# Patient Record
Sex: MALE | Race: WHITE | NOT HISPANIC OR LATINO | Employment: OTHER | URBAN - METROPOLITAN AREA
[De-identification: names, ages, dates, MRNs, and addresses within clinical notes are randomized per-mention and may not be internally consistent; named-entity substitution may affect disease eponyms.]

---

## 2017-03-04 ENCOUNTER — HOSPITAL ENCOUNTER (EMERGENCY)
Facility: CLINIC | Age: 82
Discharge: HOME | End: 2017-03-05
Attending: FAMILY MEDICINE | Admitting: FAMILY MEDICINE
Payer: COMMERCIAL

## 2017-03-04 ENCOUNTER — APPOINTMENT (OUTPATIENT)
Dept: GENERAL RADIOLOGY | Facility: CLINIC | Age: 82
End: 2017-03-04
Attending: FAMILY MEDICINE
Payer: COMMERCIAL

## 2017-03-04 DIAGNOSIS — M25.552 HIP PAIN, LEFT: ICD-10-CM

## 2017-03-04 PROCEDURE — 72100 X-RAY EXAM L-S SPINE 2/3 VWS: CPT

## 2017-03-04 PROCEDURE — 25000132 ZZH RX MED GY IP 250 OP 250 PS 637: Performed by: FAMILY MEDICINE

## 2017-03-04 PROCEDURE — 73502 X-RAY EXAM HIP UNI 2-3 VIEWS: CPT

## 2017-03-04 PROCEDURE — 99284 EMERGENCY DEPT VISIT MOD MDM: CPT | Mod: 25

## 2017-03-04 PROCEDURE — 99284 EMERGENCY DEPT VISIT MOD MDM: CPT | Performed by: FAMILY MEDICINE

## 2017-03-04 RX ORDER — HYDROCODONE BITARTRATE AND ACETAMINOPHEN 5; 325 MG/1; MG/1
1 TABLET ORAL ONCE
Status: COMPLETED | OUTPATIENT
Start: 2017-03-04 | End: 2017-03-04

## 2017-03-04 RX ORDER — LIDOCAINE 50 MG/G
1 PATCH TOPICAL
Status: DISCONTINUED | OUTPATIENT
Start: 2017-03-04 | End: 2017-03-05 | Stop reason: HOSPADM

## 2017-03-04 RX ADMIN — HYDROCODONE BITARTRATE AND ACETAMINOPHEN 1 TABLET: 5; 325 TABLET ORAL at 23:36

## 2017-03-04 RX ADMIN — LIDOCAINE 1 PATCH: 50 PATCH TOPICAL at 23:37

## 2017-03-04 NOTE — ED AVS SNAPSHOT
[facility] Emergency Department    [address] [facility] [address]    Phone:  [phone]    Fax:  [phone]                                       Alvin Newton   MRN: [id number]    Department:  [facility] Emergency Department   Date of Visit:  3/4/2017           After Visit Summary Signature Page     I have received my discharge instructions, and my questions have been answered. I have discussed any challenges I see with this plan with the nurse or doctor.    ..........................................................................................................................................  Patient/Patient Representative Signature      ..........................................................................................................................................  Patient Representative Print Name and Relationship to Patient    ..................................................               ................................................  Date                                            Time    ..........................................................................................................................................  Reviewed by Signature/Title    ...................................................              ..............................................  Date                                                            Time

## 2017-03-04 NOTE — ED AVS SNAPSHOT
[facility] Emergency Department    [address] [place] [address]    Phone:  [phone]    Fax:  [phone]                                       Alvin Newton   MRN: [id number]    Department:  [facility] Emergency Department   Date of Visit:  3/4/2017           Patient Information     Date Of Birth          5/10/1934        Your diagnoses for this visit were:     Hip pain, left This m,ay be coming from the hip replacement, the SI joint or the spine-lumbar., xray pover-read is currently pending.   I have written for medrol dose pack and vicodin.  remove the lidoderm patch from the low back tomorrow morning.  Lidocare is the OTC replacement that is available at [facility].  can apply in pm and remove in am. return for fever, worsening.       You were seen by Finesse Gonzalez MD.      Follow-up Information     Schedule an appointment as soon as possible for a visit with Semmler, Steven Duane, MD.    Specialty:  Family Practice    Contact information:    [facility]  [address] [facility] [id number] [phone]          Follow up with ortho .    Why:  they will call        Follow up with [facility] Emergency Department.    Specialty:  EMERGENCY MEDICINE    Why:  As needed, If symptoms worsen    Contact information:    [address] [place] [address] [phone]    Additional information:    The medical center is located at   [address] [place] [address] (between [address]   [address] in [place], [place] miles north   of [place]).        Discharge Instructions         ICD-10-CM    1. Hip pain, left M25.552 ORTHO  REFERRAL    This m,ay be coming from the hip replacement, the SI joint or the spine-lumbar., xray pover-read is currently pending.   I have written for medrol dose pack and vicodin.  remove the lidoderm patch from the low back tomorrow morning.  Lidocare is the OTC replacement that is available at [facility].  can apply in pm and remove in am.  return for fever, worsening.         24 Hour Appointment Hotline       To make an appointment at any [facility] clinic, call [phone] ([phone]). If you don't have a family doctor or clinic, we will help you find one. [facility] clinics are conveniently located to serve the needs of you and your family.          ED Discharge Orders     ORTHO  REFERRAL       [facility] is referring you to the Orthopedic  Services at [facility].       The  Representative will assist you in the coordination of your Orthopedic and Musculoskeletal Care as prescribed by your physician.    The  Representative will call you within 1 business day to help schedule your appointment, or you may contact the  Representative at:    All areas ~ [phone]     Type of Referral : Surgical / Specialist       Timeframe requested: 3 - 5 days    Coverage of these services is subject to the terms and limitations of your health insurance plan.  Please call member services at your health plan with any benefit or coverage questions.      If X-rays, CT or MRI's have been performed, please contact the facility where they were done to arrange for , prior to your scheduled appointment.  Please bring this referral request to your appointment and present it to your specialist.                     Review of your medicines      START taking        Dose / Directions Last dose taken    HYDROcodone-acetaminophen 5-325 MG per tablet   Commonly known as:  NORCO   Dose:  1 tablet   Quantity:  10 tablet        Take 1 tablet by mouth every 4 hours as needed for moderate to severe pain   Refills:  0        methylPREDNISolone 4 MG tablet   Commonly known as:  MEDROL DOSEPAK   Quantity:  21 tablet        Follow package instructions   Refills:  0          Our records show that you are taking the medicines listed below. If these are incorrect, please call your family doctor or  clinic.        Dose / Directions Last dose taken    aspirin 81 MG tablet   Dose:  81 mg        Take 81 mg by mouth every evening   Refills:  0        GLUCOSAMINE SULFATE PO   Dose:  1000 mg        Take 1,000 mg by mouth daily   Refills:  0        IBUPROFEN PO   Dose:  400 mg        Take 400 mg by mouth every evening   Refills:  0        lisinopril-hydrochlorothiazide 20-25 MG per tablet   Commonly known as:  PRINZIDE/ZESTORETIC   Dose:  1 tablet        Take 1 tablet by mouth daily   Refills:  0        MULTIVITAMINS PO   Dose:  1 tablet        Take 1 tablet by mouth daily   Refills:  0        OMEGA-3 FISH OIL PO   Dose:  1 capsule        Take 1 capsule by mouth daily   Refills:  0        ZOCOR PO   Dose:  40 mg        Take 40 mg by mouth At Bedtime   Refills:  0                Prescriptions were sent or printed at these locations (2 Prescriptions)                   [facility] [address] [place] [address] - [address]   [address] [facility] [address]    Telephone:  [phone]   Fax:  [phone]   Hours:                  E-Prescribed (1 of 1)         methylPREDNISolone (MEDROL DOSEPAK) 4 MG tablet                     Other Prescriptions                Printed at Department/Unit printer (1 of 1)         HYDROcodone-acetaminophen (NORCO) 5-325 MG per tablet                Procedures and tests performed during your visit     Lumbar spine XR, 2-3 views    Pelvis XR w/ unilateral hip left      Orders Needing Specimen Collection     None      Pending Results     Date and Time Order Name Status Description    3/4/2017 2321 Pelvis XR w/ unilateral hip left Preliminary     3/4/2017 2321 Lumbar spine XR, 2-3 views Preliminary             Pending Culture Results     No orders found for last 3 day(s).             Test Results from your hospital stay     3/5/2017 12:53 AM - Interface, Radiant Ib      Narrative     LUMBAR SPINE 3 VIEWS   3/5/2017 12:14 AM     HISTORY: Low back pain.    COMPARISON: None.       "  Impression     IMPRESSION: Moderate hypertrophic and degenerative changes are noted  throughout the lumbar spine. No convincing evidence for acute fracture  or gross malalignment. Bilateral hip arthroplasties are noted.         3/5/2017 12:54 AM - Interface, Radiant Ib      Narrative     PELVIS ONE VIEW AND LEFT HIP ONE VIEW   3/5/2017 12:15 AM     HISTORY: Left hip pain.    COMPARISON: 12/3/2013.        Impression     IMPRESSION: Bilateral hip arthroplasties. Components appear well  seated. No definite cause for left hip pain is identified. No  convincing evidence for acute fracture or dislocation.                Thank you for choosing [facility]       Thank you for choosing [facility] for your care. Our goal is always to provide you with excellent care. Hearing back from our patients is one way we can continue to improve our services. Please take a few minutes to complete the written survey that you may receive in the mail after you visit with us. Thank you!        [facility] Information     [employer] lets you send messages to your doctor, view your test results, renew your prescriptions, schedule appointments and more. To sign up, go to [email or web address] . Click on \"Log in\" on the left side of the screen, which will take you to the Welcome page. Then click on \"Sign up Now\" on the right side of the page.     You will be asked to enter the access code listed below, as well as some personal information. Please follow the directions to create your username and password.     Your access code is: [email or web address]  Expires: 6/3/2017 12:56 AM     Your access code will  in 90 days. If you need help or a new code, please call your [facility] clinic or [phone].        Care EveryWhere ID     This is your Care EveryWhere ID. This could be used by other organizations to access your [facility] medical records  [id number]        After Visit Summary       This is your record. Keep this with you and show to your community " pharmacist(s) and doctor(s) at your next visit.

## 2017-03-05 VITALS
OXYGEN SATURATION: 100 % | HEART RATE: 66 BPM | BODY MASS INDEX: 36.57 KG/M2 | HEIGHT: 72 IN | SYSTOLIC BLOOD PRESSURE: 150 MMHG | WEIGHT: 270 LBS | DIASTOLIC BLOOD PRESSURE: 70 MMHG | RESPIRATION RATE: 18 BRPM | TEMPERATURE: 98.1 F

## 2017-03-05 RX ORDER — HYDROCODONE BITARTRATE AND ACETAMINOPHEN 5; 325 MG/1; MG/1
1 TABLET ORAL EVERY 4 HOURS PRN
Qty: 10 TABLET | Refills: 0 | Status: ON HOLD | OUTPATIENT
Start: 2017-03-05 | End: 2017-12-31

## 2017-03-05 RX ORDER — METHYLPREDNISOLONE 4 MG
TABLET, DOSE PACK ORAL
Qty: 21 TABLET | Refills: 0 | Status: ON HOLD | OUTPATIENT
Start: 2017-03-05 | End: 2017-12-27

## 2017-03-05 RX ORDER — HYDROCODONE BITARTRATE AND ACETAMINOPHEN 5; 325 MG/1; MG/1
1 TABLET ORAL EVERY 4 HOURS PRN
Status: DISCONTINUED | OUTPATIENT
Start: 2017-03-05 | End: 2017-03-05 | Stop reason: HOSPADM

## 2017-03-05 NOTE — ED NOTES
Hx of bilateral hip replacements over 20-30 years ago.   Pt stood up onto curb while on vacation and felt something in left hip and has had pain since.  Extreme pain 3-4 days ago.  Pt reports driving back from [place] and came to ER.

## 2017-03-05 NOTE — DISCHARGE INSTRUCTIONS
ICD-10-CM    1. Hip pain, left M25.552 ORTHO  REFERRAL    This m,ay be coming from the hip replacement, the SI joint or the spine-lumbar., xray pover-read is currently pending.   I have written for medrol dose pack and vicodin.  remove the lidoderm patch from the low back tomorrow morning.  Lidocare is the OTC replacement that is available at [facility].  can apply in pm and remove in am. return for fever, worsening.

## 2017-03-09 ENCOUNTER — HOSPITAL ENCOUNTER (OUTPATIENT)
Dept: CT IMAGING | Facility: CLINIC | Age: 82
Discharge: HOME | End: 2017-03-09
Attending: ORTHOPAEDIC SURGERY | Admitting: ORTHOPAEDIC SURGERY
Payer: COMMERCIAL

## 2017-03-09 DIAGNOSIS — M25.552 PAIN OF LEFT HIP JOINT: ICD-10-CM

## 2017-03-09 DIAGNOSIS — Z96.642 STATUS POST TOTAL REPLACEMENT OF LEFT HIP: ICD-10-CM

## 2017-03-09 PROCEDURE — 73700 CT LOWER EXTREMITY W/O DYE: CPT | Mod: LT

## 2017-12-26 ENCOUNTER — HOSPITAL ENCOUNTER (EMERGENCY)
Facility: CLINIC | Age: 82
Discharge: ANOTHER HOSPITAL | End: 2017-12-27
Attending: EMERGENCY MEDICINE | Admitting: EMERGENCY MEDICINE
Payer: COMMERCIAL

## 2017-12-26 DIAGNOSIS — I21.4 NSTEMI (NON-ST ELEVATED MYOCARDIAL INFARCTION) (H): ICD-10-CM

## 2017-12-26 PROCEDURE — 76705 ECHO EXAM OF ABDOMEN: CPT | Mod: 26 | Performed by: EMERGENCY MEDICINE

## 2017-12-26 PROCEDURE — 99285 EMERGENCY DEPT VISIT HI MDM: CPT | Mod: 25 | Performed by: EMERGENCY MEDICINE

## 2017-12-26 PROCEDURE — 85025 COMPLETE CBC W/AUTO DIFF WBC: CPT | Performed by: EMERGENCY MEDICINE

## 2017-12-26 PROCEDURE — 80053 COMPREHEN METABOLIC PANEL: CPT | Performed by: EMERGENCY MEDICINE

## 2017-12-26 PROCEDURE — 93010 ELECTROCARDIOGRAM REPORT: CPT | Mod: Z6 | Performed by: EMERGENCY MEDICINE

## 2017-12-26 PROCEDURE — 96375 TX/PRO/DX INJ NEW DRUG ADDON: CPT | Performed by: EMERGENCY MEDICINE

## 2017-12-26 PROCEDURE — 96365 THER/PROPH/DIAG IV INF INIT: CPT | Performed by: EMERGENCY MEDICINE

## 2017-12-26 PROCEDURE — 76705 ECHO EXAM OF ABDOMEN: CPT | Performed by: EMERGENCY MEDICINE

## 2017-12-26 PROCEDURE — 96368 THER/DIAG CONCURRENT INF: CPT | Performed by: EMERGENCY MEDICINE

## 2017-12-26 PROCEDURE — 83690 ASSAY OF LIPASE: CPT | Performed by: EMERGENCY MEDICINE

## 2017-12-26 PROCEDURE — 93005 ELECTROCARDIOGRAM TRACING: CPT | Performed by: EMERGENCY MEDICINE

## 2017-12-26 PROCEDURE — 96366 THER/PROPH/DIAG IV INF ADDON: CPT | Performed by: EMERGENCY MEDICINE

## 2017-12-26 PROCEDURE — 84484 ASSAY OF TROPONIN QUANT: CPT | Performed by: EMERGENCY MEDICINE

## 2017-12-26 RX ORDER — ASPIRIN 81 MG/1
324 TABLET, CHEWABLE ORAL ONCE
Status: COMPLETED | OUTPATIENT
Start: 2017-12-26 | End: 2017-12-27

## 2017-12-27 ENCOUNTER — APPOINTMENT (OUTPATIENT)
Dept: GENERAL RADIOLOGY | Facility: CLINIC | Age: 82
End: 2017-12-27
Attending: EMERGENCY MEDICINE
Payer: COMMERCIAL

## 2017-12-27 ENCOUNTER — APPOINTMENT (OUTPATIENT)
Dept: NUCLEAR MEDICINE | Facility: CLINIC | Age: 82
DRG: 417 | End: 2017-12-27
Attending: INTERNAL MEDICINE
Payer: COMMERCIAL

## 2017-12-27 ENCOUNTER — APPOINTMENT (OUTPATIENT)
Dept: ULTRASOUND IMAGING | Facility: CLINIC | Age: 82
End: 2017-12-27
Attending: EMERGENCY MEDICINE
Payer: COMMERCIAL

## 2017-12-27 ENCOUNTER — APPOINTMENT (OUTPATIENT)
Dept: CT IMAGING | Facility: CLINIC | Age: 82
End: 2017-12-27
Attending: EMERGENCY MEDICINE
Payer: COMMERCIAL

## 2017-12-27 ENCOUNTER — HOSPITAL ENCOUNTER (INPATIENT)
Facility: CLINIC | Age: 82
LOS: 4 days | Discharge: HOME | DRG: 417 | End: 2017-12-31
Attending: INTERNAL MEDICINE | Admitting: INTERNAL MEDICINE
Payer: COMMERCIAL

## 2017-12-27 ENCOUNTER — APPOINTMENT (OUTPATIENT)
Dept: CARDIOLOGY | Facility: CLINIC | Age: 82
DRG: 417 | End: 2017-12-27
Attending: INTERNAL MEDICINE
Payer: COMMERCIAL

## 2017-12-27 VITALS
SYSTOLIC BLOOD PRESSURE: 163 MMHG | DIASTOLIC BLOOD PRESSURE: 83 MMHG | HEIGHT: 71 IN | WEIGHT: 260 LBS | OXYGEN SATURATION: 98 % | HEART RATE: 104 BPM | RESPIRATION RATE: 21 BRPM | TEMPERATURE: 97.4 F | BODY MASS INDEX: 36.4 KG/M2

## 2017-12-27 DIAGNOSIS — I21.4 NSTEMI (NON-ST ELEVATED MYOCARDIAL INFARCTION) (H): Primary | ICD-10-CM

## 2017-12-27 DIAGNOSIS — G89.18 ACUTE POST-OPERATIVE PAIN: ICD-10-CM

## 2017-12-27 DIAGNOSIS — K81.0 ACUTE CHOLECYSTITIS: ICD-10-CM

## 2017-12-27 DIAGNOSIS — I10 BENIGN ESSENTIAL HYPERTENSION: ICD-10-CM

## 2017-12-27 LAB
ALBUMIN SERPL-MCNC: 3.8 G/DL (ref 3.4–5)
ALBUMIN UR-MCNC: 10 MG/DL
ALP SERPL-CCNC: 113 U/L (ref 40–150)
ALT SERPL W P-5'-P-CCNC: 42 U/L (ref 0–70)
ANION GAP SERPL CALCULATED.3IONS-SCNC: 9 MMOL/L (ref 3–14)
APPEARANCE UR: CLEAR
AST SERPL W P-5'-P-CCNC: 19 U/L (ref 0–45)
BASOPHILS # BLD AUTO: 0 10E9/L (ref 0–0.2)
BASOPHILS NFR BLD AUTO: 0.2 %
BILIRUB SERPL-MCNC: 1 MG/DL (ref 0.2–1.3)
BILIRUB UR QL STRIP: NEGATIVE
BUN SERPL-MCNC: 27 MG/DL (ref 7–30)
CALCIUM SERPL-MCNC: 9.5 MG/DL (ref 8.5–10.1)
CHLORIDE SERPL-SCNC: 105 MMOL/L (ref 94–109)
CO2 SERPL-SCNC: 22 MMOL/L (ref 20–32)
COLOR UR AUTO: YELLOW
CREAT SERPL-MCNC: 0.9 MG/DL (ref 0.66–1.25)
DIFFERENTIAL METHOD BLD: ABNORMAL
EOSINOPHIL # BLD AUTO: 0 10E9/L (ref 0–0.7)
EOSINOPHIL NFR BLD AUTO: 0.2 %
ERYTHROCYTE [DISTWIDTH] IN BLOOD BY AUTOMATED COUNT: 13.2 % (ref 10–15)
ERYTHROCYTE [DISTWIDTH] IN BLOOD BY AUTOMATED COUNT: 13.2 % (ref 10–15)
GFR SERPL CREATININE-BSD FRML MDRD: 80 ML/MIN/1.7M2
GLUCOSE BLDC GLUCOMTR-MCNC: 142 MG/DL (ref 70–99)
GLUCOSE BLDC GLUCOMTR-MCNC: 146 MG/DL (ref 70–99)
GLUCOSE BLDC GLUCOMTR-MCNC: 188 MG/DL (ref 70–99)
GLUCOSE SERPL-MCNC: 210 MG/DL (ref 70–99)
GLUCOSE UR STRIP-MCNC: 300 MG/DL
HBA1C MFR BLD: 6.6 % (ref 4.3–6)
HCT VFR BLD AUTO: 44.1 % (ref 40–53)
HCT VFR BLD AUTO: 45.7 % (ref 40–53)
HGB BLD-MCNC: 15.9 G/DL (ref 13.3–17.7)
HGB BLD-MCNC: 16.2 G/DL (ref 13.3–17.7)
HGB UR QL STRIP: NEGATIVE
IMM GRANULOCYTES # BLD: 0 10E9/L (ref 0–0.4)
IMM GRANULOCYTES NFR BLD: 0.2 %
KETONES UR STRIP-MCNC: 40 MG/DL
LEUKOCYTE ESTERASE UR QL STRIP: NEGATIVE
LIPASE SERPL-CCNC: 75 U/L (ref 73–393)
LMWH PPP CHRO-ACNC: 0.66 IU/ML
LYMPHOCYTES # BLD AUTO: 1.6 10E9/L (ref 0.8–5.3)
LYMPHOCYTES NFR BLD AUTO: 12.7 %
MCH RBC QN AUTO: 30.1 PG (ref 26.5–33)
MCH RBC QN AUTO: 31.2 PG (ref 26.5–33)
MCHC RBC AUTO-ENTMCNC: 35.4 G/DL (ref 31.5–36.5)
MCHC RBC AUTO-ENTMCNC: 36.1 G/DL (ref 31.5–36.5)
MCV RBC AUTO: 85 FL (ref 78–100)
MCV RBC AUTO: 87 FL (ref 78–100)
MONOCYTES # BLD AUTO: 0.7 10E9/L (ref 0–1.3)
MONOCYTES NFR BLD AUTO: 5.3 %
MUCOUS THREADS #/AREA URNS LPF: PRESENT /LPF
NEUTROPHILS # BLD AUTO: 10 10E9/L (ref 1.6–8.3)
NEUTROPHILS NFR BLD AUTO: 81.4 %
NITRATE UR QL: NEGATIVE
PH UR STRIP: 5.5 PH (ref 5–7)
PLATELET # BLD AUTO: 195 10E9/L (ref 150–450)
PLATELET # BLD AUTO: 218 10E9/L (ref 150–450)
POTASSIUM SERPL-SCNC: 3.6 MMOL/L (ref 3.4–5.3)
PROT SERPL-MCNC: 8.4 G/DL (ref 6.8–8.8)
RBC # BLD AUTO: 5.09 10E12/L (ref 4.4–5.9)
RBC # BLD AUTO: 5.39 10E12/L (ref 4.4–5.9)
RBC #/AREA URNS AUTO: 3 /HPF (ref 0–2)
SODIUM SERPL-SCNC: 136 MMOL/L (ref 133–144)
SOURCE: ABNORMAL
SP GR UR STRIP: 1.02 (ref 1–1.03)
SQUAMOUS #/AREA URNS AUTO: <1 /HPF (ref 0–1)
TROPONIN I SERPL-MCNC: 0.05 UG/L (ref 0–0.04)
TROPONIN I SERPL-MCNC: 0.17 UG/L (ref 0–0.04)
TROPONIN I SERPL-MCNC: 0.17 UG/L (ref 0–0.04)
TROPONIN I SERPL-MCNC: <0.015 UG/L (ref 0–0.04)
UROBILINOGEN UR STRIP-MCNC: NORMAL MG/DL (ref 0–2)
WBC # BLD AUTO: 12.3 10E9/L (ref 4–11)
WBC # BLD AUTO: 18.3 10E9/L (ref 4–11)
WBC #/AREA URNS AUTO: <1 /HPF (ref 0–2)

## 2017-12-27 PROCEDURE — 25000128 H RX IP 250 OP 636: Performed by: EMERGENCY MEDICINE

## 2017-12-27 PROCEDURE — 00000146 ZZHCL STATISTIC GLUCOSE BY METER IP

## 2017-12-27 PROCEDURE — 99223 1ST HOSP IP/OBS HIGH 75: CPT | Mod: AI | Performed by: INTERNAL MEDICINE

## 2017-12-27 PROCEDURE — 78452 HT MUSCLE IMAGE SPECT MULT: CPT

## 2017-12-27 PROCEDURE — 76705 ECHO EXAM OF ABDOMEN: CPT

## 2017-12-27 PROCEDURE — 96375 TX/PRO/DX INJ NEW DRUG ADDON: CPT | Performed by: EMERGENCY MEDICINE

## 2017-12-27 PROCEDURE — 93018 CV STRESS TEST I&R ONLY: CPT | Performed by: INTERNAL MEDICINE

## 2017-12-27 PROCEDURE — 84484 ASSAY OF TROPONIN QUANT: CPT | Performed by: EMERGENCY MEDICINE

## 2017-12-27 PROCEDURE — 25000128 H RX IP 250 OP 636: Performed by: INTERNAL MEDICINE

## 2017-12-27 PROCEDURE — 93016 CV STRESS TEST SUPVJ ONLY: CPT | Performed by: INTERNAL MEDICINE

## 2017-12-27 PROCEDURE — 96365 THER/PROPH/DIAG IV INF INIT: CPT | Mod: 59 | Performed by: EMERGENCY MEDICINE

## 2017-12-27 PROCEDURE — 96366 THER/PROPH/DIAG IV INF ADDON: CPT | Performed by: EMERGENCY MEDICINE

## 2017-12-27 PROCEDURE — 96368 THER/DIAG CONCURRENT INF: CPT | Performed by: EMERGENCY MEDICINE

## 2017-12-27 PROCEDURE — 83036 HEMOGLOBIN GLYCOSYLATED A1C: CPT | Performed by: INTERNAL MEDICINE

## 2017-12-27 PROCEDURE — 36415 COLL VENOUS BLD VENIPUNCTURE: CPT | Performed by: INTERNAL MEDICINE

## 2017-12-27 PROCEDURE — 34300033 ZZH RX 343: Performed by: INTERNAL MEDICINE

## 2017-12-27 PROCEDURE — 84484 ASSAY OF TROPONIN QUANT: CPT | Performed by: INTERNAL MEDICINE

## 2017-12-27 PROCEDURE — 93017 CV STRESS TEST TRACING ONLY: CPT

## 2017-12-27 PROCEDURE — 71010 XR CHEST PORT 1 VW: CPT

## 2017-12-27 PROCEDURE — 74177 CT ABD & PELVIS W/CONTRAST: CPT

## 2017-12-27 PROCEDURE — 78452 HT MUSCLE IMAGE SPECT MULT: CPT | Mod: 26 | Performed by: INTERNAL MEDICINE

## 2017-12-27 PROCEDURE — A9502 TC99M TETROFOSMIN: HCPCS | Performed by: INTERNAL MEDICINE

## 2017-12-27 PROCEDURE — 99222 1ST HOSP IP/OBS MODERATE 55: CPT | Mod: 25 | Performed by: INTERNAL MEDICINE

## 2017-12-27 PROCEDURE — 85027 COMPLETE CBC AUTOMATED: CPT | Performed by: INTERNAL MEDICINE

## 2017-12-27 PROCEDURE — 25000132 ZZH RX MED GY IP 250 OP 250 PS 637: Performed by: INTERNAL MEDICINE

## 2017-12-27 PROCEDURE — 85520 HEPARIN ASSAY: CPT | Performed by: INTERNAL MEDICINE

## 2017-12-27 PROCEDURE — 25000125 ZZHC RX 250: Performed by: EMERGENCY MEDICINE

## 2017-12-27 PROCEDURE — 25000132 ZZH RX MED GY IP 250 OP 250 PS 637: Performed by: EMERGENCY MEDICINE

## 2017-12-27 PROCEDURE — 40000264 ECHO COMPLETE WITH LUMASON

## 2017-12-27 PROCEDURE — 81001 URINALYSIS AUTO W/SCOPE: CPT | Performed by: EMERGENCY MEDICINE

## 2017-12-27 PROCEDURE — 40000855 ZZH STATISTIC ECHO STRESS OR NM NPI

## 2017-12-27 PROCEDURE — 93005 ELECTROCARDIOGRAM TRACING: CPT

## 2017-12-27 PROCEDURE — 93010 ELECTROCARDIOGRAM REPORT: CPT | Performed by: INTERNAL MEDICINE

## 2017-12-27 PROCEDURE — 21000000 ZZH R&B IMCU HEART CARE

## 2017-12-27 PROCEDURE — 93306 TTE W/DOPPLER COMPLETE: CPT | Mod: 26 | Performed by: INTERNAL MEDICINE

## 2017-12-27 PROCEDURE — 25000131 ZZH RX MED GY IP 250 OP 636 PS 637: Performed by: INTERNAL MEDICINE

## 2017-12-27 PROCEDURE — 25500064 ZZH RX 255 OP 636: Performed by: INTERNAL MEDICINE

## 2017-12-27 RX ORDER — HYDROMORPHONE HYDROCHLORIDE 1 MG/ML
0.5 INJECTION, SOLUTION INTRAMUSCULAR; INTRAVENOUS; SUBCUTANEOUS
Status: DISCONTINUED | OUTPATIENT
Start: 2017-12-27 | End: 2017-12-27 | Stop reason: HOSPADM

## 2017-12-27 RX ORDER — ASPIRIN 81 MG/1
324 TABLET, CHEWABLE ORAL ONCE
Status: DISCONTINUED | OUTPATIENT
Start: 2017-12-27 | End: 2017-12-27

## 2017-12-27 RX ORDER — ACYCLOVIR 200 MG/1
0-1 CAPSULE ORAL
Status: DISCONTINUED | OUTPATIENT
Start: 2017-12-27 | End: 2017-12-28 | Stop reason: HOSPADM

## 2017-12-27 RX ORDER — HYDRALAZINE HYDROCHLORIDE 20 MG/ML
10 INJECTION INTRAMUSCULAR; INTRAVENOUS EVERY 4 HOURS PRN
Status: DISCONTINUED | OUTPATIENT
Start: 2017-12-27 | End: 2017-12-31 | Stop reason: HOSPADM

## 2017-12-27 RX ORDER — LABETALOL HYDROCHLORIDE 5 MG/ML
10 INJECTION, SOLUTION INTRAVENOUS
Status: DISCONTINUED | OUTPATIENT
Start: 2017-12-27 | End: 2017-12-31 | Stop reason: HOSPADM

## 2017-12-27 RX ORDER — NALOXONE HYDROCHLORIDE 0.4 MG/ML
.1-.4 INJECTION, SOLUTION INTRAMUSCULAR; INTRAVENOUS; SUBCUTANEOUS
Status: DISCONTINUED | OUTPATIENT
Start: 2017-12-27 | End: 2017-12-31 | Stop reason: HOSPADM

## 2017-12-27 RX ORDER — NICOTINE POLACRILEX 4 MG
15-30 LOZENGE BUCCAL
Status: DISCONTINUED | OUTPATIENT
Start: 2017-12-27 | End: 2017-12-31 | Stop reason: HOSPADM

## 2017-12-27 RX ORDER — ASPIRIN 325 MG
325 TABLET ORAL DAILY
Status: DISCONTINUED | OUTPATIENT
Start: 2017-12-28 | End: 2017-12-27

## 2017-12-27 RX ORDER — DEXTROSE MONOHYDRATE 25 G/50ML
25-50 INJECTION, SOLUTION INTRAVENOUS
Status: DISCONTINUED | OUTPATIENT
Start: 2017-12-27 | End: 2017-12-31 | Stop reason: HOSPADM

## 2017-12-27 RX ORDER — ONDANSETRON 2 MG/ML
4 INJECTION INTRAMUSCULAR; INTRAVENOUS EVERY 6 HOURS PRN
Status: DISCONTINUED | OUTPATIENT
Start: 2017-12-27 | End: 2017-12-31 | Stop reason: HOSPADM

## 2017-12-27 RX ORDER — HYDROMORPHONE HYDROCHLORIDE 1 MG/ML
0.2 INJECTION, SOLUTION INTRAMUSCULAR; INTRAVENOUS; SUBCUTANEOUS
Status: DISCONTINUED | OUTPATIENT
Start: 2017-12-27 | End: 2017-12-31 | Stop reason: HOSPADM

## 2017-12-27 RX ORDER — IOPAMIDOL 755 MG/ML
100 INJECTION, SOLUTION INTRAVASCULAR ONCE
Status: COMPLETED | OUTPATIENT
Start: 2017-12-27 | End: 2017-12-27

## 2017-12-27 RX ORDER — LIDOCAINE 40 MG/G
CREAM TOPICAL
Status: DISCONTINUED | OUTPATIENT
Start: 2017-12-27 | End: 2017-12-31 | Stop reason: HOSPADM

## 2017-12-27 RX ORDER — AMPICILLIN AND SULBACTAM 2; 1 G/1; G/1
3 INJECTION, POWDER, FOR SOLUTION INTRAMUSCULAR; INTRAVENOUS ONCE
Status: COMPLETED | OUTPATIENT
Start: 2017-12-27 | End: 2017-12-27

## 2017-12-27 RX ORDER — ACETAMINOPHEN 650 MG/1
650 SUPPOSITORY RECTAL EVERY 4 HOURS PRN
Status: DISCONTINUED | OUTPATIENT
Start: 2017-12-27 | End: 2017-12-31 | Stop reason: HOSPADM

## 2017-12-27 RX ORDER — ALBUTEROL SULFATE 90 UG/1
2 AEROSOL, METERED RESPIRATORY (INHALATION) EVERY 5 MIN PRN
Status: DISCONTINUED | OUTPATIENT
Start: 2017-12-27 | End: 2017-12-28 | Stop reason: HOSPADM

## 2017-12-27 RX ORDER — ONDANSETRON 2 MG/ML
4 INJECTION INTRAMUSCULAR; INTRAVENOUS ONCE
Status: COMPLETED | OUTPATIENT
Start: 2017-12-27 | End: 2017-12-27

## 2017-12-27 RX ORDER — LISINOPRIL AND HYDROCHLOROTHIAZIDE 20; 25 MG/1; MG/1
1 TABLET ORAL
Status: DISCONTINUED | OUTPATIENT
Start: 2017-12-27 | End: 2017-12-29

## 2017-12-27 RX ORDER — NITROGLYCERIN 0.4 MG/1
0.4 TABLET SUBLINGUAL EVERY 5 MIN PRN
Status: DISCONTINUED | OUTPATIENT
Start: 2017-12-27 | End: 2017-12-31 | Stop reason: HOSPADM

## 2017-12-27 RX ORDER — NALOXONE HYDROCHLORIDE 0.4 MG/ML
.1-.4 INJECTION, SOLUTION INTRAMUSCULAR; INTRAVENOUS; SUBCUTANEOUS
Status: DISCONTINUED | OUTPATIENT
Start: 2017-12-27 | End: 2017-12-27

## 2017-12-27 RX ORDER — ONDANSETRON 4 MG/1
4 TABLET, ORALLY DISINTEGRATING ORAL EVERY 6 HOURS PRN
Status: DISCONTINUED | OUTPATIENT
Start: 2017-12-27 | End: 2017-12-31 | Stop reason: HOSPADM

## 2017-12-27 RX ORDER — NITROGLYCERIN 0.4 MG/1
0.4 TABLET SUBLINGUAL EVERY 5 MIN PRN
Status: DISCONTINUED | OUTPATIENT
Start: 2017-12-27 | End: 2017-12-27 | Stop reason: HOSPADM

## 2017-12-27 RX ORDER — METOPROLOL TARTRATE 25 MG/1
25 TABLET, FILM COATED ORAL 2 TIMES DAILY
Status: DISCONTINUED | OUTPATIENT
Start: 2017-12-27 | End: 2017-12-30

## 2017-12-27 RX ORDER — METOPROLOL TARTRATE 25 MG/1
25 TABLET, FILM COATED ORAL ONCE
Status: COMPLETED | OUTPATIENT
Start: 2017-12-27 | End: 2017-12-27

## 2017-12-27 RX ORDER — SODIUM CHLORIDE 9 MG/ML
INJECTION, SOLUTION INTRAVENOUS CONTINUOUS
Status: ACTIVE | OUTPATIENT
Start: 2017-12-27 | End: 2017-12-30

## 2017-12-27 RX ORDER — BISACODYL 10 MG
10 SUPPOSITORY, RECTAL RECTAL DAILY PRN
Status: DISCONTINUED | OUTPATIENT
Start: 2017-12-27 | End: 2017-12-31

## 2017-12-27 RX ORDER — ALUMINA, MAGNESIA, AND SIMETHICONE 2400; 2400; 240 MG/30ML; MG/30ML; MG/30ML
30 SUSPENSION ORAL EVERY 4 HOURS PRN
Status: DISCONTINUED | OUTPATIENT
Start: 2017-12-27 | End: 2017-12-29

## 2017-12-27 RX ORDER — REGADENOSON 0.08 MG/ML
0.4 INJECTION, SOLUTION INTRAVENOUS ONCE
Status: DISCONTINUED | OUTPATIENT
Start: 2017-12-27 | End: 2017-12-27

## 2017-12-27 RX ORDER — AMINOPHYLLINE 25 MG/ML
50-100 INJECTION, SOLUTION INTRAVENOUS
Status: DISCONTINUED | OUTPATIENT
Start: 2017-12-27 | End: 2017-12-28 | Stop reason: HOSPADM

## 2017-12-27 RX ADMIN — TAZOBACTAM SODIUM AND PIPERACILLIN SODIUM 3.38 G: 375; 3 INJECTION, SOLUTION INTRAVENOUS at 08:38

## 2017-12-27 RX ADMIN — TETROFOSMIN 37.5 MCI.: 1.38 INJECTION, POWDER, LYOPHILIZED, FOR SOLUTION INTRAVENOUS at 14:03

## 2017-12-27 RX ADMIN — HEPARIN SODIUM 1100 UNITS/HR: 10000 INJECTION, SOLUTION INTRAVENOUS at 04:20

## 2017-12-27 RX ADMIN — SODIUM CHLORIDE 72 ML: 9 INJECTION, SOLUTION INTRAVENOUS at 01:10

## 2017-12-27 RX ADMIN — NITROGLYCERIN 0.4 MG: 0.4 TABLET SUBLINGUAL at 03:43

## 2017-12-27 RX ADMIN — METOPROLOL TARTRATE 25 MG: 25 TABLET ORAL at 04:51

## 2017-12-27 RX ADMIN — Medication 5500 UNITS: at 04:20

## 2017-12-27 RX ADMIN — LISINOPRIL AND HYDROCHLOROTHIAZIDE 1 TABLET: 25; 20 TABLET ORAL at 15:36

## 2017-12-27 RX ADMIN — Medication 0.2 MG: at 11:30

## 2017-12-27 RX ADMIN — ASPIRIN 81 MG 324 MG: 81 TABLET ORAL at 00:03

## 2017-12-27 RX ADMIN — INSULIN ASPART 1 UNITS: 100 INJECTION, SOLUTION INTRAVENOUS; SUBCUTANEOUS at 19:36

## 2017-12-27 RX ADMIN — AMPICILLIN SODIUM AND SULBACTAM SODIUM 3 G: 2; 1 INJECTION, POWDER, FOR SOLUTION INTRAMUSCULAR; INTRAVENOUS at 05:04

## 2017-12-27 RX ADMIN — IOPAMIDOL 100 ML: 755 INJECTION, SOLUTION INTRAVENOUS at 01:10

## 2017-12-27 RX ADMIN — REGADENOSON 0.4 MG: 0.08 INJECTION, SOLUTION INTRAVENOUS at 14:01

## 2017-12-27 RX ADMIN — NITROGLYCERIN 0.4 MG: 0.4 TABLET SUBLINGUAL at 03:51

## 2017-12-27 RX ADMIN — HYDROMORPHONE HYDROCHLORIDE 0.5 MG: 1 INJECTION, SOLUTION INTRAMUSCULAR; INTRAVENOUS; SUBCUTANEOUS at 00:43

## 2017-12-27 RX ADMIN — NITROGLYCERIN 0.4 MG: 0.4 TABLET SUBLINGUAL at 04:08

## 2017-12-27 RX ADMIN — ONDANSETRON 4 MG: 2 INJECTION INTRAMUSCULAR; INTRAVENOUS at 00:40

## 2017-12-27 RX ADMIN — TETROFOSMIN 12.3 MCI.: 1.38 INJECTION, POWDER, LYOPHILIZED, FOR SOLUTION INTRAVENOUS at 11:29

## 2017-12-27 RX ADMIN — TAZOBACTAM SODIUM AND PIPERACILLIN SODIUM 3.38 G: 375; 3 INJECTION, SOLUTION INTRAVENOUS at 15:28

## 2017-12-27 RX ADMIN — TAZOBACTAM SODIUM AND PIPERACILLIN SODIUM 3.38 G: 375; 3 INJECTION, SOLUTION INTRAVENOUS at 20:04

## 2017-12-27 RX ADMIN — METOPROLOL TARTRATE 25 MG: 25 TABLET ORAL at 11:28

## 2017-12-27 RX ADMIN — SODIUM CHLORIDE 1000 ML: 9 INJECTION, SOLUTION INTRAVENOUS at 08:29

## 2017-12-27 RX ADMIN — LIDOCAINE HYDROCHLORIDE 30 ML: 20 SOLUTION ORAL; TOPICAL at 00:05

## 2017-12-27 RX ADMIN — SULFUR HEXAFLUORIDE 2 ML: KIT at 12:04

## 2017-12-27 RX ADMIN — METOPROLOL TARTRATE 25 MG: 25 TABLET ORAL at 20:04

## 2017-12-27 NOTE — CONSULTS
General Surgery Consultation    Alvin Newton MRN#: [id number]   Age: 83 year old YOB: 1934     Date of Admission:  12/27/2017  Reason for consult: Possible Acute Cholecystitis       Requesting physician: Reyes Tillman       Surgeon:        Heber Gonzalez        Chief Complaint:   Abdominal pain, epigastric and RUQ     History is obtained from the patient; wife and daughter present.         History of Present Illness:   This patient is a 83 year old  male with a significant past medical history of obesity, type 2 diabetes and hypertension who presents with epigastric and RUQ pain after eating dinner last night.  Pain was severe and not alleviated by Maalox.  He presented to [facility] and was transferred to [facility].  His blood pressure has been high with this and he had a borderline elevated troponin for which Cardiology was consulted.          Past Medical History:   Alvin Newton  has a past medical history of Colonic diverticulum; Hyperlipidemia; Hypertension; Obesity (BMI 30-39.9); Osteoarthritis; and Type 2 diabetes mellitus (H).          Past Surgical History:     Past Surgical History:   Procedure Laterality Date     ARTHROPLASTY HIP BILATERAL  1987      ESOPHAGOSCOPY, DIAGNOSTIC  2003     TRANSRECTAL ULTRASONIC, TRANSURETHRAL RESECTION (TUR) OF PROSTATE CYST  1990      Also, recent face lesion excision (medial left cheek)    Tolerated anesthesia, though believes he had spinal for total hips.  No bleeding abnormalities.       Social History:     Social History   Substance Use Topics     Smoking status: Never Smoker     Smokeless tobacco: Never Used     Alcohol use Yes      Comment: 0-1 beer daily             Family History:   This patient has no significant family history          Allergies:   No Known Allergies          Medications:     Prior to Admission medications    Medication Sig Start Date End Date Taking? Authorizing Provider   HYDROcodone-acetaminophen (NORCO)  5-325 MG per tablet Take 1 tablet by mouth every 4 hours as needed for moderate to severe pain 3/5/17  Yes Finesse Gonzalez MD   Multiple Vitamin (MULTIVITAMINS PO) Take 1 tablet by mouth daily   Yes Reported, Patient   GLUCOSAMINE SULFATE PO Take 1,000 mg by mouth daily   Yes Reported, Patient   Simvastatin (ZOCOR PO) Take 40 mg by mouth At Bedtime    Yes Reported, Patient   Omega-3 Fatty Acids (OMEGA-3 FISH OIL PO) Take 1 capsule by mouth daily   Yes Reported, Patient   aspirin 81 MG tablet Take 81 mg by mouth every evening   Yes Reported, Patient   IBUPROFEN PO Take 400 mg by mouth every evening   Yes Reported, Patient   lisinopril-hydrochlorothiazide (PRINZIDE,ZESTORETIC) 20-25 MG per tablet Take 1 tablet by mouth daily   Yes Reported, Patient             Review of Systems:   The Review of Systems is negative other than noted in the HPI          Physical Exam:   Blood pressure 141/67, pulse 95, temperature 98.4  F (36.9  C), temperature source Oral, resp. rate 18, weight 118.8 kg (261 lb 14.4 oz).      General - This is a well developed, well nourished male in no apparent distress though tired.  HEENT - Face with scarring and mild paralysis to Lt and Rt sides of mouth that I would not have noted, but daughter (speech pathologist) mentioned slurred speech.  No unilateral facial drooping.  Tongue does not deviate to one side.  Pt and wife feel this is from recent surgery; sensation not fully returned, worsened by dry mouth.  Normocephalic. Atraumatic. Mucous membranes moist except dry mouth. Pupils equal.  No scleral icterus.    Neck - Supple without masses.  Lungs - Clear to ascultation bilaterally.    Heart - Regular rate & rhythm without murmur to me.  Cardiology reports 2/6 early systolic murmur.  Abdomen - Soft, nondistended with +bowel sounds.   + tenderness to upper quadrants with palpation. No rebound tenderness.   Extremities - Moves all extremities. Warm without edema.  Neurologic - Nonfocal.           Data:   Labs:  WBC -   WBC   Date Value Ref Range Status   12/27/2017 18.3 (H) 4.0 - 11.0 10e9/L Final     Hgb -   Hemoglobin   Date Value Ref Range Status   12/27/2017 15.9 13.3 - 17.7 g/dL Final       Liver Function Studies -   Recent Labs   Lab Test  12/26/17   2356   PROTTOTAL  8.4   ALBUMIN  3.8   BILITOTAL  1.0   ALKPHOS  113   AST  19   ALT  42       CT scan of the abdomen:   IMPRESSION:  1. No acute abnormality.  2. Cholelithiasis.  3. Colonic diverticula without acute diverticulitis. No bowel  obstruction or inflammation.   Ultrasound of the abdomen:  IMPRESSION:  1. Probable cholelithiasis. The gallbladder wall is borderline  thickened and there is a positive sonographic Driver's sign. Acute  cholecystitis is a possibility.  2. There is no evidence of biliary dilatation though the common  hepatic duct is not seen due to overlying bowel gas.    EKG and Echo:   Complete; See Chart         Assessment:   Cholelithiasis with probable Cholecystitis         Plan:   Laparoscopic Cholecystectomy this admission pending Cardiac clearance.  -Pt has had an Echo and is in between Stress testing.  Assuming Cardiac clearance, will try for surgery tomorrow 12/28/17.  -NPO after midnight for possible Lap Rosy.  -Discussed surgery in detail with patient and family.  All questions were answered.  Dr. Gonzalez will see pt and family prior to surgery.  -Continue Zosyn.      Enzo Ta PA-C, physician assistant for Heber Gonzalez  [facility], [phone]  Pager [phone]

## 2017-12-27 NOTE — PHARMACY-ANTICOAGULATION SERVICE
Patient to start the heparin C-V/Vascular protocol with a goal anti 10a level of 0.15-0.35. Using a HT of 71 inches and a WT of 118 kg, a dosing WT of 92 kg was calculated. Based on this dosing WT, give patient a heparin bolus of 5500 units from the bag and then start a drip at 1100 units/hr. Check the patient's anti 10a level 6 hours after starting the drip at ~1000.   Per C-V/Vascular protocol.    Iris HelmD

## 2017-12-27 NOTE — IP AVS SNAPSHOT
[facility] [facility]    [address] [place] [address]    Phone:  [phone]                                       After Visit Summary   12/27/2017    Alvin Newton    MRN: [id number]           After Visit Summary Signature Page     I have received my discharge instructions, and my questions have been answered. I have discussed any challenges I see with this plan with the nurse or doctor.    ..........................................................................................................................................  Patient/Patient Representative Signature      ..........................................................................................................................................  Patient Representative Print Name and Relationship to Patient    ..................................................               ................................................  Date                                            Time    ..........................................................................................................................................  Reviewed by Signature/Title    ...................................................              ..............................................  Date                                                            Time

## 2017-12-27 NOTE — CONSULTS
DATE OF SERVICE:  2017      REFERRAL SOURCE:  Reyes Tillman MD, Hospitalist Service.      REASON FOR CONSULTATION:  Elevated troponin in the context of possible acute cholecystitis.      HISTORY OF PRESENT ILLNESS:    Mr. Alvin Newton is new to Cardiology.  He is an 83-year-old obese (BMI 36.5 kg/m2),  gentleman who is known to have treated hypertension, hyperlipidemia, type 2 diabetes mellitus, osteoarthrosis and lumbar disk disease, lifelong never tobacco user.  He was transferred from [facility] due to a history and symptoms very suggestive of acute cholecystitis with borderline ultrasound imaging findings.  When he first presented yesterday, his blood pressure was markedly elevated at 194/94 mmHg (in the context of significant abdominal pain).  ECG showed old right bundle branch block without any concerning ischemic changes and he has had a borderline elevated troponin I of -0.049, 0.167.  He denies any chest pain.  His symptoms are epigastric and significant right upper quadrant tenderness that gets worse with minimal palpation and associated with some nausea, mild fever, and leukocytosis that is increasing (was 18.3 today).  His subsequent blood pressures overnight have been somewhat elevated, but the most recent one is 148/92.      The patient's risk factors are primarily his strong family history.  His brother had a non-fatal myocardial infarction in his mid 60s and went on to have quadruple bypass and  in his 80s.  One of his sisters also had myocardial infarction in 80s and another was found dead in her sleep in her 80s and mother had a fatal stroke and myocardial infarction in her 80s.  His blood pressure has historically been well controlled and he was only taking Zestoretic (lisinopril 20/hydrochlorothiazide 25 mg) daily at home.  His diabetes is diet-controlled with HbA1c of 6.6 percent.  We do not have a lipid profile.  His primary care provider is in the [facility] system.  The  patient does not have a preceding history concerning for angina.  He has severe arthritis but does go to the [facility] 3 times a week to do pool exercises and at this level of exertion, has not had any chest pain.  His creatinine is normal at 0.9.  Hemoglobin is 15.9.  He has not been screened for sleep apnea.      MEDICATIONS PRIOR TO ADMISSION:   1.  Simvastatin 40 mg daily.   2.  Aspirin 81 mg daily.   3.  Omega-3 fatty acid.   4.  Ibuprofen 400 mg every evening.   5.  Zestoretic (lisinopril 20/hydrochlorothiazide 25) 1 tablet daily.   6.  Hydrocodone/acetaminophen (Norco) as needed for osteoarthritis pain.      ALLERGIES:  No known allergies.      REVIEW OF SYSTEMS, PAST MEDICAL, SURGICAL, SOCIAL AND FAMILY HISTORY:  Please see my attached note in Epic.      PHYSICAL EXAMINATION:   VITAL SIGNS:  Temperature 99, blood pressure 138/91, heart rate 108 BPM and regular, respiratory rate 18 per minute, saturations 98% on 2 liters oxygen.  Height 1.803 meters, weight 118.8 kg (261 pounds), BMI is 36.5.   CONSTITUTIONAL:  Obese body habitus and lying in bed with 1 pillow.  He is cooperative, alert and oriented but he has significant right upper quadrant abdominal discomfort on minimal movement.   EYES:  No pallor, icterus, xanthelasma.   ENT:  Satisfactory dentition.   RESPIRATORY:  Normal breath sounds, no rales or wheeze.   CARDIOVASCULAR:  JVP is normal.  Carotid pulse is normal, no bruit.  Apical impulse not palpable due to obesity.  Heart sounds are normal.  He has a very soft 2/6 early systolic murmur which suggests either aortic sclerosis or mild mitral regurgitation.  No pericardial friction rub.   GASTROINTESTINAL:  Abdomen is nondistended but his right upper quadrant is exquisitely tender to palpation.  Active bowel sounds.  No splenomegaly.   SKIN:  No rash, erythema or ecchymosis.   NEUROPSYCHIATRIC:  Oriented to time, place and person.  Normal affect.   NEUROLOGIC:  No gross motor deficits.   EXTREMITIES:  No  edema, clubbing or deformities.      DIAGNOSTIC DATA:    I reviewed labs, serial ECGs (which all show an old right bundle branch block which was noted as far back as 2015), chest x-ray was unremarkable, CTA imaging with an abdominal ultrasound showed possible gallstones and possible subtle findings of acute cholecystitis.      ASSESSMENT AND PLAN:   1.  Mild increase in serum troponin, likely demand ischemia from hypertensive response in the context of abdominal pain.   2.  Possible acute cholecystitis.   3.  Hypertension.   4.  Hyperlipidemia.   5.  Type 2 diabetes mellitus (diet-controlled, HbA1c 6.6%).      ASSESSMENT 4/plan:   1.  The patient's symptoms are very typical for acute cholecystitis with some suggestive ultrasound findings.  I think the mild elevated troponin is due to a combination of infection and hypertension (190s/90s on admission).  His right bundle branch block is old.  There are no acute ischemic changes.  The patient used to do water exercises 3 times a week prior to admission and at this level does not have angina.  However, he does have a strong family history of coronary disease, so we should make sure that he has a stress test.  Clearly, he will not be able to exercise.  Another reason not to do a coronary angiogram is if we do find coronary disease, the timing of stenting will not be ideal but we will buy him dual antiplatelet therapy and he will not be able to have therapeutic gallbladder surgery.     2.  Pharmacological nuclear stress test started.   3.  Transthoracic echocardiogram ordered.  It will rule out regional wall motion abnormalities and ensure he does not have significant valve disease in the context of a soft murmur.   4.  He is tachycardic.  Start metoprolol tartrate 25 mg b.i.d.  We will also help potential angina.  Continue his established home Zestoretic   5.  Cardiology will follow up to review test results.  Unless there concerning findings, he should be able to  proceed to have gallbladder surgery.      It been my pleasure to be involved in the care of this patient.         PERICO COPELAND MD             D: 2017 11:18   T: 2017 11:55   MT: PATY      Name:     GORDON QUIJANO   MRN:      [id number]        Account:       [id number]   :      05/10/1934           Consult Date:  2017      Document: [id number]

## 2017-12-27 NOTE — PLAN OF CARE
Problem: Patient Care Overview  Goal: Plan of Care/Patient Progress Review   Pt transferred from [facility] with abd pain and trop bump of 0.049  High bps at [facility] y/day  194/94 .  ECG showed SR W old right bundle branch block Seen by Cards today and they are not too concerned with  ischemic changes and he has had a borderline elevated troponin I of -0.049, 0.167.  He denies any chest pain.  His symptoms are epigastric and significant right upper quadrant tenderness that gets worse with minimal palpation and associated with some nausea, mild fever.  Echo done, EF 70%, mild TR, Aortic Root dilation, and no WMA, , Nuc stress results pending, Surgery has seen pt and plan is LAP choly in am. Pt and family aware procedure is scheduled for 11:30 am.

## 2017-12-27 NOTE — PROGRESS NOTES
Pt received from [facility] @ 0630 via EMS. Heparin infusing @ 1100u/hr, A&Ox4, NSR with frequent PVCs, slightly HTN SBP 160s, sp02>92% on 2L NC, reports 4/10 constant, dull-sharp pain to RUQ with firm ABD distention. Denies nausea, vomiting and diarrhea. Pt has been NPO since 1800 last night. BBG elevated 188- pt reports recent diagnosis of DM2- plan to diet and exercise, plan for recheck in a few months- pt has lost 16lbs in the last 6 weeks. HOSP paged. Awaiting orders.

## 2017-12-27 NOTE — ED NOTES
After receiving pain medication, pt falling asleep and oxygen desaturation to 73%.  Nasal cannula applied at 2 Liters

## 2017-12-27 NOTE — PHARMACY-ADMISSION MEDICATION HISTORY
Admission medication history interview status for the 12/27/2017  admission is complete. See EPIC admission navigator for prior to admission medications     Medication history source reliability:Good    Actions taken by pharmacist (provider contacted, etc):None     Additional medication history information not noted on PTA med list :None    Medication reconciliation/reorder completed by provider prior to medication history? No    Time spent in this activity: 10 min    Prior to Admission medications    Medication Sig Last Dose Taking? Auth Provider   HYDROcodone-acetaminophen (NORCO) 5-325 MG per tablet Take 1 tablet by mouth every 4 hours as needed for moderate to severe pain  Yes Finesse Gonzalez MD   Multiple Vitamin (MULTIVITAMINS PO) Take 1 tablet by mouth daily 12/26/2017 at Unknown time Yes Reported, Patient   GLUCOSAMINE SULFATE PO Take 1,000 mg by mouth daily 12/26/2017 at Unknown time Yes Reported, Patient   Simvastatin (ZOCOR PO) Take 40 mg by mouth At Bedtime  12/26/2017 at Unknown time Yes Reported, Patient   Omega-3 Fatty Acids (OMEGA-3 FISH OIL PO) Take 1 capsule by mouth daily 12/26/2017 at Unknown time Yes Reported, Patient   aspirin 81 MG tablet Take 81 mg by mouth every evening 12/26/2017 at 325mg Yes Reported, Patient   IBUPROFEN PO Take 400 mg by mouth every evening 12/26/2017 at Unknown time Yes Reported, Patient   lisinopril-hydrochlorothiazide (PRINZIDE,ZESTORETIC) 20-25 MG per tablet Take 1 tablet by mouth daily 12/26/2017 at Unknown time Yes Reported, Patient

## 2017-12-27 NOTE — PROGRESS NOTES
Lexiscan Stress: Pt tolerated well. VSS. Pt denied chest pain/SOB after injection.     Pt transferred back to [facility] per w/c.

## 2017-12-27 NOTE — IP AVS SNAPSHOT
MRN:[id number]                      After Visit Summary   12/27/2017    Alvin Newton    MRN: [id number]           Thank you!     Thank you for choosing [facility] for your care. Our goal is always to provide you with excellent care. Hearing back from our patients is one way we can continue to improve our services. Please take a few minutes to complete the written survey that you may receive in the mail after you visit with us. Thank you!        Patient Information     Date Of Birth          5/10/1934        Designated Caregiver       Most Recent Value    Caregiver    Will someone help with your care after discharge? yes    Name of designated caregiver Wife    Phone number of caregiver [phone]    Caregiver address [address], [facility]      About your hospital stay     You were admitted on:  December 27, 2017 You last received care in the:  [place] [address] [facility]    You were discharged on:  December 31, 2017       Who to Call     For medical emergencies, please call 911.  For non-urgent questions about your medical care, please call your primary care provider or clinic, [phone]  For questions related to your surgery, please call your surgery clinic        Attending Provider     Provider Specialty    Leonora Leung MD Internal Medicine    Reyes Tillman MD Internal Medicine       Primary Care Provider Office Phone # Fax #    Steven Duane Semmler, [phone]      After Care Instructions     Activity       Walk several times per day. Avoid strenuous activity or heavy lifting >20 pounds for 2-3 weeks.            Diet       Gradually resume your regular diet as tolerated.            Discharge Instructions       CONSTIPATION PREVENTION  We recommend that you go to a pharmacy and purchase ONE of the following stool softeners/laxatives and start taking today to prevent constipation. You can stop this bowel regimen once you are no longer  taking your narcotic pain medication or are having regular bowel movements:    - Senokot oral once daily  - Milk of magnesium once daily  - Docusate Sodium 1 pill twice daily (stool softener)  - Miralax 1 scoop/packet 1-2 times daily (laxative)    In addition to the above options, if constipation continues, you can add:   - 4 oz Prune juice or Plum juice daily for constipation            Wound care and dressings       Keep dressings clean and dry. Remove the bandaids before showering. Wait 10 days before removing the small white steri strips over your incision. These will often fall off on their own in 7-10 days. Do not attempt to replace these if they should fall off sooner. After removing the bandaids you can shower normally. You can allow warm water and soap to run over the incision. Do not scrub the incision. After showering pat your skin and steri strips dry. Do not submerge or soak your incisions under water for 2 weeks.                  Follow-up Appointments     Follow-up and recommended labs and tests        Please follow-up in 2 weeks in Dr. Gonzalez's office for your first postoperative appointment. Call [phone] to schedule.  Our [facility] name [facility]. The address is [address] [place] [address], [facility] [address], [place], [place], [address]    F/U PCP within 1 week                  Your next 10 appointments already scheduled     Jan 26, 2018  1:00 PM CST   Ech Complete with SERENITY   [facility] ([facility])    [address] [facility] [address] [phone]           1. Please bring or wear a comfortable two-piece outfit. 2. You may eat, drink and take your normal medicines. 3. For any questions that cannot be answered, please contact the ordering physician            Jan 29, 2018  2:30 PM CST   Return Visit with Marisol Browning MD   [facility] ([facility])    [address] [facility] [address]    [phone]              Additional Services     Follow-Up with Cardiologist                 Future tests that were ordered for you     Echocardiogram       Administration of IV contrast will be tailored to this examination per the appropriate written protocol listed in the Echocardiography department Protocol Book, or by the supervising Cardiologist. This may result in an order change.    Use of contrast is at the discretion of the supervising Cardiologist.                  Further instructions from your care team       [facility]  Discharge Instructions: Post-Operative Laparoscopic Cholecystectomy    ACTIVITY    Increase your activity gradually.  Avoid strenuous physical activity or heavy lifting greater than 15-20 lbs. for 2-3 weeks.  You may climb stairs.    You may drive without restrictions when you are not using any prescription pain medication and comfortable in a car.    You may return to work/school when you are comfortable without any prescription pain medication.    WOUND CARE    You may remove your bandage and shower 48 hours after the surgery.  Pat your incisions dry and leave open to air.  Re-apply dressing (Band-aid or gauze/tape) as needed for drainage.    You may have steri-strips (looks like tape) or Dermabond (looks like glue) on your incision.  Leave it alone, it will peel up and fall off on its own.     Do not soak your incisions in a tub or pool for 2 weeks.     DIET    Return to the diet you were on before surgery.    Drink plenty of liquids to stay hydrated.     It is not uncommon to experience some loose stools or diarrhea after surgery.  This is your body s way of adapting to the bile which will slowly drain into your intestine. A low fat diet may help with this.     PAIN    Expect some tenderness and discomfort at the incision sites.  Use the prescribed pain medication at your discretion.  Expect gradual resolution of your pain over several  days.    You may take ibuprofen with food (unless you have been told not to) instead of or in addition to your prescribed pain medication.  If you are taking Norco or Percocet, do not take any additional acetaminophen/APAP/Tylenol.    Do not drink alcohol or drive while you are taking pain medications.    You may apply ice to your incisions in 20 minute intervals as needed for the next 48 hours.  After that time, consider switching to heat if you prefer.    RETURN APPOINTMENT    Follow up with your surgeon or a PA in 2 weeks.  Please call the office at [phone] to schedule your appointment.    CALL OUR OFFICE IF YOU HAVE:     Chills or fever above 101.5 F.    Increased redness or drainage at your incisions.    Significant bleeding.    Pain not relieved by your pain medication or rest.    Increasing pain after the first 48 hours.    Any other concerns or questions.    HELPFUL HINTS    Pain medications can cause constipation.  Limit use when possible.  Take over the counter stool softener/stimulant, such as Colace or Senna, with plenty of water.  You may take a mild over the counter laxative, such as Miralax or a suppository, as needed.      For laparoscopic surgery, you may have shoulder or upper back discomfort due to the gas used in surgery.  This is temporary and should resolve in 48-72 hours.  Short, frequent walks may help with this.    Revised August 2017    Pending Results     Date and Time Order Name Status Description    12/30/2017 1025 EKG 12-lead, tracing only Preliminary     12/29/2017 1942 EKG 12-lead, tracing only Preliminary             Statement of Approval     Ordered          12/31/17 1115  I have reviewed and agree with all the recommendations and orders detailed in this document.  EFFECTIVE NOW     Approved and electronically signed by:  Chris Caballero MD             Admission Information     Date & Time Provider Department Dept. Phone    12/27/2017 Reyes Tillman MD [place]  [facility] [address] [facility] [phone]      Your Vitals Were     Blood Pressure Pulse Temperature Respirations Weight Pulse Oximetry    135/75 (BP Location: Left arm) 57 98  F (36.7  C) (Oral) 16 123.1 kg (271 lb 6.2 oz) 95%    BMI (Body Mass Index)                   37.85 kg/m2           [employer] Information     [employer] lets you send messages to your doctor, view your test results, renew your prescriptions, schedule appointments and more. To sign up, go to [email or web address] . Click on \"Log in\" on the left side of the screen, which will take you to the Welcome page. Then click on \"Sign up Now\" on the right side of the page.     You will be asked to enter the access code listed below, as well as some personal information. Please follow the directions to create your username and password.     Your access code is: [email or web address]  Expires: 3/27/2018  1:50 AM     Your access code will  in 90 days. If you need help or a new code, please call your [facility] clinic or [phone].        Care EveryWhere ID     This is your Care EveryWhere ID. This could be used by other organizations to access your [facility] medical records  [id number]        Equal Access to Services     INGRID WATTERS AH: Janay carbajal Soyaneli, waaxda luqadaha, qaybta kaalmada adeegyada, corby coker. So [facility] [phone].    ATENCIÓN: Si habla español, tiene a gavin disposición servicios gratuitos de asistencia lingüística. Llame al [phone].    We comply with applicable federal civil rights laws and [place] laws. We do not discriminate on the basis of race, color, national origin, age, disability, sex, sexual orientation, or gender identity.               Review of your medicines      START taking        Dose / Directions    amoxicillin-clavulanate 875-125 MG per tablet   Commonly known as:  AUGMENTIN   Used for:  Acute cholecystitis        Dose:  1 tablet   Take 1 tablet by mouth 2 times daily for 7 " days   Quantity:  14 tablet   Refills:  0       metoprolol 25 MG tablet   Commonly known as:  LOPRESSOR   Used for:  Benign essential hypertension        Dose:  25 mg   Take 1 tablet (25 mg) by mouth 2 times daily   Quantity:  60 tablet   Refills:  0       oxyCODONE IR 5 MG tablet   Commonly known as:  ROXICODONE   Used for:  Acute post-operative pain        Dose:  5-10 mg   Take 1-2 tablets (5-10 mg) by mouth every 3 hours as needed for moderate to severe pain   Quantity:  20 tablet   Refills:  0         CONTINUE these medicines which have NOT CHANGED        Dose / Directions    aspirin 81 MG tablet        Dose:  81 mg   Take 81 mg by mouth every evening   Refills:  0       GLUCOSAMINE SULFATE PO        Dose:  1000 mg   Take 1,000 mg by mouth daily   Refills:  0       IBUPROFEN PO        Dose:  400 mg   Take 400 mg by mouth every evening   Refills:  0       MULTIVITAMINS PO        Dose:  1 tablet   Take 1 tablet by mouth daily   Refills:  0       OMEGA-3 FISH OIL PO        Dose:  1 capsule   Take 1 capsule by mouth daily   Refills:  0       ZOCOR PO        Dose:  40 mg   Take 40 mg by mouth At Bedtime   Refills:  0         STOP taking     HYDROcodone-acetaminophen 5-325 MG per tablet   Commonly known as:  NORCO           lisinopril-hydrochlorothiazide 20-25 MG per tablet   Commonly known as:  PRINZIDE/ZESTORETIC                Where to get your medicines      These medications were sent to [facility] - [address]  [address], [place] [address]     Phone:  [phone]     amoxicillin-clavulanate 875-125 MG per tablet    metoprolol 25 MG tablet         Some of these will need a paper prescription and others can be bought over the counter. Ask your nurse if you have questions.     Bring a paper prescription for each of these medications     oxyCODONE IR 5 MG tablet               ANTIBIOTIC INSTRUCTION     You've Been Prescribed an Antibiotic - Now What?  Your  healthcare team thinks that you or your loved one might have an infection. Some infections can be treated with antibiotics, which are powerful, life-saving drugs. Like all medications, antibiotics have side effects and should only be used when necessary. There are some important things you should know about your antibiotic treatment.      Your healthcare team may run tests before you start taking an antibiotic.    Your team may take samples (e.g., from your blood, urine or other areas) to run tests to look for bacteria. These test can be important to determine if you need an antibiotic at all and, if you do, which antibiotic will work best.      Within a few days, your healthcare team might change or even stop your antibiotic.    Your team may start you on an antibiotic while they are working to find out what is making you sick.    Your team might change your antibiotic because test results show that a different antibiotic would be better to treat your infection.    In some cases, once your team has more information, they learn that you do not need an antibiotic at all. They may find out that you don't have an infection, or that the antibiotic you're taking won't work against your infection. For example, an infection caused by a virus can't be treated with antibiotics. Staying on an antibiotic when you don't need it is more likely to be harmful than helpful.      You may experience side effects from your antibiotic.    Like all medications, antibiotics have side effects. Some of these can be serious.    Let you healthcare team know if you have any known allergies when you are admitted to the hospital.    One significant side effect of nearly all antibiotics is the risk of severe and sometimes deadly diarrhea caused by Clostridium difficile (C. Difficile). This occurs when a person takes antibiotics because some good germs are destroyed. Antibiotic use allows C. diificile to take over, putting patients at high risk  for this serious infection.    As a patient or caregiver, it is important to understand your or your loved one's antibiotic treatment. It is especially important for caregivers to speak up when patients can't speak for themselves. Here are some important questions to ask your healthcare team.    What infection is this antibiotic treating and how do you know I have that infection?    What side effects might occur from this antibiotic?    How long will I need to take this antibiotic?    Is it safe to take this antibiotic with other medications or supplements (e.g., vitamins) that I am taking?     Are there any special directions I need to know about taking this antibiotic? For example, should I take it with food?    How will I be monitored to know whether my infection is responding to the antibiotic?    What tests may help to make sure the right antibiotic is prescribed for me?      Information provided by:  www.cdc.gov/getsmart  U.S. Department of Health and Human Services  Centers for disease Control and Prevention  National Center for Emerging and Zoonotic Infectious Diseases  Division of Healthcare Quality Promotion         Protect others around you: Learn how to safely use, store and throw away your medicines at www.disposemymeds.org.             Medication List: This is a list of all your medications and when to take them. Check marks below indicate your daily home schedule. Keep this list as a reference.      Medications           Morning Afternoon Evening Bedtime As Needed    amoxicillin-clavulanate 875-125 MG per tablet   Commonly known as:  AUGMENTIN   Take 1 tablet by mouth 2 times daily for 7 days                                      aspirin 81 MG tablet   Take 81 mg by mouth every evening                                   GLUCOSAMINE SULFATE PO   Take 1,000 mg by mouth daily                                   IBUPROFEN PO   Take 400 mg by mouth every evening                                   metoprolol 25  MG tablet   Commonly known as:  LOPRESSOR   Take 1 tablet (25 mg) by mouth 2 times daily   Last time this was given:  25 mg on 12/31/2017  8:26 AM                                      MULTIVITAMINS PO   Take 1 tablet by mouth daily                                   OMEGA-3 FISH OIL PO   Take 1 capsule by mouth daily                                   oxyCODONE IR 5 MG tablet   Commonly known as:  ROXICODONE   Take 1-2 tablets (5-10 mg) by mouth every 3 hours as needed for moderate to severe pain   Last time this was given:  5 mg on 12/29/2017  3:51 AM                                   ZOCOR PO   Take 40 mg by mouth At Bedtime

## 2017-12-27 NOTE — ED PROVIDER NOTES
"  History     Chief Complaint   Patient presents with     Abdominal Pain     HPI  Alvin Newton is a 83 year old male who presents for epigastric abdominal pain.  Symptoms started after eating dinner tonight about 6 hours prior to arrival.  Pain is described as pressure, nonradiating, associated with shortness of breath and nausea.  No vomiting or diaphoresis.  Pain is severe.  He tried taking Maalox without improvement.  He denies hemoptysis, cough, fever, chills, headache, diarrhea, dysuria, or rash.  No prior heart attacks.  No prior abdominal surgeries.    Problem List:    There are no active problems to display for this patient.       Past Medical History:    No past medical history on file.    Past Surgical History:    No past surgical history on file.    Family History:    No family history on file.    Social History:  Marital Status:   [2]  Social History   Substance Use Topics     Smoking status: Not on file     Smokeless tobacco: Not on file     Alcohol use Not on file        Medications:      HYDROcodone-acetaminophen (NORCO) 5-325 MG per tablet   methylPREDNISolone (MEDROL DOSEPAK) 4 MG tablet   Multiple Vitamin (MULTIVITAMINS PO)   GLUCOSAMINE SULFATE PO   Simvastatin (ZOCOR PO)   Omega-3 Fatty Acids (OMEGA-3 FISH OIL PO)   aspirin 81 MG tablet   IBUPROFEN PO   lisinopril-hydrochlorothiazide (PRINZIDE,ZESTORETIC) 20-25 MG per tablet         Review of Systems  Pertinent positives and negatives listed in the HPI, all other systems reviewed and are negative.    Physical Exam   BP: 181/81  Pulse: 72  Heart Rate: 62  Temp: 97.4  F (36.3  C)  Resp: 20  Height: 180.3 cm (5' 11\")  Weight: 117.9 kg (260 lb)  SpO2: 100 %      Physical Exam   Constitutional: He is oriented to person, place, and time. He appears well-developed and well-nourished. He appears distressed.   HENT:   Head: Normocephalic and atraumatic.   Right Ear: External ear normal.   Left Ear: External ear normal.   Nose: Nose normal.   Eyes: " Conjunctivae are normal. No scleral icterus.   Neck: Normal range of motion.   Cardiovascular: Normal rate and regular rhythm.    No murmur heard.  Pulmonary/Chest: Effort normal. No stridor. No respiratory distress. He has no wheezes. He has no rales.   Abdominal: Soft. He exhibits no distension. There is tenderness in the right upper quadrant and epigastric area. There is no rigidity, no rebound and no CVA tenderness.   Neurological: He is alert and oriented to person, place, and time.   Skin: Skin is warm and dry. He is not diaphoretic.   Psychiatric: He has a normal mood and affect. His behavior is normal.   Nursing note and vitals reviewed.      ED Course     ED Course     Procedures    Results for orders placed during the hospital encounter of 12/26/17   POC US ABDOMEN LIMITED    [facility] Procedure Note      Limited Bedside ED Gallbladder  Ultrasound:    PROCEDURE: PERFORMED BY: Dr. Gomez Falcon  INDICATIONS:  Abdominal Pain  PROBE:  Low frequency convex probe  BODY LOCATION: Abdomen  FINDINGS:   An ultrasound of the gallbladder was performed using longitudinal and transverse views.  Gallstone(s):  Present  Gallbladder sludge:  Absent  Sonographic Driver's sign:  Present  Gallbladder wall thickening (greater than 4 mm):  Absent  Pericholecystic fluid: Absent  Common bile duct (dilated if internal diameter greater than 6 mm): Unable to visualize   INTERPRETATION:  Gallstones are present and there is a positive sonographic Driver's sign.  IMAGE DOCUMENTATION: Images were archived to PACs system.             EKG Interpretation:      Interpreted by Gomez Falcon  Time reviewed: 2320  Symptoms at time of EKG: Epigastric pain   Rhythm: sinus   Rate: 74  Axis: Normal  Ectopy: premature ventricular contractions (frequent)  Conduction: right bundle branch block (complete)  ST Segments/ T Waves: No acute ischemic changes  Q Waves: none  Comparison to prior: Unchanged    Clinical  Impression: no acute changes    Critical Care time:  none             Labs Ordered and Resulted from Time of ED Arrival Up to the Time of Departure from the ED   CBC WITH PLATELETS DIFFERENTIAL - Abnormal; Notable for the following:        Result Value    WBC 12.3 (*)     Absolute Neutrophil 10.0 (*)     All other components within normal limits   COMPREHENSIVE METABOLIC PANEL - Abnormal; Notable for the following:     Glucose 210 (*)     All other components within normal limits   URINE MACROSCOPIC WITH REFLEX TO MICRO - Abnormal; Notable for the following:     Glucose Urine 300 (*)     Ketones Urine 40 (*)     Protein Albumin Urine 10 (*)     RBC Urine 3 (*)     Mucous Urine Present (*)     All other components within normal limits   TROPONIN I - Abnormal; Notable for the following:     Troponin I ES 0.049 (*)     All other components within normal limits   TROPONIN I   LIPASE   PERIPHERAL IV CATHETER       Assessments & Plan (with Medical Decision Making)   83-year-old male presents for chest pain.  Temperature is 36.3 C, blood pressure 181/81, heart rate 72, SPO2 100% on room air.  Upon arrival his EKG shows right bundle branch block without signs of ischemia and initial troponin is normal.  He was given aspirin and GI cocktail upon arrival.  This did not improve his symptoms.  Therefore he was given IV hydromorphone.  White blood cell count slightly elevated at 12.3, hemoglobin stable.  LFTs and lipase normal, less likely hepatitis or pancreatitis.  Due to his continued pain, CT of his abdomen/pelvis obtained, images reviewed independently as well as radiology read reviewed, positive for cholelithiasis without signs of appendicitis, small bowel obstruction, pancreatitis.  I performed a bedside ultrasound that again showed cholelithiasis and had a positive sonographic Driver's over there is no pericholecystic fluid and I was unable to see the common bile duct.  A formal ultrasound obtained in the radiology  department and this was consistent with mine.  Urinalysis is clear, no signs of infection.  We did do a repeat troponin and this is elevated at 0.049, repeat EKG at this time is stable and unchanged.  He was given nitroglycerin with some improvement in his symptoms.  Reports his pain is pretty much gone now.  Portable chest x-ray obtained, images reviewed independently, no signs of infiltrate.  He is placed on heparin drip and I discussed the case with the on-call hospitalist at [facility], Dr. Leung.  She recommends I give metoprolol and this is done and she also recommends given the gallbladder findings to start the patient on IV Unasyn in case the troponin is falsely elevated and his symptoms are all related to cholecystitis.  She accepts the patient in transfer and the patient is transferred by ambulance for further treatment.    I have reviewed the nursing notes.    I have reviewed the findings, diagnosis, plan and need for follow up with the patient.       New Prescriptions    No medications on file       Final diagnoses:   NSTEMI (non-ST elevated myocardial infarction) (H)       12/26/2017   [facility] EMERGENCY DEPARTMENT     Gomez Falcon MD  12/27/17 [id number]

## 2017-12-27 NOTE — CONSULTS
"BRIEF NUTRITION ASSESSMENT      REASON FOR ASSESSMENT:  Nutrition Admission Screen - \"Unintentional weight loss of 10# or more in past 2 months (recently started diet/exerciseing due to DM dx)\"    NUTRITION HISTORY:  Visited with pt this morning  He tells me that he was recently dx with DM (last month)  \"I went to the DM classes at my clinic.  [place] about CHOs and meal planning.\"  Pt notes that he was told to keep his CHO intake to 45 gm per meal - \"I have been trying to keep it closer to 30 gm though\"  He isn't able to walk much - \"have 2 replaced hips and they are giving out\" - he does like to go to the [facility] a few days per week    CURRENT DIET AND INTAKE:  Diet:  NPO              Cardiology and Surgery consults pending    ANTHROPOMETRICS:  Height: 5'11\"  Weight: (12/27) 118.8 kg / 261#  BMI: 36.5 kg/m2  IBW:  78.2 kg  Weight Status: Obesity Grade II BMI 35-39.9  %IBW: 152%  Weight History: Pt reports losing ~16# since DM dx and making diet changes  Wt Readings from Last 10 Encounters:   12/27/17 118.8 kg (261 lb 14.4 oz)   12/26/17 117.9 kg (260 lb)   03/04/17 122.5 kg (270 lb)   12/03/13 115.7 kg (255 lb)         LABS:  Labs noted    MALNUTRITION:  Patient does not meet two of the following criteria necessary for diagnosing malnutrition: significant weight loss, reduced intake, subcutaneous fat loss, muscle loss or fluid retention    NUTRITION INTERVENTION:  Nutrition Diagnosis:  No nutrition diagnosis at this time.    Implementation:  Nutrition Education ---> Reviewed current diet order and meal ordering.  Encouraged pt to continue with his CHO counting once diet advanced.      FOLLOW UP/MONITORING:   Will re-evaluate in 7 - 10 days, or sooner, if re-consulted.          "

## 2017-12-27 NOTE — H&P
DATE OF SERVICE:  2017      PRIMARY CARE PHYSICIAN:  Andrey Lyons MD      CODE STATUS:  Full code.      CHIEF COMPLAINT:  Upper abdominal pain, nausea and shortness of breath.      HISTORY OF PRESENT ILLNESS:  Mr. Alvin Newton is an 83-year-old male who has a past medical history of type 2 diabetes mellitus, hyperlipidemia and lumbar disk disease, hypertension and osteoarthritis.  He does not have any prior personal history of heart disease, although his father and mother were vasculopathic and  of a stroke and that he does have some heart disease in the family.  The patient presented to [facility] as he started having right upper quadrant abdominal pain late last night.  It started after dinner last night and he said it was pressure and it actually started in the epigastric region but the pain has now shifted over to the right upper quadrant.  He says that his abdomen feels very distended like there was a bunch of fluid in it.  He has not had any recent changes in bowel habits.  He is denying any fever or chills.  He has nausea but no vomiting and says he feels like he is short of breath because he cannot expand his chest very good.  He is denying any chest pain.  He has not had any diaphoresis.  On presentation to [facility] he was a little tachycardic in the 120s and his blood pressure was high at 194/93.  They treated him with pain meds which helped some with his vitals.  They started working this up.  Initial troponin was not detectable.  He had mild leukocytosis of 12.3 and a very slight temperature bump to 99.2.  He did have Driver sign on physical exam.  They did a CT of his abdomen which was not very significant.  He had a normal lipase, normal liver enzymes and bilirubin.  They did do a right upper quadrant ultrasound and this was somewhat borderline.  It does confirm that there are gallstones and that there was some thickening of the gallbladder wall, but it is kind of indeterminate  for cholecystitis but the thought was mild temperature bump, mild leukocytosis and tachycardia might be suggestive of an early infection and they did check another troponin as he had some abnormal ST depression in his anterior lateral leads on his EKG.  The second troponin did come back a little elevated at 0.049.  The troponins were drawn about 3 hours apart from each other.  They put him on a heparin drip and sent him down here as he may require surgery and he may require cardiac workup beforehand and they do not have access to a coronary angiography at their facility.  My colleague, Dr. Leung accepted the patient in transfer and staffed the patient with me upon his arrival.  I went to see him, he looks uncomfortable.  His belly is tender on palpation, even mild palpation and he is bloated more than normal.  Fortunately, the CT scan does not show any ascites or any signs of a hernia or any other process that is really concerning for that matter, but he will be in the intermediate Medical/Surgical unit for the time being.      ALLERGIES:  No known drug allergies.      HOME MEDICATIONS:   1.  Norco 1 tab q.4h. p.r.n.   2.  Methylprednisolone.   3.  Multivitamin 1 daily.   4.  Glucosamine 1000 mg daily.   5.  Zocor 40 mg at bedtime.   6.  Omega 3 fish oil 1 cap daily.   7.  Aspirin 81 mg a day.   8.  Ibuprofen 400 mg every evening.   9.  Zestoretic 1 tab daily.      PAST MEDICAL HISTORY:   1.  Hypertension.   2.  Hypercholesterolemia.   3.  Morbid obesity.   4.  Osteoarthritis.   5.  Benign prostatic hypertrophy.   6.  Adhesive capsulitis, right shoulder.   7.  Colonic diverticula.   7.  Basal cell carcinoma of the skin.      PAST SURGICAL HISTORY:  The patient had a transurethral resection of prostate in , bilateral hip replacement in , EGD in  for gastritis and duodenitis.      FAMILY HISTORY:  Strong history of heart disease in the family.  Brother  of a heart attack at age 86.  Another sister   of a heart attack at the age of 85.   Both parents  of stroke, father at age 72, mom at age 84.      SOCIAL HISTORY:  He has never used tobacco.  He drinks a can of beer every other day, usually no more than 1-2.  No significant history of bingeing or withdrawal per patient report.  No illicit drug history.  He is retired and .  He and his wife live independently.  His primary care provider is through the [employer] system.      REVIEW OF SYSTEMS:  A 10-system review conducted with patient and other than those systems listed in history present illness are negative.      PHYSICAL EXAMINATION:   VITAL SIGNS:  Blood pressure 163/83, O2 sats 98% on 2 liters nasal cannula, respiratory rate 21, heart rate 104, temperature 99.2 Fahrenheit taken orally.  His T-max is 99.2, currently 97.4.   GENERAL:  This is a morbidly obese, elderly male, He is mildly distressed.  This is secondary to abdominal pain.  He is alert and oriented x4.    SKIN:  Warm to touch, not diaphoretic.     HEENT:  Normocephalic, atraumatic.  No oropharyngeal erythema.   NECK:  No cervical lymphadenopathy, no thyromegaly.   RESPIRATORY:  Lungs clear to auscultation bilaterally.  No marked rales, wheeze or rhonchi.   CARDIOVASCULAR:  Normal S1, S2, no S3, S4, regular rate and rhythm.  He has a very soft about 2/6 systolic murmur loudest at the fourth intercostal space without radiation.  2+ pulses are palpable in all 4 extremities.   ABDOMEN:  Tender in the right upper quadrant to even mild palpation.  There is some rebound.  He has a positive Driver sign and his abdomen is distended but not rigid.  He has hypoactive bowel sounds.   EXTREMITIES:  No clubbing, cyanosis or edema.   NEUROLOGIC:  Cranial nerves II-XII are intact, 5/5 strength in all 4 extremities.  Coordination is intact by bilateral finger-nose test.      LABORATORY DATA:  Complete metabolic profile, all values are within normal limits.  Other labs show lipase is normal at 75.   Troponins initial at 2356 and on 12/26/2017 was negative at less than 0.015, however, at 0240 on 12/27 it was 0.049.  On CBC, his white count is mildly elevated at 12.3 and his ANC is mildly elevated at 10, but all other values are normal.      IMAGING STUDIES:  CT abdomen and pelvis with contrast shows no acute abnormality, cholelithiasis and colonic diverticula without acute diverticulitis, no bowel obstruction or inflammation.  Abdominal ultrasound right upper quadrant shows probable cholelithiasis, gallbladder wall is borderline thickened and there is positive sonographic Driver sign.  Acute cholecystitis is a possibility.  There is no evidence of biliary dilatation in the common hepatic duct though the common hepatic duct was not seen due to overlying bowel gas.  Chest x-ray portable, 1 view, shows no acute abnormalities.  A 12-lead EKG shows right bundle branch block and nonspecific T-wave abnormality.  I agree you can see T-wave abnormalities in the anterolateral leads.  I agree with the computer interpretation.      ASSESSMENT:  This 83-year-old male with past medical history of hypertension, hyperlipidemia, morbid obesity, osteoarthritis and diet-controlled DM 2.  He presents with right upper quadrant abdominal pain, shortness of breath and nausea, possible cholecystitis based on radiographic evidence and some lab and physical exam evidence and he also has an NSTEMI; I am not sure if it is related to his gallbladder issues or if it is a cardiac issue.  He is transferred here for cardiac workup and possible surgery on his gallbladder.      PLAN:   1.  Non-ST elevation myocardial infarction, suspect demand ischemia.  He was tachycardic and hypertensive when he presented to [facility].  His initial troponin was negative; however, given the EKG findings with what appears to be ST depression in the anterolateral leads and some of these findings could even be anginal equivalents.  He does warrant workup and he was  started on a heparin drip at the [facility] facility.  I will just keep him on it and even if he does go to surgery at some point we can turn it off 4 hours prior and I am pretty sure that they will probably not want to take him to surgery before they get his heart issues sorted out.  The patient did tell me that his primary care doctor saw him in November and he said he had a murmur and they did an echocardiogram at that time.  I found it in his [facility] records and he had a preserved ejection fraction and it did not look like any significant valvular disease.  He was told that everything was okay.  At that time the echo looked okay.  I think we are going to get another one now  just to see if there is anything that it can tell us about possible acute coronary syndrome such as wall motion abnormalities or some hypokinesis, just more evidence that we can obtain quickly and easily to work up the elevated troponin level.  We will continue to trend the troponins q.6h.  He is in the cardiac ICU under intermediate Medical/Surgical status.  We will keep him on telemetry.  He has nitroglycerin, Dilaudid and Zofran available for symptoms and I am going to run normal saline at 75 mL an hour.   2.  Right upper quadrant pain, possible cholecystitis.  It is not 100% certain.  Ultrasound is somewhat borderline.  He does have a mild leukocytosis but that could be cardiac or gallbladder.  No outright fever but a small bump in temperature from normal, some tachycardia, but no hypotension and in fact he was more hypertensive.  I think we should treat this like cholecystitis until surgeon has a chance to evaluate things.  We will start him on Zosyn and ask General Surgery to see him to consider possible laparoscopic cholecystectomy.  I know that they will want his cardiac issues sorted out before they take him for any procedure.   3.  History of hypertension.  Having to hold his Zestoretic at the time being due to n.p.o. status.  I am  going to have IV labetalol and hydralazine available and labetalol should be first line agent and hydralazine can be used if he has any tachycardia.   4.  Hypercholesterolemia.  Hold prior to admission statin and resume in a few days after he has regained p.o. status.   5.  History of DM2.  In looking through his [facility] records, last hemoglobin A1c that I can find is 6.1 back in .  He is not on any meds for this.  He tells me it is diet-controlled.  What we will do is we will check another hemoglobin A1c and see where he is at now and I will put him on an n.p.o. sliding scale insulin for the time being.  I think once we have his A1c back we will know a bit more about his diabetes status and they can help us determine the need for a basal bolus regimen.   6.  Deep venous thrombosis prophylaxis.  He is on a heparin drip.   7.  Code status:  The patient endorses full code.      DISPOSITION:  I anticipate he will be here probably 2-3 days because he has NSTEMI that needs to be worked up and he will possibly need surgery for cholecystitis.         ROXANNE NEAL MD             D: 2017 07:50   T: 2017 08:46   MT:       Name:     GORDON QUIJANO   MRN:      [id number]        Account:      [id number]   :      05/10/1934           Admitted:     [id number]      Document: [id number]       cc: Andrey Lyons MD

## 2017-12-27 NOTE — CONSULTS
[facility]    Inpatient Cardiology Consultation   Inpatient cardiology consultation note has been dictated. Dictation number [id number]    Estrella Cordon MD    RECOMMENDATIONS:  1. Urgent pharmacological stress test and echocardiogram prior to inpatient cholecystectomy. I suspect his mild troponin elevation is demand ischemia from hypertension, acute infection and pain.        REVIEW OF SYSTEMS:  A comprehensive 10 point review of systems was completed and the pertinent positives are documented in history of present illness.    MEDICATIONS:  Prior to Admission Medications   Prescriptions Last Dose Informant Patient Reported? Taking?   GLUCOSAMINE SULFATE PO 12/26/2017 at Unknown time Self Yes Yes   Sig: Take 1,000 mg by mouth daily   HYDROcodone-acetaminophen (NORCO) 5-325 MG per tablet  Self No Yes   Sig: Take 1 tablet by mouth every 4 hours as needed for moderate to severe pain   IBUPROFEN PO 12/26/2017 at Unknown time Self Yes Yes   Sig: Take 400 mg by mouth every evening   Multiple Vitamin (MULTIVITAMINS PO) 12/26/2017 at Unknown time Self Yes Yes   Sig: Take 1 tablet by mouth daily   Omega-3 Fatty Acids (OMEGA-3 FISH OIL PO) 12/26/2017 at Unknown time Self Yes Yes   Sig: Take 1 capsule by mouth daily   Simvastatin (ZOCOR PO) 12/26/2017 at Unknown time Self Yes Yes   Sig: Take 40 mg by mouth At Bedtime    aspirin 81 MG tablet 12/26/2017 at 325mg Self Yes Yes   Sig: Take 81 mg by mouth every evening   lisinopril-hydrochlorothiazide (PRINZIDE,ZESTORETIC) 20-25 MG per tablet 12/26/2017 at Unknown time Self Yes Yes   Sig: Take 1 tablet by mouth daily      Facility-Administered Medications: None       ALLERGIES:  No Known Allergies    PAST MEDICAL HISTORY:  Past Medical History:   Diagnosis Date     Colonic diverticulum      Hyperlipidemia      Hypertension      Obesity (BMI 30-39.9)      Osteoarthritis      Type 2 diabetes mellitus (H)        PAST SURGICAL HISTORY:  Past Surgical History:    Procedure Laterality Date     ARTHROPLASTY HIP BILATERAL  1987      ESOPHAGOSCOPY, DIAGNOSTIC  2003     TRANSRECTAL ULTRASONIC, TRANSURETHRAL RESECTION (TUR) OF PROSTATE CYST  1990       SOCIAL HISTORY:   Alvin Newton  reports that he has never smoked. He has never used smokeless tobacco. He reports that he drinks alcohol. He reports that he does not use illicit drugs.    FAMILY HISTORY:  No family history on file.    PHYSICAL EXAMINATION:  Temp: 99  F (37.2  C) Temp src: Oral BP: (!) 138/91   Heart Rate: 108 Resp: 18           Vitals:    12/27/17 0600   Weight: 118.8 kg (261 lb 14.4 oz)       Constitutional: Obese body habitus. Comfortable at rest. Cooperative, alert and oriented, well developed, well nourished.  Eyes: Pupils equal and round, conjunctivae and lids unremarkable, sclera white, no xanthalasma,   ENT: Satisfactory dentition.  No cyanosis or pallor.  Neck: No thyromegaly.     Respiratory: Normal respiratory effort with symmetrical chest wall movements and no use of accessory muscles. Good air entry with normal breath sounds and no rales or wheeze.  Cardiovascular: Normal jugular venous pulse and pressure.  Normal carotid pulse character and volume.  No carotid bruit.  Apical impulse is undisplaced and normal to palpation without parasternal heave or thrill.  Heart sounds are normal and regular.  No S3 or S4.  No audible murmur.  GI: Soft, nontender, no hepatosplenomegaly, no masses, active bowel sounds.    Skin: No rash, erythema, ecchymosis.  Musculoskeletal: Normal muscle tone and strength. Normal gait. No spinal deformities.  Neuropsychiatric: Oriented to time place and person.  Affect normal.  No gross motor deficits.  Extremities: No edema, clubbing or deformities.  Vascular:     DIAGNOSTIC DATA:    Recent Labs  Lab 12/27/17  0736 12/27/17  0240 12/26/17  2356   WBC 18.3*  --  12.3*   HGB 15.9  --  16.2   MCV 87  --  85     --  218   NA  --   --  136   POTASSIUM  --   --  3.6   CHLORIDE   --   --  105   CO2  --   --  22   BUN  --   --  27   CR  --   --  0.90   ANIONGAP  --   --  9   JERRY  --   --  9.5   GLC  --   --  210*   ALBUMIN  --   --  3.8   PROTTOTAL  --   --  8.4   BILITOTAL  --   --  1.0   ALKPHOS  --   --  113   ALT  --   --  42   AST  --   --  19   LIPASE  --   --  75   TROPI 0.167* 0.049* <0.015               Mothilaulisses Cordon MD

## 2017-12-27 NOTE — ED NOTES
Shortly after eating dinner had sudden onset epigastric pain and bloating no nausea  reports pain as so bad it was hard to breathe  Tried gas med and antiacids no relief pain and pressure has persisted for about 2 hrs now  No history of same or similar

## 2017-12-28 ENCOUNTER — APPOINTMENT (OUTPATIENT)
Dept: SURGERY | Facility: PHYSICIAN GROUP | Age: 82
End: 2017-12-28
Payer: COMMERCIAL

## 2017-12-28 ENCOUNTER — ANESTHESIA (OUTPATIENT)
Dept: SURGERY | Facility: CLINIC | Age: 82
DRG: 417 | End: 2017-12-28
Payer: COMMERCIAL

## 2017-12-28 ENCOUNTER — ANESTHESIA EVENT (OUTPATIENT)
Dept: SURGERY | Facility: CLINIC | Age: 82
DRG: 417 | End: 2017-12-28
Payer: COMMERCIAL

## 2017-12-28 LAB
ALBUMIN SERPL-MCNC: 2.9 G/DL (ref 3.4–5)
ALP SERPL-CCNC: 80 U/L (ref 40–150)
ALT SERPL W P-5'-P-CCNC: 24 U/L (ref 0–70)
ANION GAP SERPL CALCULATED.3IONS-SCNC: 5 MMOL/L (ref 3–14)
AST SERPL W P-5'-P-CCNC: 15 U/L (ref 0–45)
BASOPHILS # BLD AUTO: 0 10E9/L (ref 0–0.2)
BASOPHILS NFR BLD AUTO: 0.1 %
BILIRUB SERPL-MCNC: 2 MG/DL (ref 0.2–1.3)
BUN SERPL-MCNC: 22 MG/DL (ref 7–30)
CALCIUM SERPL-MCNC: 8.5 MG/DL (ref 8.5–10.1)
CHLORIDE SERPL-SCNC: 103 MMOL/L (ref 94–109)
CO2 SERPL-SCNC: 30 MMOL/L (ref 20–32)
CREAT SERPL-MCNC: 1.26 MG/DL (ref 0.66–1.25)
DIFFERENTIAL METHOD BLD: ABNORMAL
EOSINOPHIL # BLD AUTO: 0 10E9/L (ref 0–0.7)
EOSINOPHIL NFR BLD AUTO: 0 %
ERYTHROCYTE [DISTWIDTH] IN BLOOD BY AUTOMATED COUNT: 13.7 % (ref 10–15)
GFR SERPL CREATININE-BSD FRML MDRD: 55 ML/MIN/1.7M2
GLUCOSE BLDC GLUCOMTR-MCNC: 117 MG/DL (ref 70–99)
GLUCOSE BLDC GLUCOMTR-MCNC: 131 MG/DL (ref 70–99)
GLUCOSE BLDC GLUCOMTR-MCNC: 132 MG/DL (ref 70–99)
GLUCOSE BLDC GLUCOMTR-MCNC: 135 MG/DL (ref 70–99)
GLUCOSE BLDC GLUCOMTR-MCNC: 136 MG/DL (ref 70–99)
GLUCOSE BLDC GLUCOMTR-MCNC: 186 MG/DL (ref 70–99)
GLUCOSE SERPL-MCNC: 137 MG/DL (ref 70–99)
HCT VFR BLD AUTO: 43.1 % (ref 40–53)
HGB BLD-MCNC: 14.8 G/DL (ref 13.3–17.7)
IMM GRANULOCYTES # BLD: 0.1 10E9/L (ref 0–0.4)
IMM GRANULOCYTES NFR BLD: 0.4 %
LYMPHOCYTES # BLD AUTO: 1.8 10E9/L (ref 0.8–5.3)
LYMPHOCYTES NFR BLD AUTO: 10.8 %
MCH RBC QN AUTO: 30.6 PG (ref 26.5–33)
MCHC RBC AUTO-ENTMCNC: 34.3 G/DL (ref 31.5–36.5)
MCV RBC AUTO: 89 FL (ref 78–100)
MONOCYTES # BLD AUTO: 1.4 10E9/L (ref 0–1.3)
MONOCYTES NFR BLD AUTO: 8.1 %
NEUTROPHILS # BLD AUTO: 13.5 10E9/L (ref 1.6–8.3)
NEUTROPHILS NFR BLD AUTO: 80.6 %
NRBC # BLD AUTO: 0 10*3/UL
NRBC BLD AUTO-RTO: 0 /100
PLATELET # BLD AUTO: 156 10E9/L (ref 150–450)
POTASSIUM SERPL-SCNC: 3.6 MMOL/L (ref 3.4–5.3)
PROT SERPL-MCNC: 7 G/DL (ref 6.8–8.8)
RBC # BLD AUTO: 4.84 10E12/L (ref 4.4–5.9)
SODIUM SERPL-SCNC: 138 MMOL/L (ref 133–144)
WBC # BLD AUTO: 16.7 10E9/L (ref 4–11)

## 2017-12-28 PROCEDURE — 25000128 H RX IP 250 OP 636: Performed by: ANESTHESIOLOGY

## 2017-12-28 PROCEDURE — 80053 COMPREHEN METABOLIC PANEL: CPT | Performed by: INTERNAL MEDICINE

## 2017-12-28 PROCEDURE — 71000012 ZZH RECOVERY PHASE 1 LEVEL 1 FIRST HR: Performed by: SURGERY

## 2017-12-28 PROCEDURE — 27210995 ZZH RX 272: Performed by: SURGERY

## 2017-12-28 PROCEDURE — 0FT44ZZ RESECTION OF GALLBLADDER, PERCUTANEOUS ENDOSCOPIC APPROACH: ICD-10-PCS | Performed by: SURGERY

## 2017-12-28 PROCEDURE — 25000128 H RX IP 250 OP 636: Performed by: INTERNAL MEDICINE

## 2017-12-28 PROCEDURE — 71000013 ZZH RECOVERY PHASE 1 LEVEL 1 EA ADDTL HR: Performed by: SURGERY

## 2017-12-28 PROCEDURE — 36415 COLL VENOUS BLD VENIPUNCTURE: CPT | Performed by: INTERNAL MEDICINE

## 2017-12-28 PROCEDURE — 36000058 ZZH SURGERY LEVEL 3 EA 15 ADDTL MIN: Performed by: SURGERY

## 2017-12-28 PROCEDURE — 25000128 H RX IP 250 OP 636: Performed by: NURSE ANESTHETIST, CERTIFIED REGISTERED

## 2017-12-28 PROCEDURE — 47562 LAPAROSCOPIC CHOLECYSTECTOMY: CPT | Performed by: SURGERY

## 2017-12-28 PROCEDURE — 88304 TISSUE EXAM BY PATHOLOGIST: CPT | Mod: 26 | Performed by: SURGERY

## 2017-12-28 PROCEDURE — 25000132 ZZH RX MED GY IP 250 OP 250 PS 637: Performed by: PHYSICIAN ASSISTANT

## 2017-12-28 PROCEDURE — 36000056 ZZH SURGERY LEVEL 3 1ST 30 MIN: Performed by: SURGERY

## 2017-12-28 PROCEDURE — 25000125 ZZHC RX 250: Performed by: NURSE ANESTHETIST, CERTIFIED REGISTERED

## 2017-12-28 PROCEDURE — 99233 SBSQ HOSP IP/OBS HIGH 50: CPT | Performed by: INTERNAL MEDICINE

## 2017-12-28 PROCEDURE — 25000132 ZZH RX MED GY IP 250 OP 250 PS 637: Performed by: INTERNAL MEDICINE

## 2017-12-28 PROCEDURE — 37000008 ZZH ANESTHESIA TECHNICAL FEE, 1ST 30 MIN: Performed by: SURGERY

## 2017-12-28 PROCEDURE — 25000125 ZZHC RX 250: Performed by: SURGERY

## 2017-12-28 PROCEDURE — 37000009 ZZH ANESTHESIA TECHNICAL FEE, EACH ADDTL 15 MIN: Performed by: SURGERY

## 2017-12-28 PROCEDURE — 00000146 ZZHCL STATISTIC GLUCOSE BY METER IP

## 2017-12-28 PROCEDURE — 47562 LAPAROSCOPIC CHOLECYSTECTOMY: CPT | Mod: AS | Performed by: PHYSICIAN ASSISTANT

## 2017-12-28 PROCEDURE — 25000566 ZZH SEVOFLURANE, EA 15 MIN: Performed by: SURGERY

## 2017-12-28 PROCEDURE — 27210794 ZZH OR GENERAL SUPPLY STERILE: Performed by: SURGERY

## 2017-12-28 PROCEDURE — 85025 COMPLETE CBC W/AUTO DIFF WBC: CPT | Performed by: INTERNAL MEDICINE

## 2017-12-28 PROCEDURE — 40000170 ZZH STATISTIC PRE-PROCEDURE ASSESSMENT II: Performed by: SURGERY

## 2017-12-28 PROCEDURE — 12000007 ZZH R&B INTERMEDIATE

## 2017-12-28 PROCEDURE — 88304 TISSUE EXAM BY PATHOLOGIST: CPT | Performed by: SURGERY

## 2017-12-28 RX ORDER — GLYCOPYRROLATE 0.2 MG/ML
INJECTION, SOLUTION INTRAMUSCULAR; INTRAVENOUS PRN
Status: DISCONTINUED | OUTPATIENT
Start: 2017-12-28 | End: 2017-12-28

## 2017-12-28 RX ORDER — MAGNESIUM HYDROXIDE 1200 MG/15ML
LIQUID ORAL PRN
Status: DISCONTINUED | OUTPATIENT
Start: 2017-12-28 | End: 2017-12-28 | Stop reason: HOSPADM

## 2017-12-28 RX ORDER — SODIUM CHLORIDE, SODIUM LACTATE, POTASSIUM CHLORIDE, CALCIUM CHLORIDE 600; 310; 30; 20 MG/100ML; MG/100ML; MG/100ML; MG/100ML
INJECTION, SOLUTION INTRAVENOUS CONTINUOUS
Status: DISCONTINUED | OUTPATIENT
Start: 2017-12-28 | End: 2017-12-28 | Stop reason: HOSPADM

## 2017-12-28 RX ORDER — ONDANSETRON 2 MG/ML
INJECTION INTRAMUSCULAR; INTRAVENOUS PRN
Status: DISCONTINUED | OUTPATIENT
Start: 2017-12-28 | End: 2017-12-28

## 2017-12-28 RX ORDER — HYDROMORPHONE HYDROCHLORIDE 1 MG/ML
.3-.5 INJECTION, SOLUTION INTRAMUSCULAR; INTRAVENOUS; SUBCUTANEOUS EVERY 5 MIN PRN
Status: DISCONTINUED | OUTPATIENT
Start: 2017-12-28 | End: 2017-12-28 | Stop reason: HOSPADM

## 2017-12-28 RX ORDER — EPHEDRINE SULFATE 50 MG/ML
INJECTION, SOLUTION INTRAMUSCULAR; INTRAVENOUS; SUBCUTANEOUS PRN
Status: DISCONTINUED | OUTPATIENT
Start: 2017-12-28 | End: 2017-12-28

## 2017-12-28 RX ORDER — ONDANSETRON 4 MG/1
4 TABLET, ORALLY DISINTEGRATING ORAL EVERY 30 MIN PRN
Status: DISCONTINUED | OUTPATIENT
Start: 2017-12-28 | End: 2017-12-28 | Stop reason: HOSPADM

## 2017-12-28 RX ORDER — NALOXONE HYDROCHLORIDE 0.4 MG/ML
.1-.4 INJECTION, SOLUTION INTRAMUSCULAR; INTRAVENOUS; SUBCUTANEOUS
Status: ACTIVE | OUTPATIENT
Start: 2017-12-28 | End: 2017-12-29

## 2017-12-28 RX ORDER — NEOSTIGMINE METHYLSULFATE 1 MG/ML
VIAL (ML) INJECTION PRN
Status: DISCONTINUED | OUTPATIENT
Start: 2017-12-28 | End: 2017-12-28

## 2017-12-28 RX ORDER — FENTANYL CITRATE 50 UG/ML
INJECTION, SOLUTION INTRAMUSCULAR; INTRAVENOUS PRN
Status: DISCONTINUED | OUTPATIENT
Start: 2017-12-28 | End: 2017-12-28

## 2017-12-28 RX ORDER — BUPIVACAINE HYDROCHLORIDE AND EPINEPHRINE 5; 5 MG/ML; UG/ML
INJECTION, SOLUTION PERINEURAL PRN
Status: DISCONTINUED | OUTPATIENT
Start: 2017-12-28 | End: 2017-12-28 | Stop reason: HOSPADM

## 2017-12-28 RX ORDER — FENTANYL CITRATE 50 UG/ML
25-50 INJECTION, SOLUTION INTRAMUSCULAR; INTRAVENOUS
Status: DISCONTINUED | OUTPATIENT
Start: 2017-12-28 | End: 2017-12-28 | Stop reason: HOSPADM

## 2017-12-28 RX ORDER — LIDOCAINE HYDROCHLORIDE 20 MG/ML
INJECTION, SOLUTION INFILTRATION; PERINEURAL PRN
Status: DISCONTINUED | OUTPATIENT
Start: 2017-12-28 | End: 2017-12-28

## 2017-12-28 RX ORDER — ONDANSETRON 2 MG/ML
4 INJECTION INTRAMUSCULAR; INTRAVENOUS EVERY 30 MIN PRN
Status: DISCONTINUED | OUTPATIENT
Start: 2017-12-28 | End: 2017-12-28 | Stop reason: HOSPADM

## 2017-12-28 RX ORDER — OXYCODONE HYDROCHLORIDE 5 MG/1
5-10 TABLET ORAL
Status: DISCONTINUED | OUTPATIENT
Start: 2017-12-28 | End: 2017-12-31 | Stop reason: HOSPADM

## 2017-12-28 RX ORDER — PROPOFOL 10 MG/ML
INJECTION, EMULSION INTRAVENOUS PRN
Status: DISCONTINUED | OUTPATIENT
Start: 2017-12-28 | End: 2017-12-28

## 2017-12-28 RX ORDER — ACETAMINOPHEN 500 MG
500 TABLET ORAL EVERY 4 HOURS PRN
Status: DISCONTINUED | OUTPATIENT
Start: 2017-12-28 | End: 2017-12-31 | Stop reason: HOSPADM

## 2017-12-28 RX ADMIN — PHENYLEPHRINE HYDROCHLORIDE 100 MCG: 10 INJECTION INTRAVENOUS at 11:45

## 2017-12-28 RX ADMIN — ONDANSETRON 4 MG: 2 INJECTION INTRAMUSCULAR; INTRAVENOUS at 12:08

## 2017-12-28 RX ADMIN — PHENYLEPHRINE HYDROCHLORIDE 200 MCG: 10 INJECTION INTRAVENOUS at 11:40

## 2017-12-28 RX ADMIN — GLYCOPYRROLATE 0.8 MG: 0.2 INJECTION, SOLUTION INTRAMUSCULAR; INTRAVENOUS at 12:16

## 2017-12-28 RX ADMIN — PHENYLEPHRINE HYDROCHLORIDE 100 MCG: 10 INJECTION INTRAVENOUS at 11:33

## 2017-12-28 RX ADMIN — SODIUM CHLORIDE: 9 INJECTION, SOLUTION INTRAVENOUS at 14:58

## 2017-12-28 RX ADMIN — PHENYLEPHRINE HYDROCHLORIDE 200 MCG: 10 INJECTION INTRAVENOUS at 11:36

## 2017-12-28 RX ADMIN — METOPROLOL TARTRATE 25 MG: 25 TABLET ORAL at 20:48

## 2017-12-28 RX ADMIN — PHENYLEPHRINE HYDROCHLORIDE 200 MCG: 10 INJECTION INTRAVENOUS at 11:30

## 2017-12-28 RX ADMIN — SUCCINYLCHOLINE CHLORIDE 100 MG: 20 INJECTION, SOLUTION INTRAMUSCULAR; INTRAVENOUS at 11:24

## 2017-12-28 RX ADMIN — TAZOBACTAM SODIUM AND PIPERACILLIN SODIUM 3.38 G: 375; 3 INJECTION, SOLUTION INTRAVENOUS at 20:48

## 2017-12-28 RX ADMIN — METOPROLOL TARTRATE 25 MG: 25 TABLET ORAL at 08:21

## 2017-12-28 RX ADMIN — HYDROMORPHONE HYDROCHLORIDE 0.5 MG: 1 INJECTION, SOLUTION INTRAMUSCULAR; INTRAVENOUS; SUBCUTANEOUS at 13:24

## 2017-12-28 RX ADMIN — FENTANYL CITRATE 50 MCG: 50 INJECTION, SOLUTION INTRAMUSCULAR; INTRAVENOUS at 11:16

## 2017-12-28 RX ADMIN — LIDOCAINE HYDROCHLORIDE 60 MG: 20 INJECTION, SOLUTION INFILTRATION; PERINEURAL at 11:22

## 2017-12-28 RX ADMIN — LISINOPRIL AND HYDROCHLOROTHIAZIDE 1 TABLET: 25; 20 TABLET ORAL at 08:21

## 2017-12-28 RX ADMIN — Medication 0.2 MG: at 08:33

## 2017-12-28 RX ADMIN — TAZOBACTAM SODIUM AND PIPERACILLIN SODIUM 3.38 G: 375; 3 INJECTION, SOLUTION INTRAVENOUS at 01:30

## 2017-12-28 RX ADMIN — FENTANYL CITRATE 25 MCG: 50 INJECTION, SOLUTION INTRAMUSCULAR; INTRAVENOUS at 11:55

## 2017-12-28 RX ADMIN — TAZOBACTAM SODIUM AND PIPERACILLIN SODIUM 3.38 G: 375; 3 INJECTION, SOLUTION INTRAVENOUS at 16:31

## 2017-12-28 RX ADMIN — NEOSTIGMINE METHYLSULFATE 5 MG: 1 INJECTION INTRAMUSCULAR; INTRAVENOUS; SUBCUTANEOUS at 12:16

## 2017-12-28 RX ADMIN — SODIUM CHLORIDE, POTASSIUM CHLORIDE, SODIUM LACTATE AND CALCIUM CHLORIDE: 600; 310; 30; 20 INJECTION, SOLUTION INTRAVENOUS at 10:12

## 2017-12-28 RX ADMIN — TAZOBACTAM SODIUM AND PIPERACILLIN SODIUM 3.38 G: 375; 3 INJECTION, SOLUTION INTRAVENOUS at 08:31

## 2017-12-28 RX ADMIN — SODIUM CHLORIDE: 9 INJECTION, SOLUTION INTRAVENOUS at 01:14

## 2017-12-28 RX ADMIN — Medication 0.2 MG: at 01:13

## 2017-12-28 RX ADMIN — OXYCODONE HYDROCHLORIDE 5 MG: 5 TABLET ORAL at 20:48

## 2017-12-28 RX ADMIN — Medication 5 MG: at 11:39

## 2017-12-28 RX ADMIN — PROPOFOL 130 MG: 10 INJECTION, EMULSION INTRAVENOUS at 11:24

## 2017-12-28 RX ADMIN — PHENYLEPHRINE HYDROCHLORIDE 100 MCG: 10 INJECTION INTRAVENOUS at 11:28

## 2017-12-28 RX ADMIN — FENTANYL CITRATE 50 MCG: 50 INJECTION, SOLUTION INTRAMUSCULAR; INTRAVENOUS at 11:44

## 2017-12-28 RX ADMIN — ROCURONIUM BROMIDE 30 MG: 10 INJECTION INTRAVENOUS at 11:38

## 2017-12-28 RX ADMIN — Medication 2.5 MG: at 11:33

## 2017-12-28 ASSESSMENT — LIFESTYLE VARIABLES: TOBACCO_USE: 0

## 2017-12-28 NOTE — OP NOTE
General Surgery Operative Note   PREOPERATIVE DIAGNOSIS: Acute cholecystitis  POSTOPERATIVE DIAGNOSIS: Acute gangrenous cholecystitis  PROCEDURE: LAPAROSCOPIC CHOLECYSTECTOMY   ANESTHESIA: General.   PREOPERATIVE MEDICATIONS: Ancef IV.   SURGEON: Heber Gonzalez MD   ASSISTANT: Travis Ta PA-C, Physician's assistant first assistant was necessary during the performance of this procedure for expertise in patient positioning, prepping, draping, trocar placement, camera management, retraction and exposure, and suctioning.  INDICATIONS: Patient presented with signs and symptoms consistent with acute cholecystitis.  His initial presentation was at an outside institution and he was transferred here for cardiac evaluation due to a mildly elevated troponin.  He underwent stress test and cardiology evaluation and was ultimately cleared for surgery.  Extensive discussion was undertaken regarding treatment options.  We reviewed the procedure of cholecystectomy.  Risks including but not limited to bleeding, infection, bile duct or visceral injury were all reviewed in great detail.  The patient's questions were all answered to satisfaction.  The patient wishes to proceed.  PROCEDURE: After informed consent was obtained the patient was taken to the operating suite and uneventfully endotracheally intubated. The abdomen was prepped and draped in a sterile fashion. Surgeon initiated timeout was acknowledged. A stab incision was then made within the umbilicus through which a 5mm optical trocar was used to gain access to the abdominal cavity. A CO2 pneumoperitoneum was then created. An 11mm trocar was then placed in the subxiphoid position and two 5mm ports were placed in the right subcostal position. We elevated the liver and were able to identify the gallbladder. The gallbladder was grasped and used to elevate the liver further.  The gallbladder was tense and distended with patchy areas of early gangrenous cholecystitis.  An  aspirating needle and syringe were used to decompress the gallbladder.  I began dissecting out some fatty adhesions down near the neck of the gallbladder until a cystic duct was encountered. I continued the dissection using combination of sharp and blunt dissection until the cystic duct was largely dissected out. I continued the dissection up along the sides of the gallbladder, both medially and laterally, until I had created a space between the gallbladder and the liver. At this point, I encountered the cystic artery, just posterior and lateral to the cystic duct. This again was dissected out. Once I had created a window where only the cystic artery and duct were noted to be entering the gallbladder, I felt that this represented our critical view. The cystic artery and duct were then doubly clipped and divided. I continued the dissection up along the body of the gallbladder, freeing all attachments and adhesions of the gallbladder to the liver. Gallbladder was removed from the liver in an atraumatic fashion. The gallbladder was then brought up through the subxiphoid port site and removed from the abdomen. The gallbladder fossa was reinspected, and all areas of bleeding were managed with electrocautery. I irrigated the area with normal saline and aspirated it out. I then removed the umbilical port trocar. I then reinspected the abdomen, and everything appeared to be in pristine condition. I removed the trocars under laparoscopic visualization and desufflated the abdomen with the [place] suction .  The fascia at the specimen extraction site was closed with figure-of-eight 0 Vicryl suture.  The skin edges were reapproximated with 4-0 Vicryl and Steri-Strips. The patient was uneventfully extubated, awakened and taken to the PACU in stable condition. At the conclusion of the case, all lap and needle counts were correct.   ESTIMATED BLOOD LOSS: 20cc   Heber Gonzalez MD, MD

## 2017-12-28 NOTE — PROGRESS NOTES
[facility]    Hospitalist Progress Note    Assessment & Plan    83-year-old male with past medical history of hypertension, hyperlipidemia, morbid obesity, osteoarthritis and diet-controlled DM 2.  He presented with right upper quadrant abdominal pain, shortness of breath and nausea, possible cholecystitisand elevated troponin.      Likely acute calculus cholecystitis:  -presented with epigastric and right upper quadrant pain  -patient had m mild leukocytosis with positive Driver sign on right upper quadrant ultrasound  -LFTs within normal limits except for bilirubin at 2.0 this morning  -plan for laparoscopic cholecystectomy today  -pain control as needed  -continue IV Zosyn  -NPO for now    Non-ST elevation myocardial infarction, due to demand ischemia; ACS ruled out.    -He was tachycardic and hypertensive when he presented to [facility].   -Troponin flat at 0.17   -EKG findings with ST depression in the anterolateral leads  -cardiology was consulted, patient underwent Madhavi scan -negative ischemia  -elevated troponin most likely due to demand ischemia  -cardiology has signed off, recommending continuation of perioperative beta-blocker    Diabetes mellitus type II; Diet controlled  -Hemoglobin A1c: 6.6  -sliding scale for now     History of hypertension.    -Hold his Zestoretic  -IV labetalol and hydralazine available  -continue metoprolol(new)    Hypercholesterolemia.    -Hold prior to admission statin for now      D/W: RN  DVT Prophylaxis: Pneumatic Compression Devices  Code Status: Full Code    Disposition: Expected discharge in 1-2 days    Chris Caballero MD    Interval History   Ongoing right upper quadrant abdominal pain this morning. Low-grade fever up to100.3. Denies chest pain. No dyspnea. Endorses ongoing nausea.      -Data reviewed today: I reviewed all new labs and imaging results over the last 24 hours. I personally reviewed the EKG tracing showing nsr.    Physical Exam   Temp: 99.9  F  (37.7  C) Temp src: Oral BP: 117/57 Pulse: 92 Heart Rate: 86 Resp: 24 SpO2: 99 % O2 Device: Nasal cannula Oxygen Delivery: 2 LPM  Vitals:    12/27/17 0600 12/28/17 0600   Weight: 118.8 kg (261 lb 14.4 oz) 119 kg (262 lb 5.6 oz)     Vital Signs with Ranges  Temp:  [98.4  F (36.9  C)-100.3  F (37.9  C)] 99.9  F (37.7  C)  Pulse:  [92-95] 92  Heart Rate:  [73-92] 86  Resp:  [20-24] 24  BP: ()/(36-69) 117/57  SpO2:  [96 %-99 %] 99 %  I/O last 3 completed shifts:  In: 650 [I.V.:650]  Out: 850 [Urine:850]    Constitutional: AAOX3, NAD, Appears comfortable  HEENT: Moist oral mucosa, no oral lesions, No pallor or icterus  Neck- Supple, Good ROM, No JVD  Respiratory:  No crackles, No wheezes, CTA B/L, Normal WOB  Cardiovascular: RRR, No murmur  GI: mild distention, tender right upper quadrant, sluggish bowel sounds  Skin/Integument: Warm and dry, no rashes  MSK: No joint deformity or swelling, no edema  Neuro: CN- grossly intact     Medications     - MEDICATION INSTRUCTIONS -       - MEDICATION INSTRUCTIONS -       NaCl 75 mL/hr at 12/28/17 0114     - MEDICATION INSTRUCTIONS -       [Auto Hold] Reason beta blocker order not selected       [Auto Hold] Reason ACE/ARB/ARNI order not selected       [Auto Hold] Reason statin medication order not selected         [Auto Hold] sodium chloride (PF)  3 mL Intracatheter Q8H     [Auto Hold] piperacillin-tazobactam  3.375 g Intravenous Q6H     [Auto Hold] insulin aspart  1-6 Units Subcutaneous Q4H     [Auto Hold] metoprolol  25 mg Oral BID     [Auto Hold] lisinopril-hydrochlorothiazide  1 tablet Oral Daily with breakfast     sodium chloride (PF)  10 mL Intravenous Once       Data     Recent Labs  Lab 12/28/17  0525 12/27/17  1325 12/27/17  0736 12/27/17  0240 12/26/17  2356   WBC 16.7*  --  18.3*  --  12.3*   HGB 14.8  --  15.9  --  16.2   MCV 89  --  87  --  85     --  195  --  218     --   --   --  136   POTASSIUM 3.6  --   --   --  3.6   CHLORIDE 103  --   --   --   105   CO2 30  --   --   --  22   BUN 22  --   --   --  27   CR 1.26*  --   --   --  0.90   ANIONGAP 5  --   --   --  9   JERRY 8.5  --   --   --  9.5   *  --   --   --  210*   ALBUMIN 2.9*  --   --   --  3.8   PROTTOTAL 7.0  --   --   --  8.4   BILITOTAL 2.0*  --   --   --  1.0   ALKPHOS 80  --   --   --  113   ALT 24  --   --   --  42   AST 15  --   --   --  19   LIPASE  --   --   --   --  75   TROPI  --  0.170* 0.167* 0.049* <0.015       Recent Results (from the past 24 hour(s))   NM Lexiscan stress test (nuc card)    Narrative    GATED MYOCARDIAL PERFUSION SCINTIGRAPHY WITH INTRAVENOUS PHARMACOLOGIC  VASODILATATION LEXISCAN -ONE DAY STUDY     12/27/2017 2:48 PM  GORDON QUIJANO  83 years  Male  5/10/1934.    Indication/Clinical History: Preop for acute cholecystitis    Impression  1.  Myocardial perfusion imaging using single isotope technique  demonstrated small area of mild ischemia at the apex/anteroapex..   2. Gated images demonstrated normal wall motion and thickening.  The  left ventricular systolic function is 85% at rest and 84% post stress.  3. Compared to the prior study from there is no prior study for  comparison .    Procedure  Pharmacologic stress testing was performed with Lexiscan at a rate of  0.08 mg/ml rapid bolus injection, for 15 seconds, 0.4 mg/5ml  intravenously. Low-level exercise was not performed along with the  vasodilator infusion.  The heart rate was 74 at baseline and michaela to  80 beats per minute during the Lexiscan infusion. The rest blood  pressure was 118/64 mmHg and was 105/43 mm Hg during Lexiscan  infusion. The patient experienced no chest pain  during the test.    Myocardial perfusion imaging was performed at rest, approximately 45  minutes after the injection intravenously of 12.3 mCi of Tc-99m  Myoview. At peak pharmacologic effect, 10-20 seconds after Lexiscan,   the patient was injected intravenously with 37.5 mCi of  Tc-99m  Myoview. The post-stress tomographic imaging  was performed  approximately 60 minutes after stress.    EKG Findings  The resting EKG demonstrated sinus rhythm with right bundle branch  block and occasional premature ventricular contractions. The stress  EKG demonstrated no ST-T changes over baseline to suggest ischemia or  injury.    Tomographic Findings  Overall, the study quality is adequate . On the stress images,  perfusion is normal with the exception of a very small area of mildly  decreased uptake at the apex and at the inferolateral base. On the  rest images, perfusion is normal with the exception of a mildly  decreased uptake at the inferolateral base . Gated images demonstrated  normal wall motion and thickening including at the inferolateral base.  The left ventricular ejection fraction was calculated to be 84% post  stress. TID was not seen with an index of 1.12. Rotating planar images  are reviewed and appropriately appropriately motion corrected.  Significant diaphragmatic attenuation artifact is seen which may  impact on interpretation.    GARRY GARNER MD

## 2017-12-28 NOTE — BRIEF OP NOTE
[facility] Brief Operative Note    Pre-operative diagnosis: CHOLELITHIASIS   Post-operative diagnosis Gangrenous Cholecystitis     Procedure: Procedure(s):  LAPAROSCOPIC CHOLECYSTECTOMY - Wound Class: II-Clean Contaminated   Surgeon(s): Surgeon(s) and Role:     * Heber Gonzalez MD - Primary     * Enzo Ta PA-C - Assisting   Estimated blood loss: 20 mL    Specimens:   ID Type Source Tests Collected by Time Destination   A : GALLBLADDER AND CONTENTS Tissue Gallbladder and Contents SURGICAL PATHOLOGY EXAM Heber Gonzalez MD 12/28/2017 11:52 AM       Findings: Gangrene gallbladder  15 Round ANGELA left along gallbladder fossa and Rt gutter  See Operative Report for full details.  No complications noted.

## 2017-12-28 NOTE — PLAN OF CARE
Problem: Patient Care Overview  Goal: Plan of Care/Patient Progress Review  Outcome: No Change  Tmax 100.3, gave scheduled iv antibiotics, later rechecked temp 99.4, tele SR w/ BBB. Gave prn iv dilaudid for c/o RLQ & back pain w/ relief. Voids adequate UOP. Denies CP/N/V. One assisted with cares. NPO, pt plans to have lap chol today.

## 2017-12-28 NOTE — ANESTHESIA CARE TRANSFER NOTE
Patient: Alvin Newton    Procedure(s):  LAPAROSCOPIC CHOLECYSTECTOMY - Wound Class: II-Clean Contaminated    Diagnosis: CHOLELITHIASIS  Diagnosis Additional Information: No value filed.    Anesthesia Type:   General, ETT     Note:  Airway :Face Mask  Patient transferred to:PACU  Handoff Report: Identifed the Patient, Identified the Reponsible Provider, Reviewed the pertinent medical history, Discussed the surgical course, Reviewed Intra-OP anesthesia mangement and issues during anesthesia, Set expectations for post-procedure period and Allowed opportunity for questions and acknowledgement of understanding      Vitals: (Last set prior to Anesthesia Care Transfer)    CRNA VITALS  12/28/2017 1154 - 12/28/2017 1229      12/28/2017             Pulse: 99    Ht Rate: 98    SpO2: 97 %    Resp Rate (set): 10                Electronically Signed By: CHUCK Mcwilliams CRNA  December 28, 2017  12:29 PM

## 2017-12-28 NOTE — ANESTHESIA POSTPROCEDURE EVALUATION
Patient: Alvin Newton    Procedure(s):  LAPAROSCOPIC CHOLECYSTECTOMY - Wound Class: II-Clean Contaminated    Diagnosis:CHOLELITHIASIS  Diagnosis Additional Information: No value filed.    Anesthesia Type:  General, ETT    Note:  Anesthesia Post Evaluation    Patient location during evaluation: PACU  Patient participation: Able to fully participate in evaluation  Level of consciousness: awake  Pain management: adequate  Cardiovascular status: acceptable  Respiratory status: acceptable  Hydration status: acceptable  PONV: none     Anesthetic complications: None          Last vitals:  Vitals:    12/28/17 1430 12/28/17 1440 12/28/17 1500   BP: 117/66  116/60   Pulse:      Resp: 16 10 20   Temp: 37.6  C (99.7  F) 37.6  C (99.7  F) 37  C (98.6  F)   SpO2: 95% 96% 90%         Electronically Signed By: TONY FAJARDO  December 28, 2017  5:00 PM

## 2017-12-28 NOTE — PROGRESS NOTES
I reviewed patient's cardiac tests from earlier today.    Echocardiogram showed hyperdynamic systolic function with an LVEF of 70-75% (not unexpected in the setting of acute cholecystitis). There were no regional wall motion abnormalities. The right ventricle was not well visualized but overall the systolic function appears preserved.    Pharmacological nuclear stress test was reported as a small area of ischemia in the apex, which is likely an artifact. There is mild aortic valve stenosis.     RECOMMENDATIONS:  1. Patient is cleared for urgent surgery in the context of acute cholecystitis. Intermediate risk surgery.  2. Please continue perioperative metoprolol titrate 25 mg b.i.d. which was started today.  3. Cardiology will sign off and follow up as an outpatient. The echocardiogram images were suboptimal including Doppler assessment of the aortic valve, so this will have to be repeated once he has recovered from this acute hospitalization. We will arrange for this.  4. Please page with questions.    Dr. TED Cordon. M.D. Cardiology.

## 2017-12-28 NOTE — PROGRESS NOTES
Care Coordination:    Following pt who was in heart center.  Cardiology followups were recommended.  Pt lives in [place], thus I asked Dr. Cordon if it is OK if pt sees a [facility] provider.  She states this is OK.  Scheduled echo 1/26/17, and cardiologist 1/29/17.              Aggie Godwin RN, BSN  [facility] Care Coordinator   Mobile Phone: [phone]

## 2017-12-28 NOTE — PLAN OF CARE
Problem: Cardiac: ACS (Acute Coronary Syndrome) (Adult)  Goal: Signs and Symptoms of Listed Potential Problems Will be Absent, Minimized or Managed (Cardiac: ACS)  Signs and symptoms of listed potential problems will be absent, minimized or managed by discharge/transition of care (reference Cardiac: ACS (Acute Coronary Syndrome) (Adult) CPG).   Outcome: No Change  VSS and rhythm remains SR BBB.  Pt declines pain.  Urinating in the urinal and is also incontinent at times.  Pt is on clear liquids and had minimal to eat this evening.  Will be NPO after midnight for cholecystectomy.

## 2017-12-28 NOTE — ANESTHESIA PREPROCEDURE EVALUATION
Anesthesia Evaluation     . Pt has had prior anesthetic.     No history of anesthetic complications          ROS/MED HX    ENT/Pulmonary:      (-) tobacco use, asthma and sleep apnea   Neurologic:       Cardiovascular: Comment: EKG RBBB    (+) Dyslipidemia, hypertension--CAD, -past MI,-. : . . . :. . Previous cardiac testing Echodate:results:Stress Testdate: results: date: results: date: results:         (-) angina and angina   METS/Exercise Tolerance:     Hematologic:         Musculoskeletal:         GI/Hepatic:        (-) GERD   Renal/Genitourinary:         Endo:     (+) type II DM Obesity, .      Psychiatric:         Infectious Disease:         Malignancy:         Other:                     Physical Exam  Normal systems: cardiovascular and pulmonary    Airway   Mallampati: II  TM distance: >3 FB  Neck ROM: full    Dental   (+) partials and upper dentures    Cardiovascular       Pulmonary                     Anesthesia Plan      History & Physical Review  History and physical reviewed and following examination; no interval change.    ASA Status:  3 .    NPO Status:  > 8 hours    Plan for General and ETT with Propofol induction.   PONV prophylaxis:  Ondansetron (or other 5HT-3)       Postoperative Care      Consents  Anesthetic plan, risks, benefits and alternatives discussed with:  Patient..                          .

## 2017-12-29 ENCOUNTER — APPOINTMENT (OUTPATIENT)
Dept: ULTRASOUND IMAGING | Facility: CLINIC | Age: 82
DRG: 417 | End: 2017-12-29
Attending: NURSE PRACTITIONER
Payer: COMMERCIAL

## 2017-12-29 LAB
ALBUMIN SERPL-MCNC: 2.3 G/DL (ref 3.4–5)
ALP SERPL-CCNC: 64 U/L (ref 40–150)
ALT SERPL W P-5'-P-CCNC: 27 U/L (ref 0–70)
ANION GAP SERPL CALCULATED.3IONS-SCNC: 6 MMOL/L (ref 3–14)
AST SERPL W P-5'-P-CCNC: 24 U/L (ref 0–45)
BILIRUB DIRECT SERPL-MCNC: 0.5 MG/DL (ref 0–0.2)
BILIRUB SERPL-MCNC: 1.6 MG/DL (ref 0.2–1.3)
BUN SERPL-MCNC: 34 MG/DL (ref 7–30)
CALCIUM SERPL-MCNC: 8 MG/DL (ref 8.5–10.1)
CHLORIDE SERPL-SCNC: 103 MMOL/L (ref 94–109)
CO2 SERPL-SCNC: 30 MMOL/L (ref 20–32)
COPATH REPORT: NORMAL
CREAT SERPL-MCNC: 1.57 MG/DL (ref 0.66–1.25)
CREAT SERPL-MCNC: 1.59 MG/DL (ref 0.66–1.25)
ERYTHROCYTE [DISTWIDTH] IN BLOOD BY AUTOMATED COUNT: 14 % (ref 10–15)
GFR SERPL CREATININE-BSD FRML MDRD: 42 ML/MIN/1.7M2
GFR SERPL CREATININE-BSD FRML MDRD: 42 ML/MIN/1.7M2
GLUCOSE BLDC GLUCOMTR-MCNC: 110 MG/DL (ref 70–99)
GLUCOSE BLDC GLUCOMTR-MCNC: 115 MG/DL (ref 70–99)
GLUCOSE BLDC GLUCOMTR-MCNC: 122 MG/DL (ref 70–99)
GLUCOSE BLDC GLUCOMTR-MCNC: 126 MG/DL (ref 70–99)
GLUCOSE BLDC GLUCOMTR-MCNC: 140 MG/DL (ref 70–99)
GLUCOSE SERPL-MCNC: 121 MG/DL (ref 70–99)
HCT VFR BLD AUTO: 38.3 % (ref 40–53)
HGB BLD-MCNC: 12.9 G/DL (ref 13.3–17.7)
LACTATE BLD-SCNC: 0.9 MMOL/L (ref 0.7–2)
MAGNESIUM SERPL-MCNC: 2.2 MG/DL (ref 1.6–2.3)
MCH RBC QN AUTO: 30.4 PG (ref 26.5–33)
MCHC RBC AUTO-ENTMCNC: 33.7 G/DL (ref 31.5–36.5)
MCV RBC AUTO: 90 FL (ref 78–100)
PLATELET # BLD AUTO: 142 10E9/L (ref 150–450)
POTASSIUM SERPL-SCNC: 3.4 MMOL/L (ref 3.4–5.3)
PROT SERPL-MCNC: 6.3 G/DL (ref 6.8–8.8)
RBC # BLD AUTO: 4.25 10E12/L (ref 4.4–5.9)
SODIUM SERPL-SCNC: 139 MMOL/L (ref 133–144)
TSH SERPL DL<=0.005 MIU/L-ACNC: 0.53 MU/L (ref 0.4–4)
WBC # BLD AUTO: 12.2 10E9/L (ref 4–11)

## 2017-12-29 PROCEDURE — 93970 EXTREMITY STUDY: CPT

## 2017-12-29 PROCEDURE — 93005 ELECTROCARDIOGRAM TRACING: CPT

## 2017-12-29 PROCEDURE — 25000132 ZZH RX MED GY IP 250 OP 250 PS 637: Performed by: INTERNAL MEDICINE

## 2017-12-29 PROCEDURE — 83605 ASSAY OF LACTIC ACID: CPT | Performed by: INTERNAL MEDICINE

## 2017-12-29 PROCEDURE — 36415 COLL VENOUS BLD VENIPUNCTURE: CPT | Performed by: PHYSICIAN ASSISTANT

## 2017-12-29 PROCEDURE — 99232 SBSQ HOSP IP/OBS MODERATE 35: CPT | Performed by: INTERNAL MEDICINE

## 2017-12-29 PROCEDURE — 25000128 H RX IP 250 OP 636: Performed by: PHYSICIAN ASSISTANT

## 2017-12-29 PROCEDURE — 25000132 ZZH RX MED GY IP 250 OP 250 PS 637: Performed by: PHYSICIAN ASSISTANT

## 2017-12-29 PROCEDURE — 93010 ELECTROCARDIOGRAM REPORT: CPT | Performed by: INTERNAL MEDICINE

## 2017-12-29 PROCEDURE — 80048 BASIC METABOLIC PNL TOTAL CA: CPT | Performed by: PHYSICIAN ASSISTANT

## 2017-12-29 PROCEDURE — 12000007 ZZH R&B INTERMEDIATE

## 2017-12-29 PROCEDURE — 40000275 ZZH STATISTIC RCP TIME EA 10 MIN

## 2017-12-29 PROCEDURE — 00000146 ZZHCL STATISTIC GLUCOSE BY METER IP

## 2017-12-29 PROCEDURE — 25000128 H RX IP 250 OP 636: Performed by: INTERNAL MEDICINE

## 2017-12-29 PROCEDURE — 85027 COMPLETE CBC AUTOMATED: CPT | Performed by: PHYSICIAN ASSISTANT

## 2017-12-29 PROCEDURE — 93010 ELECTROCARDIOGRAM REPORT: CPT | Mod: 77 | Performed by: INTERNAL MEDICINE

## 2017-12-29 PROCEDURE — 83735 ASSAY OF MAGNESIUM: CPT | Performed by: NURSE PRACTITIONER

## 2017-12-29 PROCEDURE — 99356 ZZC PROLONGED SERV,INPATIENT,1ST HR: CPT | Performed by: NURSE PRACTITIONER

## 2017-12-29 PROCEDURE — 40000885 ZZH STATISTIC STEP DOWN HRS EVENING

## 2017-12-29 PROCEDURE — 80076 HEPATIC FUNCTION PANEL: CPT | Performed by: PHYSICIAN ASSISTANT

## 2017-12-29 PROCEDURE — 36415 COLL VENOUS BLD VENIPUNCTURE: CPT | Performed by: INTERNAL MEDICINE

## 2017-12-29 PROCEDURE — 36415 COLL VENOUS BLD VENIPUNCTURE: CPT | Performed by: NURSE PRACTITIONER

## 2017-12-29 PROCEDURE — 25000125 ZZHC RX 250: Performed by: INTERNAL MEDICINE

## 2017-12-29 PROCEDURE — 84443 ASSAY THYROID STIM HORMONE: CPT | Performed by: NURSE PRACTITIONER

## 2017-12-29 PROCEDURE — 82565 ASSAY OF CREATININE: CPT | Performed by: NURSE PRACTITIONER

## 2017-12-29 RX ORDER — MAGNESIUM SULFATE HEPTAHYDRATE 40 MG/ML
4 INJECTION, SOLUTION INTRAVENOUS EVERY 4 HOURS PRN
Status: DISCONTINUED | OUTPATIENT
Start: 2017-12-29 | End: 2017-12-31 | Stop reason: HOSPADM

## 2017-12-29 RX ORDER — POTASSIUM CHLORIDE 7.45 MG/ML
10 INJECTION INTRAVENOUS
Status: DISCONTINUED | OUTPATIENT
Start: 2017-12-29 | End: 2017-12-31 | Stop reason: HOSPADM

## 2017-12-29 RX ORDER — SIMETHICONE 80 MG
80 TABLET,CHEWABLE ORAL EVERY 6 HOURS PRN
Status: DISCONTINUED | OUTPATIENT
Start: 2017-12-29 | End: 2017-12-31 | Stop reason: HOSPADM

## 2017-12-29 RX ORDER — AMOXICILLIN 250 MG
1 CAPSULE ORAL 2 TIMES DAILY PRN
Status: DISCONTINUED | OUTPATIENT
Start: 2017-12-29 | End: 2017-12-30

## 2017-12-29 RX ORDER — POTASSIUM CL/LIDO/0.9 % NACL 10MEQ/0.1L
10 INTRAVENOUS SOLUTION, PIGGYBACK (ML) INTRAVENOUS
Status: DISCONTINUED | OUTPATIENT
Start: 2017-12-29 | End: 2017-12-31 | Stop reason: HOSPADM

## 2017-12-29 RX ORDER — POTASSIUM CHLORIDE 1.5 G/1.58G
20-40 POWDER, FOR SOLUTION ORAL
Status: DISCONTINUED | OUTPATIENT
Start: 2017-12-29 | End: 2017-12-31 | Stop reason: HOSPADM

## 2017-12-29 RX ORDER — POTASSIUM CHLORIDE 1500 MG/1
20-40 TABLET, EXTENDED RELEASE ORAL
Status: DISCONTINUED | OUTPATIENT
Start: 2017-12-29 | End: 2017-12-31 | Stop reason: HOSPADM

## 2017-12-29 RX ORDER — POTASSIUM CHLORIDE 29.8 MG/ML
20 INJECTION INTRAVENOUS
Status: DISCONTINUED | OUTPATIENT
Start: 2017-12-29 | End: 2017-12-31 | Stop reason: HOSPADM

## 2017-12-29 RX ORDER — DILTIAZEM HYDROCHLORIDE 5 MG/ML
5-20 INJECTION INTRAVENOUS ONCE
Status: DISCONTINUED | OUTPATIENT
Start: 2017-12-29 | End: 2017-12-30

## 2017-12-29 RX ORDER — CYCLOBENZAPRINE HCL 10 MG
10 TABLET ORAL 3 TIMES DAILY PRN
Status: DISCONTINUED | OUTPATIENT
Start: 2017-12-29 | End: 2017-12-29

## 2017-12-29 RX ADMIN — SODIUM CHLORIDE 500 ML: 9 INJECTION, SOLUTION INTRAVENOUS at 10:25

## 2017-12-29 RX ADMIN — SODIUM CHLORIDE: 9 INJECTION, SOLUTION INTRAVENOUS at 03:36

## 2017-12-29 RX ADMIN — SODIUM CHLORIDE: 9 INJECTION, SOLUTION INTRAVENOUS at 12:47

## 2017-12-29 RX ADMIN — ENOXAPARIN SODIUM 40 MG: 40 INJECTION SUBCUTANEOUS at 10:25

## 2017-12-29 RX ADMIN — CYCLOBENZAPRINE HYDROCHLORIDE 10 MG: 10 TABLET, FILM COATED ORAL at 10:24

## 2017-12-29 RX ADMIN — LISINOPRIL AND HYDROCHLOROTHIAZIDE 1 TABLET: 25; 20 TABLET ORAL at 08:50

## 2017-12-29 RX ADMIN — SIMETHICONE CHEW TAB 80 MG 80 MG: 80 TABLET ORAL at 10:24

## 2017-12-29 RX ADMIN — DILTIAZEM HYDROCHLORIDE 5 MG/HR: 5 INJECTION INTRAVENOUS at 22:18

## 2017-12-29 RX ADMIN — TAZOBACTAM SODIUM AND PIPERACILLIN SODIUM 3.38 G: 375; 3 INJECTION, SOLUTION INTRAVENOUS at 03:36

## 2017-12-29 RX ADMIN — METOPROLOL TARTRATE 25 MG: 25 TABLET ORAL at 08:50

## 2017-12-29 RX ADMIN — OXYCODONE HYDROCHLORIDE 5 MG: 5 TABLET ORAL at 03:51

## 2017-12-29 RX ADMIN — SODIUM CHLORIDE 1000 ML: 9 INJECTION, SOLUTION INTRAVENOUS at 22:20

## 2017-12-29 RX ADMIN — CYCLOBENZAPRINE HYDROCHLORIDE 10 MG: 10 TABLET, FILM COATED ORAL at 16:46

## 2017-12-29 RX ADMIN — DILTIAZEM HYDROCHLORIDE 5 MG/HR: 5 INJECTION INTRAVENOUS at 22:37

## 2017-12-29 RX ADMIN — METOPROLOL TARTRATE 25 MG: 25 TABLET ORAL at 22:26

## 2017-12-29 RX ADMIN — ONDANSETRON 4 MG: 2 INJECTION INTRAMUSCULAR; INTRAVENOUS at 10:51

## 2017-12-29 RX ADMIN — TAZOBACTAM SODIUM AND PIPERACILLIN SODIUM 3.38 G: 375; 3 INJECTION, SOLUTION INTRAVENOUS at 22:26

## 2017-12-29 RX ADMIN — TAZOBACTAM SODIUM AND PIPERACILLIN SODIUM 3.38 G: 375; 3 INJECTION, SOLUTION INTRAVENOUS at 16:41

## 2017-12-29 RX ADMIN — TAZOBACTAM SODIUM AND PIPERACILLIN SODIUM 3.38 G: 375; 3 INJECTION, SOLUTION INTRAVENOUS at 08:53

## 2017-12-29 NOTE — PROGRESS NOTES
[facility]    Hospitalist Progress Note    Assessment & Plan    83-year-old male with past medical history of hypertension, hyperlipidemia, morbid obesity, osteoarthritis and diet-controlled DM 2.  He presented with right upper quadrant abdominal pain, shortness of breath and nausea, possible cholecystitisand elevated troponin.      Acute calculus gangrenous cholecystitis s/p laparoscopic cholecystectomy:  -presented with epigastric and right upper quadrant pain  -had leukocytosis with positive Driver sign on right upper quadrant ultrasound  -patient underwent laparoscopic cholecystectomy on 12/28  -routine postoperative care per general surgery  -continue IV Zosyn  -diet per surgery; currently on clear liquid diet    Non-ST elevation myocardial infarction, due to demand ischemia; ACS ruled out.    -He was tachycardic and hypertensive when he presented to [facility].   -Troponin flat at 0.17   -EKG findings with ST depression in the anterolateral leads  -cardiology was consulted, patient underwent Madhavi scan -negative ischemia  -elevated troponin most likely due to demand ischemia  -cardiology has signed off, recommending continuation of perioperative beta-blocker    Diabetes mellitus type II; Diet controlled  -Hemoglobin A1c: 6.6  -sliding scale for now  -adequate glycemic control the movement     History of hypertension.    Acute kidney injury  -creatinine worsened overnight to 1.59  -normal saline 500 ML bolus followed by 100 ML per hour  -Hold Zestoretic  -recheck creatinine in the morning  -IV labetalol and hydralazine available  -continue metoprolol(new)    Hypercholesterolemia.    -Hold prior to admission statin for now      D/W: RN  DVT Prophylaxis: Pneumatic Compression Devices  Code Status: Full Code    Disposition: Expected discharge in 1-2 days    Chris Caballero MD    Interval History   patient is status post laparoscopic cholecystectomy for gangrenous cholecystitis. Afebrile this morning.  Pain adequately controlled. Creatinine slightly worse this morning. No nausea or vomiting.      -Data reviewed today: I reviewed all new labs and imaging results over the last 24 hours. I personally reviewed the EKG tracing showing nsr.    Physical Exam   Temp: 98.8  F (37.1  C) Temp src: Oral BP: 105/54 Pulse: 73 Heart Rate: 66 Resp: 18 SpO2: 92 % O2 Device: Nasal cannula Oxygen Delivery: 1 LPM  Vitals:    12/27/17 0600 12/28/17 0600 12/29/17 0631   Weight: 118.8 kg (261 lb 14.4 oz) 119 kg (262 lb 5.6 oz) 120.3 kg (265 lb 4.8 oz)     Vital Signs with Ranges  Temp:  [98  F (36.7  C)-99.9  F (37.7  C)] 98.8  F (37.1  C)  Pulse:  [73] 73  Heart Rate:  [62-80] 66  Resp:  [9-24] 18  BP: (101-130)/(50-74) 105/54  SpO2:  [90 %-96 %] 92 %  I/O last 3 completed shifts:  In: 1567 [P.O.:480; I.V.:1087]  Out: 580 [Urine:475; Drains:85; Blood:20]    Constitutional: AAOX3, NAD, Appears comfortable  HEENT: Moist oral mucosa, no oral lesions, No pallor or icterus  Neck- Supple, Good ROM, No JVD  Respiratory:  No crackles, No wheezes, CTA B/L, Normal WOB  Cardiovascular: RRR, No murmur  GI: mild distention, tender right upper quadrant, drain in place; sluggish bowel sounds  Skin/Integument: Warm and dry, no rashes  MSK: No joint deformity or swelling, no edema  Neuro: CN- grossly intact     Medications     - MEDICATION INSTRUCTIONS -       - MEDICATION INSTRUCTIONS -       NaCl 100 mL/hr at 12/29/17 1247     - MEDICATION INSTRUCTIONS -       Reason beta blocker order not selected       Reason ACE/ARB/ARNI order not selected       Reason statin medication order not selected         enoxaparin  40 mg Subcutaneous Q24H     sodium chloride (PF)  3 mL Intracatheter Q8H     piperacillin-tazobactam  3.375 g Intravenous Q6H     insulin aspart  1-6 Units Subcutaneous Q4H     metoprolol  25 mg Oral BID       Data     Recent Labs  Lab 12/29/17  0751 12/28/17  0525 12/27/17  1325 12/27/17  0736 12/27/17  0240 12/26/17  2356   WBC 12.2* 16.7*   --  18.3*  --  12.3*   HGB 12.9* 14.8  --  15.9  --  16.2   MCV 90 89  --  87  --  85   * 156  --  195  --  218    138  --   --   --  136   POTASSIUM 3.4 3.6  --   --   --  3.6   CHLORIDE 103 103  --   --   --  105   CO2 30 30  --   --   --  22   BUN 34* 22  --   --   --  27   CR 1.59* 1.26*  --   --   --  0.90   ANIONGAP 6 5  --   --   --  9   JERRY 8.0* 8.5  --   --   --  9.5   * 137*  --   --   --  210*   ALBUMIN 2.3* 2.9*  --   --   --  3.8   PROTTOTAL 6.3* 7.0  --   --   --  8.4   BILITOTAL 1.6* 2.0*  --   --   --  1.0   ALKPHOS 64 80  --   --   --  113   ALT 27 24  --   --   --  42   AST 24 15  --   --   --  19   LIPASE  --   --   --   --   --  75   TROPI  --   --  0.170* 0.167* 0.049* <0.015       No results found for this or any previous visit (from the past 24 hour(s)).

## 2017-12-29 NOTE — PROGRESS NOTES
"General Surgery Note    Stable S/P Lap Rosy for Gangrenous Gallbladder  POD1    -Increase IVF to 100  -Add Flexeril for back spasms  -Add simethicone for bloating  -Keep on clears until nausea improves.  May ADAT later today if doing better.  -Antiemetics PRN.  -Start stool softener.  -Encourage ambulation 3-4 times daily  -Continue Zosyn.  Likely transition to Augmentin prior to discharge when tolerating PO better  -Start Lovenox.  Use PCDs  -Discontinue drain prior to discharge.  -Recheck labs in am.    Doing ok.  Pain from surgery tolerable but back pain \"from these hospital beds.\"  Uses his wife's baclofen at home for back pain/spasms.  Incision sites ok.  Had some nausea with apple juice this am.    NAD, pleasant, A&O  /50 (BP Location: Left arm)  Pulse 73  Temp 98  F (36.7  C) (Oral)  Resp 18  Wt 120.3 kg (265 lb 4.8 oz)  SpO2 93%  BMI 37 kg/m2    Intake/Output Summary (Last 24 hours) at 12/29/17 0905  Last data filed at 12/29/17 0631   Gross per 24 hour   Intake             1567 ml   Output              580 ml   Net              987 ml     Abd: distended, NT, no BS  Inc: CDI  WBC 12.2  Hgb 12.9  Creat 1.59  "

## 2017-12-29 NOTE — PLAN OF CARE
Problem: Patient Care Overview  Goal: Plan of Care/Patient Progress Review  Outcome: Improving   Pt is A&O X4. VSS stable on 1 L of O2. Lap sites are CDI. ANGELA tube is patent with minimal out put of drainage.  Pt is up standing at bedside  Using urinal. Urine out put has been low but adequate with dark miroslava urine.Post void residuals have also been low when bladder scanned. Capno is on and no alarms on this shift. Pt is tolerating oral intake well. Pain has been minimal and well controled with oxycodone.  Continue to monitor

## 2017-12-29 NOTE — PLAN OF CARE
Problem: Patient Care Overview  Goal: Plan of Care/Patient Progress Review  Outcome: Improving  Pt arrived to floor at 3pm. Denies pain. Oriented but sleepy. Jose Ramon at times, lowest 39. Pt has 3 lap sites and 1 ANGELA with SS drainage. Dressing CDI. Due to dangle. Due to void; bladder scan for 240.

## 2017-12-30 ENCOUNTER — APPOINTMENT (OUTPATIENT)
Dept: PHYSICAL THERAPY | Facility: CLINIC | Age: 82
DRG: 417 | End: 2017-12-30
Attending: NURSE PRACTITIONER
Payer: COMMERCIAL

## 2017-12-30 LAB
ANION GAP SERPL CALCULATED.3IONS-SCNC: 8 MMOL/L (ref 3–14)
BUN SERPL-MCNC: 34 MG/DL (ref 7–30)
CALCIUM SERPL-MCNC: 7.9 MG/DL (ref 8.5–10.1)
CHLORIDE SERPL-SCNC: 104 MMOL/L (ref 94–109)
CO2 SERPL-SCNC: 26 MMOL/L (ref 20–32)
CREAT SERPL-MCNC: 1.3 MG/DL (ref 0.66–1.25)
ERYTHROCYTE [DISTWIDTH] IN BLOOD BY AUTOMATED COUNT: 13.8 % (ref 10–15)
GFR SERPL CREATININE-BSD FRML MDRD: 53 ML/MIN/1.7M2
GLUCOSE BLDC GLUCOMTR-MCNC: 102 MG/DL (ref 70–99)
GLUCOSE BLDC GLUCOMTR-MCNC: 103 MG/DL (ref 70–99)
GLUCOSE BLDC GLUCOMTR-MCNC: 121 MG/DL (ref 70–99)
GLUCOSE BLDC GLUCOMTR-MCNC: 130 MG/DL (ref 70–99)
GLUCOSE BLDC GLUCOMTR-MCNC: 141 MG/DL (ref 70–99)
GLUCOSE BLDC GLUCOMTR-MCNC: 98 MG/DL (ref 70–99)
GLUCOSE SERPL-MCNC: 145 MG/DL (ref 70–99)
HCT VFR BLD AUTO: 37.1 % (ref 40–53)
HGB BLD-MCNC: 12.6 G/DL (ref 13.3–17.7)
INTERPRETATION ECG - MUSE: NORMAL
MAGNESIUM SERPL-MCNC: 2.3 MG/DL (ref 1.6–2.3)
MCH RBC QN AUTO: 30.3 PG (ref 26.5–33)
MCHC RBC AUTO-ENTMCNC: 34 G/DL (ref 31.5–36.5)
MCV RBC AUTO: 89 FL (ref 78–100)
PLATELET # BLD AUTO: 174 10E9/L (ref 150–450)
POTASSIUM SERPL-SCNC: 3.4 MMOL/L (ref 3.4–5.3)
RBC # BLD AUTO: 4.16 10E12/L (ref 4.4–5.9)
SODIUM SERPL-SCNC: 138 MMOL/L (ref 133–144)
WBC # BLD AUTO: 9 10E9/L (ref 4–11)

## 2017-12-30 PROCEDURE — 99207 ZZC CDG-MDM COMPONENT: MEETS MODERATE - UP CODED: CPT | Performed by: INTERNAL MEDICINE

## 2017-12-30 PROCEDURE — 97530 THERAPEUTIC ACTIVITIES: CPT | Mod: GP

## 2017-12-30 PROCEDURE — 97116 GAIT TRAINING THERAPY: CPT | Mod: GP

## 2017-12-30 PROCEDURE — 12000002 ZZH R&B CRITICAL

## 2017-12-30 PROCEDURE — 25000128 H RX IP 250 OP 636: Performed by: INTERNAL MEDICINE

## 2017-12-30 PROCEDURE — 83735 ASSAY OF MAGNESIUM: CPT | Performed by: INTERNAL MEDICINE

## 2017-12-30 PROCEDURE — 99233 SBSQ HOSP IP/OBS HIGH 50: CPT | Performed by: INTERNAL MEDICINE

## 2017-12-30 PROCEDURE — 80048 BASIC METABOLIC PNL TOTAL CA: CPT | Performed by: INTERNAL MEDICINE

## 2017-12-30 PROCEDURE — 40000193 ZZH STATISTIC PT WARD VISIT

## 2017-12-30 PROCEDURE — 25000128 H RX IP 250 OP 636: Performed by: PHYSICIAN ASSISTANT

## 2017-12-30 PROCEDURE — 25000132 ZZH RX MED GY IP 250 OP 250 PS 637: Performed by: INTERNAL MEDICINE

## 2017-12-30 PROCEDURE — 97161 PT EVAL LOW COMPLEX 20 MIN: CPT | Mod: GP

## 2017-12-30 PROCEDURE — 00000146 ZZHCL STATISTIC GLUCOSE BY METER IP

## 2017-12-30 PROCEDURE — 85027 COMPLETE CBC AUTOMATED: CPT | Performed by: PHYSICIAN ASSISTANT

## 2017-12-30 PROCEDURE — 36415 COLL VENOUS BLD VENIPUNCTURE: CPT | Performed by: INTERNAL MEDICINE

## 2017-12-30 PROCEDURE — 25000132 ZZH RX MED GY IP 250 OP 250 PS 637: Performed by: PHYSICIAN ASSISTANT

## 2017-12-30 PROCEDURE — 40000884 ZZH STATISTIC STEP DOWN HRS NIGHT

## 2017-12-30 RX ORDER — METOPROLOL TARTRATE 25 MG/1
25 TABLET, FILM COATED ORAL 2 TIMES DAILY
Status: DISCONTINUED | OUTPATIENT
Start: 2017-12-30 | End: 2017-12-31 | Stop reason: HOSPADM

## 2017-12-30 RX ORDER — DEXTROSE MONOHYDRATE 25 G/50ML
25-50 INJECTION, SOLUTION INTRAVENOUS
Status: DISCONTINUED | OUTPATIENT
Start: 2017-12-30 | End: 2017-12-30

## 2017-12-30 RX ORDER — NICOTINE POLACRILEX 4 MG
15-30 LOZENGE BUCCAL
Status: DISCONTINUED | OUTPATIENT
Start: 2017-12-30 | End: 2017-12-30

## 2017-12-30 RX ORDER — AMOXICILLIN 250 MG
1 CAPSULE ORAL 2 TIMES DAILY
Status: DISCONTINUED | OUTPATIENT
Start: 2017-12-30 | End: 2017-12-31 | Stop reason: HOSPADM

## 2017-12-30 RX ORDER — BISACODYL 10 MG
10 SUPPOSITORY, RECTAL RECTAL DAILY PRN
Status: DISCONTINUED | OUTPATIENT
Start: 2017-12-30 | End: 2017-12-31 | Stop reason: HOSPADM

## 2017-12-30 RX ORDER — CYCLOBENZAPRINE HCL 10 MG
5 TABLET ORAL 3 TIMES DAILY PRN
Status: DISCONTINUED | OUTPATIENT
Start: 2017-12-30 | End: 2017-12-30

## 2017-12-30 RX ORDER — CYCLOBENZAPRINE HCL 5 MG
5 TABLET ORAL 3 TIMES DAILY PRN
Status: DISCONTINUED | OUTPATIENT
Start: 2017-12-30 | End: 2017-12-31 | Stop reason: HOSPADM

## 2017-12-30 RX ORDER — METOPROLOL TARTRATE 50 MG
50 TABLET ORAL 2 TIMES DAILY
Status: DISCONTINUED | OUTPATIENT
Start: 2017-12-30 | End: 2017-12-30

## 2017-12-30 RX ADMIN — ENOXAPARIN SODIUM 40 MG: 40 INJECTION SUBCUTANEOUS at 10:16

## 2017-12-30 RX ADMIN — TAZOBACTAM SODIUM AND PIPERACILLIN SODIUM 3.38 G: 375; 3 INJECTION, SOLUTION INTRAVENOUS at 02:23

## 2017-12-30 RX ADMIN — SENNOSIDES AND DOCUSATE SODIUM 1 TABLET: 8.6; 5 TABLET ORAL at 10:15

## 2017-12-30 RX ADMIN — METOPROLOL TARTRATE 25 MG: 25 TABLET ORAL at 08:29

## 2017-12-30 RX ADMIN — TAZOBACTAM SODIUM AND PIPERACILLIN SODIUM 3.38 G: 375; 3 INJECTION, SOLUTION INTRAVENOUS at 08:31

## 2017-12-30 RX ADMIN — METOPROLOL TARTRATE 25 MG: 25 TABLET ORAL at 20:43

## 2017-12-30 RX ADMIN — TAZOBACTAM SODIUM AND PIPERACILLIN SODIUM 3.38 G: 375; 3 INJECTION, SOLUTION INTRAVENOUS at 20:44

## 2017-12-30 RX ADMIN — TAZOBACTAM SODIUM AND PIPERACILLIN SODIUM 3.38 G: 375; 3 INJECTION, SOLUTION INTRAVENOUS at 14:34

## 2017-12-30 RX ADMIN — CYCLOBENZAPRINE HYDROCHLORIDE 5 MG: 5 TABLET, FILM COATED ORAL at 14:33

## 2017-12-30 RX ADMIN — SODIUM CHLORIDE: 9 INJECTION, SOLUTION INTRAVENOUS at 13:56

## 2017-12-30 RX ADMIN — POTASSIUM CHLORIDE 20 MEQ: 1500 TABLET, EXTENDED RELEASE ORAL at 10:15

## 2017-12-30 RX ADMIN — SENNOSIDES AND DOCUSATE SODIUM 1 TABLET: 8.6; 5 TABLET ORAL at 20:43

## 2017-12-30 RX ADMIN — SODIUM CHLORIDE: 9 INJECTION, SOLUTION INTRAVENOUS at 02:24

## 2017-12-30 RX ADMIN — POTASSIUM CHLORIDE 20 MEQ: 1500 TABLET, EXTENDED RELEASE ORAL at 00:04

## 2017-12-30 NOTE — PLAN OF CARE
Problem: Cardiac: ACS (Acute Coronary Syndrome) (Adult)  Goal: Signs and Symptoms of Listed Potential Problems Will be Absent, Minimized or Managed (Cardiac: ACS)  Signs and symptoms of listed potential problems will be absent, minimized or managed by discharge/transition of care (reference Cardiac: ACS (Acute Coronary Syndrome) (Adult) CPG).   Outcome: Improving  Patient is alert and orientated X 4, up with assist of one, vital signs stable except heart rate tachy. Diltiazem drip infusing at 5 ML/hr. Tele: Afib with RVR. At 00:55 Tele: Afib with CVR. Potassium replaced for recheck this morning. Patient converted to SR at 07: 20; Diltiazem drip stopped.

## 2017-12-30 NOTE — SIGNIFICANT EVENT
Nursing Assistant assisting patient to ambulate, patient had just stood and CNA was attempting to place gate belt when patient suddenly fainted and fell backwards to the floor. Patient quickly came to and was able to answer all orientation questions appropriately. Neuro's intact, denies any new pain. VSS stable. 02 applied for comfort, House officers quickly arrived to patients room to assess. Lift equipment used to assist patient back to bed. Tele placed. New orders received. Will continue to monitor.

## 2017-12-30 NOTE — PROGRESS NOTES
12/30/17 1131   Quick Adds   Type of Visit Initial PT Evaluation   Living Environment   Lives With spouse   Living Arrangements house  (one story townhouse)   Home Accessibility no concerns   Number of Stairs to Enter Home 0   Number of Stairs Within Home 0   Self-Care   Usual Activity Tolerance moderate   Current Activity Tolerance fair   Regular Exercise no   Equipment Currently Used at Home none  (owns 4ww)   Functional Level Prior   Ambulation 0-->independent   Transferring 0-->independent   Fall history within last six months yes   Number of times patient has fallen within last six months 3   Which of the above functional risks had a recent onset or change? ambulation;transferring;fall history   General Information   Onset of Illness/Injury or Date of Surgery - Date 12/28/17   Referring Physician Salud Olguin CNP   Patient/Family Goals Statement return home   Pertinent History of Current Problem (include personal factors and/or comorbidities that impact the POC) Admitted 12/27 with R UQ pain, SOB, inc troponins and found to have an NSTEMI, gangrenous cholecystitis and is POD 2 lap cholecystectomy. PMH: DM2, back pain, HTN   Precautions/Limitations fall precautions   Weight-Bearing Status - LLE full weight-bearing   Weight-Bearing Status - RLE full weight-bearing   General Observations spouse and son present   Cognitive Status Examination   Orientation orientation to person, place and time   Level of Consciousness alert   Follows Commands and Answers Questions 100% of the time;able to follow multistep instructions   Personal Safety and Judgment intact   Memory intact   Pain Assessment   Patient Currently in Pain Yes, see Vital Sign flowsheet  (4/10)   Posture    Posture Forward head position;Protracted shoulders   Range of Motion (ROM)   ROM Quick Adds No deficits were identified   Strength   Strength Comments B LEs functionally weak, not formally tested due to abdominal surgery   Bed Mobility   Bed Mobility  "Comments Sit to supine with mod A for LEs   Transfer Skills   Transfer Comments Sit to stand with SBA   Gait   Gait Comments Pt amb 10 ft with IV pole and CGA with shuffled gait   Balance   Balance Comments Balance steady with IV pole   General Therapy Interventions   Planned Therapy Interventions bed mobility training;gait training;strengthening;transfer training   Clinical Impression   Criteria for Skilled Therapeutic Intervention yes, treatment indicated   PT Diagnosis Difficulty ambulating   Influenced by the following impairments Pain, dec strength, balance, activity tolerance   Functional limitations due to impairments Difficulty ambulating and transferrin   Clinical Presentation Stable/Uncomplicated   Clinical Presentation Rationale medically improved   Clinical Decision Making (Complexity) Low complexity   Therapy Frequency` daily   Predicted Duration of Therapy Intervention (days/wks) 3 days   Anticipated Discharge Disposition Home with Home Therapy   Risk & Benefits of therapy have been explained Yes   Patient, Family & other staff in agreement with plan of care Yes   [facility] TM \"6 Clicks\"   2016, Trustees of [facility], under license to [employer].  All rights reserved.   6 Clicks Short Forms Basic Mobility Inpatient Short Form   [facility]  \"6 Clicks\" V.2 Basic Mobility Inpatient Short Form   1. Turning from your back to your side while in a flat bed without using bedrails? 4 - None   2. Moving from lying on your back to sitting on the side of a flat bed without using bedrails? 3 - A Little   3. Moving to and from a bed to a chair (including a wheelchair)? 3 - A Little   4. Standing up from a chair using your arms (e.g., wheelchair, or bedside chair)? 3 - A Little   5. To walk in hospital room? 3 - A Little   6. Climbing 3-5 steps with a railing? 3 - A Little   Basic Mobility Raw Score (Score out of 24.Lower scores equate to lower levels of function) 19   Total " Evaluation Time   Total Evaluation Time (Minutes) 15

## 2017-12-30 NOTE — PROGRESS NOTES
HOSPITALIST CROSS COVER    Asked to see due to afib RVR    Patient feels a little weak, had a fall earlier.    GEN: NAD  HEENT: No facial asymmetryu  RESP: CTA B  CV: tachy, s1 s2 no murmurs  GI: s/nt/nd +BS         Ref. Range 12/26/2017 23:56 12/28/2017 05:25 12/29/2017 07:51 12/29/2017 18:52   Creatinine Latest Ref Range: 0.66 - 1.25 mg/dL 0.90 1.26 (H) 1.59 (H) 1.57 (H)     EKG: Afib with RVR in context of dehydration, ARF    Plan    1 liter NS bolus now.  Start Diltiazem gtts  Replace K, Mg.    BRETT PAEZ MD

## 2017-12-30 NOTE — PROGRESS NOTES
I spoke with Dr. Caballero about this patient. He was seen by Cardiology, Dr. Cordon, previously in the hospitalization. Please see her note for his full history. He had an episode of post-operative atrial fibrillation but has now converted back in to sinus rhythm. He was symptomatic with this. This is his only known episode. He has no prior history.     He already has Cardiology follow-up scheduled. If there are no further episodes during the hospitalization then continuing with follow-up as planned is appropriate. At the follow-up appt, they can consider the need for long-term anticoagulation and further evaluation for asymptomatic AF, but if he has no further events then neither of those may be necessary.    If he has further events during the hospitalization, then rate control with diltiazem, as was done in this scenario is reasonable. If AF continues then a short course of amiodarone could be considered. At that point, long-term anticoagulation will also need to be discussed more fully.     Please call with any questions or concerns.     Bora Olea MD

## 2017-12-30 NOTE — PROVIDER NOTIFICATION
MD Notification    Notified Person:  MD West Campbell    Notified Persons Name: MD West Campbell    Notification Date/Time: 12/ 29/17     Notification Interaction:  Talked with Physician    Purpose of Notification: New onset of Afib with RVR    Orders Received: 1 liter NS bolus, Start Diltiazem gtts, Replace K, Mg.

## 2017-12-30 NOTE — DISCHARGE INSTRUCTIONS
[facility]  Discharge Instructions: Post-Operative Laparoscopic Cholecystectomy    ACTIVITY    Increase your activity gradually.  Avoid strenuous physical activity or heavy lifting greater than 15-20 lbs. for 2-3 weeks.  You may climb stairs.    You may drive without restrictions when you are not using any prescription pain medication and comfortable in a car.    You may return to work/school when you are comfortable without any prescription pain medication.    WOUND CARE    You may remove your bandage and shower 48 hours after the surgery.  Pat your incisions dry and leave open to air.  Re-apply dressing (Band-aid or gauze/tape) as needed for drainage.    You may have steri-strips (looks like tape) or Dermabond (looks like glue) on your incision.  Leave it alone, it will peel up and fall off on its own.     Do not soak your incisions in a tub or pool for 2 weeks.     DIET    Return to the diet you were on before surgery.    Drink plenty of liquids to stay hydrated.     It is not uncommon to experience some loose stools or diarrhea after surgery.  This is your body s way of adapting to the bile which will slowly drain into your intestine. A low fat diet may help with this.     PAIN    Expect some tenderness and discomfort at the incision sites.  Use the prescribed pain medication at your discretion.  Expect gradual resolution of your pain over several days.    You may take ibuprofen with food (unless you have been told not to) instead of or in addition to your prescribed pain medication.  If you are taking Norco or Percocet, do not take any additional acetaminophen/APAP/Tylenol.    Do not drink alcohol or drive while you are taking pain medications.    You may apply ice to your incisions in 20 minute intervals as needed for the next 48 hours.  After that time, consider switching to heat if you prefer.    RETURN APPOINTMENT    Follow up with your surgeon or a PA in 2 weeks.   Please call the office at [phone] to schedule your appointment.    CALL OUR OFFICE IF YOU HAVE:     Chills or fever above 101.5 F.    Increased redness or drainage at your incisions.    Significant bleeding.    Pain not relieved by your pain medication or rest.    Increasing pain after the first 48 hours.    Any other concerns or questions.    HELPFUL HINTS    Pain medications can cause constipation.  Limit use when possible.  Take over the counter stool softener/stimulant, such as Colace or Senna, with plenty of water.  You may take a mild over the counter laxative, such as Miralax or a suppository, as needed.      For laparoscopic surgery, you may have shoulder or upper back discomfort due to the gas used in surgery.  This is temporary and should resolve in 48-72 hours.  Short, frequent walks may help with this.    Revised August 2017

## 2017-12-30 NOTE — CODE/RAPID RESPONSE
[facility]    RRT Note  12/29/2017   Time Called: 1804  RRT called for: fall    Assessment & Plan      Pt was walking w/ staff, felt ok prior to event, queried multiple times, recently rec'd flexeril (new med today, 2nd dose at 1646), felt dizzy, fell backwards, IV pole fell on top of pt's abd.  Fall was witnessed by staff, +LOC, no head strike.  Pt required O2 for hypoxia to 85% on RA when flat.  Metoprolol also is a new med since 12/27.  Denied neck pain, new back pain (chronic pain only), hip pain, generally feels ok.  Gluc WNL immediately after fall.  VS immediately after fall 104/66 (73) --> , HR 71 NSR on tele monitor, RR 23, 97% on 2L down from 6L.  Moved from floor to bed via ceiling lift    IMPRESSION & PLAN:  Fall preceded by dizziness w/ 2 new meds flexeril & metoprolol  DDx:  Med induced dizziness/PE/arrhythmia    INTERVENTIONS:  No need for musculoskeletal imaging at present w/o any new subjective MSK c/o  Telemetry monitoring to r/o arrhythmia (afib occurred later in the evening)  Fall precautions  Recheck Cr, still 1.5, considering CTA to r/o PE (wells score 1.5 conferring low risk) so will hold off for now, check LE ultrasound instead (neg), CTA if renal function improves.  Can also consider TTE in am if suspicion remains  D/c flexeril    Discussed with and defer further cares to jere Costa MD  Interval History     Alvin Newton is a 83 year old male who was admitted on 12/27/2017 for right upper quadrant abdominal pain, shortness of breath and nausea, possible cholecystitisand elevated troponin.  He ruled out for ACS, and is now POD2 s/p lap cholecystectomy c/b DEJUAN    Medical history significant for:hypertension, hyperlipidemia, morbid obesity, osteoarthritis and diet-controlled DM 2  Code Status: Full Code    Salud Olguin    Allergies   No Known Allergies    Physical Exam   Vital Signs with Ranges:  Temp:  [98  F (36.7  C)-99.1  F (37.3  C)] 98.5  F (36.9  C)  Pulse:   [62-73] 62  Heart Rate:  [62-78] 66  Resp:  [17-20] 18  BP: (101-111)/(50-57) 105/57  SpO2:  [91 %-95 %] 93 %  I/O last 3 completed shifts:  In: 767 [P.O.:480; I.V.:287]  Out: 880 [Urine:825; Drains:55]     Constitutional: elderly man lying on floor, NAD,occ staring off   ENT: defer   Neck: defer  Pulmonary: exp wheeze bilat upper lobes  Cardiovascular: S1S2  GI: obese soft, tender to palpation on R side, ANGELA drain w/ serosang on RUQ, lap sites cover w/ bandaids  Skin/Integumen: defer  Neuro: follows commands to show 2 fingers, wiggle feet bilat, more awake by end of event, laughing appropriately making jokes  Psych:  defer  Extremities: defer    Telemetry:  NSR    Troponin:    Recent Labs   Lab Test  12/27/17   1325   TROPI  0.170*         CBC with Diff:  Recent Labs   Lab Test  12/29/17   0751   WBC  12.2*   HGB  12.9*   MCV  90   PLT  142*       Comprehensive Metabolic Panel:    Recent Labs  Lab 12/29/17  0751      POTASSIUM 3.4   CHLORIDE 103   CO2 30   ANIONGAP 6   *   BUN 34*   CR 1.59*   GFRESTIMATED 42*   GFRESTBLACK 50*   JERRY 8.0*   PROTTOTAL 6.3*   ALBUMIN 2.3*   BILITOTAL 1.6*   ALKPHOS 64   AST 24   ALT 27       Recent Labs  Lab 12/27/17  0020   COLOR Yellow   APPEARANCE Clear   URINEGLC 300*   URINEBILI Negative   URINEKETONE 40*   SG 1.018   UBLD Negative   URINEPH 5.5   PROTEIN 10*   NITRITE Negative   LEUKEST Negative   RBCU 3*   WBCU <1       Time Spent on this Encounter   I spent 30 minutes on the unit/floor managing the care of Alvin Newton. Over 50% of my time was spent counseling the patient and/or coordinating care regarding services listed in this note.

## 2017-12-30 NOTE — PLAN OF CARE
Problem: Patient Care Overview  Goal: Plan of Care/Patient Progress Review  Outcome: Improving  VSS, denies pain, ABD distended, hyperactive BS, not passing gas, tolerating clear liquids, activity encouraged, patient c/o back spasms, flexeril providing some relief. IVF bolus given this AM, IVF rate increased, urine output now adequate.

## 2017-12-30 NOTE — PROGRESS NOTES
General Surgery Progress Note    Admission Date: 12/27/2017  Today's Date: 12/30/2017         Assessment:      Alvin Newton is a 83 year old male   S/P lap aurelio for gangrenous gallbladder  POD #2            Plan:   Advance diet, ambulate as tolerated. Minimize narcotics and muscle relaxants due to vasovagal episode yesterday. Continue drain for now, will remove prior to discharge.    Pain: mild, oxycodone and flexeril prn  Bowel: + flatus, scheduled senna and dulcolax suppository prn  Antibiotics: zosyn, transition to PO at discharge  DVT prophylaxis: PCDs, ambulation, lovenox (creatinine improving)    Dispo: improving, likely ready for discharge from surgical standpoint later today or tomorrow once cleared by hospitalist        Interval History:   Afib with RVR, started on diltiazem drip, now in NSR. Also had episodes of loss of consciousness and witnessed fall earlier in the evening. Denies chest pain, shortness of breath. Feels better today. Denies nausea, mild abdominal pain mainly with movement or deep breathing. Tolerating liquids, hungry for more food. + Flatus, no BM.  ANGELA with 75cc out x 24 hours.  Creatinine improved.          Physical Exam:   /51  Pulse 110  Temp 97.9  F (36.6  C) (Oral)  Resp 20  Wt 122.7 kg (270 lb 8.1 oz)  SpO2 99%  BMI 37.73 kg/m2  I/O last 3 completed shifts:  In: 5844 [P.O.:1080; I.V.:4764]  Out: 1175 [Urine:1100; Drains:75]  General: NAD, pleasant, alert and oriented x3  Abdomen: soft, non tender, mildly distended, + bowel sounds; ANGELA drain in place with SS fluid in bulb  Incisions: clean, dry, and intact with steris and bandaids in place      LABS:  Recent Labs   Lab Test  12/30/17   0810  12/29/17   0751  12/28/17   0525   WBC  9.0  12.2*  16.7*   HGB  12.6*  12.9*  14.8   MCV  89  90  89   PLT  174  142*  156      Recent Labs   Lab Test  12/30/17   0810  12/29/17   1852  12/29/17   0751  12/28/17   0525   POTASSIUM  3.4   --   3.4  3.6   CHLORIDE  104   --   103  103    CO2  26   --   30  30   BUN  34*   --   34*  22   CR  1.30*  1.57*  1.59*  1.26*   ANIONGAP  8   --   6  5                 Aggie Locke PA-C  [facility]: [phone]  Pager: [phone] Assistant (7am-5pm)

## 2017-12-30 NOTE — PROVIDER NOTIFICATION
Spoke with Dr Caballero at bedside, pts Dilt gtt stopped as pt has converted to NSR with HR is the high 50's.  Will continue to monitor and update Dr Caballero if pt goes back into A-fib

## 2017-12-30 NOTE — PLAN OF CARE
Problem: Patient Care Overview  Goal: Plan of Care/Patient Progress Review  PT: Orders received, eval completed, treatment initiated. Pt admitted with R UQ pain, SOB and found to have inc trops and NSTEMI, gangrenous cholelithiasis and is s/p lap aurelio. Noted to have fallen yesterday with nursing due to dizziness from new meds when ambulating. Prior to admit pt was living with spouse in a one level townhouse, no AD use but owns 4ww, independent with mobility and ADLs. Pt demonstrates pain, dec strength, balance, activity tolerance and difficulty ambulating and transferring and would benefit from skilled PT services in order to improve this.     Discharge Planner PT   Patient plan for discharge: Pt and spouse confident he can manage at home, hope to return home tomorrow  Current status: Currently requires SBA sit to/from stand, CGA for gait of 100 ft with IV pole on RA with sats 95% and required 2 rest breaks due to mild SOB and fatigue. Denies dizziness with gait today. Sit to supine with mod A for LEs.  Barriers to return to prior living situation: Needs assistance with bed mobility, dec activity tolerance, needs AD for safety at this time.  Recommendations for discharge: Return home with spouse assist and home PT.  Rationale for recommendations: Pt is safe with mobility and with spouse assistance will be safe at home.        Entered by: Arlene England 12/30/2017 11:46 AM

## 2017-12-30 NOTE — PROGRESS NOTES
[facility]    Hospitalist Progress Note    Assessment & Plan    83-year-old male with past medical history of hypertension, hyperlipidemia, morbid obesity, osteoarthritis and diet-controlled DM 2.  He presented with right upper quadrant abdominal pain, shortness of breath and nausea, possible cholecystitisand elevated troponin.      Acute calculus gangrenous cholecystitis s/p laparoscopic cholecystectomy:  -presented with epigastric and right upper quadrant pain  -had leukocytosis with positive Driver sign on right upper quadrant ultrasound  -patient underwent laparoscopic cholecystectomy on 12/28  -routine postoperative care per general surgery  -continue IV Zosyn  -diet per surgery; advanced to regular this morning    Non-ST elevation myocardial infarction, due to demand ischemia; ACS ruled out.    Brief post-op atrial fibrillation with RVR  History of hypertension.    -Troponin flat at 0.17   -initial EKG findings with ST depression in the anterolateral leads  -cardiology was consulted, patient underwent Madhavi scan -negative ischemia  -elevated troponin most likely due to demand ischemia  -patient went into a fit with RVR yesterday afternoon  -required brief Cardizem drip, now converted to normal sinus rhythm  -discussed briefly with cardiology, if no recurrence of a fib; follow-up with cardiology as previously scheduled with no intervention at this time  -continue metoprolol; hold lisinopril/hydrochlorothiazide    Brief syncope with fall:  -patient had a brief syncopal episode yesterday while getting up, apparently was lightheaded and dizzy  -likely vasovagal; blood pressure was soft at that time, although could be related to a fib, patient went into RVR after the fall  -monitor on telemetry    Diabetes mellitus type II; Diet controlled  -Hemoglobin A1c: 6.6  -sliding scale for now  -adequate glycemic control at the moment     Acute kidney injury  -presented with normal creatinine  -creatinine  worsened  to 1.59 during the course of hospital stay  -improving with IV fluids  -Hold Zestoretic  -recheck creatinine in the morning    Hypercholesterolemia.    -Hold prior to admission statin for now      D/W: RN  DVT Prophylaxis: Pneumatic Compression Devices, Lovenox  Code Status: Full Code    Disposition: Expected discharge 12/31    Chris Caballero MD    Interval History   went into a few with RVR yesterday evening, now converted to normal sinus rhythm. Had an episode of syncope with fall,  was lightheaded at that time. Denies any lightheadedness, dizziness, chest pain, dyspnea at this time. Passing flatus. Denies abdominal pain, feel slightly bloated.    -Data reviewed today: I reviewed all new labs and imaging results over the last 24 hours. I personally reviewed the EKG tracing showing nsr.    Physical Exam   Temp: 97.9  F (36.6  C) Temp src: Oral BP: (!) 113/95 Pulse: 110 Heart Rate: 82 Resp: 23 SpO2: 96 % O2 Device: Nasal cannula Oxygen Delivery: 2 LPM  Vitals:    12/28/17 0600 12/29/17 0631 12/30/17 0650   Weight: 119 kg (262 lb 5.6 oz) 120.3 kg (265 lb 4.8 oz) 122.7 kg (270 lb 8.1 oz)     Vital Signs with Ranges  Temp:  [97.9  F (36.6  C)-98.8  F (37.1  C)] 97.9  F (36.6  C)  Pulse:  [] 110  Heart Rate:  [] 82  Resp:  [11-24] 23  BP: ()/(51-95) 113/95  SpO2:  [92 %-98 %] 96 %  I/O last 3 completed shifts:  In: 5844 [P.O.:1080; I.V.:4764]  Out: 1175 [Urine:1100; Drains:75]    Constitutional: AAOX3, NAD, Appears comfortable  HEENT: Moist oral mucosa, no oral lesions, No pallor or icterus  Neck- Supple, Good ROM, No JVD  Respiratory: diminished at the base, normal effort of breathing  Cardiovascular: RRR, No murmur  GI: mild distention, mild tenderness right upper quadrant, drain in place; normal bowel sounds  Skin/Integument: Warm and dry, no rashes  MSK: No joint deformity or swelling, no edema  Neuro: CN- grossly intact     Medications     Reason anticoagulant not prescribed for  atrial fibrillation       diltiazem (CARDIZEM) infusion ADULT Stopped (12/30/17 0730)     - MEDICATION INSTRUCTIONS -       NaCl 100 mL/hr at 12/30/17 0224     - MEDICATION INSTRUCTIONS -       Reason beta blocker order not selected       Reason ACE/ARB/ARNI order not selected       Reason statin medication order not selected         metoprolol  25 mg Oral BID     enoxaparin  40 mg Subcutaneous Q24H     sodium chloride (PF)  3 mL Intracatheter Q8H     piperacillin-tazobactam  3.375 g Intravenous Q6H     insulin aspart  1-6 Units Subcutaneous Q4H       Data     Recent Labs  Lab 12/30/17  0810 12/29/17  1852 12/29/17  0751 12/28/17  0525 12/27/17  1325 12/27/17  0736 12/27/17  0240 12/26/17  2356   WBC 9.0  --  12.2* 16.7*  --  18.3*  --  12.3*   HGB 12.6*  --  12.9* 14.8  --  15.9  --  16.2   MCV 89  --  90 89  --  87  --  85     --  142* 156  --  195  --  218   NA  --   --  139 138  --   --   --  136   POTASSIUM  --   --  3.4 3.6  --   --   --  3.6   CHLORIDE  --   --  103 103  --   --   --  105   CO2  --   --  30 30  --   --   --  22   BUN  --   --  34* 22  --   --   --  27   CR  --  1.57* 1.59* 1.26*  --   --   --  0.90   ANIONGAP  --   --  6 5  --   --   --  9   JERRY  --   --  8.0* 8.5  --   --   --  9.5   GLC  --   --  121* 137*  --   --   --  210*   ALBUMIN  --   --  2.3* 2.9*  --   --   --  3.8   PROTTOTAL  --   --  6.3* 7.0  --   --   --  8.4   BILITOTAL  --   --  1.6* 2.0*  --   --   --  1.0   ALKPHOS  --   --  64 80  --   --   --  113   ALT  --   --  27 24  --   --   --  42   AST  --   --  24 15  --   --   --  19   LIPASE  --   --   --   --   --   --   --  75   TROPI  --   --   --   --  0.170* 0.167* 0.049* <0.015       Recent Results (from the past 24 hour(s))   US Lower Extremity Venous Duplex Bilateral    Narrative    ULTRASOUND VENOUS LOWER EXTREMITY BILATERAL 12/29/2017 9:53 PM     HISTORY: Rule out DVT, dizziness, new afib;     COMPARISON: None.    TECHNIQUE: Ultrasound gray scale, Color Doppler  flow, and spectral  Doppler waveform analysis performed.    FINDINGS: The bilateral common femoral, superficial femoral, popliteal  and posterior tibial veins are patent and fully compressible and  demonstrate normal venous Doppler flow.      Impression    IMPRESSION: No DVT demonstrated.    TAYA TRAN MD

## 2017-12-31 VITALS
OXYGEN SATURATION: 95 % | DIASTOLIC BLOOD PRESSURE: 75 MMHG | BODY MASS INDEX: 37.85 KG/M2 | TEMPERATURE: 98 F | HEART RATE: 57 BPM | SYSTOLIC BLOOD PRESSURE: 135 MMHG | WEIGHT: 271.39 LBS | RESPIRATION RATE: 16 BRPM

## 2017-12-31 LAB
BUN SERPL-MCNC: 26 MG/DL (ref 7–30)
CREAT SERPL-MCNC: 1.11 MG/DL (ref 0.66–1.25)
GFR SERPL CREATININE-BSD FRML MDRD: 63 ML/MIN/1.7M2
GLUCOSE BLDC GLUCOMTR-MCNC: 108 MG/DL (ref 70–99)
GLUCOSE BLDC GLUCOMTR-MCNC: 118 MG/DL (ref 70–99)
POTASSIUM SERPL-SCNC: 3.5 MMOL/L (ref 3.4–5.3)

## 2017-12-31 PROCEDURE — 25000128 H RX IP 250 OP 636: Performed by: INTERNAL MEDICINE

## 2017-12-31 PROCEDURE — 00000146 ZZHCL STATISTIC GLUCOSE BY METER IP

## 2017-12-31 PROCEDURE — 25000128 H RX IP 250 OP 636: Performed by: PHYSICIAN ASSISTANT

## 2017-12-31 PROCEDURE — 84520 ASSAY OF UREA NITROGEN: CPT | Performed by: INTERNAL MEDICINE

## 2017-12-31 PROCEDURE — 84132 ASSAY OF SERUM POTASSIUM: CPT | Performed by: INTERNAL MEDICINE

## 2017-12-31 PROCEDURE — 25000132 ZZH RX MED GY IP 250 OP 250 PS 637: Performed by: INTERNAL MEDICINE

## 2017-12-31 PROCEDURE — 82565 ASSAY OF CREATININE: CPT | Performed by: INTERNAL MEDICINE

## 2017-12-31 PROCEDURE — 36415 COLL VENOUS BLD VENIPUNCTURE: CPT | Performed by: INTERNAL MEDICINE

## 2017-12-31 PROCEDURE — 25000132 ZZH RX MED GY IP 250 OP 250 PS 637: Performed by: PHYSICIAN ASSISTANT

## 2017-12-31 PROCEDURE — 99239 HOSP IP/OBS DSCHRG MGMT >30: CPT | Performed by: INTERNAL MEDICINE

## 2017-12-31 RX ORDER — POLYETHYLENE GLYCOL 3350 17 G/17G
17 POWDER, FOR SOLUTION ORAL DAILY
Status: DISCONTINUED | OUTPATIENT
Start: 2017-12-31 | End: 2017-12-31 | Stop reason: HOSPADM

## 2017-12-31 RX ORDER — BISACODYL 10 MG
10 SUPPOSITORY, RECTAL RECTAL DAILY
Status: DISCONTINUED | OUTPATIENT
Start: 2017-12-31 | End: 2017-12-31 | Stop reason: HOSPADM

## 2017-12-31 RX ORDER — OXYCODONE HYDROCHLORIDE 5 MG/1
5-10 TABLET ORAL
Qty: 20 TABLET | Refills: 0 | Status: SHIPPED | OUTPATIENT
Start: 2017-12-31 | End: 2018-01-18

## 2017-12-31 RX ORDER — METOPROLOL TARTRATE 25 MG/1
25 TABLET, FILM COATED ORAL 2 TIMES DAILY
Qty: 60 TABLET | Refills: 0 | Status: SHIPPED | OUTPATIENT
Start: 2017-12-31 | End: 2018-01-29

## 2017-12-31 RX ADMIN — BISACODYL 10 MG: 10 SUPPOSITORY RECTAL at 08:35

## 2017-12-31 RX ADMIN — TAZOBACTAM SODIUM AND PIPERACILLIN SODIUM 3.38 G: 375; 3 INJECTION, SOLUTION INTRAVENOUS at 03:13

## 2017-12-31 RX ADMIN — SENNOSIDES AND DOCUSATE SODIUM 1 TABLET: 8.6; 5 TABLET ORAL at 08:26

## 2017-12-31 RX ADMIN — ENOXAPARIN SODIUM 40 MG: 40 INJECTION SUBCUTANEOUS at 10:52

## 2017-12-31 RX ADMIN — METOPROLOL TARTRATE 25 MG: 25 TABLET ORAL at 08:26

## 2017-12-31 RX ADMIN — POTASSIUM CHLORIDE 20 MEQ: 1500 TABLET, EXTENDED RELEASE ORAL at 12:22

## 2017-12-31 RX ADMIN — TAZOBACTAM SODIUM AND PIPERACILLIN SODIUM 3.38 G: 375; 3 INJECTION, SOLUTION INTRAVENOUS at 08:29

## 2017-12-31 NOTE — PROGRESS NOTES
Discharge instructions and medication given, reviewed and questions answered with patient, wife and daughter.  Pt discharged in stable condition with minimal pain in wheelchair via personal transportation.

## 2017-12-31 NOTE — PLAN OF CARE
Problem: Patient Care Overview  Goal: Plan of Care/Patient Progress Review  Outcome: Improving  Pt had syncopal episode yesterday and patient was in rapid a-fib, dilt gtt started and this morning at 720 pt converted to SR with HR in the 50's-60's, gtt stopped, abdomen distended, +flatus, -BM, lap sites CDI, plan for patient to discharge tomorrow and for ANGELA to be removed before leaving, K+ replaced, flexeril for back spasm

## 2017-12-31 NOTE — PROGRESS NOTES
General Surgery Progress Note    Admission Date: 12/27/2017  Today's Date: 12/31/2017         Assessment:      Alvin Newton is a 83 year old male   S/P lap aurelio for gangrenous gallbladder  POD #3          Plan:   Suppository this morning. Would like patient to have BM prior to discharge, will see how he is feeling later today. Likely plan for discharge to home today, follow-up with Dr. Gonzalez of [place] in 2 weeks. Remove ANGELA drain. Augmentin at discharge for 7 days.    Pain: mild, oxycodone available  Bowel: dulcolax, senna, miralax  Diet: regular  DVT prophylaxis: PCDs, ambulation, lovenox    Dispo: likely home later today        Interval History:   No acute events overnight. Afebrile, VSS on room air. Patient feels like his abdomen is very bloated, is passing a lot of gas but no BM. No nausea but minimal appetite today. Abdominal pain only with movement or palpation, mainly on right side near drain.            Physical Exam:   /69 (BP Location: Left arm)  Pulse 57  Temp 97.9  F (36.6  C) (Oral)  Resp 16  Wt 123.1 kg (271 lb 6.2 oz)  SpO2 95%  BMI 37.85 kg/m2  I/O last 3 completed shifts:  In: 1609 [P.O.:700; I.V.:909]  Out: 1125 [Urine:1035; Drains:90]  General: NAD, pleasant, alert and oriented x3  Abdomen: somewhat firm, minimally tender, mildly distended, + bowel sounds; ANGELA drain in place with SS fluid in bulb  Incisions: clean, dry, and intact      LABS:  Glucose: 118  Surgical pathology: cholelithiasis and acute suppurative cholecystitis, benign cystic duct lymph node present                Aggie Locke PA-C  [facility]: [phone]  Pager: [phone] (7am-5pm)

## 2017-12-31 NOTE — DISCHARGE SUMMARY
[facility]    Discharge Summary  Hospitalist    Date of Admission:  12/27/2017  Date of Discharge:  12/31/2017  Discharging Provider: Chris Caballero MD    Discharge Diagnoses       Acute calculus gangrenous cholecystitis s/p laparoscopic cholecystectomy   Non-ST elevation myocardial infarction, due to demand ischemia; ACS ruled out.    Brief post-op atrial fibrillation with RVR  History of hypertension.    Brief syncope with fall   Diabetes mellitus type II; Diet controlled  Acute kidney injury  Hypercholesterolemia.    Benign essential hypertension        Hospital Course   83-year-old male with past medical history of hypertension, hyperlipidemia, morbid obesity, osteoarthritis and diet-controlled DM 2.  He presented with right upper quadrant abdominal pain, shortness of breath and nausea, possible cholecystitisand elevated troponin.      Acute calculus gangrenous cholecystitis s/p laparoscopic cholecystectomy:  -presented with epigastric and right upper quadrant pain  -had leukocytosis with positive Driver sign on right upper quadrant ultrasound  -patient underwent laparoscopic cholecystectomy on 12/28  -initially on IV Zosyn; transitioned to Augmentin at DC  -tolerating regular diet at DC.     Non-ST elevation myocardial infarction, due to demand ischemia; ACS ruled out.    Brief post-op atrial fibrillation with RVR  History of hypertension.    -Troponin flat at 0.17   -initial EKG findings with ST depression in the anterolateral leads  -cardiology was consulted, patient underwent Madhavi scan -negative ischemia  -elevated troponin most likely due to demand ischemia  -patient went into A-fib with RVR(briefly) after surgery  -required brief Cardizem drip, now converted to normal sinus rhythm  -discussed with cardiology, as there was no recurrence of a fib; follow-up with cardiology as previously scheduled with no intervention at this time  -continue metoprolol; hold  lisinopril/hydrochlorothiazide     Brief syncope with fall:  -patient had a brief syncopal episode yesterday while getting up, apparently was lightheaded and dizzy  -likely vasovagal; blood pressure was soft at that time, although could be related to a fib, patient went into RVR after the fall  -monitored on telemetry- no further dysrhythmias.     Diabetes mellitus type II; Diet controlled  -Hemoglobin A1c: 6.6      Acute kidney injury  -presented with normal creatinine  -creatinine worsened  to 1.59 during the course of hospital stay  -improving with IV fluids  -Hold Zestoretic for now     Hypercholesterolemia.    -resume PTA regimen      Chris Caballero MD    Significant Results and Procedures       Pending Results     Unresulted Labs Ordered in the Past 30 Days of this Admission     No orders found from 10/28/2017 to 12/28/2017.          Code Status   Full Code       Primary Care Physician   Steven Duane Semmler    Physical Exam   Temp: 97.9  F (36.6  C) Temp src: Oral BP: 137/69 Pulse: 57 Heart Rate: 64 Resp: 16 SpO2: 95 % O2 Device: None (Room air)      Constitutional: AAOX3, NAD, Appears comfortable  Neck- Supple, Good ROM, No JVD  Respiratory: Diminished at basesL, Normal WOB, No crackles or wheezes  Cardiovascular: RRR, No murmur  GI: Soft, mild distension;, BS- normoactive  Skin/Integument: Warm and dry, no rashes  MSK: No joint deformity or swelling  Neuro: CN- grossly intact     Discharge Disposition   Discharged to home  Condition at discharge: Stable    Consultations This Hospital Stay   CARDIOLOGY IP CONSULT  PHARMACY TO DOSE HEPARIN  SURGERY GENERAL IP CONSULT  PHYSICAL THERAPY ADULT IP CONSULT  CARDIOLOGY IP CONSULT    Time Spent on this Encounter   Chris RODRIGUEZ, personally saw the patient today and spent greater than 30 minutes discharging this patient.    Discharge Orders     Follow-Up with Cardiologist     Activity   Walk several times per day. Avoid strenuous activity or heavy lifting  >20 pounds for 2-3 weeks.     Wound care and dressings   Keep dressings clean and dry. Remove the bandaids before showering. Wait 10 days before removing the small white steri strips over your incision. These will often fall off on their own in 7-10 days. Do not attempt to replace these if they should fall off sooner. After removing the bandaids you can shower normally. You can allow warm water and soap to run over the incision. Do not scrub the incision. After showering pat your skin and steri strips dry. Do not submerge or soak your incisions under water for 2 weeks.     Discharge Instructions   CONSTIPATION PREVENTION  We recommend that you go to a pharmacy and purchase ONE of the following stool softeners/laxatives and start taking today to prevent constipation. You can stop this bowel regimen once you are no longer taking your narcotic pain medication or are having regular bowel movements:    - Senokot oral once daily  - Milk of magnesium once daily  - Docusate Sodium 1 pill twice daily (stool softener)  - Miralax 1 scoop/packet 1-2 times daily (laxative)    In addition to the above options, if constipation continues, you can add:   - 4 oz Prune juice or Plum juice daily for constipation     Follow-up and recommended labs and tests    Please follow-up in 2 weeks in Dr. Gonzalez's office for your first postoperative appointment. Call [phone] to schedule.  Our clinic's name is [facility]. The address is [address] [place] [address], [id number]    F/U PCP within 1 week     Echocardiogram   Administration of IV contrast will be tailored to this examination per the appropriate written protocol listed in the Echocardiography department Protocol Book, or by the supervising Cardiologist. This may result in an order change.    Use of contrast is at the discretion of the supervising Cardiologist.     Diet   Gradually resume your regular diet as tolerated.       Discharge Medications   Current  Discharge Medication List      START taking these medications    Details   oxyCODONE IR (ROXICODONE) 5 MG tablet Take 1-2 tablets (5-10 mg) by mouth every 3 hours as needed for moderate to severe pain  Qty: 20 tablet, Refills: 0    Associated Diagnoses: Acute post-operative pain      amoxicillin-clavulanate (AUGMENTIN) 875-125 MG per tablet Take 1 tablet by mouth 2 times daily for 7 days  Qty: 14 tablet, Refills: 0    Associated Diagnoses: Acute cholecystitis      metoprolol (LOPRESSOR) 25 MG tablet Take 1 tablet (25 mg) by mouth 2 times daily  Qty: 60 tablet, Refills: 0    Associated Diagnoses: Benign essential hypertension         CONTINUE these medications which have NOT CHANGED    Details   Multiple Vitamin (MULTIVITAMINS PO) Take 1 tablet by mouth daily      GLUCOSAMINE SULFATE PO Take 1,000 mg by mouth daily      Simvastatin (ZOCOR PO) Take 40 mg by mouth At Bedtime       Omega-3 Fatty Acids (OMEGA-3 FISH OIL PO) Take 1 capsule by mouth daily      aspirin 81 MG tablet Take 81 mg by mouth every evening      IBUPROFEN PO Take 400 mg by mouth every evening         STOP taking these medications       HYDROcodone-acetaminophen (NORCO) 5-325 MG per tablet Comments:   Reason for Stopping:         lisinopril-hydrochlorothiazide (PRINZIDE,ZESTORETIC) 20-25 MG per tablet Comments:   Reason for Stopping:             Allergies   No Known Allergies  Data   Most Recent 3 CBC's:  Recent Labs   Lab Test  12/30/17   0810  12/29/17   0751  12/28/17   0525   WBC  9.0  12.2*  16.7*   HGB  12.6*  12.9*  14.8   MCV  89  90  89   PLT  174  142*  156      Most Recent 3 BMP's:  Recent Labs   Lab Test  12/31/17   0805  12/30/17   0810  12/29/17   1852  12/29/17   0751  12/28/17   0525   NA   --   138   --   139  138   POTASSIUM  3.5  3.4   --   3.4  3.6   CHLORIDE   --   104   --   103  103   CO2   --   26   --   30  30   BUN  26  34*   --   34*  22   CR  1.11  1.30*  1.57*  1.59*  1.26*   ANIONGAP   --   8   --   6  5   JERRY   --    7.9*   --   8.0*  8.5   GLC   --   145*   --   121*  137*     Most Recent 2 LFT's:  Recent Labs   Lab Test  12/29/17   0751  12/28/17   0525   AST  24  15   ALT  27  24   ALKPHOS  64  80   BILITOTAL  1.6*  2.0*     Most Recent INR's and Anticoagulation Dosing History:  Anticoagulation Dose History     There is no flowsheet data to display.        Most Recent 3 Troponin's:  Recent Labs   Lab Test  12/27/17   1325  12/27/17   0736  12/27/17   0240   TROPI  0.170*  0.167*  0.049*     Most Recent Cholesterol Panel:No lab results found.  Most Recent 6 Bacteria Isolates From Any Culture (See EPIC Reports for Culture Details):No lab results found.  Most Recent TSH, T4 and A1c Labs:  Recent Labs   Lab Test  12/29/17   1852  12/27/17   0736   TSH  0.53   --    A1C   --   6.6*       Results for orders placed or performed during the hospital encounter of 12/27/17   NM Lexiscan stress test (nuc card)    Narrative    GATED MYOCARDIAL PERFUSION SCINTIGRAPHY WITH INTRAVENOUS PHARMACOLOGIC  VASODILATATION LEXISCAN -ONE DAY STUDY     12/27/2017 2:48 PM  GORDON QUIJANO  83 years  Male  5/10/1934.    Indication/Clinical History: Preop for acute cholecystitis    Impression  1.  Myocardial perfusion imaging using single isotope technique  demonstrated small area of mild ischemia at the apex/anteroapex..   2. Gated images demonstrated normal wall motion and thickening.  The  left ventricular systolic function is 85% at rest and 84% post stress.  3. Compared to the prior study from there is no prior study for  comparison .    Procedure  Pharmacologic stress testing was performed with Lexiscan at a rate of  0.08 mg/ml rapid bolus injection, for 15 seconds, 0.4 mg/5ml  intravenously. Low-level exercise was not performed along with the  vasodilator infusion.  The heart rate was 74 at baseline and michaela to  80 beats per minute during the Lexiscan infusion. The rest blood  pressure was 118/64 mmHg and was 105/43 mm Hg during Lexiscan  infusion.  The patient experienced no chest pain  during the test.    Myocardial perfusion imaging was performed at rest, approximately 45  minutes after the injection intravenously of 12.3 mCi of Tc-99m  Myoview. At peak pharmacologic effect, 10-20 seconds after Lexiscan,   the patient was injected intravenously with 37.5 mCi of  Tc-99m  Myoview. The post-stress tomographic imaging was performed  approximately 60 minutes after stress.    EKG Findings  The resting EKG demonstrated sinus rhythm with right bundle branch  block and occasional premature ventricular contractions. The stress  EKG demonstrated no ST-T changes over baseline to suggest ischemia or  injury.    Tomographic Findings  Overall, the study quality is adequate . On the stress images,  perfusion is normal with the exception of a very small area of mildly  decreased uptake at the apex and at the inferolateral base. On the  rest images, perfusion is normal with the exception of a mildly  decreased uptake at the inferolateral base . Gated images demonstrated  normal wall motion and thickening including at the inferolateral base.  The left ventricular ejection fraction was calculated to be 84% post  stress. TID was not seen with an index of 1.12. Rotating planar images  are reviewed and appropriately appropriately motion corrected.  Significant diaphragmatic attenuation artifact is seen which may  impact on interpretation.    GARRY GARNER MD   US Lower Extremity Venous Duplex Bilateral    Narrative    ULTRASOUND VENOUS LOWER EXTREMITY BILATERAL 12/29/2017 9:53 PM     HISTORY: Rule out DVT, dizziness, new afib;     COMPARISON: None.    TECHNIQUE: Ultrasound gray scale, Color Doppler flow, and spectral  Doppler waveform analysis performed.    FINDINGS: The bilateral common femoral, superficial femoral, popliteal  and posterior tibial veins are patent and fully compressible and  demonstrate normal venous Doppler flow.      Impression    IMPRESSION: No DVT  demonstrated.    TAYA TRAN MD

## 2017-12-31 NOTE — PLAN OF CARE
Problem: Cardiac: ACS (Acute Coronary Syndrome) (Adult)  Goal: Signs and Symptoms of Listed Potential Problems Will be Absent, Minimized or Managed (Cardiac: ACS)  Signs and symptoms of listed potential problems will be absent, minimized or managed by discharge/transition of care (reference Cardiac: ACS (Acute Coronary Syndrome) (Adult) CPG).   Outcome: Improving  Patient is alert and orientated X 4, up with assist of one, vital signs stable, denies pain, lap sites CDI, ANGELA output minimal. Tele: SR with BBB.

## 2018-01-02 ENCOUNTER — CARE COORDINATION (OUTPATIENT)
Dept: CARDIOLOGY | Facility: CLINIC | Age: 83
End: 2018-01-02

## 2018-01-02 DIAGNOSIS — Z90.49 S/P LAPAROSCOPIC CHOLECYSTECTOMY: Primary | ICD-10-CM

## 2018-01-02 RX ORDER — ONDANSETRON 4 MG/1
4-8 TABLET, FILM COATED ORAL EVERY 6 HOURS PRN
Qty: 20 TABLET | Refills: 1 | Status: SHIPPED | OUTPATIENT
Start: 2018-01-02 | End: 2018-01-18

## 2018-01-02 NOTE — PLAN OF CARE
Problem: Patient Care Overview  Goal: Plan of Care/Patient Progress Review  Physical Therapy Discharge Summary    Reason for therapy discharge:    Discharged to home with home therapy.    Progress towards therapy goal(s). See [facility] on [facility] record for goal details.  Goals partially met.  Barriers to achieving goals:   discharge from facility.    Therapy recommendation(s):    Continued therapy is recommended.  Rationale/Recommendations:  Recommended home with home PT to ensure optimal safety in home environmnet as well as assist from wife PRN. Up with SBA, using IV pole for ambulation of 100' at last PT visit.

## 2018-01-02 NOTE — PROGRESS NOTES
Patient was evaluated by cardiology while inpatient for: Elevated troponin in the context of possible acute cholecystitis. Called patient to discuss any post hospital d/c questions he may have, discuss medications and confirm f/u appts.Patient denied any questions regarding cardiac medications. Patient denied any SOB, chest pain, or light headedness. RN confirmed with patient that he has an apt scheduled on 1/26/17 for an Echo and on 129/18 with Dr. Browning. Patient advised to call clinic with any cardiac related questions or concerns prior to his apt. Patient verbalized understanding and agreed with plan.

## 2018-01-03 LAB — INTERPRETATION ECG - MUSE: NORMAL

## 2018-01-06 LAB — INTERPRETATION ECG - MUSE: NORMAL

## 2018-01-18 ENCOUNTER — OFFICE VISIT (OUTPATIENT)
Dept: SURGERY | Facility: CLINIC | Age: 83
End: 2018-01-18
Payer: COMMERCIAL

## 2018-01-18 DIAGNOSIS — Z09 SURGERY FOLLOW-UP: Primary | ICD-10-CM

## 2018-01-18 PROCEDURE — 99024 POSTOP FOLLOW-UP VISIT: CPT | Performed by: SURGERY

## 2018-01-18 NOTE — MR AVS SNAPSHOT
After Visit Summary   1/18/2018    Alvin Newton    MRN: [id number]           Patient Information     Date Of Birth          5/10/1934        Visit Information        Provider Department      1/18/2018 9:45 AM Heber Gonzalez MD [facility]      Today's Diagnoses     Surgery follow-up    -  1       Follow-ups after your visit        Your next 10 appointments already scheduled     Jan 26, 2018  1:00 PM CST   Ech Complete with [address] Echocardiography ([facility])    [address] [facility] [id number] [phone]           1. Please bring or wear a comfortable two-piece outfit. 2. You may eat, drink and take your normal medicines. 3. For any questions that cannot be answered, please contact the ordering physician            Jan 29, 2018  2:30 PM CST   Return Visit with Marisol Browning MD   [facility] ([facility])    [address] [facility] [id number] [phone]              Who to contact     If you have questions or need follow up information about today's clinic visit or your schedule please contact [facility] directly at [phone].  Normal or non-critical lab and imaging results will be communicated to you by LogicMonitor[employer], letter or phone within 4 business days after the clinic has received the results. If you do not hear from us within 7 days, please contact the clinic through LogicMonitorhart or phone. If you have a critical or abnormal lab result, we will notify you by phone as soon as possible.  Submit refill requests through [employer] or call your pharmacy and they will forward the refill request to us. Please allow 3 business days for your refill to be completed.          Additional Information About Your Visit        LogicMonitorharHellotravel Information     [employer] lets you send messages to your doctor, view your test results,  "renew your prescriptions, schedule appointments and more. To sign up, go to [email or web address] . Click on \"Log in\" on the left side of the screen, which will take you to the Welcome page. Then click on \"Sign up Now\" on the right side of the page.     You will be asked to enter the access code listed below, as well as some personal information. Please follow the directions to create your username and password.     Your access code is: [email or web address]  Expires: 3/27/2018  1:50 AM     Your access code will  in 90 days. If you need help or a new code, please call your [place] clinic or [phone].        Care EveryWhere ID     This is your Care EveryWhere ID. This could be used by other organizations to access your [place] medical records  [id number]         Blood Pressure from Last 3 Encounters:   17 135/75   17 163/83   17 150/70    Weight from Last 3 Encounters:   17 271 lb 6.2 oz (123.1 kg)   17 260 lb (117.9 kg)   17 270 lb (122.5 kg)              Today, you had the following     No orders found for display         Today's Medication Changes          These changes are accurate as of: 18  9:49 AM.  If you have any questions, ask your nurse or doctor.               Stop taking these medicines if you haven't already. Please contact your care team if you have questions.     ondansetron 4 MG tablet   Commonly known as:  ZOFRAN   Stopped by:  Heber Gonzalez MD           oxyCODONE IR 5 MG tablet   Commonly known as:  ROXICODONE   Stopped by:  Heber Gonzalez MD                    Primary Care Provider Office Phone # Fax #    Steven Duane Semmler, [phone]       [facility] [address] [facility] [address]        Equal Access to Services     [facility]: Janay Waggoner, wafay luqadaha, qaybta kaalcorby durbin . [facility] [phone].    ATENCIÓN: Si " ruth cyr, tiene a gavin disposición servicios gratuitos de asistencia lingüística. Daniel horvath [phone].    We comply with applicable federal civil rights laws and [place] laws. We do not discriminate on the basis of race, color, national origin, age, disability, sex, sexual orientation, or gender identity.            Thank you!     Thank you for choosing [facility]  for your care. Our goal is always to provide you with excellent care. Hearing back from our patients is one way we can continue to improve our services. Please take a few minutes to complete the written survey that you may receive in the mail after your visit with us. Thank you!             Your Updated Medication List - Protect others around you: Learn how to safely use, store and throw away your medicines at www.disposemymeds.org.          This list is accurate as of: 1/18/18  9:49 AM.  Always use your most recent med list.                   Brand Name Dispense Instructions for use Diagnosis    aspirin 81 MG tablet      Take 81 mg by mouth every evening        GLUCOSAMINE SULFATE PO      Take 1,000 mg by mouth daily        IBUPROFEN PO      Take 400 mg by mouth every evening        metoprolol tartrate 25 MG tablet    LOPRESSOR    60 tablet    Take 1 tablet (25 mg) by mouth 2 times daily    Benign essential hypertension       MULTIVITAMINS PO      Take 1 tablet by mouth daily        OMEGA-3 FISH OIL PO      Take 1 capsule by mouth daily        ZOCOR PO      Take 40 mg by mouth At Bedtime

## 2018-01-18 NOTE — LETTER
2018    Re: Alvin Newton - 5/10/1934    Patient presents in follow-up after recent complicated laparoscopic cholecystectomy for severe acute cholecystitis.  His primary complaint at this time his back pain.  He has had no nausea or vomiting.  His had no fevers or chills.  He's been tolerating a diet.  His bowel function has been normal.  He's been slowly increasing activity.     On examination: His incisions are clean and intact.  There is no  evidence of infection or hernia.  His pathology was reviewed.     Overall doing well from a surgical perspective.  Suspect that his back pain is related to recent surgery as well as his hospital stay.  This should resolve with time.  He is already utilizing muscle relaxants. He may advance his activity and diet as tolerated.  He can follow-up in the general surgery clinic on an as-needed basis.     Heber Gonzalez M.D.  [facility]  [phone]

## 2018-01-25 NOTE — PROGRESS NOTES
Patient presents in follow-up after recent complicated laparoscopic cholecystectomy for severe acute cholecystitis.  His primary complaint at this time his back pain.  He has had no nausea or vomiting.  His had no fevers or chills.  He's been tolerating a diet.  His bowel function has been normal.  He's been slowly increasing activity.    On examination: His incisions are clean and intact.  There is no  evidence of infection or hernia.  His pathology was reviewed.    Overall doing well from a surgical perspective.  Suspect that his back pain is related to recent surgery as well as his hospital stay.  This should resolve with time.  He is already utilizing muscle relaxants.  He may advance his activity and diet as tolerated.  He can follow-up in the general surgery clinic on an as-needed basis.    Please route or send letter to:  Primary Care Provider (PCP)

## 2018-01-26 ENCOUNTER — HOSPITAL ENCOUNTER (OUTPATIENT)
Dept: CARDIOLOGY | Facility: CLINIC | Age: 83
Discharge: HOME | End: 2018-01-26
Attending: PHYSICIAN ASSISTANT | Admitting: PHYSICIAN ASSISTANT
Payer: COMMERCIAL

## 2018-01-26 DIAGNOSIS — I21.4 NSTEMI (NON-ST ELEVATED MYOCARDIAL INFARCTION) (H): ICD-10-CM

## 2018-01-26 PROCEDURE — 93308 TTE F-UP OR LMTD: CPT

## 2018-01-26 PROCEDURE — 93325 DOPPLER ECHO COLOR FLOW MAPG: CPT | Mod: 26 | Performed by: INTERNAL MEDICINE

## 2018-01-26 PROCEDURE — 93308 TTE F-UP OR LMTD: CPT | Mod: 26 | Performed by: INTERNAL MEDICINE

## 2018-01-26 PROCEDURE — 25500064 ZZH RX 255 OP 636: Performed by: PHYSICIAN ASSISTANT

## 2018-01-26 PROCEDURE — 40000264 ECHO COMPLETE WITH LUMASON

## 2018-01-26 PROCEDURE — 93321 DOPPLER ECHO F-UP/LMTD STD: CPT | Mod: 26 | Performed by: INTERNAL MEDICINE

## 2018-01-26 RX ADMIN — SULFUR HEXAFLUORIDE 5 ML: KIT at 13:11

## 2018-01-29 ENCOUNTER — OFFICE VISIT (OUTPATIENT)
Dept: CARDIOLOGY | Facility: CLINIC | Age: 83
End: 2018-01-29
Attending: PHYSICIAN ASSISTANT
Payer: COMMERCIAL

## 2018-01-29 VITALS
WEIGHT: 254.6 LBS | HEART RATE: 75 BPM | OXYGEN SATURATION: 99 % | DIASTOLIC BLOOD PRESSURE: 74 MMHG | SYSTOLIC BLOOD PRESSURE: 137 MMHG | BODY MASS INDEX: 35.51 KG/M2

## 2018-01-29 DIAGNOSIS — I21.4 NSTEMI (NON-ST ELEVATED MYOCARDIAL INFARCTION) (H): ICD-10-CM

## 2018-01-29 DIAGNOSIS — M54.40 ACUTE RIGHT-SIDED LOW BACK PAIN WITH SCIATICA, SCIATICA LATERALITY UNSPECIFIED: Primary | ICD-10-CM

## 2018-01-29 DIAGNOSIS — I35.0 AORTIC VALVE STENOSIS, ETIOLOGY OF CARDIAC VALVE DISEASE UNSPECIFIED: ICD-10-CM

## 2018-01-29 PROCEDURE — 99204 OFFICE O/P NEW MOD 45 MIN: CPT | Mod: 24 | Performed by: INTERNAL MEDICINE

## 2018-01-29 RX ORDER — LISINOPRIL AND HYDROCHLOROTHIAZIDE 20; 25 MG/1; MG/1
1 TABLET ORAL
COMMUNITY
Start: 2017-03-16 | End: 2022-06-06

## 2018-01-29 RX ORDER — NAPROXEN 500 MG/1
500 TABLET ORAL
COMMUNITY
Start: 2018-01-15 | End: 2019-09-09

## 2018-01-29 RX ORDER — CYCLOBENZAPRINE HCL 10 MG
10 TABLET ORAL
COMMUNITY
Start: 2017-03-16 | End: 2022-06-06

## 2018-01-29 NOTE — MR AVS SNAPSHOT
After Visit Summary   1/29/2018    Alvin Newton    MRN: [id number]           Patient Information     Date Of Birth          5/10/1934        Visit Information        Provider Department      1/29/2018 2:30 PM Marisol Browning MD [facility]        Today's Diagnoses     Back pain    -  1    NSTEMI (non-ST elevated myocardial infarction) (H)        Aortic stenosis          Care Instructions    Thank you for your  Heart Care visit today. Your provider has recommended the following:  Medication Changes:  1. Metoprolol was removed from your medication list as you never started taking this medication.  Recommendations:  1. Call Physical Therapy or stop downstairs today to make an appointment to start this for back pain.  Follow-up:  See Dr. Browning for cardiology follow up in one year with an echocardiogram to be done just prior to this revisit. Follow up sooner if needed.  We kindly ask that you call cardiology scheduling at [phone] three months prior to requested revisit date to schedule future cardiology appointments.               Reminder:  For your safety, we ask that you bring in your current medication(s) or an updated list of your medications with you to EACH office visit. Include the medication name, dose of pill on bottle and how you are taking it daily. Include over-the-counter medications or supplements as well. Your provider will review this at each visit and plan your care based on this. Thank You.  [facility] HEART CARE  [facility]~[address]~[place], [place]~[address]  Questions about your visit today?  Call your Cardiology Clinic RN's-Paulina Jensen and/or Elly Vizcarra at [phone].                Follow-ups after your visit        Additional Services     PHYSICAL THERAPY REFERRAL       *This therapy referral will be filtered to a centralized scheduling office at [facility] and  "the patient will receive a call to schedule an appointment at a [place] location most convenient for them. *     [facility] provides Physical Therapy evaluation and treatment and many specialty services across the [place] system.  If requesting a specialty program, please choose from the list below.    If you have not heard from the scheduling office within 2 business days, please call [phone] for all locations, with the exception of [place], please call [phone].  Treatment: Evaluation & Treatment  Special Instructions/Modalities: Back Pain  Special Programs:      Please be aware that coverage of these services is subject to the terms and limitations of your health insurance plan.  Call member services at your health plan with any benefit or coverage questions.      **Note to Provider:  If you are referring outside of [place] for the therapy appointment, please list the name of the location in the \"special instructions\" above, print the referral and give to the patient to schedule the appointment.            Follow-Up with Cardiologist                 Future tests that were ordered for you today     Open Future Orders        Priority Expected Expires Ordered    PHYSICAL THERAPY REFERRAL Routine 1/30/2018 1/29/2020 1/29/2018    Echocardiogram Complete Routine 1/1/2019 1/29/2019 1/29/2018    Follow-Up with Cardiologist Routine 1/1/2019 1/29/2020 1/29/2018            Who to contact     If you have questions or need follow up information about today's clinic visit or your schedule please contact [facility] directly at [phone].  Normal or non-critical lab and imaging results will be communicated to you by MyChart, letter or phone within 4 business days after the clinic has received the results. If you do not hear from us within 7 days, please contact the clinic through MyChart or phone. If you have a critical or abnormal lab result, we will " "notify you by phone as soon as possible.  Submit refill requests through [employer] or call your pharmacy and they will forward the refill request to us. Please allow 3 business days for your refill to be completed.          Additional Information About Your Visit        [employer] Information     [employer] lets you send messages to your doctor, view your test results, renew your prescriptions, schedule appointments and more. To sign up, go to [email or web address] . Click on \"Log in\" on the left side of the screen, which will take you to the Welcome page. Then click on \"Sign up Now\" on the right side of the page.     You will be asked to enter the access code listed below, as well as some personal information. Please follow the directions to create your username and password.     Your access code is: [email or web address]  Expires: 3/27/2018  1:50 AM     Your access code will  in 90 days. If you need help or a new code, please call your [place] clinic or [phone].        Care EveryWhere ID     This is your Care EveryWhere ID. This could be used by other organizations to access your [place] medical records  [id number]        Your Vitals Were     Pulse Pulse Oximetry BMI (Body Mass Index)             75 99% 35.51 kg/m2          Blood Pressure from Last 3 Encounters:   18 137/74   17 135/75   17 163/83    Weight from Last 3 Encounters:   18 115.5 kg (254 lb 9.6 oz)   17 123.1 kg (271 lb 6.2 oz)   17 117.9 kg (260 lb)              We Performed the Following     Follow-Up with Cardiologist        Primary Care Provider Office Phone # Fax #    Steven Duane Semmler, [phone]       [facility] [address] [facility] [address] [address]        Equal Access to Services     INGRID WATTERS : Janay Waggoner, jaime todd, corby read. [facility] [phone].    ATENCIÓN: Si jd davies " a gavin disposición servicios gratuitos de asistencia lingüística. Daniel horvath [phone].    We comply with applicable federal civil rights laws and [place] laws. We do not discriminate on the basis of race, color, national origin, age, disability, sex, sexual orientation, or gender identity.            Thank you!     Thank you for choosing [facility]  for your care. Our goal is always to provide you with excellent care. Hearing back from our patients is one way we can continue to improve our services. Please take a few minutes to complete the written survey that you may receive in the mail after your visit with us. Thank you!             Your Updated Medication List - Protect others around you: Learn how to safely use, store and throw away your medicines at www.disposemymeds.org.          This list is accurate as of 1/29/18  3:02 PM.  Always use your most recent med list.                   Brand Name Dispense Instructions for use Diagnosis    aspirin 81 MG tablet      Take 81 mg by mouth every evening        cyclobenzaprine 10 MG tablet    FLEXERIL     Take 10 mg by mouth        GLUCOSAMINE SULFATE PO      Take 1,000 mg by mouth daily        IBUPROFEN PO      Take 400 mg by mouth every evening        lisinopril-hydrochlorothiazide 20-25 MG per tablet    PRINZIDE/ZESTORETIC     Take 1 tablet by mouth        MULTIVITAMINS PO      Take 1 tablet by mouth daily        naproxen 500 MG tablet    NAPROSYN     Take 500 mg by mouth        OMEGA-3 FISH OIL PO      Take 1 capsule by mouth daily        ZOCOR PO      Take 40 mg by mouth At Bedtime

## 2018-01-29 NOTE — LETTER
1/29/2018    Steven Duane Semmler, MD  [facility] [address] [facility] [address] [address]    RE: Alvin Newton       Dear Colleague,    I had the pleasure of seeing Alvin Aman in the [facility].        Cardiology Consultation       Assessment & Plan      1.  Acute cholecystitis status post gallbladder surgery  2.  Likely type II demand MI  3.  Mild aortic stenosis with preserved LV systolic function  4.  Stable myocardial perfusion study low risk  5.  Recent back pain  6.  Paroxysmal atrial fibrillation currently in normal sinus rhythm    Recommendations    1.  Regarding his non-STEMI this is attributed to a type II demand MI, we will treat this conservatively  2.  Preserved LV systolic function on echocardiogram and low risk recent stress test  3.  Will reorder physical therapy for his back pain  4.  Continue present medications  5.  Is in normal sinus rhythm, at this point I do not feel complicating his post operative care with anticoagulation should be entertained. If he should redevelop atrial fibrillation we can certainly reconsider  6.  Return to clinic in 1 year's time with a pre-clinic echocardiogram      Marisol Browning MD      HPI:    Patient is a very pleasant 83-year-old gentleman who saw my colleague Dr. Cordon during his hospital stay in December 2017. At that point the cardiology service was consulted due to an elevated troponin in the context of possibly acute cholecystitis. He was significant hyper intensive on exam. An EKG at that point demonstrated old right bundle branch block without any concerning ischemic changes and his troponins were relatively flat. He had not complained of any chest pain and was mainly limited by his epigastric and significant right upper quadrant tenderness. Given his risk factor he underwent a micro-perfusion study as noted below. Prior to his presentation he has severe arthritis but had gone to the [facility] 3 times a week  for pool exercises and at this level of exertion had not had any cardiac related symptoms. Ultimately he was cleared for his noncardiac surgery and underwent cholecystectomy on December 28, 2017. He was treated with the usual antibiotics and surgical care following this and was subsequently discharged. He did have a period of time where he went into A. fib with RVR, however Cardizem was given and converted back to normal sinus rhythm.  Further workup regarding his cardiac status is as noted below. Here to establish local cardiac care.    Echocardiogram 1/26/2018  The visual ejection fraction is estimated at 55-60%.  No regional wall motion abnormalities noted.  The left ventricle is normal in structure, function and size.  The right ventricle is normal in structure, function and size.  There is no pericardial effusion.    Myocardial perfusion study December 27, 2017  Impression  1.  Myocardial perfusion imaging using single isotope technique  demonstrated small area of mild ischemia at the apex/anteroapex..   2. Gated images demonstrated normal wall motion and thickening.  The  left ventricular systolic function is 85% at rest and 84% post stress.  3. Compared to the prior study from there is no prior study for  comparison .      Mraisol Browning MD    Primary Care Physician   Steven Duane Semmler      Patient Active Problem List   Diagnosis     NSTEMI (non-ST elevated myocardial infarction) (H)       Past Medical History   I have reviewed this patient's medical history and updated it with pertinent information if needed.   Past Medical History:   Diagnosis Date     Colonic diverticulum      Hyperlipidemia      Hypertension      Obesity (BMI 30-39.9)      Osteoarthritis      Type 2 diabetes mellitus (H)        Past Surgical History   I have reviewed this patient's surgical history and updated it with pertinent information if needed.  Past Surgical History:   Procedure Laterality Date     ARTHROPLASTY HIP  BILATERAL  1987      ESOPHAGOSCOPY, DIAGNOSTIC  2003     LAPAROSCOPIC CHOLECYSTECTOMY N/A 12/28/2017    Procedure: LAPAROSCOPIC CHOLECYSTECTOMY;  LAPAROSCOPIC CHOLECYSTECTOMY;  Surgeon: Heber Gonzalez MD;  Location:  OR     TRANSRECTAL ULTRASONIC, TRANSURETHRAL RESECTION (TUR) OF PROSTATE CYST  1990       Prior to Admission Medications   Cannot display prior to admission medications because the patient has not been admitted in this contact.     [unfilled]  [unfilled]  Allergies   No Known Allergies    Social History    reports that he has never smoked. He has never used smokeless tobacco. He reports that he drinks alcohol. He reports that he does not use illicit drugs.    Family History   No family history on file.    Review of Systems   The comprehensive 10 point Review of Systems is negative other than noted in the HPI or here.     Physical Exam   Vital Signs with [place]     Wt Readings from Last 4 Encounters:   12/31/17 123.1 kg (271 lb 6.2 oz)   12/26/17 117.9 kg (260 lb)   03/04/17 122.5 kg (270 lb)   12/03/13 115.7 kg (255 lb)     [unfilled]      Vitals: There were no vitals taken for this visit.  Blood pressure 137/74, pulse 75, weight 115.5 kg (254 lb 9.6 oz), SpO2 99 %.  CONSTITUTIONAL:  Obese body habitus and lying in bed with 1 pillow.  He is cooperative, alert and oriented   EYES:  No pallor, icterus, xanthelasma.   ENT:  Satisfactory dentition.   RESPIRATORY:  Normal breath sounds, no rales or wheeze.   CARDIOVASCULAR:  JVP is normal.  Carotid pulse is normal, no bruit.  Apical impulse not palpable due to obesity.  Heart sounds are normal.  He has a very soft 2/6 early systolic murmur which suggests either aortic sclerosis or mild mitral regurgitation.  No pericardial friction rub.   GASTROINTESTINAL:  Abdomen is nondistended.  Active bowel sounds.  No splenomegaly.   SKIN:  No rash, erythema or ecchymosis.   NEUROPSYCHIATRIC:  Oriented to time, place and person.  Normal affect.    NEUROLOGIC:  No gross motor deficits.   EXTREMITIES:  No edema, clubbing or deformities.     @LABRCNTIPR(tropi:5,troponinies:5)@    @LABRCNTIPR(wbc:3,hgb:3,mcv:3,plt:3,inr:3,na:3,potassium:3,chloride:3,co2:3,bun:3,cr:3,gfrestimated:3,gfrestblack:3,aniongap:3,carmencita:3,glc:3,albumin:2,prottotal:2,bilitotal:2,alkphos:2,alt:2,ast:2,lipase:2,tropi:3)@  No results for input(s): CHOL, HDL, LDL, TRIG, CHOLHDLRATIO in the last 25050 hours.  @LABRCNTIP(wbc:3,hgb:3,hct:3,mcv:3,plt:3,iron:3,ironsat:3,reticabsct:3,retp:3,feb:3,phil:3,b12:3,folic:3,epoe:3,morph:3)@  @LABRCNTIP(PH:3,PHV:3,PO2:3,PO2V:3,sat:3,PCO2:3,PCO2V:3,HCO3:3,HCO3V:3)@  @LABRCNTIP(NTBNPI:3,NTBNP:3)@  @LABRCNTIP(DD:1)@  @LABRCNTIP(sed:3,crp:3)@  @LABRCNTIP(PLT:3)@  @LABRCNTIP(TSH:3)@  @LABRCNTIP(color:1,appearance:1,urineg,urinebili:1,urineketone:1,s,ubld:1,urineph:1,protein:1,urobilinogen:1,nitrite:1,leukest:1,rbcu:1,wbcu:1)@    Imaging:  No results found for this or any previous visit (from the past 48 hour(s)).    Echo:  No results found for this or any previous visit (from the past 4320 hour(s)).    Thank you for allowing me to participate in the care of your patient.    Sincerely,     Marisol Browning MD     [facility]

## 2018-01-29 NOTE — PATIENT INSTRUCTIONS
Thank you for your [facility] visit today. Your provider has recommended the following:  Medication Changes:  1. Metoprolol was removed from your medication list as you never started taking this medication.  Recommendations:  1. Call Physical Therapy or stop downstairs today to make an appointment to start this for back pain.  Follow-up:  See Dr. Browning for cardiology follow up in one year with an echocardiogram to be done just prior to this revisit. Follow up sooner if needed.  We kindly ask that you call cardiology scheduling at [phone] three months prior to requested revisit date to schedule future cardiology appointments.               Reminder:  For your safety, we ask that you bring in your current medication(s) or an updated list of your medications with you to EACH office visit. Include the medication name, dose of pill on bottle and how you are taking it daily. Include over-the-counter medications or supplements as well. Your provider will review this at each visit and plan your care based on this. Thank You.  [facility] HEART CARE  [facility]~[address]~[place], [place]~[address]  Questions about your visit today?  Call your Cardiology Clinic RN's-Paulina Jensen and/or Elly Vizcarra at [phone].

## 2018-01-29 NOTE — PROGRESS NOTES
Cardiology Consultation       Assessment & Plan      1.  Acute cholecystitis status post gallbladder surgery  2.  Likely type II demand MI  3.  Mild aortic stenosis with preserved LV systolic function  4.  Stable myocardial perfusion study low risk  5.  Recent back pain  6.  Paroxysmal atrial fibrillation currently in normal sinus rhythm    Recommendations    1.  Regarding his non-STEMI this is attributed to a type II demand MI, we will treat this conservatively  2.  Preserved LV systolic function on echocardiogram and low risk recent stress test  3.  Will reorder physical therapy for his back pain  4.  Continue present medications  5.  Is in normal sinus rhythm, at this point I do not feel complicating his post operative care with anticoagulation should be entertained. If he should redevelop atrial fibrillation we can certainly reconsider  6.  Return to clinic in 1 year's time with a pre-clinic echocardiogram      Marisol Browning MD      HPI:    Patient is a very pleasant 83-year-old gentleman who saw my colleague Dr. Cordon during his hospital stay in December 2017. At that point the cardiology service was consulted due to an elevated troponin in the context of possibly acute cholecystitis. He was significant hyper intensive on exam. An EKG at that point demonstrated old right bundle branch block without any concerning ischemic changes and his troponins were relatively flat. He had not complained of any chest pain and was mainly limited by his epigastric and significant right upper quadrant tenderness. Given his risk factor he underwent a micro-perfusion study as noted below. Prior to his presentation he has severe arthritis but had gone to the [facility] 3 times a week for pool exercises and at this level of exertion had not had any cardiac related symptoms. Ultimately he was cleared for his noncardiac surgery and underwent cholecystectomy on December 28, 2017. He was treated with the usual antibiotics and  surgical care following this and was subsequently discharged. He did have a period of time where he went into A. fib with RVR, however Cardizem was given and converted back to normal sinus rhythm.  Further workup regarding his cardiac status is as noted below. Here to establish local cardiac care.    Echocardiogram 1/26/2018  The visual ejection fraction is estimated at 55-60%.  No regional wall motion abnormalities noted.  The left ventricle is normal in structure, function and size.  The right ventricle is normal in structure, function and size.  There is no pericardial effusion.    Myocardial perfusion study December 27, 2017  Impression  1.  Myocardial perfusion imaging using single isotope technique  demonstrated small area of mild ischemia at the apex/anteroapex..   2. Gated images demonstrated normal wall motion and thickening.  The  left ventricular systolic function is 85% at rest and 84% post stress.  3. Compared to the prior study from there is no prior study for  comparison .      Marisol Browning MD    Primary Care Physician   Steven Duane Semmler      Patient Active Problem List   Diagnosis     NSTEMI (non-ST elevated myocardial infarction) (H)       Past Medical History   I have reviewed this patient's medical history and updated it with pertinent information if needed.   Past Medical History:   Diagnosis Date     Colonic diverticulum      Hyperlipidemia      Hypertension      Obesity (BMI 30-39.9)      Osteoarthritis      Type 2 diabetes mellitus (H)        Past Surgical History   I have reviewed this patient's surgical history and updated it with pertinent information if needed.  Past Surgical History:   Procedure Laterality Date     ARTHROPLASTY HIP BILATERAL  1987      ESOPHAGOSCOPY, DIAGNOSTIC  2003     LAPAROSCOPIC CHOLECYSTECTOMY N/A 12/28/2017    Procedure: LAPAROSCOPIC CHOLECYSTECTOMY;  LAPAROSCOPIC CHOLECYSTECTOMY;  Surgeon: Heber Gonzalez MD;  Location:  OR      TRANSRECTAL ULTRASONIC, TRANSURETHRAL RESECTION (TUR) OF PROSTATE CYST  1990       Prior to Admission Medications   Cannot display prior to admission medications because the patient has not been admitted in this contact.     [unfilled]  [unfilled]  Allergies   No Known Allergies    Social History    reports that he has never smoked. He has never used smokeless tobacco. He reports that he drinks alcohol. He reports that he does not use illicit drugs.    Family History   No family history on file.    Review of Systems   The comprehensive 10 point Review of Systems is negative other than noted in the HPI or here.     Physical Exam   Vital Signs with Ranges     Wt Readings from Last 4 Encounters:   12/31/17 123.1 kg (271 lb 6.2 oz)   12/26/17 117.9 kg (260 lb)   03/04/17 122.5 kg (270 lb)   12/03/13 115.7 kg (255 lb)     [unfilled]      Vitals: There were no vitals taken for this visit.  Blood pressure 137/74, pulse 75, weight 115.5 kg (254 lb 9.6 oz), SpO2 99 %.  CONSTITUTIONAL:  Obese body habitus and lying in bed with 1 pillow.  He is cooperative, alert and oriented   EYES:  No pallor, icterus, xanthelasma.   ENT:  Satisfactory dentition.   RESPIRATORY:  Normal breath sounds, no rales or wheeze.   CARDIOVASCULAR:  JVP is normal.  Carotid pulse is normal, no bruit.  Apical impulse not palpable due to obesity.  Heart sounds are normal.  He has a very soft 2/6 early systolic murmur which suggests either aortic sclerosis or mild mitral regurgitation.  No pericardial friction rub.   GASTROINTESTINAL:  Abdomen is nondistended.  Active bowel sounds.  No splenomegaly.   SKIN:  No rash, erythema or ecchymosis.   NEUROPSYCHIATRIC:  Oriented to time, place and person.  Normal affect.   NEUROLOGIC:  No gross motor deficits.   EXTREMITIES:  No edema, clubbing or deformities.      @LABRCNTIPR(tropi:5,troponinies:5)@    @LABRCNTIPR(wbc:3,hgb:3,mcv:3,plt:3,inr:3,na:3,potassium:3,chloride:3,co2:3,bun:3,cr:3,gfrestimated:3,gfrestblack:3,aniongap:3,carmencita:3,glc:3,albumin:2,prottotal:2,bilitotal:2,alkphos:2,alt:2,ast:2,lipase:2,tropi:3)@  No results for input(s): CHOL, HDL, LDL, TRIG, CHOLHDLRATIO in the last 71201 hours.  @LABRCNTIP(wbc:3,hgb:3,hct:3,mcv:3,plt:3,iron:3,ironsat:3,reticabsct:3,retp:3,feb:3,phil:3,b12:3,folic:3,epoe:3,morph:3)@  @LABRCNTIP(PH:3,PHV:3,PO2:3,PO2V:3,sat:3,PCO2:3,PCO2V:3,HCO3:3,HCO3V:3)@  @LABRCNTIP(NTBNPI:3,NTBNP:3)@  @LABRCNTIP(DD:1)@  @LABRCNTIP(sed:3,crp:3)@  @LABRCNTIP(PLT:3)@  @LABRCNTIP(TSH:3)@  @LABRCNTIP(color:1,appearance:1,urineg,urinebili:1,urineketone:1,s,ubld:1,urineph:1,protein:1,urobilinogen:1,nitrite:1,leukest:1,rbcu:1,wbcu:1)@    Imaging:  No results found for this or any previous visit (from the past 48 hour(s)).    Echo:  No results found for this or any previous visit (from the past 4320 hour(s)).

## 2019-09-06 ENCOUNTER — HOSPITAL ENCOUNTER (OUTPATIENT)
Dept: CARDIOLOGY | Facility: CLINIC | Age: 84
Discharge: HOME | End: 2019-09-06
Attending: INTERNAL MEDICINE | Admitting: INTERNAL MEDICINE
Payer: COMMERCIAL

## 2019-09-06 DIAGNOSIS — I35.0 AORTIC VALVE STENOSIS, ETIOLOGY OF CARDIAC VALVE DISEASE UNSPECIFIED: ICD-10-CM

## 2019-09-06 PROCEDURE — 25500064 ZZH RX 255 OP 636: Performed by: INTERNAL MEDICINE

## 2019-09-06 PROCEDURE — 40000264 ECHOCARDIOGRAM COMPLETE

## 2019-09-06 PROCEDURE — 93306 TTE W/DOPPLER COMPLETE: CPT | Mod: 26 | Performed by: INTERNAL MEDICINE

## 2019-09-06 RX ADMIN — HUMAN ALBUMIN MICROSPHERES AND PERFLUTREN 2 ML: 10; .22 INJECTION, SOLUTION INTRAVENOUS at 10:50

## 2019-09-09 ENCOUNTER — OFFICE VISIT (OUTPATIENT)
Dept: CARDIOLOGY | Facility: CLINIC | Age: 84
End: 2019-09-09
Attending: INTERNAL MEDICINE
Payer: COMMERCIAL

## 2019-09-09 VITALS
DIASTOLIC BLOOD PRESSURE: 65 MMHG | BODY MASS INDEX: 38.08 KG/M2 | HEART RATE: 69 BPM | OXYGEN SATURATION: 98 % | SYSTOLIC BLOOD PRESSURE: 139 MMHG | WEIGHT: 273 LBS

## 2019-09-09 DIAGNOSIS — I35.0 AORTIC VALVE STENOSIS, ETIOLOGY OF CARDIAC VALVE DISEASE UNSPECIFIED: ICD-10-CM

## 2019-09-09 DIAGNOSIS — I21.4 NSTEMI (NON-ST ELEVATED MYOCARDIAL INFARCTION) (H): ICD-10-CM

## 2019-09-09 PROCEDURE — 99213 OFFICE O/P EST LOW 20 MIN: CPT | Performed by: INTERNAL MEDICINE

## 2019-09-09 RX ORDER — ACETAMINOPHEN 325 MG/1
325-650 TABLET ORAL EVERY 6 HOURS PRN
COMMUNITY
End: 2022-06-06

## 2019-09-09 NOTE — LETTER
9/9/2019    Steven Duane Semmler, MD  [facility] [address] [facility] [address]    RE: Alvin Aman       Dear Colleague,    I had the pleasure of seeing Alvin Aman in the [facility].        Cardiology Clinic      Assessment & Plan      1.  Acute cholecystitis status post gallbladder surgery Dec 2017  2.  Likely type II demand MI during post operative period  3.  Moderate to Severe aortic stenosis with preserved LV systolic function.  Mean gradient 30 mmHg, KISHA 1.0 cm2 on recent echocardiogram  4.  Stable myocardial perfusion study low risk  5.  Paroxysmal atrial fibrillation (post aurelio) currently in normal sinus rhythm    Recommendations    1.  Regarding his paroxysmal atrial fibrillation we discussed anticoagulation given his risk factors.  At present patient states that he tends to bruise very easily and would like to think about this.  We can discuss this upon his return next year.  2.  We reviewed his echocardiogram with progression of his aortic stenosis.  He is asymptomatic.  We will get a follow-up echocardiogram in 6 months time  3.  [place].  We will have him follow-up in April 2020 with a pre-clinic echocardiogram.      Marisol Browning MD      HPI:    Patient is a very pleasant 85-year-old gentleman who saw my colleague Dr. Cordon during his hospital stay in December 2017. At that point the cardiology service was consulted due to an elevated troponin in the context of possibly acute cholecystitis. He was significant hyper intensive on exam. An EKG at that point demonstrated old right bundle branch block without any concerning ischemic changes and his troponins were relatively flat. He had not complained of any chest pain and was mainly limited by his epigastric and significant right upper quadrant tenderness. Given his risk factor he underwent a micro-perfusion study as noted below. Prior to his presentation he  has severe arthritis but had gone to the [facility] 3 times a week for pool exercises and at this level of exertion had not had any cardiac related symptoms. Ultimately he was cleared for his noncardiac surgery and underwent cholecystectomy on December 28, 2017. He was treated with the usual antibiotics and surgical care following this and was subsequently discharged. He did have a period of time where he went into A. fib with RVR, however Cardizem was given and converted back to normal sinus rhythm.  Further workup regarding his cardiac status is as noted below.     No active cardiac complaints since I saw him in Jan 2018.      Echocardiogram 9/6/19  The ascending aorta is mildly dilated with the Sinuses normal at 3.7 cm and  the ascending aorta 3.9 cm. There appears to be at least partial fusion  between the native right and left aortic leaflets. Moderately severe valvular  aortic stenosis is now present and the mean AoV pressure gradient is 30mmHg.  The calculated aortic valve area is 1-1.1cm2.  Left ventricular systolic function is normal. There is mild to moderate  concentric left ventricular hypertrophy.  Technically difficult, suboptimal study. Contrast was used without apparent  complications. Compared to the prior echo, the severity of aortic stenosis has  increased.      Myocardial perfusion study December 27, 2017  Impression  1.  Myocardial perfusion imaging using single isotope technique  demonstrated small area of mild ischemia at the apex/anteroapex..   2. Gated images demonstrated normal wall motion and thickening.  The  left ventricular systolic function is 85% at rest and 84% post stress.  3. Compared to the prior study from there is no prior study for  comparison .      Marisol Browning MD    Primary Care Physician   Steven Duane Semmler      Patient Active Problem List   Diagnosis     NSTEMI (non-ST elevated myocardial infarction) (H)       Past Medical History   I have reviewed this patient's  medical history and updated it with pertinent information if needed.   Past Medical History:   Diagnosis Date     Colonic diverticulum      Hyperlipidemia      Hypertension      Obesity (BMI 30-39.9)      Osteoarthritis      Type 2 diabetes mellitus (H)        Past Surgical History   I have reviewed this patient's surgical history and updated it with pertinent information if needed.  Past Surgical History:   Procedure Laterality Date     ARTHROPLASTY HIP BILATERAL  1987     HC ESOPHAGOSCOPY, DIAGNOSTIC  2003     LAPAROSCOPIC CHOLECYSTECTOMY N/A 12/28/2017    Procedure: LAPAROSCOPIC CHOLECYSTECTOMY;  LAPAROSCOPIC CHOLECYSTECTOMY;  Surgeon: Heber Gonzalez MD;  Location:  OR     TRANSRECTAL ULTRASONIC, TRANSURETHRAL RESECTION (TUR) OF PROSTATE CYST  1990       Prior to Admission Medications   Cannot display prior to admission medications because the patient has not been admitted in this contact.     [unfilled]  [unfilled]  Allergies   No Known Allergies    Social History    reports that he has never smoked. He has never used smokeless tobacco. He reports that he drinks alcohol. He reports that he does not use drugs.    Family History   No family history on file.    Review of Systems   The comprehensive 10 point Review of Systems is negative other than noted in the HPI or here.     Physical Exam   Vital Signs with Ranges     Wt Readings from Last 4 Encounters:   01/29/18 115.5 kg (254 lb 9.6 oz)   12/31/17 123.1 kg (271 lb 6.2 oz)   12/26/17 117.9 kg (260 lb)   03/04/17 122.5 kg (270 lb)     [unfilled]      Vitals: Blood pressure 139/65, pulse 69, weight 123.8 kg (273 lb), SpO2 98 %.    CONSTITUTIONAL:  Obese body habitus and lying in bed with 1 pillow.  He is cooperative, alert and oriented   EYES:  No pallor, icterus, xanthelasma.   ENT:  Satisfactory dentition.   RESPIRATORY:  Normal breath sounds, no rales or wheeze.   CARDIOVASCULAR:  JVP is normal.  Carotid pulse is normal, no bruit.  Apical  impulse not palpable due to obesity.  Heart sounds are normal.  He has a very soft 2/6 early systolic murmur which suggests either aortic sclerosis or mild mitral regurgitation.  No pericardial friction rub.   GASTROINTESTINAL:  Abdomen is nondistended.  Active bowel sounds.  No splenomegaly.   SKIN:  No rash, erythema or ecchymosis.   NEUROPSYCHIATRIC:  Oriented to time, place and person.  Normal affect.   NEUROLOGIC:  No gross motor deficits.   EXTREMITIES:  No edema, clubbing or deformities.     @LABRCNTIPR(tropi:5,troponinies:5)@    @LABRCNTIPR(wbc:3,hgb:3,mcv:3,plt:3,inr:3,na:3,potassium:3,chloride:3,co2:3,bun:3,cr:3,gfrestimated:3,gfrestblack:3,aniongap:3,carmencita:3,glc:3,albumin:2,prottotal:2,bilitotal:2,alkphos:2,alt:2,ast:2,lipase:2,tropi:3)@  No results for input(s): CHOL, HDL, LDL, TRIG, CHOLHDLRATIO in the last 92905 hours.  @LABRCNTIP(wbc:3,hgb:3,hct:3,mcv:3,plt:3,iron:3,ironsat:3,reticabsct:3,retp:3,feb:3,phil:3,b12:3,folic:3,epoe:3,morph:3)@  @LABRCNTIP(PH:3,PHV:3,PO2:3,PO2V:3,sat:3,PCO2:3,PCO2V:3,HCO3:3,HCO3V:3)@  @LABRCNTIP(NTBNPI:3,NTBNP:3)@  @LABRCNTIP(DD:1)@  @LABRCNTIP(sed:3,crp:3)@  @LABRCNTIP(PLT:3)@  @LABRCNTIP(TSH:3)@  @LABRCNTIP(color:1,appearance:1,urineg,urinebili:1,urineketone:1,s,ubld:1,urineph:1,protein:1,urobilinogen:1,nitrite:1,leukest:1,rbcu:1,wbcu:1)@    Imaging:  No results found for this or any previous visit (from the past 48 hour(s)).    Echo:  No results found for this or any previous visit (from the past 4320 hour(s)).      Thank you for allowing me to participate in the care of your patient.    Sincerely,     Marisol Browning MD     [facility]

## 2019-09-09 NOTE — PROGRESS NOTES
Cardiology Clinic      Assessment & Plan      1.  Acute cholecystitis status post gallbladder surgery Dec 2017  2.  Likely type II demand MI during post operative period  3.  Moderate to Severe aortic stenosis with preserved LV systolic function.  Mean gradient 30 mmHg, KISHA 1.0 cm2 on recent echocardiogram  4.  Stable myocardial perfusion study low risk  5.  Paroxysmal atrial fibrillation (post aurelio) currently in normal sinus rhythm    Recommendations    1.  Regarding his paroxysmal atrial fibrillation we discussed anticoagulation given his risk factors.  At present patient states that he tends to bruise very easily and would like to think about this.  We can discuss this upon his return next year.  2.  We reviewed his echocardiogram with progression of his aortic stenosis.  He is asymptomatic.  We will get a follow-up echocardiogram in 6 months time  3.  [place] going south [place].  We will have him follow-up in April 2020 with a pre-clinic echocardiogram.      Marisol Browning MD      HPI:    Patient is a very pleasant 85-year-old gentleman who saw my colleague Dr. Cordon during his hospital stay in December 2017. At that point the cardiology service was consulted due to an elevated troponin in the context of possibly acute cholecystitis. He was significant hyper intensive on exam. An EKG at that point demonstrated old right bundle branch block without any concerning ischemic changes and his troponins were relatively flat. He had not complained of any chest pain and was mainly limited by his epigastric and significant right upper quadrant tenderness. Given his risk factor he underwent a micro-perfusion study as noted below. Prior to his presentation he has severe arthritis but had gone to the [facility] 3 times a week for pool exercises and at this level of exertion had not had any cardiac related symptoms. Ultimately he was cleared for his noncardiac surgery and underwent cholecystectomy on  December 28, 2017. He was treated with the usual antibiotics and surgical care following this and was subsequently discharged. He did have a period of time where he went into A. fib with RVR, however Cardizem was given and converted back to normal sinus rhythm.  Further workup regarding his cardiac status is as noted below.     No active cardiac complaints since I saw him in Jan 2018.      Echocardiogram 9/6/19  The ascending aorta is mildly dilated with the Sinuses normal at 3.7 cm and  the ascending aorta 3.9 cm. There appears to be at least partial fusion  between the native right and left aortic leaflets. Moderately severe valvular  aortic stenosis is now present and the mean AoV pressure gradient is 30mmHg.  The calculated aortic valve area is 1-1.1cm2.  Left ventricular systolic function is normal. There is mild to moderate  concentric left ventricular hypertrophy.  Technically difficult, suboptimal study. Contrast was used without apparent  complications. Compared to the prior echo, the severity of aortic stenosis has  increased.      Myocardial perfusion study December 27, 2017  Impression  1.  Myocardial perfusion imaging using single isotope technique  demonstrated small area of mild ischemia at the apex/anteroapex..   2. Gated images demonstrated normal wall motion and thickening.  The  left ventricular systolic function is 85% at rest and 84% post stress.  3. Compared to the prior study from there is no prior study for  comparison .      Marisol Browning MD    Primary Care Physician   Steven Duane Semmler      Patient Active Problem List   Diagnosis     NSTEMI (non-ST elevated myocardial infarction) (H)       Past Medical History   I have reviewed this patient's medical history and updated it with pertinent information if needed.   Past Medical History:   Diagnosis Date     Colonic diverticulum      Hyperlipidemia      Hypertension      Obesity (BMI 30-39.9)      Osteoarthritis      Type 2 diabetes  mellitus (H)        Past Surgical History   I have reviewed this patient's surgical history and updated it with pertinent information if needed.  Past Surgical History:   Procedure Laterality Date     ARTHROPLASTY HIP BILATERAL  1987     HC ESOPHAGOSCOPY, DIAGNOSTIC  2003     LAPAROSCOPIC CHOLECYSTECTOMY N/A 12/28/2017    Procedure: LAPAROSCOPIC CHOLECYSTECTOMY;  LAPAROSCOPIC CHOLECYSTECTOMY;  Surgeon: Heber Gonzalez MD;  Location:  OR     TRANSRECTAL ULTRASONIC, TRANSURETHRAL RESECTION (TUR) OF PROSTATE CYST  1990       Prior to Admission Medications   Cannot display prior to admission medications because the patient has not been admitted in this contact.     [unfilled]  [unfilled]  Allergies   No Known Allergies    Social History    reports that he has never smoked. He has never used smokeless tobacco. He reports that he drinks alcohol. He reports that he does not use drugs.    Family History   No family history on file.    Review of Systems   The comprehensive 10 point Review of Systems is negative other than noted in the HPI or here.     Physical Exam   Vital Signs with Ranges     Wt Readings from Last 4 Encounters:   01/29/18 115.5 kg (254 lb 9.6 oz)   12/31/17 123.1 kg (271 lb 6.2 oz)   12/26/17 117.9 kg (260 lb)   03/04/17 122.5 kg (270 lb)     [unfilled]      Vitals: Blood pressure 139/65, pulse 69, weight 123.8 kg (273 lb), SpO2 98 %.    CONSTITUTIONAL:  Obese body habitus and lying in bed with 1 pillow.  He is cooperative, alert and oriented   EYES:  No pallor, icterus, xanthelasma.   ENT:  Satisfactory dentition.   RESPIRATORY:  Normal breath sounds, no rales or wheeze.   CARDIOVASCULAR:  JVP is normal.  Carotid pulse is normal, no bruit.  Apical impulse not palpable due to obesity.  Heart sounds are normal.  He has a very soft 2/6 early systolic murmur which suggests either aortic sclerosis or mild mitral regurgitation.  No pericardial friction rub.   GASTROINTESTINAL:  Abdomen is  nondistended.  Active bowel sounds.  No splenomegaly.   SKIN:  No rash, erythema or ecchymosis.   NEUROPSYCHIATRIC:  Oriented to time, place and person.  Normal affect.   NEUROLOGIC:  No gross motor deficits.   EXTREMITIES:  No edema, clubbing or deformities.     @LABRCNTIPR(tropi:5,troponinies:5)@    @LABRCNTIPR(wbc:3,hgb:3,mcv:3,plt:3,inr:3,na:3,potassium:3,chloride:3,co2:3,bun:3,cr:3,gfrestimated:3,gfrestblack:3,aniongap:3,carmencita:3,glc:3,albumin:2,prottotal:2,bilitotal:2,alkphos:2,alt:2,ast:2,lipase:2,tropi:3)@  No results for input(s): CHOL, HDL, LDL, TRIG, CHOLHDLRATIO in the last 66842 hours.  @LABRCNTIP(wbc:3,hgb:3,hct:3,mcv:3,plt:3,iron:3,ironsat:3,reticabsct:3,retp:3,feb:3,phil:3,b12:3,folic:3,epoe:3,morph:3)@  @LABRCNTIP(PH:3,PHV:3,PO2:3,PO2V:3,sat:3,PCO2:3,PCO2V:3,HCO3:3,HCO3V:3)@  @LABRCNTIP(NTBNPI:3,NTBNP:3)@  @LABRCNTIP(DD:1)@  @LABRCNTIP(sed:3,crp:3)@  @LABRCNTIP(PLT:3)@  @LABRCNTIP(TSH:3)@  @LABRCNTIP(color:1,appearance:1,urineg,urinebili:1,urineketone:1,s,ubld:1,urineph:1,protein:1,urobilinogen:1,nitrite:1,leukest:1,rbcu:1,wbcu:1)@    Imaging:  No results found for this or any previous visit (from the past 48 hour(s)).    Echo:  No results found for this or any previous visit (from the past 4320 hour(s)).

## 2019-09-09 NOTE — PATIENT INSTRUCTIONS
"[facility] HEART CARE  [facility]~[address]~[place], [place]~[address]  Thank you for your [facility] visit today. If you have questions regarding your visit, please contact your cardiology RN, Elly Vizcarra, at [phone].    Please arrive 15 minutes early to all cardiology appointments.    To schedule a future appointment, we kindly ask that you call cardiology scheduling at [phone] three months prior to requested revisit date.  [facility] cardiology clinic is staffed with \"Advance Practice Providers\". These are our cardiology Physician Assistants and Nurse Practitioners.   Please call cardiology scheduling if you feel you need clinical evaluation with them at any time for any cardiac reason.     Reminder:  For your safety, we ask that you bring in your current medication(s) or an updated list of your medications with you to EACH office visit. Include the medication name, dose of pill on bottle and how you are taking it. Include over-the-counter medications or supplements. Your provider will review this at each visit and plan your care based on your current information.   ~~~~~~~~~~~~~~~~~~~~~~~~~~~~~~~~~~~~~~~  \"[facility]\" campus telephone numbers for reference:  Cardiology Scheduling~[phone]  Diagnostic Imaging Scheduling~[phone]  Lab Scheduling~[phone]  Anticoagulation Clinic~[phone]  Cardiac Rehabilitation~[phone]  CORE Clinic RN's~[phone] (at [facility])  Congential Team [phone] (at [facility])  Cardiology Clinic RN's~[phone] (Elly Vizcarra, RN)  ~~~~~~~~~~~~~~~~~~~~~~~~~~~~~~~~~~~~~~~~      "

## 2019-09-09 NOTE — LETTER
9/9/2019    Steven Duane Semmler, MD  [facility] [address] [facility] [address]    RE: Alvin Aman       Dear Colleague,    I had the pleasure of seeing Alvin Aman in the [facility].        Cardiology Clinic      Assessment & Plan      1.  Acute cholecystitis status post gallbladder surgery Dec 2017  2.  Likely type II demand MI during post operative period  3.  Moderate to Severe aortic stenosis with preserved LV systolic function.  Mean gradient 30 mmHg, KISHA 1.0 cm2 on recent echocardiogram  4.  Stable myocardial perfusion study low risk  5.  Paroxysmal atrial fibrillation (post aurelio) currently in normal sinus rhythm    Recommendations    1.  Regarding his paroxysmal atrial fibrillation we discussed anticoagulation given his risk factors.  At present patient states that he tends to bruise very easily and would like to think about this.  We can discuss this upon his return next year.  2.  We reviewed his echocardiogram with progression of his aortic stenosis.  He is asymptomatic.  We will get a follow-up echocardiogram in 6 months time  3.  [place]  We will have him follow-up in April 2020 with a pre-clinic echocardiogram.      Marisol Browning MD      HPI:    Patient is a very pleasant 85-year-old gentleman who saw my colleague Dr. Cordon during his hospital stay in December 2017. At that point the cardiology service was consulted due to an elevated troponin in the context of possibly acute cholecystitis. He was significant hyper intensive on exam. An EKG at that point demonstrated old right bundle branch block without any concerning ischemic changes and his troponins were relatively flat. He had not complained of any chest pain and was mainly limited by his epigastric and significant right upper quadrant tenderness. Given his risk factor he underwent a micro-perfusion study as noted below. Prior to his presentation he  has severe arthritis but had gone to the [facility] 3 times a week for pool exercises and at this level of exertion had not had any cardiac related symptoms. Ultimately he was cleared for his noncardiac surgery and underwent cholecystectomy on December 28, 2017. He was treated with the usual antibiotics and surgical care following this and was subsequently discharged. He did have a period of time where he went into A. fib with RVR, however Cardizem was given and converted back to normal sinus rhythm.  Further workup regarding his cardiac status is as noted below.     No active cardiac complaints since I saw him in Jan 2018.      Echocardiogram 9/6/19  The ascending aorta is mildly dilated with the Sinuses normal at 3.7 cm and  the ascending aorta 3.9 cm. There appears to be at least partial fusion  between the native right and left aortic leaflets. Moderately severe valvular  aortic stenosis is now present and the mean AoV pressure gradient is 30mmHg.  The calculated aortic valve area is 1-1.1cm2.  Left ventricular systolic function is normal. There is mild to moderate  concentric left ventricular hypertrophy.  Technically difficult, suboptimal study. Contrast was used without apparent  complications. Compared to the prior echo, the severity of aortic stenosis has  increased.      Myocardial perfusion study December 27, 2017  Impression  1.  Myocardial perfusion imaging using single isotope technique  demonstrated small area of mild ischemia at the apex/anteroapex..   2. Gated images demonstrated normal wall motion and thickening.  The  left ventricular systolic function is 85% at rest and 84% post stress.  3. Compared to the prior study from there is no prior study for  comparison .      Marisol Browning MD    Primary Care Physician   Steven Duane Semmler      Patient Active Problem List   Diagnosis     NSTEMI (non-ST elevated myocardial infarction) (H)       Past Medical History   I have reviewed this patient's  medical history and updated it with pertinent information if needed.   Past Medical History:   Diagnosis Date     Colonic diverticulum      Hyperlipidemia      Hypertension      Obesity (BMI 30-39.9)      Osteoarthritis      Type 2 diabetes mellitus (H)        Past Surgical History   I have reviewed this patient's surgical history and updated it with pertinent information if needed.  Past Surgical History:   Procedure Laterality Date     ARTHROPLASTY HIP BILATERAL  1987     HC ESOPHAGOSCOPY, DIAGNOSTIC  2003     LAPAROSCOPIC CHOLECYSTECTOMY N/A 12/28/2017    Procedure: LAPAROSCOPIC CHOLECYSTECTOMY;  LAPAROSCOPIC CHOLECYSTECTOMY;  Surgeon: Heber Gonzalez MD;  Location:  OR     TRANSRECTAL ULTRASONIC, TRANSURETHRAL RESECTION (TUR) OF PROSTATE CYST  1990       Prior to Admission Medications   Cannot display prior to admission medications because the patient has not been admitted in this contact.     [unfilled]  [unfilled]  Allergies   No Known Allergies    Social History    reports that he has never smoked. He has never used smokeless tobacco. He reports that he drinks alcohol. He reports that he does not use drugs.    Family History   No family history on file.    Review of Systems   The comprehensive 10 point Review of Systems is negative other than noted in the HPI or here.     Physical Exam   Vital Signs with Ranges     Wt Readings from Last 4 Encounters:   01/29/18 115.5 kg (254 lb 9.6 oz)   12/31/17 123.1 kg (271 lb 6.2 oz)   12/26/17 117.9 kg (260 lb)   03/04/17 122.5 kg (270 lb)     [unfilled]      Vitals: Blood pressure 139/65, pulse 69, weight 123.8 kg (273 lb), SpO2 98 %.    CONSTITUTIONAL:  Obese body habitus and lying in bed with 1 pillow.  He is cooperative, alert and oriented   EYES:  No pallor, icterus, xanthelasma.   ENT:  Satisfactory dentition.   RESPIRATORY:  Normal breath sounds, no rales or wheeze.   CARDIOVASCULAR:  JVP is normal.  Carotid pulse is normal, no bruit.  Apical  impulse not palpable due to obesity.  Heart sounds are normal.  He has a very soft 2/6 early systolic murmur which suggests either aortic sclerosis or mild mitral regurgitation.  No pericardial friction rub.   GASTROINTESTINAL:  Abdomen is nondistended.  Active bowel sounds.  No splenomegaly.   SKIN:  No rash, erythema or ecchymosis.   NEUROPSYCHIATRIC:  Oriented to time, place and person.  Normal affect.   NEUROLOGIC:  No gross motor deficits.   EXTREMITIES:  No edema, clubbing or deformities.     @LABRCNTIPR(tropi:5,troponinies:5)@    @LABRCNTIPR(wbc:3,hgb:3,mcv:3,plt:3,inr:3,na:3,potassium:3,chloride:3,co2:3,bun:3,cr:3,gfrestimated:3,gfrestblack:3,aniongap:3,carmencita:3,glc:3,albumin:2,prottotal:2,bilitotal:2,alkphos:2,alt:2,ast:2,lipase:2,tropi:3)@  No results for input(s): CHOL, HDL, LDL, TRIG, CHOLHDLRATIO in the last 43608 hours.  @LABRCNTIP(wbc:3,hgb:3,hct:3,mcv:3,plt:3,iron:3,ironsat:3,reticabsct:3,retp:3,feb:3,phil:3,b12:3,folic:3,epoe:3,morph:3)@  @LABRCNTIP(PH:3,PHV:3,PO2:3,PO2V:3,sat:3,PCO2:3,PCO2V:3,HCO3:3,HCO3V:3)@  @LABRCNTIP(NTBNPI:3,NTBNP:3)@  @LABRCNTIP(DD:1)@  @LABRCNTIP(sed:3,crp:3)@  @LABRCNTIP(PLT:3)@  @LABRCNTIP(TSH:3)@  @LABRCNTIP(color:1,appearance:1,urineg,urinebili:1,urineketone:1,s,ubld:1,urineph:1,protein:1,urobilinogen:1,nitrite:1,leukest:1,rbcu:1,wbcu:1)@    Imaging:  No results found for this or any previous visit (from the past 48 hour(s)).    Echo:  No results found for this or any previous visit (from the past 4320 hour(s)).             Thank you for allowing me to participate in the care of your patient.      Sincerely,     Marisol Browning MD     [facility]    cc:   Marisol Browning MD  [address] [facility] [address] [place] [id number]

## 2021-02-26 ENCOUNTER — IMMUNIZATION (OUTPATIENT)
Dept: NURSING | Facility: CLINIC | Age: 86
End: 2021-02-26
Payer: COMMERCIAL

## 2021-02-26 PROCEDURE — 0001A PR COVID VAC PFIZER DIL RECON 30 MCG/0.3 ML IM: CPT

## 2021-02-26 PROCEDURE — 91300 PR COVID VAC PFIZER DIL RECON 30 MCG/0.3 ML IM: CPT

## 2021-03-19 ENCOUNTER — IMMUNIZATION (OUTPATIENT)
Dept: NURSING | Facility: CLINIC | Age: 86
End: 2021-03-19
Attending: FAMILY MEDICINE
Payer: COMMERCIAL

## 2021-03-19 PROCEDURE — 0002A PR COVID VAC PFIZER DIL RECON 30 MCG/0.3 ML IM: CPT

## 2021-03-19 PROCEDURE — 91300 PR COVID VAC PFIZER DIL RECON 30 MCG/0.3 ML IM: CPT

## 2021-04-03 ENCOUNTER — HEALTH MAINTENANCE LETTER (OUTPATIENT)
Age: 86
End: 2021-04-03

## 2021-09-18 ENCOUNTER — HEALTH MAINTENANCE LETTER (OUTPATIENT)
Age: 86
End: 2021-09-18

## 2021-11-18 ENCOUNTER — OFFICE VISIT (OUTPATIENT)
Dept: NEUROLOGY | Facility: CLINIC | Age: 86
End: 2021-11-18
Payer: COMMERCIAL

## 2021-11-18 DIAGNOSIS — G56.03 BILATERAL CARPAL TUNNEL SYNDROME: ICD-10-CM

## 2021-11-18 PROCEDURE — 95886 MUSC TEST DONE W/N TEST COMP: CPT | Mod: XS | Performed by: PSYCHIATRY & NEUROLOGY

## 2021-11-18 PROCEDURE — 2894A PR NEEDLE EMG 2 EXTREMITIES: CPT | Performed by: PSYCHIATRY & NEUROLOGY

## 2021-11-18 PROCEDURE — 2894A PR NCS MOTOR W OR W/O F-WAVE, 11 OR 12: CPT | Performed by: PSYCHIATRY & NEUROLOGY

## 2021-11-18 PROCEDURE — 95910 NRV CNDJ TEST 7-8 STUDIES: CPT | Performed by: PSYCHIATRY & NEUROLOGY

## 2021-11-18 NOTE — PROCEDURES
ELECTROMYOGRAPHY (EMG) REPORT       [facility] NEUR[facility] [place]  [address] [place] [address]  Tel: [phone]  Fax: [phone]  [email or web address]     Alvin Newton,  5/10/1934, MRN [id number]  PCP: Semmler, Steven Duane  Date: 2021     Principal Diagnosis: Bilateral carpal tunnel syndrome     Height: 5 feet 11 inch  Reason for referral: Evaluate bilateral uppers. c/o pain, numbness in both arms > 2 years. Right = Left.  Prediabetic.       Motor NCS      Nerve / Sites Lat Amp Dist Ravi    ms mV cm m/s   R Median - APB      Wrist 5.83 6.6 7       Elbow 11.46 6.3 27 48   L Median - APB      Wrist 7.40 3.6 7       Elbow 13.02 2.9 27.5 49   R Ulnar - ADM      Wrist 3.44 4.5 7       B.Elbow 8.18 4.1 23.5 50      A.Elbow 9.90 4.1 10 58   L Ulnar - ADM      Wrist 3.13 11.6 7       B.Elbow 7.66 10.7 24 53      A.Elbow 9.64 10.5 10 51       F  Wave      Nerve Fmin    ms   R Ulnar - ADM 34.43   L Ulnar - ADM 36.67       Sensory NCS      Nerve / Sites Onset Lat Pk Lat Amp.2-3 Dist Ravi Lat Diff    ms ms  V cm m/s ms   R Median - II (Antidr)      Wrist 4.11 5.68 15.6 13 32    L Median - II (Antidr)      Wrist 4.95 6.61 10.4 13 26    R Ulnar - V (Antidr)      Wrist NR NR NR 11 NR    L Ulnar - V (Antidr)      Wrist 2.66 3.18 4.1 11 41    R Median, Ulnar - Transcarpal comparison      Median Palm NR NR NR 8 NR       Ulnar Palm NR NR NR 8 NR          NR   L Median, Ulnar - Transcarpal comparison      Median Palm NR NR NR 8 NR       Ulnar Palm 1.77 2.29 7.0 8 45          NR       EMG Summary Table     Spontaneous MUAP Rcmt Note   Muscle Fib PSW Fasc IA # Amp Dur PPP Rate Type   R. Brachioradialis None None None N N N N N N N   R. Pronator teres None None None N N N N N N N   R. Biceps brachii None None None N N N N N N N   R. Deltoid None None None N N N N N N N   R. Triceps brachii None None None N N N N N N N   R. Flexor carpi ulnaris None None None N N N N N N N   R. Abductor digiti  minimi (manus) None None None N N N N N N N   R. First dorsal interosseous None None None N N N N N N N   R. Abductor pollicis brevis None None None N N N N N N N   L. Pronator teres None None None N N N N N N N   L. Brachioradialis None None None N N N N N N N   L. Biceps brachii None None None N N N N N N N   L. Deltoid None None None N N N N N N N   L. Triceps brachii None None None N N N N N N N   L. First dorsal interosseous None None None N N N N N N N   L. Abductor pollicis brevis None None None N N N N N N N        Summary: Nerve conduction and EMG study of upper extremities shows:  1. Delayed bilateral median distal motor latencies, amplitudes and conduction velocities.  2. Normal bilateral ulnar distal motor latencies, amplitudes and conduction velocities  3. Delayed bilateral ulnar F latencies  4. Delayed bilateral median peak sensory latencies with slow sensory nerve conduction velocities  5. Absent right ulnar SNAP.  Left ulnar peak sensory latency and conduction was normal    Impression:   This is a abnormal nerve conduction and EMG study of upper extremities that suggests:  1. Moderate to severe, left greater than right, bilateral carpal tunnel syndrome  2. 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  Clinical correlation is recommended      Vj Rowley MD  [facility]  (Formerly, [facility]      This note was dictated using voice recognition software.  Any grammatical or context distortions are unintentional and inherent to the software.

## 2021-11-18 NOTE — LETTER
11/18/2021         RE: Alvin Newton  [address]  [facility] [address]        Dear Colleague,    Thank you for referring your patient, Alvin Newton, to the [facility] [place]. Please see a copy of my visit note below.    See procedure note for EMG report      Again, thank you for allowing me to participate in the care of your patient.        Sincerely,        Vj Rowley MD

## 2022-01-08 ENCOUNTER — HEALTH MAINTENANCE LETTER (OUTPATIENT)
Age: 87
End: 2022-01-08

## 2022-04-24 ENCOUNTER — HEALTH MAINTENANCE LETTER (OUTPATIENT)
Age: 87
End: 2022-04-24

## 2022-06-06 ENCOUNTER — APPOINTMENT (OUTPATIENT)
Dept: CT IMAGING | Facility: CLINIC | Age: 87
End: 2022-06-06
Attending: EMERGENCY MEDICINE
Payer: COMMERCIAL

## 2022-06-06 ENCOUNTER — HOSPITAL ENCOUNTER (INPATIENT)
Facility: CLINIC | Age: 87
LOS: 14 days | Discharge: NURSING, REHAB OR LONG-TERM CARE | DRG: 552 | End: 2022-06-20
Attending: EMERGENCY MEDICINE | Admitting: SURGERY
Payer: COMMERCIAL

## 2022-06-06 ENCOUNTER — HOSPITAL ENCOUNTER (EMERGENCY)
Facility: CLINIC | Age: 87
Discharge: ANOTHER HOSPITAL | End: 2022-06-06
Attending: EMERGENCY MEDICINE | Admitting: EMERGENCY MEDICINE
Payer: COMMERCIAL

## 2022-06-06 VITALS
TEMPERATURE: 96.1 F | BODY MASS INDEX: 37.94 KG/M2 | SYSTOLIC BLOOD PRESSURE: 164 MMHG | DIASTOLIC BLOOD PRESSURE: 118 MMHG | HEIGHT: 70 IN | HEART RATE: 107 BPM | OXYGEN SATURATION: 98 % | RESPIRATION RATE: 9 BRPM | WEIGHT: 265 LBS

## 2022-06-06 DIAGNOSIS — K59.03 DRUG-INDUCED CONSTIPATION: ICD-10-CM

## 2022-06-06 DIAGNOSIS — N30.00 ACUTE CYSTITIS WITHOUT HEMATURIA: ICD-10-CM

## 2022-06-06 DIAGNOSIS — S32.019A CLOSED FRACTURE OF FIRST LUMBAR VERTEBRA, UNSPECIFIED FRACTURE MORPHOLOGY, INITIAL ENCOUNTER (H): ICD-10-CM
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
## 2022-06-06 DIAGNOSIS — R11.0 NAUSEA: ICD-10-CM

## 2022-06-06 DIAGNOSIS — I21.4 NSTEMI (NON-ST ELEVATED MYOCARDIAL INFARCTION) (H): ICD-10-CM

## 2022-06-06 DIAGNOSIS — S32.018A OTHER CLOSED FRACTURE OF FIRST LUMBAR VERTEBRA, INITIAL ENCOUNTER (H): Primary | ICD-10-CM

## 2022-06-06 DIAGNOSIS — G47.00 INSOMNIA, UNSPECIFIED TYPE: ICD-10-CM

## 2022-06-06 LAB
ALBUMIN SERPL-MCNC: 3.6 G/DL (ref 3.4–5)
ALP SERPL-CCNC: 128 U/L (ref 40–150)
ALT SERPL W P-5'-P-CCNC: 22 U/L (ref 0–70)
ANION GAP SERPL CALCULATED.3IONS-SCNC: 13 MMOL/L (ref 3–14)
APTT PPP: 31 SECONDS (ref 22–38)
AST SERPL W P-5'-P-CCNC: 30 U/L (ref 0–45)
BASOPHILS # BLD AUTO: 0.1 10E3/UL (ref 0–0.2)
BASOPHILS NFR BLD AUTO: 1 %
BILIRUB SERPL-MCNC: 2.3 MG/DL (ref 0.2–1.3)
BUN SERPL-MCNC: 20 MG/DL (ref 7–30)
CALCIUM SERPL-MCNC: 9.6 MG/DL (ref 8.5–10.1)
CHLORIDE BLD-SCNC: 108 MMOL/L (ref 94–109)
CO2 SERPL-SCNC: 21 MMOL/L (ref 20–32)
CREAT SERPL-MCNC: 1.07 MG/DL (ref 0.66–1.25)
EOSINOPHIL # BLD AUTO: 0.2 10E3/UL (ref 0–0.7)
EOSINOPHIL NFR BLD AUTO: 1 %
ERYTHROCYTE [DISTWIDTH] IN BLOOD BY AUTOMATED COUNT: 14.5 % (ref 10–15)
ETHANOL SERPL-MCNC: <0.01 G/DL
GFR SERPL CREATININE-BSD FRML MDRD: 67 ML/MIN/1.73M2
GLUCOSE BLD-MCNC: 185 MG/DL (ref 70–99)
HCT VFR BLD AUTO: 48.4 % (ref 40–53)
HGB BLD-MCNC: 16.2 G/DL (ref 13.3–17.7)
IMM GRANULOCYTES # BLD: 0.1 10E3/UL
IMM GRANULOCYTES NFR BLD: 1 %
INR PPP: 1.59 (ref 0.85–1.15)
LYMPHOCYTES # BLD AUTO: 4.5 10E3/UL (ref 0.8–5.3)
LYMPHOCYTES NFR BLD AUTO: 40 %
MCH RBC QN AUTO: 29.3 PG (ref 26.5–33)
MCHC RBC AUTO-ENTMCNC: 33.5 G/DL (ref 31.5–36.5)
MCV RBC AUTO: 88 FL (ref 78–100)
MONOCYTES # BLD AUTO: 1.2 10E3/UL (ref 0–1.3)
MONOCYTES NFR BLD AUTO: 10 %
NEUTROPHILS # BLD AUTO: 5.4 10E3/UL (ref 1.6–8.3)
NEUTROPHILS NFR BLD AUTO: 47 %
NRBC # BLD AUTO: 0 10E3/UL
NRBC BLD AUTO-RTO: 0 /100
PLATELET # BLD AUTO: 206 10E3/UL (ref 150–450)
POTASSIUM BLD-SCNC: 4.4 MMOL/L (ref 3.4–5.3)
PROT SERPL-MCNC: 7.7 G/DL (ref 6.8–8.8)
RADIOLOGIST FLAGS: ABNORMAL
RBC # BLD AUTO: 5.52 10E6/UL (ref 4.4–5.9)
SARS-COV-2 RNA RESP QL NAA+PROBE: NEGATIVE
SODIUM SERPL-SCNC: 142 MMOL/L (ref 133–144)
TROPONIN I SERPL HS-MCNC: 30 NG/L
TROPONIN I SERPL HS-MCNC: 31 NG/L
WBC # BLD AUTO: 11.4 10E3/UL (ref 4–11)

## 2022-06-06 PROCEDURE — 84484 ASSAY OF TROPONIN QUANT: CPT | Mod: 91 | Performed by: EMERGENCY MEDICINE

## 2022-06-06 PROCEDURE — 258N000003 HC RX IP 258 OP 636: Performed by: EMERGENCY MEDICINE

## 2022-06-06 PROCEDURE — 250N000011 HC RX IP 250 OP 636: Performed by: EMERGENCY MEDICINE

## 2022-06-06 PROCEDURE — 99285 EMERGENCY DEPT VISIT HI MDM: CPT | Mod: 25

## 2022-06-06 PROCEDURE — 72125 CT NECK SPINE W/O DYE: CPT

## 2022-06-06 PROCEDURE — 72131 CT LUMBAR SPINE W/O DYE: CPT

## 2022-06-06 PROCEDURE — 96376 TX/PRO/DX INJ SAME DRUG ADON: CPT

## 2022-06-06 PROCEDURE — 682N000003 HC TRAUMA EVALUATION W/O CC LEVEL II: Performed by: EMERGENCY MEDICINE

## 2022-06-06 PROCEDURE — 80053 COMPREHEN METABOLIC PANEL: CPT | Performed by: EMERGENCY MEDICINE

## 2022-06-06 PROCEDURE — 87635 SARS-COV-2 COVID-19 AMP PRB: CPT | Performed by: EMERGENCY MEDICINE

## 2022-06-06 PROCEDURE — 250N000009 HC RX 250: Performed by: EMERGENCY MEDICINE

## 2022-06-06 PROCEDURE — 96361 HYDRATE IV INFUSION ADD-ON: CPT

## 2022-06-06 PROCEDURE — 72128 CT CHEST SPINE W/O DYE: CPT

## 2022-06-06 PROCEDURE — 85610 PROTHROMBIN TIME: CPT | Performed by: EMERGENCY MEDICINE

## 2022-06-06 PROCEDURE — 99285 EMERGENCY DEPT VISIT HI MDM: CPT | Mod: 25 | Performed by: EMERGENCY MEDICINE

## 2022-06-06 PROCEDURE — C9803 HOPD COVID-19 SPEC COLLECT: HCPCS

## 2022-06-06 PROCEDURE — 93010 ELECTROCARDIOGRAM REPORT: CPT | Performed by: EMERGENCY MEDICINE

## 2022-06-06 PROCEDURE — 85730 THROMBOPLASTIN TIME PARTIAL: CPT | Performed by: EMERGENCY MEDICINE

## 2022-06-06 PROCEDURE — 99291 CRITICAL CARE FIRST HOUR: CPT | Mod: 25 | Performed by: EMERGENCY MEDICINE

## 2022-06-06 PROCEDURE — 96374 THER/PROPH/DIAG INJ IV PUSH: CPT | Mod: 59

## 2022-06-06 PROCEDURE — 36415 COLL VENOUS BLD VENIPUNCTURE: CPT | Performed by: EMERGENCY MEDICINE

## 2022-06-06 PROCEDURE — 70450 CT HEAD/BRAIN W/O DYE: CPT

## 2022-06-06 PROCEDURE — 96374 THER/PROPH/DIAG INJ IV PUSH: CPT | Performed by: EMERGENCY MEDICINE

## 2022-06-06 PROCEDURE — 84484 ASSAY OF TROPONIN QUANT: CPT | Performed by: EMERGENCY MEDICINE

## 2022-06-06 PROCEDURE — 74177 CT ABD & PELVIS W/CONTRAST: CPT

## 2022-06-06 PROCEDURE — 93005 ELECTROCARDIOGRAM TRACING: CPT

## 2022-06-06 PROCEDURE — 200N000002 HC R&B ICU UMMC

## 2022-06-06 PROCEDURE — 85025 COMPLETE CBC W/AUTO DIFF WBC: CPT | Performed by: EMERGENCY MEDICINE

## 2022-06-06 PROCEDURE — 96375 TX/PRO/DX INJ NEW DRUG ADDON: CPT

## 2022-06-06 PROCEDURE — 96376 TX/PRO/DX INJ SAME DRUG ADON: CPT | Performed by: EMERGENCY MEDICINE

## 2022-06-06 PROCEDURE — 82077 ASSAY SPEC XCP UR&BREATH IA: CPT | Performed by: EMERGENCY MEDICINE

## 2022-06-06 PROCEDURE — 96361 HYDRATE IV INFUSION ADD-ON: CPT | Performed by: EMERGENCY MEDICINE

## 2022-06-06 RX ORDER — ONDANSETRON 2 MG/ML
4 INJECTION INTRAMUSCULAR; INTRAVENOUS ONCE
Status: COMPLETED | OUTPATIENT
Start: 2022-06-06 | End: 2022-06-06

## 2022-06-06 RX ORDER — SODIUM CHLORIDE 9 MG/ML
1000 INJECTION, SOLUTION INTRAVENOUS CONTINUOUS
Status: DISCONTINUED | OUTPATIENT
Start: 2022-06-06 | End: 2022-06-07

## 2022-06-06 RX ORDER — HYDROMORPHONE HCL IN WATER/PF 6 MG/30 ML
0.2 PATIENT CONTROLLED ANALGESIA SYRINGE INTRAVENOUS ONCE
Status: COMPLETED | OUTPATIENT
Start: 2022-06-06 | End: 2022-06-06

## 2022-06-06 RX ORDER — METOPROLOL SUCCINATE 100 MG/1
100 TABLET, EXTENDED RELEASE ORAL DAILY
Status: ON HOLD | COMMUNITY
Start: 2022-03-23 | End: 2022-06-20

## 2022-06-06 RX ORDER — HYDROMORPHONE HYDROCHLORIDE 1 MG/ML
0.3 INJECTION, SOLUTION INTRAMUSCULAR; INTRAVENOUS; SUBCUTANEOUS ONCE
Status: COMPLETED | OUTPATIENT
Start: 2022-06-06 | End: 2022-06-06

## 2022-06-06 RX ORDER — APIXABAN 5 MG/1
5 TABLET, FILM COATED ORAL 2 TIMES DAILY
COMMUNITY
Start: 2022-04-21

## 2022-06-06 RX ORDER — ACETAMINOPHEN 500 MG
1000 TABLET ORAL 3 TIMES DAILY
Status: ON HOLD | COMMUNITY
Start: 2022-02-24 | End: 2023-07-27

## 2022-06-06 RX ORDER — ONDANSETRON 2 MG/ML
4 INJECTION INTRAMUSCULAR; INTRAVENOUS EVERY 6 HOURS PRN
Status: DISCONTINUED | OUTPATIENT
Start: 2022-06-06 | End: 2022-06-20 | Stop reason: HOSPADM

## 2022-06-06 RX ORDER — SODIUM CHLORIDE 9 MG/ML
1000 INJECTION, SOLUTION INTRAVENOUS CONTINUOUS
Status: DISCONTINUED | OUTPATIENT
Start: 2022-06-06 | End: 2022-06-06 | Stop reason: HOSPADM

## 2022-06-06 RX ORDER — METHOCARBAMOL 750 MG/1
750 TABLET, FILM COATED ORAL 3 TIMES DAILY
Status: DISCONTINUED | OUTPATIENT
Start: 2022-06-06 | End: 2022-06-08

## 2022-06-06 RX ORDER — HYDROMORPHONE HYDROCHLORIDE 1 MG/ML
0.5 INJECTION, SOLUTION INTRAMUSCULAR; INTRAVENOUS; SUBCUTANEOUS
Status: COMPLETED | OUTPATIENT
Start: 2022-06-06 | End: 2022-06-07

## 2022-06-06 RX ORDER — ACETAMINOPHEN 325 MG/1
650 TABLET ORAL EVERY 4 HOURS PRN
Status: DISCONTINUED | OUTPATIENT
Start: 2022-06-06 | End: 2022-06-20 | Stop reason: HOSPADM

## 2022-06-06 RX ORDER — DIAZEPAM 10 MG/2ML
2.5 INJECTION, SOLUTION INTRAMUSCULAR; INTRAVENOUS ONCE
Status: COMPLETED | OUTPATIENT
Start: 2022-06-06 | End: 2022-06-06

## 2022-06-06 RX ORDER — POLYETHYLENE GLYCOL 3350 17 G/17G
17 POWDER, FOR SOLUTION ORAL DAILY
Status: DISCONTINUED | OUTPATIENT
Start: 2022-06-07 | End: 2022-06-08

## 2022-06-06 RX ORDER — AMOXICILLIN 250 MG
1-2 CAPSULE ORAL 2 TIMES DAILY
Status: DISCONTINUED | OUTPATIENT
Start: 2022-06-07 | End: 2022-06-20 | Stop reason: HOSPADM

## 2022-06-06 RX ORDER — NAPROXEN 500 MG/1
1 TABLET ORAL EVERY 12 HOURS PRN
Status: ON HOLD | COMMUNITY
Start: 2022-04-08 | End: 2022-06-20

## 2022-06-06 RX ORDER — HYDROMORPHONE HYDROCHLORIDE 1 MG/ML
.3-.5 INJECTION, SOLUTION INTRAMUSCULAR; INTRAVENOUS; SUBCUTANEOUS
Status: DISCONTINUED | OUTPATIENT
Start: 2022-06-06 | End: 2022-06-14

## 2022-06-06 RX ORDER — ATORVASTATIN CALCIUM 20 MG/1
20 TABLET, FILM COATED ORAL AT BEDTIME
COMMUNITY
Start: 2022-04-08

## 2022-06-06 RX ORDER — IOPAMIDOL 755 MG/ML
100 INJECTION, SOLUTION INTRAVASCULAR ONCE
Status: COMPLETED | OUTPATIENT
Start: 2022-06-06 | End: 2022-06-06

## 2022-06-06 RX ORDER — ONDANSETRON 4 MG/1
4 TABLET, ORALLY DISINTEGRATING ORAL EVERY 6 HOURS PRN
Status: DISCONTINUED | OUTPATIENT
Start: 2022-06-06 | End: 2022-06-20 | Stop reason: HOSPADM

## 2022-06-06 RX ORDER — FUROSEMIDE 20 MG
1 TABLET ORAL DAILY
COMMUNITY
Start: 2022-04-08 | End: 2022-06-20

## 2022-06-06 RX ADMIN — SODIUM CHLORIDE 250 ML: 9 INJECTION, SOLUTION INTRAVENOUS at 18:24

## 2022-06-06 RX ADMIN — SODIUM CHLORIDE 250 ML: 9 INJECTION, SOLUTION INTRAVENOUS at 17:23

## 2022-06-06 RX ADMIN — SODIUM CHLORIDE 74 ML: 9 INJECTION, SOLUTION INTRAVENOUS at 14:49

## 2022-06-06 RX ADMIN — ONDANSETRON 4 MG: 2 INJECTION INTRAMUSCULAR; INTRAVENOUS at 16:02

## 2022-06-06 RX ADMIN — DIAZEPAM 2.5 MG: 10 INJECTION, SOLUTION INTRAMUSCULAR; INTRAVENOUS at 13:33

## 2022-06-06 RX ADMIN — HYDROMORPHONE HYDROCHLORIDE 0.2 MG: 0.2 INJECTION, SOLUTION INTRAMUSCULAR; INTRAVENOUS; SUBCUTANEOUS at 17:22

## 2022-06-06 RX ADMIN — HYDROMORPHONE HYDROCHLORIDE 0.3 MG: 1 INJECTION, SOLUTION INTRAMUSCULAR; INTRAVENOUS; SUBCUTANEOUS at 12:54

## 2022-06-06 RX ADMIN — HYDROMORPHONE HYDROCHLORIDE 0.2 MG: 0.2 INJECTION, SOLUTION INTRAMUSCULAR; INTRAVENOUS; SUBCUTANEOUS at 18:24

## 2022-06-06 RX ADMIN — HYDROMORPHONE HYDROCHLORIDE 0.5 MG: 1 INJECTION, SOLUTION INTRAMUSCULAR; INTRAVENOUS; SUBCUTANEOUS at 22:44

## 2022-06-06 RX ADMIN — ONDANSETRON 4 MG: 2 INJECTION INTRAMUSCULAR; INTRAVENOUS at 17:20

## 2022-06-06 RX ADMIN — IOPAMIDOL 100 ML: 755 INJECTION, SOLUTION INTRAVENOUS at 14:49

## 2022-06-06 ASSESSMENT — ENCOUNTER SYMPTOMS
EYE REDNESS: 0
WEAKNESS: 0
SHORTNESS OF BREATH: 0
HEADACHES: 0
CONFUSION: 0
COLOR CHANGE: 0
DIFFICULTY URINATING: 0
NECK STIFFNESS: 0
ABDOMINAL PAIN: 0
BACK PAIN: 1
NUMBNESS: 0
ARTHRALGIAS: 0
FEVER: 0

## 2022-06-06 ASSESSMENT — ACTIVITIES OF DAILY LIVING (ADL): ADLS_ACUITY_SCORE: 35

## 2022-06-06 NOTE — ED TRIAGE NOTES
Triage Assessment     Row Name 06/06/22 1243       Triage Assessment (Adult)    Airway WDL WDL       Respiratory WDL    Respiratory WDL WDL       Skin Circulation/Temperature WDL    Skin Circulation/Temperature WDL X  diaphoretic       Cardiac WDL    Cardiac WDL X  hx of afib       Peripheral/Neurovascular WDL    Peripheral Neurovascular WDL WDL

## 2022-06-06 NOTE — ED PROVIDER NOTES
History     Chief Complaint   Patient presents with     Fall     On blood thinners. Landed on back now having back pain.     Back Pain     HPI  Alvin Newton is a 88 year old male with a past medical history significant for atrial fibrillation, hypertension hyperlipidemia type 2 diabetes osteoarthritis and history of NSTEMI in 2017 who presents emergency department complaining of back pain status post fall.  Patient was trying to get a in a stool at home when he missed stepped and fell backwards landing on his lower back.  Patient is not sure if he lost consciousness and he is not sure if he hit his head.  Denies any significant neck pain but does have significant pain in his lower back.  Patient was brought in by EMS and given pain meds prior to arrival but was still in significant pain.  He is alert and oriented and denies any focal numbness weakness in extremity but does not want to move his legs due to pain in his back.  He does not have a headache denies visual changes.  He is not having chest pain and has some chronic shortness of breath which is unchanged.  He denies any abdominal pain or back pain and has not had any bowel or bladder dysfunction.    Allergies:  No Known Allergies    Problem List:    Patient Active Problem List    Diagnosis Date Noted     Bilateral carpal tunnel syndrome 11/18/2021     Priority: Medium     BPH with urinary obstruction 06/22/2020     Priority: Medium     NSTEMI (non-ST elevated myocardial infarction) (H) 12/27/2017     Priority: Medium     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) 12/06/2017     Priority: Medium     Obesity, morbid, BMI 40.0-49.9 (H) 11/07/2017     Priority: Medium     Essential hypertension 04/04/2016     Priority: Medium     Mixed hyperlipidemia 01/02/2009     Priority: Medium        Past Medical History:    Past Medical History:   Diagnosis Date     Colonic diverticulum      Hyperlipidemia      Hypertension      Obesity (BMI 30-39.9)       "Osteoarthritis      Type 2 diabetes mellitus (H)        Past Surgical History:    Past Surgical History:   Procedure Laterality Date     ARTHROPLASTY HIP BILATERAL  1987      ESOPHAGOSCOPY, DIAGNOSTIC  2003     LAPAROSCOPIC CHOLECYSTECTOMY N/A 12/28/2017    Procedure: LAPAROSCOPIC CHOLECYSTECTOMY;  LAPAROSCOPIC CHOLECYSTECTOMY;  Surgeon: Heber Gonzalez MD;  Location: [facility]     TRANSRECTAL ULTRASONIC, TRANSURETHRAL RESECTION (TUR) OF PROSTATE CYST  1990       Family History:    No family history on file.    Social History:  Marital Status:   [2]  Social History     Tobacco Use     Smoking status: Never Smoker     Smokeless tobacco: Never Used   Substance Use Topics     Alcohol use: Yes     Comment: 0-1 beer daily     Drug use: No        Medications:    acetaminophen (TYLENOL) 500 MG tablet  atorvastatin (LIPITOR) 20 MG tablet  cholecalciferol 50 MCG (2000 UT) CAPS  ELIQUIS ANTICOAGULANT 5 MG tablet  furosemide (LASIX) 20 MG tablet  metoprolol succinate ER (TOPROL XL) 100 MG 24 hr tablet  Multiple Vitamin (MULTIVITAMINS PO)  naproxen (NAPROSYN) 500 MG tablet          Review of Systems  All systems reviewed and other than pertinent positives and negatives in HPI all other systems are negative.  Physical Exam   BP: 133/84  Pulse: 97  Temp: (!) 96.1  F (35.6  C)  Resp: 24  Height: 177.8 cm (5' 10\")  Weight: 120.2 kg (265 lb)  SpO2: 97 %      Physical Exam  Vitals and nursing note reviewed.   Constitutional:       General: He is not in acute distress.     Appearance: Normal appearance. He is obese. He is not ill-appearing, toxic-appearing or diaphoretic.      Comments: Elderly male moderate distress secondary to back pain.   HENT:      Head: Normocephalic and atraumatic.      Nose: Nose normal.   Eyes:      Conjunctiva/sclera: Conjunctivae normal.   Neck:      Comments: C-collar in place.  Midline no swelling erythema noted using C-spine precautions posterior neck was palpated and there is mild right " posterior lateral neck pain without midline back pain or left-sided posterior neck pain.  Cardiovascular:      Rate and Rhythm: Normal rate and regular rhythm.      Pulses: Normal pulses.      Heart sounds: Normal heart sounds. No murmur heard.    No friction rub.   Pulmonary:      Effort: Pulmonary effort is normal.      Breath sounds: No stridor. No wheezing or rhonchi.      Comments: Sounds are decreased at bases.  Abdominal:      General: Abdomen is flat. Bowel sounds are normal. There is no distension.      Palpations: Abdomen is soft.      Tenderness: There is no abdominal tenderness.   Musculoskeletal:      Comments: Patient was logrolled using C-spine precautions and patient has tenderness to palpation of his lumbar spine especially in the upper lumbar spine..  No hip tenderness patient is able to raise legs off the bed but is very difficult to do secondary to back pain.  Denies any change in sensation no calf tenderness pulses and sensation are symmetrical.   Skin:     General: Skin is warm and dry.      Findings: No rash.   Neurological:      General: No focal deficit present.      Mental Status: He is alert and oriented to person, place, and time.      Coordination: Coordination normal.   Psychiatric:         Mood and Affect: Mood normal.         ED Course                 Procedures              EKG Interpretation:      Interpreted by Pradeep Salinas MD  Rhythm: atrial fibrillation - controlled  Rate: Normal  Axis: Right Axis Deviation  Ectopy: premature ventricular contractions (unifocal)  Conduction: right bundle branch block (complete)  ST Segments/ T Waves: Non-specific ST-T wave changes  Q Waves: none  Comparison to prior: Unchanged from 12/26/17    Clinical Impression: Atrial fibrillation rate controlled with right bundle branch block and nonspecific ST-T wave changes.    Critical Care time:  30 minutes for management of trauma evaluation       Trauma Summary Disposition     Patient is trauma  admission:  Trauma  Evaluation    Intent to transfer: 6/6/2022 @ 4pm    Spine  C-collar and immobilization: applied prior to arrival.  CSpine Clearance: CT scan  Full Primary and Secondary survey with appropriate immobilization of spine completed in exam section.     Neuro  GCS at arrival:  Motor 6=Obeys commands   Verbal 5=Oriented   Eye Opening 4=Spontaneous   Total: 15     GCS at disposition: unchanged    ED Procedures completed  none       Transfer Consultant:  Patient s status reviewed with Ad WALTERS at [facility],  who agrees to accept patient in transfer.@[id number]                Results for orders placed or performed during the hospital encounter of 06/06/22 (from the past 24 hour(s))   CBC with platelets, differential    Narrative    The following orders were created for panel order CBC with platelets, differential.  Procedure                               Abnormality         Status                     ---------                               -----------         ------                     CBC with platelets and d...[[id number]]  Abnormal            Final result                 Please view results for these tests on the individual orders.   Comprehensive metabolic panel   Result Value Ref Range    Sodium 142 133 - 144 mmol/L    Potassium 4.4 3.4 - 5.3 mmol/L    Chloride 108 94 - 109 mmol/L    Carbon Dioxide (CO2) 21 20 - 32 mmol/L    Anion Gap 13 3 - 14 mmol/L    Urea Nitrogen 20 7 - 30 mg/dL    Creatinine 1.07 0.66 - 1.25 mg/dL    Calcium 9.6 8.5 - 10.1 mg/dL    Glucose 185 (H) 70 - 99 mg/dL    Alkaline Phosphatase 128 40 - 150 U/L    AST 30 0 - 45 U/L    ALT 22 0 - 70 U/L    Protein Total 7.7 6.8 - 8.8 g/dL    Albumin 3.6 3.4 - 5.0 g/dL    Bilirubin Total 2.3 (H) 0.2 - 1.3 mg/dL    GFR Estimate 67 >60 mL/min/1.73m2   CBC with platelets and differential   Result Value Ref Range    WBC Count 11.4 (H) 4.0 - 11.0 10e3/uL    RBC Count 5.52 4.40 - 5.90 10e6/uL    Hemoglobin 16.2 13.3 - 17.7 g/dL     Hematocrit 48.4 40.0 - 53.0 %    MCV 88 78 - 100 fL    MCH 29.3 26.5 - 33.0 pg    MCHC 33.5 31.5 - 36.5 g/dL    RDW 14.5 10.0 - 15.0 %    Platelet Count 206 150 - 450 10e3/uL    % Neutrophils 47 %    % Lymphocytes 40 %    % Monocytes 10 %    % Eosinophils 1 %    % Basophils 1 %    % Immature Granulocytes 1 %    NRBCs per 100 WBC 0 <1 /100    Absolute Neutrophils 5.4 1.6 - 8.3 10e3/uL    Absolute Lymphocytes 4.5 0.8 - 5.3 10e3/uL    Absolute Monocytes 1.2 0.0 - 1.3 10e3/uL    Absolute Eosinophils 0.2 0.0 - 0.7 10e3/uL    Absolute Basophils 0.1 0.0 - 0.2 10e3/uL    Absolute Immature Granulocytes 0.1 <=0.4 10e3/uL    Absolute NRBCs 0.0 10e3/uL   Ethyl Alcohol Level   Result Value Ref Range    Alcohol ethyl <0.01 <=0.01 g/dL   Troponin I   Result Value Ref Range    Troponin I High Sensitivity 31 <79 ng/L   Lumbar spine CT w/o contrast    Narrative    CT OF THE THORACIC AND LUMBAR SPINE WITHOUT CONTRAST     6/6/2022 3:28 PM     COMPARISON: None.    HISTORY: Spine fracture, thoracic, traumatic.     TECHNIQUE: Axial images of the thoracic and lumbar spine were acquired  without intravenous contrast. Multiplanar reformations were created.      FINDINGS:   THORACIC SPINE CT:   Normal alignment. Normal vertebral body heights. Bulky bridging  osteophytes throughout the thoracic spine consistent with diffuse  idiopathic skeletal hyperostosis, resulting in spine ankylosis. No  fracture or posttraumatic subluxation. Moderate to severe foraminal  stenoses greatest at T9-T10 and T10-T11 on the right. No high-grade  spinal canal stenosis.    Please see dedicated chest CT regarding intrathoracic findings.    LUMBAR SPINE CT:   Bulky bridging osteophytes throughout the lumbar spine consistent with  diffuse idiopathic skeletal hyperostosis, and resulting in spinal  ankylosis. Mild anterior spondylolisthesis at L3-L4 and L4-L5.  Otherwise normal alignment. Acute obliquely-oriented fracture  extending from the anterior superior  osteophyte at L1 and propagating  through the posterior inferior cortex and inferior endplate of L1,  likely involving the L1-L2 disc space. There is diastasis along the  anterior fracture plane by 1 cm without significant subluxation. No  definite fracture extension into the posterior elements. No other  fractures.    Severe facet arthropathy with prominent hypertrophic changes in the  lower lumbar spine and multilevel facet ankylosis at L2-L5. Moderate  to severe spinal canal stenosis at L3-L4 and severe spinal canal  stenosis at L4-L5. Moderate to severe bilateral foraminal stenoses at  L4-L5 and mild to moderate foraminal stenoses at other levels.    Please see dedicated on abdomen/pelvis CT report regarding  intra-abdominal findings.    CONCLUSION:  THORACIC SPINE CT:  1.  No fracture or post-traumatic subluxation.  2.  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  3.  Multilevel moderate to severe foraminal stenoses within the mid to  lower thoracic spine.    LUMBAR SPINE CT:  1.  Diffuse spinal ankylosis secondary to bulky bridging osteophytes  throughout the thoracolumbar spine.  2.  Acute obliquely oriented fracture through the ventral osteophyte  at L1 propagating through the posterior inferior L1 vertebral body  with involvement of the inferior endplate. Mild diastasis along the  fracture plane anteriorly without significant subluxation. Spine  surgery consultation recommended.  3.  Severe spinal canal stenosis L4-L5 and moderate to severe spinal  canal stenosis L3-L4. Moderate to severe foraminal stenosis at L4-L5  and mild to moderate foraminal stenosis at other levels.    The results were communicated to Dr. Salinas at 3:55 PM on 6/6/2022.    CALEB BULL MD         SYSTEM ID:  [id number]   CT Thoracic Spine w/o Contrast    Narrative    CT OF THE THORACIC AND LUMBAR SPINE WITHOUT CONTRAST     6/6/2022 3:28 PM     COMPARISON: None.    HISTORY: Spine fracture,  thoracic, traumatic.     TECHNIQUE: Axial images of the thoracic and lumbar spine were acquired  without intravenous contrast. Multiplanar reformations were created.      FINDINGS:   THORACIC SPINE CT:   Normal alignment. Normal vertebral body heights. Bulky bridging  osteophytes throughout the thoracic spine consistent with diffuse  idiopathic skeletal hyperostosis, resulting in spine ankylosis. No  fracture or posttraumatic subluxation. Moderate to severe foraminal  stenoses greatest at T9-T10 and T10-T11 on the right. No high-grade  spinal canal stenosis.    Please see dedicated chest CT regarding intrathoracic findings.    LUMBAR SPINE CT:   Bulky bridging osteophytes throughout the lumbar spine consistent with  diffuse idiopathic skeletal hyperostosis, and resulting in spinal  ankylosis. Mild anterior spondylolisthesis at L3-L4 and L4-L5.  Otherwise normal alignment. Acute obliquely-oriented fracture  extending from the anterior superior osteophyte at L1 and propagating  through the posterior inferior cortex and inferior endplate of L1,  likely involving the L1-L2 disc space. There is diastasis along the  anterior fracture plane by 1 cm without significant subluxation. No  definite fracture extension into the posterior elements. No other  fractures.    Severe facet arthropathy with prominent hypertrophic changes in the  lower lumbar spine and multilevel facet ankylosis at L2-L5. Moderate  to severe spinal canal stenosis at L3-L4 and severe spinal canal  stenosis at L4-L5. Moderate to severe bilateral foraminal stenoses at  L4-L5 and mild to moderate foraminal stenoses at other levels.    Please see dedicated on abdomen/pelvis CT report regarding  intra-abdominal findings.    CONCLUSION:  THORACIC SPINE CT:  1.  No fracture or post-traumatic subluxation.  2.  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  3.  Multilevel moderate to severe foraminal stenoses within the mid  to  lower thoracic spine.    LUMBAR SPINE CT:  1.  Diffuse spinal ankylosis secondary to bulky bridging osteophytes  throughout the thoracolumbar spine.  2.  Acute obliquely oriented fracture through the ventral osteophyte  at L1 propagating through the posterior inferior L1 vertebral body  with involvement of the inferior endplate. Mild diastasis along the  fracture plane anteriorly without significant subluxation. Spine  surgery consultation recommended.  3.  Severe spinal canal stenosis L4-L5 and moderate to severe spinal  canal stenosis L3-L4. Moderate to severe foraminal stenosis at L4-L5  and mild to moderate foraminal stenosis at other levels.    The results were communicated to Dr. Salinas at 3:55 PM on 6/6/2022.    CALEB BULL MD         SYSTEM ID:  [id number]   CT Chest/Abdomen/Pelvis w Contrast   Result Value Ref Range    Radiologist flags Unstable lumbar fracture (AA)     Narrative    CT CHEST/ABDOMEN/PELVIS WITH CONTRAST 6/6/2022 3:29 PM    CLINICAL HISTORY: Flank and back pain following fall    TECHNIQUE: CT scan of the chest, abdomen, and pelvis was performed  following injection of IV contrast. Multiplanar reformats were  obtained. Dose reduction techniques were used.   CONTRAST: 100 mL Isovue-370    COMPARISON: 12/27/2017, 3/19/2015    FINDINGS:   LUNGS AND PLEURA: Small right pleural effusion. No pneumothorax. There  are two pulmonary nodules in the left lower lobe that measure 0.8 cm  each (series 6, images 195 and 199) that are unchanged. Mild bibasilar  atelectasis.    MEDIASTINUM/AXILLAE: Thoracic aorta is unremarkable. Thyroid and  esophagus appear normal. No thoracic or axillary adenopathy.    CORONARY ARTERY CALCIFICATION: Mild.    HEPATOBILIARY: No focal liver lesions. The gallbladder is absent.    PANCREAS: Normal.    SPLEEN: Normal.    ADRENAL GLANDS: Normal.    KIDNEYS/BLADDER: Normal.    BOWEL: Sigmoid diverticulosis without evidence of diverticulitis. No  bowel obstruction or free  air. No ascites.    PELVIC ORGANS: Normal.    ADDITIONAL FINDINGS: None.    MUSCULOSKELETAL: Bilateral hip replacements. There is an acute  fracture traversing the L1 vertebral body with mild displacement  (series 9, images 86-97 and series 5, image 161).      Impression    IMPRESSION:  1.  L1 vertebral body fracture appears unstable.  Orthopedic/neurosurgical consult recommended.  2.  Small right pleural effusion.  3.  Stable pulmonary nodules.      [Critical Result: Unstable lumbar fracture]    Finding was identified on 6/6/2022 3:42 PM.     Dr. Salinas was contacted by me on 6/6/2022 3:54 PM and verbalized  understanding of the critical result.     HALLE MOYER MD         SYSTEM ID:  [id number]   Head CT w/o contrast    Narrative    CT SCAN OF THE HEAD WITHOUT CONTRAST   6/6/2022 3:38 PM     HISTORY: Head trauma, minor (Age >= 65 years).    TECHNIQUE:  Axial images of the head and coronal reformations without  IV contrast material. Radiation dose for this scan was reduced using  automated exposure control, adjustment of the mA and/or kV according  to patient size, or iterative reconstruction technique.    COMPARISON: None.    FINDINGS: There is no evidence of intracranial hemorrhage, mass, acute  infarct or anomaly. The ventricles are normal in size, shape and  configuration. Mild diffuse parenchymal volume loss. Mild patchy  periventricular white matter hypodensities which are nonspecific, but  likely related to chronic microvascular ischemic disease. Scattered  atherosclerotic calcifications.    Bilateral lens implants. Near complete opacification of the right  maxillary sinus that is presumably due to mucosal thickening.  Hyperostosis of the walls of the right maxillary sinus, suggesting  some element of chronic right maxillary sinusitis. Mild to moderate  mucosal thickening in the sphenoid sinuses with layering fluid/aerated  secretions as well as small volume secretions noted in the right  frontal and right  posterior ethmoid air cells, with mild mucosal  thickening. The bony calvarium and bones of the skull base appear  intact.       Impression    IMPRESSION:  1. No CT findings of acute intracranial process.  2. Brain atrophy and presumed chronic small vessel ischemic changes,  as described.  3. Paranasal sinus disease, as described, including near complete  opacification of the right maxillary sinus as well as scattered  aerated/layering secretions in the right frontal, right posterior  ethmoid, and bilateral sphenoid sinuses. Please correlate clinically  for possible symptoms/signs of acute or acute on chronic sinusitis.    GORDON URIAS MD         SYSTEM ID:  [id number]   Cervical spine CT w/o contrast    Narrative    CT CERVICAL SPINE WITHOUT CONTRAST   6/6/2022 3:38 PM     HISTORY: Neck trauma (age >= 65 years). Fall, neck pain.     TECHNIQUE: Axial images of the cervical spine were obtained without  intravenous contrast. Multiplanar reformations were performed.   Radiation dose for this scan was reduced using automated exposure  control, adjustment of the mA and/or kV according to patient size, or  iterative reconstruction technique.    COMPARISON: None.    FINDINGS: No acute cervical spine fracture is identified.  Straightening of the normal cervical lordosis. Minimal dorsal convex  curvature of the lower cervical spine and minimal levoconvex curvature  of the partially visualized upper thoracic spine. Flowing  ossifications along the anterior aspect of the vertebral column  extending from C2 down to the upper thoracic region, compatible with  diffuse idiopathic skeletal hyperostosis. There also appears to be  multilevel facet joint fusion, at least bilaterally at C3-C4 and  possibly on the right at C2-C3.    Multilevel disc osteophyte complexes are present. There appears to be  at least moderate spinal canal stenosis at C3-C4, C5-C6 and C6-C7 with  lesser degrees of spinal canal narrowing elsewhere. Varying  degrees of  multilevel neural foraminal stenosis, including at least moderate  bilateral C3-C4 neural foraminal stenosis, moderate right C5-C6 neural  foraminal stenosis, moderate bilateral C6-C7 neural foraminal  stenosis, and moderate right C7-T1 neural foraminal stenosis.    Bilateral carotid bifurcation atherosclerotic calcifications. The  visualized paravertebral soft tissues otherwise appear grossly  unremarkable.      Impression    IMPRESSION:  1. No acute fracture or posttraumatic malalignment of the cervical  spine.  2. Diffuse idiopathic skeletal hyperostosis.  3. Multilevel degenerative changes, as described.    GORDON URIAS MD         SYSTEM ID:  [id number]   Asymptomatic COVID-19 Virus (Coronavirus) by PCR Nose    Specimen: Nose; Swab   Result Value Ref Range    SARS CoV2 PCR Negative Negative    Narrative    Testing was performed using the annette  SARS-CoV-2 & Influenza A/B Assay on the annette  Pilar  System.  This test should be ordered for the detection of SARS-COV-2 in individuals who meet SARS-CoV-2 clinical and/or epidemiological criteria. Test performance is unknown in asymptomatic patients.  This test is for in vitro diagnostic use under the FDA EUA for laboratories certified under CLIA to perform moderate and/or high complexity testing. This test has not been FDA cleared or approved.  A negative test does not rule out the presence of PCR inhibitors in the specimen or target RNA in concentration below the limit of detection for the assay. The possibility of a false negative should be considered if the patient's recent exposure or clinical presentation suggests COVID-19.  [facility] are certified under the Clinical Laboratory Improvement Amendments of 1988 (CLIA-88) as qualified to perform moderate and/or high complexity laboratory testing.   Partial thromboplastin time   Result Value Ref Range    aPTT 31 22 - 38 Seconds   INR   Result Value Ref Range    INR 1.59 (H) 0.85 - 1.15        Medications   0.9% sodium chloride BOLUS (250 mLs Intravenous New Bag 6/6/22 1723)   sodium chloride 0.9% infusion (has no administration in time range)   HYDROmorphone (PF) (DILAUDID) injection 0.3 mg (0.3 mg Intravenous Given 6/6/22 1254)   iopamidol (ISOVUE-370) solution 100 mL (100 mLs Intravenous Given 6/6/22 1449)   sodium chloride 0.9 % bag 500mL for CT scan flush use (74 mLs Intravenous Given 6/6/22 1449)   diazepam (VALIUM) injection 2.5 mg (2.5 mg Intravenous Given 6/6/22 1333)   ondansetron (ZOFRAN) injection 4 mg (4 mg Intravenous Given 6/6/22 1602)   ondansetron (ZOFRAN) injection 4 mg (4 mg Intravenous Given 6/6/22 1720)   HYDROmorphone (DILAUDID) injection 0.2 mg (0.2 mg Intravenous Given 6/6/22 1722)       Assessments & Plan (with Medical Decision Making) records were reviewed.  Labs were obtained.  EKG revealed atrial fibrillation with rate controlled several PVCs present.  Patient was given Dilaudid and Zofran and then due to muscle spasms in his back and legs was given Valium.  He was given a fluid bolus.  He did require second dose of Dilaudid.  Patient's white count was 11.4 hemoglobin 16.2 platelet count was 206..  Metabolic panel significant for a bilirubin elevated at 10.3 glucose was 185.  Troponin was within normal limits alcohol level was negative.  COVID was negative.  CT scan of the head revealed no acute intracranial process.  Chronic small vessel ischemic changes are noted.  Patient also has paranasal sinus disease with near complete opacification of the right maxillary sinus.  CT scan of the cervical spine revealed no acute fracture or posttraumatic malalignment of the cervical spine.  Multilevel degenerative changes are noted.  Thoracic spine CT with no fracture or posttraumatic subluxation diffuse spinal ankylosis is noted.  CT scan of the chest abdomen pelvis revealed small right pleural effusion with stable pulmonary nodules there was an L5 1 vertebral body fracture.   Unstable.  Orthopedic no surgical consultation was recommended.  At this time I did discuss the case with Dr. Eduardo with neurosurgery at the [facility].  He reviewed the imaging studies and felt that patient may avoid surgery but at least will need some fairly significant bracing and recommended patient coming down to the ED for further evaluation and care discussed the case with Dr. Tejada with [facility] ED.  It was recommended that we wait a short time before sending the patient down as there overwhelmed at this time.  Findings discussed with patient and his wife.  He was complaining of worsening pain and describes some shortness of breath.  A repeat troponin was obtained.  CT scan of the chest did not show any acute abnormality but I feel some of this is due to the pain.  He was given Zofran and was feeling a bit better.  Patient will be transferred to the [facility] when available patient is signed out to Dr. Morales to await transfer.     I have reviewed the nursing notes.    I have reviewed the findings, diagnosis, plan and need for follow up with the patient.       New Prescriptions    No medications on file       Final diagnoses:   Fall, initial encounter   Closed fracture of first lumbar vertebra, unspecified fracture morphology, initial encounter (H)       6/6/2022   [facility] EMERGENCY DEPT     Pradeep Salinas MD  06/08/22 0872

## 2022-06-06 NOTE — LETTER
"Transition Communication Hand-off for Care Transitions to Next Level of Care Provider    Name: Alvin Newton  : 5/10/1934  MRN #: [id number]  Primary Care Provider: Steven Duane Semmler     Primary Clinic: [facility] [address] [address]     Reason for Hospitalization:  Fracture of L1 vertebra (H) [S32.019A]  Fall, initial encounter [W19.XXXA]  Closed fracture of first lumbar vertebra, unspecified fracture morphology, initial encounter (H) [S32.019A]  Admit Date/Time: 2022  7:32 PM  Discharge Date: 2022  Payor Source: Payor: [facility] / Plan: [employer] MEDICARE / Product Type: HMO /       Reason for Communication Hand-off Referral:   Notification of the discharge plan  Discharge Plan:     Care Management Discharge Note     Discharge Date: 2022        Discharge Disposition:  [facility] ([phone])     Discharge Services:  Short term TCU placement at [facility]     Discharge DME:  (Not applicable at this time)     Discharge Transportation:  Per pt's floor nurse (Lindsey), pt can travel by w/c.   arranged for [facility] ([place] [phone]) to provide private pay w/c transport at 4pm.     Private pay costs discussed:   Pt, wife and daughter (Fuad) have been informed that the cost of transport from [facility] to [facility] is private pay.  Pt, wife and Fuad has been informed that [facility] pays up to the first 20 days in the SNF at 100%.  Pt has a $100.00 daily private pay co- pay for days  (if pt is still meeting criteria for coverage)     PAS Confirmation Code:  [id number]  Patient/family educated on Medicare website which has current facility and service quality ratings:  ( provided \"Medicare Care Compare\" document)     Education Provided on the Discharge Plan:  yes  Persons Notified of Discharge Plans: pt, daughter (Fuad), wife, 6A nursing and Alan Velazco NP  Patient/Family in Agreement with the Plan:   yes      Handoff Referral Completed: " "Yes     Additional Information:  - Alan Velazco NP has confirmed readiness for discharge  - Admissions for [facility] ([place]) has confirmed acceptance for admit  -  faxed pt's discharge orders, narcotic script and discharge summary to [facility]  - SW completed \"Important Message from Medicare\" with pt's wife.     HEATH Erickson  Social Work, 6A  Phone:  [phone]  Pager:  [phone]  6/20/2022     "

## 2022-06-06 NOTE — ED NOTES
"Pt breathing heavy and stating \"I can't breath.\"  Pt also c/o \"heaviness\" in his chest.  MD aware.  EKG obtained.  "

## 2022-06-07 ENCOUNTER — APPOINTMENT (OUTPATIENT)
Dept: GENERAL RADIOLOGY | Facility: CLINIC | Age: 87
DRG: 552 | End: 2022-06-07
Attending: NURSE PRACTITIONER
Payer: COMMERCIAL

## 2022-06-07 ENCOUNTER — APPOINTMENT (OUTPATIENT)
Dept: MRI IMAGING | Facility: CLINIC | Age: 87
DRG: 552 | End: 2022-06-07
Attending: EMERGENCY MEDICINE
Payer: COMMERCIAL

## 2022-06-07 LAB
ANION GAP SERPL CALCULATED.3IONS-SCNC: 7 MMOL/L (ref 3–14)
BUN SERPL-MCNC: 20 MG/DL (ref 7–30)
CALCIUM SERPL-MCNC: 9.6 MG/DL (ref 8.5–10.1)
CHLORIDE BLD-SCNC: 108 MMOL/L (ref 94–109)
CO2 SERPL-SCNC: 28 MMOL/L (ref 20–32)
CREAT SERPL-MCNC: 1.07 MG/DL (ref 0.66–1.25)
ERYTHROCYTE [DISTWIDTH] IN BLOOD BY AUTOMATED COUNT: 14.8 % (ref 10–15)
GFR SERPL CREATININE-BSD FRML MDRD: 67 ML/MIN/1.73M2
GLUCOSE BLD-MCNC: 205 MG/DL (ref 70–99)
GLUCOSE BLDC GLUCOMTR-MCNC: 139 MG/DL (ref 70–99)
GLUCOSE BLDC GLUCOMTR-MCNC: 156 MG/DL (ref 70–99)
GLUCOSE BLDC GLUCOMTR-MCNC: 162 MG/DL (ref 70–99)
HCT VFR BLD AUTO: 50.1 % (ref 40–53)
HGB BLD-MCNC: 16.2 G/DL (ref 13.3–17.7)
MAGNESIUM SERPL-MCNC: 1.9 MG/DL (ref 1.6–2.3)
MAGNESIUM SERPL-MCNC: 2.1 MG/DL (ref 1.6–2.3)
MCH RBC QN AUTO: 29.1 PG (ref 26.5–33)
MCHC RBC AUTO-ENTMCNC: 32.3 G/DL (ref 31.5–36.5)
MCV RBC AUTO: 90 FL (ref 78–100)
PHOSPHATE SERPL-MCNC: 3.2 MG/DL (ref 2.5–4.5)
PLATELET # BLD AUTO: 190 10E3/UL (ref 150–450)
POTASSIUM BLD-SCNC: 4.1 MMOL/L (ref 3.4–5.3)
POTASSIUM BLD-SCNC: 4.8 MMOL/L (ref 3.4–5.3)
RBC # BLD AUTO: 5.57 10E6/UL (ref 4.4–5.9)
SODIUM SERPL-SCNC: 143 MMOL/L (ref 133–144)
WBC # BLD AUTO: 14.1 10E3/UL (ref 4–11)

## 2022-06-07 PROCEDURE — 94640 AIRWAY INHALATION TREATMENT: CPT

## 2022-06-07 PROCEDURE — 71045 X-RAY EXAM CHEST 1 VIEW: CPT

## 2022-06-07 PROCEDURE — 999N000157 HC STATISTIC RCP TIME EA 10 MIN

## 2022-06-07 PROCEDURE — 72148 MRI LUMBAR SPINE W/O DYE: CPT

## 2022-06-07 PROCEDURE — 250N000013 HC RX MED GY IP 250 OP 250 PS 637: Performed by: NURSE PRACTITIONER

## 2022-06-07 PROCEDURE — 84132 ASSAY OF SERUM POTASSIUM: CPT | Performed by: STUDENT IN AN ORGANIZED HEALTH CARE EDUCATION/TRAINING PROGRAM

## 2022-06-07 PROCEDURE — 72148 MRI LUMBAR SPINE W/O DYE: CPT | Mod: 26 | Performed by: RADIOLOGY

## 2022-06-07 PROCEDURE — 84100 ASSAY OF PHOSPHORUS: CPT | Performed by: NURSE PRACTITIONER

## 2022-06-07 PROCEDURE — 94640 AIRWAY INHALATION TREATMENT: CPT | Mod: 76

## 2022-06-07 PROCEDURE — 250N000011 HC RX IP 250 OP 636: Performed by: STUDENT IN AN ORGANIZED HEALTH CARE EDUCATION/TRAINING PROGRAM

## 2022-06-07 PROCEDURE — 83735 ASSAY OF MAGNESIUM: CPT | Performed by: STUDENT IN AN ORGANIZED HEALTH CARE EDUCATION/TRAINING PROGRAM

## 2022-06-07 PROCEDURE — 71045 X-RAY EXAM CHEST 1 VIEW: CPT | Mod: 26 | Performed by: STUDENT IN AN ORGANIZED HEALTH CARE EDUCATION/TRAINING PROGRAM

## 2022-06-07 PROCEDURE — 250N000013 HC RX MED GY IP 250 OP 250 PS 637: Performed by: STUDENT IN AN ORGANIZED HEALTH CARE EDUCATION/TRAINING PROGRAM

## 2022-06-07 PROCEDURE — 250N000009 HC RX 250: Performed by: NURSE PRACTITIONER

## 2022-06-07 PROCEDURE — 250N000011 HC RX IP 250 OP 636: Performed by: NURSE PRACTITIONER

## 2022-06-07 PROCEDURE — 84100 ASSAY OF PHOSPHORUS: CPT | Performed by: STUDENT IN AN ORGANIZED HEALTH CARE EDUCATION/TRAINING PROGRAM

## 2022-06-07 PROCEDURE — 85027 COMPLETE CBC AUTOMATED: CPT | Performed by: STUDENT IN AN ORGANIZED HEALTH CARE EDUCATION/TRAINING PROGRAM

## 2022-06-07 PROCEDURE — 99232 SBSQ HOSP IP/OBS MODERATE 35: CPT | Performed by: NURSE PRACTITIONER

## 2022-06-07 PROCEDURE — 258N000003 HC RX IP 258 OP 636: Performed by: STUDENT IN AN ORGANIZED HEALTH CARE EDUCATION/TRAINING PROGRAM

## 2022-06-07 PROCEDURE — 36415 COLL VENOUS BLD VENIPUNCTURE: CPT | Performed by: STUDENT IN AN ORGANIZED HEALTH CARE EDUCATION/TRAINING PROGRAM

## 2022-06-07 PROCEDURE — 250N000011 HC RX IP 250 OP 636: Performed by: EMERGENCY MEDICINE

## 2022-06-07 PROCEDURE — 200N000002 HC R&B ICU UMMC

## 2022-06-07 RX ORDER — METOPROLOL SUCCINATE 100 MG/1
100 TABLET, EXTENDED RELEASE ORAL DAILY
Status: DISCONTINUED | OUTPATIENT
Start: 2022-06-07 | End: 2022-06-20

## 2022-06-07 RX ORDER — MAGNESIUM SULFATE HEPTAHYDRATE 40 MG/ML
2 INJECTION, SOLUTION INTRAVENOUS ONCE
Status: COMPLETED | OUTPATIENT
Start: 2022-06-07 | End: 2022-06-07

## 2022-06-07 RX ORDER — CYCLOBENZAPRINE HCL 5 MG
5 TABLET ORAL EVERY 8 HOURS PRN
Status: DISCONTINUED | OUTPATIENT
Start: 2022-06-07 | End: 2022-06-08

## 2022-06-07 RX ORDER — NALOXONE HYDROCHLORIDE 0.4 MG/ML
0.4 INJECTION, SOLUTION INTRAMUSCULAR; INTRAVENOUS; SUBCUTANEOUS
Status: DISCONTINUED | OUTPATIENT
Start: 2022-06-07 | End: 2022-06-20 | Stop reason: HOSPADM

## 2022-06-07 RX ORDER — POLYETHYLENE GLYCOL 3350 17 G/17G
17 POWDER, FOR SOLUTION ORAL DAILY
Status: DISCONTINUED | OUTPATIENT
Start: 2022-06-07 | End: 2022-06-07

## 2022-06-07 RX ORDER — ENOXAPARIN SODIUM 100 MG/ML
40 INJECTION SUBCUTANEOUS EVERY 24 HOURS
Status: DISCONTINUED | OUTPATIENT
Start: 2022-06-07 | End: 2022-06-08

## 2022-06-07 RX ORDER — SODIUM CHLORIDE 9 MG/ML
1000 INJECTION, SOLUTION INTRAVENOUS CONTINUOUS
Status: DISCONTINUED | OUTPATIENT
Start: 2022-06-07 | End: 2022-06-07

## 2022-06-07 RX ORDER — NALOXONE HYDROCHLORIDE 0.4 MG/ML
0.2 INJECTION, SOLUTION INTRAMUSCULAR; INTRAVENOUS; SUBCUTANEOUS
Status: DISCONTINUED | OUTPATIENT
Start: 2022-06-07 | End: 2022-06-20 | Stop reason: HOSPADM

## 2022-06-07 RX ORDER — OXYCODONE HYDROCHLORIDE 5 MG/1
5 TABLET ORAL
Status: DISCONTINUED | OUTPATIENT
Start: 2022-06-07 | End: 2022-06-10

## 2022-06-07 RX ORDER — LEVALBUTEROL INHALATION SOLUTION 0.63 MG/3ML
0.63 SOLUTION RESPIRATORY (INHALATION) EVERY 6 HOURS PRN
Status: DISCONTINUED | OUTPATIENT
Start: 2022-06-07 | End: 2022-06-20 | Stop reason: HOSPADM

## 2022-06-07 RX ADMIN — HYDROMORPHONE HYDROCHLORIDE 0.5 MG: 1 INJECTION, SOLUTION INTRAMUSCULAR; INTRAVENOUS; SUBCUTANEOUS at 03:21

## 2022-06-07 RX ADMIN — HYDROMORPHONE HYDROCHLORIDE 0.5 MG: 1 INJECTION, SOLUTION INTRAMUSCULAR; INTRAVENOUS; SUBCUTANEOUS at 22:42

## 2022-06-07 RX ADMIN — ONDANSETRON 4 MG: 2 INJECTION INTRAMUSCULAR; INTRAVENOUS at 03:20

## 2022-06-07 RX ADMIN — SODIUM CHLORIDE 1000 ML: 9 INJECTION, SOLUTION INTRAVENOUS at 00:49

## 2022-06-07 RX ADMIN — ACETAMINOPHEN 650 MG: 325 TABLET, FILM COATED ORAL at 20:07

## 2022-06-07 RX ADMIN — MAGNESIUM SULFATE IN WATER 2 G: 40 INJECTION, SOLUTION INTRAVENOUS at 13:36

## 2022-06-07 RX ADMIN — ENOXAPARIN SODIUM 40 MG: 40 INJECTION SUBCUTANEOUS at 14:25

## 2022-06-07 RX ADMIN — SENNOSIDES AND DOCUSATE SODIUM 1 TABLET: 8.6; 5 TABLET ORAL at 13:35

## 2022-06-07 RX ADMIN — OXYCODONE HYDROCHLORIDE 5 MG: 5 TABLET ORAL at 20:08

## 2022-06-07 RX ADMIN — HYDROMORPHONE HYDROCHLORIDE 0.5 MG: 1 INJECTION, SOLUTION INTRAMUSCULAR; INTRAVENOUS; SUBCUTANEOUS at 11:17

## 2022-06-07 RX ADMIN — METHOCARBAMOL 750 MG: 750 TABLET ORAL at 08:13

## 2022-06-07 RX ADMIN — LEVALBUTEROL HYDROCHLORIDE 0.63 MG: 0.63 SOLUTION RESPIRATORY (INHALATION) at 10:15

## 2022-06-07 RX ADMIN — OXYCODONE HYDROCHLORIDE 5 MG: 5 TABLET ORAL at 14:30

## 2022-06-07 RX ADMIN — METHOCARBAMOL 750 MG: 750 TABLET ORAL at 20:07

## 2022-06-07 RX ADMIN — METHOCARBAMOL 750 MG: 750 TABLET ORAL at 00:49

## 2022-06-07 RX ADMIN — HYDROMORPHONE HYDROCHLORIDE 0.5 MG: 1 INJECTION, SOLUTION INTRAMUSCULAR; INTRAVENOUS; SUBCUTANEOUS at 01:34

## 2022-06-07 RX ADMIN — METHOCARBAMOL 750 MG: 750 TABLET ORAL at 13:35

## 2022-06-07 RX ADMIN — METOPROLOL SUCCINATE 100 MG: 50 TABLET, EXTENDED RELEASE ORAL at 08:12

## 2022-06-07 RX ADMIN — CYCLOBENZAPRINE HYDROCHLORIDE 5 MG: 5 TABLET, FILM COATED ORAL at 17:12

## 2022-06-07 ASSESSMENT — ACTIVITIES OF DAILY LIVING (ADL)
TOILETING: 0-->INDEPENDENT
WALKING_OR_CLIMBING_STAIRS_DIFFICULTY: YES
ADLS_ACUITY_SCORE: 46
EATING: 0-->INDEPENDENT
ADLS_ACUITY_SCORE: 44
ADLS_ACUITY_SCORE: 40
ADLS_ACUITY_SCORE: 44
TRANSFERRING: 1-->ASSISTANCE (EQUIPMENT/PERSON) NEEDED (NOT DEVELOPMENTALLY APPROPRIATE)
DRESSING/BATHING_DIFFICULTY: YES
DRESSING/BATHING: BATHING DIFFICULTY, ASSISTANCE 1 PERSON;DRESSING DIFFICULTY, ASSISTANCE 1 PERSON
DIFFICULTY_EATING/SWALLOWING: YES
BATHING: 0-->INDEPENDENT
TOILETING_ISSUES: YES
NUMBER_OF_TIMES_PATIENT_HAS_FALLEN_WITHIN_LAST_SIX_MONTHS: 3
TRANSFERRING: 1-->ASSISTANCE (EQUIPMENT/PERSON) NEEDED
ADLS_ACUITY_SCORE: 33
SWALLOWING: 2-->DIFFICULTY SWALLOWING FOODS
WALKING_OR_CLIMBING_STAIRS: AMBULATION DIFFICULTY, REQUIRES EQUIPMENT
EQUIPMENT_CURRENTLY_USED_AT_HOME: CANE, STRAIGHT
TOILETING: 0-->INDEPENDENT
ADLS_ACUITY_SCORE: 46
FALL_HISTORY_WITHIN_LAST_SIX_MONTHS: YES
ADLS_ACUITY_SCORE: 44
WEAR_GLASSES_OR_BLIND: YES
SWALLOWING: 2-->DIFFICULTY SWALLOWING FOODS
EATING: 0-->INDEPENDENT
EATING/SWALLOWING: SWALLOWING SOLID FOOD
ADLS_ACUITY_SCORE: 46
ADLS_ACUITY_SCORE: 46
CHANGE_IN_FUNCTIONAL_STATUS_SINCE_ONSET_OF_CURRENT_ILLNESS/INJURY: YES
ADLS_ACUITY_SCORE: 40
DRESS: 0-->INDEPENDENT
TOILETING_ASSISTANCE: TOILETING DIFFICULTY, REQUIRES EQUIPMENT
DOING_ERRANDS_INDEPENDENTLY_DIFFICULTY: YES
DRESS: 0-->INDEPENDENT
ADLS_ACUITY_SCORE: 40
CONCENTRATING,_REMEMBERING_OR_MAKING_DECISIONS_DIFFICULTY: NO
ADLS_ACUITY_SCORE: 35

## 2022-06-07 ASSESSMENT — VISUAL ACUITY
OU: BASELINE

## 2022-06-07 ASSESSMENT — ENCOUNTER SYMPTOMS: BRUISES/BLEEDS EASILY: 1

## 2022-06-07 NOTE — PLAN OF CARE
Major Shift Events:  Neuro: A+Ox4. Follows commands. Moves all extremities. Shoulder pain at baseline. Strict bedrest, flat HOB, thoracic/lumbar precautions. Log-rolling, micro-turns/weight-shifting due to strict orders. Measured for TLSO brace. Tremors noted BUE; pt states this is baseline and usually more prominent in the morning; tremors absent in subsequent assessments. PRN dilaudid, Oxy, and Flexeril for pain. Scheduled robaxin.   CV: A-fib with RVR. HR 110s-160s. (See provider notification note). Occasional PVCs. BBB.   Pulm: 4L NC. Expiratory wheezing upper lobes, cleared with neb. CXR.   GI/: Regular diet, good appetite. Voiding adequately via urinal. No BM this shift.   Endo: Trauma notified BG >150, inquired about sliding scale insulin d/t h/o DM Type 2. No further orders at this time.     Gtts: TKO @ 5mL/h.     Plan: Pain management, spinal precautions. Notify MD with changes/concerns.     For vital signs and complete assessments, please see documentation flowsheets.

## 2022-06-07 NOTE — PLAN OF CARE
Assumed cares at [phone]     Status: Unstable lt vertebral Fracture  Neuro:  AOX4  GI/: assisted with male urinal with good amt of UO, no other complaints  Resp: All lungs were clear diminished sound: On 2L NC, upon arrival to ED, after MRI increased to 4L as desating to 88%  Mobility:  Strict bedrest, with lumbar spine prec  Cardiac: Fluctuating HR with 120-145, HIGH BP with 160-190/100, Team was aware: looks like Afib with PVC  Lines/Drains: PIV line at lt hand with Normal Saline 100ml/hr  Pain: 8/10 during MRI, given Dilaudid 0.5mg PRN     VS: BP (!) 163/117   Pulse (!) 131   Temp 97.9  F (36.6  C) (Oral)   Resp 20   SpO2 96%       Plan of Care:     1. Strict bedrest with lumbar spine precautions.  2. Pain control  3. NPO and IVF  4. Hold home meds including anticoagulation for now as per Trauma team

## 2022-06-07 NOTE — ED TRIAGE NOTES
[facility] transfer with L1 fracture after a fall. 0.6 mg dilaudid given en route.      Triage Assessment     Row Name 06/06/22 1937       Triage Assessment (Adult)    Airway WDL WDL       Respiratory WDL    Respiratory WDL WDL       Skin Circulation/Temperature WDL    Skin Circulation/Temperature WDL WDL       Cardiac WDL    Cardiac WDL WDL       Peripheral/Neurovascular WDL    Peripheral Neurovascular WDL WDL       Cognitive/Neuro/Behavioral WDL    Cognitive/Neuro/Behavioral WDL WDL

## 2022-06-07 NOTE — H&P
[facility]    History and Physical / Consult note: Trauma Service     Date of Admission:  6/6/2022    Time of Admission/Consult Request (page/call): 2030    Time of my evaluation: 2100  Consulting services:  Neurosurgery - Emergent consult (within 30 mins): Called by ED    Assessment & Plan   Trauma mechanism:GLF  Time/date of injury:06/06/22  Known Injuries:  1. Unstable L1 fracture  Other diagnoses:   None     Procedure: TBD    Plan:  1. Admit to trauma surgery  2. Neurosurgery consultation. Appreciate recs  3. Strict bedrest with lumbar spine precautions.  4. Pain control  5. NPO and IVF  6. Hold home meds including anticoagulation for now    Code status: Full confirmed with patient.     General Cares:  GI Prophylaxis: n/a  DVT Prophylaxis: scd  Date of last stool/Bowel Regimen:06/06/22  Pulmonary toilet:IS    ETOH: This patient was asked if in the last 3-6 months there has been a time when he had  5 or more drinks in a single day/outing.. Patient answer to the screening question was in the negative. No intervention needed.  Primary Care Physician   Steven Duane Semmler    Chief Complaint  Back pain    History is obtained from the patient    History of Present Illness   Alvin Newton is a 88 year old male with hx of A fib on Eliquis, HTN, HLD, T2DM, NSTEMI 2017 who presnted to the ED for evaluation after a fall. Pt states that he was trying to get off a bar stool when he lost balance and fell backwards and landed on his back. He did not strike his head. No LOC. He denies any headaches, vision changes, nausea/vomiting, numbness/tingling/weakness in any 4 extremities, loss of bowel and bladder control. He is complaining of significant amount of pain in his lower back. Work up in the ED significant for unstable L1 fracture. Labs are unremarkable.     Past Medical History    I have reviewed this patient's medical history and updated it with pertinent information if  needed.   Past Medical History:   Diagnosis Date     Colonic diverticulum      Hyperlipidemia      Hypertension      Obesity (BMI 30-39.9)      Osteoarthritis      Type 2 diabetes mellitus (H)        Past Surgical History   I have reviewed this patient's surgical history and updated it with pertinent information if needed.  Past Surgical History:   Procedure Laterality Date     ARTHROPLASTY HIP BILATERAL  1987     HC ESOPHAGOSCOPY, DIAGNOSTIC  2003     LAPAROSCOPIC CHOLECYSTECTOMY N/A 12/28/2017    Procedure: LAPAROSCOPIC CHOLECYSTECTOMY;  LAPAROSCOPIC CHOLECYSTECTOMY;  Surgeon: Heber Gonzalez MD;  Location: [facility] OR     TRANSRECTAL ULTRASONIC, TRANSURETHRAL RESECTION (TUR) OF PROSTATE CYST  1990     Prior to Admission Medications   Prior to Admission Medications   Prescriptions Last Dose Informant Patient Reported? Taking?   ELIQUIS ANTICOAGULANT 5 MG tablet  Self Yes No   Sig: Take 5 mg by mouth 2 times daily   Multiple Vitamin (MULTIVITAMINS PO)  Self Yes No   Sig: Take 1 chew tab by mouth daily   acetaminophen (TYLENOL) 500 MG tablet  Self Yes No   Sig: Take 500-1,000 mg by mouth every 6 hours as needed   atorvastatin (LIPITOR) 20 MG tablet  Self Yes No   Sig: Take 20 mg by mouth At Bedtime   cholecalciferol 50 MCG (2000 UT) CAPS  Self Yes No   Sig: Take 2,000 Units by mouth daily   furosemide (LASIX) 20 MG tablet  Self Yes No   Sig: Take 1 tablet by mouth daily   metoprolol succinate ER (TOPROL XL) 100 MG 24 hr tablet  Self Yes No   Sig: Take 100 mg by mouth daily   naproxen (NAPROSYN) 500 MG tablet  Self Yes No   Sig: Take 1 tablet by mouth every 12 hours as needed      Facility-Administered Medications: None     Allergies   No Known Allergies    Social History   Social History     Socioeconomic History     Marital status:      Spouse name: Not on file     Number of children: Not on file     Years of education: Not on file     Highest education level: Not on file   Occupational History     Not on  file   Tobacco Use     Smoking status: Never Smoker     Smokeless tobacco: Never Used   Substance and Sexual Activity     Alcohol use: Yes     Comment: 0-1 beer daily     Drug use: No     Sexual activity: Not on file   Other Topics Concern     Not on file   Social History Narrative     Not on file     Social Determinants of Health     Financial Resource Strain: Not on file   Food Insecurity: Not on file   Transportation Needs: Not on file   Physical Activity: Not on file   Stress: Not on file   Social Connections: Not on file   Intimate Partner Violence: Not on file   Housing Stability: Not on file       Family History   Family history reviewed with patient and is noncontributory.    Review of Systems   CONSTITUTIONAL: No fever, chills, sweats, fatigue   EYES: no visual blurring, no double vision or visual loss  ENT: no decrease in hearing, no tinnitus, no vertigo, no hoarseness  RESPIRATORY: no shortness of breath, no cough, no sputum   CARDIOVASCULAR: no palpitations, no chest  pain, no exertional chest pain or pressure  GASTROINTESTINAL: no nausea or vomiting, or abd pain  GENITOURINARY: no dysuria, no frequency or hesitancy, no hematuria  MUSCULOSKELETAL: no weakness, no redness, no swelling, no joint pain,   SKIN: no rashes, ecchymoses, abrasions or lacerations  NEUROLOGIC: no numbness or tingling of hands, no numbness or tingling  of feet, no syncope, no tremors or weakness  PSYCHIATRIC: no sleep disturbances, no anxiety or depression    Physical Exam   Temp: 97.9  F (36.6  C) Temp src: Oral BP: (!) 180/141 Pulse: (!) 136   Resp: 20 SpO2: 96 %      Vital Signs with Ranges  Temp:  [96.1  F (35.6  C)-97.9  F (36.6  C)] 97.9  F (36.6  C)  Pulse:  [] 136  Resp:  [7-28] 20  BP: (133-192)/() 180/141  SpO2:  [76 %-100 %] 96 % 0 lbs 0 oz    Primary Survey:  Airway: patient talking  Breathing: symmetric respiratory effort bilaterally  Circulation: central pulses present and peripheral pulses  present  Disability: Pupils - left 4 mm and brisk, right 4 mm and brisk     [place] Coma Scale - Total 15/15  Eye Response (E): 4  4= spontaneous,  3= to verbal/voice, 2=  to pain, 1= No response   Verbal Response (V): 5   5= Orientated, converses,  4= Confused, converses, 3= Inappropriate words,  2= Incomprehensible sounds,  1=No response   Motor Response (M): 6   6= Obeys commands, 5= Localizes to pain, 4= Withdrawal to pain, 3=Fexion to pain, 2= Extension to pain, 1= No response    Secondary Survey:  General: alert, oriented to person, place, time  Head: atraumatic, normocephalic, trachea midline  Eyes: PERRLA, pupils 4mm, EOMI, corneas and conjunctivae clear  Ears: pearly grey bilateral TMs and non-inflamed external ear canals  Nose: nares patent, no drainage, nasal septum non-tender  Mouth/Throat: no exudates or erythema,  no dental tenderness or malocclusions, no tongue lacerations  Neck: No cervical collar present. No midline posterior tenderness, full AROM without pain or tenderness   Chest/Pulmonary: normal respiratory rate and rhythm,  bilateral clear breath sounds, no wheezes, rales or rhonchi, no chest wall tenderness or deformities,   Cardiovascular: S1, S2,  normal and regular rate and rhythm, no murmurs  Abdomen: soft, non-tender, no guarding, no rebound tenderness and no tenderness to palpation  : normal external genitalia, pelvis stable to lateral compression  Back/Spine: deferred due to known unstable L1 fracture  Musculoskel/Extremities: normal extremities, full AROM of major joints without tenderness, edema, erythema, ecchymosis, or abrasions. bilateral PP. no edema.   Hand: no gross deformities of hands or fingers. Full AROM of hand and fingers in flexion and extension.  strength equal and symmetric.   Skin: no rashes, laceration, ecchymosis, skin warm and dry.   Neuro: PERRLA, alert, oriented x 4. CN II-XII grossly intact. No focal deficits. Strength 5/5 x 4 extremities.  Sensation  intact.  Psychiatric: affect/mood normal, cooperative, normal judgement/insight and memory intact  # Pain Assessment:  Current Pain Score 6/6/2022   Patient currently in pain? yes   Pain score (0-10) -   Pain location -   Pain descriptors -   - Alvin is experiencing pain due to fall. Pain management was discussed and the plan was created in a collaborative fashion.  Alvin's response to the current recommendations: compliant  - Please see the plan for pain management as documented above        Data     Most Recent 3 CBC's:Recent Labs   Lab Test 06/06/22  1301 12/30/17  0810 12/29/17  0751   WBC 11.4* 9.0 12.2*   HGB 16.2 12.6* 12.9*   MCV 88 89 90    174 142*     Most Recent 3 BMP's:Recent Labs   Lab Test 06/06/22  1301 12/31/17  0805 12/30/17  0810 12/29/17  1852 12/29/17  0751     --  138  --  139   POTASSIUM 4.4 3.5 3.4  --  3.4   CHLORIDE 108  --  104  --  103   CO2 21  --  26  --  30   BUN 20 26 34*  --  34*   CR 1.07 1.11 1.30*   < > 1.59*   ANIONGAP 13  --  8  --  6   JERRY 9.6  --  7.9*  --  8.0*   *  --  145*  --  121*    < > = values in this interval not displayed.     Most Recent 2 LFT's:Recent Labs   Lab Test 06/06/22  1301 12/29/17  0751   AST 30 24   ALT 22 27   ALKPHOS 128 64   BILITOTAL 2.3* 1.6*     Most Recent 3 INR's:Recent Labs   Lab Test 06/06/22  1716   INR 1.59*       Studies:  No orders to display     Discussed with Trauma staff on call, Dr. Thurston-Niko Hearn MD  CVICU/Trauma [facility]

## 2022-06-07 NOTE — PROVIDER NOTIFICATION
Paged the team regarding the -140's bpm, looks like Afib with PVC, pt was sleeping with high BP too. Team was informed, advised not to do anything right now as pt having h/o Afib

## 2022-06-07 NOTE — PROGRESS NOTES
S: Pt seen at [facility] room 4218 in much back pain from falling. O: I see the EPIC order for the custom TLSO due to multiple Dx and L-1 acute oblique Fx ( unstable). A: Measurements were taken for a custom TLSO. P: We will see him tomorrow after 2 pm for delivery and fitting of the custom TLSO measured today. G: The goal is to reduce vertical forces on the spine and to Restrict motion during healing.  Electronically signed Nish Noguera CPO, LPO.

## 2022-06-07 NOTE — PROGRESS NOTES
[facility]   Tertiary Survey Progress Note     Date of Service (when I saw the patient): 06/07/2022     Assessment & Plan     Trauma mechanism:Fall from bar stool  Time/date of injury:06/06 PM  Known Injuries:  1. Acute unstable L1 fracture  2. Posterior longitudinal Ligament injury    Procedure(s):   Plans today:  - CXR  - Resume PTA Metoprolol  - Xopenox inhaler due to tachycardia,  - Further plans for L1 fracture per Neurosurgery, NPO till then  - Orthotics consult for TLSO brace  - Mag sulfate 2gm  Neuro/Pain:  # Acute traumatic pain  Scheduled: Tylenol, Methocarbamol  PRN: Oxycodone, IV hydromorphone for breakthrough  - Monitor neurological status.   - Maintain circadian rhythm.  Lights on during the day.  Off at night, minimize cares at night.  OOB during the day.    Pulmonary:  # Hypoxia  Wheezy, on supplemental oxygen since admit, on flat bedrest 2/2 thoracic fractures. Occasionally uses an inhaler at home, unknown type  - CXR  - Decrease MIVF rate  - PRN Xopenex nebs   - Supplemental oxygen to keep saturation above 92 %.  - Aggressive pulmonary hygiene. Incentive spirometer while awake     Cardiovascular:    #Hx P Afib   # HTN, HLD  # NSTEMI 2017  12 lead EKG: Afib +rbbb (noted on prior 12 lead EKG's from 2017),  to 160's most the the night. BP stable  - Resumed PTA Metoprolol 100mg XL  - Electrolyte replacements, maintain K >4.0, mag >2.0  - mag sulfate 2gm x1  - Resume PTA statin  - Cardiac tele  - Pain control  - Holding Furosemide until surgical plan known    GI/Nutrition:    Regular diet  NPO once surgery vs non surgery plan finalized    Renal/ Fluids/Electrolytes:  - 0.9 for IV fluid hydration.   - electrolyte replacement protocol  In place.     Endocrine:  # Diabetes Mellitus   - PTA medications: No PTA hyperglycemic meds, or insulin   - No management indication.   Monitor blood glucose on daily BMP if persistently >180g/dL will initiate Novolog  Sliding scale insulin    Infectious disease:    No indications for antibiotics.     Hematology:    # Coagulopathy  On Apixaban PTA chronically for stroke prophylaxis with Hx of Afib  - Admitted with Hb of 16.2g/dL.   - Threshold for transfusion if hgb <7.0 or signs/symptoms of hypoperfusion.       Musculoskeletal:  #Fall  # L1 unstable fracture  Seen and evlauted by Neurosurgery  - Flat bedrest on T/L spine precautions  - TLSO brace ordered  - Surgical plan per Neurosurgery  - Physical and occupational therapy consults when cleared    Lines/ tubes/ drains:  - PIV  Code status: Full code  General Cares:    PPI/H2 blocker:  n/a   DVT prophylaxis: Lovenox, plan to hold pending OR   Bowel Regimen/Date of last stool: PTA   Pulmonary toilet: IS   ETOH screen completed: yes   Lines / drains: PIV    Interval History   Back spasms, denies chest pain or shortness of breath    Review Of Systems   Skin: negative  Eyes: negative  Ears/Nose/Throat: negative  Respiratory: No shortness of breath, dyspnea on exertion, cough, or hemoptysis  Cardiovascular: negative  Gastrointestinal: negative  Genitourinary: negative  Musculoskeletal:  back pain  Neurologic: negative  Psychiatric: negative  Hematologic/Lymphatic/Immunologic: negative  Endocrine: negative     Physical Exam     [place] Coma Scale - Total 15/15  Eye Response (E): 4   4= spontaneous, 3= to verbal/voice, 2= to pain, 1= No response   Verbal Response (V): 5   5= Orientated, converses, 4= Confused, converses, 3= Inappropriate words, 2= Incomprehensible sounds, 1=No response   Motor Response (M): 6   6= Obeys commands, 5= Localizes to pain, 4= Withdrawal to pain, 3=Fexion to pain, 2= Extension to pain, 1= No response     Frailty Questionnaire: To be done for all patients age 60+. To be completed on admission or with the tertiary exam  F (Fatigue): Is the patient easily fatigued? NO = 0  R (Resistance): Is the patient unable to walk one flight of stairs? NO = 0  A  (Ambulation): Is the patient unable to walk one block? NO = 0  I  (Illness): Does the patient have more than five illnesses? NO = 0  L (Loss of weight): Has the patient lost more than 5% of weight in the past 6 months. NO = 0  Lost five pounds or more in the last 3 months without trying? AND/OR Unintended weight loss?  Does the patient have difficulty performing housework such as washing windows or scrubbing floors? AND Activity in a typical 24-hour day- No moderate or vigorous activity    Score: 0    Score: 0-2: Ensure appropriate therapies consulted if needed     Physical Exam  Vitals and nursing note reviewed.   HENT:      Head: Normocephalic.      Nose: Nose normal.      Mouth/Throat:      Mouth: Mucous membranes are dry.   Cardiovascular:      Rate and Rhythm: Rhythm irregular.      Heart sounds: No murmur heard.     Comments: Afib  Pulmonary:      Effort: Pulmonary effort is normal.      Breath sounds: Wheezing present.   Abdominal:      General: Bowel sounds are normal. There is no distension.      Palpations: Abdomen is soft.      Tenderness: There is no abdominal tenderness.   Musculoskeletal:         General: Tenderness and signs of injury present.      Cervical back: Normal range of motion.      Comments: THoracic spine   Skin:     General: Skin is warm.      Capillary Refill: Capillary refill takes less than 2 seconds.      Coloration: Skin is not jaundiced.   Neurological:      General: No focal deficit present.      Mental Status: He is alert and oriented to person, place, and time.      Comments: With occasional tremors in the upper extremitites   Psychiatric:         Mood and Affect: Mood normal.         Temp: 97.5  F (36.4  C) Temp src: Axillary BP: 127/83 Pulse: (!) 133   Resp: 20 SpO2: 95 % O2 Device: Nasal cannula Oxygen Delivery: 4 LPM  Vitals:    06/07/22 0245   Weight: 119.4 kg (263 lb 3.7 oz)     Vital Signs with Ranges  Temp:  [96.1  F (35.6  C)-97.9  F (36.6  C)] 97.5  F (36.4  C)  Pulse:   [] 133  Resp:  [7-28] 20  BP: ()/() 127/83  SpO2:  [76 %-100 %] 95 %  I/O last 3 completed shifts:  In: 218.33 [I.V.:218.33]  Out: 250 [Urine:250]      CHUCK Higgins CNP  To contact the trauma service use job code pager [phone],   Numeric texts or alpha text through [facility]

## 2022-06-07 NOTE — CONSULTS
[facility]         NEUROSURGERY CONSULTATION      This consultation was requested by Dr. Cook from the ED service.      Time Paged/Notified: 11:45  Trauma Activation: Yes  Arrival time in ED: 11:53    GCS: 15      Reason for Consultation: L1 fracture    HPI:    88-year-old male, past medical history of diabetes type 2, atrial fibrillation on eloquis, hyperlipidemia, hypertension, NSTEMI, BPH presenting after mechanical fall today at home found to have an obliquely oriented L1 vertebral body fracture.    Patient reports he was standing on a high barstool this morning he missed a step fell backwards and was fell onto another chair.  After the fall he noticed immediate lower back pain in the lumbar region.  Due to his pain he reports he was unable to walk or get up after the fall.  Of note, at baseline he ambulates with a cane (mostly for stability says).  He denies any loss of consciousness.  Although he denies head strike, per chart review wife reports that he did hit his head.  At present, he endorses significant lumbar area back pain.  He denies any weakness, numbness, or paresthesias.  He denies any bowel or bladder incontinence.  Denies any neck or chest pain.  Denies any shooting pain down any of his extremities.    PAST MEDICAL HISTORY:   Past Medical History:   Diagnosis Date     Colonic diverticulum      Hyperlipidemia      Hypertension      Obesity (BMI 30-39.9)      Osteoarthritis      Type 2 diabetes mellitus (H)        PAST SURGICAL HISTORY:   Past Surgical History:   Procedure Laterality Date     ARTHROPLASTY HIP BILATERAL  1987      ESOPHAGOSCOPY, DIAGNOSTIC  2003     LAPAROSCOPIC CHOLECYSTECTOMY N/A 12/28/2017    Procedure: LAPAROSCOPIC CHOLECYSTECTOMY;  LAPAROSCOPIC CHOLECYSTECTOMY;  Surgeon: Heber Gonzalez MD;  Location:  OR     TRANSRECTAL ULTRASONIC, TRANSURETHRAL RESECTION (TUR) OF PROSTATE CYST  1990       FAMILY HISTORY: History  reviewed. No pertinent family history.    SOCIAL HISTORY:   Social History     Tobacco Use     Smoking status: Never Smoker     Smokeless tobacco: Never Used   Substance Use Topics     Alcohol use: Yes     Comment: 0-1 beer daily       MEDICATIONS:  Current Outpatient Medications   Medication Sig Dispense Refill     acetaminophen (TYLENOL) 500 MG tablet Take 500-1,000 mg by mouth every 6 hours as needed       atorvastatin (LIPITOR) 20 MG tablet Take 20 mg by mouth At Bedtime       cholecalciferol 50 MCG (2000 UT) CAPS Take 2,000 Units by mouth daily       ELIQUIS ANTICOAGULANT 5 MG tablet Take 5 mg by mouth 2 times daily       furosemide (LASIX) 20 MG tablet Take 1 tablet by mouth daily       metoprolol succinate ER (TOPROL XL) 100 MG 24 hr tablet Take 100 mg by mouth daily       Multiple Vitamin (MULTIVITAMINS PO) Take 1 chew tab by mouth daily       naproxen (NAPROSYN) 500 MG tablet Take 1 tablet by mouth every 12 hours as needed (Shoulder Pain)         Allergies:  No Known Allergies    ROS: 10 point ROS of systems including Constitutional, Eyes, Respiratory, Cardiovascular, Gastroenterology, Genitourinary, Integumentary, Muscularskeletal, Psychiatric were all negative except for pertinent positives noted in my HPI.    Physical exam:   Blood pressure (!) 190/91, pulse 76, temperature 97.9  F (36.6  C), temperature source Oral, resp. rate 20, SpO2 97 %.  General: Awake, alert, laying in bed, appearing stated age, not appearing in acute distress  HEENT: Atraumatic, normocephalic  PULM: Breathing comfortably on room air  MSK: Unremarkable, obese appearing gentleman  NEUROLOGIC:  -- Awake; Alert; oriented x 3  -- Follows commands briskly  -- +repetition, calculation, and naming  -- Speech fluent, spontaneous. No aphasia or dysarthria.  -- no gaze preference. No apparent hemineglect.  Cranial Nerves:  -- visual fields full to confrontation, PERRL 3-2mm bilat and brisk, extraocular movements intact  -- face symmetrical,  tongue midline  -- sensory V1-V3 intact bilaterally  -- palate elevates symmetrically, uvula midline  -- Slightly hard of hearing  -- Trapezii 5/5 strength bilat symmetric  -- Cerebellar: Finger nose finger without dysmetria, intact rapid alternating motions bilaterally    Motor:  Normal bulk / tone; no tremor, rigidity, or bradykinesia.  No muscle wasting or fasciculations  No Pronator Drift     Delt Bi Tri Hand Flexion/  Extension Iliopsoas Quadriceps Hamstrings Tibialis Anterior Gastroc    C5 C6 C7 C8/T1 L2 L3 L4-S1 L4 S1   R 5 5 5 5 5 (pain limited component, however full strength)) 5 5 5 5   L 5 5 5 5 5 (pain limited component, however full strength)) 5 5 5 5     Sensory:  intact to LT x 4 extremities throughout      Reflexes: no ankle clonus       Bi Tri BR Jaswinder Pat Ach Bab     C5-6 C7-8 C6 UMN L2-4 S1 UMN   R 2+ 2+ 2+ Norm 2+ 2+ Norm   L 2+ 2+ 2+ Norm 2+ 2+ Norm      Gait: deferred       IMAGING:  Recent Results (from the past 24 hour(s))   Lumbar spine CT w/o contrast    Narrative    CT OF THE THORACIC AND LUMBAR SPINE WITHOUT CONTRAST     6/6/2022 3:28 PM     COMPARISON: None.    HISTORY: Spine fracture, thoracic, traumatic.     TECHNIQUE: Axial images of the thoracic and lumbar spine were acquired  without intravenous contrast. Multiplanar reformations were created.      FINDINGS:   THORACIC SPINE CT:   Normal alignment. Normal vertebral body heights. Bulky bridging  osteophytes throughout the thoracic spine consistent with diffuse  idiopathic skeletal hyperostosis, resulting in spine ankylosis. No  fracture or posttraumatic subluxation. Moderate to severe foraminal  stenoses greatest at T9-T10 and T10-T11 on the right. No high-grade  spinal canal stenosis.    Please see dedicated chest CT regarding intrathoracic findings.    LUMBAR SPINE CT:   Bulky bridging osteophytes throughout the lumbar spine consistent with  diffuse idiopathic skeletal hyperostosis, and resulting in spinal  ankylosis. Mild anterior  spondylolisthesis at L3-L4 and L4-L5.  Otherwise normal alignment. Acute obliquely-oriented fracture  extending from the anterior superior osteophyte at L1 and propagating  through the posterior inferior cortex and inferior endplate of L1,  likely involving the L1-L2 disc space. There is diastasis along the  anterior fracture plane by 1 cm without significant subluxation. No  definite fracture extension into the posterior elements. No other  fractures.    Severe facet arthropathy with prominent hypertrophic changes in the  lower lumbar spine and multilevel facet ankylosis at L2-L5. Moderate  to severe spinal canal stenosis at L3-L4 and severe spinal canal  stenosis at L4-L5. Moderate to severe bilateral foraminal stenoses at  L4-L5 and mild to moderate foraminal stenoses at other levels.    Please see dedicated on abdomen/pelvis CT report regarding  intra-abdominal findings.    CONCLUSION:  THORACIC SPINE CT:  1.  No fracture or post-traumatic subluxation.  2.  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  3.  Multilevel moderate to severe foraminal stenoses within the mid to  lower thoracic spine.    LUMBAR SPINE CT:  1.  Diffuse spinal ankylosis secondary to bulky bridging osteophytes  throughout the thoracolumbar spine.  2.  Acute obliquely oriented fracture through the ventral osteophyte  at L1 propagating through the posterior inferior L1 vertebral body  with involvement of the inferior endplate. Mild diastasis along the  fracture plane anteriorly without significant subluxation. Spine  surgery consultation recommended.  3.  Severe spinal canal stenosis L4-L5 and moderate to severe spinal  canal stenosis L3-L4. Moderate to severe foraminal stenosis at L4-L5  and mild to moderate foraminal stenosis at other levels.    The results were communicated to Dr. Salinas at 3:55 PM on 6/6/2022.    CALEB BULL MD         SYSTEM ID:  [id number]   CT Thoracic Spine w/o Contrast     Narrative    CT OF THE THORACIC AND LUMBAR SPINE WITHOUT CONTRAST     6/6/2022 3:28 PM     COMPARISON: None.    HISTORY: Spine fracture, thoracic, traumatic.     TECHNIQUE: Axial images of the thoracic and lumbar spine were acquired  without intravenous contrast. Multiplanar reformations were created.      FINDINGS:   THORACIC SPINE CT:   Normal alignment. Normal vertebral body heights. Bulky bridging  osteophytes throughout the thoracic spine consistent with diffuse  idiopathic skeletal hyperostosis, resulting in spine ankylosis. No  fracture or posttraumatic subluxation. Moderate to severe foraminal  stenoses greatest at T9-T10 and T10-T11 on the right. No high-grade  spinal canal stenosis.    Please see dedicated chest CT regarding intrathoracic findings.    LUMBAR SPINE CT:   Bulky bridging osteophytes throughout the lumbar spine consistent with  diffuse idiopathic skeletal hyperostosis, and resulting in spinal  ankylosis. Mild anterior spondylolisthesis at L3-L4 and L4-L5.  Otherwise normal alignment. Acute obliquely-oriented fracture  extending from the anterior superior osteophyte at L1 and propagating  through the posterior inferior cortex and inferior endplate of L1,  likely involving the L1-L2 disc space. There is diastasis along the  anterior fracture plane by 1 cm without significant subluxation. No  definite fracture extension into the posterior elements. No other  fractures.    Severe facet arthropathy with prominent hypertrophic changes in the  lower lumbar spine and multilevel facet ankylosis at L2-L5. Moderate  to severe spinal canal stenosis at L3-L4 and severe spinal canal  stenosis at L4-L5. Moderate to severe bilateral foraminal stenoses at  L4-L5 and mild to moderate foraminal stenoses at other levels.    Please see dedicated on abdomen/pelvis CT report regarding  intra-abdominal findings.    CONCLUSION:  THORACIC SPINE CT:  1.  No fracture or post-traumatic subluxation.  2.  Diffuse spinal  ankylosis secondary to diffuse idiopathic skeletal  hyperostosis.  3.  Multilevel moderate to severe foraminal stenoses within the mid to  lower thoracic spine.    LUMBAR SPINE CT:  1.  Diffuse spinal ankylosis secondary to bulky bridging osteophytes  throughout the thoracolumbar spine.  2.  Acute obliquely oriented fracture through the ventral osteophyte  at L1 propagating through the posterior inferior L1 vertebral body  with involvement of the inferior endplate. Mild diastasis along the  fracture plane anteriorly without significant subluxation. Spine  surgery consultation recommended.  3.  Severe spinal canal stenosis L4-L5 and moderate to severe spinal  canal stenosis L3-L4. Moderate to severe foraminal stenosis at L4-L5  and mild to moderate foraminal stenosis at other levels.    The results were communicated to Dr. Salinas at 3:55 PM on 6/6/2022.    CALEB BULL MD         SYSTEM ID:  [id number]   CT Chest/Abdomen/Pelvis w Contrast   Result Value    Radiologist flags Unstable lumbar fracture (AA)    Narrative    CT CHEST/ABDOMEN/PELVIS WITH CONTRAST 6/6/2022 3:29 PM    CLINICAL HISTORY: Flank and back pain following fall    TECHNIQUE: CT scan of the chest, abdomen, and pelvis was performed  following injection of IV contrast. Multiplanar reformats were  obtained. Dose reduction techniques were used.   CONTRAST: 100 mL [id number]    COMPARISON: 12/27/2017, 3/19/2015    FINDINGS:   LUNGS AND PLEURA: Small right pleural effusion. No pneumothorax. There  are two pulmonary nodules in the left lower lobe that measure 0.8 cm  each (series 6, images 195 and 199) that are unchanged. Mild bibasilar  atelectasis.    MEDIASTINUM/AXILLAE: Thoracic aorta is unremarkable. Thyroid and  esophagus appear normal. No thoracic or axillary adenopathy.    CORONARY ARTERY CALCIFICATION: Mild.    HEPATOBILIARY: No focal liver lesions. The gallbladder is absent.    PANCREAS: Normal.    SPLEEN: Normal.    ADRENAL GLANDS:  Normal.    KIDNEYS/BLADDER: Normal.    BOWEL: Sigmoid diverticulosis without evidence of diverticulitis. No  bowel obstruction or free air. No ascites.    PELVIC ORGANS: Normal.    ADDITIONAL FINDINGS: None.    MUSCULOSKELETAL: Bilateral hip replacements. There is an acute  fracture traversing the L1 vertebral body with mild displacement  (series 9, images 86-97 and series 5, image 161).      Impression    IMPRESSION:  1.  L1 vertebral body fracture appears unstable.  Orthopedic/neurosurgical consult recommended.  2.  Small right pleural effusion.  3.  Stable pulmonary nodules.      [Critical Result: Unstable lumbar fracture]    Finding was identified on 6/6/2022 3:42 PM.     Dr. Salinas was contacted by me on 6/6/2022 3:54 PM and verbalized  understanding of the critical result.     HALLE MOYER MD         SYSTEM ID:  [id number]   Head CT w/o contrast    Narrative    CT SCAN OF THE HEAD WITHOUT CONTRAST   6/6/2022 3:38 PM     HISTORY: Head trauma, minor (Age >= 65 years).    TECHNIQUE:  Axial images of the head and coronal reformations without  IV contrast material. Radiation dose for this scan was reduced using  automated exposure control, adjustment of the mA and/or kV according  to patient size, or iterative reconstruction technique.    COMPARISON: None.    FINDINGS: There is no evidence of intracranial hemorrhage, mass, acute  infarct or anomaly. The ventricles are normal in size, shape and  configuration. Mild diffuse parenchymal volume loss. Mild patchy  periventricular white matter hypodensities which are nonspecific, but  likely related to chronic microvascular ischemic disease. Scattered  atherosclerotic calcifications.    Bilateral lens implants. Near complete opacification of the right  maxillary sinus that is presumably due to mucosal thickening.  Hyperostosis of the walls of the right maxillary sinus, suggesting  some element of chronic right maxillary sinusitis. Mild to moderate  mucosal thickening in  the sphenoid sinuses with layering fluid/aerated  secretions as well as small volume secretions noted in the right  frontal and right posterior ethmoid air cells, with mild mucosal  thickening. The bony calvarium and bones of the skull base appear  intact.       Impression    IMPRESSION:  1. No CT findings of acute intracranial process.  2. Brain atrophy and presumed chronic small vessel ischemic changes,  as described.  3. Paranasal sinus disease, as described, including near complete  opacification of the right maxillary sinus as well as scattered  aerated/layering secretions in the right frontal, right posterior  ethmoid, and bilateral sphenoid sinuses. Please correlate clinically  for possible symptoms/signs of acute or acute on chronic sinusitis.    GORDON URIAS MD         SYSTEM ID:  [id number]   Cervical spine CT w/o contrast    Narrative    CT CERVICAL SPINE WITHOUT CONTRAST   6/6/2022 3:38 PM     HISTORY: Neck trauma (age >= 65 years). Fall, neck pain.     TECHNIQUE: Axial images of the cervical spine were obtained without  intravenous contrast. Multiplanar reformations were performed.   Radiation dose for this scan was reduced using automated exposure  control, adjustment of the mA and/or kV according to patient size, or  iterative reconstruction technique.    COMPARISON: None.    FINDINGS: No acute cervical spine fracture is identified.  Straightening of the normal cervical lordosis. Minimal dorsal convex  curvature of the lower cervical spine and minimal levoconvex curvature  of the partially visualized upper thoracic spine. Flowing  ossifications along the anterior aspect of the vertebral column  extending from C2 down to the upper thoracic region, compatible with  diffuse idiopathic skeletal hyperostosis. There also appears to be  multilevel facet joint fusion, at least bilaterally at C3-C4 and  possibly on the right at C2-C3.    Multilevel disc osteophyte complexes are present. There appears to  be  at least moderate spinal canal stenosis at C3-C4, C5-C6 and C6-C7 with  lesser degrees of spinal canal narrowing elsewhere. Varying degrees of  multilevel neural foraminal stenosis, including at least moderate  bilateral C3-C4 neural foraminal stenosis, moderate right C5-C6 neural  foraminal stenosis, moderate bilateral C6-C7 neural foraminal  stenosis, and moderate right C7-T1 neural foraminal stenosis.    Bilateral carotid bifurcation atherosclerotic calcifications. The  visualized paravertebral soft tissues otherwise appear grossly  unremarkable.      Impression    IMPRESSION:  1. No acute fracture or posttraumatic malalignment of the cervical  spine.  2. Diffuse idiopathic skeletal hyperostosis.  3. Multilevel degenerative changes, as described.    GORDON URIAS MD         SYSTEM ID:  [id number]           LABS:   Last Comprehensive Metabolic Panel:  Sodium   Date Value Ref Range Status   06/06/2022 142 133 - 144 mmol/L Final   12/30/2017 138 133 - 144 mmol/L Final     Potassium   Date Value Ref Range Status   06/06/2022 4.4 3.4 - 5.3 mmol/L Final     Comment:     Specimen slightly hemolyzed, potassium may be falsely elevated.   12/31/2017 3.5 3.4 - 5.3 mmol/L Final     Chloride   Date Value Ref Range Status   06/06/2022 108 94 - 109 mmol/L Final   12/30/2017 104 94 - 109 mmol/L Final     Carbon Dioxide   Date Value Ref Range Status   12/30/2017 26 20 - 32 mmol/L Final     Carbon Dioxide (CO2)   Date Value Ref Range Status   06/06/2022 21 20 - 32 mmol/L Final     Anion Gap   Date Value Ref Range Status   06/06/2022 13 3 - 14 mmol/L Final   12/30/2017 8 3 - 14 mmol/L Final     Glucose   Date Value Ref Range Status   06/06/2022 185 (H) 70 - 99 mg/dL Final   12/30/2017 145 (H) 70 - 99 mg/dL Final     Urea Nitrogen   Date Value Ref Range Status   06/06/2022 20 7 - 30 mg/dL Final   12/31/2017 26 7 - 30 mg/dL Final     Creatinine   Date Value Ref Range Status   06/06/2022 1.07 0.66 - 1.25 mg/dL Final   12/31/2017  1.11 0.66 - 1.25 mg/dL Final     GFR Estimate   Date Value Ref Range Status   06/06/2022 67 >60 mL/min/1.73m2 Final     Comment:     Effective December 21, 2021 eGFRcr in adults is calculated using the 2021 CKD-EPI creatinine equation which includes age and gender (Sofia mansfield al., [place], DOI: 10.1056/LWYSzk3309427)   12/31/2017 63 >60 mL/min/1.7m2 Final     Comment:     Non  GFR Calc     Calcium   Date Value Ref Range Status   06/06/2022 9.6 8.5 - 10.1 mg/dL Final   12/30/2017 7.9 (L) 8.5 - 10.1 mg/dL Final     Lab Results   Component Value Date    WBC 11.4 06/06/2022    WBC 9.0 12/30/2017     Lab Results   Component Value Date    RBC 5.52 06/06/2022    RBC 4.16 12/30/2017     Lab Results   Component Value Date    HGB 16.2 06/06/2022    HGB 12.6 12/30/2017     Lab Results   Component Value Date    HCT 48.4 06/06/2022    HCT 37.1 12/30/2017     Lab Results   Component Value Date    MCV 88 06/06/2022    MCV 89 12/30/2017     Lab Results   Component Value Date    MCH 29.3 06/06/2022    MCH 30.3 12/30/2017     Lab Results   Component Value Date    MCHC 33.5 06/06/2022    MCHC 34.0 12/30/2017     Lab Results   Component Value Date    RDW 14.5 06/06/2022    RDW 13.8 12/30/2017     Lab Results   Component Value Date     06/06/2022     12/30/2017     INR   Date Value Ref Range Status   06/06/2022 1.59 (H) 0.85 - 1.15 Final      aPTT   Date Value Ref Range Status   06/06/2022 31 22 - 38 Seconds Final      @Fibrinogen1@    ASSESSMENT: 88-year-old male, atrial fibrillation on Eliquis, presenting after mechanical fall found to have a obliquely oriented linear fracture through L1 vertebral body-does not appear to extend to posterior elements.  Severe lumbar area back pain. He is neurologically normal.  Will need further MRI imaging to determine posterior element involvement and to rule out ligamentous injury.  Conservative management with bracing and serial imaging may suffice if posterior elements  appear to be intact.    Clinically Significant Risk Factors Present on Admission              # Coagulation Defect: home medication list includes an anticoagulant medication          RECOMMENDATIONS:  No neurosurgical intervention indicated at this time   Brace for now-TLSO  Upright XR        Rubina Liang MD  Neurosurgery Resident, PGY-2    The patient was will be discussed with staff in a.m.    I have reviewed the history above and agree with the resident's assessment and plan.  CHRISTIAN Eduardo MD

## 2022-06-07 NOTE — PLAN OF CARE
Neuro: A&Ox4. Follows commands. Moving all extremities, some difficulty with moving upper extremities due to shoulder pain, some difficulty with movement in lower extremities due to back pain. Strict bedrest with flat HOB  Cardiac: Atrial fibrillation with PVCs. Fluctuating -170, systolic blood pressure has been less than 180. Afebrile  Respiratory: Lung sounds clear. On 4L nasal cannula  GI: NPO except for meds, BS +  : Voiding spontaneously via bedside urinal    Plan: Pain management. Continue to follow care plan  For vital signs and complete assessments, please see documentation flowsheets.     Admitted/transferred from: ED at approximately 0300  Reason for admission/transfer: Patient will stay on 4A until 6B bed is available  2 RN skin assessment: completed by Lizbeth RN, Asiya RN  Result of skin assessment and interventions/actions: Preventative sacral mepilex to coccyx. Bruising/abrasion to RUE. Rash/redness to R chest/skinfold  Height, weight, drug calc weight: Done  Patient belongings (see Flowsheet)    ?

## 2022-06-07 NOTE — PROVIDER NOTIFICATION
Trauma notified HR up to 160s with A-fib and PVCs. Per NP, no further action at this time and continue to monitor.

## 2022-06-07 NOTE — ED PROVIDER NOTES
"ED Provider Note  [facility]      History     Chief Complaint   Patient presents with     Fall     Back Pain     HPI  Alvin Newton is a 88 year old male with a PMH of DM2, HTN, HLD, NSTEMI, colonic diverticulum, and BPH with urinary obstruction who presents to the ED today after a fall reporting back pain.  Patient reports that he was standing on a high barstool this morning at around 11 AM when he missed a step, falling backwards and striking his back on another chair before falling to the ground.  He states he could not get up or walk after the fall.  He reports that he did not lose consciousness.  He does note that his wife stated that he hit his head, but he \"does not feel it\".  Here in the ED he endorses back pain.  Patient denies neck pain, chest pain, abdominal pain, numbness or tingling in toes or any leg weakness.    Patient is a [facility] transfer with known L1 fracture after a fall.  0.6 mg Dilaudid was given en route.    CT OF THE THORACIC AND LUMBAR SPINE WITHOUT CONTRAST 6/6/2022 3:28 PM   COMPARISON: None.  HISTORY: Spine fracture, thoracic, traumatic.  IMPRESSIONS:  THORACIC SPINE CT:  1.  No fracture or post-traumatic subluxation.  2.  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  3.  Multilevel moderate to severe foraminal stenoses within the mid to lower thoracic spine.  LUMBAR SPINE CT:  1.  Diffuse spinal ankylosis secondary to bulky bridging osteophytes  throughout the thoracolumbar spine.  2.  Acute obliquely oriented fracture through the ventral osteophyte  at L1 propagating through the posterior inferior L1 vertebral body  with involvement of the inferior endplate. Mild diastasis along the  fracture plane anteriorly without significant subluxation. Spine  surgery consultation recommended.  3.  Severe spinal canal stenosis L4-L5 and moderate to severe spinal canal stenosis L3-L4. Moderate to severe foraminal stenosis at " L4-L5 and mild to moderate foraminal stenosis at other levels.    Past Medical History  Past Medical History:   Diagnosis Date     Colonic diverticulum      Hyperlipidemia      Hypertension      Obesity (BMI 30-39.9)      Osteoarthritis      Type 2 diabetes mellitus (H)      Past Surgical History:   Procedure Laterality Date     ARTHROPLASTY HIP BILATERAL  1987     HC ESOPHAGOSCOPY, DIAGNOSTIC  2003     LAPAROSCOPIC CHOLECYSTECTOMY N/A 12/28/2017    Procedure: LAPAROSCOPIC CHOLECYSTECTOMY;  LAPAROSCOPIC CHOLECYSTECTOMY;  Surgeon: Heber Gonzalez MD;  Location: [facility] OR     TRANSRECTAL ULTRASONIC, TRANSURETHRAL RESECTION (TUR) OF PROSTATE CYST  1990     acetaminophen (TYLENOL) 500 MG tablet  atorvastatin (LIPITOR) 20 MG tablet  cholecalciferol 50 MCG (2000 UT) CAPS  ELIQUIS ANTICOAGULANT 5 MG tablet  furosemide (LASIX) 20 MG tablet  metoprolol succinate ER (TOPROL XL) 100 MG 24 hr tablet  Multiple Vitamin (MULTIVITAMINS PO)  naproxen (NAPROSYN) 500 MG tablet      No Known Allergies  Family History  History reviewed. No pertinent family history.  Social History   Social History     Tobacco Use     Smoking status: Never Smoker     Smokeless tobacco: Never Used   Substance Use Topics     Alcohol use: Yes     Comment: 0-1 beer daily     Drug use: No      Past medical history, past surgical history, medications, allergies, family history, and social history were reviewed with the patient. No additional pertinent items.       Review of Systems   Constitutional: Negative for fever.   HENT: Negative for congestion.    Eyes: Negative for redness.   Respiratory: Negative for shortness of breath.    Cardiovascular: Negative for chest pain.   Gastrointestinal: Negative for abdominal pain.   Endocrine: Negative for polyuria.   Genitourinary: Negative for difficulty urinating.   Musculoskeletal: Positive for back pain. Negative for arthralgias and neck stiffness.   Skin: Negative for color change.   Allergic/Immunologic:  Negative for immunocompromised state.   Neurological: Negative for weakness, numbness and headaches.   Hematological: Bruises/bleeds easily.   Psychiatric/Behavioral: Negative for confusion.     A complete review of systems was performed with pertinent positives and negatives noted in the HPI, and all other systems negative.    Physical Exam       Physical Exam  General: Afebrile, moderate distress 2/2 to pain   HEENT: Normocephalic, atraumatic, conjunctivae normal. MMM  Neck: no cervical collar present, no midline TTP, full AROM without pain or tenderness  Cardio: regular rate. regular rhythm   Resp: Normal work of breathing, no respiratory distress, lungs clear bilaterally, no wheezing, rhonchi, rales  Chest/Back: deferred due to known unstable L1 fracture   Abdomen: soft, non distension, no tenderness, no peritoneal signs   Neuro: alert and fully oriented. CN II-XII  Intact. Normal strength and sensation in all extremities.   MSK: no deformities. Normal range of motion  Integumentary/Skin: no rash visualized, normal color  Psych: normal affect, normal behavior    ED Course     8:20 PM  The patient was seen and examined by Louise Norton MD in Room ED17.     Procedures     .Trauma:  Level of trauma activation: Trauma evaluation (consult) called at 2030  Full Primary and Secondary survey with appropriate immobilization of spine completed in exam section.  C-collar and immobilization: clear prior to arrival  CSpine Clearance: C spine cleared clinically  GCS at arrival: 15  GCS at disposition: unchanged  Consults prior to admission or transfer: Neurosurgery called at 2030  Procedures done in the ED: none  Disposition: Admit. Admission ordered at 2155 PM    Results for orders placed or performed during the hospital encounter of 06/06/22   Head CT w/o contrast     Status: None    Narrative    CT SCAN OF THE HEAD WITHOUT CONTRAST   6/6/2022 3:38 PM     HISTORY: Head trauma, minor (Age >= 65 years).    TECHNIQUE:   Axial images of the head and coronal reformations without  IV contrast material. Radiation dose for this scan was reduced using  automated exposure control, adjustment of the mA and/or kV according  to patient size, or iterative reconstruction technique.    COMPARISON: None.    FINDINGS: There is no evidence of intracranial hemorrhage, mass, acute  infarct or anomaly. The ventricles are normal in size, shape and  configuration. Mild diffuse parenchymal volume loss. Mild patchy  periventricular white matter hypodensities which are nonspecific, but  likely related to chronic microvascular ischemic disease. Scattered  atherosclerotic calcifications.    Bilateral lens implants. Near complete opacification of the right  maxillary sinus that is presumably due to mucosal thickening.  Hyperostosis of the walls of the right maxillary sinus, suggesting  some element of chronic right maxillary sinusitis. Mild to moderate  mucosal thickening in the sphenoid sinuses with layering fluid/aerated  secretions as well as small volume secretions noted in the right  frontal and right posterior ethmoid air cells, with mild mucosal  thickening. The bony calvarium and bones of the skull base appear  intact.       Impression    IMPRESSION:  1. No CT findings of acute intracranial process.  2. Brain atrophy and presumed chronic small vessel ischemic changes,  as described.  3. Paranasal sinus disease, as described, including near complete  opacification of the right maxillary sinus as well as scattered  aerated/layering secretions in the right frontal, right posterior  ethmoid, and bilateral sphenoid sinuses. Please correlate clinically  for possible symptoms/signs of acute or acute on chronic sinusitis.    GORDON URIAS MD         SYSTEM ID:  [id number]   Cervical spine CT w/o contrast     Status: None    Narrative    CT CERVICAL SPINE WITHOUT CONTRAST   6/6/2022 3:38 PM     HISTORY: Neck trauma (age >= 65 years). Fall, neck pain.      TECHNIQUE: Axial images of the cervical spine were obtained without  intravenous contrast. Multiplanar reformations were performed.   Radiation dose for this scan was reduced using automated exposure  control, adjustment of the mA and/or kV according to patient size, or  iterative reconstruction technique.    COMPARISON: None.    FINDINGS: No acute cervical spine fracture is identified.  Straightening of the normal cervical lordosis. Minimal dorsal convex  curvature of the lower cervical spine and minimal levoconvex curvature  of the partially visualized upper thoracic spine. Flowing  ossifications along the anterior aspect of the vertebral column  extending from C2 down to the upper thoracic region, compatible with  diffuse idiopathic skeletal hyperostosis. There also appears to be  multilevel facet joint fusion, at least bilaterally at C3-C4 and  possibly on the right at C2-C3.    Multilevel disc osteophyte complexes are present. There appears to be  at least moderate spinal canal stenosis at C3-C4, C5-C6 and C6-C7 with  lesser degrees of spinal canal narrowing elsewhere. Varying degrees of  multilevel neural foraminal stenosis, including at least moderate  bilateral C3-C4 neural foraminal stenosis, moderate right C5-C6 neural  foraminal stenosis, moderate bilateral C6-C7 neural foraminal  stenosis, and moderate right C7-T1 neural foraminal stenosis.    Bilateral carotid bifurcation atherosclerotic calcifications. The  visualized paravertebral soft tissues otherwise appear grossly  unremarkable.      Impression    IMPRESSION:  1. No acute fracture or posttraumatic malalignment of the cervical  spine.  2. Diffuse idiopathic skeletal hyperostosis.  3. Multilevel degenerative changes, as described.    GORDON URIAS MD         SYSTEM ID:  [id number]   CT Chest/Abdomen/Pelvis w Contrast     Status: Abnormal   Result Value Ref Range    Radiologist flags Unstable lumbar fracture (AA)     Narrative    CT  CHEST/ABDOMEN/PELVIS WITH CONTRAST 6/6/2022 3:29 PM    CLINICAL HISTORY: Flank and back pain following fall    TECHNIQUE: CT scan of the chest, abdomen, and pelvis was performed  following injection of IV contrast. Multiplanar reformats were  obtained. Dose reduction techniques were used.   CONTRAST: 100 mL [id number]    COMPARISON: 12/27/2017, 3/19/2015    FINDINGS:   LUNGS AND PLEURA: Small right pleural effusion. No pneumothorax. There  are two pulmonary nodules in the left lower lobe that measure 0.8 cm  each (series 6, images 195 and 199) that are unchanged. Mild bibasilar  atelectasis.    MEDIASTINUM/AXILLAE: Thoracic aorta is unremarkable. Thyroid and  esophagus appear normal. No thoracic or axillary adenopathy.    CORONARY ARTERY CALCIFICATION: Mild.    HEPATOBILIARY: No focal liver lesions. The gallbladder is absent.    PANCREAS: Normal.    SPLEEN: Normal.    ADRENAL GLANDS: Normal.    KIDNEYS/BLADDER: Normal.    BOWEL: Sigmoid diverticulosis without evidence of diverticulitis. No  bowel obstruction or free air. No ascites.    PELVIC ORGANS: Normal.    ADDITIONAL FINDINGS: None.    MUSCULOSKELETAL: Bilateral hip replacements. There is an acute  fracture traversing the L1 vertebral body with mild displacement  (series 9, images 86-97 and series 5, image 161).      Impression    IMPRESSION:  1.  L1 vertebral body fracture appears unstable.  Orthopedic/neurosurgical consult recommended.  2.  Small right pleural effusion.  3.  Stable pulmonary nodules.      [Critical Result: Unstable lumbar fracture]    Finding was identified on 6/6/2022 3:42 PM.     Dr. Salinas was contacted by me on 6/6/2022 3:54 PM and verbalized  understanding of the critical result.     HALLE MOYER MD         SYSTEM ID:  [id number]   CT Thoracic Spine w/o Contrast     Status: None    Narrative    CT OF THE THORACIC AND LUMBAR SPINE WITHOUT CONTRAST     6/6/2022 3:28 PM     COMPARISON: None.    HISTORY: Spine fracture, thoracic,  traumatic.     TECHNIQUE: Axial images of the thoracic and lumbar spine were acquired  without intravenous contrast. Multiplanar reformations were created.      FINDINGS:   THORACIC SPINE CT:   Normal alignment. Normal vertebral body heights. Bulky bridging  osteophytes throughout the thoracic spine consistent with diffuse  idiopathic skeletal hyperostosis, resulting in spine ankylosis. No  fracture or posttraumatic subluxation. Moderate to severe foraminal  stenoses greatest at T9-T10 and T10-T11 on the right. No high-grade  spinal canal stenosis.    Please see dedicated chest CT regarding intrathoracic findings.    LUMBAR SPINE CT:   Bulky bridging osteophytes throughout the lumbar spine consistent with  diffuse idiopathic skeletal hyperostosis, and resulting in spinal  ankylosis. Mild anterior spondylolisthesis at L3-L4 and L4-L5.  Otherwise normal alignment. Acute obliquely-oriented fracture  extending from the anterior superior osteophyte at L1 and propagating  through the posterior inferior cortex and inferior endplate of L1,  likely involving the L1-L2 disc space. There is diastasis along the  anterior fracture plane by 1 cm without significant subluxation. No  definite fracture extension into the posterior elements. No other  fractures.    Severe facet arthropathy with prominent hypertrophic changes in the  lower lumbar spine and multilevel facet ankylosis at L2-L5. Moderate  to severe spinal canal stenosis at L3-L4 and severe spinal canal  stenosis at L4-L5. Moderate to severe bilateral foraminal stenoses at  L4-L5 and mild to moderate foraminal stenoses at other levels.    Please see dedicated on abdomen/pelvis CT report regarding  intra-abdominal findings.    CONCLUSION:  THORACIC SPINE CT:  1.  No fracture or post-traumatic subluxation.  2.  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  3.  Multilevel moderate to severe foraminal stenoses within the mid to  lower  thoracic spine.    LUMBAR SPINE CT:  1.  Diffuse spinal ankylosis secondary to bulky bridging osteophytes  throughout the thoracolumbar spine.  2.  Acute obliquely oriented fracture through the ventral osteophyte  at L1 propagating through the posterior inferior L1 vertebral body  with involvement of the inferior endplate. Mild diastasis along the  fracture plane anteriorly without significant subluxation. Spine  surgery consultation recommended.  3.  Severe spinal canal stenosis L4-L5 and moderate to severe spinal  canal stenosis L3-L4. Moderate to severe foraminal stenosis at L4-L5  and mild to moderate foraminal stenosis at other levels.    The results were communicated to Dr. Salinas at 3:55 PM on 6/6/2022.    CALEB BULL MD         SYSTEM ID:  [id number]   Lumbar spine CT w/o contrast     Status: None    Narrative    CT OF THE THORACIC AND LUMBAR SPINE WITHOUT CONTRAST     6/6/2022 3:28 PM     COMPARISON: None.    HISTORY: Spine fracture, thoracic, traumatic.     TECHNIQUE: Axial images of the thoracic and lumbar spine were acquired  without intravenous contrast. Multiplanar reformations were created.      FINDINGS:   THORACIC SPINE CT:   Normal alignment. Normal vertebral body heights. Bulky bridging  osteophytes throughout the thoracic spine consistent with diffuse  idiopathic skeletal hyperostosis, resulting in spine ankylosis. No  fracture or posttraumatic subluxation. Moderate to severe foraminal  stenoses greatest at T9-T10 and T10-T11 on the right. No high-grade  spinal canal stenosis.    Please see dedicated chest CT regarding intrathoracic findings.    LUMBAR SPINE CT:   Bulky bridging osteophytes throughout the lumbar spine consistent with  diffuse idiopathic skeletal hyperostosis, and resulting in spinal  ankylosis. Mild anterior spondylolisthesis at L3-L4 and L4-L5.  Otherwise normal alignment. Acute obliquely-oriented fracture  extending from the anterior superior osteophyte at L1 and  propagating  through the posterior inferior cortex and inferior endplate of L1,  likely involving the L1-L2 disc space. There is diastasis along the  anterior fracture plane by 1 cm without significant subluxation. No  definite fracture extension into the posterior elements. No other  fractures.    Severe facet arthropathy with prominent hypertrophic changes in the  lower lumbar spine and multilevel facet ankylosis at L2-L5. Moderate  to severe spinal canal stenosis at L3-L4 and severe spinal canal  stenosis at L4-L5. Moderate to severe bilateral foraminal stenoses at  L4-L5 and mild to moderate foraminal stenoses at other levels.    Please see dedicated on abdomen/pelvis CT report regarding  intra-abdominal findings.    CONCLUSION:  THORACIC SPINE CT:  1.  No fracture or post-traumatic subluxation.  2.  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  3.  Multilevel moderate to severe foraminal stenoses within the mid to  lower thoracic spine.    LUMBAR SPINE CT:  1.  Diffuse spinal ankylosis secondary to bulky bridging osteophytes  throughout the thoracolumbar spine.  2.  Acute obliquely oriented fracture through the ventral osteophyte  at L1 propagating through the posterior inferior L1 vertebral body  with involvement of the inferior endplate. Mild diastasis along the  fracture plane anteriorly without significant subluxation. Spine  surgery consultation recommended.  3.  Severe spinal canal stenosis L4-L5 and moderate to severe spinal  canal stenosis L3-L4. Moderate to severe foraminal stenosis at L4-L5  and mild to moderate foraminal stenosis at other levels.    The results were communicated to Dr. Salinas at 3:55 PM on 6/6/2022.    CALEB BULL MD         SYSTEM ID:  [id number]   Comprehensive metabolic panel     Status: Abnormal   Result Value Ref Range    Sodium 142 133 - 144 mmol/L    Potassium 4.4 3.4 - 5.3 mmol/L    Chloride 108 94 - 109 mmol/L    Carbon Dioxide (CO2)  21 20 - 32 mmol/L    Anion Gap 13 3 - 14 mmol/L    Urea Nitrogen 20 7 - 30 mg/dL    Creatinine 1.07 0.66 - 1.25 mg/dL    Calcium 9.6 8.5 - 10.1 mg/dL    Glucose 185 (H) 70 - 99 mg/dL    Alkaline Phosphatase 128 40 - 150 U/L    AST 30 0 - 45 U/L    ALT 22 0 - 70 U/L    Protein Total 7.7 6.8 - 8.8 g/dL    Albumin 3.6 3.4 - 5.0 g/dL    Bilirubin Total 2.3 (H) 0.2 - 1.3 mg/dL    GFR Estimate 67 >60 mL/min/1.73m2   CBC with platelets and differential     Status: Abnormal   Result Value Ref Range    WBC Count 11.4 (H) 4.0 - 11.0 10e3/uL    RBC Count 5.52 4.40 - 5.90 10e6/uL    Hemoglobin 16.2 13.3 - 17.7 g/dL    Hematocrit 48.4 40.0 - 53.0 %    MCV 88 78 - 100 fL    MCH 29.3 26.5 - 33.0 pg    MCHC 33.5 31.5 - 36.5 g/dL    RDW 14.5 10.0 - 15.0 %    Platelet Count 206 150 - 450 10e3/uL    % Neutrophils 47 %    % Lymphocytes 40 %    % Monocytes 10 %    % Eosinophils 1 %    % Basophils 1 %    % Immature Granulocytes 1 %    NRBCs per 100 WBC 0 <1 /100    Absolute Neutrophils 5.4 1.6 - 8.3 10e3/uL    Absolute Lymphocytes 4.5 0.8 - 5.3 10e3/uL    Absolute Monocytes 1.2 0.0 - 1.3 10e3/uL    Absolute Eosinophils 0.2 0.0 - 0.7 10e3/uL    Absolute Basophils 0.1 0.0 - 0.2 10e3/uL    Absolute Immature Granulocytes 0.1 <=0.4 10e3/uL    Absolute NRBCs 0.0 10e3/uL   Ethyl Alcohol Level     Status: Normal   Result Value Ref Range    Alcohol ethyl <0.01 <=0.01 g/dL   Asymptomatic COVID-19 Virus (Coronavirus) by PCR Nose     Status: Normal    Specimen: Nose; Swab   Result Value Ref Range    SARS CoV2 PCR Negative Negative    Narrative    Testing was performed using the annette  SARS-CoV-2 & Influenza A/B Assay on the annette  Pilar  System.  This test should be ordered for the detection of SARS-COV-2 in individuals who meet SARS-CoV-2 clinical and/or epidemiological criteria. Test performance is unknown in asymptomatic patients.  This test is for in vitro diagnostic use under the FDA EUA for laboratories certified under CLIA to perform moderate  and/or high complexity testing. This test has not been FDA cleared or approved.  A negative test does not rule out the presence of PCR inhibitors in the specimen or target RNA in concentration below the limit of detection for the assay. The possibility of a false negative should be considered if the patient's recent exposure or clinical presentation suggests COVID-19.  [facility] are certified under the Clinical Laboratory Improvement Amendments of 1988 (CLIA-88) as qualified to perform moderate and/or high complexity laboratory testing.   Partial thromboplastin time     Status: Normal   Result Value Ref Range    aPTT 31 22 - 38 Seconds   INR     Status: Abnormal   Result Value Ref Range    INR 1.59 (H) 0.85 - 1.15   Troponin I     Status: Normal   Result Value Ref Range    Troponin I High Sensitivity 31 <79 ng/L   Troponin I     Status: Normal   Result Value Ref Range    Troponin I High Sensitivity 30 <79 ng/L   CBC with platelets, differential     Status: Abnormal    Narrative    The following orders were created for panel order CBC with platelets, differential.  Procedure                               Abnormality         Status                     ---------                               -----------         ------                     CBC with platelets and d...[[id number]]  Abnormal            Final result                 Please view results for these tests on the individual orders.     Medications - No data to display     Assessments & Plan (with Medical Decision Making)   Alvin Newton is a 88 year old male with a PMH of DM2, HTN, HLD, NSTEMI, colonic diverticulum, and BPH with urinary obstruction who presents to the ED today after a fall reporting back pain; patient sent as a trauma transfer for further evaluation of an unstable L1 fracture.  Upon arrival patient patient is nontoxic-appearing, moderate distress secondary to pain.  Patient complains of pain in his back, no other complaints.  I  discussed the case with trauma team as well as neurosurgery who both evaluated patient emergency department.  Neurosurgery recommends MRI of the lumbar spine.  We will plan on admission to the trauma team for further evaluation, pain management, TLSO brace.  Patient understands and agrees with the plan.    I have reviewed the nursing notes. I have reviewed the findings, diagnosis, plan and need for follow up with the patient.    New Prescriptions    No medications on file       Final diagnoses:   None   I, Isaias Navarrete, am serving as a trained medical scribe to document services personally performed by Louise Norton MD, based on the provider's statements to me.     I, Louise Norton MD, was physically present and have reviewed and verified the accuracy of this note documented by Isaias Navarrete.      --  Louise Norton MD  [facility] EMERGENCY DEPARTMENT  6/6/2022     Louise Norton MD  06/07/22 0132

## 2022-06-08 LAB
ANION GAP SERPL CALCULATED.3IONS-SCNC: 5 MMOL/L (ref 3–14)
BUN SERPL-MCNC: 23 MG/DL (ref 7–30)
CALCIUM SERPL-MCNC: 9.2 MG/DL (ref 8.5–10.1)
CHLORIDE BLD-SCNC: 110 MMOL/L (ref 94–109)
CO2 SERPL-SCNC: 25 MMOL/L (ref 20–32)
CREAT SERPL-MCNC: 0.92 MG/DL (ref 0.66–1.25)
ERYTHROCYTE [DISTWIDTH] IN BLOOD BY AUTOMATED COUNT: 15.2 % (ref 10–15)
GFR SERPL CREATININE-BSD FRML MDRD: 80 ML/MIN/1.73M2
GLUCOSE BLD-MCNC: 161 MG/DL (ref 70–99)
GLUCOSE BLDC GLUCOMTR-MCNC: 150 MG/DL (ref 70–99)
GLUCOSE BLDC GLUCOMTR-MCNC: 163 MG/DL (ref 70–99)
GLUCOSE BLDC GLUCOMTR-MCNC: 175 MG/DL (ref 70–99)
GLUCOSE BLDC GLUCOMTR-MCNC: 192 MG/DL (ref 70–99)
HCT VFR BLD AUTO: 46.8 % (ref 40–53)
HGB BLD-MCNC: 14.9 G/DL (ref 13.3–17.7)
MAGNESIUM SERPL-MCNC: 2.4 MG/DL (ref 1.6–2.3)
MCH RBC QN AUTO: 29.4 PG (ref 26.5–33)
MCHC RBC AUTO-ENTMCNC: 31.8 G/DL (ref 31.5–36.5)
MCV RBC AUTO: 92 FL (ref 78–100)
PHOSPHATE SERPL-MCNC: 3 MG/DL (ref 2.5–4.5)
PHOSPHATE SERPL-MCNC: 3.3 MG/DL (ref 2.5–4.5)
PLATELET # BLD AUTO: 183 10E3/UL (ref 150–450)
POTASSIUM BLD-SCNC: 4.5 MMOL/L (ref 3.4–5.3)
RBC # BLD AUTO: 5.07 10E6/UL (ref 4.4–5.9)
SODIUM SERPL-SCNC: 140 MMOL/L (ref 133–144)
WBC # BLD AUTO: 10.4 10E3/UL (ref 4–11)

## 2022-06-08 PROCEDURE — L0486 TLSO RIGIDLINED CUST FAB TWO: HCPCS

## 2022-06-08 PROCEDURE — 36415 COLL VENOUS BLD VENIPUNCTURE: CPT | Performed by: NURSE PRACTITIONER

## 2022-06-08 PROCEDURE — 250N000013 HC RX MED GY IP 250 OP 250 PS 637: Performed by: NURSE PRACTITIONER

## 2022-06-08 PROCEDURE — 85014 HEMATOCRIT: CPT | Performed by: NURSE PRACTITIONER

## 2022-06-08 PROCEDURE — 250N000012 HC RX MED GY IP 250 OP 636 PS 637: Performed by: PHYSICIAN ASSISTANT

## 2022-06-08 PROCEDURE — 250N000013 HC RX MED GY IP 250 OP 250 PS 637: Performed by: STUDENT IN AN ORGANIZED HEALTH CARE EDUCATION/TRAINING PROGRAM

## 2022-06-08 PROCEDURE — 84132 ASSAY OF SERUM POTASSIUM: CPT | Performed by: NURSE PRACTITIONER

## 2022-06-08 PROCEDURE — 200N000002 HC R&B ICU UMMC

## 2022-06-08 PROCEDURE — 84100 ASSAY OF PHOSPHORUS: CPT | Performed by: NURSE PRACTITIONER

## 2022-06-08 PROCEDURE — 250N000011 HC RX IP 250 OP 636: Performed by: STUDENT IN AN ORGANIZED HEALTH CARE EDUCATION/TRAINING PROGRAM

## 2022-06-08 PROCEDURE — 99232 SBSQ HOSP IP/OBS MODERATE 35: CPT | Performed by: NURSE PRACTITIONER

## 2022-06-08 PROCEDURE — 250N000011 HC RX IP 250 OP 636: Performed by: NURSE PRACTITIONER

## 2022-06-08 PROCEDURE — 83735 ASSAY OF MAGNESIUM: CPT | Performed by: NURSE PRACTITIONER

## 2022-06-08 RX ORDER — ACETAMINOPHEN 325 MG/1
975 TABLET ORAL EVERY 8 HOURS
Status: DISCONTINUED | OUTPATIENT
Start: 2022-06-08 | End: 2022-06-20 | Stop reason: HOSPADM

## 2022-06-08 RX ORDER — BISACODYL 10 MG
10 SUPPOSITORY, RECTAL RECTAL DAILY PRN
Status: DISCONTINUED | OUTPATIENT
Start: 2022-06-08 | End: 2022-06-20 | Stop reason: HOSPADM

## 2022-06-08 RX ORDER — VITAMIN B COMPLEX
2000 TABLET ORAL DAILY
Status: DISCONTINUED | OUTPATIENT
Start: 2022-06-08 | End: 2022-06-20 | Stop reason: HOSPADM

## 2022-06-08 RX ORDER — CYCLOBENZAPRINE HCL 10 MG
10 TABLET ORAL EVERY 8 HOURS
Status: DISCONTINUED | OUTPATIENT
Start: 2022-06-08 | End: 2022-06-09

## 2022-06-08 RX ORDER — LIDOCAINE 4 G/G
1-2 PATCH TOPICAL
Status: DISCONTINUED | OUTPATIENT
Start: 2022-06-08 | End: 2022-06-20 | Stop reason: HOSPADM

## 2022-06-08 RX ORDER — POLYETHYLENE GLYCOL 3350 17 G/17G
17 POWDER, FOR SOLUTION ORAL 2 TIMES DAILY
Status: DISCONTINUED | OUTPATIENT
Start: 2022-06-08 | End: 2022-06-20 | Stop reason: HOSPADM

## 2022-06-08 RX ORDER — ATORVASTATIN CALCIUM 20 MG/1
20 TABLET, FILM COATED ORAL AT BEDTIME
Status: DISCONTINUED | OUTPATIENT
Start: 2022-06-08 | End: 2022-06-20 | Stop reason: HOSPADM

## 2022-06-08 RX ORDER — DEXTROSE MONOHYDRATE 25 G/50ML
25-50 INJECTION, SOLUTION INTRAVENOUS
Status: DISCONTINUED | OUTPATIENT
Start: 2022-06-08 | End: 2022-06-20 | Stop reason: HOSPADM

## 2022-06-08 RX ORDER — NICOTINE POLACRILEX 4 MG
15-30 LOZENGE BUCCAL
Status: DISCONTINUED | OUTPATIENT
Start: 2022-06-08 | End: 2022-06-20 | Stop reason: HOSPADM

## 2022-06-08 RX ORDER — METHOCARBAMOL 750 MG/1
750 TABLET, FILM COATED ORAL EVERY 6 HOURS PRN
Status: DISCONTINUED | OUTPATIENT
Start: 2022-06-08 | End: 2022-06-09

## 2022-06-08 RX ORDER — MULTIVITAMIN,THERAPEUTIC
TABLET ORAL DAILY
Status: DISCONTINUED | OUTPATIENT
Start: 2022-06-08 | End: 2022-06-20 | Stop reason: HOSPADM

## 2022-06-08 RX ORDER — FUROSEMIDE 20 MG
20 TABLET ORAL DAILY
Status: DISCONTINUED | OUTPATIENT
Start: 2022-06-08 | End: 2022-06-20 | Stop reason: HOSPADM

## 2022-06-08 RX ADMIN — ACETAMINOPHEN 650 MG: 325 TABLET, FILM COATED ORAL at 06:26

## 2022-06-08 RX ADMIN — POLYETHYLENE GLYCOL 3350 17 G: 17 POWDER, FOR SOLUTION ORAL at 20:00

## 2022-06-08 RX ADMIN — INSULIN ASPART 1 UNITS: 100 INJECTION, SOLUTION INTRAVENOUS; SUBCUTANEOUS at 13:15

## 2022-06-08 RX ADMIN — OXYCODONE HYDROCHLORIDE 5 MG: 5 TABLET ORAL at 09:27

## 2022-06-08 RX ADMIN — INSULIN ASPART 2 UNITS: 100 INJECTION, SOLUTION INTRAVENOUS; SUBCUTANEOUS at 17:39

## 2022-06-08 RX ADMIN — METOPROLOL SUCCINATE 100 MG: 50 TABLET, EXTENDED RELEASE ORAL at 07:46

## 2022-06-08 RX ADMIN — OXYCODONE HYDROCHLORIDE 5 MG: 5 TABLET ORAL at 06:26

## 2022-06-08 RX ADMIN — APIXABAN 5 MG: 5 TABLET, FILM COATED ORAL at 20:00

## 2022-06-08 RX ADMIN — FUROSEMIDE 20 MG: 20 TABLET ORAL at 09:24

## 2022-06-08 RX ADMIN — OXYCODONE HYDROCHLORIDE 5 MG: 5 TABLET ORAL at 13:25

## 2022-06-08 RX ADMIN — ACETAMINOPHEN 325MG 975 MG: 325 TABLET ORAL at 15:16

## 2022-06-08 RX ADMIN — METHOCARBAMOL 750 MG: 750 TABLET ORAL at 07:46

## 2022-06-08 RX ADMIN — SENNOSIDES AND DOCUSATE SODIUM 1 TABLET: 8.6; 5 TABLET ORAL at 07:46

## 2022-06-08 RX ADMIN — Medication 2000 UNITS: at 09:23

## 2022-06-08 RX ADMIN — CYCLOBENZAPRINE 10 MG: 10 TABLET, FILM COATED ORAL at 22:54

## 2022-06-08 RX ADMIN — POLYETHYLENE GLYCOL 3350 17 G: 17 POWDER, FOR SOLUTION ORAL at 07:46

## 2022-06-08 RX ADMIN — SENNOSIDES AND DOCUSATE SODIUM 1 TABLET: 8.6; 5 TABLET ORAL at 20:00

## 2022-06-08 RX ADMIN — LIDOCAINE PATCH 4% 1 PATCH: 40 PATCH TOPICAL at 09:24

## 2022-06-08 RX ADMIN — THERA TABS 1 TABLET: TAB at 09:24

## 2022-06-08 RX ADMIN — ENOXAPARIN SODIUM 40 MG: 40 INJECTION SUBCUTANEOUS at 13:24

## 2022-06-08 RX ADMIN — ACETAMINOPHEN 325MG 975 MG: 325 TABLET ORAL at 22:53

## 2022-06-08 RX ADMIN — HYDROMORPHONE HYDROCHLORIDE 0.5 MG: 1 INJECTION, SOLUTION INTRAMUSCULAR; INTRAVENOUS; SUBCUTANEOUS at 23:48

## 2022-06-08 RX ADMIN — METHOCARBAMOL 750 MG: 750 TABLET ORAL at 13:24

## 2022-06-08 RX ADMIN — CYCLOBENZAPRINE 10 MG: 10 TABLET, FILM COATED ORAL at 15:39

## 2022-06-08 RX ADMIN — HYDROMORPHONE HYDROCHLORIDE 0.5 MG: 1 INJECTION, SOLUTION INTRAMUSCULAR; INTRAVENOUS; SUBCUTANEOUS at 14:00

## 2022-06-08 RX ADMIN — ATORVASTATIN CALCIUM 20 MG: 20 TABLET, FILM COATED ORAL at 22:53

## 2022-06-08 ASSESSMENT — ACTIVITIES OF DAILY LIVING (ADL)
ADLS_ACUITY_SCORE: 46
ADLS_ACUITY_SCORE: 50
ADLS_ACUITY_SCORE: 50
ADLS_ACUITY_SCORE: 46
ADLS_ACUITY_SCORE: 50
ADLS_ACUITY_SCORE: 46

## 2022-06-08 NOTE — PROGRESS NOTES
"S: Pt seen bedside with 5 nurses present for TLSO fitting/delivery and instructions today. Patient reports he is having much pain during the donning of the Brace and 1/2 hr after given pain medication. O: I see the EPIC order for said TLSO due to unstable L-1 Fx. Patient is conscious. A: I fit the TLSO and delivered the custom TLSO while laying supine. The instructions were given to the patient, and nurse. They seemed to understand. P: FU PRN. The goal is to stabilize the spinal column.    Fitting a Gage TLSO  1. Patient should be supine. Palpate for waist (below ribs but above hips) so you know where to line up waist grooves of the brace.  2. Logroll patient and slide back section of brace under patient lining up waist groove of brace with patient's waist.  3. Roll patient onto their back with the posterior section behind them. Recheck alignment of waist groove on brace with patient's waist. It may be necessary to logroll patient to the opposite side to get posterior section centered on patient, where it extends anteriorly equal amounts on patient's sides. Repeat logrolling side to side as necessary.  4. Once posterior section positioned correctly, lay anterior section on patient. Again, line up waist groove of brace to patient's waist. Waist grooves of anterior and posterior section should match up and fit together. Anterior shell should go over the top of posterior shell. Velcro straps should line up with the chafes/D-rings, if they don't, either the anterior or the posterior sections are not in the proper place.  5. Fasten the middle straps first and tighten both sides evenly. Then fasten either top or bottom straps in the same manner. Brace should be snug so as to prevent brace from sliding up on patient. If it is too loose, the brace will slide up and cause discomfort to the patient in the chin and/or axilla area.  6. When properly fit, brace should be about 1\" inferior to the manubrium and about 1.5-2\" " inferior to the axilla. The inferior aspect should allow clearance so as not to cut into the tops of the thighs when sitting, but needs to be as low in the pelvic area as possible.     For optimal fit brace should NOT be donned with patient sitting. Ideal fit is achieved by donning in either laying or standing position. Due to changes in belly tissue, it is difficult to achieve a proper fit when patient is sitting.     Brace migration is inevitable, especially when patient is going from laying to upright in bed. Bed will push posterior section of brace up and will push the whole brace up. Migration will also occur when patient is sitting in a hard chair. Don't be afraid to readjust brace and pull it down and back to its proper position.  This note has been electronically signed by Nish Noguera CPO, LPO.

## 2022-06-08 NOTE — PROVIDER NOTIFICATION
Trauma updated as patient is more confused overnight. He is oriented to self, disoriented to time, place, situation. Patient following simple commands, slower to respond. Pupils equal and reactive. Soft blood pressures systolic blood pressures 80s-90s.

## 2022-06-08 NOTE — PLAN OF CARE
Neuro: Patient sleeping deeply overnight, responds to voice and touch. At times disoriented to time, place, situation when woken up from sleep. Diminished hearing. Follows most commands, slower to respond during the night. Moving all extremities. Patient has back and shoulder pain which increases significantly with movement. Strict bedrest with flat HOB and log-rolling  Cardiac: Atrial fibrillation with occasional PVCs, bundle branch block. Fluctuating -150s, systolic blood pressures have been soft overnight. Afebrile  Respiratory: Lung sounds clear. On 4L nasal cannula  GI: Regular diet, no BM   : Voiding spontaneously via bedside urinal     Plan: Continue to follow care plan, spinal precautions  For vital signs and complete assessments, please see documentation flowsheets.

## 2022-06-08 NOTE — PROGRESS NOTES
[facility]  Trauma Service Progress Note    Date of Service (when I saw the patient): 06/08/2022     Assessment & Plan   Trauma mechanism:Fall from bar stool  Time/date of injury:06/06 PM  Known Injuries:  1. Acute unstable L1 fracture  2. Posterior longitudinal Ligament injury     Procedure(s):   Plans today:  - TLSO brace delivery  - Novolog insulin sliding scale  - Pain control  - Upright Xrays with brace  - Resumed Apixaban given no surgical plan    Neuro/Pain:  # Acute traumatic pain  Scheduled: Tylenol, Methocarbamol  PRN: Oxycodone, IV hydromorphone for breakthrough, added PRN Flexiril  - Monitor neurological status.   - Maintain circadian rhythm.  Lights on during the day.  Off at night, minimize cares at night.  OOB during the day.     Pulmonary:  # Hypoxia, on 4LNC  Prolonged bedrest pending TLSO brace delivery for lumbar fractures  CXR obtained with low lung volumes, atelectasis  - Early mobility once brace delivered  - Supplemental oxygen to keep saturation above 92 %.  - Aggressive pulmonary hygiene. Incentive spirometer while awake      Cardiovascular:    #Hx P Afib   # HTN, HLD  # NSTEMI 2017  12 lead EKG: Afib +rbbb (noted on prior 12 lead EKG's from 2017),  to 160's, . BP stable  - ContinuePTA Metoprolol 100mg XL  - Electrolyte replacements, maintain K >4.0, mag >2.0  - Resume PTA statin  - Pain control  - Resume Furosemide     GI/Nutrition:    Regular diet + bowel regimen    Renal/ Fluids/Electrolytes:  - electrolyte replacement protocol  In place.      Endocrine:  # Diabetes Mellitus with hyperglycemia  - No medications or insulin prior to admission.  - Medium correction scale insulin  Monitor blood glucose on daily BMP if persistently >180g/dL will initiate Novolog Sliding scale insulin     Infectious disease:    No indications for antibiotics.      Hematology:    # Coagulopathy  On Apixaban PTA chronically for stroke prophylaxis with Hx of  Afib  - Admitted with Hb of 16.2g/dL.   - Threshold for transfusion if hgb <7.0 or signs/symptoms of hypoperfusion.        Musculoskeletal:  #Fall  # L1 unstable fracture  Seen and evlauted by Neurosurgery  - Flat bedrest on T/L spine precautions  - TLSO brace ordered  - Surgical plan per Neurosurgery  - Physical and occupational therapy consults when cleared     Lines/ tubes/ drains:  - PIV  Code status: Full code  General Cares:               PPI/H2 blocker:  n/a              DVT prophylaxis: Lovenox,              Bowel Regimen/Date of last stool: PTA              Pulmonary toilet: IS              ETOH screen completed: yes              Lines / drains: PIV  Discharge goals:     Adequate pain management: yes    VSS x24 hours: yes    Hemoglobin stable x 48 hours: yes    Ambulating safely and/or therapy evals complete:yes    Drains/lines removed or plan in place to manage: yes  Interval History   Back spasms with any movement. Denies numbness and tingling or weakness of the extremities. Some confusion overnight  ROS x 8 negative with exception of those things listed in interval hx    Physical Exam   Temp: 97.3  F (36.3  C) Temp src: Axillary BP: 98/47 Pulse: 112   Resp: 16 SpO2: 97 % O2 Device: Nasal cannula Oxygen Delivery: 4 LPM  Vitals:    06/07/22 0245 06/08/22 0200   Weight: 119.4 kg (263 lb 3.7 oz) 120.7 kg (266 lb 3.2 oz)     Vital Signs with Ranges  Temp:  [97.3  F (36.3  C)-98.6  F (37  C)] 97.3  F (36.3  C)  Pulse:  [] 112  Resp:  [14-22] 16  BP: ()/() 98/47  SpO2:  [93 %-100 %] 97 %  I/O last 3 completed shifts:  In: 1835 [P.O.:1000; I.V.:835]  Out: 1175 [Urine:1175]    [place] Coma Scale - Total 15/15  Eye Response (E): 4   4= spontaneous, 3= to verbal/voice, 2= to pain, 1= No response   Verbal Response (V): 5   5= Orientated, converses, 4= Confused, converses, 3= Inappropriate words, 2= Incomprehensible sounds, 1=No response   Motor Response (M): 6   6= Obeys commands, 5= Localizes to  pain, 4= Withdrawal to pain, 3=Fexion to pain, 2= Extension to pain, 1= No response   Constitutional: Awake, alert, cooperative, no apparent distress.  Eyes: +EOMI  ENT: Normocephalic, atraumatic  Respiratory: Clear bilaterally without wheezes  Cardiovascular:  irregular rate and rhythm, normal S1 and S2  GI: Normal bowel sounds, abdomen soft, non-distended, non-tender, no guarding  Genitourinary:  Clear yellow  Skin:  Warm and dry  Musculoskeletal: There is no redness, warmth, or swelling of the joints.  Pedal pulse palpated.  Neurologic: Awake, alert, oriented. Strength and sensory is intact. No focal deficits.  Neuropsychiatric: Calm, normal eye contact, alert, affect appropriate to situation, oriented, thought process normal.    CHUCK Higgins CNP  To contact the trauma service use job code pager [phone],   Numeric texts or alpha text through [facility]

## 2022-06-08 NOTE — PROGRESS NOTES
[facility], [place]     Neurosurgery Progress Note:  06/08/2022      Interval History: TLSO fitted.  Pending upright x-rays once TLSO is fitted.    Assessment:  88-year-old male, atrial fibrillation on Eliquis, presenting after mechanical fall found to have a obliquely oriented linear fracture through L1 vertebral body-does not appear to extend to posterior elements.  Significant lumbar area back pain on exam however grossly nonfocal.  Will need further MRI imaging to determine posterior element involvement and to rule out ligamentous injury.  Conservative management with bracing and serial imaging may suffice if posterior elements appear to be intact.  Given significant medical comorbidities as well as patient's reluctance for surgery, will pursue conservative options with bracing and serial imaging.    Clinically Significant Risk Factors Present on Admission                  Recommendations:  No neurosurgical intervention indicated at this time   Brace for now-TLSO  Upright XR  -----------------------------------  Rubina Liang MD  Neurosurgery resident, PGY-2  -----------------------------------    Gen: Appears comfortable, NAD, laying in bed, not appearing in acute distress  Neurologic:  Alert & Oriented to person, place, time, and situation  Follows commands briskly  Speech fluent, spontaneous. No aphasia or dysarthria.  No gaze preference. No apparent hemineglect.  PERRL, EOMI  Face symmetric with sensation intact to light touch  Palate elevates symmetrically, uvula midline, tongue protrudes midline  Trapezii muscles 5/5 bilaterally  No pronator drift     Del Tr Bi WE WF Gr   R 5 5 5 5 5 5   L 5 5 5 5 5 5    HF KE KF DF PF EHL   R 4+ (pain limited) 5 5 5 5 5   L 4+ (pain limited) 5 5 5 5 5     Reflexes 2+ throughout    Sensation intact and symmetric to light touch throughout    Objective:   Temp:  [97.5  F (36.4  C)-98.6  F (37  C)] 98.6  F (37  C)  Pulse:  [105-152] 151  Resp:   [14-22] 20  BP: ()/() 110/96  SpO2:  [94 %-100 %] 95 %  I/O last 3 completed shifts:  In: 2053.33 [P.O.:1000; I.V.:1053.33]  Out: 1225 [Urine:1225]        LABS:  Recent Labs   Lab 06/07/22  1611 06/07/22  1142 06/07/22  0849 06/07/22  0535 06/07/22  0048 06/06/22  1301   NA  --   --   --  143  --  142   POTASSIUM  --   --   --  4.8 4.1 4.4   CHLORIDE  --   --   --  108  --  108   CO2  --   --   --  28  --  21   ANIONGAP  --   --   --  7  --  13   * 139* 156* 205*  --  185*   BUN  --   --   --  20  --  20   CR  --   --   --  1.07  --  1.07   JERRY  --   --   --  9.6  --  9.6       Recent Labs   Lab 06/07/22  0535   WBC 14.1*   RBC 5.57   HGB 16.2   HCT 50.1   MCV 90   MCH 29.1   MCHC 32.3   RDW 14.8          IMAGING:  Recent Results (from the past 24 hour(s))   XR Chest Port 1 View    Narrative    Exam: XR CHEST PORT 1 VIEW, 6/7/2022 8:36 AM    Indication: hypoxia, shortness of breath    Comparison: 6/6/2022    Findings:     Portable supine view radiograph with normal appearing trachea, cardiac  silhouette. Indistinct pulmonary vasculature. Low inspiratory volumes  with bibasilar opacities, favor atelectasis. Costophrenic angles are  not included. No appreciable pneumothorax. Degenerative changes of the  shoulder joints. No focal consolidation.      Impression    Impression: Low lung volumes, with basilar opacities, favor  atelectasis.    I have personally reviewed the examination and initial interpretation  and I agree with the findings.    YEYO CONRAD MD         SYSTEM ID:  [id number]

## 2022-06-09 LAB
ANION GAP SERPL CALCULATED.3IONS-SCNC: 8 MMOL/L (ref 3–14)
BUN SERPL-MCNC: 22 MG/DL (ref 7–30)
CALCIUM SERPL-MCNC: 9.3 MG/DL (ref 8.5–10.1)
CHLORIDE BLD-SCNC: 107 MMOL/L (ref 94–109)
CO2 SERPL-SCNC: 25 MMOL/L (ref 20–32)
CREAT SERPL-MCNC: 0.88 MG/DL (ref 0.66–1.25)
ERYTHROCYTE [DISTWIDTH] IN BLOOD BY AUTOMATED COUNT: 14.9 % (ref 10–15)
GFR SERPL CREATININE-BSD FRML MDRD: 83 ML/MIN/1.73M2
GLUCOSE BLD-MCNC: 167 MG/DL (ref 70–99)
GLUCOSE BLDC GLUCOMTR-MCNC: 131 MG/DL (ref 70–99)
GLUCOSE BLDC GLUCOMTR-MCNC: 136 MG/DL (ref 70–99)
GLUCOSE BLDC GLUCOMTR-MCNC: 139 MG/DL (ref 70–99)
GLUCOSE BLDC GLUCOMTR-MCNC: 146 MG/DL (ref 70–99)
GLUCOSE BLDC GLUCOMTR-MCNC: 174 MG/DL (ref 70–99)
HCT VFR BLD AUTO: 48.6 % (ref 40–53)
HGB BLD-MCNC: 15.6 G/DL (ref 13.3–17.7)
MAGNESIUM SERPL-MCNC: 2.2 MG/DL (ref 1.6–2.3)
MCH RBC QN AUTO: 29.5 PG (ref 26.5–33)
MCHC RBC AUTO-ENTMCNC: 32.1 G/DL (ref 31.5–36.5)
MCV RBC AUTO: 92 FL (ref 78–100)
PHOSPHATE SERPL-MCNC: 2.8 MG/DL (ref 2.5–4.5)
PHOSPHATE SERPL-MCNC: 2.8 MG/DL (ref 2.5–4.5)
PLATELET # BLD AUTO: 173 10E3/UL (ref 150–450)
POTASSIUM BLD-SCNC: 4.6 MMOL/L (ref 3.4–5.3)
RBC # BLD AUTO: 5.28 10E6/UL (ref 4.4–5.9)
SODIUM SERPL-SCNC: 140 MMOL/L (ref 133–144)
WBC # BLD AUTO: 9.5 10E3/UL (ref 4–11)

## 2022-06-09 PROCEDURE — 250N000013 HC RX MED GY IP 250 OP 250 PS 637: Performed by: STUDENT IN AN ORGANIZED HEALTH CARE EDUCATION/TRAINING PROGRAM

## 2022-06-09 PROCEDURE — 36415 COLL VENOUS BLD VENIPUNCTURE: CPT | Performed by: PHYSICIAN ASSISTANT

## 2022-06-09 PROCEDURE — 250N000011 HC RX IP 250 OP 636: Performed by: PHYSICIAN ASSISTANT

## 2022-06-09 PROCEDURE — 999N000127 HC STATISTIC PERIPHERAL IV START W US GUIDANCE

## 2022-06-09 PROCEDURE — 83735 ASSAY OF MAGNESIUM: CPT | Performed by: SURGERY

## 2022-06-09 PROCEDURE — 82310 ASSAY OF CALCIUM: CPT | Performed by: NURSE PRACTITIONER

## 2022-06-09 PROCEDURE — 258N000003 HC RX IP 258 OP 636: Performed by: PHYSICIAN ASSISTANT

## 2022-06-09 PROCEDURE — 250N000009 HC RX 250: Performed by: PHYSICIAN ASSISTANT

## 2022-06-09 PROCEDURE — 250N000011 HC RX IP 250 OP 636: Performed by: STUDENT IN AN ORGANIZED HEALTH CARE EDUCATION/TRAINING PROGRAM

## 2022-06-09 PROCEDURE — 84100 ASSAY OF PHOSPHORUS: CPT | Performed by: PHYSICIAN ASSISTANT

## 2022-06-09 PROCEDURE — 99232 SBSQ HOSP IP/OBS MODERATE 35: CPT | Performed by: PHYSICIAN ASSISTANT

## 2022-06-09 PROCEDURE — 200N000002 HC R&B ICU UMMC

## 2022-06-09 PROCEDURE — 99222 1ST HOSP IP/OBS MODERATE 55: CPT | Performed by: CLINICAL NURSE SPECIALIST

## 2022-06-09 PROCEDURE — 36415 COLL VENOUS BLD VENIPUNCTURE: CPT | Performed by: NURSE PRACTITIONER

## 2022-06-09 PROCEDURE — 84100 ASSAY OF PHOSPHORUS: CPT | Performed by: SURGERY

## 2022-06-09 PROCEDURE — 85027 COMPLETE CBC AUTOMATED: CPT | Performed by: NURSE PRACTITIONER

## 2022-06-09 PROCEDURE — 250N000013 HC RX MED GY IP 250 OP 250 PS 637: Performed by: NURSE PRACTITIONER

## 2022-06-09 PROCEDURE — 250N000013 HC RX MED GY IP 250 OP 250 PS 637: Performed by: PHYSICIAN ASSISTANT

## 2022-06-09 RX ORDER — KETOROLAC TROMETHAMINE 15 MG/ML
15 INJECTION, SOLUTION INTRAMUSCULAR; INTRAVENOUS ONCE
Status: COMPLETED | OUTPATIENT
Start: 2022-06-09 | End: 2022-06-09

## 2022-06-09 RX ORDER — DEXTROSE MONOHYDRATE, SODIUM CHLORIDE, AND POTASSIUM CHLORIDE 50; 1.49; 4.5 G/1000ML; G/1000ML; G/1000ML
INJECTION, SOLUTION INTRAVENOUS CONTINUOUS
Status: DISCONTINUED | OUTPATIENT
Start: 2022-06-09 | End: 2022-06-12

## 2022-06-09 RX ORDER — METHOCARBAMOL 750 MG/1
750 TABLET, FILM COATED ORAL 4 TIMES DAILY
Status: DISCONTINUED | OUTPATIENT
Start: 2022-06-09 | End: 2022-06-10

## 2022-06-09 RX ORDER — METHOCARBAMOL 100 MG/ML
1000 INJECTION, SOLUTION INTRAMUSCULAR; INTRAVENOUS ONCE
Status: COMPLETED | OUTPATIENT
Start: 2022-06-09 | End: 2022-06-09

## 2022-06-09 RX ADMIN — APIXABAN 5 MG: 5 TABLET, FILM COATED ORAL at 09:00

## 2022-06-09 RX ADMIN — ACETAMINOPHEN 325MG 975 MG: 325 TABLET ORAL at 14:31

## 2022-06-09 RX ADMIN — ACETAMINOPHEN 325MG 975 MG: 325 TABLET ORAL at 06:16

## 2022-06-09 RX ADMIN — INSULIN ASPART 1 UNITS: 100 INJECTION, SOLUTION INTRAVENOUS; SUBCUTANEOUS at 17:33

## 2022-06-09 RX ADMIN — INSULIN ASPART 1 UNITS: 100 INJECTION, SOLUTION INTRAVENOUS; SUBCUTANEOUS at 08:48

## 2022-06-09 RX ADMIN — POLYETHYLENE GLYCOL 3350 17 G: 17 POWDER, FOR SOLUTION ORAL at 19:46

## 2022-06-09 RX ADMIN — OXYCODONE HYDROCHLORIDE 5 MG: 5 TABLET ORAL at 14:31

## 2022-06-09 RX ADMIN — APIXABAN 5 MG: 5 TABLET, FILM COATED ORAL at 19:46

## 2022-06-09 RX ADMIN — METHOCARBAMOL 750 MG: 750 TABLET ORAL at 19:46

## 2022-06-09 RX ADMIN — CYCLOBENZAPRINE 10 MG: 10 TABLET, FILM COATED ORAL at 06:17

## 2022-06-09 RX ADMIN — KETOROLAC TROMETHAMINE 15 MG: 15 INJECTION, SOLUTION INTRAMUSCULAR; INTRAVENOUS at 09:17

## 2022-06-09 RX ADMIN — POLYETHYLENE GLYCOL 3350 17 G: 17 POWDER, FOR SOLUTION ORAL at 09:06

## 2022-06-09 RX ADMIN — ATORVASTATIN CALCIUM 20 MG: 20 TABLET, FILM COATED ORAL at 22:10

## 2022-06-09 RX ADMIN — KETAMINE HYDROCHLORIDE 5 MG/HR: 100 INJECTION, SOLUTION, CONCENTRATE INTRAMUSCULAR; INTRAVENOUS at 13:20

## 2022-06-09 RX ADMIN — FUROSEMIDE 20 MG: 20 TABLET ORAL at 09:07

## 2022-06-09 RX ADMIN — OXYCODONE HYDROCHLORIDE 5 MG: 5 TABLET ORAL at 08:59

## 2022-06-09 RX ADMIN — SENNOSIDES AND DOCUSATE SODIUM 1 TABLET: 8.6; 5 TABLET ORAL at 19:46

## 2022-06-09 RX ADMIN — Medication 2000 UNITS: at 09:04

## 2022-06-09 RX ADMIN — METOPROLOL SUCCINATE 100 MG: 50 TABLET, EXTENDED RELEASE ORAL at 09:00

## 2022-06-09 RX ADMIN — METHOCARBAMOL 750 MG: 750 TABLET ORAL at 14:31

## 2022-06-09 RX ADMIN — METHOCARBAMOL 1000 MG: 100 INJECTION, SOLUTION INTRAMUSCULAR; INTRAVENOUS at 10:29

## 2022-06-09 RX ADMIN — ACETAMINOPHEN 325MG 975 MG: 325 TABLET ORAL at 22:09

## 2022-06-09 RX ADMIN — SENNOSIDES AND DOCUSATE SODIUM 1 TABLET: 8.6; 5 TABLET ORAL at 09:05

## 2022-06-09 RX ADMIN — THERA TABS 1 TABLET: TAB at 09:05

## 2022-06-09 RX ADMIN — HYDROMORPHONE HYDROCHLORIDE 0.3 MG: 1 INJECTION, SOLUTION INTRAMUSCULAR; INTRAVENOUS; SUBCUTANEOUS at 08:35

## 2022-06-09 RX ADMIN — POTASSIUM CHLORIDE, DEXTROSE MONOHYDRATE AND SODIUM CHLORIDE: 150; 5; 450 INJECTION, SOLUTION INTRAVENOUS at 16:00

## 2022-06-09 ASSESSMENT — ACTIVITIES OF DAILY LIVING (ADL)
ADLS_ACUITY_SCORE: 46
ADLS_ACUITY_SCORE: 44
ADLS_ACUITY_SCORE: 46
ADLS_ACUITY_SCORE: 44
ADLS_ACUITY_SCORE: 46
ADLS_ACUITY_SCORE: 44

## 2022-06-09 NOTE — PLAN OF CARE
Major Shift Events: Patient lethargic but alert and oriented x4. [place]. Neurologically intact. Complains of low back pain that is worse with movement. PRN Tylenol and scheduled flexeril given in addition to 1x dose IV dilaudid, after which patient de-sated requiring increased O2 supplement. Thoracic/ lumbar precautions maintained, and logrolls for turns. MD ok'd for HOB to 30 for feeding and meds dt concern for aspiration when lying flat. Requiring meal assist per previous RN. Patient in afib with RVR & labile blood pressures. MD'd aware. Afebrile. Wheezy on 2L NC. Voiding spontaneously using the urinal. Continue to monitor.    Plan: Reattempt TLSO brace today and XR if tolerated. Ensure good pain management.     For vital signs and complete assessments, please see documentation flowsheets.

## 2022-06-09 NOTE — PROGRESS NOTES
[facility], [place]     Neurosurgery Progress Note:  06/09/2022      Interval History: Patient in significant pain when attempting to wear the TLSO.  We will attempt again to refit with instructions provided by orthotics via note.    Assessment:  88-year-old male, atrial fibrillation on Eliquis, presenting after mechanical fall found to have a obliquely oriented linear fracture through L1 vertebral body-does not appear to extend to posterior elements.  Significant lumbar area back pain on exam however grossly nonfocal.  Will need further MRI imaging to determine posterior element involvement and to rule out ligamentous injury.  Conservative management with bracing and serial imaging may suffice if posterior elements appear to be intact.  Given significant medical comorbidities as well as patient's reluctance for surgery, will pursue conservative options with bracing and serial imaging.    Clinically Significant Risk Factors Present on Admission                  Recommendations:  No neurosurgical intervention indicated at this time   Brace for now-TLSO  Upright XR  -----------------------------------  Rubina Liang MD  Neurosurgery resident, PGY-2  -----------------------------------    Gen: Appears comfortable, NAD, laying in bed, not appearing in acute distress, in good spirits  Cardiac: Appears to be in atrial fibrillation with rapid ventricular rate.  Neurologic:  Alert & Oriented to person, place, time, and situation  Follows commands briskly  Speech fluent, spontaneous. No aphasia or dysarthria.  No gaze preference. No apparent hemineglect.  PERRL, EOMI  Face symmetric with sensation intact to light touch  Palate elevates symmetrically, uvula midline, tongue protrudes midline  Trapezii muscles 5/5 bilaterally  No pronator drift     Del Tr Bi WE WF Gr   R 5 5 5 5 5 5   L 5 5 5 5 5 5    HF KE KF DF PF EHL   R 4+ (pain limited) 5 5 5 5 5   L 4+ (pain limited) 5 5 5 5 5     Reflexes  2+ throughout    Sensation intact and symmetric to light touch throughout    Objective:   Temp:  [97.5  F (36.4  C)-98  F (36.7  C)] 97.5  F (36.4  C)  Pulse:  [101-146] 103  Resp:  [16-22] 18  BP: ()/() 92/77  SpO2:  [92 %-100 %] 95 %  I/O last 3 completed shifts:  In: 1755 [P.O.:1650; I.V.:105]  Out: 1100 [Urine:1100]        LABS:  Recent Labs   Lab 06/09/22  0847 06/09/22  0421 06/08/22  2321 06/08/22  1058 06/08/22  0728 06/07/22  0849 06/07/22  0535   NA  --  140  --   --  140  --  143   POTASSIUM  --  4.6  --   --  4.5  --  4.8   CHLORIDE  --  107  --   --  110*  --  108   CO2  --  25  --   --  25  --  28   ANIONGAP  --  8  --   --  5  --  7   * 167* 163*   < > 161*   < > 205*   BUN  --  22  --   --  23  --  20   CR  --  0.88  --   --  0.92  --  1.07   JERRY  --  9.3  --   --  9.2  --  9.6    < > = values in this interval not displayed.       Recent Labs   Lab 06/09/22  0421   WBC 9.5   RBC 5.28   HGB 15.6   HCT 48.6   MCV 92   MCH 29.5   MCHC 32.1   RDW 14.9          IMAGING:  No results found for this or any previous visit (from the past 24 hour(s)).

## 2022-06-09 NOTE — PROGRESS NOTES
[facility]  Trauma Service Progress Note    Date of Service: 06/09/2022    Trauma mechanism:Fall from bar stool  Time/date of injury: 6/6/22  Known Injuries:  1. Acute unstable L1 fracture  2. Posterior longitudinal Ligament injury     Assessment & Plan   Neuro/Pain/Psych:  # Acute traumatic pain  - Scheduled: Tylenol, Methocarbamol  - PRN: Oxycodone, IV hydromorphone for breakthrough,  - Increased pain this am, could not sit up for Meds. Tried 1x dose of IV Robaxin 1,000mg and IV Toradol with no relief. Pt declining brace so unable to sit up.  - Added Ketamine gtts   - Monitor neurological status.   - Maintain circadian rhythm. Lights on during the day. Off at night, minimize cares at night. OOB during the day.    Pulmonary:  # Acute Hypoxia,  - Prolonged bedrest pending TLSO brace delivery for lumbar fractures  - Patient declining brace so unable to get OOB.   - CXR obtained with low lung volumes, atelectasis  - Early mobility once brace delivered  - Supplemental oxygen to keep saturation above 92 %.  - Aggressive pulmonary hygiene. Incentive spirometer while awake      Cardiovascular:    # Hx P Afib   # HTN, HLD  # NSTEMI 2017  - 12 lead EKG: Afib +rbbb (noted on prior 12 lead EKG's from 2017),  to 160's, . BP stable  - Continue PTA: Metoprolol 100mg XL, atorvastatin,   - Electrolyte replacements, maintain K >4.0, mag >2.0  - Resume PTA statin  - Pain control  - Resume Furosemide    GI/Nutrition:    - Regular Diet     Renal/ Fluids/Electrolytes:  - Creatinine: 0.88  - Dex5 + 0.45%NaCl +20 KCL for IV fluid hydration poor po intake   - electrolyte replacement protocol in place.     Endocrine:  # Diabetes Mellitus with hyperglycemia  - No medications or insulin prior to admission.  - Medium correction scale insulin  Monitor blood glucose on daily BMP if persistently >180g/dL will initiate Novolog Sliding scale insulin    Infectious disease:   -  No indications for  antibiotics.     Hematology:    # Coagulopathy  - On Apixaban PTA chronically for stroke prophylaxis with Hx of Afib, continued on 6/8/22.   - Admitted with Hb of 16.2g/dL.   - Threshold for transfusion if hgb <7.0 or signs/symptoms of hypoperfusion.   :     Musculoskeletal:  # Fall  # L1 unstable fracture  Seen and evlauted by Neurosurgery  - Flat bedrest on T/L spine precautions  - TLSO brace ordered  - Surgical plan per Neurosurgery  - Physical and occupational therapy consults when cleared    Skin:  - dilgent cares to prevent skin breakdown and wound formation.      Palliative Consulted     Lines/ tubes/ drains:  - PIV     General Cares:    PPI/H2 blocker:  NA   DVT prophylaxis: Lovenox   Bowel Regimen/Date of last stool: PTA   Pulmonary toilet: IS    Code status:  Full Code     Discharge goals:     Adequate pain management: yes    VSS x24 hours: yes    Hemoglobin stable x 48 hours: yes    Ambulating safely and/or therapy evals complete: in process     Drains/lines removed or plan in place to manage: yes    Teaching done: in process     Other:  Expected D/C date: dependent on NSGY plan    Lakia Bains PA-C  To contact the trauma service use job code pager [phone],   Numeric texts or alpha text through [employer]     Interval History   Patient in lower back pain this morning and unable to take po meds Added 1x dose of Robaxin and Toradol with little relief. Added ketamine gtts at 5. This is helping without the side effects experienced with the dilaudid.   Palliative will discuss surgical plan with Neurosurgery since patient has pain and unable to wear TLSO.   ROS x 8 negative with exception of those things listed in interval hx    Physical Exam   Temp: 97.7  F (36.5  C) Temp src: Axillary BP: 92/77 Pulse: 103   Resp: 18 SpO2: 95 % O2 Device: Nasal cannula Oxygen Delivery: 2 LPM  Vitals:    06/07/22 0245 06/08/22 0200 06/09/22 0400   Weight: 119.4 kg (263 lb 3.7 oz) 120.7 kg (266 lb 3.2 oz) 121 kg (266 lb 12.1 oz)      Vital Signs with Ranges  Temp:  [97.6  F (36.4  C)-98.7  F (37.1  C)] 97.7  F (36.5  C)  Pulse:  [101-146] 103  Resp:  [16-22] 18  BP: ()/() 92/77  SpO2:  [92 %-100 %] 95 %  I/O last 3 completed shifts:  In: 1755 [P.O.:1650; I.V.:105]  Out: 1100 [Urine:1100]    [place] Coma Scale - Total 15/15  Eye Response (E): 4   4= spontaneous, 3= to verbal/voice, 2= to pain, 1= No response   Verbal Response (V): 5   5= Orientated, converses, 4= Confused, converses, 3= Inappropriate words, 2= Incomprehensible sounds, 1=No response   Motor Response (M): 6   6= Obeys commands, 5= Localizes to pain, 4= Withdrawal to pain, 3=Fexion to pain, 2= Extension to pain, 1= No response     Constitutional: Awake, alert, cooperative, apparent distress when not moving, will have spasm pain during movements.   Eyes: +EOMI  ENT: Normocephalic, atraumatic  Respiratory: Clear bilaterally without wheezes  Cardiovascular:  irregular rate and rhythm, normal S1 and S2  GI: Normal bowel sounds, abdomen soft, non-distended, non-tender, no guarding  Genitourinary:  Clear yellow  Skin:  Warm and dry  Musculoskeletal: There is no redness, warmth, or swelling of the joints.  Pedal pulse palpated.  Neurologic: Awake, alert, oriented. Strength and sensory is intact. No focal deficits.  Neuropsychiatric: Calm, normal eye contact, alert, affect appropriate to situation, oriented, thought process normal.

## 2022-06-09 NOTE — CONSULTS
[facility]  Palliative Care Consultation Note    Patient: Alvin Newton  Date of Admission:  6/6/2022    Requesting Clinician / Team: Lakia Bains PA-C  Reason for consult: Symptom management  Goals of care    Recommendations:    Discussed with him option of surgery, sounds like he would be interested now however difficult to hold a full discussion with him, tried to reach out to his wife however unable to reach, will need further discussion with surgery, patient, and wife    Currently comfortable, no suggestions for pain as this moment, consider pain team and/or interventional options if pain continues to be difficult to control    If having dissociative side effects would give 1 mg ativan     These recommendations have been discussed with primary team, neurosurgery team.      Thank you for the opportunity to participate in the care of this patient and family. Our team: will continue to follow.     During regular M-F work hours -- if you are not sure who specifically to contact -- please contact us by sending a text page to our team consult pager at [phone].    After regular work hours and on weekends/holidays, you can call our answering service at [phone]. Also, who's on call for us is available in [employer].       Assessments:  Alvin Newton is a 88 year old male with a past medical history of afib on eliquis, HTN, HLD, T2DM, NSTEMI in 2017, who presented to the ED after a fall and found to have an unstable L1 fracture. His hospital course today has been complicated by severe pain greatly limiting mobility.    Today, the patient was seen for:  Unstable L1 fracture  Acute pain  Acute on chronic pain      Prognosis, Goals, & Planning:      Functional Status just prior to hospitalization: Not assessed      Prognosis, Goals, and/or Advance Care Planning were addressed today: Yes        Summary/Comments: spoke with patient about his understanding, he  reported that he did initially decline surgery however seems more open to it when we discussed it. He did seem slightly off in his thinking and statements also and so would be best to touch base with the wife/family to further discuss. I was unable to reach the wife x3 today.      Patient's decision making preferences: unable to assess          Patient has decision-making capacity today for complex decisions: Partial (needs assistance with complex decisions)            I have concerns about the patient/family's health literacy today: No           Patient has a completed Health Care Directive: No.       Code status: Full Code    Coping, Meaning, & Spirituality:   Mood, coping, and/or meaning in the context of serious illness were addressed today: Yes  Summary/Comments: having terrible pain from getting up and trying to get brace on. More comfortable now. Difficult to fully assess coping today given symptoms.    Social:     Key family / caregivers: wife    History of Present Illness:  History gathered today from: medical chart    Alvin Newton is a 88 year old male with a past medical history of afib on eliquis, HTN, HLD, T2DM, NSTEMI in 2017, who presented to the ED after a fall and found to have an unstable L1 fracture. His hospital course today has been complicated by severe pain greatly limiting mobility.    Key Palliative Symptom Data:  # Pain severity the last 12 hours: severe        ROS:  Comprehensive ROS is reviewed and is negative except as here & per HPI:      Past Medical History:  Past Medical History:   Diagnosis Date     Colonic diverticulum      Hyperlipidemia      Hypertension      Obesity (BMI 30-39.9)      Osteoarthritis      Type 2 diabetes mellitus (H)         Past Surgical History:  Past Surgical History:   Procedure Laterality Date     ARTHROPLASTY HIP BILATERAL  1987     HC ESOPHAGOSCOPY, DIAGNOSTIC  2003     LAPAROSCOPIC CHOLECYSTECTOMY N/A 12/28/2017    Procedure: LAPAROSCOPIC  CHOLECYSTECTOMY;  LAPAROSCOPIC CHOLECYSTECTOMY;  Surgeon: Heber Gonzalez MD;  Location: [facility] OR     TRANSRECTAL ULTRASONIC, TRANSURETHRAL RESECTION (TUR) OF PROSTATE CYST  1990         Family History:  History reviewed. No pertinent family history.      Allergies:  No Known Allergies     Medications:  I have reviewed this patient's medication profile and medications from this hospitalization.   Noted:  Acetaminophen 975 mg TID  Flexeril 10 mg q8h  Lidocaine patch  Methocarbamol 750 mg 4 times a day  miralax BID  Senna 1-2 tablets BID    Ketamine 2.5 mg/hr    Hydromorphone PRN- 1 mg over last 24 hours  Oxycodone PRN- 10 mg over last 24 hours    Physical Exam:  Vital Signs: Temp: 97.5  F (36.4  C) Temp src: Oral BP: (!) 88/77 Pulse: 105   Resp: 16 SpO2: 98 % O2 Device: Nasal cannula Oxygen Delivery: 3 LPM  Weight: 266 lbs 12.11 oz    Constitutional: Awake, alert, cooperative, no apparent distress  ENT: Normocephalic, without obvious abnormality, atramatic  Lungs: No increased work of breathing, good air exchange  Musculoskeletal: No redness, warmth, or swelling of the joints.   Neurologic: Awake, alert, oriented to name, able to answer questions, however also unable to follow some of his statements made  Skin: No rashes, erythema    Data reviewed:  Recent imaging reviewed, my comments on pertinents:   Lumbar spine 6/7  IMPRESSION:   1.  There is an acute unstable slightly distracted obliquely oriented fracture through the ventral osteophyte at L1 with propagation to the inferior endplate as well as to the posterior cortex where there may be injury to the posterior longitudinal   ligament, as detailed above.   2.  Edema within the L1-L2 disc compatible with disc injury.   3.  Diffuse idiopathic skeletal hyperostosis.   4.  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 There is moderate to severe central canal stenosis at L4-L5 with moderate right foraminal narrowing at     Recent lab  data reviewed, my comments on pertinents:   Sodium 140  Potassium 4.6  Creatinine 0.88  GFR 83  WBC 9.5  Hemoglobin 15.6  Platelets 173    CHUCK Chao CNS  Palliative Care Consult Team  Pager: [phone]    55 minutes spent. This includes 35 minutes face to face with patient discussing current complex health conditions, prognosis, goals of care, symptom management. Coordination of care with the primary team, neurosurgery, and RN regarding goals of care and symptom management.

## 2022-06-09 NOTE — PLAN OF CARE
Major Shift Events:  Neuro status unchanged. Cardiovascular: Afib w/RVR. Apical pulse irregular. BP WNL but trending downward for the day. Started on K+D5W1/2NS @ 100 m/L/hr. Ketamine gtt 5 mg/hr started today for difficult pain management. Given tylenol, oxycodone, dilaudid, robaxin, Toradol with little relief. 3L NC ongoing. Intermittently removes NC from nares. Voiding slightly low UO. Refusing turns intermittently d/t pain. Palliative consulted today, will reassess tomorrow the potential for surgery and plan a care conference. Unable to successfully apply TLSO today related to lack of motivation combined with substantial pain, both chronic and onset post traumatic fall.   Plan: Implement active orders, encourage activity, medicate as able  For vital signs and complete assessments, please see documentation flowsheets.    Problem: Plan of Care - These are the overarching goals to be used throughout the patient stay.    Goal: Optimal Comfort and Wellbeing  Outcome: Ongoing, Not Progressing  Intervention: Monitor Pain and Promote Comfort  Recent Flowsheet Documentation  Taken 6/9/2022 0800 by Jl Wade RN  Pain Management Interventions:    relaxation techniques promoted    medication (see MAR)  Intervention: Provide Person-Centered Care  Recent Flowsheet Documentation  Taken 6/9/2022 1600 by Jl Wade, RN  Trust Relationship/Rapport:    care explained    choices provided  Taken 6/9/2022 1200 by Jl Wade, BAO  Trust Relationship/Rapport:    care explained    choices provided  Taken 6/9/2022 0800 by Jl Wade, BAO  Trust Relationship/Rapport:    care explained    choices provided     Problem: Plan of Care - These are the overarching goals to be used throughout the patient stay.    Goal: Optimal Comfort and Wellbeing  Outcome: Ongoing, Not Progressing  Intervention: Monitor Pain and Promote Comfort  Recent Flowsheet Documentation  Taken 6/9/2022 0800 by Jl Wade RN  Pain  Management Interventions:    relaxation techniques promoted    medication (see MAR)  Intervention: Provide Person-Centered Care  Recent Flowsheet Documentation  Taken 6/9/2022 1600 by Jl Wade, RN  Trust Relationship/Rapport:    care explained    choices provided  Taken 6/9/2022 1200 by Jl Wade, RN  Trust Relationship/Rapport:    care explained    choices provided  Taken 6/9/2022 0800 by Jl Wade, RN  Trust Relationship/Rapport:    care explained    choices provided     Problem: Risk for Delirium  Goal: Optimal Coping  Outcome: Ongoing, Not Progressing  Intervention: Optimize Psychosocial Adjustment to Delirium  Recent Flowsheet Documentation  Taken 6/9/2022 1600 by Jl Wade, RN  Supportive Measures:    active listening utilized    decision-making supported    goal-setting facilitated    positive reinforcement provided    problem-solving facilitated    relaxation techniques promoted    self-care encouraged    self-reflection promoted    self-responsibility promoted    verbalization of feelings encouraged  Family/Support System Care: presence promoted  Taken 6/9/2022 1200 by Jl Wade, RN  Supportive Measures:    active listening utilized    decision-making supported    goal-setting facilitated    positive reinforcement provided    problem-solving facilitated    relaxation techniques promoted    self-care encouraged    self-reflection promoted    self-responsibility promoted    verbalization of feelings encouraged  Family/Support System Care: presence promoted  Taken 6/9/2022 0800 by Jl Wade, RN  Supportive Measures:    active listening utilized    decision-making supported    goal-setting facilitated    positive reinforcement provided    problem-solving facilitated    relaxation techniques promoted    self-care encouraged    self-reflection promoted    self-responsibility promoted    verbalization of feelings encouraged  Family/Support System Care:  presence promoted  Goal: Improved Behavioral Control  Outcome: Ongoing, Not Progressing  Intervention: Prevent and Manage Agitation  Recent Flowsheet Documentation  Taken 6/9/2022 1600 by Jl Wade RN  Environment Familiarity/Consistency: daily routine followed  Taken 6/9/2022 1200 by Jl Wade RN  Environment Familiarity/Consistency: daily routine followed  Taken 6/9/2022 0800 by Jl Wade RN  Environment Familiarity/Consistency: daily routine followed  Goal: Improved Attention and Thought Clarity  Outcome: Ongoing, Not Progressing  Intervention: Maximize Cognitive Function  Recent Flowsheet Documentation  Taken 6/9/2022 1600 by Jl Wade RN  Sensory Stimulation Regulation: care clustered  Reorientation Measures:    calendar in view    clock in view  Taken 6/9/2022 1200 by Jl Wade RN  Sensory Stimulation Regulation: care clustered  Reorientation Measures:    calendar in view    clock in view  Taken 6/9/2022 0800 by Jl Wade RN  Sensory Stimulation Regulation: care clustered  Reorientation Measures:    calendar in view    clock in view  Goal: Improved Sleep  Outcome: Ongoing, Not Progressing  Intervention: Promote Sleep  Recent Flowsheet Documentation  Taken 6/9/2022 1600 by Jl Wade RN  Sleep/Rest Enhancement:    consistent schedule promoted    natural light exposure provided    regular sleep/rest pattern promoted    relaxation techniques promoted  Taken 6/9/2022 1200 by Jl Wade RN  Sleep/Rest Enhancement:    consistent schedule promoted    natural light exposure provided    regular sleep/rest pattern promoted    relaxation techniques promoted  Taken 6/9/2022 0800 by Jl Wade RN  Sleep/Rest Enhancement:    consistent schedule promoted    natural light exposure provided    regular sleep/rest pattern promoted    relaxation techniques promoted

## 2022-06-10 LAB
ERYTHROCYTE [DISTWIDTH] IN BLOOD BY AUTOMATED COUNT: 14.6 % (ref 10–15)
GLUCOSE BLDC GLUCOMTR-MCNC: 128 MG/DL (ref 70–99)
GLUCOSE BLDC GLUCOMTR-MCNC: 141 MG/DL (ref 70–99)
GLUCOSE BLDC GLUCOMTR-MCNC: 156 MG/DL (ref 70–99)
GLUCOSE BLDC GLUCOMTR-MCNC: 161 MG/DL (ref 70–99)
GLUCOSE BLDC GLUCOMTR-MCNC: 162 MG/DL (ref 70–99)
HCT VFR BLD AUTO: 47.2 % (ref 40–53)
HGB BLD-MCNC: 14.9 G/DL (ref 13.3–17.7)
MAGNESIUM SERPL-MCNC: 2 MG/DL (ref 1.6–2.3)
MCH RBC QN AUTO: 29.3 PG (ref 26.5–33)
MCHC RBC AUTO-ENTMCNC: 31.6 G/DL (ref 31.5–36.5)
MCV RBC AUTO: 93 FL (ref 78–100)
PHOSPHATE SERPL-MCNC: 3.2 MG/DL (ref 2.5–4.5)
PLATELET # BLD AUTO: 172 10E3/UL (ref 150–450)
POTASSIUM BLD-SCNC: 4.5 MMOL/L (ref 3.4–5.3)
RBC # BLD AUTO: 5.09 10E6/UL (ref 4.4–5.9)
WBC # BLD AUTO: 9.2 10E3/UL (ref 4–11)

## 2022-06-10 PROCEDURE — 250N000013 HC RX MED GY IP 250 OP 250 PS 637: Performed by: SURGERY

## 2022-06-10 PROCEDURE — 84100 ASSAY OF PHOSPHORUS: CPT | Performed by: SURGERY

## 2022-06-10 PROCEDURE — 99356 PR PROLONGED SERV,INPATIENT,1ST HR: CPT | Performed by: CLINICAL NURSE SPECIALIST

## 2022-06-10 PROCEDURE — 250N000013 HC RX MED GY IP 250 OP 250 PS 637: Performed by: PHYSICIAN ASSISTANT

## 2022-06-10 PROCEDURE — 85027 COMPLETE CBC AUTOMATED: CPT | Performed by: NURSE PRACTITIONER

## 2022-06-10 PROCEDURE — 99232 SBSQ HOSP IP/OBS MODERATE 35: CPT | Performed by: PHYSICIAN ASSISTANT

## 2022-06-10 PROCEDURE — 200N000002 HC R&B ICU UMMC

## 2022-06-10 PROCEDURE — 99233 SBSQ HOSP IP/OBS HIGH 50: CPT | Performed by: CLINICAL NURSE SPECIALIST

## 2022-06-10 PROCEDURE — 250N000013 HC RX MED GY IP 250 OP 250 PS 637: Performed by: NURSE PRACTITIONER

## 2022-06-10 PROCEDURE — 84132 ASSAY OF SERUM POTASSIUM: CPT | Performed by: SURGERY

## 2022-06-10 PROCEDURE — 258N000003 HC RX IP 258 OP 636: Performed by: PHYSICIAN ASSISTANT

## 2022-06-10 PROCEDURE — 250N000013 HC RX MED GY IP 250 OP 250 PS 637: Performed by: STUDENT IN AN ORGANIZED HEALTH CARE EDUCATION/TRAINING PROGRAM

## 2022-06-10 PROCEDURE — 36415 COLL VENOUS BLD VENIPUNCTURE: CPT | Performed by: NURSE PRACTITIONER

## 2022-06-10 PROCEDURE — 250N000009 HC RX 250: Performed by: PHYSICIAN ASSISTANT

## 2022-06-10 PROCEDURE — 83735 ASSAY OF MAGNESIUM: CPT | Performed by: SURGERY

## 2022-06-10 RX ORDER — METHOCARBAMOL 500 MG/1
1000 TABLET, FILM COATED ORAL 4 TIMES DAILY
Status: DISCONTINUED | OUTPATIENT
Start: 2022-06-10 | End: 2022-06-20 | Stop reason: HOSPADM

## 2022-06-10 RX ORDER — MAGNESIUM OXIDE 400 MG/1
400 TABLET ORAL 2 TIMES DAILY
Status: DISCONTINUED | OUTPATIENT
Start: 2022-06-10 | End: 2022-06-11

## 2022-06-10 RX ORDER — OXYCODONE HYDROCHLORIDE 5 MG/1
5-10 TABLET ORAL
Status: DISCONTINUED | OUTPATIENT
Start: 2022-06-10 | End: 2022-06-20 | Stop reason: HOSPADM

## 2022-06-10 RX ADMIN — POTASSIUM CHLORIDE, DEXTROSE MONOHYDRATE AND SODIUM CHLORIDE: 150; 5; 450 INJECTION, SOLUTION INTRAVENOUS at 22:22

## 2022-06-10 RX ADMIN — FUROSEMIDE 20 MG: 20 TABLET ORAL at 07:55

## 2022-06-10 RX ADMIN — LIDOCAINE PATCH 4% 2 PATCH: 40 PATCH TOPICAL at 09:01

## 2022-06-10 RX ADMIN — INSULIN ASPART 1 UNITS: 100 INJECTION, SOLUTION INTRAVENOUS; SUBCUTANEOUS at 08:22

## 2022-06-10 RX ADMIN — SENNOSIDES AND DOCUSATE SODIUM 2 TABLET: 8.6; 5 TABLET ORAL at 20:49

## 2022-06-10 RX ADMIN — METHOCARBAMOL 1000 MG: 500 TABLET ORAL at 20:49

## 2022-06-10 RX ADMIN — ACETAMINOPHEN 325MG 975 MG: 325 TABLET ORAL at 06:01

## 2022-06-10 RX ADMIN — METHOCARBAMOL 1000 MG: 500 TABLET ORAL at 07:55

## 2022-06-10 RX ADMIN — METHOCARBAMOL 1000 MG: 500 TABLET ORAL at 12:02

## 2022-06-10 RX ADMIN — ACETAMINOPHEN 325MG 975 MG: 325 TABLET ORAL at 22:15

## 2022-06-10 RX ADMIN — POTASSIUM CHLORIDE, DEXTROSE MONOHYDRATE AND SODIUM CHLORIDE: 150; 5; 450 INJECTION, SOLUTION INTRAVENOUS at 02:38

## 2022-06-10 RX ADMIN — POTASSIUM CHLORIDE, DEXTROSE MONOHYDRATE AND SODIUM CHLORIDE: 150; 5; 450 INJECTION, SOLUTION INTRAVENOUS at 12:07

## 2022-06-10 RX ADMIN — APIXABAN 5 MG: 5 TABLET, FILM COATED ORAL at 20:49

## 2022-06-10 RX ADMIN — ATORVASTATIN CALCIUM 20 MG: 20 TABLET, FILM COATED ORAL at 22:15

## 2022-06-10 RX ADMIN — THERA TABS 1 TABLET: TAB at 08:00

## 2022-06-10 RX ADMIN — OXYCODONE HYDROCHLORIDE 5 MG: 5 TABLET ORAL at 02:04

## 2022-06-10 RX ADMIN — OXYCODONE HYDROCHLORIDE 5 MG: 5 TABLET ORAL at 12:02

## 2022-06-10 RX ADMIN — Medication 2000 UNITS: at 08:12

## 2022-06-10 RX ADMIN — APIXABAN 5 MG: 5 TABLET, FILM COATED ORAL at 07:55

## 2022-06-10 RX ADMIN — ACETAMINOPHEN 325MG 975 MG: 325 TABLET ORAL at 14:56

## 2022-06-10 RX ADMIN — POLYETHYLENE GLYCOL 3350 17 G: 17 POWDER, FOR SOLUTION ORAL at 08:56

## 2022-06-10 RX ADMIN — METHOCARBAMOL 1000 MG: 500 TABLET ORAL at 15:56

## 2022-06-10 RX ADMIN — METOPROLOL SUCCINATE 100 MG: 50 TABLET, EXTENDED RELEASE ORAL at 07:57

## 2022-06-10 RX ADMIN — Medication 400 MG: at 20:49

## 2022-06-10 RX ADMIN — POLYETHYLENE GLYCOL 3350 17 G: 17 POWDER, FOR SOLUTION ORAL at 20:49

## 2022-06-10 RX ADMIN — SENNOSIDES AND DOCUSATE SODIUM 2 TABLET: 8.6; 5 TABLET ORAL at 08:00

## 2022-06-10 RX ADMIN — KETAMINE HYDROCHLORIDE 5 MG/HR: 100 INJECTION, SOLUTION, CONCENTRATE INTRAMUSCULAR; INTRAVENOUS at 07:36

## 2022-06-10 RX ADMIN — Medication 400 MG: at 07:59

## 2022-06-10 RX ADMIN — INSULIN ASPART 1 UNITS: 100 INJECTION, SOLUTION INTRAVENOUS; SUBCUTANEOUS at 17:52

## 2022-06-10 ASSESSMENT — ACTIVITIES OF DAILY LIVING (ADL)
ADLS_ACUITY_SCORE: 46
DEPENDENT_IADLS:: INDEPENDENT
ADLS_ACUITY_SCORE: 46

## 2022-06-10 ASSESSMENT — VISUAL ACUITY: OU: GLASSES

## 2022-06-10 NOTE — PROGRESS NOTES
[facility]  Palliative Care Daily Progress Note       Recommendations & Counseling       Full code and restorative goals of care    Currently holding off on surgery and hoping that the injury will heal on its own over time    Appreciate primary team's symptom management    Our team will sign off however please re-consult should there be urgent needs that arise.          Assessments          Alvin Newton is a 88 year old male with a past medical history of afib on eliquis, HTN, HLD, T2DM, NSTEMI in 2017, who presented to the ED after a fall and found to have an unstable L1 fracture. His hospital course today has been complicated by severe pain greatly limiting mobility.     Today, the patient was seen for:  Unstable L1 fracture  Acute pain  Acute on chronic pain    Prognosis, Goals, or Advance Care Planning was addressed today with: Yes.    Met with patient and family today along with trauma and neurosurgery. Patient was not able to interact and was sleeping for most of the meeting. Discussed with family that from surgery's perspective that surgery is currently not thought to be the best option given he is tolerating the brace better today and pain seems to be better, however he is not more delirious. Discussed the next steps being better pain control and then getting further imaging to help determine next steps from there. Also discussed his delirium and what interventions can be helpful for getting his mind more clear. Discussed that family hopes that it can heal on its own and that surgery is not needed. Discussed his pain management as well today and the complexity of this as well. Also discussed the what if's of the future of maybe he needs a surgery and do we do that or not in the future. And also discussed concern for his functional level not being as good as it was prior to admission and needing more help. Also discussed the possibility of complications that  lead to more serious consequences. Answered all of families questions at this time.    Mood, coping, and/or meaning in the context of serious illness were addressed today: Yes.  Summary/Comments: patient is pleasantly confused, family is supportive and seems to be coping well.            Interval History:     Chart review/discussion with unit or clinical team members:   Notes reviewed, better pain management with ketamine, having a care meeting today with patient and family and medical teams. Was able to tolerate TSLO brace today. Ketamine now off due to AMS.    Per patient or family/caregivers today:  Patient is resting comfortably in bed, family notes that he is confused. Report that patient enjoys watching all sports, nature shows, and enjoys gardening. Family reports that he is stubborn at times given his [place] background. Also he had chronic shoulder pain which was his main limitation, would use a cane. He did not like to take medications and would sometimes use heat or ice.     Key Palliative Symptoms:  # Pain severity the last 12 hours: low               Review of Systems:     Besides above, ROS was reviewed and is unremarkable          Medications:     I have reviewed this patient's medication profile and medications during this hospitalization.           Physical Exam:   Vitals were reviewed  Temp: 97.8  F (36.6  C) Temp src: Oral BP: (!) 85/73 Pulse: 81   Resp: 16 SpO2: 100 % O2 Device: Nasal cannula Oxygen Delivery: 1 LPM    Intake/Output Summary (Last 24 hours) at 6/10/2022 1231  Last data filed at 6/10/2022 1100  Gross per 24 hour   Intake 2029.17 ml   Output 1615 ml   Net 414.17 ml     Constitutional: Awake, alert, cooperative, no apparent distress  ENT: Normocephalic, without obvious abnormality, atramatic  Lungs: No increased work of breathing, good air exchange  Musculoskeletal: No redness, warmth, or swelling of the joints.   Neurologic: Awake, alert, oriented to name, able to answer questions,  however also unable to follow some of his statements made  Skin: No rashes, erythema           Data Reviewed:     Reviewed recent pertinent imaging, comments:   Lumbar mri 6/7  IMPRESSION:   1.  There is an acute unstable slightly distracted obliquely oriented fracture through the ventral osteophyte at L1 with propagation to the inferior endplate as well as to the posterior cortex where there may be injury to the posterior longitudinal   ligament, as detailed above.   2.  Edema within the L1-L2 disc compatible with disc injury.   3.  Diffuse idiopathic skeletal hyperostosis.   4.  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 There is moderate to severe central canal stenosis at L4-L5 with moderate right foraminal narrowing at this level.    Reviewed recent labs, comments:   Potassium 4.5  WBC 9.2  Platelets 172  Hemoglobin 14.9      CHUCK Chao CNS  Palliative Care Consult Team  Pager: [phone]    70 minutes spent. This includes 45 minutes ([phone]) face to face with primary team, neurosurgery, patient and family discussing current complex health conditions and prognosis, goals of care, symptom management. Coordination of care with the primary team, neurosurgery, palliative  and SW, and RN regarding goals of care and symptom management.

## 2022-06-10 NOTE — PROGRESS NOTES
[facility]  Trauma Service Progress Note    Date of Service: 06/10/2022    Trauma mechanism:Fall from bar stool  Time/date of injury: 6/6/22  Known Injuries:  1. Acute unstable L1 fracture  2. Posterior longitudinal Ligament injury     Assessment & Plan   Neuro/Pain/Psych:  # Acute traumatic pain  - Scheduled: Tylenol, Methocarbamol  - PRN: Oxycodone, IV hydromorphone for breakthrough,  - Increased pain 6/10, could not sit up for Meds. Tried 1x dose of IV Robaxin 1,000mg and IV Toradol with no relief. Added Ketamine gtts, discontinued today d/t increased delirium.   - Monitor neurological status.   - Maintain circadian rhythm. Lights on during the day. Off at night, minimize cares at night. OOB during the day.     Pulmonary:  # Acute Hypoxia,   - Prolonged bedrest pending TLSO brace delivery for lumbar fractures  - Patient declining brace so unable to get OOB.   - CXR obtained with low lung volumes, atelectasis  - Early mobility once brace delivered  - Supplemental oxygen to keep saturation above 92 %. 2lpm NC  - Aggressive pulmonary hygiene. Incentive spirometer while awake      Cardiovascular:    # Hx P Afib   # HTN, HLD  # NSTEMI 2017  - 12 lead EKG: Afib +rbbb (noted on prior 12 lead EKG's from 2017),  to 160's, . BP stable  - Continue PTA: Metoprolol 100mg XL, atorvastatin,   - Electrolyte replacements, maintain K >4.0, mag >2.0  - Resume PTA statin  - Resume Furosemide     GI/Nutrition:    - Regular Diet      Renal/ Fluids/Electrolytes:  - Creatinine: 0.88  - Dex5 + 0.45%NaCl +20 KCL for IV fluid hydration poor po intake   - electrolyte replacement protocol in place.      Endocrine:  # Diabetes Mellitus with hyperglycemia  - No medications or insulin prior to admission.  - Medium correction scale insulin. Monitor blood glucose on daily BMP if persistently >180g/dL will initiate Novolog Sliding scale insulin     Infectious disease:   -  No indications for  antibiotics.      Hematology:    # Coagulopathy  - On Apixaban PTA chronically for stroke prophylaxis with Hx of Afib, continued on 6/8/22.   - Admitted with Hb of 16.2g/dL. Today: 14.9  - Threshold for transfusion if hgb <7.0 or signs/symptoms of hypoperfusion.   :      Musculoskeletal:  # Fall  # L1 unstable fracture  Seen and evlauted by Neurosurgery  - Flat bedrest on T/L spine precautions  - TLSO brace on, unable to get patient up at this time for follow-up back xray   - Neurosurgery following: We will discuss with trauma regarding need for potential care conference due to patient's degree of pain with bracing.  We will reach out to palliative regarding this. Continue to attempt donning brace for now-TLSO. Upright XR-okay for up in chair  - Physical and occupational therapy consults when cleared     Skin:  - dilgent cares to prevent skin breakdown and wound formation.       Palliative Consulted     Care Conference today. Patient's family wants to defer surgery at this time. Will continue to provide pain management      Lines/ tubes/ drains:  - PIV      General Cares:                 PPI/H2 blocker:  NA                DVT prophylaxis: Lovenox                Bowel Regimen/Date of last stool: PTA                Pulmonary toilet: IS     Code status:  Full Code      Discharge goals:     Adequate pain management: yes    VSS x24 hours: yes    Hemoglobin stable x 48 hours: yes    Ambulating safely and/or therapy evals complete: in process     Drains/lines removed or plan in place to manage: yes    Teaching done: in process     Other:  Expected D/C date: dependent on NSGY plan    Lakia Bains PA-C  To contact the trauma service use job code pager [phone],   Numeric texts or alpha text through [facility]     Interval History     ROS x 8 negative with exception of those things listed in interval hx    Physical Exam   Temp: (!) 96.6  F (35.9  C) Temp src: Axillary BP: (!) 85/74 Pulse: 74   Resp: 16 SpO2: 100 % O2 Device:  Nasal cannula Oxygen Delivery: 3 LPM  Vitals:    06/08/22 0200 06/09/22 0400 06/10/22 0400   Weight: 120.7 kg (266 lb 3.2 oz) 121 kg (266 lb 12.1 oz) 122.3 kg (269 lb 10 oz)     Vital Signs with Ranges  Temp:  [96.6  F (35.9  C)-97.5  F (36.4  C)] 96.6  F (35.9  C)  Pulse:  [] 74  Resp:  [14-18] 16  BP: ()/() 85/74  SpO2:  [91 %-100 %] 100 %  I/O last 3 completed shifts:  In: 1554.17 [P.O.:200; I.V.:1354.17]  Out: 825 [Urine:825]    [place] Coma Scale - Total 13/15  Eye Response (E): 3   4= spontaneous, 3= to verbal/voice, 2= to pain, 1= No response   Verbal Response (V): 4   5= Orientated, converses, 4= Confused, converses, 3= Inappropriate words, 2= Incomprehensible sounds, 1=No response   Motor Response (M): 6   6= Obeys commands, 5= Localizes to pain, 4= Withdrawal to pain, 3=Fexion to pain, 2= Extension to pain, 1= No response     Constitutional: lethargic, confused with talking   Eyes: +EOMI  ENT: Normocephalic, atraumatic  Respiratory: Clear bilaterally without wheezes  Cardiovascular:  irregular rate and rhythm,   GI: Abdomen soft, non-distended, non-tender,  Genitourinary: Eastman in place Clear yellow  Skin:  Warm and dry  Musculoskeletal: There is no redness, warmth, or swelling of the joints.  Pedal pulse palpated.  Neurologic: lethargic,. Strength and sensory is intact. No focal deficits.  Neuropsychiatric: Calm, normal eye contact, alert, affect appropriate to situation, oriented, thought process normal.

## 2022-06-10 NOTE — PLAN OF CARE
Major shift events: Able to place brace and keep it on w/out difficulty. Therapy had planned to see pt but unable to. Per therapy, not advised to get OOB w/out them. Good appetite today, starting calorie counts tmr. C/O back and shoulder pain w/ movement however it seems to be more tolerable today. Pt only wanted PRN Oxycodone x1 and declined any more, scheduled meds are helping. Ketamine discontinued this AM after pt showing confusion. A/O x4 the rest of shift however forgetful.   Weaned to room air. Continued Afib. Voiding spontaneously. Logrolling, weight shifting as tolerated. Care conference today - no surgical plan, will continue to monitor w/ pain management.     For complete assessments/vital signs please see flowsheets    Plan: Continue to wear brace, work w/ therapy, encourage PO intake, follow plan of care.

## 2022-06-10 NOTE — PROGRESS NOTES
[facility], [place]     Neurosurgery Progress Note:  06/10/2022      Interval History: Palliative consulted.  Given patient's tolerance of brace, surgical options will need to be discussed with family, trauma team, and palliative care together as patient is considerably high risk given the complexity of the surgery    Assessment:  88-year-old male, atrial fibrillation on Eliquis, presenting after mechanical fall found to have a obliquely oriented linear fracture through L1 vertebral body-does not appear to extend to posterior elements.  Significant lumbar area back pain on exam however grossly nonfocal.  Will need further MRI imaging to determine posterior element involvement and to rule out ligamentous injury.  Conservative management with bracing and serial imaging may suffice if posterior elements appear to be intact.  Given significant medical comorbidities as well as patient's reluctance for surgery, will pursue conservative options with bracing and serial imaging.    Clinically Significant Risk Factors Present on Admission                  Recommendations:  We will discuss with trauma regarding need for potential care conference due to patient's degree of pain with bracing.  We will reach out to palliative regarding this.  Continue to attempt donning brace for now-TLSO  Upright XR-okay for up in chair  -----------------------------------  Rubina Liang MD  Neurosurgery resident, PGY-2  -----------------------------------    Gen: Appears comfortable, NAD, laying in bed, not appearing in acute distress  Cardiac: Appears to be in atrial fibrillation with rapid ventricular rate.  Neurologic:  Alert & Oriented to person, place, time, and situation  Follows commands briskly  Speech fluent, spontaneous. No aphasia or dysarthria.  No gaze preference. No apparent hemineglect.  PERRL, EOMI  Face symmetric with sensation intact to light touch  Palate elevates symmetrically, uvula midline,  tongue protrudes midline  Trapezii muscles 5/5 bilaterally  No pronator drift     Del Tr Bi WE WF Gr   R 5 5 5 5 5 5   L 5 5 5 5 5 5    HF KE KF DF PF EHL   R 4+ (pain limited) 5 5 5 5 5   L 4+ (pain limited) 5 5 5 5 5     Reflexes 2+ throughout    Sensation intact and symmetric to light touch throughout    Objective:   Temp:  [96.8  F (36  C)-97.7  F (36.5  C)] 96.8  F (36  C)  Pulse:  [] 106  Resp:  [14-18] 16  BP: ()/() 146/78  SpO2:  [91 %-100 %] 99 %  I/O last 3 completed shifts:  In: 646.67 [P.O.:100; I.V.:546.67]  Out: 750 [Urine:750]        LABS:  Recent Labs   Lab 06/09/22  2208 06/09/22  1731 06/09/22  1547 06/09/22  0847 06/09/22  0421 06/08/22  1058 06/08/22  0728 06/07/22  0849 06/07/22  0535   NA  --   --   --   --  140  --  140  --  143   POTASSIUM  --   --   --   --  4.6  --  4.5  --  4.8   CHLORIDE  --   --   --   --  107  --  110*  --  108   CO2  --   --   --   --  25  --  25  --  28   ANIONGAP  --   --   --   --  8  --  5  --  7   * 174* 139*   < > 167*   < > 161*   < > 205*   BUN  --   --   --   --  22  --  23  --  20   CR  --   --   --   --  0.88  --  0.92  --  1.07   JERRY  --   --   --   --  9.3  --  9.2  --  9.6    < > = values in this interval not displayed.       Recent Labs   Lab 06/09/22 0421   WBC 9.5   RBC 5.28   HGB 15.6   HCT 48.6   MCV 92   MCH 29.5   MCHC 32.1   RDW 14.9          IMAGING:  No results found for this or any previous visit (from the past 24 hour(s)).

## 2022-06-10 NOTE — CONSULTS
Care Management Initial Consult    General Information  Assessment completed with: Patient, Family (by phone),    Type of CM/SW Visit: Initial Assessment  Primary Care Provider verified and updated as needed: Yes   Readmission within the last 30 days:     Advance Care Planning:            Communication Assessment  Patient's communication style: spoken language (English or Bilingual)    Hearing Difficulty or Deaf: yes   Wear Glasses or Blind: yes    Cognitive  Cognitive/Neuro/Behavioral: .WDL except  Level of Consciousness: somnolent  Arousal Level: arouses to voice, arouses to touch/gentle shaking  Orientation: oriented x 4  Mood/Behavior: calm, cooperative  Best Language: 0 - No aphasia  Speech: garbled    Living Environment:   People in home: spouse     Current living Arrangements: house      Able to return to prior arrangements:         Family/Social Support:  Care provided by: self  Provides care for: no one  Marital Status:   Wife, Children          Description of Support System: Supportive, Involved         Current Resources:   Patient receiving home care services: No  Community Resources: None  Equipment currently used at home: cane, straight  Supplies currently used at home: None    Employment/Financial:  Employment Status:  (not discussed)     Financial Concerns: No concerns identified         Functional Status:  Prior to admission patient needed assistance:   Dependent ADLs:: Ambulation-cane  Dependent IADLs:: Independent              Additional Information:  Lakia Trauma RENETTA requesting care conference with family, palliative and neurosurgery at 2PM today (both teams aware per RENETTA).     Spoke with daughterSabine by phone who is in the room with patient's wife (Rosie) and one of the patient's PCP - Dr. Irving Morin. They are all staying for the care conference and want it in the room so the patient can participate. Provided Sabine with the conference call in numbers so 2 other children can join by  phone. Bedside RN will bring ipad to room. Updated ICU SW and Trauma RENETTA with plan.     Completed above initial assessment with Sabine.     Shiela Neil RN, [place]  Float Care Coordinator  Covering ICU RN   Pager: [phone]

## 2022-06-10 NOTE — PLAN OF CARE
Major Shift Events:  Neuro status unchanged aside from 1 brief episode of confusion/ agitation at start of shift which has since resolved. Patient was started on a ketamine drip yesterday for better pain management. He continues to have significant low back pain with turns/ adjustments. CV: atrial fibrillation with runs of RVR. Afebrile. BPs labile. Pulm: on 3L NC. Urine output minimal but adequate. No bowel movements over night, scheduled bowel meds given. Electrolytes replaced per protocol.     Plan: Patient care conference today. Continue with pain management.     For vital signs and complete assessments, please see documentation flowsheets.

## 2022-06-11 ENCOUNTER — APPOINTMENT (OUTPATIENT)
Dept: PHYSICAL THERAPY | Facility: CLINIC | Age: 87
DRG: 552 | End: 2022-06-11
Attending: PHYSICIAN ASSISTANT
Payer: COMMERCIAL

## 2022-06-11 LAB
ANION GAP SERPL CALCULATED.3IONS-SCNC: 7 MMOL/L (ref 3–14)
BUN SERPL-MCNC: 22 MG/DL (ref 7–30)
CALCIUM SERPL-MCNC: 9 MG/DL (ref 8.5–10.1)
CHLORIDE BLD-SCNC: 105 MMOL/L (ref 94–109)
CO2 SERPL-SCNC: 24 MMOL/L (ref 20–32)
CREAT SERPL-MCNC: 0.76 MG/DL (ref 0.66–1.25)
ERYTHROCYTE [DISTWIDTH] IN BLOOD BY AUTOMATED COUNT: 14.5 % (ref 10–15)
GFR SERPL CREATININE-BSD FRML MDRD: 86 ML/MIN/1.73M2
GLUCOSE BLD-MCNC: 178 MG/DL (ref 70–99)
GLUCOSE BLDC GLUCOMTR-MCNC: 149 MG/DL (ref 70–99)
GLUCOSE BLDC GLUCOMTR-MCNC: 157 MG/DL (ref 70–99)
GLUCOSE BLDC GLUCOMTR-MCNC: 169 MG/DL (ref 70–99)
GLUCOSE BLDC GLUCOMTR-MCNC: 176 MG/DL (ref 70–99)
HCT VFR BLD AUTO: 47.7 % (ref 40–53)
HGB BLD-MCNC: 15.6 G/DL (ref 13.3–17.7)
MAGNESIUM SERPL-MCNC: 1.9 MG/DL (ref 1.6–2.3)
MCH RBC QN AUTO: 29.5 PG (ref 26.5–33)
MCHC RBC AUTO-ENTMCNC: 32.7 G/DL (ref 31.5–36.5)
MCV RBC AUTO: 90 FL (ref 78–100)
PHOSPHATE SERPL-MCNC: 2.8 MG/DL (ref 2.5–4.5)
PLATELET # BLD AUTO: 186 10E3/UL (ref 150–450)
POTASSIUM BLD-SCNC: 4.6 MMOL/L (ref 3.4–5.3)
RBC # BLD AUTO: 5.29 10E6/UL (ref 4.4–5.9)
SODIUM SERPL-SCNC: 136 MMOL/L (ref 133–144)
WBC # BLD AUTO: 9.3 10E3/UL (ref 4–11)

## 2022-06-11 PROCEDURE — 83735 ASSAY OF MAGNESIUM: CPT | Performed by: PHYSICIAN ASSISTANT

## 2022-06-11 PROCEDURE — 99232 SBSQ HOSP IP/OBS MODERATE 35: CPT | Performed by: PHYSICIAN ASSISTANT

## 2022-06-11 PROCEDURE — 250N000013 HC RX MED GY IP 250 OP 250 PS 637: Performed by: PHYSICIAN ASSISTANT

## 2022-06-11 PROCEDURE — 80048 BASIC METABOLIC PNL TOTAL CA: CPT | Performed by: PHYSICIAN ASSISTANT

## 2022-06-11 PROCEDURE — 97530 THERAPEUTIC ACTIVITIES: CPT | Mod: GP | Performed by: PHYSICAL THERAPIST

## 2022-06-11 PROCEDURE — 97161 PT EVAL LOW COMPLEX 20 MIN: CPT | Mod: GP | Performed by: PHYSICAL THERAPIST

## 2022-06-11 PROCEDURE — 36415 COLL VENOUS BLD VENIPUNCTURE: CPT | Performed by: NURSE PRACTITIONER

## 2022-06-11 PROCEDURE — 84100 ASSAY OF PHOSPHORUS: CPT | Performed by: SURGERY

## 2022-06-11 PROCEDURE — 250N000013 HC RX MED GY IP 250 OP 250 PS 637: Performed by: STUDENT IN AN ORGANIZED HEALTH CARE EDUCATION/TRAINING PROGRAM

## 2022-06-11 PROCEDURE — 200N000002 HC R&B ICU UMMC

## 2022-06-11 PROCEDURE — 250N000011 HC RX IP 250 OP 636: Performed by: STUDENT IN AN ORGANIZED HEALTH CARE EDUCATION/TRAINING PROGRAM

## 2022-06-11 PROCEDURE — 250N000013 HC RX MED GY IP 250 OP 250 PS 637: Performed by: NURSE PRACTITIONER

## 2022-06-11 PROCEDURE — 85027 COMPLETE CBC AUTOMATED: CPT | Performed by: NURSE PRACTITIONER

## 2022-06-11 PROCEDURE — 258N000003 HC RX IP 258 OP 636: Performed by: PHYSICIAN ASSISTANT

## 2022-06-11 PROCEDURE — 250N000013 HC RX MED GY IP 250 OP 250 PS 637: Performed by: SURGERY

## 2022-06-11 RX ORDER — MAGNESIUM OXIDE 400 MG/1
400 TABLET ORAL 2 TIMES DAILY
Status: DISCONTINUED | OUTPATIENT
Start: 2022-06-11 | End: 2022-06-12

## 2022-06-11 RX ADMIN — APIXABAN 5 MG: 5 TABLET, FILM COATED ORAL at 07:45

## 2022-06-11 RX ADMIN — INSULIN ASPART 1 UNITS: 100 INJECTION, SOLUTION INTRAVENOUS; SUBCUTANEOUS at 07:54

## 2022-06-11 RX ADMIN — ACETAMINOPHEN 325MG 975 MG: 325 TABLET ORAL at 22:33

## 2022-06-11 RX ADMIN — HYDROMORPHONE HYDROCHLORIDE 0.5 MG: 1 INJECTION, SOLUTION INTRAMUSCULAR; INTRAVENOUS; SUBCUTANEOUS at 09:49

## 2022-06-11 RX ADMIN — METHOCARBAMOL 1000 MG: 500 TABLET ORAL at 12:18

## 2022-06-11 RX ADMIN — Medication 400 MG: at 20:10

## 2022-06-11 RX ADMIN — SENNOSIDES AND DOCUSATE SODIUM 1 TABLET: 8.6; 5 TABLET ORAL at 20:10

## 2022-06-11 RX ADMIN — OXYCODONE HYDROCHLORIDE 5 MG: 5 TABLET ORAL at 23:59

## 2022-06-11 RX ADMIN — ACETAMINOPHEN 325MG 975 MG: 325 TABLET ORAL at 06:40

## 2022-06-11 RX ADMIN — POTASSIUM CHLORIDE, DEXTROSE MONOHYDRATE AND SODIUM CHLORIDE: 150; 5; 450 INJECTION, SOLUTION INTRAVENOUS at 07:52

## 2022-06-11 RX ADMIN — THERA TABS 1 TABLET: TAB at 07:53

## 2022-06-11 RX ADMIN — METOPROLOL SUCCINATE 100 MG: 50 TABLET, EXTENDED RELEASE ORAL at 07:45

## 2022-06-11 RX ADMIN — Medication 2000 UNITS: at 07:53

## 2022-06-11 RX ADMIN — METHOCARBAMOL 1000 MG: 500 TABLET ORAL at 20:10

## 2022-06-11 RX ADMIN — INSULIN ASPART 1 UNITS: 100 INJECTION, SOLUTION INTRAVENOUS; SUBCUTANEOUS at 12:26

## 2022-06-11 RX ADMIN — POTASSIUM CHLORIDE, DEXTROSE MONOHYDRATE AND SODIUM CHLORIDE: 150; 5; 450 INJECTION, SOLUTION INTRAVENOUS at 17:27

## 2022-06-11 RX ADMIN — LIDOCAINE PATCH 4% 2 PATCH: 40 PATCH TOPICAL at 07:25

## 2022-06-11 RX ADMIN — OXYCODONE HYDROCHLORIDE 5 MG: 5 TABLET ORAL at 16:45

## 2022-06-11 RX ADMIN — METHOCARBAMOL 1000 MG: 500 TABLET ORAL at 07:43

## 2022-06-11 RX ADMIN — ATORVASTATIN CALCIUM 20 MG: 20 TABLET, FILM COATED ORAL at 21:28

## 2022-06-11 RX ADMIN — POLYETHYLENE GLYCOL 3350 17 G: 17 POWDER, FOR SOLUTION ORAL at 07:47

## 2022-06-11 RX ADMIN — ACETAMINOPHEN 325MG 975 MG: 325 TABLET ORAL at 14:18

## 2022-06-11 RX ADMIN — POLYETHYLENE GLYCOL 3350 17 G: 17 POWDER, FOR SOLUTION ORAL at 20:10

## 2022-06-11 RX ADMIN — APIXABAN 5 MG: 5 TABLET, FILM COATED ORAL at 20:10

## 2022-06-11 RX ADMIN — FUROSEMIDE 20 MG: 20 TABLET ORAL at 07:46

## 2022-06-11 RX ADMIN — OXYCODONE HYDROCHLORIDE 5 MG: 5 TABLET ORAL at 07:43

## 2022-06-11 RX ADMIN — Medication 400 MG: at 07:46

## 2022-06-11 RX ADMIN — SENNOSIDES AND DOCUSATE SODIUM 2 TABLET: 8.6; 5 TABLET ORAL at 07:48

## 2022-06-11 RX ADMIN — METHOCARBAMOL 1000 MG: 500 TABLET ORAL at 15:56

## 2022-06-11 RX ADMIN — INSULIN ASPART 1 UNITS: 100 INJECTION, SOLUTION INTRAVENOUS; SUBCUTANEOUS at 18:10

## 2022-06-11 ASSESSMENT — ACTIVITIES OF DAILY LIVING (ADL)
ADLS_ACUITY_SCORE: 48
ADLS_ACUITY_SCORE: 46
ADLS_ACUITY_SCORE: 47
ADLS_ACUITY_SCORE: 48
ADLS_ACUITY_SCORE: 46
ADLS_ACUITY_SCORE: 47
ADLS_ACUITY_SCORE: 47
ADLS_ACUITY_SCORE: 46

## 2022-06-11 ASSESSMENT — VISUAL ACUITY
OU: GLASSES

## 2022-06-11 NOTE — PROGRESS NOTES
06/11/22 0900   Quick Adds   Type of Visit Initial PT Evaluation   Living Environment   People in Home spouse   Current Living Arrangements house   Home Accessibility no concerns   Transportation Anticipated car, drives self;family or friend will provide   Living Environment Comments spouse can assist as needed   Self-Care   Usual Activity Tolerance moderate   Current Activity Tolerance poor   Regular Exercise Yes   Activity/Exercise Type walking   Exercise Amount/Frequency 10 mins;daily   Equipment Currently Used at Home cane, straight;walker, rolling  (uses FWW for community distances approx 25% of time)   Fall history within last six months yes   Number of times patient has fallen within last six months 3   Activity/Exercise/Self-Care Comment IND with functional mobility and ADLs, used SPC/FWW for community ambulation   General Information   Onset of Illness/Injury or Date of Surgery 06/07/22   Referring Physician Lakia Bains PA-C   Patient/Family Therapy Goals Statement (PT) improve pain   Pertinent History of Current Problem (include personal factors and/or comorbidities that impact the POC) 88-year-old male, atrial fibrillation on Eliquis, presenting after mechanical fall found to have a obliquely oriented linear fracture through L1 vertebral body-does not appear to extend to posterior elements.  Significant lumbar area back pain on exam however grossly nonfocal.  Will need further MRI imaging to determine posterior element involvement and to rule out ligamentous injury.  Conservative management with bracing and serial imaging may suffice if posterior elements appear to be intact.  Given significant medical comorbidities as well as patient's reluctance for surgery, will pursue conservative options with bracing and serial imaging.   Existing Precautions/Restrictions   (TLSO when HOB > 30 degrees)   General Observations states he has long hx of B frozen shoulder   Cognition   Affect/Mental Status  (Cognition) confused  (mildly confused at times)   Orientation Status (Cognition) disoriented to;time   Follows Commands (Cognition) WFL   Pain Assessment   Patient Currently in Pain Yes, see Vital Sign flowsheet   Posture    Posture Forward head position;Protracted shoulders   Range of Motion (ROM)   ROM Comment B LE appear WFL, not formally assessed due to pain   Strength (Manual Muscle Testing)   Strength Comments demonstrated B LE antigravity strength   Bed Mobility   Comment, (Bed Mobility) supine > sit max assist   Transfers   Comment, (Transfers) unable to attempt sit > stand due to pain   Gait/Stairs (Locomotion)   Comment, (Gait/Stairs) unable to assess gait, unable to stand due to pain   Balance   Balance Comments required mod assist to maintain sitting balance due to pain   Sensory Examination   Sensory Perception Comments light touch, proprioception intact B LE   Clinical Impression   Criteria for Skilled Therapeutic Intervention Yes, treatment indicated   PT Diagnosis (PT) impaired functional mobility s/p fall and subsequent L1 fx.   Influenced by the following impairments pain, impaired balance, decreased activity tolerance   Functional limitations due to impairments impaired bed mobility, impaired transfers, impaired gait   Clinical Presentation (PT Evaluation Complexity) Evolving/Changing   Clinical Presentation Rationale clinical judgement   Clinical Decision Making (Complexity) low complexity   Planned Therapy Interventions (PT) balance training;bed mobility training;gait training;neuromuscular re-education;orthotic fitting/training;transfer training;progressive activity/exercise   Risk & Benefits of therapy have been explained evaluation/treatment results reviewed;care plan/treatment goals reviewed   PT Discharge Planning   PT Discharge Recommendation (DC Rec) Transitional Care Facility   PT Rationale for DC Rec dependence with functional mobility   PT Brief overview of current status supine > sit  max; unable to attempt standing due to pain   Plan of Care Review   Plan of Care Reviewed With patient   Total Evaluation Time   Total Evaluation Time (Minutes) 8   Physical Therapy Goals   PT Frequency 5x/week   PT Predicted Duration/Target Date for Goal Attainment 06/25/22   PT Goals Bed Mobility;Transfers;Gait   PT: Bed Mobility Independent;Supine to/from sit;Within precautions   PT: Transfers Modified independent;Sit to/from stand;Assistive device  (FWW)   PT: Gait Modified independent;Rolling walker;150 feet

## 2022-06-11 NOTE — PROGRESS NOTES
[facility], [place]     Neurosurgery Progress Note:  06/11/2022      Interval History: Family opted for no surgery.  Was able to wear brace.  We will slowly mobilize and hope for pain control with brace in place.    Assessment:  88-year-old male, atrial fibrillation on Eliquis, presenting after mechanical fall found to have a obliquely oriented linear fracture through L1 vertebral body-does not appear to extend to posterior elements.  Significant lumbar area back pain on exam however grossly nonfocal.  Will need further MRI imaging to determine posterior element involvement and to rule out ligamentous injury.  Conservative management with bracing and serial imaging may suffice if posterior elements appear to be intact.  Given significant medical comorbidities as well as patient's reluctance for surgery, will pursue conservative options with bracing and serial imaging.    Clinically Significant Risk Factors Present on Admission                   Recommendations:  TLSO brace  Upright XR-okay for up in chair  -----------------------------------  Rubina Liang MD  Neurosurgery resident, PGY-2  -----------------------------------    Gen: Appears comfortable, NAD, laying in bed, not appearing in acute distress  Cardiac: Appears to be in atrial fibrillation with rapid ventricular rate.  Neurologic:  Alert & Oriented to person, place, time, and situation  Follows commands briskly  Speech fluent, spontaneous. No aphasia or dysarthria.  No gaze preference. No apparent hemineglect.  PERRL, EOMI  Face symmetric with sensation intact to light touch  Palate elevates symmetrically, uvula midline, tongue protrudes midline  Trapezii muscles 5/5 bilaterally  No pronator drift     Del Tr Bi WE WF Gr   R 5 5 5 5 5 5   L 5 5 5 5 5 5    HF KE KF DF PF EHL   R 4+ (pain limited) 5 5 5 5 5   L 4+ (pain limited) 5 5 5 5 5     Reflexes 2+ throughout    Sensation intact and symmetric to light touch  throughout    Objective:   Temp:  [96.4  F (35.8  C)-98  F (36.7  C)] 97.2  F (36.2  C)  Pulse:  [] 129  Resp:  [14-22] 14  BP: ()/() 125/104  SpO2:  [89 %-99 %] 99 %  I/O last 3 completed shifts:  In: 4140.42 [P.O.:1690; I.V.:2450.42]  Out: 1430 [Urine:1430]        LABS:  Recent Labs   Lab 06/11/22  1225 06/11/22  0752 06/11/22  0434 06/10/22  0821 06/10/22  0340 06/09/22  0847 06/09/22  0421 06/08/22  1058 06/08/22  0728   NA  --   --  136  --   --   --  140  --  140   POTASSIUM  --   --  4.6  --  4.5  --  4.6  --  4.5   CHLORIDE  --   --  105  --   --   --  107  --  110*   CO2  --   --  24  --   --   --  25  --  25   ANIONGAP  --   --  7  --   --   --  8  --  5   * 169* 178*   < >  --    < > 167*   < > 161*   BUN  --   --  22  --   --   --  22  --  23   CR  --   --  0.76  --   --   --  0.88  --  0.92   JERRY  --   --  9.0  --   --   --  9.3  --  9.2    < > = values in this interval not displayed.       Recent Labs   Lab 06/11/22  0434   WBC 9.3   RBC 5.29   HGB 15.6   HCT 47.7   MCV 90   MCH 29.5   MCHC 32.7   RDW 14.5          IMAGING:  No results found for this or any previous visit (from the past 24 hour(s)).

## 2022-06-11 NOTE — PLAN OF CARE
Major Shift Events: Oriented, but forgetful. Neuro assessment intact. TLSO brace on overnight. Pain managed with scheduled medication and repositioning. Afib overnight  bpm with occasional PVC, normotensive and afebrile. RA-2 LPM NC w/sleep, dyspnea on exertion and tachypnea at times. Encouraging IS use. Bowel regimen given, active bowel sounds. Swallowing pills well with water. No appetite, carmencita count today. Voiding spontaneously. Mepilex applied under brace for prevention. PIVs x2 infusing MIVF. Mag replacements today. Breakfast ordered per pt request.    Plan: PT/OT today and encourage self-care.  For vital signs and complete assessments, please see documentation flowsheets.

## 2022-06-11 NOTE — PLAN OF CARE
Major shift events: Worked w/ PT today w/ much difficulty. Pt unable to properly sit on edge of bed or stand d/t pain despite pre-medicating. Pt states pain is minimal to none when at rest however is severely increased w/ movement.   Utilizing opoids w/ caution for pain management as it causes increased confusion. Weight shifting as tolerated. PO intake encouraged, fair intake. Intermittently using O2 when sleeping d/t desaturations. Afib w/ HR fluctuating from 60s to 140s, trauma team aware. About to give suppository however pt was able to have BM independently. Voiding spontaneously. Additional mepilex and interdry utilized for skin protection under brace. A/O except for time.     For complete assessments/vital signs please see flowsheets    Plan: Continue to work w/ therapy, pain management, follow plan of care.

## 2022-06-11 NOTE — PROGRESS NOTES
[facility]  Trauma Service Progress Note    Date of Service: 06/11/2022    Trauma mechanism:Fall from bar stool  Time/date of injury: 6/6/22  Known Injuries:  1. Acute unstable L1 fracture  2. Posterior longitudinal Ligament injury     Assessment & Plan   Neuro/Pain/Psych:  # Acute traumatic pain  - Scheduled: Tylenol, Methocarbamol  - PRN: Oxycodone, IV hydromorphone for breakthrough,  - Increased pain 6/9, could not sit up for Meds. Tried 1x dose of IV Robaxin 1,000mg and IV Toradol with no relief. Added Ketamine gtts, discontinued today d/t increased delirium.   - Monitor neurological status.   - Maintain circadian rhythm. Lights on during the day. Off at night, minimize cares at night. OOB during the day.     Pulmonary:  # Acute Hypoxia,   - Prolonged bedrest pending TLSO brace delivery for lumbar fractures  - Patient declining brace so unable to get OOB.   - CXR obtained with low lung volumes, atelectasis  - Early mobility once brace delivered  - Supplemental oxygen to keep saturation above 92 %. 2lpm NC  - Aggressive pulmonary hygiene. Incentive spirometer while awake      Cardiovascular:    # Hx P Afib   # HTN, HLD  # NSTEMI 2017  - 12 lead EKG: Afib +rbbb (noted on prior 12 lead EKG's from 2017),  to 160's, . BP stable  - Continue PTA: Metoprolol 100mg XL, atorvastatin,   - Electrolyte replacements, maintain K >4.0, mag >2.0  - continue PTA statin, Furosemide     GI/Nutrition:    - Regular Diet      Renal/ Fluids/Electrolytes:  - Creatinine: 0.88  - Dex5 + 0.45%NaCl +20 KCL for IV fluid hydration poor po intake   - electrolyte replacement protocol in place.      Endocrine:  # Diabetes Mellitus with hyperglycemia  - No medications or insulin prior to admission.  - Medium correction scale insulin. Monitor blood glucose on daily BMP if persistently >180g/dL will initiate Novolog Sliding scale insulin     Infectious disease:   -  No indications for antibiotics.       Hematology:    # Coagulopathy  - On Apixaban PTA chronically for stroke prophylaxis with Hx of Afib, continued on 6/8/22.   - Admitted with Hb of 16.2g/dL. Today: 14.9  - Threshold for transfusion if hgb <7.0 or signs/symptoms of hypoperfusion.   :      Musculoskeletal:  # Fall  # L1 unstable fracture  Seen and evlauted by Neurosurgery  - Flat bedrest on T/L spine precautions  - TLSO brace on, unable to get patient up at this time for follow-up back xray   - Neurosurgery following: We will discuss with trauma regarding need for potential care conference due to patient's degree of pain with bracing.  We will reach out to palliative regarding this. Continue to attempt donning brace for now-TLSO. Upright XR-okay for up in chair  - Physical and occupational therapy consults when cleared     Skin:  - dilgent cares to prevent skin breakdown and wound formation.       Palliative Consulted   Care Conference 6/10: Patient's family wants to defer surgery at this time since it would require a fusion from T11 to lower lumbar. They are concerned about the healing and recovery time. Will continue to provide pain management while diminishing delirium as possible.     Lines/ tubes/ drains:  - PIV      General Cares:                 PPI/H2 blocker:  NA                DVT prophylaxis: Lovenox                Bowel Regimen/Date of last stool: PTA                Pulmonary toilet: IS     Code status:  Full Code      Discharge goals:     Adequate pain management: yes    VSS x24 hours: yes    Hemoglobin stable x 48 hours: yes    Ambulating safely and/or therapy evals complete: in process     Drains/lines removed or plan in place to manage: yes    Teaching done: in process     Other:  Expected D/C date:  dependent on progression with PT    Lakia Bains PA-C  To contact the trauma service use job code pager [phone],   Numeric texts or alpha text through [facility]     Interval History   Patient more orientated today per nurse. He had  difficult with PT this morning. Per nurse they would like to defer to OT tomorrow when they can use a lift.   ROS x 8 negative with exception of those things listed in interval hx    Physical Exam   Temp: 98  F (36.7  C) Temp src: Oral BP: (!) 105/95 Pulse: 117   Resp: 20 SpO2: 98 % O2 Device: Nasal cannula Oxygen Delivery: 2 LPM  Vitals:    06/09/22 0400 06/10/22 0400 06/11/22 0000   Weight: 121 kg (266 lb 12.1 oz) 122.3 kg (269 lb 10 oz) 125.1 kg (275 lb 12.7 oz)     Vital Signs with Ranges  Temp:  [97.2  F (36.2  C)-98  F (36.7  C)] 98  F (36.7  C)  Pulse:  [] 117  Resp:  [14-22] 20  BP: ()/() 105/95  SpO2:  [89 %-100 %] 98 %  I/O last 3 completed shifts:  In: 4140.42 [P.O.:1690; I.V.:2450.42]  Out: 1430 [Urine:1430]    Amber Coma Scale - Total 14/15  Eye Response (E): 4   4= spontaneous, 3= to verbal/voice, 2= to pain, 1= No response   Verbal Response (V): 4  5= Orientated, converses, 4= Confused, converses, 3= Inappropriate words, 2= Incomprehensible sounds, 1=No response   Motor Response (M): 6   6= Obeys commands, 5= Localizes to pain, 4= Withdrawal to pain, 3=Fexion to pain, 2= Extension to pain, 1= No response     Constitutional: Awake, alert, cooperative, no apparent distress.  Eyes: +EOMI, PERRL,   ENT: Normocephalic, atraumatic  Respiratory: Clear bilaterally without wheezes  Cardiovascular:  irregular rate and rhythm,   GI: Abdomen soft, non-distended, non-tender,  Genitourinary: Eastman in place Clear yellow  Skin:  Warm and dry  Musculoskeletal: There is no redness, warmth, or swelling of the joints.  Pedal pulse palpated.  Neurologic: awake to lethargic,. Strength and sensory is intact. No focal deficits.  Neuropsychiatric: Calm, normal eye contact, alert, affect appropriate to situation, oriented, thought process normal.

## 2022-06-12 ENCOUNTER — APPOINTMENT (OUTPATIENT)
Dept: OCCUPATIONAL THERAPY | Facility: CLINIC | Age: 87
DRG: 552 | End: 2022-06-12
Attending: PHYSICIAN ASSISTANT
Payer: COMMERCIAL

## 2022-06-12 LAB
ANION GAP SERPL CALCULATED.3IONS-SCNC: 5 MMOL/L (ref 3–14)
BUN SERPL-MCNC: 17 MG/DL (ref 7–30)
CALCIUM SERPL-MCNC: 9 MG/DL (ref 8.5–10.1)
CHLORIDE BLD-SCNC: 105 MMOL/L (ref 94–109)
CO2 SERPL-SCNC: 25 MMOL/L (ref 20–32)
CREAT SERPL-MCNC: 0.55 MG/DL (ref 0.66–1.25)
ERYTHROCYTE [DISTWIDTH] IN BLOOD BY AUTOMATED COUNT: 14.4 % (ref 10–15)
GFR SERPL CREATININE-BSD FRML MDRD: >90 ML/MIN/1.73M2
GLUCOSE BLD-MCNC: 174 MG/DL (ref 70–99)
GLUCOSE BLDC GLUCOMTR-MCNC: 132 MG/DL (ref 70–99)
GLUCOSE BLDC GLUCOMTR-MCNC: 132 MG/DL (ref 70–99)
GLUCOSE BLDC GLUCOMTR-MCNC: 171 MG/DL (ref 70–99)
GLUCOSE BLDC GLUCOMTR-MCNC: 189 MG/DL (ref 70–99)
GLUCOSE BLDC GLUCOMTR-MCNC: 203 MG/DL (ref 70–99)
GLUCOSE BLDC GLUCOMTR-MCNC: 222 MG/DL (ref 70–99)
HCT VFR BLD AUTO: 46.2 % (ref 40–53)
HGB BLD-MCNC: 15.2 G/DL (ref 13.3–17.7)
MAGNESIUM SERPL-MCNC: 2 MG/DL (ref 1.6–2.3)
MCH RBC QN AUTO: 29.3 PG (ref 26.5–33)
MCHC RBC AUTO-ENTMCNC: 32.9 G/DL (ref 31.5–36.5)
MCV RBC AUTO: 89 FL (ref 78–100)
PHOSPHATE SERPL-MCNC: 3.1 MG/DL (ref 2.5–4.5)
PLATELET # BLD AUTO: 180 10E3/UL (ref 150–450)
POTASSIUM BLD-SCNC: 4.3 MMOL/L (ref 3.4–5.3)
RBC # BLD AUTO: 5.18 10E6/UL (ref 4.4–5.9)
SODIUM SERPL-SCNC: 135 MMOL/L (ref 133–144)
WBC # BLD AUTO: 9 10E3/UL (ref 4–11)

## 2022-06-12 PROCEDURE — 36415 COLL VENOUS BLD VENIPUNCTURE: CPT | Performed by: NURSE PRACTITIONER

## 2022-06-12 PROCEDURE — 250N000011 HC RX IP 250 OP 636: Performed by: STUDENT IN AN ORGANIZED HEALTH CARE EDUCATION/TRAINING PROGRAM

## 2022-06-12 PROCEDURE — 120N000002 HC R&B MED SURG/OB UMMC

## 2022-06-12 PROCEDURE — 97165 OT EVAL LOW COMPLEX 30 MIN: CPT | Mod: GO

## 2022-06-12 PROCEDURE — 250N000013 HC RX MED GY IP 250 OP 250 PS 637: Performed by: SURGERY

## 2022-06-12 PROCEDURE — 84100 ASSAY OF PHOSPHORUS: CPT | Performed by: SURGERY

## 2022-06-12 PROCEDURE — 82310 ASSAY OF CALCIUM: CPT | Performed by: PHYSICIAN ASSISTANT

## 2022-06-12 PROCEDURE — 250N000013 HC RX MED GY IP 250 OP 250 PS 637: Performed by: STUDENT IN AN ORGANIZED HEALTH CARE EDUCATION/TRAINING PROGRAM

## 2022-06-12 PROCEDURE — 99232 SBSQ HOSP IP/OBS MODERATE 35: CPT | Performed by: NURSE PRACTITIONER

## 2022-06-12 PROCEDURE — 85027 COMPLETE CBC AUTOMATED: CPT | Performed by: NURSE PRACTITIONER

## 2022-06-12 PROCEDURE — 97530 THERAPEUTIC ACTIVITIES: CPT | Mod: GO

## 2022-06-12 PROCEDURE — 83735 ASSAY OF MAGNESIUM: CPT | Performed by: SURGERY

## 2022-06-12 PROCEDURE — 250N000011 HC RX IP 250 OP 636: Performed by: NURSE PRACTITIONER

## 2022-06-12 PROCEDURE — 250N000013 HC RX MED GY IP 250 OP 250 PS 637: Performed by: PHYSICIAN ASSISTANT

## 2022-06-12 PROCEDURE — 250N000013 HC RX MED GY IP 250 OP 250 PS 637: Performed by: NURSE PRACTITIONER

## 2022-06-12 RX ORDER — MAGNESIUM OXIDE 400 MG/1
400 TABLET ORAL 2 TIMES DAILY
Status: COMPLETED | OUTPATIENT
Start: 2022-06-12 | End: 2022-06-13

## 2022-06-12 RX ORDER — KETOROLAC TROMETHAMINE 15 MG/ML
15 INJECTION, SOLUTION INTRAMUSCULAR; INTRAVENOUS EVERY 8 HOURS
Status: COMPLETED | OUTPATIENT
Start: 2022-06-12 | End: 2022-06-15

## 2022-06-12 RX ADMIN — APIXABAN 5 MG: 5 TABLET, FILM COATED ORAL at 20:13

## 2022-06-12 RX ADMIN — LIDOCAINE PATCH 4% 2 PATCH: 40 PATCH TOPICAL at 10:21

## 2022-06-12 RX ADMIN — METHOCARBAMOL 1000 MG: 500 TABLET ORAL at 08:24

## 2022-06-12 RX ADMIN — ACETAMINOPHEN 325MG 975 MG: 325 TABLET ORAL at 06:46

## 2022-06-12 RX ADMIN — OXYCODONE HYDROCHLORIDE 5 MG: 5 TABLET ORAL at 01:16

## 2022-06-12 RX ADMIN — METHOCARBAMOL 1000 MG: 500 TABLET ORAL at 20:13

## 2022-06-12 RX ADMIN — INSULIN ASPART 1 UNITS: 100 INJECTION, SOLUTION INTRAVENOUS; SUBCUTANEOUS at 09:08

## 2022-06-12 RX ADMIN — POLYETHYLENE GLYCOL 3350 17 G: 17 POWDER, FOR SOLUTION ORAL at 20:13

## 2022-06-12 RX ADMIN — Medication 400 MG: at 20:13

## 2022-06-12 RX ADMIN — FUROSEMIDE 20 MG: 20 TABLET ORAL at 08:21

## 2022-06-12 RX ADMIN — APIXABAN 5 MG: 5 TABLET, FILM COATED ORAL at 08:21

## 2022-06-12 RX ADMIN — ACETAMINOPHEN 325MG 975 MG: 325 TABLET ORAL at 13:54

## 2022-06-12 RX ADMIN — METHOCARBAMOL 1000 MG: 500 TABLET ORAL at 16:01

## 2022-06-12 RX ADMIN — OXYCODONE HYDROCHLORIDE 10 MG: 5 TABLET ORAL at 08:56

## 2022-06-12 RX ADMIN — Medication 2000 UNITS: at 08:21

## 2022-06-12 RX ADMIN — OXYCODONE HYDROCHLORIDE 5 MG: 5 TABLET ORAL at 13:55

## 2022-06-12 RX ADMIN — Medication 400 MG: at 08:24

## 2022-06-12 RX ADMIN — ATORVASTATIN CALCIUM 20 MG: 20 TABLET, FILM COATED ORAL at 21:53

## 2022-06-12 RX ADMIN — METOPROLOL SUCCINATE 100 MG: 50 TABLET, EXTENDED RELEASE ORAL at 08:23

## 2022-06-12 RX ADMIN — POLYETHYLENE GLYCOL 3350 17 G: 17 POWDER, FOR SOLUTION ORAL at 08:20

## 2022-06-12 RX ADMIN — THERA TABS 1 TABLET: TAB at 08:25

## 2022-06-12 RX ADMIN — ACETAMINOPHEN 325MG 975 MG: 325 TABLET ORAL at 21:53

## 2022-06-12 RX ADMIN — SENNOSIDES AND DOCUSATE SODIUM 2 TABLET: 8.6; 5 TABLET ORAL at 08:23

## 2022-06-12 RX ADMIN — KETOROLAC TROMETHAMINE 15 MG: 15 INJECTION, SOLUTION INTRAMUSCULAR; INTRAVENOUS at 16:01

## 2022-06-12 RX ADMIN — HYDROMORPHONE HYDROCHLORIDE 0.5 MG: 1 INJECTION, SOLUTION INTRAMUSCULAR; INTRAVENOUS; SUBCUTANEOUS at 07:32

## 2022-06-12 RX ADMIN — METHOCARBAMOL 1000 MG: 500 TABLET ORAL at 12:38

## 2022-06-12 RX ADMIN — SENNOSIDES AND DOCUSATE SODIUM 2 TABLET: 8.6; 5 TABLET ORAL at 20:13

## 2022-06-12 RX ADMIN — INSULIN ASPART 1 UNITS: 100 INJECTION, SOLUTION INTRAVENOUS; SUBCUTANEOUS at 12:37

## 2022-06-12 ASSESSMENT — ACTIVITIES OF DAILY LIVING (ADL)
ADLS_ACUITY_SCORE: 49
ADLS_ACUITY_SCORE: 48
ADLS_ACUITY_SCORE: 49
ADLS_ACUITY_SCORE: 49
ADLS_ACUITY_SCORE: 46
ADLS_ACUITY_SCORE: 49
ADLS_ACUITY_SCORE: 46
ADLS_ACUITY_SCORE: 49
ADLS_ACUITY_SCORE: 47
ADLS_ACUITY_SCORE: 47
ADLS_ACUITY_SCORE: 48
ADLS_ACUITY_SCORE: 46
IADL_COMMENTS: SPOUSE CAN A

## 2022-06-12 ASSESSMENT — VISUAL ACUITY
OU: GLASSES
OU: GLASSES

## 2022-06-12 NOTE — PROGRESS NOTES
[facility], [place]     Neurosurgery Progress Note:  06/12/2022      Interval History: Tolerated brace yesterday, though unable to sit up with PT for further work.  UXR pending.    Assessment:  88-year-old male, atrial fibrillation on Eliquis, presenting after mechanical fall found to have a obliquely oriented linear fracture through L1 vertebral body-does not appear to extend to posterior elements.  Significant lumbar area back pain on exam however grossly nonfocal.  Will need further MRI imaging to determine posterior element involvement and to rule out ligamentous injury.  Conservative management with bracing and serial imaging may suffice if posterior elements appear to be intact.  Given significant medical comorbidities as well as patient's reluctance for surgery, will pursue conservative options with bracing and serial imaging.    Clinically Significant Risk Factors Present on Admission                   Recommendations:  TLSO brace  Upright XR-okay for up in chair  -----------------------------------  Kevin Boston M.D.  Neurosurgery Resident, PGY-3    Please contact neurosurgery resident on call with questions.    Dial * * [phone], enter [phone] when prompted.    -----------------------------------    Gen: Appears comfortable, NAD, laying in bed, not appearing in acute distress  Cardiac: Appears to be in atrial fibrillation with rapid ventricular rate.  Neurologic:  Alert & Oriented to person, place, time, and situation  Follows commands briskly  Speech fluent, spontaneous. No aphasia or dysarthria.  No gaze preference. No apparent hemineglect.  PERRL, EOMI  Face symmetric with sensation intact to light touch  Palate elevates symmetrically, uvula midline, tongue protrudes midline  Trapezii muscles 5/5 bilaterally  No pronator drift     Del Tr Bi WE WF Gr   R 5 5 5 5 5 5   L 5 5 5 5 5 5    HF KE KF DF PF EHL   R 4+ (pain limited) 5 5 5 5 5   L 4+ (pain limited) 5 5 5 5 5     Reflexes  2+ throughout    Sensation intact and symmetric to light touch throughout    Objective:   Temp:  [96.4  F (35.8  C)-97.9  F (36.6  C)] 96.4  F (35.8  C)  Pulse:  [] 102  Resp:  [14-26] 16  BP: ()/() 133/98  SpO2:  [84 %-100 %] 98 %  I/O last 3 completed shifts:  In: 3520 [P.O.:1120; I.V.:2400]  Out: 1800 [Urine:1800]        LABS:  Recent Labs   Lab 06/12/22  0432 06/11/22  2130 06/11/22  1809 06/11/22  0752 06/11/22  0434 06/10/22  0821 06/10/22  0340 06/09/22  0847 06/09/22  0421     --   --   --  136  --   --   --  140   POTASSIUM 4.3  --   --   --  4.6  --  4.5  --  4.6   CHLORIDE 105  --   --   --  105  --   --   --  107   CO2 25  --   --   --  24  --   --   --  25   ANIONGAP 5  --   --   --  7  --   --   --  8   * 157* 149*   < > 178*   < >  --    < > 167*   BUN 17  --   --   --  22  --   --   --  22   CR 0.55*  --   --   --  0.76  --   --   --  0.88   JERRY 9.0  --   --   --  9.0  --   --   --  9.3    < > = values in this interval not displayed.       Recent Labs   Lab 06/12/22 0432   WBC 9.0   RBC 5.18   HGB 15.2   HCT 46.2   MCV 89   MCH 29.3   MCHC 32.9   RDW 14.4          IMAGING:  No results found for this or any previous visit (from the past 24 hour(s)).

## 2022-06-12 NOTE — PROGRESS NOTES
"   06/12/22 1200   Quick Adds   Type of Visit Initial Occupational Therapy Evaluation   Living Environment   People in Home spouse   Current Living Arrangements house   Home Accessibility no concerns   Transportation Anticipated car, drives self;family or friend will provide   Living Environment Comments Pt spouse can A. Pt reporting tub shower with grab bars/   Self-Care   Usual Activity Tolerance moderate   Current Activity Tolerance poor   Regular Exercise Yes   Activity/Exercise Type walking   Exercise Amount/Frequency 10 mins;daily   Equipment Currently Used at Home cane, straight;walker, rolling  (uses FWW for community distances \"sometimes\")   Fall history within last six months yes   Number of times patient has fallen within last six months 3   Activity/Exercise/Self-Care Comment Pt IND-JASMINA at baseline   Instrumental Activities of Daily Living (IADL)   IADL Comments Spouse can A   General Information   Onset of Illness/Injury or Date of Surgery 06/07/22   Referring Physician Lakia Bains PA-C   Patient/Family Therapy Goal Statement (OT) To go home   Additional Occupational Profile Info/Pertinent History of Current Problem per chart \"88-year-old male, atrial fibrillation on Eliquis, presenting after mechanical fall found to have a obliquely oriented linear fracture through L1 vertebral body-does not appear to extend to posterior elements. \"   Existing Precautions/Restrictions fall;spinal   Left Upper Extremity (Weight-bearing Status) partial weight-bearing (PWB)   Right Upper Extremity (Weight-bearing Status) partial weight-bearing (PWB)   Left Lower Extremity (Weight-bearing Status) full weight-bearing (FWB)   Right Lower Extremity (Weight-bearing Status) full weight-bearing (FWB)   Cognitive Status Examination   Orientation Status orientation to person, place and time   Affect/Mental Status (Cognitive) low arousal/lethargic   Follows Commands follows one-step commands   Cognitive Status Comments Pt " "lethargic throughout session, though cog appears intact some word-finding difficulties noted.   Visual Perception   Visual Impairment/Limitations corrective lenses full-time   Sensory   Sensory Quick Adds No deficits were identified   Pain Assessment   Patient Currently in Pain Yes, see Vital Sign flowsheet   Integumentary/Edema   Integumentary/Edema   (yes, pain reported with all movement, subsiding while supine or seated in recliner)   Posture   Posture forward head position;protracted shoulders   Range of Motion Comprehensive   Comment, General Range of Motion Pt reporting decreased ROM in B-shoulders at baseline - pt able to demo shoulder flexion to about 80-90 degrees. (pt could not remember name of injury, though stating \"rotator cuff sounds right\". all distal joints WFL   Strength Comprehensive (MMT)   Comment, General Manual Muscle Testing (MMT) Assessment at least 3+/5, not formally tested   Coordination   Coordination Comments Will cont to monitor, minimal deficits noted, though pt very lethargic through session   Bed Mobility   Bed Mobility rolling left;rolling right   Rolling Left [place] (Bed Mobility) 2 person assist;maximum assist (25% patient effort)   Rolling Right [place] (Bed Mobility) 2 person assist;maximum assist (25% patient effort)   Transfers   Transfers toilet transfer   Transfer Comments OH lift > recliner   Toilet Transfer   Toilet Transfer Comments OH lift to commode   Activities of Daily Living   BADL Assessment/Intervention bathing;upper body dressing;lower body dressing;grooming;toileting   Bathing Assessment/Intervention   [place] Level (Bathing) dependent (less than 25% patient effort)   Upper Body Dressing Assessment/Training   [place] Level (Upper Body Dressing) moderate assist (50% patient effort)   Lower Body Dressing Assessment/Training   [place] Level (Lower Body Dressing) maximum assist (25% patient effort)   Grooming Assessment/Training "   [place] Level (Grooming) minimum assist (75% patient effort)   Toileting   [place] Level (Toileting) dependent (less than 25% patient effort)   Clinical Impression   Criteria for Skilled Therapeutic Interventions Met (OT) Yes, treatment indicated   OT Diagnosis Pt is below baseline for ADLs   OT Problem List-Impairments impacting ADL problems related to;activity tolerance impaired;mobility;pain;post-surgical precautions  ((spinal precautions))   Assessment of Occupational Performance 3-5 Performance Deficits   Identified Performance Deficits dressing, bathing, toileting, functional mobility, home management   Planned Therapy Interventions (OT) ADL retraining;bed mobility training;strengthening;ROM;transfer training;progressive activity/exercise   Clinical Decision Making Complexity (OT) low complexity   Anticipated Equipment Needs Upon Discharge (OT) shower chair   Risk & Benefits of therapy have been explained evaluation/treatment results reviewed;care plan/treatment goals reviewed;risks/benefits reviewed;current/potential barriers reviewed;participants voiced agreement with care plan;patient   Clinical Impression Comments Pt presents below baseline, limited by pain and spinal precautions. pt will benefit from skilled OT services to progress toward PLOF   OT Discharge Planning   OT Discharge Recommendation (DC Rec) Transitional Care Facility   OT Rationale for DC Rec Pt well below baseline, limited this date by pain and spinal precautions, limited tolerance for activity. Pt will benefit from further skilled therapies to progress toward PLOF   OT Brief overview of current status AX2 w/OH lift   Total Evaluation Time (Minutes)   Total Evaluation Time (Minutes) 6   OT Goals   Therapy Frequency (OT) 5 times/wk   OT Predicted Duration/Target Date for Goal Attainment 06/23/22   OT Goals Upper Body Dressing;Lower Body Dressing;Toilet Transfer/Toileting;OT Goal 1;Hygiene/Grooming   OT: Hygiene/Grooming within  precautions;independent   OT: Upper Body Dressing Independent;within precautions   OT: Lower Body Dressing Modified independent;using adaptive equipment;within precautions   OT: Toilet Transfer/Toileting Modified independent;within precautions   OT: Goal 1 Pt will report 3/3 spinal precautions by discharge.   Psychosocial Support   Trust Relationship/Rapport choices provided;care explained;questions answered

## 2022-06-12 NOTE — PLAN OF CARE
[id number]    Major shift events: Able to get up to chair w/ lift and tolerated it fairly well being pre-medicated w/ 10mg Oxycodone. Fair PO intake. Small BM. Disoriented to place and time intermittently. Family present. Brace on.     For complete assessments/vital signs please see flowsheets    Plan: Transfer to

## 2022-06-12 NOTE — PLAN OF CARE
Major Shift Events: Disoriented to time and place. Forgetful, but following commands. Back pain managed with schedule tyl and robaxin, prn oxy given 10 mg total for breakthrough pain with adequate relief. Afib 60-120s, normotensive and afebrile. RA-2 LPM NC or oxymask. Dyspnea with exertion in bed. BM x2. Voiding adequate amounts. TLSO brace removed for skin breakdown prevention as long as HOB <30 per order set. L PIV infusing MIVF @ 100 ml/hr.    Plan: PT/OT and back XR when up in chair  For vital signs and complete assessments, please see documentation flowsheets.

## 2022-06-12 NOTE — PROGRESS NOTES
[facility]  Trauma Service Progress Note    Date of Service (when I saw the patient): 06/12/2022     Assessment & Plan   Trauma mechanism:Fall from bar stool  Time/date of injury: 6/6/22  Known Injuries:  1. Acute unstable L1 fracture  2. Posterior longitudinal Ligament injury     Neuro/Pain/Psych:  # Acute traumatic pain  - Scheduled: Tylenol, Methocarbamol, Lidoderm patches, TLSO brace  - PRN: Oxycodone, IV hydromorphone for breakthrough,  Delirium prevention measures:  - Maintain circadian rhythm. Lights on during the day. Off at night, minimize cares at night. OOB during the day.     Pulmonary:  # Acute Hypoxia, on 1-2 LPM NC  - CXR obtained with low lung volumes, atelectasis  - Early mobility, pulmonary hygiene  - Supplemental oxygen to keep saturation above 92 %. 2lpm NC  - Aggressive pulmonary hygiene. Incentive spirometer while awake      Cardiovascular:    # Hx P Afib   # HTN, HLD  # NSTEMI 2017  - 12 lead EKG: Afib +rbbb (noted on prior 12 lead EKG's from 2017), HR 80 to 160's, . BP stable  - Continue PTA: Metoprolol 100mg XL, atorvastatin,   - Electrolyte replacements, maintain K >4.0, mag >2.0  - continue PTA statin, Furosemide     GI/Nutrition:    - Regular Diet   - Bowel regimen     Renal/ Fluids/Electrolytes:  SL MIVF  - electrolyte replacement protocol in place.      Endocrine:  # Diabetes Mellitus with hyperglycemia  - No medications or insulin prior to admission.  - Medium correction scale insulin. Monitor blood glucose on daily BMP if persistently >180g/dL will initiate Novolog Sliding scale insulin     Infectious disease:   -  No indications for antibiotics.      Hematology:    # Coagulopathy  - On Apixaban PTA chronically for stroke prophylaxis with Hx of Afib, continued on 6/8/22.   - Admitted with Hb of 16.2g/dL, stable without the need for transfusion  - Threshold for transfusion if hgb <7.0 or signs/symptoms of hypoperfusion.   :       Musculoskeletal:  # Fall  # L1 unstable fracture  Seen and evlauted by Neurosurgery, managing fracture conservatively with pain control and bracing  - Continue to attempt donning brace for now-TLSO.   - Upright XR's when able  - TLSO brace when HOB >30degrees and when out of bed, OK to remove while <30degress  - Physical and occupational therapy consults when cleared     Skin:  - dilgent cares to prevent skin breakdown and wound formation.       Palliative Consulted   Care Conference 6/10: Patient's family wants to defer surgery at this time since it would require a fusion from T11 to lower lumbar. They are concerned about the healing and recovery time. Goal is to manage pain, improve delirium and discharge     Lines/ tubes/ drains:  - PIV      General Cares:                 PPI/H2 blocker:  NA                DVT prophylaxis: Lovenox                Bowel Regimen/Date of last stool: 06/12                Pulmonary toilet: IS     Code status:  Full Code      Discharge goals:     Adequate pain management: yes    VSS x24 hours: yes    Hemoglobin stable x 48 hours: yes    Ambulating safely and/or therapy evals complete:Yes    Drains/lines removed or plan in place to manage: yes    Teaching done: in process     Other:  Expected D/C date:  Dependent on pain, TCU anticipated    Interval History    BM 06/12, was able to be assisted OOB to the chair today, brace on. Premedications given, tolerated well  ROS x 8 negative with exception of those things listed in interval hx    Physical Exam   Temp: (!) 96.4  F (35.8  C) Temp src: Axillary BP: (!) 131/114 Pulse: 120   Resp: 18 SpO2: 97 % O2 Device: Nasal cannula Oxygen Delivery: 1 LPM  Vitals:    06/10/22 0400 06/11/22 0000 06/12/22 0000   Weight: 122.3 kg (269 lb 10 oz) 125.1 kg (275 lb 12.7 oz) 126.9 kg (279 lb 12.2 oz)     Vital Signs with Ranges  Temp:  [96.4  F (35.8  C)-97.9  F (36.6  C)] 96.4  F (35.8  C)  Pulse:  [] 120  Resp:  [14-26] 18  BP: ()/()  131/114  SpO2:  [84 %-100 %] 97 %  I/O last 3 completed shifts:  In: 3520 [P.O.:1120; I.V.:2400]  Out: 1800 [Urine:1800]    Amber Coma Scale - Total 14/15  Eye Response (E): 4   4= spontaneous, 3= to verbal/voice, 2= to pain, 1= No response   Verbal Response (V): 5   5= Orientated, converses, 4= Confused, converses, 3= Inappropriate words, 2= Incomprehensible sounds, 1=No response   Motor Response (M): 6   6= Obeys commands, 5= Localizes to pain, 4= Withdrawal to pain, 3=Fexion to pain, 2= Extension to pain, 1= No response   Constitutional: Awake, alert, very hard of hearing  Eyes: +EOMI, PERRL,   ENT: Normocephalic, atraumatic  Respiratory: Clear bilaterally without wheezes  Cardiovascular:  irregular rate and rhythm,   GI: Abdomen soft, non-distended, non-tender,  Genitourinary: [place] in place Clear yellow  Skin:  Warm and dry  Musculoskeletal: There is no redness, warmth, or swelling of the joints.  Pedal pulse palpated.  Neurologic: Awake, strength and sensory is intact. No focal deficits.  Neuropsychiatric: Calm, normal eye contact,     CHUCK Higgins CNP  To contact the trauma service use job code pager [phone],   Numeric texts or alpha text through [employer]

## 2022-06-12 NOTE — PROGRESS NOTES
Arrived from: 4A around 1450.   Belongings/meds: Cell Phone, , glasses, shoes, dentures.   2 RN Skin Assessment Completed by: Passed along to next RN. TLSO brace on at this time.

## 2022-06-12 NOTE — CONSULTS
"Pain Service Consultation Note  [facility]      Patient Name: Alvin Newton  MRN: [id number]   Age: 88 year old  Sex: male  Date: June 12, 2022                                      Reviewed: Yes    Referring Provider:  Soo Hernandez                                             Referring Service:  Trauma  Reason for Consultation: Difficult to control pain    Assessment/Recommendations:  88 year old old male with hx of A fib on eliquis, HTN, HLD, T2DM, NSTEMI in 2017 who presented following a fall. An unstable L1 fracture was found. Proceeding forward with non surgical treatment at this time but pain has been difficult to control complicated by delirium.    Plan:   Patient's pain may be difficult to control given complicating factors of age, delirium and eliquis use. Recommendations are to start 100mg gabapentin TID with caution of worsening delirium but this is a low dose, ketorolac 15mg TID with caution to kidney function and GI side effects given eliquis and also increase oxycodone 10-20mg q4h.    Patient has already tried ketamine without improvement    -- start gabapentin 100mg TID  -- start ketorolac 15mg TID  -- increase oxycodone to 10-20mg q4h  -- be cautious towards side effects given delirium, age and eliquis use    Thank you for the opportunity to participate in the care of Alvin Newton  Pain Service will continue to follow.    Discussed with attending anesthesiologist  Primary Service Contacted with Recommendations? Yes    Thank you for the opportunity to participate in the care of Alvin Newton    Contact information:  Mon - Friday 8 AM - 3 PM: [phone]  After Hours, Weekends and Holidays: [phone]  Please Page via [employer] (Search \"Pain\")    Nikolai Smith MD  6/12/2022      Chief Complaint:  Back apin      History of Present Illness:  Alvin Newton is a 88 year old old male with h/o a fib on eliquis, HTN, T2DM and NSTEMI in 2017. Patient had a fall and was found " to have a L1 burst fracture that is not being treated surgically.    Pain Assessment:   Description of Pain: non focal back pain  Location: low back  Current Pain: 7/10  Goal: 4/10  Baseline Pain Level: 0/10  Onset:post fall   Relieving/exacerbating factors: movement   Radiating: no   Localized: no  Constant: yes  Intermittent: yes    Past Medical History:  Past Medical History:   Diagnosis Date     Colonic diverticulum      Hyperlipidemia      Hypertension      Obesity (BMI 30-39.9)      Osteoarthritis      Type 2 diabetes mellitus (H)          Family History:    History reviewed. No pertinent family history.   reviewed family history and noncontributory to pain consult     Social History:  Social History     Tobacco Use     Smoking status: Never Smoker     Smokeless tobacco: Never Used   Substance Use Topics     Alcohol use: Yes     Comment: 0-1 beer daily         Tobacco:   no  ETOH:  yes  H/O Substance Abuse:  no      Review of Systems:  Complete ROS reviewed. Unless otherwise noted, all other systems found to be negative.        Laboratory Results:  Recent Labs   Lab Test 06/12/22  0432 06/07/22  0535 06/06/22  1716   INR  --   --  1.59*      < >  --    PTT  --   --  31   BUN 17   < >  --     < > = values in this interval not displayed.       Other significant values:       Allergies:  No Known Allergies      Pain Medications:  Pain Medications/Prescriber:     Current Pain Related Medications:  Medications related to Pain Management (From now, onward)    Start     Dose/Rate Route Frequency Ordered Stop    06/10/22 0800  methocarbamol (ROBAXIN) tablet 1,000 mg         1,000 mg Oral 4 TIMES DAILY 06/10/22 0722      06/10/22 0723  oxyCODONE (ROXICODONE) tablet 5-10 mg         5-10 mg Oral EVERY 3 HOURS PRN 06/10/22 0723      06/08/22 2000  polyethylene glycol (MIRALAX) Packet 17 g         17 g Oral 2 TIMES DAILY 06/08/22 1507      06/08/22 1505  bisacodyl (DULCOLAX) Suppository 10 mg         10 mg Rectal  "DAILY PRN 06/08/22 1507      06/08/22 1430  acetaminophen (TYLENOL) tablet 975 mg         975 mg Oral EVERY 8 HOURS 06/08/22 1425      06/08/22 0800  Lidocaine (LIDOCARE) 4 % Patch 1-2 patch         1-2 patch  over 12 Hours Transdermal EVERY 24 HOURS 0800 06/08/22 0756      06/08/22 0800  lidocaine patch in PLACE          Transdermal EVERY 8 HOURS SCHEDULED 06/08/22 0756      06/07/22 0800  senna-docusate (SENOKOT-S/PERICOLACE) 8.6-50 MG per tablet 1-2 tablet         1-2 tablet Oral 2 TIMES DAILY 06/06/22 2329 06/06/22 2329  acetaminophen (TYLENOL) tablet 650 mg         650 mg Oral EVERY 4 HOURS PRN 06/06/22 2329 06/06/22 2329  HYDROmorphone (PF) (DILAUDID) injection 0.3-0.5 mg         0.3-0.5 mg Intravenous EVERY 2 HOURS PRN 06/06/22 2329              Physical Exam:  Vitals: BP 98/71   Pulse 90   Temp 36.8  C (98.3  F) (Oral)   Resp 16   Ht 1.778 m (5' 10\")   Wt 126.9 kg (279 lb 12.2 oz)   SpO2 94%   BMI 40.14 kg/m      Physical Exam:   CONSTITUTIONAL/GENERAL APPEARANCE:  obese, uncomfortable with movement  EYES: EOMI, sclera anicteric, PERRLA  ENT/NECK: atraumatic, lips and oral mucous membranes dry  RESPIRATORY: non-labored breathing. No cough, wheeze  CV: RRR, no audible rubs, gallops, or murmurs  ABDOMEN: Soft, non-tender, non-distended  MUSCULOSKELETAL/BACK/SPINE/EXTREMITIES: Moves all extremities purposefully.  No edema or obvious joint deformities   NEURO: Alert and Oriented x3. Answers questions appropriately  SKIN/VASCULAR EXAM:  No jaundice, no visible rashes or lesions      Total time spent 35 minutes with greater than 50% in consultation, education and coordination of care.  Also discussed with RN, MD, Senior Acupuncturist. We specifically discussed. See notes above. And time spent in discussion with pharmacist.     Please Page the Pain Team Via Amcom: \"Adult acute inpatient pain management/[facility]\"  "

## 2022-06-12 NOTE — CONSULTS
"Pain Service Consultation Note  [facility]        Patient Name: Alvin Newton  MRN: [id number]   Age: 88 year old  Sex: male  Date: June 12, 2022                                      Reviewed: Yes     Referring Provider:  Soo Hernandez                                             Referring Service:  Trauma  Reason for Consultation: Difficult to control pain     Assessment/Recommendations:  88 year old old male with hx of A fib on eliquis, HTN, HLD, T2DM, NSTEMI in 2017 who presented following a fall. An unstable L1 fracture was found. Proceeding forward with non surgical treatment at this time but pain has been difficult to control complicated by delirium.     Plan:   Patient's pain may be difficult to control given complicating factors of age, delirium and eliquis use. Recommendations are to start 100mg gabapentin TID with caution of worsening delirium but this is a low dose, ketorolac 15mg TID with caution to kidney function and GI side effects given eliquis and also increase oxycodone 10-20mg q4h.     Patient has already tried ketamine without improvement     -- start gabapentin 100mg TID  -- start ketorolac 15mg TID  -- increase oxycodone to 10-20mg q4h  -- be cautious towards side effects given delirium, age and eliquis use     Thank you for the opportunity to participate in the care of Alvin Newton  Pain Service will continue to follow.     Discussed with attending anesthesiologist  Primary Service Contacted with Recommendations? Yes     Thank you for the opportunity to participate in the care of Alvin Newton     Contact information:  Mon - Friday 8 AM - 3 PM: [phone]  After Hours, Weekends and Holidays: [phone]  Please Page via [employer] (Search \"Pain\")     Nikolai Smith MD  6/12/2022        Chief Complaint:  Back apin        History of Present Illness:  Alvin Newton is a 88 year old old male with h/o a fib on eliquis, HTN, T2DM and NSTEMI in 2017. Patient had a " fall and was found to have a L1 burst fracture that is not being treated surgically.     Pain Assessment:   Description of Pain: non focal back pain  Location: low back  Current Pain: 7/10  Goal: 4/10  Baseline Pain Level: 0/10  Onset:post fall   Relieving/exacerbating factors: movement   Radiating: no   Localized: no  Constant: yes  Intermittent: yes     Past Medical History:       Past Medical History:   Diagnosis Date     Colonic diverticulum       Hyperlipidemia       Hypertension       Obesity (BMI 30-39.9)       Osteoarthritis       Type 2 diabetes mellitus (H)              Family History:    History reviewed. No pertinent family history.   reviewed family history and noncontributory to pain consult      Social History:  Social History            Tobacco Use     Smoking status: Never Smoker     Smokeless tobacco: Never Used   Substance Use Topics     Alcohol use: Yes       Comment: 0-1 beer daily         Tobacco:   no  ETOH:  yes  H/O Substance Abuse:  no        Review of Systems:  Complete ROS reviewed. Unless otherwise noted, all other systems found to be negative.          Laboratory Results:        Recent Labs   Lab Test 06/12/22  0432 06/07/22  0535 06/06/22  1716   INR  --   --  1.59*      < >  --    PTT  --   --  31   BUN 17   < >  --     < > = values in this interval not displayed.         Other significant values:         Allergies:  No Known Allergies        Pain Medications:  Pain Medications/Prescriber:      Current Pain Related Medications:              Medications related to Pain Management (From now, onward)     Start     Dose/Rate Route Frequency Ordered Stop     06/10/22 0800   methocarbamol (ROBAXIN) tablet 1,000 mg         1,000 mg Oral 4 TIMES DAILY 06/10/22 0722       06/10/22 0723   oxyCODONE (ROXICODONE) tablet 5-10 mg         5-10 mg Oral EVERY 3 HOURS PRN 06/10/22 0723       06/08/22 2000   polyethylene glycol (MIRALAX) Packet 17 g         17 g Oral 2 TIMES DAILY 06/08/22 1379    "    06/08/22 1505   bisacodyl (DULCOLAX) Suppository 10 mg         10 mg Rectal DAILY PRN 06/08/22 1507       06/08/22 1430   acetaminophen (TYLENOL) tablet 975 mg         975 mg Oral EVERY 8 HOURS 06/08/22 1425       06/08/22 0800   Lidocaine (LIDOCARE) 4 % Patch 1-2 patch         1-2 patch  over 12 Hours Transdermal EVERY 24 HOURS 0800 06/08/22 0756       06/08/22 0800   lidocaine patch in PLACE           Transdermal EVERY 8 HOURS SCHEDULED 06/08/22 0756       06/07/22 0800   senna-docusate (SENOKOT-S/PERICOLACE) 8.6-50 MG per tablet 1-2 tablet         1-2 tablet Oral 2 TIMES DAILY 06/06/22 2329 06/06/22 2329   acetaminophen (TYLENOL) tablet 650 mg         650 mg Oral EVERY 4 HOURS PRN 06/06/22 2329 06/06/22 2329   HYDROmorphone (PF) (DILAUDID) injection 0.3-0.5 mg         0.3-0.5 mg Intravenous EVERY 2 HOURS PRN 06/06/22 2329                  Physical Exam:  Vitals: BP 98/71   Pulse 90   Temp 36.8  C (98.3  F) (Oral)   Resp 16   Ht 1.778 m (5' 10\")   Wt 126.9 kg (279 lb 12.2 oz)   SpO2 94%   BMI 40.14 kg/m       Physical Exam:   CONSTITUTIONAL/GENERAL APPEARANCE:  obese, uncomfortable with movement  EYES: EOMI, sclera anicteric, PERRLA  ENT/NECK: atraumatic, lips and oral mucous membranes dry  RESPIRATORY: non-labored breathing. No cough, wheeze  CV: RRR, no audible rubs, gallops, or murmurs  ABDOMEN: Soft, non-tender, non-distended  MUSCULOSKELETAL/BACK/SPINE/EXTREMITIES: Moves all extremities purposefully.  No edema or obvious joint deformities   NEURO: Alert and Oriented x3. Answers questions appropriately  SKIN/VASCULAR EXAM:  No jaundice, no visible rashes or lesions        Total time spent 35 minutes with greater than 50% in consultation, education and coordination of care.  Also discussed with RN, MD, Senior Acupuncturist. We specifically discussed. See notes above. And time spent in discussion with pharmacist.     Harrison Falk MD  Perioperative and Interventional Pain " Service  6/12/2022 2:31 PM  PIPS 24-hour Job Code Pagers (for in-house use only, may text page using [employer])  [place]:  [phone]  [place]:  [phone]  Peds PIPS: [phone]

## 2022-06-13 ENCOUNTER — APPOINTMENT (OUTPATIENT)
Dept: GENERAL RADIOLOGY | Facility: CLINIC | Age: 87
DRG: 552 | End: 2022-06-13
Attending: NURSE PRACTITIONER
Payer: COMMERCIAL

## 2022-06-13 ENCOUNTER — APPOINTMENT (OUTPATIENT)
Dept: OCCUPATIONAL THERAPY | Facility: CLINIC | Age: 87
DRG: 552 | End: 2022-06-13
Payer: COMMERCIAL

## 2022-06-13 LAB
ANION GAP SERPL CALCULATED.3IONS-SCNC: 6 MMOL/L (ref 3–14)
BUN SERPL-MCNC: 26 MG/DL (ref 7–30)
CALCIUM SERPL-MCNC: 9.2 MG/DL (ref 8.5–10.1)
CHLORIDE BLD-SCNC: 104 MMOL/L (ref 94–109)
CO2 SERPL-SCNC: 28 MMOL/L (ref 20–32)
CREAT SERPL-MCNC: 0.95 MG/DL (ref 0.66–1.25)
ERYTHROCYTE [DISTWIDTH] IN BLOOD BY AUTOMATED COUNT: 14.6 % (ref 10–15)
GFR SERPL CREATININE-BSD FRML MDRD: 77 ML/MIN/1.73M2
GLUCOSE BLD-MCNC: 127 MG/DL (ref 70–99)
GLUCOSE BLDC GLUCOMTR-MCNC: 128 MG/DL (ref 70–99)
GLUCOSE BLDC GLUCOMTR-MCNC: 140 MG/DL (ref 70–99)
GLUCOSE BLDC GLUCOMTR-MCNC: 143 MG/DL (ref 70–99)
GLUCOSE BLDC GLUCOMTR-MCNC: 157 MG/DL (ref 70–99)
GLUCOSE BLDC GLUCOMTR-MCNC: 176 MG/DL (ref 70–99)
HCT VFR BLD AUTO: 46 % (ref 40–53)
HGB BLD-MCNC: 14.9 G/DL (ref 13.3–17.7)
MAGNESIUM SERPL-MCNC: 2.2 MG/DL (ref 1.6–2.3)
MCH RBC QN AUTO: 29.2 PG (ref 26.5–33)
MCHC RBC AUTO-ENTMCNC: 32.4 G/DL (ref 31.5–36.5)
MCV RBC AUTO: 90 FL (ref 78–100)
PHOSPHATE SERPL-MCNC: 3.6 MG/DL (ref 2.5–4.5)
PLATELET # BLD AUTO: 208 10E3/UL (ref 150–450)
POTASSIUM BLD-SCNC: 4.4 MMOL/L (ref 3.4–5.3)
RBC # BLD AUTO: 5.11 10E6/UL (ref 4.4–5.9)
SODIUM SERPL-SCNC: 138 MMOL/L (ref 133–144)
WBC # BLD AUTO: 7.7 10E3/UL (ref 4–11)

## 2022-06-13 PROCEDURE — 99232 SBSQ HOSP IP/OBS MODERATE 35: CPT | Performed by: NURSE PRACTITIONER

## 2022-06-13 PROCEDURE — 85027 COMPLETE CBC AUTOMATED: CPT | Performed by: SURGERY

## 2022-06-13 PROCEDURE — 250N000013 HC RX MED GY IP 250 OP 250 PS 637: Performed by: SURGERY

## 2022-06-13 PROCEDURE — 97530 THERAPEUTIC ACTIVITIES: CPT | Mod: GO

## 2022-06-13 PROCEDURE — 72100 X-RAY EXAM L-S SPINE 2/3 VWS: CPT | Mod: 26 | Performed by: RADIOLOGY

## 2022-06-13 PROCEDURE — 250N000013 HC RX MED GY IP 250 OP 250 PS 637: Performed by: STUDENT IN AN ORGANIZED HEALTH CARE EDUCATION/TRAINING PROGRAM

## 2022-06-13 PROCEDURE — 250N000011 HC RX IP 250 OP 636: Performed by: NURSE PRACTITIONER

## 2022-06-13 PROCEDURE — 36415 COLL VENOUS BLD VENIPUNCTURE: CPT | Performed by: SURGERY

## 2022-06-13 PROCEDURE — 97535 SELF CARE MNGMENT TRAINING: CPT | Mod: GO

## 2022-06-13 PROCEDURE — 250N000013 HC RX MED GY IP 250 OP 250 PS 637: Performed by: PHYSICIAN ASSISTANT

## 2022-06-13 PROCEDURE — 84100 ASSAY OF PHOSPHORUS: CPT | Performed by: SURGERY

## 2022-06-13 PROCEDURE — 120N000002 HC R&B MED SURG/OB UMMC

## 2022-06-13 PROCEDURE — 250N000013 HC RX MED GY IP 250 OP 250 PS 637: Performed by: NURSE PRACTITIONER

## 2022-06-13 PROCEDURE — 83735 ASSAY OF MAGNESIUM: CPT | Performed by: NURSE PRACTITIONER

## 2022-06-13 PROCEDURE — 99233 SBSQ HOSP IP/OBS HIGH 50: CPT | Performed by: NURSE PRACTITIONER

## 2022-06-13 PROCEDURE — 80048 BASIC METABOLIC PNL TOTAL CA: CPT | Performed by: SURGERY

## 2022-06-13 PROCEDURE — 72100 X-RAY EXAM L-S SPINE 2/3 VWS: CPT

## 2022-06-13 RX ORDER — GABAPENTIN 100 MG/1
100 CAPSULE ORAL 3 TIMES DAILY
Status: DISCONTINUED | OUTPATIENT
Start: 2022-06-13 | End: 2022-06-14

## 2022-06-13 RX ORDER — CALCITONIN SALMON 200 [IU]/.09ML
1 SPRAY, METERED NASAL DAILY
Status: DISCONTINUED | OUTPATIENT
Start: 2022-06-13 | End: 2022-06-20 | Stop reason: HOSPADM

## 2022-06-13 RX ADMIN — INSULIN ASPART 1 UNITS: 100 INJECTION, SOLUTION INTRAVENOUS; SUBCUTANEOUS at 08:26

## 2022-06-13 RX ADMIN — KETOROLAC TROMETHAMINE 15 MG: 15 INJECTION, SOLUTION INTRAMUSCULAR; INTRAVENOUS at 23:50

## 2022-06-13 RX ADMIN — Medication 400 MG: at 20:24

## 2022-06-13 RX ADMIN — METHOCARBAMOL 1000 MG: 500 TABLET ORAL at 08:12

## 2022-06-13 RX ADMIN — APIXABAN 5 MG: 5 TABLET, FILM COATED ORAL at 20:24

## 2022-06-13 RX ADMIN — INSULIN ASPART 1 UNITS: 100 INJECTION, SOLUTION INTRAVENOUS; SUBCUTANEOUS at 12:02

## 2022-06-13 RX ADMIN — METHOCARBAMOL 1000 MG: 500 TABLET ORAL at 11:52

## 2022-06-13 RX ADMIN — KETOROLAC TROMETHAMINE 15 MG: 15 INJECTION, SOLUTION INTRAMUSCULAR; INTRAVENOUS at 16:02

## 2022-06-13 RX ADMIN — Medication 2000 UNITS: at 08:12

## 2022-06-13 RX ADMIN — ACETAMINOPHEN 325MG 975 MG: 325 TABLET ORAL at 14:27

## 2022-06-13 RX ADMIN — APIXABAN 5 MG: 5 TABLET, FILM COATED ORAL at 08:12

## 2022-06-13 RX ADMIN — OXYCODONE HYDROCHLORIDE 10 MG: 5 TABLET ORAL at 06:17

## 2022-06-13 RX ADMIN — ACETAMINOPHEN 325MG 975 MG: 325 TABLET ORAL at 05:57

## 2022-06-13 RX ADMIN — LIDOCAINE PATCH 4% 1 PATCH: 40 PATCH TOPICAL at 08:14

## 2022-06-13 RX ADMIN — INSULIN ASPART 1 UNITS: 100 INJECTION, SOLUTION INTRAVENOUS; SUBCUTANEOUS at 18:05

## 2022-06-13 RX ADMIN — METHOCARBAMOL 1000 MG: 500 TABLET ORAL at 16:02

## 2022-06-13 RX ADMIN — GABAPENTIN 100 MG: 100 CAPSULE ORAL at 11:52

## 2022-06-13 RX ADMIN — GABAPENTIN 100 MG: 100 CAPSULE ORAL at 16:04

## 2022-06-13 RX ADMIN — KETOROLAC TROMETHAMINE 15 MG: 15 INJECTION, SOLUTION INTRAMUSCULAR; INTRAVENOUS at 09:51

## 2022-06-13 RX ADMIN — SENNOSIDES AND DOCUSATE SODIUM 2 TABLET: 8.6; 5 TABLET ORAL at 08:12

## 2022-06-13 RX ADMIN — Medication 400 MG: at 08:12

## 2022-06-13 RX ADMIN — KETOROLAC TROMETHAMINE 15 MG: 15 INJECTION, SOLUTION INTRAMUSCULAR; INTRAVENOUS at 00:27

## 2022-06-13 RX ADMIN — ATORVASTATIN CALCIUM 20 MG: 20 TABLET, FILM COATED ORAL at 22:25

## 2022-06-13 RX ADMIN — ACETAMINOPHEN 325MG 975 MG: 325 TABLET ORAL at 22:25

## 2022-06-13 RX ADMIN — OXYCODONE HYDROCHLORIDE 10 MG: 5 TABLET ORAL at 09:57

## 2022-06-13 RX ADMIN — THERA TABS 1 TABLET: TAB at 08:12

## 2022-06-13 RX ADMIN — METHOCARBAMOL 1000 MG: 500 TABLET ORAL at 20:24

## 2022-06-13 RX ADMIN — METOPROLOL SUCCINATE 100 MG: 50 TABLET, EXTENDED RELEASE ORAL at 08:12

## 2022-06-13 RX ADMIN — GABAPENTIN 100 MG: 100 CAPSULE ORAL at 22:26

## 2022-06-13 RX ADMIN — CALCITONIN SALMON 1 SPRAY: 200 SPRAY, METERED NASAL at 11:53

## 2022-06-13 RX ADMIN — FUROSEMIDE 20 MG: 20 TABLET ORAL at 08:12

## 2022-06-13 ASSESSMENT — ACTIVITIES OF DAILY LIVING (ADL)
ADLS_ACUITY_SCORE: 47
ADLS_ACUITY_SCORE: 47
ADLS_ACUITY_SCORE: 51
ADLS_ACUITY_SCORE: 47
ADLS_ACUITY_SCORE: 51
ADLS_ACUITY_SCORE: 51
ADLS_ACUITY_SCORE: 47
ADLS_ACUITY_SCORE: 47

## 2022-06-13 ASSESSMENT — VISUAL ACUITY
OU: NORMAL ACUITY;GLASSES

## 2022-06-13 NOTE — PROGRESS NOTES
Inpatient Pain Service: Follow Up Note  06/13/22    Patient: Alvin Newton  Admission Date:6/6/2022    * No surgery found *       Chief Pain Endorsement: back pain radicular to BLE    Recommendations were discussed and relayed to Trauma Service, HUMA Hernandez CNP  Plan was reviewed by the Inpatient Pain Service, staff attending GERA Canales MD      Alvin Newton is an 88 year old male with PMH significant for atrial fibrillation (on apixaban), HTN, HLD, DM II, NSTEMI in 2017 who presented following fall sustaining unstable L1 vertebral fracture.  Patient admitted seen by neurosurgery with plan for nonsurgical treatment approach. Pain management complicated by delirium.  Was seen by palliative care on 6/9.  Opioid naive prior to admission. Goal is to manage pain, improve delirium and discharge (TCU anticipated).  Patient has tried ketamine without improvement.      Primary Care Provider: Semmler, Steven Duane        1. Continue oxycodone 5-10 mg PO Q 3 hr PRN pain. No further dose increase recommended.    2. Continue Toradol 15 mg Q 8 hrs, max of 72 hours  3. Start gabapentin 100 mg PO TID    If sedation over the next 24 hrs, decrease to 100 mg at bedtime, and increase every day by until back at 100 mg PO TID.      Continue to titrate by 100 mg dose to effect and side effect for neuropathic (radicular pain)  4. Start miacalcin nasal spray, one spray in nostril, alternating nostrils daily x 30 days.     Discussed risk/benefit for lumbar compression fracture pain in the elderly patient with patient, his wife and daughter who verbalize understanding and would like to use this medication for pain management  5. Add menthol 5% topical patch Q 8 hrs when lidocaine patches are off  6. Agree with scheduled methocarbamol, lidocaine patches ordered per primary service  7. Agree with plan for cold therapy ordered per primary service for back pain. Patient states he prefers topical application of cold for pain  8. Bowel regimen per  "primary team to prevent opioid induced constipation.    Pain Service will Continue to follow at this time    Thank you for consulting the Inpatient Pain Management Service. The above recommendations are to be acted upon at the primary team s discretion.       Contact Info Please Page the Pain Team Via [facility]: \"Adult acute inpatient pain management/[facility]\"    PAIN MEDICATION SAFE USE:   Prior to discharge instruct patient on the following in addition to the medication fact sheet:    Caution: these medications can cause sedation    Take prescription medicine only if it has been prescribed by your doctor    Do not take more medicine or take it more often than instructed     Call your doctor if pain gets worse    Never mix pain medicine with alcohol, sleeping pills, or any illicit drugs    Do not operate heavy machinery, including vehicles, when initiation opioid therapy or increasing dosage    Store prescription opioids in a locked container, whenever possible     Dispose of unused opioids appropriately     Do not stop abruptly once at higher doses.  These medications must be tapered off.    Opioid pain medications do carry the risk for physical dependence and addiction and patients should be counseled about this.     Interval History:  Alvin Newton was seen today (06/13/22) and he reports that his back pain remains severe and \"shooting pain\" that worsens with movement, radicular to bilateral LE to knee pain.  Receiving oxycodone 5 mg x 1 and 10 mg x 2 over the past 24 hrs.  He is tearful when seen today.    His last bowel movement was 6/13 - incontinent  FUNCTIONAL STATUS:  Change:      staying the same  Oral intake:     Regular  Activity level:     Up with assistance, using TLSO brace  Mood:      depressed affect and labile     -- Inpatient Medications Related to Pain Management:   Medications related to Pain Management (From now, onward)    Start     Dose/Rate Route Frequency Ordered Stop    06/13/22 1000  gabapentin " "(NEURONTIN) capsule 100 mg         100 mg Oral 3 TIMES DAILY 06/13/22 0931      06/12/22 1430  ketorolac (TORADOL) injection 15 mg         15 mg Intravenous EVERY 8 HOURS 06/12/22 1414 06/15/22 1559    06/10/22 0800  methocarbamol (ROBAXIN) tablet 1,000 mg         1,000 mg Oral 4 TIMES DAILY 06/10/22 0722      06/10/22 0723  oxyCODONE (ROXICODONE) tablet 5-10 mg         5-10 mg Oral EVERY 3 HOURS PRN 06/10/22 0723      06/08/22 2000  polyethylene glycol (MIRALAX) Packet 17 g         17 g Oral 2 TIMES DAILY 06/08/22 1507      06/08/22 1505  bisacodyl (DULCOLAX) Suppository 10 mg         10 mg Rectal DAILY PRN 06/08/22 1507      06/08/22 1430  acetaminophen (TYLENOL) tablet 975 mg         975 mg Oral EVERY 8 HOURS 06/08/22 1425      06/08/22 0800  Lidocaine (LIDOCARE) 4 % Patch 1-2 patch         1-2 patch  over 12 Hours Transdermal EVERY 24 HOURS 0800 06/08/22 0756      06/08/22 0800  lidocaine patch in PLACE          Transdermal EVERY 8 HOURS SCHEDULED 06/08/22 0756      06/07/22 0800  senna-docusate (SENOKOT-S/PERICOLACE) 8.6-50 MG per tablet 1-2 tablet         1-2 tablet Oral 2 TIMES DAILY 06/06/22 2329      06/06/22 2329  acetaminophen (TYLENOL) tablet 650 mg         650 mg Oral EVERY 4 HOURS PRN 06/06/22 2329      06/06/22 2329  HYDROmorphone (PF) (DILAUDID) injection 0.3-0.5 mg         0.3-0.5 mg Intravenous EVERY 2 HOURS PRN 06/06/22 2329            LAB DATA:  Recent Labs   Lab 06/13/22  0746 06/12/22  0432 06/11/22  0434 06/07/22  0535 06/06/22  1301   CR 0.95 0.55* 0.76   < > 1.07   WBC 7.7 9.0 9.3   < > 11.4*   HGB 14.9 15.2 15.6   < > 16.2   AST  --   --   --   --  30   ALT  --   --   --   --  22    < > = values in this interval not displayed.       /87 (BP Location: Left arm, Patient Position: Semi-Cuenca's, Cuff Size: Adult Large)   Pulse 100   Temp 97.9  F (36.6  C) (Oral)   Resp 16   Ht 1.778 m (5' 10\")   Wt 126.9 kg (279 lb 12.2 oz)   SpO2 96%   BMI 40.14 kg/m   "   EXAM:  CONSTITUTIONAL/GENERAL APPEARANCE:  Awake, supine in bed. Mild distress at rest and severe distress with movement  EYES: EOMI, sclera anicteric  ENT/NECK: atraumatic, lips and oral mucous membranes moist  RESPIRATORY: non-labored breathing on room air. No cough, wheeze  MUSCULOSKELETAL/BACK/SPINE/EXTREMITIES: Moves all extremities purposefully, 5/5 strength BLE, negative straight leg raise.  No edema or obvious joint deformities  NEURO:  Intermittent confusion. Answers questions appropriately  SKIN/VASCULAR EXAM:  No jaundice, no suspicious rash or lesions to exposed skin  PSYCHIATRIC/BEHAVIORAL/OBSERVATIONS:  Objective signs of pain observed during our interview.  Tearful, cooperative      ----------------------------------------------------------------------------------  CHUCK Strickland [facility]  Inpatient Pain Service     Helpful Resources:  Getting Rid of Unwanted Medications (printable PDF for patients)   Opioid Overdose Prevention Toolkit (printable PDF for patients)   Prescription Opioids: What You Need To Know (printable PDF for patients)

## 2022-06-13 NOTE — PROGRESS NOTES
Admitted/transferred from:   2 RN full   skin assessment completed by Ahmet Yang RN and Yessenia FINN RN from .  Skin assessment finding: issues found skin rash/moist rash on both groins, under R breast, scattered bruises mostly L elebow, blanchable redness on his buttocks/coccyx, scattered scabbing, 2+ edema to LUE, skin cracked uner L armpit.    Interventions/actions: skin interventions Cleansed and applied interdry on his groins/paola area, under R breast, applied mepilex on L elbow and coccyx area, mepilex on L armpit.       Will continue to monitor.

## 2022-06-13 NOTE — PLAN OF CARE
Goal Outcome Evaluation:    Plan of Care Reviewed With: patient      Overall patient progress:no change    Time: 4876-9723  Status:admitted on 06/06/22 due to fall injury at home. L1 acute unstable fx. No surgical intervention at this time. TLSO brace when HOB> 30 degree or when OOB.   Neuro/Pain:  A&Ox4, intermittent confusion/lethargic, forgetful. 4/5 UE strength, 3/5 LE.   Activity: lift device for transfer. HOB < 30 degree  Cardiac/vs: On tele; Afib with HR 80-110s, denied chest pain. BP stable and afebrile.   Respiratory: ESQUIVEL at times. On room air sating > 92%, LS clear, diminished on the lower lobes.   GI/: incontinent of bowel, small/smear twice this shift. Voided using urinal, needs assistance for urinal.   Diet: regular, fair appetite, needs set up for food tray and ordering food.   Skin: blanchable redness, skin rash/yeast infection on both groins, and right under breast. Cleaned and Interdry applied. Scattered bruises and scabbing.   LDAs: PIV X 2 saline locked.   Labs/Imaging: BS ACHS.  Change this shift: none   Plan: HOB < 30 degree or TLSO brace when OOB or HOB > 30 degree. Pain management.

## 2022-06-13 NOTE — PLAN OF CARE
Status: Admitted on 06/06/22 due to fall injury at home. L1 acute unstable fx. No surgical intervention at this time.   Vitals: VSS on RA. A-fib with HR 80s to 110s.   Neuro/Pain:  Disoriented to time this shift. Intermittent confusion/lethargic, forgetful. BUE 5/5, BLE 3/5.   Lines: PIV x2 SL  Respiratory: Dyspnea on exertion.   GI: BM this shift x1, incontinent   : Voiding with bedside urinal  Diet: Regular diet, needs set up for food tray and ordering food.   Skin: Blanchable redness, skin rash/yeast infection on both groins - interdry in place. Scattered bruises and scabbing.   Activity: Up with lift, turn and repo Q2. TLSO when HOB >30 and when OOB.   Pain: Pain mananged with scheduled Tylenol, Toradol and PRN oxycodone.   Plan: Continue to monitor and follow POC. Recommending TCU.

## 2022-06-13 NOTE — PLAN OF CARE
Goal Outcome Evaluation:    Plan of Care Reviewed With: patient, spouse, daughter     Overall Patient Progress: improving       Status: Pt admitted on 6/6 due to fall injury at home. L1 acute unstable fx.  Vitals: HTN within parameters, known a-fib on CCM  Neuros: AO4, no instances of confusion/disorientation. BLE 3/5, forgetful.   IV: PIV SL  Labs/Electrolytes: WDL  Resp/trach: Dyspneic on exertion. 1-2L NC this AM for comfort.   Diet: Regular diet, fair intake  Bowel status: Smear BM incontinently this shift.   : Voiding incontinently  Skin: Bruising throughout. Preventative melipex lite to bilateral elbows. Yeast rash to groin, interdry in place   Pain: Well controlled with scheduled Tylenol, Robaxin, Gabapentin, and Toradol. PRN Oxycodone x 1  Activity: Up with assist of 2, lift. Up to chair x 1 this afternoon  Social: Daughter and wife present throughout the day, supportive  Plan: Plan for TCU pending placement. Continue to monitor and follow POC  Updates this shift: X-rays completed this shift. Gabapentin and Calcitrol added this shift.

## 2022-06-13 NOTE — PROGRESS NOTES
[facility], [place]     Neurosurgery Progress Note:  06/13/2022      Interval History: Tolerating brace better.  States back pain is better controlled today.     Assessment:  88-year-old male, atrial fibrillation on Eliquis, presenting after mechanical fall found to have a obliquely oriented linear fracture through L1 vertebral body-does not appear to extend to posterior elements.  Significant lumbar area back pain on exam however grossly nonfocal.  Will need further MRI imaging to determine posterior element involvement and to rule out ligamentous injury.  Conservative management with bracing and serial imaging may suffice if posterior elements appear to be intact.  Given significant medical comorbidities as well as patient's reluctance for surgery, will pursue conservative options with bracing and serial imaging.    Clinically Significant Risk Factors Present on Admission                   Recommendations:  TLSO brace  Upright XR-okay for up in chair    -----------------------------------  CHUCK Roberson, CNP  Department of Neurosurgery  Pager: [phone]  -----------------------------------    Gen: Appears comfortable, NAD, laying in bed, not appearing in acute distress  Cardiac: Appears to be in atrial fibrillation with rapid ventricular rate.  Neurologic:  Alert & Oriented to person, place, time, and situation  Follows commands briskly  Speech fluent, spontaneous. No aphasia or dysarthria.  No gaze preference. No apparent hemineglect.  PERRL, EOMI  Face symmetric with sensation intact to light touch  Palate elevates symmetrically, uvula midline, tongue protrudes midline  Trapezii muscles 5/5 bilaterally  No pronator drift     Del Tr Bi WE WF Gr   R 5 5 5 5 5 5   L 5 5 5 5 5 5    HF KE KF DF PF EHL   R 4+ (pain limited) 5 5 5 5 5   L 4+ (pain limited) 5 5 5 5 5     Reflexes 2+ throughout    Sensation intact and symmetric to light touch throughout    Objective:   Temp:  [97.9  F (36.6   C)-98.3  F (36.8  C)] 97.9  F (36.6  C)  Pulse:  [] 100  Resp:  [14-18] 16  BP: ()/(71-87) 117/87  SpO2:  [94 %-96 %] 96 %  I/O last 3 completed shifts:  In: 920 [P.O.:420; I.V.:500]  Out: 1460 [Urine:1460]        LABS:  Recent Labs   Lab 06/13/22  0818 06/13/22  0746 06/13/22  0257 06/12/22  0907 06/12/22  0432 06/11/22  0752 06/11/22  0434   NA  --  138  --   --  135  --  136   POTASSIUM  --  4.4  --   --  4.3  --  4.6   CHLORIDE  --  104  --   --  105  --  105   CO2  --  28  --   --  25  --  24   ANIONGAP  --  6  --   --  5  --  7   * 127* 128*   < > 174*   < > 178*   BUN  --  26  --   --  17  --  22   CR  --  0.95  --   --  0.55*  --  0.76   JERRY  --  9.2  --   --  9.0  --  9.0    < > = values in this interval not displayed.       Recent Labs   Lab 06/13/22  0746   WBC 7.7   RBC 5.11   HGB 14.9   HCT 46.0   MCV 90   MCH 29.2   MCHC 32.4   RDW 14.6          IMAGING:  No results found for this or any previous visit (from the past 24 hour(s)).

## 2022-06-13 NOTE — PROGRESS NOTES
[facility]  Trauma Service Progress Note    Date of Service (when I saw the patient): 06/13/2022     Assessment & Plan      Trauma mechanism:Fall from bar stool  Time/date of injury: 6/6/22  Known Injuries:  1. Acute unstable L1 fracture  2. Posterior longitudinal Ligament injury     Neuro/Pain/Psych:  # Acute traumatic pain  Neuropathic pain secondary to Lumbar fractures that radiate down the anterior portion of bilateral thighs.   Consulted pain service yesterday for assistance with pain management.   - Added scheduled Toradol 06/12, high risk of bleeding coadministration with Apixaban, family was cautioned. Will try for 72 hours  - Added Menthol patches PRN when Lidoderm patches are off  - Added low dose Gabapentin for neuropathic pain, will monitor for increased lethargy and delirium  - Miacalcin nasal spray alternating nostrils for a total of 30days  - Aqua K cooling pad to the low back  - Scheduled: Tylenol, Methocarbamol, Lidoderm patches, TLSO brace  - PRN: Oxycodone currently getting 5-10mg as needed, would be cautious about increasing the 10mg dose given his age and increased adverse effect risk.    Delirium prevention measures:  - Maintain circadian rhythm. Lights on during the day. Off at night, minimize cares at night. OOB during the day.     Pulmonary:  # Acute Hypoxia, resolved, on room air  - Early mobility, pulmonary hygiene  - Supplemental oxygen to keep saturation above 92 %  - Aggressive pulmonary hygiene. Incentive spirometer while awake      Cardiovascular:    # Hx P Afib   # HTN, HLD  # NSTEMI 2017  - 12 lead EKG: Afib +rbbb (noted on prior 12 lead EKG's from 2017), HR 80 to 160's, . BP stable  - Continue PTA: Metoprolol 100mg XL, atorvastatin, Apixaban, Furosemide  - Electrolyte replacements, maintain K >4.0, mag >2.0     GI/Nutrition:    - Regular Diet   - Bowel regimen     Renal/ Fluids/Electrolytes:  - electrolyte replacement protocol in place.  "     Endocrine:  # Diabetes Mellitus with hyperglycemia  - No medications or insulin prior to admission.  - Medium correction Novolog scale insulin     Infectious disease:   -  No indications for antibiotics.      Hematology:    # Coagulopathy  - On Apixaban PTA chronically for stroke prophylaxis with Hx of Afib, continued on 6/8/22.   - Admitted with Hb of 16.2g/dL, stable without the need for transfusion  - Threshold for transfusion if hgb <7.0 or signs/symptoms of hypoperfusion.   :      Musculoskeletal:  # Fall  # L1 unstable fracture  Seen and evlauted by Neurosurgery, managing fracture conservatively with pain control and bracing  - Continue to attempt donning brace for now-TLSO.   - Upright XR's when able  - TLSO brace when HOB >30degrees and when out of bed, OK to remove while <30degress  - Physical and occupational therapy consults when cleared     Skin:  - dilgent cares to prevent skin breakdown and wound formation.       Palliative Consulted and following  Pain team following  Care Conference 6/10: Patient's family wants to defer surgery at this time since it would require a fusion from T11 to lower lumbar. They are concerned about the healing and recovery time. Goal is to manage pain, improve delirium and discharge     Lines/ tubes/ drains:  - PIV      General Cares:                 PPI/H2 blocker:  NA                DVT prophylaxis: Lovenox                Bowel Regimen/Date of last stool: 06/12                Pulmonary toilet: IS     Code status:  Full Code      Discharge goals:     Adequate pain management: yes    VSS x24 hours: yes    Hemoglobin stable x 48 hours: yes    Ambulating safely and/or therapy evals complete:Yes    Drains/lines removed or plan in place to manage: yes    Teaching done: in process     Other:  Expected D/C date: 1-2 days dependent on pain, TCU anticipated    Interval History   \" I feel like giving up, this pain has to go away\", felt calmer and relaxed once family arrived. " Reports shorting pain that originates from the low back to the thighs. Some confusion overnight  Wife and daugther at bedside- updated  ROS x 8 negative with exception of those things listed in interval hx    Physical Exam   Temp: 97.9  F (36.6  C) Temp src: Oral BP: 117/87 Pulse: 100   Resp: 16 SpO2: 96 % O2 Device: None (Room air)    Vitals:    06/10/22 0400 06/11/22 0000 06/12/22 0000   Weight: 122.3 kg (269 lb 10 oz) 125.1 kg (275 lb 12.7 oz) 126.9 kg (279 lb 12.2 oz)     Vital Signs with Ranges  Temp:  [97.9  F (36.6  C)-98.3  F (36.8  C)] 97.9  F (36.6  C)  Pulse:  [] 100  Resp:  [14-18] 16  BP: ()/(71-87) 117/87  SpO2:  [94 %-96 %] 96 %  I/O last 3 completed shifts:  In: 920 [P.O.:420; I.V.:500]  Out: 1460 [Urine:1460]    Amber Coma Scale - Total 14-15/15  Eye Response (E): 4   4= spontaneous, 3= to verbal/voice, 2= to pain, 1= No response   Verbal Response (V): 5   5= Orientated, converses, 4= Confused, converses, 3= Inappropriate words, 2= Incomprehensible sounds, 1=No response   Motor Response (M): 6   6= Obeys commands, 5= Localizes to pain, 4= Withdrawal to pain, 3=Fexion to pain, 2= Extension to pain, 1= No response   Constitutional: Awake, alert, expresses feeling of   ENT: Normocephalic, atraumatic, Hard of hearing  Respiratory: No increased work of breathing, good air exchange, clear to auscultation bilaterally, no crackles or wheezing.  Cardiovascular:  irregular  GI: Normal bowel sounds, abdomen soft, non-distended, non-tender, no guarding  Genitourinary:  voids  Skin:  Warm and dry, left elbow ecchymotic, protective Mepilex dressing  Musculoskeletal: There is no redness, warmth, or swelling of the joints.  Pedal pulse palpated.  Neurologic: Awake, alert, oriented. Strength and sensory is intact. No focal deficits.  Neuropsychiatric: Calm, teary and emotional regarding pain, family visited, felt more relaxed and calm    CHUCK Higgins CNP  To contact the trauma service use job  code pager [phone],   Numeric texts or alpha text through [facility]

## 2022-06-14 ENCOUNTER — APPOINTMENT (OUTPATIENT)
Dept: GENERAL RADIOLOGY | Facility: CLINIC | Age: 87
DRG: 552 | End: 2022-06-14
Attending: NURSE PRACTITIONER
Payer: COMMERCIAL

## 2022-06-14 LAB
ALBUMIN UR-MCNC: NEGATIVE MG/DL
APPEARANCE UR: CLEAR
BACTERIA #/AREA URNS HPF: ABNORMAL /HPF
BILIRUB UR QL STRIP: NEGATIVE
COLOR UR AUTO: YELLOW
GLUCOSE BLDC GLUCOMTR-MCNC: 121 MG/DL (ref 70–99)
GLUCOSE BLDC GLUCOMTR-MCNC: 128 MG/DL (ref 70–99)
GLUCOSE BLDC GLUCOMTR-MCNC: 139 MG/DL (ref 70–99)
GLUCOSE BLDC GLUCOMTR-MCNC: 167 MG/DL (ref 70–99)
GLUCOSE BLDC GLUCOMTR-MCNC: 167 MG/DL (ref 70–99)
GLUCOSE BLDC GLUCOMTR-MCNC: 179 MG/DL (ref 70–99)
GLUCOSE BLDC GLUCOMTR-MCNC: 198 MG/DL (ref 70–99)
GLUCOSE BLDC GLUCOMTR-MCNC: 212 MG/DL (ref 70–99)
GLUCOSE UR STRIP-MCNC: NEGATIVE MG/DL
HGB UR QL STRIP: ABNORMAL
KETONES UR STRIP-MCNC: NEGATIVE MG/DL
LEUKOCYTE ESTERASE UR QL STRIP: ABNORMAL
MUCOUS THREADS #/AREA URNS LPF: PRESENT /LPF
NITRATE UR QL: NEGATIVE
PH UR STRIP: 5 [PH] (ref 5–7)
RBC URINE: 1 /HPF
SP GR UR STRIP: 1.02 (ref 1–1.03)
SQUAMOUS EPITHELIAL: <1 /HPF
UROBILINOGEN UR STRIP-MCNC: NORMAL MG/DL
WBC URINE: 58 /HPF

## 2022-06-14 PROCEDURE — 99232 SBSQ HOSP IP/OBS MODERATE 35: CPT | Performed by: NURSE PRACTITIONER

## 2022-06-14 PROCEDURE — 250N000013 HC RX MED GY IP 250 OP 250 PS 637: Performed by: PHYSICIAN ASSISTANT

## 2022-06-14 PROCEDURE — 71045 X-RAY EXAM CHEST 1 VIEW: CPT

## 2022-06-14 PROCEDURE — 120N000002 HC R&B MED SURG/OB UMMC

## 2022-06-14 PROCEDURE — 250N000011 HC RX IP 250 OP 636: Performed by: NURSE PRACTITIONER

## 2022-06-14 PROCEDURE — 250N000013 HC RX MED GY IP 250 OP 250 PS 637: Performed by: NURSE PRACTITIONER

## 2022-06-14 PROCEDURE — 81001 URINALYSIS AUTO W/SCOPE: CPT | Performed by: NURSE PRACTITIONER

## 2022-06-14 PROCEDURE — 87086 URINE CULTURE/COLONY COUNT: CPT | Performed by: NURSE PRACTITIONER

## 2022-06-14 PROCEDURE — 71045 X-RAY EXAM CHEST 1 VIEW: CPT | Mod: 26 | Performed by: RADIOLOGY

## 2022-06-14 RX ORDER — FUROSEMIDE 20 MG
20 TABLET ORAL ONCE
Status: COMPLETED | OUTPATIENT
Start: 2022-06-14 | End: 2022-06-14

## 2022-06-14 RX ADMIN — FUROSEMIDE 20 MG: 20 TABLET ORAL at 15:56

## 2022-06-14 RX ADMIN — APIXABAN 5 MG: 5 TABLET, FILM COATED ORAL at 19:16

## 2022-06-14 RX ADMIN — Medication 2000 UNITS: at 08:44

## 2022-06-14 RX ADMIN — ACETAMINOPHEN 325MG 975 MG: 325 TABLET ORAL at 05:56

## 2022-06-14 RX ADMIN — INSULIN ASPART 1 UNITS: 100 INJECTION, SOLUTION INTRAVENOUS; SUBCUTANEOUS at 17:27

## 2022-06-14 RX ADMIN — APIXABAN 5 MG: 5 TABLET, FILM COATED ORAL at 08:43

## 2022-06-14 RX ADMIN — METHOCARBAMOL 1000 MG: 500 TABLET ORAL at 11:59

## 2022-06-14 RX ADMIN — MICONAZOLE NITRATE: 2 POWDER TOPICAL at 15:57

## 2022-06-14 RX ADMIN — KETOROLAC TROMETHAMINE 15 MG: 15 INJECTION, SOLUTION INTRAMUSCULAR; INTRAVENOUS at 15:55

## 2022-06-14 RX ADMIN — METHOCARBAMOL 1000 MG: 500 TABLET ORAL at 15:56

## 2022-06-14 RX ADMIN — MICONAZOLE NITRATE: 2 POWDER TOPICAL at 19:13

## 2022-06-14 RX ADMIN — GABAPENTIN 100 MG: 100 CAPSULE ORAL at 08:44

## 2022-06-14 RX ADMIN — KETOROLAC TROMETHAMINE 15 MG: 15 INJECTION, SOLUTION INTRAMUSCULAR; INTRAVENOUS at 08:39

## 2022-06-14 RX ADMIN — Medication 5 MG: at 21:36

## 2022-06-14 RX ADMIN — ACETAMINOPHEN 325MG 975 MG: 325 TABLET ORAL at 14:41

## 2022-06-14 RX ADMIN — FUROSEMIDE 20 MG: 20 TABLET ORAL at 08:44

## 2022-06-14 RX ADMIN — CALCITONIN SALMON 1 SPRAY: 200 SPRAY, METERED NASAL at 08:49

## 2022-06-14 RX ADMIN — ATORVASTATIN CALCIUM 20 MG: 20 TABLET, FILM COATED ORAL at 21:36

## 2022-06-14 RX ADMIN — METHOCARBAMOL 1000 MG: 500 TABLET ORAL at 19:16

## 2022-06-14 RX ADMIN — METOPROLOL SUCCINATE 100 MG: 50 TABLET, EXTENDED RELEASE ORAL at 08:43

## 2022-06-14 RX ADMIN — METHOCARBAMOL 1000 MG: 500 TABLET ORAL at 08:43

## 2022-06-14 RX ADMIN — INSULIN ASPART 2 UNITS: 100 INJECTION, SOLUTION INTRAVENOUS; SUBCUTANEOUS at 11:59

## 2022-06-14 RX ADMIN — ACETAMINOPHEN 325MG 975 MG: 325 TABLET ORAL at 21:36

## 2022-06-14 RX ADMIN — THERA TABS 1 TABLET: TAB at 08:44

## 2022-06-14 ASSESSMENT — ACTIVITIES OF DAILY LIVING (ADL)
ADLS_ACUITY_SCORE: 51
ADLS_ACUITY_SCORE: 47
ADLS_ACUITY_SCORE: 51
ADLS_ACUITY_SCORE: 53
ADLS_ACUITY_SCORE: 51
ADLS_ACUITY_SCORE: 47
ADLS_ACUITY_SCORE: 47
ADLS_ACUITY_SCORE: 51
ADLS_ACUITY_SCORE: 51
ADLS_ACUITY_SCORE: 53
ADLS_ACUITY_SCORE: 47
ADLS_ACUITY_SCORE: 47

## 2022-06-14 ASSESSMENT — VISUAL ACUITY: OU: NORMAL ACUITY;GLASSES

## 2022-06-14 NOTE — PLAN OF CARE
[id number]  Status: Pt admitted on 6/6 due to fall injury at home. L1 acute unstable fx.  Vitals: VSS on RA, known a-fib. 1-2 L NC intermittently for comfort.  Neuros: Disoriented time this this shift. BLE strengths 3/5, forgetful. Diminished hearing bilaterally. No hallucinations noted this shift.  IV: PIV SL  Labs/Electrolytes: WDL. BG checks. UA sent this shift, findings include: trace blood, increased WBC, few bacteria and mucus in urine. UC results pending.  Resp/trach: Dyspneic on exertion. 1-2L NC for comfort.   Diet: Regular diet, good PO.  Bowel status: BS+, loose BMx3 this shift. LBM 6/15. Hold bowel meds this AM.  : Voiding via urinal with intermittent incontinence.  Skin: Bruising throughout. Large bruise to L elbow. Preventative melipex lite to coccyx, mepilex x 2 to R flank for unopened blisters. Yeast rash to groin, powder applied. Rash under R breast, powder applied.  Pain: Well controlled with scheduled Tylenol, Robaxin, and Toradol. PRN oxycodone given x1. Gabapentin was held d/t patient having visual hallucinations of fish on the ceiling, none noted this shift.  Activity: A2/lift. Turn/repo q2hr.  Social: Daughter and wife present, supportive. Daughter wants to talk to social work regarding patient.  Plan: Plan for TCU pending placement. Continue to monitor and follow POC

## 2022-06-14 NOTE — PROGRESS NOTES
"Pain Service Progress Note  [facility]  Date: 06/13/2022       Patient Name: Alvin Newton  MRN: [id number]  Age: 88 year old  Sex: male      Assessment:  88 year old old male with hx of A fib on eliquis, HTN, HLD, T2DM, NSTEMI in 2017 who presented following a fall. An unstable L1 fracture was found. Proceeding forward with non surgical treatment at this time but pain has been difficult to control complicated by delirium. Improving.       Plan/Recommendations:   Recommendations are to start 100mg gabapentin TID with caution of worsening delirium but this is a low dose, ketorolac 15mg TID with caution to kidney function and GI side effects given eliquis and also increase oxycodone 10-20mg q4h.     -- Continue gabapentin 100mg TID  -- Continue ketorolac 15mg TID  -- increase oxycodone to 10-20mg q4h  -- be cautious towards side effects given delirium, age and eliquis use           Overnight Events:  None, patient reports that he is doing \"better\" and that his pain is controlled.   Diet: Regular Diet Adult  Snacks/Supplements Adult: Ensure Enlive; Between Meals    Relevant Labs:  Recent Labs   Lab Test 06/13/22  0746 06/07/22  0535 06/06/22  1716   INR  --   --  1.59*      < >  --    PTT  --   --  31   BUN 26   < >  --     < > = values in this interval not displayed.       Physical Exam:  Vitals: BP (!) 129/114 (BP Location: Left arm)   Pulse 94   Temp 36.8  C (98.3  F) (Oral)   Resp 16   Ht 1.778 m (5' 10\")   Wt 124.7 kg (274 lb 14.6 oz)   SpO2 100%   BMI 39.45 kg/m        Relevant Medications:  Current Pain Medications:  Medications related to Pain Management (From now, onward)    Start     Dose/Rate Route Frequency Ordered Stop    06/13/22 1000  gabapentin (NEURONTIN) capsule 100 mg         100 mg Oral 3 TIMES DAILY 06/13/22 0931      06/12/22 1430  ketorolac (TORADOL) injection 15 mg         15 mg Intravenous EVERY 8 HOURS 06/12/22 1414 06/15/22 1559    06/10/22 0800  " "methocarbamol (ROBAXIN) tablet 1,000 mg         1,000 mg Oral 4 TIMES DAILY 06/10/22 0722      06/10/22 0723  oxyCODONE (ROXICODONE) tablet 5-10 mg         5-10 mg Oral EVERY 3 HOURS PRN 06/10/22 0723      06/08/22 2000  polyethylene glycol (MIRALAX) Packet 17 g         17 g Oral 2 TIMES DAILY 06/08/22 1507      06/08/22 1505  bisacodyl (DULCOLAX) Suppository 10 mg         10 mg Rectal DAILY PRN 06/08/22 1507      06/08/22 1430  acetaminophen (TYLENOL) tablet 975 mg         975 mg Oral EVERY 8 HOURS 06/08/22 1425      06/08/22 0800  Lidocaine (LIDOCARE) 4 % Patch 1-2 patch         1-2 patch  over 12 Hours Transdermal EVERY 24 HOURS 0800 06/08/22 0756      06/08/22 0800  lidocaine patch in PLACE          Transdermal EVERY 8 HOURS SCHEDULED 06/08/22 0756      06/07/22 0800  senna-docusate (SENOKOT-S/PERICOLACE) 8.6-50 MG per tablet 1-2 tablet         1-2 tablet Oral 2 TIMES DAILY 06/06/22 2329      06/06/22 2329  acetaminophen (TYLENOL) tablet 650 mg         650 mg Oral EVERY 4 HOURS PRN 06/06/22 2329      06/06/22 2329  HYDROmorphone (PF) (DILAUDID) injection 0.3-0.5 mg         0.3-0.5 mg Intravenous EVERY 2 HOURS PRN 06/06/22 2329            Leslie Goldberg, MD  06/13/2022   [phone]      Contact Info Please Page the Pain Team Via [employer]: Adult acute inpatient pain management/[employer]\"  "

## 2022-06-14 NOTE — PLAN OF CARE
Status: Admitted on 06/06/22 due to fall injury at home. L1 acute unstable fx. No surgical intervention at this time.   Vitals: VSS on RA. A-fib with HR 80s to 110s. 1L O2 when sleeping.   Neuro/Pain: A&Ox4. Intermittent confusion/lethargic, forgetful. At beginning of shift patient woke up out of sleep and was trying to get out of bed, also was hollering for help. Pt was very anxious and confused about where he was, but was able to be reoriented and did not experience this for the rest of the shift.  BUE 5/5, BLE 3/5.   Lines: PIV SL  Respiratory: Dyspnea on exertion, on 1L O2 overnight  GI: Loose BM this shift x1, incontinent   : Voiding with bedside urinal, PVRs 200-300 mL.   Diet: Regular diet, needs set up for food tray and ordering food.   Skin: Blanchable redness, skin rash/yeast infection on both groins - interdry in place. Scattered bruises and scabbing.   Social: Family updated on phone this shift   Activity: Up with lift, turn and repo Q2. TLSO when HOB >30 and when OOB.   Pain: Pain managed well with scheduled Tylenol, Toradol, Robaxin, and Gabapentin.   Plan: Continue to monitor and follow POC. Recommending TCU.

## 2022-06-14 NOTE — PROGRESS NOTES
[facility], [place]     Neurosurgery Progress Note:  06/14/2022      Interval History: States back pain is better controlled with meds today. He continues to work with PT/OT. Able to wear brace with less difficulty. Plan to transfer patient to the chair today with PT.  Will likely repeat upright X-rays once able to transfer OOB.     Assessment:  88-year-old male, atrial fibrillation on Eliquis, presenting after mechanical fall found to have a obliquely oriented linear fracture through L1 vertebral body-does not appear to extend to posterior elements.  Significant lumbar area back pain on exam however grossly nonfocal.  Will need further MRI imaging to determine posterior element involvement and to rule out ligamentous injury.  Conservative management with bracing and serial imaging may suffice if posterior elements appear to be intact.  Given significant medical comorbidities as well as patient's reluctance for surgery, will pursue conservative options with bracing and serial imaging.    Clinically Significant Risk Factors Present on Admission   # Hypertension  # Obesity (BMI 30-39.9)           Recommendations:  TLSO brace when OOB  Upright XR completed on 6/13/2022  Appreciate Pain Service managing on-going pain  Follow-up with Neurosurgery in 4-6 weeks with repeat Upright Thoracic/Lumbar X-Rays  Neurosurgery will sign-off at this time.  Please contact Neurosurgery with any questions or concerns.  [phone]  -----------------------------------  CHUCK Mcelroy, CNP  Neurosurgery  Pager [phone]    -----------------------------------    Gen: Appears comfortable, NAD, laying in bed, not appearing in acute distress  Neurologic:  Alert & Oriented to person, place, time, and situation  Follows commands briskly  Speech fluent, spontaneous. No aphasia or dysarthria.  No gaze preference. No apparent hemineglect.  PERRL, EOMI  Face symmetric with sensation intact to light touch  Palate elevates  symmetrically, uvula midline, tongue protrudes midline  Trapezii muscles 5/5 bilaterally  No pronator drift     Del Tr Bi WE WF Gr   R 5 5 5 5 5 5   L 5 5 5 5 5 5    HF KE KF DF PF EHL   R 4+ (pain limited) 5 5 5 5 5   L 4+ (pain limited) 5 5 5 5 5     Reflexes 2+ throughout    Sensation intact and symmetric to light touch throughout    Objective:   Temp:  [97.4  F (36.3  C)-98.3  F (36.8  C)] 97.4  F (36.3  C)  Pulse:  [75-94] 75  Resp:  [16] 16  BP: (125-159)/() 159/74  SpO2:  [95 %-100 %] 95 %  I/O last 3 completed shifts:  In: -   Out: 1025 [Urine:1025]        LABS:  Recent Labs   Lab 06/14/22  0744 06/14/22  0249 06/13/22 2132 06/13/22  0818 06/13/22  0746 06/12/22  0907 06/12/22  0432 06/11/22  0752 06/11/22  0434   NA  --   --   --   --  138  --  135  --  136   POTASSIUM  --   --   --   --  4.4  --  4.3  --  4.6   CHLORIDE  --   --   --   --  104  --  105  --  105   CO2  --   --   --   --  28  --  25  --  24   ANIONGAP  --   --   --   --  6  --  5  --  7   * 128* 176*   < > 127*   < > 174*   < > 178*   BUN  --   --   --   --  26  --  17  --  22   CR  --   --   --   --  0.95  --  0.55*  --  0.76   JERRY  --   --   --   --  9.2  --  9.0  --  9.0    < > = values in this interval not displayed.       Recent Labs   Lab 06/13/22  0746   WBC 7.7   RBC 5.11   HGB 14.9   HCT 46.0   MCV 90   MCH 29.2   MCHC 32.4   RDW 14.6          IMAGING:  Recent Results (from the past 24 hour(s))   XR Lumbar Spine 2/3 Views    Narrative    EXAM: XR LUMBAR SPINE 2-3 VIEWS  LOCATION: [facility]  DATE/TIME: 6/13/2022 5:11 PM    INDICATION: Known L 1 fracture, please obtain with TLSO brace ON  COMPARISON: Lumbar spine MRI 06/07/2022  TECHNIQUE: CR Lumbar Spine.      Impression    IMPRESSION: Evaluation is limited by osteopenia and overlying TLSO. Complex fracture deformity of the L1 vertebral body again noted. No definite loss of vertebral body height at L1 since the  comparison MRI. Mild retrolisthesis of the upper L1 fracture   fragment with respect L2 again noted. Alignment otherwise appears normal. No definite new fractures.

## 2022-06-14 NOTE — PLAN OF CARE
Goal Outcome Evaluation:    Plan of Care Reviewed With: patient, spouse, daughter     Overall Patient Progress: improving    Status: Pt admitted on 6/6 due to fall injury at home. L1 acute unstable fx.  Vitals: HTN within parameters, known a-fib  Neuros: AO4, disoriented time this AM after waking up, quickly resolved. BLE 3/5, forgetful.   IV: PIV SL  Labs/Electrolytes: WDL  Resp/trach: Dyspneic on exertion. 1-2L NC for comfort.   Diet: Regular diet, good intake  Bowel status: Smear BM incontinently this shift.   : Voiding incontinently  Skin: Bruising throughout. Large bruise to L elbow. Preventative melipex lite to coccyx, mepilex x 2 to R flank for unopened blisters. Yeast rash to groin, interdry in place, powder ordered   Pain: Well controlled with scheduled Tylenol, Robaxin, Gabapentin, and Toradol.   Activity: Up with assist of 2, lift. Up to chair x 1 this AM  Social: Daughter and wife present throughout the day, supportive  Plan: Plan for TCU pending placement. Continue to monitor and follow POC  Updates this shift: Chest X-rays completed this shift.

## 2022-06-14 NOTE — PROGRESS NOTES
[facility]  Trauma Service Progress Note    Date of Service (when I saw the patient): 06/14/2022     Assessment & Plan   Trauma mechanism:Fall from bar stool  Time/date of injury: 6/6/22  Known Injuries:  1. Acute unstable L1 fracture  2. Posterior longitudinal Ligament injury     Neuro/Pain/Psych:  # Acute traumatic pain  Neuropathic pain secondary to Lumbar fractures that radiate down the anterior portion of bilateral thighs, improved on exam today. Has not required any dose of Oxycodone sine 06/13am  Pain service consulted and is following pain management.   - Added scheduled Toradol 06/12, high risk of bleeding coadministration with Apixaban, family was cautioned. Duration x 72 hours, stop date today  - Menthol patches PRN when Lidoderm patches are off  - Started low dose Gabapentin 06/13 for neuropathic pain, monitor for increased lethargy and delirium, stopped possibly contributing to visual hallucinations.  - Miacalcin nasal spray alternating nostrils for a total of 30days  - Aqua K cooling pad to the low back  - Scheduled: Tylenol, Methocarbamol, Lidoderm patches, TLSO brace  - PRN: Oxycodone currently getting 5-10mg as needed, would be cautious about uptitrating given his age and increased adverse effect.     Delirium prevention measures:  - Maintain circadian rhythm. Lights on during the day. Off at night, minimize cares at night. OOB during the day.  - Added scheduled Melatonin     Pulmonary:  # Acute Hypoxia, resolved, on room air  - Reports dyspnea on exertion, on room air  - CXR today  - Early mobility, pulmonary hygiene  - Supplemental oxygen to keep saturation above 92 %  - Aggressive pulmonary hygiene. Incentive spirometer while awake      Cardiovascular:    # Hx P Afib   # HTN, HLD  # NSTEMI 2017  - 12 lead EKG: Afib +rbbb (noted on prior 12 lead EKG's from 2017), HR 80 to 160's, . BP stable  - Continue PTA: Metoprolol 100mg XL, Atorvastatin, Apixaban,  Furosemide  - Electrolyte replacements, maintain K >4.0, mag >2.0     GI/Nutrition:    - Regular Diet   - Bowel regimen  - Stooling     Renal/ Fluids/Electrolytes:  - electrolyte replacement protocol in place.   - Lab frequency changed to three times a week, or obtain as needed     Endocrine:  # Diabetes Mellitus with hyperglycemia  - No medications or insulin prior to admission.  - Medium correction Novolog scale insulin     Infectious disease:   -  No indications for antibiotics.      Hematology:    # Coagulopathy  - On Apixaban PTA chronically for stroke prophylaxis with Hx of Afib, continued on 6/8/22.   - Admitted with Hb of 16.2g/dL, stable without the need for transfusion  - Threshold for transfusion if hgb <7.0 or signs/symptoms of hypoperfusion.   :      Musculoskeletal:  # Fall  # L1 unstable fracture  Seen and evaluated by Neurosurgery, managing fracture conservatively with pain control and bracing  - Upright XR's completed and reviewed by Neurosurgery  - TLSO brace when HOB >30degrees and when out of bed, OK to remove while <30degrees  - Follow up recommended in 4-6 weeks with repeat Upright Thoracic/Lumbar X-Rays outpatient at the Neurosurgery Clinic.  - Physical and occupational therapy consults     Skin:  - dilgent cares to prevent skin breakdown and wound formation.       Palliative Consulted   Pain team  Care Conference 6/10: Patient's family wants to defer surgery at this time since it would require a fusion from T11 to lower lumbar. They are concerned about the healing and recovery time. Goal is to manage pain, improve delirium and discharge  Social work consult: TCU placement     Lines/ tubes/ drains:  - PIV      General Cares:                 PPI/H2 blocker:  NA                DVT prophylaxis: PTA Apixaban                Bowel Regimen/Date of last stool: 06/13, diarrhea                Pulmonary toilet: IS     Code status:  Full Code   Discharge goals:     Adequate pain management: yes    VSS x24  hours: yes    Hemoglobin stable x 48 hours: yes    Ambulating safely and/or therapy evals complete:Yes    Drains/lines removed or plan in place to manage: yes    Teaching done:yes    Other:  Expected D/C date: medically optimized for TCU placement.     Interval History   Some confusion reported at night, alert and oriented today. Smiling and making jokes during my visit. Reported dyspnea with activity otherwise none noted.   ROS x 8 negative with exception of those things listed in interval hx    Physical Exam   Temp: 97.4  F (36.3  C) Temp src: Axillary BP: (!) 159/74 (RN informed) Pulse: 75   Resp: 16 SpO2: 95 % O2 Device: None (Room air) Oxygen Delivery: 1 LPM  Vitals:    06/11/22 0000 06/12/22 0000 06/13/22 1739   Weight: 125.1 kg (275 lb 12.7 oz) 126.9 kg (279 lb 12.2 oz) 124.7 kg (274 lb 14.6 oz)     Vital Signs with Ranges  Temp:  [97.4  F (36.3  C)-98.3  F (36.8  C)] 97.4  F (36.3  C)  Pulse:  [75-94] 75  Resp:  [16] 16  BP: (125-159)/() 159/74  SpO2:  [95 %-100 %] 95 %  I/O last 3 completed shifts:  In: -   Out: 1025 [Urine:1025]  Dale Coma Scale - Total 15/15  Eye Response (E): 4   4= spontaneous, 3= to verbal/voice, 2= to pain, 1= No response   Verbal Response (V): 5   5= Orientated, converses, 4= Confused, converses, 3= Inappropriate words, 2= Incomprehensible sounds, 1=No response   Motor Response (M): 6   6= Obeys commands, 5= Localizes to pain, 4= Withdrawal to pain, 3=Fexion to pain, 2= Extension to pain, 1= No response   Constitutional: Awake, alert, cooperative, calm  Eyes:PERRL  ENT: Normocephalic, atraumatic  Respiratory: No increased work of breathing, good air exchange, clear to auscultation bilaterally, no crackles or wheezing.  Cardiovascular:  regular rate and rhythm, normal S1 and S2  GI: Normal bowel sounds, abdomen soft, non-distended, non-tender, no guarding  Genitourinary:  Voids spont.  Skin:  Ecchymotic areas to the left elbow and left flank possibly from friction/ shear, no  open wounds, paola redness  Musculoskeletal: There is no redness, warmth, or swelling of the joints.  Pedal pulse palpated.  Neurologic: Awake, alert, oriented. Strength and sensory is intact. No focal deficits.  Neuropsychiatric: Calm, normal eye contact, alert    CHUCK Higgins CNP  To contact the trauma service use job code pager [phone],   Numeric texts or alpha text through [facility]

## 2022-06-15 ENCOUNTER — APPOINTMENT (OUTPATIENT)
Dept: PHYSICAL THERAPY | Facility: CLINIC | Age: 87
DRG: 552 | End: 2022-06-15
Payer: COMMERCIAL

## 2022-06-15 ENCOUNTER — APPOINTMENT (OUTPATIENT)
Dept: OCCUPATIONAL THERAPY | Facility: CLINIC | Age: 87
DRG: 552 | End: 2022-06-15
Payer: COMMERCIAL

## 2022-06-15 LAB
ANION GAP SERPL CALCULATED.3IONS-SCNC: 8 MMOL/L (ref 3–14)
BUN SERPL-MCNC: 28 MG/DL (ref 7–30)
CALCIUM SERPL-MCNC: 9.1 MG/DL (ref 8.5–10.1)
CHLORIDE BLD-SCNC: 105 MMOL/L (ref 94–109)
CO2 SERPL-SCNC: 26 MMOL/L (ref 20–32)
CREAT SERPL-MCNC: 0.94 MG/DL (ref 0.66–1.25)
ERYTHROCYTE [DISTWIDTH] IN BLOOD BY AUTOMATED COUNT: 14.5 % (ref 10–15)
GFR SERPL CREATININE-BSD FRML MDRD: 78 ML/MIN/1.73M2
GLUCOSE BLD-MCNC: 133 MG/DL (ref 70–99)
GLUCOSE BLDC GLUCOMTR-MCNC: 118 MG/DL (ref 70–99)
GLUCOSE BLDC GLUCOMTR-MCNC: 154 MG/DL (ref 70–99)
GLUCOSE BLDC GLUCOMTR-MCNC: 158 MG/DL (ref 70–99)
GLUCOSE BLDC GLUCOMTR-MCNC: 168 MG/DL (ref 70–99)
GLUCOSE BLDC GLUCOMTR-MCNC: 196 MG/DL (ref 70–99)
HCT VFR BLD AUTO: 46.1 % (ref 40–53)
HGB BLD-MCNC: 15.1 G/DL (ref 13.3–17.7)
MAGNESIUM SERPL-MCNC: 2.2 MG/DL (ref 1.6–2.3)
MCH RBC QN AUTO: 29.2 PG (ref 26.5–33)
MCHC RBC AUTO-ENTMCNC: 32.8 G/DL (ref 31.5–36.5)
MCV RBC AUTO: 89 FL (ref 78–100)
PHOSPHATE SERPL-MCNC: 4 MG/DL (ref 2.5–4.5)
PLATELET # BLD AUTO: 199 10E3/UL (ref 150–450)
POTASSIUM BLD-SCNC: 4.2 MMOL/L (ref 3.4–5.3)
RBC # BLD AUTO: 5.18 10E6/UL (ref 4.4–5.9)
SODIUM SERPL-SCNC: 139 MMOL/L (ref 133–144)
WBC # BLD AUTO: 7.8 10E3/UL (ref 4–11)

## 2022-06-15 PROCEDURE — 250N000013 HC RX MED GY IP 250 OP 250 PS 637: Performed by: NURSE PRACTITIONER

## 2022-06-15 PROCEDURE — 99232 SBSQ HOSP IP/OBS MODERATE 35: CPT | Performed by: NURSE PRACTITIONER

## 2022-06-15 PROCEDURE — 120N000002 HC R&B MED SURG/OB UMMC

## 2022-06-15 PROCEDURE — 80048 BASIC METABOLIC PNL TOTAL CA: CPT | Performed by: NURSE PRACTITIONER

## 2022-06-15 PROCEDURE — 97116 GAIT TRAINING THERAPY: CPT | Mod: GP

## 2022-06-15 PROCEDURE — 85027 COMPLETE CBC AUTOMATED: CPT | Performed by: NURSE PRACTITIONER

## 2022-06-15 PROCEDURE — 84100 ASSAY OF PHOSPHORUS: CPT | Performed by: NURSE PRACTITIONER

## 2022-06-15 PROCEDURE — 83735 ASSAY OF MAGNESIUM: CPT | Performed by: NURSE PRACTITIONER

## 2022-06-15 PROCEDURE — 250N000013 HC RX MED GY IP 250 OP 250 PS 637: Performed by: PHYSICIAN ASSISTANT

## 2022-06-15 PROCEDURE — 250N000011 HC RX IP 250 OP 636: Performed by: NURSE PRACTITIONER

## 2022-06-15 PROCEDURE — 36415 COLL VENOUS BLD VENIPUNCTURE: CPT | Performed by: NURSE PRACTITIONER

## 2022-06-15 PROCEDURE — 97530 THERAPEUTIC ACTIVITIES: CPT | Mod: GO

## 2022-06-15 PROCEDURE — 97530 THERAPEUTIC ACTIVITIES: CPT | Mod: GP

## 2022-06-15 PROCEDURE — 97535 SELF CARE MNGMENT TRAINING: CPT | Mod: GO

## 2022-06-15 RX ORDER — CEFTRIAXONE 1 G/1
1 INJECTION, POWDER, FOR SOLUTION INTRAMUSCULAR; INTRAVENOUS EVERY 24 HOURS
Status: DISCONTINUED | OUTPATIENT
Start: 2022-06-15 | End: 2022-06-15

## 2022-06-15 RX ORDER — SULFAMETHOXAZOLE/TRIMETHOPRIM 800-160 MG
1 TABLET ORAL 2 TIMES DAILY
Status: DISCONTINUED | OUTPATIENT
Start: 2022-06-16 | End: 2022-06-20 | Stop reason: HOSPADM

## 2022-06-15 RX ADMIN — CALCITONIN SALMON 1 SPRAY: 200 SPRAY, METERED NASAL at 08:07

## 2022-06-15 RX ADMIN — ACETAMINOPHEN 325MG 975 MG: 325 TABLET ORAL at 06:31

## 2022-06-15 RX ADMIN — APIXABAN 5 MG: 5 TABLET, FILM COATED ORAL at 08:06

## 2022-06-15 RX ADMIN — MICONAZOLE NITRATE: 2 POWDER TOPICAL at 21:32

## 2022-06-15 RX ADMIN — OXYCODONE HYDROCHLORIDE 10 MG: 5 TABLET ORAL at 02:18

## 2022-06-15 RX ADMIN — THERA TABS 1 TABLET: TAB at 08:06

## 2022-06-15 RX ADMIN — KETOROLAC TROMETHAMINE 15 MG: 15 INJECTION, SOLUTION INTRAMUSCULAR; INTRAVENOUS at 00:27

## 2022-06-15 RX ADMIN — APIXABAN 5 MG: 5 TABLET, FILM COATED ORAL at 21:32

## 2022-06-15 RX ADMIN — METHOCARBAMOL 1000 MG: 500 TABLET ORAL at 21:32

## 2022-06-15 RX ADMIN — Medication 2000 UNITS: at 08:06

## 2022-06-15 RX ADMIN — INSULIN ASPART 2 UNITS: 100 INJECTION, SOLUTION INTRAVENOUS; SUBCUTANEOUS at 12:11

## 2022-06-15 RX ADMIN — ACETAMINOPHEN 325MG 975 MG: 325 TABLET ORAL at 13:50

## 2022-06-15 RX ADMIN — FUROSEMIDE 20 MG: 20 TABLET ORAL at 08:06

## 2022-06-15 RX ADMIN — METOPROLOL SUCCINATE 100 MG: 50 TABLET, EXTENDED RELEASE ORAL at 08:06

## 2022-06-15 RX ADMIN — LIDOCAINE PATCH 4% 2 PATCH: 40 PATCH TOPICAL at 08:06

## 2022-06-15 RX ADMIN — Medication 5 MG: at 21:32

## 2022-06-15 RX ADMIN — ACETAMINOPHEN 325MG 975 MG: 325 TABLET ORAL at 21:32

## 2022-06-15 RX ADMIN — ATORVASTATIN CALCIUM 20 MG: 20 TABLET, FILM COATED ORAL at 21:32

## 2022-06-15 RX ADMIN — INSULIN ASPART 1 UNITS: 100 INJECTION, SOLUTION INTRAVENOUS; SUBCUTANEOUS at 06:31

## 2022-06-15 RX ADMIN — OXYCODONE HYDROCHLORIDE 10 MG: 5 TABLET ORAL at 21:37

## 2022-06-15 RX ADMIN — METHOCARBAMOL 1000 MG: 500 TABLET ORAL at 17:13

## 2022-06-15 RX ADMIN — MICONAZOLE NITRATE: 2 POWDER TOPICAL at 08:07

## 2022-06-15 RX ADMIN — METHOCARBAMOL 1000 MG: 500 TABLET ORAL at 12:11

## 2022-06-15 RX ADMIN — CEFTRIAXONE SODIUM 1 G: 1 INJECTION, POWDER, FOR SOLUTION INTRAMUSCULAR; INTRAVENOUS at 09:24

## 2022-06-15 RX ADMIN — METHOCARBAMOL 1000 MG: 500 TABLET ORAL at 08:06

## 2022-06-15 RX ADMIN — KETOROLAC TROMETHAMINE 15 MG: 15 INJECTION, SOLUTION INTRAMUSCULAR; INTRAVENOUS at 08:07

## 2022-06-15 ASSESSMENT — ACTIVITIES OF DAILY LIVING (ADL)
ADLS_ACUITY_SCORE: 51
ADLS_ACUITY_SCORE: 51
ADLS_ACUITY_SCORE: 49
ADLS_ACUITY_SCORE: 49
ADLS_ACUITY_SCORE: 51
ADLS_ACUITY_SCORE: 49
ADLS_ACUITY_SCORE: 49
ADLS_ACUITY_SCORE: 51
ADLS_ACUITY_SCORE: 51
ADLS_ACUITY_SCORE: 49

## 2022-06-15 ASSESSMENT — VISUAL ACUITY: OU: NORMAL ACUITY;GLASSES

## 2022-06-15 NOTE — PROGRESS NOTES
"Inpatient Pain Service: Follow Up Note  06/15/22    Patient: Alvin Newton  Admission Date: 6/6/2022        Chief Pain Endorsement: back pain \"improving\"    Recommendations were discussed and relayed to HUMA Hernandez CNP with Trauma Service  Plan was reviewed by the Inpatient Pain Service, staff attending Dr. GERA Canales      Alvin Newton is an 88 year old male with PMH significant for atrial fibrillation (on apixaban), HTN, HLD, DM II, NSTEMI in 2017 who presented following fall sustaining unstable L1 vertebral fracture.  Patient admitted seen by neurosurgery with plan for nonsurgical treatment approach. Pain management complicated by delirium.  Was seen by palliative care on 6/9.  Opioid naive prior to admission. Goal is to manage pain, improve delirium and discharge (TCU anticipated).      6/14 Delirium and visual hallucinations likely multifactorial (medication, pain, poor sleep, possible UTI). Gabapentin stopped, UA/UC and started on antibiotic per primary service.    6/15 Confusion overnight, no further episodes of hallucinations, improved comfort and tolerating analgesic meds    Outpatient opioid requirements prior to admission: OME = 0 mg    Inpatient opioid use over past 24 hrs = oxycodone 10 mg x 1    Primary Care Provider: Semmler, Steven Duane        1. Continue oxycodone 5-10 mg PO Q 3 hr PRN pain. No further dose increase recommended.    2. No gabapentin at this time. If need to resume for radicular neuropathic pain, start with 100 mg PO at bedtime and titrate if needed by 100 mg dose to effect and side effect for neuropathic (radicular pain)  3. Continue Miacalcin nasal spray, one spray in nostril, alternating nostrils daily x 30 days.     Risk/benefit for lumbar compression fracture pain in the elderly patient has been discussed with patient, his wife and daughter who verbalize understanding and agree with use of medication for analgesia  4. Add menthol 5% topical patch Q 8 hrs when lidocaine patches are " "off  5. Agree with scheduled methocarbamol, lidocaine patches, and menthol patches as ordered  6. Agree with plan for cold therapy ordered per primary service for back pain. Patient states he prefers topical application of cold for pain  7. Bowel regimen per primary team to prevent opioid induced constipation.    Pain Service will Sign Off at this time    Thank you for consulting the Inpatient Pain Management Service. The above recommendations are to be acted upon at the primary team s discretion.       To reach us:  Please Page the Pain Team Via [employer]: \"Adult acute inpatient pain management/[facility]\"      Interval History:  Alvin Newton was seen today (06/15/22). Voiding, on commode.  Awake and alert, he reports that his pain is adequately controlled, improved comfort and activity is tolerable.  Plans for discharge to TCU.     -- Inpatient Medications Related to Pain Management:   Medications related to Pain Management (From now, onward)    Start     Dose/Rate Route Frequency Ordered Stop    06/10/22 0800  methocarbamol (ROBAXIN) tablet 1,000 mg         1,000 mg Oral 4 TIMES DAILY 06/10/22 0722      06/10/22 0723  oxyCODONE (ROXICODONE) tablet 5-10 mg         5-10 mg Oral EVERY 3 HOURS PRN 06/10/22 0723      06/08/22 2000  polyethylene glycol (MIRALAX) Packet 17 g         17 g Oral 2 TIMES DAILY 06/08/22 1507      06/08/22 1505  bisacodyl (DULCOLAX) Suppository 10 mg         10 mg Rectal DAILY PRN 06/08/22 1507      06/08/22 1430  acetaminophen (TYLENOL) tablet 975 mg         975 mg Oral EVERY 8 HOURS 06/08/22 1425      06/08/22 0800  Lidocaine (LIDOCARE) 4 % Patch 1-2 patch         1-2 patch  over 12 Hours Transdermal EVERY 24 HOURS 0800 06/08/22 0756      06/08/22 0800  lidocaine patch in PLACE          Transdermal EVERY 8 HOURS SCHEDULED 06/08/22 0756      06/07/22 0800  senna-docusate (SENOKOT-S/PERICOLACE) 8.6-50 MG per tablet 1-2 tablet         1-2 tablet Oral 2 TIMES DAILY 06/06/22 2324      06/06/22 2328  " "acetaminophen (TYLENOL) tablet 650 mg         650 mg Oral EVERY 4 HOURS PRN 06/06/22 2329            LAB DATA:  Recent Labs   Lab 06/15/22  0735 06/13/22  0746 06/12/22  0432   CR 0.94 0.95 0.55*   WBC 7.8 7.7 9.0   HGB 15.1 14.9 15.2       /73 (BP Location: Left arm, Patient Position: Chair, Cuff Size: Adult Large)   Pulse 58   Temp 97.5  F (36.4  C) (Oral)   Resp (!) 118   Ht 1.778 m (5' 10\")   Wt 124.1 kg (273 lb 9.5 oz)   SpO2 100%   BMI 39.26 kg/m     EXAM:  CONSTITUTIONAL/GENERAL APPEARANCE:  awake, NAD  EYES: EOMI, sclera anicteric  ENT/NECK: atraumatic, lips and oral mucous membranes moist  RESPIRATORY: non-labored breathing on room air. No cough, wheeze  MUSCULOSKELETAL/BACK/SPINE/EXTREMITIES: Wearing TLSO brace. Moves all extremities purposefully.  No edema  NEURO:  Alert oriented x 4. Answers questions appropriately  SKIN/VASCULAR EXAM:  No jaundice, no suspicious rash or lesions to exposed skin  PSYCHIATRIC/BEHAVIORAL/OBSERVATIONS:  No objective signs of pain observed during our interview.  Pleasant, cooperative    ----------------------------------------------------------------------------------  CHUCK Strickland [facility]  Inpatient Pain Service     Helpful Resources:  Getting Rid of Unwanted Medications (printable PDF for patients)   Opioid Overdose Prevention Toolkit (printable PDF for patients)   Prescription Opioids: What You Need To Know (printable PDF for patients)     "

## 2022-06-15 NOTE — PROGRESS NOTES
[facility]  Trauma Service Progress Note    Date of Service (when I saw the patient): 06/15/2022     Assessment & Plan   Trauma mechanism:Fall from bar stool  Time/date of injury: 6/6/22  Known Injuries:  1. Acute unstable L1 fracture  2. Posterior longitudinal Ligament injury     Neuro/Pain/Psych:  # Acute traumatic pain  Neuropathic pain is improved, overall pain is improved  Pain service consulted and is following pain management.   - Added scheduled Toradol 06/12, high risk of bleeding with Apixaban, family was cautioned. Completed 72 hours 06/15 am  - Menthol patches PRN when Lidoderm patches are off  - Started low dose Gabapentin 06/13 for neuropathic pain, stopped 06/14, concerned this was possibly contributing to visual hallucinations. Other etiologies include pain, poor sleep, medications, UTI (see ID)  - Miacalcin nasal spray alternating nostrils for a total of 30days  - Aqua K cooling pad to the low back can be used  - Scheduled: Tylenol, Methocarbamol, Lidoderm patches, TLSO brace  - PRN: Oxycodone currently getting 5-10mg as needed, would be cautious about uptitrating given his age and increased adverse effect.     Delirium prevention measures:  - Maintain circadian rhythm. Lights on during the day. Off at night, minimize cares at night. OOB during the day. Some confusion reported last evening otherwise slept well and is oriented  - Added scheduled Melatonin for sleep hygiene     Pulmonary:  # Acute Hypoxia, resolved, on room air  - Reports dyspnea on exertion, on room air, CXR with some pulmonary edema, additional dose of Furosemide given yesterday.   - Early mobility, pulmonary hygiene  - Supplemental oxygen to keep saturation above 92 %  - Aggressive pulmonary hygiene. Incentive spirometer while awake      Cardiovascular:    # Hx P Afib   # HTN, HLD  # NSTEMI 2017  - 12 lead EKG: Afib +rbbb (noted on prior 12 lead EKG's from 2017), HR 80 to 160's, . BP  stable  - Continue PTA: Metoprolol 100mg XL, Atorvastatin, Apixaban, Furosemide  - Electrolyte replacements, maintain K >4.0, mag >2.0     GI/Nutrition:    - Regular Diet   - Bowel regimen  - Diarrhea hold bowel regimen for loose stool     Renal/ Fluids/Electrolytes:  - electrolyte replacement protocol in place.   - Lab frequency changed to three times a week, or obtain as needed     Endocrine:  # Diabetes Mellitus with hyperglycemia  - No medications or insulin prior to admission.  - Medium correction Novolog scale insulin     Infectious disease:   # Possible UTI  UA obtained for episodes of hallucinations yesterday with large leuk est and WBC, cultures pending. Reports burring with urination  Started on Ceftriaxone empirically, can transition to an oral regimen at discharge     Hematology:    # Coagulopathy  - On Apixaban PTA chronically for stroke prophylaxis with Hx of Afib, continued on 6/8/22.   - Admitted with Hb of 16.2g/dL, stable without the need for transfusion  - Threshold for transfusion if hgb <7.0 or signs/symptoms of hypoperfusion.   :      Musculoskeletal:  # Fall  # L1 unstable fracture  Seen and evaluated by Neurosurgery, managing fracture conservatively with pain control and bracing  - Upright XR's completed and reviewed by Neurosurgery  - TLSO brace when HOB >30degrees and when out of bed, OK to remove while <30degrees  - Follow up recommended in 4-6 weeks with repeat Upright Thoracic/Lumbar X-Rays outpatient at the Neurosurgery Clinic.  - Physical and occupational therapy consults     Skin:  - dilgent cares to prevent skin breakdown and wound formation.       Palliative Consulted   Pain team  Care Conference 6/10: Patient's family wants to defer surgery at this time since it would require a fusion from T11 to lower lumbar. They are concerned about the healing and recovery time. Goal is to manage pain, improve delirium and discharge  Social work consult: TCU placement     Lines/ tubes/  drains:  - PIV   General Cares:                 PPI/H2 blocker:  NA                DVT prophylaxis: PTA Apixaban                Bowel Regimen/Date of last stool: 06/15, diarrhea                Pulmonary toilet: IS     Code status:  Full Code   Discharge goals:     Adequate pain management: yes    VSS x24 hours: yes    Hemoglobin stable x 48 hours: yes    Ambulating safely and/or therapy evals complete:Yes    Drains/lines removed or plan in place to manage: yes    Teaching done:yes    Other:  Expected D/C date: medically optimized for TCU placement.  Interval History   Nursing reported some confusion last evening, no further episodes noted for hallucinations. Was working with therapy at the time of my visit, stood. Reports improved pain.  ROS x 8 negative with exception of those things listed in interval hx    Physical Exam   Temp: 97.5  F (36.4  C) Temp src: Oral BP: 130/73 Pulse: 58   Resp: (!) 118 SpO2: 100 % O2 Device: None (Room air) Oxygen Delivery: 2 LPM (for comfort)  Vitals:    06/12/22 0000 06/13/22 1739 06/14/22 1300   Weight: 126.9 kg (279 lb 12.2 oz) 124.7 kg (274 lb 14.6 oz) 124.1 kg (273 lb 9.5 oz)     Vital Signs with Ranges  Temp:  [97.3  F (36.3  C)-97.5  F (36.4  C)] 97.5  F (36.4  C)  Pulse:  [58-77] 58  Resp:  [] 118  BP: (104-160)/() 130/73  SpO2:  [95 %-100 %] 100 %  I/O last 3 completed shifts:  In: -   Out: 1260 [Urine:1260]    Amber Coma Scale - Total 14-15/15  Eye Response (E): 4   4= spontaneous, 3= to verbal/voice, 2= to pain, 1= No response   Verbal Response (V): 5, forgetful at times   5= Orientated, converses, 4= Confused, converses, 3= Inappropriate words, 2= Incomprehensible sounds, 1=No response   Motor Response (M): 6   6= Obeys commands, 5= Localizes to pain, 4= Withdrawal to pain, 3=Fexion to pain, 2= Extension to pain, 1= No response   Constitutional: Awake, alert, pleasant, calm  ENT: Normocephalic, atraumatic  Respiratory: No increased work of breathing, good air  exchange, clear to auscultation bilaterally, no crackles or wheezing.  Cardiovascular:  regular rate and rhythm, normal S1 and S2  GI: Normal bowel sounds, abdomen soft, non-distended, non-tender, no guarding  Genitourinary:  Voids spont.  Skin:  Ecchymotic areas to the left elbow and left flank possibly from friction/ shear, no open wounds, paola redness, improving  Musculoskeletal: There is no redness, warmth, or swelling of the joints.  Pedal pulse palpated.  Neurologic: Awake, alert, oriented. Strength and sensory is intact. No focal deficits.  Neuropsychiatric: Calm, normal eye contact, alert    CHUCK Higgins CNP  To contact the trauma service use job code pager [phone],   Numeric texts or alpha text through [facility]

## 2022-06-15 NOTE — PLAN OF CARE
Goal Outcome Evaluation:    Plan of Care Reviewed With: patient     Overall Patient Progress: improving    Status: Pt admitted on 6/6 due to fall injury at home. L1 acute unstable fx.  Vitals: HTN within parameters after recheck, known a-fib  Neuros: AO4, disoriented time this AM after waking up, quickly resolved. BLE 3/5, forgetful. No hallucinations or confusion noted this shift  IV: PIV SL in between antibiotics   Labs/Electrolytes: WDL  Resp/trach: Dyspneic on exertion, LS clear  Diet: Regular diet, fair intake  Bowel status: Bm overnight, incontinently   : Voiding incontinently. + UTI, antibiotics added  Skin: Bruising throughout. Large bruise to L elbow. Preventative melipex lite to coccyx, mepilex x 2 to R flank for unopened blisters. Yeast rash to groin, interdry in place, powder applied  Pain: Well controlled with scheduled Tylenol, Robaxin, and Toradol. Declined Oxycodone   Activity: Up with assist of 2, GB, walker, pivot. Up to chair x 1   Social: Daughter updated via phone   Plan: Plan for TCU pending placement. Continue to monitor and follow POC

## 2022-06-16 ENCOUNTER — APPOINTMENT (OUTPATIENT)
Dept: OCCUPATIONAL THERAPY | Facility: CLINIC | Age: 87
DRG: 552 | End: 2022-06-16
Payer: COMMERCIAL

## 2022-06-16 ENCOUNTER — APPOINTMENT (OUTPATIENT)
Dept: PHYSICAL THERAPY | Facility: CLINIC | Age: 87
DRG: 552 | End: 2022-06-16
Payer: COMMERCIAL

## 2022-06-16 LAB
GLUCOSE BLDC GLUCOMTR-MCNC: 120 MG/DL (ref 70–99)
GLUCOSE BLDC GLUCOMTR-MCNC: 125 MG/DL (ref 70–99)
GLUCOSE BLDC GLUCOMTR-MCNC: 136 MG/DL (ref 70–99)
GLUCOSE BLDC GLUCOMTR-MCNC: 140 MG/DL (ref 70–99)
GLUCOSE BLDC GLUCOMTR-MCNC: 162 MG/DL (ref 70–99)
GLUCOSE BLDC GLUCOMTR-MCNC: 194 MG/DL (ref 70–99)

## 2022-06-16 PROCEDURE — 250N000013 HC RX MED GY IP 250 OP 250 PS 637: Performed by: STUDENT IN AN ORGANIZED HEALTH CARE EDUCATION/TRAINING PROGRAM

## 2022-06-16 PROCEDURE — 99232 SBSQ HOSP IP/OBS MODERATE 35: CPT | Performed by: PHYSICIAN ASSISTANT

## 2022-06-16 PROCEDURE — 97535 SELF CARE MNGMENT TRAINING: CPT | Mod: GO

## 2022-06-16 PROCEDURE — 250N000013 HC RX MED GY IP 250 OP 250 PS 637: Performed by: NURSE PRACTITIONER

## 2022-06-16 PROCEDURE — 250N000011 HC RX IP 250 OP 636: Performed by: STUDENT IN AN ORGANIZED HEALTH CARE EDUCATION/TRAINING PROGRAM

## 2022-06-16 PROCEDURE — 97110 THERAPEUTIC EXERCISES: CPT | Mod: GP

## 2022-06-16 PROCEDURE — 120N000002 HC R&B MED SURG/OB UMMC

## 2022-06-16 PROCEDURE — 250N000013 HC RX MED GY IP 250 OP 250 PS 637: Performed by: PHYSICIAN ASSISTANT

## 2022-06-16 RX ORDER — GABAPENTIN 100 MG/1
100 CAPSULE ORAL 2 TIMES DAILY
Status: DISCONTINUED | OUTPATIENT
Start: 2022-06-16 | End: 2022-06-20 | Stop reason: HOSPADM

## 2022-06-16 RX ADMIN — SULFAMETHOXAZOLE AND TRIMETHOPRIM 1 TABLET: 800; 160 TABLET ORAL at 21:00

## 2022-06-16 RX ADMIN — ACETAMINOPHEN 325MG 975 MG: 325 TABLET ORAL at 16:22

## 2022-06-16 RX ADMIN — APIXABAN 5 MG: 5 TABLET, FILM COATED ORAL at 09:04

## 2022-06-16 RX ADMIN — ACETAMINOPHEN 325MG 975 MG: 325 TABLET ORAL at 23:46

## 2022-06-16 RX ADMIN — MICONAZOLE NITRATE: 2 POWDER TOPICAL at 09:10

## 2022-06-16 RX ADMIN — APIXABAN 5 MG: 5 TABLET, FILM COATED ORAL at 21:00

## 2022-06-16 RX ADMIN — METHOCARBAMOL 1000 MG: 500 TABLET ORAL at 16:21

## 2022-06-16 RX ADMIN — Medication 2000 UNITS: at 09:04

## 2022-06-16 RX ADMIN — METHOCARBAMOL 1000 MG: 500 TABLET ORAL at 21:00

## 2022-06-16 RX ADMIN — ACETAMINOPHEN 650 MG: 325 TABLET, FILM COATED ORAL at 09:03

## 2022-06-16 RX ADMIN — ACETAMINOPHEN 325MG 975 MG: 325 TABLET ORAL at 06:15

## 2022-06-16 RX ADMIN — LIDOCAINE PATCH 4% 1 PATCH: 40 PATCH TOPICAL at 17:33

## 2022-06-16 RX ADMIN — GABAPENTIN 100 MG: 100 CAPSULE ORAL at 10:20

## 2022-06-16 RX ADMIN — INSULIN ASPART 1 UNITS: 100 INJECTION, SOLUTION INTRAVENOUS; SUBCUTANEOUS at 13:00

## 2022-06-16 RX ADMIN — METOPROLOL SUCCINATE 100 MG: 50 TABLET, EXTENDED RELEASE ORAL at 09:04

## 2022-06-16 RX ADMIN — CALCITONIN SALMON 1 SPRAY: 200 SPRAY, METERED NASAL at 09:09

## 2022-06-16 RX ADMIN — OXYCODONE HYDROCHLORIDE 5 MG: 5 TABLET ORAL at 19:02

## 2022-06-16 RX ADMIN — SULFAMETHOXAZOLE AND TRIMETHOPRIM 1 TABLET: 800; 160 TABLET ORAL at 09:05

## 2022-06-16 RX ADMIN — ATORVASTATIN CALCIUM 20 MG: 20 TABLET, FILM COATED ORAL at 21:00

## 2022-06-16 RX ADMIN — OXYCODONE HYDROCHLORIDE 5 MG: 5 TABLET ORAL at 10:20

## 2022-06-16 RX ADMIN — OXYCODONE HYDROCHLORIDE 10 MG: 5 TABLET ORAL at 02:23

## 2022-06-16 RX ADMIN — METHOCARBAMOL 1000 MG: 500 TABLET ORAL at 09:04

## 2022-06-16 RX ADMIN — GABAPENTIN 100 MG: 100 CAPSULE ORAL at 20:59

## 2022-06-16 RX ADMIN — FUROSEMIDE 20 MG: 20 TABLET ORAL at 09:04

## 2022-06-16 RX ADMIN — ONDANSETRON 4 MG: 4 TABLET, ORALLY DISINTEGRATING ORAL at 17:38

## 2022-06-16 RX ADMIN — OXYCODONE HYDROCHLORIDE 5 MG: 5 TABLET ORAL at 23:45

## 2022-06-16 RX ADMIN — THERA TABS 1 TABLET: TAB at 09:04

## 2022-06-16 RX ADMIN — Medication 5 MG: at 21:00

## 2022-06-16 RX ADMIN — MICONAZOLE NITRATE: 2 POWDER TOPICAL at 21:02

## 2022-06-16 RX ADMIN — ACETAMINOPHEN 650 MG: 325 TABLET, FILM COATED ORAL at 02:22

## 2022-06-16 ASSESSMENT — ACTIVITIES OF DAILY LIVING (ADL)
ADLS_ACUITY_SCORE: 47
ADLS_ACUITY_SCORE: 51
ADLS_ACUITY_SCORE: 47
ADLS_ACUITY_SCORE: 46
ADLS_ACUITY_SCORE: 48
ADLS_ACUITY_SCORE: 46
ADLS_ACUITY_SCORE: 46
ADLS_ACUITY_SCORE: 47
ADLS_ACUITY_SCORE: 48
ADLS_ACUITY_SCORE: 51
ADLS_ACUITY_SCORE: 47
ADLS_ACUITY_SCORE: 47

## 2022-06-16 NOTE — PLAN OF CARE
Status: Pt admitted on 6/6 due to fall injury at home. L1 acute unstable fx.  Vitals: HTN within parameters after recheck, known a-fib  Neuros: AO4, BLE 3/5, forgetful. No hallucinations or confusion noted this shift  IV: PIV SL in between antibiotics   Labs/Electrolytes: WDL  Resp/trach: Dyspneic on exertion, LS clear  Diet: Regular diet, fair intake  Bowel status: Loose BM this shift  : Voiding intermittently with incontinence. + UTI, on abx  Skin: Bruising throughout. Large bruise to L elbow. Preventative melipex lite to coccyx, mepilex x 2 to R flank for unopened blisters. Yeast rash to groin, interdry in place, powder applied  Pain: Well controlled with scheduled Tylenol, Robaxin, and oxy.  Activity: Up with assist of 2, GB, walker, pivot. Up to chair x 1   Social: Daughter updated via phone   Plan: Plan for TCU pending placement. Continue to monitor and follow POC

## 2022-06-16 NOTE — PROGRESS NOTES
[facility]  Trauma Service Progress Note    Date of Service: 06/16/2022    Trauma mechanism:Fall from bar stool  Time/date of injury: 6/6/22  Known Injuries:  1. Acute unstable L1 fracture  2. Posterior longitudinal Ligament injury    Assessment & Plan   Neuro/Pain/Psych:  # Acute traumatic pain  - Pain service consulted and is following pain management.   - Current Pain Regimen:   - Scheduled: Tylenol, Robaxin 1,000mg 4x a day, Miacalcin nasal spray alternating nostrils for a total of 30days  - PRN: Tylenol, Menthol, Oxycodone   - Restarted Gabapentin today, was taken off for as possible source of hallucinations. Pt is more clear today but has severe pain. Hallucinations could also be 2/2 UTI. Started Gabapentin at 100mg bid to see if his pain improves without hallucination.   - Completed 72 hours of Toradol   - Aqua K cooling pad to the low back can be used    Delirium prevention measures:  - Maintain circadian rhythm. Lights on during the day. Off at night, minimize cares at night. OOB during the day. Some confusion reported last evening otherwise slept well and is oriented  - Scheduled Melatonin 5mg for sleep hygiene    Pulmonary:  # Acute Hypoxia, resolved, on room air  - Reports dyspnea on exertion, on room air, CXR with some pulmonary edema, additional dose of Furosemide given yesterday.   - Early mobility, pulmonary hygiene  - Supplemental oxygen to keep saturation above 92 %  - Aggressive pulmonary hygiene. Incentive spirometer while awake      Cardiovascular:    # Hx P Afib   # HTN, HLD  # NSTEMI 2017  - 12 lead EKG: Afib +rbbb (noted on prior 12 lead EKG's from 2017), HR 80 to 160's, . BP stable  - Continue PTA: Metoprolol 100mg XL, Atorvastatin, Apixaban, Furosemide  - Electrolyte replacements, maintain K >4.0, mag >2.0     GI/Nutrition:    - Regular Diet   - Bowel regimen  - Diarrhea hold bowel regimen for loose stool     Renal/ Fluids/Electrolytes:  -  electrolyte replacement protocol in place.   - Lab frequency changed to three times a week, or obtain as needed  - Continue PTA: Lasix      Endocrine:  # Diabetes Mellitus with hyperglycemia  - No medications or insulin prior to admission.  - Medium correction Novolog scale insulin     Infectious disease:   # Possible UTI  - 6/14: Blood Trace, Leukocyte Esterase: Large, WBC 58, Bacteria Few, UC: Klebsiella aerogenes     Antibiotic Regimen::   - Ceftriaxone: 6/15 x1  - Bactrim BS started 6/16 for 5 day    Hematology:    # Coagulopathy  - On Apixaban PTA chronically for stroke prophylaxis with Hx of Afib, continued on 6/8/22.   - Admitted with Hb of 16.2g/dL, stable without the need for transfusion  - Threshold for transfusion if hgb <7.0 or signs/symptoms of hypoperfusion.        Musculoskeletal:  # Fall  # L1 unstable fracture  Seen and evaluated by Neurosurgery, managing fracture conservatively with pain control and bracing  - Upright XR's completed and reviewed by Neurosurgery  - TLSO brace when HOB >30degrees and when out of bed, OK to remove while <30degrees  - Follow up recommended in 4-6 weeks with repeat Upright Thoracic/Lumbar X-Rays outpatient at the Neurosurgery [facility].  - Physical and occupational therapy consults     Skin:  # Skin irritation from TLSO  - continue Miconazole 2% powder, wear Tshirt under brace   - dilgent cares to prevent skin breakdown and wound formation.       Palliative Consulted   Pain team  Care Conference 6/10: Patient's family wants to defer surgery at this time since it would require a fusion from T11 to lower lumbar. They are concerned about the healing and recovery time. Goal is to manage pain, improve delirium and discharge  Social work consult: TCU placement     Lines/ tubes/ drains:  - PIV   General Cares:                 PPI/H2 blocker:  NA                DVT prophylaxis: PTA Apixaban                Bowel Regimen/Date of last stool: 06/15, diarrhea                Pulmonary  toilet: IS     Code status:  Full Code   Discharge goals:     Adequate pain management: in process    VSS x24 hours: yes    Hemoglobin stable x 48 hours: yes    Ambulating safely and/or therapy evals complete:Yes    Drains/lines removed or plan in place to manage: yes    Teaching done:yes    Other:  Expected D/C date: medically ready for TCU in 1-2 days, increased pain today attempting to improve pain regimen without causing delirium.     Lakia Bains PA-C  To contact the trauma service use job code pager [phone],   Numeric texts or alpha text through [facility]     Interval History   I watched the patient work with the nurses this morning. He  was able to to get up with minimal assist but he was in a lot of pain. He is no longer having hallucinations, and remembers the hallucinations he has had.    ROS x 8 negative with exception of those things listed in interval hx    Physical Exam   Temp: 97.9  F (36.6  C) Temp src: Oral BP: (!) 136/94 Pulse: 83   Resp: 16 SpO2: 92 % O2 Device: None (Room air)    Vitals:    06/12/22 0000 06/13/22 1739 06/14/22 1300   Weight: 126.9 kg (279 lb 12.2 oz) 124.7 kg (274 lb 14.6 oz) 124.1 kg (273 lb 9.5 oz)     Vital Signs with Ranges  Temp:  [97.9  F (36.6  C)-98.6  F (37  C)] 97.9  F (36.6  C)  Pulse:  [81-95] 83  Resp:  [16-18] 16  BP: (108-136)/() 136/94  SpO2:  [92 %-100 %] 92 %  I/O last 3 completed shifts:  In: -   Out: 750 [Urine:750]    Amber Coma Scale - Total 15/15  Eye Response (E): 4   4= spontaneous, 3= to verbal/voice, 2= to pain, 1= No response   Verbal Response (V): 5   5= Orientated, converses, 4= Confused, converses, 3= Inappropriate words, 2= Incomprehensible sounds, 1=No response   Motor Response (M): 6   6= Obeys commands, 5= Localizes to pain, 4= Withdrawal to pain, 3=Fexion to pain, 2= Extension to pain, 1= No response     Constitutional: Awake, alert, calm  ENT: Normocephalic, atraumatic  Respiratory: No increased work of breathing, good air exchange,  clear to auscultation bilaterally,  Cardiovascular:  regular rate and rhythm, normal S1 and S2  GI: TLSO on, unable to assess  Genitourinary:  Voids spont.  Skin:  Ecchymotic areas to the left elbow and left flank possibly from friction/ shear, no open wounds, paola redness, improving  Musculoskeletal: There is no redness, warmth, or swelling of the joints.   Neurologic: Awake, alert, oriented. Strength and sensory is intact. No focal deficits.  Neuropsychiatric: Calm, normal eye contact, alert

## 2022-06-16 NOTE — PROGRESS NOTES
"CLINICAL NUTRITION SERVICES - ASSESSMENT NOTE     Nutrition Prescription    RECOMMENDATIONS FOR MDs/PROVIDERS TO ORDER:  Consider simethicone for bloating relief    Malnutrition Status:    Patient does not meet two of the established criteria necessary for diagnosing malnutrition but is at risk for malnutrition    Recommendations already ordered by Registered Dietitian (RD):  Discontinuing ensure per pt request    Future/Additional Recommendations:  Monitor labs, intakes, and weight trends.     REASON FOR ASSESSMENT  Alvin Newton is a/an 88 year old male assessed by the dietitian for LOS    NUTRITION/MEDICAL HISTORY  History of A fib on Eliquis, HTN, HLD, T2DM, NSTEMI 2017 presenting after mechanical fall found to have a obliquely oriented linear fracture through L1 vertebral body-does not appear to extend to posterior elements.  Significant lumbar area back pain on exam however grossly nonfocal    FINDINGS  Patient states he is eating okay. Discussed importance of nutrition in healing. Patient stated he is feeling very bloated and that has gotten in the way of him wanting to eat over the past couple days. Would like something to make him feel less bloated. Pt has not tried any of the Ensures provided and is not interested. Encouraged patient to try them, patient politely declined.     CURRENT NUTRITION ORDERS  Diet: Regular  Supplements: Ensure Enlive between meals  Intake/Tolerance: 0-75% of documented meals, mostly ~ 50%     Pt ordering (on average) 1669 kcal and 76 g protein per day per [employer] from 6/11-6/13 and with documented intakes is likely meeting 30% minimum energy and protein needs.    LABS  Labs reviewed Glucose 136     MEDICATIONS  Medications reviewed: atorvastatin, lasix, insulin, multivitamin, vitamin D3, oxycodone    ANTHROPOMETRICS  Height: 177.8 cm (5' 10\")  Most Recent Weight: 124.1 kg (273 lb 9.5 oz)    IBW: 75.5 kg  BMI: Obesity Grade II BMI 35-39.9  Weight History: Pt weight stable with " some fluctuations since admission.   Wt Readings from Last Encounters:   06/14/22 124.1 kg (273 lb 9.5 oz)   06/13/22 124.7 kg (274 lb 14.6 oz)   06/12/22 126.9 kg (279 lb 12.2 oz)   06/11/22 125.1 kg (275 lb 12.7 oz)   06/10/22 122.3 kg (269 lb 10 oz)   06/09/22 121 kg (266 lb 12.1 oz)   06/08/22 120.7 kg (266 lb 3.2 oz)   06/07/22 119.4 kg (263 lb 3.7 oz)   06/06/22 120.2 kg (265 lb)   09/09/19 123.8 kg (273 lb)   01/29/18 115.5 kg (254 lb 9.6 oz)   12/31/17 123.1 kg (271 lb 6.2 oz)   12/26/17 117.9 kg (260 lb)   03/04/17 122.5 kg (270 lb)   12/03/13 115.7 kg (255 lb)     Dosing Weight: 86 kg - adjusted BW based on admission weight and IBW of 75.5 kg    ASSESSED NUTRITION NEEDS  Estimated Energy Needs: 9463-0329 kcals/day (25 - 30 kcals/kg)  Justification: Maintenance  Estimated Protein Needs: 103-129 grams protein/day (1.2 - 1.5 grams of pro/kg)  Justification: Increased needs  Estimated Fluid Needs: 1 mL/kcal or per provider     MALNUTRITION  % Intake: </= 50% for >/= 5 days (severe)  % Weight Loss: None noted  Subcutaneous Fat Loss: None observed  Muscle Loss: None observed  Fluid Accumulation/Edema: None noted  Malnutrition Diagnosis: Patient does not meet two of the established criteria necessary for diagnosing malnutrition but is at risk for malnutrition    NUTRITION DIAGNOSIS  Inadequate oral intake related to decreased appetite and mobility and bloating as evidenced by patient report and documented intakes.       INTERVENTIONS  Implementation  Nutrition education for nutrition relationship to health/disease   Medical food supplement therapy - discontinue per patient request    Goals  Patient to consume % of nutritionally adequate meal trays TID, or the equivalent with supplements/snacks.     Monitoring/Evaluation  Progress toward goals will be monitored and evaluated per protocol.    Caridad Blanca MS, RDN, LDN  6A/7D RD pager: [phone]  Weekend/Holiday RD pager: [phone]

## 2022-06-16 NOTE — PLAN OF CARE
Vitals: VSS - known AFib   Neuros: Intact except BLE 4/5. No hallucinations noted.   IV: PIV SLd  Labs/Electrolytes: WNL   Resp/trach: Dyspneic on exertion, LS clear  Diet: Regular diet - Fair intake   Bowel status: Small BM 6/16  : Voided x 2   Skin: Bruising throughout. Rash to right chest and groin, powder and intra-dry applied.  Pain: Controlled with robaxin, oxycodone and re-started on gabapentin   Activity: Up with assist of 1-2 GB, walker. TLSO when OOB or HOB > 30. Sat in recliner most of shift, walked to bathroom with OT.   Social: Wife and daughter at bedside   Plan: TCU at discharge pending pain control.

## 2022-06-16 NOTE — PLAN OF CARE
Goal Outcome Evaluation:    Plan of Care Reviewed With: patient     Overall Patient Progress: improving    AxOx4, forgetful at times. No hallucinations noted overnight, patient slept well. Pain managed with Oral tylenol and oxy. UC still pending, on oral Abx. HOB<30, TLSO brace off overnight. Hesitency with voiding, able to void in urinal. Patient is awaiting TCU placement.

## 2022-06-17 ENCOUNTER — APPOINTMENT (OUTPATIENT)
Dept: PHYSICAL THERAPY | Facility: CLINIC | Age: 87
DRG: 552 | End: 2022-06-17
Payer: COMMERCIAL

## 2022-06-17 LAB
ANION GAP SERPL CALCULATED.3IONS-SCNC: 7 MMOL/L (ref 3–14)
BUN SERPL-MCNC: 23 MG/DL (ref 7–30)
CALCIUM SERPL-MCNC: 9.1 MG/DL (ref 8.5–10.1)
CHLORIDE BLD-SCNC: 107 MMOL/L (ref 94–109)
CO2 SERPL-SCNC: 25 MMOL/L (ref 20–32)
CREAT SERPL-MCNC: 0.77 MG/DL (ref 0.66–1.25)
ERYTHROCYTE [DISTWIDTH] IN BLOOD BY AUTOMATED COUNT: 14.6 % (ref 10–15)
GFR SERPL CREATININE-BSD FRML MDRD: 86 ML/MIN/1.73M2
GLUCOSE BLD-MCNC: 114 MG/DL (ref 70–99)
GLUCOSE BLDC GLUCOMTR-MCNC: 118 MG/DL (ref 70–99)
GLUCOSE BLDC GLUCOMTR-MCNC: 130 MG/DL (ref 70–99)
GLUCOSE BLDC GLUCOMTR-MCNC: 136 MG/DL (ref 70–99)
GLUCOSE BLDC GLUCOMTR-MCNC: 178 MG/DL (ref 70–99)
GLUCOSE BLDC GLUCOMTR-MCNC: 202 MG/DL (ref 70–99)
HCT VFR BLD AUTO: 46.8 % (ref 40–53)
HGB BLD-MCNC: 15 G/DL (ref 13.3–17.7)
MCH RBC QN AUTO: 29.2 PG (ref 26.5–33)
MCHC RBC AUTO-ENTMCNC: 32.1 G/DL (ref 31.5–36.5)
MCV RBC AUTO: 91 FL (ref 78–100)
PLATELET # BLD AUTO: 218 10E3/UL (ref 150–450)
POTASSIUM BLD-SCNC: 4 MMOL/L (ref 3.4–5.3)
RBC # BLD AUTO: 5.14 10E6/UL (ref 4.4–5.9)
SODIUM SERPL-SCNC: 139 MMOL/L (ref 133–144)
WBC # BLD AUTO: 7.6 10E3/UL (ref 4–11)

## 2022-06-17 PROCEDURE — 80048 BASIC METABOLIC PNL TOTAL CA: CPT | Performed by: NURSE PRACTITIONER

## 2022-06-17 PROCEDURE — 36415 COLL VENOUS BLD VENIPUNCTURE: CPT | Performed by: NURSE PRACTITIONER

## 2022-06-17 PROCEDURE — 97530 THERAPEUTIC ACTIVITIES: CPT | Mod: GP | Performed by: REHABILITATION PRACTITIONER

## 2022-06-17 PROCEDURE — 250N000013 HC RX MED GY IP 250 OP 250 PS 637: Performed by: STUDENT IN AN ORGANIZED HEALTH CARE EDUCATION/TRAINING PROGRAM

## 2022-06-17 PROCEDURE — 250N000013 HC RX MED GY IP 250 OP 250 PS 637: Performed by: PHYSICIAN ASSISTANT

## 2022-06-17 PROCEDURE — 85027 COMPLETE CBC AUTOMATED: CPT | Performed by: NURSE PRACTITIONER

## 2022-06-17 PROCEDURE — 120N000002 HC R&B MED SURG/OB UMMC

## 2022-06-17 PROCEDURE — 999N000128 HC STATISTIC PERIPHERAL IV START W/O US GUIDANCE

## 2022-06-17 PROCEDURE — 250N000013 HC RX MED GY IP 250 OP 250 PS 637: Performed by: NURSE PRACTITIONER

## 2022-06-17 PROCEDURE — 99232 SBSQ HOSP IP/OBS MODERATE 35: CPT | Performed by: PHYSICIAN ASSISTANT

## 2022-06-17 PROCEDURE — 97116 GAIT TRAINING THERAPY: CPT | Mod: GP | Performed by: REHABILITATION PRACTITIONER

## 2022-06-17 RX ADMIN — Medication 5 MG: at 21:45

## 2022-06-17 RX ADMIN — SULFAMETHOXAZOLE AND TRIMETHOPRIM 1 TABLET: 800; 160 TABLET ORAL at 19:35

## 2022-06-17 RX ADMIN — FUROSEMIDE 20 MG: 20 TABLET ORAL at 07:41

## 2022-06-17 RX ADMIN — SULFAMETHOXAZOLE AND TRIMETHOPRIM 1 TABLET: 800; 160 TABLET ORAL at 07:41

## 2022-06-17 RX ADMIN — OXYCODONE HYDROCHLORIDE 5 MG: 5 TABLET ORAL at 10:26

## 2022-06-17 RX ADMIN — GABAPENTIN 100 MG: 100 CAPSULE ORAL at 07:41

## 2022-06-17 RX ADMIN — MICONAZOLE NITRATE: 2 POWDER TOPICAL at 07:43

## 2022-06-17 RX ADMIN — THERA TABS 1 TABLET: TAB at 07:41

## 2022-06-17 RX ADMIN — GABAPENTIN 100 MG: 100 CAPSULE ORAL at 19:35

## 2022-06-17 RX ADMIN — METHOCARBAMOL 1000 MG: 500 TABLET ORAL at 07:41

## 2022-06-17 RX ADMIN — ACETAMINOPHEN 325MG 975 MG: 325 TABLET ORAL at 16:18

## 2022-06-17 RX ADMIN — Medication 2000 UNITS: at 07:41

## 2022-06-17 RX ADMIN — INSULIN ASPART 1 UNITS: 100 INJECTION, SOLUTION INTRAVENOUS; SUBCUTANEOUS at 12:25

## 2022-06-17 RX ADMIN — LIDOCAINE PATCH 4% 1 PATCH: 40 PATCH TOPICAL at 18:06

## 2022-06-17 RX ADMIN — APIXABAN 5 MG: 5 TABLET, FILM COATED ORAL at 19:35

## 2022-06-17 RX ADMIN — APIXABAN 5 MG: 5 TABLET, FILM COATED ORAL at 07:41

## 2022-06-17 RX ADMIN — CALCITONIN SALMON 1 SPRAY: 200 SPRAY, METERED NASAL at 07:42

## 2022-06-17 RX ADMIN — MICONAZOLE NITRATE: 2 POWDER TOPICAL at 19:19

## 2022-06-17 RX ADMIN — METHOCARBAMOL 1000 MG: 500 TABLET ORAL at 12:23

## 2022-06-17 RX ADMIN — OXYCODONE HYDROCHLORIDE 5 MG: 5 TABLET ORAL at 21:45

## 2022-06-17 RX ADMIN — METHOCARBAMOL 1000 MG: 500 TABLET ORAL at 19:35

## 2022-06-17 RX ADMIN — ATORVASTATIN CALCIUM 20 MG: 20 TABLET, FILM COATED ORAL at 21:45

## 2022-06-17 RX ADMIN — METHOCARBAMOL 1000 MG: 500 TABLET ORAL at 16:18

## 2022-06-17 RX ADMIN — ACETAMINOPHEN 650 MG: 325 TABLET, FILM COATED ORAL at 07:41

## 2022-06-17 ASSESSMENT — ACTIVITIES OF DAILY LIVING (ADL)
ADLS_ACUITY_SCORE: 46
ADLS_ACUITY_SCORE: 42
ADLS_ACUITY_SCORE: 46
ADLS_ACUITY_SCORE: 46
ADLS_ACUITY_SCORE: 42
ADLS_ACUITY_SCORE: 42
ADLS_ACUITY_SCORE: 44
ADLS_ACUITY_SCORE: 46
ADLS_ACUITY_SCORE: 42
ADLS_ACUITY_SCORE: 42
ADLS_ACUITY_SCORE: 46
ADLS_ACUITY_SCORE: 42

## 2022-06-17 NOTE — PLAN OF CARE
Vitals: VSS on RA - known AFib   Neuros: Intact except BLE 4/5. No hallucinations noted.   IV: PIV SL  Labs/Electrolytes: WNL   Resp/trach: Dyspneic on exertion  Diet: Regular, fair PO  Bowel status: BS+, no BM this shift. LBM 6/16  : Voiding spontaneously.   Skin: Bruising throughout. Rash to right chest and groin, powder and intra-dry applied.  Pain: Controlled with robaxin, oxycodone and re-started on gabapentin.  Activity: A2/GB/walker. TLSO when OOB or HOB > 30. Slept in recliner overnight. Stated it was much more comfortable then his bed.  Social: Sleeping in between cares.  Plan: TCU at discharge pending pain control. Pain management.

## 2022-06-17 NOTE — PROGRESS NOTES
Care Management Initial Consult    General Information  Assessment completed with:  (pt, wife, son (Abraham), daughter (Fuad)),  (Pt, wife, son (Abraham), daughter (Fuad))  Type of CM/SW Visit: Initial Assessment    Primary Care Provider verified and updated as needed: Yes   Readmission within the last 30 days: no previous admission in last 30 days      Reason for Consult: discharge planning  Advance Care Planning:     (Pt does not have a health care directive)       Communication Assessment  Patient's communication style: spoken language (English or Bilingual)    Hearing Difficulty or Deaf: yes   Wear Glasses or Blind: yes    Cognitive  Cognitive/Neuro/Behavioral: WDL  Level of Consciousness: alert  Arousal Level: opens eyes spontaneously  Orientation: oriented x 4  Mood/Behavior: calm, cooperative  Best Language: 0 - No aphasia  Speech: clear, spontaneous, logical    Living Environment:   People in home:  (pt and wife)   (Pt (Alvin) wife (Rosie))  Current living Arrangements: house      Able to return to prior arrangements: yes       Family/Social Support:  Care provided by: self  Provides care for: no one  Marital Status:   Wife, Children   (Rosie)       Description of Support System: Supportive, Involved    Support Assessment: Adequate family and caregiver support    Current Resources:   Patient receiving home care services: No     Community Resources:  (Disability parking certificate)  Equipment currently used at home:  (Pt owns: hh shower nozzle, reacher, RTS with toilet frame, grab bar in the tub/shower combo)  Supplies currently used at home: None    Employment/Financial:  Employment Status: retired     Employment/ Comments:  (Pt served 2 years in the [employer])  Financial Concerns: No concerns identified   Referral to Financial Worker: No       Lifestyle & Psychosocial Needs:  Social Determinants of Health     Tobacco Use: Low Risk      Smoking Tobacco Use: Never Smoker     Smokeless Tobacco Use:  "Never Used   Alcohol Use: Not on file   Financial Resource Strain: Not on file   Food Insecurity: Not on file   Transportation Needs: Not on file   Physical Activity: Not on file   Stress: Not on file   Social Connections: Not on file   Intimate Partner Violence: Not on file   Depression: Not on file   Housing Stability: Not on file       Functional Status:  Prior to admission patient needed assistance:   Dependent ADLs::  (Indep with ambulation, transfer and ADL's prior to current hospitalization,  bed mobility was challenging due to \"frozen shoulder\")  Dependent IADLs::  (Wife primarily completed the IADL's with 10-15% assist from pt)       Mental Health Status:  Mental Health Status:  (No mental health history and no current concerns related to mental health)       Chemical Dependency Status:     Chemical Dependency Management:  (No CD history or no current concerns related to CD)          Values/Beliefs:  Spiritual, Cultural Beliefs, Adventism Practices, Values that affect care: yes          Values/Beliefs Comment:  (Ben)    Additional Information:  Prior to hospitalization pt was living with his wife (Rosie) in a town home.  There is one thresh hold step to enter the home.  Pt's bed, bath (step in shower and tub/shower combo) and laundry are on the main floor.  Prior to hospitalization pt was indep with ambulation (pt used a cane, 2 falls in the past year one resulting in a fracture), transfers and with adl's.  Pt states that bed mobility was difficult due to \"frozen shoulder.\"  Pt's wife primarily completed the housekeeping, laundry, cooking and shopping tasks with 10-15% assist from pt.  Pt drives.  Pt was indep with medication administration.  Pt's wife manages the household finances.  Pt owns the following DME:  Cane, old walker, hh shower nozzle, RTS with handrails, grab bar in the tub/shower, and a reacher.  Pt has a disability parking certificate.  Pt was not utilizing add'l community resources.  " "Pt does not have a known .   Pt does not have a health care directive.  Pt's \"Gnosticism\" sandra is important to hime.  Pt's income consists of Social Security and a pension.  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  Pt served 2 years in the [employer].  Pt's PCP is Dr. Lyons at [facility] in [place].  Pt has not had add'l hospitalizations in the past 30 days.  Pt does not have MI/CD history or current concerns related to MI/CD.  Pt's support system includes:  - Wife (Rosie)  - Son (Abraham, resides in [place])  - Daughter (Fuad, resides in [place], [place])  - Daughter (Sabine, resides in [place], [place]).      Disposition planning was discussed with pt, wife, son and daughter (Fuad).  A TCU stay is recommended.  Pt and family voice agreement with this recommendation.  SW provided a \"Medicare Care Compare\" document.      Pt's family identified the following facility preferences (and will determine 6 add'l preferences in the event that this is needed:  - Laura on  TCU ,  sent referral via [facility]  - Karen on [facility] faxed referral to [facility]  - [facility] faxed referral via [facility]  - [facility] faxed referral via [facility].    ALIREZA will follow for discharge planning.    HAETH Erickson  Social Work, 6A  Phone:  [phone]  Pager:  [phone]  6/17/2022        EITAN Crabtree      "

## 2022-06-17 NOTE — PLAN OF CARE
Vitals: VSS - known AFib   Neuros: Intact except BLE 4/5. No hallucinations noted.   IV: PIV SLd  Labs/Electrolytes: WNL   Resp/trach: Dyspneic on exertion, LS clear  Diet: Regular diet - Fair intake   Bowel status: BM 6/16  : Voided x 1  Skin: Bruising throughout. Rash to right chest and groin, powder and intra-dry applied.   Pain: Lumbar pain controlled with robaxin, oxycodone   Activity: Up with assist of 1-2 GB, walker. TLSO when OOB or HOB > 30. Sat in recliner most of shift. Brace was off from [id number] while pt was in bed, skin intact.   Social: Daughter updated. Son and wife will be coming this afternoon.   Plan: TCU at discharge - Pt is medically ready.

## 2022-06-17 NOTE — PLAN OF CARE
Goal Outcome Evaluation:    Status: Fall and Closed fracture of first lumbar vertebra  Pain/Nausea: reported nausea, given oral Zofran and Oxycodone - effective   Mobility: Up with assist of 2, gait belt and walker,   Diet: Refused dinner, drank ensure and few bites of donut  Labs: Reviewed   LDAs: PIV SL   Skin/incisions:  Rash under R breast, and bruising on both arms  Neuro:  A/O X4   Respiratory: Some dyspnea on exertion, utilizes 1L O2 NC for comfort.   Cardiac: Apical - Irregular pulse, Hx A-fib  GI/: Urinal at bedside, No BM   New Changes: TLSO on,   Plan: Waiting for TCU placement.      Plan of Care Reviewed With: patient

## 2022-06-17 NOTE — PLAN OF CARE
0446-1207:  No changes since previous RN. Pt working well with therapies @ 1445, denies pain, completed his lunch.

## 2022-06-17 NOTE — PROGRESS NOTES
[facility]  Trauma Service Progress Note    Date of Service: 06/17/2022    Trauma mechanism:Fall from bar stool  Time/date of injury: 6/6/22  Known Injuries:  1. Acute unstable L1 fracture  2. Posterior longitudinal Ligament injury    Assessment & Plan   Neuro/Pain/Psych:  # Acute traumatic pain  - Pain service consulted and is following pain management.   - Current Pain Regimen:   - Scheduled: Tylenol, Robaxin 1,000mg 4x a day, Miacalcin nasal spray alternating nostrils for a total of 30days, Gabapentin restarted 6/17 at 100mg bid - pain is better managed with no hallucinations. Will continue on low dose.   - PRN: Tylenol, Menthol, Oxycodone   - Completed 72 hours of Toradol   - Aqua K cooling pad to the low back can be used     Delirium prevention measures:  - Maintain circadian rhythm. Lights on during the day. Off at night, minimize cares at night. OOB during the day. Some confusion reported last evening otherwise slept well and is oriented  - Scheduled Melatonin 5mg for sleep hygiene     Pulmonary:  # Acute Hypoxia, resolved, on room air  - Reports dyspnea on exertion, on room air, CXR with some pulmonary edema, additional dose of Furosemide given yesterday.   - Early mobility, pulmonary hygiene  - Supplemental oxygen to keep saturation above 92 %  - Aggressive pulmonary hygiene. Incentive spirometer while awake      Cardiovascular:    # Hx P Afib   # HTN, HLD  # NSTEMI 2017  - 12 lead EKG: Afib +rbbb (noted on prior 12 lead EKG's from 2017), HR 80 to 160's, . BP stable  - Continue PTA: Metoprolol 100mg XL, Atorvastatin, Apixaban, Furosemide  - Electrolyte replacements, maintain K >4.0, mag >2.0     GI/Nutrition:    - Regular Diet   - Bowel regimen  - Diarrhea hold bowel regimen for loose stool     Renal/ Fluids/Electrolytes:  - electrolyte replacement protocol in place.   - Lab frequency changed to three times a week, or obtain as needed  - Continue PTA: Lasix       Endocrine:  # Diabetes Mellitus with hyperglycemia  - No medications or insulin prior to admission.  - Medium correction Novolog scale insulin     Infectious disease:   # Possible UTI  - 6/14: Blood Trace, Leukocyte Esterase: Large, WBC 58, Bacteria Few, UC: Klebsiella aerogenes      Antibiotic Regimen::   - Ceftriaxone: 6/15 x1  - Bactrim BS started 6/16 for 5 day     Hematology:    # Coagulopathy  - On Apixaban PTA chronically for stroke prophylaxis with Hx of Afib, continued on 6/8/22.   - Admitted with Hb of 16.2g/dL, stable without the need for transfusion  - Threshold for transfusion if hgb <7.0 or signs/symptoms of hypoperfusion.        Musculoskeletal:  # Fall  # L1 unstable fracture  Seen and evaluated by Neurosurgery, managing fracture conservatively with pain control and bracing  - Upright XR's completed and reviewed by Neurosurgery  - TLSO brace when HOB >30degrees and when out of bed, OK to remove while <30degrees  - Follow up recommended in 4-6 weeks with repeat Upright Thoracic/Lumbar X-Rays outpatient at the [facility].  - Physical and occupational therapy consults     Skin:  # Skin irritation from TLSO  - continue Miconazole 2% powder, wear Tshirt under brace   - dilgent cares to prevent skin breakdown and wound formation.       Palliative Consulted   Pain team  Care Conference 6/10: Patient's family wants to defer surgery at this time since it would require a fusion from T11 to lower lumbar. They are concerned about the healing and recovery time. Goal is to manage pain, improve delirium and discharge  Social work consult: TCU placement     Lines/ tubes/ drains:  - PIV   General Cares:                 PPI/H2 blocker:  NA                DVT prophylaxis: PTA Apixaban                Bowel Regimen/Date of last stool: 06/15, diarrhea                Pulmonary toilet: IS     Code status:  Full Code   Discharge goals:     Adequate pain management: in process    VSS x24 hours: yes    Hemoglobin  stable x 48 hours: yes    Ambulating safely and/or therapy evals complete:Yes    Drains/lines removed or plan in place to manage: yes    Teaching done:yes    Other:  Expected D/C date: Pt medically ready for TCU    Lakia Bains PA-C  To contact the trauma service use job code pager [phone],   Numeric texts or alpha text through [facility]     Interval History   Patient had much better pain management today when up and moving on current pain regimen. He did decline morning Tylenol. Previous to admission patient would take no medications even when in severe shoulder and back pain. Or would take 1/2 a tab of Tylenol.   ROS x 8 negative with exception of those things listed in interval hx    Physical Exam   Temp: 97.6  F (36.4  C) Temp src: Oral BP: 102/65 Pulse: 82   Resp: 18 SpO2: 98 % O2 Device: None (Room air) Oxygen Delivery: 1 LPM  Vitals:    06/12/22 0000 06/13/22 1739 06/14/22 1300   Weight: 126.9 kg (279 lb 12.2 oz) 124.7 kg (274 lb 14.6 oz) 124.1 kg (273 lb 9.5 oz)     Vital Signs with Ranges  Temp:  [97.5  F (36.4  C)-97.6  F (36.4  C)] 97.6  F (36.4  C)  Pulse:  [71-82] 82  Resp:  [14-18] 18  BP: ()/(65-88) 102/65  SpO2:  [98 %-100 %] 98 %  I/O last 3 completed shifts:  In: 400 [P.O.:400]  Out: 950 [Urine:950]    Amber Coma Scale - Total 15/15  Eye Response (E): 4   4= spontaneous, 3= to verbal/voice, 2= to pain, 1= No response   Verbal Response (V): 5   5= Orientated, converses, 4= Confused, converses, 3= Inappropriate words, 2= Incomprehensible sounds, 1=No response   Motor Response (M): 6   6= Obeys commands, 5= Localizes to pain, 4= Withdrawal to pain, 3=Fexion to pain, 2= Extension to pain, 1= No response     Constitutional: Awake, alert, calm  ENT: Normocephalic, atraumatic  Respiratory: No increased work of breathing, good air exchange, clear to auscultation bilaterally,  Cardiovascular:  regular rate and rhythm, normal S1 and S2  GI: TLSO on, unable to assess  Genitourinary:  Voids  spont.  Skin:  Ecchymotic areas to the left elbow and left flank possibly from friction/ shear, no open wounds, paola redness, improving  Musculoskeletal: There is no redness, warmth, or swelling of the joints.   Neurologic: Awake, alert, oriented. Strength and sensory is intact. No focal deficits.  Neuropsychiatric: Calm, normal eye contact, alert

## 2022-06-18 ENCOUNTER — APPOINTMENT (OUTPATIENT)
Dept: OCCUPATIONAL THERAPY | Facility: CLINIC | Age: 87
DRG: 552 | End: 2022-06-18
Payer: COMMERCIAL

## 2022-06-18 LAB
BACTERIA UR CULT: ABNORMAL
BACTERIA UR CULT: ABNORMAL
GLUCOSE BLDC GLUCOMTR-MCNC: 112 MG/DL (ref 70–99)
GLUCOSE BLDC GLUCOMTR-MCNC: 130 MG/DL (ref 70–99)
GLUCOSE BLDC GLUCOMTR-MCNC: 131 MG/DL (ref 70–99)
GLUCOSE BLDC GLUCOMTR-MCNC: 137 MG/DL (ref 70–99)
GLUCOSE BLDC GLUCOMTR-MCNC: 145 MG/DL (ref 70–99)

## 2022-06-18 PROCEDURE — 250N000013 HC RX MED GY IP 250 OP 250 PS 637: Performed by: STUDENT IN AN ORGANIZED HEALTH CARE EDUCATION/TRAINING PROGRAM

## 2022-06-18 PROCEDURE — 120N000002 HC R&B MED SURG/OB UMMC

## 2022-06-18 PROCEDURE — G0463 HOSPITAL OUTPT CLINIC VISIT: HCPCS

## 2022-06-18 PROCEDURE — 97535 SELF CARE MNGMENT TRAINING: CPT | Mod: GO

## 2022-06-18 PROCEDURE — 250N000011 HC RX IP 250 OP 636: Performed by: STUDENT IN AN ORGANIZED HEALTH CARE EDUCATION/TRAINING PROGRAM

## 2022-06-18 PROCEDURE — 250N000013 HC RX MED GY IP 250 OP 250 PS 637: Performed by: NURSE PRACTITIONER

## 2022-06-18 PROCEDURE — 250N000013 HC RX MED GY IP 250 OP 250 PS 637: Performed by: PHYSICIAN ASSISTANT

## 2022-06-18 PROCEDURE — 99232 SBSQ HOSP IP/OBS MODERATE 35: CPT | Performed by: PHYSICIAN ASSISTANT

## 2022-06-18 PROCEDURE — 258N000003 HC RX IP 258 OP 636: Performed by: STUDENT IN AN ORGANIZED HEALTH CARE EDUCATION/TRAINING PROGRAM

## 2022-06-18 RX ORDER — PROCHLORPERAZINE MALEATE 5 MG
5 TABLET ORAL EVERY 6 HOURS PRN
Status: DISCONTINUED | OUTPATIENT
Start: 2022-06-18 | End: 2022-06-20 | Stop reason: HOSPADM

## 2022-06-18 RX ORDER — PROCHLORPERAZINE 25 MG
12.5 SUPPOSITORY, RECTAL RECTAL EVERY 12 HOURS PRN
Status: DISCONTINUED | OUTPATIENT
Start: 2022-06-18 | End: 2022-06-20 | Stop reason: HOSPADM

## 2022-06-18 RX ADMIN — APIXABAN 5 MG: 5 TABLET, FILM COATED ORAL at 19:47

## 2022-06-18 RX ADMIN — OXYCODONE HYDROCHLORIDE 10 MG: 5 TABLET ORAL at 10:21

## 2022-06-18 RX ADMIN — ATORVASTATIN CALCIUM 20 MG: 20 TABLET, FILM COATED ORAL at 21:46

## 2022-06-18 RX ADMIN — METHOCARBAMOL 1000 MG: 500 TABLET ORAL at 10:06

## 2022-06-18 RX ADMIN — GABAPENTIN 100 MG: 100 CAPSULE ORAL at 10:06

## 2022-06-18 RX ADMIN — CALCITONIN SALMON 1 SPRAY: 200 SPRAY, METERED NASAL at 10:06

## 2022-06-18 RX ADMIN — SULFAMETHOXAZOLE AND TRIMETHOPRIM 1 TABLET: 800; 160 TABLET ORAL at 19:47

## 2022-06-18 RX ADMIN — GABAPENTIN 100 MG: 100 CAPSULE ORAL at 19:47

## 2022-06-18 RX ADMIN — SENNOSIDES AND DOCUSATE SODIUM 1 TABLET: 8.6; 5 TABLET ORAL at 19:47

## 2022-06-18 RX ADMIN — OXYCODONE HYDROCHLORIDE 5 MG: 5 TABLET ORAL at 19:47

## 2022-06-18 RX ADMIN — ACETAMINOPHEN 325MG 975 MG: 325 TABLET ORAL at 10:05

## 2022-06-18 RX ADMIN — SODIUM CHLORIDE, POTASSIUM CHLORIDE, SODIUM LACTATE AND CALCIUM CHLORIDE 500 ML: 600; 310; 30; 20 INJECTION, SOLUTION INTRAVENOUS at 23:44

## 2022-06-18 RX ADMIN — POLYETHYLENE GLYCOL 3350 17 G: 17 POWDER, FOR SOLUTION ORAL at 10:05

## 2022-06-18 RX ADMIN — METHOCARBAMOL 1000 MG: 500 TABLET ORAL at 14:04

## 2022-06-18 RX ADMIN — METHOCARBAMOL 1000 MG: 500 TABLET ORAL at 19:47

## 2022-06-18 RX ADMIN — METOPROLOL SUCCINATE 100 MG: 50 TABLET, EXTENDED RELEASE ORAL at 10:08

## 2022-06-18 RX ADMIN — APIXABAN 5 MG: 5 TABLET, FILM COATED ORAL at 10:06

## 2022-06-18 RX ADMIN — MICONAZOLE NITRATE: 2 POWDER TOPICAL at 19:55

## 2022-06-18 RX ADMIN — SULFAMETHOXAZOLE AND TRIMETHOPRIM 1 TABLET: 800; 160 TABLET ORAL at 10:06

## 2022-06-18 RX ADMIN — ACETAMINOPHEN 325MG 975 MG: 325 TABLET ORAL at 16:48

## 2022-06-18 RX ADMIN — OXYCODONE HYDROCHLORIDE 10 MG: 5 TABLET ORAL at 14:04

## 2022-06-18 RX ADMIN — MICONAZOLE NITRATE: 2 POWDER TOPICAL at 08:40

## 2022-06-18 RX ADMIN — FUROSEMIDE 20 MG: 20 TABLET ORAL at 10:06

## 2022-06-18 RX ADMIN — ONDANSETRON 4 MG: 2 INJECTION INTRAMUSCULAR; INTRAVENOUS at 08:38

## 2022-06-18 ASSESSMENT — ACTIVITIES OF DAILY LIVING (ADL)
ADLS_ACUITY_SCORE: 42
ADLS_ACUITY_SCORE: 46
ADLS_ACUITY_SCORE: 45
ADLS_ACUITY_SCORE: 42
ADLS_ACUITY_SCORE: 45
ADLS_ACUITY_SCORE: 42
ADLS_ACUITY_SCORE: 42
ADLS_ACUITY_SCORE: 45

## 2022-06-18 NOTE — PLAN OF CARE
Status: admitted following a fall on 6/6 causing unable L1 fracture.  Vitals: VSS, known Afib  Neuros: AO x4. [place]. BLE numbness, baseline per pt. Edema to BLE.   IV: PIV SL  Labs/Electrolytes: WNL  Resp/trach: dyspnea on exertion, LS clear.  Diet: Regular diet, fair intake  Bowel status: LBM 6/16  : voiding via bedside urinal, +UTI - strains to void  Skin: generalized bruising. Rash to right chest and groin/perineum- powder applied per orders. Red spots noted on R side of abdomen, marked and WOC consult placed for possible start of pressure injury r/t brace.  Pain: back pain, scheduled tylenol/robaxin and prn oxycodone x1 effective.  Activity: A2 GB/Walker. TLSO when OOB or HOB > 30. Up in chair this afternoon.  Social: wife and son at bedside this shift, supportive.  Plan: awaiting TCU placement, continue POC.

## 2022-06-18 NOTE — PROGRESS NOTES
[facility]  Trauma Service Progress Note    Date of Service: 06/18/2022    Trauma mechanism:Fall from bar stool  Time/date of injury: 6/6/22  Known Injuries:  1. Acute unstable L1 fracture  2. Posterior longitudinal Ligament injury     Assessment & Plan   Neuro/Pain/Psych:  # Acute traumatic pain  - Pain service consulted and is following pain management.   - Current Pain Regimen:   - Scheduled: Tylenol, Robaxin 1,000mg 4x a day, Miacalcin nasal spray alternating nostrils for a total of 30days, Gabapentin restarted 6/17 at 100mg bid - pain is better managed with no hallucinations. Will continue on low dose.   - PRN: Tylenol, Menthol, Oxycodone   - Completed 72 hours of Toradol   - Aqua K cooling pad to the low back can be used     Delirium prevention measures:  - Maintain circadian rhythm. Lights on during the day. Off at night, minimize cares at night. OOB during the day. Some confusion reported last evening otherwise slept well and is oriented  - Scheduled Melatonin 5mg for sleep hygiene     Pulmonary:  # Acute Hypoxia, resolved, on room air  - Reports dyspnea on exertion, on room air, CXR with some pulmonary edema, additional dose of Furosemide given yesterday.   - Early mobility, pulmonary hygiene  - Supplemental oxygen to keep saturation above 92 %  - Aggressive pulmonary hygiene. Incentive spirometer while awake      Cardiovascular:    # Hx P Afib   # HTN, HLD  # NSTEMI 2017  - 12 lead EKG: Afib +rbbb (noted on prior 12 lead EKG's from 2017), HR 80 to 160's, . BP stable  - Continue PTA: Metoprolol 100mg XL, Atorvastatin, Apixaban, Furosemide  - Electrolyte replacements, maintain K >4.0, mag >2.0     GI/Nutrition:    - Regular Diet   - Bowel regimen  - Last BM on 6/16  - Nausea and vomiting today, prn Zofran and Compazine. Patient did not take pain meds overnight, increased pain with movement this morning witch may have contributed to.      Renal/  Fluids/Electrolytes:  - electrolyte replacement protocol in place.   - Lab frequency changed to three times a week, or obtain as needed  - Continue PTA: Lasix      Endocrine:  # Diabetes Mellitus with hyperglycemia  - No medications or insulin prior to admission.  - Medium correction Novolog scale insulin      Infectious disease:   # UTI  Klebsiella aerogenes   - 6/14: Blood Trace, Leukocyte Esterase: Large, WBC 58, Bacteria Few, UC: Klebsiella aerogenes      Antibiotic Regimen::   - Ceftriaxone: 6/15 x1  - Bactrim BS started 6/16 for 5 day     Hematology:    # Coagulopathy  - On Apixaban PTA chronically for stroke prophylaxis with Hx of Afib, continued on 6/8/22.   - Admitted with Hb of 16.2g/dL, stable without the need for transfusion  - Threshold for transfusion if hgb <7.0 or signs/symptoms of hypoperfusion.        Musculoskeletal:  # Fall  # L1 unstable fracture  Seen and evaluated by Neurosurgery, managing fracture conservatively with pain control and bracing  - Upright XR's completed and reviewed by Neurosurgery  - TLSO brace when HOB >30degrees and when out of bed, OK to remove while <30degrees  - Follow up recommended in 4-6 weeks with repeat Upright Thoracic/Lumbar X-Rays outpatient at the Neurosurgery Clinic.  - Physical and occupational therapy consults     Skin:  # Skin irritation from TLSO, improving   - continue Miconazole 2% powder, wear Tshirt or gown under brace   - dilgent cares to prevent skin breakdown and wound formation.       Palliative Consulted   Pain team  Care Conference 6/10: Patient's family wants to defer surgery at this time since it would require a fusion from T11 to lower lumbar. They are concerned about the healing and recovery time. Goal is to manage pain, improve delirium and discharge  Social work consult: TCU placement     Lines/ tubes/ drains:  - PIV   General Cares:                 PPI/H2 blocker:  NA                DVT prophylaxis: PTA Apixaban                Bowel  Regimen/Date of last stool: 06/15, diarrhea                Pulmonary toilet: IS     Code status:  Full Code   Discharge goals:     Adequate pain management: in process    VSS x24 hours: yes    Hemoglobin stable x 48 hours: yes    Ambulating safely and/or therapy evals complete:Yes    Drains/lines removed or plan in place to manage: yes    Teaching done:yes    Other:  Expected D/C date: Pt medically ready for TCU     Lakia Bains PA-C  To contact the trauma service use job code pager [phone],   Numeric texts or alpha text through [facility]     Interval History   Patient had increased pain with putting on brace and getting up this morning, which caused nausea then vomiting. This may be because patient did not get any pain medications overnight. Patient states he did not refuse. Nurse already has plans to make sure that pain plan continues throughout night because of the set backs experienced today.   ROS x 8 negative with exception of those things listed in interval hx    Physical Exam   Temp: 98.2  F (36.8  C) Temp src: Oral BP: (!) 123/100 (RN aware, will reassess) Pulse: 83   Resp: 16 SpO2: 94 % O2 Device: None (Room air)    Vitals:    06/12/22 0000 06/13/22 1739 06/14/22 1300   Weight: 126.9 kg (279 lb 12.2 oz) 124.7 kg (274 lb 14.6 oz) 124.1 kg (273 lb 9.5 oz)     Vital Signs with Ranges  Temp:  [97.5  F (36.4  C)-98.2  F (36.8  C)] 98.2  F (36.8  C)  Pulse:  [55-83] 83  Resp:  [16-18] 16  BP: (106-133)/() 123/100  SpO2:  [94 %-100 %] 94 %  I/O last 3 completed shifts:  In: 860 [P.O.:860]  Out: 1400 [Urine:1400]    [place] Coma Scale - Total 15/15  Eye Response (E): 4   4= spontaneous, 3= to verbal/voice, 2= to pain, 1= No response   Verbal Response (V): 5   5= Orientated, converses, 4= Confused, converses, 3= Inappropriate words, 2= Incomprehensible sounds, 1=No response   Motor Response (M): 6   6= Obeys commands, 5= Localizes to pain, 4= Withdrawal to pain, 3=Fexion to pain, 2= Extension to pain, 1= No  response     Constitutional: Awake, alert, sitting in chair, vomiting small amounts into blue bag  ENT: Normocephalic, atraumatic  Respiratory: No increased work of breathing, good air exchange, clear to auscultation bilaterally,  Cardiovascular:  regular rate and rhythm, normal S1 and S2  GI: TLSO on, unable to assess abdomen,   Genitourinary:  Voids spont.  Skin:  Warm & dry  Musculoskeletal: There is no redness, warmth, or swelling of the joints.   Neurologic: Awake, alert, oriented. Strength and sensory is intact. No focal deficits.  Neuropsychiatric:  normal eye contact, alert

## 2022-06-18 NOTE — CONSULTS
Focus- Abdomen and lateral back  D/I/A: Received consult for suspected pressure injury on R) abdomen related to TLSO brace. Assessed the area and noted blanchable erythema. Currently has foam lining on the brace for protection. No pressure injury detected.   P. Continue to follow pressure injury prevention protocol. Cover the area with Mepilex for protection if needed. Discussed the plan with RN. No further visit planned, will sign off.

## 2022-06-18 NOTE — PLAN OF CARE
Pt here with L1 fx 2/2 fall @ home, vs ex HTN with no interventions, HR displaying a.fib (as low as 47 and high as 142), pt denies any s/sx of a.fib and primary service was contacted. Neuros include: a/o x4, 5/5 with weakness 2/2 pain, BLE N/T @ baseline and slightly forgetful, [place]. PIV SL, regular diet with poor PO intake, voiding with hesitancy and needs cueing. Pt up AO2 w/GB/walker/TLSO brace, smear BM this morning, up in chair x1 and worked with OT. Pt receieving PRN and scheduled pain meds with relief, pt needs encouragement to take pain meds. Per family and pt he doesn't really like to take them but with education pt was more accepting and agreed that they provide better relief and are helpful, Please give pain meds over night. Continue to care per orders.

## 2022-06-18 NOTE — PLAN OF CARE
Status: admitted following a fall on 6/6 causing unable L1 fracture.  Vitals: VSS, known Afib  Neuros: AO x4. [place]. BLE numbness, baseline per pt. Edema to BLE.   IV: PIV SL  Labs/Electrolytes: Blood Glucose   Resp/trach: dyspnea on exertion, LS clear.  Diet: Regular diet  Bowel status: LBM 6/16, BS+  : voiding via bedside urinal, +UTI - strains to void  Skin: generalized bruising. Rash to right chest and groin/perineum- powder applied per orders. Red spots noted on R side of abdomen, marked and WOC consult placed for possible start of pressure injury r/t brace.  Pain: back pain, scheduled tylenol/robaxin and prn oxycodone. Denied pain on shift.  Activity: A2 GB/Walker. TLSO when OOB or HOB > 30. Up in chair this afternoon.  Social:  Plan: awaiting TCU placement

## 2022-06-19 LAB
GLUCOSE BLDC GLUCOMTR-MCNC: 107 MG/DL (ref 70–99)
GLUCOSE BLDC GLUCOMTR-MCNC: 118 MG/DL (ref 70–99)
GLUCOSE BLDC GLUCOMTR-MCNC: 132 MG/DL (ref 70–99)
GLUCOSE BLDC GLUCOMTR-MCNC: 141 MG/DL (ref 70–99)
GLUCOSE BLDC GLUCOMTR-MCNC: 159 MG/DL (ref 70–99)
GLUCOSE BLDC GLUCOMTR-MCNC: 160 MG/DL (ref 70–99)
LACTATE SERPL-SCNC: 1.5 MMOL/L (ref 0.7–2)

## 2022-06-19 PROCEDURE — 250N000013 HC RX MED GY IP 250 OP 250 PS 637: Performed by: STUDENT IN AN ORGANIZED HEALTH CARE EDUCATION/TRAINING PROGRAM

## 2022-06-19 PROCEDURE — 250N000013 HC RX MED GY IP 250 OP 250 PS 637: Performed by: PHYSICIAN ASSISTANT

## 2022-06-19 PROCEDURE — 99232 SBSQ HOSP IP/OBS MODERATE 35: CPT | Performed by: PHYSICIAN ASSISTANT

## 2022-06-19 PROCEDURE — 250N000013 HC RX MED GY IP 250 OP 250 PS 637: Performed by: NURSE PRACTITIONER

## 2022-06-19 PROCEDURE — 258N000003 HC RX IP 258 OP 636: Performed by: PHYSICIAN ASSISTANT

## 2022-06-19 PROCEDURE — 120N000002 HC R&B MED SURG/OB UMMC

## 2022-06-19 PROCEDURE — 83605 ASSAY OF LACTIC ACID: CPT | Performed by: SURGERY

## 2022-06-19 PROCEDURE — 36415 COLL VENOUS BLD VENIPUNCTURE: CPT | Performed by: SURGERY

## 2022-06-19 RX ADMIN — ACETAMINOPHEN 325MG 975 MG: 325 TABLET ORAL at 00:00

## 2022-06-19 RX ADMIN — ATORVASTATIN CALCIUM 20 MG: 20 TABLET, FILM COATED ORAL at 20:10

## 2022-06-19 RX ADMIN — INSULIN ASPART 1 UNITS: 100 INJECTION, SOLUTION INTRAVENOUS; SUBCUTANEOUS at 11:51

## 2022-06-19 RX ADMIN — GABAPENTIN 100 MG: 100 CAPSULE ORAL at 20:10

## 2022-06-19 RX ADMIN — SENNOSIDES AND DOCUSATE SODIUM 2 TABLET: 8.6; 5 TABLET ORAL at 07:58

## 2022-06-19 RX ADMIN — SENNOSIDES AND DOCUSATE SODIUM 1 TABLET: 8.6; 5 TABLET ORAL at 20:10

## 2022-06-19 RX ADMIN — SULFAMETHOXAZOLE AND TRIMETHOPRIM 1 TABLET: 800; 160 TABLET ORAL at 07:58

## 2022-06-19 RX ADMIN — METHOCARBAMOL 1000 MG: 500 TABLET ORAL at 11:49

## 2022-06-19 RX ADMIN — ACETAMINOPHEN 325MG 975 MG: 325 TABLET ORAL at 07:58

## 2022-06-19 RX ADMIN — METHOCARBAMOL 1000 MG: 500 TABLET ORAL at 07:58

## 2022-06-19 RX ADMIN — APIXABAN 5 MG: 5 TABLET, FILM COATED ORAL at 07:58

## 2022-06-19 RX ADMIN — ACETAMINOPHEN 325MG 975 MG: 325 TABLET ORAL at 16:02

## 2022-06-19 RX ADMIN — OXYCODONE HYDROCHLORIDE 5 MG: 5 TABLET ORAL at 17:41

## 2022-06-19 RX ADMIN — FUROSEMIDE 20 MG: 20 TABLET ORAL at 07:58

## 2022-06-19 RX ADMIN — APIXABAN 5 MG: 5 TABLET, FILM COATED ORAL at 20:10

## 2022-06-19 RX ADMIN — SODIUM CHLORIDE, POTASSIUM CHLORIDE, SODIUM LACTATE AND CALCIUM CHLORIDE 500 ML: 600; 310; 30; 20 INJECTION, SOLUTION INTRAVENOUS at 17:33

## 2022-06-19 RX ADMIN — THERA TABS 1 TABLET: TAB at 07:58

## 2022-06-19 RX ADMIN — GABAPENTIN 100 MG: 100 CAPSULE ORAL at 07:58

## 2022-06-19 RX ADMIN — MICONAZOLE NITRATE: 2 POWDER TOPICAL at 20:21

## 2022-06-19 RX ADMIN — SULFAMETHOXAZOLE AND TRIMETHOPRIM 1 TABLET: 800; 160 TABLET ORAL at 20:10

## 2022-06-19 RX ADMIN — MICONAZOLE NITRATE: 2 POWDER TOPICAL at 08:07

## 2022-06-19 RX ADMIN — Medication 2000 UNITS: at 07:58

## 2022-06-19 RX ADMIN — METHOCARBAMOL 1000 MG: 500 TABLET ORAL at 16:02

## 2022-06-19 RX ADMIN — CALCITONIN SALMON 1 SPRAY: 200 SPRAY, METERED NASAL at 07:59

## 2022-06-19 RX ADMIN — METHOCARBAMOL 1000 MG: 500 TABLET ORAL at 20:10

## 2022-06-19 ASSESSMENT — ACTIVITIES OF DAILY LIVING (ADL)
ADLS_ACUITY_SCORE: 45
ADLS_ACUITY_SCORE: 43
ADLS_ACUITY_SCORE: 43
ADLS_ACUITY_SCORE: 45
ADLS_ACUITY_SCORE: 40
ADLS_ACUITY_SCORE: 45
ADLS_ACUITY_SCORE: 43
ADLS_ACUITY_SCORE: 43
ADLS_ACUITY_SCORE: 45
ADLS_ACUITY_SCORE: 40
ADLS_ACUITY_SCORE: 43
ADLS_ACUITY_SCORE: 45

## 2022-06-19 NOTE — PLAN OF CARE
Status: admitted following a fall on 6/6 causing unstable L1 fracture.  Vitals: VSS ex soft BP's, known Afib. 500 LR bolus givne  Neuros: AO x4. [place]. BLE numbness, baseline per pt. Edema to BLE. d/o to time for 0000 assessment  IV: PIV SL  Labs/Electrolytes: Lactate 1.5. Blood glucose checks  Resp/trach: dyspnea on exertion, LS clear.  Diet: Regular diet   Bowel status: LBM 6/18,  : voiding via bedside urinal, +UTI - strains to void  Skin: generalized bruising. Rash to right chest and groin/perineum- powder applied per orders. Red spots noted on R side of abdomen r/t brace.  Pain: back pain, scheduled tylenol/robaxin and prn oxycodone effective   Activity: A2 GB/Walker. TLSO when OOB or HOB > 30.    Social:   Plan: awaiting TCU placement

## 2022-06-19 NOTE — PLAN OF CARE
Status: Pt admitted on 6/6 following a fall, which cased an unstable L1 fracture.   Vitals: VSS on RA ex soft BP. Known A fib.   Neuros: A&O x4. N/T and edema to BLE, baseline. [place].   IV: PIV SL.   Labs/Electrolytes: WDL.   Resp/trach: LS clear. Denies SOB.   Diet: Regular, fair intake.   Bowel status: BS+. LBM 6/18.  : Voids via bedside urinal. UTI+. Strains to void.   Skin: Rash to right chest and perineum, powder applied per MAR. Small red marks noted on R side of abdomen, marked, appear to be from brace. Generalized bruising.   Pain: C/o pain in mid-back. Scheduled Tylenol and Robaxin given, effective.   Activity: Assist of 1-2, GB, walker. TLSO on when OOB or HOB >30. Pt up in chair this afternoon, tolerating well.   Social: Daughter currently at bedside, supportive of pt.  Plan: Awaiting TCU placement. Continue follow plan of care.

## 2022-06-19 NOTE — PROGRESS NOTES
[facility]  Trauma Service Progress Note    Date of Service: 06/19/2022    Trauma mechanism:Fall from bar stool  Time/date of injury: 6/6/22  Known Injuries:  1. Acute unstable L1 fracture  2. Posterior longitudinal Ligament injury     Assessment & Plan   Neuro/Pain/Psych:  # Acute traumatic pain  - Pain service consulted and is following pain management.   - Current Pain Regimen:   - Scheduled: Tylenol, Robaxin 1,000mg 4x a day, Miacalcin nasal spray alternating nostrils for a total of 30days, Gabapentin restarted 6/17 at 100mg bid - pain is better managed with no hallucinations. Will continue on low dose.   - PRN: Tylenol, Menthol, Oxycodone   - Completed 72 hours of Toradol   - Aqua K cooling pad to the low back can be used     Delirium prevention measures:  - Maintain circadian rhythm. Lights on during the day. Off at night, minimize cares at night. OOB during the day. Some confusion reported last evening otherwise slept well and is oriented  - Scheduled Melatonin 5mg for sleep hygiene     Pulmonary:  # Acute Hypoxia, resolved, on room air  - Reports dyspnea on exertion, on room air, CXR with some pulmonary edema, additional dose of Furosemide given yesterday.   - Early mobility, pulmonary hygiene  - Supplemental oxygen to keep saturation above 92 %  - Aggressive pulmonary hygiene. Incentive spirometer while awake      Cardiovascular:    # Hx P Afib   # HTN, HLD  # NSTEMI 2017  - 12 lead EKG: Afib +rbbb (noted on prior 12 lead EKG's from 2017), HR 80 to 160's, . BP stable  - Continue PTA: Metoprolol 100mg XL, Atorvastatin, Apixaban, Furosemide  - Electrolyte replacements, maintain K >4.0, mag >2.0  - Hypotensive overnight, improved with fluids     GI/Nutrition:    - Regular Diet   - Bowel regimen  - Last BM on 6/16  - Nausea and vomiting today, prn Zofran and Compazine. Patient did not take pain meds overnight, increased pain with movement this morning witch may have  contributed to.      Renal/ Fluids/Electrolytes:  - electrolyte replacement protocol in place.   - Lab frequency changed to three times a week, or obtain as needed  - Continue PTA: Lasix      Endocrine:  # Diabetes Mellitus with hyperglycemia  - No medications or insulin prior to admission.  - Medium correction Novolog scale insulin      Infectious disease:   # UTI  Klebsiella aerogenes   - 6/14: Blood Trace, Leukocyte Esterase: Large, WBC 58, Bacteria Few, UC: Klebsiella aerogenes      Antibiotic Regimen::   - Ceftriaxone: 6/15 x1  - Bactrim BS started 6/16 for 5 day     Hematology:    # Coagulopathy  - On Apixaban PTA chronically for stroke prophylaxis with Hx of Afib, continued on 6/8/22.   - Admitted with Hb of 16.2g/dL, stable without the need for transfusion  - Threshold for transfusion if hgb <7.0 or signs/symptoms of hypoperfusion.        Musculoskeletal:  # Fall  # L1 unstable fracture  Seen and evaluated by Neurosurgery, managing fracture conservatively with pain control and bracing  - Upright XR's completed and reviewed by Neurosurgery  - TLSO brace when HOB >30degrees and when out of bed, OK to remove while <30degrees  - Follow up recommended in 4-6 weeks with repeat Upright Thoracic/Lumbar X-Rays outpatient at the [facility]surgery [facility].  - Physical and occupational therapy consults     Skin:  # Skin irritation from TLSO, improving   - continue Miconazole 2% powder, wear Tshirt or gown under brace   - dilgent cares to prevent skin breakdown and wound formation.       Palliative Consulted   Pain team  Care Conference 6/10: Patient's family wants to defer surgery at this time since it would require a fusion from T11 to lower lumbar. They are concerned about the healing and recovery time. Goal is to manage pain, improve delirium and discharge  Social work consult: TCU placement     Lines/ tubes/ drains:  - PIV   General Cares:                 PPI/H2 blocker:  NA                DVT prophylaxis: PTA  Apixaban                Bowel Regimen/Date of last stool: 06/15, diarrhea                Pulmonary toilet: IS     Code status:  Full Code   Discharge goals:     Adequate pain management: in process    VSS x24 hours: yes    Hemoglobin stable x 48 hours: yes    Ambulating safely and/or therapy evals complete:Yes    Drains/lines removed or plan in place to manage: yes    Teaching done:yes    Other:  Expected D/C date: Pt waiting for TCU     Lakia Bains PA-C  To contact the trauma service use job code pager [phone],   Numeric texts or alpha text through [facility]     Interval History   Patient laying in bed without the TLSO on. He states his pain is well managed. Discussed keeping up with his water intake. He had decreased UOP and hypotension overnight which improved with a 500cc bolus.   ROS x 8 negative with exception of those things listed in interval hx    Physical Exam   Temp: 98.1  F (36.7  C) Temp src: Oral BP: 101/81 Pulse: 88   Resp: 16 SpO2: 97 % O2 Device: None (Room air)    Vitals:    06/12/22 0000 06/13/22 1739 06/14/22 1300   Weight: 126.9 kg (279 lb 12.2 oz) 124.7 kg (274 lb 14.6 oz) 124.1 kg (273 lb 9.5 oz)     Vital Signs with Ranges  Temp:  [98.1  F (36.7  C)-98.4  F (36.9  C)] 98.1  F (36.7  C)  Pulse:  [] 88  Resp:  [16-18] 16  BP: ()/(43-89) 101/81  SpO2:  [94 %-100 %] 97 %  I/O last 3 completed shifts:  In: 200 [P.O.:200]  Out: -     [place] Coma Scale - Total 15/15  Eye Response (E): 4   4= spontaneous, 3= to verbal/voice, 2= to pain, 1= No response   Verbal Response (V): 5   5= Orientated, converses, 4= Confused, converses, 3= Inappropriate words, 2= Incomprehensible sounds, 1=No response   Motor Response (M): 6   6= Obeys commands, 5= Localizes to pain, 4= Withdrawal to pain, 3=Fexion to pain, 2= Extension to pain, 1= No response     Constitutional: Awake, alert, cooperative, no apparent distress.  ENT: Normocephalic, atraumatic  Respiratory: No increased work of breathing, good air  exchange, clear to auscultation bilaterally,  Cardiovascular:  regular rate and rhythm, normal S1 and S2.   GI: Abdomen soft, non-distended, non-tender,  Genitourinary:  Voids spont  Skin:  Warm & dry, ecchymosis improving   Musculoskeletal: There is no redness, warmth, or swelling of the joints.   Neurologic: Awake, alert, oriented. Strength and sensory is intact. No focal deficits.  Neuropsychiatric:  normal eye contact, alert

## 2022-06-19 NOTE — SIGNIFICANT EVENT
Writer contacted lab regarding the delay in pt's lactate results at 0300. Lab states Lactate was ran wrong again. Earlier at 0116 lab contacted writer to notify nurse the lactate was ran wrong so they would need to re-draw pt. Lab is currently in pt's room to obtain another blood sample.

## 2022-06-19 NOTE — PLAN OF CARE
Status: admitted following a fall on 6/6 causing unstable L1 fracture.  Vitals: VSS, known Afib  Neuros: AO x4. [place]. BLE numbness, baseline per pt. Edema to BLE.   IV: PIV SL  Labs/Electrolytes: WNL  Resp/trach: dyspnea on exertion, LS clear.  Diet: Regular diet, poor intake this shift.   Bowel status: LBM 6/18, smear  : voiding via bedside urinal, +UTI - strains to void  Skin: generalized bruising. Rash to right chest and groin/perineum- powder applied per orders. Red spots noted on R side of abdomen r/t brace.  Pain: back pain, scheduled tylenol/robaxin and prn oxycodone effective - family would like patient woken up at night for pain meds when available.   Activity: A2 GB/Walker. TLSO when OOB or HOB > 30. Up in chair this afternoon. Refusing to be repositioned despite education.   Social: wife and son at bedside this shift, supportive.  Plan: awaiting TCU placement, continue POC.    2304: trauma MD phone paged d/t BP of 77/43 - MD ordering LR bolus. Patient also triggered sepsis protocol, awaiting [place] lab draw.

## 2022-06-20 VITALS
RESPIRATION RATE: 16 BRPM | DIASTOLIC BLOOD PRESSURE: 80 MMHG | SYSTOLIC BLOOD PRESSURE: 118 MMHG | OXYGEN SATURATION: 97 % | HEIGHT: 70 IN | BODY MASS INDEX: 39.17 KG/M2 | WEIGHT: 273.59 LBS | HEART RATE: 115 BPM | TEMPERATURE: 98.2 F

## 2022-06-20 PROBLEM — M16.10 OSTEOARTHRITIS OF PELVIS: Status: ACTIVE | Noted: 2022-06-20

## 2022-06-20 PROBLEM — R73.01 IMPAIRED FASTING GLUCOSE: Status: ACTIVE | Noted: 2022-06-20

## 2022-06-20 PROBLEM — G56.03 CARPAL TUNNEL SYNDROME, BILATERAL: Status: ACTIVE | Noted: 2021-11-18

## 2022-06-20 PROBLEM — M19.019 PRIMARY LOCALIZED OSTEOARTHROSIS OF SHOULDER REGION: Status: ACTIVE | Noted: 2022-06-20

## 2022-06-20 LAB
ANION GAP SERPL CALCULATED.3IONS-SCNC: 8 MMOL/L (ref 3–14)
BUN SERPL-MCNC: 17 MG/DL (ref 7–30)
CALCIUM SERPL-MCNC: 9 MG/DL (ref 8.5–10.1)
CHLORIDE BLD-SCNC: 104 MMOL/L (ref 94–109)
CO2 SERPL-SCNC: 25 MMOL/L (ref 20–32)
CREAT SERPL-MCNC: 1.01 MG/DL (ref 0.66–1.25)
ERYTHROCYTE [DISTWIDTH] IN BLOOD BY AUTOMATED COUNT: 14.5 % (ref 10–15)
GFR SERPL CREATININE-BSD FRML MDRD: 72 ML/MIN/1.73M2
GLUCOSE BLD-MCNC: 114 MG/DL (ref 70–99)
GLUCOSE BLDC GLUCOMTR-MCNC: 131 MG/DL (ref 70–99)
GLUCOSE BLDC GLUCOMTR-MCNC: 132 MG/DL (ref 70–99)
HCT VFR BLD AUTO: 45.7 % (ref 40–53)
HGB BLD-MCNC: 14.7 G/DL (ref 13.3–17.7)
MCH RBC QN AUTO: 29.2 PG (ref 26.5–33)
MCHC RBC AUTO-ENTMCNC: 32.2 G/DL (ref 31.5–36.5)
MCV RBC AUTO: 91 FL (ref 78–100)
PLATELET # BLD AUTO: 235 10E3/UL (ref 150–450)
POTASSIUM BLD-SCNC: 4 MMOL/L (ref 3.4–5.3)
RBC # BLD AUTO: 5.04 10E6/UL (ref 4.4–5.9)
SODIUM SERPL-SCNC: 137 MMOL/L (ref 133–144)
WBC # BLD AUTO: 6.7 10E3/UL (ref 4–11)

## 2022-06-20 PROCEDURE — 36415 COLL VENOUS BLD VENIPUNCTURE: CPT | Performed by: NURSE PRACTITIONER

## 2022-06-20 PROCEDURE — 250N000013 HC RX MED GY IP 250 OP 250 PS 637: Performed by: PHYSICIAN ASSISTANT

## 2022-06-20 PROCEDURE — 250N000013 HC RX MED GY IP 250 OP 250 PS 637: Performed by: STUDENT IN AN ORGANIZED HEALTH CARE EDUCATION/TRAINING PROGRAM

## 2022-06-20 PROCEDURE — 85027 COMPLETE CBC AUTOMATED: CPT | Performed by: NURSE PRACTITIONER

## 2022-06-20 PROCEDURE — 250N000013 HC RX MED GY IP 250 OP 250 PS 637: Performed by: NURSE PRACTITIONER

## 2022-06-20 PROCEDURE — 99233 SBSQ HOSP IP/OBS HIGH 50: CPT | Performed by: NURSE PRACTITIONER

## 2022-06-20 PROCEDURE — 80048 BASIC METABOLIC PNL TOTAL CA: CPT | Performed by: NURSE PRACTITIONER

## 2022-06-20 RX ORDER — AMOXICILLIN 250 MG
1-2 CAPSULE ORAL 2 TIMES DAILY
DISCHARGE
Start: 2022-06-20 | End: 2022-06-22 | Stop reason: DRUGHIGH

## 2022-06-20 RX ORDER — SULFAMETHOXAZOLE/TRIMETHOPRIM 800-160 MG
1 TABLET ORAL 2 TIMES DAILY
DISCHARGE
Start: 2022-06-20 | End: 2022-06-22

## 2022-06-20 RX ORDER — ONDANSETRON 4 MG/1
4 TABLET, ORALLY DISINTEGRATING ORAL EVERY 6 HOURS PRN
DISCHARGE
Start: 2022-06-20 | End: 2022-07-27

## 2022-06-20 RX ORDER — POLYETHYLENE GLYCOL 3350 17 G/17G
17 POWDER, FOR SOLUTION ORAL DAILY
Qty: 510 G | DISCHARGE
Start: 2022-06-20 | End: 2022-07-08

## 2022-06-20 RX ORDER — METHOCARBAMOL 500 MG/1
1000 TABLET, FILM COATED ORAL 4 TIMES DAILY
DISCHARGE
Start: 2022-06-20 | End: 2022-07-08

## 2022-06-20 RX ORDER — GABAPENTIN 100 MG/1
100 CAPSULE ORAL 2 TIMES DAILY
DISCHARGE
Start: 2022-06-20 | End: 2022-07-08

## 2022-06-20 RX ORDER — OXYCODONE HYDROCHLORIDE 5 MG/1
5-10 TABLET ORAL
Qty: 35 TABLET | Refills: 0 | Status: SHIPPED | DISCHARGE
Start: 2022-06-20 | End: 2022-07-27

## 2022-06-20 RX ORDER — METOPROLOL SUCCINATE 25 MG/1
75 TABLET, EXTENDED RELEASE ORAL DAILY
DISCHARGE
Start: 2022-06-21 | End: 2022-07-08

## 2022-06-20 RX ORDER — CALCITONIN SALMON 200 [IU]/.09ML
1 SPRAY, METERED NASAL DAILY
Status: ON HOLD | DISCHARGE
Start: 2022-06-20 | End: 2023-07-12

## 2022-06-20 RX ADMIN — METHOCARBAMOL 1000 MG: 500 TABLET ORAL at 12:03

## 2022-06-20 RX ADMIN — GABAPENTIN 100 MG: 100 CAPSULE ORAL at 07:39

## 2022-06-20 RX ADMIN — Medication 2000 UNITS: at 07:40

## 2022-06-20 RX ADMIN — OXYCODONE HYDROCHLORIDE 5 MG: 5 TABLET ORAL at 06:29

## 2022-06-20 RX ADMIN — ACETAMINOPHEN 325MG 975 MG: 325 TABLET ORAL at 00:07

## 2022-06-20 RX ADMIN — SENNOSIDES AND DOCUSATE SODIUM 2 TABLET: 8.6; 5 TABLET ORAL at 07:39

## 2022-06-20 RX ADMIN — METOPROLOL SUCCINATE 100 MG: 50 TABLET, EXTENDED RELEASE ORAL at 07:39

## 2022-06-20 RX ADMIN — METHOCARBAMOL 1000 MG: 500 TABLET ORAL at 07:40

## 2022-06-20 RX ADMIN — ACETAMINOPHEN 650 MG: 325 TABLET, FILM COATED ORAL at 12:03

## 2022-06-20 RX ADMIN — ACETAMINOPHEN 325MG 975 MG: 325 TABLET ORAL at 15:49

## 2022-06-20 RX ADMIN — POLYETHYLENE GLYCOL 3350 17 G: 17 POWDER, FOR SOLUTION ORAL at 07:39

## 2022-06-20 RX ADMIN — ACETAMINOPHEN 650 MG: 325 TABLET, FILM COATED ORAL at 06:29

## 2022-06-20 RX ADMIN — MICONAZOLE NITRATE: 2 POWDER TOPICAL at 07:43

## 2022-06-20 RX ADMIN — APIXABAN 5 MG: 5 TABLET, FILM COATED ORAL at 07:40

## 2022-06-20 RX ADMIN — SULFAMETHOXAZOLE AND TRIMETHOPRIM 1 TABLET: 800; 160 TABLET ORAL at 07:40

## 2022-06-20 RX ADMIN — OXYCODONE HYDROCHLORIDE 10 MG: 5 TABLET ORAL at 12:03

## 2022-06-20 RX ADMIN — FUROSEMIDE 20 MG: 20 TABLET ORAL at 07:40

## 2022-06-20 RX ADMIN — THERA TABS 1 TABLET: TAB at 07:39

## 2022-06-20 RX ADMIN — OXYCODONE HYDROCHLORIDE 10 MG: 5 TABLET ORAL at 15:49

## 2022-06-20 RX ADMIN — METHOCARBAMOL 1000 MG: 500 TABLET ORAL at 15:49

## 2022-06-20 ASSESSMENT — ACTIVITIES OF DAILY LIVING (ADL)
ADLS_ACUITY_SCORE: 46
ADLS_ACUITY_SCORE: 40
ADLS_ACUITY_SCORE: 46
ADLS_ACUITY_SCORE: 40
ADLS_ACUITY_SCORE: 46
ADLS_ACUITY_SCORE: 46
ADLS_ACUITY_SCORE: 40
ADLS_ACUITY_SCORE: 40

## 2022-06-20 NOTE — PROGRESS NOTES
[facility] GERIATRICS  Primary Care Provider & Clinic: Steven Duane Semmler, MD, [facility] [address] [address]  Chief Complaint   Patient presents with     Hospital F/U     [facility] 6/6/2022 - 6/20/2022     [place] Medical Record Number: [id number]  Place of Service Where Encounter Took Place: [facility] (TCU) [[id number]]    Alvin Newton is a 88 year old (5/10/1934), admitted to the above facility from  [facility]. Hospital stay 6/6/22 through 6/20/22.    HPI:    Alvin Newton has a past medical history of afib, hypertension, hyperlipidemia, NSTEMI in 2017, DM2.  S/he was admitted to the hospital after losing his balance trying to get off a bar-height stool and found to have L1 unstable fracture.  S/he underwent conservative management with pain control, TLSO . The hospital stay was complicated with UTI-treated with bactrim ds, acute hypoxia.  Medication changes:  -  Added calcitonin, gabapentin, methocarbamol, zofran, oxycodone, bactrim ds  -Lasix and metoprolol changed/decreased due to hypotension (PTA dose lasix 20 mg daily, metoprolol  daily)- will need close monitoring   Follow up needs:  --neurosurgery clinic at [facility] in 4-6 weeks with upright thoracic and lumbar xrays prior at clinic  --TLSO when HOB >30 degrees or out of bed     Future Appointments   Date Time Provider Department Center   7/21/2022 10:00 AM [id number]   7/21/2022 10:30 AM Paris Cartagena APRN CNP [facility]     TODAY:  Alvin reports he slept pretty well last night, has minimal pain at rest.  He reports no dyspnea, no cough, no congestion.  He reports he had a BM today.     CODE STATUS/ADVANCE DIRECTIVES DISCUSSION: Full Code - CPR/Full code   Patient's living condition: lives with spouse  ALLERGIES: No Known Allergies   PAST MEDICAL HISTORY:   Past Medical History:   Diagnosis Date     Colonic  diverticulum      Hyperlipidemia      Hypertension      Obesity (BMI 30-39.9)      Osteoarthritis      Type 2 diabetes mellitus (H)       PAST SURGICAL HISTORY:  has a past surgical history that includes Transrectal Ultrasonic, Transurethral Resection (TUR) Of Prostate Cyst (1990); Arthroplasty hip bilateral (1987); ESOPHAGOSCOPY, DIAGNOSTIC (2003); and Laparoscopic cholecystectomy (N/A, 12/28/2017).  FAMILY HISTORY: family history is not on file.  SOCIAL HISTORY:  reports that he has never smoked. He has never used smokeless tobacco. He reports current alcohol use. He reports that he does not use drugs.    Post Discharge Medication Reconciliation Status: discharge medications reconciled and changed, per note/orders  Current Outpatient Medications   Medication Sig     acetaminophen (TYLENOL) 500 MG tablet Take 1,000 mg by mouth 3 times daily     atorvastatin (LIPITOR) 20 MG tablet Take 20 mg by mouth At Bedtime     calcitonin, salmon, (MIACALCIN) 200 UNIT/ACT nasal spray Spray 1 spray into one nostril alternating nostrils daily Alternate nostril each day.     cholecalciferol 50 MCG (2000 UT) CAPS Take 2,000 Units by mouth daily     ELIQUIS ANTICOAGULANT 5 MG tablet Take 5 mg by mouth 2 times daily     gabapentin (NEURONTIN) 100 MG capsule Take 1 capsule (100 mg) by mouth 2 times daily     melatonin 5 MG tablet Take 1 tablet (5 mg) by mouth At Bedtime     menthol (ICY HOT) 5 % PTCH Apply 1 patch topically At Bedtime To low back     methocarbamol (ROBAXIN) 500 MG tablet Take 2 tablets (1,000 mg) by mouth 4 times daily     metoprolol succinate ER (TOPROL XL) 25 MG 24 hr tablet Take 3 tablets (75 mg) by mouth daily     miconazole (MICATIN) 2 % external powder Apply topically 2 times daily     Multiple Vitamin (MULTIVITAMINS PO) Take 1 chew tab by mouth daily     ondansetron (ZOFRAN ODT) 4 MG ODT tab Take 1 tablet (4 mg) by mouth every 6 hours as needed for nausea or vomiting     oxyCODONE (ROXICODONE) 5 MG tablet Take  "1-2 tablets (5-10 mg) by mouth every 3 hours as needed for moderate to severe pain     polyethylene glycol (MIRALAX) 17 GM/Dose powder Take 17 g by mouth daily     senna-docusate (SENOKOT-S/PERICOLACE) 8.6-50 MG tablet Take 1 tablet by mouth 2 times daily     No current facility-administered medications for this visit.     ROS:  10 point ROS of systems including Constitutional, Eyes, Respiratory, Cardiovascular, Gastroenterology, Genitourinary, Integumentary, Musculoskeletal, Psychiatric were all negative except for pertinent positives noted in my HPI.    Vitals:  /82   Pulse 69   Temp 97.3  F (36.3  C)   Resp 18   Ht 1.778 m (5' 10\")   Wt 123.8 kg (273 lb)   SpO2 96%   BMI 39.17 kg/m    Exam:  GENERAL APPEARANCE:  Alert, in no distress at rest in bed  HEAD:  Normal, normocephalic, atraumatic  EYE EXAM: normal external eye, conjunctiva, lids, SELENA  NECK EXAM: supple, no JVD  CHEST/RESP:  respiratory effort normal, lung sounds CTA  , no respiratory distress  CV:  Rate reg, rhythm reg, no murmur, trace peripheral edema R>L  M/S:   extremities normal, gait abnormal-requires assist of staff for transfers, non-ambulatory, normal muscle tone, and range of motion normal  NEUROLOGIC EXAM: Cranial nerves 2-12 are normal tested and grossly at patient's baseline.  No tremor, gross motor movement at baseline.   PSYCH:  Alert and oriented to person-place-time , affect pleasant , judgement appropriate      Lab/Diagnostic data:  Recent labs in [facility] reviewed by me today. and Care Everywhere     ASSESSMENT/PLAN:  (S32.903S) Closed fracture of first lumbar vertebra with routine healing, unspecified fracture morphology, subsequent encounter  (primary encounter diagnosis)  Comment: patient with unstable L1 fracture, significant pain control issues.  Seen by Neurology and plans for TLSO when out of bed/head> 30 degrees and follow up with Neurology in about 4-6 weeks (planned for 7/21). Pain control has been of concern, now " on calcitonin, gabapentin, methocarbamol, oxycodone prn, icy hot prn, prn tylenol   Plan:   -schedule tylenol   -schedule icy hot patch  -therapy for mobility and strengthening   -follow up with Neurology per recommendations with xrays prior   -schedule senna s for constipation management    (N39.0) Urinary tract infection without hematuria, site unspecified  (N40.1,  N13.8) BPH with urinary obstruction  Comment: hospital stay complicated with UTI, now on day 5 of Bactrim for >100,000 Klebsiella UTI.  No new symptoms   Plan: clarify end date of bactrim to today     (R09.02) Hypoxia  Comment: patient with acute hypoxia while inpatient.  No oxygen use needed now, but certainly at risk of pulmonary complications due to immobility and use of TLSO.   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  Last A1C was 7.2% in 2/2022 per [facility] records.  He reports poor appetite.  Diet controlled  Plan: consider routine blood sugar checks, none scheduled now, monitor po intake     (I10) Essential hypertension  Comment: patient with history of hypertension, previously on lasix 20 daily, metoprolol 100 daily.  Trending hypotension in hospital so lasix was discontinued and beta blocker decreased.  BP still trending low 100's systolic, minimal LE edema  Plan:   -monitor BP, edema  -consider resumption of PTA medications as needed    (I48.20) Chronic atrial fibrillation (H)  (Z79.01) Long term current use of anticoagulant therapy  Comment: patient with history of afib on beta blocker and apixaban.  HR regular today   Plan: continue current plan     Orders:  - discontinue bactrim-completed for UTI  - Clarify senna s to 1 tab BID Po for constipation   - discontinue prn extra strength tylenol   - extra strength tylenol 1000 mg po TID for pain  -  Discontinue prn icy hot  - Icy Hot patch at bedtime to muscle spasms on low back     Total time spent with patient visit at the skilled nursing facility was 37 min including patient visit and review of past records. Greater than 50% of total time spent with counseling and coordinating care due to tenuous situation with patient at high risk for complications due to multiple medical comorbid conditions, significant pain control issues, decreased mobility, and counseling regarding coordination of care and care planning in skilled nursing facility as well as expected length of stay, therapy services, and discharge planning, with patient's goal to return home when goals have been met .     Electronically signed by: CHUCK Ozuna CNP

## 2022-06-20 NOTE — DISCHARGE SUMMARY
[facility]    Discharge Summary  Trauma Surgery Service    Date of Admission:  6/6/2022  Date of Discharge:  6/20/2022  Attending Physician: Dr. Leung  Discharging Provider: Alan Velazco NP   Date of Service (when I saw the patient): 06/20/22    Primary Provider: Semmler, Steven Duane  Primary Care clinic: [facility] [address] [facility] [address] [facility] [address]  Phone: [phone]  Fax number: [phone]     Discharge Diagnoses   Trauma mechanism:Fall from bar stool  Time/date of injury: 6/6/22  Known Injuries:  1. Acute unstable L1 fracture  2. Posterior longitudinal Ligament injury     Assessment & Plan   Neuro/Pain/Psych:  # Acute traumatic pain  - Pain service consulted and is following pain management.   - Current Pain Regimen:   - Scheduled: Tylenol, Robaxin 1,000mg 4x a day, Miacalcin nasal spray alternating nostrils for a total of 30days, Gabapentin restarted 6/17 at 100mg bid - pain is better managed with no hallucinations. Will continue on low dose.   - PRN: Tylenol, Menthol, Oxycodone   - Completed 72 hours of Toradol   - Aqua K cooling pad to the low back can be used     Delirium prevention measures:  - Maintain circadian rhythm. Lights on during the day. Off at night, minimize cares at night. OOB during the day. Some confusion reported last evening otherwise slept well and is oriented  - Scheduled Melatonin 5mg for sleep hygiene     Pulmonary:  # Acute Hypoxia, resolved, on room air  - Early mobility, pulmonary hygiene  - Supplemental oxygen to keep saturation above 92 %  - Aggressive pulmonary hygiene. Incentive spirometer while awake      Cardiovascular:    # Hx P Afib   # HTN, HLD  # NSTEMI 2017  - 12 lead EKG: Afib +rbbb (noted on prior 12 lead EKG's from 2017), HR 80 to 160's, BP stable  - Continue PTA: Metoprolol  XL, Atorvastatin, Apixaban, Furosemide  - Electrolyte replacements, maintain K >4.0, mag >2.0  - Intermittently  SBP<120, and HR <60  Last 48 hours. Hold Lasix and decrease metoprolol to 75. Resume or increase as needed.        GI/Nutrition:    - Regular Diet   - Bowel regimen  - Last BM on 6/19  - Nausea but improving; has not required tx        Renal/ Fluids/Electrolytes:  - Electrolyte replacement protocol in place.   - Lab frequency changed to three times a week, or obtain as needed  - Continue PTA: Lasix      Endocrine:  # Diabetes Mellitus with hyperglycemia  - No medications or insulin prior to admission.  - Medium correction Novolog scale insulin      Infectious disease:   # UTI  Klebsiella aerogenes   - 6/14: Blood Trace, Leukocyte Esterase: Large, WBC 58, Bacteria Few, UC: Klebsiella aerogenes      Antibiotic Regimen::   - Ceftriaxone: 6/15 x1  - Bactrim BS started 6/16 for 5 day     Hematology:    # Coagulopathy  - On Apixaban PTA chronically for stroke prophylaxis with Hx of Afib, continued on 6/8/22.   - Admitted with Hb of 16.2g/dL, stable without the need for transfusion  - Threshold for transfusion if hgb <7.0 or signs/symptoms of hypoperfusion.        Musculoskeletal:  # Fall  # L1 unstable fracture  Seen and evaluated by Neurosurgery, managing fracture conservatively with pain control and bracing  - Upright XR's completed and reviewed by Neurosurgery  - TLSO brace when HOB >30degrees and when out of bed, OK to remove while <30degrees  - Follow up recommended in 4-6 weeks with repeat Upright Thoracic/Lumbar X-Rays outpatient at the Neurosurgery Clinic.  - Physical and occupational therapy consults     Skin:  # Skin irritation from TLSO, improving   - continue Miconazole 2% powder, wear Tshirt or gown under brace   - dilgent cares to prevent skin breakdown and wound formation.        Palliative Care Consult    Palliative Consulted   Pain team  Care Conference 6/10: Patient's family wants to defer surgery at this time since it would require a fusion from T11 to lower lumbar. They are concerned about the healing  "and recovery time. Goal is to manage pain, improve delirium and discharge  Social work consult: TCU placement    Therapy Recommendations:   Current status of physical therapies on discharge: TCU     Current status of occupational therapies on discharge: TCU     Code Status   Full Code    SUBJECTIVE: ROS negative with exception of low back pain     Physical Exam   Temp: 98.2  F (36.8  C) Temp src: Oral BP: 118/80 Pulse: 115   Resp: 16 SpO2: 97 % O2 Device: None (Room air)    Vitals:    06/12/22 0000 06/13/22 1739 06/14/22 1300   Weight: 126.9 kg (279 lb 12.2 oz) 124.7 kg (274 lb 14.6 oz) 124.1 kg (273 lb 9.5 oz)     Vital Signs with Ranges  Temp:  [97.8  F (36.6  C)-98.3  F (36.8  C)] 98.2  F (36.8  C)  Pulse:  [] 115  Resp:  [16] 16  BP: ()/(49-84) 118/80  SpO2:  [97 %-98 %] 97 %  I/O last 3 completed shifts:  In: 400 [P.O.:400]  Out: 1650 [Urine:1650]    ---    Discharge Disposition   Discharged to home  Condition at discharge: Stable  Discharge VS: Blood pressure 118/80, pulse 115, temperature 98.2  F (36.8  C), temperature source Oral, resp. rate 16, height 1.778 m (5' 10\"), weight 124.1 kg (273 lb 9.5 oz), SpO2 97 %.    Consultations This Hospital Stay   ORTHOSIS SPINAL IP CONSULT  NURSING TO CONSULT FOR VASCULAR ACCESS CARE IP CONSULT  PALLIATIVE CARE ADULT IP CONSULT  PHYSICAL THERAPY ADULT IP CONSULT  OCCUPATIONAL THERAPY ADULT IP CONSULT  NURSING TO CONSULT FOR VASCULAR ACCESS CARE IP CONSULT  CARE MANAGEMENT / SOCIAL WORK IP CONSULT  PAIN MANAGEMENT ADULT IP CONSULT  NURSING TO CONSULT FOR VASCULAR ACCESS CARE IP CONSULT  WOUND OSTOMY CONTINENCE NURSE  IP CONSULT  WOUND OSTOMY CONTINENCE NURSE  IP CONSULT    Discharge Orders      XR Thoracic Lumbar Standing 2 Views     Discharge Medications   Current Discharge Medication List      CONTINUE these medications which have NOT CHANGED    Details   acetaminophen (TYLENOL) 500 MG tablet Take 500-1,000 mg by mouth every 6 hours as needed    "   atorvastatin (LIPITOR) 20 MG tablet Take 20 mg by mouth At Bedtime      cholecalciferol 50 MCG (2000 UT) CAPS Take 2,000 Units by mouth daily      ELIQUIS ANTICOAGULANT 5 MG tablet Take 5 mg by mouth 2 times daily      furosemide (LASIX) 20 MG tablet Take 1 tablet by mouth daily      metoprolol succinate ER (TOPROL XL) 100 MG 24 hr tablet Take 100 mg by mouth daily      Multiple Vitamin (MULTIVITAMINS PO) Take 1 chew tab by mouth daily      naproxen (NAPROSYN) 500 MG tablet Take 1 tablet by mouth every 12 hours as needed (Shoulder Pain)           Allergies   No Known Allergies  Data   Most Recent 3 CBC's:  Recent Labs   Lab Test 06/20/22  0725 06/17/22  0709 06/15/22  0735   WBC 6.7 7.6 7.8   HGB 14.7 15.0 15.1   MCV 91 91 89    218 199      Most Recent 3 BMP's:  Recent Labs   Lab Test 06/20/22  0725 06/20/22  0642 06/19/22  2108 06/17/22  0749 06/17/22  0709 06/15/22  0744 06/15/22  0735     --   --   --  139  --  139   POTASSIUM 4.0  --   --   --  4.0  --  4.2   CHLORIDE 104  --   --   --  107  --  105   CO2 25  --   --   --  25  --  26   BUN 17  --   --   --  23  --  28   CR 1.01  --   --   --  0.77  --  0.94   ANIONGAP 8  --   --   --  7  --  8   JERRY 9.0  --   --   --  9.1  --  9.1   * 132* 160*   < > 114*   < > 133*    < > = values in this interval not displayed.     Most Recent 2 LFT's:  Recent Labs   Lab Test 06/06/22  1301 12/29/17  0751   AST 30 24   ALT 22 27   ALKPHOS 128 64   BILITOTAL 2.3* 1.6*     Most Recent INR's and Anticoagulation Dosing History:  Anticoagulation Dose History     Recent Dosing and Labs Latest Ref Rng & Units 6/6/2022    INR 0.85 - 1.15 1.59(H)        Most Recent 3 Troponin's:  Recent Labs   Lab Test 12/27/17  1325 12/27/17  0736 12/27/17  0240   TROPI 0.170* 0.167* 0.049*     Most Recent 6 Bacteria Isolates From Any Culture (See EPIC Reports for Culture Details):No lab results found.  Most Recent TSH, T4 and A1c Labs:  Recent Labs   Lab Test 12/29/17  8768  12/27/17  0736   TSH 0.53  --    A1C  --  6.6*     Results for orders placed or performed during the hospital encounter of 06/06/22   Lumbar spine MRI w/o contrast    Narrative    EXAM: MR LUMBAR SPINE W/O CONTRAST  LOCATION: [facility]  DATE/TIME: 6/7/2022 1:34 AM    INDICATION: Back trauma, no prior imaging (Age >= 16y)  COMPARISON: CT lumbar spine 6/6/2022  TECHNIQUE: Routine Lumbar Spine MRI without IV contrast.    FINDINGS:   Nomenclature is based on 5 lumbar type vertebral bodies. Diffuse spinal ankylosis secondary to bulky bridging osteophytes through the visualized thoracic and lumbar spine. There is an acute obliquely oriented fracture through the L1 vertebra involving   the anterior bridging osteophytes, superior and inferior endplates as well as the anterior and posterior cortex and bilateral lateral cortices. There is distraction with the fracture cleft measuring up to 9 mm in thickness. There is hemorrhage within the   L1-L2 disc compatible with disc injury. There is disruption of the anterior longitudinal ligament. There may be disruption of the posterior longitudinal ligament laterally on the right, sagittal STIR image 9. No significant intraspinal hematoma. 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 There is 4 mm anterolisthesis of L4. 2 mm anterolisthesis of C3. Normal distal spinal cord and cauda equina with conus medullaris at L1. No extraspinal abnormality. Unremarkable   visualized bony pelvis.    T12-L1: Normal disc height and signal. No herniation. Moderate facet arthropathy. No spinal canal or neural foraminal stenosis.     L1-L2: Normal disc height. There is edema within the disc compatible with disc injury. Slight distraction anteriorly. No herniation. Relatively severe ligamentum flavum thickening and facet arthropathy. Mild central canal stenosis. No neural foraminal   narrowing.    L2-L3: Fusion across the  interspace. Mild loss of disc height. Interbody spurring. Moderate facet arthropathy. Mild congenital narrowing of the spinal canal. No foraminal narrowing.     L3-L4: Fusion across the interspace. Degenerative grade I anterolisthesis of L3 on L4. At least moderate to severe facet arthropathy. Moderate central canal stenosis. No foraminal narrowing.    L4-L5: Grade I anterolisthesis of L4. Moderate loss of disc height. Fusion across the interspace. Relatively severe facet arthropathy. Moderate to severe central canal stenosis. Moderate right and mild left neural foraminal narrowing.    L5-S1: Normal disc height. Disc desiccation. Minimal annular bulge. Advanced facet arthropathy. No spinal canal stenosis or foraminal narrowing.      Impression    IMPRESSION:  1.  There is an acute unstable slightly distracted obliquely oriented fracture through the ventral osteophyte at L1 with propagation to the inferior endplate as well as to the posterior cortex where there may be injury to the posterior longitudinal   ligament, as detailed above.  2.  Edema within the L1-L2 disc compatible with disc injury.  3.  Diffuse idiopathic skeletal hyperostosis.  4.  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 There is moderate to severe central canal stenosis at L4-L5 with moderate right foraminal narrowing at this level.   XR Chest Port 1 View    Narrative    Exam: XR CHEST PORT 1 VIEW, 6/7/2022 8:36 AM    Indication: hypoxia, shortness of breath    Comparison: 6/6/2022    Findings:     Portable supine view radiograph with normal appearing trachea, cardiac  silhouette. Indistinct pulmonary vasculature. Low inspiratory volumes  with bibasilar opacities, favor atelectasis. Costophrenic angles are  not included. No appreciable pneumothorax. Degenerative changes of the  shoulder joints. No focal consolidation.      Impression    Impression: Low lung volumes, with basilar opacities, favor  atelectasis.    I have  personally reviewed the examination and initial interpretation  and I agree with the findings.    YEYO CONRAD MD         SYSTEM ID:  [id number]   XR Lumbar Spine 2/3 Views    Narrative    EXAM: XR LUMBAR SPINE 2-3 VIEWS  LOCATION: [facility]  DATE/TIME: 6/13/2022 5:11 PM    INDICATION: Known L 1 fracture, please obtain with TLSO brace ON  COMPARISON: Lumbar spine MRI 06/07/2022  TECHNIQUE: CR Lumbar Spine.      Impression    IMPRESSION: Evaluation is limited by osteopenia and overlying TLSO. Complex fracture deformity of the L1 vertebral body again noted. No definite loss of vertebral body height at L1 since the comparison MRI. Mild retrolisthesis of the upper L1 fracture   fragment with respect L2 again noted. Alignment otherwise appears normal. No definite new fractures.   XR Chest Port 1 View    Narrative    Exam: XR CHEST PORT 1 VIEW, 6/14/2022 11:29 AM    Comparison: Chest x-ray 6/7/2022    History: dyspnea on exertion    Findings:  Portable AP view of the chest. Trachea is midline. Cardiomediastinal  silhouette is enlarged. Mildly increased interstitial opacities. Trace  right and small layering left pleural effusions. There is no  pneumothorax . The upper abdomen is unremarkable. No acute osseous  abnormality.       Impression    Impression:   1. Cardiomegaly with mildly increased interstitial opacities,  suggestive of pulmonary edema.  2. Trace right and small layering left pleural effusions.     I have personally reviewed the examination and initial interpretation  and I agree with the findings.    KAVITHA LIMA MD         SYSTEM ID:  [id number]       Time Spent on this Encounter   I, Alan Velazco NP, personally saw the patient today and spent greater than 30 minutes discharging this patient.    We appreciate the opportunity to care for your patient while in the hospital.  Should you have any questions about their injuries or this discharge summary our  contact information is below.    Trauma Services  [facility]   Department of Critical Care and Acute Care Surgery  [address]  [place] [address]  Office: [phone]

## 2022-06-20 NOTE — PROGRESS NOTES
"Care Management Discharge Note    Discharge Date: 06/20/2022       Discharge Disposition:  [facility] ([phone])    Discharge Services:  Short term TCU placement at [facility]    Discharge DME:  (Not applicable at this time)    Discharge Transportation:  Per pt's floor nurse (Lindsey), pt can travel by w/c.   arranged for [facility] (Chris [phone]) to provide private pay w/c transport at 4pm.    Private pay costs discussed:   Pt, wife and daughter (Fuad) have been informed that the cost of transport from [facility] to [facility] is private pay.  Pt, wife and Fuad has been informed that ARE pays up to the first 20 days in the SNF at 100%.  Pt has a $100.00 daily private pay co- pay for days  (if pt is still meeting criteria for coverage)    PAS Confirmation Code:  [id number]  Patient/family educated on Medicare website which has current facility and service quality ratings:  ( provided \"Medicare Care Compare\" document)    Education Provided on the Discharge Plan:  yes  Persons Notified of Discharge Plans: pt, daughter (Fuad), wife, 6A nursing and Alan Velazco NP  Patient/Family in Agreement with the Plan:   yes     Handoff Referral Completed: Yes    Additional Information:  - Alan Velazco NP has confirmed readiness for discharge  - Admissions for [facility] ([place]) has confirmed acceptance for admit  - SW faxed pt's discharge orders, narcotic script and discharge summary to [facility]  -  completed \"Important Message from Medicare\" with pt's wife.    HEATH Erickson  Social Work, 6A  Phone:  [phone]  Pager:  [phone]  6/20/2022            EITAN Crabtree        "

## 2022-06-20 NOTE — CARE PLAN
Physical Therapy Discharge Summary    Reason for therapy discharge:    Discharged to transitional care facility.    Progress towards therapy goal(s). See goals on Care Plan in [facility] electronic health record for goal details.  Goals not met.  Barriers to achieving goals:   discharge from facility.    Therapy recommendation(s):    Continued therapy is recommended.  Rationale/Recommendations:  TCU to address deficits in functional mobility.

## 2022-06-20 NOTE — PLAN OF CARE
Status: admitted following a fall on 6/6 causing unstable L1 fracture.  Vitals: soft Bps, known Afib  Neuros: Alert, disoriented to time at 1600 (knew June 2022, not exact date), [place].   IV: PIV SL  Labs/Electrolytes: WNL  Resp/trach: dyspnea on exertion, LS clear.  Diet: Regular diet, poor intake. Continue to encourage patient to eat/drink fluids.  Bowel status: LBM 6/19  : voiding via bedside urinal, +UTI - strains to void  Skin: generalized bruising. Rash to right chest and groin/perineum- powder applied per orders. Red spots noted on R side of abdomen r/t brace.  Pain: back pain, scheduled tylenol/robaxin and prn oxycodone x1 effective  Activity: A2 GB/Walker. TLSO when OOB or HOB > 30. Up in chair this afternoon. Refusing to be repositioned in bed despite education.   Social: daughter at bedside this shift, supportive.  Plan: 500mL bolus of LR given at 1733 for soft BP and per family request; awaiting TCU placement, continue POC.

## 2022-06-20 NOTE — PLAN OF CARE
Pt here with L1 fx 2/2 fall @ home, vs ex a.fib (baseline), neuros include: d/o x1 (time), forgetful, GW. PIV removed per orders, regular diet w/poor PO intake, voids with hesitancy and small volumes. PVR is WNL, small BM this morning, up AO2 w/GB and walker with TLSO brace. PRN and scheduled pain meds around the clock with relief, family @ bedside and very supportive. Plan for TCU @ 1600 today.

## 2022-06-20 NOTE — PLAN OF CARE
Occupational Therapy Discharge Summary    Reason for therapy discharge:    Discharged to transitional care facility.    Progress towards therapy goal(s). See goals on Care Plan in [facility] electronic health record for goal details.  Goals partially met.  Barriers to achieving goals:   discharge from facility.    Therapy recommendation(s):    Continued therapy is recommended.  Rationale/Recommendations:  to increase activity tolerance and independence with ADL completion.

## 2022-06-20 NOTE — PROGRESS NOTES
"Care Management Follow Up    Length of Stay (days): 14    Expected Discharge Date:  Pt remains medically ready for d/c     Concerns to be Addressed:    Disposition planning   Patient plan of care discussed at interdisciplinary rounds: Yes    Anticipated Discharge Disposition:  (Short term TCU placement)     Anticipated Discharge Services:  (Short term TCU placement)  Anticipated Discharge DME:  (Not applicable at this time)    Patient/family educated on Medicare website which has current facility and service quality ratings:  (SW provided \"Medicare Care Compare\" document)  Education Provided on the Discharge Plan:  Yes  Patient/Family in Agreement with the Plan:  Yes    Referrals Placed by CM/SW:  Post acute care facility's  Private pay costs discussed:   Not applicable at this time    Additional Information:  SW is following pt for discharge planning.  TCU placement is recommended and pt remains medically ready for discharge.      SW  Placed follow up calls to Admissions at the following SNF's:  - [facility], left message for Admissions to call regarding acceptance  - [facility], left message for Admissions to call regarding acceptance  - [facility], left message for Admissions to call regarding acceptance  - [facility], left message for Admissions to call regarding acceptance.    SW will await return calls from the above facility's. SW will continue to follow for discharge planning.    HEATH Erickson  Social Work, 6A  Phone:  [phone]  Pager:  [phone]  6/20/2022          EITAN Crabtree      "

## 2022-06-20 NOTE — PLAN OF CARE
Alert and oriented to person, place and situation. Rated back pain 5. Tylenol and oxycodone 5 mg given. Repositioned Q 2 hours. Blood glucose checked this morning. Voided in the urinal

## 2022-06-20 NOTE — PROGRESS NOTES
[facility]  Trauma Service Progress Note    Date of Service: 06/20/2022    Trauma mechanism:Fall from bar stool  Time/date of injury: 6/6/22  Known Injuries:  1. Acute unstable L1 fracture  2. Posterior longitudinal Ligament injury     Assessment & Plan   Neuro/Pain/Psych:  # Acute traumatic pain  - Pain service consulted and is following pain management.   - Current Pain Regimen:   - Scheduled: Tylenol, Robaxin 1,000mg 4x a day, Miacalcin nasal spray alternating nostrils for a total of 30days, Gabapentin restarted 6/17 at 100mg bid - pain is better managed with no hallucinations. Will continue on low dose.   - PRN: Tylenol, Menthol, Oxycodone   - Completed 72 hours of Toradol   - Aqua K cooling pad to the low back can be used     Delirium prevention measures:  - Maintain circadian rhythm. Lights on during the day. Off at night, minimize cares at night. OOB during the day. Some confusion reported last evening otherwise slept well and is oriented  - Scheduled Melatonin 5mg for sleep hygiene     Pulmonary:  # Acute Hypoxia, resolved, on room air  - Reports dyspnea on exertion, on room air, CXR with some pulmonary edema, additional dose of Furosemide given yesterday.   - Early mobility, pulmonary hygiene  - Supplemental oxygen to keep saturation above 92 %  - Aggressive pulmonary hygiene. Incentive spirometer while awake      Cardiovascular:    # Hx P Afib   # HTN, HLD  # NSTEMI 2017  - 12 lead EKG: Afib +rbbb (noted on prior 12 lead EKG's from 2017), HR 80 to 160's, . BP stable  - Continue PTA: Metoprolol 100mg XL, Atorvastatin, Apixaban, Furosemide  - Electrolyte replacements, maintain K >4.0, mag >2.0  - Intermittently SBP<120, and HR <60 hold Lasix and decrease metoprolol to 75. Resume or increase as needed.        GI/Nutrition:    - Regular Diet   - Bowel regimen  - Last BM on 6/19  - Nausea but improving  .      Renal/ Fluids/Electrolytes:  - electrolyte replacement  protocol in place.   - Lab frequency changed to three times a week, or obtain as needed  - Continue PTA: Lasix      Endocrine:  # Diabetes Mellitus with hyperglycemia  - No medications or insulin prior to admission.  - Medium correction Novolog scale insulin      Infectious disease:   # UTI  Klebsiella aerogenes   - 6/14: Blood Trace, Leukocyte Esterase: Large, WBC 58, Bacteria Few, UC: Klebsiella aerogenes      Antibiotic Regimen::   - Ceftriaxone: 6/15 x1  - Bactrim BS started 6/16 for 5 day     Hematology:    # Coagulopathy  - On Apixaban PTA chronically for stroke prophylaxis with Hx of Afib, continued on 6/8/22.   - Admitted with Hb of 16.2g/dL, stable without the need for transfusion  - Threshold for transfusion if hgb <7.0 or signs/symptoms of hypoperfusion.        Musculoskeletal:  # Fall  # L1 unstable fracture  Seen and evaluated by Neurosurgery, managing fracture conservatively with pain control and bracing  - Upright XR's completed and reviewed by Neurosurgery  - TLSO brace when HOB >30degrees and when out of bed, OK to remove while <30degrees  - Follow up recommended in 4-6 weeks with repeat Upright Thoracic/Lumbar X-Rays outpatient at the Neurosurgery Clinic.  - Physical and occupational therapy consults     Skin:  # Skin irritation from TLSO, improving   - continue Miconazole 2% powder, wear Tshirt or gown under brace   - dilgent cares to prevent skin breakdown and wound formation.       Palliative Consulted   Pain team  Care Conference 6/10: Patient's family wants to defer surgery at this time since it would require a fusion from T11 to lower lumbar. They are concerned about the healing and recovery time. Goal is to manage pain, improve delirium and discharge  Social work consult: TCU placement     Lines/ tubes/ drains:  - PIV   General Cares:                 PPI/H2 blocker:  NA                DVT prophylaxis: PTA Apixaban                Bowel Regimen/Date of last stool: 06/19,                  Pulmonary toilet: IS     Code status:  Full Code   Discharge goals:     Adequate pain management: Yes    VSS x24 hours: yes    Hemoglobin stable x 48 hours: yes    Ambulating safely and/or therapy evals complete:Yes    Drains/lines removed or plan in place to manage: yes    Teaching done:yes    Expected D/C date: Pt waiting for TCU     Alan Velazco NP  To contact the trauma service use job code pager [phone],   Numeric texts or alpha text through [facility]     Interval History   Patient laying in bed without the TLSO on. He states his pain is well managed. Current 3-4. No other acute complaints     ROS x 8 negative with exception of those things listed in interval hx    Physical Exam   Temp: 98.2  F (36.8  C) Temp src: Oral BP: 118/80 Pulse: 115   Resp: 16 SpO2: 97 % O2 Device: None (Room air)    Vitals:    06/12/22 0000 06/13/22 1739 06/14/22 1300   Weight: 126.9 kg (279 lb 12.2 oz) 124.7 kg (274 lb 14.6 oz) 124.1 kg (273 lb 9.5 oz)     Vital Signs with Ranges  Temp:  [97.8  F (36.6  C)-98.3  F (36.8  C)] 98.2  F (36.8  C)  Pulse:  [] 115  Resp:  [16] 16  BP: ()/(49-84) 118/80  SpO2:  [97 %-98 %] 97 %  I/O last 3 completed shifts:  In: 400 [P.O.:400]  Out: 1650 [Urine:1650]    [place] Coma Scale - Total 15/15  Eye Response (E): 4   4= spontaneous, 3= to verbal/voice, 2= to pain, 1= No response   Verbal Response (V): 5   5= Orientated, converses, 4= Confused, converses, 3= Inappropriate words, 2= Incomprehensible sounds, 1=No response   Motor Response (M): 6   6= Obeys commands, 5= Localizes to pain, 4= Withdrawal to pain, 3=Fexion to pain, 2= Extension to pain, 1= No response     Constitutional: Awake, alert, cooperative, no apparent distress.  ENT: Normocephalic, atraumatic  Respiratory: No increased work of breathing, good air exchange, clear to auscultation bilaterally,  Cardiovascular:  regular rate and rhythm, normal S1 and S2.   GI: Abdomen soft, non-distended,  non-tender,  Genitourinary:  Voids spont  Skin:  Warm & dry, ecchymosis improving   Musculoskeletal: There is no redness, warmth, or swelling of the joints.   Neurologic: Awake, alert, oriented. Strength and sensory is intact. No focal deficits.  Neuropsychiatric:  normal eye contact, alert

## 2022-06-20 NOTE — PLAN OF CARE
Discharge time/date: 6/20 at 1600 via EMS to Replaced by [facility]  Walked or Wheelchair: wheelchair  PIV removed: yes

## 2022-06-21 ENCOUNTER — PATIENT OUTREACH (OUTPATIENT)
Dept: CARE COORDINATION | Facility: CLINIC | Age: 87
End: 2022-06-21

## 2022-06-21 ENCOUNTER — TRANSITIONAL CARE UNIT VISIT (OUTPATIENT)
Dept: GERIATRICS | Facility: CLINIC | Age: 87
End: 2022-06-21
Payer: COMMERCIAL

## 2022-06-21 VITALS
HEIGHT: 70 IN | WEIGHT: 273 LBS | OXYGEN SATURATION: 96 % | SYSTOLIC BLOOD PRESSURE: 112 MMHG | RESPIRATION RATE: 18 BRPM | TEMPERATURE: 97.3 F | BODY MASS INDEX: 39.08 KG/M2 | HEART RATE: 69 BPM | DIASTOLIC BLOOD PRESSURE: 82 MMHG

## 2022-06-21 DIAGNOSIS — I10 ESSENTIAL HYPERTENSION: ICD-10-CM

## 2022-06-21 DIAGNOSIS — E11.42 TYPE 2 DIABETES MELLITUS WITH DIABETIC POLYNEUROPATHY, WITHOUT LONG-TERM CURRENT USE OF INSULIN (H): ICD-10-CM

## 2022-06-21 DIAGNOSIS — N40.1 BPH WITH URINARY OBSTRUCTION: ICD-10-CM

## 2022-06-21 DIAGNOSIS — I48.20 CHRONIC ATRIAL FIBRILLATION (H): ICD-10-CM

## 2022-06-21 DIAGNOSIS — N39.0 URINARY TRACT INFECTION WITHOUT HEMATURIA, SITE UNSPECIFIED: ICD-10-CM

## 2022-06-21 DIAGNOSIS — S32.019D CLOSED FRACTURE OF FIRST LUMBAR VERTEBRA WITH ROUTINE HEALING, UNSPECIFIED FRACTURE MORPHOLOGY, SUBSEQUENT ENCOUNTER: Primary | ICD-10-CM

## 2022-06-21 DIAGNOSIS — Z79.01 LONG TERM CURRENT USE OF ANTICOAGULANT THERAPY: ICD-10-CM

## 2022-06-21 DIAGNOSIS — N13.8 BPH WITH URINARY OBSTRUCTION: ICD-10-CM

## 2022-06-21 DIAGNOSIS — R09.02 HYPOXIA: ICD-10-CM

## 2022-06-21 DIAGNOSIS — Z71.89 OTHER SPECIFIED COUNSELING: ICD-10-CM

## 2022-06-21 PROCEDURE — 99310 SBSQ NF CARE HIGH MDM 45: CPT | Performed by: NURSE PRACTITIONER

## 2022-06-21 NOTE — PROGRESS NOTES
Clinic Care Coordination Contact    Background: Care Coordination referral placed from [facility] discharge report for reason of patient meeting criteria for a TCM outreach call by [facility] [place] team.    Assessment: Upon chart review, CCRC Team member will cancel/close the referral for TCM outreach due to reason below:    Patient is not established within [facility]. Upon chart review, CCRC Team member noted patient discharged to TCU.    Plan: Care Coordination referral for TCM outreach canceled.    NIKO Torre  [phone]  [facility]

## 2022-06-21 NOTE — LETTER
6/21/2022        RE: Alvin Newton  [address] [id number]        [facility]  Primary Care Provider & Clinic: Steven Duane Semmler, MD, [facility] [address] [facility] [address]  Chief Complaint   Patient presents with     Hospital F/U     [facility] 6/6/2022 - 6/20/2022     [facility] Medical Record Number: [id number]  Place of Service Where Encounter Took Place: [facility] [[id number]]    Alvin Newton is a 88 year old (5/10/1934), admitted to the above facility from  [facility]. Hospital stay 6/6/22 through 6/20/22.    HPI:    Alvin Netwon has a past medical history of afib, hypertension, hyperlipidemia, NSTEMI in 2017, DM2.  S/he was admitted to the hospital after losing his balance trying to get off a bar-height stool and found to have L1 unstable fracture.  S/he underwent conservative management with pain control, TLSO . The hospital stay was complicated with UTI-treated with bactrim ds, acute hypoxia.  Medication changes:  -  Added calcitonin, gabapentin, methocarbamol, zofran, oxycodone, bactrim ds  -Lasix and metoprolol changed/decreased due to hypotension (PTA dose lasix 20 mg daily, metoprolol  daily)- will need close monitoring   Follow up needs:  --neurosurgery clinic at [facility] in 4-6 weeks with upright thoracic and lumbar xrays prior at clinic  --TLSO when HOB >30 degrees or out of bed     Future Appointments   Date Time Provider Department Center   7/21/2022 10:00 AM [id number] [facility]   7/21/2022 10:30 AM Paris Cartagena APRN CNP [facility]     TODAY:  Alvin reports he slept pretty well last night, has minimal pain at rest.  He reports no dyspnea, no cough, no congestion.  He reports he had a BM today.     CODE STATUS/ADVANCE DIRECTIVES DISCUSSION: Full Code - CPR/Full code   Patient's living condition: lives with spouse  ALLERGIES: No  Known Allergies   PAST MEDICAL HISTORY:   Past Medical History:   Diagnosis Date     Colonic diverticulum      Hyperlipidemia      Hypertension      Obesity (BMI 30-39.9)      Osteoarthritis      Type 2 diabetes mellitus (H)       PAST SURGICAL HISTORY:  has a past surgical history that includes Transrectal Ultrasonic, Transurethral Resection (TUR) Of Prostate Cyst (1990); Arthroplasty hip bilateral (1987); ESOPHAGOSCOPY, DIAGNOSTIC (2003); and Laparoscopic cholecystectomy (N/A, 12/28/2017).  FAMILY HISTORY: family history is not on file.  SOCIAL HISTORY:  reports that he has never smoked. He has never used smokeless tobacco. He reports current alcohol use. He reports that he does not use drugs.    Post Discharge Medication Reconciliation Status: discharge medications reconciled and changed, per note/orders  Current Outpatient Medications   Medication Sig     acetaminophen (TYLENOL) 500 MG tablet Take 1,000 mg by mouth 3 times daily     atorvastatin (LIPITOR) 20 MG tablet Take 20 mg by mouth At Bedtime     calcitonin, salmon, (MIACALCIN) 200 UNIT/ACT nasal spray Spray 1 spray into one nostril alternating nostrils daily Alternate nostril each day.     cholecalciferol 50 MCG (2000 UT) CAPS Take 2,000 Units by mouth daily     ELIQUIS ANTICOAGULANT 5 MG tablet Take 5 mg by mouth 2 times daily     gabapentin (NEURONTIN) 100 MG capsule Take 1 capsule (100 mg) by mouth 2 times daily     melatonin 5 MG tablet Take 1 tablet (5 mg) by mouth At Bedtime     menthol (ICY HOT) 5 % PTCH Apply 1 patch topically At Bedtime To low back     methocarbamol (ROBAXIN) 500 MG tablet Take 2 tablets (1,000 mg) by mouth 4 times daily     metoprolol succinate ER (TOPROL XL) 25 MG 24 hr tablet Take 3 tablets (75 mg) by mouth daily     miconazole (MICATIN) 2 % external powder Apply topically 2 times daily     Multiple Vitamin (MULTIVITAMINS PO) Take 1 chew tab by mouth daily     ondansetron (ZOFRAN ODT) 4 MG ODT tab Take 1 tablet (4 mg) by mouth  "every 6 hours as needed for nausea or vomiting     oxyCODONE (ROXICODONE) 5 MG tablet Take 1-2 tablets (5-10 mg) by mouth every 3 hours as needed for moderate to severe pain     polyethylene glycol (MIRALAX) 17 GM/Dose powder Take 17 g by mouth daily     senna-docusate (SENOKOT-S/PERICOLACE) 8.6-50 MG tablet Take 1 tablet by mouth 2 times daily     No current facility-administered medications for this visit.     ROS:  10 point ROS of systems including Constitutional, Eyes, Respiratory, Cardiovascular, Gastroenterology, Genitourinary, Integumentary, Musculoskeletal, Psychiatric were all negative except for pertinent positives noted in my HPI.    Vitals:  /82   Pulse 69   Temp 97.3  F (36.3  C)   Resp 18   Ht 1.778 m (5' 10\")   Wt 123.8 kg (273 lb)   SpO2 96%   BMI 39.17 kg/m    Exam:  GENERAL APPEARANCE:  Alert, in no distress at rest in bed  HEAD:  Normal, normocephalic, atraumatic  EYE EXAM: normal external eye, conjunctiva, lids, SELENA  NECK EXAM: supple, no JVD  CHEST/RESP:  respiratory effort normal, lung sounds CTA  , no respiratory distress  CV:  Rate reg, rhythm reg, no murmur, trace peripheral edema R>L  M/S:   extremities normal, gait abnormal-requires assist of staff for transfers, non-ambulatory, normal muscle tone, and range of motion normal  NEUROLOGIC EXAM: Cranial nerves 2-12 are normal tested and grossly at patient's baseline.  No tremor, gross motor movement at baseline.   PSYCH:  Alert and oriented to person-place-time , affect pleasant , judgement appropriate      Lab/Diagnostic data:  Recent labs in [facility] reviewed by me today. and Care Everywhere     ASSESSMENT/PLAN:  (S32.459D) Closed fracture of first lumbar vertebra with routine healing, unspecified fracture morphology, subsequent encounter  (primary encounter diagnosis)  Comment: patient with unstable L1 fracture, significant pain control issues.  Seen by Neurology and plans for TLSO when out of bed/head> 30 degrees and follow up " with Neurology in about 4-6 weeks (planned for 7/21). 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  No new symptoms   Plan: clarify end date of bactrim to today     (R09.02) Hypoxia  Comment: patient with acute hypoxia while inpatient.  No oxygen use needed now, but certainly at risk of pulmonary complications due to immobility and use of TLSO.   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  Last A1C was 7.2% in 2/2022 per [facility] records.  He reports poor appetite.  Diet controlled  Plan: consider routine blood sugar checks, none scheduled now, monitor po intake     (I10) Essential hypertension  Comment: patient with history of hypertension, previously on lasix 20 daily, metoprolol 100 daily.  Trending hypotension in hospital so lasix was discontinued and beta blocker decreased.  BP still trending low 100's systolic, minimal LE edema  Plan:   -monitor BP, edema  -consider resumption of PTA medications as needed    (I48.20) Chronic atrial fibrillation (H)  (Z79.01) Long term current use of anticoagulant therapy  Comment: patient with history of afib on beta blocker and apixaban.  HR regular today   Plan: continue current plan     Orders:  - discontinue bactrim-completed for UTI  - Clarify senna s to 1 tab BID Po for constipation   -  discontinue prn extra strength tylenol   - extra strength tylenol 1000 mg po TID for pain  - Discontinue prn icy hot  - Icy Hot patch at bedtime to muscle spasms on low back     Total time spent with patient visit at the skilled nursing facility was 37 min including patient visit and review of past records. Greater than 50% of total time spent with counseling and coordinating care due to tenuous situation with patient at high risk for complications due to multiple medical comorbid conditions, significant pain control issues, decreased mobility, and counseling regarding coordination of care and care planning in skilled nursing facility as well as expected length of stay, therapy services, and discharge planning, with patient's goal to return home when goals have been met .     Electronically signed by: CHUCK Ozuna CNP        Sincerely,        CHUCK Ozuna CNP

## 2022-06-22 RX ORDER — AMOXICILLIN 250 MG
1 CAPSULE ORAL 2 TIMES DAILY
COMMUNITY
End: 2022-07-08

## 2022-06-23 ENCOUNTER — DOCUMENTATION ONLY (OUTPATIENT)
Dept: OTHER | Facility: CLINIC | Age: 87
End: 2022-06-23

## 2022-06-27 ENCOUNTER — TRANSITIONAL CARE UNIT VISIT (OUTPATIENT)
Dept: GERIATRICS | Facility: CLINIC | Age: 87
End: 2022-06-27
Payer: COMMERCIAL

## 2022-06-27 VITALS
HEART RATE: 50 BPM | RESPIRATION RATE: 16 BRPM | OXYGEN SATURATION: 91 % | TEMPERATURE: 97.8 F | SYSTOLIC BLOOD PRESSURE: 103 MMHG | DIASTOLIC BLOOD PRESSURE: 66 MMHG | WEIGHT: 255.8 LBS | HEIGHT: 70 IN | BODY MASS INDEX: 36.62 KG/M2

## 2022-06-27 DIAGNOSIS — G89.18 ACUTE POST-OPERATIVE PAIN: ICD-10-CM

## 2022-06-27 DIAGNOSIS — R11.0 NAUSEA: Primary | ICD-10-CM

## 2022-06-27 PROCEDURE — 99308 SBSQ NF CARE LOW MDM 20: CPT | Performed by: NURSE PRACTITIONER

## 2022-06-27 RX ORDER — PROCHLORPERAZINE MALEATE 5 MG
5 TABLET ORAL EVERY 6 HOURS PRN
COMMUNITY
End: 2022-07-27

## 2022-06-27 NOTE — PROGRESS NOTES
"[facility]    Chief Complaint   Patient presents with     RECHECK     HPI:  Alvin Newton is a 88 year old  (5/10/1934), who is being seen today for an episodic care visit at: [facility] ([facility] [[id number]]. Today's concern is:      Nausea  Acute post-operative pain     Patient has been having nausea all weekend. Barely keeping any food or medications down. Patient reports that nothing seems to help, zofran helps sometimes. Nursing home EHR shows patient is using the oxycodone for pain. Patient denies blood in emesis and stool. Reports small but recent bowel movement. Is passing gas.     Allergies, and PMH/PSH reviewed in [facility] today.  REVIEW OF SYSTEMS:  4 point ROS including Respiratory, CV, GI and , other than that noted in the HPI,  is negative    Objective:   /66   Pulse 50   Temp 97.8  F (36.6  C)   Resp 16   Ht 1.778 m (5' 10\")   Wt 116 kg (255 lb 12.8 oz)   SpO2 91%   BMI 36.70 kg/m    General: alert in no distress.  Abd: deferred. Patient is sitting up in the chair with his TLSO brace on    Assessment/Plan:     Nausea  Acute post-operative pain     -Compazine 5 mg p.o. twice daily with breakfast and supper x 5 days and 5 mg p.o. every 8 hours as needed for nausea x 14 days.     Electronically signed by: Sindhu Patel NP       "

## 2022-06-27 NOTE — LETTER
"    6/27/2022        RE: Alvin Newton  [address] [facility] [address]        [facility]    Chief Complaint   Patient presents with     RECHECK     HPI:  Alvin Newton is a 88 year old  (5/10/1934), who is being seen today for an episodic care visit at: [facility] ([facility]) [[id number]]. Today's concern is:      Nausea  Acute post-operative pain     Patient has been having nausea all weekend. Barely keeping any food or medications down. Patient reports that nothing seems to help, zofran helps sometimes. Nursing home EHR shows patient is using the oxycodone for pain. Patient denies blood in emesis and stool. Reports small but recent bowel movement. Is passing gas.     Allergies, and PMH/PSH reviewed in EPIC today.  REVIEW OF SYSTEMS:  4 point ROS including Respiratory, CV, GI and , other than that noted in the HPI,  is negative    Objective:   /66   Pulse 50   Temp 97.8  F (36.6  C)   Resp 16   Ht 1.778 m (5' 10\")   Wt 116 kg (255 lb 12.8 oz)   SpO2 91%   BMI 36.70 kg/m    General: alert in no distress.  Abd: deferred. Patient is sitting up in the chair with his TLSO brace on    Assessment/Plan:     Nausea  Acute post-operative pain     -Compazine 5 mg p.o. twice daily with breakfast and supper x 5 days and 5 mg p.o. every 8 hours as needed for nausea x 14 days.     Electronically signed by: Sindhu Patel NP             Sincerely,        Sindhu Patel NP      "

## 2022-06-28 ENCOUNTER — TRANSITIONAL CARE UNIT VISIT (OUTPATIENT)
Dept: GERIATRICS | Facility: CLINIC | Age: 87
End: 2022-06-28
Payer: COMMERCIAL

## 2022-06-28 VITALS
HEART RATE: 58 BPM | TEMPERATURE: 97.7 F | OXYGEN SATURATION: 95 % | SYSTOLIC BLOOD PRESSURE: 111 MMHG | HEIGHT: 70 IN | DIASTOLIC BLOOD PRESSURE: 71 MMHG | RESPIRATION RATE: 18 BRPM | BODY MASS INDEX: 37.68 KG/M2 | WEIGHT: 263.2 LBS

## 2022-06-28 DIAGNOSIS — R11.0 NAUSEA: ICD-10-CM

## 2022-06-28 DIAGNOSIS — I48.20 CHRONIC ATRIAL FIBRILLATION (H): ICD-10-CM

## 2022-06-28 DIAGNOSIS — S32.019D CLOSED FRACTURE OF FIRST LUMBAR VERTEBRA WITH ROUTINE HEALING, UNSPECIFIED FRACTURE MORPHOLOGY, SUBSEQUENT ENCOUNTER: Primary | ICD-10-CM

## 2022-06-28 DIAGNOSIS — G89.18 ACUTE POST-OPERATIVE PAIN: ICD-10-CM

## 2022-06-28 DIAGNOSIS — I10 ESSENTIAL HYPERTENSION: ICD-10-CM

## 2022-06-28 PROCEDURE — 99309 SBSQ NF CARE MODERATE MDM 30: CPT | Performed by: NURSE PRACTITIONER

## 2022-06-28 NOTE — PROGRESS NOTES
[facility] GERIATRICS  Primary Care Provider & Clinic: Steven Duane Semmler, MD, [facility] [address] [facility] [address]  Chief Complaint   [facility]     [facility]     [place] Medical Record Number: [id number]  Place of Service Where Encounter Took Place: [facility] [[id number]]    Alvin Newton is a 88 year old (5/10/1934), admitted to the above facility from  [facility]. Hospital stay 6/6/22 through 6/20/22.  Patient's living condition: lives with spouse    HPI:    Alvin Newton has a past medical history of afib, hypertension, hyperlipidemia, NSTEMI in 2017, DM2.  S/he was admitted to the hospital after losing his balance trying to get off a bar-height stool and found to have L1 unstable fracture.  S/he underwent conservative management with pain control, TLSO . The hospital stay was complicated with UTI-treated with bactrim ds, acute hypoxia.  Medication changes:  -  Added calcitonin, gabapentin, methocarbamol, zofran, oxycodone, bactrim ds  -Lasix and metoprolol changed/decreased due to hypotension (PTA dose lasix 20 mg daily, metoprolol  daily)- will need close monitoring   Follow up needs:  --neurosurgery clinic at [facility] in 4-6 weeks with upright thoracic and lumbar xrays prior at clinic  --TLSO when HOB >30 degrees or out of bed     Updates since admission to TCU:  6/21 - discontinue bactrim-completed for UTI- Clarify senna s to 1 tab BID Po for constipation - discontinue prn extra strength tylenol - extra strength tylenol 1000 mg po TID for pain  - Icy Hot patch at bedtime to muscle spasms on low back   6/27 -Compazine 5 mg p.o. twice daily with breakfast and supper x 5 days and 5 mg p.o. every 8 hours as needed for nausea x 14 days.     TODAY: wife and dtr present for visit. Patient reports nausea is much better today. Pain is under good control with oral medications. Denies  "having muscle spasm. Family expresses concerns over his lack of appetite and nutrition. Nursing home EHR shows a few pounds of weight loss and noted b/ps and pulse have been on lower side. Therapy reports patient has minimal shoulder ROM and is not able to manage his ADL's on his own while in the TLSO brace. Able to ambulate 200 feet with CGA.     ALLERGIES: Patient has no known allergies.  Past Medical, Surgical, Family and Social History reviewed and updated in EPIC.  Medication list and progress notes reviewed in nursing home electronic health record    ROS:  4 point ROS including Respiratory, CV, GI and , other than that noted in the HPI,  is negative    Vitals:  /71   Pulse 58   Temp 97.7  F (36.5  C)   Resp 18   Ht 1.778 m (5' 10\")   Wt 119.4 kg (263 lb 3.2 oz)   SpO2 95%   BMI 37.77 kg/m    Exam:  GENERAL APPEARANCE:  Alert, in no distress  CHEST/RESP:  respiratory effort normal,  CV:   trace peripheral edema   M/S: TLSO brace on, observed in recliner - moves toes. Limited ROM of shoulders at baseline.   PSYCH:  Alert and oriented to person-place-time , affect pleasant , judgement appropriate      ASSESSMENT/PLAN:  Closed fracture of first lumbar vertebra with routine healing, unspecified fracture morphology, subsequent encounter  patient with unstable L1 fracture with significant pain control issues. requires TSLO while up in chair. Reviewed anotomy and pathophys of the injury and reason for TLSO along with expected recover with family to the best of my ability. - TLSO per neurology wheneber hob > 30 deg.  - follow up with Neurology in about 4-6 weeks (planned for 7/21).   - pain medications adjusted last week and pain is better.  - continue Physical Therapy / Ocupational Therapy   -  following for discharge planning     Nausea  Improving with addition of scheduled compazine. Advised patient that the compazine is scheduled for 5 days and after that he will need to ask for it. He " can also ask for additional if needed.    Acute post-operative pain  Improving.  No changes    Essential hypertension  Home regimen was lasix 20 daily and metoprolol 100 daily.  Trending hypotension in hospital so lasix was discontinued and beta blocker decreased.    Seems a little more edema today. But b/ps and HR lower than needed.   - continue without lasix for now  - decrease metoprolol.     Chronic atrial fibrillation (H)  HR lower than needed.   - decrease metoprolol and monitor.     Orders:  - change methocarbamol to 500 mg po QID and 500 mg po QID PRN  - decrease metoprolol succinate to 50 mg po every day dx htn    Electronically signed by: Sindhu Patel NP

## 2022-06-28 NOTE — LETTER
6/28/2022        RE: Alvin Newton  [address] [facility] [address]        [facility] GERIATRICS  Primary Care Provider & Clinic: Steven Duane Semmler, MD, [facility] [address] [facility] [address]  Chief Complaint   Patient presents with     RECHECK     [place] Medical Record Number: [id number]  Place of Service Where Encounter Took Place: [facility] (TCU) [[id number]]    Alvin Newton is a 88 year old (5/10/1934), admitted to the above facility from  [facility]. Hospital stay 6/6/22 through 6/20/22.  Patient's living condition: lives with spouse    HPI:    Alvin Newton has a past medical history of afib, hypertension, hyperlipidemia, NSTEMI in 2017, DM2.  S/he was admitted to the hospital after losing his balance trying to get off a bar-height stool and found to have L1 unstable fracture.  S/he underwent conservative management with pain control, TLSO . The hospital stay was complicated with UTI-treated with bactrim ds, acute hypoxia.  Medication changes:  -  Added calcitonin, gabapentin, methocarbamol, zofran, oxycodone, bactrim ds  -Lasix and metoprolol changed/decreased due to hypotension (PTA dose lasix 20 mg daily, metoprolol  daily)- will need close monitoring   Follow up needs:  --neurosurgery clinic at [facility] in 4-6 weeks with upright thoracic and lumbar xrays prior at clinic  --TLSO when HOB >30 degrees or out of bed     Updates since admission to TCU:  6/21 - discontinue bactrim-completed for UTI- Clarify senna s to 1 tab BID Po for constipation - discontinue prn extra strength tylenol - extra strength tylenol 1000 mg po TID for pain  - Icy Hot patch at bedtime to muscle spasms on low back   6/27 -Compazine 5 mg p.o. twice daily with breakfast and supper x 5 days and 5 mg p.o. every 8 hours as needed for nausea x 14 days.     TODAY: wife and dtr present for visit. Patient  "reports nausea is much better today. Pain is under good control with oral medications. Denies having muscle spasm. Family expresses concerns over his lack of appetite and nutrition. Nursing home EHR shows a few pounds of weight loss and noted b/ps and pulse have been on lower side. Therapy reports patient has minimal shoulder ROM and is not able to manage his ADL's on his own while in the TLSO brace. Able to ambulate 200 feet with CGA.     ALLERGIES: Patient has no known allergies.  Past Medical, Surgical, Family and Social History reviewed and updated in EPIC.  Medication list and progress notes reviewed in nursing home electronic health record    ROS:  4 point ROS including Respiratory, CV, GI and , other than that noted in the HPI,  is negative    Vitals:  /71   Pulse 58   Temp 97.7  F (36.5  C)   Resp 18   Ht 1.778 m (5' 10\")   Wt 119.4 kg (263 lb 3.2 oz)   SpO2 95%   BMI 37.77 kg/m    Exam:  GENERAL APPEARANCE:  Alert, in no distress  CHEST/RESP:  respiratory effort normal,  CV:   trace peripheral edema   M/S: TLSO brace on, observed in recliner - moves toes. Limited ROM of shoulders at baseline.   PSYCH:  Alert and oriented to person-place-time , affect pleasant , judgement appropriate      ASSESSMENT/PLAN:  Closed fracture of first lumbar vertebra with routine healing, unspecified fracture morphology, subsequent encounter  patient with unstable L1 fracture with significant pain control issues. requires TSLO while up in chair. Reviewed anotomy and pathophys of the injury and reason for TLSO along with expected recover with family to the best of my ability. - TLSO per neurology wheneber hob > 30 deg.  - follow up with Neurology in about 4-6 weeks (planned for 7/21).   - pain medications adjusted last week and pain is better.  - continue Physical Therapy / Ocupational Therapy   -  following for discharge planning     Nausea  Improving with addition of scheduled compazine. Advised " patient that the compazine is scheduled for 5 days and after that he will need to ask for it. He can also ask for additional if needed.    Acute post-operative pain  Improving.  No changes    Essential hypertension  Home regimen was lasix 20 daily and metoprolol 100 daily.  Trending hypotension in hospital so lasix was discontinued and beta blocker decreased.    Seems a little more edema today. But b/ps and HR lower than needed.   - continue without lasix for now  - decrease metoprolol.     Chronic atrial fibrillation (H)  HR lower than needed.   - decrease metoprolol and monitor.     Orders:  - change methocarbamol to 500 mg po QID and 500 mg po QID PRN  - decrease metoprolol succinate to 50 mg po every day dx htn    Electronically signed by: Sidnhu Patel NP        Sincerely,        Sindhu Patel NP

## 2022-07-01 ENCOUNTER — TELEPHONE (OUTPATIENT)
Dept: NEUROSURGERY | Facility: CLINIC | Age: 87
End: 2022-07-01

## 2022-07-01 NOTE — TELEPHONE ENCOUNTER
Health Call Center    Phone Message    May a detailed message be left on voicemail: yes     Reason for Call:  Nancy nurse  from [facility] (TCU) called to speak to care team for clarification on TLSO Brace. Family / TCU looking for some more information about TLSO brace, wondering if patient can be up and put the brace on? Can he have it off when going to use the bathroom? Please call back to discuss p# [phone]    Action Taken: Message routed to:  [facility] ([facility]): [facility] neurosurg    Travel Screening: Not Applicable

## 2022-07-01 NOTE — TELEPHONE ENCOUNTER
[facility]    Phone Message    May a detailed message be left on voicemail: yes     Reason for Call: Other: Nancy return a call from [place] about Pt TLSO brace.   Please call Nancy back at [phone] to discuss.    Action Taken: Message routed to:  [facility] ([facility]): Neurosurgery    Travel Screening: Not Applicable

## 2022-07-06 ENCOUNTER — TRANSITIONAL CARE UNIT VISIT (OUTPATIENT)
Dept: GERIATRICS | Facility: CLINIC | Age: 87
End: 2022-07-06
Payer: COMMERCIAL

## 2022-07-06 VITALS
SYSTOLIC BLOOD PRESSURE: 118 MMHG | OXYGEN SATURATION: 98 % | RESPIRATION RATE: 18 BRPM | DIASTOLIC BLOOD PRESSURE: 69 MMHG | TEMPERATURE: 98.6 F | BODY MASS INDEX: 35.93 KG/M2 | WEIGHT: 251 LBS | HEART RATE: 62 BPM | HEIGHT: 70 IN

## 2022-07-06 VITALS
OXYGEN SATURATION: 97 % | SYSTOLIC BLOOD PRESSURE: 154 MMHG | HEIGHT: 70 IN | RESPIRATION RATE: 20 BRPM | TEMPERATURE: 97.7 F | WEIGHT: 251 LBS | DIASTOLIC BLOOD PRESSURE: 92 MMHG | HEART RATE: 65 BPM | BODY MASS INDEX: 35.93 KG/M2

## 2022-07-06 DIAGNOSIS — R11.0 NAUSEA: Primary | ICD-10-CM

## 2022-07-06 PROCEDURE — 99308 SBSQ NF CARE LOW MDM 20: CPT | Performed by: NURSE PRACTITIONER

## 2022-07-06 NOTE — LETTER
"    7/6/2022        RE: Alvin Newton  [address] [facility] [address]        [facility]    Chief Complaint   Patient presents with     RECHECK     HPI:  Alvin Newton is a 88 year old  (5/10/1934), who is being seen today for an episodic care visit at: [facility] ([facility]) [[id number]]. Today's concern is:   Nausea    Patient with recent history of nausea that improved with schecduled compazine. Today he present with nausea and vomiting for the last 2 days. He reports that he has been nauseated for a bout a week. During my last visit the nausea was better with the compazine. He denies abd pain, just bloating and loose stools.      Allergies, and PMH/PSH reviewed in EPIC today.  REVIEW OF SYSTEMS:  4 point ROS including Respiratory, CV, GI and , other than that noted in the HPI,  is negative    Objective:   BP (!) 154/92   Pulse 65   Temp 97.7  F (36.5  C)   Resp 20   Ht 1.778 m (5' 10\")   Wt 113.9 kg (251 lb)   SpO2 97%   BMI 36.01 kg/m    General: alert. In no distress. Sitting up in recliner with TLSO brace on.     Assessment/Plan:  Nausea  Compazine 5 mg po BID x 5 days  Compazine 5 mg po BID x 14 days     - follow up on Friday.   Time 15 minutes    Electronically signed by: Sindhu Patel NP             Sincerely,        Sindhu Patel NP      "

## 2022-07-06 NOTE — TELEPHONE ENCOUNTER
Writer returned call to Nancy [facility].   Provided information on TLSO brace. Call returned 07/06/2022     Tomeka Chu LPN  Neurosurgery

## 2022-07-06 NOTE — PROGRESS NOTES
"[place] GERIATRIC SERVICES  PRIMARY CARE PROVIDER AND CLINIC:  Steven Duane Semmler, MD, [facility] [address] [facility]*  Chief Complaint   Patient presents with     Hospital F/U     [place] Medical Record Number:  [id number]  Place of Service where encounter took place:  [facility] (TCU) [[id number]]    Alvin Newton  is a 88 year old  (5/10/1934), admitted to the above facility from  [facility]. Hospital stay 6/6/22 through 6/20/22..  Admitted to this facility for  rehab, medical management and nursing care.    HPI:    HPI information obtained from: facility chart records, facility staff, patient report, [facility] chart review and Care Everywhere Epic chart review. Received verbal consent to use Care Everywhere in order to access labs, documents, histories, and all other needed information to provide care at current facility.    Brief Summary of Hospital Course:   * Pt with pmh notable for  A-fib, CAD, Essential HTN, had a mechanical fall, resulted in L1 unstable fx, managed conservatively.   * for optimal analgesia, gabapentin, oxycodone, methocarbamol and calcitonin were added.   * hospital stay c/w: HoTN (Lasix 20 mg and toprol xl 100 mg reduced); and acute cystitis treated with Bactrim DS.       Today:  - L1 fx: pt rpeorts has pain at certain time when he works up and with putting  The brace.  In genearl aggravated wit hmovmetns, better with rest and med.s sleeps in the bed, and no pain unless he rolls over.      - rehab: reports it is going ok. At first jsut walked him in the arguello way but not doing more, walked on the bike for 10 minutes. 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  Asked if     - HTN / Afib:  Denies CP or SOB with reahb, or palpitation    - Nausea: : reports yesterday and today is decent, he does not eat much, and was able to keep " it donw.  Denies pain in the stomach. Feels his nause is due to many pills, does not have a gall bladder.     ================================    CODE STATUS/ADVANCE DIRECTIVES DISCUSSION:   CPR/Full code   Patient's living condition: lives with spouse  ALLERGIES: Patient has no known allergies.  PAST MEDICAL HISTORY:  has a past medical history of Colonic diverticulum, Hyperlipidemia, Hypertension, Obesity (BMI 30-39.9), Osteoarthritis, and Type 2 diabetes mellitus (H).  PAST SURGICAL HISTORY:   has a past surgical history that includes Transrectal Ultrasonic, Transurethral Resection (TUR) Of Prostate Cyst (); Arthroplasty hip bilateral (); ESOPHAGOSCOPY, DIAGNOSTIC (); and Laparoscopic cholecystectomy (N/A, 2017).  FAMILY HISTORY:   Medical History Relation Name Comments   Heart Disease Brother 3       Cancer Brother 4       Hypertension Father        Stroke Father        Arthritis Mother        Hypertension Mother        Osteoporosis Mother        Stroke Mother        Heart Disease Sister 2       Osteoporosis Sister 3         Family History  Relation Name Status Comments   Brother 1    (Age 86) Heart   Brother 2    (Age 63) Cancer   Brother 3         Brother 4         Father     (Age 72) Stroke   Mother     (Age 84) Stroke   Sister 1    (Age 85) Hea       SOCIAL HISTORY:   reports that he has never smoked. He has never used smokeless tobacco. He reports current alcohol use. He reports that he does not use drugs.    Post Discharge Medication Reconciliation Status: discharge medications reconciled and changed, per note/orders  Current Outpatient Medications   Medication Sig Dispense Refill     acetaminophen (TYLENOL) 500 MG tablet Take 1,000 mg by mouth 3 times daily       atorvastatin (LIPITOR) 20 MG tablet Take 20 mg by mouth At Bedtime       calcitonin, salmon, (MIACALCIN) 200 UNIT/ACT nasal spray Spray 1 spray into one nostril alternating nostrils daily  "Alternate nostril each day.       cholecalciferol 50 MCG (2000 UT) CAPS Take 2,000 Units by mouth daily       ELIQUIS ANTICOAGULANT 5 MG tablet Take 5 mg by mouth 2 times daily       gabapentin (NEURONTIN) 100 MG capsule Take 1 capsule (100 mg) by mouth 2 times daily       melatonin 5 MG tablet Take 1 tablet (5 mg) by mouth At Bedtime       menthol (ICY HOT) 5 % PTCH Apply 1 patch topically At Bedtime To low back       methocarbamol (ROBAXIN) 500 MG tablet Take 2 tablets (1,000 mg) by mouth 4 times daily       metoprolol succinate ER (TOPROL XL) 25 MG 24 hr tablet Take 3 tablets (75 mg) by mouth daily       miconazole (MICATIN) 2 % external powder Apply topically 2 times daily       Multiple Vitamin (MULTIVITAMINS PO) Take 1 chew tab by mouth daily       ondansetron (ZOFRAN ODT) 4 MG ODT tab Take 1 tablet (4 mg) by mouth every 6 hours as needed for nausea or vomiting       oxyCODONE (ROXICODONE) 5 MG tablet Take 1-2 tablets (5-10 mg) by mouth every 3 hours as needed for moderate to severe pain 35 tablet 0     polyethylene glycol (MIRALAX) 17 GM/Dose powder Take 17 g by mouth daily 510 g      prochlorperazine (COMPAZINE) 5 MG tablet Take 5 mg by mouth every 6 hours as needed       senna-docusate (SENOKOT-S/PERICOLACE) 8.6-50 MG tablet Take 1 tablet by mouth 2 times daily       ROS:  10 point ROS of systems including Constitutional, Eyes, Respiratory, Cardiovascular, Gastroenterology, Genitourinary, Integumentary, Musculoskeletal, Psychiatric were all negative except for pertinent positives noted in my HPI.    Vitals:  /69   Pulse 62   Temp 98.6  F (37  C)   Resp 18   Ht 1.778 m (5' 10\")   Wt 113.9 kg (251 lb)   SpO2 98%   BMI 36.01 kg/m    Exam:  GENERAL APPEARANCE:  in no distress, cooperative  ENT:  Mouth and posterior oropharynx normal, moist mucous membranes, oral mucosa moist, no lesion noted.   EYES:  EOMI, Pupil rounded and equal.  RESP:  lungs clear to auscultation   CV:  S1S2 audible, regular " HR, no murmur appreciated.   ABDOMEN:  soft, NT/ND, BS audible. no mass appreciated on palpation.   M/S:   TLSO in place.   SKIN:  No rash.   NEURO:   No NFD appreciated on observation  PSYCH: affect and mood normal      Lab/Diagnostic data: Reviewed in the chart and EHR.        ASSESSMENT/PLAN:  -----------------------------------  * Pt with pmh notable for  Chronic A-fib (on BB and Eliquis), CAD, Essential HTN, had a mechanical fall, resulted in L1 unstable fx, managed conservatively.   * for optimal analgesia, gabapentin, oxycodone, methocarbamol and calcitonin were added.   * hospital stay c/w: HoTN (Lasix 20 mg and toprol xl 100 mg reduced); and acute cystitis treated with Bactrim DS; and transient  hypoxia    - Started rehab program, making a progress, continue until desired goal is achieved.   - Analgesia optimal  - Followed by neurosurgery Team.   - BP  Is better.   - continue eliquis. CVR on metoprlol  - completed abx. No concern.         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   -  In this frail eldelry adult with a limited life expectancy, the goal of  the management is to address the hyperglycemia sx if any,  rather than the  BGs numbers per se.       Intractable nausea: started on compazine prn. QTc is 458, borderline high.       Electronically signed by:  Michele Dai MD

## 2022-07-06 NOTE — TELEPHONE ENCOUNTER
[facility] Call [facility]    Phone Message    May a detailed message be left on voicemail: yes     Reason for Call: Other: Nancy is looking for call back regarding patients TLSO brace. Please see previous messages. Please call her at [phone].    Action Taken: Message routed to:  [facility] ([facility]): neurosurgery    Travel Screening: Not Applicable

## 2022-07-07 ENCOUNTER — TRANSITIONAL CARE UNIT VISIT (OUTPATIENT)
Dept: GERIATRICS | Facility: CLINIC | Age: 87
End: 2022-07-07
Payer: COMMERCIAL

## 2022-07-07 DIAGNOSIS — E11.42 TYPE 2 DIABETES MELLITUS WITH DIABETIC POLYNEUROPATHY, WITHOUT LONG-TERM CURRENT USE OF INSULIN (H): ICD-10-CM

## 2022-07-07 DIAGNOSIS — I10 ESSENTIAL HYPERTENSION: ICD-10-CM

## 2022-07-07 DIAGNOSIS — S32.019D CLOSED FRACTURE OF FIRST LUMBAR VERTEBRA WITH ROUTINE HEALING, UNSPECIFIED FRACTURE MORPHOLOGY, SUBSEQUENT ENCOUNTER: ICD-10-CM

## 2022-07-07 DIAGNOSIS — R11.0 NAUSEA: ICD-10-CM

## 2022-07-07 DIAGNOSIS — I48.20 CHRONIC ATRIAL FIBRILLATION (H): Primary | ICD-10-CM

## 2022-07-07 PROCEDURE — 99305 1ST NF CARE MODERATE MDM 35: CPT | Performed by: FAMILY MEDICINE

## 2022-07-07 NOTE — PROGRESS NOTES
"[facility] GERIATRIC SERVICES    Chief Complaint   Patient presents with     RECHECK     HPI:  Alvin Newton is a 88 year old  (5/10/1934), who is being seen today for an episodic care visit at: [facility] ([facility]) [[id number]]. Today's concern is:   Nausea    Patient with recent history of nausea that improved with schecduled compazine. Today he present with nausea and vomiting for the last 2 days. He reports that he has been nauseated for a bout a week. During my last visit the nausea was better with the compazine. He denies abd pain, just bloating and loose stools.      Allergies, and PMH/PSH reviewed in EPIC today.  REVIEW OF SYSTEMS:  4 point ROS including Respiratory, CV, GI and , other than that noted in the HPI,  is negative    Objective:   BP (!) 154/92   Pulse 65   Temp 97.7  F (36.5  C)   Resp 20   Ht 1.778 m (5' 10\")   Wt 113.9 kg (251 lb)   SpO2 97%   BMI 36.01 kg/m    General: alert. In no distress. Sitting up in recliner with TLSO brace on.     Assessment/Plan:  Nausea  Compazine 5 mg po BID x 5 days  Compazine 5 mg po BID x 14 days     - follow up on Friday.   Time 15 minutes    Electronically signed by: Sindhu Patel NP       "

## 2022-07-07 NOTE — LETTER
"    7/7/2022        RE: Alvin Newton  [address] [address] [address]        [place] [facility]  PRIMARY CARE PROVIDER AND CLINIC:  Steven Duane Semmler, MD, [facility] [address] [facility]*  Chief Complaint   Patient presents with     Hospital F/U     [place] Medical Record Number:  [id number]  Place of Service where encounter took place:  [facility] (TCU) [[id number]]    Alvin Newton  is a 88 year old  (5/10/1934), admitted to the above facility from  [facility]. Hospital stay 6/6/22 through 6/20/22..  Admitted to this facility for  rehab, medical management and nursing care.    HPI:    HPI information obtained from: facility chart records, facility staff, patient report, [facility] chart review and Care Everywhere [facility] chart review. Received verbal consent to use Care Everywhere in order to access labs, documents, histories, and all other needed information to provide care at current facility.    Brief Summary of Hospital Course:   * Pt with pmh notable for  A-fib, CAD, Essential HTN, had a mechanical fall, resulted in L1 unstable fx, managed conservatively.   * for optimal analgesia, gabapentin, oxycodone, methocarbamol and calcitonin were added.   * hospital stay c/w: HoTN (Lasix 20 mg and toprol xl 100 mg reduced); and acute cystitis treated with Bactrim DS.       Today:  - L1 fx: pt rpeorts has pain at certain time when he works up and with putting  The brace.  In genearl aggravated wit hmovmetns, better with rest and med.s sleeps in the bed, and no pain unless he rolls over.      - rehab: reports it is going ok. At first jsut walked him in the arguello way but not doing more, walked on the bike for 10 minutes. 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  Asked if     - HTN / Afib:  Denies CP or SOB with reahb, or palpitation    - " Nausea: : reports yesterday and today is decent, he does not eat much, and was able to keep it donw.  Denies pain in the stomach. Feels his nause is due to many pills, does not have a gall bladder.     ================================    CODE STATUS/ADVANCE DIRECTIVES DISCUSSION:   CPR/Full code   Patient's living condition: lives with spouse  ALLERGIES: Patient has no known allergies.  PAST MEDICAL HISTORY:  has a past medical history of Colonic diverticulum, Hyperlipidemia, Hypertension, Obesity (BMI 30-39.9), Osteoarthritis, and Type 2 diabetes mellitus (H).  PAST SURGICAL HISTORY:   has a past surgical history that includes Transrectal Ultrasonic, Transurethral Resection (TUR) Of Prostate Cyst (); Arthroplasty hip bilateral (); ESOPHAGOSCOPY, DIAGNOSTIC (); and Laparoscopic cholecystectomy (N/A, 2017).  FAMILY HISTORY:   Medical History Relation Name Comments   Heart Disease Brother 3       Cancer Brother 4       Hypertension Father        Stroke Father        Arthritis Mother        Hypertension Mother        Osteoporosis Mother        Stroke Mother        Heart Disease Sister 2       Osteoporosis Sister 3         Family History  Relation Name Status Comments   Brother 1    (Age 86) Heart   Brother 2    (Age 63) Cancer   Brother 3         Brother 4         Father     (Age 72) Stroke   Mother     (Age 84) Stroke   Sister 1    (Age 85) Hea       SOCIAL HISTORY:   reports that he has never smoked. He has never used smokeless tobacco. He reports current alcohol use. He reports that he does not use drugs.    Post Discharge Medication Reconciliation Status: discharge medications reconciled and changed, per note/orders  Current Outpatient Medications   Medication Sig Dispense Refill     acetaminophen (TYLENOL) 500 MG tablet Take 1,000 mg by mouth 3 times daily       atorvastatin (LIPITOR) 20 MG tablet Take 20 mg by mouth At Bedtime       calcitonin, salmon,  "(MIACALCIN) 200 UNIT/ACT nasal spray Spray 1 spray into one nostril alternating nostrils daily Alternate nostril each day.       cholecalciferol 50 MCG (2000 UT) CAPS Take 2,000 Units by mouth daily       ELIQUIS ANTICOAGULANT 5 MG tablet Take 5 mg by mouth 2 times daily       gabapentin (NEURONTIN) 100 MG capsule Take 1 capsule (100 mg) by mouth 2 times daily       melatonin 5 MG tablet Take 1 tablet (5 mg) by mouth At Bedtime       menthol (ICY HOT) 5 % PTCH Apply 1 patch topically At Bedtime To low back       methocarbamol (ROBAXIN) 500 MG tablet Take 2 tablets (1,000 mg) by mouth 4 times daily       metoprolol succinate ER (TOPROL XL) 25 MG 24 hr tablet Take 3 tablets (75 mg) by mouth daily       miconazole (MICATIN) 2 % external powder Apply topically 2 times daily       Multiple Vitamin (MULTIVITAMINS PO) Take 1 chew tab by mouth daily       ondansetron (ZOFRAN ODT) 4 MG ODT tab Take 1 tablet (4 mg) by mouth every 6 hours as needed for nausea or vomiting       oxyCODONE (ROXICODONE) 5 MG tablet Take 1-2 tablets (5-10 mg) by mouth every 3 hours as needed for moderate to severe pain 35 tablet 0     polyethylene glycol (MIRALAX) 17 GM/Dose powder Take 17 g by mouth daily 510 g      prochlorperazine (COMPAZINE) 5 MG tablet Take 5 mg by mouth every 6 hours as needed       senna-docusate (SENOKOT-S/PERICOLACE) 8.6-50 MG tablet Take 1 tablet by mouth 2 times daily       ROS:  10 point ROS of systems including Constitutional, Eyes, Respiratory, Cardiovascular, Gastroenterology, Genitourinary, Integumentary, Musculoskeletal, Psychiatric were all negative except for pertinent positives noted in my HPI.    Vitals:  /69   Pulse 62   Temp 98.6  F (37  C)   Resp 18   Ht 1.778 m (5' 10\")   Wt 113.9 kg (251 lb)   SpO2 98%   BMI 36.01 kg/m    Exam:  GENERAL APPEARANCE:  in no distress, cooperative  ENT:  Mouth and posterior oropharynx normal, moist mucous membranes, oral mucosa moist, no lesion noted.   EYES:  " EOMI, Pupil rounded and equal.  RESP:  lungs clear to auscultation   CV:  S1S2 audible, regular HR, no murmur appreciated.   ABDOMEN:  soft, NT/ND, BS audible. no mass appreciated on palpation.   M/S:   TLSO in place.   SKIN:  No rash.   NEURO:   No NFD appreciated on observation  PSYCH: affect and mood normal      Lab/Diagnostic data: Reviewed in the chart and EHR.        ASSESSMENT/PLAN:  -----------------------------------  * Pt with pmh notable for  Chronic A-fib (on BB and Eliquis), CAD, Essential HTN, had a mechanical fall, resulted in L1 unstable fx, managed conservatively.   * for optimal analgesia, gabapentin, oxycodone, methocarbamol and calcitonin were added.   * hospital stay c/w: HoTN (Lasix 20 mg and toprol xl 100 mg reduced); and acute cystitis treated with Bactrim DS; and transient  hypoxia    - Started rehab program, making a progress, continue until desired goal is achieved.   - Analgesia optimal  - Followed by neurosurgery Team.   - BP  Is better.   - continue eliquis. CVR on metoprlol  - completed abx. No concern.         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   -  In this frail eldelry adult with a limited life expectancy, the goal of  the management is to address the hyperglycemia sx if any,  rather than the  BGs numbers per se.       Intractable nausea: started on compazine prn. QTc is 458, borderline high.       Electronically signed by:  Michele Dai MD               Sincerely,        Michele Dai MD

## 2022-07-08 ENCOUNTER — TRANSITIONAL CARE UNIT VISIT (OUTPATIENT)
Dept: GERIATRICS | Facility: CLINIC | Age: 87
End: 2022-07-08
Payer: COMMERCIAL

## 2022-07-08 VITALS
RESPIRATION RATE: 19 BRPM | SYSTOLIC BLOOD PRESSURE: 128 MMHG | TEMPERATURE: 98.3 F | OXYGEN SATURATION: 96 % | WEIGHT: 249.6 LBS | DIASTOLIC BLOOD PRESSURE: 72 MMHG | BODY MASS INDEX: 35.73 KG/M2 | HEART RATE: 65 BPM | HEIGHT: 70 IN

## 2022-07-08 DIAGNOSIS — S32.019D CLOSED FRACTURE OF FIRST LUMBAR VERTEBRA WITH ROUTINE HEALING, UNSPECIFIED FRACTURE MORPHOLOGY, SUBSEQUENT ENCOUNTER: Primary | ICD-10-CM

## 2022-07-08 DIAGNOSIS — I10 ESSENTIAL HYPERTENSION: ICD-10-CM

## 2022-07-08 DIAGNOSIS — G89.18 ACUTE POST-OPERATIVE PAIN: ICD-10-CM

## 2022-07-08 DIAGNOSIS — I48.20 CHRONIC ATRIAL FIBRILLATION (H): ICD-10-CM

## 2022-07-08 DIAGNOSIS — R11.0 NAUSEA: ICD-10-CM

## 2022-07-08 PROCEDURE — 99309 SBSQ NF CARE MODERATE MDM 30: CPT | Performed by: NURSE PRACTITIONER

## 2022-07-08 RX ORDER — METOPROLOL SUCCINATE 25 MG/1
50 TABLET, EXTENDED RELEASE ORAL DAILY
Status: ON HOLD
Start: 2022-07-08 | End: 2024-01-22

## 2022-07-08 NOTE — LETTER
7/8/2022        RE: Alvin Newton  [address] [facility] [address]        [facility]  Primary Care Provider & Clinic: Steven Duane Semmler, MD, [facility] [address] [facility] / [facility] [address]  Chief Complaint   Patient presents with     RECHECK     [place] Medical Record Number: [id number]  Place of Service Where Encounter Took Place: [facility] (TCU) [[id number]]    Alvin Newton is a 88 year old (5/10/1934), admitted to the above facility from  [facility]. Hospital stay 6/6/22 through 6/20/22.  Patient's living condition: lives with spouse    HPI:    Alvin Newton has a past medical history of afib, hypertension, hyperlipidemia, NSTEMI in 2017, DM2.  S/he was admitted to the hospital after losing his balance trying to get off a bar-height stool and found to have L1 unstable fracture.  S/he underwent conservative management with pain control, TLSO . The hospital stay was complicated with UTI-treated with bactrim ds, acute hypoxia.  Medication changes:  -  Added calcitonin, gabapentin, methocarbamol, zofran, oxycodone, bactrim ds  -Lasix and metoprolol changed/decreased due to hypotension (PTA dose lasix 20 mg daily, metoprolol  daily)- will need close monitoring   Follow up needs:  --neurosurgery clinic at [facility] in 4-6 weeks with upright thoracic and lumbar xrays prior at clinic  --TLSO when HOB >30 degrees or out of bed     Updates since admission to TCU:  6/21 - discontinue bactrim-completed for UTI- Clarify senna s to 1 tab BID Po for constipation - discontinue prn extra strength tylenol - extra strength tylenol 1000 mg po TID for pain  - Icy Hot patch at bedtime to muscle spasms on low back   6/27 -Compazine 5 mg p.o. twice daily with breakfast and supper x 5 days and 5 mg p.o. every 8 hours as needed for nausea x 14 days.   6/28 - change methocarbamol to 500 mg po QID and 500  "mg po QID PRN  - decrease metoprolol succinate to 50 mg po every day dx htn   -Therapy reports patient has minimal shoulder ROM and is not able to manage his ADL's on his own while in the TLSO brace. Able to ambulate 200 feet with CGA.     7/6 compazine 5 mg po BID added for nausea  7/7 MD visit    TODAY: nurse relays that patient has been refusing medications.  Patient reports that bloating and nausea are better since he stopped those medications.  He reports that he did ask for a oxycodone this morning.  Per nursing home EHR he is only requested oxycodone once in the last week.  He has been refusing several medications.    ALLERGIES: Patient has no known allergies.  Past Medical, Surgical, Family and Social History reviewed and updated in EPIC.  Medication list and progress notes reviewed in nursing home electronic health record    ROS:  4 point ROS including Respiratory, CV, GI and , other than that noted in the HPI,  is negative    Vitals:  /72   Pulse 65   Temp 98.3  F (36.8  C)   Resp 19   Ht 1.778 m (5' 10\")   Wt 113.2 kg (249 lb 9.6 oz)   SpO2 96%   BMI 35.81 kg/m    Exam:  GENERAL APPEARANCE:  Alert, in no distress  CHEST/RESP:  respiratory effort normal,  CV:   trace peripheral edema   M/S: TLSO brace on, observed in wheelchair- moves toes. Limited ROM of shoulders at baseline.   PSYCH:  Alert and oriented to person-place-time , affect pleasant , judgement appropriate      ASSESSMENT/PLAN:  Closed fracture of first lumbar vertebra with routine healing, unspecified fracture morphology, subsequent encounter  patient with unstable L1 fracture with significant pain control issues. requires TSLO while up in chair. Reviewed anotomy and pathophys of the injury and reason for TLSO along with expected recover with family to the best of my ability. - TLSO per neurology wheneber hob > 30 deg.  - follow up with Neurology in about 4-6 weeks (planned for 7/21).   -Continue as needed oxycodone   - continue " Physical Therapy / Ocupational Therapy   -  following for discharge planning     Nausea  Improving now.     Acute post-operative pain  Improving.  Discontinue methocarbamol scheduled and as needed.  Continue oxycodone as needed.  Continue Tylenol    Essential hypertension  Home regimen was lasix 20 daily and metoprolol 100 daily.  Trending hypotension in hospital so lasix was discontinued and beta blocker decreased.  Very little edema today.  Heart rate and BP good.  Metoprolol dose decreased further to 75 mg daily.  - continue without lasix for now    Chronic atrial fibrillation (H)  Controlled with metoprolol and for stroke prevention    Orders:  Discontinue MiraLAX, multivitamin, melatonin, gabapentin, senna S, methocarbamol    Electronically signed by: Sindhu Patel NP        Sincerely,        Sindhu Patel NP

## 2022-07-08 NOTE — PROGRESS NOTES
[facility] GERIATRICS  Primary Care Provider & Clinic: Steven Duane Semmler, MD, [facility] [address] [facility] [address]  Chief Complaint   Patient presents with     JESUSECK     [place] Medical Record Number: [id number]  Place of Service Where Encounter Took Place: [facility] ([facility] [[id number]]    Alvin Newton is a 88 year old (5/10/1934), admitted to the above facility from  [facility]. Hospital stay 6/6/22 through 6/20/22.  Patient's living condition: lives with spouse    HPI:    Alvin Newton has a past medical history of afib, hypertension, hyperlipidemia, NSTEMI in 2017, DM2.  S/he was admitted to the hospital after losing his balance trying to get off a bar-height stool and found to have L1 unstable fracture.  S/he underwent conservative management with pain control, TLSO . The hospital stay was complicated with UTI-treated with bactrim ds, acute hypoxia.  Medication changes:  -  Added calcitonin, gabapentin, methocarbamol, zofran, oxycodone, bactrim ds  -Lasix and metoprolol changed/decreased due to hypotension (PTA dose lasix 20 mg daily, metoprolol  daily)- will need close monitoring   Follow up needs:  --neurosurgery clinic at [facility] in 4-6 weeks with upright thoracic and lumbar xrays prior at clinic  --TLSO when HOB >30 degrees or out of bed     Updates since admission to TCU:  6/21 - discontinue bactrim-completed for UTI- Clarify senna s to 1 tab BID Po for constipation - discontinue prn extra strength tylenol - extra strength tylenol 1000 mg po TID for pain  - Icy Hot patch at bedtime to muscle spasms on low back   6/27 -Compazine 5 mg p.o. twice daily with breakfast and supper x 5 days and 5 mg p.o. every 8 hours as needed for nausea x 14 days.   6/28 - change methocarbamol to 500 mg po QID and 500 mg po QID PRN  - decrease metoprolol succinate to 50 mg po every day dx htn   -Therapy reports  "patient has minimal shoulder ROM and is not able to manage his ADL's on his own while in the TLSO brace. Able to ambulate 200 feet with CGA.     7/6 compazine 5 mg po BID added for nausea  7/7 MD visit    TODAY: nurse relays that patient has been refusing medications.  Patient reports that bloating and nausea are better since he stopped those medications.  He reports that he did ask for a oxycodone this morning.  Per nursing home EHR he is only requested oxycodone once in the last week.  He has been refusing several medications.    ALLERGIES: Patient has no known allergies.  Past Medical, Surgical, Family and Social History reviewed and updated in EPIC.  Medication list and progress notes reviewed in nursing home electronic health record    ROS:  4 point ROS including Respiratory, CV, GI and , other than that noted in the HPI,  is negative    Vitals:  /72   Pulse 65   Temp 98.3  F (36.8  C)   Resp 19   Ht 1.778 m (5' 10\")   Wt 113.2 kg (249 lb 9.6 oz)   SpO2 96%   BMI 35.81 kg/m    Exam:  GENERAL APPEARANCE:  Alert, in no distress  CHEST/RESP:  respiratory effort normal,  CV:   trace peripheral edema   M/S: TLSO brace on, observed in wheelchair- moves toes. Limited ROM of shoulders at baseline.   PSYCH:  Alert and oriented to person-place-time , affect pleasant , judgement appropriate      ASSESSMENT/PLAN:  Closed fracture of first lumbar vertebra with routine healing, unspecified fracture morphology, subsequent encounter  patient with unstable L1 fracture with significant pain control issues. requires TSLO while up in chair. Reviewed anotomy and pathophys of the injury and reason for TLSO along with expected recover with family to the best of my ability. - TLSO per neurology wheneber hob > 30 deg.  - follow up with Neurology in about 4-6 weeks (planned for 7/21).   -Continue as needed oxycodone   - continue Physical Therapy / Ocupational Therapy   -  following for discharge planning "     Nausea  Improving now.     Acute post-operative pain  Improving.  Discontinue methocarbamol scheduled and as needed.  Continue oxycodone as needed.  Continue Tylenol    Essential hypertension  Home regimen was lasix 20 daily and metoprolol 100 daily.  Trending hypotension in hospital so lasix was discontinued and beta blocker decreased.  Very little edema today.  Heart rate and BP good.  Metoprolol dose decreased further to 75 mg daily.  - continue without lasix for now    Chronic atrial fibrillation (H)  Controlled with metoprolol and for stroke prevention    Orders:  Discontinue MiraLAX, multivitamin, melatonin, gabapentin, senna S, methocarbamol    Electronically signed by: Sindhu Patel NP

## 2022-07-12 ENCOUNTER — TRANSITIONAL CARE UNIT VISIT (OUTPATIENT)
Dept: GERIATRICS | Facility: CLINIC | Age: 87
End: 2022-07-12
Payer: COMMERCIAL

## 2022-07-12 VITALS
SYSTOLIC BLOOD PRESSURE: 120 MMHG | DIASTOLIC BLOOD PRESSURE: 67 MMHG | HEART RATE: 66 BPM | RESPIRATION RATE: 16 BRPM | BODY MASS INDEX: 35.43 KG/M2 | TEMPERATURE: 97.2 F | OXYGEN SATURATION: 97 % | WEIGHT: 247.5 LBS | HEIGHT: 70 IN

## 2022-07-12 DIAGNOSIS — I48.20 CHRONIC ATRIAL FIBRILLATION (H): ICD-10-CM

## 2022-07-12 DIAGNOSIS — I10 ESSENTIAL HYPERTENSION: ICD-10-CM

## 2022-07-12 DIAGNOSIS — G89.18 ACUTE POST-OPERATIVE PAIN: ICD-10-CM

## 2022-07-12 DIAGNOSIS — S32.019D CLOSED FRACTURE OF FIRST LUMBAR VERTEBRA WITH ROUTINE HEALING, UNSPECIFIED FRACTURE MORPHOLOGY, SUBSEQUENT ENCOUNTER: Primary | ICD-10-CM

## 2022-07-12 DIAGNOSIS — R11.0 NAUSEA: ICD-10-CM

## 2022-07-12 PROCEDURE — 99309 SBSQ NF CARE MODERATE MDM 30: CPT | Performed by: NURSE PRACTITIONER

## 2022-07-12 RX ORDER — OMEPRAZOLE 40 MG/1
40 CAPSULE, DELAYED RELEASE ORAL DAILY
Status: ON HOLD | COMMUNITY
End: 2023-07-12

## 2022-07-12 NOTE — LETTER
7/12/2022        RE: Alvin Newton  [address] [facility] [address]        [facility] GERIATRICS  Primary Care Provider & Clinic: Steven Duane Semmler, MD, [facility] [address] [facility] [address]  Chief Complaint   Patient presents with     RECHECK     [place] Medical Record Number: [id number]  Place of Service Where Encounter Took Place: [facility] (TCU) [[id number]]    Alvin Newton is a 88 year old (5/10/1934), admitted to the above facility from  [facility]. Hospital stay 6/6/22 through 6/20/22.  Patient's living condition: lives with spouse    HPI:    Alvin Newton has a past medical history of afib, hypertension, hyperlipidemia, NSTEMI in 2017, DM2.  S/he was admitted to the hospital after losing his balance trying to get off a bar-height stool and found to have L1 unstable fracture.  S/he underwent conservative management with pain control, TLSO . The hospital stay was complicated with UTI-treated with bactrim ds, acute hypoxia.  Medication changes:  -  Added calcitonin, gabapentin, methocarbamol, zofran, oxycodone, bactrim ds  -Lasix and metoprolol changed/decreased due to hypotension (PTA dose lasix 20 mg daily, metoprolol  daily)- will need close monitoring   Follow up needs:  --neurosurgery clinic at [facility] in 4-6 weeks with upright thoracic and lumbar xrays prior at clinic  --TLSO when HOB >30 degrees or out of bed     Updates since admission to TCU:  6/21 - discontinue bactrim-completed for UTI- Clarify senna s to 1 tab BID Po for constipation - discontinue prn extra strength tylenol - extra strength tylenol 1000 mg po TID for pain  - Icy Hot patch at bedtime to muscle spasms on low back   6/27 -Compazine 5 mg p.o. twice daily with breakfast and supper x 5 days and 5 mg p.o. every 8 hours as needed for nausea x 14 days.   6/28 - change methocarbamol to 500 mg po QID and 500  "mg po QID PRN  - decrease metoprolol succinate to 50 mg po every day dx htn   -Therapy reports patient has minimal shoulder ROM and is not able to manage his ADL's on his own while in the TLSO brace. Able to ambulate 200 feet with CGA.     7/6 compazine 5 mg po BID added for nausea  7/7 MD visit  7/8 Discontinue MiraLAX, multivitamin, melatonin, gabapentin, senna S, methocarbamol    TODAY: nurse relays that compazine is helpful for nausea. Still unclear reason for nausea and not feeling well. So far, minimal weight loss noted. Patient still eating and drinking ok.   Wt Readings from Last 4 Encounters:   07/12/22 112.3 kg (247 lb 8 oz)   07/08/22 113.2 kg (249 lb 9.6 oz)   07/06/22 113.9 kg (251 lb)   07/06/22 113.9 kg (251 lb)        ALLERGIES: Patient has no known allergies.  Past Medical, Surgical, Family and Social History reviewed and updated in EPIC.  Medication list and progress notes reviewed in nursing home electronic health record    ROS:  4 point ROS including Respiratory, CV, GI and , other than that noted in the HPI,  is negative    Vitals:  /67   Pulse 66   Temp 97.2  F (36.2  C)   Resp 16   Ht 1.778 m (5' 10\")   Wt 112.3 kg (247 lb 8 oz)   SpO2 97%   BMI 35.51 kg/m    Exam:  GENERAL APPEARANCE:  Alert, in no distress  CHEST/RESP:  respiratory effort normal,  CV:   trace peripheral edema   M/S: TLSO brace on, observed in wheelchair- moves toes. Limited ROM of shoulders at baseline.   PSYCH:  Alert and oriented to person-place-time , affect pleasant , judgement appropriate      ASSESSMENT/PLAN:  Closed fracture of first lumbar vertebra with routine healing, unspecified fracture morphology, subsequent encounter  patient with unstable L1 fracture with significant pain control issues. requires TSLO while up in chair. Reviewed anotomy and pathophys of the injury and reason for TLSO along with expected recover with family to the best of my ability. - TLSO per neurology wheneber hob > 30 deg.  - " follow up with Neurology in about 4-6 weeks (planned for 7/21).   -Continue as needed oxycodone   - continue Physical Therapy / Ocupational Therapy   -  following for discharge planning     Nausea  Improved with compazine.   - will try adding omeprazole.     Essential hypertension  Stable. Continue metoprolol.    Chronic atrial fibrillation (H)  Controlled with metoprolol and apixiban for stroke prevention    Orders:  - omeprazole 20 mg po everyday dx nausea.     Electronically signed by: Sindhu Patel NP        Sincerely,        Sindhu Patel NP

## 2022-07-12 NOTE — PROGRESS NOTES
[facility] GERIATRICS  Primary Care Provider & Clinic: Steven Duane Semmler, MD, [facility] [address] [address]  Chief Complaint   Patient presents with     JESUSECK     [place] Medical Record Number: [id number]  Place of Service Where Encounter Took Place: [facility] ([facility]) [[id number]]    Alvin Newton is a 88 year old (5/10/1934), admitted to the above facility from  [facility]. Hospital stay 6/6/22 through 6/20/22.  Patient's living condition: lives with spouse    HPI:    Alvin Newton has a past medical history of afib, hypertension, hyperlipidemia, NSTEMI in 2017, DM2.  S/he was admitted to the hospital after losing his balance trying to get off a bar-height stool and found to have L1 unstable fracture.  S/he underwent conservative management with pain control, TLSO . The hospital stay was complicated with UTI-treated with bactrim ds, acute hypoxia.  Medication changes:  -  Added calcitonin, gabapentin, methocarbamol, zofran, oxycodone, bactrim ds  -Lasix and metoprolol changed/decreased due to hypotension (PTA dose lasix 20 mg daily, metoprolol  daily)- will need close monitoring   Follow up needs:  --neurosurgery clinic at [facility] in 4-6 weeks with upright thoracic and lumbar xrays prior at clinic  --TLSO when HOB >30 degrees or out of bed     Updates since admission to TCU:  6/21 - discontinue bactrim-completed for UTI- Clarify senna s to 1 tab BID Po for constipation - discontinue prn extra strength tylenol - extra strength tylenol 1000 mg po TID for pain  - Icy Hot patch at bedtime to muscle spasms on low back   6/27 -Compazine 5 mg p.o. twice daily with breakfast and supper x 5 days and 5 mg p.o. every 8 hours as needed for nausea x 14 days.   6/28 - change methocarbamol to 500 mg po QID and 500 mg po QID PRN  - decrease metoprolol succinate to 50 mg po every day dx htn   -Therapy reports  "patient has minimal shoulder ROM and is not able to manage his ADL's on his own while in the TLSO brace. Able to ambulate 200 feet with CGA.     7/6 compazine 5 mg po BID added for nausea  7/7 MD visit  7/8 Discontinue MiraLAX, multivitamin, melatonin, gabapentin, senna S, methocarbamol    TODAY: nurse relays that compazine is helpful for nausea. Still unclear reason for nausea and not feeling well. So far, minimal weight loss noted. Patient still eating and drinking ok.   Wt Readings from Last 4 Encounters:   07/12/22 112.3 kg (247 lb 8 oz)   07/08/22 113.2 kg (249 lb 9.6 oz)   07/06/22 113.9 kg (251 lb)   07/06/22 113.9 kg (251 lb)        ALLERGIES: Patient has no known allergies.  Past Medical, Surgical, Family and Social History reviewed and updated in EPIC.  Medication list and progress notes reviewed in nursing home electronic health record    ROS:  4 point ROS including Respiratory, CV, GI and , other than that noted in the HPI,  is negative    Vitals:  /67   Pulse 66   Temp 97.2  F (36.2  C)   Resp 16   Ht 1.778 m (5' 10\")   Wt 112.3 kg (247 lb 8 oz)   SpO2 97%   BMI 35.51 kg/m    Exam:  GENERAL APPEARANCE:  Alert, in no distress  CHEST/RESP:  respiratory effort normal,  CV:   trace peripheral edema   M/S: TLSO brace on, observed in wheelchair- moves toes. Limited ROM of shoulders at baseline.   PSYCH:  Alert and oriented to person-place-time , affect pleasant , judgement appropriate      ASSESSMENT/PLAN:  Closed fracture of first lumbar vertebra with routine healing, unspecified fracture morphology, subsequent encounter  patient with unstable L1 fracture with significant pain control issues. requires TSLO while up in chair. Reviewed anotomy and pathophys of the injury and reason for TLSO along with expected recover with family to the best of my ability. - TLSO per neurology wheneber hob > 30 deg.  - follow up with Neurology in about 4-6 weeks (planned for 7/21).   -Continue as needed " oxycodone   - continue Physical Therapy / Ocupational Therapy   -  following for discharge planning     Nausea  Improved with compazine.   - will try adding omeprazole.     Essential hypertension  Stable. Continue metoprolol.    Chronic atrial fibrillation (H)  Controlled with metoprolol and apixiban for stroke prevention    Orders:  - omeprazole 20 mg po everyday dx nausea.     Electronically signed by: Sindhu Patel NP

## 2022-07-14 ENCOUNTER — TELEPHONE (OUTPATIENT)
Dept: NEUROSURGERY | Facility: CLINIC | Age: 87
End: 2022-07-14

## 2022-07-14 NOTE — TELEPHONE ENCOUNTER
[facility] Call Center    Phone Message    May a detailed message be left on voicemail: yes     Reason for Call: Other: Pt daughter Fuad called and requested a call back to discuss whether or not her father can have his follow up appt in [place] as they now live far away from the [facility]. Fuad stated that they could do a virtual visit for the follow up as well. Please call Fuad back with further information.       Fuad also stated the Pt has a turtle brace and he has a lot of pain when they try to put it on while he's in bed. The nurses have been sitting him up in bed even though they're not suppose to do it sitting up but it does not hurt him when it is done that way. Pt would like to make sure that it is okay to do this sitting up.   Action Taken: Message routed to:  [facility] ([facility]): [facility] neurosurgery    Travel Screening: Not Applicable

## 2022-07-15 ENCOUNTER — LAB REQUISITION (OUTPATIENT)
Dept: LAB | Facility: CLINIC | Age: 87
End: 2022-07-15
Payer: COMMERCIAL

## 2022-07-15 DIAGNOSIS — R19.7 DIARRHEA, UNSPECIFIED: ICD-10-CM

## 2022-07-15 DIAGNOSIS — I10 ESSENTIAL (PRIMARY) HYPERTENSION: ICD-10-CM

## 2022-07-15 LAB
ALBUMIN SERPL-MCNC: 3.1 G/DL (ref 3.4–5)
ALP SERPL-CCNC: 138 U/L (ref 40–150)
ALT SERPL W P-5'-P-CCNC: 19 U/L (ref 0–70)
ANION GAP SERPL CALCULATED.3IONS-SCNC: 9 MMOL/L (ref 3–14)
AST SERPL W P-5'-P-CCNC: 20 U/L (ref 0–45)
BILIRUB SERPL-MCNC: 1.8 MG/DL (ref 0.2–1.3)
BUN SERPL-MCNC: 13 MG/DL (ref 7–30)
CALCIUM SERPL-MCNC: 8.8 MG/DL (ref 8.5–10.1)
CHLORIDE BLD-SCNC: 106 MMOL/L (ref 94–109)
CO2 SERPL-SCNC: 24 MMOL/L (ref 20–32)
CREAT SERPL-MCNC: 0.83 MG/DL (ref 0.66–1.25)
ERYTHROCYTE [DISTWIDTH] IN BLOOD BY AUTOMATED COUNT: 14.7 % (ref 10–15)
GFR SERPL CREATININE-BSD FRML MDRD: 84 ML/MIN/1.73M2
GLUCOSE BLD-MCNC: 97 MG/DL (ref 70–99)
HCT VFR BLD AUTO: 48.5 % (ref 40–53)
HGB BLD-MCNC: 15.8 G/DL (ref 13.3–17.7)
MCH RBC QN AUTO: 29.2 PG (ref 26.5–33)
MCHC RBC AUTO-ENTMCNC: 32.6 G/DL (ref 31.5–36.5)
MCV RBC AUTO: 90 FL (ref 78–100)
PLATELET # BLD AUTO: 170 10E3/UL (ref 150–450)
POTASSIUM BLD-SCNC: 3.7 MMOL/L (ref 3.4–5.3)
PROT SERPL-MCNC: 6.9 G/DL (ref 6.8–8.8)
RBC # BLD AUTO: 5.42 10E6/UL (ref 4.4–5.9)
SODIUM SERPL-SCNC: 139 MMOL/L (ref 133–144)
WBC # BLD AUTO: 6.1 10E3/UL (ref 4–11)

## 2022-07-15 PROCEDURE — 85027 COMPLETE CBC AUTOMATED: CPT | Performed by: NURSE PRACTITIONER

## 2022-07-15 PROCEDURE — 36415 COLL VENOUS BLD VENIPUNCTURE: CPT | Performed by: NURSE PRACTITIONER

## 2022-07-15 PROCEDURE — 80053 COMPREHEN METABOLIC PANEL: CPT | Performed by: NURSE PRACTITIONER

## 2022-07-15 PROCEDURE — P9603 ONE-WAY ALLOW PRORATED MILES: HCPCS | Performed by: NURSE PRACTITIONER

## 2022-07-15 NOTE — TELEPHONE ENCOUNTER
"[facility] Call Center    Phone Message    May a detailed message be left on voicemail: yes     Reason for Call: Other: [facility] is calling to request verbal orders per staff in regards to brace \"to sit on the edge of the bed, and have the brace applied right after sitting up\" or if the patient is okay to sit up without the brace on.  Would like verification that they want patient on a brat diet.    Please contact Caridad at [facility] to verify information at [phone]    Action Taken: Message routed to:  [facility] ([facility]): Neurosurgery    Travel Screening: Not Applicable                                                                        "

## 2022-07-15 NOTE — TELEPHONE ENCOUNTER
Writer spoke with [facility] from [facility].    Patient family would like for the patient to sit at the end of the bed to have patient brace put on.     Patient will need to have written or verbal orders. Outside of writer's scope of practice.     Patient will be seen on 07/21/2022 in clinic. Provider will determine patient plan of care for applying the brace.     Writer spoke with patient daughter and suggested food the care facility has available. Bananas, rice applesauce, toast for patient to try. Writer informed daughter to reach out to [facility] Nutritionist.     Writer did not recommend the patient be on a nutritional plan. Patient would need to have orders for a change in diet. Writer cannot give a verbal or written order.     Provider can discuss with family at 07/21/2022 appointment.     Writer spoke with patient daughter and informed of all of the above.     Daughter was agreeable to this plan.     Tomeka Chu LPN  Neurosurgery

## 2022-07-15 NOTE — TELEPHONE ENCOUNTER
Writer spoke with daughter Fuad. Patient will be coming to appointment on Thursday in clinic with imaging prior.     Discussed patient brace and nutrition.     Tomeka Chu LPN  Neurosurgery

## 2022-07-17 ENCOUNTER — LAB REQUISITION (OUTPATIENT)
Dept: LAB | Facility: CLINIC | Age: 87
End: 2022-07-17
Payer: COMMERCIAL

## 2022-07-17 DIAGNOSIS — R19.7 DIARRHEA, UNSPECIFIED: ICD-10-CM

## 2022-07-18 ENCOUNTER — TRANSITIONAL CARE UNIT VISIT (OUTPATIENT)
Dept: GERIATRICS | Facility: CLINIC | Age: 87
End: 2022-07-18
Payer: COMMERCIAL

## 2022-07-18 VITALS
HEIGHT: 70 IN | BODY MASS INDEX: 34.88 KG/M2 | RESPIRATION RATE: 14 BRPM | DIASTOLIC BLOOD PRESSURE: 75 MMHG | HEART RATE: 73 BPM | SYSTOLIC BLOOD PRESSURE: 132 MMHG | WEIGHT: 243.6 LBS | TEMPERATURE: 97.5 F | OXYGEN SATURATION: 97 %

## 2022-07-18 DIAGNOSIS — R63.4 WEIGHT LOSS: ICD-10-CM

## 2022-07-18 DIAGNOSIS — S32.019D CLOSED FRACTURE OF FIRST LUMBAR VERTEBRA WITH ROUTINE HEALING, UNSPECIFIED FRACTURE MORPHOLOGY, SUBSEQUENT ENCOUNTER: Primary | ICD-10-CM

## 2022-07-18 DIAGNOSIS — R11.0 NAUSEA: ICD-10-CM

## 2022-07-18 DIAGNOSIS — I10 ESSENTIAL HYPERTENSION: ICD-10-CM

## 2022-07-18 DIAGNOSIS — I48.20 CHRONIC ATRIAL FIBRILLATION (H): ICD-10-CM

## 2022-07-18 DIAGNOSIS — G89.18 ACUTE POST-OPERATIVE PAIN: ICD-10-CM

## 2022-07-18 PROCEDURE — 99309 SBSQ NF CARE MODERATE MDM 30: CPT | Performed by: NURSE PRACTITIONER

## 2022-07-18 NOTE — LETTER
"    7/18/2022        RE: Alvin Newton  [address] [address]        [facility] GERIATRIC [facility]    Chief Complaint   Patient presents with     RECHECK     HPI:  Alvin Newton is a 88 year old  (5/10/1934), who is being seen today for an episodic care visit at: [facility] ([facility]) [[id number]]. Today's concern is:      Closed fracture of first lumbar vertebra with routine healing, unspecified fracture morphology, subsequent encounter  Nausea  Acute post-operative pain  Essential hypertension  Chronic atrial fibrillation (H)    Patient seen today at the request of the facility nurses.  Nurse reports poor appetite, dark urine, refusing Prilosec.  Patient's family is present for visit and endorsed appetite is improved but still lower than normal.  Not having much nausea.  Patient is complaining of bloating - but generally not asking for the simethicone.      Allergies, and PMH/PSH reviewed in EPIC today.  REVIEW OF SYSTEMS:  4 point ROS including Respiratory, CV, GI and , other than that noted in the HPI,  is negative    Objective:   /75   Pulse 73   Temp 97.5  F (36.4  C)   Resp 14   Ht 1.778 m (5' 10\")   Wt 110.5 kg (243 lb 9.6 oz)   SpO2 97%   BMI 34.95 kg/m    General: Alert.  In no distress.    Assessment/Plan:  Nausea  Patient is refusing Prilosec explained rationale for the medication and patient still would like to stop it.    - Will discontinue Prilosec  -Discussed fluid intake with patient and recommended he add at least 1 more of the nursing home Jugs to his daily intake. Currently not drinking any.     Closed fracture of first lumbar vertebra with routine healing, unspecified fracture morphology, subsequent encounter  Acute post-operative pain  Not using any of the oxycodone and this could probably be discontinued when he returns home.  Looking forward to his appointment with the orthopedic surgeon on Thursday and family is hoping for good news.    Essential " hypertension  Chronic atrial fibrillation (H)  Reviewed all medications with family today at their request.  Continues on decreased dosing of metoprolol for hypertension.    Weight loss  Patient has lost about 30 pounds in the last couple months.  While this has not been intentional patient is feeling good about it and hopes to keep it off.       Orders  Discontinue Prilosec    Electronically signed by: Sindhu Patel NP             Sincerely,        Sindhu Patel NP

## 2022-07-19 NOTE — PROGRESS NOTES
Neurosurgery Clinic Note    Chief Complaint: f/u L1 vertebral body fracture     DATE OF VISIT: 7/21/2022    HPI:  88-year-old male, past medical history of diabetes type 2, atrial fibrillation on eloquis, hyperlipidemia, hypertension, NSTEMI, BPH presenting after mechanical fall on 6/6/22 while at home and was found to have an obliquely oriented L1 vertebral body fracture without extension into the posterior elements on imaging.  Treatment options were discussed however given the patient's multiple medical co-morbidities it was felt that conservative management should be pursued given high surgical risk.  Patient was fit with TLSO brace and serial x-rays which revealed stable positioning without evidence of increased height loss or kyphosis.  The patient was subsequently hospitalized from 6/6/22 to 6/20/22 and transferred to a TCU facility where he continues to reside.     Since hospital discharge, patient states pain is present above the right hip and midline in the upper lumbar region, continues to wear brace as directed when out of bed, denies radiation of pain or paresthesias to the bilateral lower extremities, denies bowel/bladder dysfunction however has been experiencing diarrhea.   Patient initially experienced exquisite pain while hospitalized and states that the pain continues to improve. The patient states pain is  worse in the morning when trying arise from bed.  Has been applying ice for pain in his back which has helped  Taking tylenol for pain in his back.  No radicular leg pain.   No eating well, has been consistently nauseous since his admission to the hospital on 6/6/22.   The patient is being followed by a dietician in the facility he is residing in.  Is taking Vitamin D supplement.      Patient continues to reside in the skilled nursing facility but is hoping to return home soon.             Review of Systems   Negative except in HPI    Past Medical History:   Diagnosis Date     Colonic  diverticulum      Hyperlipidemia      Hypertension      Obesity (BMI 30-39.9)      Osteoarthritis      Type 2 diabetes mellitus (H)        Past Surgical History:   Procedure Laterality Date     ARTHROPLASTY HIP BILATERAL  1987     HC ESOPHAGOSCOPY, DIAGNOSTIC  2003     LAPAROSCOPIC CHOLECYSTECTOMY N/A 12/28/2017    Procedure: LAPAROSCOPIC CHOLECYSTECTOMY;  LAPAROSCOPIC CHOLECYSTECTOMY;  Surgeon: Heber Gonzalez MD;  Location: [facility] OR     TRANSRECTAL ULTRASONIC, TRANSURETHRAL RESECTION (TUR) OF PROSTATE CYST  1990       Social History     Socioeconomic History     Marital status:    Tobacco Use     Smoking status: Never Smoker     Smokeless tobacco: Never Used   Substance and Sexual Activity     Alcohol use: Yes     Comment: 0-1 beer daily     Drug use: No       family history is not on file.              Physical Exam   There were no vitals taken for this visit.  Constitutional: Oriented to person, place, and time. Appears well-developed and well-nourished. Cooperative. No distress.   HENT: Head normocephalic and atraumatic.     Neurological: alert and oriented to person, place, and time. CN II-XII grossly intact      STRENGTH LEFT RIGHT   Deltoid 5 5   Bicep 5 5   Wrist Extensor 5 5   Tricep 5 5   Finger flexion 5 5   Finger abduction 5 5    5 5       Hip Flexion     5     5   Knee Extension 5 5   Ankle Dorsiflexion 5 5   Extensor Hallucis Longus 5 5   Plantar Flexion 5 5   Foot eversion 5 5   Foot inversion 5 5       Skin: Skin is warm, dry and intact.   Psychiatric: Normal mood and affect. Speech is normal and behavior is normal.      IMAGING:  Personally reviewed Thoracolumbar x-rays dated 7/21/22:  L1 fracture noted on previous imaging appears stable in height, no evidence of bebo- or retro listhesis, no significant kyphosis noted.       ASSESSMENT:  Alvin Newton is a 88-year-old male with history atrial fibrillation on Eliquis, presenting after mechanical fall found to have a obliquely  oriented linear fracture through L1 vertebral body which did not appear to extend to posterior elements.     PLAN:  Do not do any bending, twisting, strenuous exercise, or heavy lifting (greater than 10 pounds) until cleared by Neurosurgery. Be careful and ask for assistance when walking or going up and down stairs.   Ok for NSAIDs (advil, aleve, motrin, ibuprofen, celebrex, etc), head, ice, and menthol patches for pain     Continue taking Vitamin D supplementation as prescribed.    Please continue to wear brace when out of bed until discontinued by Neurosurgery provider.      Please follow-up in 6 weeks with repeat thoracolumbar imaging.      Documentation sent from nursing facility was completed prior to patient leaving today and was returned to patient's daughter.                  I spent 25 minutes in patient care with greater than 50% spent in counseling and/or coordination of care.     I performed independent visualization of radiographic imaging and entered my own interpretation, reviewed and/or ordered tests in radiology        CHUCK Roberson, CNP  Department of Neurosurgery  Pager: [phone]

## 2022-07-19 NOTE — PROGRESS NOTES
"[facility] GERIATRIC SERVICES    Chief Complaint   Patient presents with     RECHECK     HPI:  Alvin Newton is a 88 year old  (5/10/1934), who is being seen today for an episodic care visit at: [facility] ([facility] [[id number]]. Today's concern is:      Closed fracture of first lumbar vertebra with routine healing, unspecified fracture morphology, subsequent encounter  Nausea  Acute post-operative pain  Essential hypertension  Chronic atrial fibrillation (H)    Patient seen today at the request of the facility nurses.  Nurse reports poor appetite, dark urine, refusing Prilosec.  Patient's family is present for visit and endorsed appetite is improved but still lower than normal.  Not having much nausea.  Patient is complaining of bloating - but generally not asking for the simethicone.      Allergies, and PMH/PSH reviewed in [facility] today.  REVIEW OF SYSTEMS:  4 point ROS including Respiratory, CV, GI and , other than that noted in the HPI,  is negative    Objective:   /75   Pulse 73   Temp 97.5  F (36.4  C)   Resp 14   Ht 1.778 m (5' 10\")   Wt 110.5 kg (243 lb 9.6 oz)   SpO2 97%   BMI 34.95 kg/m    General: Alert.  In no distress.    Assessment/Plan:  Nausea  Patient is refusing Prilosec explained rationale for the medication and patient still would like to stop it.    - Will discontinue Prilosec  -Discussed fluid intake with patient and recommended he add at least 1 more of the nursing home Jugs to his daily intake. Currently not drinking any.     Closed fracture of first lumbar vertebra with routine healing, unspecified fracture morphology, subsequent encounter  Acute post-operative pain  Not using any of the oxycodone and this could probably be discontinued when he returns home.  Looking forward to his appointment with the orthopedic surgeon on Thursday and family is hoping for good news.    Essential hypertension  Chronic atrial fibrillation (H)  Reviewed all medications with family today at their " request.  Continues on decreased dosing of metoprolol for hypertension.    Weight loss  Patient has lost about 30 pounds in the last couple months.  While this has not been intentional patient is feeling good about it and hopes to keep it off.       Orders  Discontinue Prilosec    Electronically signed by: Sindhu Patel NP

## 2022-07-21 ENCOUNTER — OFFICE VISIT (OUTPATIENT)
Dept: NEUROSURGERY | Facility: CLINIC | Age: 87
End: 2022-07-21
Payer: COMMERCIAL

## 2022-07-21 ENCOUNTER — ANCILLARY PROCEDURE (OUTPATIENT)
Dept: GENERAL RADIOLOGY | Facility: CLINIC | Age: 87
End: 2022-07-21
Attending: NURSE PRACTITIONER
Payer: COMMERCIAL

## 2022-07-21 VITALS
HEART RATE: 85 BPM | OXYGEN SATURATION: 95 % | DIASTOLIC BLOOD PRESSURE: 85 MMHG | SYSTOLIC BLOOD PRESSURE: 129 MMHG | BODY MASS INDEX: 35.79 KG/M2 | WEIGHT: 249.4 LBS

## 2022-07-21 DIAGNOSIS — S32.011D CLOSED STABLE BURST FRACTURE OF FIRST LUMBAR VERTEBRA WITH ROUTINE HEALING, SUBSEQUENT ENCOUNTER: Primary | ICD-10-CM

## 2022-07-21 DIAGNOSIS — S32.018A OTHER CLOSED FRACTURE OF FIRST LUMBAR VERTEBRA, INITIAL ENCOUNTER (H): ICD-10-CM

## 2022-07-21 PROCEDURE — 72080 X-RAY EXAM THORACOLMB 2/> VW: CPT | Performed by: SURGERY

## 2022-07-21 PROCEDURE — 99213 OFFICE O/P EST LOW 20 MIN: CPT | Performed by: NURSE PRACTITIONER

## 2022-07-21 RX ORDER — SIMETHICONE 80 MG
80 TABLET,CHEWABLE ORAL EVERY 6 HOURS PRN
COMMUNITY
End: 2022-08-25

## 2022-07-21 ASSESSMENT — PAIN SCALES - GENERAL: PAINLEVEL: MILD PAIN (3)

## 2022-07-21 NOTE — LETTER
7/21/2022       RE: Alivn Newton  [address] [address]     Dear Colleague,    Thank you for referring your patient, Alvin Newton, to the [facility] [place] [facility]. Please see a copy of my visit note below.        Neurosurgery Clinic Note    Chief Complaint: f/u L1 vertebral body fracture     DATE OF VISIT: 7/21/2022    HPI:  88-year-old male, past medical history of diabetes type 2, atrial fibrillation on eloquis, hyperlipidemia, hypertension, NSTEMI, BPH presenting after mechanical fall on 6/6/22 while at home and was found to have an obliquely oriented L1 vertebral body fracture without extension into the posterior elements on imaging.  Treatment options were discussed however given the patient's multiple medical co-morbidities it was felt that conservative management should be pursued given high surgical risk.  Patient was fit with TLSO brace and serial x-rays which revealed stable positioning without evidence of increased height loss or kyphosis.  The patient was subsequently hospitalized from 6/6/22 to 6/20/22 and transferred to a TCU facility where he continues to reside.     Since hospital discharge, patient states pain is present above the right hip and midline in the upper lumbar region, continues to wear brace as directed when out of bed, denies radiation of pain or paresthesias to the bilateral lower extremities, denies bowel/bladder dysfunction however has been experiencing diarrhea.   Patient initially experienced exquisite pain while hospitalized and states that the pain continues to improve. The patient states pain is  worse in the morning when trying arise from bed.  Has been applying ice for pain in his back which has helped  Taking tylenol for pain in his back.  No radicular leg pain.   No eating well, has been consistently nauseous since his admission to the hospital on 6/6/22.   The  patient is being followed by a dietician in the facility he is residing in.  Is taking Vitamin D supplement.      Patient continues to reside in the skilled nursing facility but is hoping to return home soon.             Review of Systems   Negative except in HPI    Past Medical History:   Diagnosis Date     Colonic diverticulum      Hyperlipidemia      Hypertension      Obesity (BMI 30-39.9)      Osteoarthritis      Type 2 diabetes mellitus (H)        Past Surgical History:   Procedure Laterality Date     ARTHROPLASTY HIP BILATERAL  1987     HC ESOPHAGOSCOPY, DIAGNOSTIC  2003     LAPAROSCOPIC CHOLECYSTECTOMY N/A 12/28/2017    Procedure: LAPAROSCOPIC CHOLECYSTECTOMY;  LAPAROSCOPIC CHOLECYSTECTOMY;  Surgeon: Heber Gonzalez MD;  Location:  OR     TRANSRECTAL ULTRASONIC, TRANSURETHRAL RESECTION (TUR) OF PROSTATE CYST  1990       Social History     Socioeconomic History     Marital status:    Tobacco Use     Smoking status: Never Smoker     Smokeless tobacco: Never Used   Substance and Sexual Activity     Alcohol use: Yes     Comment: 0-1 beer daily     Drug use: No       family history is not on file.              Physical Exam   There were no vitals taken for this visit.  Constitutional: Oriented to person, place, and time. Appears well-developed and well-nourished. Cooperative. No distress.   HENT: Head normocephalic and atraumatic.     Neurological: alert and oriented to person, place, and time. CN II-XII grossly intact      STRENGTH LEFT RIGHT   Deltoid 5 5   Bicep 5 5   Wrist Extensor 5 5   Tricep 5 5   Finger flexion 5 5   Finger abduction 5 5    5 5       Hip Flexion     5     5   Knee Extension 5 5   Ankle Dorsiflexion 5 5   Extensor Hallucis Longus 5 5   Plantar Flexion 5 5   Foot eversion 5 5   Foot inversion 5 5       Skin: Skin is warm, dry and intact.   Psychiatric: Normal mood and affect. Speech is normal and behavior is normal.      IMAGING:  Personally reviewed Thoracolumbar x-rays  dated 7/21/22:  L1 fracture noted on previous imaging appears stable in height, no evidence of bebo- or retro listhesis, no significant kyphosis noted.       ASSESSMENT:  Alvin Newton is a 88-year-old male with history atrial fibrillation on Eliquis, presenting after mechanical fall found to have a obliquely oriented linear fracture through L1 vertebral body which did not appear to extend to posterior elements.     PLAN:  Do not do any bending, twisting, strenuous exercise, or heavy lifting (greater than 10 pounds) until cleared by Neurosurgery. Be careful and ask for assistance when walking or going up and down stairs.   Ok for NSAIDs (advil, aleve, motrin, ibuprofen, celebrex, etc), head, ice, and menthol patches for pain     Continue taking Vitamin D supplementation as prescribed.    Please continue to wear brace when out of bed until discontinued by Neurosurgery provider.      Please follow-up in 6 weeks with repeat thoracolumbar imaging.      Documentation sent from nursing facility was completed prior to patient leaving today and was returned to patient's daughter.                  I spent 25 minutes in patient care with greater than 50% spent in counseling and/or coordination of care.     I performed independent visualization of radiographic imaging and entered my own interpretation, reviewed and/or ordered tests in radiology        CHUCK Roberson, CNP  Department of Neurosurgery  Pager: [phone]

## 2022-07-21 NOTE — PATIENT INSTRUCTIONS
Do not do any bending, twisting, strenuous exercise, or heavy lifting (greater than 10 pounds) until cleared by Neurosurgery. Be careful and ask for assistance when walking or going up and down stairs.   Ok for NSAIDs (advil, aleve, motrin, ibuprofen, celebrex, etc), head, ice, and menthol patches for pain     Continue taking Vitamin D supplementation as prescribed.    Please continue to wear brace as directed until discontinued by Neurosurgery provider.      Please follow-up in 6 weeks with repeat thoracolumbar imaging.              At your visit today, we discussed your risk for falls and preventive options.    Fall Prevention  Falls often occur due to slipping, tripping or losing your balance. Millions of people fall every year and injure themselves. Here are ways to reduce your risk of falling again.   Think about your fall, was there anything that caused your fall that can be fixed, removed, or replaced?  Make your home safe by keeping walkways clear of objects you may trip over, such as electric cords.  Use non-slip pads under rugs. Don't use area rugs or small throw rugs.  Use non-slip mats in bathtubs and showers.  Install handrails and lights on staircases. The handrails should be on both sides of the stairs.  Don't walk in poorly lit areas.  Don't stand on chairs or wobbly ladders.  Use caution when reaching overhead or looking upward. This position can cause a loss of balance.  Be sure your shoes fit properly, have non-slip bottoms and are in good condition.   Wear shoes both inside and out. Don't go barefoot or wear slippers.  Be cautious when going up and down stairs, curbs, and when walking on uneven sidewalks.  If your balance is poor, consider using a cane or walker.  If your fall was related to alcohol use, stop or limit alcohol intake.   If your fall was related to use of sleeping medicines, talk to your healthcare provider about this. You may need to reduce your dosage at bedtime if you awaken  during the night to go to the bathroom.    To reduce the need for nighttime bathroom trips:  Don't drink fluids for several hours before going to bed  Empty your bladder before going to bed  Men can keep a urinal at the bedside  Stay as active as you can. Balance, flexibility, strength, and endurance all come from exercise. They all play a role in preventing falls. Ask your healthcare provider which types of activity are right for you.  Get your vision checked on a regular basis.  If you have pets, know where they are before you stand up or walk so you don't trip over them.  Use night lights.  Go over all your medicines with a pharmacist or other healthcare provider to see if any of them could make you more likely to fall.  [place] last reviewed this educational content on 4/1/2018 2000-2021 [employer]. All rights reserved. This information is not intended as a substitute for professional medical care. Always follow your healthcare professional's instructions.

## 2022-07-26 ENCOUNTER — DISCHARGE SUMMARY NURSING HOME (OUTPATIENT)
Dept: GERIATRICS | Facility: CLINIC | Age: 87
End: 2022-07-26
Payer: COMMERCIAL

## 2022-07-26 VITALS
HEART RATE: 73 BPM | OXYGEN SATURATION: 95 % | DIASTOLIC BLOOD PRESSURE: 72 MMHG | WEIGHT: 246.4 LBS | HEIGHT: 70 IN | SYSTOLIC BLOOD PRESSURE: 120 MMHG | BODY MASS INDEX: 35.28 KG/M2 | RESPIRATION RATE: 18 BRPM | TEMPERATURE: 97.3 F

## 2022-07-26 DIAGNOSIS — N13.8 BPH WITH URINARY OBSTRUCTION: ICD-10-CM

## 2022-07-26 DIAGNOSIS — G89.18 ACUTE POST-OPERATIVE PAIN: ICD-10-CM

## 2022-07-26 DIAGNOSIS — N40.1 BPH WITH URINARY OBSTRUCTION: ICD-10-CM

## 2022-07-26 DIAGNOSIS — R63.4 WEIGHT LOSS: ICD-10-CM

## 2022-07-26 DIAGNOSIS — I10 ESSENTIAL HYPERTENSION: ICD-10-CM

## 2022-07-26 DIAGNOSIS — E11.42 TYPE 2 DIABETES MELLITUS WITH DIABETIC POLYNEUROPATHY, WITHOUT LONG-TERM CURRENT USE OF INSULIN (H): ICD-10-CM

## 2022-07-26 DIAGNOSIS — R11.0 NAUSEA: ICD-10-CM

## 2022-07-26 DIAGNOSIS — I48.20 CHRONIC ATRIAL FIBRILLATION (H): ICD-10-CM

## 2022-07-26 DIAGNOSIS — S32.019D CLOSED FRACTURE OF FIRST LUMBAR VERTEBRA WITH ROUTINE HEALING, UNSPECIFIED FRACTURE MORPHOLOGY, SUBSEQUENT ENCOUNTER: Primary | ICD-10-CM

## 2022-07-26 PROCEDURE — 99316 NF DSCHRG MGMT 30 MIN+: CPT | Performed by: NURSE PRACTITIONER

## 2022-07-26 NOTE — LETTER
7/26/2022        RE: Alvin Newton  [address] [facility] [address]        [facility] GERIATRIC SERVICES DISCHARGE SUMMARY    PATIENT'S NAME: Alvin Newton  YOB: 1934  MEDICAL RECORD NUMBER:  [id number]  Place of Service where encounter took place:  [facility] (TCU) [[id number]]    PRIMARY CARE PROVIDER AND CLINIC RESPONSIBLE AFTER TRANSFER: [facility] [address] [facility]    Non-G Provider     Transferring providers: Sindhu Patel NP / Michele Dai MD  Recent Hospitalization/ED:  [facility]. Hospital stay 6/6/22 through 6/20/22.  Date of SNF Admission: 6/20  Date of SNF (anticipated) Discharge: 7/27  Discharged to: previous independent home. With wife and family support.   Cognitive Scores: Slums 15/30 MoCA 20/30 CPT 4.7/5.6  Physical Function: Mod to max assist for bed mobility modified independent for pivot transfers ambulating 250 feet with a walker.  ADLs are set up to max assist.  DME: No new equipment    CODE STATUS/ADVANCE DIRECTIVES DISCUSSION:  Full Code   ALLERGIES: Patient has no known allergies.    DISCHARGE DIAGNOSIS/NURSING FACILITY COURSE:   Alvin Newton has a past medical history of afib, hypertension, hyperlipidemia, NSTEMI in 2017, DM2.  S/he was admitted to the hospital after losing his balance trying to get off a bar-height stool and found to have L1 unstable fracture.  S/he underwent conservative management with pain control, TLSO . The hospital stay was complicated with UTI-treated with bactrim ds, acute hypoxia.  Medication changes:  -  Added calcitonin, gabapentin, methocarbamol, zofran, oxycodone, bactrim ds  -Lasix and metoprolol changed/decreased due to hypotension (PTA dose lasix 20 mg daily, metoprolol  daily)- will need close monitoring   Follow up needs:  --neurosurgery clinic at [facility] in 4-6 weeks with upright thoracic and  lumbar xrays prior at clinic  --TLSO when HOB >30 degrees or out of bed      Updates since admission to TCU:  6/21 - discontinue bactrim-completed for UTI- Clarify senna s to 1 tab BID Po for constipation - discontinue prn extra strength tylenol - extra strength tylenol 1000 mg po TID for pain  - Icy Hot patch at bedtime to muscle spasms on low back   6/27 -Compazine 5 mg p.o. twice daily with breakfast and supper x 5 days and 5 mg p.o. every 8 hours as needed for nausea x 14 days.   6/28 - change methocarbamol to 500 mg po QID and 500 mg po QID PRN  - decrease metoprolol succinate to 50 mg po every day dx htn   -Therapy reports patient has minimal shoulder ROM and is not able to manage his ADL's on his own while in the TLSO brace. Able to ambulate 200 feet with CGA.    7/6 compazine 5 mg po BID added for nausea  7/7 MD visit  7/8 Discontinue MiraLAX, multivitamin, melatonin, gabapentin, senna S, methocarbamol     Closed fracture of first lumbar vertebra with routine healing, unspecified fracture morphology, subsequent encounter  Patient progressed with therapy and is able to discharge home with care from his family.  He still needs help with some ADLs and applying the TLSO brace.  -Continue with home care PT/OT/nursing monitoring    Nausea  Nausea has resolved.  During his TCU stay patient's struggled intermittently with nausea and low appetite.     Acute post-operative pain  Resolved.  Discontinue oxycodone.    Essential hypertension  Blood pressures lower than needed while at the TCU his Lasix was stopped while at the hospital and his metoprolol has been decreased.    Chronic atrial fibrillation (H)  Heart rate controlled with the metoprolol, continues on Eliquis for stroke prevention.    Weight loss  Inadvertently lost about 30 pounds over the last month.  He feels that this has been helpful.  Advised against further weight loss at this time.    Type 2 diabetes mellitus with diabetic polyneuropathy, without  "long-term current use of insulin (H)  Diet controlled currently.    BPH with urinary obstruction  Stable.      PAST MEDICAL HISTORY:  has a past medical history of Colonic diverticulum, Hyperlipidemia, Hypertension, Obesity (BMI 30-39.9), Osteoarthritis, and Type 2 diabetes mellitus (H).    DISCHARGE MEDICATIONS:  Current Outpatient Medications   Medication Sig Dispense Refill     acetaminophen (TYLENOL) 500 MG tablet Take 1,000 mg by mouth 3 times daily       atorvastatin (LIPITOR) 20 MG tablet Take 20 mg by mouth At Bedtime       calcitonin, salmon, (MIACALCIN) 200 UNIT/ACT nasal spray Spray 1 spray into one nostril alternating nostrils daily Alternate nostril each day.       cholecalciferol 50 MCG (2000 UT) CAPS Take 2,000 Units by mouth daily       ELIQUIS ANTICOAGULANT 5 MG tablet Take 5 mg by mouth 2 times daily       menthol (ICY HOT) 5 % PTCH Apply 1 patch topically At Bedtime To low back       metoprolol succinate ER (TOPROL XL) 25 MG 24 hr tablet Take 2 tablets (50 mg) by mouth daily       miconazole (MICATIN) 2 % external powder Apply topically 2 times daily       omeprazole (PRILOSEC) 40 MG DR capsule Take 40 mg by mouth daily       simethicone (MYLICON) 80 MG chewable tablet Take 80 mg by mouth every 6 hours as needed for flatulence or cramping         MEDICATION CHANGES/RATIONALE:   Discontinue oxycodone due to no use.  discontinue Zofran and Compazine due to no use     ROS:    4 point ROS including Respiratory, CV, GI and , other than that noted in the HPI,  is negative    Physical Exam:   Vitals: /72   Pulse 73   Temp 97.3  F (36.3  C)   Resp 18   Ht 1.778 m (5' 10\")   Wt 111.8 kg (246 lb 6.4 oz)   SpO2 95%   BMI 35.35 kg/m    BMI= Body mass index is 35.35 kg/m .  Alert.  In no distress.  Wearing TLSO brace sitting in recliner.    DISCHARGE PLAN:  Occupational Therapy, Physical Therapy, Registered Nurse and Home Health Aide  Patient instructed to follow-up with:  PCP in 7 days  "     Current [place] scheduled appointments:  No future appointments.    MTM referral needed and placed by this provider: No    Pending labs: none  SNF labs: 7/15  K3.7 BUN 13 creatinine 0.83  AST 20 ALT 19 albumin 3.1 GFR 84.  Hemoglobin 15.8 WBC 6.1 platelets 170     discharge Instructions:  - Ok to discharge to home with current medications and treatments  - Home Physical Therapy / Ocupational Therapy / RN / HHA  - Follow up with Primary care provider in 1 week   -Instructed to follow-up with neurosurgery as directed    TOTAL DISCHARGE TIME:   Greater than 30 minutes  Electronically signed by:  Sindhu Patel NP    Documentation of Face to Face and Certification for Home Health Services  I certify that services are/were furnished while this patient was under the care of a physician and that a physician or an allowed non-physician practitioner (NPP), had a face-to-face encounter that meets the physician face-to-face encounter requirements. The encounter was in whole, or in part, related to the primary reason for home health. The patient is confined to his/her home and needs intermittent skilled nursing, physical therapy, speech-language pathology, or the continued need for occupational therapy. A plan of care has been established by a physician and is periodically reviewed by a physician.  Date of Face-to-Face Encounter:  7/26/2022.    I certify that, based on my findings, the following services are medically necessary home health services: Nursing, Occupational Therapy and Physical Therapy.    My clinical findings support the need for the above skilled services because: Requires assistance of another person or specialized equipment to access medical services because patient: Range of motion limitations prevents ability to exit home safely...    Patient to re-establish plan of care with their PCP within 7-10 days after leaving the facility to reestablish care.  Medicare certified [facility]DA provider: Sindhu  SANTHOSH Patel   Date: July 27, 2022          Sincerely,        Sindhu Patel NP

## 2022-07-27 ENCOUNTER — TELEPHONE (OUTPATIENT)
Dept: NEUROSURGERY | Facility: CLINIC | Age: 87
End: 2022-07-27

## 2022-07-27 NOTE — PROGRESS NOTES
[facility] GERIATRIC SERVICES DISCHARGE SUMMARY    PATIENT'S NAME: Alvin Newton  YOB: 1934  MEDICAL RECORD NUMBER:  [id number]  [facility] [place] [facility] [id number]    PRIMARY CARE PROVIDER AND CLINIC RESPONSIBLE AFTER TRANSFER: Semmler, Steven Duane [facility] [address] [facility]*    Non-FMG Provider     Transferring providers: Sindhu Patel NP / Michele Dai MD  Recent Hospitalization/ED:  [facility]. Hospital stay 6/6/22 through 6/20/22.  Date of SNF Admission: 6/20  Date of SNF (anticipated) Discharge: 7/27  Discharged to: previous independent home. With wife and family support.   Cognitive Scores: Slums 15/30 MoCA 20/30 CPT 4.7/5.6  Physical Function: Mod to max assist for bed mobility modified independent for pivot transfers ambulating 250 feet with a walker.  ADLs are set up to max assist.  DME: No new equipment    CODE STATUS/ADVANCE DIRECTIVES DISCUSSION:  Full Code   ALLERGIES: Patient has no known allergies.    DISCHARGE DIAGNOSIS/NURSING FACILITY COURSE:   Alvin Newton has a past medical history of afib, hypertension, hyperlipidemia, NSTEMI in 2017, DM2.  S/he was admitted to the hospital after losing his balance trying to get off a bar-height stool and found to have L1 unstable fracture.  S/he underwent conservative management with pain control, TLSO . The hospital stay was complicated with UTI-treated with bactrim ds, acute hypoxia.  Medication changes:  -  Added calcitonin, gabapentin, methocarbamol, zofran, oxycodone, bactrim ds  -Lasix and metoprolol changed/decreased due to hypotension (PTA dose lasix 20 mg daily, metoprolol  daily)- will need close monitoring   Follow up needs:  --neurosurgery clinic at [facility] in 4-6 weeks with upright thoracic and lumbar xrays prior at clinic  --TLSO when HOB >30 degrees or out of bed      Updates since  admission to TCU:  6/21 - discontinue bactrim-completed for UTI- Clarify senna s to 1 tab BID Po for constipation - discontinue prn extra strength tylenol - extra strength tylenol 1000 mg po TID for pain  - Icy Hot patch at bedtime to muscle spasms on low back   6/27 -Compazine 5 mg p.o. twice daily with breakfast and supper x 5 days and 5 mg p.o. every 8 hours as needed for nausea x 14 days.   6/28 - change methocarbamol to 500 mg po QID and 500 mg po QID PRN  - decrease metoprolol succinate to 50 mg po every day dx htn   -Therapy reports patient has minimal shoulder ROM and is not able to manage his ADL's on his own while in the TLSO brace. Able to ambulate 200 feet with CGA.    7/6 compazine 5 mg po BID added for nausea  7/7 MD visit  7/8 Discontinue MiraLAX, multivitamin, melatonin, gabapentin, senna S, methocarbamol     Closed fracture of first lumbar vertebra with routine healing, unspecified fracture morphology, subsequent encounter  Patient progressed with therapy and is able to discharge home with care from his family.  He still needs help with some ADLs and applying the TLSO brace.  -Continue with home care PT/OT/nursing monitoring    Nausea  Nausea has resolved.  During his TCU stay patient's struggled intermittently with nausea and low appetite.     Acute post-operative pain  Resolved.  Discontinue oxycodone.    Essential hypertension  Blood pressures lower than needed while at the TCU his Lasix was stopped while at the hospital and his metoprolol has been decreased.    Chronic atrial fibrillation (H)  Heart rate controlled with the metoprolol, continues on Eliquis for stroke prevention.    Weight loss  Inadvertently lost about 30 pounds over the last month.  He feels that this has been helpful.  Advised against further weight loss at this time.    Type 2 diabetes mellitus with diabetic polyneuropathy, without long-term current use of insulin (H)  Diet controlled currently.    BPH with urinary  "obstruction  Stable.      PAST MEDICAL HISTORY:  has a past medical history of Colonic diverticulum, Hyperlipidemia, Hypertension, Obesity (BMI 30-39.9), Osteoarthritis, and Type 2 diabetes mellitus (H).    DISCHARGE MEDICATIONS:  Current Outpatient Medications   Medication Sig Dispense Refill     acetaminophen (TYLENOL) 500 MG tablet Take 1,000 mg by mouth 3 times daily       atorvastatin (LIPITOR) 20 MG tablet Take 20 mg by mouth At Bedtime       calcitonin, salmon, (MIACALCIN) 200 UNIT/ACT nasal spray Spray 1 spray into one nostril alternating nostrils daily Alternate nostril each day.       cholecalciferol 50 MCG (2000 UT) CAPS Take 2,000 Units by mouth daily       ELIQUIS ANTICOAGULANT 5 MG tablet Take 5 mg by mouth 2 times daily       menthol (ICY HOT) 5 % PTCH Apply 1 patch topically At Bedtime To low back       metoprolol succinate ER (TOPROL XL) 25 MG 24 hr tablet Take 2 tablets (50 mg) by mouth daily       miconazole (MICATIN) 2 % external powder Apply topically 2 times daily       omeprazole (PRILOSEC) 40 MG DR capsule Take 40 mg by mouth daily       simethicone (MYLICON) 80 MG chewable tablet Take 80 mg by mouth every 6 hours as needed for flatulence or cramping         MEDICATION CHANGES/RATIONALE:   Discontinue oxycodone due to no use.  discontinue Zofran and Compazine due to no use     ROS:    4 point ROS including Respiratory, CV, GI and , other than that noted in the HPI,  is negative    Physical Exam:   Vitals: /72   Pulse 73   Temp 97.3  F (36.3  C)   Resp 18   Ht 1.778 m (5' 10\")   Wt 111.8 kg (246 lb 6.4 oz)   SpO2 95%   BMI 35.35 kg/m    BMI= Body mass index is 35.35 kg/m .  Alert.  In no distress.  Wearing TLSO brace sitting in recliner.    DISCHARGE PLAN:  Occupational Therapy, Physical Therapy, Registered Nurse and Home Health Aide  Patient instructed to follow-up with:  PCP in 7 days      Current [employer] scheduled appointments:  No future appointments.    MTM referral needed " and placed by this provider: No    Pending labs: none  SNF labs: 7/15  K3.7 BUN 13 creatinine 0.83  AST 20 ALT 19 albumin 3.1 GFR 84.  Hemoglobin 15.8 WBC 6.1 platelets 170     discharge Instructions:  - Ok to discharge to home with current medications and treatments  - Home Physical Therapy / Ocupational Therapy / RN / HHA  - Follow up with Primary care provider in 1 week   -Instructed to follow-up with neurosurgery as directed    TOTAL DISCHARGE TIME:   Greater than 30 minutes  Electronically signed by:  Sindhu Patel NP    Documentation of Face to Face and Certification for Home Health Services  I certify that services are/were furnished while this patient was under the care of a physician and that a physician or an allowed non-physician practitioner (NPP), had a face-to-face encounter that meets the physician face-to-face encounter requirements. The encounter was in whole, or in part, related to the primary reason for home health. The patient is confined to his/her home and needs intermittent skilled nursing, physical therapy, speech-language pathology, or the continued need for occupational therapy. A plan of care has been established by a physician and is periodically reviewed by a physician.  Date of Face-to-Face Encounter:  7/26/2022.    I certify that, based on my findings, the following services are medically necessary home health services: Nursing, Occupational Therapy and Physical Therapy.    My clinical findings support the need for the above skilled services because: Requires assistance of another person or specialized equipment to access medical services because patient: Range of motion limitations prevents ability to exit home safely...    Patient to re-establish plan of care with their PCP within 7-10 days after leaving the facility to reestablish care.  Medicare certified PECOS provider: Sindhu Patel NP   Date: July 27, 2022

## 2022-07-27 NOTE — TELEPHONE ENCOUNTER
[facility] Call Center    Phone Message    May a detailed message be left on voicemail: yes     Reason for Call: Other: [facility]. Please call and discuss.     Action Taken: Other: Neurosurgery    Travel Screening: Not Applicable

## 2022-07-27 NOTE — TELEPHONE ENCOUNTER
Writer routed to Neurosurgery Clinic Scheduling.     Please schedule patient for follow up appointment and schedule patient imaging prior with Paris Cartagena CNP.     Tomeka Chu LPN  Neurosurgery

## 2022-07-28 NOTE — TELEPHONE ENCOUNTER
Writer was asked to call pt daughter, FUNMI for asking for her to call back and schedule a follow up    Please schedule an in person, return ([facility] RETURN visit type) with Paris Cartagena for the end of August. Please also help to schedule Xray(ordered) prior to visit, can be same day    Adriana Tellez

## 2022-08-14 ENCOUNTER — HEALTH MAINTENANCE LETTER (OUTPATIENT)
Age: 87
End: 2022-08-14

## 2022-08-25 ENCOUNTER — OFFICE VISIT (OUTPATIENT)
Dept: NEUROSURGERY | Facility: CLINIC | Age: 87
End: 2022-08-25
Payer: COMMERCIAL

## 2022-08-25 ENCOUNTER — ANCILLARY PROCEDURE (OUTPATIENT)
Dept: GENERAL RADIOLOGY | Facility: CLINIC | Age: 87
End: 2022-08-25
Attending: NURSE PRACTITIONER
Payer: COMMERCIAL

## 2022-08-25 VITALS
OXYGEN SATURATION: 99 % | HEART RATE: 101 BPM | RESPIRATION RATE: 16 BRPM | DIASTOLIC BLOOD PRESSURE: 90 MMHG | SYSTOLIC BLOOD PRESSURE: 131 MMHG

## 2022-08-25 DIAGNOSIS — S32.011D CLOSED STABLE BURST FRACTURE OF FIRST LUMBAR VERTEBRA WITH ROUTINE HEALING, SUBSEQUENT ENCOUNTER: ICD-10-CM

## 2022-08-25 DIAGNOSIS — S32.011D CLOSED STABLE BURST FRACTURE OF FIRST LUMBAR VERTEBRA WITH ROUTINE HEALING, SUBSEQUENT ENCOUNTER: Primary | ICD-10-CM

## 2022-08-25 PROCEDURE — 72080 X-RAY EXAM THORACOLMB 2/> VW: CPT | Performed by: STUDENT IN AN ORGANIZED HEALTH CARE EDUCATION/TRAINING PROGRAM

## 2022-08-25 PROCEDURE — 99214 OFFICE O/P EST MOD 30 MIN: CPT | Performed by: NURSE PRACTITIONER

## 2022-08-25 RX ORDER — CYCLOBENZAPRINE HCL 5 MG
TABLET ORAL
Status: ON HOLD | COMMUNITY
Start: 2022-08-22 | End: 2023-07-19

## 2022-08-25 ASSESSMENT — PAIN SCALES - GENERAL: PAINLEVEL: NO PAIN (1)

## 2022-08-25 NOTE — LETTER
8/25/2022       RE: Alvin Newton  [address] [id number]     Dear Colleague,    Thank you for referring your patient, Alvin Newton, to the [facility] NEUROSURGERY CLINIC [place] at [facility]. Please see a copy of my visit note below.        Neurosurgery Clinic Note    Chief Complaint: follow-up     DATE OF VISIT: 8/25/2022    HPI: Alvin Newton is a pleasant 88-year-old male, past medical history of diabetes type 2, atrial fibrillation on eloquis, hyperlipidemia, hypertension, NSTEMI, BPH presenting after mechanical fall on 6/6/22 while at home and was found to have an obliquely oriented L1 vertebral body fracture without extension into the posterior elements on imaging.  Treatment options were discussed however given the patient's multiple medical co-morbidities it was felt that conservative management should be pursued given high surgical risk.  Patient was fit with TLSO brace and serial x-rays which revealed stable positioning without evidence of increased height loss or kyphosis.  The patient was subsequently hospitalized from 6/6/22 to 6/20/22 and transferred to a TCU facility where he has since been discharged from on 7/27/22.  Patient was last seen on 7/21/22 and at that time pain above the right hip and in the lumbar spine had improved, no radiculopathy.  Lumbar x-rays at that time was stable in height and alignment was maintained.         Since his last visit he continues to note pain across the entire low back at the beltline.  Pain typically worse in the morning.  No leg pain or paresthesias.  No weakness in the legs, no bowel or bladder issues.  Has been wearing brace as directed.  Taking tylenol three times daily for pain and intermittent ibuprofen.  Has been ambulating with the assistance of a cane.  Working with home health PT once a week and has been walking frequently.           Review of Systems   Negative except  in HPI    Past Medical History:   Diagnosis Date     Colonic diverticulum      Hyperlipidemia      Hypertension      Obesity (BMI 30-39.9)      Osteoarthritis      Type 2 diabetes mellitus (H)        Past Surgical History:   Procedure Laterality Date     ARTHROPLASTY HIP BILATERAL  1987     HC ESOPHAGOSCOPY, DIAGNOSTIC  2003     LAPAROSCOPIC CHOLECYSTECTOMY N/A 12/28/2017    Procedure: LAPAROSCOPIC CHOLECYSTECTOMY;  LAPAROSCOPIC CHOLECYSTECTOMY;  Surgeon: Heber Gonzalez MD;  Location: [facility] OR     TRANSRECTAL ULTRASONIC, TRANSURETHRAL RESECTION (TUR) OF PROSTATE CYST  1990       Social History     Socioeconomic History     Marital status:    Tobacco Use     Smoking status: Never Smoker     Smokeless tobacco: Never Used   Substance and Sexual Activity     Alcohol use: Yes     Comment: 0-1 beer daily     Drug use: No       family history is not on file.              Physical Exam   There were no vitals taken for this visit.  Constitutional: Oriented to person, place, and time. Appears well-developed and well-nourished. Cooperative. No distress.   HENT: Head normocephalic and atraumatic.     Neurological: alert and oriented to person, place, and time. CN II-XII grossly  intact      STRENGTH LEFT RIGHT   Deltoid 5 5   Bicep 5 5   Wrist Extensor 5 5   Tricep 5 5   Finger flexion 5 5   Finger abduction 5 5    5 5       Hip Flexion     5     5   Knee Extension 5 5   Ankle Dorsiflexion 5 5   Extensor Hallucis Longus 5 5   Plantar Flexion 5 5   Foot eversion 5 5   Foot inversion 5 5       Skin: Skin is warm, dry and intact.   Psychiatric: Normal mood and affect. Speech is normal and behavior is normal.      IMAGING:  Personally reviewed Thoracolumbar x-rays dated 8/25/22:  L1 fracture noted on previous imaging appears stable in height, no evidence of bebo- or retro listhesis, no significant kyphosis noted.  Lucency in the L1 body remains present.     ASSESSMENT:  Alvin Newton is a 88-year-old male with  history atrial fibrillation on Eliquis, presenting after mechanical fall found to have a obliquely oriented linear fracture through L1 vertebral body which did not appear to extend to posterior elements.     PLAN:  Ok to gradually increase activity/walking.  Ok to lift up to 15 pounds.  Please refrain from repetitive bending or twisting.     Continue with TLSO brace, ok to remove when sedentary.     Continue physical therapy as prescribed.     Ok for  tylenol, and menthol patches for pain. May also use other non medicinal modalities such as heat/ice.      Continue taking calcium and Vitamin D supplementation as prescribed.    Please follow-up with 4 weeks with repeat upright thoracolumbar imaging.                I spent 30 minutes in patient care with greater than 50% spent in counseling and/or coordination of care.     I performed independent visualization of radiographic imaging and entered my own interpretation, reviewed and/or ordered tests in radiology        CHUCK Roberson, CNP  Department of Neurosurgery  Pager: [phone]

## 2022-08-25 NOTE — PATIENT INSTRUCTIONS
Ok to gradually increase activity.  Ok to lift up to 15 pounds and gradually increase if tolerating.  Please refrain from repetitive bending or twisting.     Continue physical therapy as prescribed.     Ok for NSAIDs (advil, aleve, motrin, ibuprofen, celebrex, etc), tylenol, and menthol patches for pain. May also use other non medicinal modalities such as heat/ice.      Continue taking calcium and Vitamin D supplementation as prescribed.    Please follow-up with 4 weeks with repeat upright thoracolumbar imaging.

## 2022-08-25 NOTE — PROGRESS NOTES
Neurosurgery Clinic Note    Chief Complaint: follow-up     DATE OF VISIT: 8/25/2022    HPI: Alvin Newton is a pleasant 88-year-old male, past medical history of diabetes type 2, atrial fibrillation on eloquis, hyperlipidemia, hypertension, NSTEMI, BPH presenting after mechanical fall on 6/6/22 while at home and was found to have an obliquely oriented L1 vertebral body fracture without extension into the posterior elements on imaging.  Treatment options were discussed however given the patient's multiple medical co-morbidities it was felt that conservative management should be pursued given high surgical risk.  Patient was fit with TLSO brace and serial x-rays which revealed stable positioning without evidence of increased height loss or kyphosis.  The patient was subsequently hospitalized from 6/6/22 to 6/20/22 and transferred to a TCU facility where he has since been discharged from on 7/27/22.  Patient was last seen on 7/21/22 and at that time pain above the right hip and in the lumbar spine had improved, no radiculopathy.  Lumbar x-rays at that time was stable in height and alignment was maintained.         Since his last visit he continues to note pain across the entire low back at the beltline.  Pain typically worse in the morning.  No leg pain or paresthesias.  No weakness in the legs, no bowel or bladder issues.  Has been wearing brace as directed.  Taking tylenol three times daily for pain and intermittent ibuprofen.  Has been ambulating with the assistance of a cane.  Working with home health PT once a week and has been walking frequently.           Review of Systems   Negative except in HPI    Past Medical History:   Diagnosis Date     Colonic diverticulum      Hyperlipidemia      Hypertension      Obesity (BMI 30-39.9)      Osteoarthritis      Type 2 diabetes mellitus (H)        Past Surgical History:   Procedure Laterality Date     ARTHROPLASTY HIP BILATERAL  1987     HC ESOPHAGOSCOPY, DIAGNOSTIC   2003     LAPAROSCOPIC CHOLECYSTECTOMY N/A 12/28/2017    Procedure: LAPAROSCOPIC CHOLECYSTECTOMY;  LAPAROSCOPIC CHOLECYSTECTOMY;  Surgeon: Heber Gonzalez MD;  Location: [facility] OR     TRANSRECTAL ULTRASONIC, TRANSURETHRAL RESECTION (TUR) OF PROSTATE CYST  1990       Social History     Socioeconomic History     Marital status:    Tobacco Use     Smoking status: Never Smoker     Smokeless tobacco: Never Used   Substance and Sexual Activity     Alcohol use: Yes     Comment: 0-1 beer daily     Drug use: No       family history is not on file.              Physical Exam   There were no vitals taken for this visit.  Constitutional: Oriented to person, place, and time. Appears well-developed and well-nourished. Cooperative. No distress.   HENT: Head normocephalic and atraumatic.     Neurological: alert and oriented to person, place, and time. CN II-XII grossly  intact      STRENGTH LEFT RIGHT   Deltoid 5 5   Bicep 5 5   Wrist Extensor 5 5   Tricep 5 5   Finger flexion 5 5   Finger abduction 5 5    5 5       Hip Flexion     5     5   Knee Extension 5 5   Ankle Dorsiflexion 5 5   Extensor Hallucis Longus 5 5   Plantar Flexion 5 5   Foot eversion 5 5   Foot inversion 5 5       Skin: Skin is warm, dry and intact.   Psychiatric: Normal mood and affect. Speech is normal and behavior is normal.      IMAGING:  Personally reviewed Thoracolumbar x-rays dated 8/25/22:  L1 fracture noted on previous imaging appears stable in height, no evidence of bebo- or retro listhesis, no significant kyphosis noted.  Lucency in the L1 body remains present.     ASSESSMENT:  Alvin Newton is a 88-year-old male with history atrial fibrillation on Eliquis, presenting after mechanical fall found to have a obliquely oriented linear fracture through L1 vertebral body which did not appear to extend to posterior elements.     PLAN:  Ok to gradually increase activity/walking.  Ok to lift up to 15 pounds.  Please refrain from repetitive  bending or twisting.     Continue with TLSO brace, ok to remove when sedentary.     Continue physical therapy as prescribed.     Ok for  tylenol, and menthol patches for pain. May also use other non medicinal modalities such as heat/ice.      Continue taking calcium and Vitamin D supplementation as prescribed.    Please follow-up with 4 weeks with repeat upright thoracolumbar imaging.                I spent 30 minutes in patient care with greater than 50% spent in counseling and/or coordination of care.     I performed independent visualization of radiographic imaging and entered my own interpretation, reviewed and/or ordered tests in radiology        CHUCK Roberson, CNP  Department of Neurosurgery  Pager: [phone]

## 2022-09-21 NOTE — PROGRESS NOTES
Neurosurgery Clinic Note    Chief Complaint: follow-up     DATE OF VISIT: 9/22/2022    HPI:     HPI: Alvin Newton is a pleasant 88-year-old male, past medical history of diabetes type 2, atrial fibrillation on eloquis, hyperlipidemia, hypertension, NSTEMI, BPH presenting after mechanical fall on 6/6/22 while at home and was found to have an obliquely oriented L1 vertebral body fracture without extension into the posterior elements on imaging.  Treatment options were discussed however given the patient's multiple medical co-morbidities it was felt that conservative management should be pursued given high surgical risk.  Patient was fit with TLSO brace and serial x-rays which revealed stable positioning without evidence of increased height loss or kyphosis.  The patient was subsequently hospitalized from 6/6/22 to 6/20/22 and transferred to a TCU facility where he has since been discharged from on 7/27/22.  Patient was last seen on 8/25/22 and at that time the patient endorses low back pain without radicular symptoms.  Lumbar x-rays at that time was stable in height and alignment was maintained however no significant change in healing of fracture was noted.          Since his last visit on 8/25/2022,  he continues to note pain across the left lower back at the beltline.  Pain typically worse in the morning.  No leg pain or paresthesias.  No weakness in the legs, no bowel or bladder issues.  Has been wearing brace as directed.  Taking tylenol three times daily for pain and intermittent ibuprofen.  Has been ambulating with the assistance of a cane.  Working with home health PT once a week and has been walking frequently.     Notes intermittent left lower back pain without radicular symptoms.  Since last visit, the patient has returned home after being in a TCU facility.   Patient stated he began to wean from brace two weeks ago, has not noted increased pain.  Has been undergoing home therapies 1-2 times per week.   Currently only taking tylenol for pain.  No bowel or bladder changes.           Review of Systems   Negative except in HPI    Past Medical History:   Diagnosis Date     Colonic diverticulum      Hyperlipidemia      Hypertension      Obesity (BMI 30-39.9)      Osteoarthritis      Type 2 diabetes mellitus (H)        Past Surgical History:   Procedure Laterality Date     ARTHROPLASTY HIP BILATERAL  1987     HC ESOPHAGOSCOPY, DIAGNOSTIC  2003     LAPAROSCOPIC CHOLECYSTECTOMY N/A 12/28/2017    Procedure: LAPAROSCOPIC CHOLECYSTECTOMY;  LAPAROSCOPIC CHOLECYSTECTOMY;  Surgeon: Heber Gonzalez MD;  Location: [facility] OR     TRANSRECTAL ULTRASONIC, TRANSURETHRAL RESECTION (TUR) OF PROSTATE CYST  1990       Social History     Socioeconomic History     Marital status:    Tobacco Use     Smoking status: Never Smoker     Smokeless tobacco: Never Used   Substance and Sexual Activity     Alcohol use: Yes     Comment: 0-1 beer daily     Drug use: No       family history is not on file.              Physical Exam   There were no vitals taken for this visit.  Constitutional: Oriented to person, place, and time. Appears well-developed and well-nourished. Cooperative. No distress.   HENT: Head normocephalic and atraumatic.     Neurological: alert and oriented to person, place, and time. CN II-XII grossly  intact      STRENGTH LEFT RIGHT   Deltoid 5 5   Bicep 5 5   Wrist Extensor 5 5   Tricep 5 5   Finger flexion 5 5   Finger abduction 5 5    5 5       Hip Flexion     5     5   Knee Extension 5 5   Ankle Dorsiflexion 5 5   Extensor Hallucis Longus 5 5   Plantar Flexion 5 5   Foot eversion 5 5   Foot inversion 5 5       Skin: Skin is warm, dry and intact.   Psychiatric: Normal mood and affect. Speech is normal and behavior is normal.      IMAGING:  Personally reviewed Thoracolumbar x-rays dated 9/22/22:  L1 fracture noted on previous imaging appears stable in height, no evidence of bebo- or retro listhesis, no significant  kyphosis noted.          ASSESSMENT:  Alvin Newton is a 88-year-old male with history atrial fibrillation on Eliquis, presenting after mechanical fall found to have a obliquely oriented linear fracture through L1 vertebral body which did not appear to extend to posterior elements.     PLAN:  Ok to walk as tolerated.  Ok to lift up to 15 pounds.  Please refrain from repetitive bending or twisting.      Continue to discontinue TLSO brace      Continue physical therapy as prescribed.      Ok for  tylenol, and menthol patches for pain. May also use other non medicinal modalities such as heat/ice.      Continue taking calcium and Vitamin D supplementation as prescribed.     Please follow-up with 4 weeks with repeat upright thoracolumbar imaging.  Ok for virtual visit.                   I spent 30 minutes in patient care with greater than 50% spent in counseling and/or coordination of care.     I performed independent visualization of radiographic imaging and entered my own interpretation, reviewed and/or ordered tests in radiology        CHUCK Roberson, CNP  Department of Neurosurgery  Pager: [phone]

## 2022-09-22 ENCOUNTER — OFFICE VISIT (OUTPATIENT)
Dept: NEUROSURGERY | Facility: CLINIC | Age: 87
End: 2022-09-22
Payer: COMMERCIAL

## 2022-09-22 ENCOUNTER — ANCILLARY PROCEDURE (OUTPATIENT)
Dept: GENERAL RADIOLOGY | Facility: CLINIC | Age: 87
End: 2022-09-22
Attending: NURSE PRACTITIONER
Payer: COMMERCIAL

## 2022-09-22 VITALS
SYSTOLIC BLOOD PRESSURE: 130 MMHG | HEART RATE: 87 BPM | DIASTOLIC BLOOD PRESSURE: 79 MMHG | WEIGHT: 245.7 LBS | BODY MASS INDEX: 35.25 KG/M2 | OXYGEN SATURATION: 98 %

## 2022-09-22 DIAGNOSIS — S32.011D CLOSED STABLE BURST FRACTURE OF FIRST LUMBAR VERTEBRA WITH ROUTINE HEALING, SUBSEQUENT ENCOUNTER: Primary | ICD-10-CM

## 2022-09-22 DIAGNOSIS — S32.011D CLOSED STABLE BURST FRACTURE OF FIRST LUMBAR VERTEBRA WITH ROUTINE HEALING, SUBSEQUENT ENCOUNTER: ICD-10-CM

## 2022-09-22 PROCEDURE — 72080 X-RAY EXAM THORACOLMB 2/> VW: CPT | Performed by: STUDENT IN AN ORGANIZED HEALTH CARE EDUCATION/TRAINING PROGRAM

## 2022-09-22 PROCEDURE — 99214 OFFICE O/P EST MOD 30 MIN: CPT | Performed by: NURSE PRACTITIONER

## 2022-09-22 ASSESSMENT — PAIN SCALES - GENERAL: PAINLEVEL: NO PAIN (0)

## 2022-09-22 NOTE — PATIENT INSTRUCTIONS
Ok to walk as tolerated.  Ok to lift up to 15 pounds.  Please refrain from repetitive bending or twisting.      Continue to discontinue TLSO brace      Continue physical therapy as prescribed.      Ok for  tylenol, and menthol patches for pain. May also use other non medicinal modalities such as heat/ice.      Continue taking calcium and Vitamin D supplementation as prescribed.     Please follow-up with 4 weeks with repeat upright thoracolumbar imaging.

## 2022-09-22 NOTE — LETTER
9/22/2022       RE: Alvin Newton  [address] [address] [facility] [id number]     Dear Colleague,    Thank you for referring your patient, Alvin Newton, to the [facility]UROSURGERY CLINIC [place] at [facility]. Please see a copy of my visit note below.        Neurosurgery Clinic Note    Chief Complaint: follow-up     DATE OF VISIT: 9/22/2022    HPI: Alvin Newton is a pleasant 88-year-old male, past medical history of diabetes type 2, atrial fibrillation on eloquis, hyperlipidemia, hypertension, NSTEMI, BPH presenting after mechanical fall on 6/6/22 while at home and was found to have an obliquely oriented L1 vertebral body fracture without extension into the posterior elements on imaging.  Treatment options were discussed however given the patient's multiple medical co-morbidities it was felt that conservative management should be pursued given high surgical risk.  Patient was fit with TLSO brace and serial x-rays which revealed stable positioning without evidence of increased height loss or kyphosis.  The patient was subsequently hospitalized from 6/6/22 to 6/20/22 and transferred to a TCU facility where he has since been discharged from on 7/27/22.  Patient was last seen on 8/25/22 and at that time the patient endorses low back pain without radicular symptoms.  Lumbar x-rays at that time was stable in height and alignment was maintained however no significant change in healing of fracture was noted.          Since his last visit on 8/25/2022,  he continues to note pain across the left lower back at the beltline.  Pain typically worse in the morning.  No leg pain or paresthesias.  No weakness in the legs, no bowel or bladder issues.  Has been wearing brace as directed.  Taking tylenol three times daily for pain and intermittent ibuprofen.  Has been ambulating with the assistance of a cane.  Working with home health PT once a week and has been  walking frequently.     Notes intermittent left lower back pain without radicular symptoms.  Since last visit, the patient has returned home after being in a TCU facility.   Patient stated he began to wean from brace two weeks ago, has not noted increased pain.  Has been undergoing home therapies 1-2 times per week.  Currently only taking tylenol for pain.  No bowel or bladder changes.           Review of Systems   Negative except in HPI    Past Medical History:   Diagnosis Date     Colonic diverticulum      Hyperlipidemia      Hypertension      Obesity (BMI 30-39.9)      Osteoarthritis      Type 2 diabetes mellitus (H)        Past Surgical History:   Procedure Laterality Date     ARTHROPLASTY HIP BILATERAL  1987     HC ESOPHAGOSCOPY, DIAGNOSTIC  2003     LAPAROSCOPIC CHOLECYSTECTOMY N/A 12/28/2017    Procedure: LAPAROSCOPIC CHOLECYSTECTOMY;  LAPAROSCOPIC CHOLECYSTECTOMY;  Surgeon: Heber Gonzalez MD;  Location: [facility] OR     TRANSRECTAL ULTRASONIC, TRANSURETHRAL RESECTION (TUR) OF PROSTATE CYST  1990       Social History     Socioeconomic History     Marital status:    Tobacco Use     Smoking status: Never Smoker     Smokeless tobacco: Never Used   Substance and Sexual Activity     Alcohol use: Yes     Comment: 0-1 beer daily     Drug use: No       family history is not on file.              Physical Exam   There were no vitals taken for this visit.  Constitutional: Oriented to person, place, and time. Appears well-developed and well-nourished. Cooperative. No distress.   HENT: Head normocephalic and atraumatic.     Neurological: alert and oriented to person, place, and time. CN II-XII grossly  intact      STRENGTH LEFT RIGHT   Deltoid 5 5   Bicep 5 5   Wrist Extensor 5 5   Tricep 5 5   Finger flexion 5 5   Finger abduction 5 5    5 5       Hip Flexion     5     5   Knee Extension 5 5   Ankle Dorsiflexion 5 5   Extensor Hallucis Longus 5 5   Plantar Flexion 5 5   Foot eversion 5 5   Foot inversion 5  5       Skin: Skin is warm, dry and intact.   Psychiatric: Normal mood and affect. Speech is normal and behavior is normal.      IMAGING:  Personally reviewed Thoracolumbar x-rays dated 9/22/22:  L1 fracture noted on previous imaging appears stable in height, no evidence of bebo- or retro listhesis, no significant kyphosis noted.          ASSESSMENT:  Alvin Newton is a 88-year-old male with history atrial fibrillation on Eliquis, presenting after mechanical fall found to have a obliquely oriented linear fracture through L1 vertebral body which did not appear to extend to posterior elements.     PLAN:  Ok to walk as tolerated.  Ok to lift up to 15 pounds.  Please refrain from repetitive bending or twisting.      Continue to discontinue TLSO brace      Continue physical therapy as prescribed.      Ok for  tylenol, and menthol patches for pain. May also use other non medicinal modalities such as heat/ice.      Continue taking calcium and Vitamin D supplementation as prescribed.     Please follow-up with 4 weeks with repeat upright thoracolumbar imaging.  Ok for virtual visit.           I spent 30 minutes in patient care with greater than 50% spent in counseling and/or coordination of care.     I performed independent visualization of radiographic imaging and entered my own interpretation, reviewed and/or ordered tests in radiology        CHUCK Roberson, CNP  Department of Neurosurgery  Pager: [phone]

## 2022-09-26 ENCOUNTER — TELEPHONE (OUTPATIENT)
Dept: NEUROSURGERY | Facility: CLINIC | Age: 87
End: 2022-09-26

## 2022-09-26 NOTE — TELEPHONE ENCOUNTER
[facility] Call Center    Phone Message    May a detailed message be left on voicemail: yes     Reason for Call: Other: Rosie calling to request a call back to discuss Alvin's appointments on 10/17/22 and 10/20/22. She stated that she was under the impression that both of Alvin's appointments were going to be in [place]. She is inquiring if the appointment on 10/20/22 can be a telephone visit due to travel. Please call Rosie at your earliest convenience to discuss.     Action Taken: Message routed to:  [facility] ([facility]    Travel Screening: Not Applicable

## 2022-10-17 ENCOUNTER — ANCILLARY PROCEDURE (OUTPATIENT)
Dept: GENERAL RADIOLOGY | Facility: CLINIC | Age: 87
End: 2022-10-17
Attending: NURSE PRACTITIONER
Payer: COMMERCIAL

## 2022-10-17 DIAGNOSIS — S32.011D CLOSED STABLE BURST FRACTURE OF FIRST LUMBAR VERTEBRA WITH ROUTINE HEALING, SUBSEQUENT ENCOUNTER: ICD-10-CM

## 2022-10-17 PROCEDURE — 72082 X-RAY EXAM ENTIRE SPI 2/3 VW: CPT | Mod: TC | Performed by: RADIOLOGY

## 2022-10-18 DIAGNOSIS — S32.010A CLOSED WEDGE COMPRESSION FRACTURE OF L1 VERTEBRA, INITIAL ENCOUNTER (H): Primary | ICD-10-CM

## 2022-10-20 ENCOUNTER — VIRTUAL VISIT (OUTPATIENT)
Dept: NEUROSURGERY | Facility: CLINIC | Age: 87
End: 2022-10-20
Payer: COMMERCIAL

## 2022-10-20 DIAGNOSIS — S32.010D CLOSED WEDGE COMPRESSION FRACTURE OF L1 VERTEBRA WITH ROUTINE HEALING, SUBSEQUENT ENCOUNTER: Primary | ICD-10-CM

## 2022-10-20 PROCEDURE — 99443 PR PHYSICIAN TELEPHONE EVALUATION 21-30 MIN: CPT | Mod: 95 | Performed by: NURSE PRACTITIONER

## 2022-10-20 NOTE — LETTER
10/20/2022       RE: Alvin Newton  [address] [facility] [id number]     Dear Colleague,    Thank you for referring your patient, Alvin Newton, to the [facility] NEUROSURGERY CLINIC [place] at [facility]. Please see a copy of my visit note below.       Neurosurgery Clinic Note     Chief Complaint: follow-up      DATE OF VISIT: 10/20/2022       HPI:      HPI: Alvin Newton is a pleasant 88-year-old male, past medical history of diabetes type 2, atrial fibrillation on eloquis, hyperlipidemia, hypertension, NSTEMI, BPH presenting after mechanical fall on 6/6/22 while at home and was found to have an obliquely oriented L1 vertebral body fracture without extension into the posterior elements on imaging.  Treatment options were discussed however given the patient's multiple medical co-morbidities it was felt that conservative management should be pursued given high surgical risk.  Patient was fit with TLSO brace and serial x-rays which revealed stable positioning without evidence of increased height loss or kyphosis.  The patient was subsequently hospitalized from 6/6/22 to 6/20/22 and transferred to a TCU facility where he has since been discharged from on 7/27/22.  Patient was last seen on 9/22/22 and at that time the patient endorses low back pain without radicular symptoms.  Lumbar x-rays at that time was stable in height and alignment was maintained however no significant change in healing of fracture was noted.          Since his last visit on 9/22/2022, patient states pain continues to improve.  He states he is going to try sleeping in a bed tonight given his pain has improved so much, has been sleeping in recliner.    No leg pain or paresthesias.  No weakness in the legs, no bowel or bladder issues.  Since last visit has weaned from brace.  Taking tylenol three times daily for pain and intermittent ibuprofen.  Has been ambulating with the  assistance of a cane.  Working with home health PT once a week and continues to  walk frequently.              Review of Systems   Negative except in HPI     Past Medical History        Past Medical History:   Diagnosis Date     Colonic diverticulum       Hyperlipidemia       Hypertension       Obesity (BMI 30-39.9)       Osteoarthritis       Type 2 diabetes mellitus (H)              Past Surgical History         Past Surgical History:   Procedure Laterality Date     ARTHROPLASTY HIP BILATERAL   1987     HC ESOPHAGOSCOPY, DIAGNOSTIC   2003     LAPAROSCOPIC CHOLECYSTECTOMY N/A 12/28/2017     Procedure: LAPAROSCOPIC CHOLECYSTECTOMY;  LAPAROSCOPIC CHOLECYSTECTOMY;  Surgeon: Heber Gonzalez MD;  Location: [facility] OR     TRANSRECTAL ULTRASONIC, TRANSURETHRAL RESECTION (TUR) OF PROSTATE CYST   1990            Social History            Socioeconomic History     Marital status:    Tobacco Use     Smoking status: Never Smoker     Smokeless tobacco: Never Used   Substance and Sexual Activity     Alcohol use: Yes       Comment: 0-1 beer daily     Drug use: No         family history is not on file.                Physical Exam   There were no vitals taken for this visit.  Constitutional: Oriented to person, place, and time. Appears well-developed and well-nourished. Cooperative. No distress.   HENT: Head normocephalic and atraumatic.     Neurological: alert and oriented to person, place, and time. CN II-XII grossly  intact        STRENGTH LEFT RIGHT   Deltoid 5 5   Bicep 5 5   Wrist Extensor 5 5   Tricep 5 5   Finger flexion 5 5   Finger abduction 5 5    5 5         Hip Flexion       5       5   Knee Extension 5 5   Ankle Dorsiflexion 5 5   Extensor Hallucis Longus 5 5   Plantar Flexion 5 5   Foot eversion 5 5   Foot inversion 5 5        Skin: Skin is warm, dry and intact.   Psychiatric: Normal mood and affect. Speech is normal and behavior is normal.        IMAGING:  Personally reviewed Thoracolumbar x-rays  dated 10/20/2022:  L1 fracture noted on previous imaging appears stable in height, no evidence of bebo- or retro listhesis, no significant kyphosis noted.           ASSESSMENT:  Alvin Newton is a 88-year-old male with history atrial fibrillation on Eliquis, presenting after mechanical fall found to have a obliquely oriented linear fracture through L1 vertebral body which did not appear to extend to posterior elements.  Serial imaging has remained stable without evidence increased height loss, instability or malalignment.  Pain has resolved.      PLAN:  Ok to increase activity as tolerated.       Continue physical therapy as prescribed.      Ok for  tylenol, and menthol patches for pain. May also use other non medicinal modalities such as heat/ice.      Continue taking calcium and Vitamin D supplementation as prescribed.    Given stability of x-rays, no need for additional follow-up.      Instructed patient to contact our clinic if patient should note increased pain or have additional questions.             I spent 30 minutes in patient care with greater than 50% spent in counseling and/or coordination of care.     I performed independent visualization of radiographic imaging and entered my own interpretation, reviewed and/or ordered tests in radiology           CHUCK Roberson, CNP  Department of Neurosurgery  Pager: [phone]

## 2022-10-20 NOTE — PROGRESS NOTES
Alvin is a 88 year old who is being evaluated via a billable telephone visit.      What phone number would you like to be contacted at? [phone]  How would you like to obtain your AVS? Kareem  Phone call duration: 30 minutes

## 2022-10-20 NOTE — PROGRESS NOTES
Neurosurgery Clinic Note     Chief Complaint: follow-up      DATE OF VISIT: 10/20/2022       HPI:      HPI: Alvin Newton is a pleasant 88-year-old male, past medical history of diabetes type 2, atrial fibrillation on eloquis, hyperlipidemia, hypertension, NSTEMI, BPH presenting after mechanical fall on 6/6/22 while at home and was found to have an obliquely oriented L1 vertebral body fracture without extension into the posterior elements on imaging.  Treatment options were discussed however given the patient's multiple medical co-morbidities it was felt that conservative management should be pursued given high surgical risk.  Patient was fit with TLSO brace and serial x-rays which revealed stable positioning without evidence of increased height loss or kyphosis.  The patient was subsequently hospitalized from 6/6/22 to 6/20/22 and transferred to a TCU facility where he has since been discharged from on 7/27/22.  Patient was last seen on 9/22/22 and at that time the patient endorses low back pain without radicular symptoms.  Lumbar x-rays at that time was stable in height and alignment was maintained however no significant change in healing of fracture was noted.          Since his last visit on 9/22/2022, patient states pain continues to improve.  He states he is going to try sleeping in a bed tonight given his pain has improved so much, has been sleeping in recliner.    No leg pain or paresthesias.  No weakness in the legs, no bowel or bladder issues.  Since last visit has weaned from brace.  Taking tylenol three times daily for pain and intermittent ibuprofen.  Has been ambulating with the assistance of a cane.  Working with home health PT once a week and continues to  walk frequently.              Review of Systems   Negative except in HPI     Past Medical History        Past Medical History:   Diagnosis Date     Colonic diverticulum       Hyperlipidemia       Hypertension       Obesity (BMI 30-39.9)        Osteoarthritis       Type 2 diabetes mellitus (H)              Past Surgical History         Past Surgical History:   Procedure Laterality Date     ARTHROPLASTY HIP BILATERAL   1987      ESOPHAGOSCOPY, DIAGNOSTIC   2003     LAPAROSCOPIC CHOLECYSTECTOMY N/A 12/28/2017     Procedure: LAPAROSCOPIC CHOLECYSTECTOMY;  LAPAROSCOPIC CHOLECYSTECTOMY;  Surgeon: Heber Gonzalez MD;  Location:  OR     TRANSRECTAL ULTRASONIC, TRANSURETHRAL RESECTION (TUR) OF PROSTATE CYST   1990            Social History            Socioeconomic History     Marital status:    Tobacco Use     Smoking status: Never Smoker     Smokeless tobacco: Never Used   Substance and Sexual Activity     Alcohol use: Yes       Comment: 0-1 beer daily     Drug use: No         family history is not on file.                Physical Exam   There were no vitals taken for this visit.  Constitutional: Oriented to person, place, and time. Appears well-developed and well-nourished. Cooperative. No distress.   HENT: Head normocephalic and atraumatic.     Neurological: alert and oriented to person, place, and time. CN II-XII grossly  intact        STRENGTH LEFT RIGHT   Deltoid 5 5   Bicep 5 5   Wrist Extensor 5 5   Tricep 5 5   Finger flexion 5 5   Finger abduction 5 5    5 5         Hip Flexion       5       5   Knee Extension 5 5   Ankle Dorsiflexion 5 5   Extensor Hallucis Longus 5 5   Plantar Flexion 5 5   Foot eversion 5 5   Foot inversion 5 5        Skin: Skin is warm, dry and intact.   Psychiatric: Normal mood and affect. Speech is normal and behavior is normal.        IMAGING:  Personally reviewed Thoracolumbar x-rays dated 10/20/2022:  L1 fracture noted on previous imaging appears stable in height, no evidence of bebo- or retro listhesis, no significant kyphosis noted.           ASSESSMENT:  Alvin Newton is a 88-year-old male with history atrial fibrillation on Eliquis, presenting after mechanical fall found to have a obliquely oriented  linear fracture through L1 vertebral body which did not appear to extend to posterior elements.  Serial imaging has remained stable without evidence increased height loss, instability or malalignment.  Pain has resolved.      PLAN:  Ok to increase activity as tolerated.       Continue physical therapy as prescribed.      Ok for  tylenol, and menthol patches for pain. May also use other non medicinal modalities such as heat/ice.      Continue taking calcium and Vitamin D supplementation as prescribed.    Given stability of x-rays, no need for additional follow-up.      Instructed patient to contact our clinic if patient should note increased pain or have additional questions.                              I spent 30 minutes in patient care with greater than 50% spent in counseling and/or coordination of care.     I performed independent visualization of radiographic imaging and entered my own interpretation, reviewed and/or ordered tests in radiology           CHUCK Roberson, CNP  Department of Neurosurgery  Pager: [phone]

## 2022-12-21 ENCOUNTER — HOSPITAL ENCOUNTER (EMERGENCY)
Facility: CLINIC | Age: 87
Discharge: HOME | End: 2022-12-21
Attending: EMERGENCY MEDICINE | Admitting: EMERGENCY MEDICINE
Payer: COMMERCIAL

## 2022-12-21 ENCOUNTER — APPOINTMENT (OUTPATIENT)
Dept: GENERAL RADIOLOGY | Facility: CLINIC | Age: 87
End: 2022-12-21
Attending: EMERGENCY MEDICINE
Payer: COMMERCIAL

## 2022-12-21 VITALS
TEMPERATURE: 99.1 F | HEART RATE: 109 BPM | DIASTOLIC BLOOD PRESSURE: 65 MMHG | RESPIRATION RATE: 21 BRPM | WEIGHT: 235 LBS | SYSTOLIC BLOOD PRESSURE: 95 MMHG | OXYGEN SATURATION: 98 % | BODY MASS INDEX: 32.9 KG/M2 | HEIGHT: 71 IN

## 2022-12-21 DIAGNOSIS — M62.81 GENERALIZED MUSCLE WEAKNESS: ICD-10-CM

## 2022-12-21 DIAGNOSIS — E86.0 DEHYDRATION: ICD-10-CM

## 2022-12-21 LAB
ALBUMIN SERPL BCG-MCNC: 3.2 G/DL (ref 3.5–5.2)
ALBUMIN UR-MCNC: 30 MG/DL
ALP SERPL-CCNC: 129 U/L (ref 40–129)
ALT SERPL W P-5'-P-CCNC: 14 U/L (ref 10–50)
ANION GAP SERPL CALCULATED.3IONS-SCNC: 12 MMOL/L (ref 7–15)
APPEARANCE UR: CLEAR
AST SERPL W P-5'-P-CCNC: 18 U/L (ref 10–50)
BASE EXCESS BLDV CALC-SCNC: 1.2 MMOL/L (ref -7.7–1.9)
BASOPHILS # BLD AUTO: 0 10E3/UL (ref 0–0.2)
BASOPHILS NFR BLD AUTO: 0 %
BILIRUB SERPL-MCNC: 2.6 MG/DL
BILIRUB UR QL STRIP: ABNORMAL
BUN SERPL-MCNC: 26.3 MG/DL (ref 8–23)
CALCIUM SERPL-MCNC: 9.2 MG/DL (ref 8.8–10.2)
CHLORIDE SERPL-SCNC: 103 MMOL/L (ref 98–107)
COLOR UR AUTO: YELLOW
CREAT SERPL-MCNC: 1.17 MG/DL (ref 0.67–1.17)
DEPRECATED HCO3 PLAS-SCNC: 23 MMOL/L (ref 22–29)
EOSINOPHIL # BLD AUTO: 0 10E3/UL (ref 0–0.7)
EOSINOPHIL NFR BLD AUTO: 0 %
ERYTHROCYTE [DISTWIDTH] IN BLOOD BY AUTOMATED COUNT: 14 % (ref 10–15)
FLUAV RNA SPEC QL NAA+PROBE: NEGATIVE
FLUBV RNA RESP QL NAA+PROBE: NEGATIVE
GFR SERPL CREATININE-BSD FRML MDRD: 60 ML/MIN/1.73M2
GLUCOSE SERPL-MCNC: 164 MG/DL (ref 70–99)
GLUCOSE UR STRIP-MCNC: NEGATIVE MG/DL
HCO3 BLDV-SCNC: 25 MMOL/L (ref 21–28)
HCT VFR BLD AUTO: 46.8 % (ref 40–53)
HGB BLD-MCNC: 15.2 G/DL (ref 13.3–17.7)
HGB UR QL STRIP: ABNORMAL
HOLD SPECIMEN: NORMAL
HYALINE CASTS: 4 /LPF
IMM GRANULOCYTES # BLD: 0 10E3/UL
IMM GRANULOCYTES NFR BLD: 0 %
KETONES UR STRIP-MCNC: 5 MG/DL
LACTATE SERPL-SCNC: 1.3 MMOL/L (ref 0.7–2)
LEUKOCYTE ESTERASE UR QL STRIP: NEGATIVE
LYMPHOCYTES # BLD AUTO: 1 10E3/UL (ref 0.8–5.3)
LYMPHOCYTES NFR BLD AUTO: 11 %
MCH RBC QN AUTO: 29.9 PG (ref 26.5–33)
MCHC RBC AUTO-ENTMCNC: 32.5 G/DL (ref 31.5–36.5)
MCV RBC AUTO: 92 FL (ref 78–100)
MONOCYTES # BLD AUTO: 1 10E3/UL (ref 0–1.3)
MONOCYTES NFR BLD AUTO: 11 %
MUCOUS THREADS #/AREA URNS LPF: PRESENT /LPF
NEUTROPHILS # BLD AUTO: 7.1 10E3/UL (ref 1.6–8.3)
NEUTROPHILS NFR BLD AUTO: 78 %
NITRATE UR QL: NEGATIVE
NRBC # BLD AUTO: 0 10E3/UL
NRBC BLD AUTO-RTO: 0 /100
O2/TOTAL GAS SETTING VFR VENT: 21 %
PCO2 BLDV: 37 MM HG (ref 40–50)
PH BLDV: 7.44 [PH] (ref 7.32–7.43)
PH UR STRIP: 5 [PH] (ref 5–7)
PLATELET # BLD AUTO: 160 10E3/UL (ref 150–450)
PO2 BLDV: 56 MM HG (ref 25–47)
POTASSIUM SERPL-SCNC: 4.3 MMOL/L (ref 3.4–5.3)
PROT SERPL-MCNC: 6.8 G/DL (ref 6.4–8.3)
RBC # BLD AUTO: 5.08 10E6/UL (ref 4.4–5.9)
RBC URINE: 39 /HPF
SARS-COV-2 RNA RESP QL NAA+PROBE: NEGATIVE
SODIUM SERPL-SCNC: 138 MMOL/L (ref 136–145)
SP GR UR STRIP: 1.02 (ref 1–1.03)
SQUAMOUS EPITHELIAL: 1 /HPF
UROBILINOGEN UR STRIP-MCNC: NORMAL MG/DL
WBC # BLD AUTO: 9.2 10E3/UL (ref 4–11)
WBC URINE: 10 /HPF

## 2022-12-21 PROCEDURE — 258N000003 HC RX IP 258 OP 636: Performed by: EMERGENCY MEDICINE

## 2022-12-21 PROCEDURE — 82040 ASSAY OF SERUM ALBUMIN: CPT | Performed by: EMERGENCY MEDICINE

## 2022-12-21 PROCEDURE — 80053 COMPREHEN METABOLIC PANEL: CPT | Performed by: EMERGENCY MEDICINE

## 2022-12-21 PROCEDURE — 85025 COMPLETE CBC W/AUTO DIFF WBC: CPT | Performed by: EMERGENCY MEDICINE

## 2022-12-21 PROCEDURE — 250N000013 HC RX MED GY IP 250 OP 250 PS 637: Performed by: EMERGENCY MEDICINE

## 2022-12-21 PROCEDURE — 87636 SARSCOV2 & INF A&B AMP PRB: CPT | Performed by: EMERGENCY MEDICINE

## 2022-12-21 PROCEDURE — C9803 HOPD COVID-19 SPEC COLLECT: HCPCS | Performed by: EMERGENCY MEDICINE

## 2022-12-21 PROCEDURE — 83605 ASSAY OF LACTIC ACID: CPT | Performed by: EMERGENCY MEDICINE

## 2022-12-21 PROCEDURE — 99284 EMERGENCY DEPT VISIT MOD MDM: CPT | Mod: CS | Performed by: EMERGENCY MEDICINE

## 2022-12-21 PROCEDURE — 36415 COLL VENOUS BLD VENIPUNCTURE: CPT | Performed by: EMERGENCY MEDICINE

## 2022-12-21 PROCEDURE — 96360 HYDRATION IV INFUSION INIT: CPT | Performed by: EMERGENCY MEDICINE

## 2022-12-21 PROCEDURE — 82803 BLOOD GASES ANY COMBINATION: CPT | Performed by: EMERGENCY MEDICINE

## 2022-12-21 PROCEDURE — 71045 X-RAY EXAM CHEST 1 VIEW: CPT

## 2022-12-21 PROCEDURE — 99284 EMERGENCY DEPT VISIT MOD MDM: CPT | Mod: CS,25 | Performed by: EMERGENCY MEDICINE

## 2022-12-21 PROCEDURE — 81001 URINALYSIS AUTO W/SCOPE: CPT | Performed by: EMERGENCY MEDICINE

## 2022-12-21 RX ORDER — METOPROLOL TARTRATE 25 MG/1
25 TABLET, FILM COATED ORAL ONCE
Status: DISCONTINUED | OUTPATIENT
Start: 2022-12-21 | End: 2022-12-21

## 2022-12-21 RX ORDER — METOPROLOL TARTRATE 1 MG/ML
5 INJECTION, SOLUTION INTRAVENOUS EVERY 5 MIN PRN
Status: DISCONTINUED | OUTPATIENT
Start: 2022-12-21 | End: 2022-12-21

## 2022-12-21 RX ORDER — ACETAMINOPHEN 325 MG/1
650 TABLET ORAL ONCE
Status: COMPLETED | OUTPATIENT
Start: 2022-12-21 | End: 2022-12-21

## 2022-12-21 RX ADMIN — SODIUM CHLORIDE 500 ML: 9 INJECTION, SOLUTION INTRAVENOUS at 19:10

## 2022-12-21 RX ADMIN — ACETAMINOPHEN 650 MG: 325 TABLET, FILM COATED ORAL at 21:54

## 2022-12-21 ASSESSMENT — ACTIVITIES OF DAILY LIVING (ADL)
ADLS_ACUITY_SCORE: 35
ADLS_ACUITY_SCORE: 35

## 2022-12-22 NOTE — ED NOTES
Spoke with daughter and wife and updated them on patient status and plan for discharge. Staying hydrated was strong encouraged and all parties agree for discharge and transport with [facility].

## 2022-12-22 NOTE — DISCHARGE INSTRUCTIONS
Continue your home medications.  I think you likely have a viral illness and got dehydrated.  Return to the emergency department for fevers, repeated vomiting, worsening symptoms, or other concerns.  Otherwise follow-up in clinic for recheck if not feeling better over the next week.

## 2022-12-22 NOTE — ED NOTES
Spoke with Milana (daughter) and provided update. Unfortunately no one would be able to pick him up until the morning due to weather.

## 2022-12-22 NOTE — ED PROVIDER NOTES
"  History     Chief Complaint   Patient presents with     Altered Mental Status     HPI  Alvin Newton is a 88 year old male with a history of atrial fibrillation on apixaban who presents for concerns of altered mental status.  History is obtained from the patient, review of the medical record, and EMS.  Per EMS the patient started to act more confused per family about 2 or 3 hours prior to arrival.  EMS arrived and found the patient be tachycardic and brought him here for further evaluation.  Upon arrival here the patient says that he is feeling a lot better.  He tells me that he remembers feeling confused earlier, wanted to go lay down in bed but could not figure out how to get there and felt weak all over in his arms and his legs.  He remembers talking to his wife but does not feel like he can get the words out correctly and does remember understanding what she was saying to him.  He is doing better now and is able to tell me about being a high school  and he is able to tell me the date and where he is.  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  He denies headache, vision changes, chest pain, shortness of breath, palpitations, abdominal pain, vomiting, diarrhea, or rash.    I called the spouse on the phone with her and she says he has been feeling rundown for several days with runny nose, more fatigued, slept most of the day today, did not eat much, and then this evening he kept repeating himself. Then after going to the bathroom he seemed very confused, he wanted to go to bed but was walked towards the front door. He said \"I need to go someplace and get something done\" but could not elaborate further.     Allergies:  No Known Allergies    Problem List:    Patient Active Problem List    Diagnosis Date Noted     Impaired fasting glucose 06/20/2022     Priority: Medium     Osteoarthritis of pelvis 06/20/2022     " Priority: Medium     Dec 16, 2003 Entered By: JOHN GROVE Comment: 1988  S-P HIP REPLACEMENT       Primary localized osteoarthrosis of shoulder region 06/20/2022     Priority: Medium     Reason for consultation 06/20/2022     Priority: Medium     Right bundle branch block 06/20/2022     Priority: Medium     Fall, initial encounter 06/06/2022     Priority: Medium     Closed fracture of first lumbar vertebra, unspecified fracture morphology, initial encounter (H) 06/06/2022     Priority: Medium     Fracture of L1 vertebra (H) 06/06/2022     Priority: Medium     Carpal tunnel syndrome, bilateral 11/18/2021     Priority: Medium     BPH with urinary obstruction 06/22/2020     Priority: Medium     NSTEMI (non-ST elevated myocardial infarction) (H) 12/27/2017     Priority: Medium     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) 12/06/2017     Priority: Medium     Obesity, morbid, BMI 40.0-49.9 (H) 11/07/2017     Priority: Medium     Essential hypertension 04/04/2016     Priority: Medium     Mixed hyperlipidemia 01/02/2009     Priority: Medium     Pure hypercholesterolemia 01/01/1999     Priority: Medium     Other ill-defined and unknown causes of morbidity and mortality 01/01/1985     Priority: Medium     Dec 16, 2003 Entered By: JOHN GROVE Comment: S-P TURP          Past Medical History:    Past Medical History:   Diagnosis Date     Colonic diverticulum      Hyperlipidemia      Hypertension      Obesity (BMI 30-39.9)      Osteoarthritis      Type 2 diabetes mellitus (H)        Past Surgical History:    Past Surgical History:   Procedure Laterality Date     ARTHROPLASTY HIP BILATERAL  1987      ESOPHAGOSCOPY, DIAGNOSTIC  2003     LAPAROSCOPIC CHOLECYSTECTOMY N/A 12/28/2017    Procedure: LAPAROSCOPIC CHOLECYSTECTOMY;  LAPAROSCOPIC CHOLECYSTECTOMY;  Surgeon: Heber Gonzalez MD;  Location:  OR     TRANSRECTAL ULTRASONIC, TRANSURETHRAL RESECTION (TUR) OF PROSTATE CYST  1990       Family History:   "  No family history on file.    Social History:  Marital Status:   [2]  Social History     Tobacco Use     Smoking status: Never     Smokeless tobacco: Never   Substance Use Topics     Alcohol use: Yes     Comment: 0-1 beer daily     Drug use: No        Medications:    atorvastatin (LIPITOR) 20 MG tablet  ELIQUIS ANTICOAGULANT 5 MG tablet  acetaminophen (TYLENOL) 500 MG tablet  calcitonin, salmon, (MIACALCIN) 200 UNIT/ACT nasal spray  cholecalciferol 50 MCG (2000 UT) CAPS  cyclobenzaprine (FLEXERIL) 5 MG tablet  metoprolol succinate ER (TOPROL XL) 25 MG 24 hr tablet  miconazole (MICATIN) 2 % external powder  omeprazole (PRILOSEC) 40 MG DR capsule          Review of Systems  Pertinent positives and negatives listed in the HPI, all other systems reviewed and are negative.    Physical Exam   BP: 130/87  Pulse: (!) 135  Temp: 99.1  F (37.3  C)  Resp: 20  Height: 180.3 cm (5' 11\")  Weight: 106.6 kg (235 lb)  SpO2: 96 %      Physical Exam  Vitals and nursing note reviewed.   Constitutional:       General: He is in acute distress.      Appearance: He is well-developed. He is not diaphoretic.   HENT:      Head: Normocephalic and atraumatic.      Right Ear: External ear normal.      Left Ear: External ear normal.      Nose: Nose normal.      Mouth/Throat:      Mouth: Mucous membranes are dry.   Eyes:      General: No scleral icterus.     Conjunctiva/sclera: Conjunctivae normal.   Cardiovascular:      Rate and Rhythm: Tachycardia present. Rhythm irregularly irregular.   Pulmonary:      Effort: Pulmonary effort is normal. No respiratory distress.      Breath sounds: No stridor.   Abdominal:      General: There is no distension.      Palpations: Abdomen is soft.   Musculoskeletal:      Cervical back: Normal range of motion.      Right lower leg: Edema present.      Left lower leg: Edema present.   Skin:     General: Skin is warm and dry.   Neurological:      Mental Status: He is alert and oriented to person, place, and " time.      GCS: GCS eye subscore is 4. GCS verbal subscore is 5. GCS motor subscore is 6.      Cranial Nerves: No cranial nerve deficit.      Comments: Moving all extremities   Psychiatric:         Behavior: Behavior normal.         ED Course                 Procedures              Critical Care time:  none               Results for orders placed or performed during the hospital encounter of 12/21/22 (from the past 24 hour(s))   [place] Draw    Narrative    The following orders were created for panel order [place] Draw.  Procedure                               Abnormality         Status                     ---------                               -----------         ------                     Extra Blue Top Tube[[id number]]                              Final result               Extra Red Top Tube[[id number]]                               Final result               Extra Green Top (Lithium...[[id number]]                      Final result               Extra Purple Top Tube[[id number]]                            Final result               Extra Green Top (Lithium...[[id number]]                      Final result                 Please view results for these tests on the individual orders.   Extra Blue Top Tube   Result Value Ref Range    Hold Specimen JIC    Extra Red Top Tube   Result Value Ref Range    Hold Specimen JIC    Extra Green Top (Lithium Heparin) Tube   Result Value Ref Range    Hold Specimen JIC    Extra Purple Top Tube   Result Value Ref Range    Hold Specimen JIC    Extra Green Top (Lithium Heparin) ON ICE   Result Value Ref Range    Hold Specimen     CBC with Platelets & Differential    Narrative    The following orders were created for panel order CBC with Platelets & Differential.  Procedure                               Abnormality         Status                     ---------                               -----------         ------                     CBC with platelets and d...[[id number]]                       Final result                 Please view results for these tests on the individual orders.   Comprehensive metabolic panel   Result Value Ref Range    Sodium 138 136 - 145 mmol/L    Potassium 4.3 3.4 - 5.3 mmol/L    Chloride 103 98 - 107 mmol/L    Carbon Dioxide (CO2) 23 22 - 29 mmol/L    Anion Gap 12 7 - 15 mmol/L    Urea Nitrogen 26.3 (H) 8.0 - 23.0 mg/dL    Creatinine 1.17 0.67 - 1.17 mg/dL    Calcium 9.2 8.8 - 10.2 mg/dL    Glucose 164 (H) 70 - 99 mg/dL    Alkaline Phosphatase 129 40 - 129 U/L    AST 18 10 - 50 U/L    ALT 14 10 - 50 U/L    Protein Total 6.8 6.4 - 8.3 g/dL    Albumin 3.2 (L) 3.5 - 5.2 g/dL    Bilirubin Total 2.6 (H) <=1.2 mg/dL    GFR Estimate 60 (L) >60 mL/min/1.73m2   Lactic acid whole blood   Result Value Ref Range    Lactic Acid 1.3 0.7 - 2.0 mmol/L   CBC with platelets and differential   Result Value Ref Range    WBC Count 9.2 4.0 - 11.0 10e3/uL    RBC Count 5.08 4.40 - 5.90 10e6/uL    Hemoglobin 15.2 13.3 - 17.7 g/dL    Hematocrit 46.8 40.0 - 53.0 %    MCV 92 78 - 100 fL    MCH 29.9 26.5 - 33.0 pg    MCHC 32.5 31.5 - 36.5 g/dL    RDW 14.0 10.0 - 15.0 %    Platelet Count 160 150 - 450 10e3/uL    % Neutrophils 78 %    % Lymphocytes 11 %    % Monocytes 11 %    % Eosinophils 0 %    % Basophils 0 %    % Immature Granulocytes 0 %    NRBCs per 100 WBC 0 <1 /100    Absolute Neutrophils 7.1 1.6 - 8.3 10e3/uL    Absolute Lymphocytes 1.0 0.8 - 5.3 10e3/uL    Absolute Monocytes 1.0 0.0 - 1.3 10e3/uL    Absolute Eosinophils 0.0 0.0 - 0.7 10e3/uL    Absolute Basophils 0.0 0.0 - 0.2 10e3/uL    Absolute Immature Granulocytes 0.0 <=0.4 10e3/uL    Absolute NRBCs 0.0 10e3/uL   Blood gas venous   Result Value Ref Range    pH Venous 7.44 (H) 7.32 - 7.43    pCO2 Venous 37 (L) 40 - 50 mm Hg    pO2 Venous 56 (H) 25 - 47 mm Hg    Bicarbonate Venous 25 21 - 28 mmol/L    Base Excess/Deficit (+/-) 1.2 -7.7 - 1.9 mmol/L    FIO2 21    Symptomatic Influenza A/B & SARS-CoV2 (COVID-19) Virus PCR Multiplex  Nasopharyngeal    Specimen: Nasopharyngeal; Swab   Result Value Ref Range    Influenza A PCR Negative Negative    Influenza B PCR Negative Negative    SARS CoV2 PCR Negative Negative    Narrative    Testing was performed using the annette SARS-CoV-2 & Influenza A/B Assay on the annette Pilar System. This test should be ordered for the detection of SARS-CoV-2 and influenza viruses in individuals who meet clinical and/or epidemiological criteria. Test performance is unknown in asymptomatic patients. This test is for in vitro diagnostic use under the FDA EUA for laboratories certified under CLIA to perform moderate and/or high complexity testing. This test has not been FDA cleared or approved. A negative result does not rule out the presence of PCR inhibitors in the specimen or target RNA in concentration below the limit of detection for the assay. If only one viral target is positive but coinfection with multiple targets is suspected, the sample should be re-tested with another FDA cleared, approved or authorized test, if coinfection would change clinical management. [facility] are certified under the Clinical Laboratory Improvement Amendments of 1988 (CLIA-88) as qualified to perform moderate and/or high complexity laboratory testing.   UA reflex to Microscopic   Result Value Ref Range    Color Urine Yellow Colorless, Straw, Light Yellow, Yellow    Appearance Urine Clear Clear    Glucose Urine Negative Negative mg/dL    Bilirubin Urine Small (A) Negative    Ketones Urine 5 (A) Negative mg/dL    Specific Gravity Urine 1.020 1.003 - 1.035    Blood Urine Moderate (A) Negative    pH Urine 5.0 5.0 - 7.0    Protein Albumin Urine 30 (A) Negative mg/dL    Urobilinogen Urine Normal Normal, 2.0 mg/dL    Nitrite Urine Negative Negative    Leukocyte Esterase Urine Negative Negative    RBC Urine 39 (H) <=2 /HPF    WBC Urine 10 (H) <=5 /HPF    Squamous Epithelials Urine 1 <=1 /HPF    Mucus Urine Present (A) None  Seen /LPF    Hyaline Casts Urine 4 (H) <=2 /LPF   XR Chest Port 1 View    Narrative    EXAM: XR CHEST PORT 1 VIEW  LOCATION: [facility]  DATE/TIME: 12/21/2022 6:51 PM    INDICATION: Generalized weakness.  COMPARISON: Chest x-ray 06/14/2022.      Impression    IMPRESSION: Cardiomegaly appears stable. No acute airspace disease identified. Bilateral shoulder DJD.       Medications   0.9% sodium chloride BOLUS (0 mLs Intravenous Stopped 12/21/22 1959)   acetaminophen (TYLENOL) tablet 650 mg (650 mg Oral Given 12/21/22 2154)       Assessments & Plan (with Medical Decision Making)   88-year-old male with history of atrial fibrillation on apixaban who presents for generalized weakness and confusion at home.  Blood pressure is 130/87, temperature is 37.3  C, heart is 135, SPO2 is 96% on room air.  Based on the history provided from the spouse it sounds more like encephalopathy, does not sound like stroke or TIA and he has a normal neurologic examination now.  He has some mild bilateral peripheral edema but his mucous membranes are dry.  We will give him a 500 cc bolus of LR.  Urinalysis is positive for hyaline casts consistent with dehydration and he also has red blood cells and white blood cells redness.  Negative nitrite or leukocyte esterase.  White blood cell count of 9.2 and hemoglobin of 15.2, no signs of anemia.  His lactic acid is 1.3 which is reassuring.  Nasal swab is negative for COVID-19 or influenza.  VBG is reassuring with a pH of 7.4 and a PCO2 of 37.  Electrolytes are within normal limits.  LFTs are normal, no signs of hepatitis.  White blood cell count is 9.2 and his hemoglobin is 15.2, no signs of anemia.  On recheck he is feeling much better.  Heart rate is improved, he is ambulating with his normal assistive device, communicating clearly.  I think he has been sick for several days with likely viral illness and has gotten dehydrated.  Doing better after fluids and at this time  I believe he is safe to discharge home with instructions return if he has worsening of his symptoms or other concerns, otherwise follow-up in clinic.  The patient feels comfortable with this plan.    I have reviewed the nursing notes.    I have reviewed the findings, diagnosis, plan and need for follow up with the patient.       New Prescriptions    No medications on file       Final diagnoses:   Generalized muscle weakness   Dehydration       12/21/2022   [facility] EMERGENCY DEPT     Gomez Falcon MD  12/21/22 4081

## 2023-01-20 NOTE — ED PROVIDER NOTES
History     Chief Complaint   Patient presents with     Hip Pain     L hip pain with h/o LTHR x 30 years ago.  pt states a week ago he felt and heard a cracking noise when walking a curb.  it has progressively gotten worse.  pt [place] home from [place] yesterday and today  approx 25 hr dr divided in 2 days.  c/o weakness with inability to stand and walk now.  denies loss of bladder/bowel     HPI  Alvin Newton is a 82 year old male who presents from home after returning from a multi-day trip vacationing with his wife and during the vacation wall descending from a curb and felt a crack in the lower left back in the region of the SI joints.  Over the last 4 days he's had painful ambulation.  He only sleeps in a recliner and is unable to lie down.   He notes pain predominantly at the left SI as well as in the inguinal region of the hip and on associated pain also in the low back possibly in the L3-4 region midline and finally a numbness on the lateral thigh.   He has no flank pain no dysuria urgency frequency or hematuria.  There is no associated fever.    No symptoms suggestive of  cauda equina syndrome (central spinal stenosis) such as incontinence or retention of urine or stool, inner thigh numbness or foot drop.     PMH  pre-diabetes    Current Outpatient Prescriptions   Medication Sig Dispense Refill     Multiple Vitamin (MULTIVITAMINS PO) Take 1 tablet by mouth daily       GLUCOSAMINE SULFATE PO Take 1,000 mg by mouth daily       Simvastatin (ZOCOR PO) Take 40 mg by mouth At Bedtime        Omega-3 Fatty Acids (OMEGA-3 FISH OIL PO) Take 1 capsule by mouth daily       aspirin 81 MG tablet Take 81 mg by mouth every evening       IBUPROFEN PO Take 400 mg by mouth every evening       lisinopril-hydrochlorothiazide (PRINZIDE,ZESTORETIC) 20-25 MG per tablet Take 1 tablet by mouth daily          No Known Allergies    I have reviewed the Medications, Allergies, Past Medical and Surgical History, and Social History in the Epic  OPERATIVE REPORT - Podiatry  PATIENT NAME: Morgan Blank    :  1958  MRN: [id number]  Pt Location: [facility] OR ROOM 01    SURGERY DATE: 2023    Surgeon(s) and Role:     * Tory Chavez DPM - Primary    Pre-op Diagnosis:  Toe osteomyelitis, left ([facility] [address] ) [M86 9]    Post-Op Diagnosis Codes:     * Toe osteomyelitis, left ([facility] [address] ) [M86 9]    Procedure(s) (LRB):  AMPUTATION TOE- hallux (Left)    Specimen(s):  ID Type Source Tests Collected by Time Destination   1 : left great toe Tissue Toe, Left TISSUE EXAM Tory Chavez DPM 2023 1036        Estimated Blood Loss:   3 mL    Drains:  * No LDAs found *    Anesthesia Type:   Conscious Sedation  with 10 ml of 1% Lidocaine and 0 5% Bupivacaine in a 1:1 mixture    Hemostasis:  -Atraumatic technique    Materials:  * No implants in log *  -3-0 nylon    Operative Findings:  After hallux amputation, first metatarsal head was examined and was found to be white in color and hard when touched with instrumentation, these are signs of healthy bone  Complications:   None    Procedure and Technique:     Under mild sedation, the patient was brought into the operating room and remained on the stretcher in the supine position  IV sedation was achieved by anesthesia team and a universal timeout was performed where all parties are in agreement of correct patient, correct procedure and correct site  A Santana block was performed consisting of 10 ml of 1% Lidocaine and 0 5% Bupivacaine in a 1:1 mixture  The foot was then prepped and draped in the usual aseptic manner  Attention was then directed to the left foot  A surgical marker was utilized to draw a racquet shaped incision surrounding the patient's first MTPJ  A surgical blade was then utilized to create a full depth incision down to bone in accordance with the drawn on incision line  The first MTPJ was identified and was entered with the surgical blade    Soft tissue structures from the hallux were "system.    Review of Systems   No fever  No cough  No chest pain, palpitations or shortness of breath  No abdominal pain, nausea, vomiting, diarrhea, constipation or blood in the stool or black tarry stools  No dysuria, urgency,  frequency or hematuria  No new rashes  Review of systems otherwise negative         Physical Exam   BP: 156/73  Pulse: 66  Temp: 97.4  F (36.3  C)  Resp: 18  Height: 181.6 cm (5' 11.5\")  Weight: 122.5 kg (270 lb)  SpO2: 98 %  Physical Exam   Constitutional: He appears distressed.   Neck: Neck supple.   Cardiovascular: Normal rate and regular rhythm.    Pulmonary/Chest: Effort normal. No respiratory distress. He has no wheezes. He has no rales.   Abdominal: Soft. Bowel sounds are normal. He exhibits distension. There is no tenderness. There is no rebound.   Musculoskeletal: He exhibits no edema.   Neurological: He is alert.   Skin: No rash noted. He is not diaphoretic.      GENERAL: Alert and no distress  CARDIOVASCULAR: Peripheral pulses are palpable    Lumbosacral spine area reveals local tenderness left si joint, less at midline lumbar spine,   painful hip range of motion  generally painful range of motion.  sitting, as this is most comfortable    Straight leg raise      Sitting: Left (-) Right (-)   SI Joint Testing   Unable to perform specific testing    Other joints          Knees: full range of motion without pain.    NEURO:      Deep Tendon Reflexes: Present and symmetric      Motor strength: 5/5 throughout      Sensation to light touch intact         ED Course     ED Course     Procedures             Critical Care time:  none             Results for orders placed or performed during the hospital encounter of 03/04/17   Lumbar spine XR, 2-3 views    Narrative    LUMBAR SPINE 3 VIEWS   3/5/2017 12:14 AM     HISTORY: Low back pain.    COMPARISON: None.      Impression    IMPRESSION: Moderate hypertrophic and degenerative changes are noted  throughout the lumbar spine. No convincing " freed globally in order to fully freed the digit at the first MTPJ  It was dissected out of the foot and passed off the field for pathological examination  The underlying first metatarsal head was examined and was found to be white in color and was hard when touched with instrumentation, these are signs of healthy bone  The underlying soft tissue was examined and was noted to be healthy, with no purulence or infected tissue noted  The incision site was then irrigated with a bulb syringe normal sterile saline  Skin was reapproximated with 3-0 nylon in horizontal mattress and simple interrupted techniques  The foot was then cleansed and dried and the incision site was dressed with Betadine soaked Adaptic, 4 x 4 gauze, Lester, and a 4 inch Ace bandage  Normal hyperemic response was noted to all digits  The patient tolerated the procedure and anesthesia well without immediate complications and transferred to PACU with vital signs stable  As with many limb salvage procedures, we contemplate the possibility of performing further stages to this procedure  Procedures may include debridements, delayed closure, plastic surgery techniques, or more proximal amputations  This procedure may be considered part of a multi-staged limb salvage treatment plan  Dr Luz Maria Molina was present during the entire procedure and participated in all key aspects  Isela Tarango DPM  DATE: January 20, 2023  TIME: 11:02 AM      Portions of the record may have been created with voice recognition software  Occasional wrong word or "sound a like" substitutions may have occurred due to the inherent limitations of voice recognition software  Read the chart carefully and recognize, using context, where substitutions have occurred  evidence for acute fracture  or gross malalignment in the lumbar spine. Bilateral hip  arthroplasties are noted.    ROXANNE GIRON MD   Pelvis XR w/ unilateral hip left    Narrative    PELVIS ONE VIEW AND LEFT HIP ONE VIEW   3/5/2017 12:15 AM     HISTORY: Left hip pain.    COMPARISON: 12/3/2013.      Impression    IMPRESSION: Bilateral hip arthroplasties. Components appear well  seated. No definite cause for left hip pain is identified. No  convincing evidence for acute fracture or dislocation.    ROXANNE GIRON MD         Assessments & Plan (with Medical Decision Making)     MDM: Alvin Newton is a 82 year old male who presented with history of bilateral MARIANA, and acute onset left hip pain while walking on vacation and stepping off the curb.  There was no fall and he was able to ambulate afterwords but had difficulty with several different positions including being completely upright standing as well as lying.  there was no obvious focal weakness but there was a numbness on the lateral thigh. He had tenderness and pain in several different regions that could lead to his current symptoms. Pain over both the midline lumbar spine that was mild and yell for region as well as over the SI joints which was more significant and over the inguinal region of the anterior hip.   he noted hip range of motion also exacerbated his pain. Straight leg raise did not significantly exacerbate his symptoms.  Examination was made more difficult due to his pain interfering with the exam.    Imaging demonstrated no obvious  loosening or fracture in the hip and primarily chronic degenerative changes in the spine and SI region.      Differential diagnosis includes lumbar disc disease with radiculopathy especially in the L4 region, SI joint dysfunction, acute hip pathology. Plain film imaging is reassuring but additional imaging including MRI may be beneficial. I recommended follow up with orthopedics given his bilateral hip arthrosis.       Lidoderm patch was applied to the posterior low back region near the SI joints.  A single Vicodin tablet was also given prior to imaging.  given the radicular symptoms of the low back a prednisone dose was given here and he will be on Medrol Dosepak after this one is able to pick this up when pharmacies are open. Additional pain meds are given and we discussed the use of Lidoderm as a possible adjunct. I asked for conc years console later this week.  He is given precautions for return and warned about the risk of falls.    I have reviewed the nursing notes.    I have reviewed the findings, diagnosis, plan and need for follow up with the patient.    New Prescriptions    No medications on file       Final diagnoses:   Hip pain, left - This m,ay be coming from the hip replacement, the SI joint or the spine-lumbar., xray pover-read is currently pending.   I have written for medrol dose pack and vicodin.  remove the lidoderm patch from the low back tomorrow morning.  Lidocare is the OTC replacement that is available at [facility].  can apply in pm and remove in am. return for fever, worsening.       3/4/2017   [facility] EMERGENCY DEPARTMENT     Finesse Gonzalez MD  03/05/17 [id number]

## 2023-03-02 ENCOUNTER — OFFICE VISIT (OUTPATIENT)
Dept: AUDIOLOGY | Facility: CLINIC | Age: 88
End: 2023-03-02
Payer: COMMERCIAL

## 2023-03-02 DIAGNOSIS — H90.3 SENSORINEURAL HEARING LOSS, BILATERAL: Primary | ICD-10-CM

## 2023-03-02 PROCEDURE — 92550 TYMPANOMETRY & REFLEX THRESH: CPT | Performed by: AUDIOLOGIST

## 2023-03-02 PROCEDURE — 92557 COMPREHENSIVE HEARING TEST: CPT | Performed by: AUDIOLOGIST

## 2023-03-02 NOTE — PROGRESS NOTES
AUDIOLOGY REPORT    SUBJECTIVE:  Alvin Newton is a 88 year old male who was seen in the Audiology Clinic [facility] on 3/02/23 for audiologic evaluation, referred by [facility].  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 The patient denies  bilateral tinnitus, bilateral otalgia, bilateral drainage, bilateral aural fullness, family history of hearing loss, and dizziness. The patient notes difficulty with communication in a variety of listening situations. They were accompanied today by their wife.    Abuse Screening:  Do you feel unsafe at home or work/school? No  Do you feel threatened by someone? No  Does anyone try to keep you from having contact with others, or doing things outside of your home? No  Physical signs of abuse present? No     Fall Risk Screen:  1. Have you fallen two or more times in the past year? No  2. Have you fallen and had an injury in the past year? No    OBJECTIVE:    Otoscopic exam indicates partial obstruction with cerumen bilaterally     Pure Tone Thresholds assessed using standard techniques  audiometry with good  reliability from 250-8000 Hz bilaterally using insert earphones and circumaural headphones     RIGHT:  mild to profound sensorineural hearing loss    LEFT:    mild to profound sensorineural hearing loss    NOTE: Change in transducers did not merit a change in thresholds.     Tympanogram:    RIGHT: restricted eardrum mobility [Type As]    LEFT:   restricted eardrum mobility [Type As]    Reflexes (reported by stimulus ear): 1000 Hz  RIGHT: Ipsilateral is absent at frequencies tested  RIGHT: Contralateral is absent at frequencies tested  LEFT:   Ipsilateral is absent at frequencies tested  LEFT:   Contralateral is absent at frequencies tested      Speech Reception Threshold:    RIGHT: 40 dB HL    LEFT:   55 dB HL    Word Recognition Score:     RIGHT:  68% at 80 dB HL using NU-6 recorded word list.    LEFT:   64% at 90 dB HL using NU-6 recorded word list.    ASSESSMENT:   Bilateral sensorineural hearing loss      Today s results were discussed with the patient and his wife in detail. Patient requested and was provided a copy of his audiogram.      PLAN:  Patient was counseled regarding hearing loss and impact on communication.  Patient is a good candidate for amplification at this time. Handout on good communication strategies, and hearing aid use was given to patient. It is recommended that the patient have his ears cleared of cerumen and return for a hearing aid consultation.  Please call this clinic with questions regarding these results or recommendations.    Antolin Stringer [facility]  Licensed Audiologist #[id number]  3/2/2023

## 2023-03-10 ENCOUNTER — TELEPHONE (OUTPATIENT)
Dept: AUDIOLOGY | Facility: CLINIC | Age: 88
End: 2023-03-10
Payer: COMMERCIAL

## 2023-03-10 NOTE — TELEPHONE ENCOUNTER
Discussed options for more rapid hearing aid fitting. Milana will look into private practices for quicker fitting our of pocket.     -Antolin Stringer CCC-A

## 2023-03-10 NOTE — TELEPHONE ENCOUNTER
[facility] Call Center    Phone Message    May a detailed message be left on voicemail: yes     Reason for Call: Other: Pt Daughter Fuad called to have someone call back and discuss the options for the hearing aids as her Father is a [facility] patient. Please call @ [phone] Thank You     Action Taken: Other: AUD    Travel Screening: Not Applicable

## 2023-03-10 NOTE — TELEPHONE ENCOUNTER
Daughter calling again requesting a call from Dr. Macedo to discuss other, more timely, options to obtain hearing aids for patient. Daughter states patient has a hard time hearing and wife of patient gets confused. Please call Milana at [phone]. [facility] on file.     Ev Novak  [facility]

## 2023-04-09 ENCOUNTER — HEALTH MAINTENANCE LETTER (OUTPATIENT)
Age: 88
End: 2023-04-09

## 2023-07-11 ENCOUNTER — APPOINTMENT (OUTPATIENT)
Dept: GENERAL RADIOLOGY | Facility: CLINIC | Age: 88
DRG: 872 | End: 2023-07-11
Attending: EMERGENCY MEDICINE
Payer: COMMERCIAL

## 2023-07-11 ENCOUNTER — HOSPITAL ENCOUNTER (INPATIENT)
Facility: CLINIC | Age: 88
LOS: 1 days | Discharge: ANOTHER HOSPITAL | DRG: 872 | End: 2023-07-12
Attending: EMERGENCY MEDICINE | Admitting: EMERGENCY MEDICINE
Payer: COMMERCIAL

## 2023-07-11 DIAGNOSIS — I48.91 ATRIAL FIBRILLATION WITH RVR (H): ICD-10-CM

## 2023-07-11 DIAGNOSIS — R65.20 SEVERE SEPSIS (H): ICD-10-CM

## 2023-07-11 DIAGNOSIS — L03.115 CELLULITIS OF RIGHT LOWER EXTREMITY: ICD-10-CM

## 2023-07-11 DIAGNOSIS — A41.9 SEVERE SEPSIS (H): ICD-10-CM

## 2023-07-11 LAB
ALBUMIN SERPL BCG-MCNC: 4.1 G/DL (ref 3.5–5.2)
ALBUMIN UR-MCNC: NEGATIVE MG/DL
ALP SERPL-CCNC: 170 U/L (ref 40–129)
ALT SERPL W P-5'-P-CCNC: 20 U/L (ref 0–70)
ANION GAP SERPL CALCULATED.3IONS-SCNC: 11 MMOL/L (ref 7–15)
APPEARANCE UR: ABNORMAL
AST SERPL W P-5'-P-CCNC: 27 U/L (ref 0–45)
BACTERIA #/AREA URNS HPF: ABNORMAL /HPF
BASE EXCESS BLDV CALC-SCNC: 2.1 MMOL/L (ref -7.7–1.9)
BASOPHILS # BLD AUTO: 0 10E3/UL (ref 0–0.2)
BASOPHILS NFR BLD AUTO: 0 %
BILIRUB SERPL-MCNC: 3 MG/DL
BILIRUB UR QL STRIP: NEGATIVE
BUN SERPL-MCNC: 28.3 MG/DL (ref 8–23)
CALCIUM SERPL-MCNC: 10 MG/DL (ref 8.8–10.2)
CHLORIDE SERPL-SCNC: 104 MMOL/L (ref 98–107)
COLOR UR AUTO: ABNORMAL
CREAT SERPL-MCNC: 1.32 MG/DL (ref 0.67–1.17)
DEPRECATED HCO3 PLAS-SCNC: 24 MMOL/L (ref 22–29)
EOSINOPHIL # BLD AUTO: 0 10E3/UL (ref 0–0.7)
EOSINOPHIL NFR BLD AUTO: 0 %
ERYTHROCYTE [DISTWIDTH] IN BLOOD BY AUTOMATED COUNT: 14.1 % (ref 10–15)
GFR SERPL CREATININE-BSD FRML MDRD: 52 ML/MIN/1.73M2
GLUCOSE BLDC GLUCOMTR-MCNC: 147 MG/DL (ref 70–99)
GLUCOSE SERPL-MCNC: 140 MG/DL (ref 70–99)
GLUCOSE UR STRIP-MCNC: NEGATIVE MG/DL
HCO3 BLDV-SCNC: 27 MMOL/L (ref 21–28)
HCT VFR BLD AUTO: 48.9 % (ref 40–53)
HGB BLD-MCNC: 16.1 G/DL (ref 13.3–17.7)
HGB UR QL STRIP: ABNORMAL
HYALINE CASTS: 4 /LPF
IMM GRANULOCYTES # BLD: 0 10E3/UL
IMM GRANULOCYTES NFR BLD: 0 %
KETONES UR STRIP-MCNC: NEGATIVE MG/DL
LACTATE SERPL-SCNC: 2.4 MMOL/L (ref 0.7–2)
LEUKOCYTE ESTERASE UR QL STRIP: ABNORMAL
LIPASE SERPL-CCNC: 15 U/L (ref 13–60)
LYMPHOCYTES # BLD AUTO: 0.7 10E3/UL (ref 0.8–5.3)
LYMPHOCYTES NFR BLD AUTO: 7 %
MCH RBC QN AUTO: 29.9 PG (ref 26.5–33)
MCHC RBC AUTO-ENTMCNC: 32.9 G/DL (ref 31.5–36.5)
MCV RBC AUTO: 91 FL (ref 78–100)
MONOCYTES # BLD AUTO: 0.8 10E3/UL (ref 0–1.3)
MONOCYTES NFR BLD AUTO: 9 %
MUCOUS THREADS #/AREA URNS LPF: PRESENT /LPF
NEUTROPHILS # BLD AUTO: 8 10E3/UL (ref 1.6–8.3)
NEUTROPHILS NFR BLD AUTO: 84 %
NITRATE UR QL: NEGATIVE
NRBC # BLD AUTO: 0 10E3/UL
NRBC BLD AUTO-RTO: 0 /100
O2/TOTAL GAS SETTING VFR VENT: 0 %
PCO2 BLDV: 42 MM HG (ref 40–50)
PH BLDV: 7.42 [PH] (ref 7.32–7.43)
PH UR STRIP: 5 [PH] (ref 5–7)
PLATELET # BLD AUTO: 159 10E3/UL (ref 150–450)
PO2 BLDV: 23 MM HG (ref 25–47)
POTASSIUM SERPL-SCNC: 4.2 MMOL/L (ref 3.4–5.3)
PROT SERPL-MCNC: 7.3 G/DL (ref 6.4–8.3)
RBC # BLD AUTO: 5.39 10E6/UL (ref 4.4–5.9)
RBC URINE: 14 /HPF
SODIUM SERPL-SCNC: 139 MMOL/L (ref 136–145)
SP GR UR STRIP: 1.02 (ref 1–1.03)
SQUAMOUS EPITHELIAL: <1 /HPF
UROBILINOGEN UR STRIP-MCNC: NORMAL MG/DL
WBC # BLD AUTO: 9.6 10E3/UL (ref 4–11)
WBC URINE: 29 /HPF

## 2023-07-11 PROCEDURE — 83605 ASSAY OF LACTIC ACID: CPT | Performed by: EMERGENCY MEDICINE

## 2023-07-11 PROCEDURE — 87149 DNA/RNA DIRECT PROBE: CPT | Performed by: EMERGENCY MEDICINE

## 2023-07-11 PROCEDURE — 82803 BLOOD GASES ANY COMBINATION: CPT | Performed by: EMERGENCY MEDICINE

## 2023-07-11 PROCEDURE — 81001 URINALYSIS AUTO W/SCOPE: CPT | Performed by: EMERGENCY MEDICINE

## 2023-07-11 PROCEDURE — 99285 EMERGENCY DEPT VISIT HI MDM: CPT | Performed by: EMERGENCY MEDICINE

## 2023-07-11 PROCEDURE — 87077 CULTURE AEROBIC IDENTIFY: CPT | Performed by: EMERGENCY MEDICINE

## 2023-07-11 PROCEDURE — 71045 X-RAY EXAM CHEST 1 VIEW: CPT

## 2023-07-11 PROCEDURE — 36415 COLL VENOUS BLD VENIPUNCTURE: CPT | Performed by: EMERGENCY MEDICINE

## 2023-07-11 PROCEDURE — 87086 URINE CULTURE/COLONY COUNT: CPT | Performed by: EMERGENCY MEDICINE

## 2023-07-11 PROCEDURE — 82962 GLUCOSE BLOOD TEST: CPT

## 2023-07-11 PROCEDURE — 83690 ASSAY OF LIPASE: CPT | Performed by: EMERGENCY MEDICINE

## 2023-07-11 PROCEDURE — 120N000001 HC R&B MED SURG/OB

## 2023-07-11 PROCEDURE — 258N000003 HC RX IP 258 OP 636: Performed by: EMERGENCY MEDICINE

## 2023-07-11 PROCEDURE — 99285 EMERGENCY DEPT VISIT HI MDM: CPT | Mod: 25 | Performed by: EMERGENCY MEDICINE

## 2023-07-11 PROCEDURE — 250N000011 HC RX IP 250 OP 636: Performed by: EMERGENCY MEDICINE

## 2023-07-11 PROCEDURE — 80053 COMPREHEN METABOLIC PANEL: CPT | Performed by: EMERGENCY MEDICINE

## 2023-07-11 PROCEDURE — 85025 COMPLETE CBC W/AUTO DIFF WBC: CPT | Performed by: EMERGENCY MEDICINE

## 2023-07-11 PROCEDURE — 250N000013 HC RX MED GY IP 250 OP 250 PS 637: Performed by: EMERGENCY MEDICINE

## 2023-07-11 RX ORDER — HYDROMORPHONE HYDROCHLORIDE 1 MG/ML
0.3 INJECTION, SOLUTION INTRAMUSCULAR; INTRAVENOUS; SUBCUTANEOUS
Status: DISCONTINUED | OUTPATIENT
Start: 2023-07-11 | End: 2023-07-12 | Stop reason: HOSPADM

## 2023-07-11 RX ORDER — ACETAMINOPHEN 500 MG
1000 TABLET ORAL ONCE
Status: COMPLETED | OUTPATIENT
Start: 2023-07-11 | End: 2023-07-11

## 2023-07-11 RX ORDER — CEFAZOLIN SODIUM 1 G/50ML
1250 SOLUTION INTRAVENOUS EVERY 24 HOURS
Status: DISCONTINUED | OUTPATIENT
Start: 2023-07-12 | End: 2023-07-12 | Stop reason: HOSPADM

## 2023-07-11 RX ADMIN — SODIUM CHLORIDE, POTASSIUM CHLORIDE, SODIUM LACTATE AND CALCIUM CHLORIDE 1000 ML: 600; 310; 30; 20 INJECTION, SOLUTION INTRAVENOUS at 23:02

## 2023-07-11 RX ADMIN — SODIUM CHLORIDE, POTASSIUM CHLORIDE, SODIUM LACTATE AND CALCIUM CHLORIDE 1000 ML: 600; 310; 30; 20 INJECTION, SOLUTION INTRAVENOUS at 23:43

## 2023-07-11 RX ADMIN — ACETAMINOPHEN 1000 MG: 500 TABLET, FILM COATED ORAL at 23:05

## 2023-07-11 RX ADMIN — PIPERACILLIN SODIUM AND TAZOBACTAM SODIUM 4.5 G: 4; .5 INJECTION, SOLUTION INTRAVENOUS at 23:15

## 2023-07-11 RX ADMIN — HYDROMORPHONE HYDROCHLORIDE 0.3 MG: 1 INJECTION, SOLUTION INTRAMUSCULAR; INTRAVENOUS; SUBCUTANEOUS at 23:50

## 2023-07-11 RX ADMIN — VANCOMYCIN HYDROCHLORIDE 2500 MG: 1 INJECTION, POWDER, LYOPHILIZED, FOR SOLUTION INTRAVENOUS at 23:51

## 2023-07-11 ASSESSMENT — ACTIVITIES OF DAILY LIVING (ADL): ADLS_ACUITY_SCORE: 35

## 2023-07-12 ENCOUNTER — APPOINTMENT (OUTPATIENT)
Dept: GENERAL RADIOLOGY | Facility: CLINIC | Age: 88
DRG: 871 | End: 2023-07-12
Attending: HOSPITALIST
Payer: COMMERCIAL

## 2023-07-12 ENCOUNTER — APPOINTMENT (OUTPATIENT)
Dept: CT IMAGING | Facility: CLINIC | Age: 88
DRG: 871 | End: 2023-07-12
Attending: FAMILY MEDICINE
Payer: COMMERCIAL

## 2023-07-12 ENCOUNTER — HOSPITAL ENCOUNTER (INPATIENT)
Facility: CLINIC | Age: 88
LOS: 7 days | Discharge: NURSING, REHAB OR LONG-TERM CARE | DRG: 871 | End: 2023-07-19
Attending: HOSPITALIST | Admitting: HOSPITALIST
Payer: COMMERCIAL

## 2023-07-12 VITALS
OXYGEN SATURATION: 92 % | DIASTOLIC BLOOD PRESSURE: 90 MMHG | HEART RATE: 116 BPM | BODY MASS INDEX: 36.44 KG/M2 | RESPIRATION RATE: 30 BRPM | SYSTOLIC BLOOD PRESSURE: 114 MMHG | TEMPERATURE: 98.3 F | WEIGHT: 260.3 LBS | HEIGHT: 71 IN

## 2023-07-12 DIAGNOSIS — R60.0 PERIPHERAL EDEMA: ICD-10-CM

## 2023-07-12 DIAGNOSIS — G89.29 CHRONIC PAIN OF BOTH SHOULDERS: Primary | ICD-10-CM

## 2023-07-12 DIAGNOSIS — L03.115 CELLULITIS OF RIGHT LOWER EXTREMITY: ICD-10-CM

## 2023-07-12 DIAGNOSIS — M25.511 CHRONIC PAIN OF BOTH SHOULDERS: Primary | ICD-10-CM

## 2023-07-12 DIAGNOSIS — A41.9 SEPTIC SHOCK (H): ICD-10-CM

## 2023-07-12 DIAGNOSIS — I48.0 PAROXYSMAL ATRIAL FIBRILLATION WITH RVR (H): ICD-10-CM

## 2023-07-12 DIAGNOSIS — B95.5 STREPTOCOCCAL BACTEREMIA: ICD-10-CM

## 2023-07-12 DIAGNOSIS — R65.21 SEPTIC SHOCK (H): ICD-10-CM

## 2023-07-12 DIAGNOSIS — R78.81 STREPTOCOCCAL BACTEREMIA: ICD-10-CM

## 2023-07-12 DIAGNOSIS — M25.512 CHRONIC PAIN OF BOTH SHOULDERS: Primary | ICD-10-CM

## 2023-07-12 LAB
ALBUMIN SERPL BCG-MCNC: 3.1 G/DL (ref 3.5–5.2)
ALP SERPL-CCNC: 113 U/L (ref 40–129)
ALT SERPL W P-5'-P-CCNC: 27 U/L (ref 0–70)
ANION GAP SERPL CALCULATED.3IONS-SCNC: 13 MMOL/L (ref 7–15)
ANION GAP SERPL CALCULATED.3IONS-SCNC: 15 MMOL/L (ref 7–15)
AST SERPL W P-5'-P-CCNC: 31 U/L (ref 0–45)
BASOPHILS # BLD AUTO: 0.1 10E3/UL (ref 0–0.2)
BASOPHILS NFR BLD AUTO: 1 %
BILIRUB SERPL-MCNC: 3.8 MG/DL
BUN SERPL-MCNC: 27.4 MG/DL (ref 8–23)
BUN SERPL-MCNC: 28.5 MG/DL (ref 8–23)
CALCIUM SERPL-MCNC: 9 MG/DL (ref 8.8–10.2)
CALCIUM SERPL-MCNC: 9.2 MG/DL (ref 8.8–10.2)
CHLORIDE SERPL-SCNC: 103 MMOL/L (ref 98–107)
CHLORIDE SERPL-SCNC: 104 MMOL/L (ref 98–107)
CREAT SERPL-MCNC: 1.16 MG/DL (ref 0.67–1.17)
CREAT SERPL-MCNC: 1.18 MG/DL (ref 0.67–1.17)
CRP SERPL-MCNC: 50.65 MG/L
DEPRECATED HCO3 PLAS-SCNC: 19 MMOL/L (ref 22–29)
DEPRECATED HCO3 PLAS-SCNC: 21 MMOL/L (ref 22–29)
ENTEROCOCCUS FAECALIS: NOT DETECTED
ENTEROCOCCUS FAECIUM: NOT DETECTED
EOSINOPHIL # BLD AUTO: 0 10E3/UL (ref 0–0.7)
EOSINOPHIL NFR BLD AUTO: 0 %
ERYTHROCYTE [DISTWIDTH] IN BLOOD BY AUTOMATED COUNT: 14.3 % (ref 10–15)
GFR SERPL CREATININE-BSD FRML MDRD: 59 ML/MIN/1.73M2
GFR SERPL CREATININE-BSD FRML MDRD: 60 ML/MIN/1.73M2
GLUCOSE BLDC GLUCOMTR-MCNC: 131 MG/DL (ref 70–99)
GLUCOSE BLDC GLUCOMTR-MCNC: 134 MG/DL (ref 70–99)
GLUCOSE BLDC GLUCOMTR-MCNC: 144 MG/DL (ref 70–99)
GLUCOSE BLDC GLUCOMTR-MCNC: 147 MG/DL (ref 70–99)
GLUCOSE BLDC GLUCOMTR-MCNC: 157 MG/DL (ref 70–99)
GLUCOSE SERPL-MCNC: 152 MG/DL (ref 70–99)
GLUCOSE SERPL-MCNC: 156 MG/DL (ref 70–99)
HCT VFR BLD AUTO: 44.2 % (ref 40–53)
HGB BLD-MCNC: 14.3 G/DL (ref 13.3–17.7)
HOLD SPECIMEN: NORMAL
IMM GRANULOCYTES # BLD: 0.1 10E3/UL
IMM GRANULOCYTES NFR BLD: 1 %
LACTATE SERPL-SCNC: 2.1 MMOL/L (ref 0.7–2)
LACTATE SERPL-SCNC: 2.5 MMOL/L (ref 0.7–2)
LACTATE SERPL-SCNC: 3.1 MMOL/L (ref 0.7–2)
LACTATE SERPL-SCNC: 3.2 MMOL/L (ref 0.7–2)
LACTATE SERPL-SCNC: 3.2 MMOL/L (ref 0.7–2)
LISTERIA SPECIES (DETECTED/NOT DETECTED): NOT DETECTED
LYMPHOCYTES # BLD AUTO: 0.7 10E3/UL (ref 0.8–5.3)
LYMPHOCYTES NFR BLD AUTO: 6 %
MAGNESIUM SERPL-MCNC: 1.5 MG/DL (ref 1.7–2.3)
MAGNESIUM SERPL-MCNC: 2.2 MG/DL (ref 1.7–2.3)
MCH RBC QN AUTO: 29.4 PG (ref 26.5–33)
MCHC RBC AUTO-ENTMCNC: 32.4 G/DL (ref 31.5–36.5)
MCV RBC AUTO: 91 FL (ref 78–100)
MONOCYTES # BLD AUTO: 0.8 10E3/UL (ref 0–1.3)
MONOCYTES NFR BLD AUTO: 7 %
NEUTROPHILS # BLD AUTO: 9.3 10E3/UL (ref 1.6–8.3)
NEUTROPHILS NFR BLD AUTO: 85 %
NRBC # BLD AUTO: 0 10E3/UL
NRBC BLD AUTO-RTO: 0 /100
PHOSPHATE SERPL-MCNC: 2.9 MG/DL (ref 2.5–4.5)
PLATELET # BLD AUTO: 133 10E3/UL (ref 150–450)
POTASSIUM SERPL-SCNC: 4.2 MMOL/L (ref 3.4–5.3)
POTASSIUM SERPL-SCNC: 4.5 MMOL/L (ref 3.4–5.3)
PROCALCITONIN SERPL IA-MCNC: 6.74 NG/ML
PROT SERPL-MCNC: 6.3 G/DL (ref 6.4–8.3)
RBC # BLD AUTO: 4.86 10E6/UL (ref 4.4–5.9)
SODIUM SERPL-SCNC: 137 MMOL/L (ref 136–145)
SODIUM SERPL-SCNC: 138 MMOL/L (ref 136–145)
STAPHYLOCOCCUS AUREUS: NOT DETECTED
STAPHYLOCOCCUS EPIDERMIDIS: NOT DETECTED
STAPHYLOCOCCUS LUGDUNENSIS: NOT DETECTED
STAPHYLOCOCCUS SPECIES: NOT DETECTED
STREPTOCOCCUS AGALACTIAE: NOT DETECTED
STREPTOCOCCUS ANGINOSUS GROUP: NOT DETECTED
STREPTOCOCCUS PNEUMONIAE: NOT DETECTED
STREPTOCOCCUS PYOGENES: NOT DETECTED
STREPTOCOCCUS SPECIES: DETECTED
WBC # BLD AUTO: 10.9 10E3/UL (ref 4–11)

## 2023-07-12 PROCEDURE — 85025 COMPLETE CBC W/AUTO DIFF WBC: CPT | Performed by: HOSPITALIST

## 2023-07-12 PROCEDURE — 36415 COLL VENOUS BLD VENIPUNCTURE: CPT | Performed by: FAMILY MEDICINE

## 2023-07-12 PROCEDURE — 73590 X-RAY EXAM OF LOWER LEG: CPT | Mod: RT

## 2023-07-12 PROCEDURE — 99223 1ST HOSP IP/OBS HIGH 75: CPT | Mod: 25 | Performed by: HOSPITALIST

## 2023-07-12 PROCEDURE — 250N000009 HC RX 250: Performed by: FAMILY MEDICINE

## 2023-07-12 PROCEDURE — 86140 C-REACTIVE PROTEIN: CPT | Performed by: FAMILY MEDICINE

## 2023-07-12 PROCEDURE — 250N000012 HC RX MED GY IP 250 OP 636 PS 637: Performed by: HOSPITALIST

## 2023-07-12 PROCEDURE — 250N000011 HC RX IP 250 OP 636: Mod: JZ | Performed by: FAMILY MEDICINE

## 2023-07-12 PROCEDURE — 36415 COLL VENOUS BLD VENIPUNCTURE: CPT | Performed by: HOSPITALIST

## 2023-07-12 PROCEDURE — 80053 COMPREHEN METABOLIC PANEL: CPT | Performed by: HOSPITALIST

## 2023-07-12 PROCEDURE — 250N000013 HC RX MED GY IP 250 OP 250 PS 637: Performed by: FAMILY MEDICINE

## 2023-07-12 PROCEDURE — 250N000013 HC RX MED GY IP 250 OP 250 PS 637: Performed by: HOSPITALIST

## 2023-07-12 PROCEDURE — 84100 ASSAY OF PHOSPHORUS: CPT | Performed by: FAMILY MEDICINE

## 2023-07-12 PROCEDURE — 250N000011 HC RX IP 250 OP 636: Performed by: HOSPITALIST

## 2023-07-12 PROCEDURE — 258N000003 HC RX IP 258 OP 636: Performed by: HOSPITALIST

## 2023-07-12 PROCEDURE — 87040 BLOOD CULTURE FOR BACTERIA: CPT | Performed by: FAMILY MEDICINE

## 2023-07-12 PROCEDURE — 250N000011 HC RX IP 250 OP 636: Performed by: FAMILY MEDICINE

## 2023-07-12 PROCEDURE — 83605 ASSAY OF LACTIC ACID: CPT | Performed by: FAMILY MEDICINE

## 2023-07-12 PROCEDURE — 73701 CT LOWER EXTREMITY W/DYE: CPT | Mod: RT

## 2023-07-12 PROCEDURE — 99291 CRITICAL CARE FIRST HOUR: CPT | Performed by: FAMILY MEDICINE

## 2023-07-12 PROCEDURE — 83605 ASSAY OF LACTIC ACID: CPT | Performed by: HOSPITALIST

## 2023-07-12 PROCEDURE — 83735 ASSAY OF MAGNESIUM: CPT | Performed by: FAMILY MEDICINE

## 2023-07-12 PROCEDURE — 87641 MR-STAPH DNA AMP PROBE: CPT | Performed by: FAMILY MEDICINE

## 2023-07-12 PROCEDURE — 200N000001 HC R&B ICU

## 2023-07-12 PROCEDURE — 99207 PR NOT IN PERSON INPATIENT CONSULT STATISTICAL MARKER: CPT | Performed by: HOSPITALIST

## 2023-07-12 PROCEDURE — 258N000003 HC RX IP 258 OP 636: Performed by: FAMILY MEDICINE

## 2023-07-12 PROCEDURE — 84145 PROCALCITONIN (PCT): CPT | Performed by: FAMILY MEDICINE

## 2023-07-12 RX ORDER — DEXTROSE MONOHYDRATE 25 G/50ML
25-50 INJECTION, SOLUTION INTRAVENOUS
Status: DISCONTINUED | OUTPATIENT
Start: 2023-07-12 | End: 2023-07-19 | Stop reason: HOSPADM

## 2023-07-12 RX ORDER — HYDROMORPHONE HCL IN WATER/PF 6 MG/30 ML
0.2 PATIENT CONTROLLED ANALGESIA SYRINGE INTRAVENOUS
Status: DISCONTINUED | OUTPATIENT
Start: 2023-07-12 | End: 2023-07-19 | Stop reason: HOSPADM

## 2023-07-12 RX ORDER — SODIUM CHLORIDE 9 MG/ML
INJECTION, SOLUTION INTRAVENOUS CONTINUOUS
Status: DISCONTINUED | OUTPATIENT
Start: 2023-07-12 | End: 2023-07-12

## 2023-07-12 RX ORDER — AMOXICILLIN 250 MG
1 CAPSULE ORAL 2 TIMES DAILY PRN
Status: DISCONTINUED | OUTPATIENT
Start: 2023-07-12 | End: 2023-07-19 | Stop reason: HOSPADM

## 2023-07-12 RX ORDER — DEXTROSE MONOHYDRATE 25 G/50ML
25-50 INJECTION, SOLUTION INTRAVENOUS
Status: DISCONTINUED | OUTPATIENT
Start: 2023-07-12 | End: 2023-07-12

## 2023-07-12 RX ORDER — ROPIVACAINE IN 0.9% SOD CHL/PF 0.1 %
.03-.125 PLASTIC BAG, INJECTION (ML) EPIDURAL CONTINUOUS
Status: DISCONTINUED | OUTPATIENT
Start: 2023-07-12 | End: 2023-07-14

## 2023-07-12 RX ORDER — IOPAMIDOL 755 MG/ML
500 INJECTION, SOLUTION INTRAVASCULAR ONCE
Status: COMPLETED | OUTPATIENT
Start: 2023-07-12 | End: 2023-07-12

## 2023-07-12 RX ORDER — ATORVASTATIN CALCIUM 10 MG/1
20 TABLET, FILM COATED ORAL AT BEDTIME
Status: DISCONTINUED | OUTPATIENT
Start: 2023-07-12 | End: 2023-07-19 | Stop reason: HOSPADM

## 2023-07-12 RX ORDER — NICOTINE POLACRILEX 4 MG
15-30 LOZENGE BUCCAL
Status: DISCONTINUED | OUTPATIENT
Start: 2023-07-12 | End: 2023-07-12

## 2023-07-12 RX ORDER — FUROSEMIDE 20 MG
20 TABLET ORAL EVERY MORNING
Status: ON HOLD | COMMUNITY
Start: 2023-06-20 | End: 2023-07-19

## 2023-07-12 RX ORDER — CEFAZOLIN SODIUM 1 G/50ML
1250 SOLUTION INTRAVENOUS EVERY 24 HOURS
Status: DISCONTINUED | OUTPATIENT
Start: 2023-07-12 | End: 2023-07-13

## 2023-07-12 RX ORDER — HEPARIN SODIUM 5000 [USP'U]/.5ML
5000 INJECTION, SOLUTION INTRAVENOUS; SUBCUTANEOUS EVERY 8 HOURS
Status: DISCONTINUED | OUTPATIENT
Start: 2023-07-12 | End: 2023-07-12

## 2023-07-12 RX ORDER — NICOTINE POLACRILEX 4 MG
15-30 LOZENGE BUCCAL
Status: DISCONTINUED | OUTPATIENT
Start: 2023-07-12 | End: 2023-07-19 | Stop reason: HOSPADM

## 2023-07-12 RX ORDER — SODIUM CHLORIDE, SODIUM LACTATE, POTASSIUM CHLORIDE, CALCIUM CHLORIDE 600; 310; 30; 20 MG/100ML; MG/100ML; MG/100ML; MG/100ML
INJECTION, SOLUTION INTRAVENOUS CONTINUOUS
Status: DISCONTINUED | OUTPATIENT
Start: 2023-07-12 | End: 2023-07-13

## 2023-07-12 RX ORDER — LIDOCAINE 40 MG/G
CREAM TOPICAL
Status: DISCONTINUED | OUTPATIENT
Start: 2023-07-12 | End: 2023-07-19 | Stop reason: HOSPADM

## 2023-07-12 RX ORDER — ONDANSETRON 4 MG/1
4 TABLET, ORALLY DISINTEGRATING ORAL EVERY 6 HOURS PRN
Status: DISCONTINUED | OUTPATIENT
Start: 2023-07-12 | End: 2023-07-19 | Stop reason: HOSPADM

## 2023-07-12 RX ORDER — METOPROLOL SUCCINATE 100 MG/1
100 TABLET, EXTENDED RELEASE ORAL
Status: ON HOLD | COMMUNITY
Start: 2023-02-01 | End: 2023-07-12

## 2023-07-12 RX ORDER — MAGNESIUM SULFATE HEPTAHYDRATE 40 MG/ML
4 INJECTION, SOLUTION INTRAVENOUS ONCE
Status: COMPLETED | OUTPATIENT
Start: 2023-07-12 | End: 2023-07-12

## 2023-07-12 RX ORDER — ONDANSETRON 2 MG/ML
4 INJECTION INTRAMUSCULAR; INTRAVENOUS EVERY 6 HOURS PRN
Status: DISCONTINUED | OUTPATIENT
Start: 2023-07-12 | End: 2023-07-19 | Stop reason: HOSPADM

## 2023-07-12 RX ORDER — ACETAMINOPHEN 325 MG/1
650 TABLET ORAL EVERY 4 HOURS PRN
Status: DISCONTINUED | OUTPATIENT
Start: 2023-07-12 | End: 2023-07-13

## 2023-07-12 RX ORDER — PIPERACILLIN SODIUM, TAZOBACTAM SODIUM 4; .5 G/20ML; G/20ML
4.5 INJECTION, POWDER, LYOPHILIZED, FOR SOLUTION INTRAVENOUS EVERY 6 HOURS
Status: DISCONTINUED | OUTPATIENT
Start: 2023-07-12 | End: 2023-07-14

## 2023-07-12 RX ORDER — METOPROLOL SUCCINATE 50 MG/1
50 TABLET, EXTENDED RELEASE ORAL DAILY
Status: DISCONTINUED | OUTPATIENT
Start: 2023-07-12 | End: 2023-07-14

## 2023-07-12 RX ORDER — AMOXICILLIN 250 MG
2 CAPSULE ORAL 2 TIMES DAILY PRN
Status: DISCONTINUED | OUTPATIENT
Start: 2023-07-12 | End: 2023-07-19 | Stop reason: HOSPADM

## 2023-07-12 RX ORDER — FUROSEMIDE 20 MG
20 TABLET ORAL EVERY MORNING
Status: DISCONTINUED | OUTPATIENT
Start: 2023-07-13 | End: 2023-07-14

## 2023-07-12 RX ADMIN — INSULIN ASPART 1 UNITS: 100 INJECTION, SOLUTION INTRAVENOUS; SUBCUTANEOUS at 06:53

## 2023-07-12 RX ADMIN — PIPERACILLIN AND TAZOBACTAM 4.5 G: 4; .5 INJECTION, POWDER, FOR SOLUTION INTRAVENOUS at 17:53

## 2023-07-12 RX ADMIN — ACETAMINOPHEN 650 MG: 325 TABLET, FILM COATED ORAL at 16:24

## 2023-07-12 RX ADMIN — ACETAMINOPHEN 650 MG: 325 TABLET, FILM COATED ORAL at 09:50

## 2023-07-12 RX ADMIN — IOPAMIDOL 100 ML: 755 INJECTION, SOLUTION INTRAVENOUS at 08:37

## 2023-07-12 RX ADMIN — APIXABAN 5 MG: 5 TABLET, FILM COATED ORAL at 20:36

## 2023-07-12 RX ADMIN — VANCOMYCIN HYDROCHLORIDE 1250 MG: 1 INJECTION, POWDER, LYOPHILIZED, FOR SOLUTION INTRAVENOUS at 21:23

## 2023-07-12 RX ADMIN — MAGNESIUM SULFATE HEPTAHYDRATE 4 G: 40 INJECTION, SOLUTION INTRAVENOUS at 09:19

## 2023-07-12 RX ADMIN — SODIUM CHLORIDE, POTASSIUM CHLORIDE, SODIUM LACTATE AND CALCIUM CHLORIDE: 600; 310; 30; 20 INJECTION, SOLUTION INTRAVENOUS at 14:37

## 2023-07-12 RX ADMIN — SODIUM CHLORIDE 70 ML: 9 INJECTION, SOLUTION INTRAVENOUS at 08:36

## 2023-07-12 RX ADMIN — SODIUM CHLORIDE, POTASSIUM CHLORIDE, SODIUM LACTATE AND CALCIUM CHLORIDE 500 ML: 600; 310; 30; 20 INJECTION, SOLUTION INTRAVENOUS at 09:36

## 2023-07-12 RX ADMIN — HEPARIN SODIUM 5000 UNITS: 10000 INJECTION, SOLUTION INTRAVENOUS; SUBCUTANEOUS at 06:33

## 2023-07-12 RX ADMIN — INSULIN ASPART 1 UNITS: 100 INJECTION, SOLUTION INTRAVENOUS; SUBCUTANEOUS at 09:36

## 2023-07-12 RX ADMIN — PIPERACILLIN AND TAZOBACTAM 4.5 G: 4; .5 INJECTION, POWDER, FOR SOLUTION INTRAVENOUS at 11:51

## 2023-07-12 RX ADMIN — PIPERACILLIN AND TAZOBACTAM 4.5 G: 4; .5 INJECTION, POWDER, FOR SOLUTION INTRAVENOUS at 06:33

## 2023-07-12 RX ADMIN — PIPERACILLIN AND TAZOBACTAM 4.5 G: 4; .5 INJECTION, POWDER, FOR SOLUTION INTRAVENOUS at 23:40

## 2023-07-12 RX ADMIN — SODIUM CHLORIDE, POTASSIUM CHLORIDE, SODIUM LACTATE AND CALCIUM CHLORIDE 2000 ML: 600; 310; 30; 20 INJECTION, SOLUTION INTRAVENOUS at 06:04

## 2023-07-12 ASSESSMENT — ACTIVITIES OF DAILY LIVING (ADL)
ADLS_ACUITY_SCORE: 34
ADLS_ACUITY_SCORE: 35
ADLS_ACUITY_SCORE: 35
WEAR_GLASSES_OR_BLIND: YES
VISION_MANAGEMENT: GLASSES
CONCENTRATING,_REMEMBERING_OR_MAKING_DECISIONS_DIFFICULTY: NO
DIFFICULTY_EATING/SWALLOWING: NO
DEPENDENT_IADLS:: TRANSPORTATION
ADLS_ACUITY_SCORE: 35
ADLS_ACUITY_SCORE: 34
ADLS_ACUITY_SCORE: 35
ADLS_ACUITY_SCORE: 34
ADLS_ACUITY_SCORE: 35
ADLS_ACUITY_SCORE: 26
ADLS_ACUITY_SCORE: 35
ADLS_ACUITY_SCORE: 35

## 2023-07-12 ASSESSMENT — COLUMBIA-SUICIDE SEVERITY RATING SCALE - C-SSRS
5. HAVE YOU STARTED TO WORK OUT OR WORKED OUT THE DETAILS OF HOW TO KILL YOURSELF? DO YOU INTEND TO CARRY OUT THIS PLAN?: NO
4. HAVE YOU HAD THESE THOUGHTS AND HAD SOME INTENTION OF ACTING ON THEM?: NO
3. HAVE YOU BEEN THINKING ABOUT HOW YOU MIGHT KILL YOURSELF?: NO
2. HAVE YOU ACTUALLY HAD ANY THOUGHTS OF KILLING YOURSELF IN THE PAST MONTH?: NO
1. IN THE PAST MONTH, HAVE YOU WISHED YOU WERE DEAD OR WISHED YOU COULD GO TO SLEEP AND NOT WAKE UP?: NO
6. HAVE YOU EVER DONE ANYTHING, STARTED TO DO ANYTHING, OR PREPARED TO DO ANYTHING TO END YOUR LIFE?: NO

## 2023-07-12 NOTE — PHARMACY-VANCOMYCIN DOSING SERVICE
"Pharmacy Vancomycin Initial Note  Date of Service 2023  Patient's  5/10/1934  89 year old, male    Indication: Sepsis and Skin and Soft Tissue Infection    Current estimated CrCl = Estimated Creatinine Clearance: 47.3 mL/min (A) (based on SCr of 1.32 mg/dL (H)).    Creatinine for last 3 days  2023: 10:44 PM Creatinine 1.32 mg/dL    Recent Vancomycin Level(s) for last 3 days  No results found for requested labs within last 3 days.      Vancomycin IV Administrations (past 72 hours)                   vancomycin (VANCOCIN) 2,500 mg in sodium chloride 0.9 % 500 mL intermittent infusion (mg) 2,500 mg New Bag 23 2351                Nephrotoxins and other renal medications (From now, onward)    Start     Dose/Rate Route Frequency Ordered Stop    23 2200  vancomycin (VANCOCIN) 1,250 mg in sodium chloride 0.9 % 250 mL intermittent infusion         1,250 mg  over 90 Minutes Intravenous EVERY 24 HOURS 23 0530      23 0530  piperacillin-tazobactam (ZOSYN) 4.5 g vial to attach to  mL bag        Note to Pharmacy: For [facility], [facility] and [facility]: For Zosyn-naive patients, use the \"Zosyn initial dose + extended infusion\" order panel.    4.5 g  over 30 Minutes Intravenous EVERY 6 HOURS 23 0521            Contrast Orders - past 72 hours (72h ago, onward)    None          Insight[employer] Prediction of Planned Initial Vancomycin Regimen  Loading dose: got 2500mg IV once at [facility]  Regimen: 1250 mg IV every 24 hours.  Start time: 11:51 on 2023  Exposure target: AUC24 (range)400-600 mg/L.hr   AUC24,ss: 499 mg/L.hr  Probability of AUC24 > 400: 73 %  Ctrough,ss: 16 mg/L  Probability of Ctrough,ss > 20: 31 %  Probability of nephrotoxicity (Lodise MAYKEL ): 11 %          Plan:  1. Start vancomycin  1250 mg IV q24h.   2. Vancomycin monitoring method: AUC  3. Vancomycin therapeutic monitoring goal: 400-600 mg*h/L  4. Pharmacy will check vancomycin levels as appropriate in 1-3 Days.    5. Serum " creatinine levels will be ordered daily for the first week of therapy and at least twice weekly for subsequent weeks.      Iris KunzD

## 2023-07-12 NOTE — PROGRESS NOTES
Patient has appeared to stabilize throughout the day.  Blood pressures continue in the  range without pressors being needed and MAPs consistently 65 or above this afternoon.  Patient tolerating dietary advancement throughout the day     Blood cultures Verigene indicate strep species     Patient was finally able to void small amount - very concentrated    Lactic acid continues to be elevated in the 3.1-3.2 range but stable and no longer trending upward.      PLAN;  -Continue on ICU status overnight in case pressors are needed  -continue Zosyn and Vanco for antibiotics.  -lactic acids every 8 hours or sooner if signs of decompensation   -repeat blood cultures these evening as planned  -transition to mod carb diet and transition to moderate carb diet, blood sugar monitoring and sliding scale insulin with meals and bedtime.      48 minutes has been spent throughout the day on patient with ongoing critical illness and high risk for further decompensation.      Electronically Signed:  Arlene Lawrence MD

## 2023-07-12 NOTE — PHARMACY-VANCOMYCIN DOSING SERVICE
Pharmacy Vancomycin Initial Note  Date of Service 2023  Patient's  5/10/1934  89 year old, male    Indication: Sepsis and Skin and Soft Tissue Infection    Current estimated CrCl = Estimated Creatinine Clearance: 49.6 mL/min (A) (based on SCr of 1.32 mg/dL (H)).    Creatinine for last 3 days  2023: 10:44 PM Creatinine 1.32 mg/dL    Recent Vancomycin Level(s) for last 3 days  No results found for requested labs within last 3 days.      Vancomycin IV Administrations (past 72 hours)        No vancomycin orders with administrations in past 72 hours.                    Nephrotoxins and other renal medications (From now, onward)      Start     Dose/Rate Route Frequency Ordered Stop    23 2330  vancomycin (VANCOCIN) 1,250 mg in sodium chloride 0.9 % 250 mL intermittent infusion         1,250 mg  over 90 Minutes Intravenous EVERY 24 HOURS 23 2323      23 2325  vancomycin (VANCOCIN) 2,500 mg in sodium chloride 0.9 % 500 mL intermittent infusion         2,500 mg  over 120 Minutes Intravenous ONCE 23 2323              Contrast Orders - past 72 hours (72h ago, onward)      None            InsightRX Prediction of Planned Initial Vancomycin Regimen  Loading dose: 2500 mg at 23:30 2023.  Regimen: 1250 mg IV every 24 hours.  Start time: 23:45 on 2023  Exposure target: AUC24 (range)400-600 mg/L.hr   AUC24,ss: 457 mg/L.hr  Probability of AUC24 > 400: 64 %  Ctrough,ss: 14.7 mg/L  Probability of Ctrough,ss > 20: 24 %  Probability of nephrotoxicity (Lodise MAYKEL ): 10 %        Plan:  Start vancomycin 2500mg load, then 1250 mg IV q24h.   Vancomycin monitoring method: AUC  Vancomycin therapeutic monitoring goal: 400-600 mg*h/L  Pharmacy will check vancomycin levels as appropriate in 1-3 Days.    Serum creatinine levels will be ordered daily for the first week of therapy and at least twice weekly for subsequent weeks.      Bryson Mohr, [facility]

## 2023-07-12 NOTE — ED NOTES
Pt was accepted to [facility]. Writer informed pt on admission and location. Pt agreed to transfer. Writer contacted spouse, Rosie. Spouse was upset that pt was not admitting at [facility] or [facility]. Writer informed spouse that bed availably is limited and we were able to find a bed at [facility]. Rosie agreed to transfer.

## 2023-07-12 NOTE — ED TRIAGE NOTES
Pt was brought in by EMS, lives at home with wife. last known well at 0900, right sided weakness. . A/O2--3. Per EMS wife and pt is poor historian. Pt has a hx of right frozen shoulder and is on blood thinners. Pt denied CP, SOB. Writer noted isai RLE and redness in right under arm. Pt has c/o right ankle pain. Reported he fell 2 months prior and has had right ankle pain since.

## 2023-07-12 NOTE — PROGRESS NOTES
Both blood cultures from 7/11/23 prior to antibiotic initiation are growing gram positive cocci in pairs and chains.  Will monitor for Verigene results and culture results going forward, continue with current antibiotics.  Plan to repeat blood cultures tonight after 24 hours of what should be effective antibiotic therapy to ensure clearance of the bacteremia.    Electronically Signed:  Arlene Lawrence MD

## 2023-07-12 NOTE — H&P
"[facility]    History and Physical - Hospitalist Service       Date of Admission:  7/12/2023    Assessment & Plan      Alvin Newton is a 89 year old male admitted on 7/12/2023. He was transferred from  [facility] for sepsis secondary to cellulitis of right lower extremity.    Sepsis with septic shock  Cellulitis of the right LE  Admit to ICU  Patient did get fluid resuscitation in the ER at [facility]  BP continues to be soft, give 2 L RL bolus, followed by 150cc/hr  Follow labs: CBC, BMP mAg, lactic acid  Zosyn and vancomycin started  Follow blood and urine culture  May need to start on pressures to keep MAP>65  Will also consider LE arterial doppler to r/o PAD, poor pulses   patient does have diabetes, but do not see an open wound  Will get imaging x ray and follow    Paroxysmal A.fibwith RVR  Patient's blood pressure/hypotensive hold metoprolol and takes at home  I will hold Eliquis until imaging is done for any intervention needed.  Restart Eliquis once patient's imaging studies are negative for deep infection/abscess and may not need intervention      DM 2  Continue home medications  hold Metformin given renal function during hospitalization  continue rest of home meds   ISS:   check HbA1c and Follow    Acute renal insufficiency  In the setting of sepsis  Continue IV fluid and follow.  Monitor lites and replace.         Diet: Advance Diet as Tolerated: Clear Liquid Diet    DVT Prophylaxis: Heparin SQ  Eastman Catheter: Not present  Lines: None     Cardiac Monitoring: ACTIVE order. Indication: ICU  Code Status: Full Code      Clinically Significant Risk Factors Present on Admission               # Drug Induced Coagulation Defect: home medication list includes an anticoagulant medication    # Hypertension: Noted on problem list      # Obesity: Estimated body mass index is 33 kg/m  as calculated from the following:    Height as of this encounter: 1.803 m (5' 11\").    Weight as " of this encounter: 107.3 kg (236 lb 9.6 oz).            Disposition Plan      Expected Discharge Date: 07/14/2023                  Zechariah Lozano MD  Hospitalist Service  [facility]  Securely message with [employer] (more info)  Text page via [facility] Paging/Directory     ______________________________________________________________________    Chief Complaint   Worsening lower extremity swelling and erythema the last few days    History is obtained from the patient, electronic health record, emergency department physician and paper chart    History of Present Illness   Alvin Newton is a 89 year old male who presented with altered mental status and presentation redness with swelling of his right lower extremity over the past several days.  Per EMS they were called for confusion and weakness.  Initially was asked for stroke evaluation upon interviewing the patient states that he was feeling weak in his right lower extremity swelling and red for the last few days.  Patient has history of paroxysmal atrial fibrillation on the blocker and Eliquis, diabetes mellitus type 2, hypertension, hyperlipidemia, taking medications regularly.  In the emergency the patient was found to be in severe sepsis with septic shock did well with fluid resuscitation and started on antibiotics, cultures were sent.  Denies headache, LOC, seizures,no Chest pain, SOB, palpitation, cough or fever, no abdominal pain, nausea or vomiting. no bowel or bladder issues, no focal weakness, no recent travel, taking medications regulary, no sick contacts       Past Medical History    Past Medical History:   Diagnosis Date     Colonic diverticulum      Hyperlipidemia      Hypertension      Obesity (BMI 30-39.9)      Osteoarthritis      Type 2 diabetes mellitus (H)        Past Surgical History   Past Surgical History:   Procedure Laterality Date     ARTHROPLASTY HIP BILATERAL  1987      ESOPHAGOSCOPY, DIAGNOSTIC  2003      LAPAROSCOPIC CHOLECYSTECTOMY N/A 12/28/2017    Procedure: LAPAROSCOPIC CHOLECYSTECTOMY;  LAPAROSCOPIC CHOLECYSTECTOMY;  Surgeon: Heber Gonzalez MD;  Location: [facility] OR     TRANSRECTAL ULTRASONIC, TRANSURETHRAL RESECTION (TUR) OF PROSTATE CYST  1990       Prior to Admission Medications   Prior to Admission Medications   Prescriptions Last Dose Informant Patient Reported? Taking?   ELIQUIS ANTICOAGULANT 5 MG tablet  Self Yes No   Sig: Take 5 mg by mouth 2 times daily   acetaminophen (TYLENOL) 500 MG tablet  Self Yes No   Sig: Take 1,000 mg by mouth 3 times daily   atorvastatin (LIPITOR) 20 MG tablet  Self Yes No   Sig: Take 20 mg by mouth At Bedtime   calcitonin, salmon, (MIACALCIN) 200 UNIT/ACT nasal spray   No No   Sig: Spray 1 spray into one nostril alternating nostrils daily Alternate nostril each day.   Patient not taking: Reported on 10/20/2022   cholecalciferol 50 MCG (2000 UT) CAPS  Self Yes No   Sig: Take 2,000 Units by mouth daily   cyclobenzaprine (FLEXERIL) 5 MG tablet   Yes No   Patient not taking: Reported on 9/22/2022   furosemide (LASIX) 20 MG tablet   Yes No   Sig: Take 20 mg by mouth every morning   metoprolol succinate ER (TOPROL XL) 25 MG 24 hr tablet   No No   Sig: Take 2 tablets (50 mg) by mouth daily   miconazole (MICATIN) 2 % external powder   No No   Sig: Apply topically 2 times daily   Patient not taking: Reported on 9/22/2022   omeprazole (PRILOSEC) 40 MG DR capsule   Yes No   Sig: Take 40 mg by mouth daily   Patient not taking: Reported on 9/22/2022      Facility-Administered Medications: None        Review of Systems    The 10 point Review of Systems is negative other than noted in the HPI or here.   Physical Exam   Vital Signs: Temp: 98.3  F (36.8  C) Temp src: Oral BP: 98/61 Pulse: 114   Resp: 18 SpO2: 98 % O2 Device: None (Room air)    Weight: 236 lbs 9.6 oz    General:  Alert and oriented, No acute distress.    HENT:  Normocephalic.    Respiratory:  Lungs are clear to  auscultation, Respirations are non-labored.    Cardiovascular:  Normal rate, Regular rhythm.  Systolic murmur  Gastrointestinal:  Soft, Non-tender, Non-distended, Normal bowel sounds.  Musculoskeletal: Swelling with erythema and tenderness over the right lower extremity more over his legs.  Per RN no pulses  Neurologic:  Alert, Oriented.    Cognition and Speech:  Oriented, Speech clear and coherent.    Psychiatric:  Cooperative, Appropriate mood & affect.    Medical Decision Making     80 MINUTES SPENT BY ME on the date of service doing chart review, history, exam, documentation & further activities per the note.      Data     I have personally reviewed the following data over the past 24 hrs:    9.6  \   16.1   / 159     139 104 28.3 (H) /  140 (H)   4.2 24 1.32 (H) \       ALT: 20 AST: 27 AP: 170 (H) TBILI: 3.0 (H)   ALB: 4.1 TOT PROTEIN: 7.3 LIPASE: 15       Procal: N/A CRP: N/A Lactic Acid: 2.4 (H)         Imaging results reviewed over the past 24 hrs:   Recent Results (from the past 24 hour(s))   XR Chest Port 1 View    Narrative    EXAM: XR CHEST PORT 1 VIEW  LOCATION: [facility]  DATE: 7/11/2023    INDICATION: sepsis  COMPARISON: Portable chest radiograph 12/21/2022      Impression    IMPRESSION: Stable enlarged heart without pulmonary vascular congestion. No definite focal pulmonary consolidation. Question slight blunting of the left costophrenic angle which could be indicative of a trace left pleural effusion. No pneumothorax.   Degenerative changes of the shoulders and thoracic spine. No acute osseous abnormality.     Visit/Communication Style   Virtual (Video) communication was used to evaluate Alvin.  Alvin consented to the use of video communication: yes  Video START time: 0545AM EST, 7/12/2023  Video STOP time: 0620AM EST, 7/12/2023   Patient's location: [facility]   Provider's location during the visit: [facility]-medicine site

## 2023-07-12 NOTE — PROGRESS NOTES
Patient is a 89 year old with severe sepsis with acute organ dysfunction and persistent hypotension however had not yet received adequate 30 mL/kg bolus on arrival to ICU.  Possible sources are right lower leg cellulitis that is rapidly expanding and possible UTI.  Blood pressures how have MAPS above 65 but blood pressures starting to trend downward following bolus completion and systolic now in the 90s again.  Lactic acid continues to be in the low 3 range even after fluid resuscitation.  Currently on Zosyn and Vanco for antibiotic coverage    Lactic acid - will complete final 500 ml fluid bolus, start IV fluids at maintanence rate and recheck in 3 hours of adequate perfusion     Blood pressures - were in the 80 to low 90s on arrival to the ICU, improved into the 100s following early fluid bolusing but now decreasing into the 90s again.  Continue with final 500 ml fluid bolus but order placed for peripheral Levophed if MAPS less than 65 and nursing will inform if needed.  Continue to hold home blood pressure medications. Would need PICC line placement if Levophed initiated.      Cellulitis - noted on right lower leg.  CT does not show any since of necrotizing fasciitis but does confirm cellulitis.  Will restart home Eliquis.  Continue Zosyn and Vanco as above.  MRSA swab    UA - UC pending.  Continue antibiotics as above    DM - patient not wanting to eat much.  Continue with blood sugars q4 hours with sliding scale insulin low resistance coverage and ADAT.  Nursing will inform if patient starts adequate PO intake.     Atrial fibrillation - chronic.  Normally on metoprolol XL 50 mg daily and Eliquis which were both held initially.  Continue to hold metoprolol given blood pressures above but restart Eliquis given no anticipated upcoming surgery.  Stop heparin. Monitor by cardiac monitoring.     Electronically Signed:  Arlene Lawrence MD

## 2023-07-12 NOTE — ED NOTES
Writer provided BAO Restrepo with report    Writer called spouse to update with [facility] ICU phone number. Writer reviewed home medications with spouse.

## 2023-07-12 NOTE — ED PROVIDER NOTES
History     Chief Complaint   Patient presents with    Stroke Symptoms     HPI  Alvin Newton is a 89 year old male who presents for altered mental status.  History is obtained from the patient and from EMS.  EMS reports that they were called for confusion and weakness, they were concerned for weakness and asked for stroke evaluation upon arrival.  I met the patient immediately upon arrival in room 1.  The patient reports that he has been feeling weak and has had increased pain and redness and swelling of his right lower extremity over the past several days.  He denies headache, vision changes, chest pain, cough, difficulty breathing, abdominal pain, nausea, vomiting.    Allergies:  No Known Allergies    Problem List:    Patient Active Problem List    Diagnosis Date Noted    Sepsis (H) 07/12/2023     Priority: Medium    Cellulitis of right lower extremity 07/11/2023     Priority: Medium    Severe sepsis (H) 07/11/2023     Priority: Medium    Impaired fasting glucose 06/20/2022     Priority: Medium    Osteoarthritis of pelvis 06/20/2022     Priority: Medium     Dec 16, 2003 Entered By: JOHN GROVE Comment: 1988  S-P HIP REPLACEMENT      Primary localized osteoarthrosis of shoulder region 06/20/2022     Priority: Medium    Reason for consultation 06/20/2022     Priority: Medium    Right bundle branch block 06/20/2022     Priority: Medium    Fall, initial encounter 06/06/2022     Priority: Medium    Closed fracture of first lumbar vertebra, unspecified fracture morphology, initial encounter (H) 06/06/2022     Priority: Medium    Fracture of L1 vertebra (H) 06/06/2022     Priority: Medium    Carpal tunnel syndrome, bilateral 11/18/2021     Priority: Medium    BPH with urinary obstruction 06/22/2020     Priority: Medium    NSTEMI (non-ST elevated myocardial infarction) (H) 12/27/2017     Priority: Medium    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) 12/06/2017     Priority: Medium    Obesity, morbid,  "BMI 40.0-49.9 (H) 11/07/2017     Priority: Medium    Essential hypertension 04/04/2016     Priority: Medium    Mixed hyperlipidemia 01/02/2009     Priority: Medium    Pure hypercholesterolemia 01/01/1999     Priority: Medium    Other ill-defined and unknown causes of morbidity and mortality 01/01/1985     Priority: Medium     Dec 16, 2003 Entered By: JOHN GORVE Comment: S-P TURP          Past Medical History:    Past Medical History:   Diagnosis Date    Colonic diverticulum     Hyperlipidemia     Hypertension     Obesity (BMI 30-39.9)     Osteoarthritis     Type 2 diabetes mellitus (H)        Past Surgical History:    Past Surgical History:   Procedure Laterality Date    ARTHROPLASTY HIP BILATERAL  1987    HC ESOPHAGOSCOPY, DIAGNOSTIC  2003    LAPAROSCOPIC CHOLECYSTECTOMY N/A 12/28/2017    Procedure: LAPAROSCOPIC CHOLECYSTECTOMY;  LAPAROSCOPIC CHOLECYSTECTOMY;  Surgeon: Heber Gonzalez MD;  Location: [facility] OR    TRANSRECTAL ULTRASONIC, TRANSURETHRAL RESECTION (TUR) OF PROSTATE CYST  1990       Family History:    No family history on file.    Social History:  Marital Status:   [2]  Social History     Tobacco Use    Smoking status: Never    Smokeless tobacco: Never   Substance Use Topics    Alcohol use: Yes     Comment: 0-1 beer daily    Drug use: No        Medications:    No current outpatient medications on file.        Review of Systems    Physical Exam   BP: 111/71  Pulse: 120  Temp: (!) 102.2  F (39  C)  Resp: 27  Height: 180.3 cm (5' 11\")  Weight: 118.1 kg (260 lb 4.8 oz)  SpO2: 96 %      Physical Exam  Vitals and nursing note reviewed.   Constitutional:       General: He is in acute distress.      Appearance: He is well-developed.      Comments: Warm to the touch   HENT:      Head: Normocephalic and atraumatic.      Right Ear: External ear normal.      Left Ear: External ear normal.      Nose: Nose normal.      Mouth/Throat:      Mouth: Mucous membranes are dry.   Eyes:      General: No scleral " icterus.     Conjunctiva/sclera: Conjunctivae normal.   Cardiovascular:      Rate and Rhythm: Tachycardia present. Rhythm irregularly irregular.   Pulmonary:      Effort: Pulmonary effort is normal. No respiratory distress.      Breath sounds: No stridor.   Abdominal:      General: There is no distension.      Palpations: Abdomen is soft.      Tenderness: There is no abdominal tenderness.   Musculoskeletal:      Cervical back: Normal range of motion.   Skin:     General: Skin is warm and dry.      Comments: The patient was undressed and rolled for full skin evaluation.  His right lower extremity has erythema, tenderness to palpation, swelling starting from just below the knee down to the ankle.   Neurological:      Mental Status: He is alert and oriented to person, place, and time.   Psychiatric:         Behavior: Behavior normal.         ED Course                 Procedures              EKG Interpretation:      Interpreted by Gomez Falcon MD  Time reviewed: 2316  Symptoms at time of EKG: Severe sepsis, rapid irregular rate   Rhythm: atrial fibrillation - rapid  Rate: 122  Axis: Right Axis Deviation  Ectopy: none  Conduction: right bundle branch block (complete)  ST Segments/ T Waves: No acute ischemic changes  Comparison to prior: Unchanged morphology, new A-fib with RVR.    Clinical Impression: Atrial fibrillation with RVR and right bundle branch block      Critical Care time:  was 35 minutes for this patient excluding procedures.               Results for orders placed or performed during the hospital encounter of 07/11/23 (from the past 24 hour(s))   Glucose by meter   Result Value Ref Range    GLUCOSE BY METER POCT 147 (H) 70 - 99 mg/dL   Urine Macroscopic with reflex to Microscopic   Result Value Ref Range    Color Urine Lynn (A) Colorless, Straw, Light Yellow, Yellow    Appearance Urine Slightly Cloudy (A) Clear    Glucose Urine Negative Negative mg/dL    Bilirubin Urine Negative Negative    Ketones  Urine Negative Negative mg/dL    Specific Gravity Urine 1.024 1.003 - 1.035    Blood Urine Moderate (A) Negative    pH Urine 5.0 5.0 - 7.0    Protein Albumin Urine Negative Negative mg/dL    Urobilinogen Urine Normal Normal, 2.0 mg/dL    Nitrite Urine Negative Negative    Leukocyte Esterase Urine Moderate (A) Negative    Bacteria Urine Moderate (A) None Seen /HPF    RBC Urine 14 (H) <=2 /HPF    WBC Urine 29 (H) <=5 /HPF    Squamous Epithelials Urine <1 <=1 /HPF    Mucus Urine Present (A) None Seen /LPF    Hyaline Casts Urine 4 (H) <=2 /LPF   Blood gas venous   Result Value Ref Range    pH Venous 7.42 7.32 - 7.43    pCO2 Venous 42 40 - 50 mm Hg    pO2 Venous 23 (L) 25 - 47 mm Hg    Bicarbonate Venous 27 21 - 28 mmol/L    Base Excess/Deficit (+/-) 2.1 (H) -7.7 - 1.9 mmol/L    FIO2 0    Lactic acid whole blood   Result Value Ref Range    Lactic Acid 2.4 (H) 0.7 - 2.0 mmol/L   Comprehensive metabolic panel   Result Value Ref Range    Sodium 139 136 - 145 mmol/L    Potassium 4.2 3.4 - 5.3 mmol/L    Chloride 104 98 - 107 mmol/L    Carbon Dioxide (CO2) 24 22 - 29 mmol/L    Anion Gap 11 7 - 15 mmol/L    Urea Nitrogen 28.3 (H) 8.0 - 23.0 mg/dL    Creatinine 1.32 (H) 0.67 - 1.17 mg/dL    Calcium 10.0 8.8 - 10.2 mg/dL    Glucose 140 (H) 70 - 99 mg/dL    Alkaline Phosphatase 170 (H) 40 - 129 U/L    AST 27 0 - 45 U/L    ALT 20 0 - 70 U/L    Protein Total 7.3 6.4 - 8.3 g/dL    Albumin 4.1 3.5 - 5.2 g/dL    Bilirubin Total 3.0 (H) <=1.2 mg/dL    GFR Estimate 52 (L) >60 mL/min/1.73m2   CBC with Platelets & Differential    Narrative    The following orders were created for panel order CBC with Platelets & Differential.  Procedure                               Abnormality         Status                     ---------                               -----------         ------                     CBC with platelets and d...[[id number]]  Abnormal            Final result                 Please view results for these tests on the individual  orders.   Lipase   Result Value Ref Range    Lipase 15 13 - 60 U/L   CBC with platelets and differential   Result Value Ref Range    WBC Count 9.6 4.0 - 11.0 10e3/uL    RBC Count 5.39 4.40 - 5.90 10e6/uL    Hemoglobin 16.1 13.3 - 17.7 g/dL    Hematocrit 48.9 40.0 - 53.0 %    MCV 91 78 - 100 fL    MCH 29.9 26.5 - 33.0 pg    MCHC 32.9 31.5 - 36.5 g/dL    RDW 14.1 10.0 - 15.0 %    Platelet Count 159 150 - 450 10e3/uL    % Neutrophils 84 %    % Lymphocytes 7 %    % Monocytes 9 %    % Eosinophils 0 %    % Basophils 0 %    % Immature Granulocytes 0 %    NRBCs per 100 WBC 0 <1 /100    Absolute Neutrophils 8.0 1.6 - 8.3 10e3/uL    Absolute Lymphocytes 0.7 (L) 0.8 - 5.3 10e3/uL    Absolute Monocytes 0.8 0.0 - 1.3 10e3/uL    Absolute Eosinophils 0.0 0.0 - 0.7 10e3/uL    Absolute Basophils 0.0 0.0 - 0.2 10e3/uL    Absolute Immature Granulocytes 0.0 <=0.4 10e3/uL    Absolute NRBCs 0.0 10e3/uL   [place] Draw    Narrative    The following orders were created for panel order [place] Draw.  Procedure                               Abnormality         Status                     ---------                               -----------         ------                     Extra Blue Top Tube[[id number]]                              Final result                 Please view results for these tests on the individual orders.   Extra Blue Top Tube   Result Value Ref Range    Hold Specimen JI    XR Chest Port 1 View    Narrative    EXAM: XR CHEST PORT 1 VIEW  LOCATION: [facility]  DATE: 7/11/2023    INDICATION: sepsis  COMPARISON: Portable chest radiograph 12/21/2022      Impression    IMPRESSION: Stable enlarged heart without pulmonary vascular congestion. No definite focal pulmonary consolidation. Question slight blunting of the left costophrenic angle which could be indicative of a trace left pleural effusion. No pneumothorax.   Degenerative changes of the shoulders and thoracic spine. No acute osseous abnormality.        Medications   lactated ringers BOLUS 1,000 mL (0 mLs Intravenous Stopped 7/12/23 0120)   acetaminophen (TYLENOL) tablet 1,000 mg (1,000 mg Oral $Given 7/11/23 2305)   piperacillin-tazobactam (ZOSYN) intermittent infusion 4.5 g (0 g Intravenous Stopped 7/11/23 2349)   lactated ringers BOLUS 1,000 mL (0 mLs Intravenous Stopped 7/12/23 0152)   vancomycin (VANCOCIN) 2,500 mg in sodium chloride 0.9 % 500 mL intermittent infusion (0 mg Intravenous Stopped 7/12/23 0217)       Assessments & Plan (with Medical Decision Making)   89-year-old male who presents for confusion.  Febrile upon arrival with a temperature of 102  F rectally.  He is given IV fluids.  Lactic acid is elevated.  Urinalysis is positive for white blood cells and red blood cells.  Urine culture and blood cultures are pending.  He is started on IV piperacillin-tazobactam and vancomycin empirically for cellulitis.  Chest x-ray obtained, images interpreted independently as well as radiology read reviewed, no signs of infiltrate to suggest pneumonia.  The patient requires admission to the hospital for further management of his severe sepsis.  Unfortunately there are no hospital beds available at [facility] at this time and the patient will be transferred to [facility] for further care.  I discussed the case with the on-call telehospitalist, Dr. Lozano agrees to accept the patient in transfer.  We discussed ongoing management of the patient including need for ICU bed.    I have reviewed the nursing notes.    I have reviewed the findings, diagnosis, plan and need for follow up with the patient.           Medical Decision Making  The patient's presentation was of high complexity (an acute health issue posing potential threat to life or bodily function).    The patient's evaluation involved:  ordering and/or review of 3+ test(s) in this encounter (see separate area of note for details)  independent interpretation of testing performed by another health  professional (see separate area of note for details)  discussion of management or test interpretation with another health professional (see separate area of note for details)    The patient's management necessitated moderate risk (prescription drug management including medications given in the ED).        Discharge Medication List as of 7/12/2023  2:19 AM          Final diagnoses:   Severe sepsis (H)   Cellulitis of right lower extremity   Atrial fibrillation with RVR (H)       7/11/2023   [facility] EMERGENCY DEPT       Gomez Falcon MD  07/12/23 0739

## 2023-07-12 NOTE — PROGRESS NOTES
S-(situation): Patient arrives to room 218 via cart from [facility]    B-(background): Sepsis / Cellulitis     A-(assessment): Pt arrived alert and oriented to self and place, somewhat anxious but cooperative and able to participate in the admission process. Right lower leg is reddened, warm and edematous, both feet also edematous with about +2 edema. 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 There is a red rash present at the front of the right arm pit. Telemetry shows afib with a rate of 110-140 on arrival. All other vitals stable at the time of arrival. External catheter in place. No identifiable neurological deficits at the time of arrival. Pt was not able to stand or ambulate at the time of admission but states that he does use a cane for ambulation at baseline.     R-(recommendations): Orders reviewed with Pt. Will monitor patient per MD orders.     Inpatient nursing criteria listed below were met:    Health care directives status obtained and documented: Yes  VTE ordered/documented: Yes  Skin issues/needs documented:Yes  Isolation addressed and Signage used: NA  Fall Prevention: Care plan updated Yes Education given and documented Yes  Care Plan initiated and Co-Morbidities added: Yes  Education Assessment documented:Yes  Admission Education Documented: Yes  If present CAUTI/CLABI Education done: NA  New medication patient education completed and documented (Possible Side Effects of Common Medications handout): Yes  Allergies Reviewed: Yes  Admission Medication Reconciliation completed: Yes  Home medications if not able to send immediately home with family stored here: NA  Reminder note placed in discharge instructions regarding home meds: NA  Individualized care needs/preferences addressed and charted: Yes  Provider Notified that patient has arrived to the unit: Yes

## 2023-07-12 NOTE — CONSULTS
Care Management Initial Consult    General Information  Assessment completed with: Patient,    Type of CM/SW Visit: Initial Assessment    Primary Care Provider verified and updated as needed: Yes   Readmission within the last 30 days: no previous admission in last 30 days      Reason for Consult: discharge planning  Advance Care Planning: Advance Care Planning Reviewed: no concerns identified          Communication Assessment  Patient's communication style: spoken language (English or Bilingual)    Hearing Difficulty or Deaf: yes        Cognitive  Cognitive/Neuro/Behavioral: .WDL except, orientation  Level of Consciousness: alert  Arousal Level: opens eyes spontaneously  Orientation: disoriented to, situation  Mood/Behavior: calm  Best Language: 0 - No aphasia  Speech: clear    Living Environment:   People in home: spouse  Rosie  Current living Arrangements: house      Able to return to prior arrangements: other (see comments) (depends on mobility)       Family/Social Support:  Care provided by: self  Provides care for: no one  Marital Status:   Wife  Rosie       Description of Support System: Supportive, Involved    Support Assessment: Adequate family and caregiver support (if patient is able to be mobile)    Current Resources:   Patient receiving home care services: No     Community Resources: None  Equipment currently used at home:    Supplies currently used at home: None    Employment/Financial:  Employment Status: retired        Financial Concerns: No concerns identified   Referral to Financial Worker: No       Does the patient's insurance plan have a 3 day qualifying hospital stay waiver?  No    Lifestyle & Psychosocial Needs:  Social Determinants of Health     Tobacco Use: Low Risk  (7/11/2023)    Patient History     Smoking Tobacco Use: Never     Smokeless Tobacco Use: Never     Passive Exposure: Not on file   Alcohol Use: Not on file   Financial Resource Strain: Not on file   Food Insecurity: Not  on file   Transportation Needs: Not on file   Physical Activity: Not on file   Stress: Not on file   Social Connections: Not on file   Intimate Partner Violence: Not on file   Depression: Not at risk (7/21/2022)    PHQ-2     PHQ-2 Score: 0   Housing Stability: Not on file       Functional Status:  Prior to admission patient needed assistance:   Dependent ADLs:: Ambulation-cane  Dependent IADLs:: Transportation (wife does most of the driving)  Assesssment of Functional Status: Not at baseline with ADL Functioning, Not at baseline with mobility    Mental Health Status:          Chemical Dependency Status:                Values/Beliefs:  Spiritual, Cultural Beliefs, [employer] Practices, Values that affect care:                 Additional Information:  Writer met with patient in the room introduced self and role patient currently lives in an one level town home with his wife. Patient has 3 children 2 who live in in [place] and 1 son who lives in [place]. Patient states his one daughter Fuad [phone] see him and his wife 1-2 times a week and is encouraging patient to move to an FCO, patient is not ready for that move yet. Write encouraged patient to get on a list at a place they would be interested in so when it is time they would get to be whee they wanted to be.    Patient's wife drives gets groceries and runs other errands when needed and would be able to transport patient home if able to get in a vehicle otherwise would need health planned transpotation. Patient states they share chores and meal prep at home and normally is up and about with a cane.    Patient is open to Home care which he has had in the past through  the [facility] and if he would need TCU he has been at [facility] (Main: [phone] Admissions: [phone] Fax: [phone]) in the past. It is to early in patient's stay to determine what he needs. CTS will continue to follow.    Juana Robert RN   Inpatient Care  Coordinator  M [facility] [phone]  [facility] [phone]       Juana Robert RN

## 2023-07-12 NOTE — ED NOTES
Bed: ED01  Expected date: 7/11/23  Expected time: 10:15 PM  Means of arrival: Ambulance  Comments:  Stroke eval

## 2023-07-12 NOTE — PROVIDER NOTIFICATION
07/12/23 1356   Critical Test Results/Notification   Critical Lab Result (Lab Name and Value) procalcitonin 6.74   What Time Did The Lab Notify You? 1356   Provider Notified yes   Date of Provider Notification 07/12/23   Time of Provider Notification 1356   Mechanism of Provider notification page   What Provider Did You Notify? Lawrence   Response no orders were obtained, but call back

## 2023-07-12 NOTE — MEDICATION SCRIBE - ADMISSION MEDICATION HISTORY
Medication Scribe Admission Medication History    Admission medication history is complete. The information provided in this note is only as accurate as the sources available at the time of the update.    Medication reconciliation/reorder completed by provider prior to medication history? Yes    Information Source(s): Patient via in-person    Pertinent Information:     Changes made to PTA medication list:  Added: None  Deleted: Calcitonin, [place] (miacalcin) 200 unit/act nasal spray   Miconazole 2% external powder   Omeprazole 40 mg   Deleted all, patient reports not taking, dispense reports seems to support report   Changed: Metoprolol 25 mg, take 2 tablets (50 mg) PO daily changed to take 1 tablet 25 mg daily as per patient report with dispense history support     Medication Affordability:  Not including over the counter (OTC) medications, was there a time in the past 3 months when you did not take your medications as prescribed because of cost?: No (Barely able to cover Eliquis co pay)    Allergies reviewed with patient and updates made in EHR: yes    Medication History Completed By: CHRISTIANO WESTFALL 7/12/2023 4:38 PM    Prior to Admission medications    Medication Sig Last Dose Taking? Auth Provider Long Term End Date   acetaminophen (TYLENOL) 500 MG tablet Take 1,000 mg by mouth 3 times daily 7/11/2023 at evening Yes Reported, Patient     atorvastatin (LIPITOR) 20 MG tablet Take 20 mg by mouth At Bedtime 7/11/2023 at hs Yes Reported, Patient Yes    cholecalciferol 50 MCG (2000 UT) CAPS Take 2,000 Units by mouth daily 7/11/2023 at am Yes Reported, Patient     ELIQUIS ANTICOAGULANT 5 MG tablet Take 5 mg by mouth 2 times daily 7/11/2023 at hs Yes Reported, Patient     furosemide (LASIX) 20 MG tablet Take 20 mg by mouth every morning 7/11/2023 at am Yes Reported, Patient Yes    metoprolol succinate ER (TOPROL XL) 25 MG 24 hr tablet Take 2 tablets (50 mg) by mouth daily  Patient taking differently: Take 25 mg by mouth  daily 7/11/2023 at am Yes Sindhu Patel, NP Yes    cyclobenzaprine (FLEXERIL) 5 MG tablet    Reported, Patient

## 2023-07-13 ENCOUNTER — APPOINTMENT (OUTPATIENT)
Dept: GENERAL RADIOLOGY | Facility: CLINIC | Age: 88
DRG: 871 | End: 2023-07-13
Attending: PEDIATRICS
Payer: COMMERCIAL

## 2023-07-13 PROBLEM — E83.42 HYPOMAGNESEMIA: Status: ACTIVE | Noted: 2023-07-13

## 2023-07-13 PROBLEM — M25.512 CHRONIC PAIN OF BOTH SHOULDERS: Status: ACTIVE | Noted: 2023-07-13

## 2023-07-13 PROBLEM — E80.6 HYPERBILIRUBINEMIA: Status: ACTIVE | Noted: 2023-07-13

## 2023-07-13 PROBLEM — M25.511 CHRONIC PAIN OF BOTH SHOULDERS: Status: ACTIVE | Noted: 2023-07-13

## 2023-07-13 PROBLEM — R65.21 SEPTIC SHOCK (H): Status: ACTIVE | Noted: 2023-07-12

## 2023-07-13 PROBLEM — R78.81 STREPTOCOCCAL BACTEREMIA: Status: ACTIVE | Noted: 2023-07-13

## 2023-07-13 PROBLEM — B95.5 STREPTOCOCCAL BACTEREMIA: Status: ACTIVE | Noted: 2023-07-13

## 2023-07-13 PROBLEM — G89.29 CHRONIC PAIN OF BOTH SHOULDERS: Status: ACTIVE | Noted: 2023-07-13

## 2023-07-13 PROBLEM — E66.01 OBESITY, MORBID, BMI 40.0-49.9 (H): Status: ACTIVE | Noted: 2017-11-07

## 2023-07-13 PROBLEM — D69.6 THROMBOCYTOPENIA (H): Status: ACTIVE | Noted: 2023-07-13

## 2023-07-13 PROBLEM — R82.81 PYURIA: Status: ACTIVE | Noted: 2023-07-13

## 2023-07-13 PROBLEM — E83.39 HYPOPHOSPHATEMIA: Status: ACTIVE | Noted: 2023-07-13

## 2023-07-13 PROBLEM — E88.09 HYPOALBUMINEMIA: Status: ACTIVE | Noted: 2023-07-13

## 2023-07-13 LAB
ALBUMIN SERPL BCG-MCNC: 3 G/DL (ref 3.5–5.2)
ALP SERPL-CCNC: 115 U/L (ref 40–129)
ALT SERPL W P-5'-P-CCNC: 23 U/L (ref 0–70)
ANION GAP SERPL CALCULATED.3IONS-SCNC: 11 MMOL/L (ref 7–15)
AST SERPL W P-5'-P-CCNC: 22 U/L (ref 0–45)
BILIRUB SERPL-MCNC: 3.3 MG/DL
BUN SERPL-MCNC: 27.3 MG/DL (ref 8–23)
CALCIUM SERPL-MCNC: 9 MG/DL (ref 8.8–10.2)
CHLORIDE SERPL-SCNC: 103 MMOL/L (ref 98–107)
CREAT SERPL-MCNC: 1.25 MG/DL (ref 0.67–1.17)
CRP SERPL-MCNC: 246.7 MG/L
DEPRECATED HCO3 PLAS-SCNC: 21 MMOL/L (ref 22–29)
ERYTHROCYTE [DISTWIDTH] IN BLOOD BY AUTOMATED COUNT: 14.8 % (ref 10–15)
GFR SERPL CREATININE-BSD FRML MDRD: 55 ML/MIN/1.73M2
GLUCOSE BLDC GLUCOMTR-MCNC: 120 MG/DL (ref 70–99)
GLUCOSE BLDC GLUCOMTR-MCNC: 127 MG/DL (ref 70–99)
GLUCOSE BLDC GLUCOMTR-MCNC: 132 MG/DL (ref 70–99)
GLUCOSE BLDC GLUCOMTR-MCNC: 152 MG/DL (ref 70–99)
GLUCOSE SERPL-MCNC: 137 MG/DL (ref 70–99)
HCT VFR BLD AUTO: 43.7 % (ref 40–53)
HGB BLD-MCNC: 14.3 G/DL (ref 13.3–17.7)
LACTATE SERPL-SCNC: 2 MMOL/L (ref 0.7–2)
MAGNESIUM SERPL-MCNC: 2.2 MG/DL (ref 1.7–2.3)
MCH RBC QN AUTO: 29.8 PG (ref 26.5–33)
MCHC RBC AUTO-ENTMCNC: 32.7 G/DL (ref 31.5–36.5)
MCV RBC AUTO: 91 FL (ref 78–100)
MRSA DNA SPEC QL NAA+PROBE: NEGATIVE
NT-PROBNP SERPL-MCNC: 4246 PG/ML (ref 0–1800)
PHOSPHATE SERPL-MCNC: 2.4 MG/DL (ref 2.5–4.5)
PLATELET # BLD AUTO: 132 10E3/UL (ref 150–450)
POTASSIUM SERPL-SCNC: 4.3 MMOL/L (ref 3.4–5.3)
PROCALCITONIN SERPL IA-MCNC: 8.23 NG/ML
PROT SERPL-MCNC: 6.4 G/DL (ref 6.4–8.3)
RBC # BLD AUTO: 4.8 10E6/UL (ref 4.4–5.9)
SA TARGET DNA: POSITIVE
SODIUM SERPL-SCNC: 135 MMOL/L (ref 136–145)
WBC # BLD AUTO: 13 10E3/UL (ref 4–11)

## 2023-07-13 PROCEDURE — 85027 COMPLETE CBC AUTOMATED: CPT | Performed by: FAMILY MEDICINE

## 2023-07-13 PROCEDURE — 83605 ASSAY OF LACTIC ACID: CPT | Performed by: FAMILY MEDICINE

## 2023-07-13 PROCEDURE — 84100 ASSAY OF PHOSPHORUS: CPT | Performed by: FAMILY MEDICINE

## 2023-07-13 PROCEDURE — 200N000001 HC R&B ICU

## 2023-07-13 PROCEDURE — 36415 COLL VENOUS BLD VENIPUNCTURE: CPT | Performed by: FAMILY MEDICINE

## 2023-07-13 PROCEDURE — 99233 SBSQ HOSP IP/OBS HIGH 50: CPT | Performed by: PEDIATRICS

## 2023-07-13 PROCEDURE — 84145 PROCALCITONIN (PCT): CPT | Performed by: FAMILY MEDICINE

## 2023-07-13 PROCEDURE — 250N000011 HC RX IP 250 OP 636: Mod: JZ | Performed by: HOSPITALIST

## 2023-07-13 PROCEDURE — 250N000013 HC RX MED GY IP 250 OP 250 PS 637: Performed by: PEDIATRICS

## 2023-07-13 PROCEDURE — 80053 COMPREHEN METABOLIC PANEL: CPT | Performed by: FAMILY MEDICINE

## 2023-07-13 PROCEDURE — 71045 X-RAY EXAM CHEST 1 VIEW: CPT

## 2023-07-13 PROCEDURE — 86140 C-REACTIVE PROTEIN: CPT | Performed by: FAMILY MEDICINE

## 2023-07-13 PROCEDURE — 250N000013 HC RX MED GY IP 250 OP 250 PS 637: Performed by: FAMILY MEDICINE

## 2023-07-13 PROCEDURE — 83880 ASSAY OF NATRIURETIC PEPTIDE: CPT | Performed by: PEDIATRICS

## 2023-07-13 PROCEDURE — 83735 ASSAY OF MAGNESIUM: CPT | Performed by: FAMILY MEDICINE

## 2023-07-13 PROCEDURE — 258N000003 HC RX IP 258 OP 636: Performed by: FAMILY MEDICINE

## 2023-07-13 PROCEDURE — 250N000013 HC RX MED GY IP 250 OP 250 PS 637: Performed by: HOSPITALIST

## 2023-07-13 RX ORDER — NALOXONE HYDROCHLORIDE 0.4 MG/ML
0.2 INJECTION, SOLUTION INTRAMUSCULAR; INTRAVENOUS; SUBCUTANEOUS
Status: DISCONTINUED | OUTPATIENT
Start: 2023-07-13 | End: 2023-07-19 | Stop reason: HOSPADM

## 2023-07-13 RX ORDER — AMIODARONE HYDROCHLORIDE 100 MG/1
300 TABLET ORAL 2 TIMES DAILY
Status: COMPLETED | OUTPATIENT
Start: 2023-07-17 | End: 2023-07-18

## 2023-07-13 RX ORDER — NALOXONE HYDROCHLORIDE 0.4 MG/ML
0.4 INJECTION, SOLUTION INTRAMUSCULAR; INTRAVENOUS; SUBCUTANEOUS
Status: DISCONTINUED | OUTPATIENT
Start: 2023-07-13 | End: 2023-07-19 | Stop reason: HOSPADM

## 2023-07-13 RX ORDER — AMIODARONE HYDROCHLORIDE 200 MG/1
200 TABLET ORAL DAILY
Status: DISCONTINUED | OUTPATIENT
Start: 2023-07-19 | End: 2023-07-19 | Stop reason: HOSPADM

## 2023-07-13 RX ORDER — OXYCODONE HYDROCHLORIDE 5 MG/1
5 TABLET ORAL EVERY 4 HOURS PRN
Status: DISCONTINUED | OUTPATIENT
Start: 2023-07-13 | End: 2023-07-14

## 2023-07-13 RX ORDER — ACETAMINOPHEN 325 MG/1
975 TABLET ORAL EVERY 6 HOURS PRN
Status: DISCONTINUED | OUTPATIENT
Start: 2023-07-13 | End: 2023-07-14

## 2023-07-13 RX ORDER — AMIODARONE HYDROCHLORIDE 200 MG/1
600 TABLET ORAL 2 TIMES DAILY
Status: COMPLETED | OUTPATIENT
Start: 2023-07-13 | End: 2023-07-16

## 2023-07-13 RX ADMIN — OXYCODONE HYDROCHLORIDE 2.5 MG: 5 TABLET ORAL at 13:50

## 2023-07-13 RX ADMIN — HYDROMORPHONE HYDROCHLORIDE 0.2 MG: 0.2 INJECTION, SOLUTION INTRAMUSCULAR; INTRAVENOUS; SUBCUTANEOUS at 05:10

## 2023-07-13 RX ADMIN — PIPERACILLIN AND TAZOBACTAM 4.5 G: 4; .5 INJECTION, POWDER, FOR SOLUTION INTRAVENOUS at 11:36

## 2023-07-13 RX ADMIN — APIXABAN 5 MG: 5 TABLET, FILM COATED ORAL at 08:53

## 2023-07-13 RX ADMIN — Medication: at 10:52

## 2023-07-13 RX ADMIN — APIXABAN 5 MG: 5 TABLET, FILM COATED ORAL at 21:07

## 2023-07-13 RX ADMIN — SODIUM CHLORIDE, POTASSIUM CHLORIDE, SODIUM LACTATE AND CALCIUM CHLORIDE: 600; 310; 30; 20 INJECTION, SOLUTION INTRAVENOUS at 03:38

## 2023-07-13 RX ADMIN — ACETAMINOPHEN 650 MG: 325 TABLET, FILM COATED ORAL at 04:55

## 2023-07-13 RX ADMIN — AMIODARONE HYDROCHLORIDE 600 MG: 200 TABLET ORAL at 21:06

## 2023-07-13 RX ADMIN — POTASSIUM & SODIUM PHOSPHATES POWDER PACK 280-160-250 MG 1 PACKET: 280-160-250 PACK at 07:05

## 2023-07-13 RX ADMIN — POTASSIUM & SODIUM PHOSPHATES POWDER PACK 280-160-250 MG 1 PACKET: 280-160-250 PACK at 11:35

## 2023-07-13 RX ADMIN — Medication: at 16:58

## 2023-07-13 RX ADMIN — AMIODARONE HYDROCHLORIDE 600 MG: 200 TABLET ORAL at 09:42

## 2023-07-13 RX ADMIN — PIPERACILLIN AND TAZOBACTAM 4.5 G: 4; .5 INJECTION, POWDER, FOR SOLUTION INTRAVENOUS at 23:38

## 2023-07-13 RX ADMIN — PIPERACILLIN AND TAZOBACTAM 4.5 G: 4; .5 INJECTION, POWDER, FOR SOLUTION INTRAVENOUS at 05:18

## 2023-07-13 RX ADMIN — ACETAMINOPHEN 650 MG: 325 TABLET, FILM COATED ORAL at 08:53

## 2023-07-13 RX ADMIN — PIPERACILLIN AND TAZOBACTAM 4.5 G: 4; .5 INJECTION, POWDER, FOR SOLUTION INTRAVENOUS at 17:38

## 2023-07-13 RX ADMIN — ACETAMINOPHEN 975 MG: 325 TABLET, FILM COATED ORAL at 17:01

## 2023-07-13 ASSESSMENT — ACTIVITIES OF DAILY LIVING (ADL)
ADLS_ACUITY_SCORE: 34

## 2023-07-13 NOTE — PROGRESS NOTES
[facility]    Medicine Progress Note - Hospitalist Service    Date of Admission:  7/12/2023    Assessment & Plan      Alvin Newton is a 89 year old man with paroxysmal atrial fibrillation, type 2 diabetes with polyneuropathy, hypertension, BPH with urinary obstruction, obesity, and chronic bilateral shoulder pain who presented with several day history of increased redness of the right lower leg, confusion, and weakness and was admitted on 7/12/2023 due to concerns for sepsis and cellulitis of right lower extremity.  Clinical course has been concerning for septic shock.    Septic shock due to cellulitis of right lower extremity with streptococcal bacteremia  Clinical presentation of cellulitis in the right lower leg with both blood cultures from admission growing Streptococcus species.  Has had SIRS with elevated lactic acid level, thrombocytopenia, and worsened hyperbilirubinemia from baseline all concerning for sepsis.  Also presented with confusion raising concern for probable septic encephalopathy present at admission.  Procalcitonin has been significantly elevated.  Blood pressure has been tenuous though generally responsive to extensive IV fluid resuscitation, but he is now demonstrating signs of volume overload with over 10 kg weight gain in the last 24 hours and continues to have intermittent hypotension.  Renal function again is trending toward worsening.  This increases concern for septic shock.  Blood cultures repeated about 10 PM July 12 are pending.  -Continue Zosyn empirically while awaiting additional culture results, discontinue vancomycin  -Discontinue IV fluids and start norepinephrine infusion if he is hypotensive  -Continue close ICU monitoring  -Continue to hold chronic Toprol-XL because of hypotension  -Continue to repeat blood cultures until they have been negative for at least 24 to 48 hours  -Avoiding placement of central venous catheter until bacteremia has  cleared  -Reconsider transthoracic echocardiogram for evaluation of endocarditis if there is increasing concern for it (transesophageal echocardiogram is not available at this hospital)    Paroxysmal atrial fibrillation with rapid ventricular response  Medication induced coagulopathy from Eliquis  Has known paroxysmal atrial fibrillation and currently continues to have atrial fibrillation.  Normally taking Toprol-XL for rate and rhythm control.  Heart rate is trending upward persistently tachycardic after holding chronic beta-blocker therapy and blood pressure remains intermittently hypotensive in context of septic shock although rapid atrial fibrillation may also contribute to hypotension.  Chronically taking Eliquis which causes coagulopathy and increases bleeding risk but active bleeding has not been suspected so far.  -Continuing to hold Toprol-XL because of hypotension  -Start oral amiodarone loading protocol 600 mg twice daily  -Add IV amiodarone bolus if rapid atrial fibrillation worsens and persists  -Continue chronic dose of Eliquis    Type 2 diabetes with polyneuropathy  Good glycemic control so far during hospitalization.  No apparent recent hemoglobin A1c on file.  Currently manages diabetes with lifestyle measures and no medications at baseline.  -Continue to monitor blood sugars and use NovoLog correction scale to treat hyperglycemia  -Diabetic diet    Hypertension  Chronically takes Toprol-XL and Lasix both of which are on hold because of hypotension.  -Continue to hold Toprol-XL and Lasix    Peripheral edema  Patient reports history of peripheral edema for which he takes Lasix daily at baseline.  Today he has significant peripheral edema on exam with what appears to be greater than 10 kg weight gain since admission.  -Holding chronic Lasix for now because of hypotension although could administer a dose of oral or IV Lasix if needed today    Hypomagnesemia  Hypophosphatemia  Hypoalbuminemia  Has  developed hypomagnesemia, hypophosphatemia, and hypoalbuminemia during hospitalization in the context of sepsis with decreased oral intake.  Electrolyte disturbances could exacerbate symptoms of weakness and his arrhythmia.  Hypoalbuminemia increases his risk for edema.  -Continue magnesium and phosphorus supplementation per respective protocols  -Continue to monitor magnesium and phosphorus as indicated  -Monitor albumin as indicated    BPH with urinary obstruction  Pyuria  Chronically symptomatic from BPH likely exacerbated by chronic Lasix therapy.  Voiding spontaneously so far during hospitalization.  Initial UA demonstrated pyuria.  He has not reported symptoms suspicious for UTI.  Urine culture is pending  -Monitor spontaneous voiding capacity  -Reconsider bladder scan and/or catheterization if there is increasing concern for urinary retention or obstruction  -Follow-up on urine culture results    Thrombocytopenia  Has developed mild thrombocytopenia due to sepsis.  Thrombocytopenia increases his bleeding risk but active bleeding has not been clinically apparent so far.  -Monitor platelet count    Hyperbilirubinemia  Has chronically elevated serum bilirubin level which is worsened due to sepsis.  May be at risk for worsening liver function tests after starting amiodarone.  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  Shoulder pain has not been responsive over the long-term to previous injection therapy.  Current shoulder pain is similar to his usual baseline and there are no clinical findings to suggest an acute inflammatory process including infection in either shoulder.  -Start topical Aspercreme as needed to the shoulders  -Continue acetaminophen but increase dose as needed for shoulder pain  -Add oxycodone  as needed for moderate to severe shoulder pain    Obesity  Chronic with elevated BMI currently 37 although likely higher than baseline because of significant volume overload currently.  Obesity likely contributes to chronic health problems including diabetes.  -No acute intervention       Diet: Moderate Consistent Carb (60 g CHO per Meal) Diet    DVT Prophylaxis: DOAC  Eastman Catheter: Not present  Lines: None     Cardiac Monitoring: ACTIVE order. Indication: ICU  Code Status: Full Code      Clinically Significant Risk Factors                           Disposition Plan     Expected Discharge Date: 07/14/2023      Destination: other (comment) (Home vs HHC vs TCU)            Matt Valera MD  Hospitalist Service  [facility]  Securely message with [employer] (more info)  Text page via [facility] Paging/Directory   ______________________________________________________________________    Interval History   Patient's heart rate and blood pressure have continued to fluctuate overnight.  He was more persistently tachycardic with resting heart rate 130s for a few hours this morning.  He was intermittently hypotensive overnight.  He reports a sense of chest congestion although denies shortness of breath at rest, dizziness, palpitations, or chest pain.  Intermittent episodes of hypotension resolved without specific intervention although he continues to receive IV fluids.  Vasopressors have not been started.  Nursing has noted that he appears very dyspneic with limited activity.  He has maintained adequate oxygen saturations at rest.  He is voiding spontaneously but borderline oliguric over the last 24 hours.  He is tolerating oral intake including medications.  He continues to have pain in his right lower leg although it has not worsened.  He also reports ongoing pain in both of his shoulders right greater than left but his shoulder pain is no different than his usual state.  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  He says he has had injection therapy in his shoulders which helped for a few weeks but not longer than that in the past.    Physical Exam   Vital Signs: Temp: 99.1  F (37.3  C) Temp src: Oral BP: 94/81 Pulse: (!) 135   Resp: 27 SpO2: 94 % O2 Device: None (Room air)    Patient Vitals for the past 24 hrs:   BP Temp Temp src Pulse Resp SpO2 Weight   07/13/23 0830 94/81 -- -- (!) 135 27 94 % --   07/13/23 0815 -- -- -- (!) 134 18 96 % --   07/13/23 0800 -- -- -- 105 24 95 % --   07/13/23 0745 -- -- -- 112 20 96 % --   07/13/23 0730 94/76 -- -- (!) 142 24 95 % --   07/13/23 0700 (!) 84/54 99.1  F (37.3  C) Oral (!) 144 24 94 % --   07/13/23 0600 94/81 -- -- (!) 130 20 95 % --   07/13/23 0540 -- -- -- (!) 138 22 94 % --   07/13/23 0500 (!) 115/100 -- -- 118 30 96 % 121.6 kg (268 lb)   07/13/23 0400 105/71 -- -- 118 25 97 % --   07/13/23 0300 119/78 -- -- (!) 124 10 96 % --   07/13/23 0200 102/79 -- -- (!) 121 30 97 % --   07/13/23 0100 101/72 -- -- 94 23 96 % --   07/13/23 0000 95/65 98.7  F (37.1  C) Oral 105 29 95 % --   07/12/23 2300 (!) 86/70 -- -- (!) 144 16 96 % --   07/12/23 2200 (!) 80/62 -- -- 113 26 97 % --   07/12/23 2100 100/67 -- -- 95 (!) 31 96 % --   07/12/23 2000 98/65 -- -- 110 24 95 % --   07/12/23 1900 90/52 -- -- 100 25 95 % --   07/12/23 1845 99/65 -- -- 101 20 -- --   07/12/23 1810 96/65 98.8  F (37.1  C) Oral 107 27 97 % --   07/12/23 1800 101/61 -- -- (!) 121 25 95 % --   07/12/23 1700 107/76 -- -- 114 17 97 % --   07/12/23 1600 94/84 -- -- 101 22 98 % --   07/12/23 1500 97/78 -- -- 104 25 98 % --   07/12/23 1430 93/74 -- -- 77 22 98 % --   07/12/23 1400 99/68 -- -- 91 22 97 % --   07/12/23 1330 (!) 88/68 -- -- 84 21 97 % --   07/12/23 1300 99/71 98.2  F (36.8  C) Oral 112 20 97 % --   07/12/23 1230 100/78 -- -- 97 27 98 % --   07/12/23 1200 91/76 -- -- 111 15 97 % --   07/12/23 1130 97/71 -- -- 110 23 97 % --   07/12/23 1100 97/72 -- -- 117 23 95 % --    07/12/23 1035 96/75 -- -- 112 20 96 % --   07/12/23 1030 -- -- -- -- -- -- 110.9 kg (244 lb 8 oz)   07/12/23 1000 112/81 -- -- 118 24 97 % --   07/12/23 0915 102/77 -- -- -- 23 100 % --     Weight: 268 lbs 0 oz Body mass index is 37.38 kg/m .    Vitals:    07/12/23 0342 07/12/23 1030 07/13/23 0500   Weight: 107.3 kg (236 lb 9.6 oz) 110.9 kg (244 lb 8 oz) 121.6 kg (268 lb)     Wt Readings from Last 4 Encounters:   07/13/23 121.6 kg (268 lb)   07/11/23 118.1 kg (260 lb 4.8 oz)   12/21/22 106.6 kg (235 lb)   09/22/22 111.4 kg (245 lb 11.2 oz)       Intake/Output Summary (Last 24 hours) at 7/13/2023 0854  Last data filed at 7/13/2023 0613  Gross per 24 hour   Intake 5864.2 ml   Output 950 ml   Net 4914.2 ml     General Appearance: Ill-appearing elderly man without signs of acute distress while sitting up in bed  Respiratory: Tachypneic with otherwise normal respiratory effort, mildly diminished breath sounds, clear lung fields  Cardiovascular: Intermittently tachycardic with irregularly irregular rhythm, palpable radial pulse, normal capillary refill, moderately severe peripheral edema in the lower legs and feet  GI: Obese abdomen, hypoactive bowel sounds, no distention, soft, nontender  Skin: Right erythema circumferentially around most of the right lower leg without open skin lesions, vesicles, pustules, or bullae, skin of right lower leg is tender to touch but no areas of fluctuance are appreciated  Musculoskeletal: No erythema or warmth overlying either shoulder, mild tenderness to palpation present over both shoulders diffusely, passive range of motion of the shoulders is painful and limited  Neuro: Alert and maintains wakefulness and attention, answering questions appropriately at this time, no apparent confusion, able to follow simple instructions and purposefully move all limbs symmetrically    Medical Decision Making       59 MINUTES SPENT BY ME on the date of service doing chart review, history, exam,  documentation & further activities per the note.      Data     I have personally reviewed the following data over the past 24 hrs:    13.0 (H)  \   14.3   / 132 (L)     135 (L) 103 27.3 (H) /  132 (H)   4.3 21 (L) 1.25 (H) \     ALT: 23 AST: 22 AP: 115 TBILI: 3.3 (H)   ALB: 3.0 (L) TOT PROTEIN: 6.4 LIPASE: N/A     Procal: 8.23 (HH) CRP: 246.70 (H) Lactic Acid: 2.0         Blood cultures from admission are both growing gram-positive cocci in pairs and chains with preliminary identification as Strep species  Blood cultures drawn about 10 PM on July 12 are pending  Urine culture from admission is pending  Nasal MRSA testing was negative although positive for Staph aureus    Blood sugars ranged 131-156 over the last day  Phosphorus 2.4 today, decreased from 2.9 yesterday  Magnesium 2.2 today, increased from 1.5 previously  Lactic acid 2.0 this morning, improved from 2.1 last night  Procalcitonin previously was 6.7    Imaging results reviewed over the past 24 hrs:   Recent Results (from the past 24 hour(s))   CT Tibia Fibula Right w Contrast    Narrative    CT RIGHT TIBIA FIBULA/LOWER LEG WITH CONTRAST  7/12/2023 9:17 AM    CLINICAL HISTORY:  Rapidly developing cellulitis and severe sepsis  (possible septic shock) with concern for necrotizing fasciitis, please  include foot and lower right leg up to the knee. Leg pain.    TECHNIQUE: CT scan of the right tibia and fibula following injection  of IV contrast. Multiplanar reformats were obtained. Dose reduction  techniques were used.  CONTRAST: Isovue 370, 100 mL    COMPARISON: 7/12/2023 radiographs.     FINDINGS:    BONES: No acute fracture. No evidence of osteomyelitis. No evidence of  a lytic or sclerotic lesion in the visualized bones. Small chronic  avulsion fracture of the medial aspect of the neck of the talus. Mild  degenerative changes throughout the midfoot. Advanced degenerative  changes at the first MTP joint. Mild degenerative changes in the  medial and  patellofemoral compartments of the knee.    SOFT TISSUES: Moderate circumferential subcutaneous cellulitis in the  distal calf extending into the ankle and foot. No discrete fluid  collection to suggest an abscess. No soft tissue gas. Arch atrophy of  the intrinsic musculature of the foot. Moderate atrophy of the medial  head of the gastrocnemius and adjacent soleus muscle. Moderate atrophy  of the peroneal musculature. No deep fascial fluid or enhancement.      Impression    IMPRESSION:   1.  No abscess. No soft tissue gas. No evidence of osteomyelitis.  2.    3.  Moderate circumferential subcutaneous cellulitis in the distal  calf extending into the ankle and foot.    JANETTE FLYNN MD         SYSTEM ID:  [id number]     Recent Labs   Lab 07/13/23  0826 07/13/23  0527 07/12/23  2130 07/12/23  1711 07/12/23  1605 07/12/23  0809 07/12/23  0536 07/12/23  0534 07/11/23  2244   WBC  --  13.0*  --   --   --   --  10.9  --  9.6   HGB  --  14.3  --   --   --   --  14.3  --  16.1   MCV  --  91  --   --   --   --  91  --  91   PLT  --  132*  --   --   --   --  133*  --  159   NA  --  135*  --   --  137  --  138  --  139   POTASSIUM  --  4.3  --   --  4.5  --  4.2  --  4.2   CHLORIDE  --  103  --   --  103  --  104  --  104   CO2  --  21*  --   --  21*  --  19*  --  24   BUN  --  27.3*  --   --  27.4*  --  28.5*  --  28.3*   CR  --  1.25*  --   --  1.16  --  1.18*  --  1.32*   ANIONGAP  --  11  --   --  13  --  15  --  11   JERRY  --  9.0  --   --  9.0  --  9.2  --  10.0   * 137* 131*   < > 156*   < > 152*   < > 140*   ALBUMIN  --  3.0*  --   --  3.1*  --   --   --  4.1   PROTTOTAL  --  6.4  --   --  6.3*  --   --   --  7.3   BILITOTAL  --  3.3*  --   --  3.8*  --   --   --  3.0*   ALKPHOS  --  115  --   --  113  --   --   --  170*   ALT  --  23  --   --  27  --   --   --  20   AST  --  22  --   --  31  --   --   --  27   LIPASE  --   --   --   --   --   --   --   --  15    < > = values in this interval not displayed.

## 2023-07-13 NOTE — PLAN OF CARE
Goal Outcome Evaluation:      Plan of Care Reviewed With: patient    Overall Patient Progress: no changeOverall Patient Progress: no change    Outcome Evaluation: VSS on RA.  Afebrile.  BPs trending down after boluses today, but MAP remains >65.  Pt alert and oriented x4 this shift, appearing more tired this evening.  Lung sounds diminished, patient has ESQUIVEL and fatigues easily with turning in bed.  Tylenol given x2 for chronic right shoulder pain.  RLE remains warm to touch and reddened and edematous, elevated on pillows.  Bed change and partial bath completed this evening after external catheter leaked small amount on linens.  Borderline urine output throughout the day today.  Urine dark miroslava in color.  Good water intake at meals, diet advanced to mod CHO and patient ate 25% of potroast and mashed potato dinner.  MRSA swab collected this evening and sent to lab, results in process.

## 2023-07-13 NOTE — PROVIDER NOTIFICATION
Respiratory distress and expiratory wheezing when pt lying flat to turn/reposition. Sats stayed above 90% but lips turning purple. Took several minutes to recover once head elevated. Lungs clear. HR remained 100-120s, BP stable.  Dr. Valera notified.  New orders for BNP add-on and CXR.  Continuing to monitor

## 2023-07-13 NOTE — PLAN OF CARE
Goal Outcome Evaluation:      Plan of Care Reviewed With: patient    Overall Patient Progress: improvingOverall Patient Progress: improving    Outcome Evaluation: VSS, afebrile.  HR 90-110s this afternoon after recieving initial dose of PO amiodarone this morning.  BP has remained stable.  Tylenol given for right shoulder pain with little relief noted and poor activity tolerance despite analgesia.  Up to commode via ceiling lift with assist of 2 staff.  Pt very dyspneic while lying flat - see provider notification note.  Pt c/o nausea and declined lunch tray prior to having a large, watery brown BM on commode.  Pt stated he believes the PO phosphorus supplement caused the nausea and refused the afternoon dose.  Urine is dark miroslava, draining via external catheter. 2.5mg oxycodone given this afternoon and patient appearing more comfortable resting in bed.  Pt's spouse and daughter visited today and were supportive at bedside.  Updates provided to family.  Continuing to monitor and assess.

## 2023-07-13 NOTE — PLAN OF CARE
"Goal Outcome Evaluation:      Plan of Care Reviewed With: patient    Overall Patient Progress: improvingOverall Patient Progress: improving         Outcome Evaluation: Pt A&O x3, disoriented to place. BP stable with MAPS above 67 throughout shift. Resting well. Does have some sleep apnea. Lactic trending down. Edema +3 on left lower leg and +2 on right lower leg. External catheter in use with dark miroslava urine.Tele rhythm is afib. Tylenol and dilaudid given x1 for shoulder and leg pain rated 7/10, recheck at 3/10.  Tachycardia.  Afebrile. /79   Pulse (!) 121   Temp 98.7  F (37.1  C) (Oral)   Resp 30   Ht 1.803 m (5' 11\")   Wt 121.6 kg (268 lb)   SpO2 97%   BMI 37.38 kg/m      "

## 2023-07-13 NOTE — CONSULTS
CLINICAL NUTRITION SERVICES - ASSESSMENT NOTE     Nutrition Prescription    RECOMMENDATIONS FOR MDs/PROVIDERS TO ORDER:  Highly recommend initiation of multivitamin supplement with minerals to support micronutrient status in setting of poor PO intake    Malnutrition Status:    Unable to determine due to large weight fluctuations - bed scale vs. fluid shifts, lack of NFPE, nutrition hx - will meet with pt as able    Recommendations already ordered by Registered Dietitian (RD):  Will offer Ensure clear (any flavor) TID with 10 am, 2 pm, and 8 pm snack times to supplement oral intake - adjust per pt preference, tolerance    Future/Additional Recommendations:  Will follow to monitor oral intake, weight changes, GI symptoms/BMs, and nutritional supplement tolerance     REASON FOR ASSESSMENT  Alvin Newton is a/an 89 year old male assessed by the dietitian via RN consult - Hunter less than 3 Consult     NUTRITION HISTORY  Pt admitted from [facility] for sepsis secondary to cellulitis of right lower extremity. Presented with altered mental status. EMS was called for confusion and weakness. Right lower leg is reddened, warm, and edematous upon admission, was present for a few days prior to admission.. Was not able to stand or ambulate at time of admission but does use a cane at baseline.     Found to have cellulitis of right lower extremity, septic shock, streptococcal bacteremia, thrombocytopenia, hyperbilirubinemia, hypophosphatemia, hypomagnesemia    Dxhx of BPH with a.fib, urinary obstruction, chronic pain of both shoulders, aortic stenosis, HTN, morbid obesity, T2DM with diabetic polyneuropathy, NSTEMI, mixed hyperlipidemia, carpal tunnel syndrome bilateral, closed fracture of first lumbar vertebra, osteoarthritis of pelvis, primary localized osteoarthrosis of shoulder region, pure hypercholesterolemia, right bundle branch block    Per chart review, pt has a hx of OP diabetes education per RD/CDE.    CURRENT NUTRITION  "ORDERS  Diet: Orders Placed This Encounter      Moderate Consistent Carb (60 g CHO per Meal) Diet    Intake/Tolerance: 0% this AM for breakfast (no solids or fluid). Overall very inconsistent intake between 0-75% at most. Appetite last rated as \"poor\" but has been \"fair\" during admission. Has adequate PO fluid intake. Mostly eating 25% of meal trays. GI is abnormal - rounded, taut at baseline per flow sheet. Has been having nausea and diarrhea. Last BM was yesterday 7/13, watery and brown.     LABS  Labs reviewed - elevated urea nitrogen and creatinine, low GFR, elevated alkaline phosphatase, elevated bilirubin total, elevated CRP, slightly elevated BGs, elevated BNP, critically elevated procalcitonin at 8.23    MEDICATIONS  Medications reviewed - amiodarone, eliquis, lipitor (held), lasix, insulin, toprol XL, IV zosyn, PRN tylenol last given today 7/14, PRN IV dilaudid given yesterday 7/13, PRN oxycodone given yesterday 7/13    ANTHROPOMETRICS  Height: 180.3 cm (5' 11\")  Most Recent Weight: 113.9 kg (251 lb) via bed scale   IBW: 172 lbs  ADJ BW: 192 lbs  BMI: Obesity Grade I BMI 30-34.9  Weight History:   Wt Readings from Last 15 Encounters:   07/14/23 107.1 kg (236 lb 1.6 oz)   07/11/23 118.1 kg (260 lb 4.8 oz)   12/21/22 106.6 kg (235 lb)   09/22/22 111.4 kg (245 lb 11.2 oz)   07/26/22 111.8 kg (246 lb 6.4 oz)   07/21/22 113.1 kg (249 lb 6.4 oz)   07/18/22 110.5 kg (243 lb 9.6 oz)   07/12/22 112.3 kg (247 lb 8 oz)   07/08/22 113.2 kg (249 lb 9.6 oz)   07/06/22 113.9 kg (251 lb)   07/06/22 113.9 kg (251 lb)   06/28/22 119.4 kg (263 lb 3.2 oz)   06/27/22 116 kg (255 lb 12.8 oz)   06/21/22 123.8 kg (273 lb)   06/14/22 124.1 kg (273 lb 9.5 oz)     Weights during admission:   07/14/23 0500 107.1 kg (236 lb 1.6 oz) Bed scale   07/13/23 1410 113.9 kg (251 lb) Bed scale   07/13/23 0745 118.9 kg (262 lb 3.2 oz) Bed scale   07/13/23 0500 121.6 kg (268 lb) Bed scale   07/12/23 1030 110.9 kg (244 lb 8 oz) Bed scale "   07/12/23 0342 107.3 kg (236 lb 9.6 oz) --     Very rapid weight fluctuations during admission, difficult to tell accurate weight due to bed scale. Does have edema that is likely contributing to fluid shifts. May be able to assess later in admission pending fluid status.     Per chart review:  259 lbs on 3/31/23     Dosing Weight: 87.3 kg using ADJ BW    ASSESSED NUTRITION NEEDS  Estimated Energy Needs: 2,183-2,619 kcals/day (25 - 30 kcals/kg)  Justification: Increased needs and Repletion  Estimated Protein Needs: 105-131 grams protein/day (1.2 - 1.5 grams of pro/kg)  Justification: Hypercatabolism with acute illness, Increased needs, and Repletion  Estimated Fluid Needs: 2,183-2,619 mL/day (25 - 30 mL/kg)   Justification: Maintenance    PHYSICAL FINDINGS  See malnutrition section below.  Per chart review, blanchable redness to buttocks    MALNUTRITION  % Intake: Unable to assess PTA (not noted per MST score, likely due to illness)  % Weight Loss: Unable to assess due to large weight fluctuations - bed scale vs. fluid shifts  Subcutaneous Fat Loss: Unable to assess  Muscle Loss: Unable to assess - potential for age-related sarcopenia vs. malnutrition if intake WDL prior to admission  Fluid Accumulation/Edema: Does not meet criteria - 2-3+ mild-moderate to BLEs  Malnutrition Diagnosis: Unable to determine due to large weight fluctuations - bed scale vs. fluid shifts, lack of NFPE, nutrition hx - will meet with pt as able    NUTRITION DIAGNOSIS  Inadequate oral intake related to poor appetite, acute on chronic illness, GI distress as evidenced by inconsistent intake of 0-75% during admission and increased needs above baseline    INTERVENTIONS  Implementation  Nutrition Education: Will be provided if education needs arise     Collaboration with other providers  Medical food supplement therapy - will offer Ensure clear due to nausea, TID between meals to support stable BG levels  Multivitamin/mineral supplement therapy  - recommended pending MD approval/order    Goals  Patient to consume % of nutritionally adequate meal trays TID, or the equivalent with supplements/snacks  Pt weight will remain stable during admission  Pt will tolerate nutritional supplement      Monitoring/Evaluation  Progress toward goals will be monitored and evaluated per protocol.  Khadijah Donaldson RD, LD  Clinical Dietitian  Office: [phone]  Weekend pager: [phone]

## 2023-07-14 ENCOUNTER — APPOINTMENT (OUTPATIENT)
Dept: CARDIOLOGY | Facility: CLINIC | Age: 88
DRG: 871 | End: 2023-07-14
Attending: PEDIATRICS
Payer: COMMERCIAL

## 2023-07-14 PROBLEM — R82.79 POSITIVE URINE CULTURE: Status: ACTIVE | Noted: 2023-07-14

## 2023-07-14 PROBLEM — I77.810 ASCENDING AORTA DILATATION (H): Status: ACTIVE | Noted: 2023-07-14

## 2023-07-14 PROBLEM — R60.0 PERIPHERAL EDEMA: Status: ACTIVE | Noted: 2023-07-14

## 2023-07-14 PROBLEM — T50.905A MEDICATION INDUCED COAGULOPATHY (H): Status: ACTIVE | Noted: 2023-07-14

## 2023-07-14 PROBLEM — R79.89 ELEVATED BRAIN NATRIURETIC PEPTIDE (BNP) LEVEL: Status: ACTIVE | Noted: 2023-07-14

## 2023-07-14 PROBLEM — D68.9 MEDICATION INDUCED COAGULOPATHY (H): Status: ACTIVE | Noted: 2023-07-14

## 2023-07-14 LAB
ALBUMIN SERPL BCG-MCNC: 2.8 G/DL (ref 3.5–5.2)
ALP SERPL-CCNC: 168 U/L (ref 40–129)
ALT SERPL W P-5'-P-CCNC: 23 U/L (ref 0–70)
ANION GAP SERPL CALCULATED.3IONS-SCNC: 11 MMOL/L (ref 7–15)
AST SERPL W P-5'-P-CCNC: 22 U/L (ref 0–45)
BACTERIA BLD CULT: ABNORMAL
BILIRUB SERPL-MCNC: 3 MG/DL
BUN SERPL-MCNC: 27.1 MG/DL (ref 8–23)
CALCIUM SERPL-MCNC: 8.9 MG/DL (ref 8.8–10.2)
CHLORIDE SERPL-SCNC: 104 MMOL/L (ref 98–107)
CREAT SERPL-MCNC: 1.18 MG/DL (ref 0.67–1.17)
CRP SERPL-MCNC: 229.47 MG/L
DEPRECATED HCO3 PLAS-SCNC: 21 MMOL/L (ref 22–29)
GFR SERPL CREATININE-BSD FRML MDRD: 59 ML/MIN/1.73M2
GLUCOSE BLDC GLUCOMTR-MCNC: 125 MG/DL (ref 70–99)
GLUCOSE BLDC GLUCOMTR-MCNC: 131 MG/DL (ref 70–99)
GLUCOSE BLDC GLUCOMTR-MCNC: 138 MG/DL (ref 70–99)
GLUCOSE BLDC GLUCOMTR-MCNC: 157 MG/DL (ref 70–99)
GLUCOSE BLDC GLUCOMTR-MCNC: 171 MG/DL (ref 70–99)
GLUCOSE SERPL-MCNC: 123 MG/DL (ref 70–99)
LACTATE SERPL-SCNC: 1.5 MMOL/L (ref 0.7–2)
LACTATE SERPL-SCNC: 1.7 MMOL/L (ref 0.7–2)
LACTATE SERPL-SCNC: 2.1 MMOL/L (ref 0.7–2)
LVEF ECHO: NORMAL
MAGNESIUM SERPL-MCNC: 2.1 MG/DL (ref 1.7–2.3)
PHOSPHATE SERPL-MCNC: 2.6 MG/DL (ref 2.5–4.5)
PLATELET # BLD AUTO: 130 10E3/UL (ref 150–450)
POTASSIUM SERPL-SCNC: 4.2 MMOL/L (ref 3.4–5.3)
PROT SERPL-MCNC: 6.4 G/DL (ref 6.4–8.3)
SODIUM SERPL-SCNC: 136 MMOL/L (ref 136–145)
TSH SERPL DL<=0.005 MIU/L-ACNC: 1.54 UIU/ML (ref 0.3–4.2)
WBC # BLD AUTO: 12 10E3/UL (ref 4–11)

## 2023-07-14 PROCEDURE — 99233 SBSQ HOSP IP/OBS HIGH 50: CPT | Performed by: PEDIATRICS

## 2023-07-14 PROCEDURE — 255N000002 HC RX 255 OP 636: Performed by: INTERNAL MEDICINE

## 2023-07-14 PROCEDURE — 85048 AUTOMATED LEUKOCYTE COUNT: CPT | Performed by: PEDIATRICS

## 2023-07-14 PROCEDURE — 250N000011 HC RX IP 250 OP 636: Mod: JZ | Performed by: PEDIATRICS

## 2023-07-14 PROCEDURE — 86140 C-REACTIVE PROTEIN: CPT | Performed by: PEDIATRICS

## 2023-07-14 PROCEDURE — 250N000011 HC RX IP 250 OP 636: Mod: JZ | Performed by: HOSPITALIST

## 2023-07-14 PROCEDURE — 250N000013 HC RX MED GY IP 250 OP 250 PS 637: Performed by: FAMILY MEDICINE

## 2023-07-14 PROCEDURE — 84443 ASSAY THYROID STIM HORMONE: CPT | Performed by: PEDIATRICS

## 2023-07-14 PROCEDURE — 36415 COLL VENOUS BLD VENIPUNCTURE: CPT | Performed by: PEDIATRICS

## 2023-07-14 PROCEDURE — 250N000013 HC RX MED GY IP 250 OP 250 PS 637: Performed by: PEDIATRICS

## 2023-07-14 PROCEDURE — 85049 AUTOMATED PLATELET COUNT: CPT | Performed by: PEDIATRICS

## 2023-07-14 PROCEDURE — 84100 ASSAY OF PHOSPHORUS: CPT | Performed by: FAMILY MEDICINE

## 2023-07-14 PROCEDURE — 83605 ASSAY OF LACTIC ACID: CPT | Performed by: PEDIATRICS

## 2023-07-14 PROCEDURE — 83735 ASSAY OF MAGNESIUM: CPT | Performed by: FAMILY MEDICINE

## 2023-07-14 PROCEDURE — 120N000001 HC R&B MED SURG/OB

## 2023-07-14 PROCEDURE — 80053 COMPREHEN METABOLIC PANEL: CPT | Performed by: PEDIATRICS

## 2023-07-14 PROCEDURE — 93306 TTE W/DOPPLER COMPLETE: CPT | Mod: 26 | Performed by: INTERNAL MEDICINE

## 2023-07-14 RX ORDER — FUROSEMIDE 10 MG/ML
40 INJECTION INTRAMUSCULAR; INTRAVENOUS EVERY 8 HOURS
Status: DISCONTINUED | OUTPATIENT
Start: 2023-07-14 | End: 2023-07-15

## 2023-07-14 RX ORDER — ACETAMINOPHEN 325 MG/1
975 TABLET ORAL 3 TIMES DAILY
Status: DISCONTINUED | OUTPATIENT
Start: 2023-07-14 | End: 2023-07-19 | Stop reason: HOSPADM

## 2023-07-14 RX ORDER — CEFTRIAXONE 2 G/1
2 INJECTION, POWDER, FOR SOLUTION INTRAMUSCULAR; INTRAVENOUS EVERY 24 HOURS
Status: DISCONTINUED | OUTPATIENT
Start: 2023-07-14 | End: 2023-07-19 | Stop reason: HOSPADM

## 2023-07-14 RX ORDER — OXYCODONE HYDROCHLORIDE 5 MG/1
5 TABLET ORAL EVERY 4 HOURS PRN
Status: DISCONTINUED | OUTPATIENT
Start: 2023-07-14 | End: 2023-07-19 | Stop reason: HOSPADM

## 2023-07-14 RX ORDER — OXYCODONE HYDROCHLORIDE 5 MG/1
10 TABLET ORAL EVERY 4 HOURS PRN
Status: DISCONTINUED | OUTPATIENT
Start: 2023-07-14 | End: 2023-07-19 | Stop reason: HOSPADM

## 2023-07-14 RX ORDER — LIDOCAINE 4 G/G
2 PATCH TOPICAL
Status: DISCONTINUED | OUTPATIENT
Start: 2023-07-14 | End: 2023-07-19 | Stop reason: HOSPADM

## 2023-07-14 RX ORDER — METOPROLOL SUCCINATE 25 MG/1
25 TABLET, EXTENDED RELEASE ORAL DAILY
Status: DISCONTINUED | OUTPATIENT
Start: 2023-07-14 | End: 2023-07-16

## 2023-07-14 RX ADMIN — ACETAMINOPHEN 975 MG: 325 TABLET, FILM COATED ORAL at 21:29

## 2023-07-14 RX ADMIN — ATORVASTATIN CALCIUM 20 MG: 10 TABLET, FILM COATED ORAL at 21:29

## 2023-07-14 RX ADMIN — APIXABAN 5 MG: 5 TABLET, FILM COATED ORAL at 10:07

## 2023-07-14 RX ADMIN — ACETAMINOPHEN 975 MG: 325 TABLET, FILM COATED ORAL at 07:44

## 2023-07-14 RX ADMIN — FUROSEMIDE 40 MG: 10 INJECTION, SOLUTION INTRAMUSCULAR; INTRAVENOUS at 10:06

## 2023-07-14 RX ADMIN — APIXABAN 5 MG: 5 TABLET, FILM COATED ORAL at 21:29

## 2023-07-14 RX ADMIN — AMIODARONE HYDROCHLORIDE 600 MG: 200 TABLET ORAL at 10:07

## 2023-07-14 RX ADMIN — PIPERACILLIN AND TAZOBACTAM 4.5 G: 4; .5 INJECTION, POWDER, FOR SOLUTION INTRAVENOUS at 11:58

## 2023-07-14 RX ADMIN — METOPROLOL SUCCINATE 25 MG: 25 TABLET, EXTENDED RELEASE ORAL at 10:07

## 2023-07-14 RX ADMIN — PIPERACILLIN AND TAZOBACTAM 4.5 G: 4; .5 INJECTION, POWDER, FOR SOLUTION INTRAVENOUS at 05:16

## 2023-07-14 RX ADMIN — FUROSEMIDE 40 MG: 10 INJECTION, SOLUTION INTRAMUSCULAR; INTRAVENOUS at 17:46

## 2023-07-14 RX ADMIN — LIDOCAINE 2 PATCH: 560 PATCH PERCUTANEOUS; TOPICAL; TRANSDERMAL at 11:58

## 2023-07-14 RX ADMIN — PIPERACILLIN AND TAZOBACTAM 4.5 G: 4; .5 INJECTION, POWDER, FOR SOLUTION INTRAVENOUS at 17:47

## 2023-07-14 RX ADMIN — ACETAMINOPHEN 975 MG: 325 TABLET, FILM COATED ORAL at 14:11

## 2023-07-14 RX ADMIN — OXYCODONE HYDROCHLORIDE 5 MG: 5 TABLET ORAL at 10:11

## 2023-07-14 RX ADMIN — AMIODARONE HYDROCHLORIDE 600 MG: 200 TABLET ORAL at 21:28

## 2023-07-14 RX ADMIN — CEFTRIAXONE SODIUM 2 G: 2 INJECTION, POWDER, FOR SOLUTION INTRAMUSCULAR; INTRAVENOUS at 19:42

## 2023-07-14 RX ADMIN — HUMAN ALBUMIN MICROSPHERES AND PERFLUTREN 6 ML: 10; .22 INJECTION, SOLUTION INTRAVENOUS at 08:43

## 2023-07-14 ASSESSMENT — ACTIVITIES OF DAILY LIVING (ADL)
ADLS_ACUITY_SCORE: 34
ADLS_ACUITY_SCORE: 36
ADLS_ACUITY_SCORE: 34
ADLS_ACUITY_SCORE: 36
ADLS_ACUITY_SCORE: 34

## 2023-07-14 NOTE — PLAN OF CARE
Goal Outcome Evaluation:      Plan of Care Reviewed With: patient    Overall Patient Progress: improvingOverall Patient Progress: improving       Patient alert and oriented. Vital signs stable. Up with assist X2 with marisol barrow. Poor appetite. Electrolytes rechecks in AM. ECHO complete today, see results. IV lasix. Telemetry a-fib.

## 2023-07-14 NOTE — PROGRESS NOTES
Antimicrobial Stewardship Team Note    Antimicrobial Stewardship Program - A joint venture between [facility] and  Physicians to optimize antibiotic management.  NOT a formal consult - Restricted Antimicrobial Review     Patient: Alvin Newton  MRN: [id number]  Allergies: Patient has no known allergies.    Brief Summary:   Patient is an 89 year old male with a PMHx of HTN, HLD, Type 2 diabetes, osteoarthritis, obesity, and colonic diverticulum who presented to the ED on 7/11 with altered mental status, confusion, and weakness.     History of Present Illness:  The patient reported feeling weak and having increased pain, redness, and swelling of his right lower extremity over the past several days. He denied headache, vision changes, chest pain, cough, difficulty breathing, abdominal pain, nausea, and vomiting.     Upon presentation, patient was febrile (102.2), tachycardic (120), normotensive (111/71), and tachypneic (27). Labs showed a WBC (9.6) that increased to 13.0 on 7/13, creatinine (1.32), lactic acid (2.4) and a CRP (50.65) that increased to 246.70 on 7/13. A Chest XR on 7/11 showed no definite focal pulmonary consolidation, slight blunting of the left costophrenic angle, no pneumothorax, and no acute osseous abnormality. An XR of the tibia/fibula on 7/12 showed soft tissue edema of the leg, no fracture, dislocation, bone destruction, or radiopaque foreign body. A CT of the tibia/fibula on 7/12 showed no abscess, no soft tissue gas, no evidence of osteomyelitis, and moderate circumferential subcutaneous cellulitis in the distal calf extending into the ankle and foot. A follow up Chest XR on 7/13 showed no amish edema, definite airspace consolidation or pleural effusion, and no pneumothorax. A urine culture on 7/11 grew >100k CFU Staphylococcus epidermidis (UA demonstrated moderate leukocyte esterase, 29 WBC, and moderate bacteria). Blood cultures on 7/11 grew 2/2 sets (4/4 bottles)  Streptococcus dysgalactiae. Repeat blood cultures on 7/12 were no growth after 1 day. The MRSA Nares PCR was negative (MSSA +). Patient was started on Zosyn and Vancomycin.        Active Anti-infective Medications   (From admission, onward)                 Start     Stop    07/12/23 0530  piperacillin-tazobactam  4.5 g,   Intravenous,   EVERY 6 HOURS        Sepsis, Skin and Soft Tissue Infection       --                  Assessment: Streptococcus bacteremia secondary to cellulitis of the right lower extremity  Patient has remained afebrile since admission, tachycardic (97), normotensive (131/98), and mildly tachypneic (21) with an oxygen saturation of 96% on room air. His lactic acidosis resolved and CRP (229.47) is trending down. His WBC has gone down (12.0). Patient has been overall improving but has numbness and tingling in his right lower extremity. Given that the patient has no urinary symptoms and no catheter in place, Staphylococcus epidermidis on the urine culture is likely a contaminant. Regardless of the MRSA nares PCR result, no longer need vancomycin since we have a known organism, therefore agree with discontinuation of vancomycin. The likely source of the bacteremia is the cellulitis of the right lower extremity given the known organism of Streptococcus that grew 2/2 blood culture sets and is known to be a common skin colonizer. Given the known organism, recommend narrowing therapy to Ceftriaxone and discontinuing Zosyn. Recommend giving ceftriaxone for as long as possible (while in the hospital) and then continuing the rest of the antibiotic course with an oral regimen such as Amoxicillin. Would plan for a 14-day total antibiotic course with continued clinical improvement.     Recommendations:  Agree with discontinuation of vancomycin  Discontinue Zosyn  Start Ceftriaxone 2 g IV every 24 hours while in the hospital, then switch to an oral regimen of Amoxicillin 1000 mg PO every 12 hours  Would plan  for a 14-day total antibiotic course with continued clinical improvement (7/12 - 7/25)    Pharmacy took the following actions: Sticky note reminder created, Electronic note created.    Discussed with ID Staff Ambar Rodriguez MD and Larissa Santana, PharmD, [facility]    Shaye Fleming, PharmD Student  Phone: [phone]    Vital Signs/Clinical Features:  Vitals         07/12 0700  07/13 0659 07/13 0700 07/14 0659 07/14 0700 07/14 1220   Most Recent      Temp ( F) 98.2 -  98.8    97.7 -  99.1    97.9 -  98.6     97.9 (36.6) 07/14 1145    Pulse 77 -  144    86 -  144      115     115 07/14 1145    Resp 10 -  31    9 -  31 22     22 07/14 1145    BP 80/62 -  119/78    84/54 -  135/93      107/80     107/80 07/14 1145    SpO2 (%) 94 -  100    94 -  98      93     93 07/14 1145            Labs  Estimated Creatinine Clearance: 52.8 mL/min (A) (based on SCr of 1.18 mg/dL (H)).  Recent Labs   Lab Test 12/21/22 1758 07/11/23  2244 07/12/23  0536 07/12/23  1605 07/13/23  0527 07/14/23  0509   CR 1.17 1.32* 1.18* 1.16 1.25* 1.18*       Recent Labs   Lab Test 12/26/17  2356 12/27/17  0736 12/28/17  0525 12/29/17  0751 06/20/22  0725 07/15/22  0810 12/21/22 1758 07/11/23 2244 07/12/23  0536 07/13/23  0527 07/14/23  0509   WBC 12.3*   < > 16.7*   < > 6.7 6.1 9.2 9.6 10.9 13.0* 12.0*   ANEU 10.0*  --  13.5*  --   --   --   --   --   --   --   --    ALYM 1.6  --  1.8  --   --   --   --   --   --   --   --    KIANNA 0.7  --  1.4*  --   --   --   --   --   --   --   --    AEOS 0.0  --  0.0  --   --   --   --   --   --   --   --    HGB 16.2   < > 14.8   < > 14.7 15.8 15.2 16.1 14.3 14.3  --    HCT 45.7   < > 43.1   < > 45.7 48.5 46.8 48.9 44.2 43.7  --    MCV 85   < > 89   < > 91 90 92 91 91 91  --       < > 156   < > 235 170 160 159 133* 132* 130*    < > = values in this interval not displayed.       Recent Labs   Lab Test 07/15/22  0810 12/21/22  1758 07/11/23  2244 07/12/23  1605 07/13/23  0527 07/14/23  0509   BILITOTAL  1.8* 2.6* 3.0* 3.8* 3.3* 3.0*   ALKPHOS 138 129 170* 113 115 168*   ALBUMIN 3.1* 3.2* 4.1 3.1* 3.0* 2.8*   AST 20 18 27 31 22 22   ALT 19 14 20 27 23 23       Recent Labs   Lab Test 07/12/23  0536 07/12/23  0826 07/12/23  1127 07/12/23  1605 07/12/23 2211 07/13/23  0527 07/14/23  0509   PCAL 6.74*  --   --   --   --  8.23*  --    LACT 2.5* 3.2* 3.1* 3.2* 2.1* 2.0  --    CRPI 50.65*  --   --   --   --  246.70* 229.47*             Culture Results:  7-Day Micro Results       Procedure Component Value Units Date/Time    Blood Culture Hand, Right [[id number]]  (Normal) Collected: 07/12/23 2241    Order Status: Completed Lab Status: Preliminary result Updated: 07/14/23 0417    Specimen: Blood from Hand, Right      Culture No growth after 1 day    Narrative:      Only an Aerobic Blood Culture Bottle was collected, interpret results with caution.        Blood Culture Arm, Left [[id number]]  (Normal) Collected: 07/12/23 2211    Order Status: Completed Lab Status: Preliminary result Updated: 07/14/23 0417    Specimen: Blood from Arm, Left      Culture No growth after 1 day    MRSA MSSA PCR, Nasal Swab [[id number]] Collected: 07/12/23 1825    Order Status: Completed Lab Status: Final result Updated: 07/13/23 0022    Specimen: Swab from Nares, Bilateral      MRSA Target DNA Negative     SA Target DNA Positive    Narrative:      The [employer]  Xpert SA Nasal Complete assay performed in the [employer]  Dx System is a qualitative in vitro diagnostic test designed for rapid detection of Staphylococcus aureus (SA) and methicillin-resistant Staphylococcus aureus (MRSA) from nasal swabs in patients at risk for nasal colonization. The test utilizes automated real-time polymerase chain reaction (PCR) to detect MRSA/SA DNA. The Xpert SA Nasal Complete assay is intended to aid in the prevention and control of MRSA/SA infections in healthcare settings. The assay is not intended to diagnose, guide or monitor treatment for MRSA/SA  infections, or provide results of susceptibility to methicillin. A negative result does not preclude MRSA/SA nasal colonization.     Blood Culture Line, venous [[id number]]  (Abnormal) Collected: 07/11/23 2307    Order Status: Completed Lab Status: Final result Updated: 07/14/23 0709    Specimen: Blood from Line, venous      Culture Positive on the 1st day of incubation      Streptococcus dysgalactiae (Group C Streptococcus)     Comment: 2 of 2 bottlesSusceptibilities done on previous cultures       Blood Culture Peripheral Blood [[id number]]  (Abnormal)  (Susceptibility) Collected: 07/11/23 2244    Order Status: Completed Lab Status: Final result Updated: 07/14/23 1047    Specimen: Peripheral Blood      Culture Positive on the 1st day of incubation      Streptococcus dysgalactiae (Group C Streptococcus)     Comment: 2 of 2 bottles       Susceptibility       Streptococcus dysgalactiae (Group C Streptococcus) (1)       Antibiotic Interpretation Sensitivity   Method Status    Ampicillin Susceptible <=0.06 ug/mL CELINE Final    Penicillin Susceptible <=0.03 ug/mL CELINE Final    Clindamycin Susceptible <=0.06 ug/mL CELINE Final    Erythromycin  [*]  Susceptible <=0.06 ug/mL CELINE Final    Cefotaxime Susceptible <=0.25 ug/mL CELINE Final    Ceftriaxone Susceptible <=0.25 ug/mL CELINE Final    Vancomycin Susceptible 0.25 ug/mL CELINE Final    Meropenem Susceptible <=0.06 ug/mL CELNIE Final               [*]  Suppressed Antibiotic                   Verigene GP Panel [[id number]]  (Abnormal) Collected: 07/11/23 2244    Order Status: Completed Lab Status: Final result Updated: 07/12/23 1547    Specimen: Peripheral Blood      Staphylococcus species Not Detected     Staphylococcus aureus Not Detected     Staphylococcus epidermidis Not Detected     Staphylococcus lugdunensis Not Detected     Enterococcus faecalis Not Detected     Enterococcus faecium Not Detected     Streptococcus species Detected     Comment: Positive for Streptococcus  species other than Streptococcus pneumococcus, Streptococcus anginosus group, Streptococcus pyogenes and Streptococcus agalactiae. Performed using [employer] multiplex nucleic acid test. Final identification and antimicrobial susceptibility testing will be verified by standard methods.        Streptococcus agalactiae Not Detected     Streptococcus anginosus group Not Detected     Streptococcus pneumoniae Not Detected     Streptococcus pyogenes Not Detected     Listeria species Not Detected    Narrative:      Specimen tested with Verigene multiplex, gram-positive blood culture nucleic acid test for the following targets: Staphylococcus aureus, Staphylococcus epidermidis, Staphylococcus lugdunensis, other Staphylococcus species, Enterococcus faecalis, Enterococcus faecium, Streptococcus species, Streptococcus agalactiae, Streptococcus anginosus group, Streptococcus pneumoniae, Streptococcus pyogenes, Listeria species, mecA (methicillin resistance), and Jen/vanB (vancomycin resistance).    Urine Culture [[id number]]  (Abnormal) Collected: 23    Order Status: Completed Lab Status: Preliminary result Updated: 23 114    Specimen: Urine, Catheter      Culture >100,000 CFU/mL Staphylococcus epidermidis      50,000-100,000 CFU/mL Urogenital wesley            Recent Labs   Lab Test 17  0020 226 22  1804 23   URINEPH 5.5 5.0 5.0 5.0   NITRITE Negative Negative Negative Negative   LEUKEST Negative Large* Negative Moderate*   WBCU <1 58* 10* 29*                         Imaging: Echocardiogram Complete    Result Date: 2023  [id number]^PHOEBE^FREDERICK^PEDRO  [facility] [address] [place] [address]  Name: GORDON QUIJANO MRN: [id number] : 05/10/1934 Study Date: 2023 08:10 AM Age: 89 yrs Gender: Male Patient Location: [facility] Reason For Study: Aortic Valve Disorder History: PAF, DM, HTN,  HYPERLIPIDEMIA Ordering Physician: FREDERICK SANDHU Referring Physician: DRU VERMA Performed By: Shaye Powell  BSA: 2.3 m2 Height: 71 in Weight: 236 lb HR: 76 BP: 135/93 mmHg ______________________________________________________________________________ Procedure Complete Portable Echo Adult. Optison (NDC #9723-4578) given intravenously. Poor acoustic windows. Technically difficult study.Extremely difficult acoustic windows despite the use of contrast for endcardial border definition. ______________________________________________________________________________ Interpretation Summary  The left ventricle is normal in size. There is moderate concentric left ventricular hypertrophy.Left ventricular systolic function is mildly reduced. The visual ejection fraction is 50-55%. No regional wall motion abnormalities noted. Severe valvular aortic stenosis. (severe low-flow, low gradient AS with SVI of only 15 ml/m2). Visually, the AV appears to be severely stenotic. The calculated aortic valve area is 0.7cm2. The mean AoV pressure gradient is 21mmHg. The peak AoV pressure gradient is 31mmHg. The right ventricle is mildly dilated. Mildly decreased right ventricular systolic function The rhythm was rapid atrial fibrillation. -110 during most of the study.  The study was technically difficult. Compared to the previous study, the AS appears to be worse and afib with RVR is now present. ______________________________________________________________________________ Left Ventricle The left ventricle is normal in size. There is moderate concentric left ventricular hypertrophy. Diastolic Doppler findings (E/E' ratio and/or other parameters) suggest left ventricular filling pressures are increased. Left ventricular systolic function is mildly reduced. The visual ejection fraction is 50-55%. No regional wall motion abnormalities noted.  Right Ventricle The right ventricle is mildly dilated. Mildly decreased  right ventricular systolic function.  Atria Normal left atrial size. Right atrial size is normal. There is no atrial shunt seen.  Mitral Valve The mitral valve is normal in structure and function. There is trace mitral regurgitation.  Tricuspid Valve The tricuspid valve is normal in structure and function. Right ventricle systolic pressure estimate normal. There is trace tricuspid regurgitation.  Aortic Valve No aortic regurgitation is present. The calculated aortic valve area is 0.7cm2. The mean AoV pressure gradient is 21mmHg. The peak AoV pressure gradient is 31mmHg. Severe valvular aortic stenosis.  Pulmonic Valve The pulmonic valve is not well seen, but is grossly normal. There is trace pulmonic valvular regurgitation.  Vessels Normal size aorta.  Pericardium There is no pericardial effusion.  Rhythm The rhythm was rapid atrial fibrillation. ______________________________________________________________________________ MMode/2D Measurements & Calculations IVSd: 1.7 cm  LVIDd: 3.9 cm LVIDs: 3.5 cm LVPWd: 1.7 cm FS: 11.6 % LV mass(C)d: 270.3 grams LV mass(C)dI: 119.5 grams/m2 Ao root diam: 3.8 cm LA dimension: 4.8 cm asc Aorta Diam: 3.9 cm LA/Ao: 1.3 LVOT diam: 2.2 cm LVOT area: 3.9 cm2 LA Volume (BP): 66.7 ml LA Volume Index (BP): 29.5 ml/m2 RWT: 0.85 TAPSE: 1.2 cm  Doppler Measurements & Calculations MV E max ravi: 87.0 cm/sec MV dec time: 0.18 sec Ao V2 max: 273.1 cm/sec Ao max P.7 mmHg Ao V2 mean: 200.6 cm/sec Ao mean P.7 mmHg Ao V2 VTI: 49.5 cm KISHA(I,D): 0.67 cm2 KISHA(V,D): 0.64 cm2 LV V1 max P.78 mmHg LV V1 max: 44.1 cm/sec LV V1 VTI: 8.4 cm SV(LVOT): 33.1 ml SI(LVOT): 14.6 ml/m2 PA V2 max: 68.5 cm/sec PA max P.9 mmHg PI end-d ravi: 149.3 cm/sec AV Ravi Ratio (DI): 0.16 KISHA Index (cm2/m2): 0.30 Medial E/e': 18.2 RV S Ravi: 7.7 cm/sec  ______________________________________________________________________________ Report approved by: Estuardo Chew 2023 12:04 PM       XR Chest Port 1  View    Result Date: 7/13/2023  XR CHEST PORT 1 VIEW   7/13/2023 2:40 PM HISTORY: Shortness of breath, edema, septic shock, ?CHF COMPARISON: Chest radiograph 7/11/2023     IMPRESSION: Stable cardiac silhouette which is at the upper limits of normal in size. No amish edema, definite airspace consolidation or pleural effusion. No pneumothorax. Bones are unchanged. Severe osteoarthritis of both shoulders. TONY ESPINOZA MD   SYSTEM ID:  [id number]    CT Tibia Fibula Right w Contrast    Result Date: 7/12/2023  CT RIGHT TIBIA FIBULA/LOWER LEG WITH CONTRAST  7/12/2023 9:17 AM CLINICAL HISTORY:  Rapidly developing cellulitis and severe sepsis (possible septic shock) with concern for necrotizing fasciitis, please include foot and lower right leg up to the knee. Leg pain. TECHNIQUE: CT scan of the right tibia and fibula following injection of IV contrast. Multiplanar reformats were obtained. Dose reduction techniques were used. CONTRAST: Isovue 370, 100 mL COMPARISON: 7/12/2023 radiographs. FINDINGS: BONES: No acute fracture. No evidence of osteomyelitis. No evidence of a lytic or sclerotic lesion in the visualized bones. Small chronic avulsion fracture of the medial aspect of the neck of the talus. Mild degenerative changes throughout the midfoot. Advanced degenerative changes at the first MTP joint. Mild degenerative changes in the medial and patellofemoral compartments of the knee. SOFT TISSUES: Moderate circumferential subcutaneous cellulitis in the distal calf extending into the ankle and foot. No discrete fluid collection to suggest an abscess. No soft tissue gas. Arch atrophy of the intrinsic musculature of the foot. Moderate atrophy of the medial head of the gastrocnemius and adjacent soleus muscle. Moderate atrophy of the peroneal musculature. No deep fascial fluid or enhancement.     IMPRESSION: 1.  No abscess. No soft tissue gas. No evidence of osteomyelitis. 2.  3.  Moderate circumferential subcutaneous  cellulitis in the distal calf extending into the ankle and foot. JANETTE FLYNN MD   SYSTEM ID:  [id number]    XR Tibia & Fibula Port Right 2 Views    Result Date: 7/12/2023  EXAM: XR TIBIA and FIBULA PORT RIGHT 2 VIEWS LOCATION: [facility] DATE: 7/12/2023 INDICATION: right LE leg cellulitis COMPARISON: None.     IMPRESSION: Soft tissue edema of the leg. No fracture, dislocation, bone destruction or radiopaque foreign body.    XR Chest Port 1 View    Result Date: 7/11/2023  EXAM: XR CHEST PORT 1 VIEW LOCATION: [facility] DATE: 7/11/2023 INDICATION: sepsis COMPARISON: Portable chest radiograph 12/21/2022     IMPRESSION: Stable enlarged heart without pulmonary vascular congestion. No definite focal pulmonary consolidation. Question slight blunting of the left costophrenic angle which could be indicative of a trace left pleural effusion. No pneumothorax. Degenerative changes of the shoulders and thoracic spine. No acute osseous abnormality.

## 2023-07-14 NOTE — PLAN OF CARE
Goal Outcome Evaluation:      Plan of Care Reviewed With: patient    Overall Patient Progress: improvingOverall Patient Progress: improving    Outcome Evaluation: A&Ox4. Slept well overnight. Tele - a.fib/a. flutter. HR 90s to one-teens. .77, 118/97 and 118/97. Lower extremities edematous bilaterally. + cellulitis to RLE. Legs elevated, + numbness and tingling in RLE per patient with cellulitis. Lung sounds are clear, diminished at bases bilaterally. Will audibly sound wheezy at times but on auscultation does not. Does not tolate lying supine, experiences dyspnea and increased work of breather. Blood sugars 127 and 125. Chronic should pain bilaterally, R>L. Patient declined pain relievers overnight.

## 2023-07-14 NOTE — PROGRESS NOTES
[facility]    Medicine Progress Note - Hospitalist Service    Date of Admission:  7/12/2023    Assessment & Plan   Alvin Newton is a 89 year old man with paroxysmal atrial fibrillation, type 2 diabetes with polyneuropathy, hypertension, BPH with urinary obstruction, obesity, and chronic bilateral shoulder pain who presented with several day history of increased redness of the right lower leg, confusion, and weakness and was admitted on 7/12/2023 due to concerns for sepsis and cellulitis of right lower extremity.  Clinical course has been concerning for septic shock.    Septic shock due to cellulitis of right lower extremity with streptococcal bacteremia  Clinical presentation of cellulitis in the right lower leg with both blood cultures from admission growing Streptococcus dysgalactiae group C.  Has had SIRS with acute organ dysfunction including hypotension, elevated lactic acid level, encephalopathy, acute kidney injury (creatinine as high as 1.32 with baseline creatinine 0.83 in July 2022), thrombocytopenia, and worsened hyperbilirubinemia from baseline all concerning for sepsis.  Procalcitonin has been significantly elevated.  Hypotension was generally responsive to extensive IV fluid resuscitation and withholding chronic Lasix and metoprolol XL and vasopressors were strongly considered although never started.   He probably had septic shock.  He has improved with improving signs of acute organ dysfunction.  Hypotension and encephalopathy have resolved.  Lactic acid has normalized.  Renal function is recovering toward baseline.  Blood cultures repeated about 10 PM July 12 are negative so far.  Urine culture from admission was positive for staph epi of unclear clinical significance but probably not the cause for sepsis.  -Continue Zosyn empirically while awaiting culture results including results of antibiotic susceptibility testing, anticipate continued use of parenteral  antibiotics during hospitalization due to severity of peripheral edema which will limit penetration of less bioavailable antibiotics (such as beta-lactam agents) into the soft tissues  -May advance activity and transfer out of ICU today  -Restart Toprol-XL today but at lower dose of 25 mg daily compared with chronic dose of 50 mg daily  -If necessary, could place central venous catheter now that repeat blood cultures are negative demonstrating clearance of bacteremia for about 36 hours  -Echocardiogram ordered today for assessment of aortic valve disease and will also review with respect to question of endocarditis within the limitations of a transthoracic echocardiogram (transesophageal echocardiogram is not available at this hospital)    Paroxysmal atrial fibrillation with rapid ventricular response  Medication induced coagulopathy from Eliquis  Has known paroxysmal atrial fibrillation and has had persistent atrial fibrillation during hospitalization.  Chronic Toprol-XL for rate and rhythm control was held due to septic shock.  Heart rate subsequently trended upward with persistent tachycardia and blood pressure remained intermittently hypotensive, so he was started on oral amiodarone loading dose on 7/13 which he has tolerated well and after which his rate control at rest has been improved.  Blood pressure has also significantly improved and it is possible that rapid atrial fibrillation was exacerbating hypotension.  He chronically takes Eliquis which causes coagulopathy and increases bleeding risk but active bleeding has not been suspected.  -Restarting Toprol-XL but at lower dose than previously because of hypotension  -Continue oral amiodarone loading protocol 600 mg twice daily with de-escalating doses per that protocol    Peripheral edema  Elevated BNP  Aortic stenosis  Dilated ascending aorta  Patient reports history of peripheral edema for which he takes Lasix daily at baseline.  He has significant  peripheral edema probably exacerbated by aggressive IV fluid resuscitation of septic shock and with holding chronic Lasix therapy.  Peripheral edema likely exacerbates soft tissue infection in the right lower leg and recovery from it.  Echocardiogram in September 2019 demonstrated moderately severe aortic stenosis at that time with aortic valve area 1 to 1.1 cm.  Also noted was LVH with dilated ascending aorta on that study.  BNP on 7/13 was elevated without previous BNP for comparison and without any known history of congestive heart failure.  Chest x-ray did not demonstrate findings concerning for CHF and he has not been hypoxic at rest.  He continues to have significant exertional dyspnea and cyanosis with exertion which could be related to symptomatic aortic stenosis.  Hemodynamic status has improved as he recovers from septic shock and as rate of atrial fibrillation has improved.  -Restart Lasix today with IV Lasix as long as he tolerates that hemodynamically  -Echocardiogram ordered for reassessment of valvular disease and LV function  -Discussed outpatient follow-up with cardiology after recovery from this acute illness    Type 2 diabetes with polyneuropathy  Good glycemic control so far during hospitalization.  Most recent hemoglobin A1c was 7.0 in March 2023.  Currently manages diabetes with lifestyle measures and no medications at baseline.  -Continue to monitor blood sugars and use NovoLog correction scale to treat hyperglycemia  -Diabetic diet    Hypertension  Chronically takes Toprol-XL and Lasix both of which were held because of hypotension.  -Restarting Toprol-XL and Lasix today as above    Hypomagnesemia  Hypophosphatemia  Hypoalbuminemia  Has developed hypomagnesemia, hypophosphatemia, and hypoalbuminemia during hospitalization in the context of sepsis with decreased oral intake.  Electrolyte disturbances could exacerbate symptoms of weakness and his arrhythmia.  Hypoalbuminemia increases his risk  for edema.  Magnesium and phosphorus have improved with supplementation.  -Continue magnesium and phosphorus supplementation per respective protocols  -Continue to monitor magnesium and phosphorus as indicated  -Encourage adequate nutritional intake  -Monitor albumin as indicated    BPH with urinary obstruction  Pyuria  Positive urine culture for staph epi  Chronically symptomatic from BPH likely exacerbated by chronic Lasix therapy.  He does not appear to be taking medication chronically for BPH treatment.  He is voiding spontaneously during hospitalization.  Initial UA demonstrated pyuria with urine culture that grew greater than 100,000 colonies per mL Staphylococcus epidermidis of unclear clinical significance.  He has not reported symptoms suspicious for UTI, and sepsis has been attributed to soft tissue infection with streptococcal bacteremia.  For these reasons, it seems less likely that positive urine culture results are indicative of active infection.  -Monitor spontaneous voiding capacity  -Reconsider bladder scan and/or catheterization if there is increasing concern for urinary retention or obstruction  -Could also consider adding Flomax or similar medication for treatment of BPH if there are increasing concerns for symptomatic BPH    Thrombocytopenia  He developed mild thrombocytopenia due to sepsis.  Thrombocytopenia increases his bleeding risk but active bleeding has not been clinically apparent so far.  Thrombocytopenia is stabilizing as sepsis resolves.  -Monitor platelet count    Hyperbilirubinemia  Has chronically elevated serum bilirubin level which worsened due to sepsis.  He has had previous cholecystectomy and most recent abdominal ultrasound in October 2022 demonstrated normal common bile duct but findings suspicious for hepatic fibrosis.  He may be at risk for worsening liver function tests after starting amiodarone, but bilirubin is trending toward improvement and transaminases remain  normal.  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  Shoulder pain has not been responsive over the long-term to previous injection therapy.  Current shoulder pain is similar to his usual baseline and there are no clinical findings to suggest an acute inflammatory process including infection in either shoulder.  -Continue topical Aspercreme as needed to the shoulders  -Start scheduled dose of acetaminophen  -Continue oxycodone as needed for moderate to severe shoulder pain  -Add Lidoderm patch to the shoulders today  -Avoiding NSAIDs because of sepsis with acute kidney injury    Obesity  Chronic with elevated BMI currently 37 although likely higher than baseline because of significant volume overload currently.  Obesity likely contributes to chronic health problems including diabetes.  -No acute intervention       Diet: Moderate Consistent Carb (60 g CHO per Meal) Diet    DVT Prophylaxis: DOAC  Eastman Catheter: Not present  Lines: None     Cardiac Monitoring: ACTIVE order. Indication: ICU  Code Status: Full Code      Clinically Significant Risk Factors                           Disposition Plan     Expected Discharge Date: 07/14/2023      Destination: other (comment) (Home vs HHC vs TCU)            Matt Valera MD  Hospitalist Service  [facility], located within [facility]  Securely message with [employer] (more info)  Text page via [employer] Paging/Directory   ______________________________________________________________________    Interval History   He says he feels poorly today.  He has pain in his shoulders particularly his right shoulders.  He also continues to have pain in his lower legs particularly his right lower leg.  He remains afebrile.  Blood pressure has trended upward  over the last 24 hours and today his diastolic blood pressure is consistently elevated.  Heart rate improved after starting amiodarone yesterday and has been adequately controlled at rest overnight and this morning.  He denies dyspnea at rest but feels very dyspneic with even limited activity.  Nursing reports that he had difficulty tolerating supine positioning yesterday because of worsening dyspnea and he appeared cyanotic in the supine position.  Dyspnea and cyanosis resolved with elevating his head about 30 to 45 degrees.  He has not yet attempted to get out of bed.  He denies any chest pain, palpitations, or dizziness.  He continues to void spontaneously.  He feels like he is going to have a bowel movement today.    Physical Exam   Vital Signs: Temp: 98.6  F (37  C) Temp src: Oral BP: (!) 135/93 Pulse: 91   Resp: 22 SpO2: 96 % O2 Device: None (Room air)    Patient Vitals for the past 24 hrs:   BP Temp Temp src Pulse Resp SpO2 Weight   07/14/23 0744 -- 98.6  F (37  C) Oral -- -- -- --   07/14/23 0600 (!) 135/93 -- -- 91 22 96 % --   07/14/23 0500 -- -- -- -- -- -- 107.1 kg (236 lb 1.6 oz)   07/14/23 0319 (!) 118/97 97.8  F (36.6  C) Oral 105 22 96 % --   07/13/23 2338 (!) 118/97 97.7  F (36.5  C) Oral 90 23 96 % --   07/13/23 1934 102/77 97.7  F (36.5  C) Oral 93 23 95 % --   07/13/23 1700 118/89 -- -- 112 25 -- --   07/13/23 1600 95/81 -- -- 92 21 98 % --   07/13/23 1500 94/73 -- -- 110 23 95 % --   07/13/23 1410 -- -- -- -- -- -- 113.9 kg (251 lb)   07/13/23 1330 119/82 98.3  F (36.8  C) Oral (!) 129 (!) 31 97 % --   07/13/23 1325 -- -- -- (!) 123 (!) 9 96 % --   07/13/23 1320 -- -- -- (!) 126 22 95 % --   07/13/23 1315 -- -- -- (!) 122 28 -- --   07/13/23 1300 107/79 -- -- 116 23 95 % --   07/13/23 1200 103/77 98.8  F (37.1  C) Oral 106 26 96 % --   07/13/23 1100 96/67 -- -- 86 23 96 % --   07/13/23 1030 90/70 -- -- 115 25 95 % --   07/13/23 1000 (!) 87/67 -- -- 105 23 95 % --   07/13/23 0944 108/73 -- -- 120  23 95 % --     Weight: 236 lbs 1.6 oz  Vitals:    07/12/23 1030 07/13/23 0500 07/13/23 0745 07/13/23 1410   Weight: 110.9 kg (244 lb 8 oz) 121.6 kg (268 lb) 118.9 kg (262 lb 3.2 oz) 113.9 kg (251 lb)    07/14/23 0500   Weight: 107.1 kg (236 lb 1.6 oz)       Intake/Output Summary (Last 24 hours) at 7/14/2023 0913  Last data filed at 7/14/2023 0600  Gross per 24 hour   Intake 910 ml   Output 890 ml   Net 20 ml       General Appearance: Chronically ill-appearing elderly man, obese, no acute distress sitting up in bed  Respiratory: Normal respiratory effort, diminished breath sounds throughout, clear lung fields  Cardiovascular: Irregularly irregular heart rate and rhythm, palpable radial pulse with normal capillary refill, severe peripheral edema in the lower legs bilaterally  GI: Obese abdomen, soft, nontender  Skin: Erythema with warmth and tenderness present over most of the right lower leg, no open skin lesions seen on the right lower leg, no erythema or warmth over the shoulders     Medical Decision Making     Total visit time 58 minutes today including time spent with the activities listed in this note        Data     I have personally reviewed the following data over the past 24 hrs:    12.0 (H)  \   N/A   / 130 (L)     136 104 27.1 (H) /  131 (H)   4.2 21 (L) 1.18 (H) \     ALT: 23 AST: 22 AP: 168 (H) TBILI: 3.0 (H)   ALB: 2.8 (L) TOT PROTEIN: 6.4 LIPASE: N/A     Trop: N/A BNP: N/A     Procal: N/A CRP: 229.47 (H) Lactic Acid: N/A         Blood sugars range 120-152  Reviewed outside lab results: most recent hemoglobin A1c from March 2023 was 7.0  CRP yesterday was 246.7 for comparison  BNP yesterday was 4246 without previous BNP for comparison  Report of echocardiogram from September 2019 was reviewed and that study demonstrated moderately severe aortic stenosis with aortic valve area 1 to 1.1 cm , LVH, normal LVEF, and dilated ascending aorta  Report of abdominal ultrasound from October 2022 was reviewed and  demonstrated findings of previous cholecystectomy, normal common bile duct diameter, and findings interpreted as suggestive of hepatic fibrosis    Blood cultures from admission on July 11 are growing group C strep dysgalactiae, antibiotic susceptibility testing is pending  Blood cultures from about 10:40 PM on July 12 are negative so far  Urine culture from admission is growing greater than 100,000 colonies per mL Staphylococcus epidermidis with antibiotic susceptibility test results pending    Cardiac monitor results reviewed over the past 24 hours: Atrial fibrillation with generally controlled ventricular response, no other arrhythmias    Imaging results reviewed over the past 24 hrs:   Recent Results (from the past 24 hour(s))   XR Chest Port 1 View    Narrative    XR CHEST PORT 1 VIEW   7/13/2023 2:40 PM     HISTORY: Shortness of breath, edema, septic shock, ?CHF    COMPARISON: Chest radiograph 7/11/2023      Impression    IMPRESSION: Stable cardiac silhouette which is at the upper limits of  normal in size. No amish edema, definite airspace consolidation or  pleural effusion. No pneumothorax. Bones are unchanged. Severe  osteoarthritis of both shoulders.    TONY ESPINOZA MD         SYSTEM ID:  [id number]     Recent Labs   Lab 07/14/23  0742 07/14/23  0509 07/14/23  0152 07/13/23  0826 07/13/23  0527 07/12/23  1711 07/12/23  1605 07/12/23  0809 07/12/23  0536 07/12/23  0534 07/11/23  2244   WBC  --  12.0*  --   --  13.0*  --   --   --  10.9  --  9.6   HGB  --   --   --   --  14.3  --   --   --  14.3  --  16.1   MCV  --   --   --   --  91  --   --   --  91  --  91   PLT  --  130*  --   --  132*  --   --   --  133*  --  159   NA  --  136  --   --  135*  --  137  --  138  --  139   POTASSIUM  --  4.2  --   --  4.3  --  4.5  --  4.2  --  4.2   CHLORIDE  --  104  --   --  103  --  103  --  104  --  104   CO2  --  21*  --   --  21*  --  21*  --  19*  --  24   BUN  --  27.1*  --   --  27.3*  --  27.4*  --  28.5*  --   28.3*   CR  --  1.18*  --   --  1.25*  --  1.16  --  1.18*  --  1.32*   ANIONGAP  --  11  --   --  11  --  13  --  15  --  11   JERRY  --  8.9  --   --  9.0  --  9.0  --  9.2  --  10.0   * 123* 125*   < > 137*   < > 156*   < > 152*   < > 140*   ALBUMIN  --  2.8*  --   --  3.0*  --  3.1*  --   --   --  4.1   PROTTOTAL  --  6.4  --   --  6.4  --  6.3*  --   --   --  7.3   BILITOTAL  --  3.0*  --   --  3.3*  --  3.8*  --   --   --  3.0*   ALKPHOS  --  168*  --   --  115  --  113  --   --   --  170*   ALT  --  23  --   --  23  --  27  --   --   --  20   AST  --  22  --   --  22  --  31  --   --   --  27   LIPASE  --   --   --   --   --   --   --   --   --   --  15    < > = values in this interval not displayed.

## 2023-07-14 NOTE — PROGRESS NOTES
"S- Transfer to 269 from 218.    B- Cellulitis of right lower extremity.     A- Brief systems assessment: Patient alert and oriented. Remains in a-fib, heart rate increase when use of marisol steady to commode. Blood pressure stable. Assist X2 with marisol steady. Bowel movement today.     R- Transfer to med/surg per physician orders. Continue to monitor pt and update physician as needed.      Code status: Full Code  Skin: RLE cellulitis.   Fall Risk: Yes- Department fall risk interventions implemented.  Isolation and Signage: None  Medication drips upon transfer: None  Blue Bin checked and medications transfer out with patientYesABLE\"}    "

## 2023-07-15 LAB
ALBUMIN SERPL BCG-MCNC: 2.9 G/DL (ref 3.5–5.2)
ALP SERPL-CCNC: 220 U/L (ref 40–129)
ALT SERPL W P-5'-P-CCNC: 27 U/L (ref 0–70)
ANION GAP SERPL CALCULATED.3IONS-SCNC: 14 MMOL/L (ref 7–15)
AST SERPL W P-5'-P-CCNC: 25 U/L (ref 0–45)
BACTERIA UR CULT: ABNORMAL
BACTERIA UR CULT: ABNORMAL
BILIRUB SERPL-MCNC: 2.4 MG/DL
BUN SERPL-MCNC: 27.3 MG/DL (ref 8–23)
CALCIUM SERPL-MCNC: 8.9 MG/DL (ref 8.8–10.2)
CHLORIDE SERPL-SCNC: 102 MMOL/L (ref 98–107)
CREAT SERPL-MCNC: 1.21 MG/DL (ref 0.67–1.17)
CRP SERPL-MCNC: 179.95 MG/L
DEPRECATED HCO3 PLAS-SCNC: 22 MMOL/L (ref 22–29)
GFR SERPL CREATININE-BSD FRML MDRD: 57 ML/MIN/1.73M2
GLUCOSE BLDC GLUCOMTR-MCNC: 126 MG/DL (ref 70–99)
GLUCOSE BLDC GLUCOMTR-MCNC: 128 MG/DL (ref 70–99)
GLUCOSE BLDC GLUCOMTR-MCNC: 129 MG/DL (ref 70–99)
GLUCOSE BLDC GLUCOMTR-MCNC: 135 MG/DL (ref 70–99)
GLUCOSE BLDC GLUCOMTR-MCNC: 140 MG/DL (ref 70–99)
GLUCOSE SERPL-MCNC: 142 MG/DL (ref 70–99)
MAGNESIUM SERPL-MCNC: 1.8 MG/DL (ref 1.7–2.3)
PHOSPHATE SERPL-MCNC: 3.2 MG/DL (ref 2.5–4.5)
PLATELET # BLD AUTO: 158 10E3/UL (ref 150–450)
POTASSIUM SERPL-SCNC: 3.2 MMOL/L (ref 3.4–5.3)
POTASSIUM SERPL-SCNC: 3.7 MMOL/L (ref 3.4–5.3)
PROCALCITONIN SERPL IA-MCNC: 2.97 NG/ML
PROT SERPL-MCNC: 6.7 G/DL (ref 6.4–8.3)
SODIUM SERPL-SCNC: 138 MMOL/L (ref 136–145)
WBC # BLD AUTO: 9.5 10E3/UL (ref 4–11)

## 2023-07-15 PROCEDURE — 86140 C-REACTIVE PROTEIN: CPT | Performed by: PEDIATRICS

## 2023-07-15 PROCEDURE — 80053 COMPREHEN METABOLIC PANEL: CPT | Performed by: PEDIATRICS

## 2023-07-15 PROCEDURE — 83735 ASSAY OF MAGNESIUM: CPT | Performed by: PEDIATRICS

## 2023-07-15 PROCEDURE — 36415 COLL VENOUS BLD VENIPUNCTURE: CPT | Performed by: FAMILY MEDICINE

## 2023-07-15 PROCEDURE — 99233 SBSQ HOSP IP/OBS HIGH 50: CPT | Performed by: FAMILY MEDICINE

## 2023-07-15 PROCEDURE — 85048 AUTOMATED LEUKOCYTE COUNT: CPT | Performed by: PEDIATRICS

## 2023-07-15 PROCEDURE — 250N000011 HC RX IP 250 OP 636: Mod: JZ | Performed by: FAMILY MEDICINE

## 2023-07-15 PROCEDURE — 84100 ASSAY OF PHOSPHORUS: CPT | Performed by: PEDIATRICS

## 2023-07-15 PROCEDURE — 84145 PROCALCITONIN (PCT): CPT | Performed by: PEDIATRICS

## 2023-07-15 PROCEDURE — 250N000013 HC RX MED GY IP 250 OP 250 PS 637: Performed by: FAMILY MEDICINE

## 2023-07-15 PROCEDURE — 36415 COLL VENOUS BLD VENIPUNCTURE: CPT | Performed by: PEDIATRICS

## 2023-07-15 PROCEDURE — 85049 AUTOMATED PLATELET COUNT: CPT | Performed by: PEDIATRICS

## 2023-07-15 PROCEDURE — 84132 ASSAY OF SERUM POTASSIUM: CPT | Performed by: FAMILY MEDICINE

## 2023-07-15 PROCEDURE — 120N000001 HC R&B MED SURG/OB

## 2023-07-15 PROCEDURE — 250N000013 HC RX MED GY IP 250 OP 250 PS 637: Performed by: PEDIATRICS

## 2023-07-15 PROCEDURE — 250N000011 HC RX IP 250 OP 636: Mod: JZ | Performed by: PEDIATRICS

## 2023-07-15 RX ORDER — POTASSIUM CHLORIDE 1500 MG/1
40 TABLET, EXTENDED RELEASE ORAL ONCE
Status: COMPLETED | OUTPATIENT
Start: 2023-07-15 | End: 2023-07-15

## 2023-07-15 RX ORDER — FUROSEMIDE 10 MG/ML
60 INJECTION INTRAMUSCULAR; INTRAVENOUS
Status: DISCONTINUED | OUTPATIENT
Start: 2023-07-15 | End: 2023-07-19

## 2023-07-15 RX ADMIN — POTASSIUM CHLORIDE 40 MEQ: 1500 TABLET, EXTENDED RELEASE ORAL at 08:38

## 2023-07-15 RX ADMIN — CEFTRIAXONE SODIUM 2 G: 2 INJECTION, POWDER, FOR SOLUTION INTRAMUSCULAR; INTRAVENOUS at 19:54

## 2023-07-15 RX ADMIN — FUROSEMIDE 60 MG: 10 INJECTION, SOLUTION INTRAVENOUS at 15:48

## 2023-07-15 RX ADMIN — FUROSEMIDE 40 MG: 10 INJECTION, SOLUTION INTRAMUSCULAR; INTRAVENOUS at 02:40

## 2023-07-15 RX ADMIN — LIDOCAINE 2 PATCH: 560 PATCH PERCUTANEOUS; TOPICAL; TRANSDERMAL at 08:41

## 2023-07-15 RX ADMIN — FUROSEMIDE 40 MG: 10 INJECTION, SOLUTION INTRAMUSCULAR; INTRAVENOUS at 10:07

## 2023-07-15 RX ADMIN — OXYCODONE HYDROCHLORIDE 5 MG: 5 TABLET ORAL at 02:51

## 2023-07-15 RX ADMIN — ACETAMINOPHEN 975 MG: 325 TABLET, FILM COATED ORAL at 08:38

## 2023-07-15 RX ADMIN — APIXABAN 5 MG: 5 TABLET, FILM COATED ORAL at 20:52

## 2023-07-15 RX ADMIN — AMIODARONE HYDROCHLORIDE 600 MG: 200 TABLET ORAL at 08:38

## 2023-07-15 RX ADMIN — APIXABAN 5 MG: 5 TABLET, FILM COATED ORAL at 08:38

## 2023-07-15 RX ADMIN — ACETAMINOPHEN 975 MG: 325 TABLET, FILM COATED ORAL at 13:56

## 2023-07-15 RX ADMIN — OXYCODONE HYDROCHLORIDE 10 MG: 5 TABLET ORAL at 12:01

## 2023-07-15 RX ADMIN — METOPROLOL SUCCINATE 25 MG: 25 TABLET, EXTENDED RELEASE ORAL at 08:38

## 2023-07-15 RX ADMIN — AMIODARONE HYDROCHLORIDE 600 MG: 200 TABLET ORAL at 20:52

## 2023-07-15 RX ADMIN — ACETAMINOPHEN 975 MG: 325 TABLET, FILM COATED ORAL at 20:52

## 2023-07-15 RX ADMIN — ATORVASTATIN CALCIUM 20 MG: 10 TABLET, FILM COATED ORAL at 20:53

## 2023-07-15 ASSESSMENT — ACTIVITIES OF DAILY LIVING (ADL)
ADLS_ACUITY_SCORE: 36
ADLS_ACUITY_SCORE: 34
ADLS_ACUITY_SCORE: 34
ADLS_ACUITY_SCORE: 36
ADLS_ACUITY_SCORE: 36
ADLS_ACUITY_SCORE: 34
ADLS_ACUITY_SCORE: 36
ADLS_ACUITY_SCORE: 33
ADLS_ACUITY_SCORE: 34
ADLS_ACUITY_SCORE: 36

## 2023-07-15 NOTE — PLAN OF CARE
Goal Outcome Evaluation:      Plan of Care Reviewed With: patient    Overall Patient Progress: improvingOverall Patient Progress: improving    Outcome Evaluation: Pt is A&Ox4. VSS on RA. Pt is up with lizzie PATRICK and marisol edwards. pt up to chair for dinner. Pt continues to have a poor appetite but is snacking in between meals. Pt continues to have redness to RLE-small fluid filled blisters noted on right lower leg. Pt is on IV rocephin for antibiotic treatment.

## 2023-07-15 NOTE — PROGRESS NOTES
[facility]    Medicine Progress Note - Hospitalist Service    Date of Admission:  7/12/2023    Assessment & Plan     Alvin Newton is a 89 year old man with paroxysmal atrial fibrillation, type 2 diabetes with polyneuropathy, hypertension, BPH with urinary obstruction, obesity, and chronic bilateral shoulder pain who presented with several day history of increased redness of the right lower leg, confusion, and weakness and was admitted on 7/12/2023 due to concerns for severe sepsis and cellulitis of right lower extremity.  Clinical course has been concerning for septic shock however patient is improving with ongoing antibiotics.  Clinical course now complicated by third spacing of fluid previously needed and worsening of his aortic stenosis to the severe stable.  Patient is underlying cautious diuresis with good results and stable blood pressure to date with ongoing overload still present.  Will continue with diuresis efforts and have physical therapy evaluate patient to assist with discharge planning.    Septic shock due to cellulitis of right lower extremity with streptococcal bacteremia  Clinical presentation of cellulitis in the right lower leg with both blood cultures from admission growing Streptococcus dysgalactiae group C.  Has had SIRS with acute organ dysfunction including hypotension, elevated lactic acid level, encephalopathy, acute kidney injury (creatinine as high as 1.32 with baseline creatinine 0.83 in July 2022), thrombocytopenia, and worsened hyperbilirubinemia from baseline all consistent for sepsis.  Procalcitonin has been significantly elevated.  Hypotension was generally responsive to extensive IV fluid resuscitation and withholding chronic Lasix and metoprolol XL and vasopressors were strongly considered although never started.   He probably had septic shock.  He has improved with improving signs of acute organ dysfunction.  Hypotension and encephalopathy have  resolved.  Lactic acid has normalized.  Renal function is recovering toward baseline.  Repeat blood cultures negative to date.  Urine culture from admission was positive for staph epi of unclear clinical significance but probably not the cause for sepsis.  Patient has been transitioned to IV Rocephin as of 7/14/23 as per antibiotic stewardship recommendations  -Continue with Rocephin while hospitalized with plans to transition to Augmentin for 14 day total course completion as per antibiotic stewardship recommendations.    -Monitor for ongoing clinical improvement  -Continue Toprol-XL at lower dose of 25 mg daily compared with chronic dose of 50 mg daily    Paroxysmal atrial fibrillation with rapid ventricular response  Medication induced coagulopathy from Eliquis  Has known paroxysmal atrial fibrillation and has had persistent atrial fibrillation during hospitalization.  Chronic Toprol-XL for rate and rhythm control was held due to septic shock.  Heart rate subsequently trended upward with persistent tachycardia and blood pressure remained intermittently hypotensive, so he was started on oral amiodarone loading dose on 7/13 which he has tolerated well and after which his rate control at rest has been improved.  Blood pressure has also significantly improved and lower than normal doses of Toprol XL restarted and well tolerated.  He chronically takes Eliquis which causes coagulopathy and increases bleeding risk but active bleeding has not been suspected.  -Continue Toprol-XL at lower dose than previously to allow for more aggressive diuresis with plans to increase to 50 mg as per home regimen as blood pressure allows  -Continue oral amiodarone loading protocol 600 mg twice daily with de-escalating doses per protocol    Peripheral edema  Elevated BNP  Severe aortic stenosis  Dilated ascending aorta  Patient reports history of peripheral edema for which he takes Lasix daily at baseline.  He has significant peripheral  edema probably exacerbated by aggressive IV fluid resuscitation of septic shock and with holding chronic Lasix therapy.  Chest x-ray did not demonstrate findings concerning for CHF and he has not been hypoxic at rest.  He continues to have significant exertional dyspnea and cyanosis with exertion which could be related to symptomatic aortic stenosis, which has progressed from moderate to severe in nature since last ECHO.  Hemodynamic status has improved as he recovers from septic shock and as rate of atrial fibrillation has improved.  -Continue IV Lasix with hold parameters as long as he tolerates that hemodynamically.  Will attempt to increase Lasix to 60 mg but ongoing give twice a day to allow patient improved sleep overnight  -Patient will need close outpatient follow-up with cardiology after recovery from this acute illness to discuss severe aortic stenosis further    Type 2 diabetes with polyneuropathy  Good glycemic control so far during hospitalization.  Most recent hemoglobin A1c was 7.0 in March 2023.  Currently manages diabetes with lifestyle measures and no medications at baseline.  -Continue to monitor blood sugars and use NovoLog correction scale to treat hyperglycemia  -Diabetic diet    Hypertension  Chronically takes Toprol-XL and Lasix both of which were held because of hypotension earlier in this stay.  -Continue lower than normal Toprox XL as above  -continue IV lasix as above     Hypomagnesemia  Hypophosphatemia  Hypoalbuminemia  Has developed hypomagnesemia, hypophosphatemia, and hypoalbuminemia during hospitalization in the context of sepsis with decreased oral intake.  Electrolyte disturbances could exacerbate symptoms of weakness and his arrhythmia.  Hypoalbuminemia increases his risk for edema.  Magnesium and phosphorus have improved with supplementation.  -Continue magnesium and phosphorus supplementation per respective protocols  -Continue to monitor magnesium and phosphorus as  indicated  -Encourage adequate nutritional intake  -Monitor albumin as indicated    BPH with urinary obstruction  Pyuria  Positive urine culture for staph epi  Chronically symptomatic from BPH likely exacerbated by chronic Lasix therapy.  He does not appear to be taking medication chronically for BPH treatment.  He is voiding spontaneously during hospitalization.  Initial UA demonstrated pyuria with urine culture that grew greater than 100,000 colonies per mL Staphylococcus epidermidis of unclear clinical significance.  He has not reported symptoms suspicious for UTI, and sepsis has been attributed to soft tissue infection with streptococcal bacteremia.  For these reasons, it seems less likely that positive urine culture results are indicative of active infection.  -Monitor spontaneous voiding capacity  -Could also consider adding Flomax or similar medication for treatment of BPH if there are increasing concerns for symptomatic BPH    Thrombocytopenia  He developed mild thrombocytopenia due to sepsis.  Thrombocytopenia has now resolved as sepsis continue to improve  -Monitor platelet count to ensure ongoing resolution     Hyperbilirubinemia  Has chronically elevated serum bilirubin level which worsened due to sepsis.  He has had previous cholecystectomy and most recent abdominal ultrasound in October 2022 demonstrated normal common bile duct but findings suspicious for hepatic fibrosis.  He may be at risk for worsening liver function tests after starting amiodarone, but bilirubin is trending toward improvement and transaminases remain normal.  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  Shoulder pain has not been responsive over the long-term to previous injection therapy.  Current  "shoulder pain is similar to his usual baseline and there are no clinical findings to suggest an acute inflammatory process including infection in either shoulder.  -Continue topical Aspercreme as needed to the shoulders  -Continue scheduled dose of acetaminophen  -Continue oxycodone as needed for moderate to severe shoulder pain  -Continue Lidoderm patch to the shoulders     Obesity  Chronic with elevated BMI currently 37 although likely higher than baseline because of significant volume overload currently.  Obesity likely contributes to chronic health problems including diabetes.  -No acute intervention       Diet: Moderate Consistent Carb (60 g CHO per Meal) Diet  Snacks/Supplements Adult: Ensure Clear; Between Meals    DVT Prophylaxis: Eliquis   Eastman Catheter: Not present  Lines: None     Cardiac Monitoring: ACTIVE order. Indication: Tachyarrhythmias, acute (48 hours)  Code Status: Full Code      Clinically Significant Risk Factors        # Hypokalemia: Lowest K = 3.2 mmol/L in last 2 days, will replace as needed       # Hypoalbuminemia: Lowest albumin = 2.8 g/dL at 7/14/2023  5:09 AM, will monitor as appropriate     # Hypertension: Noted on problem list        # Obesity: Estimated body mass index is 32.93 kg/m  as calculated from the following:    Height as of this encounter: 1.803 m (5' 11\").    Weight as of this encounter: 107.1 kg (236 lb 1.6 oz)., PRESENT ON ADMISSION          Disposition Plan      Expected Discharge Date: 07/18/2023    Discharge Delays: OT Disposition recs needed  PT Disposition recs needed  Destination: other (comment) (Home vs HHC vs TCU)          Arlene Lawrence MD  Hospitalist Service  [facility]  Securely message with [employer] (more info)  Text page via [employer] Paging/Directory   ______________________________________________________________________    Interval History   Patient has been afebrile, vitals stable.  Patient continues to worry about " his ongoing lower leg swelling and the burning pain present in his leg.  He also complains of voiding so much last night that he couldn't sleep and is now very tired today.  No other new symptoms or concerns.  No new nursing concerns.      Physical Exam   Vital Signs: Temp: 97  F (36.1  C) Temp src: Oral BP: 128/70 Pulse: 83   Resp: 20 SpO2: 100 % O2 Device: None (Room air)    Weight: 236 lbs 1.6 oz    Constitutional: sleepy but awakens to voice, cooperative, no apparent distress, and appears stated age  Respiratory: No increased work of breathing, good air exchange, clear to auscultation bilaterally, no crackles or wheezing  Cardiovascular: RRR  Skin: patient has mild erythema but no warmth or tenderness on palpation of right lower leg.  Does have ongoing swelling bilaterally.    Musculoskeletal: bilateral pitting edema 2+ in bilateral lower feet and lower legs up to the knees  Neurologic: sleepy but oriented to person and place    Medical Decision Making       60 MINUTES SPENT BY ME on the date of service doing chart review, history, exam, documentation & further activities per the note.      Data     I have personally reviewed the following data over the past 24 hrs:    9.5  \   N/A   / 158     138 102 27.3 (H) /  126 (H)   3.7 22 1.21 (H) \       ALT: 27 AST: 25 AP: 220 (H) TBILI: 2.4 (H)   ALB: 2.9 (L) TOT PROTEIN: 6.7 LIPASE: N/A       Procal: 2.97 (H) CRP: 179.95 (H) Lactic Acid: 1.5

## 2023-07-15 NOTE — PLAN OF CARE
Goal Outcome Evaluation:      Plan of Care Reviewed With: patient    Overall Patient Progress: improvingOverall Patient Progress: improving    Outcome Evaluation: patient A&O, afternoon dose of lasix increased so that night time dose could be skipped for better sleep, redness under right breast interdry in place, PRN 10mg oxy for right leg pain

## 2023-07-15 NOTE — PLAN OF CARE
Goal Outcome Evaluation:      Plan of Care Reviewed With: patient    Overall Patient Progress: no changeOverall Patient Progress: no change    Outcome Evaluation: Pt is A&Ox4. VSS on RA. Tele is Afib. Pt is up with lizzie arriaza and 2A. Pt got up to chair for dinner. Cellutlitis to RLE marked. Pt's antibiotics switched from zosyn to rocephin.  Legs elevated in bed.  Lactic elevated at 2.1 on rechecked came down to 1.5. Pt's appetite is poor-pt only ate 25% of dinner- ensure clear encouraged d/t poor appetite. Pt reports feeling tired and has been napping on and off this shift. Pt took scheduled tylenol for chronic R shoulder pain-which pt reported as helpful. Pt recieved IV lasix and had good urine output.

## 2023-07-15 NOTE — PLAN OF CARE
Goal Outcome Evaluation:      Plan of Care Reviewed With: patient    Overall Patient Progress: no changeOverall Patient Progress: no change    Outcome Evaluation: Pt A&Ox4 but seemed forgetful at times overnight. Used call light appropriately except for one attempt to get out of bed independently to use the bathroom. Agreeable to using urinal in bed. No new redness noted outside of marked borders to RLE. PRN oxycodone given x1 for pain, sleeping on reassessment.

## 2023-07-16 ENCOUNTER — APPOINTMENT (OUTPATIENT)
Dept: PHYSICAL THERAPY | Facility: CLINIC | Age: 88
DRG: 871 | End: 2023-07-16
Attending: HOSPITALIST
Payer: COMMERCIAL

## 2023-07-16 LAB
ALBUMIN SERPL BCG-MCNC: 2.9 G/DL (ref 3.5–5.2)
ALP SERPL-CCNC: 258 U/L (ref 40–129)
ALT SERPL W P-5'-P-CCNC: 25 U/L (ref 0–70)
ANION GAP SERPL CALCULATED.3IONS-SCNC: 13 MMOL/L (ref 7–15)
AST SERPL W P-5'-P-CCNC: 25 U/L (ref 0–45)
BILIRUB SERPL-MCNC: 2.1 MG/DL
BUN SERPL-MCNC: 26.3 MG/DL (ref 8–23)
CALCIUM SERPL-MCNC: 9.1 MG/DL (ref 8.8–10.2)
CHLORIDE SERPL-SCNC: 101 MMOL/L (ref 98–107)
CREAT SERPL-MCNC: 1.2 MG/DL (ref 0.67–1.17)
CREAT SERPL-MCNC: 1.37 MG/DL (ref 0.67–1.17)
CRP SERPL-MCNC: 129.84 MG/L
DEPRECATED HCO3 PLAS-SCNC: 27 MMOL/L (ref 22–29)
GFR SERPL CREATININE-BSD FRML MDRD: 49 ML/MIN/1.73M2
GFR SERPL CREATININE-BSD FRML MDRD: 58 ML/MIN/1.73M2
GLUCOSE BLDC GLUCOMTR-MCNC: 106 MG/DL (ref 70–99)
GLUCOSE BLDC GLUCOMTR-MCNC: 123 MG/DL (ref 70–99)
GLUCOSE BLDC GLUCOMTR-MCNC: 125 MG/DL (ref 70–99)
GLUCOSE BLDC GLUCOMTR-MCNC: 138 MG/DL (ref 70–99)
GLUCOSE BLDC GLUCOMTR-MCNC: 160 MG/DL (ref 70–99)
GLUCOSE SERPL-MCNC: 121 MG/DL (ref 70–99)
MAGNESIUM SERPL-MCNC: 1.8 MG/DL (ref 1.7–2.3)
PHOSPHATE SERPL-MCNC: 3.7 MG/DL (ref 2.5–4.5)
PLATELET # BLD AUTO: 149 10E3/UL (ref 150–450)
POTASSIUM SERPL-SCNC: 3.5 MMOL/L (ref 3.4–5.3)
PROT SERPL-MCNC: 6.9 G/DL (ref 6.4–8.3)
SODIUM SERPL-SCNC: 141 MMOL/L (ref 136–145)
WBC # BLD AUTO: 7.2 10E3/UL (ref 4–11)

## 2023-07-16 PROCEDURE — 83735 ASSAY OF MAGNESIUM: CPT | Performed by: FAMILY MEDICINE

## 2023-07-16 PROCEDURE — 250N000011 HC RX IP 250 OP 636: Mod: JZ | Performed by: PEDIATRICS

## 2023-07-16 PROCEDURE — 84100 ASSAY OF PHOSPHORUS: CPT | Performed by: FAMILY MEDICINE

## 2023-07-16 PROCEDURE — 250N000011 HC RX IP 250 OP 636: Mod: JZ | Performed by: FAMILY MEDICINE

## 2023-07-16 PROCEDURE — 82565 ASSAY OF CREATININE: CPT | Performed by: FAMILY MEDICINE

## 2023-07-16 PROCEDURE — 250N000013 HC RX MED GY IP 250 OP 250 PS 637: Performed by: PEDIATRICS

## 2023-07-16 PROCEDURE — 82310 ASSAY OF CALCIUM: CPT | Performed by: FAMILY MEDICINE

## 2023-07-16 PROCEDURE — 250N000013 HC RX MED GY IP 250 OP 250 PS 637: Performed by: FAMILY MEDICINE

## 2023-07-16 PROCEDURE — 97530 THERAPEUTIC ACTIVITIES: CPT | Mod: GP | Performed by: PHYSICAL THERAPIST

## 2023-07-16 PROCEDURE — 85049 AUTOMATED PLATELET COUNT: CPT | Performed by: FAMILY MEDICINE

## 2023-07-16 PROCEDURE — 99233 SBSQ HOSP IP/OBS HIGH 50: CPT | Performed by: FAMILY MEDICINE

## 2023-07-16 PROCEDURE — 85048 AUTOMATED LEUKOCYTE COUNT: CPT | Performed by: FAMILY MEDICINE

## 2023-07-16 PROCEDURE — 97161 PT EVAL LOW COMPLEX 20 MIN: CPT | Mod: GP | Performed by: PHYSICAL THERAPIST

## 2023-07-16 PROCEDURE — 36415 COLL VENOUS BLD VENIPUNCTURE: CPT | Performed by: FAMILY MEDICINE

## 2023-07-16 PROCEDURE — 120N000001 HC R&B MED SURG/OB

## 2023-07-16 PROCEDURE — 86140 C-REACTIVE PROTEIN: CPT | Performed by: FAMILY MEDICINE

## 2023-07-16 RX ORDER — METOPROLOL SUCCINATE 25 MG/1
25 TABLET, EXTENDED RELEASE ORAL ONCE
Status: COMPLETED | OUTPATIENT
Start: 2023-07-16 | End: 2023-07-16

## 2023-07-16 RX ORDER — METOPROLOL SUCCINATE 50 MG/1
50 TABLET, EXTENDED RELEASE ORAL DAILY
Status: DISCONTINUED | OUTPATIENT
Start: 2023-07-17 | End: 2023-07-19 | Stop reason: HOSPADM

## 2023-07-16 RX ADMIN — ACETAMINOPHEN 975 MG: 325 TABLET, FILM COATED ORAL at 08:01

## 2023-07-16 RX ADMIN — FUROSEMIDE 60 MG: 10 INJECTION, SOLUTION INTRAVENOUS at 15:41

## 2023-07-16 RX ADMIN — OXYCODONE HYDROCHLORIDE 10 MG: 5 TABLET ORAL at 09:20

## 2023-07-16 RX ADMIN — AMIODARONE HYDROCHLORIDE 600 MG: 200 TABLET ORAL at 20:50

## 2023-07-16 RX ADMIN — OXYCODONE HYDROCHLORIDE 5 MG: 5 TABLET ORAL at 18:09

## 2023-07-16 RX ADMIN — APIXABAN 5 MG: 5 TABLET, FILM COATED ORAL at 20:50

## 2023-07-16 RX ADMIN — ACETAMINOPHEN 975 MG: 325 TABLET, FILM COATED ORAL at 20:50

## 2023-07-16 RX ADMIN — AMIODARONE HYDROCHLORIDE 600 MG: 200 TABLET ORAL at 08:01

## 2023-07-16 RX ADMIN — CEFTRIAXONE SODIUM 2 G: 2 INJECTION, POWDER, FOR SOLUTION INTRAMUSCULAR; INTRAVENOUS at 20:49

## 2023-07-16 RX ADMIN — ACETAMINOPHEN 975 MG: 325 TABLET, FILM COATED ORAL at 13:57

## 2023-07-16 RX ADMIN — APIXABAN 5 MG: 5 TABLET, FILM COATED ORAL at 08:01

## 2023-07-16 RX ADMIN — METOPROLOL SUCCINATE 25 MG: 25 TABLET, EXTENDED RELEASE ORAL at 13:03

## 2023-07-16 RX ADMIN — METOPROLOL SUCCINATE 25 MG: 25 TABLET, EXTENDED RELEASE ORAL at 08:01

## 2023-07-16 RX ADMIN — FUROSEMIDE 60 MG: 10 INJECTION, SOLUTION INTRAVENOUS at 08:07

## 2023-07-16 RX ADMIN — LIDOCAINE 2 PATCH: 560 PATCH PERCUTANEOUS; TOPICAL; TRANSDERMAL at 08:03

## 2023-07-16 RX ADMIN — ATORVASTATIN CALCIUM 20 MG: 10 TABLET, FILM COATED ORAL at 21:13

## 2023-07-16 ASSESSMENT — ACTIVITIES OF DAILY LIVING (ADL)
ADLS_ACUITY_SCORE: 33
ADLS_ACUITY_SCORE: 34
ADLS_ACUITY_SCORE: 33
ADLS_ACUITY_SCORE: 34

## 2023-07-16 NOTE — DISCHARGE INSTRUCTIONS
Apixaban (By mouth)  Apixaban (a-PIX-a-ban)    Treats and prevents blood clots. This medicine is a blood thinner.    Brand Name(s):Eliquis,Eliquis 30 Day DVT/PE Starter Pack  There may be other brand names for this medicine.    When This Medicine Should Not Be Used:  This medicine is not right for everyone. Do not use it if you had an allergic reaction to apixaban or you have active bleeding.    How to Use This Medicine:  Tablet  Your doctor will tell you how much medicine to use. Do not use more than directed.   If you are not able to swallow the tablets whole, they may be crushed and mixed in water, 5% dextrose in water (D5W), apple juice, or applesauce. The crushed tablets may be mixed with 60 mL of water or D5W dose and given through a nasogastric tube (NGT).  This medicine should come with a Medication Guide. Ask your pharmacist for a copy if you do not have one.   Missed dose: Take a dose as soon as you remember. If it is almost time for your next dose, wait until then and take a regular dose. Do not take extra medicine to make up for a missed dose.   Store the medicine in a closed container at room temperature, away from heat, moisture, and direct light.     Drugs and Foods to Avoid:  Ask your doctor or pharmacist before using any other medicine, including over-the-counter medicines, vitamins, and herbal products.  Some medicines can affect how apixaban works. Tell your doctor if you are using any of the following:   Carbamazepine, itraconazole, ketoconazole, phenytoin, rifampin, ritonavir, Marilee's wort  Blood thinner (including clopidogrel, heparin, prasugrel, warfarin)  Medicine to treat depression  NSAID pain or arthritis medicine (including aspirin, celecoxib, diclofenac, ibuprofen, naproxen)    Warnings While Using This Medicine:  Tell your doctor if you are pregnant or breastfeeding, or if you have kidney disease, liver disease, bleeding problems, antiphospholipid syndrome, or an artificial heart  valve.  Do not stop using this medicine suddenly without asking your doctor. You might have a higher risk of stroke for a short time after you stop using this medicine.  This medicine increases your risk for bleeding that can become serious if not controlled. You may also bruise easily, and it may take longer than usual for bleeding to stop.  This medicine may increase your risk for a hematoma (blood clot) in your spine or back if you undergo an epidural or spinal puncture. This could lead to paralysis. Tell your doctor if you ever had spine problems or back surgery.  Tell any doctor or dentist who treats you that you are using this medicine. With your doctor's supervision, you may need to stop using this medicine several days before you have surgery or medical tests.  Your doctor will do lab tests at regular visits to check on the effects of this medicine. Keep all appointments.   Keep all medicine out of the reach of children. Never share your medicine with anyone.     Possible Side Effects While Using This Medicine:  Call your doctor right away if you notice any of these side effects:  Allergic reaction: Itching or hives, swelling in your face or hands, swelling or tingling in your mouth or throat, chest tightness, trouble breathing  Change in how much or how often you urinate, red or pink urine  Chest pain, trouble breathing  Coughing up blood, vomiting blood or material that looks like coffee grounds  Numbness, tingling, or muscle weakness in your legs or feet  Red or black, tarry stools  Unusual bleeding, bruising, or weakness    If you notice other side effects that you think are caused by this medicine, tell your doctor.  Call your doctor for medical advice about side effects. You may report side effects to FDA at 3-178-FDA-9271

## 2023-07-16 NOTE — PROGRESS NOTES
Care Management Follow Up    Length of Stay (days): 4    Expected Discharge Date: 07/18/2023     Concerns to be Addressed: discharge planning     Patient plan of care discussed at interdisciplinary rounds: Yes    Anticipated Discharge Disposition: Home, Skilled Nursing Facility (awaiting mobility assessment)     Anticipated Discharge Services:  TCU  Anticipated Discharge DME:  None    Patient/family educated on Medicare website which has current facility and service quality ratings:  Yes  Education Provided on the Discharge Plan:  Yes  Patient/Family in Agreement with the Plan: yes    Referrals Placed by CM/SW:  TCU  Private pay costs discussed: Not applicable    Additional Information:  PT saw patient this AM and is recommending TCU placement at this time. CM spoke with patient and he is in agreement with TCU placement. Referrals sent to the following:     Destination    Service Provider Request Status Selected Services Address Phone Fax Patient Preferred   [facility] ()  Pending - Request Sent N/A [address] [address] [phone] --   [facility] - REFERRAL ONLY (SNF)  Pending - Request Sent N/A [id number] [facility] [id number] [phone]    [facility] ()  Pending - Request Sent N/A [id number] [facility] [id number] [phone] --   [facility])  Pending - Request Sent N/A [id number] [facility] [id number] [phone] --     Care Management will continue to follow for discharge planning.       Vane Villegas [facility]  Inpatient Care Coordinator   [facility] [phone]  [facility] [phone]

## 2023-07-16 NOTE — PLAN OF CARE
Goal Outcome Evaluation:      Plan of Care Reviewed With: patient    Overall Patient Progress: improvingOverall Patient Progress: improving    Outcome Evaluation: A&Ox4. VSS on room air. Up with lizzie PATRICK and marisol barrow. Denies pain.  overnight. Afib on telemetry. numbness and edema in BLE.

## 2023-07-16 NOTE — PROGRESS NOTES
07/16/23 1100   Appointment Info   Signing Clinician's Name / Credentials (PT) Ludwin Sena, PT   Rehab Comments (PT) PT order for: severe sepsis due to R LE cellulitis, a fib with RVR with ongoing generalized weakness, assist with disposition planning   Living Environment   People in Home spouse   Current Living Arrangements house   Home Accessibility no concerns   Transportation Anticipated family or friend will provide   Living Environment Comments town house   Self-Care   Usual Activity Tolerance fair   Current Activity Tolerance poor   Regular Exercise No   Equipment Currently Used at Home cane, straight;walker, rolling;raised toilet seat   Fall history within last six months no   Activity/Exercise/Self-Care Comment was going to the pool at the [facility] but stopped about 1 year aog after he got hurt from a fall, pt notes he usually uses a cane for ambulation, wheeled walker at times.   General Information   Onset of Illness/Injury or Date of Surgery 07/12/23   Referring Physician Dr Lawrence   Patient/Family Therapy Goals Statement (PT) get more mobile   Pertinent History of Current Problem (include personal factors and/or comorbidities that impact the POC) Alvin Newton is a 89 year old man with paroxysmal atrial fibrillation, type 2 diabetes with polyneuropathy, hypertension, BPH with urinary obstruction, obesity, and chronic bilateral shoulder pain who presented with several day history of increased redness of the right lower leg, confusion, and weakness and was admitted on 7/12/2023 due to concerns for severe sepsis and cellulitis of right lower extremity.  Clinical course has been concerning for septic shock however patient is improving with ongoing antibiotics.  Clinical course now complicated by third spacing of fluid previously needed and worsening of his aortic stenosis to the severe stable.  Patient is underlying cautious diuresis with good results and stable blood pressure to date with ongoing overload  still present.  Will continue with diuresis efforts and have physical therapy evaluate patient to assist with discharge planning.   Existing Precautions/Restrictions fall   Weight-Bearing Status - LLE weight-bearing as tolerated   Weight-Bearing Status - RLE weight-bearing as tolerated   General Observations Pt hard of hearing, marisol steady in room and pt notes leg pain with the transfer. Pt in bedside chair upon PT arrival.   Cognition   Affect/Mental Status (Cognition) confused   Orientation Status (Cognition) person;place;situation;oriented x 3   Follows Commands (Cognition) delayed response/completion   Safety Deficit (Cognition) at risk behavior observed   Cognitive Status Comments Slower responses may be due to hearing issues, difficult to assess.   Pain Assessment   Patient Currently in Pain Yes, see Vital Sign flowsheet  (4-5/10 but just took pain meds, 7-8/10 before the meds.)   Integumentary/Edema   Integumentary/Edema Comments R lower leg red and is being monitored, swelling present   Range of Motion (ROM)   ROM Comment R ankle limited due to swelling, otherwise wfl   Strength (Manual Muscle Testing)   Strength (Manual Muscle Testing) Able to perform R SLR;Able to perform L SLR;strength is WFL   Bed Mobility   Comment, (Bed Mobility) pt just got up in beside chair so did not assess   Transfers   Comment, (Transfers) Pt not able to get up sit to  bedside chair on multiple attempts. Mod assist x 1 sit to stand.   Gait/Stairs (Locomotion)   [place] Level (Gait) contact guard;verbal cues   Assistive Device (Gait) walker, front-wheeled   Distance in Feet 10   Pattern (Gait) step-to   Deviations/Abnormal Patterns (Gait) antalgic;base of support, wide;gait speed decreased;stride length decreased   Comment, (Gait/Stairs) Pt has not been ambulating since admit so did not want to overdo it.   Balance   Balance Comments fair sitting and standing posture   Clinical Impression   Criteria for Skilled  Therapeutic Intervention Yes, treatment indicated   PT Diagnosis (PT) impaired mobility, decreased functional level   Influenced by the following impairments pain, swelling, edema, cellulitis, sepsis, weakness, balance   Functional limitations due to impairments general mobility, transfers, ambulation   Clinical Presentation (PT Evaluation Complexity) Stable/Uncomplicated   Clinical Presentation Rationale clinical judgement   Clinical Decision Making (Complexity) low complexity   Planned Therapy Interventions (PT) balance training;gait training;home exercise program;progressive activity/exercise   Risk & Benefits of therapy have been explained evaluation/treatment results reviewed;care plan/treatment goals reviewed;patient;current/potential barriers reviewed   Clinical Impression Comments Admit on 7/12/23 due to sepsis and R LE cellulitis and has extensive PMH. He has had a decline in his functional level and today was the 1st time he was standing and walking with FWW.   PT Total Evaluation Time   PT Marta Low Complexity Minutes (15695) 15   Physical Therapy Goals   PT Frequency Daily   PT Predicted Duration/Target Date for Goal Attainment 07/19/23   PT Goals Bed Mobility;Transfers;Gait   PT: Bed Mobility Supervision/stand-by assist;Supine to/from sit   PT: Transfers Minimal assist;Sit to/from stand;Bed to/from chair;Assistive device   PT: Gait Supervision/stand-by assist;Rolling walker;25 feet   Interventions   Interventions Quick Adds Therapeutic Activity   Therapeutic Activity   Therapeutic Activities: dynamic activities to improve functional performance Minutes (75431) 15   Symptoms Noted During/After Treatment Fatigue   Treatment Detail/Skilled Intervention pt seated for L and R ankle pumps and could do on R without pain, seated R and L SLR x 10 each and no pain increase on R. Sat edge of chair with SBA x 3 without R LE pain increase, mod assist x 1 sit to stand with FWW x 2' and no pain increase. Did FWW  ambulation with min assist and VCs x 1 x 10 feet and back to bedside chair with LE's elevated and alarm on.   PT Discharge Planning   PT Plan daily for progressive activity/mobility as long as R lower leg tolerates   PT Discharge Recommendation (DC Rec) home with home care physical therapy;Transitional Care Facility   PT Rationale for DC Rec TCU stay likely due to decline in functional status. Pt lives in [facility] with wife and is now mod assist for sit to stand and has not been doing any significant walking since 7/12/23 admit. Would benefit from TCU stay and skilled PT to get back to prior functional level.   PT Brief overview of current status Pt mod assist sit to stand, min assist x 1 for FWW ambulation x 10 feet, no complaints of increased R LE pain with PT, R LE cellulits and pain may limit some activity tolerance, pt with some confusion today but also is quite hard of hearing

## 2023-07-16 NOTE — PROGRESS NOTES
[facility]    Medicine Progress Note - Hospitalist Service    Date of Admission:  7/12/2023    Assessment & Plan     Alvin Newton is a 89 year old man with paroxysmal atrial fibrillation, type 2 diabetes with polyneuropathy, hypertension, BPH with urinary obstruction, obesity, and chronic bilateral shoulder pain who presented with several day history of increased redness of the right lower leg, confusion, and weakness and was admitted on 7/12/2023 due to concerns for severe sepsis and cellulitis of right lower extremity.  Clinical course has been concerning for septic shock however patient is improving with ongoing antibiotics.  Clinical course now complicated by third spacing of fluid previously needed and worsening of his aortic stenosis to the severe stable.  Patient is underlying cautious diuresis with good results and stable blood pressure to date with ongoing overload still present.  Will continue with diuresis efforts and have physical therapy evaluate patient to assist with discharge planning.  Anticipate discharge in 1-2 days depending on patient's ongoing improvement.     Septic shock due to cellulitis of right lower extremity with streptococcal bacteremia  Clinical presentation of cellulitis in the right lower leg with both blood cultures from admission growing Streptococcus dysgalactiae group C.  Has had SIRS with acute organ dysfunction including hypotension, elevated lactic acid level, encephalopathy, acute kidney injury (creatinine as high as 1.32 with baseline creatinine 0.83 in July 2022), thrombocytopenia, and worsened hyperbilirubinemia from baseline all consistent for sepsis.  Procalcitonin has been significantly elevated.  Hypotension was generally responsive to extensive IV fluid resuscitation and withholding chronic Lasix and metoprolol XL and vasopressors were strongly considered although never started.   He probably had septic shock.  He has improved with  improving signs of acute organ dysfunction.  Hypotension and encephalopathy have resolved and blood pressures are now hypertensive despite diuresis.  Lactic acid has normalized.  Renal function is recovering toward baseline.  Repeat blood cultures negative to date.  Urine culture from admission was positive for staph epi of unclear clinical significance but probably not the cause for sepsis.  Patient has been transitioned to IV Rocephin as of 7/14/23 as per antibiotic stewardship recommendations  -Continue with Rocephin while hospitalized with plans to transition to Augmentin for 14 day total course completion as per antibiotic stewardship recommendations.    -Monitor for ongoing clinical improvement  -Continue Toprol-XL but increase to previous chronic dose of 50 mg daily and monitor for tolerance     Paroxysmal atrial fibrillation with rapid ventricular response  Medication induced coagulopathy from Eliquis  Has known paroxysmal atrial fibrillation and has had persistent atrial fibrillation during hospitalization.  Chronic Toprol-XL for rate and rhythm control was held due to septic shock.  Heart rate subsequently trended upward with persistent tachycardia and blood pressure remained intermittently hypotensive, so he was started on oral amiodarone loading dose on 7/13 which he has tolerated well and after which his rate control at rest has been improved.  Blood pressure has also significantly improved and lower than normal doses of Toprol XL restarted and well tolerated.  He chronically takes Eliquis which causes coagulopathy and increases bleeding risk but active bleeding has not been suspected.  -Continue Toprol-XL and increase to 50 mg as per home regimen today and monitor for blood pressure tolerance  -Continue oral amiodarone loading protocol 600 mg twice daily with de-escalating doses per protocol    Peripheral edema  Elevated BNP  Severe aortic stenosis  Dilated ascending aorta  Patient reports history of  peripheral edema for which he takes Lasix daily at baseline.  He has significant peripheral edema probably exacerbated by aggressive IV fluid resuscitation of septic shock and with holding chronic Lasix therapy.  Chest x-ray did not demonstrate findings concerning for CHF and he has not been hypoxic at rest.  He continues to have significant exertional dyspnea and cyanosis with exertion which could be related to symptomatic aortic stenosis, which has progressed from moderate to severe in nature since last ECHO.  Hemodynamic status has improved as he recovers from septic shock and as rate of atrial fibrillation has improved.  -Continue IV Lasix with hold parameters as long as he tolerates that hemodynamically.  Will continue Lasix to 60 mg twice a day timed to allow patient improved sleep overnight  -Patient will need close outpatient follow-up with cardiology after recovery from this acute illness to discuss severe aortic stenosis further.  Discussed with patient and will attempt to get patient an appointment with his cardiologist he sees through [facility].    Type 2 diabetes with polyneuropathy  Good glycemic control so far during hospitalization.  Most recent hemoglobin A1c was 7.0 in March 2023.  Currently manages diabetes with lifestyle measures and no medications at baseline.  -Continue to monitor blood sugars and use NovoLog correction scale to treat hyperglycemia  -Diabetic diet    Hypertension  Chronically takes Toprol-XL and Lasix both of which were held because of hypotension earlier in this stay.   -Increase Toprox XL back to chronic dosing as above  -continue IV lasix as above     Hypomagnesemia  Hypophosphatemia  Hypoalbuminemia  Has developed hypomagnesemia, hypophosphatemia, and hypoalbuminemia during hospitalization in the context of sepsis with decreased oral intake.  Electrolyte disturbances could exacerbate symptoms of weakness and his arrhythmia.  Hypoalbuminemia increases his risk for edema.   Magnesium and phosphorus have improved with supplementation.  -Continue magnesium and phosphorus supplementation per respective protocols  -Continue to monitor magnesium and phosphorus as indicated  -Encourage adequate nutritional intake  -Monitor albumin as indicated    BPH with urinary obstruction  Pyuria  Positive urine culture for staph epi  Chronically symptomatic from BPH likely exacerbated by chronic Lasix therapy.  He does not appear to be taking medication chronically for BPH treatment.  He is voiding spontaneously during hospitalization.  Initial UA demonstrated pyuria with urine culture that grew greater than 100,000 colonies per mL Staphylococcus epidermidis of unclear clinical significance.  He has not reported symptoms suspicious for UTI, and sepsis has been attributed to soft tissue infection with streptococcal bacteremia.  For these reasons, it seems less likely that positive urine culture results are indicative of active infection.  -Monitor spontaneous voiding capacity  -Could also consider adding Flomax or similar medication for treatment of BPH if there are increasing concerns for symptomatic BPH    Thrombocytopenia  He developed mild thrombocytopenia due to sepsis.  Thrombocytopenia has been improving as sepsis resolves and is near normal today  -Monitor platelet count to ensure ongoing resolution     Hyperbilirubinemia  Has chronically elevated serum bilirubin level which worsened due to sepsis.  He has had previous cholecystectomy and most recent abdominal ultrasound in October 2022 demonstrated normal common bile duct but findings suspicious for hepatic fibrosis.  He may be at risk for worsening liver function tests after starting amiodarone, but bilirubin is trending toward improvement and transaminases remain normal.  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  Shoulder pain has not been responsive over the long-term to previous injection therapy.  Current shoulder pain is similar to his usual baseline and there are no clinical findings to suggest an acute inflammatory process including infection in either shoulder.  -Continue topical Aspercreme as needed to the shoulders  -Continue scheduled dose of acetaminophen  -Continue oxycodone as needed for moderate to severe shoulder pain  -Continue Lidoderm patch to the shoulders     Obesity  Chronic with elevated BMI currently 37 although likely higher than baseline because of significant volume overload currently.  Obesity likely contributes to chronic health problems including diabetes.  -No acute intervention     Diet: Moderate Consistent Carb (60 g CHO per Meal) Diet  Snacks/Supplements Adult: Ensure Clear; Between Meals    DVT Prophylaxis: DOAC  Eastman Catheter: Not present  Lines: None     Cardiac Monitoring: ACTIVE order. Indication: Tachyarrhythmias, acute (48 hours)  Code Status: Full Code      Clinically Significant Risk Factors        # Hypokalemia: Lowest K = 3.2 mmol/L in last 2 days, will replace as needed       # Hypoalbuminemia: Lowest albumin = 2.8 g/dL at 7/14/2023  5:09 AM, will monitor as appropriate     # Hypertension: Noted on problem list        # Obesity: Estimated body mass index is 32.93 kg/m  as calculated from the following:    Height as of this encounter: 1.803 m (5' 11\").    Weight as of this encounter: 107.1 kg (236 lb 1.6 oz).           Disposition Plan      Expected Discharge Date: 07/18/2023    Discharge Delays: OT Disposition recs needed  PT Disposition recs needed  Destination: other (comment) (Home vs HHC vs TCU)            Arlene Lawrence MD  Hospitalist Service  [facility]  Securely message with [employer] (more info)  Text page via [employer] " Paging/Directory   ______________________________________________________________________    Interval History   Patient reports sleeping much better overnight and is pleased by that.  Afebrile and vitals stable with blood pressure in the normal to slightly hypertensive stage over the past 24 hours. He feels his right leg pain is still ongoing for longer than he was planning on but oxycodone is working very well to help control his symptoms.  No new patient symptoms.  No new nursing concerns.      Physical Exam   Vital Signs: Temp: 97.8  F (36.6  C) Temp src: Oral BP: 124/75 Pulse: 68   Resp: 16 SpO2: 96 % O2 Device: None (Room air)    Weight: 236 lbs 1.6 oz    Constitutional: awake, alert, cooperative, no apparent distress, and appears stated age  Respiratory: No increased work of breathing, good air exchange, clear to auscultation bilaterally, no crackles or wheezing  Cardiovascular: irregularly irregular rhythm with rate well controlled  GI: bowel sounds present, abdomen obese but soft and non-tender   Skin: Patient has ongoing dull redness and swelling but improved warmth of the right lower leg and overall improved from yesterday   Neurologic: Awake, alert, oriented to name, place and overall to situation     Medical Decision Making       55 MINUTES SPENT BY ME on the date of service doing chart review, history, exam, documentation & further activities per the note.      Data     I have personally reviewed the following data over the past 24 hrs:    7.2  \   N/A   / 149 (L)     141 101 26.3 (H) /  160 (H)   3.5 27 1.20 (H) \       ALT: 25 AST: 25 AP: 258 (H) TBILI: 2.1 (H)   ALB: 2.9 (L) TOT PROTEIN: 6.9 LIPASE: N/A       Procal: N/A CRP: 129.84 (H) Lactic Acid: N/A

## 2023-07-16 NOTE — PLAN OF CARE
Goal Outcome Evaluation:      Plan of Care Reviewed With: patient    Overall Patient Progress: improvingOverall Patient Progress: improving    Outcome Evaluation: patient A&O, increased metropolol today, worked with PT, PRN oxy 10mg for pain, afib on tele, possible TCU

## 2023-07-17 ENCOUNTER — APPOINTMENT (OUTPATIENT)
Dept: PHYSICAL THERAPY | Facility: CLINIC | Age: 88
DRG: 871 | End: 2023-07-17
Attending: HOSPITALIST
Payer: COMMERCIAL

## 2023-07-17 LAB
ALBUMIN SERPL BCG-MCNC: 2.5 G/DL (ref 3.5–5.2)
ALP SERPL-CCNC: 260 U/L (ref 40–129)
ALT SERPL W P-5'-P-CCNC: 19 U/L (ref 0–70)
ANION GAP SERPL CALCULATED.3IONS-SCNC: 15 MMOL/L (ref 7–15)
AST SERPL W P-5'-P-CCNC: 27 U/L (ref 0–45)
BILIRUB SERPL-MCNC: 1.6 MG/DL
BUN SERPL-MCNC: 25.1 MG/DL (ref 8–23)
CALCIUM SERPL-MCNC: 9.3 MG/DL (ref 8.8–10.2)
CHLORIDE SERPL-SCNC: 99 MMOL/L (ref 98–107)
CREAT SERPL-MCNC: 1.17 MG/DL (ref 0.67–1.17)
CRP SERPL-MCNC: 124.76 MG/L
DEPRECATED HCO3 PLAS-SCNC: 21 MMOL/L (ref 22–29)
GFR SERPL CREATININE-BSD FRML MDRD: 60 ML/MIN/1.73M2
GLUCOSE BLDC GLUCOMTR-MCNC: 122 MG/DL (ref 70–99)
GLUCOSE BLDC GLUCOMTR-MCNC: 126 MG/DL (ref 70–99)
GLUCOSE BLDC GLUCOMTR-MCNC: 126 MG/DL (ref 70–99)
GLUCOSE BLDC GLUCOMTR-MCNC: 131 MG/DL (ref 70–99)
GLUCOSE SERPL-MCNC: 116 MG/DL (ref 70–99)
MAGNESIUM SERPL-MCNC: 1.8 MG/DL (ref 1.7–2.3)
PHOSPHATE SERPL-MCNC: 3.8 MG/DL (ref 2.5–4.5)
PLATELET # BLD AUTO: 158 10E3/UL (ref 150–450)
POTASSIUM SERPL-SCNC: 3.7 MMOL/L (ref 3.4–5.3)
PROT SERPL-MCNC: 6.9 G/DL (ref 6.4–8.3)
SODIUM SERPL-SCNC: 135 MMOL/L (ref 136–145)

## 2023-07-17 PROCEDURE — 250N000013 HC RX MED GY IP 250 OP 250 PS 637: Performed by: FAMILY MEDICINE

## 2023-07-17 PROCEDURE — 36415 COLL VENOUS BLD VENIPUNCTURE: CPT | Performed by: FAMILY MEDICINE

## 2023-07-17 PROCEDURE — 250N000013 HC RX MED GY IP 250 OP 250 PS 637: Performed by: PEDIATRICS

## 2023-07-17 PROCEDURE — 85049 AUTOMATED PLATELET COUNT: CPT | Performed by: FAMILY MEDICINE

## 2023-07-17 PROCEDURE — 99233 SBSQ HOSP IP/OBS HIGH 50: CPT | Performed by: FAMILY MEDICINE

## 2023-07-17 PROCEDURE — 84100 ASSAY OF PHOSPHORUS: CPT | Performed by: FAMILY MEDICINE

## 2023-07-17 PROCEDURE — 83735 ASSAY OF MAGNESIUM: CPT | Performed by: FAMILY MEDICINE

## 2023-07-17 PROCEDURE — 80053 COMPREHEN METABOLIC PANEL: CPT | Performed by: FAMILY MEDICINE

## 2023-07-17 PROCEDURE — 86140 C-REACTIVE PROTEIN: CPT | Performed by: FAMILY MEDICINE

## 2023-07-17 PROCEDURE — 250N000011 HC RX IP 250 OP 636: Mod: JZ | Performed by: FAMILY MEDICINE

## 2023-07-17 PROCEDURE — 250N000011 HC RX IP 250 OP 636: Mod: JZ | Performed by: PEDIATRICS

## 2023-07-17 PROCEDURE — 97530 THERAPEUTIC ACTIVITIES: CPT | Mod: GP | Performed by: PHYSICAL THERAPIST

## 2023-07-17 PROCEDURE — 120N000001 HC R&B MED SURG/OB

## 2023-07-17 PROCEDURE — 97110 THERAPEUTIC EXERCISES: CPT | Mod: GP | Performed by: PHYSICAL THERAPIST

## 2023-07-17 RX ADMIN — AMIODARONE HYDROCHLORIDE 300 MG: 100 TABLET ORAL at 20:35

## 2023-07-17 RX ADMIN — ATORVASTATIN CALCIUM 20 MG: 10 TABLET, FILM COATED ORAL at 21:54

## 2023-07-17 RX ADMIN — METOPROLOL SUCCINATE 50 MG: 50 TABLET, EXTENDED RELEASE ORAL at 08:24

## 2023-07-17 RX ADMIN — APIXABAN 5 MG: 5 TABLET, FILM COATED ORAL at 20:36

## 2023-07-17 RX ADMIN — CEFTRIAXONE SODIUM 2 G: 2 INJECTION, POWDER, FOR SOLUTION INTRAMUSCULAR; INTRAVENOUS at 19:39

## 2023-07-17 RX ADMIN — LIDOCAINE 2 PATCH: 560 PATCH PERCUTANEOUS; TOPICAL; TRANSDERMAL at 08:34

## 2023-07-17 RX ADMIN — APIXABAN 5 MG: 5 TABLET, FILM COATED ORAL at 08:24

## 2023-07-17 RX ADMIN — FUROSEMIDE 60 MG: 10 INJECTION, SOLUTION INTRAVENOUS at 16:14

## 2023-07-17 RX ADMIN — AMIODARONE HYDROCHLORIDE 300 MG: 100 TABLET ORAL at 08:24

## 2023-07-17 RX ADMIN — ACETAMINOPHEN 975 MG: 325 TABLET, FILM COATED ORAL at 20:34

## 2023-07-17 RX ADMIN — OXYCODONE HYDROCHLORIDE 5 MG: 5 TABLET ORAL at 14:51

## 2023-07-17 RX ADMIN — OXYCODONE HYDROCHLORIDE 5 MG: 5 TABLET ORAL at 10:02

## 2023-07-17 RX ADMIN — FUROSEMIDE 60 MG: 10 INJECTION, SOLUTION INTRAVENOUS at 08:27

## 2023-07-17 RX ADMIN — ACETAMINOPHEN 975 MG: 325 TABLET, FILM COATED ORAL at 13:21

## 2023-07-17 RX ADMIN — ACETAMINOPHEN 975 MG: 325 TABLET, FILM COATED ORAL at 08:24

## 2023-07-17 RX ADMIN — OXYCODONE HYDROCHLORIDE 5 MG: 5 TABLET ORAL at 06:42

## 2023-07-17 ASSESSMENT — ACTIVITIES OF DAILY LIVING (ADL)
ADLS_ACUITY_SCORE: 33

## 2023-07-17 NOTE — PROGRESS NOTES
[facility]    Medicine Progress Note - Hospitalist Service    Date of Admission:  7/12/2023    Assessment & Plan   Alvin Newton is a 89 year old man with paroxysmal atrial fibrillation, type 2 diabetes with polyneuropathy, hypertension, BPH with urinary obstruction, obesity, and chronic bilateral shoulder pain who presented with several day history of increased redness of the right lower leg, confusion, and weakness and was admitted on 7/12/2023 due to concerns for severe sepsis and cellulitis of right lower extremity.  Clinical course has been concerning for septic shock however patient is improving with ongoing antibiotics however clinical course now complicated by third spacing of fluid previously needed and worsening of his aortic stenosis to the severe stable.  Patient is being cautious diuresed with good results and stable blood pressure to date with ongoing overload still present.  Will continue with diuresis efforts.  TCU is recommended at discharge with referrals pending.  Anticipate discharge in 1-2 days depending on patient's ongoing improvement.     Septic shock due to cellulitis of right lower extremity with streptococcal bacteremia  Clinical presentation of cellulitis in the right lower leg with both blood cultures from admission growing Streptococcus dysgalactiae group C.  Has had SIRS with acute organ dysfunction including hypotension, elevated lactic acid level, encephalopathy, acute kidney injury (creatinine as high as 1.32 with baseline creatinine 0.83 in July 2022), thrombocytopenia, and worsened hyperbilirubinemia from baseline all consistent for sepsis.  Procalcitonin has been significantly elevated.  Hypotension was generally responsive to extensive IV fluid resuscitation and withholding chronic Lasix and metoprolol XL and vasopressors were strongly considered although never started.   He probably had septic shock.  He has improved with improving signs of acute  organ dysfunction.  Hypotension and encephalopathy have resolved and blood pressures are now hypertensive despite diuresis.  Lactic acid has normalized.  Renal function is recovering toward baseline.  Repeat blood cultures negative to date.  Urine culture from admission was positive for staph epi of unclear clinical significance but probably not the cause for sepsis.  Patient has been transitioned to IV Rocephin as of 7/14/23 as per antibiotic stewardship recommendations.  Blood pressures became hypertensive and home metoprolol restarted.  -Continue with Rocephin while hospitalized with plans to transition to Augmentin for 14 day total course completion as per antibiotic stewardship recommendations.    -Monitor for ongoing clinical improvement  -Continue Toprol-XL at previous chronic dose of 50 mg daily and monitor for tolerance     Paroxysmal atrial fibrillation with rapid ventricular response  Medication induced coagulopathy from Eliquis  Has known paroxysmal atrial fibrillation and has had persistent atrial fibrillation during hospitalization.  Chronic Toprol-XL for rate and rhythm control was held due to septic shock.  Heart rate subsequently trended upward with persistent tachycardia and blood pressure remained intermittently hypotensive, so he was started on oral amiodarone loading dose on 7/13 which he has tolerated well and after which his rate control at rest has been improved.  Blood pressure has also significantly improved and became slightly hypertensive and he is now back on his chronic home regimen.  He chronically takes Eliquis which has been continued during this stay  -Continue Toprol-XL 50 mg as per home regimen and monitor for blood pressure tolerance  -Continue oral amiodarone loading protocol 600 mg twice daily with de-escalating doses per protocol    Peripheral edema  Elevated BNP  Severe aortic stenosis  Dilated ascending aorta  Patient reports history of peripheral edema for which he takes  Lasix daily at baseline.  He has significant peripheral edema probably exacerbated by aggressive IV fluid resuscitation of septic shock and with holding chronic Lasix therapy.  Chest x-ray did not demonstrate findings concerning for CHF and he has not been hypoxic at rest.  He continues to have significant exertional dyspnea and cyanosis with exertion which could be related to symptomatic aortic stenosis, which has progressed from moderate to severe in nature since last ECHO.  Hemodynamic status has improved as he recovers from septic shock and as rate of atrial fibrillation has improved.  -Continue IV Lasix with hold parameters as long as he tolerates that hemodynamically.  Will continue Lasix to 60 mg twice a day timed to allow patient improved sleep overnight  -Patient will need close outpatient follow-up with cardiology after recovery from this acute illness to discuss severe aortic stenosis further.  Discussed with patient and will attempt to get patient an appointment with his cardiologist he sees through [facility].    Type 2 diabetes with polyneuropathy  Good glycemic control so far during hospitalization.  Most recent hemoglobin A1c was 7.0 in March 2023.  Currently manages diabetes with lifestyle measures and no medications at baseline.  -Continue to monitor blood sugars and use NovoLog correction scale to treat hyperglycemia  -Diabetic diet    Hypertension  Chronically takes Toprol-XL and Lasix both of which were held because of hypotension earlier in this stay.   -Continue Toprox XL at chronic dosing as above  -continue IV lasix as above     Hypomagnesemia  Hypophosphatemia  Hypoalbuminemia  Has developed hypomagnesemia, hypophosphatemia, and hypoalbuminemia during hospitalization in the context of sepsis with decreased oral intake.  Electrolyte disturbances could exacerbate symptoms of weakness and his arrhythmia.  Hypoalbuminemia increases his risk for edema.  Magnesium and phosphorus have improved with  supplementation.  -Continue magnesium and phosphorus supplementation per respective protocols  -Continue to monitor magnesium and phosphorus as indicated  -Encourage adequate nutritional intake  -Monitor albumin as indicated    BPH with urinary obstruction  Pyuria  Positive urine culture for staph epi  Chronically symptomatic from BPH likely exacerbated by chronic Lasix therapy.  He does not appear to be taking medication chronically for BPH treatment.  He is voiding spontaneously during hospitalization.  Initial UA demonstrated pyuria with urine culture that grew greater than 100,000 colonies per mL Staphylococcus epidermidis of unclear clinical significance.  He has not reported symptoms suspicious for UTI, and sepsis has been attributed to soft tissue infection with streptococcal bacteremia.  For these reasons, it seems less likely that positive urine culture results are indicative of active infection.  -Monitor spontaneous voiding capacity  -Could also consider adding Flomax or similar medication for treatment of BPH if there are increasing concerns for symptomatic BPH    Thrombocytopenia  He developed mild thrombocytopenia due to sepsis.  Thrombocytopenia has been improving as sepsis resolves and is normal today  -no further routine monitoring needed as long as patient continues to clinically improve    Hyperbilirubinemia  Has chronically elevated serum bilirubin level which worsened due to sepsis.  He has had previous cholecystectomy and most recent abdominal ultrasound in October 2022 demonstrated normal common bile duct but findings suspicious for hepatic fibrosis.  He may be at risk for worsening liver function tests after starting amiodarone, but bilirubin is trending toward improvement and transaminases remain normal.  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  Shoulder pain has not been responsive over the long-term to previous injection therapy.  Current shoulder pain is similar to his usual baseline and there are no clinical findings to suggest an acute inflammatory process including infection in either shoulder.  -Continue topical Aspercreme as needed to the shoulders  -Continue scheduled dose of acetaminophen  -Continue oxycodone as needed for moderate to severe shoulder pain  -Continue Lidoderm patch to the shoulders     Obesity  Chronic with elevated BMI currently 37 although likely higher than baseline because of significant volume overload currently.  Obesity likely contributes to chronic health problems including diabetes.  -No acute intervention       Diet: Moderate Consistent Carb (60 g CHO per Meal) Diet  Snacks/Supplements Adult: Ensure Clear; Between Meals    DVT Prophylaxis: DOAC  Eastman Catheter: Not present  Lines: None     Cardiac Monitoring: None  Code Status: Full Code      Clinically Significant Risk Factors           # Hypercalcemia: corrected calcium is >10.1, will monitor as appropriate    # Hypoalbuminemia: Lowest albumin = 2.5 g/dL at 7/17/2023  6:15 AM, will monitor as appropriate     # Hypertension: Noted on problem list        # Obesity: Estimated body mass index is 34.59 kg/m  as calculated from the following:    Height as of this encounter: 1.803 m (5' 11\").    Weight as of this encounter: 112.5 kg (248 lb 0.3 oz).           Disposition Plan      Expected Discharge Date: 07/18/2023    Discharge Delays: OT Disposition recs needed  PT Disposition recs needed  Destination: other (comment) (Home vs HHC vs TCU)  Discharge Comments: TCU referrals pending.          Arlene Lawrence MD  Hospitalist Service  [facility]  Securely message with [employer] (more info)  Text page via [facility] Paging/Directory "   ______________________________________________________________________    Interval History   Patient had a good night - slept well with avoidance of late night Lasix.  Had severe pain upon attempting to stand this morning which has improved with prn oxycodone administration.  Ongoing swelling and tenderness in the right lower leg and patient discouraged as he thought he would be much further along in his improvement after this long.  He is understanding of the need for TCU but is also discouraged by it because he wants to go home.  Still not having much of an appetite and feels his energy level is low but has no new symptoms.  No new nursing concerns     Physical Exam   Vital Signs: Temp: 98.1  F (36.7  C) Temp src: Oral BP: 118/75 Pulse: 72   Resp: 18 SpO2: 95 % O2 Device: None (Room air)    Weight: 248 lbs .28 oz    Constitutional: awake, alert, cooperative but appears discouraged and down affect, no apparent distress, and appears stated age  Respiratory: No increased work of breathing, good air exchange, clear to auscultation bilaterally, no crackles or wheezing  Cardiovascular: irregularly irregular rhythm  GI: bowel sounds present, abdomen soft and non-tender  Skin: patient has ongoing edema, dark erythema, and mild tenderness on palpation of right lower leg in area of cellulitis involvement  Musculoskeletal: ongoing bilateral lower leg edema, worse in right than left  Neurologic: Awake, alert, oriented to name, place and situation     Medical Decision Making       50 MINUTES SPENT BY ME on the date of service doing chart review, history, exam, documentation & further activities per the note.      Data     I have personally reviewed the following data over the past 24 hrs:    N/A  \   N/A   / 158     135 (L) 99 25.1 (H) /  126 (H)   3.7 21 (L) 1.17 \       ALT: 19 AST: 27 AP: 260 (H) TBILI: 1.6 (H)   ALB: 2.5 (L) TOT PROTEIN: 6.9 LIPASE: N/A       Procal: N/A CRP: 124.76 (H) Lactic Acid: N/A

## 2023-07-17 NOTE — PROGRESS NOTES
"Nutrition Therapy Update: Brief Note    Notified by dietary staff that pt no longer would like Ensure clear provided daily, reportedly is tired of drinking them. Writer d/c'd order from [employer] and removed plan from [employer].     Pt's intake remains inconsistent, frequently 25% but did have one intake of 75% and another of 100%. Appetite lasted as \"fair\" this AM with intake of 25%.     Please continue to encourage oral intake - will meet with pt as able to see if pt is open to another supplement and/or additional intervention.     Will continue to follow pt to monitor oral intake, weight changes, and GI symptoms/BMs.    Khadijah Donaldson RD, LD  Clinical Dietitian  Office: [phone]  Weekend pager: [phone]  "

## 2023-07-17 NOTE — PLAN OF CARE
Goal Outcome Evaluation:      Plan of Care Reviewed With: patient    Overall Patient Progress: improvingOverall Patient Progress: improving    Outcome Evaluation: patient A&O, IV lasix, AM checks on k+, mag, phos, no replacements, PRN oxy for pain, RLE edema, Ax1 with walker/gaitbelt, waiting on TCU placement

## 2023-07-17 NOTE — PLAN OF CARE
Goal Outcome Evaluation:      Plan of Care Reviewed With: patient    Overall Patient Progress: improvingOverall Patient Progress: improving    Outcome Evaluation: Pt is A&Ox4. VSS on RA. Pt is afebrile. Pt is up with 1A, gb and walker and is up to chair. Pt reports right leg pain/tightness which pt reports is improving with PO oxycodone and elevation. Blood glucose before dinner was 126. Pt is on IV rocephin. Pt is on K, Mag, phos protocol to be rechecked tomorrow AM. Appetite still is decreased, pt is snacking in between meals. Pt reports he does not like ensures.

## 2023-07-17 NOTE — PLAN OF CARE
Goal Outcome Evaluation:      Plan of Care Reviewed With: patient    Overall Patient Progress: improvingOverall Patient Progress: improving    Outcome Evaluation: A&Ox4. VSS on room air. Ambulating A2 w/gait belt and marisol steady. Oxycodone given for pain. Edema in BLE +2. Lungs clear. Voiding adequately. RLE hot and red. Cellulitis still within markings.

## 2023-07-17 NOTE — PLAN OF CARE
Goal Outcome Evaluation:           Overall Patient Progress: improvingOverall Patient Progress: improving    Outcome Evaluation: Pt is A&Ox4. VSS and afebrile. Pt is up with 2A, gb and marisol edwards. Pt reported right leg pain and took 5 mg of PO Oxycodone which brought pain down to a tolerable pain level for pt. Pt's appetite was improved for dinner this evening eating 75% of meal. Pt denies nausea. Redness to RLE is within marked area-small blisters on leg are intact and appear to be serous fluid filled. No drainage noted. Pt is on IV rocephin. Blood glucose have been 160-138 this shift. Pt's K, Mag and Phos to be rechecked tomorrow AM.

## 2023-07-17 NOTE — PROGRESS NOTES
Care Management Follow Up    Length of Stay (days): 5    Expected Discharge Date: 07/18/2023     Concerns to be Addressed: discharge planning       Patient plan of care discussed at interdisciplinary rounds: Yes    Anticipated Discharge Disposition: TCU      Anticipated Discharge Services: PT, skilled nursing     Anticipated Discharge DME:  None     Patient/family educated on Medicare website which has current facility and service quality ratings:  Yes     Education Provided on the Discharge Plan:  Yes     Patient/Family in Agreement with the Plan: yes    Referrals Placed by CM/SW:  Post Acute Facilities     Private pay costs discussed: transportation costs    Additional Information:  Writer received voice mail message from Riana with [facility] #[phone] inquiring about the referral that was sent over the weekend on this patient.  Writer called Riana back and had to leave her a voice mail.  Will await call back.      8439- Writer has visited with patient, wife, and daughter- Fuad.  Discussed pending referrals:      [facility] ()  Pending - Request Sent N/A [address] [id number] [phone] --   Internal Comment last updated by Elaine Vega MA 7/17/2023 1046    7/17 0938 sent a follow-up email-regla [facility] - REFERRAL ONLY (SNF)  Pending - Request Sent N/A [id number] [facility] [id number] [phone]    Internal Comment last updated by Elaine Vega MA 7/17/2023 1042    7/17 0944 follow-up email sent to Trish TAYLOR [facility] ()  Pending - Request Sent N/A [id number] [facility] [id number] [phone] --   [facility])  Pending - Request Sent N/A [address] [facility] [id number] [phone] --     Discussed opening at [facility].  Patient has been there before, but family  reports he did not have the best experience at that time.  Patient and family are considering this option, but do want to hear from the other facilities first.  Writer is awaiting communication back from the other 3 facilities.        HEATH Rae  [facility]   [phone]

## 2023-07-18 ENCOUNTER — APPOINTMENT (OUTPATIENT)
Dept: PHYSICAL THERAPY | Facility: CLINIC | Age: 88
DRG: 871 | End: 2023-07-18
Attending: HOSPITALIST
Payer: COMMERCIAL

## 2023-07-18 LAB
ALBUMIN SERPL BCG-MCNC: 2.5 G/DL (ref 3.5–5.2)
ALP SERPL-CCNC: 248 U/L (ref 40–129)
ALT SERPL W P-5'-P-CCNC: 19 U/L (ref 0–70)
ANION GAP SERPL CALCULATED.3IONS-SCNC: 13 MMOL/L (ref 7–15)
AST SERPL W P-5'-P-CCNC: 24 U/L (ref 0–45)
BACTERIA BLD CULT: NO GROWTH
BACTERIA BLD CULT: NO GROWTH
BILIRUB SERPL-MCNC: 1.4 MG/DL
BUN SERPL-MCNC: 24.9 MG/DL (ref 8–23)
CALCIUM SERPL-MCNC: 8.9 MG/DL (ref 8.8–10.2)
CHLORIDE SERPL-SCNC: 98 MMOL/L (ref 98–107)
CK SERPL-CCNC: 41 U/L (ref 39–308)
CREAT SERPL-MCNC: 1.16 MG/DL (ref 0.67–1.17)
CRP SERPL-MCNC: 128.87 MG/L
DEPRECATED HCO3 PLAS-SCNC: 25 MMOL/L (ref 22–29)
GFR SERPL CREATININE-BSD FRML MDRD: 60 ML/MIN/1.73M2
GLUCOSE BLDC GLUCOMTR-MCNC: 109 MG/DL (ref 70–99)
GLUCOSE BLDC GLUCOMTR-MCNC: 117 MG/DL (ref 70–99)
GLUCOSE BLDC GLUCOMTR-MCNC: 117 MG/DL (ref 70–99)
GLUCOSE BLDC GLUCOMTR-MCNC: 124 MG/DL (ref 70–99)
GLUCOSE BLDC GLUCOMTR-MCNC: 138 MG/DL (ref 70–99)
GLUCOSE SERPL-MCNC: 112 MG/DL (ref 70–99)
MAGNESIUM SERPL-MCNC: 1.8 MG/DL (ref 1.7–2.3)
PHOSPHATE SERPL-MCNC: 3.4 MG/DL (ref 2.5–4.5)
POTASSIUM SERPL-SCNC: 3.2 MMOL/L (ref 3.4–5.3)
POTASSIUM SERPL-SCNC: 3.3 MMOL/L (ref 3.4–5.3)
POTASSIUM SERPL-SCNC: 3.5 MMOL/L (ref 3.4–5.3)
PROT SERPL-MCNC: 6.7 G/DL (ref 6.4–8.3)
SODIUM SERPL-SCNC: 136 MMOL/L (ref 136–145)
WBC # BLD AUTO: 7.1 10E3/UL (ref 4–11)

## 2023-07-18 PROCEDURE — 84100 ASSAY OF PHOSPHORUS: CPT | Performed by: FAMILY MEDICINE

## 2023-07-18 PROCEDURE — 120N000001 HC R&B MED SURG/OB

## 2023-07-18 PROCEDURE — 250N000013 HC RX MED GY IP 250 OP 250 PS 637: Performed by: FAMILY MEDICINE

## 2023-07-18 PROCEDURE — 84132 ASSAY OF SERUM POTASSIUM: CPT | Performed by: PEDIATRICS

## 2023-07-18 PROCEDURE — 85048 AUTOMATED LEUKOCYTE COUNT: CPT | Performed by: FAMILY MEDICINE

## 2023-07-18 PROCEDURE — 250N000013 HC RX MED GY IP 250 OP 250 PS 637: Performed by: PEDIATRICS

## 2023-07-18 PROCEDURE — 36415 COLL VENOUS BLD VENIPUNCTURE: CPT | Performed by: PEDIATRICS

## 2023-07-18 PROCEDURE — 83735 ASSAY OF MAGNESIUM: CPT | Performed by: FAMILY MEDICINE

## 2023-07-18 PROCEDURE — 250N000011 HC RX IP 250 OP 636: Mod: JZ | Performed by: FAMILY MEDICINE

## 2023-07-18 PROCEDURE — 86140 C-REACTIVE PROTEIN: CPT | Performed by: FAMILY MEDICINE

## 2023-07-18 PROCEDURE — 80053 COMPREHEN METABOLIC PANEL: CPT | Performed by: FAMILY MEDICINE

## 2023-07-18 PROCEDURE — 82550 ASSAY OF CK (CPK): CPT | Performed by: PEDIATRICS

## 2023-07-18 PROCEDURE — 97530 THERAPEUTIC ACTIVITIES: CPT | Mod: GP | Performed by: PHYSICAL THERAPIST

## 2023-07-18 PROCEDURE — 36415 COLL VENOUS BLD VENIPUNCTURE: CPT | Performed by: FAMILY MEDICINE

## 2023-07-18 PROCEDURE — 99233 SBSQ HOSP IP/OBS HIGH 50: CPT | Performed by: PEDIATRICS

## 2023-07-18 PROCEDURE — 250N000011 HC RX IP 250 OP 636: Mod: JZ | Performed by: PEDIATRICS

## 2023-07-18 RX ORDER — POTASSIUM CHLORIDE 1500 MG/1
40 TABLET, EXTENDED RELEASE ORAL ONCE
Status: COMPLETED | OUTPATIENT
Start: 2023-07-18 | End: 2023-07-18

## 2023-07-18 RX ADMIN — POTASSIUM CHLORIDE 40 MEQ: 1500 TABLET, EXTENDED RELEASE ORAL at 13:16

## 2023-07-18 RX ADMIN — FUROSEMIDE 60 MG: 10 INJECTION, SOLUTION INTRAVENOUS at 08:03

## 2023-07-18 RX ADMIN — AMIODARONE HYDROCHLORIDE 300 MG: 100 TABLET ORAL at 20:04

## 2023-07-18 RX ADMIN — APIXABAN 5 MG: 5 TABLET, FILM COATED ORAL at 20:06

## 2023-07-18 RX ADMIN — OXYCODONE HYDROCHLORIDE 5 MG: 5 TABLET ORAL at 16:08

## 2023-07-18 RX ADMIN — AMIODARONE HYDROCHLORIDE 300 MG: 100 TABLET ORAL at 09:58

## 2023-07-18 RX ADMIN — FUROSEMIDE 60 MG: 10 INJECTION, SOLUTION INTRAVENOUS at 16:09

## 2023-07-18 RX ADMIN — METOPROLOL SUCCINATE 50 MG: 50 TABLET, EXTENDED RELEASE ORAL at 08:02

## 2023-07-18 RX ADMIN — ATORVASTATIN CALCIUM 20 MG: 10 TABLET, FILM COATED ORAL at 21:48

## 2023-07-18 RX ADMIN — POTASSIUM CHLORIDE 40 MEQ: 1500 TABLET, EXTENDED RELEASE ORAL at 08:02

## 2023-07-18 RX ADMIN — LIDOCAINE 2 PATCH: 560 PATCH PERCUTANEOUS; TOPICAL; TRANSDERMAL at 08:04

## 2023-07-18 RX ADMIN — ACETAMINOPHEN 975 MG: 325 TABLET, FILM COATED ORAL at 08:02

## 2023-07-18 RX ADMIN — CEFTRIAXONE SODIUM 2 G: 2 INJECTION, POWDER, FOR SOLUTION INTRAMUSCULAR; INTRAVENOUS at 19:58

## 2023-07-18 RX ADMIN — ACETAMINOPHEN 975 MG: 325 TABLET, FILM COATED ORAL at 13:16

## 2023-07-18 RX ADMIN — APIXABAN 5 MG: 5 TABLET, FILM COATED ORAL at 08:02

## 2023-07-18 RX ADMIN — ACETAMINOPHEN 975 MG: 325 TABLET, FILM COATED ORAL at 20:02

## 2023-07-18 ASSESSMENT — ACTIVITIES OF DAILY LIVING (ADL)
ADLS_ACUITY_SCORE: 33

## 2023-07-18 NOTE — PROGRESS NOTES
Care Management Follow Up    Length of Stay (days): 6    Expected Discharge Date: 07/18/2023     Concerns to be Addressed: discharge planning       Patient plan of care discussed at interdisciplinary rounds: Yes    Anticipated Discharge Disposition: Home, Skilled Nursing Facility (awaiting mobility assessment)     Anticipated Discharge Services: TCU      Anticipated Discharge DME:  None     Patient/family educated on Medicare website which has current facility and service quality ratings:  Yes     Education Provided on the Discharge Plan:  Yes     Patient/Family in Agreement with the Plan: yes    Referrals Placed by CM/SW:  Post hospital facilities     Private pay costs discussed: transportation costs    Additional Information:  Susanar is awaiting communication back from [facility] Phone: (Admissions: [phone] RN Report: [phone] Fax: [phone]) regarding possible male TCU opening for today.      Writer has had communication with [facility] (Phone: [phone] Fax: [phone]). She does not have a male bed for today.      Also awaiting communication back from [facility].      Family is hoping for placement at one of the above facilities.      1509- Both [facility] and [place] [facility] have a bed available for tomorrow.  Writer has called and spoken with daughter, Fuad, as she is the primary contact for patient, per discussion with patient, wife, and daughter yesterday.  Writer updated her on options and discussed that it is possible patient could be ready for discharge tomorrow, but not a guarantee.      PT has reported that patient could transport by family car, as he has improved enough for this.      Care Management will continue to follow and assist with discharge planning.        HEATH Rae  [facility]   [phone]

## 2023-07-18 NOTE — PROGRESS NOTES
[facility]    Medicine Progress Note - Hospitalist Service    Date of Admission:  7/12/2023    Assessment & Plan   Alvin Newton is a 89 year old man with paroxysmal atrial fibrillation, type 2 diabetes with polyneuropathy, hypertension, BPH with urinary obstruction, obesity, and chronic bilateral shoulder pain who presented with several day history of increased redness of the right lower leg, confusion, and weakness and was admitted on 7/12/2023 due to concerns for severe sepsis and cellulitis of right lower extremity.  Clinical course has been concerning for septic shock     Septic shock due to cellulitis of right lower extremity with streptococcal bacteremia  Clinical presentation of cellulitis in the right lower leg with both blood cultures from admission that grew Streptococcus dysgalactiae group C.  He had SIRS with acute organ dysfunction including hypotension, elevated lactic acid level, encephalopathy, acute kidney injury (creatinine as high as 1.32 with baseline creatinine 0.83 in July 2022), thrombocytopenia, and worsened hyperbilirubinemia from baseline all consistent for sepsis.  Procalcitonin was significantly elevated.  Hypotension was generally responsive to extensive IV fluid resuscitation and withholding chronic Lasix and metoprolol XL.  Although vasopressors were strongly considered, they were never started.   He probably had septic shock.  Sepsis improved with resolving signs of acute organ dysfunction.  Hypotension and encephalopathy have resolved and blood pressures became hypertensive.  Lactic acid normalized.  Renal function is recovering toward baseline.  Repeat blood cultures were negative.  Urine culture from admission grew staph epi of unclear clinical significance but probably not the cause for sepsis.  Patient was switched to IV Rocephin as of 7/14/23 as per antibiotic stewardship recommendations.  Patient continues to have significant right lower leg  pain and swelling.  CT imaging had not demonstrated any abscess or other radiographic signs of complication earlier in hospitalization.  He chronically has been receiving therapeutic anticoagulation, so DVT has not been suspected.  -Continue Rocephin while hospitalized but transition to Augmentin to complete 14 day treatment course per antibiotic stewardship recommendations (today is day 7)    -Ordered CK level  -Ordered recheck of CRP  -Continue symptomatic treatment of right leg pain with analgesics as needed  -May bear weight on right leg and advance activity as tolerated    Paroxysmal atrial fibrillation with rapid ventricular response  Medication induced coagulopathy from Eliquis  Has known paroxysmal atrial fibrillation with persistent atrial fibrillation during hospitalization.  Chronic Toprol-XL for rate and rhythm control was held due to septic shock.  Heart rate then trended upward with persistent tachycardia and blood pressure remained intermittently hypotensive, so he was started on oral amiodarone loading dose on 7/13 which he has tolerated well and after which his rate control at rest has been improved.  Blood pressure has also significantly improved and he is now tolerating his chronic dose of Toprol XL.  He chronically takes Eliquis which has been continued during this stay  -Continue Toprol-XL 50 mg as per home regimen and monitor for blood pressure tolerance  -Continue oral amiodarone loading protocol 600 mg twice daily with de-escalating doses per protocol    Peripheral edema  Elevated BNP  Severe aortic stenosis  Dilated ascending aorta  Patient reports history of peripheral edema for which he takes Lasix daily at baseline.  He has significant peripheral edema probably exacerbated by aggressive IV fluid resuscitation of septic shock and while holding chronic Lasix therapy.  Chest x-ray did not demonstrate findings concerning for CHF and he was not hypoxic at rest.  He had significant exertional  dyspnea and cyanosis with exertion possibly due to symptomatic severe aortic stenosis which has worsened last ECHO in 2019.  Hemodynamic status has improved as he recovers from septic shock and as rate of atrial fibrillation has improved.  -Continue IV Lasix with hold parameters as long as he tolerates it hemodynamically  -anticipate close outpatient follow-up with [facility] (previously seen at [facility]) after recovery from this acute illness to discuss severe aortic stenosis further    Type 2 diabetes with polyneuropathy  Good glycemic control during hospitalization.  Most recent hemoglobin A1c was 7.0 in March 2023.  Currently manages diabetes with lifestyle measures and no medications at baseline.  -Continue to monitor blood sugars and use NovoLog correction scale to treat hyperglycemia  -Diabetic diet    Hypertension  Chronically takes Toprol-XL and Lasix both of which were held because of hypotension earlier in this stay.   -Continue Toprox XL at chronic dosing  -continue IV lasix    Hypomagnesemia  Hypophosphatemia  Hypoalbuminemia  Hypokalemia  Has developed hypomagnesemia, hypophosphatemia, and hypoalbuminemia during hospitalization in the context of sepsis with decreased oral intake.  Electrolyte disturbances could exacerbate symptoms of weakness and his arrhythmia.  Hypoalbuminemia increases his risk for edema.  Magnesium and phosphorus have improved with supplementation.  He also developed hypokalemia as acute kidney injury subsided and after initiating IV Lasix.  Potassium is improving with supplementation.  -Continue potassium, magnesium and phosphorus supplementation per respective protocols  -Continue to monitor magnesium and phosphorus as indicated  -Encourage adequate nutritional intake  -Monitor albumin as indicated    BPH with urinary obstruction  Pyuria  Positive urine culture for staph epi  Chronically symptomatic from BPH likely exacerbated by chronic Lasix therapy.  He does  not appear to be taking medication chronically for BPH treatment.  He is voiding spontaneously during hospitalization.  Initial UA demonstrated pyuria with urine culture that grew greater than 100,000 colonies per mL Staphylococcus epidermidis of unclear clinical significance.  He has not reported symptoms suspicious for UTI, and sepsis has been attributed to soft tissue infection with streptococcal bacteremia.  For these reasons, it seems less likely that positive urine culture results are indicative of active infection.  -Monitor spontaneous voiding capacity  -Could also consider adding Flomax or similar medication for treatment of BPH if there are increasing concerns for symptomatic BPH    Thrombocytopenia  He developed mild thrombocytopenia due to sepsis.  Thrombocytopenia has resolved  -no further routine monitoring needed as long as patient continues to clinically improve    Hyperbilirubinemia  Has chronically elevated serum bilirubin level which worsened due to sepsis.  He has had previous cholecystectomy and most recent abdominal ultrasound in October 2022 demonstrated normal common bile duct but findings suspicious for hepatic fibrosis.  He may be at risk for worsening liver function tests after starting amiodarone, but bilirubin is trending toward improvement and transaminases remain normal.  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  Shoulder pain has not been responsive over the long-term to previous injection therapy.  Current shoulder pain is similar to his usual baseline and there are no clinical findings to suggest an acute inflammatory process including infection in either shoulder.  -Continue topical Aspercreme as needed to the shoulders  -Continue scheduled dose of  acetaminophen  -Continue oxycodone as needed for moderate to severe shoulder pain  -Continue Lidoderm patch to the shoulders     Obesity  Chronic with elevated BMI currently 37 although likely higher than baseline because of significant volume overload currently.  Obesity likely contributes to chronic health problems including diabetes.  -No acute intervention       Diet: Moderate Consistent Carb (60 g CHO per Meal) Diet    DVT Prophylaxis: DOAC  Eastman Catheter: Not present  Lines: None     Cardiac Monitoring: None  Code Status: Full Code      Clinically Significant Risk Factors                            Disposition Plan     Expected Discharge Date: 07/18/2023      Destination: other (comment) (Home vs HHC vs TCU)  Discharge Comments: TCU referrals pending.          Matt Valera MD  Hospitalist Service  [facility]  Securely message with [employer] (more info)  Text page via [email or web address] Paging/Directory   ______________________________________________________________________    Interval History   There were no significant overnight events.  He continues to report pain in his right lower leg.  However, he has been able to stand, bear weight, and ambulate on his right leg.  His right leg pain is not any worse but it has not improved very much.  He is not sure if the leg swelling has improved.  He remains afebrile and hemodynamically stable.  Oxygenation remains normal.  He is tolerating good oral intake.  He is voiding well after doses of IV Lasix.    Physical Exam   Vital Signs: Temp: 98.3  F (36.8  C) Temp src: Oral BP: 139/73 Pulse: 99   Resp: 20 SpO2: 98 % O2 Device: None (Room air)    Patient Vitals for the past 24 hrs:   BP Temp Temp src Pulse Resp SpO2 Weight   07/18/23 0735 139/73 98.3  F (36.8  C) Oral 99 20 98 % --   07/18/23 0554 -- -- -- -- -- -- 113.5 kg (250 lb 4.8 oz)   07/18/23 0248 121/54 98.7  F (37.1  C) Oral 57 20 93 % --   07/17/23 2307 118/66 97.7  F (36.5  C) Oral 80 18  95 % --   07/17/23 1927 123/67 98.3  F (36.8  C) Oral 88 20 95 % --   07/17/23 1522 101/62 97.9  F (36.6  C) Oral 100 20 94 % --   07/17/23 1125 119/74 97.9  F (36.6  C) Oral 86 20 96 % --     Weight: 250 lbs 4.8 oz  Vitals:    07/13/23 1410 07/14/23 0500 07/16/23 0649 07/17/23 0636   Weight: 113.9 kg (251 lb) 107.1 kg (236 lb 1.6 oz) 117.1 kg (258 lb 2.5 oz) 112.5 kg (248 lb 0.3 oz)    07/18/23 0554   Weight: 113.5 kg (250 lb 4.8 oz)     Wt Readings from Last 4 Encounters:   07/18/23 113.5 kg (250 lb 4.8 oz)   07/11/23 118.1 kg (260 lb 4.8 oz)   12/21/22 106.6 kg (235 lb)   09/22/22 111.4 kg (245 lb 11.2 oz)       Intake/Output Summary (Last 24 hours) at 7/18/2023 1043  Last data filed at 7/18/2023 1003  Gross per 24 hour   Intake 750 ml   Output 2000 ml   Net -1250 ml     General Appearance: Elderly man without signs of acute distress while resting in bed  Respiratory: Normal respiratory effort, clear lungs  Cardiovascular: Irregularly irregular heart rate and rhythm, good radial pulse, brisk capillary refill  Skin: Right lower leg is erythematous, warm, and tender to touch without crepitus  Musculoskeletal: There is asymmetric peripheral edema in the lower legs with moderate edema in the right lower leg and mild edema in the left lower leg    Medical Decision Making             Data     I have personally reviewed the following data over the past 24 hrs:    7.1  \   N/A   / N/A     136 98 24.9 (H) /  109 (H)   3.2 (L) 25 1.16 \     ALT: 19 AST: 24 AP: 248 (H) TBILI: 1.4 (H)   ALB: 2.5 (L) TOT PROTEIN: 6.7 LIPASE: N/A     Procal: N/A CRP: 128.87 (H) Lactic Acid: N/A         Blood sugars ranged 112-126 over the last day  CRP yesterday was 124.8    Recent Labs   Lab 07/18/23  0735 07/18/23  0632 07/18/23  0244 07/17/23  0747 07/17/23  0615 07/16/23  1638 07/16/23  1411 07/16/23  0739 07/16/23  0613 07/15/23  0739 07/15/23  0559 07/13/23  0826 07/13/23  0527 07/12/23  0809 07/12/23  0536 07/12/23  0534 07/11/23  2124    WBC  --  7.1  --   --   --   --   --   --  7.2  --  9.5   < > 13.0*  --  10.9  --  9.6   HGB  --   --   --   --   --   --   --   --   --   --   --   --  14.3  --  14.3  --  16.1   MCV  --   --   --   --   --   --   --   --   --   --   --   --  91  --  91  --  91   PLT  --   --   --   --  158  --   --   --  149*  --  158   < > 132*  --  133*  --  159   NA  --  136  --   --  135*  --   --   --  141  --  138   < > 135*   < > 138  --  139   POTASSIUM  --  3.2*  --   --  3.7  --   --   --  3.5   < > 3.2*   < > 4.3   < > 4.2  --  4.2   CHLORIDE  --  98  --   --  99  --   --   --  101  --  102   < > 103   < > 104  --  104   CO2  --  25  --   --  21*  --   --   --  27  --  22   < > 21*   < > 19*  --  24   BUN  --  24.9*  --   --  25.1*  --   --   --  26.3*  --  27.3*   < > 27.3*   < > 28.5*  --  28.3*   CR  --  1.16  --   --  1.17  --  1.20*  --  1.37*  --  1.21*   < > 1.25*   < > 1.18*  --  1.32*   ANIONGAP  --  13  --   --  15  --   --   --  13  --  14   < > 11   < > 15  --  11   JERRY  --  8.9  --   --  9.3  --   --   --  9.1  --  8.9   < > 9.0   < > 9.2  --  10.0   * 112* 117*   < > 116*   < >  --    < > 121*   < > 142*   < > 137*   < > 152*   < > 140*   ALBUMIN  --  2.5*  --   --  2.5*  --   --   --  2.9*  --  2.9*   < > 3.0*   < >  --   --  4.1   PROTTOTAL  --  6.7  --   --  6.9  --   --   --  6.9  --  6.7   < > 6.4   < >  --   --  7.3   BILITOTAL  --  1.4*  --   --  1.6*  --   --   --  2.1*  --  2.4*   < > 3.3*   < >  --   --  3.0*   ALKPHOS  --  248*  --   --  260*  --   --   --  258*  --  220*   < > 115   < >  --   --  170*   ALT  --  19  --   --  19  --   --   --  25  --  27   < > 23   < >  --   --  20   AST  --  24  --   --  27  --   --   --  25  --  25   < > 22   < >  --   --  27   LIPASE  --   --   --   --   --   --   --   --   --   --   --   --   --   --   --   --  15    < > = values in this interval not displayed.

## 2023-07-18 NOTE — PLAN OF CARE
Goal Outcome Evaluation:      Plan of Care Reviewed With: patient    Overall Patient Progress: improvingOverall Patient Progress: improving     Vitals stable on RA. A&Ox4. Pt reported pain in RLE has decreased but continues with chronic pain in R shoulder. Lido patch removed. Mag, phos and K protocols will be ran in the AM.  and novolog held.

## 2023-07-18 NOTE — PLAN OF CARE
Goal Outcome Evaluation:      Plan of Care Reviewed With: patient    Overall Patient Progress: no changeOverall Patient Progress: no change    Outcome Evaluation: Pt is A/Ox4. VSS on RA. Afebrile. Up with assist of 1 with walker and gaitbelt. Encouraged to be up in the chair. Pt continues to report pain and tightness in right leg. Recieved scheduled pain medication this shift. Blood sugars today were 109,124, recieved no insulin coverage. Potassium was replaced x2 this shift. Magnesium and phosphorus will be rechecked in the morning. Continues to have poor diet. Will continue to monitor.

## 2023-07-19 ENCOUNTER — LAB REQUISITION (OUTPATIENT)
Dept: LAB | Facility: CLINIC | Age: 88
End: 2023-07-19

## 2023-07-19 VITALS
HEIGHT: 71 IN | SYSTOLIC BLOOD PRESSURE: 125 MMHG | TEMPERATURE: 98.2 F | OXYGEN SATURATION: 97 % | HEART RATE: 94 BPM | RESPIRATION RATE: 18 BRPM | BODY MASS INDEX: 34.66 KG/M2 | WEIGHT: 247.6 LBS | DIASTOLIC BLOOD PRESSURE: 63 MMHG

## 2023-07-19 DIAGNOSIS — M25.511 CHRONIC PAIN OF BOTH SHOULDERS: ICD-10-CM

## 2023-07-19 DIAGNOSIS — M25.512 CHRONIC PAIN OF BOTH SHOULDERS: ICD-10-CM

## 2023-07-19 DIAGNOSIS — G89.29 CHRONIC PAIN OF BOTH SHOULDERS: ICD-10-CM

## 2023-07-19 DIAGNOSIS — R76.12 NONSPECIFIC REACTION TO CELL MEDIATED IMMUNITY MEASUREMENT OF GAMMA INTERFERON ANTIGEN RESPONSE WITHOUT ACTIVE TUBERCULOSIS: ICD-10-CM

## 2023-07-19 PROBLEM — I50.30 DIASTOLIC CONGESTIVE HEART FAILURE, UNSPECIFIED HF CHRONICITY (H): Status: ACTIVE | Noted: 2023-03-31

## 2023-07-19 LAB
ANION GAP SERPL CALCULATED.3IONS-SCNC: 10 MMOL/L (ref 7–15)
BUN SERPL-MCNC: 23.8 MG/DL (ref 8–23)
CALCIUM SERPL-MCNC: 9.2 MG/DL (ref 8.8–10.2)
CHLORIDE SERPL-SCNC: 99 MMOL/L (ref 98–107)
CREAT SERPL-MCNC: 1.43 MG/DL (ref 0.67–1.17)
CRP SERPL-MCNC: 116.48 MG/L
DEPRECATED HCO3 PLAS-SCNC: 31 MMOL/L (ref 22–29)
GFR SERPL CREATININE-BSD FRML MDRD: 47 ML/MIN/1.73M2
GLUCOSE BLDC GLUCOMTR-MCNC: 107 MG/DL (ref 70–99)
GLUCOSE BLDC GLUCOMTR-MCNC: 116 MG/DL (ref 70–99)
GLUCOSE SERPL-MCNC: 122 MG/DL (ref 70–99)
HOLD SPECIMEN: NORMAL
MAGNESIUM SERPL-MCNC: 2 MG/DL (ref 1.7–2.3)
PHOSPHATE SERPL-MCNC: 2.9 MG/DL (ref 2.5–4.5)
POTASSIUM SERPL-SCNC: 3.8 MMOL/L (ref 3.4–5.3)
SODIUM SERPL-SCNC: 140 MMOL/L (ref 136–145)

## 2023-07-19 PROCEDURE — 83735 ASSAY OF MAGNESIUM: CPT | Performed by: PEDIATRICS

## 2023-07-19 PROCEDURE — 250N000013 HC RX MED GY IP 250 OP 250 PS 637: Performed by: PEDIATRICS

## 2023-07-19 PROCEDURE — 250N000013 HC RX MED GY IP 250 OP 250 PS 637: Performed by: FAMILY MEDICINE

## 2023-07-19 PROCEDURE — 36415 COLL VENOUS BLD VENIPUNCTURE: CPT | Performed by: PEDIATRICS

## 2023-07-19 PROCEDURE — 86140 C-REACTIVE PROTEIN: CPT | Performed by: PEDIATRICS

## 2023-07-19 PROCEDURE — 99239 HOSP IP/OBS DSCHRG MGMT >30: CPT | Performed by: PEDIATRICS

## 2023-07-19 PROCEDURE — 80048 BASIC METABOLIC PNL TOTAL CA: CPT | Performed by: PEDIATRICS

## 2023-07-19 PROCEDURE — 84100 ASSAY OF PHOSPHORUS: CPT | Performed by: PEDIATRICS

## 2023-07-19 RX ORDER — OXYCODONE HYDROCHLORIDE 10 MG/1
10 TABLET ORAL EVERY 4 HOURS PRN
Qty: 4 TABLET | Refills: 0 | Status: SHIPPED | OUTPATIENT
Start: 2023-07-19 | End: 2023-07-19

## 2023-07-19 RX ORDER — FUROSEMIDE 40 MG
80 TABLET ORAL DAILY
Status: DISCONTINUED | OUTPATIENT
Start: 2023-07-19 | End: 2023-07-19 | Stop reason: HOSPADM

## 2023-07-19 RX ORDER — FUROSEMIDE 20 MG
80 TABLET ORAL EVERY MORNING
DISCHARGE
Start: 2023-07-19 | End: 2023-08-07

## 2023-07-19 RX ORDER — OXYCODONE HYDROCHLORIDE 5 MG/1
5 TABLET ORAL EVERY 4 HOURS PRN
Qty: 4 TABLET | Refills: 0 | Status: SHIPPED | OUTPATIENT
Start: 2023-07-19 | End: 2023-07-19

## 2023-07-19 RX ORDER — LIDOCAINE 4 G/G
2 PATCH TOPICAL EVERY 24 HOURS
Status: ON HOLD | DISCHARGE
Start: 2023-07-19 | End: 2024-01-22

## 2023-07-19 RX ORDER — OXYCODONE HYDROCHLORIDE 5 MG/1
5-10 TABLET ORAL EVERY 4 HOURS PRN
Qty: 20 TABLET | Refills: 0 | Status: ON HOLD | OUTPATIENT
Start: 2023-07-19 | End: 2023-07-27

## 2023-07-19 RX ORDER — AMIODARONE HYDROCHLORIDE 200 MG/1
200 TABLET ORAL DAILY
DISCHARGE
Start: 2023-07-20

## 2023-07-19 RX ADMIN — AMIODARONE HYDROCHLORIDE 200 MG: 200 TABLET ORAL at 08:15

## 2023-07-19 RX ADMIN — OXYCODONE HYDROCHLORIDE 5 MG: 5 TABLET ORAL at 03:18

## 2023-07-19 RX ADMIN — LIDOCAINE 2 PATCH: 560 PATCH PERCUTANEOUS; TOPICAL; TRANSDERMAL at 08:13

## 2023-07-19 RX ADMIN — ACETAMINOPHEN 975 MG: 325 TABLET, FILM COATED ORAL at 08:15

## 2023-07-19 RX ADMIN — METOPROLOL SUCCINATE 50 MG: 50 TABLET, EXTENDED RELEASE ORAL at 08:14

## 2023-07-19 RX ADMIN — APIXABAN 5 MG: 5 TABLET, FILM COATED ORAL at 08:15

## 2023-07-19 RX ADMIN — FUROSEMIDE 80 MG: 40 TABLET ORAL at 08:14

## 2023-07-19 RX ADMIN — OXYCODONE HYDROCHLORIDE 10 MG: 5 TABLET ORAL at 09:34

## 2023-07-19 ASSESSMENT — ACTIVITIES OF DAILY LIVING (ADL)
ADLS_ACUITY_SCORE: 33

## 2023-07-19 NOTE — PLAN OF CARE
Goal Outcome Evaluation:      Plan of Care Reviewed With: patient    Overall Patient Progress: improvingOverall Patient Progress: improving     Vitals stable. Pt continues with chronic pain in shoulders and 5/10 pain in RLE. Lido patch removed. Pt K was 3.5 at 1815 and set up for AM recheck. Pt BS was 117, 116, novolog was held. PRN oxycodone given was given at 0320. Pt daughter Milana called and left message with staff requesting cardiac consult due to family friend who was a Dr concerns with fluid retention related to stenosis.

## 2023-07-19 NOTE — DISCHARGE SUMMARY
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)    Thrombocytopenia (H)    Hyperbilirubinemia    Hypophosphatemia    Hypoalbuminemia    Hypomagnesemia    Aortic stenosis    Elevated brain natriuretic peptide (BNP) level    Ascending aorta dilatation (H)    Peripheral edema    Positive urine culture    Medication induced coagulopathy (H)    BPH with urinary obstruction    Essential hypertension    Obesity, morbid, BMI 40.0-49.9 (H)    Pyuria    Chronic pain of both shoulders    Clinically Significant Risk Factors     # Obesity: Estimated body mass index is 34.53 kg/m  as calculated from the following:    Height as of this encounter: 1.803 m (5' 11\").    Weight as of this encounter: 112.3 kg (247 lb 9.6 oz).       Follow-ups Needed After Discharge   Follow-up Appointments     Follow Up and recommended labs and tests      Follow up with [facility] physician.  The following labs/tests are   recommended: basic metabolic panel within 1 week.          Discharge Disposition   Discharged to TCU  Condition at discharge: Stable    Hospital Course   Alvin Newton is a 89 year old man with paroxysmal atrial fibrillation, type 2 diabetes with polyneuropathy, hypertension, BPH with urinary obstruction, obesity, and chronic bilateral shoulder pain who presented with several day history of increased redness of the right lower leg, confusion, and weakness and was admitted on 7/12/2023 due to concerns for severe sepsis and cellulitis of right lower extremity.      Septic shock due to cellulitis of right lower " extremity with streptococcal bacteremia  Clinically presented with cellulitis in the right lower leg with both blood cultures from admission that grew Streptococcus dysgalactiae group C.  He had SIRS with acute organ dysfunction including hypotension, elevated lactic acid level, encephalopathy, acute kidney injury (creatinine initially as high as 1.32 with baseline creatinine 0.83 in July 2022), thrombocytopenia, and worsened hyperbilirubinemia from baseline all concerning for sepsis.  Procalcitonin was significantly elevated.  Hypotension was generally responsive to extensive IV fluid resuscitation and withholding chronic Lasix and metoprolol XL.  Although vasopressors were strongly considered, they were never started.   He probably had septic shock.  Sepsis improved with resolving signs of acute organ dysfunction.  Hypotension and encephalopathy resolved and blood pressures became hypertensive.  Lactic acid normalized.  Renal function started to recover toward baseline although trended toward worsening after aggressive diuresis.  Repeat blood cultures were negative.  Urine culture from admission grew staph epi of unclear clinical significance but probably not the cause for sepsis.  After initial treatment with vancomycin and Zosyn, patient was switched to IV Rocephin as of 7/14/23 per antibiotic stewardship recommendations.  Patient continued to have significant right lower leg pain and swelling.  CT imaging did not demonstrate any abscess or other radiographic signs of complication.  CK was normal.  Pain was adequately controlled with oral analgesics by discharge.  He chronically had been receiving therapeutic anticoagulation, so DVT was not suspected.  At discharge, infectious disease had advised transition from IV Rocephin to oral Augmentin for another week of antibiotic treatment.  Continuing monitoring of cellulitis clinically and recheck of CRP appeared appropriate.    Paroxysmal atrial fibrillation with  rapid ventricular response  Medication induced coagulopathy from Eliquis  Has known paroxysmal atrial fibrillation with persistent atrial fibrillation during hospitalization.  Chronic Toprol-XL for rate and rhythm control was held due to septic shock.  Heart rate then trended upward with persistent tachycardia from rapid atrial fibrillation and blood pressure remained intermittently hypotensive, so he was started on oral amiodarone loading protocol on 7/13 which he tolerated well and after which his rate control at rest improved.  Blood pressure also significantly improved and he subsequently tolerated restarting his chronic dose of Toprol XL.  He chronically takes Eliquis which was continued during this stay.    Peripheral edema  Elevated BNP  Severe aortic stenosis  Dilated ascending aorta  Patient reported history of peripheral edema for which he has been taking Lasix daily at baseline.  He had significant peripheral edema probably exacerbated by aggressive IV fluid resuscitation of septic shock and initially holding chronic Lasix therapy.  Chest x-ray did not demonstrate findings concerning for CHF and he was not hypoxic at rest.  BNP was elevated.  Echocardiogram demonstrated severe aortic stenosis.  He had significant exertional dyspnea and cyanosis with exertion probably due to symptomatic severe aortic stenosis which had worsened compared with prior Echo in 2019.  Hemodynamic status improved as he recovered from septic shock and as rate of atrial fibrillation improved.  Due to concern that worsening peripheral edema exacerbated soft tissue infection of the right leg, he was treated with high doses of IV Lasix with recovery of weight to nearly admission weight by discharge.  However, after treatment with IV Lasix, creatinine did start to trend upward.  At discharge he was advised to continue higher dose oral Lasix with recommendation to recheck renal function within a week.  Outpatient follow-up with his  primary cardiologist at [facility] in [place] regarding worsening aortic stenosis was recommended.    Type 2 diabetes with polyneuropathy  Good glycemic control during hospitalization.  Most recent hemoglobin A1c was 7.0 in March 2023.  Previously had managed diabetes with lifestyle measures and no medications at baseline.  Continued monitoring of blood sugars after discharge was recommended.    Hypertension  Chronically takes Toprol-XL and Lasix both of which were held because of hypotension earlier in this stay.  As septic shock resolved, both Toprol-XL and Lasix were restarted.    Hypomagnesemia  Hypophosphatemia  Hypoalbuminemia  Hypokalemia  He developed hypomagnesemia, hypophosphatemia, and hypoalbuminemia during hospitalization in the context of sepsis with decreased oral intake.  Electrolyte disturbances may have exacerbated symptoms of weakness and his arrhythmia.  Hypoalbuminemia increases his risk for edema.  Magnesium and phosphorus improved with supplementation.  He also developed hypokalemia as acute kidney injury subsided and after initiating IV Lasix.  Potassium also improved with supplementation.  As he was recovering from acute illness, all oral nutritional intake was improving.    BPH with urinary obstruction  Pyuria  Positive urine culture for staph epi  Chronically symptomatic from BPH likely exacerbated by chronic Lasix therapy.  He did not appear to be taking medication chronically for BPH treatment.  He voided spontaneously during hospitalization.  Initial UA demonstrated pyuria with urine culture that grew greater than 100,000 colonies per mL Staphylococcus epidermidis of unclear clinical significance.  He had not reported symptoms suspicious for UTI, and sepsis was attributed to soft tissue infection and streptococcal bacteremia.  For these reasons, it seems un likely that positive urine culture results were indicative of active infection.    Thrombocytopenia  He developed mild  thrombocytopenia due to sepsis.  Thrombocytopenia resolved    Hyperbilirubinemia  Has chronically elevated serum bilirubin level which worsened due to sepsis.  He has had previous cholecystectomy and most recent abdominal ultrasound in October 2022 demonstrated normal common bile duct but findings suspicious for hepatic fibrosis.  As he recovered from septic shock and even after continuing chronic statin therapy and starting amiodarone, bilirubin is trended toward improvement and transaminases remained normal.    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  Shoulder pain has not been responsive over the long-term to previous injection therapy.  Current shoulder pain is similar to his usual baseline and there were no clinical findings to suggest an acute inflammatory process including infection in either shoulder.  Shoulder pain was treated with topical Lidoderm patch to be shoulder, Aspercreme as needed to the shoulders, scheduled dose of acetaminophen, and oxycodone as needed for moderate to severe shoulder pain    Obesity  Chronic with elevated BMI currently 37 although likely higher than baseline because of significant volume overload currently.  Obesity likely contributes to chronic health problems including diabetes.    Consultations This Hospital Stay   PHARMACY TO DOSE VANCO  CARE MANAGEMENT / SOCIAL WORK IP CONSULT  NUTRITION SERVICES ADULT IP CONSULT  PHYSICAL THERAPY ADULT IP CONSULT  PHYSICAL THERAPY ADULT IP CONSULT    Code Status   Full Code    Time Spent on this Encounter   I, Matt Valera MD, personally saw the patient today and spent greater than 30 minutes discharging this patient.       Matt Valera MD  [address] MEDICAL SURGICAL  [address] [place] [address]  Phone:  [phone]  ______________________________________________________________________    Physical Exam   Vital Signs: Temp: 98.2  F (36.8  C) Temp src: Oral BP: 125/63 Pulse: 94   Resp: 18 SpO2: 97 % O2 Device: None (Room air)    Weight: 247 lbs 9.6 oz  General Appearance: Elderly man, currently appears uncomfortable sitting up in a chair  Skin: Fading erythema on right lower leg with 2 small bullae containing serous fluid anteriorly  Other: Mild peripheral edema left lower leg and moderate peripheral edema in right lower leg       Primary Care Physician   Steven Duane Semmler    Discharge Orders      General info for SNF    Length of Stay Estimate: Short Term Care: Estimated # of Days <30  Condition at Discharge: Improving  Level of care:skilled   Rehabilitation Potential: Good  Admission H&P remains valid and up-to-date: Yes  Recent Chemotherapy: N/A  Use Nursing Home Standing Orders: Yes     Mantoux instructions    Give two-step Mantoux (PPD) Per Facility Policy Yes     Follow Up and recommended labs and tests    Follow up with [facility] physician.  The following labs/tests are recommended: basic metabolic panel within 1 week.     Reason for your hospital stay    Hospitalized due to severe infection (bloodstream infection, skin infection and sepsis) and improved     Glucose monitor nursing POCT    Once daily at varying times before meals and at bedtime     Daily weights    Call Provider for weight gain of more than 2 pounds per day or 5 pounds per week.     Activity - Up with nursing assistance     Full Code     Physical Therapy Adult Consult    Evaluate and treat as clinically indicated.    Reason:  cellulitis and bacteremia with septic shock, weakness     Diet    Follow this diet upon discharge: Orders Placed This Encounter      Moderate Consistent Carb (60 g CHO per Meal) and 2 gm Na Diet       Significant Results and Procedures   Most Recent 3 CBC's:  Recent Labs   Lab Test 07/18/23  0632 07/17/23  0615  07/16/23  0613 07/15/23  0559 07/14/23  0509 07/13/23  0527 07/12/23  0536 07/11/23  2244   WBC 7.1  --  7.2 9.5   < > 13.0* 10.9 9.6   HGB  --   --   --   --   --  14.3 14.3 16.1   MCV  --   --   --   --   --  91 91 91   PLT  --  158 149* 158   < > 132* 133* 159    < > = values in this interval not displayed.     Most Recent 3 BMP's:  Recent Labs   Lab Test 07/19/23  0803 07/19/23  0610 07/19/23  0144 07/18/23  2110 07/18/23  1813 07/18/23  1653 07/18/23  1245 07/18/23  0735 07/18/23  0632 07/17/23  0747 07/17/23  0615   NA  --  140  --   --   --   --   --   --  136  --  135*   POTASSIUM  --  3.8  --   --  3.5  --  3.3*  --  3.2*  --  3.7   CHLORIDE  --  99  --   --   --   --   --   --  98  --  99   CO2  --  31*  --   --   --   --   --   --  25  --  21*   BUN  --  23.8*  --   --   --   --   --   --  24.9*  --  25.1*   CR  --  1.43*  --   --   --   --   --   --  1.16  --  1.17   ANIONGAP  --  10  --   --   --   --   --   --  13  --  15   JERRY  --  9.2  --   --   --   --   --   --  8.9  --  9.3   * 122* 116*   < >  --    < >  --    < > 112*   < > 116*    < > = values in this interval not displayed.     Most Recent 2 LFT's:  Recent Labs   Lab Test 07/18/23  0632 07/17/23  0615   AST 24 27   ALT 19 19   ALKPHOS 248* 260*   BILITOTAL 1.4* 1.6*     7-Day Micro Results       Collected Updated Procedure Result Status      07/12/2023 2241 07/18/2023 0418 Blood Culture Hand, Right [[id number]]    Blood from Hand, Right    Final result Component Value   Culture No Growth               07/12/2023 2211 07/18/2023 0418 Blood Culture Arm, Left [[id number]]   Blood from Arm, Left    Final result Component Value   Culture No Growth               07/12/2023 1825 07/13/2023 0022 MRSA MSSA PCR, Nasal Swab [[id number]]    Swab from Nares, Bilateral    Final result Component Value   MRSA Target DNA Negative   SA Target DNA Positive                  Most Recent Hemoglobin A1c:  Recent Labs   Lab Test 12/27/17  0736   A1C 6.6*      Most Recent 6 glucoses:  Recent Labs   Lab Test 07/19/23  0803 07/19/23  0610 07/19/23  0144 07/18/23  2110 07/18/23  1653 07/18/23  1150   * 122* 116* 117* 138* 124*     Most Recent ESR & CRP:  Recent Labs   Lab Test 07/19/23  0610   CRPI 116.48*   ,   Results for orders placed or performed during the hospital encounter of 07/12/23   XR Tibia & Fibula Port Right 2 Views    Narrative    EXAM: XR TIBIA and FIBULA PORT RIGHT 2 VIEWS  LOCATION: [facility]  DATE: 7/12/2023    INDICATION: right LE leg cellulitis  COMPARISON: None.      Impression    IMPRESSION: Soft tissue edema of the leg. No fracture, dislocation, bone destruction or radiopaque foreign body.   CT Tibia Fibula Right w Contrast    Narrative    CT RIGHT TIBIA FIBULA/LOWER LEG WITH CONTRAST  7/12/2023 9:17 AM    CLINICAL HISTORY:  Rapidly developing cellulitis and severe sepsis  (possible septic shock) with concern for necrotizing fasciitis, please  include foot and lower right leg up to the knee. Leg pain.    TECHNIQUE: CT scan of the right tibia and fibula following injection  of IV contrast. Multiplanar reformats were obtained. Dose reduction  techniques were used.  CONTRAST: Isovue 370, 100 mL    COMPARISON: 7/12/2023 radiographs.     FINDINGS:    BONES: No acute fracture. No evidence of osteomyelitis. No evidence of  a lytic or sclerotic lesion in the visualized bones. Small chronic  avulsion fracture of the medial aspect of the neck of the talus. Mild  degenerative changes throughout the midfoot. Advanced degenerative  changes at the first MTP joint. Mild degenerative changes in the  medial and patellofemoral compartments of the knee.    SOFT TISSUES: Moderate circumferential subcutaneous cellulitis in the  distal calf extending into the ankle and foot. No discrete fluid  collection to suggest an abscess. No soft tissue gas. Arch atrophy of  the intrinsic musculature of the foot. Moderate atrophy of the  medial  head of the gastrocnemius and adjacent soleus muscle. Moderate atrophy  of the peroneal musculature. No deep fascial fluid or enhancement.      Impression    IMPRESSION:   1.  No abscess. No soft tissue gas. No evidence of osteomyelitis.  2.    3.  Moderate circumferential subcutaneous cellulitis in the distal  calf extending into the ankle and foot.    JANETTE FLYNN MD         SYSTEM ID:  [id number]   XR Chest Port 1 View    Narrative    XR CHEST PORT 1 VIEW   2023 2:40 PM     HISTORY: Shortness of breath, edema, septic shock, ?CHF    COMPARISON: Chest radiograph 2023      Impression    IMPRESSION: Stable cardiac silhouette which is at the upper limits of  normal in size. No amish edema, definite airspace consolidation or  pleural effusion. No pneumothorax. Bones are unchanged. Severe  osteoarthritis of both shoulders.    TONY ESPINOZA MD         SYSTEM ID:  [id number]   Echocardiogram Complete     Value    LVEF  50-55%    Narrative    [id number]  [id number]  [id number]  [id number]^PHOEBE^FREDERICK^PEDRO     [facility]  Echocardiography Laboratory  [address] [place] [address] [place] [address]     Name: GORDON QUIJANO  MRN: [id number]  : 05/10/1934  Study Date: 2023 08:10 AM  Age: 89 yrs  Gender: Male  Patient Location: [facility]  Reason For Study: Aortic Valve Disorder  History: PAF, DM, HTN, HYPERLIPIDEMIA  Ordering Physician: FREDERICK SANDHU  Referring Physician: DRU VERMA  Performed By: Shaye Powell     BSA: 2.3 m2  Height: 71 in  Weight: 236 lb  HR: 76  BP: 135/93 mmHg  ______________________________________________________________________________  Procedure  Complete Portable Echo Adult. Optison (NDC #7026-6451) given intravenously.  Poor acoustic windows. Technically difficult study.Extremely difficult  acoustic windows despite the use of contrast for endcardial border  definition.  ______________________________________________________________________________  Interpretation Summary     The left ventricle is normal in size.  There is moderate concentric left ventricular hypertrophy.Left ventricular  systolic function is mildly reduced.  The visual ejection fraction is 50-55%. No regional wall motion abnormalities  noted.  Severe valvular aortic stenosis. (severe low-flow, low gradient AS with SVI of  only 15 ml/m2). Visually, the AV appears to be severely stenotic.  The calculated aortic valve area is 0.7cm2. The mean AoV pressure gradient is  21mmHg. The peak AoV pressure gradient is 31mmHg.  The right ventricle is mildly dilated. Mildly decreased right ventricular  systolic function  The rhythm was rapid atrial fibrillation. -110 during most of the study.     The study was technically difficult. Compared to the previous study, the AS  appears to be worse and afib with RVR is now present.  ______________________________________________________________________________  Left Ventricle  The left ventricle is normal in size. There is moderate concentric left  ventricular hypertrophy. Diastolic Doppler findings (E/E' ratio and/or other  parameters) suggest left ventricular filling pressures are increased. Left  ventricular systolic function is mildly reduced. The visual ejection fraction  is 50-55%. No regional wall motion abnormalities noted.     Right Ventricle  The right ventricle is mildly dilated. Mildly decreased right ventricular  systolic function.     Atria  Normal left atrial size. Right atrial size is normal. There is no atrial shunt  seen.     Mitral Valve  The mitral valve is normal in structure and function. There is trace mitral  regurgitation.     Tricuspid Valve  The tricuspid valve is normal in structure and function. Right ventricle  systolic pressure estimate normal. There is trace tricuspid regurgitation.     Aortic Valve  No aortic regurgitation is present.  The calculated aortic valve area is  0.7cm2. The mean AoV pressure gradient is 21mmHg. The peak AoV pressure  gradient is 31mmHg. Severe valvular aortic stenosis.     Pulmonic Valve  The pulmonic valve is not well seen, but is grossly normal. There is trace  pulmonic valvular regurgitation.     Vessels  Normal size aorta.     Pericardium  There is no pericardial effusion.     Rhythm  The rhythm was rapid atrial fibrillation.  ______________________________________________________________________________  MMode/2D Measurements & Calculations  IVSd: 1.7 cm     LVIDd: 3.9 cm  LVIDs: 3.5 cm  LVPWd: 1.7 cm  FS: 11.6 %  LV mass(C)d: 270.3 grams  LV mass(C)dI: 119.5 grams/m2  Ao root diam: 3.8 cm  LA dimension: 4.8 cm  asc Aorta Diam: 3.9 cm  LA/Ao: 1.3  LVOT diam: 2.2 cm  LVOT area: 3.9 cm2  LA Volume (BP): 66.7 ml  LA Volume Index (BP): 29.5 ml/m2  RWT: 0.85  TAPSE: 1.2 cm     Doppler Measurements & Calculations  MV E max ravi: 87.0 cm/sec  MV dec time: 0.18 sec  Ao V2 max: 273.1 cm/sec  Ao max P.7 mmHg  Ao V2 mean: 200.6 cm/sec  Ao mean P.7 mmHg  Ao V2 VTI: 49.5 cm  KISHA(I,D): 0.67 cm2  KISHA(V,D): 0.64 cm2  LV V1 max P.78 mmHg  LV V1 max: 44.1 cm/sec  LV V1 VTI: 8.4 cm  SV(LVOT): 33.1 ml  SI(LVOT): 14.6 ml/m2  PA V2 max: 68.5 cm/sec  PA max P.9 mmHg  PI end-d ravi: 149.3 cm/sec  AV Ravi Ratio (DI): 0.16  KISHA Index (cm2/m2): 0.30  Medial E/e': 18.2  RV S Ravi: 7.7 cm/sec     ______________________________________________________________________________  Report approved by: Estuardo Chew 2023 12:04 PM             Discharge Medications   Current Discharge Medication List        START taking these medications    Details   amiodarone (PACERONE) 200 MG tablet Take 1 tablet (200 mg) by mouth daily    Associated Diagnoses: Paroxysmal atrial fibrillation with RVR (H)      amoxicillin-clavulanate (AUGMENTIN) 875-125 MG tablet Take 1 tablet by mouth 2 times daily for 7 days    Associated Diagnoses:  Streptococcal bacteremia; Septic shock (H); Cellulitis of right lower extremity      Lidocaine (LIDOCARE) 4 % Patch Place 2 patches onto the skin every 24 hours To prevent lidocaine toxicity, patient should be patch free for 12 hrs daily.    Associated Diagnoses: Chronic pain of both shoulders      menthol, Topical Analgesic, 2.5% (BENGAY VANISHING SCENT) 2.5 % GEL topical gel Apply topically 4 times daily as needed for other (pain) Apply to shoulders at times when Lidoderm patch is absent    Associated Diagnoses: Chronic pain of both shoulders      !! oxyCODONE (ROXICODONE) 10 MG tablet Take 1 tablet (10 mg) by mouth every 4 hours as needed for severe pain  Qty: 4 tablet, Refills: 0    Associated Diagnoses: Chronic pain of both shoulders      !! oxyCODONE (ROXICODONE) 5 MG tablet Take 1 tablet (5 mg) by mouth every 4 hours as needed for moderate pain  Qty: 4 tablet, Refills: 0    Associated Diagnoses: Chronic pain of both shoulders       !! - Potential duplicate medications found. Please discuss with provider.        CONTINUE these medications which have CHANGED    Details   furosemide (LASIX) 20 MG tablet Take 4 tablets (80 mg) by mouth every morning    Associated Diagnoses: Peripheral edema           CONTINUE these medications which have NOT CHANGED    Details   acetaminophen (TYLENOL) 500 MG tablet Take 1,000 mg by mouth 3 times daily      atorvastatin (LIPITOR) 20 MG tablet Take 20 mg by mouth At Bedtime      cholecalciferol 50 MCG (2000 UT) CAPS Take 2,000 Units by mouth daily      ELIQUIS ANTICOAGULANT 5 MG tablet Take 5 mg by mouth 2 times daily      metoprolol succinate ER (TOPROL XL) 25 MG 24 hr tablet Take 2 tablets (50 mg) by mouth daily           STOP taking these medications       cyclobenzaprine (FLEXERIL) 5 MG tablet Comments:   Reason for Stopping:             Allergies   No Known Allergies

## 2023-07-19 NOTE — PLAN OF CARE
"----- Message from Yoshi Erazo MD sent at 4/17/2019  7:50 AM CDT -----  Desi  please tell patient that their Hepatitis A, B and C labs are negative but they have "No" immunity to them either.      There is currently No vaccination yet for Hepatitis C.    There is vaccinations for Hepatitis A and B,  and Recommend the Hepatitis A and B vaccination series.        Its a three part vaccination series:   Day 1, then again in 1 month, and then again in 6 months from first one.      Orders were  Placed.      Desi - please tell patient that they are iron deficient and but not anaemic and recommend that they take ferrous sulfate one 325mg pill every 12 hours for next 4 months and repeat fasting hemoglobin and Ferritin in 8 weeks - Orders placed.  " Physical Therapy Discharge Summary    Reason for therapy discharge:    Discharged to transitional care facility.    Progress towards therapy goal(s). See goals on Care Plan in [facility] electronic health record for goal details.  Goals partially met.  Barriers to achieving goals:   limited tolerance for therapy and discharge from facility.    Therapy recommendation(s):    Continued therapy is recommended.  Rationale/Recommendations:  to address functional mobility, safety, strength with transfers and global endurance in the setting of LE infection in order to safely progress patient to home.    Thank you for your referral.  Zara Perez, PT, DPT, ATC, [facility]  O: [phone]  E: [email or web address]

## 2023-07-19 NOTE — PROGRESS NOTES
Care Management Discharge Note    Discharge Date: 07/19/2023       Discharge Disposition: Transitional Care    Discharge Services: Transportation Services    Discharge DME:  None     Discharge Transportation: agency- [facility]     Private pay costs discussed: transportation costs    Does the patient's insurance plan have a 3 day qualifying hospital stay waiver?  No    PAS Confirmation Code:  [id number]    Patient/family educated on Medicare website which has current facility and service quality ratings:  Yes     Education Provided on the Discharge Plan: Yes    Persons Notified of Discharge Plans: Patient, Daughter- Laura Avelar [place]- U     Patient/Family in Agreement with the Plan: yes    Handoff Referral Completed: Yes    Additional Information:  Writer visited with patient this morning.  Discussed that he is medically ready for discharge and bed is available at  [facility] today and bed available at [facility] today.  Patient and family are wanting [facility].     Writer called and spoke with daughterRosio, per patient request. Discussed family transport (OK per PT) versus agency wheelchair transport.  Daughter requesting agency transport, as she lives two hours away in [place].  Writer contacted [facility] #[phone].  Discussed with daughter that they are available at 1100 today and the up front private pay cost by check is $236.00.  Family in agreement with this.      Writer notified Nadia in admissions at [facility].  She was aware and in agreement with the discharge time.       Mikaela Gutierrez, [facility]   [phone]

## 2023-07-19 NOTE — PROGRESS NOTES
S-(situation): Patient discharged to [facility] via transport service with [facility]    B-(background): Cellulitis    A-(assessment): A&O. VSS. Oxycodone x1 given for RLE pain. Up in chair for breakfast. BS-107, no insulin coverage    R-(recommendations): Discharge instructions reviewed with [facility] [place]. Listed belongings gathered and returned to patient. YES         Discharge Nursing Criteria:     Care Plan and Patient education resolved: Yes    New Medications- pt has been educated about purpose and side effects: No, going to TCU    Vaccines  Influenza status verified at discharge:  No    MISC  Prescriptions if needed, hard copies sent with patient  Yes  Home medications returned to patient: NA  Medication Bin checked and emptied on discharge Yes  Patient reports post-discharge pain management plan is effective: No

## 2023-07-20 ENCOUNTER — TELEPHONE (OUTPATIENT)
Dept: GERIATRICS | Facility: CLINIC | Age: 88
End: 2023-07-20
Payer: COMMERCIAL

## 2023-07-20 PROCEDURE — 86481 TB AG RESPONSE T-CELL SUSP: CPT | Performed by: NURSE PRACTITIONER

## 2023-07-20 PROCEDURE — P9604 ONE-WAY ALLOW PRORATED TRIP: HCPCS | Performed by: NURSE PRACTITIONER

## 2023-07-20 PROCEDURE — 36415 COLL VENOUS BLD VENIPUNCTURE: CPT | Performed by: NURSE PRACTITIONER

## 2023-07-20 RX ORDER — ONDANSETRON 4 MG/1
1 TABLET, FILM COATED ORAL EVERY 8 HOURS PRN
COMMUNITY
End: 2023-08-16

## 2023-07-20 NOTE — TELEPHONE ENCOUNTER
[facility] Triage Nurse Telephone Encounter    Provider: CHUCK Martino CNP  Facility: [facility]  Facility Type:  TCU    Caller: Rose   Call Back Number: [phone]    Allergies:  No Known Allergies     Reason for call: Pt has an emesis at 530am and 845am today, medium amount of waterly liquid. No abd pain, no distension, small BM today and yesterday. Pt is having nausea, Vitals: BP:  133/75  P:: 67  R:: 16  SPO2: 96% R/A Temp.:  98.3.   Currently on Augmentin.           Verbal Order/Direction given by Provider: Zofran 4mg q8h prn, monitor for ongoing emesis and VS.     Provider giving Order:  Michele Dai MD    Verbal Order given to: Rose Jacobsen RN

## 2023-07-23 ENCOUNTER — APPOINTMENT (OUTPATIENT)
Dept: GENERAL RADIOLOGY | Facility: CLINIC | Age: 88
DRG: 372 | End: 2023-07-23
Attending: PHYSICIAN ASSISTANT
Payer: COMMERCIAL

## 2023-07-23 ENCOUNTER — APPOINTMENT (OUTPATIENT)
Dept: CT IMAGING | Facility: CLINIC | Age: 88
DRG: 372 | End: 2023-07-23
Attending: STUDENT IN AN ORGANIZED HEALTH CARE EDUCATION/TRAINING PROGRAM
Payer: COMMERCIAL

## 2023-07-23 ENCOUNTER — HOSPITAL ENCOUNTER (INPATIENT)
Facility: CLINIC | Age: 88
LOS: 4 days | Discharge: NURSING, REHAB OR LONG-TERM CARE | DRG: 372 | End: 2023-07-27
Attending: STUDENT IN AN ORGANIZED HEALTH CARE EDUCATION/TRAINING PROGRAM | Admitting: FAMILY MEDICINE
Payer: COMMERCIAL

## 2023-07-23 ENCOUNTER — TELEPHONE (OUTPATIENT)
Dept: GERIATRICS | Facility: CLINIC | Age: 88
End: 2023-07-23

## 2023-07-23 DIAGNOSIS — K29.00 ACUTE SUPERFICIAL GASTRITIS WITHOUT HEMORRHAGE: ICD-10-CM

## 2023-07-23 DIAGNOSIS — E83.42 HYPOMAGNESEMIA: ICD-10-CM

## 2023-07-23 DIAGNOSIS — L03.115 CELLULITIS OF RIGHT LOWER EXTREMITY: ICD-10-CM

## 2023-07-23 DIAGNOSIS — A04.72 COLITIS DUE TO CLOSTRIDIUM DIFFICILE: ICD-10-CM

## 2023-07-23 DIAGNOSIS — Z11.52 ENCOUNTER FOR SCREENING LABORATORY TESTING FOR SEVERE ACUTE RESPIRATORY SYNDROME CORONAVIRUS 2 (SARS-COV-2): Primary | ICD-10-CM

## 2023-07-23 DIAGNOSIS — R19.7 DIARRHEA, UNSPECIFIED TYPE: ICD-10-CM

## 2023-07-23 DIAGNOSIS — R41.82 ALTERED MENTAL STATUS, UNSPECIFIED ALTERED MENTAL STATUS TYPE: ICD-10-CM

## 2023-07-23 LAB
ALBUMIN SERPL BCG-MCNC: 3.4 G/DL (ref 3.5–5.2)
ALBUMIN UR-MCNC: NEGATIVE MG/DL
ALP SERPL-CCNC: 336 U/L (ref 40–129)
ALP SERPL-CCNC: ABNORMAL U/L
ALT SERPL W P-5'-P-CCNC: 31 U/L (ref 0–70)
ALT SERPL W P-5'-P-CCNC: ABNORMAL U/L
ANION GAP SERPL CALCULATED.3IONS-SCNC: 12 MMOL/L (ref 7–15)
APPEARANCE UR: CLEAR
AST SERPL W P-5'-P-CCNC: 44 U/L (ref 0–45)
AST SERPL W P-5'-P-CCNC: ABNORMAL U/L
BASOPHILS # BLD AUTO: 0.1 10E3/UL (ref 0–0.2)
BASOPHILS NFR BLD AUTO: 0 %
BILIRUB SERPL-MCNC: 1.6 MG/DL
BILIRUB UR QL STRIP: NEGATIVE
BUN SERPL-MCNC: 28.5 MG/DL (ref 8–23)
CALCIUM SERPL-MCNC: 9.9 MG/DL (ref 8.8–10.2)
CHLORIDE SERPL-SCNC: 95 MMOL/L (ref 98–107)
COLOR UR AUTO: YELLOW
CREAT SERPL-MCNC: 1.44 MG/DL (ref 0.67–1.17)
CRP SERPL-MCNC: 67.97 MG/L
DEPRECATED HCO3 PLAS-SCNC: 29 MMOL/L (ref 22–29)
EOSINOPHIL # BLD AUTO: 0.1 10E3/UL (ref 0–0.7)
EOSINOPHIL NFR BLD AUTO: 0 %
ERYTHROCYTE [DISTWIDTH] IN BLOOD BY AUTOMATED COUNT: 14 % (ref 10–15)
ERYTHROCYTE [SEDIMENTATION RATE] IN BLOOD BY WESTERGREN METHOD: 32 MM/HR (ref 0–20)
FLUAV RNA SPEC QL NAA+PROBE: NEGATIVE
FLUBV RNA RESP QL NAA+PROBE: NEGATIVE
GFR SERPL CREATININE-BSD FRML MDRD: 46 ML/MIN/1.73M2
GLUCOSE SERPL-MCNC: 137 MG/DL (ref 70–99)
GLUCOSE UR STRIP-MCNC: NEGATIVE MG/DL
HCT VFR BLD AUTO: 46.2 % (ref 40–53)
HGB BLD-MCNC: 15.3 G/DL (ref 13.3–17.7)
HGB UR QL STRIP: ABNORMAL
HOLD SPECIMEN: NORMAL
HYALINE CASTS: 4 /LPF
IMM GRANULOCYTES # BLD: 0.1 10E3/UL
IMM GRANULOCYTES NFR BLD: 1 %
KETONES UR STRIP-MCNC: 5 MG/DL
LACTATE SERPL-SCNC: 1.9 MMOL/L (ref 0.7–2)
LEUKOCYTE ESTERASE UR QL STRIP: NEGATIVE
LIPASE SERPL-CCNC: 22 U/L (ref 13–60)
LYMPHOCYTES # BLD AUTO: 1.8 10E3/UL (ref 0.8–5.3)
LYMPHOCYTES NFR BLD AUTO: 9 %
MCH RBC QN AUTO: 29.5 PG (ref 26.5–33)
MCHC RBC AUTO-ENTMCNC: 33.1 G/DL (ref 31.5–36.5)
MCV RBC AUTO: 89 FL (ref 78–100)
MONOCYTES # BLD AUTO: 1.6 10E3/UL (ref 0–1.3)
MONOCYTES NFR BLD AUTO: 8 %
MUCOUS THREADS #/AREA URNS LPF: PRESENT /LPF
NEUTROPHILS # BLD AUTO: 15.7 10E3/UL (ref 1.6–8.3)
NEUTROPHILS NFR BLD AUTO: 82 %
NITRATE UR QL: NEGATIVE
NRBC # BLD AUTO: 0 10E3/UL
NRBC BLD AUTO-RTO: 0 /100
PH UR STRIP: 5 [PH] (ref 5–7)
PLATELET # BLD AUTO: 413 10E3/UL (ref 150–450)
POTASSIUM SERPL-SCNC: 5.6 MMOL/L (ref 3.4–5.3)
PROCALCITONIN SERPL IA-MCNC: 0.17 NG/ML
PROT SERPL-MCNC: 8.5 G/DL (ref 6.4–8.3)
RBC # BLD AUTO: 5.18 10E6/UL (ref 4.4–5.9)
RBC URINE: 3 /HPF
RSV RNA SPEC NAA+PROBE: NEGATIVE
SARS-COV-2 RNA RESP QL NAA+PROBE: NEGATIVE
SODIUM SERPL-SCNC: 136 MMOL/L (ref 136–145)
SP GR UR STRIP: 1.03 (ref 1–1.03)
TROPONIN T SERPL HS-MCNC: 50 NG/L
TROPONIN T SERPL HS-MCNC: 50 NG/L
UROBILINOGEN UR STRIP-MCNC: NORMAL MG/DL
WBC # BLD AUTO: 19.3 10E3/UL (ref 4–11)
WBC URINE: 1 /HPF

## 2023-07-23 PROCEDURE — 120N000001 HC R&B MED SURG/OB

## 2023-07-23 PROCEDURE — 84484 ASSAY OF TROPONIN QUANT: CPT | Performed by: STUDENT IN AN ORGANIZED HEALTH CARE EDUCATION/TRAINING PROGRAM

## 2023-07-23 PROCEDURE — 70450 CT HEAD/BRAIN W/O DYE: CPT

## 2023-07-23 PROCEDURE — 84075 ASSAY ALKALINE PHOSPHATASE: CPT | Performed by: STUDENT IN AN ORGANIZED HEALTH CARE EDUCATION/TRAINING PROGRAM

## 2023-07-23 PROCEDURE — 250N000011 HC RX IP 250 OP 636: Mod: JZ | Performed by: STUDENT IN AN ORGANIZED HEALTH CARE EDUCATION/TRAINING PROGRAM

## 2023-07-23 PROCEDURE — 250N000009 HC RX 250: Performed by: STUDENT IN AN ORGANIZED HEALTH CARE EDUCATION/TRAINING PROGRAM

## 2023-07-23 PROCEDURE — 99285 EMERGENCY DEPT VISIT HI MDM: CPT | Mod: 25 | Performed by: STUDENT IN AN ORGANIZED HEALTH CARE EDUCATION/TRAINING PROGRAM

## 2023-07-23 PROCEDURE — 87637 SARSCOV2&INF A&B&RSV AMP PRB: CPT | Performed by: STUDENT IN AN ORGANIZED HEALTH CARE EDUCATION/TRAINING PROGRAM

## 2023-07-23 PROCEDURE — 83605 ASSAY OF LACTIC ACID: CPT | Performed by: STUDENT IN AN ORGANIZED HEALTH CARE EDUCATION/TRAINING PROGRAM

## 2023-07-23 PROCEDURE — 258N000003 HC RX IP 258 OP 636: Performed by: PHYSICIAN ASSISTANT

## 2023-07-23 PROCEDURE — 85025 COMPLETE CBC W/AUTO DIFF WBC: CPT | Performed by: STUDENT IN AN ORGANIZED HEALTH CARE EDUCATION/TRAINING PROGRAM

## 2023-07-23 PROCEDURE — 87040 BLOOD CULTURE FOR BACTERIA: CPT | Performed by: STUDENT IN AN ORGANIZED HEALTH CARE EDUCATION/TRAINING PROGRAM

## 2023-07-23 PROCEDURE — 99223 1ST HOSP IP/OBS HIGH 75: CPT | Mod: AI | Performed by: PHYSICIAN ASSISTANT

## 2023-07-23 PROCEDURE — 96375 TX/PRO/DX INJ NEW DRUG ADDON: CPT | Performed by: STUDENT IN AN ORGANIZED HEALTH CARE EDUCATION/TRAINING PROGRAM

## 2023-07-23 PROCEDURE — 250N000013 HC RX MED GY IP 250 OP 250 PS 637: Performed by: PHYSICIAN ASSISTANT

## 2023-07-23 PROCEDURE — 74177 CT ABD & PELVIS W/CONTRAST: CPT

## 2023-07-23 PROCEDURE — 81001 URINALYSIS AUTO W/SCOPE: CPT | Performed by: STUDENT IN AN ORGANIZED HEALTH CARE EDUCATION/TRAINING PROGRAM

## 2023-07-23 PROCEDURE — 71045 X-RAY EXAM CHEST 1 VIEW: CPT

## 2023-07-23 PROCEDURE — 83036 HEMOGLOBIN GLYCOSYLATED A1C: CPT | Performed by: PHYSICIAN ASSISTANT

## 2023-07-23 PROCEDURE — 84145 PROCALCITONIN (PCT): CPT | Performed by: PHYSICIAN ASSISTANT

## 2023-07-23 PROCEDURE — 73700 CT LOWER EXTREMITY W/O DYE: CPT | Mod: RT

## 2023-07-23 PROCEDURE — 93010 ELECTROCARDIOGRAM REPORT: CPT | Performed by: STUDENT IN AN ORGANIZED HEALTH CARE EDUCATION/TRAINING PROGRAM

## 2023-07-23 PROCEDURE — 36415 COLL VENOUS BLD VENIPUNCTURE: CPT | Performed by: STUDENT IN AN ORGANIZED HEALTH CARE EDUCATION/TRAINING PROGRAM

## 2023-07-23 PROCEDURE — 96374 THER/PROPH/DIAG INJ IV PUSH: CPT | Mod: 59 | Performed by: STUDENT IN AN ORGANIZED HEALTH CARE EDUCATION/TRAINING PROGRAM

## 2023-07-23 PROCEDURE — 93005 ELECTROCARDIOGRAM TRACING: CPT | Performed by: STUDENT IN AN ORGANIZED HEALTH CARE EDUCATION/TRAINING PROGRAM

## 2023-07-23 PROCEDURE — 258N000003 HC RX IP 258 OP 636: Performed by: STUDENT IN AN ORGANIZED HEALTH CARE EDUCATION/TRAINING PROGRAM

## 2023-07-23 PROCEDURE — 250N000011 HC RX IP 250 OP 636: Performed by: STUDENT IN AN ORGANIZED HEALTH CARE EDUCATION/TRAINING PROGRAM

## 2023-07-23 PROCEDURE — 83690 ASSAY OF LIPASE: CPT | Performed by: STUDENT IN AN ORGANIZED HEALTH CARE EDUCATION/TRAINING PROGRAM

## 2023-07-23 PROCEDURE — 96361 HYDRATE IV INFUSION ADD-ON: CPT | Performed by: STUDENT IN AN ORGANIZED HEALTH CARE EDUCATION/TRAINING PROGRAM

## 2023-07-23 PROCEDURE — 86140 C-REACTIVE PROTEIN: CPT | Performed by: STUDENT IN AN ORGANIZED HEALTH CARE EDUCATION/TRAINING PROGRAM

## 2023-07-23 PROCEDURE — 85652 RBC SED RATE AUTOMATED: CPT | Performed by: STUDENT IN AN ORGANIZED HEALTH CARE EDUCATION/TRAINING PROGRAM

## 2023-07-23 PROCEDURE — 84155 ASSAY OF PROTEIN SERUM: CPT | Performed by: STUDENT IN AN ORGANIZED HEALTH CARE EDUCATION/TRAINING PROGRAM

## 2023-07-23 RX ORDER — NALOXONE HYDROCHLORIDE 0.4 MG/ML
0.2 INJECTION, SOLUTION INTRAMUSCULAR; INTRAVENOUS; SUBCUTANEOUS
Status: DISCONTINUED | OUTPATIENT
Start: 2023-07-23 | End: 2023-07-27 | Stop reason: HOSPADM

## 2023-07-23 RX ORDER — LIDOCAINE 4 G/G
2 PATCH TOPICAL EVERY 24 HOURS
Status: DISCONTINUED | OUTPATIENT
Start: 2023-07-24 | End: 2023-07-27 | Stop reason: HOSPADM

## 2023-07-23 RX ORDER — LIDOCAINE 40 MG/G
CREAM TOPICAL
Status: DISCONTINUED | OUTPATIENT
Start: 2023-07-23 | End: 2023-07-27 | Stop reason: HOSPADM

## 2023-07-23 RX ORDER — OXYCODONE HYDROCHLORIDE 5 MG/1
5 TABLET ORAL EVERY 4 HOURS PRN
Status: DISCONTINUED | OUTPATIENT
Start: 2023-07-23 | End: 2023-07-27 | Stop reason: HOSPADM

## 2023-07-23 RX ORDER — AMIODARONE HYDROCHLORIDE 200 MG/1
200 TABLET ORAL DAILY
Status: DISCONTINUED | OUTPATIENT
Start: 2023-07-24 | End: 2023-07-27 | Stop reason: HOSPADM

## 2023-07-23 RX ORDER — HYDROMORPHONE HCL IN WATER/PF 6 MG/30 ML
0.2 PATIENT CONTROLLED ANALGESIA SYRINGE INTRAVENOUS ONCE
Status: COMPLETED | OUTPATIENT
Start: 2023-07-23 | End: 2023-07-23

## 2023-07-23 RX ORDER — ACETAMINOPHEN 325 MG/1
650 TABLET ORAL EVERY 6 HOURS PRN
Status: DISCONTINUED | OUTPATIENT
Start: 2023-07-23 | End: 2023-07-27 | Stop reason: HOSPADM

## 2023-07-23 RX ORDER — SODIUM CHLORIDE 9 MG/ML
INJECTION, SOLUTION INTRAVENOUS CONTINUOUS
Status: DISCONTINUED | OUTPATIENT
Start: 2023-07-23 | End: 2023-07-27 | Stop reason: HOSPADM

## 2023-07-23 RX ORDER — ATORVASTATIN CALCIUM 20 MG/1
20 TABLET, FILM COATED ORAL AT BEDTIME
Status: DISCONTINUED | OUTPATIENT
Start: 2023-07-23 | End: 2023-07-27 | Stop reason: HOSPADM

## 2023-07-23 RX ORDER — METOPROLOL SUCCINATE 50 MG/1
50 TABLET, EXTENDED RELEASE ORAL DAILY
Status: DISCONTINUED | OUTPATIENT
Start: 2023-07-24 | End: 2023-07-27 | Stop reason: HOSPADM

## 2023-07-23 RX ORDER — ACETAMINOPHEN 500 MG
1000 TABLET ORAL 3 TIMES DAILY
Status: DISCONTINUED | OUTPATIENT
Start: 2023-07-23 | End: 2023-07-27 | Stop reason: HOSPADM

## 2023-07-23 RX ORDER — PROCHLORPERAZINE 25 MG
12.5 SUPPOSITORY, RECTAL RECTAL EVERY 12 HOURS PRN
Status: DISCONTINUED | OUTPATIENT
Start: 2023-07-23 | End: 2023-07-27 | Stop reason: HOSPADM

## 2023-07-23 RX ORDER — FUROSEMIDE 40 MG
80 TABLET ORAL EVERY MORNING
Status: DISCONTINUED | OUTPATIENT
Start: 2023-07-24 | End: 2023-07-25

## 2023-07-23 RX ORDER — ONDANSETRON 2 MG/ML
4 INJECTION INTRAMUSCULAR; INTRAVENOUS EVERY 6 HOURS PRN
Status: DISCONTINUED | OUTPATIENT
Start: 2023-07-23 | End: 2023-07-27 | Stop reason: HOSPADM

## 2023-07-23 RX ORDER — PROCHLORPERAZINE MALEATE 5 MG
5 TABLET ORAL EVERY 6 HOURS PRN
Status: DISCONTINUED | OUTPATIENT
Start: 2023-07-23 | End: 2023-07-27 | Stop reason: HOSPADM

## 2023-07-23 RX ORDER — NALOXONE HYDROCHLORIDE 0.4 MG/ML
0.4 INJECTION, SOLUTION INTRAMUSCULAR; INTRAVENOUS; SUBCUTANEOUS
Status: DISCONTINUED | OUTPATIENT
Start: 2023-07-23 | End: 2023-07-27 | Stop reason: HOSPADM

## 2023-07-23 RX ORDER — ONDANSETRON 4 MG/1
4 TABLET, ORALLY DISINTEGRATING ORAL EVERY 6 HOURS PRN
Status: DISCONTINUED | OUTPATIENT
Start: 2023-07-23 | End: 2023-07-27 | Stop reason: HOSPADM

## 2023-07-23 RX ORDER — ONDANSETRON 2 MG/ML
4 INJECTION INTRAMUSCULAR; INTRAVENOUS ONCE
Status: COMPLETED | OUTPATIENT
Start: 2023-07-23 | End: 2023-07-23

## 2023-07-23 RX ORDER — IOPAMIDOL 755 MG/ML
100 INJECTION, SOLUTION INTRAVASCULAR ONCE
Status: COMPLETED | OUTPATIENT
Start: 2023-07-23 | End: 2023-07-23

## 2023-07-23 RX ADMIN — HYDROMORPHONE HYDROCHLORIDE 0.2 MG: 0.2 INJECTION, SOLUTION INTRAMUSCULAR; INTRAVENOUS; SUBCUTANEOUS at 13:33

## 2023-07-23 RX ADMIN — ACETAMINOPHEN 1000 MG: 500 TABLET, FILM COATED ORAL at 21:33

## 2023-07-23 RX ADMIN — MICONAZOLE NITRATE: 20 POWDER TOPICAL at 22:09

## 2023-07-23 RX ADMIN — APIXABAN 5 MG: 5 TABLET, FILM COATED ORAL at 21:33

## 2023-07-23 RX ADMIN — ATORVASTATIN CALCIUM 20 MG: 20 TABLET, FILM COATED ORAL at 21:33

## 2023-07-23 RX ADMIN — SODIUM CHLORIDE, PRESERVATIVE FREE: 5 INJECTION INTRAVENOUS at 21:34

## 2023-07-23 RX ADMIN — HYDROMORPHONE HYDROCHLORIDE 0.2 MG: 0.2 INJECTION, SOLUTION INTRAMUSCULAR; INTRAVENOUS; SUBCUTANEOUS at 20:21

## 2023-07-23 RX ADMIN — AMOXICILLIN AND CLAVULANATE POTASSIUM 1 TABLET: 875; 125 TABLET, FILM COATED ORAL at 21:33

## 2023-07-23 RX ADMIN — IOPAMIDOL 100 ML: 755 INJECTION, SOLUTION INTRAVENOUS at 15:10

## 2023-07-23 RX ADMIN — SODIUM CHLORIDE, PRESERVATIVE FREE: 5 INJECTION INTRAVENOUS at 21:33

## 2023-07-23 RX ADMIN — SODIUM CHLORIDE 500 ML: 9 INJECTION, SOLUTION INTRAVENOUS at 13:33

## 2023-07-23 RX ADMIN — ONDANSETRON 4 MG: 2 INJECTION INTRAMUSCULAR; INTRAVENOUS at 13:33

## 2023-07-23 RX ADMIN — SODIUM CHLORIDE 69 ML: 9 INJECTION, SOLUTION INTRAVENOUS at 15:10

## 2023-07-23 ASSESSMENT — ACTIVITIES OF DAILY LIVING (ADL)
ADLS_ACUITY_SCORE: 35
DOING_ERRANDS_INDEPENDENTLY_DIFFICULTY: YES
WEAR_GLASSES_OR_BLIND: YES
HEARING_MANAGEMENT: HEARING AIDS
WALKING_OR_CLIMBING_STAIRS_DIFFICULTY: YES
ADLS_ACUITY_SCORE: 35
TRANSFERRING: 2-->COMPLETELY DEPENDENT
DESCRIBE_HEARING_LOSS: BILATERAL HEARING LOSS
HEARING_DIFFICULTY_OR_DEAF: NO
CONCENTRATING,_REMEMBERING_OR_MAKING_DECISIONS_DIFFICULTY: YES
THE_FOLLOWING_AIDS_WERE_PROVIDED;: PATIENT DECLINED OFFER OF AUXILIARY AIDS
COMMUNICATION: DIFFICULTY SPEAKING
ADLS_ACUITY_SCORE: 35
DIFFICULTY_COMMUNICATING: YES
ADLS_ACUITY_SCORE: 43
DRESS: 2-->COMPLETELY DEPENDENT (NOT DEVELOPMENTALLY APPROPRIATE)
DIFFICULTY_EATING/SWALLOWING: OTHER (SEE COMMENTS)
PATIENT'S_PREFERRED_MEANS_OF_COMMUNICATION: OTHER
DRESS: 2-->COMPLETELY DEPENDENT
TOILETING_ISSUES: NO
ADLS_ACUITY_SCORE: 43
TRANSFERRING: 2-->COMPLETELY DEPENDENT (NOT DEVELOPMENTALLY APPROPRIATE)
ADLS_ACUITY_SCORE: 35
BATHING: 2-->COMPLETELY DEPENDENT (NOT DEVELOPMENTALLY APPROPRIATE)
WERE_AUXILIARY_AIDS_OFFERED?: NO
CHANGE_IN_FUNCTIONAL_STATUS_SINCE_ONSET_OF_CURRENT_ILLNESS/INJURY: YES
DRESSING/BATHING_DIFFICULTY: YES

## 2023-07-23 NOTE — ED TRIAGE NOTES
Pt arrives to ER via EMS from [facility].  He is there after being hospitalized for cellulitis/sepsis RLE.  He normally lives in a senior community duplex with his wife.  He normally A & O x 4.  Today he is somewhat confused per staff who called 911.  Pt agrees, he feels slightly confused.  He is able to answer basic questions appropriately.  Afebrile.      Triage Assessment       Row Name 07/23/23 0945       Triage Assessment (Adult)    Airway WDL WDL       Cognitive/Neuro/Behavioral WDL    Cognitive/Neuro/Behavioral WDL WDL

## 2023-07-23 NOTE — ED PROVIDER NOTES
History     Chief Complaint   Patient presents with    Abdominal Pain     HPI  Alvin Newton is a 89 year old male recently discharged from hospital 7/19/2023 after treatment for RLE cellulitis meeting criteria for septic shock.  Patient arrived to the department via EMS but unfortunately confused and poor historian with regards to recent events.  He does relay that he is suffering from abdominal pain and nausea.  Have not heard from local TCU, staff will attempt to contact for update.      Allergies:  No Known Allergies    Problem List:    Patient Active Problem List    Diagnosis Date Noted    Altered mental status, unspecified altered mental status type 07/23/2023     Priority: Medium    Diarrhea, unspecified type 07/23/2023     Priority: Medium    Aortic stenosis 07/14/2023     Priority: Medium    Elevated brain natriuretic peptide (BNP) level 07/14/2023     Priority: Medium    Ascending aorta dilatation (H) 07/14/2023     Priority: Medium    Peripheral edema 07/14/2023     Priority: Medium    Positive urine culture 07/14/2023     Priority: Medium    Medication induced coagulopathy (H) 07/14/2023     Priority: Medium    Streptococcal bacteremia 07/13/2023     Priority: Medium    Thrombocytopenia (H) 07/13/2023     Priority: Medium    Hyperbilirubinemia 07/13/2023     Priority: Medium    Hypophosphatemia 07/13/2023     Priority: Medium    Hypoalbuminemia 07/13/2023     Priority: Medium    Pyuria 07/13/2023     Priority: Medium    Paroxysmal atrial fibrillation with RVR (H) 07/13/2023     Priority: Medium    Hypomagnesemia 07/13/2023     Priority: Medium    Chronic pain of both shoulders 07/13/2023     Priority: Medium    Septic shock (H) 07/12/2023     Priority: Medium    Cellulitis of right lower extremity 07/11/2023     Priority: Medium    Severe sepsis (H) 07/11/2023     Priority: Medium    Diastolic congestive heart failure, unspecified HF chronicity (H) 03/31/2023     Priority: Medium    Impaired fasting  glucose 06/20/2022     Priority: Medium    Osteoarthritis of pelvis 06/20/2022     Priority: Medium     Dec 16, 2003 Entered By: JOHN GROVE Comment: 1988  S-P HIP REPLACEMENT      Primary localized osteoarthrosis of shoulder region 06/20/2022     Priority: Medium    Reason for consultation 06/20/2022     Priority: Medium    Right bundle branch block 06/20/2022     Priority: Medium    Fall, initial encounter 06/06/2022     Priority: Medium    Closed fracture of first lumbar vertebra, unspecified fracture morphology, initial encounter (H) 06/06/2022     Priority: Medium    Fracture of L1 vertebra (H) 06/06/2022     Priority: Medium    Carpal tunnel syndrome, bilateral 11/18/2021     Priority: Medium    BPH with urinary obstruction 06/22/2020     Priority: Medium    NSTEMI (non-ST elevated myocardial infarction) (H) 12/27/2017     Priority: Medium    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) 12/06/2017     Priority: Medium    Obesity, morbid, BMI 40.0-49.9 (H) 11/07/2017     Priority: Medium    Essential hypertension 04/04/2016     Priority: Medium    Mixed hyperlipidemia 01/02/2009     Priority: Medium    Pure hypercholesterolemia 01/01/1999     Priority: Medium    Other ill-defined and unknown causes of morbidity and mortality 01/01/1985     Priority: Medium     Dec 16, 2003 Entered By: JOHN GROVE Comment: S-P TURP          Past Medical History:    Past Medical History:   Diagnosis Date    Colonic diverticulum     Hyperlipidemia     Hypertension     Obesity (BMI 30-39.9)     Osteoarthritis     Type 2 diabetes mellitus (H)        Past Surgical History:    Past Surgical History:   Procedure Laterality Date    ARTHROPLASTY HIP BILATERAL  1987     ESOPHAGOSCOPY, DIAGNOSTIC  2003    LAPAROSCOPIC CHOLECYSTECTOMY N/A 12/28/2017    Procedure: LAPAROSCOPIC CHOLECYSTECTOMY;  LAPAROSCOPIC CHOLECYSTECTOMY;  Surgeon: Heber Gonzalez MD;  Location:  OR    TRANSRECTAL ULTRASONIC, TRANSURETHRAL  RESECTION (TUR) OF PROSTATE CYST  1990       Family History:    No family history on file.    Social History:  Marital Status:   [2]  Social History     Tobacco Use    Smoking status: Never    Smokeless tobacco: Never   Substance Use Topics    Alcohol use: Yes     Comment: 0-1 beer daily    Drug use: No        Medications:    No current outpatient medications on file.        Review of Systems  Unable to obtain secondary to clinical condition...    Physical Exam   BP: (!) 145/81  Pulse: 108  Temp: 98.2  F (36.8  C)  Resp: 18  Weight: 107 kg (236 lb)  SpO2: 97 %      Physical Exam  Constitutional:  Well developed, well nourished.  Appears uncomfortable but nontoxic.  HENT:  Normocephalic and atraumatic.  Symmetric in appearance.  Eyes:  Conjunctivae are normal.  Cardiovascular:  No cyanosis.  Borderline tachycardia with regular rhythm.  No audible murmurs noted.    Respiratory:  Effort normal without sign of respiratory distress.  No audible wheezing or stridor.  CTAB.   Gastrointestinal:  Soft nondistended abdomen.  Nontender and without guarding.  No rigidity or rebound tenderness.  Negative Driver's sign.  Negative McBurney's point.  No palpable pulsatile mass.   Musculoskeletal:  Moves extremities spontaneously.  No appreciable right knee or ankle effusion.  Neurological:  Patient is alert but disoriented.  Skin:  Skin is warm and dry.  Significant cellulitis of right lower extremity.  Psychiatric:  Normal mood and affect.      ED Course                 Procedures                EKG Interpretation:      Interpreted by: Jake Mac  Time reviewed: Upon completion    Symptoms at time of EKG: AMS  Rhythm: Narrow complex irregular  Rate: 110 bpm  Axis: Normal    Conduction: N RBBB  ST Segments/ T Waves: No pathologic ST-elevations or T-wave abnormalities.  Q Waves: None  Comparison to prior: Similar morphology to previous     Clinical Impression: No sign of ischemia       Critical Care time:  none                Results for orders placed or performed during the hospital encounter of 07/23/23 (from the past 24 hour(s))   [place] Draw    Narrative    The following orders were created for panel order [place] [facility].  Procedure                               Abnormality         Status                     ---------                               -----------         ------                     Extra Blue Top Tube[[id number]]                              Final result               Extra Red Top Tube[[id number]]                               Final result               Extra Green Top (Lithium...[[id number]]                      Final result               Extra Purple Top Tube[[id number]]                            Final result               Extra Green Top (Lithium...[[id number]]                      Final result                 Please view results for these tests on the individual orders.   Extra Blue Top Tube   Result Value Ref Range    Hold Specimen JIC    Extra Red Top Tube   Result Value Ref Range    Hold Specimen JIC    Extra Green Top (Lithium Heparin) Tube   Result Value Ref Range    Hold Specimen JIC    Extra Purple Top Tube   Result Value Ref Range    Hold Specimen JIC    Extra Green Top (Lithium Heparin) ON ICE   Result Value Ref Range    Hold Specimen JIC    CBC with Platelets & Differential    Narrative    The following orders were created for panel order CBC with Platelets & Differential.  Procedure                               Abnormality         Status                     ---------                               -----------         ------                     CBC with platelets and d...[[id number]]  Abnormal            Final result                 Please view results for these tests on the individual orders.   Comprehensive metabolic panel   Result Value Ref Range    Sodium 136 136 - 145 mmol/L    Potassium 5.6 (H) 3.4 - 5.3 mmol/L    Chloride 95 (L) 98 - 107 mmol/L    Carbon Dioxide (CO2) 29 22 - 29 mmol/L    Anion Gap 12  7 - 15 mmol/L    Urea Nitrogen 28.5 (H) 8.0 - 23.0 mg/dL    Creatinine 1.44 (H) 0.67 - 1.17 mg/dL    Calcium 9.9 8.8 - 10.2 mg/dL    Glucose 137 (H) 70 - 99 mg/dL    Alkaline Phosphatase      AST      ALT      Protein Total 8.5 (H) 6.4 - 8.3 g/dL    Albumin 3.4 (L) 3.5 - 5.2 g/dL    Bilirubin Total 1.6 (H) <=1.2 mg/dL    GFR Estimate 46 (L) >60 mL/min/1.73m2   CBC with platelets and differential   Result Value Ref Range    WBC Count 19.3 (H) 4.0 - 11.0 10e3/uL    RBC Count 5.18 4.40 - 5.90 10e6/uL    Hemoglobin 15.3 13.3 - 17.7 g/dL    Hematocrit 46.2 40.0 - 53.0 %    MCV 89 78 - 100 fL    MCH 29.5 26.5 - 33.0 pg    MCHC 33.1 31.5 - 36.5 g/dL    RDW 14.0 10.0 - 15.0 %    Platelet Count 413 150 - 450 10e3/uL    % Neutrophils 82 %    % Lymphocytes 9 %    % Monocytes 8 %    % Eosinophils 0 %    % Basophils 0 %    % Immature Granulocytes 1 %    NRBCs per 100 WBC 0 <1 /100    Absolute Neutrophils 15.7 (H) 1.6 - 8.3 10e3/uL    Absolute Lymphocytes 1.8 0.8 - 5.3 10e3/uL    Absolute Monocytes 1.6 (H) 0.0 - 1.3 10e3/uL    Absolute Eosinophils 0.1 0.0 - 0.7 10e3/uL    Absolute Basophils 0.1 0.0 - 0.2 10e3/uL    Absolute Immature Granulocytes 0.1 <=0.4 10e3/uL    Absolute NRBCs 0.0 10e3/uL   Lipase   Result Value Ref Range    Lipase 22 13 - 60 U/L   Lactic acid whole blood   Result Value Ref Range    Lactic Acid 1.9 0.7 - 2.0 mmol/L   Symptomatic Influenza A/B, RSV, & SARS-CoV2 PCR (COVID-19) Nose    Specimen: Nose; Swab   Result Value Ref Range    Influenza A PCR Negative Negative    Influenza B PCR Negative Negative    RSV PCR Negative Negative    SARS CoV2 PCR Negative Negative    Narrative    Testing was performed using the Xpert Xpress CoV2/Flu/RSV Assay on the [employer] Instrument. This test should be ordered for the detection of SARS-CoV-2, influenza, and RSV viruses in individuals who meet clinical and/or epidemiological criteria. Test performance is unknown in asymptomatic patients. This test is for in vitro  diagnostic use under the FDA EUA for laboratories certified under CLIA to perform high or moderate complexity testing. This test has not been FDA cleared or approved. A negative result does not rule out the presence of PCR inhibitors in the specimen or target RNA in concentration below the limit of detection for the assay. If only one viral target is positive but coinfection with multiple targets is suspected, the sample should be re-tested with another FDA cleared, approved, or authorized test, if coinfection would change clinical management. This test was validated by the [facility]. These laboratories are certified under the Clinical Laboratory Improvement Amendments of 1988 (CLIA-88) as qualified to perform high complexity laboratory testing.   Troponin T, High Sensitivity   Result Value Ref Range    Troponin T, High Sensitivity 50 (H) <=22 ng/L   AST   Result Value Ref Range    AST 44 0 - 45 U/L   ALT   Result Value Ref Range    ALT 31 0 - 70 U/L   Alkaline phosphatase   Result Value Ref Range    Alkaline Phosphatase 336 (H) 40 - 129 U/L   CRP Inflammation   Result Value Ref Range    CRP Inflammation 67.97 (H) <5.00 mg/L   CT Abdomen Pelvis w Contrast    Narrative    EXAM: CT ABDOMEN PELVIS W CONTRAST  LOCATION: [facility]  DATE: 7/23/2023    INDICATION: AMS with abdominal pain and nausea  COMPARISON: None.  TECHNIQUE: CT scan of the abdomen and pelvis was performed following injection of IV contrast. Multiplanar reformats were obtained. Dose reduction techniques were used.  CONTRAST: 90 mL Isovue 370    FINDINGS:   LOWER CHEST: Multiple small pulmonary nodules, including 5 mm left lower lobe nodule (series 3 image 31) and 4 mm right lower lobe nodule (series 3 image 28). No focal airspace consolidation or pleural effusion. Extensive calcification of aortic valve   leaflets.       HEPATOBILIARY: Cholecystectomy.    PANCREAS: Normal.    SPLEEN: Multiple calcified  splenic granulomas.    ADRENAL GLANDS: Normal.    KIDNEYS/BLADDER: Punctate nonobstructing stone lower pole the left kidney. No ureterolithiasis or hydronephrosis. Likely small renal cysts, no specific follow-up recommended. Urinary bladder is decompressed and not well-visualized.    BOWEL: Moderate rectal wall thickening without surrounding fat stranding. No additional evidence of bowel inflammation. No obstruction or perforation. Normal appendix.    LYMPH NODES: No suspicious lymphadenopathy.    VASCULATURE: Moderate calcified atherosclerosis.    PELVIC ORGANS: Obscured by artifact from hip arthroplasties.    MUSCULOSKELETAL: No acute bony abnormality. Bilateral hip arthroplasties noted. 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      Impression    IMPRESSION:   1.  Rectal wall thickening without definite surrounding fat stranding, nonspecific but can be seen in the setting of proctitis.  2.  No additional visualized explanation for patient's symptoms.  3.  Incidental small pulmonary nodules in the lung bases, largest measuring 5 mm, consider follow-up described below.  4.  Calcification of aortic valve leaflets, can be seen in setting of aortic stenosis.    REFERENCE:  Guidelines for Management of Incidental Pulmonary Nodules Detected on CT Images: From the Fleischner Society 2017.   Guidelines apply to incidental nodules in patients who are 35 years or older.  Guidelines do not apply to lung cancer screening, patients with immunosuppression, or patients with known primary cancer.    MULTIPLE NODULES  Nodule size <6 mm  Low-risk patients: No follow-up needed.  High-risk patients: Optional follow-up at 12 months.         UA with Microscopic reflex to Culture    Specimen: Urine, Clean Catch   Result Value Ref Range    Color Urine Yellow Colorless, Straw, Light Yellow, Yellow    Appearance Urine Clear Clear    Glucose Urine Negative Negative mg/dL    Bilirubin Urine Negative Negative    Ketones Urine 5 (A)  Negative mg/dL    Specific Gravity Urine 1.033 1.003 - 1.035    Blood Urine Small (A) Negative    pH Urine 5.0 5.0 - 7.0    Protein Albumin Urine Negative Negative mg/dL    Urobilinogen Urine Normal Normal, 2.0 mg/dL    Nitrite Urine Negative Negative    Leukocyte Esterase Urine Negative Negative    Mucus Urine Present (A) None Seen /LPF    RBC Urine 3 (H) <=2 /HPF    WBC Urine 1 <=5 /HPF    Hyaline Casts Urine 4 (H) <=2 /LPF    Narrative    Urine Culture not indicated   Head CT w/o contrast    Narrative    EXAM: CT HEAD W/O CONTRAST  LOCATION: [facility]  DATE: 7/23/2023    INDICATION: Altered mental status.  COMPARISON: 06/06/2022.   TECHNIQUE: Routine CT Head without IV contrast. Multiplanar reformats. Dose reduction techniques were used.    FINDINGS:  INTRACRANIAL CONTENTS: No acute intracranial hemorrhage, extraaxial collection, or mass effect. No evidence of an acute transcortical confluent infarct. Mild presumed chronic small vessel ischemic changes. Mild generalized parenchymal volume loss. No   interval ventriculomegaly.     VISUALIZED ORBITS/SINUSES/MASTOIDS: No acute intraorbital finding. Grossly similar partial opacification of the visualized paranasal sinuses, most notably and moderate in the right sphenoid and partially imaged maxillary sinuses with scattered sinus wall   ostitis, most consistent with chronic sinusitis. No mastoid or large middle ear effusion.     BONES/SOFT TISSUES: No acute abnormality.      Impression    IMPRESSION:  1.  No acute intracranial abnormality.  2.  Similar chronic changes, as described.   Erythrocyte sedimentation rate auto   Result Value Ref Range    Erythrocyte Sedimentation Rate 32 (H) 0 - 20 mm/hr   Troponin T, High Sensitivity   Result Value Ref Range    Troponin T, High Sensitivity 50 (H) <=22 ng/L   Procalcitonin   Result Value Ref Range    Procalcitonin 0.17 (H) <0.05 ng/mL   CT Ankle Right w/o Contrast    Narrative    EXAM: CT ANKLE  RIGHT W/O CONTRAST  LOCATION: [facility]  DATE: 7/23/2023    INDICATION: Right ankle pain, cellulitis.  COMPARISON: Right foot CT 07/12/2023.  TECHNIQUE: Noncontrast. Axial, sagittal and coronal thin-section reconstruction. Dose reduction techniques were used.     FINDINGS:     BONES:  -Negative for fracture.  -Moderate degenerative hypertrophic spurring at the dorsal aspect of the talonavicular and TMT joints.  -Moderate plantar and small Achilles calcaneal enthesophytes.    JOINTS:  -Normal alignment. No joint effusion.    SOFT TISSUES:  -Moderate chronic atrophy of the musculature in the lower calf and foot. No deep space soft tissue edema.  -Moderate skin thickening and subcutaneous edema throughout the lower calf, greatest anteriorly, medially, and laterally. No soft tissue air/gas.      Impression    IMPRESSION:  1.  Skin thickening and subcutaneous edema in the lower calf, anteriorly, medially, and laterally, which may correlate with symptoms of cellulitis.    2.  No acute bone or joint abnormality. No fracture. No joint effusions.    3.  Mild-moderate degenerative arthritic spurring in the ankle.    4.  Moderate chronic atrophy of the musculature in the lower calf and foot. No deep space soft tissue edema/inflammation visualized by CT.         Medications   acetaminophen (TYLENOL) tablet 1,000 mg (1,000 mg Oral $Given 7/23/23 2133)   amiodarone (PACERONE) tablet 200 mg (has no administration in time range)   amoxicillin-clavulanate (AUGMENTIN) 875-125 MG per tablet 1 tablet (1 tablet Oral $Given 7/23/23 2133)   atorvastatin (LIPITOR) tablet 20 mg (20 mg Oral $Given 7/23/23 2133)   apixaban ANTICOAGULANT (ELIQUIS) tablet 5 mg (5 mg Oral $Given 7/23/23 2133)   furosemide (LASIX) tablet 80 mg ( Oral Automatically Held 7/27/23 0800)   Lidocaine (LIDOCARE) 4 % Patch 2 patch (has no administration in time range)   menthol (Topical Analgesic) 2.5% (BENGAY VANISHING SCENT) 2.5 % topical  gel (has no administration in time range)   metoprolol succinate ER (TOPROL XL) 24 hr tablet 50 mg (has no administration in time range)   lidocaine 1 % 0.1-1 mL (has no administration in time range)   lidocaine (LMX4) kit (has no administration in time range)   sodium chloride (PF) 0.9% PF flush 3 mL (3 mLs Intracatheter $Given 7/23/23 2134)   sodium chloride (PF) 0.9% PF flush 3 mL (has no administration in time range)   melatonin tablet 1 mg (has no administration in time range)   Patient is already receiving anticoagulation with heparin, enoxaparin (LOVENOX), warfarin (COUMADIN)  or other anticoagulant medication (has no administration in time range)   sodium chloride 0.9% infusion ( Intravenous $New Bag 7/23/23 2134)   acetaminophen (TYLENOL) tablet 650 mg (has no administration in time range)     Or   acetaminophen (TYLENOL) Suppository 650 mg (has no administration in time range)   oxyCODONE IR (ROXICODONE) half-tab 2.5 mg (has no administration in time range)   oxyCODONE (ROXICODONE) tablet 5 mg (has no administration in time range)   prochlorperazine (COMPAZINE) injection 5 mg (has no administration in time range)     Or   prochlorperazine (COMPAZINE) tablet 5 mg (has no administration in time range)     Or   prochlorperazine (COMPAZINE) suppository 12.5 mg (has no administration in time range)   ondansetron (ZOFRAN ODT) ODT tab 4 mg (has no administration in time range)     Or   ondansetron (ZOFRAN) injection 4 mg (has no administration in time range)   lidocaine patch REMOVAL (has no administration in time range)   naloxone (NARCAN) injection 0.2 mg (has no administration in time range)     Or   naloxone (NARCAN) injection 0.4 mg (has no administration in time range)     Or   naloxone (NARCAN) injection 0.2 mg (has no administration in time range)     Or   naloxone (NARCAN) injection 0.4 mg (has no administration in time range)   miconazole (MICATIN) 2 % powder (has no administration in time range)   0.9%  sodium chloride BOLUS (0 mLs Intravenous Stopped 7/23/23 1631)   ondansetron (ZOFRAN) injection 4 mg (4 mg Intravenous $Given 7/23/23 1333)   HYDROmorphone (DILAUDID) injection 0.2 mg (0.2 mg Intravenous $Given 7/23/23 1333)   iopamidol (ISOVUE-370) solution 100 mL (100 mLs Intravenous $Given 7/23/23 1510)   sodium chloride 0.9 % bag 500mL for CT scan flush use (69 mLs Intravenous $Given 7/23/23 1510)   HYDROmorphone (DILAUDID) injection 0.2 mg (0.2 mg Intravenous $Given 7/23/23 2021)       Assessments & Plan (with Medical Decision Making)   Alvin Newton is a 89 year old male who presented to the department via EMS from local TCU for evaluation of confusion.  Patient arrived to the department alert but disoriented, not aware of place or time.  Shortly after arrival the patient's spouse and daughter came with medical update, they report that over the past 24 hours the patient has been intermittently confused reminiscent of about of altered mentation he had 1 year ago after hospitalization.  They admit he is also complained of abdominal pain and nausea, however this is not an unusual occurrence by their report.  Secondarily he has said that he has right ankle pain, they believe the right lower extremity cellulitis is improving with antibiotic therapy.  Patient is afebrile and hemodynamically stable, clear lung sounds with relatively benign abdominal examination.  Metabolic panel values including creatinine are not significantly elevated, although WBC count is approaching 20.  Urinalysis unimpressive for infection.  Family provides further details that the patient has had some recent diarrhea, in addition to antibiotics is possible that he is suffering from colitis.  Patient will require hospital admission for signs and symptoms consistent with encephalopathy, accepted by hospitalist Yojana who agrees with plan thus far.        Disclaimer:  This note consists of symbols derived from keyboarding, dictation, and/or  voice recognition software.  As a result, there may be errors in the script that have gone undetected.  Please consider this when interpreting information found in the chart.      I have reviewed the nursing notes.    I have reviewed the findings, diagnosis, plan and need for follow up with the patient.        Current Discharge Medication List          Final diagnoses:   Altered mental status, unspecified altered mental status type   Cellulitis of right lower extremity   Diarrhea, unspecified type       7/23/2023   [facility] EMERGENCY DEPT       Jake Mac,   07/23/23 [id number]

## 2023-07-23 NOTE — H&P
[facility]    History and Physical - Hospitalist Service       Date of Admission:  7/23/2023    Assessment & Plan      Alvin Newton is a 89 year old male admitted on 7/23/2023. He presented to the emergency department for evaluation abdominal pain, diarrhea, and mild confusion of unclear etiology for which he is being admitted for further evaluation and treatment.    Mild encephalopathy / Altered mental status, unclear etiology  Presents slightly confused, is A&O x 3 (didn't fully know date, only year), at times has difficulty following a conversation and provides conflicting answers to questions. Head CT unremarkable. Concern for infection noted below, confusion is possibly driven by infection.  - Address infection below and monitor for improvement  - If not improving, consider MRI brain to rule out stroke vs septic emboli    Leukocytosis with elevated ANC  Admit WBC 19.3, ANC 15.7, absolute monocytes 1.6. Afebrile, normal lactic acid. Procalcitonin mildly elevated (0.17). Etiology of infection is unclear, differential includes possible C.diff or other GI illness (although less likely), persisting cellulitis and bacteremia, untreated prior UTI, less likely meningitis or pneumonia.   - Trend CBC with differential  - Blood culture x 2 pending  - Treat each possible infection as noted below    Diarrhea and abdominal pain, concern for possible C.diff  Reports 4 day history of diarrhea and mild abdominal cramping. Occurs in the setting of current Augmentin use for cellulitis and recent hospitalization, so is at risk for C.diff. CT abdomen & pelvis demonstrates evidence of possible proctitis, but otherwise no obvious intraabdominal infectious source.   - C.diff and enteric panel pending  - Empiric IV Flagyl (unable to take oral vancomycin at this time)     Cellulitis of right lower extremity  Recent bacteremia - Strep dysgalactiae  Cellulitis and bacteremia previously diagnosed, was  "hospitalized at [facility] from July 12-19, 2023 for cellulitis with septic shock. Strep was pan-sensitive. Initially on zosyn/vancomycin, then ceftriaxone, and discharged on 7 day course of Augmentin. Patient has persisting evidence of cellulitis on admission 7/23/2023, although erythema has diminished in area from previously outlined area.   CT ankle demonstrates \"1.  Skin  thickening and subcutaneous edema in the lower calf, anteriorly, medially, and laterally, which may correlate with symptoms of cellulitis. 2.  No acute bone or joint abnormality. No fracture. No joint effusions. 3.  Mild-moderate degenerative arthritic spurring in the ankle. 4.  Moderate chronic atrophy of the musculature in the lower calf and foot. No deep space soft tissue edema/inflammation visualized by CT.\"   - Hold Augmentin (unable to take oral at this time); change to Unasyn 3g q8h while NPO    Prior positive urine culture - Staph epidermidis   Prior urine culture from 7/11/23 grew >100k Staph epidermidis, resistant to cephalosporins and penicillins. This was previously considered to be of unclear significance, but was not felt to be the driving force behind patient's prior sepsis and was not specifically treated at discharge. Repeat UA on admission 7/23/2023 was unremarkable.  - Doubt UTI is the source for patient's current infection, although Augmentin would not have covered the prior Staph epi that grew    Dysphagia   New onset 2 days prior to admission. Is aspiration risk, although admit chest x-ray without evidence of infection. Patient is not hypoxic at time of admission.  - SLP evaluation  - NPO, await SLP recommendations    Hyperkalemia   Admit K = 5.6. Appears slightly dry, received 500 ml NS in the emergency department. No peaked t waves on EKG.   - Gentle NS and repeat BMP in am     Renal impairment  Admit creatinine 1.44 (recent baseline 1.1 - 1.4), GFR 46 (recent baseline 47 - 60). Patient likely has CKD stage 3, " "although his most recent renal function was fluctuating in the hospital due to sepsis and rehydration followed by diuresis, so his baseline is unknown at this time.   - Holding furosemide and giving LR @ 50 ml/hr; re-evaluate in am and adjust IV fluids vs diuresis based on am labs    Elevated troponin   Admit troponin 50, denies chest pain, no acute ST changes on EKG.   Troponin unchanged on recheck, doubt acute coronary syndrome. Likely strain due to illness.   - No further work-up    Pulmonary nodules  Incidental finding on CT abdomen & pelvis - \"Incidental small pulmonary nodules in the lung bases, largest measuring 5 mm, consider follow-up described below.\" Per history in chart, patient is a lifelong non-smoker, so low risk.  - Radiology does not recommend follow-up for low risk patients    Paroxysmal atrial fibrillation with RVR  Medication induced coagulopathy  Previously diagnosed. Rate controlled prior to admission with amiodarone 200 mg daily and metoprolol XL 50 mg daily. Anticoagulated prior to admission with apixaban 5 mg bid.   - Hold amiodarone, metoprolol, and apixaban while NPO; resume in am or substitute with IV medications depending on results of SLP evaluation    Type 2 diabetes mellitus with diabetic polyneuropathy  Previously diagnosed, is diet controlled and not on any pharmacologic treatments.  - HgbA1c pending  - Will need consistent carbohydrate diet once advanced  - Monitor glucose on daily labs, if persistently elevated then initiate sliding scale insulin     Diastolic congestive heart failure  Aortic stenosis  Essential hypertension  Previously diagnosed. Appears compensated / slightly dry on admission. Managed prior to admission with furosemide 80 mg daily and beta blocker as noted above.  - Holding furosemide  - See above regarding beta blocker    Mixed hyperlipidemia  Managed prior to admission with atorvastatin 20 mg q hs, hold while NPO.          Diet: NPO for Medical/Clinical " "Reasons Except for: No Exceptions    DVT Prophylaxis: DOAC  Eastman Catheter: Not present  Lines: PIV     Cardiac Monitoring: None  Code Status: Full Code - discussed at time of admission     Clinically Significant Risk Factors Present on Admission        # Hyperkalemia: Highest K = 5.6 mmol/L in last 2 days, will monitor as appropriate       # Hypoalbuminemia: Lowest albumin = 3.4 g/dL at 7/23/2023 12:52 PM, will monitor as appropriate    # Drug Induced Coagulation Defect: home medication list includes an anticoagulant medication      # Hypertension: Noted on problem list      # Obesity: Estimated body mass index is 32.92 kg/m  as calculated from the following:    Height as of 7/12/23: 1.803 m (5' 11\").    Weight as of this encounter: 107 kg (236 lb).            Disposition Plan      Expected Discharge Date: 07/25/2023                The patient's care was discussed with the Attending Physician, Dr. Aurelio Singh, Bedside Nurse, and Patient.    Lyric Quick PA-C  Hospitalist Service  [facility]  Securely message with [employer] (more info)  Text page via [facility] Paging/Directory     ______________________________________________________________________    Chief Complaint   \"I was a little confused today.\"    History is obtained from the patient, review of EMR, and emergency department sign out from Dr. Jake Mac.    History of Present Illness   Alvin Newton is a 89 year old male who presented to the emergency department for evaluation of confusion, abdominal pain, and diarrhea.    Patient has a complex recent medical course, was hospitalized at [facility] July 12-19, 2023 for cellulitis of right lower extremity and bacteremia with septic shock, ultimately discharged on Augmentin to TCU where he has been since 7/19.   A few days ago patient developed some abdominal pain, diarrhea, and vomiting.  He has also been unable to swallow pills or food the past 2 " days.  Today he seemed confused per staff at the TCU, so he was sent back to the emergency department; work-up did not identify any acute changes, although patient is unable to discharge at this time, as cause of his symptoms are not yet identified.     Patient says his abdominal pain is mild, but is associated with nausea and vomiting.  He has had some diarrhea the past 4 days, describes it as bloody although this cannot be confirmed.  Patient feels a little confused at times.  He denies any known fevers or chills.  Denies chest pain, palpitations, cough, wheeze, shortness of breath.  Gives inconsistent information regarding urination - initially says he is not urinating much, but later says he urinates a lot.  He feels his cellulitis is improving but not yet gone.  Still feels generally weak.   The remainder review of systems is negative.       Past Medical History    Past Medical History:   Diagnosis Date    Colonic diverticulum     Hyperlipidemia     Hypertension     Obesity (BMI 30-39.9)     Osteoarthritis     Type 2 diabetes mellitus (H)        Past Surgical History   Past Surgical History:   Procedure Laterality Date    ARTHROPLASTY HIP BILATERAL  1987     ESOPHAGOSCOPY, DIAGNOSTIC  2003    LAPAROSCOPIC CHOLECYSTECTOMY N/A 12/28/2017    Procedure: LAPAROSCOPIC CHOLECYSTECTOMY;  LAPAROSCOPIC CHOLECYSTECTOMY;  Surgeon: Heber Gonzalez MD;  Location:  OR    TRANSRECTAL ULTRASONIC, TRANSURETHRAL RESECTION (TUR) OF PROSTATE CYST  1990       Prior to Admission Medications   Prior to Admission Medications   Prescriptions Last Dose Informant Patient Reported? Taking?   ELIQUIS ANTICOAGULANT 5 MG tablet 7/23/2023 at am Self Yes Yes   Sig: Take 5 mg by mouth 2 times daily   Lidocaine (LIDOCARE) 4 % Patch 7/23/2023 at OFF Self No Yes   Sig: Place 2 patches onto the skin every 24 hours To prevent lidocaine toxicity, patient should be patch free for 12 hrs daily.   acetaminophen (TYLENOL) 500 MG tablet  7/23/2023 at 0600 Self Yes Yes   Sig: Take 1,000 mg by mouth 3 times daily   amiodarone (PACERONE) 200 MG tablet 7/23/2023 at am Self No Yes   Sig: Take 1 tablet (200 mg) by mouth daily   amoxicillin-clavulanate (AUGMENTIN) 875-125 MG tablet 7/23/2023 at am Self No Yes   Sig: Take 1 tablet by mouth 2 times daily for 7 days   atorvastatin (LIPITOR) 20 MG tablet 7/22/2023 at hs Self Yes Yes   Sig: Take 20 mg by mouth At Bedtime   cholecalciferol 50 MCG (2000 UT) CAPS 7/23/2023 at am Self Yes Yes   Sig: Take 2,000 Units by mouth daily   furosemide (LASIX) 20 MG tablet 7/23/2023 at am Self No Yes   Sig: Take 4 tablets (80 mg) by mouth every morning   menthol, Topical Analgesic, 2.5% (BENGAY VANISHING SCENT) 2.5 % GEL topical gel Unknown Self No Yes   Sig: Apply topically 4 times daily as needed for other (pain) Apply to shoulders at times when Lidoderm patch is absent   metoprolol succinate ER (TOPROL XL) 25 MG 24 hr tablet 7/23/2023 at am Self No Yes   Sig: Take 2 tablets (50 mg) by mouth daily   ondansetron (ZOFRAN) 4 MG tablet 7/23/2023 at 0500 Self Yes Yes   Sig: Take 1 tablet by mouth every 8 hours as needed   oxyCODONE (ROXICODONE) 5 MG tablet 7/23/2023 at 0500 Self No Yes   Sig: Take 1-2 tablets (5-10 mg) by mouth every 4 hours as needed for moderate pain      Facility-Administered Medications: None        Review of Systems    The 10 point Review of Systems is negative other than noted in the HPI or here.      Physical Exam   Vital Signs: Temp: 99.5  F (37.5  C) Temp src: Oral BP: 119/57 Pulse: 89   Resp: 14 SpO2: 93 % O2 Device: None (Room air)    Weight: 236 lbs 0 oz    Constitutional: Alert, cooperative. Oriented x 3 (did not know month, but knows year). No apparent distress, appears nontoxic. Speaking in full sentences, although at times he provides inconsistent answers and has some difficulty following a conversation.    Eyes: Eyes are clear, pupils are reactive. No scleral icterus.     HEENT: Oropharynx is  clear but dry, no lesions. Normocephalic, no evidence of cranial trauma.      Cardiovascular: Irregularly irregular rhythm, normal rate, normal S1 and S2. No murmur, rubs, or gallops. Peripheral pulses intact bilaterally. Bilateral +1 lower extremity edema.    Respiratory: Non-labored breathing on room air. Lung sounds are clear to auscultation bilaterally without wheezes, rhonchi, or crackles.    GI: Soft, non-distended. Minimally tender to palpation of left lower quadrant, otherwise non-tender, no rebound or guarding. No hepatosplenomegaly or masses appreciated. Normal bowel sounds.     Musculoskeletal: Without obvious deformity, normal range of motion. Normal muscle bulk and tone. Distal CMS intact.      Skin: Warm and dry. No mottling of skin. Area of ecchymosis on right arm. Right lower extremity with dense erythema that has been previously outlined, area of erythema slightly receded from previous border.      Neurologic: Patient moves all extremities. Gross strength and sensation are equal bilaterally.    Genitourinary: Deferred      Medical Decision Making       75 MINUTES SPENT BY ME on the date of service doing chart review, history, exam, documentation & further activities per the note.  MANAGEMENT DISCUSSED with the following over the past 24 hours: Dr. Aurelio Singh   NOTE(S)/MEDICAL RECORDS REVIEWED over the past 24 hours: emergency department documentation 7/23/23, prior discharge summary from 7/19/23  Tests ORDERED & REVIEWED in the past 24 hours:  Tests ORDERED in the past 24 hours:   Tests REVIEWED in the past 24 hours:  - CMP  - CBC  - CRP  - Lactic Acid  - LFTs  - Procalcitonin  - Troponin  Tests personally interpreted in the past 24 hours:  - EKG tracing showing atrial fibrillation, no acute ST changes       Data     I have personally reviewed the following data over the past 24 hrs:    19.3 (H)  \   15.3   / 413     136 95 (L) 28.5 (H) /  137 (H)   5.6 (H) 29 1.44 (H) \     ALT: 31 AST: 44 AP:  336 (H) TBILI: 1.6 (H)   ALB: 3.4 (L) TOT PROTEIN: 8.5 (H) LIPASE: 22     Trop: 50 (H) BNP: N/A     Procal: 0.17 (H) CRP: 67.97 (H) Lactic Acid: 1.9         Imaging results reviewed over the past 24 hrs:   Recent Results (from the past 24 hour(s))   CT Abdomen Pelvis w Contrast    Narrative    EXAM: CT ABDOMEN PELVIS W CONTRAST  LOCATION: [facility]  DATE: 7/23/2023    INDICATION: AMS with abdominal pain and nausea  COMPARISON: None.  TECHNIQUE: CT scan of the abdomen and pelvis was performed following injection of IV contrast. Multiplanar reformats were obtained. Dose reduction techniques were used.  CONTRAST: 90 mL Isovue 370    FINDINGS:   LOWER CHEST: Multiple small pulmonary nodules, including 5 mm left lower lobe nodule (series 3 image 31) and 4 mm right lower lobe nodule (series 3 image 28). No focal airspace consolidation or pleural effusion. Extensive calcification of aortic valve   leaflets.       HEPATOBILIARY: Cholecystectomy.    PANCREAS: Normal.    SPLEEN: Multiple calcified splenic granulomas.    ADRENAL GLANDS: Normal.    KIDNEYS/BLADDER: Punctate nonobstructing stone lower pole the left kidney. No ureterolithiasis or hydronephrosis. Likely small renal cysts, no specific follow-up recommended. Urinary bladder is decompressed and not well-visualized.    BOWEL: Moderate rectal wall thickening without surrounding fat stranding. No additional evidence of bowel inflammation. No obstruction or perforation. Normal appendix.    LYMPH NODES: No suspicious lymphadenopathy.    VASCULATURE: Moderate calcified atherosclerosis.    PELVIC ORGANS: Obscured by artifact from hip arthroplasties.    MUSCULOSKELETAL: No acute bony abnormality. Bilateral hip arthroplasties noted. 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      Impression    IMPRESSION:   1.  Rectal wall thickening without definite surrounding fat stranding, nonspecific but can be seen in the setting of proctitis.  2.   No additional visualized explanation for patient's symptoms.  3.  Incidental small pulmonary nodules in the lung bases, largest measuring 5 mm, consider follow-up described below.  4.  Calcification of aortic valve leaflets, can be seen in setting of aortic stenosis.    REFERENCE:  Guidelines for Management of Incidental Pulmonary Nodules Detected on CT Images: From the Fleischner Society 2017.   Guidelines apply to incidental nodules in patients who are 35 years or older.  Guidelines do not apply to lung cancer screening, patients with immunosuppression, or patients with known primary cancer.    MULTIPLE NODULES  Nodule size <6 mm  Low-risk patients: No follow-up needed.  High-risk patients: Optional follow-up at 12 months.         Head CT w/o contrast    Narrative    EXAM: CT HEAD W/O CONTRAST  LOCATION: [facility]  DATE: 7/23/2023    INDICATION: Altered mental status.  COMPARISON: 06/06/2022.   TECHNIQUE: Routine CT Head without IV contrast. Multiplanar reformats. Dose reduction techniques were used.    FINDINGS:  INTRACRANIAL CONTENTS: No acute intracranial hemorrhage, extraaxial collection, or mass effect. No evidence of an acute transcortical confluent infarct. Mild presumed chronic small vessel ischemic changes. Mild generalized parenchymal volume loss. No   interval ventriculomegaly.     VISUALIZED ORBITS/SINUSES/MASTOIDS: No acute intraorbital finding. Grossly similar partial opacification of the visualized paranasal sinuses, most notably and moderate in the right sphenoid and partially imaged maxillary sinuses with scattered sinus wall   ostitis, most consistent with chronic sinusitis. No mastoid or large middle ear effusion.     BONES/SOFT TISSUES: No acute abnormality.      Impression    IMPRESSION:  1.  No acute intracranial abnormality.  2.  Similar chronic changes, as described.   CT Ankle Right w/o Contrast    Narrative    EXAM: CT ANKLE RIGHT W/O CONTRAST  LOCATION:   [facility]  DATE: 7/23/2023    INDICATION: Right ankle pain, cellulitis.  COMPARISON: Right foot CT 07/12/2023.  TECHNIQUE: Noncontrast. Axial, sagittal and coronal thin-section reconstruction. Dose reduction techniques were used.     FINDINGS:     BONES:  -Negative for fracture.  -Moderate degenerative hypertrophic spurring at the dorsal aspect of the talonavicular and TMT joints.  -Moderate plantar and small Achilles calcaneal enthesophytes.    JOINTS:  -Normal alignment. No joint effusion.    SOFT TISSUES:  -Moderate chronic atrophy of the musculature in the lower calf and foot. No deep space soft tissue edema.  -Moderate skin thickening and subcutaneous edema throughout the lower calf, greatest anteriorly, medially, and laterally. No soft tissue air/gas.      Impression    IMPRESSION:  1.  Skin thickening and subcutaneous edema in the lower calf, anteriorly, medially, and laterally, which may correlate with symptoms of cellulitis.    2.  No acute bone or joint abnormality. No fracture. No joint effusions.    3.  Mild-moderate degenerative arthritic spurring in the ankle.    4.  Moderate chronic atrophy of the musculature in the lower calf and foot. No deep space soft tissue edema/inflammation visualized by CT.     XR Chest Port 1 View    Narrative    EXAM: XR CHEST PORT 1 VIEW  LOCATION: [facility]  DATE: 7/23/2023    INDICATION: Infection of unclear source, dysphagia, aspiration risk  COMPARISON: 07/13/2023      Impression    IMPRESSION: No change. Heart size and pulmonary vessels normal. Lungs clear. Severe shoulder DJD.

## 2023-07-23 NOTE — TELEPHONE ENCOUNTER
[facility] TRIAGE ENCOUNTER    Chief Complaint   Patient presents with     Abdominal Pain       Alvin Newton is a 89 year old  (5/10/1934), Nurse called today to report: In distress- crying and rocking back and forth. He is currently on Augmentin for cellulitis. He has been having abdominal pain, taking oxycodone without relief. Had a large BM yesterday. Non-distended abdomen. HR 's.  Increased confusion and weakness. Assessment is hard due to him being in distress. OK send patient to ER for further evaluation.     ASSESSMENT/PLAN    OK to send to ER    Electronically signed by:   Paris Plasencia NP

## 2023-07-23 NOTE — ED NOTES
[facility]   Admission Handoff    The patient is Alvin Newton, 89 year old who arrived in the ED by AMBULANCE from TCU with a complaint of Abdominal Pain  . The patient's current symptoms are new and during this time the symptoms have remained the same. In the ED, patient was diagnosed with   Final diagnoses:   Altered mental status, unspecified altered mental status type   Cellulitis of right lower extremity   Diarrhea, unspecified type         Needed?: No    Allergies:  No Known Allergies    Past Medical Hx:   Past Medical History:   Diagnosis Date    Colonic diverticulum     Hyperlipidemia     Hypertension     Obesity (BMI 30-39.9)     Osteoarthritis     Type 2 diabetes mellitus (H)        Initial vitals were: BP: (!) 145/81  Pulse: 108  Temp: 98.2  F (36.8  C)  Resp: 18  Weight: 107 kg (236 lb)  SpO2: 97 %   Recent vital Signs: /65   Pulse 93   Temp 98.2  F (36.8  C) (Oral)   Resp (!) 8   Wt 107 kg (236 lb)   SpO2 94%   BMI 32.92 kg/m      Elimination Status: Continent: wears briefs     Activity Level: 2 assist    Fall Status: Reason for falls risk:  Mobility and Reason for falls risk: Cognition  bed/chair alarm on, nonskid shoes/slippers when out of bed, arm band in place, patient and family education, assistive device/personal items within reach, and activity supervised    Baseline Mental status: WDL  Current Mental Status changes: acute confusion    Infection present or suspected this encounter: cultures pending  Sepsis suspected: No    Isolation type:     Bariatric equipment needed?: No    In the ED these meds were given:   Medications   0.9% sodium chloride BOLUS (0 mLs Intravenous Stopped 7/23/23 1631)   ondansetron (ZOFRAN) injection 4 mg (4 mg Intravenous $Given 7/23/23 1333)   HYDROmorphone (DILAUDID) injection 0.2 mg (0.2 mg Intravenous $Given 7/23/23 1333)   iopamidol (ISOVUE-370) solution 100 mL (100 mLs Intravenous $Given 7/23/23 1510)   sodium  chloride 0.9 % bag 500mL for CT scan flush use (69 mLs Intravenous $Given 7/23/23 1510)       Drips running?  No    Home pump  No    Current LDAs: Peripheral IV: Site left AC; Gauge 20g    Results:   Labs/Imaging  Ordered and Resulted from Time of ED Arrival Up to the Time of Departure from the ED  Results for orders placed or performed during the hospital encounter of 07/23/23 (from the past 24 hour(s))   [place] Draw    Narrative    The following orders were created for panel order [place] Draw.  Procedure                               Abnormality         Status                     ---------                               -----------         ------                     Extra Blue Top Tube[[id number]]                              Final result               Extra Red Top Tube[[id number]]                               Final result               Extra Green Top (Lithium...[[id number]]                      Final result               Extra Purple Top Tube[[id number]]                            Final result               Extra Green Top (Lithium...[[id number]]                      Final result                 Please view results for these tests on the individual orders.   Extra Blue Top Tube   Result Value Ref Range    Hold Specimen JIC    Extra Red Top Tube   Result Value Ref Range    Hold Specimen JIC    Extra Green Top (Lithium Heparin) Tube   Result Value Ref Range    Hold Specimen JIC    Extra Purple Top Tube   Result Value Ref Range    Hold Specimen JIC    Extra Green Top (Lithium Heparin) ON ICE   Result Value Ref Range    Hold Specimen JIC    CBC with Platelets & Differential    Narrative    The following orders were created for panel order CBC with Platelets & Differential.  Procedure                               Abnormality         Status                     ---------                               -----------         ------                     CBC with platelets and d...[[id number]]  Abnormal            Final result                  Please view results for these tests on the individual orders.   Comprehensive metabolic panel   Result Value Ref Range    Sodium 136 136 - 145 mmol/L    Potassium 5.6 (H) 3.4 - 5.3 mmol/L    Chloride 95 (L) 98 - 107 mmol/L    Carbon Dioxide (CO2) 29 22 - 29 mmol/L    Anion Gap 12 7 - 15 mmol/L    Urea Nitrogen 28.5 (H) 8.0 - 23.0 mg/dL    Creatinine 1.44 (H) 0.67 - 1.17 mg/dL    Calcium 9.9 8.8 - 10.2 mg/dL    Glucose 137 (H) 70 - 99 mg/dL    Alkaline Phosphatase      AST      ALT      Protein Total 8.5 (H) 6.4 - 8.3 g/dL    Albumin 3.4 (L) 3.5 - 5.2 g/dL    Bilirubin Total 1.6 (H) <=1.2 mg/dL    GFR Estimate 46 (L) >60 mL/min/1.73m2   CBC with platelets and differential   Result Value Ref Range    WBC Count 19.3 (H) 4.0 - 11.0 10e3/uL    RBC Count 5.18 4.40 - 5.90 10e6/uL    Hemoglobin 15.3 13.3 - 17.7 g/dL    Hematocrit 46.2 40.0 - 53.0 %    MCV 89 78 - 100 fL    MCH 29.5 26.5 - 33.0 pg    MCHC 33.1 31.5 - 36.5 g/dL    RDW 14.0 10.0 - 15.0 %    Platelet Count 413 150 - 450 10e3/uL    % Neutrophils 82 %    % Lymphocytes 9 %    % Monocytes 8 %    % Eosinophils 0 %    % Basophils 0 %    % Immature Granulocytes 1 %    NRBCs per 100 WBC 0 <1 /100    Absolute Neutrophils 15.7 (H) 1.6 - 8.3 10e3/uL    Absolute Lymphocytes 1.8 0.8 - 5.3 10e3/uL    Absolute Monocytes 1.6 (H) 0.0 - 1.3 10e3/uL    Absolute Eosinophils 0.1 0.0 - 0.7 10e3/uL    Absolute Basophils 0.1 0.0 - 0.2 10e3/uL    Absolute Immature Granulocytes 0.1 <=0.4 10e3/uL    Absolute NRBCs 0.0 10e3/uL   Lipase   Result Value Ref Range    Lipase 22 13 - 60 U/L   Lactic acid whole blood   Result Value Ref Range    Lactic Acid 1.9 0.7 - 2.0 mmol/L   Symptomatic Influenza A/B, RSV, & SARS-CoV2 PCR (COVID-19) Nose    Specimen: Nose; Swab   Result Value Ref Range    Influenza A PCR Negative Negative    Influenza B PCR Negative Negative    RSV PCR Negative Negative    SARS CoV2 PCR Negative Negative    Narrative    Testing was performed using the Xpert  Xpress CoV2/Flu/RSV Assay on the [employer] Gene[employer]pert Instrument. This test should be ordered for the detection of SARS-CoV-2, influenza, and RSV viruses in individuals who meet clinical and/or epidemiological criteria. Test performance is unknown in asymptomatic patients. This test is for in vitro diagnostic use under the FDA EUA for laboratories certified under CLIA to perform high or moderate complexity testing. This test has not been FDA cleared or approved. A negative result does not rule out the presence of PCR inhibitors in the specimen or target RNA in concentration below the limit of detection for the assay. If only one viral target is positive but coinfection with multiple targets is suspected, the sample should be re-tested with another FDA cleared, approved, or authorized test, if coinfection would change clinical management. This test was validated by the [facility]. These laboratories are certified under the Clinical Laboratory Improvement Amendments of 1988 (CLIA-88) as qualified to perform high complexity laboratory testing.   Troponin T, High Sensitivity   Result Value Ref Range    Troponin T, High Sensitivity 50 (H) <=22 ng/L   AST   Result Value Ref Range    AST 44 0 - 45 U/L   ALT   Result Value Ref Range    ALT 31 0 - 70 U/L   Alkaline phosphatase   Result Value Ref Range    Alkaline Phosphatase 336 (H) 40 - 129 U/L   CT Abdomen Pelvis w Contrast    Narrative    EXAM: CT ABDOMEN PELVIS W CONTRAST  LOCATION: [facility]  DATE: 7/23/2023    INDICATION: AMS with abdominal pain and nausea  COMPARISON: None.  TECHNIQUE: CT scan of the abdomen and pelvis was performed following injection of IV contrast. Multiplanar reformats were obtained. Dose reduction techniques were used.  CONTRAST: 90 mL Isovue 370    FINDINGS:   LOWER CHEST: Multiple small pulmonary nodules, including 5 mm left lower lobe nodule (series 3 image 31) and 4 mm right lower lobe nodule  (series 3 image 28). No focal airspace consolidation or pleural effusion. Extensive calcification of aortic valve   leaflets.       HEPATOBILIARY: Cholecystectomy.    PANCREAS: Normal.    SPLEEN: Multiple calcified splenic granulomas.    ADRENAL GLANDS: Normal.    KIDNEYS/BLADDER: Punctate nonobstructing stone lower pole the left kidney. No ureterolithiasis or hydronephrosis. Likely small renal cysts, no specific follow-up recommended. Urinary bladder is decompressed and not well-visualized.    BOWEL: Moderate rectal wall thickening without surrounding fat stranding. No additional evidence of bowel inflammation. No obstruction or perforation. Normal appendix.    LYMPH NODES: No suspicious lymphadenopathy.    VASCULATURE: Moderate calcified atherosclerosis.    PELVIC ORGANS: Obscured by artifact from hip arthroplasties.    MUSCULOSKELETAL: No acute bony abnormality. Bilateral hip arthroplasties noted. 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      Impression    IMPRESSION:   1.  Rectal wall thickening without definite surrounding fat stranding, nonspecific but can be seen in the setting of proctitis.  2.  No additional visualized explanation for patient's symptoms.  3.  Incidental small pulmonary nodules in the lung bases, largest measuring 5 mm, consider follow-up described below.  4.  Calcification of aortic valve leaflets, can be seen in setting of aortic stenosis.    REFERENCE:  Guidelines for Management of Incidental Pulmonary Nodules Detected on CT Images: From the Fleischner Society 2017.   Guidelines apply to incidental nodules in patients who are 35 years or older.  Guidelines do not apply to lung cancer screening, patients with immunosuppression, or patients with known primary cancer.    MULTIPLE NODULES  Nodule size <6 mm  Low-risk patients: No follow-up needed.  High-risk patients: Optional follow-up at 12 months.         UA with Microscopic reflex to Culture    Specimen: Urine, Clean Catch    Result Value Ref Range    Color Urine Yellow Colorless, Straw, Light Yellow, Yellow    Appearance Urine Clear Clear    Glucose Urine Negative Negative mg/dL    Bilirubin Urine Negative Negative    Ketones Urine 5 (A) Negative mg/dL    Specific Gravity Urine 1.033 1.003 - 1.035    Blood Urine Small (A) Negative    pH Urine 5.0 5.0 - 7.0    Protein Albumin Urine Negative Negative mg/dL    Urobilinogen Urine Normal Normal, 2.0 mg/dL    Nitrite Urine Negative Negative    Leukocyte Esterase Urine Negative Negative    Mucus Urine Present (A) None Seen /LPF    RBC Urine 3 (H) <=2 /HPF    WBC Urine 1 <=5 /HPF    Hyaline Casts Urine 4 (H) <=2 /LPF    Narrative    Urine Culture not indicated       For the majority of the shift this patient's behavior was Green     Cardiac Rhythm: N/A  Pt needs tele? No  Skin/wound Issues: cellulitis on right lower leg    Code Status: Full Code    Pain control: good    Nausea control: good    Abnormal labs/tests/findings requiring intervention:     Patient tested for COVID 19 prior to admission: YES     OBS brochure/video discussed/provided to patient/family: Yes     Family present during ED course? Yes     Family Comments/Social Situation comments:     Tasks needing completion: None    Jeimy Lucas, RN

## 2023-07-23 NOTE — ED NOTES
[facility]. Given update on patient's condition and admission status. #[phone]

## 2023-07-23 NOTE — MEDICATION SCRIBE - ADMISSION MEDICATION HISTORY
Medication Scribe Admission Medication History    Admission medication history is complete. The information provided in this note is only as accurate as the sources available at the time of the update.    Medication reconciliation/reorder completed by provider prior to medication history? No    Information Source(s): Facility (TCU/NH/) medication list/MAR  [facility] [place] staff: Edna via phone [phone]    Pertinent Information: Augmentin was started the evening of 7/19/23, only one dose was administered that day; scheduled to be completed Wednesday 7/26/23 morning. Only one dose was administered by TCU today, second dose due this evening.     Changes made to PTA medication list:  Added: None  Deleted: None  Changed: None    Medication Affordability:  Not including over the counter (OTC) medications, was there a time in the past 3 months when you did not take your medications as prescribed because of cost?: Unable to Assess    Allergies reviewed with patient and updates made in EHR: yes - reviewed with TCU staff, Edna     Medication History Completed By: CHRISTIANO WESTFALL 7/23/2023 5:26 PM    Prior to Admission medications    Medication Sig Last Dose Taking? Auth Provider Long Term End Date   acetaminophen (TYLENOL) 500 MG tablet Take 1,000 mg by mouth 3 times daily 7/23/2023 at 0600 Yes Reported, Patient     amiodarone (PACERONE) 200 MG tablet Take 1 tablet (200 mg) by mouth daily 7/23/2023 at am Yes Matt Valera MD Yes    amoxicillin-clavulanate (AUGMENTIN) 875-125 MG tablet Take 1 tablet by mouth 2 times daily for 7 days 7/23/2023 at am Yes Matt Valera MD  7/26/23   atorvastatin (LIPITOR) 20 MG tablet Take 20 mg by mouth At Bedtime 7/22/2023 at hs Yes Reported, Patient Yes    cholecalciferol 50 MCG (2000 UT) CAPS Take 2,000 Units by mouth daily 7/23/2023 at am Yes Reported, Patient     ELIQUIS ANTICOAGULANT 5 MG tablet Take 5 mg by mouth 2 times daily 7/23/2023 at am Yes Reported, Patient      furosemide (LASIX) 20 MG tablet Take 4 tablets (80 mg) by mouth every morning 7/23/2023 at am Yes Matt Valera MD Yes    Lidocaine (LIDOCARE) 4 % Patch Place 2 patches onto the skin every 24 hours To prevent lidocaine toxicity, patient should be patch free for 12 hrs daily. 7/23/2023 at OFF Yes Matt Valera MD     menthol, Topical Analgesic, 2.5% (BENGAY VANISHING SCENT) 2.5 % GEL topical gel Apply topically 4 times daily as needed for other (pain) Apply to shoulders at times when Lidoderm patch is absent Unknown Yes Matt Valera MD     metoprolol succinate ER (TOPROL XL) 25 MG 24 hr tablet Take 2 tablets (50 mg) by mouth daily 7/23/2023 at am Yes Sindhu Patel, SANTHOSH Yes    ondansetron (ZOFRAN) 4 MG tablet Take 1 tablet by mouth every 8 hours as needed 7/23/2023 at 0500 Yes Reported, Patient     oxyCODONE (ROXICODONE) 5 MG tablet Take 1-2 tablets (5-10 mg) by mouth every 4 hours as needed for moderate pain 7/23/2023 at 0500 Yes Yeimy Mary APRN CNP

## 2023-07-24 LAB
ALBUMIN SERPL BCG-MCNC: 3.1 G/DL (ref 3.5–5.2)
ALP SERPL-CCNC: 298 U/L (ref 40–129)
ALT SERPL W P-5'-P-CCNC: 27 U/L (ref 0–70)
ANION GAP SERPL CALCULATED.3IONS-SCNC: 17 MMOL/L (ref 7–15)
AST SERPL W P-5'-P-CCNC: 40 U/L (ref 0–45)
BASOPHILS # BLD AUTO: 0.1 10E3/UL (ref 0–0.2)
BASOPHILS NFR BLD AUTO: 0 %
BILIRUB SERPL-MCNC: 1.7 MG/DL
BUN SERPL-MCNC: 27.4 MG/DL (ref 8–23)
CALCIUM SERPL-MCNC: 9.3 MG/DL (ref 8.8–10.2)
CHLORIDE SERPL-SCNC: 100 MMOL/L (ref 98–107)
CREAT SERPL-MCNC: 1.41 MG/DL (ref 0.67–1.17)
DEPRECATED HCO3 PLAS-SCNC: 26 MMOL/L (ref 22–29)
EOSINOPHIL # BLD AUTO: 0 10E3/UL (ref 0–0.7)
EOSINOPHIL NFR BLD AUTO: 0 %
ERYTHROCYTE [DISTWIDTH] IN BLOOD BY AUTOMATED COUNT: 14.2 % (ref 10–15)
GAMMA INTERFERON BACKGROUND BLD IA-ACNC: 0.09 IU/ML
GFR SERPL CREATININE-BSD FRML MDRD: 48 ML/MIN/1.73M2
GLUCOSE SERPL-MCNC: 141 MG/DL (ref 70–99)
HBA1C MFR BLD: 7.2 %
HCT VFR BLD AUTO: 44.8 % (ref 40–53)
HGB BLD-MCNC: 14.8 G/DL (ref 13.3–17.7)
IMM GRANULOCYTES # BLD: 0.2 10E3/UL
IMM GRANULOCYTES NFR BLD: 1 %
LACTATE SERPL-SCNC: 1.8 MMOL/L (ref 0.7–2)
LYMPHOCYTES # BLD AUTO: 1.3 10E3/UL (ref 0.8–5.3)
LYMPHOCYTES NFR BLD AUTO: 5 %
M TB IFN-G BLD-IMP: NEGATIVE
M TB IFN-G CD4+ BCKGRND COR BLD-ACNC: 0.75 IU/ML
MCH RBC QN AUTO: 29.6 PG (ref 26.5–33)
MCHC RBC AUTO-ENTMCNC: 33 G/DL (ref 31.5–36.5)
MCV RBC AUTO: 90 FL (ref 78–100)
MITOGEN IGNF BCKGRD COR BLD-ACNC: 0 IU/ML
MITOGEN IGNF BCKGRD COR BLD-ACNC: 0 IU/ML
MONOCYTES # BLD AUTO: 2.2 10E3/UL (ref 0–1.3)
MONOCYTES NFR BLD AUTO: 9 %
NEUTROPHILS # BLD AUTO: 20 10E3/UL (ref 1.6–8.3)
NEUTROPHILS NFR BLD AUTO: 85 %
NRBC # BLD AUTO: 0 10E3/UL
NRBC BLD AUTO-RTO: 0 /100
PLATELET # BLD AUTO: 378 10E3/UL (ref 150–450)
POTASSIUM SERPL-SCNC: 3.5 MMOL/L (ref 3.4–5.3)
PROT SERPL-MCNC: 7.3 G/DL (ref 6.4–8.3)
QUANTIFERON MITOGEN: 0.84 IU/ML
QUANTIFERON NIL TUBE: 0.09 IU/ML
QUANTIFERON TB1 TUBE: 0.09 IU/ML
QUANTIFERON TB2 TUBE: 0.09
RBC # BLD AUTO: 5 10E6/UL (ref 4.4–5.9)
SODIUM SERPL-SCNC: 143 MMOL/L (ref 136–145)
WBC # BLD AUTO: 23.8 10E3/UL (ref 4–11)

## 2023-07-24 PROCEDURE — 99232 SBSQ HOSP IP/OBS MODERATE 35: CPT | Performed by: FAMILY MEDICINE

## 2023-07-24 PROCEDURE — 999N000226 HC STATISTIC SLP IP EVAL DEFER: Performed by: SPEECH-LANGUAGE PATHOLOGIST

## 2023-07-24 PROCEDURE — 87507 IADNA-DNA/RNA PROBE TQ 12-25: CPT | Performed by: PHYSICIAN ASSISTANT

## 2023-07-24 PROCEDURE — 120N000001 HC R&B MED SURG/OB

## 2023-07-24 PROCEDURE — 250N000013 HC RX MED GY IP 250 OP 250 PS 637: Performed by: PHYSICIAN ASSISTANT

## 2023-07-24 PROCEDURE — 87493 C DIFF AMPLIFIED PROBE: CPT | Performed by: PHYSICIAN ASSISTANT

## 2023-07-24 PROCEDURE — 36415 COLL VENOUS BLD VENIPUNCTURE: CPT | Performed by: FAMILY MEDICINE

## 2023-07-24 PROCEDURE — 83605 ASSAY OF LACTIC ACID: CPT | Performed by: FAMILY MEDICINE

## 2023-07-24 PROCEDURE — 87324 CLOSTRIDIUM AG IA: CPT | Performed by: PHYSICIAN ASSISTANT

## 2023-07-24 PROCEDURE — 36415 COLL VENOUS BLD VENIPUNCTURE: CPT | Performed by: PHYSICIAN ASSISTANT

## 2023-07-24 PROCEDURE — 85025 COMPLETE CBC W/AUTO DIFF WBC: CPT | Performed by: PHYSICIAN ASSISTANT

## 2023-07-24 PROCEDURE — 80053 COMPREHEN METABOLIC PANEL: CPT | Performed by: PHYSICIAN ASSISTANT

## 2023-07-24 PROCEDURE — 250N000011 HC RX IP 250 OP 636: Performed by: PHYSICIAN ASSISTANT

## 2023-07-24 RX ORDER — AMPICILLIN AND SULBACTAM 2; 1 G/1; G/1
3 INJECTION, POWDER, FOR SOLUTION INTRAMUSCULAR; INTRAVENOUS EVERY 8 HOURS
Status: DISCONTINUED | OUTPATIENT
Start: 2023-07-24 | End: 2023-07-25

## 2023-07-24 RX ORDER — METRONIDAZOLE 500 MG/100ML
500 INJECTION, SOLUTION INTRAVENOUS EVERY 8 HOURS
Status: DISCONTINUED | OUTPATIENT
Start: 2023-07-24 | End: 2023-07-25

## 2023-07-24 RX ADMIN — MICONAZOLE NITRATE: 20 POWDER TOPICAL at 21:55

## 2023-07-24 RX ADMIN — AMPICILLIN SODIUM, SULBACTAM SODIUM 3 G: 2; 1 INJECTION, POWDER, FOR SOLUTION INTRAMUSCULAR; INTRAVENOUS at 16:14

## 2023-07-24 RX ADMIN — LIDOCAINE 2 PATCH: 560 PATCH PERCUTANEOUS; TOPICAL; TRANSDERMAL at 07:45

## 2023-07-24 RX ADMIN — METRONIDAZOLE 500 MG: 500 INJECTION, SOLUTION INTRAVENOUS at 01:12

## 2023-07-24 RX ADMIN — METRONIDAZOLE 500 MG: 500 INJECTION, SOLUTION INTRAVENOUS at 07:43

## 2023-07-24 RX ADMIN — AMPICILLIN SODIUM, SULBACTAM SODIUM 3 G: 2; 1 INJECTION, POWDER, FOR SOLUTION INTRAMUSCULAR; INTRAVENOUS at 08:22

## 2023-07-24 RX ADMIN — APIXABAN 5 MG: 5 TABLET, FILM COATED ORAL at 21:15

## 2023-07-24 RX ADMIN — ONDANSETRON 4 MG: 4 TABLET, ORALLY DISINTEGRATING ORAL at 03:30

## 2023-07-24 RX ADMIN — MICONAZOLE NITRATE: 20 POWDER TOPICAL at 07:36

## 2023-07-24 RX ADMIN — ATORVASTATIN CALCIUM 20 MG: 20 TABLET, FILM COATED ORAL at 21:15

## 2023-07-24 RX ADMIN — AMPICILLIN SODIUM, SULBACTAM SODIUM 3 G: 2; 1 INJECTION, POWDER, FOR SOLUTION INTRAMUSCULAR; INTRAVENOUS at 00:29

## 2023-07-24 RX ADMIN — METRONIDAZOLE 500 MG: 500 INJECTION, SOLUTION INTRAVENOUS at 16:59

## 2023-07-24 ASSESSMENT — ACTIVITIES OF DAILY LIVING (ADL)
ADLS_ACUITY_SCORE: 44
ADLS_ACUITY_SCORE: 45
ADLS_ACUITY_SCORE: 44
ADLS_ACUITY_SCORE: 45
ADLS_ACUITY_SCORE: 44
ADLS_ACUITY_SCORE: 45
ADLS_ACUITY_SCORE: 44
DEPENDENT_IADLS:: CLEANING;COOKING;LAUNDRY;MEAL PREPARATION;SHOPPING;MEDICATION MANAGEMENT;MONEY MANAGEMENT;TRANSPORTATION

## 2023-07-24 NOTE — PROGRESS NOTES
SPEECH THERAPY    Attempted bedside swallow. Patient's daughter reports it is not necessary at this time and he is doing well (daughter is SLP). Patient was also cleared by MD to be on a regular diet. Completing orders.    Funmi Maldonado MA, CCC/SLP

## 2023-07-24 NOTE — PLAN OF CARE
Pt confused, patient noted to start choking when attempting to administer medications so swallow study was ordered by provider and NPO resumed, sleeping between cares, RLE red and swollen (cellulitis), skin is very red particularly in groin so powder applied per MAR, NS 50 ml/hr with intermittent IV abx (until patient pulled it out), denied pain, need stool sample, emesis x 1 and Zofran given, sleeping between cares  /57 (BP Location: Right arm, Patient Position: Supine)   Pulse 89   Temp 99.5  F (37.5  C) (Oral)   Resp 14   Wt 107 kg (236 lb)   SpO2 93%   BMI 32.92 kg/m      Staff found patient on edge of bed ~0330 with blood everywhere.  Pt had pulled his PIV out.  Will replace in am.

## 2023-07-24 NOTE — PROGRESS NOTES
[facility] [place]    Patient admitted to room 2208 at approximately 2030 via cart from emergency room. Patient was accompanied by other: Patient came alone.     Verbal SBAR report received from BAO Castillo prior to patient arrival.     Patient trasferred to bed via air jasvir. Patient alert and oriented X 1. The patient is not having any pain.  . Admission vital signs: Blood pressure 127/71, pulse 93, temperature 99.3  F (37.4  C), temperature source Oral, resp. rate (!) 8, weight 107 kg (236 lb), SpO2 94 %. Patient was oriented to plan of care, call light, bed controls, tv, telephone, bathroom, and visiting hours.     Risk Assessment    The following safety risks were identified during admission: fall. Yellow risk band applied: YES.     Skin Initial Assessment    This writer admitted this patient and completed a full skin assessment and Hunter score in the Adult PCS flowsheet. Appropriate interventions initiated as needed.     Secondary skin check completed by BAO Fraser.         Education    Patient has a Alex to Observation order: No  Observation education completed and documented: N/A      Galilea Norton RN

## 2023-07-24 NOTE — PROGRESS NOTES
CLINICAL NUTRITION SERVICES - ASSESSMENT NOTE     Nutrition Prescription    RECOMMENDATIONS FOR MDs/PROVIDERS TO ORDER:  None at this time    Malnutrition Status:    Moderate malnutrition in the context of acute illness    Recommendations already ordered by Registered Dietitian (RD):  Please send vanilla magic cup with dinner meal  Log patient meal preferences into   Education provided on nutrition for the preservation of LBM and nutrition post antibiotic treatment for C. diff    Future/Additional Recommendations:  Monitor patient weight, intakes, labs, and GI/BM  Monitor patient tolerance to supplement     REASON FOR ASSESSMENT  Alvin Newton is a/an 89 year old male assessed by the dietitian for Admission Nutrition Risk Screen for positive    NUTRITION HISTORY  Patient is a 89 year old male who presents with mild encephalopathy/ altered mental status, leukocytosis with elevated ANC, diarrhea and abdominal pain; concern for possible C.diff, cellulitis of right lower extremity, recent bacteremia, prior positive urine culture, dysphagia, hyperkalemia, renal impairment, elevated trop, pulm nodules, afib with RVR, type 2 DM, diastolic congestive HF, aortic stenosis. HTN, mixed HLD.     Per MST criteria patient scored unsure for weight loss but no decreased appetite    Family reports that patient looks much better this morning than he did yesterday.  Patient was oriented to self, place, time, and situation this morning with very little error. Family reports this is at baseline.     Per patient interview  Patient reports a poor appetite since the start of his illness. Patient endorses symptoms of nausea, vomiting, and loose stools. RD went over symptoms of C.diff and provided education on gut micro biome health following antibiotic treatment for C.diff. Patient estimates around a 10 lb weight loss but unsure of how much he typical weighs. RD went over the importance of calories and protein for the preservation of LBM.  Patient was agreeable to supplementation however noted that he has tried ensure in the past and it made him vomit. Patient was agreeable to trial of vanilla magic cup. RD also went over patient preferences and logged information in patient menu.     CURRENT NUTRITION ORDERS  Diet: Orders Placed This Encounter      Regular Diet Adult      Intake/Tolerance: No recorded intake in patient chart.    LABS  Labs reviewed  Potassium low-3.2 mg/dL  Glucose elevated-139 mg/dL      MEDICATIONS  Medications reviewed  Scheduled: Unasyn, Flagyl  Continuous: Sodium chloride 50 mL/hr  PRN: Zofran    ANTHROPOMETRICS  Height: 0 cm (Data Unavailable)  Most Recent Weight: 107.8 kg (237 lb 10.5 oz)    IBW: 78.1 kg  BMI: Obesity Grade I BMI 30-34.9  Weight History:   Wt Readings from Last 15 Encounters:   07/24/23 107.8 kg (237 lb 10.5 oz)   07/19/23 112.3 kg (247 lb 9.6 oz)   07/11/23 118.1 kg (260 lb 4.8 oz)   12/21/22 106.6 kg (235 lb)   09/22/22 111.4 kg (245 lb 11.2 oz)   07/26/22 111.8 kg (246 lb 6.4 oz)   07/21/22 113.1 kg (249 lb 6.4 oz)   07/18/22 110.5 kg (243 lb 9.6 oz)   07/12/22 112.3 kg (247 lb 8 oz)   07/08/22 113.2 kg (249 lb 9.6 oz)   07/06/22 113.9 kg (251 lb)   07/06/22 113.9 kg (251 lb)   06/28/22 119.4 kg (263 lb 3.2 oz)   06/27/22 116 kg (255 lb 12.8 oz)   06/21/22 123.8 kg (273 lb)   8.8% weight loss noted in one month.     Dosing Weight: 85.5 kg-adjusted BW used.    ASSESSED NUTRITION NEEDS  Estimated Energy Needs: 2,137- 2,565 kcals/day (25 - 30 kcals/kg)  Justification: Maintenance  Estimated Protein Needs:  grams protein/day (1 - 1.2 grams of pro/kg)  Justification: Increased needs  Estimated Fluid Needs: 2,137- 2,565 mL/day (1 mL/kcal)   Justification: Per provider pending fluid status    PHYSICAL FINDINGS  See malnutrition section below.  Cellulitis of right lower extremity    MALNUTRITION  % Intake: </= 50% intake for >/= 5 days (severe)  % Weight Loss: Unable to determine d/t fluid  fluctuations  Subcutaneous Fat Loss: None observed  Muscle Loss: None observed  Fluid Accumulation/Edema: Mild  Malnutrition Diagnosis: Moderate malnutrition in the context of acute illness    NUTRITION DIAGNOSIS  Malnutrition related to poor appetite as evidenced by intakes <50% intake for > 5 days and mild fluid accumulation.    INTERVENTIONS  Implementation  Nutrition education for nutrition relationship to health/disease and Nutrition education for recommended modifications   Collaboration with other providers-IDT rounds  Medical food supplement therapy-Please send vanilla magic cup once daily  Modify composition of meals/snacks -Logged patient meal preferences into HT.    Goals  Patient to consume % of estimated needs through meal trays or the equivalent w/ supplements/snacks.     Monitoring/Evaluation  Progress toward goals will be monitored and evaluated per protocol.  Paris Chin RDN, KARTHIK  Clinical Dietitian  Office: [phone]  [facility] pager: [phone]

## 2023-07-24 NOTE — UTILIZATION REVIEW
[facility] Utilization Review  Admission Status; Secondary Review Determination     Admission Date: 7/23/2023 12:26 PM      Under the authority of the Utilization Management Committee, the utilization review process indicated a secondary review on the above patient.  The review outcome is based on review of the medical records, discussions with staff, and applying clinical experience noted on the date of the review.        (X)      Inpatient Status Appropriate - This patient's medical care is consistent with medical management for inpatient care and reasonable inpatient medical practice.          RATIONALE FOR DETERMINATION   89-year-old male admitted with abdominal pain, diarrhea and mild encephalopathy of unclear etiology.  Patient started on IV Flagyl with concern for C. difficile versus antibiotic associated diarrhea with leukocytosis.  Blood cultures x2 pending, started on IV Unasyn for right lower extremity cellulitis ongoing, and seen on CT ankle.  Complex patient with multiple acute issues during the hospital stay, confusion resolved but had diarrhea overnight, with plan to discharge to TCU once medically stable, with ongoing medical issues and requiring interventions with close monitoring in the hospital, agree with inpatient status      The severity of illness, intensity of service provided, expected LOS and risk for adverse outcome make the care complex, high risk and appropriate for hospital admission.The patient requires hospital based medical care which is anticipated to require a stay of 2 or more midnights; according to CMS guidelines the patient should be admitted as inpatient        The information on this document is developed by the utilization review team in order for the business office to ensure compliance.  This only denotes the appropriateness of proper admission status and does not reflect the quality of care rendered.         The definitions of Inpatient Status and Observation  Status used in making the determination above are those provided in the CMS Coverage Manual, Chapter 1 and Chapter 6, section 70.4.      Sincerely,       Olayinka Lyle MD  Physician Advisor  Utilization Review-[place]    Phone: [phone]

## 2023-07-24 NOTE — PROGRESS NOTES
"[facility]    Medicine Progress Note - Hospitalist Service    Date of Admission:  7/23/2023    Assessment & Plan      Alvin Newton is a 89 year old male admitted on 7/23/2023. He presented to the emergency department for evaluation abdominal pain, diarrhea, and mild confusion of unclear etiology for which he is being admitted for further evaluation and treatment.    Mild encephalopathy / Altered mental status, unclear etiology  Resolved today.  Family at bedside and confirms that he is at his normal mentation.  His daughter thinks that perhaps it could just be from panic because he has been so sick and not getting better.    Diarrhea and abdominal pain, concern for possible C.diff  C. difficile versus antibiotic associated  - C.diff and enteric panel pending  - Empiric IV Flagyl (unable to take oral vancomycin at this time)     Leukocytosis with elevated ANC  Admit WBC 19.3, ANC 15.7, absolute monocytes 1.6. Afebrile, normal lactic acid. Procalcitonin mildly elevated (0.17). Etiology of infection is unclear, differential includes possible C.diff or other GI illness (although less likely), persisting cellulitis and bacteremia.  - Trend CBC with differential  - Blood culture x 2 pending  -Continue Unasyn and empiric Flagyl while looking for other possible sources      Cellulitis of right lower extremity  Recent bacteremia - Strep dysgalactiae  Cellulitis and bacteremia previously diagnosed, was hospitalized at [facility] from July 12-19, 2023 for cellulitis with septic shock. Strep was pan-sensitive. Initially on zosyn/vancomycin, then ceftriaxone, and discharged on 7 day course of Augmentin. Patient has persisting evidence of cellulitis on admission 7/23/2023, although erythema has diminished in area from previously outlined area.   CT ankle demonstrates \"1.  Skin  thickening and subcutaneous edema in the lower calf, anteriorly, medially, and laterally, which may correlate with " "symptoms of cellulitis. 2.  No acute bone or joint abnormality. No fracture. No joint effusions. 3.  Mild-moderate degenerative arthritic spurring in the ankle. 4.  Moderate chronic atrophy of the musculature in the lower calf and foot. No deep space soft tissue edema/inflammation visualized by CT.\"   -Currently covered well with Unasyn  -As patient cannot swallow the Augmentin, will plan to switch to cefdinir at discharge if the patient does not have a separate indication for a different antibiotic    Prior positive urine culture - Staph epidermidis   Prior urine culture from 7/11/23 grew >100k Staph epidermidis, but no evidence of UTI on admission    Suspected dysphagia   The patient and family deny dysphagia and say really the only thing he cannot swallow or the very large Augmentin tablets.  I do not think there is really an indication for a full SLP evaluation in that case.  We will cleared the patient to take orals and switch the Augmentin to a smaller tablet.    Hyperkalemia   Resolved    Renal impairment  Admit creatinine 1.44 (recent baseline 1.1 - 1.4), GFR 46 (recent baseline 47 - 60). Patient likely has CKD stage 3, although his most recent renal function was fluctuating in the hospital due to sepsis and rehydration followed by diuresis, so his baseline is unknown at this time.   -Continue to hold furosemide until we are sure that the patient can tolerate oral intake and diarrhea is resolved    Paroxysmal atrial fibrillation with RVR  Medication induced coagulopathy  Previously diagnosed. Rate controlled prior to admission with amiodarone 200 mg daily and metoprolol XL 50 mg daily. Anticoagulated prior to admission with apixaban 5 mg bid.   -Resume amiodarone, metoprolol, and apixaban     Type 2 diabetes mellitus with diabetic polyneuropathy  Previously diagnosed, is diet controlled and not on any pharmacologic treatments.  - HgbA1c 7.2 which is appropriate for his age  - Monitor glucose on daily labs, " "if persistently elevated then initiate sliding scale insulin     Diastolic congestive heart failure  Aortic stenosis  Essential hypertension  Previously diagnosed. Appears compensated / slightly dry on admission. Managed prior to admission with furosemide 80 mg daily and beta blocker as noted above.  - Holding furosemide  -Continue metoprolol    Mixed hyperlipidemia  Continue atorvastatin.            Diet: Regular Diet Adult    DVT Prophylaxis: DOAC  Eastman Catheter: Not present  Lines: None     Cardiac Monitoring: None  Code Status: Full Code      Clinically Significant Risk Factors Present on Admission        # Hyperkalemia: Highest K = 5.6 mmol/L in last 2 days, will monitor as appropriate    # Hypercalcemia: corrected calcium is >10.1, will monitor as appropriate    # Hypoalbuminemia: Lowest albumin = 3.1 g/dL at 7/24/2023  5:33 AM, will monitor as appropriate  # Drug Induced Coagulation Defect: home medication list includes an anticoagulant medication    # Hypertension: Noted on problem list     # DMII: A1C = 7.2 % (Ref range: <5.7 %) within past 6 months    # Obesity: Estimated body mass index is 33.15 kg/m  as calculated from the following:    Height as of 7/12/23: 1.803 m (5' 11\").    Weight as of this encounter: 107.8 kg (237 lb 10.5 oz).            Disposition Plan      Expected Discharge Date: 07/24/2023      Destination: inpatient rehabilitation facility            Gomez Hollingsworth MD  Hospitalist Service  [facility]  Securely message with [employer] (more info)  Text page via [facility] Paging/Directory   ______________________________________________________________________    Interval History   The patient's confusion rapidly resolved overnight.  He had an episode of diarrhea overnight but none since.  He is hungry.  No new symptoms.    Physical Exam   Vital Signs: Temp: 98  F (36.7  C) Temp src: Oral BP: 121/67 Pulse: 63   Resp: 16 SpO2: 97 % O2 Device: None (Room air)    Weight: " 237 lbs 10.49 oz    Physical Exam  Vitals and nursing note reviewed.   Cardiovascular:      Rate and Rhythm: Regular rhythm.      Heart sounds: Murmur heard.   Abdominal:      General: Bowel sounds are increased.      Tenderness: There is no abdominal tenderness.   Musculoskeletal:      Comments: Right lower extremity area of cellulitis with no significant calor, more dusky than erythematous, and withdrawn from previous markings   Skin:     Capillary Refill: Capillary refill takes less than 2 seconds.   Neurological:      General: No focal deficit present.   Psychiatric:         Mood and Affect: Mood normal.         Behavior: Behavior normal.          Medical Decision Making             Data     I have personally reviewed the following data over the past 24 hrs:    23.8 (H)  \   14.8   / 378     143 100 27.4 (H) /  141 (H)   3.5 26 1.41 (H) \       ALT: 27 AST: 40 AP: 298 (H) TBILI: 1.7 (H)   ALB: 3.1 (L) TOT PROTEIN: 7.3 LIPASE: N/A       Trop: 50 (H) BNP: N/A       TSH: N/A T4: N/A A1C: 7.2 (H)       Procal: 0.17 (H) CRP: N/A Lactic Acid: N/A         Imaging results reviewed over the past 24 hrs:   Recent Results (from the past 24 hour(s))   CT Abdomen Pelvis w Contrast    Narrative    EXAM: CT ABDOMEN PELVIS W CONTRAST  LOCATION: [facility]  DATE: 7/23/2023    INDICATION: AMS with abdominal pain and nausea  COMPARISON: None.  TECHNIQUE: CT scan of the abdomen and pelvis was performed following injection of IV contrast. Multiplanar reformats were obtained. Dose reduction techniques were used.  CONTRAST: 90 mL Isovue 370    FINDINGS:   LOWER CHEST: Multiple small pulmonary nodules, including 5 mm left lower lobe nodule (series 3 image 31) and 4 mm right lower lobe nodule (series 3 image 28). No focal airspace consolidation or pleural effusion. Extensive calcification of aortic valve   leaflets.       HEPATOBILIARY: Cholecystectomy.    PANCREAS: Normal.    SPLEEN: Multiple calcified  splenic granulomas.    ADRENAL GLANDS: Normal.    KIDNEYS/BLADDER: Punctate nonobstructing stone lower pole the left kidney. No ureterolithiasis or hydronephrosis. Likely small renal cysts, no specific follow-up recommended. Urinary bladder is decompressed and not well-visualized.    BOWEL: Moderate rectal wall thickening without surrounding fat stranding. No additional evidence of bowel inflammation. No obstruction or perforation. Normal appendix.    LYMPH NODES: No suspicious lymphadenopathy.    VASCULATURE: Moderate calcified atherosclerosis.    PELVIC ORGANS: Obscured by artifact from hip arthroplasties.    MUSCULOSKELETAL: No acute bony abnormality. Bilateral hip arthroplasties noted. 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      Impression    IMPRESSION:   1.  Rectal wall thickening without definite surrounding fat stranding, nonspecific but can be seen in the setting of proctitis.  2.  No additional visualized explanation for patient's symptoms.  3.  Incidental small pulmonary nodules in the lung bases, largest measuring 5 mm, consider follow-up described below.  4.  Calcification of aortic valve leaflets, can be seen in setting of aortic stenosis.    REFERENCE:  Guidelines for Management of Incidental Pulmonary Nodules Detected on CT Images: From the Fleischner Society 2017.   Guidelines apply to incidental nodules in patients who are 35 years or older.  Guidelines do not apply to lung cancer screening, patients with immunosuppression, or patients with known primary cancer.    MULTIPLE NODULES  Nodule size <6 mm  Low-risk patients: No follow-up needed.  High-risk patients: Optional follow-up at 12 months.         Head CT w/o contrast    Narrative    EXAM: CT HEAD W/O CONTRAST  LOCATION: [facility]  DATE: 7/23/2023    INDICATION: Altered mental status.  COMPARISON: 06/06/2022.   TECHNIQUE: Routine CT Head without IV contrast. Multiplanar reformats. Dose reduction  techniques were used.    FINDINGS:  INTRACRANIAL CONTENTS: No acute intracranial hemorrhage, extraaxial collection, or mass effect. No evidence of an acute transcortical confluent infarct. Mild presumed chronic small vessel ischemic changes. Mild generalized parenchymal volume loss. No   interval ventriculomegaly.     VISUALIZED ORBITS/SINUSES/MASTOIDS: No acute intraorbital finding. Grossly similar partial opacification of the visualized paranasal sinuses, most notably and moderate in the right sphenoid and partially imaged maxillary sinuses with scattered sinus wall   ostitis, most consistent with chronic sinusitis. No mastoid or large middle ear effusion.     BONES/SOFT TISSUES: No acute abnormality.      Impression    IMPRESSION:  1.  No acute intracranial abnormality.  2.  Similar chronic changes, as described.   CT Ankle Right w/o Contrast    Narrative    EXAM: CT ANKLE RIGHT W/O CONTRAST  LOCATION: [facility]  DATE: 7/23/2023    INDICATION: Right ankle pain, cellulitis.  COMPARISON: Right foot CT 07/12/2023.  TECHNIQUE: Noncontrast. Axial, sagittal and coronal thin-section reconstruction. Dose reduction techniques were used.     FINDINGS:     BONES:  -Negative for fracture.  -Moderate degenerative hypertrophic spurring at the dorsal aspect of the talonavicular and TMT joints.  -Moderate plantar and small Achilles calcaneal enthesophytes.    JOINTS:  -Normal alignment. No joint effusion.    SOFT TISSUES:  -Moderate chronic atrophy of the musculature in the lower calf and foot. No deep space soft tissue edema.  -Moderate skin thickening and subcutaneous edema throughout the lower calf, greatest anteriorly, medially, and laterally. No soft tissue air/gas.      Impression    IMPRESSION:  1.  Skin thickening and subcutaneous edema in the lower calf, anteriorly, medially, and laterally, which may correlate with symptoms of cellulitis.    2.  No acute bone or joint abnormality. No  fracture. No joint effusions.    3.  Mild-moderate degenerative arthritic spurring in the ankle.    4.  Moderate chronic atrophy of the musculature in the lower calf and foot. No deep space soft tissue edema/inflammation visualized by CT.     XR Chest Port 1 View    Narrative    EXAM: XR CHEST PORT 1 VIEW  LOCATION: [facility]  DATE: 7/23/2023    INDICATION: Infection of unclear source, dysphagia, aspiration risk  COMPARISON: 07/13/2023      Impression    IMPRESSION: No change. Heart size and pulmonary vessels normal. Lungs clear. Severe shoulder DJD.

## 2023-07-24 NOTE — PLAN OF CARE
Patient is alert and answering questions correctly no report of abdominal pain or diarrhea at this time right lower extremity remains red and warm to touch

## 2023-07-24 NOTE — CONSULTS
Care Management Initial Consult    General Information  Assessment completed with: Patient, Children, Spouse or significant other, Milana Busby, spouse, Rosie  Type of CM/SW Visit: Initial Assessment    Primary Care Provider verified and updated as needed: Yes   Readmission within the last 30 days:  (Yes)      Reason for Consult: discharge planning  Advance Care Planning:            Communication Assessment  Patient's communication style: spoken language (English or Bilingual)    Hearing Difficulty or Deaf: no   Wear Glasses or Blind: yes    Cognitive  Cognitive/Neuro/Behavioral: .WDL except, orientation        Orientation: disoriented to, person, place, time             Living Environment:   People in home: spouse     Current living Arrangements: house      Able to return to prior arrangements:  (Admitted from [facility])       Family/Social Support:  Care provided by: self, spouse/significant other  Provides care for: no one  Marital Status:   Wife, Children          Description of Support System: Supportive, Involved    Support Assessment: Adequate family and caregiver support, Adequate social supports    Current Resources:   Patient receiving home care services: No     Community Resources: Transitional Care  Equipment currently used at home: cane, straight, raised toilet seat, walker, rolling  Supplies currently used at home:      Employment/Financial:  Employment Status: retired        Financial Concerns:     Referral to Financial Worker: No       Does the patient's insurance plan have a 3 day qualifying hospital stay waiver?  No    Lifestyle & Psychosocial Needs:  Social Determinants of Health     Tobacco Use: Low Risk  (7/11/2023)    Patient History     Smoking Tobacco Use: Never     Smokeless Tobacco Use: Never     Passive Exposure: Not on file   Alcohol Use: Not on file   Financial Resource Strain: Not on file   Food Insecurity: Not on file   Transportation Needs: Not on file   Physical Activity:  Not on file   Stress: Not on file   Social Connections: Not on file   Intimate Partner Violence: Not on file   Depression: Not at risk (7/21/2022)    PHQ-2     PHQ-2 Score: 0   Housing Stability: Not on file       Functional Status:  Prior to admission patient needed assistance:   Dependent ADLs:: Bathing, Dressing, Eating, Grooming, Transfers, Toileting  Dependent IADLs:: Cleaning, Cooking, Laundry, Meal Preparation, Shopping, Medication Management, Money Management, Transportation  Assesssment of Functional Status: Not at baseline with ADL Functioning, Not at baseline with mobility, Needs placement in a SNF/TCF for rehabilitation, Not at  functional baseline    Mental Health Status:  Mental Health Status: No Current Concerns       Chemical Dependency Status:  Chemical Dependency Status: No Current Concerns             Values/Beliefs:  Spiritual, Cultural Beliefs, Amish Practices, Values that affect care: N/a            Additional Information:    Writer met patient, dtr, Milana and spouse, Rosie at bedside to introduce self and discuss role. [facility] from [facility]) on 7/23. Per dtr, they do have a bed hold at [facility]. Dtr will be bringing Rosie to [facility] to sign the bed hold form today.     Per dtr, patient hasn't been able to tolerate much therapy as he has not been feeling well the last few days. Dtr reports patient has been using a standard walker at the TCU.    PLAN: Return to [facility] (Phone: [phone] Fax: [phone]) when medically ready. Patient will likely need [facility] WC transport.     13:05- confirmed that patient has a bed hold from Nadia, khdara at [facility].       EITAN Olvera  Care Management, [facility]  [phone]

## 2023-07-25 PROBLEM — A04.72 COLITIS DUE TO CLOSTRIDIUM DIFFICILE: Status: ACTIVE | Noted: 2023-07-25

## 2023-07-25 LAB
ADV 40+41 DNA STL QL NAA+NON-PROBE: NEGATIVE
ALBUMIN SERPL BCG-MCNC: 3 G/DL (ref 3.5–5.2)
ALP SERPL-CCNC: 253 U/L (ref 40–129)
ALT SERPL W P-5'-P-CCNC: 20 U/L (ref 0–70)
ANION GAP SERPL CALCULATED.3IONS-SCNC: 13 MMOL/L (ref 7–15)
AST SERPL W P-5'-P-CCNC: 28 U/L (ref 0–45)
ASTRO TYP 1-8 RNA STL QL NAA+NON-PROBE: NEGATIVE
BASOPHILS # BLD AUTO: 0.1 10E3/UL (ref 0–0.2)
BASOPHILS NFR BLD AUTO: 0 %
BILIRUB SERPL-MCNC: 1.2 MG/DL
BUN SERPL-MCNC: 28.4 MG/DL (ref 8–23)
C CAYETANENSIS DNA STL QL NAA+NON-PROBE: NEGATIVE
C DIFF GDH STL QL IA: POSITIVE
C DIFF TOX A+B STL QL IA: POSITIVE
C DIFF TOX B STL QL: POSITIVE
CALCIUM SERPL-MCNC: 9.2 MG/DL (ref 8.8–10.2)
CAMPYLOBACTER DNA SPEC NAA+PROBE: NEGATIVE
CHLORIDE SERPL-SCNC: 103 MMOL/L (ref 98–107)
CREAT SERPL-MCNC: 1.16 MG/DL (ref 0.67–1.17)
CRYPTOSP DNA STL QL NAA+NON-PROBE: NEGATIVE
DEPRECATED HCO3 PLAS-SCNC: 28 MMOL/L (ref 22–29)
E COLI O157 DNA STL QL NAA+NON-PROBE: NORMAL
E HISTOLYT DNA STL QL NAA+NON-PROBE: NEGATIVE
EAEC ASTA GENE ISLT QL NAA+PROBE: NEGATIVE
EC STX1+STX2 GENES STL QL NAA+NON-PROBE: NEGATIVE
EOSINOPHIL # BLD AUTO: 0.1 10E3/UL (ref 0–0.7)
EOSINOPHIL NFR BLD AUTO: 0 %
EPEC EAE GENE STL QL NAA+NON-PROBE: NEGATIVE
ERYTHROCYTE [DISTWIDTH] IN BLOOD BY AUTOMATED COUNT: 14.4 % (ref 10–15)
ETEC LTA+ST1A+ST1B TOX ST NAA+NON-PROBE: NEGATIVE
G LAMBLIA DNA STL QL NAA+NON-PROBE: NEGATIVE
GFR SERPL CREATININE-BSD FRML MDRD: 60 ML/MIN/1.73M2
GLUCOSE SERPL-MCNC: 139 MG/DL (ref 70–99)
HCT VFR BLD AUTO: 44.5 % (ref 40–53)
HGB BLD-MCNC: 14.3 G/DL (ref 13.3–17.7)
IMM GRANULOCYTES # BLD: 0.2 10E3/UL
IMM GRANULOCYTES NFR BLD: 1 %
LACTATE SERPL-SCNC: 2 MMOL/L (ref 0.7–2)
LACTATE SERPL-SCNC: 2.1 MMOL/L (ref 0.7–2)
LYMPHOCYTES # BLD AUTO: 1.8 10E3/UL (ref 0.8–5.3)
LYMPHOCYTES NFR BLD AUTO: 10 %
MAGNESIUM SERPL-MCNC: 2 MG/DL (ref 1.7–2.3)
MCH RBC QN AUTO: 29.2 PG (ref 26.5–33)
MCHC RBC AUTO-ENTMCNC: 32.1 G/DL (ref 31.5–36.5)
MCV RBC AUTO: 91 FL (ref 78–100)
MONOCYTES # BLD AUTO: 2 10E3/UL (ref 0–1.3)
MONOCYTES NFR BLD AUTO: 11 %
NEUTROPHILS # BLD AUTO: 14.1 10E3/UL (ref 1.6–8.3)
NEUTROPHILS NFR BLD AUTO: 78 %
NOROVIRUS GI+II RNA STL QL NAA+NON-PROBE: NEGATIVE
NRBC # BLD AUTO: 0 10E3/UL
NRBC BLD AUTO-RTO: 0 /100
P SHIGELLOIDES DNA STL QL NAA+NON-PROBE: NEGATIVE
PLATELET # BLD AUTO: 410 10E3/UL (ref 150–450)
POTASSIUM SERPL-SCNC: 3.2 MMOL/L (ref 3.4–5.3)
PROT SERPL-MCNC: 7.1 G/DL (ref 6.4–8.3)
RBC # BLD AUTO: 4.89 10E6/UL (ref 4.4–5.9)
RVA RNA STL QL NAA+NON-PROBE: NEGATIVE
SALMONELLA SP RPOD STL QL NAA+PROBE: NEGATIVE
SAPO I+II+IV+V RNA STL QL NAA+NON-PROBE: NEGATIVE
SHIGELLA SP+EIEC IPAH ST NAA+NON-PROBE: NEGATIVE
SODIUM SERPL-SCNC: 144 MMOL/L (ref 136–145)
V CHOLERAE DNA SPEC QL NAA+PROBE: NEGATIVE
VIBRIO DNA SPEC NAA+PROBE: NEGATIVE
WBC # BLD AUTO: 18.2 10E3/UL (ref 4–11)
Y ENTEROCOL DNA STL QL NAA+PROBE: NEGATIVE

## 2023-07-25 PROCEDURE — 250N000011 HC RX IP 250 OP 636: Mod: JZ | Performed by: FAMILY MEDICINE

## 2023-07-25 PROCEDURE — 258N000003 HC RX IP 258 OP 636: Performed by: PHYSICIAN ASSISTANT

## 2023-07-25 PROCEDURE — 120N000001 HC R&B MED SURG/OB

## 2023-07-25 PROCEDURE — 250N000013 HC RX MED GY IP 250 OP 250 PS 637: Performed by: PHYSICIAN ASSISTANT

## 2023-07-25 PROCEDURE — 36415 COLL VENOUS BLD VENIPUNCTURE: CPT | Performed by: FAMILY MEDICINE

## 2023-07-25 PROCEDURE — 80053 COMPREHEN METABOLIC PANEL: CPT | Performed by: FAMILY MEDICINE

## 2023-07-25 PROCEDURE — 250N000011 HC RX IP 250 OP 636: Mod: JZ | Performed by: PHYSICIAN ASSISTANT

## 2023-07-25 PROCEDURE — 83735 ASSAY OF MAGNESIUM: CPT | Performed by: FAMILY MEDICINE

## 2023-07-25 PROCEDURE — 83605 ASSAY OF LACTIC ACID: CPT | Performed by: FAMILY MEDICINE

## 2023-07-25 PROCEDURE — 99232 SBSQ HOSP IP/OBS MODERATE 35: CPT | Performed by: FAMILY MEDICINE

## 2023-07-25 PROCEDURE — 250N000013 HC RX MED GY IP 250 OP 250 PS 637: Performed by: FAMILY MEDICINE

## 2023-07-25 PROCEDURE — 85025 COMPLETE CBC W/AUTO DIFF WBC: CPT | Performed by: FAMILY MEDICINE

## 2023-07-25 RX ORDER — FUROSEMIDE 40 MG
80 TABLET ORAL EVERY MORNING
Status: DISCONTINUED | OUTPATIENT
Start: 2023-07-25 | End: 2023-07-27 | Stop reason: HOSPADM

## 2023-07-25 RX ORDER — POTASSIUM CHLORIDE 7.45 MG/ML
10 INJECTION INTRAVENOUS
Status: DISPENSED | OUTPATIENT
Start: 2023-07-25 | End: 2023-07-25

## 2023-07-25 RX ORDER — POTASSIUM CHLORIDE 29.8 MG/ML
20 INJECTION INTRAVENOUS
Status: DISCONTINUED | OUTPATIENT
Start: 2023-07-25 | End: 2023-07-25 | Stop reason: DRUGHIGH

## 2023-07-25 RX ORDER — POTASSIUM CHLORIDE 1.5 G/1.58G
40 POWDER, FOR SOLUTION ORAL ONCE
Status: COMPLETED | OUTPATIENT
Start: 2023-07-25 | End: 2023-07-25

## 2023-07-25 RX ORDER — VANCOMYCIN HYDROCHLORIDE 125 MG/1
125 CAPSULE ORAL 4 TIMES DAILY
Status: DISCONTINUED | OUTPATIENT
Start: 2023-07-25 | End: 2023-07-27 | Stop reason: HOSPADM

## 2023-07-25 RX ORDER — CEFDINIR 300 MG/1
300 CAPSULE ORAL EVERY 12 HOURS SCHEDULED
Status: COMPLETED | OUTPATIENT
Start: 2023-07-25 | End: 2023-07-26

## 2023-07-25 RX ADMIN — CEFDINIR 300 MG: 300 CAPSULE ORAL at 21:03

## 2023-07-25 RX ADMIN — APIXABAN 5 MG: 5 TABLET, FILM COATED ORAL at 08:10

## 2023-07-25 RX ADMIN — VANCOMYCIN HYDROCHLORIDE 125 MG: 125 CAPSULE ORAL at 17:34

## 2023-07-25 RX ADMIN — APIXABAN 5 MG: 5 TABLET, FILM COATED ORAL at 21:03

## 2023-07-25 RX ADMIN — POTASSIUM CHLORIDE 10 MEQ: 7.46 INJECTION, SOLUTION INTRAVENOUS at 21:05

## 2023-07-25 RX ADMIN — VANCOMYCIN HYDROCHLORIDE 125 MG: 125 CAPSULE ORAL at 12:15

## 2023-07-25 RX ADMIN — ATORVASTATIN CALCIUM 20 MG: 20 TABLET, FILM COATED ORAL at 21:03

## 2023-07-25 RX ADMIN — POTASSIUM CHLORIDE 10 MEQ: 7.46 INJECTION, SOLUTION INTRAVENOUS at 16:27

## 2023-07-25 RX ADMIN — AMIODARONE HYDROCHLORIDE 200 MG: 200 TABLET ORAL at 08:10

## 2023-07-25 RX ADMIN — MICONAZOLE NITRATE: 20 POWDER TOPICAL at 08:09

## 2023-07-25 RX ADMIN — CEFDINIR 300 MG: 300 CAPSULE ORAL at 15:49

## 2023-07-25 RX ADMIN — VANCOMYCIN HYDROCHLORIDE 125 MG: 125 CAPSULE ORAL at 08:10

## 2023-07-25 RX ADMIN — SODIUM CHLORIDE, PRESERVATIVE FREE: 5 INJECTION INTRAVENOUS at 02:11

## 2023-07-25 RX ADMIN — ONDANSETRON 4 MG: 4 TABLET, ORALLY DISINTEGRATING ORAL at 11:49

## 2023-07-25 RX ADMIN — AMPICILLIN SODIUM, SULBACTAM SODIUM 3 G: 2; 1 INJECTION, POWDER, FOR SOLUTION INTRAMUSCULAR; INTRAVENOUS at 00:56

## 2023-07-25 RX ADMIN — METRONIDAZOLE 500 MG: 500 INJECTION, SOLUTION INTRAVENOUS at 02:12

## 2023-07-25 RX ADMIN — LIDOCAINE 2 PATCH: 560 PATCH PERCUTANEOUS; TOPICAL; TRANSDERMAL at 08:05

## 2023-07-25 RX ADMIN — POTASSIUM CHLORIDE 10 MEQ: 7.46 INJECTION, SOLUTION INTRAVENOUS at 18:20

## 2023-07-25 RX ADMIN — AMPICILLIN SODIUM, SULBACTAM SODIUM 3 G: 2; 1 INJECTION, POWDER, FOR SOLUTION INTRAMUSCULAR; INTRAVENOUS at 07:58

## 2023-07-25 RX ADMIN — METOPROLOL SUCCINATE 50 MG: 50 TABLET, EXTENDED RELEASE ORAL at 08:10

## 2023-07-25 RX ADMIN — FUROSEMIDE 80 MG: 40 TABLET ORAL at 15:49

## 2023-07-25 RX ADMIN — VANCOMYCIN HYDROCHLORIDE 125 MG: 125 CAPSULE ORAL at 21:03

## 2023-07-25 ASSESSMENT — ACTIVITIES OF DAILY LIVING (ADL)
ADLS_ACUITY_SCORE: 50
ADLS_ACUITY_SCORE: 50
ADLS_ACUITY_SCORE: 44
ADLS_ACUITY_SCORE: 50
ADLS_ACUITY_SCORE: 44
ADLS_ACUITY_SCORE: 50
ADLS_ACUITY_SCORE: 44
ADLS_ACUITY_SCORE: 50
ADLS_ACUITY_SCORE: 44
ADLS_ACUITY_SCORE: 50

## 2023-07-25 NOTE — PROGRESS NOTES
Time: 4836-1775    Reason for Admission: Diarrhea    Activity:  Assist of one, gait belt and walker.    Neuro: Patient is alert to self and disorientated to place, time and situation.    Resp:  LS are clear.  O2 sats 94% on RA.  No SOB.    GI/:  No c/o nausea.  Continent of bowel and bladder.  1 episode of diarrhea this am.      Skin: Right lower leg cellulitis has stayed inside marked regions.  Rash bilateral groin.      Diet:  Regular diet.    IV Access: PIV left lower forearm infusing NS at 50 ml/hr.    Vitals: /72 (BP Location: Right arm)   Pulse 79   Temp 98.8  F (37.1  C) (Oral)   Resp 18   Wt 107.8 kg (237 lb 10.5 oz)   SpO2 94%   BMI 33.15 kg/m      Pain: Mild pain in right ankle.  Declined anything for pain.    Plan: Continue with current plan of care.

## 2023-07-25 NOTE — PROGRESS NOTES
Goal Outcome Evaluation:      Neuro: A&0x2 to self and time. Pt does not know where he is or why he is here.   Cardiac: a-fib, regular apical pulse  Respiratory: All fields clear but diminished.   GI/: Diarrhea x1 at 7pm. Denies abdominal pain.   Diet/appetite: Minimal appetite. Reg diet  Activity: Up with walker and 1 assist.    Pain: Denies pain but cringes when moving in bed  Skin: Cellulitis on right shin/calf. Rash on both sides of groin.    LDA's:PIV infusing NS @ 150/hr     Other: Daughter will be coming in the morning to visit with patient.       Plan: undetermined.

## 2023-07-25 NOTE — PROGRESS NOTES
Situation: Patient admitted for cellulitis of RLE. Patient has also been found to be positive for C Diff.      Subjective/Objective: Family reports that patient looks much better this morning than he did yesterday.       Neuro: Patient was oriented to self, place, time, and situation this morning with very little error. Family reports this is at baseline.       Cardiac: has irregular tachycardic pulse, Dr Hollingsworth placed patient on telemetry this shift       Respiratory: WNL       GI/: Positive for C Diff. 1 loose stool documented for yesterday. None today as of the time of this note (1635)       Mobility: Assist 1 w/walker and gait belt       Skin: Red Raúl Skin BLE. Cellulitis in RLE. Infection has receded from the border markings.       LDAs: IV in left arm. Lidocaine pain patches on BLE shoulders/shoulder blades.     Pain: Patient denies. Treated with lidocaine pain patches.       Misc: Pleasant calm demeanor.     Potassium replacement begun this afternoon. Patient cannot tolerate oral pills or oral packets without vomiting. Patient could not tolerate IV potassium @ 100mL/hr, patient is able to tolerate IV potassium at 50mL/hr.    Nab GORE [facility]

## 2023-07-25 NOTE — PROGRESS NOTES
[facility]    Medicine Progress Note - Hospitalist Service    Date of Admission:  7/23/2023    Assessment & Plan    Alvin Newton is a 89 year old male admitted on 7/23/2023. He presented to the emergency department for evaluation abdominal pain, diarrhea, and mild confusion of unclear etiology for which he is being admitted for further evaluation and treatment.    Clostridium Difficile Colitis  - C.diff and enteric panel pending  - PO Vancomycin       Cellulitis of right lower extremity  Recent bacteremia - Strep dysgalactiae  Cellulitis and bacteremia previously diagnosed, was hospitalized at [facility] from July 12-19, 2023 for cellulitis with septic shock. Strep was pan-sensitive. Initially on zosyn/vancomycin, then ceftriaxone, and discharged on 7 day course of Augmentin.  Not tolerating the Augmentin due to nausea and difficulty swallowing.  Has been on Unasyn this hospitalization.  -Transition to Omnicef now in anticipation of discharge tomorrow    Hyperkalemia on admission, devolved into hypokalemia now  -Potassium chloride 40mEq once now  -Check Magnesium    CKD stage 3A  Estimated Creatinine Clearance: 54.2 mL/min (based on SCr of 1.16 mg/dL).  Safe to restart furosemide today    Paroxysmal atrial fibrillation with RVR  Medication induced coagulopathy  Previously diagnosed. Rate controlled prior to admission with amiodarone 200 mg daily and metoprolol XL 50 mg daily. Anticoagulated prior to admission with apixaban 5 mg bid.   -Continue amiodarone, metoprolol, and apixaban   -Tachy today, add telemetry    Type 2 diabetes mellitus with diabetic polyneuropathy  Previously diagnosed, is diet controlled and not on any pharmacologic treatments.  - HgbA1c 7.2 which is appropriate for his age  - Monitor glucose on daily labs, if persistently elevated then initiate sliding scale insulin     Diastolic congestive heart failure  Aortic stenosis  Essential hypertension  Previously  "diagnosed. Appears compensated / slightly dry on admission, however now appearing volume up  - Restart furosemide, first dose now  -Continue metoprolol    Mixed hyperlipidemia  Continue atorvastatin.     Resolved hospital problems  Possible Mild encephalopathy vs delirium from panic, resolved  Suspected dysphagia, ruled out       Diet: Regular Diet Adult  Snacks/Supplements Adult: Magic Cup; With Meals    DVT Prophylaxis: DOAC  Eastman Catheter: Not present  Lines: None     Cardiac Monitoring: None  Code Status: Full Code      Clinically Significant Risk Factors        # Hypokalemia: Lowest K = 3.2 mmol/L in last 2 days, will replace as needed       # Hypoalbuminemia: Lowest albumin = 3 g/dL at 7/25/2023  4:05 AM, will monitor as appropriate     # Hypertension: Noted on problem list       # DMII: A1C = 7.2 % (Ref range: <5.7 %) within past 6 months, PRESENT ON ADMISSION    # Obesity: Estimated body mass index is 33.42 kg/m  as calculated from the following:    Height as of 7/24/23: 1.803 m (5' 11\").    Weight as of this encounter: 108.7 kg (239 lb 10.2 oz)., PRESENT ON ADMISSION  # Moderate Malnutrition: based on nutrition assessment, PRESENT ON ADMISSION        Disposition Plan           Possibly stable to discharge tomorrow, with some electrolyte abnormalities today and tachycardic.  We will reevaluate on a daily basis.    Gomez Hollingsworth MD  Hospitalist Service  [facility]  Securely message with [employer] (more info)  Text page via [facility] Paging/Directory   ______________________________________________________________________    Interval History   Patient did well overnight.  He is in no difficulty with eating.  He has only had 1 more episode of diarrhea.  He is not having chest pain or difficulty breathing.  His mental status is at his baseline.  His family visited today and they thought that he looked like normal.    Physical Exam   Vital Signs: Temp: 98.1  F (36.7  C) Temp src: Oral " BP: (!) 160/75 Pulse: 113   Resp: 18 SpO2: 95 % O2 Device: None (Room air)    Weight: 239 lbs 10.24 oz    Physical Exam  Vitals and nursing note reviewed.   Cardiovascular:      Rate and Rhythm: Tachycardia present. Rhythm irregularly irregular.      Heart sounds: Murmur heard.      Systolic murmur is present with a grade of 2/6.   Abdominal:      General: Bowel sounds are increased.      Tenderness: There is no abdominal tenderness.   Skin:     Capillary Refill: Capillary refill takes less than 2 seconds.      Comments: Right lower extremity area of cellulitis with no significant calor, more dusky than erythematous, and withdrawn from previous markings   Neurological:      General: No focal deficit present.   Psychiatric:         Mood and Affect: Mood normal.         Behavior: Behavior normal.          Medical Decision Making             Data     I have personally reviewed the following data over the past 24 hrs:    18.2 (H)  \   14.3   / 410     144 103 28.4 (H) /  139 (H)   3.2 (L) 28 1.16 \     ALT: 20 AST: 28 AP: 253 (H) TBILI: 1.2   ALB: 3.0 (L) TOT PROTEIN: 7.1 LIPASE: N/A     Procal: N/A CRP: N/A Lactic Acid: 1.8

## 2023-07-26 LAB
HOLD SPECIMEN: NORMAL
POTASSIUM SERPL-SCNC: 3.5 MMOL/L (ref 3.4–5.3)

## 2023-07-26 PROCEDURE — 250N000013 HC RX MED GY IP 250 OP 250 PS 637: Performed by: FAMILY MEDICINE

## 2023-07-26 PROCEDURE — 250N000011 HC RX IP 250 OP 636: Mod: JZ | Performed by: FAMILY MEDICINE

## 2023-07-26 PROCEDURE — 258N000003 HC RX IP 258 OP 636: Performed by: INTERNAL MEDICINE

## 2023-07-26 PROCEDURE — 250N000011 HC RX IP 250 OP 636: Mod: JZ | Performed by: PHYSICIAN ASSISTANT

## 2023-07-26 PROCEDURE — 84132 ASSAY OF SERUM POTASSIUM: CPT | Performed by: FAMILY MEDICINE

## 2023-07-26 PROCEDURE — 99232 SBSQ HOSP IP/OBS MODERATE 35: CPT | Performed by: FAMILY MEDICINE

## 2023-07-26 PROCEDURE — 250N000013 HC RX MED GY IP 250 OP 250 PS 637: Performed by: PHYSICIAN ASSISTANT

## 2023-07-26 PROCEDURE — 258N000003 HC RX IP 258 OP 636: Performed by: PHYSICIAN ASSISTANT

## 2023-07-26 PROCEDURE — 120N000001 HC R&B MED SURG/OB

## 2023-07-26 PROCEDURE — 36415 COLL VENOUS BLD VENIPUNCTURE: CPT | Performed by: FAMILY MEDICINE

## 2023-07-26 RX ORDER — POTASSIUM CHLORIDE 7.45 MG/ML
10 INJECTION INTRAVENOUS
Status: DISCONTINUED | OUTPATIENT
Start: 2023-07-26 | End: 2023-07-26

## 2023-07-26 RX ORDER — POTASSIUM CHLORIDE 7.45 MG/ML
10 INJECTION INTRAVENOUS
Status: COMPLETED | OUTPATIENT
Start: 2023-07-26 | End: 2023-07-26

## 2023-07-26 RX ORDER — PANTOPRAZOLE SODIUM 40 MG/1
40 TABLET, DELAYED RELEASE ORAL
Status: DISCONTINUED | OUTPATIENT
Start: 2023-07-26 | End: 2023-07-27 | Stop reason: HOSPADM

## 2023-07-26 RX ADMIN — ATORVASTATIN CALCIUM 20 MG: 20 TABLET, FILM COATED ORAL at 21:34

## 2023-07-26 RX ADMIN — FUROSEMIDE 80 MG: 40 TABLET ORAL at 09:03

## 2023-07-26 RX ADMIN — SODIUM CHLORIDE, PRESERVATIVE FREE: 5 INJECTION INTRAVENOUS at 06:53

## 2023-07-26 RX ADMIN — CEFDINIR 300 MG: 300 CAPSULE ORAL at 21:34

## 2023-07-26 RX ADMIN — APIXABAN 5 MG: 5 TABLET, FILM COATED ORAL at 21:34

## 2023-07-26 RX ADMIN — VANCOMYCIN HYDROCHLORIDE 125 MG: 125 CAPSULE ORAL at 12:14

## 2023-07-26 RX ADMIN — VANCOMYCIN HYDROCHLORIDE 125 MG: 125 CAPSULE ORAL at 09:03

## 2023-07-26 RX ADMIN — METOPROLOL SUCCINATE 50 MG: 50 TABLET, EXTENDED RELEASE ORAL at 09:03

## 2023-07-26 RX ADMIN — VANCOMYCIN HYDROCHLORIDE 125 MG: 125 CAPSULE ORAL at 19:20

## 2023-07-26 RX ADMIN — CEFDINIR 300 MG: 300 CAPSULE ORAL at 09:03

## 2023-07-26 RX ADMIN — MICONAZOLE NITRATE: 20 POWDER TOPICAL at 09:07

## 2023-07-26 RX ADMIN — AMIODARONE HYDROCHLORIDE 200 MG: 200 TABLET ORAL at 09:03

## 2023-07-26 RX ADMIN — ONDANSETRON 4 MG: 2 INJECTION INTRAMUSCULAR; INTRAVENOUS at 06:53

## 2023-07-26 RX ADMIN — MICONAZOLE NITRATE: 20 POWDER TOPICAL at 21:35

## 2023-07-26 RX ADMIN — APIXABAN 5 MG: 5 TABLET, FILM COATED ORAL at 09:02

## 2023-07-26 RX ADMIN — POTASSIUM CHLORIDE 10 MEQ: 7.46 INJECTION, SOLUTION INTRAVENOUS at 02:18

## 2023-07-26 RX ADMIN — SODIUM CHLORIDE 500 ML: 9 INJECTION, SOLUTION INTRAVENOUS at 00:08

## 2023-07-26 RX ADMIN — LIDOCAINE 2 PATCH: 560 PATCH PERCUTANEOUS; TOPICAL; TRANSDERMAL at 07:48

## 2023-07-26 RX ADMIN — PANTOPRAZOLE SODIUM 40 MG: 40 TABLET, DELAYED RELEASE ORAL at 12:14

## 2023-07-26 ASSESSMENT — ACTIVITIES OF DAILY LIVING (ADL)
ADLS_ACUITY_SCORE: 52
ADLS_ACUITY_SCORE: 53
ADLS_ACUITY_SCORE: 52
ADLS_ACUITY_SCORE: 53
ADLS_ACUITY_SCORE: 51
ADLS_ACUITY_SCORE: 53
ADLS_ACUITY_SCORE: 53
ADLS_ACUITY_SCORE: 52

## 2023-07-26 NOTE — PROGRESS NOTES
"A&O, independent in room. Does move somewhat slow, but is steady on his feet. VSS, denies pain, one BM this shift.     Writer spoke with daughter Fuad on the phone, she requested and update. She stated that patient was very upset on the phone when speaking with her regarding his \"arm\" and IV, although daughter was unaware pf what this was at the time. Explained K+ in IV, along with slowing rate. Daughter hopes patient will return back to [facility] upon discharge. Currently resting comfortably.;  "

## 2023-07-26 NOTE — PROGRESS NOTES
Care Management Follow Up    Length of Stay (days): 3  Expected Discharge Date: 7/27/23  Concerns to be Addressed:  All concerns addressed     Patient plan of care discussed at interdisciplinary rounds: Yes  Anticipated Discharge Disposition: Transitional Care  Anticipated Discharge Services:  Transportation  Patient/family educated on Medicare website which has current facility and service quality ratings: no  Education Provided on the Discharge Plan: Yes  Patient/Family in Agreement with the Plan: yes  Referrals Placed by CM/SW: External Care Coordination, Post Acute Facilities  Private pay costs discussed: transportation costs    Additional Information:    The discharge plan remains unchanged, Patient will return to [facility] (Phone: [phone] Fax: [phone]) when medically stable.  He has a bed hold at [facility].  Spoke with Patient, wife Rosie and daughter Sara regarding the discharge plan, they are in agreement.  Care Management will arrange wheelchair transportation upon discharge.    Plan:  Return to [facility]      Rosio Snyder RN

## 2023-07-26 NOTE — PLAN OF CARE
[id number]    Neuro:  Alert, Intermittent confusion.   Cardiac: Irregular HR, a-fib. Tele in place.   Respiratory: WNL.   GI/: Intermittent nausea, no emesis. Improved throughout the day. Loose stool x1 this shift. Urine very dark.  Mobility: A1 w/ walker.   Diet: Regular diet, poor appetite for both meals.   Pain: Shoulder pain, lidocaine patch in place.   Skin:  Scattered bruising. RLE cellulitis. Open area to coccyx.   LDA: PIV infusing at 50mL/hr.

## 2023-07-26 NOTE — PROGRESS NOTES
[facility]    Medicine Progress Note - Hospitalist Service    Date of Admission:  7/23/2023    Assessment & Plan   Alvin Newton is a 89 year old male admitted on 7/23/2023. He presented to the emergency department for evaluation abdominal pain, diarrhea, and mild confusion of unclear etiology for which he is being admitted for further evaluation and treatment.    Clostridium Difficile Colitis  - PO Vancomycin   -If patient continues vomiting may need to restart IV Flagyl    Cellulitis of right lower extremity  Recent bacteremia - Strep dysgalactiae  Cellulitis and bacteremia previously diagnosed, was hospitalized at [facility] [place] from July 12-19, 2023 for cellulitis with septic shock. Strep was pan-sensitive. Initially on zosyn/vancomycin, then ceftriaxone, and discharged on 7 day course of Augmentin.  Not tolerating the Augmentin due to nausea and difficulty swallowing.  Has been on Unasyn this hospitalization, switched to Omnicef yesterday.  -This evening should be his last dose as originally prescribed and his cellulitis looks better so we will enter a stop date of this evening    Hyperkalemia on admission, devolved into hypokalemia now  Normal today, recheck tomorrow    CKD stage 3A  Estimated Creatinine Clearance: 54 mL/min (based on SCr of 1.16 mg/dL).      Paroxysmal atrial fibrillation with RVR  Medication induced coagulopathy  Previously diagnosed. Rate controlled prior to admission with amiodarone 200 mg daily and metoprolol XL 50 mg daily. Anticoagulated prior to admission with apixaban 5 mg bid.   -Continue amiodarone, metoprolol, and apixaban   -Heart rates hovering around 100, continue telemetry.    Type 2 diabetes mellitus with diabetic polyneuropathy  Previously diagnosed, is diet controlled and not on any pharmacologic treatments.  Most Recent 6 glucoses:  Recent Labs   Lab Test 07/25/23  0405 07/24/23  0533 07/23/23  1252 07/19/23  0803 07/19/23  0610  "07/19/23  0144   * 141* 137* 107* 122* 116*   - HgbA1c 7.2 which is appropriate for his age  -No need for Accu-Cheks    Diastolic congestive heart failure  Aortic stenosis  Essential hypertension  Looks euvolemic today  -Continue home furosemide  -Continue metoprolol    Mixed hyperlipidemia  Continue atorvastatin.     Resolved hospital problems  Possible Mild encephalopathy vs delirium from panic, resolved  Suspected dysphagia, ruled out       Diet: Regular Diet Adult  Snacks/Supplements Adult: Magic Cup; With Meals    DVT Prophylaxis: DOAC  Eastman Catheter: Not present  Lines: None     Cardiac Monitoring: ACTIVE order. Indication: Tachyarrhythmias, acute (48 hours)  Code Status: Full Code      Clinically Significant Risk Factors        # Hypokalemia: Lowest K = 3.2 mmol/L in last 2 days, will replace as needed       # Hypoalbuminemia: Lowest albumin = 3 g/dL at 7/25/2023  4:05 AM, will monitor as appropriate     # Hypertension: Noted on problem list       # DMII: A1C = 7.2 % (Ref range: <5.7 %) within past 6 months, PRESENT ON ADMISSION  # Obesity: Estimated body mass index is 33.27 kg/m  as calculated from the following:    Height as of 7/24/23: 1.803 m (5' 11\").    Weight as of this encounter: 108.2 kg (238 lb 8.6 oz)., PRESENT ON ADMISSION  # Moderate Malnutrition: based on nutrition assessment, PRESENT ON ADMISSION        Disposition Plan      Expected Discharge Date: 07/26/2023      Destination: inpatient rehabilitation facility            Gomez Hollingsworth MD  Hospitalist Service  [facility]  Securely message with [employer] (more info)  Text page via [facility] Paging/Directory   ______________________________________________________________________    Interval History   The patient has had 2 episodes of vomiting.  He denies having heartburn, but does state that he has significant burning with the vomiting.  He continues to feel generally unwell.  No significant pain from his right " lower extremity.  No difficulty breathing or chest pain.    Physical Exam   Vital Signs: Temp: 97.5  F (36.4  C) Temp src: Oral BP: 139/82 Pulse: 58   Resp: 14 SpO2: 96 % O2 Device: None (Room air)    Weight: 238 lbs 8.6 oz    Physical Exam  Vitals and nursing note reviewed.   Constitutional:       Comments: Holding emesis bag   Cardiovascular:      Rate and Rhythm: Tachycardia present. Rhythm irregularly irregular.      Heart sounds: Murmur heard.      Systolic murmur is present with a grade of 2/6.   Abdominal:      General: Bowel sounds are increased.      Tenderness: There is no abdominal tenderness.   Skin:     Capillary Refill: Capillary refill takes less than 2 seconds.      Comments: Right lower extremity area of cellulitis with no significant calor, more dusky than erythematous, and withdrawn from previous markings   Neurological:      General: No focal deficit present.   Psychiatric:         Mood and Affect: Mood normal.         Behavior: Behavior normal.          Medical Decision Making             Data     I have personally reviewed the following data over the past 24 hrs:    N/A  \   N/A   / N/A     N/A N/A N/A /  N/A   3.5 N/A N/A \     Procal: N/A CRP: N/A Lactic Acid: 2.0

## 2023-07-26 NOTE — PLAN OF CARE
Goal Outcome Evaluation:  Pt is alert, intermittently confused, forgetful.   Using callight majority of time. Bed alarm on for safety.   Patient is assist of one, walker, and gait belt to transfer and ambulate.   Blood pressure (!) 133/93, pulse 54, temperature 97.4  F (36.3  C), temperature source Oral, resp. rate 18, weight 108.7 kg (239 lb 10.2 oz), SpO2 97 %.  Stable on room air.   IV fluids infusing as ordered.  K+ replaced by IV. Recheck this AM.   Continue to assess per plan of care and update care team as needed.

## 2023-07-27 VITALS
OXYGEN SATURATION: 97 % | RESPIRATION RATE: 16 BRPM | TEMPERATURE: 97.9 F | WEIGHT: 238.54 LBS | DIASTOLIC BLOOD PRESSURE: 63 MMHG | SYSTOLIC BLOOD PRESSURE: 135 MMHG | BODY MASS INDEX: 33.27 KG/M2 | HEART RATE: 58 BPM

## 2023-07-27 PROBLEM — K29.30 SUPERFICIAL GASTRITIS WITHOUT HEMORRHAGE: Status: ACTIVE | Noted: 2023-07-27

## 2023-07-27 LAB
ALBUMIN SERPL BCG-MCNC: 2.8 G/DL (ref 3.5–5.2)
ALP SERPL-CCNC: 263 U/L (ref 40–129)
ALT SERPL W P-5'-P-CCNC: 17 U/L (ref 0–70)
ANION GAP SERPL CALCULATED.3IONS-SCNC: 14 MMOL/L (ref 7–15)
AST SERPL W P-5'-P-CCNC: 25 U/L (ref 0–45)
BASOPHILS # BLD AUTO: 0.1 10E3/UL (ref 0–0.2)
BASOPHILS NFR BLD AUTO: 0 %
BILIRUB SERPL-MCNC: 1.4 MG/DL
BUN SERPL-MCNC: 25.6 MG/DL (ref 8–23)
CALCIUM SERPL-MCNC: 9 MG/DL (ref 8.8–10.2)
CHLORIDE SERPL-SCNC: 101 MMOL/L (ref 98–107)
CREAT SERPL-MCNC: 1.15 MG/DL (ref 0.67–1.17)
DEPRECATED HCO3 PLAS-SCNC: 28 MMOL/L (ref 22–29)
EOSINOPHIL # BLD AUTO: 0.1 10E3/UL (ref 0–0.7)
EOSINOPHIL NFR BLD AUTO: 1 %
ERYTHROCYTE [DISTWIDTH] IN BLOOD BY AUTOMATED COUNT: 14.5 % (ref 10–15)
GFR SERPL CREATININE-BSD FRML MDRD: 61 ML/MIN/1.73M2
GLUCOSE SERPL-MCNC: 137 MG/DL (ref 70–99)
HCT VFR BLD AUTO: 47.8 % (ref 40–53)
HGB BLD-MCNC: 15.5 G/DL (ref 13.3–17.7)
IMM GRANULOCYTES # BLD: 0.1 10E3/UL
IMM GRANULOCYTES NFR BLD: 1 %
LYMPHOCYTES # BLD AUTO: 1.9 10E3/UL (ref 0.8–5.3)
LYMPHOCYTES NFR BLD AUTO: 14 %
MCH RBC QN AUTO: 29.4 PG (ref 26.5–33)
MCHC RBC AUTO-ENTMCNC: 32.4 G/DL (ref 31.5–36.5)
MCV RBC AUTO: 91 FL (ref 78–100)
MONOCYTES # BLD AUTO: 1.7 10E3/UL (ref 0–1.3)
MONOCYTES NFR BLD AUTO: 13 %
NEUTROPHILS # BLD AUTO: 9.6 10E3/UL (ref 1.6–8.3)
NEUTROPHILS NFR BLD AUTO: 71 %
NRBC # BLD AUTO: 0 10E3/UL
NRBC BLD AUTO-RTO: 0 /100
PLATELET # BLD AUTO: 539 10E3/UL (ref 150–450)
POTASSIUM SERPL-SCNC: 3.1 MMOL/L (ref 3.4–5.3)
PROT SERPL-MCNC: 7.2 G/DL (ref 6.4–8.3)
RBC # BLD AUTO: 5.27 10E6/UL (ref 4.4–5.9)
SODIUM SERPL-SCNC: 143 MMOL/L (ref 136–145)
WBC # BLD AUTO: 13.4 10E3/UL (ref 4–11)

## 2023-07-27 PROCEDURE — 250N000013 HC RX MED GY IP 250 OP 250 PS 637: Performed by: PHYSICIAN ASSISTANT

## 2023-07-27 PROCEDURE — 80053 COMPREHEN METABOLIC PANEL: CPT | Performed by: FAMILY MEDICINE

## 2023-07-27 PROCEDURE — 250N000013 HC RX MED GY IP 250 OP 250 PS 637: Performed by: FAMILY MEDICINE

## 2023-07-27 PROCEDURE — 36415 COLL VENOUS BLD VENIPUNCTURE: CPT | Performed by: FAMILY MEDICINE

## 2023-07-27 PROCEDURE — 85025 COMPLETE CBC W/AUTO DIFF WBC: CPT | Performed by: FAMILY MEDICINE

## 2023-07-27 PROCEDURE — 258N000003 HC RX IP 258 OP 636: Performed by: PHYSICIAN ASSISTANT

## 2023-07-27 PROCEDURE — 99239 HOSP IP/OBS DSCHRG MGMT >30: CPT | Performed by: FAMILY MEDICINE

## 2023-07-27 RX ORDER — PANTOPRAZOLE SODIUM 40 MG/1
40 TABLET, DELAYED RELEASE ORAL
Qty: 26 TABLET | Refills: 0 | Status: ON HOLD | OUTPATIENT
Start: 2023-07-28 | End: 2023-08-24

## 2023-07-27 RX ORDER — VANCOMYCIN HYDROCHLORIDE 125 MG/1
125 CAPSULE ORAL 4 TIMES DAILY
Qty: 32 CAPSULE | Refills: 0 | Status: SHIPPED | OUTPATIENT
Start: 2023-07-27 | End: 2023-08-04

## 2023-07-27 RX ORDER — ACETAMINOPHEN 500 MG
1000 TABLET ORAL EVERY 8 HOURS PRN
Refills: 0 | Status: ON HOLD | DISCHARGE
Start: 2023-07-27 | End: 2024-01-29

## 2023-07-27 RX ADMIN — AMIODARONE HYDROCHLORIDE 200 MG: 200 TABLET ORAL at 08:56

## 2023-07-27 RX ADMIN — VANCOMYCIN HYDROCHLORIDE 125 MG: 125 CAPSULE ORAL at 00:33

## 2023-07-27 RX ADMIN — APIXABAN 5 MG: 5 TABLET, FILM COATED ORAL at 08:56

## 2023-07-27 RX ADMIN — PANTOPRAZOLE SODIUM 40 MG: 40 TABLET, DELAYED RELEASE ORAL at 06:21

## 2023-07-27 RX ADMIN — FUROSEMIDE 80 MG: 40 TABLET ORAL at 08:56

## 2023-07-27 RX ADMIN — SODIUM CHLORIDE, PRESERVATIVE FREE: 5 INJECTION INTRAVENOUS at 00:35

## 2023-07-27 RX ADMIN — MICONAZOLE NITRATE: 20 POWDER TOPICAL at 12:05

## 2023-07-27 RX ADMIN — VANCOMYCIN HYDROCHLORIDE 125 MG: 125 CAPSULE ORAL at 08:55

## 2023-07-27 RX ADMIN — LIDOCAINE 2 PATCH: 560 PATCH PERCUTANEOUS; TOPICAL; TRANSDERMAL at 09:13

## 2023-07-27 RX ADMIN — ACETAMINOPHEN 650 MG: 325 TABLET, FILM COATED ORAL at 13:44

## 2023-07-27 RX ADMIN — VANCOMYCIN HYDROCHLORIDE 125 MG: 125 CAPSULE ORAL at 12:22

## 2023-07-27 ASSESSMENT — ACTIVITIES OF DAILY LIVING (ADL)
ADLS_ACUITY_SCORE: 54
ADLS_ACUITY_SCORE: 53
ADLS_ACUITY_SCORE: 53
ADLS_ACUITY_SCORE: 54
ADLS_ACUITY_SCORE: 53
ADLS_ACUITY_SCORE: 53

## 2023-07-27 NOTE — PROGRESS NOTES
Care Management Discharge Note    Discharge Date: 07/27/2023     Discharge Disposition: Transitional Care    Discharge Services:  TCU    Discharge DME:  NA    Discharge Transportation: agency    Private pay costs discussed: transportation costs    Does the patient's insurance plan have a 3 day qualifying hospital stay waiver?  No    Patient/family educated on Medicare website which has current facility and service quality ratings: no    Education Provided on the Discharge Plan: Yes  Persons Notified of Discharge Plans: Patient, Spouse, [facility] staff  Patient/Family in Agreement with the Plan: yes    Handoff Referral Completed: No    Additional Information:    Per IDT rounds today, MD team states that patient is medically stable for discharge today. Patient will return to [facility] (Phone: [phone] Fax: [phone]) via  [employer] w/c transport between 2:10-2:45PM.     Left VM for spouse and updated Nadia in admissions at [facility] (Phone: [phone] Fax: [phone]) regarding discharge plans.    GIANLUCA ARCE RN

## 2023-07-27 NOTE — DISCHARGE SUMMARY
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)    Cellulitis of right lower extremity    Paroxysmal atrial fibrillation with RVR (H)    Aortic stenosis    Medication induced coagulopathy (H)    Diastolic congestive heart failure, unspecified HF chronicity (H)    Altered mental status, unspecified altered mental status type    Diarrhea, unspecified type    Stage 3a chronic kidney disease (H)    Superficial gastritis without hemorrhage    Clinically Significant Risk Factors     # DMII: A1C = 7.2 % (Ref range: <5.7 %) within past 6 months  # Obesity: Estimated body mass index is 33.27 kg/m  as calculated from the following:    Height as of 7/24/23: 1.803 m (5' 11\").    Weight as of this encounter: 108.2 kg (238 lb 8.6 oz).  # Moderate Malnutrition: based on nutrition assessment      Follow-ups Needed After Discharge   Follow-up Appointments     Follow Up and recommended labs and tests      Follow up with Nursing home physician.  BMP recommended, this has been   ordered for 7/31/2023        He has been started on a PPI for gastritis.  He should be on this for 4 weeks, but it is recommended to try off of the PPI afterward to see if he still has gastritis rather than leaving him on this medication indefinitely.  If the gastritis recurs, would restart PPI.  If the PPI is not effective at relieving his symptoms, would recommend further evaluation.    Unresulted Labs Ordered in the Past 30 Days of this Admission       Date and Time Order Name Status Description    7/23/2023  1:09 PM Blood Culture Hand, Right Preliminary     7/23/2023  1:09 PM " Blood Culture Hand, Right Preliminary         These results will be followed up by the hospitalist team    Discharge Disposition   Discharged to rehabilitation facility  Condition at discharge: Stable    Hospital Course   Alvin Newton is a 89 year old male admitted on 7/23/2023. He presented to the emergency department for evaluation abdominal pain, diarrhea, and mild confusion of unclear etiology.  The patient's diarrhea was determined to be secondary to C. difficile colitis.  Additionally, he is having significant nausea attributed to his Augmentin.  On admission it was believed from the history that he had dysphagia, however was later determined that the only thing he could not swallow was his large Augmentin tablet he was taking for cellulitis and he had no difficulty with food or fluids.  Patient was initially treated with IV Flagyl and converted to p.o. vancomycin.  While he was inpatient his Augmentin was replaced with initially Unasyn, followed with cefdinir.  He did complete the full course of antibiotics that had been prescribed by the outside facility for his right lower extremity cellulitis.  Patient continued to have gastric reflux with waterbrash and was started on a PPI.  This is a new development for him and is believed that most likely represents stress gastritis from his recent hospitalization.  He will be treated with a short course of PPI and it is recommended to discontinue the PPI    Consultations This Hospital Stay   CARE MANAGEMENT / SOCIAL WORK IP CONSULT  SPEECH LANGUAGE PATH ADULT IP CONSULT    Code Status   Full Code    Time Spent on this Encounter   IGomez MD, personally saw the patient today and spent greater than 30 minutes discharging this patient.       Gomez Hollingsworth MD  [facility] SURGICAL  [facility] [address]  Phone: [phone]  Fax:  [phone]  ______________________________________________________________________    Physical Exam   Vital Signs: Temp: 97.9  F (36.6  C) Temp src: Oral BP: 135/63 Pulse: 58   Resp: 16 SpO2: 97 % O2 Device: None (Room air)    Weight: 238 lbs 8.6 oz  Physical Exam  Vitals and nursing note reviewed.   Constitutional:       General: He is not in acute distress.  Cardiovascular:      Rate and Rhythm: Tachycardia present. Rhythm irregularly irregular.      Heart sounds: Murmur heard.      Systolic murmur is present with a grade of 2/6.   Abdominal:      General: Bowel sounds are increased.      Tenderness: There is no abdominal tenderness.   Skin:     Capillary Refill: Capillary refill takes less than 2 seconds.      Comments: Right lower extremity area of cellulitis with no significant calor, more dusky than erythematous, and withdrawn from previous markings   Neurological:      General: No focal deficit present.      Mental Status: He is alert.   Psychiatric:         Mood and Affect: Mood normal.         Behavior: Behavior normal.             Primary Care Physician   Steven Duane Semmler    Discharge Orders      Basic metabolic panel     General info for SNF    Length of Stay Estimate: Long Term Care  Condition at Discharge: Improving  Level of care:board and care  Rehabilitation Potential: Fair  Admission H&P remains valid and up-to-date: Yes  Recent Chemotherapy: N/A  Use Nursing Home Standing Orders: Yes     Reason for your hospital stay    Clostridium difficile colitis     Activity - Up ad kierra     Follow Up and recommended labs and tests    Follow up with Nursing home physician.  BMP recommended, this has been ordered for 7/31/2023     Contact Isolation    Enteric contact precautions     Fall precautions     Diet    Follow this diet upon discharge:     Regular Diet Adult       Significant Results and Procedures   Most Recent 3 CBC's:  Recent Labs   Lab Test 07/27/23  0448 07/25/23  0405 07/24/23  0533   WBC  13.4* 18.2* 23.8*   HGB 15.5 14.3 14.8   MCV 91 91 90   * 410 378     Most Recent 3 BMP's:  Recent Labs   Lab Test 07/27/23  0448 07/26/23  0628 07/25/23  0405 07/24/23  0533     --  144 143   POTASSIUM 3.1* 3.5 3.2* 3.5   CHLORIDE 101  --  103 100   CO2 28  --  28 26   BUN 25.6*  --  28.4* 27.4*   CR 1.15  --  1.16 1.41*   ANIONGAP 14  --  13 17*   JERRY 9.0  --  9.2 9.3   *  --  139* 141*     7-Day Micro Results       Collected Updated Procedure Result Status      07/24/2023 2145 07/25/2023 0522 Enteric Bacteria and Virus Panel PCR [[id number]]    Stool from Per Rectum    Final result Component Value   Campylobacter species Negative   Salmonella species Negative   Vibrio species Negative   Vibrio cholerae Negative   Yersinia enterocolitica Negative   Enteropathogenic E. coli (EPEC) Negative   Shiga-like toxin-producing E. coli (STEC) Negative   Shigella/Enteroinvasive E. coli (EIEC) Negative   Cryptosporidium species Negative   Giardia lamblia Negative   Norovirus Gl/Gll Negative   Rotavirus A Negative   Plesiomonas shigelloides Negative   Enteroaggregative E. coli (EAEC) Negative   Enterotoxigenic E. coli (ETEC) Negative   E. coli O157 NA   Cyclospora cayetanensis Negative   Entamoeba histolytica Negative   Adenovirus F40/41 Negative   Astrovirus Negative   Sapovirus Negative            07/24/2023 2145 07/25/2023 0438 C. difficile Toxin B PCR with reflex to C. difficile Antigen and Toxins A/B EIA [[id number]]    (Abnormal)   Stool from Per Rectum    Final result Component Value   C Difficile Toxin B by PCR Positive   Detection of C. difficile nucleic acid in stools confirms the presence of these organisms in diarrheal patients but may not indicate that C. difficile is the etiologic agent of the diarrhea. Results from the Xpert C. difficile assay should be interpreted in conjunction with other laboratory and clinical data available to the clinician.    Patients with a positive C. difficile  PCR result will receive reflex GDH/Toxin Immunoassay testing. Please interpret the PCR test result in conjunction with GDH/Toxin Immunoassay results and the clinical status of patient.            07/24/2023 2145 07/25/2023 0543 C. difficile Antigen and Toxins A/B by Enzyme Immunoassay [[id number]]    (Abnormal)   Stool from Per Rectum    Final result Component Value   C. difficile GDH Antigen Positive   C. difficile Toxin Positive            07/23/2023 1549 07/27/2023 1831 Blood Culture Hand, Right [[id number]]   Blood from Hand, Right    Preliminary result Component Value   Culture No growth after 4 days  [P]                07/23/2023 1410 07/27/2023 1831 Blood Culture Hand, Right [[id number]]    Blood from Hand, Right    Preliminary result Component Value   Culture No growth after 4 days  [P]                07/23/2023 1342 07/23/2023 1423 Symptomatic Influenza A/B, RSV, & SARS-CoV2 PCR (COVID-19) Nose [[id number]]    Swab from Nose    Final result Component Value   Influenza A PCR Negative   Influenza B PCR Negative   RSV PCR Negative   SARS CoV2 PCR Negative   NEGATIVE: SARS-CoV-2 (COVID-19) RNA not detected, presumed negative.                ,   Results for orders placed or performed during the hospital encounter of 07/23/23   CT Abdomen Pelvis w Contrast    Narrative    EXAM: CT ABDOMEN PELVIS W CONTRAST  LOCATION: [facility]  DATE: 7/23/2023    INDICATION: AMS with abdominal pain and nausea  COMPARISON: None.  TECHNIQUE: CT scan of the abdomen and pelvis was performed following injection of IV contrast. Multiplanar reformats were obtained. Dose reduction techniques were used.  CONTRAST: 90 mL Isovue 370    FINDINGS:   LOWER CHEST: Multiple small pulmonary nodules, including 5 mm left lower lobe nodule (series 3 image 31) and 4 mm right lower lobe nodule (series 3 image 28). No focal airspace consolidation or pleural effusion. Extensive calcification of aortic valve    leaflets.       HEPATOBILIARY: Cholecystectomy.    PANCREAS: Normal.    SPLEEN: Multiple calcified splenic granulomas.    ADRENAL GLANDS: Normal.    KIDNEYS/BLADDER: Punctate nonobstructing stone lower pole the left kidney. No ureterolithiasis or hydronephrosis. Likely small renal cysts, no specific follow-up recommended. Urinary bladder is decompressed and not well-visualized.    BOWEL: Moderate rectal wall thickening without surrounding fat stranding. No additional evidence of bowel inflammation. No obstruction or perforation. Normal appendix.    LYMPH NODES: No suspicious lymphadenopathy.    VASCULATURE: Moderate calcified atherosclerosis.    PELVIC ORGANS: Obscured by artifact from hip arthroplasties.    MUSCULOSKELETAL: No acute bony abnormality. Bilateral hip arthroplasties noted. 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      Impression    IMPRESSION:   1.  Rectal wall thickening without definite surrounding fat stranding, nonspecific but can be seen in the setting of proctitis.  2.  No additional visualized explanation for patient's symptoms.  3.  Incidental small pulmonary nodules in the lung bases, largest measuring 5 mm, consider follow-up described below.  4.  Calcification of aortic valve leaflets, can be seen in setting of aortic stenosis.    REFERENCE:  Guidelines for Management of Incidental Pulmonary Nodules Detected on CT Images: From the Fleischner Society 2017.   Guidelines apply to incidental nodules in patients who are 35 years or older.  Guidelines do not apply to lung cancer screening, patients with immunosuppression, or patients with known primary cancer.    MULTIPLE NODULES  Nodule size <6 mm  Low-risk patients: No follow-up needed.  High-risk patients: Optional follow-up at 12 months.         Head CT w/o contrast    Narrative    EXAM: CT HEAD W/O CONTRAST  LOCATION: [facility]  DATE: 7/23/2023    INDICATION: Altered mental status.  COMPARISON:  06/06/2022.   TECHNIQUE: Routine CT Head without IV contrast. Multiplanar reformats. Dose reduction techniques were used.    FINDINGS:  INTRACRANIAL CONTENTS: No acute intracranial hemorrhage, extraaxial collection, or mass effect. No evidence of an acute transcortical confluent infarct. Mild presumed chronic small vessel ischemic changes. Mild generalized parenchymal volume loss. No   interval ventriculomegaly.     VISUALIZED ORBITS/SINUSES/MASTOIDS: No acute intraorbital finding. Grossly similar partial opacification of the visualized paranasal sinuses, most notably and moderate in the right sphenoid and partially imaged maxillary sinuses with scattered sinus wall   ostitis, most consistent with chronic sinusitis. No mastoid or large middle ear effusion.     BONES/SOFT TISSUES: No acute abnormality.      Impression    IMPRESSION:  1.  No acute intracranial abnormality.  2.  Similar chronic changes, as described.   CT Ankle Right w/o Contrast    Narrative    EXAM: CT ANKLE RIGHT W/O CONTRAST  LOCATION: [facility]  DATE: 7/23/2023    INDICATION: Right ankle pain, cellulitis.  COMPARISON: Right foot CT 07/12/2023.  TECHNIQUE: Noncontrast. Axial, sagittal and coronal thin-section reconstruction. Dose reduction techniques were used.     FINDINGS:     BONES:  -Negative for fracture.  -Moderate degenerative hypertrophic spurring at the dorsal aspect of the talonavicular and TMT joints.  -Moderate plantar and small Achilles calcaneal enthesophytes.    JOINTS:  -Normal alignment. No joint effusion.    SOFT TISSUES:  -Moderate chronic atrophy of the musculature in the lower calf and foot. No deep space soft tissue edema.  -Moderate skin thickening and subcutaneous edema throughout the lower calf, greatest anteriorly, medially, and laterally. No soft tissue air/gas.      Impression    IMPRESSION:  1.  Skin thickening and subcutaneous edema in the lower calf, anteriorly, medially, and laterally,  which may correlate with symptoms of cellulitis.    2.  No acute bone or joint abnormality. No fracture. No joint effusions.    3.  Mild-moderate degenerative arthritic spurring in the ankle.    4.  Moderate chronic atrophy of the musculature in the lower calf and foot. No deep space soft tissue edema/inflammation visualized by CT.     XR Chest Port 1 View    Narrative    EXAM: XR CHEST PORT 1 VIEW  LOCATION: [facility]  DATE: 7/23/2023    INDICATION: Infection of unclear source, dysphagia, aspiration risk  COMPARISON: 07/13/2023      Impression    IMPRESSION: No change. Heart size and pulmonary vessels normal. Lungs clear. Severe shoulder DJD.       Discharge Medications   Current Discharge Medication List        START taking these medications    Details   pantoprazole (PROTONIX) 40 MG EC tablet Take 1 tablet (40 mg) by mouth every morning (before breakfast) for 26 days  Qty: 26 tablet, Refills: 0    Associated Diagnoses: Acute superficial gastritis without hemorrhage      vancomycin (VANCOCIN) 125 MG capsule Take 1 capsule (125 mg) by mouth 4 times daily for 8 days  Qty: 32 capsule, Refills: 0    Associated Diagnoses: Colitis due to Clostridium difficile           CONTINUE these medications which have CHANGED    Details   acetaminophen (TYLENOL) 500 MG tablet Take 2 tablets (1,000 mg) by mouth every 8 hours as needed for mild pain or fever  Refills: 0    Associated Diagnoses: Hypomagnesemia           CONTINUE these medications which have NOT CHANGED    Details   amiodarone (PACERONE) 200 MG tablet Take 1 tablet (200 mg) by mouth daily    Associated Diagnoses: Paroxysmal atrial fibrillation with RVR (H)      atorvastatin (LIPITOR) 20 MG tablet Take 20 mg by mouth At Bedtime      cholecalciferol 50 MCG (2000 UT) CAPS Take 2,000 Units by mouth daily      ELIQUIS ANTICOAGULANT 5 MG tablet Take 5 mg by mouth 2 times daily      furosemide (LASIX) 20 MG tablet Take 4 tablets (80 mg) by mouth  every morning    Associated Diagnoses: Peripheral edema      Lidocaine (LIDOCARE) 4 % Patch Place 2 patches onto the skin every 24 hours To prevent lidocaine toxicity, patient should be patch free for 12 hrs daily.    Associated Diagnoses: Chronic pain of both shoulders      menthol, Topical Analgesic, 2.5% (BENGAY VANISHING SCENT) 2.5 % GEL topical gel Apply topically 4 times daily as needed for other (pain) Apply to shoulders at times when Lidoderm patch is absent    Associated Diagnoses: Chronic pain of both shoulders      metoprolol succinate ER (TOPROL XL) 25 MG 24 hr tablet Take 2 tablets (50 mg) by mouth daily      ondansetron (ZOFRAN) 4 MG tablet Take 1 tablet by mouth every 8 hours as needed           STOP taking these medications       amoxicillin-clavulanate (AUGMENTIN) 875-125 MG tablet Comments:   Reason for Stopping:         oxyCODONE (ROXICODONE) 5 MG tablet Comments:   Reason for Stopping:             Allergies   No Known Allergies

## 2023-07-27 NOTE — PLAN OF CARE
[facility] DISCHARGE NOTE    Patient discharged to transitional care unit at 2:57 PM via wheel chair. Accompanied by other:  [facility] staff. Discharge instructions reviewed with  Coretta kelly at [place] [phone] , opportunity offered to ask questions. Prescriptions sent with patient to fill. All belongings sent with patient.    Maggie Grossman RN    Goal Outcome Evaluation:      Plan of Care Reviewed With: other (see comments)

## 2023-07-27 NOTE — PLAN OF CARE
Goal Outcome Evaluation:      Neuro: Intermittent confusion  Cardiac: Apical pulse irregular, on tele  Respiratory: WNL  GI/: Incontinent stool x1  Diet/appetite: Regular  Activity: A1 with walker and gaitbelt  Pain: Denies  Skin: Cellulitis of R leg, open area on sacrum, scattered bruising  LDA's:  IV infusing NS @ 50     Other: Pt does not utilize call light      Plan: Return to [facility] once medically stable

## 2023-07-27 NOTE — PROGRESS NOTES
Patient disoriented to situation this morning. Bed alarm has been on for safety. One small loose stool tonight, ambulated well with assist 1, walker and gait belt. Denied any nausea, and had no emesis tonight. Heart rate irregular, VSS.

## 2023-07-28 ENCOUNTER — TELEPHONE (OUTPATIENT)
Dept: GERIATRICS | Facility: CLINIC | Age: 88
End: 2023-07-28
Payer: COMMERCIAL

## 2023-07-28 LAB
BACTERIA BLD CULT: NO GROWTH
BACTERIA BLD CULT: NO GROWTH

## 2023-07-28 NOTE — TELEPHONE ENCOUNTER
[facility] Geriatrics Triage Nurse Telephone Encounter    Provider: Michele Dai MD  Facility: [facility] Facility Type:  TCU      Call Back Number: [phone]    Allergies:  No Known Allergies     Reason for call: New orders per MD.      Verbal Order/Direction given by Provider: Check Heme 1 and CMP on 8/1/23.      Provider giving Order:  Michele Dai MD    Verbal Order given to: Tess Herndon RN

## 2023-07-30 ENCOUNTER — LAB REQUISITION (OUTPATIENT)
Dept: LAB | Facility: CLINIC | Age: 88
End: 2023-07-30

## 2023-07-30 DIAGNOSIS — L03.116 CELLULITIS OF LEFT LOWER LIMB: ICD-10-CM

## 2023-07-31 ENCOUNTER — TRANSITIONAL CARE UNIT VISIT (OUTPATIENT)
Dept: GERIATRICS | Facility: CLINIC | Age: 88
End: 2023-07-31
Payer: COMMERCIAL

## 2023-07-31 ENCOUNTER — HOSPITAL ENCOUNTER (EMERGENCY)
Facility: CLINIC | Age: 88
Discharge: HOME | End: 2023-07-31
Attending: EMERGENCY MEDICINE | Admitting: EMERGENCY MEDICINE
Payer: COMMERCIAL

## 2023-07-31 ENCOUNTER — APPOINTMENT (OUTPATIENT)
Dept: GENERAL RADIOLOGY | Facility: CLINIC | Age: 88
End: 2023-07-31
Attending: EMERGENCY MEDICINE
Payer: COMMERCIAL

## 2023-07-31 VITALS
SYSTOLIC BLOOD PRESSURE: 126 MMHG | BODY MASS INDEX: 31.92 KG/M2 | HEIGHT: 71 IN | WEIGHT: 228 LBS | DIASTOLIC BLOOD PRESSURE: 74 MMHG | OXYGEN SATURATION: 99 % | RESPIRATION RATE: 16 BRPM | HEART RATE: 76 BPM | TEMPERATURE: 98.1 F

## 2023-07-31 VITALS
TEMPERATURE: 97.7 F | OXYGEN SATURATION: 97 % | HEART RATE: 101 BPM | RESPIRATION RATE: 10 BRPM | BODY MASS INDEX: 32.2 KG/M2 | WEIGHT: 230 LBS | DIASTOLIC BLOOD PRESSURE: 83 MMHG | SYSTOLIC BLOOD PRESSURE: 126 MMHG | HEIGHT: 71 IN

## 2023-07-31 DIAGNOSIS — I35.0 AORTIC VALVE STENOSIS, ETIOLOGY OF CARDIAC VALVE DISEASE UNSPECIFIED: ICD-10-CM

## 2023-07-31 DIAGNOSIS — L03.115 CELLULITIS OF RIGHT LOWER EXTREMITY: ICD-10-CM

## 2023-07-31 DIAGNOSIS — N18.31 STAGE 3A CHRONIC KIDNEY DISEASE (H): ICD-10-CM

## 2023-07-31 DIAGNOSIS — E11.42 TYPE 2 DIABETES MELLITUS WITH DIABETIC POLYNEUROPATHY, WITHOUT LONG-TERM CURRENT USE OF INSULIN (H): ICD-10-CM

## 2023-07-31 DIAGNOSIS — I50.30 DIASTOLIC CONGESTIVE HEART FAILURE, UNSPECIFIED HF CHRONICITY (H): ICD-10-CM

## 2023-07-31 DIAGNOSIS — A04.72 COLITIS DUE TO CLOSTRIDIUM DIFFICILE: ICD-10-CM

## 2023-07-31 DIAGNOSIS — M25.512 CHRONIC PAIN OF BOTH SHOULDERS: ICD-10-CM

## 2023-07-31 DIAGNOSIS — R07.9 CHEST PAIN, UNSPECIFIED TYPE: ICD-10-CM

## 2023-07-31 DIAGNOSIS — I48.20 CHRONIC ATRIAL FIBRILLATION (H): ICD-10-CM

## 2023-07-31 DIAGNOSIS — M10.9 ACUTE GOUTY ARTHRITIS: ICD-10-CM

## 2023-07-31 DIAGNOSIS — E78.00 PURE HYPERCHOLESTEROLEMIA: ICD-10-CM

## 2023-07-31 DIAGNOSIS — E87.6 HYPOKALEMIA: ICD-10-CM

## 2023-07-31 DIAGNOSIS — G89.29 CHRONIC PAIN OF BOTH SHOULDERS: ICD-10-CM

## 2023-07-31 DIAGNOSIS — I10 ESSENTIAL HYPERTENSION: ICD-10-CM

## 2023-07-31 DIAGNOSIS — D68.9 MEDICATION INDUCED COAGULOPATHY (H): ICD-10-CM

## 2023-07-31 DIAGNOSIS — R07.9 CHEST PAIN, UNSPECIFIED TYPE: Primary | ICD-10-CM

## 2023-07-31 DIAGNOSIS — T50.905A MEDICATION INDUCED COAGULOPATHY (H): ICD-10-CM

## 2023-07-31 DIAGNOSIS — M25.511 CHRONIC PAIN OF BOTH SHOULDERS: ICD-10-CM

## 2023-07-31 DIAGNOSIS — I77.810 ASCENDING AORTA DILATATION (H): ICD-10-CM

## 2023-07-31 PROBLEM — E66.01 OBESITY, MORBID, BMI 40.0-49.9 (H): Status: RESOLVED | Noted: 2017-11-07 | Resolved: 2023-07-31

## 2023-07-31 LAB
ALBUMIN SERPL BCG-MCNC: 3.1 G/DL (ref 3.5–5.2)
ALP SERPL-CCNC: 236 U/L (ref 40–129)
ALT SERPL W P-5'-P-CCNC: 23 U/L (ref 0–70)
ANION GAP SERPL CALCULATED.3IONS-SCNC: 14 MMOL/L (ref 7–15)
ANION GAP SERPL CALCULATED.3IONS-SCNC: 14 MMOL/L (ref 7–15)
AST SERPL W P-5'-P-CCNC: 44 U/L (ref 0–45)
BASOPHILS # BLD AUTO: 0.1 10E3/UL (ref 0–0.2)
BASOPHILS NFR BLD AUTO: 1 %
BILIRUB DIRECT SERPL-MCNC: 0.51 MG/DL (ref 0–0.3)
BILIRUB SERPL-MCNC: 2 MG/DL
BUN SERPL-MCNC: 23.1 MG/DL (ref 8–23)
BUN SERPL-MCNC: 23.8 MG/DL (ref 8–23)
CALCIUM SERPL-MCNC: 9.1 MG/DL (ref 8.8–10.2)
CALCIUM SERPL-MCNC: 9.4 MG/DL (ref 8.8–10.2)
CHLORIDE SERPL-SCNC: 96 MMOL/L (ref 98–107)
CHLORIDE SERPL-SCNC: 99 MMOL/L (ref 98–107)
CREAT SERPL-MCNC: 1.3 MG/DL (ref 0.67–1.17)
CREAT SERPL-MCNC: 1.38 MG/DL (ref 0.67–1.17)
DEPRECATED HCO3 PLAS-SCNC: 31 MMOL/L (ref 22–29)
DEPRECATED HCO3 PLAS-SCNC: 32 MMOL/L (ref 22–29)
EOSINOPHIL # BLD AUTO: 0.2 10E3/UL (ref 0–0.7)
EOSINOPHIL NFR BLD AUTO: 1 %
ERYTHROCYTE [DISTWIDTH] IN BLOOD BY AUTOMATED COUNT: 14.4 % (ref 10–15)
GFR SERPL CREATININE-BSD FRML MDRD: 49 ML/MIN/1.73M2
GFR SERPL CREATININE-BSD FRML MDRD: 53 ML/MIN/1.73M2
GLUCOSE SERPL-MCNC: 153 MG/DL (ref 70–99)
GLUCOSE SERPL-MCNC: 167 MG/DL (ref 70–99)
HCT VFR BLD AUTO: 49.9 % (ref 40–53)
HGB BLD-MCNC: 16.3 G/DL (ref 13.3–17.7)
HOLD SPECIMEN: NORMAL
IMM GRANULOCYTES # BLD: 0.3 10E3/UL
IMM GRANULOCYTES NFR BLD: 2 %
LACTATE SERPL-SCNC: 2 MMOL/L (ref 0.7–2)
LYMPHOCYTES # BLD AUTO: 2.7 10E3/UL (ref 0.8–5.3)
LYMPHOCYTES NFR BLD AUTO: 18 %
MCH RBC QN AUTO: 29.3 PG (ref 26.5–33)
MCHC RBC AUTO-ENTMCNC: 32.7 G/DL (ref 31.5–36.5)
MCV RBC AUTO: 90 FL (ref 78–100)
MONOCYTES # BLD AUTO: 1.7 10E3/UL (ref 0–1.3)
MONOCYTES NFR BLD AUTO: 11 %
NEUTROPHILS # BLD AUTO: 10.6 10E3/UL (ref 1.6–8.3)
NEUTROPHILS NFR BLD AUTO: 67 %
NRBC # BLD AUTO: 0 10E3/UL
NRBC BLD AUTO-RTO: 0 /100
NT-PROBNP SERPL-MCNC: 2568 PG/ML (ref 0–1800)
PLATELET # BLD AUTO: 474 10E3/UL (ref 150–450)
POTASSIUM SERPL-SCNC: 2.8 MMOL/L (ref 3.4–5.3)
POTASSIUM SERPL-SCNC: 2.9 MMOL/L (ref 3.4–5.3)
PROT SERPL-MCNC: 7.5 G/DL (ref 6.4–8.3)
RBC # BLD AUTO: 5.56 10E6/UL (ref 4.4–5.9)
SODIUM SERPL-SCNC: 141 MMOL/L (ref 136–145)
SODIUM SERPL-SCNC: 145 MMOL/L (ref 136–145)
TROPONIN T SERPL HS-MCNC: 60 NG/L
TROPONIN T SERPL HS-MCNC: 65 NG/L
URATE SERPL-MCNC: 8.9 MG/DL (ref 3.4–7)
WBC # BLD AUTO: 15.6 10E3/UL (ref 4–11)

## 2023-07-31 PROCEDURE — 99284 EMERGENCY DEPT VISIT MOD MDM: CPT | Mod: 25 | Performed by: EMERGENCY MEDICINE

## 2023-07-31 PROCEDURE — 250N000011 HC RX IP 250 OP 636: Mod: JZ | Performed by: EMERGENCY MEDICINE

## 2023-07-31 PROCEDURE — 85014 HEMATOCRIT: CPT | Performed by: EMERGENCY MEDICINE

## 2023-07-31 PROCEDURE — 82310 ASSAY OF CALCIUM: CPT | Performed by: NURSE PRACTITIONER

## 2023-07-31 PROCEDURE — 93005 ELECTROCARDIOGRAM TRACING: CPT | Performed by: EMERGENCY MEDICINE

## 2023-07-31 PROCEDURE — 36415 COLL VENOUS BLD VENIPUNCTURE: CPT | Performed by: NURSE PRACTITIONER

## 2023-07-31 PROCEDURE — 84484 ASSAY OF TROPONIN QUANT: CPT | Mod: 91 | Performed by: EMERGENCY MEDICINE

## 2023-07-31 PROCEDURE — 36415 COLL VENOUS BLD VENIPUNCTURE: CPT | Performed by: EMERGENCY MEDICINE

## 2023-07-31 PROCEDURE — 93010 ELECTROCARDIOGRAM REPORT: CPT | Performed by: EMERGENCY MEDICINE

## 2023-07-31 PROCEDURE — 71046 X-RAY EXAM CHEST 2 VIEWS: CPT

## 2023-07-31 PROCEDURE — 250N000013 HC RX MED GY IP 250 OP 250 PS 637: Performed by: EMERGENCY MEDICINE

## 2023-07-31 PROCEDURE — P9604 ONE-WAY ALLOW PRORATED TRIP: HCPCS | Performed by: NURSE PRACTITIONER

## 2023-07-31 PROCEDURE — 96374 THER/PROPH/DIAG INJ IV PUSH: CPT | Mod: 59 | Performed by: EMERGENCY MEDICINE

## 2023-07-31 PROCEDURE — 96361 HYDRATE IV INFUSION ADD-ON: CPT | Performed by: EMERGENCY MEDICINE

## 2023-07-31 PROCEDURE — 80053 COMPREHEN METABOLIC PANEL: CPT | Performed by: EMERGENCY MEDICINE

## 2023-07-31 PROCEDURE — 82248 BILIRUBIN DIRECT: CPT | Performed by: EMERGENCY MEDICINE

## 2023-07-31 PROCEDURE — 258N000003 HC RX IP 258 OP 636: Performed by: EMERGENCY MEDICINE

## 2023-07-31 PROCEDURE — 99310 SBSQ NF CARE HIGH MDM 45: CPT | Performed by: NURSE PRACTITIONER

## 2023-07-31 PROCEDURE — 96365 THER/PROPH/DIAG IV INF INIT: CPT | Performed by: EMERGENCY MEDICINE

## 2023-07-31 PROCEDURE — 83880 ASSAY OF NATRIURETIC PEPTIDE: CPT | Performed by: EMERGENCY MEDICINE

## 2023-07-31 PROCEDURE — 99285 EMERGENCY DEPT VISIT HI MDM: CPT | Mod: 25 | Performed by: EMERGENCY MEDICINE

## 2023-07-31 PROCEDURE — 83605 ASSAY OF LACTIC ACID: CPT | Performed by: EMERGENCY MEDICINE

## 2023-07-31 PROCEDURE — 84550 ASSAY OF BLOOD/URIC ACID: CPT | Performed by: EMERGENCY MEDICINE

## 2023-07-31 PROCEDURE — 96366 THER/PROPH/DIAG IV INF ADDON: CPT | Performed by: EMERGENCY MEDICINE

## 2023-07-31 RX ORDER — POTASSIUM CHLORIDE 7.45 MG/ML
10 INJECTION INTRAVENOUS ONCE
Status: COMPLETED | OUTPATIENT
Start: 2023-07-31 | End: 2023-07-31

## 2023-07-31 RX ORDER — POTASSIUM CHLORIDE 1500 MG/1
40 TABLET, EXTENDED RELEASE ORAL ONCE
Status: COMPLETED | OUTPATIENT
Start: 2023-07-31 | End: 2023-07-31

## 2023-07-31 RX ORDER — KETOROLAC TROMETHAMINE 15 MG/ML
15 INJECTION, SOLUTION INTRAMUSCULAR; INTRAVENOUS ONCE
Status: COMPLETED | OUTPATIENT
Start: 2023-07-31 | End: 2023-07-31

## 2023-07-31 RX ORDER — POTASSIUM CHLORIDE 750 MG/1
10 TABLET, EXTENDED RELEASE ORAL 2 TIMES DAILY
Qty: 6 TABLET | Refills: 0 | Status: SHIPPED | OUTPATIENT
Start: 2023-07-31 | End: 2023-08-03

## 2023-07-31 RX ADMIN — KETOROLAC TROMETHAMINE 15 MG: 15 INJECTION, SOLUTION INTRAMUSCULAR; INTRAVENOUS at 18:23

## 2023-07-31 RX ADMIN — SODIUM CHLORIDE 500 ML: 9 INJECTION, SOLUTION INTRAVENOUS at 14:46

## 2023-07-31 RX ADMIN — POTASSIUM CHLORIDE 40 MEQ: 1500 TABLET, EXTENDED RELEASE ORAL at 17:11

## 2023-07-31 RX ADMIN — POTASSIUM CHLORIDE 10 MEQ: 7.46 INJECTION, SOLUTION INTRAVENOUS at 17:09

## 2023-07-31 RX ADMIN — POTASSIUM CHLORIDE 10 MEQ: 7.46 INJECTION, SOLUTION INTRAVENOUS at 15:35

## 2023-07-31 ASSESSMENT — ACTIVITIES OF DAILY LIVING (ADL)
ADLS_ACUITY_SCORE: 35

## 2023-07-31 NOTE — ED TRIAGE NOTES
"Pt reports pain in great toes bilateral from gout, right shoulder pain due to frozen shoulder and left chest/breast pain x 2 hours ago.   Pt was given asa and nitroglycerin at [facility] and pt reports \"maybe feeling a little bit better\"   pt was admitted to [facility] TCU 2 days ago per pt.      Triage Assessment       Row Name 07/31/23 1226       Triage Assessment (Adult)    Airway WDL WDL       Respiratory WDL    Respiratory WDL X       Cardiac WDL    Cardiac WDL X;chest pain       Chest Pain Assessment    Chest Pain Intervention cardiac monitoring continued;activity minimized                    "

## 2023-07-31 NOTE — ED NOTES
MD notified that pt vomited a portion of his oral potassium pills and refused the final amount. IV potassium currently infusion.

## 2023-07-31 NOTE — ED PROVIDER NOTES
History     Chief Complaint   Patient presents with     Chest Pain     HPI  Alvin Newton is a 89 year old male with past medical history significant for previous STEMI hypertension hyperlipidemia diabetes type 2 hypercholesterolemia right bundle branch block with A-fib on Eliquis who presents emergency department complaining of chest pain.  Patient states he began having chest pain about 2 hours ago.  This is a left-sided chest aching pain.  He was given aspirin and nitro at [facility] and states that did improve things he still has a dull ache in his chest also has some pain in his right shoulder which is chronic pain in his feet from gout which she states is chronic also.  He has had a mild cough which is nonproductive denies any fevers or chills has not had a headache denies any neck pain has not had any back pain or abdominal pain.  He has not had any bowel or bladder dysfunction.  Patient was recently discharged from the hospital on the 27th after being diagnosed with C. difficile.    Allergies:  No Known Allergies    Problem List:    Patient Active Problem List    Diagnosis Date Noted     Superficial gastritis without hemorrhage 07/27/2023     Priority: Medium     Colitis due to Clostridium difficile 07/25/2023     Priority: Medium     Stage 3a chronic kidney disease (H) 07/25/2023     Priority: Medium     Altered mental status, unspecified altered mental status type 07/23/2023     Priority: Medium     Diarrhea, unspecified type 07/23/2023     Priority: Medium     Aortic stenosis 07/14/2023     Priority: Medium     Elevated brain natriuretic peptide (BNP) level 07/14/2023     Priority: Medium     Ascending aorta dilatation (H) 07/14/2023     Priority: Medium     Peripheral edema 07/14/2023     Priority: Medium     Positive urine culture 07/14/2023     Priority: Medium     Medication induced coagulopathy (H) 07/14/2023     Priority: Medium     Streptococcal bacteremia 07/13/2023     Priority: Medium      Thrombocytopenia (H) 07/13/2023     Priority: Medium     Hyperbilirubinemia 07/13/2023     Priority: Medium     Hypophosphatemia 07/13/2023     Priority: Medium     Hypoalbuminemia 07/13/2023     Priority: Medium     Pyuria 07/13/2023     Priority: Medium     Paroxysmal atrial fibrillation with RVR (H) 07/13/2023     Priority: Medium     Hypomagnesemia 07/13/2023     Priority: Medium     Chronic pain of both shoulders 07/13/2023     Priority: Medium     Septic shock (H) 07/12/2023     Priority: Medium     Cellulitis of right lower extremity 07/11/2023     Priority: Medium     Severe sepsis (H) 07/11/2023     Priority: Medium     Diastolic congestive heart failure, unspecified HF chronicity (H) 03/31/2023     Priority: Medium     Impaired fasting glucose 06/20/2022     Priority: Medium     Osteoarthritis of pelvis 06/20/2022     Priority: Medium     Dec 16, 2003 Entered By: JOHN GROVE Comment: 1988  S-P HIP REPLACEMENT       Primary localized osteoarthrosis of shoulder region 06/20/2022     Priority: Medium     Reason for consultation 06/20/2022     Priority: Medium     Right bundle branch block 06/20/2022     Priority: Medium     Fall, initial encounter 06/06/2022     Priority: Medium     Closed fracture of first lumbar vertebra, unspecified fracture morphology, initial encounter (H) 06/06/2022     Priority: Medium     Fracture of L1 vertebra (H) 06/06/2022     Priority: Medium     Carpal tunnel syndrome, bilateral 11/18/2021     Priority: Medium     BPH with urinary obstruction 06/22/2020     Priority: Medium     NSTEMI (non-ST elevated myocardial infarction) (H) 12/27/2017     Priority: Medium     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) 12/06/2017     Priority: Medium     Essential hypertension 04/04/2016     Priority: Medium     Mixed hyperlipidemia 01/02/2009     Priority: Medium     Pure hypercholesterolemia 01/01/1999     Priority: Medium     Other ill-defined and unknown causes of  "morbidity and mortality 01/01/1985     Priority: Medium     Dec 16, 2003 Entered By: JOHN GROVE Comment: S-P TURP          Past Medical History:    Past Medical History:   Diagnosis Date     Colonic diverticulum      Hyperlipidemia      Hypertension      Obesity (BMI 30-39.9)      Osteoarthritis      Type 2 diabetes mellitus (H)        Past Surgical History:    Past Surgical History:   Procedure Laterality Date     ARTHROPLASTY HIP BILATERAL  1987     HC ESOPHAGOSCOPY, DIAGNOSTIC  2003     LAPAROSCOPIC CHOLECYSTECTOMY N/A 12/28/2017    Procedure: LAPAROSCOPIC CHOLECYSTECTOMY;  LAPAROSCOPIC CHOLECYSTECTOMY;  Surgeon: Heber Gonzalez MD;  Location: [facility] OR     TRANSRECTAL ULTRASONIC, TRANSURETHRAL RESECTION (TUR) OF PROSTATE CYST  1990       Family History:    No family history on file.    Social History:  Marital Status:   [2]  Social History     Tobacco Use     Smoking status: Never     Smokeless tobacco: Never   Substance Use Topics     Alcohol use: Yes     Comment: 0-1 beer daily     Drug use: No        Medications:    acetaminophen (TYLENOL) 500 MG tablet  amiodarone (PACERONE) 200 MG tablet  atorvastatin (LIPITOR) 20 MG tablet  cholecalciferol 50 MCG (2000 UT) CAPS  ELIQUIS ANTICOAGULANT 5 MG tablet  furosemide (LASIX) 20 MG tablet  Lidocaine (LIDOCARE) 4 % Patch  menthol, Topical Analgesic, 2.5% (BENGAY VANISHING SCENT) 2.5 % GEL topical gel  metoprolol succinate ER (TOPROL XL) 25 MG 24 hr tablet  ondansetron (ZOFRAN) 4 MG tablet  pantoprazole (PROTONIX) 40 MG EC tablet  vancomycin (VANCOCIN) 125 MG capsule          Review of Systems  As per HPI.  Physical Exam   BP: 128/79  Pulse: 115  Temp: 97.7  F (36.5  C)  Resp: 18  Height: 180.3 cm (5' 11\")  Weight: 104.3 kg (230 lb)  SpO2: 94 %      Physical Exam  Vitals and nursing note reviewed.   Constitutional:       General: He is not in acute distress.     Appearance: He is well-developed. He is not ill-appearing, toxic-appearing or diaphoretic. "   HENT:      Head: Normocephalic and atraumatic.      Nose: Nose normal.      Mouth/Throat:      Mouth: Mucous membranes are moist.      Pharynx: Oropharynx is clear.   Eyes:      Conjunctiva/sclera: Conjunctivae normal.   Cardiovascular:      Rate and Rhythm: Normal rate and regular rhythm.      Pulses: Normal pulses.      Heart sounds: Normal heart sounds. No murmur heard.  Pulmonary:      Effort: Pulmonary effort is normal.      Breath sounds: No stridor. No wheezing or rhonchi.      Comments: Breath sounds are decreased at bases.  Abdominal:      General: Abdomen is flat. Bowel sounds are normal. There is no distension.      Palpations: Abdomen is soft.      Tenderness: There is no abdominal tenderness. There is no right CVA tenderness or left CVA tenderness.   Musculoskeletal:         General: No swelling or tenderness. Normal range of motion.      Cervical back: Normal range of motion and neck supple.      Right lower leg: No edema.      Left lower leg: No edema.      Comments: Left toe with mild redness and swelling at the base joint this area is tender to palpation good capillary refill pulses sensation symmetrical.   Skin:     General: Skin is warm and dry.   Neurological:      General: No focal deficit present.      Mental Status: He is alert and oriented to person, place, and time.      Sensory: No sensory deficit.      Motor: No weakness.      Coordination: Coordination normal.      Comments: Venous stasis changes noted to bilateral legs.  No significant erythema present.   Psychiatric:         Mood and Affect: Mood normal.         ED Course                 Procedures              EKG Interpretation:      Interpreted by Pradeep Salinas MD  Rhythm: atrial fibrillation - controlled  Rate: Normal  Axis: Right Axis Deviation  Ectopy: none  Conduction: right bundle branch block (complete)  ST Segments/ T Waves: Non-specific ST-T wave changes  Q Waves: none  Comparison to prior: Unchanged from  7/23/23    Clinical Impression: Rate controlled atrial fibrillation with right bundle branch block and nonspecific ST-T wave abnormaliti  Critical Care time:  none               Results for orders placed or performed during the hospital encounter of 07/31/23 (from the past 24 hour(s))   [place] Draw    Narrative    The following orders were created for panel order [place] Draw.  Procedure                               Abnormality         Status                     ---------                               -----------         ------                     Extra Blue Top Tube[[id number]]                              In process                 Extra Red Top Tube[[id number]]                               In process                 Extra Green Top (Lithium...[[id number]]                      In process                 Extra Purple Top Tube[[id number]]                            In process                   Please view results for these tests on the individual orders.   CBC with platelets, differential    Narrative    The following orders were created for panel order CBC with platelets, differential.  Procedure                               Abnormality         Status                     ---------                               -----------         ------                     CBC with platelets and d...[[id number]]  Abnormal            Final result                 Please view results for these tests on the individual orders.   Basic metabolic panel   Result Value Ref Range    Sodium 141 136 - 145 mmol/L    Potassium 2.9 (L) 3.4 - 5.3 mmol/L    Chloride 96 (L) 98 - 107 mmol/L    Carbon Dioxide (CO2) 31 (H) 22 - 29 mmol/L    Anion Gap 14 7 - 15 mmol/L    Urea Nitrogen 23.8 (H) 8.0 - 23.0 mg/dL    Creatinine 1.30 (H) 0.67 - 1.17 mg/dL    Calcium 9.4 8.8 - 10.2 mg/dL    Glucose 153 (H) 70 - 99 mg/dL    GFR Estimate 53 (L) >60 mL/min/1.73m2   CBC with platelets and differential   Result Value Ref Range    WBC Count 15.6 (H) 4.0 - 11.0  10e3/uL    RBC Count 5.56 4.40 - 5.90 10e6/uL    Hemoglobin 16.3 13.3 - 17.7 g/dL    Hematocrit 49.9 40.0 - 53.0 %    MCV 90 78 - 100 fL    MCH 29.3 26.5 - 33.0 pg    MCHC 32.7 31.5 - 36.5 g/dL    RDW 14.4 10.0 - 15.0 %    Platelet Count 474 (H) 150 - 450 10e3/uL    % Neutrophils 67 %    % Lymphocytes 18 %    % Monocytes 11 %    % Eosinophils 1 %    % Basophils 1 %    % Immature Granulocytes 2 %    NRBCs per 100 WBC 0 <1 /100    Absolute Neutrophils 10.6 (H) 1.6 - 8.3 10e3/uL    Absolute Lymphocytes 2.7 0.8 - 5.3 10e3/uL    Absolute Monocytes 1.7 (H) 0.0 - 1.3 10e3/uL    Absolute Eosinophils 0.2 0.0 - 0.7 10e3/uL    Absolute Basophils 0.1 0.0 - 0.2 10e3/uL    Absolute Immature Granulocytes 0.3 <=0.4 10e3/uL    Absolute NRBCs 0.0 10e3/uL       Medications - No data to display    Assessments & Plan (with Medical Decision Making) records were reviewed.  Past medical history was reviewed.  Recent hospital admission on 723 was reviewed secondary to colitis due to C. difficile.  Patient just left the hospital 4 days ago.  EKG revealed a atrial fibrillation rate controlled with right bundle branch block nonspecific ST-T wave abnormalities no significant change from previous EKG.  Labs were obtained.  I independently reviewed and interpreted labs.  Patient's white count was 15.6 it has been previously elevated.  Hemoglobin 16.3 platelet count 474.  Basic metabolic panel significant for potassium level 2.9 chloride 96 bicarb 31 creatinine was 1.3 GFR 53.  Creatinine creatinine is slightly increased from 6 days ago.  Hepatic panel near baseline for patient.  Patient was given IV potassium 10 mill equivalents x2.  Lactic acid was within normal limits initial troponin was 65-second troponin 60.  proBNP elevated at 2568 this is improved from previous.  Uric acid is 8.9.  Patient was given a small fluid bolus and also given 15 of Toradol for his gouty arthritis.  He is a diabetic and I do not think prednisone would be a good idea  and he cannot take colchicine secondary to his amiodarone.  Advised taking small amounts of ibuprofen as needed and monitoring levels closely.  A chest x-ray revealed a stable cardiac silhouette with no focal airspace consolidation.  No pleural effusion or pneumothorax.  There is severe degenerative changes of both shoulders.  Findings discussed with family in detail.  No obvious source of chest pain I do not think this is ACS.  I have given the patient to rounds of potassium and his baseline potassium has been around 3.1.  I offered admission to the patient but he would rather not be in the hospital and wanted feels comfortable going home.  He should monitor his gout/arthritis and return if worsening chest pain fevers vomiting altered mental status abdominal pain weakness or other symptoms present.  Patient will be transported back to transitional care unit in stable condition.     I have reviewed the nursing notes.    I have reviewed the findings, diagnosis, plan and need for follow up with the patient.             Discharge Medication List as of 7/31/2023  7:07 PM      START taking these medications    Details   potassium chloride ER (K-TAB/KLOR-CON) 10 MEQ CR tablet Take 1 tablet (10 mEq) by mouth 2 times daily for 3 days, Disp-6 tablet, R-0, Local Print             Final diagnoses:   Chest pain, unspecified type   Acute gouty arthritis   Hypokalemia       7/31/2023   [facility] EMERGENCY DEPT     Rita, Pradeep Mcgraw MD  08/02/23 [id number]

## 2023-07-31 NOTE — ED NOTES
MD notified of abdominal labs (including K+, WBC, and trop) and that BPA sepsis protocol alerted on pt's chart. Pt remains in a-fib but reports that CP has improved. All other vss.   Pt resting in bed, with family at bedside.

## 2023-07-31 NOTE — ED NOTES
Writer spoke with MD to determine if he would like potassium lab re-draw. He stated that he would order another dose of IV potassium plus oral supplement, and no re-draw will be needed afterwards.

## 2023-07-31 NOTE — LETTER
7/31/2023        RE: Alvin Newton  [address]  [place] [id number]        [facility] GERIATRICS  INITIAL VISIT NOTE  July 31, 2023    PRIMARY CARE PROVIDER AND CLINIC: Semmler, Steven Duane [address] [facility] [id number]    [facility] Medical Record Number: [id number]  Place of Service where encounter took place: [facility] [[id number]]    Chief Complaint   Patient presents with     [facility] F/U     [facility] 7/23/2023 - 7/27/2023     HPI:    Alvin Newton is a 89 year old (5/10/1934) male returned to the above facility from [facility]. Hospital stay 7/23/23 through 7/27/23 where they were admitted for Cellulitis. Now admitted to this facility for rehab, medical management, and nursing care.      History obtained from: facility chart records, facility staff, patient report, [facility] chart review, and Care Everywhere [facility] chart review.      Brief Hospital Course: PMH of HLD, Dm2, CHF,, NSTEMI, Ckd3, HTN who was admitted 7/12-7/19 with RLE cellulitis. sepsis.  Was started on amiodarone for afib with RVR. Was started on Augmentin and discharged to TCU. 7/23 developed  abdominal pain, confusion and diarrhea and was sent to the ED. Had been on Augmentin for cellulitis. Tested positive for c.diff. Started on IV Flagyl, converted to PO Vanco. Augmentin changed to cefdinir for RLE cellulitis. Was started on PPI for gastritis. When medically stable was discharged to TCU for further rehab and medical management.      TCU Course: Nursing told provider patient started complaining of pressure in left chest/breast area when  went in to draw blood. Started about an hour ago. He reports discomfort is 5/10, /74, HR 77. He was given nitroglycerin x1 with no change. Reports SOB but does not appear to be in distress. He reports some nausea. Says he has had some increased dizziness today, needed assistance with walking  because of this. Has been having 1-2 BM daily, says they are not longer watery, soft, but not formed. He also reports pain in both great toes, says he has had gout before. Has some difficulty swallowing pills as he choked on Augmentin previously as it was large, and now says all pills are difficult to take.     CODE STATUS/ADVANCE DIRECTIVES: CPR/Full code     ALLERGIES:  No Known Allergies    PAST MEDICAL HISTORY:   Past Medical History:   Diagnosis Date     Colonic diverticulum      Hyperlipidemia      Hypertension      Obesity (BMI 30-39.9)      Osteoarthritis      Type 2 diabetes mellitus (H)      PAST SURGICAL HISTORY:   Past Surgical History:   Procedure Laterality Date     ARTHROPLASTY HIP BILATERAL  1987     HC ESOPHAGOSCOPY, DIAGNOSTIC  2003     LAPAROSCOPIC CHOLECYSTECTOMY N/A 12/28/2017    Procedure: LAPAROSCOPIC CHOLECYSTECTOMY;  LAPAROSCOPIC CHOLECYSTECTOMY;  Surgeon: Heber Gonzalez MD;  Location: [facility] OR     TRANSRECTAL ULTRASONIC, TRANSURETHRAL RESECTION (TUR) OF PROSTATE CYST  1990           SOCIAL HISTORY:   Patient's living condition: lives with spouse    MEDICATIONS  Post Discharge Medication Reconciliation Status: discharge medications reconciled, continue medications without change.  Current Outpatient Medications   Medication Sig Dispense Refill     acetaminophen (TYLENOL) 500 MG tablet Take 2 tablets (1,000 mg) by mouth every 8 hours as needed for mild pain or fever  0     amiodarone (PACERONE) 200 MG tablet Take 1 tablet (200 mg) by mouth daily       atorvastatin (LIPITOR) 20 MG tablet Take 20 mg by mouth At Bedtime       cholecalciferol 50 MCG (2000 UT) CAPS Take 2,000 Units by mouth daily       ELIQUIS ANTICOAGULANT 5 MG tablet Take 5 mg by mouth 2 times daily       furosemide (LASIX) 20 MG tablet Take 4 tablets (80 mg) by mouth every morning       Lidocaine (LIDOCARE) 4 % Patch Place 2 patches onto the skin every 24 hours To prevent lidocaine toxicity, patient should be patch free  "for 12 hrs daily.       menthol, Topical Analgesic, 2.5% (BENGAY VANISHING SCENT) 2.5 % GEL topical gel Apply topically 4 times daily as needed for other (pain) Apply to shoulders at times when Lidoderm patch is absent       metoprolol succinate ER (TOPROL XL) 25 MG 24 hr tablet Take 2 tablets (50 mg) by mouth daily       ondansetron (ZOFRAN) 4 MG tablet Take 1 tablet by mouth every 8 hours as needed       pantoprazole (PROTONIX) 40 MG EC tablet Take 1 tablet (40 mg) by mouth every morning (before breakfast) for 26 days 26 tablet 0     vancomycin (VANCOCIN) 125 MG capsule Take 1 capsule (125 mg) by mouth 4 times daily for 8 days 32 capsule 0     ROS:  10 point ROS neg other than the symptoms noted above in the HPI.      PHYSICAL EXAM:  /74   Pulse 76   Temp 98.1  F (36.7  C)   Resp 16   Ht 1.803 m (5' 11\")   Wt 103.4 kg (228 lb)   SpO2 99%   BMI 31.80 kg/m    Physical Exam  Cardiovascular:      Rate and Rhythm: Tachycardia present. Rhythm irregular.   Pulmonary:      Effort: Pulmonary effort is normal.      Breath sounds: Normal breath sounds.   Abdominal:      General: Bowel sounds are normal.   Skin:     Comments: Dry skin, mild erythema to to RLE   Neurological:      Mental Status: He is alert.   Psychiatric:         Mood and Affect: Mood normal.      Comments: forgetful          LABORATORY/IMAGING DATA:  Reviewed as per [facility] and/or [facility]    ASSESSMENT/PLAN:  Chest pain, unspecified type  Denies history of \"heart attack\" but does have NSTEMI listed on diagnosis list. Was given nitroglycerin x1 in TCU with no improvement EMS called, and he was given ASA 324mg  - send to ED for evaluation of chest pain    Diastolic congestive heart failure, unspecified HF chronicity (H)  Ascending aorta dilatation (H)  Essential hypertension  Pure hypercholesterolemia  Aortic valve stenosis, etiology of cardiac valve disease unspecified  Echo with EF of 50-55%, severe aortic stenosis. Weight in -228lbs " in TCU. -130s. Appears compensated. Reviewed with nursing staff.   - daily weights  - continue lasix 80mg daily, metoprolol XL, atrovastatin  - daily weights  - follows with cardiology     Type 2 diabetes mellitus with diabetic polyneuropathy, without long-term current use of insulin (H)  A1C 7.2%, diet controlled, -160,   - BG checks BID    Chronic atrial fibrillation (H)  Had RVR, typically running 60-80 in TCU, but is 100s today.  Discussed with patient, nursing staff.   - Eliquis BID  - metoprolol XL 50mg daily   - monitor and adjust     Stage 3a chronic kidney disease (H)  Baseline 1.1-1.4, stable  - BMP stable on labs today  - avoid nephrotoxic medications.      Cellulitis of right lower extremity  Resolving, healing. Has completed abx  - monitor     Medication induced coagulopathy (H)  Known issue that I take into account for their medical decisions, no current exacerbations or new concerns    Gastritis  New on PPI, felt likely due to stress  - continue protonix, will wean off when able.         Colitis due to Clostridium difficile  Improving, denies nausea. Discussed with patient, nursing staff  - Vanco PO QID  - contact precautions        Chronic pain of both shoulders  Reports pain in right shoulder. Staff and therapy report has been ongoing.   - APAP 975mg TID PRN, Bengay QID PRN      Orders:   Send to ED re: chest pain     Total time spent with patient visit at the skilled nursing facility was 50 minutes including patient visit and review of past records. Total time spent reviewing records from hospitalization within my organization Including review of labs, review of PCP note from outside organization, review of TCU facility records, medication reconciliation discussion of plan of care with nursing staff and therapy, time spent on documentation as well as discussion with patient including review of medications, discussion of plan of care and patient education as stated above.        Electronically signed by:  CHUCK Cedeno CNP       Sincerely,        CHUCK Cedeno CNP

## 2023-07-31 NOTE — PROGRESS NOTES
[facility] GERIATRICS  INITIAL VISIT NOTE  July 31, 2023    PRIMARY CARE PROVIDER AND CLINIC: Semmler, Steven Duane [address] [facility] / [facility] [address]    [facility] Medical Record Number: [id number]  Place of Service where encounter took place: [facility] ([facility]) [[id number]]    Chief Complaint   Patient presents with    Hospital F/U     [facility] 7/23/2023 - 7/27/2023     HPI:    Alvin Newton is a 89 year old (5/10/1934) male returned to the above facility from [facility]. Hospital stay 7/23/23 through 7/27/23 where they were admitted for Cellulitis. Now admitted to this facility for rehab, medical management, and nursing care.      History obtained from: facility chart records, facility staff, patient report, [facility] chart review, and Care Everywhere [facility] chart review.      Brief Hospital Course: PMH of HLD, Dm2, CHF,, NSTEMI, Ckd3, HTN who was admitted 7/12-7/19 with RLE cellulitis. sepsis.  Was started on amiodarone for afib with RVR. Was started on Augmentin and discharged to TCU. 7/23 developed  abdominal pain, confusion and diarrhea and was sent to the ED. Had been on Augmentin for cellulitis. Tested positive for c.diff. Started on IV Flagyl, converted to PO Vanco. Augmentin changed to cefdinir for RLE cellulitis. Was started on PPI for gastritis. When medically stable was discharged to TCU for further rehab and medical management.      TCU Course: Nursing told provider patient started complaining of pressure in left chest/breast area when  went in to draw blood. Started about an hour ago. He reports discomfort is 5/10, /74, HR 77. He was given nitroglycerin x1 with no change. Reports SOB but does not appear to be in distress. He reports some nausea. Says he has had some increased dizziness today, needed assistance with walking because of this. Has been having 1-2 BM daily, says they are not longer watery, soft, but not  formed. He also reports pain in both great toes, says he has had gout before. Has some difficulty swallowing pills as he choked on Augmentin previously as it was large, and now says all pills are difficult to take.     CODE STATUS/ADVANCE DIRECTIVES: CPR/Full code     ALLERGIES:  No Known Allergies    PAST MEDICAL HISTORY:   Past Medical History:   Diagnosis Date    Colonic diverticulum     Hyperlipidemia     Hypertension     Obesity (BMI 30-39.9)     Osteoarthritis     Type 2 diabetes mellitus (H)      PAST SURGICAL HISTORY:   Past Surgical History:   Procedure Laterality Date    ARTHROPLASTY HIP BILATERAL  1987     ESOPHAGOSCOPY, DIAGNOSTIC  2003    LAPAROSCOPIC CHOLECYSTECTOMY N/A 12/28/2017    Procedure: LAPAROSCOPIC CHOLECYSTECTOMY;  LAPAROSCOPIC CHOLECYSTECTOMY;  Surgeon: Heber Gonzalez MD;  Location: [facility] OR    TRANSRECTAL ULTRASONIC, TRANSURETHRAL RESECTION (TUR) OF PROSTATE CYST  1990           SOCIAL HISTORY:   Patient's living condition: lives with spouse    MEDICATIONS  Post Discharge Medication Reconciliation Status: discharge medications reconciled, continue medications without change.  Current Outpatient Medications   Medication Sig Dispense Refill    acetaminophen (TYLENOL) 500 MG tablet Take 2 tablets (1,000 mg) by mouth every 8 hours as needed for mild pain or fever  0    amiodarone (PACERONE) 200 MG tablet Take 1 tablet (200 mg) by mouth daily      atorvastatin (LIPITOR) 20 MG tablet Take 20 mg by mouth At Bedtime      cholecalciferol 50 MCG (2000 UT) CAPS Take 2,000 Units by mouth daily      ELIQUIS ANTICOAGULANT 5 MG tablet Take 5 mg by mouth 2 times daily      furosemide (LASIX) 20 MG tablet Take 4 tablets (80 mg) by mouth every morning      Lidocaine (LIDOCARE) 4 % Patch Place 2 patches onto the skin every 24 hours To prevent lidocaine toxicity, patient should be patch free for 12 hrs daily.      menthol, Topical Analgesic, 2.5% (BENGAY VANISHING SCENT) 2.5 % GEL topical gel Apply  "topically 4 times daily as needed for other (pain) Apply to shoulders at times when Lidoderm patch is absent      metoprolol succinate ER (TOPROL XL) 25 MG 24 hr tablet Take 2 tablets (50 mg) by mouth daily      ondansetron (ZOFRAN) 4 MG tablet Take 1 tablet by mouth every 8 hours as needed      pantoprazole (PROTONIX) 40 MG EC tablet Take 1 tablet (40 mg) by mouth every morning (before breakfast) for 26 days 26 tablet 0    vancomycin (VANCOCIN) 125 MG capsule Take 1 capsule (125 mg) by mouth 4 times daily for 8 days 32 capsule 0     ROS:  10 point ROS neg other than the symptoms noted above in the HPI.      PHYSICAL EXAM:  /74   Pulse 76   Temp 98.1  F (36.7  C)   Resp 16   Ht 1.803 m (5' 11\")   Wt 103.4 kg (228 lb)   SpO2 99%   BMI 31.80 kg/m    Physical Exam  Cardiovascular:      Rate and Rhythm: Tachycardia present. Rhythm irregular.   Pulmonary:      Effort: Pulmonary effort is normal.      Breath sounds: Normal breath sounds.   Abdominal:      General: Bowel sounds are normal.   Skin:     Comments: Dry skin, mild erythema to to RLE   Neurological:      Mental Status: He is alert.   Psychiatric:         Mood and Affect: Mood normal.      Comments: forgetful          LABORATORY/IMAGING DATA:  Reviewed as per [facility] and/or [facility]    ASSESSMENT/PLAN:  Chest pain, unspecified type  Denies history of \"heart attack\" but does have NSTEMI listed on diagnosis list. Was given nitroglycerin x1 in TCU with no improvement EMS called, and he was given ASA 324mg  - send to ED for evaluation of chest pain    Diastolic congestive heart failure, unspecified HF chronicity (H)  Ascending aorta dilatation (H)  Essential hypertension  Pure hypercholesterolemia  Aortic valve stenosis, etiology of cardiac valve disease unspecified  Echo with EF of 50-55%, severe aortic stenosis. Weight in -228lbs in TCU. -130s. Appears compensated. Reviewed with nursing staff.   - daily weights  - continue lasix 80mg " daily, metoprolol XL, atrovastatin  - daily weights  - follows with cardiology     Type 2 diabetes mellitus with diabetic polyneuropathy, without long-term current use of insulin (H)  A1C 7.2%, diet controlled, -160,   - BG checks BID    Chronic atrial fibrillation (H)  Had RVR, typically running 60-80 in TCU, but is 100s today.  Discussed with patient, nursing staff.   - Eliquis BID  - metoprolol XL 50mg daily   - monitor and adjust     Stage 3a chronic kidney disease (H)  Baseline 1.1-1.4, stable  - BMP stable on labs today  - avoid nephrotoxic medications.      Cellulitis of right lower extremity  Resolving, healing. Has completed abx  - monitor     Medication induced coagulopathy (H)  Known issue that I take into account for their medical decisions, no current exacerbations or new concerns    Gastritis  New on PPI, felt likely due to stress  - continue protonix, will wean off when able.         Colitis due to Clostridium difficile  Improving, denies nausea. Discussed with patient, nursing staff  - Vanco PO QID  - contact precautions        Chronic pain of both shoulders  Reports pain in right shoulder. Staff and therapy report has been ongoing.   - APAP 975mg TID PRN, Bengay QID PRN      Orders:   Send to ED re: chest pain     Total time spent with patient visit at the skilled nursing facility was 50 minutes including patient visit and review of past records. Total time spent reviewing records from hospitalization within my organization Including review of labs, review of PCP note from outside organization, review of TCU facility records, medication reconciliation discussion of plan of care with nursing staff and therapy, time spent on documentation as well as discussion with patient including review of medications, discussion of plan of care and patient education as stated above.       Electronically signed by:  CHUCK Cedeno CNP

## 2023-08-01 NOTE — ED NOTES
Jayshree nurse from [facility] called for update.   Pt to be discharged via [facility].

## 2023-08-01 NOTE — DISCHARGE INSTRUCTIONS
Return if symptoms worsen or new symptoms develop.  Follow-up with primary care physician later this week or have nursing home nurse draw potassium to be rechecked Thursday or Friday.  Take ibuprofen 400 mg twice daily as needed for gout.  Steroids and colchicine are not indicated as you have diabetes and are on amiodarone.  Drink plenty of fluids.  If increased pain in toe spread of redness or erythema or other symptoms present please return for further evaluation and care.

## 2023-08-02 ENCOUNTER — DOCUMENTATION ONLY (OUTPATIENT)
Dept: OTHER | Facility: CLINIC | Age: 88
End: 2023-08-02
Payer: COMMERCIAL

## 2023-08-02 ENCOUNTER — LAB REQUISITION (OUTPATIENT)
Dept: LAB | Facility: CLINIC | Age: 88
End: 2023-08-02

## 2023-08-02 DIAGNOSIS — E87.6 HYPOKALEMIA: ICD-10-CM

## 2023-08-02 NOTE — PROGRESS NOTES
[facility] GERIATRICS  ACUTE/EPISODIC VISIT    [facility] Medical Record Number: [id number]  Place of Service where encounter took place: [facility] ([facility]) [[id number]]    Chief Complaint   Patient presents with    RECHECK     ED F/U - [facility] 7/31/2023 (8 hours)       HPI:    Alvin Newton is a 89 year old (5/10/1934), who is being seen today for an episodic care visit. HPI information obtained from: facility chart records, facility staff, patient report, and [facility] chart review.    Today's concern is: Recently hospitalized with cellulitis; was sent to ED on 7/31 with chest pain. He was found to have hypokalemia. Chest pain not felt to be ACS. Did have elevated uric acid, was given x1 dose of Toradol and was discharged back to TCU. Seen today with daughter in his room. He is wanting to go home. provider, PT and daughter discussed goals that he needs to achieve to go home, and he verbalized agreement that he needs to get better to go home. He has not been walking much, but feels pain in his toes has improved so he will be able to walk more. He does feel tired today. Staff did attempt to give him is potassium pills this morning, but he vomited them back up. Daughter does not think he would tolerate drinking the liquid potassium. He does admit to feeling depressed, having some anxiety. He was willing to try medication    Daughter requested we discuss code status, but Alvin states he was tired and just wanted to sleep so were not able to address during visit.     ALLERGIES: No Known Allergies   MEDICATIONS:  Post Discharge Medication Reconciliation Status: medication reconcilation previously completed during another office visit.     Current Outpatient Medications   Medication Sig Dispense Refill    citalopram (CELEXA) 10 MG tablet Take 0.5 tablets (5 mg) by mouth daily      acetaminophen (TYLENOL) 500 MG tablet Take 2 tablets (1,000 mg) by mouth every 8 hours as needed for  "mild pain or fever  0    amiodarone (PACERONE) 200 MG tablet Take 1 tablet (200 mg) by mouth daily      atorvastatin (LIPITOR) 20 MG tablet Take 20 mg by mouth At Bedtime      cholecalciferol 50 MCG (2000 UT) CAPS Take 2,000 Units by mouth daily      ELIQUIS ANTICOAGULANT 5 MG tablet Take 5 mg by mouth 2 times daily      furosemide (LASIX) 20 MG tablet Take 4 tablets (80 mg) by mouth every morning      Lidocaine (LIDOCARE) 4 % Patch Place 2 patches onto the skin every 24 hours To prevent lidocaine toxicity, patient should be patch free for 12 hrs daily.      menthol, Topical Analgesic, 2.5% (BENGAY VANISHING SCENT) 2.5 % GEL topical gel Apply topically 4 times daily as needed for other (pain) Apply to shoulders at times when Lidoderm patch is absent      metoprolol succinate ER (TOPROL XL) 25 MG 24 hr tablet Take 2 tablets (50 mg) by mouth daily      ondansetron (ZOFRAN) 4 MG tablet Take 1 tablet by mouth every 8 hours as needed      pantoprazole (PROTONIX) 40 MG EC tablet Take 1 tablet (40 mg) by mouth every morning (before breakfast) for 26 days 26 tablet 0    potassium chloride ER (K-TAB/KLOR-CON) 10 MEQ CR tablet Take 1 tablet (10 mEq) by mouth 2 times daily for 3 days 6 tablet 0    vancomycin (VANCOCIN) 125 MG capsule Take 1 capsule (125 mg) by mouth 4 times daily for 8 days 32 capsule 0     Medications reviewed:  Medications reconciled to facility chart and changes were made to reflect current medications as identified as above med list. Below are the changes that were made:   Medications stopped since last EPIC medication reconciliation:   There are no discontinued medications.    Medications started since last [facility] medication reconciliation:  No orders of the defined types were placed in this encounter.        REVIEW OF SYSTEMS:  4 point ROS neg other than the symptoms noted above in the HPI.      PHYSICAL EXAM:  /72   Pulse 67   Temp 98.2  F (36.8  C)   Resp 13   Ht 1.803 m (5' 11\")   Wt 103.3 kg " (227 lb 12.8 oz)   SpO2 93%   BMI 31.77 kg/m    Physical Exam  Cardiovascular:      Rate and Rhythm: Normal rate and regular rhythm.      Heart sounds: Normal heart sounds.   Pulmonary:      Effort: Pulmonary effort is normal.      Breath sounds: Normal breath sounds.   Musculoskeletal:         General: Tenderness (mild, to bilat great toes) present.      Right lower leg: Edema (1+) present.      Left lower leg: Edema (1+) present.   Skin:     Comments: Thickened, long yellow toenails   Neurological:      Mental Status: He is alert and oriented to person, place, and time.   Psychiatric:         Mood and Affect: Mood normal.      Comments: Memory fair         ASSESSMENT / PLAN:  Hypokalemia  In the setting of diuretics, decreased intake. Discussed with patient and daughter  - repeat K-dur 20meq this AM, and give 20 meq again this afternoon  - increase K-dur to 20meq q AM and 10 meq q afternoon,   - [facility] on 8/7    Reactive depression  Anxiety  Family reports they note he has been depressed. Have also noted anxiety, gets worked up easily. Discussed starting selective serotonin reuptake inhibitor with patient, and daughter, did discuss side effects. He was agreeable to try medication  - citalopram (CELEXA) 10 MG tablet; Take 0.5 tablets (5 mg) by mouth daily    Essential hypertension  Diastolic congestive heart failure, unspecified HF chronicity (H)  cho with EF of 50-55%, severe aortic stenosis. Weight stable. -120  - continue lasix 80mg daily, metoprolol XL, atrovastatin  - daily weights  - follows with cardiology    Acute idiopathic gout involving toe, unspecified laterality  Elevated uric acid level, was given toradol x1, reports pain continues to improve. Discussed potential side effect of prednisone, and opted against trying.   - continue to monitor    Colitis due to Clostridium difficile  Having soft stools, no further diarrhea. Reviewed with patient and daughter  - complete vancomycin 125mg QID  -  monitor    Dysphagia  Difficulty taking pills, daughter feels is anxiety related.  Discussed with patient, nursing staff, and daughter  - add scheduled zofran  - encourage patient to try take pills independently instead of nursing using spoon as may improved his tolerance  - monitor and adjust     Hypertrophic toenails  Thick, yellow toenails, likely has underlying fungal infection. Provider trimmed all nails on both feet during visit today      Orders:  Give K-dur 20 mew PO x1 now and again at 2 PM  Increase k-dur to 20 meq PO q am and 10 meq q PM   BMP on 8/7  Encourage patient to take pills independently, may crush if needed  Zofran 4mg po q AM before breakfast  Celexa 5mg Po q day     Total time spent with patient visit at the skilled nursing facility was 45 min including patient visit and review of past records. Total time spent reviewing records from hospitalization/ED visit within my organization, review of TCU facility records, medication reconciliation discussion of plan of care with nursing staff and therapy, time spent on documentation as well as discussion with patient including review of medications, discussion and patient education around selective serotonin reuptake inhibitor, gout, hypokalemia, and others as stated above. .       Electronically signed by:  CHUCK Cedeno CNP

## 2023-08-03 ENCOUNTER — TRANSITIONAL CARE UNIT VISIT (OUTPATIENT)
Dept: GERIATRICS | Facility: CLINIC | Age: 88
End: 2023-08-03
Payer: COMMERCIAL

## 2023-08-03 VITALS
BODY MASS INDEX: 31.89 KG/M2 | SYSTOLIC BLOOD PRESSURE: 113 MMHG | HEART RATE: 67 BPM | WEIGHT: 227.8 LBS | TEMPERATURE: 98.2 F | HEIGHT: 71 IN | DIASTOLIC BLOOD PRESSURE: 72 MMHG | OXYGEN SATURATION: 93 % | RESPIRATION RATE: 13 BRPM

## 2023-08-03 DIAGNOSIS — E87.6 HYPOKALEMIA: Primary | ICD-10-CM

## 2023-08-03 DIAGNOSIS — L60.2 HYPERTROPHIC TOENAIL: ICD-10-CM

## 2023-08-03 DIAGNOSIS — A04.72 COLITIS DUE TO CLOSTRIDIUM DIFFICILE: ICD-10-CM

## 2023-08-03 DIAGNOSIS — F41.9 ANXIETY: ICD-10-CM

## 2023-08-03 DIAGNOSIS — M10.079 ACUTE IDIOPATHIC GOUT INVOLVING TOE, UNSPECIFIED LATERALITY: ICD-10-CM

## 2023-08-03 DIAGNOSIS — I50.30 DIASTOLIC CONGESTIVE HEART FAILURE, UNSPECIFIED HF CHRONICITY (H): ICD-10-CM

## 2023-08-03 DIAGNOSIS — F32.9 REACTIVE DEPRESSION: ICD-10-CM

## 2023-08-03 DIAGNOSIS — I10 ESSENTIAL HYPERTENSION: ICD-10-CM

## 2023-08-03 LAB — POTASSIUM SERPL-SCNC: 2.9 MMOL/L (ref 3.4–5.3)

## 2023-08-03 PROCEDURE — P9603 ONE-WAY ALLOW PRORATED MILES: HCPCS | Performed by: NURSE PRACTITIONER

## 2023-08-03 PROCEDURE — 84132 ASSAY OF SERUM POTASSIUM: CPT | Performed by: NURSE PRACTITIONER

## 2023-08-03 PROCEDURE — 99310 SBSQ NF CARE HIGH MDM 45: CPT | Mod: 25 | Performed by: NURSE PRACTITIONER

## 2023-08-03 PROCEDURE — G0127 TRIM NAIL(S): HCPCS | Performed by: NURSE PRACTITIONER

## 2023-08-03 RX ORDER — CITALOPRAM HYDROBROMIDE 10 MG/1
5 TABLET ORAL DAILY
Status: ON HOLD
Start: 2023-08-03 | End: 2024-01-22

## 2023-08-03 RX ORDER — POTASSIUM CHLORIDE 750 MG/1
TABLET, EXTENDED RELEASE ORAL
Qty: 6 TABLET | Refills: 0 | COMMUNITY
Start: 2023-08-03 | End: 2023-08-16

## 2023-08-03 NOTE — LETTER
8/3/2023        RE: Alvin Newton  [address] [facility] [address]        [facility] GERIATRICS  ACUTE/EPISODIC VISIT    [facility] Medical Record Number: [id number]  Place of Service where encounter took place: [facility] ([facility]) [[id number]]    Chief Complaint   Patient presents with     RECHECK     ED F/U - [facility] 7/31/2023 (8 hours)       HPI:    Alvin Newton is a 89 year old (5/10/1934), who is being seen today for an episodic care visit. HPI information obtained from: facility chart records, facility staff, patient report, and [facility] chart review.    Today's concern is: Recently hospitalized with cellulitis; was sent to ED on 7/31 with chest pain. He was found to have hypokalemia. Chest pain not felt to be ACS. Did have elevated uric acid, was given x1 dose of Toradol and was discharged back to TCU. Seen today with daughter in his room. He is wanting to go home. provider, PT and daughter discussed goals that he needs to achieve to go home, and he verbalized agreement that he needs to get better to go home. He has not been walking much, but feels pain in his toes has improved so he will be able to walk more. He does feel tired today. Staff did attempt to give him is potassium pills this morning, but he vomited them back up. Daughter does not think he would tolerate drinking the liquid potassium. He does admit to feeling depressed, having some anxiety. He was willing to try medication    Daughter requested we discuss code status, but Alvin states he was tired and just wanted to sleep so were not able to address during visit.     ALLERGIES: No Known Allergies   MEDICATIONS:  Post Discharge Medication Reconciliation Status: medication reconcilation previously completed during another office visit.     Current Outpatient Medications   Medication Sig Dispense Refill     citalopram (CELEXA) 10 MG tablet Take 0.5 tablets (5 mg) by mouth daily        acetaminophen (TYLENOL) 500 MG tablet Take 2 tablets (1,000 mg) by mouth every 8 hours as needed for mild pain or fever  0     amiodarone (PACERONE) 200 MG tablet Take 1 tablet (200 mg) by mouth daily       atorvastatin (LIPITOR) 20 MG tablet Take 20 mg by mouth At Bedtime       cholecalciferol 50 MCG (2000 UT) CAPS Take 2,000 Units by mouth daily       ELIQUIS ANTICOAGULANT 5 MG tablet Take 5 mg by mouth 2 times daily       furosemide (LASIX) 20 MG tablet Take 4 tablets (80 mg) by mouth every morning       Lidocaine (LIDOCARE) 4 % Patch Place 2 patches onto the skin every 24 hours To prevent lidocaine toxicity, patient should be patch free for 12 hrs daily.       menthol, Topical Analgesic, 2.5% (BENGAY VANISHING SCENT) 2.5 % GEL topical gel Apply topically 4 times daily as needed for other (pain) Apply to shoulders at times when Lidoderm patch is absent       metoprolol succinate ER (TOPROL XL) 25 MG 24 hr tablet Take 2 tablets (50 mg) by mouth daily       ondansetron (ZOFRAN) 4 MG tablet Take 1 tablet by mouth every 8 hours as needed       pantoprazole (PROTONIX) 40 MG EC tablet Take 1 tablet (40 mg) by mouth every morning (before breakfast) for 26 days 26 tablet 0     potassium chloride ER (K-TAB/KLOR-CON) 10 MEQ CR tablet Take 1 tablet (10 mEq) by mouth 2 times daily for 3 days 6 tablet 0     vancomycin (VANCOCIN) 125 MG capsule Take 1 capsule (125 mg) by mouth 4 times daily for 8 days 32 capsule 0     Medications reviewed:  Medications reconciled to facility chart and changes were made to reflect current medications as identified as above med list. Below are the changes that were made:   Medications stopped since last EPIC medication reconciliation:   There are no discontinued medications.    Medications started since last [facility] medication reconciliation:  No orders of the defined types were placed in this encounter.        REVIEW OF SYSTEMS:  4 point ROS neg other than the symptoms noted above in the  "HPI.      PHYSICAL EXAM:  /72   Pulse 67   Temp 98.2  F (36.8  C)   Resp 13   Ht 1.803 m (5' 11\")   Wt 103.3 kg (227 lb 12.8 oz)   SpO2 93%   BMI 31.77 kg/m    Physical Exam  Cardiovascular:      Rate and Rhythm: Normal rate and regular rhythm.      Heart sounds: Normal heart sounds.   Pulmonary:      Effort: Pulmonary effort is normal.      Breath sounds: Normal breath sounds.   Musculoskeletal:         General: Tenderness (mild, to bilat great toes) present.      Right lower leg: Edema (1+) present.      Left lower leg: Edema (1+) present.   Skin:     Comments: Thickened, long yellow toenails   Neurological:      Mental Status: He is alert and oriented to person, place, and time.   Psychiatric:         Mood and Affect: Mood normal.      Comments: Memory fair         ASSESSMENT / PLAN:  Hypokalemia  In the setting of diuretics, decreased intake. Discussed with patient and daughter  - repeat K-dur 20meq this AM, and give 20 meq again this afternoon  - increase K-dur to 20meq q AM and 10 meq q afternoon,   - [facility] on 8/7    Reactive depression  Anxiety  Family reports they note he has been depressed. Have also noted anxiety, gets worked up easily. Discussed starting selective serotonin reuptake inhibitor with patient, and daughter, did discuss side effects. He was agreeable to try medication  - citalopram (CELEXA) 10 MG tablet; Take 0.5 tablets (5 mg) by mouth daily    Essential hypertension  Diastolic congestive heart failure, unspecified HF chronicity (H)  cho with EF of 50-55%, severe aortic stenosis. Weight stable. -120  - continue lasix 80mg daily, metoprolol XL, atrovastatin  - daily weights  - follows with cardiology    Acute idiopathic gout involving toe, unspecified laterality  Elevated uric acid level, was given toradol x1, reports pain continues to improve. Discussed potential side effect of prednisone, and opted against trying.   - continue to monitor    Colitis due to Clostridium " difficile  Having soft stools, no further diarrhea. Reviewed with patient and daughter  - complete vancomycin 125mg QID  - monitor    Dysphagia  Difficulty taking pills, daughter feels is anxiety related.  Discussed with patient, nursing staff, and daughter  - add scheduled zofran  - encourage patient to try take pills independently instead of nursing using spoon as may improved his tolerance  - monitor and adjust     Hypertrophic toenails  Thick, yellow toenails, likely has underlying fungal infection. Provider trimmed all nails on both feet during visit today      Orders:  Give K-dur 20 mew PO x1 now and again at 2 PM  Increase k-dur to 20 meq PO q am and 10 meq q PM   BMP on 8/7  Encourage patient to take pills independently, may crush if needed  Zofran 4mg po q AM before breakfast  Celexa 5mg Po q day     Total time spent with patient visit at the skilled nursing facility was 45 min including patient visit and review of past records. Total time spent reviewing records from hospitalization/ED visit within my organization, review of TCU facility records, medication reconciliation discussion of plan of care with nursing staff and therapy, time spent on documentation as well as discussion with patient including review of medications, discussion and patient education around selective serotonin reuptake inhibitor, gout, hypokalemia, and others as stated above. .       Electronically signed by:  CHUCK Cedeno CNP      Sincerely,        CHUCK Cedeno CNP

## 2023-08-06 ENCOUNTER — LAB REQUISITION (OUTPATIENT)
Dept: LAB | Facility: CLINIC | Age: 88
End: 2023-08-06

## 2023-08-06 DIAGNOSIS — E87.6 HYPOKALEMIA: ICD-10-CM

## 2023-08-07 ENCOUNTER — DISCHARGE SUMMARY NURSING HOME (OUTPATIENT)
Dept: GERIATRICS | Facility: CLINIC | Age: 88
End: 2023-08-07
Payer: COMMERCIAL

## 2023-08-07 VITALS
HEIGHT: 71 IN | WEIGHT: 220.8 LBS | OXYGEN SATURATION: 97 % | TEMPERATURE: 98.1 F | SYSTOLIC BLOOD PRESSURE: 116 MMHG | HEART RATE: 64 BPM | BODY MASS INDEX: 30.91 KG/M2 | RESPIRATION RATE: 18 BRPM | DIASTOLIC BLOOD PRESSURE: 72 MMHG

## 2023-08-07 DIAGNOSIS — E11.42 TYPE 2 DIABETES MELLITUS WITH DIABETIC POLYNEUROPATHY, WITHOUT LONG-TERM CURRENT USE OF INSULIN (H): ICD-10-CM

## 2023-08-07 DIAGNOSIS — L60.2 HYPERTROPHIC TOENAIL: ICD-10-CM

## 2023-08-07 DIAGNOSIS — I10 ESSENTIAL HYPERTENSION: ICD-10-CM

## 2023-08-07 DIAGNOSIS — R07.9 CHEST PAIN, UNSPECIFIED TYPE: ICD-10-CM

## 2023-08-07 DIAGNOSIS — I48.20 CHRONIC ATRIAL FIBRILLATION (H): ICD-10-CM

## 2023-08-07 DIAGNOSIS — F41.9 ANXIETY: ICD-10-CM

## 2023-08-07 DIAGNOSIS — Z71.89 ACP (ADVANCE CARE PLANNING): ICD-10-CM

## 2023-08-07 DIAGNOSIS — R13.10 DYSPHAGIA, UNSPECIFIED TYPE: ICD-10-CM

## 2023-08-07 DIAGNOSIS — M10.079 ACUTE IDIOPATHIC GOUT INVOLVING TOE, UNSPECIFIED LATERALITY: ICD-10-CM

## 2023-08-07 DIAGNOSIS — G89.29 CHRONIC PAIN OF BOTH SHOULDERS: ICD-10-CM

## 2023-08-07 DIAGNOSIS — M25.511 CHRONIC PAIN OF BOTH SHOULDERS: ICD-10-CM

## 2023-08-07 DIAGNOSIS — A04.72 COLITIS DUE TO CLOSTRIDIUM DIFFICILE: Primary | ICD-10-CM

## 2023-08-07 DIAGNOSIS — L03.115 CELLULITIS OF RIGHT LOWER EXTREMITY: ICD-10-CM

## 2023-08-07 DIAGNOSIS — I50.30 DIASTOLIC CONGESTIVE HEART FAILURE, UNSPECIFIED HF CHRONICITY (H): ICD-10-CM

## 2023-08-07 DIAGNOSIS — N18.31 STAGE 3A CHRONIC KIDNEY DISEASE (H): ICD-10-CM

## 2023-08-07 DIAGNOSIS — M25.512 CHRONIC PAIN OF BOTH SHOULDERS: ICD-10-CM

## 2023-08-07 DIAGNOSIS — I35.0 AORTIC VALVE STENOSIS, ETIOLOGY OF CARDIAC VALVE DISEASE UNSPECIFIED: ICD-10-CM

## 2023-08-07 DIAGNOSIS — F32.9 REACTIVE DEPRESSION: ICD-10-CM

## 2023-08-07 LAB
ANION GAP SERPL CALCULATED.3IONS-SCNC: 11 MMOL/L (ref 7–15)
BUN SERPL-MCNC: 23 MG/DL (ref 8–23)
CALCIUM SERPL-MCNC: 9.4 MG/DL (ref 8.8–10.2)
CHLORIDE SERPL-SCNC: 99 MMOL/L (ref 98–107)
CREAT SERPL-MCNC: 1.6 MG/DL (ref 0.67–1.17)
DEPRECATED HCO3 PLAS-SCNC: 32 MMOL/L (ref 22–29)
GFR SERPL CREATININE-BSD FRML MDRD: 41 ML/MIN/1.73M2
GLUCOSE SERPL-MCNC: 139 MG/DL (ref 70–99)
POTASSIUM SERPL-SCNC: 3.7 MMOL/L (ref 3.4–5.3)
SODIUM SERPL-SCNC: 142 MMOL/L (ref 136–145)

## 2023-08-07 PROCEDURE — 82310 ASSAY OF CALCIUM: CPT | Performed by: NURSE PRACTITIONER

## 2023-08-07 PROCEDURE — 99316 NF DSCHRG MGMT 30 MIN+: CPT | Performed by: NURSE PRACTITIONER

## 2023-08-07 RX ORDER — FUROSEMIDE 20 MG
60 TABLET ORAL EVERY MORNING
Start: 2023-08-07 | End: 2023-08-16

## 2023-08-07 NOTE — LETTER
8/7/2023        RE: Alvin Newton  [address] [facility] [id number]        [facility] GERIATRICS DISCHARGE SUMMARY  Patient Name: Alvin Newton  YOB: 1934  [place] Medical Record Number: [id number]  Place of Service Where Encounter Took Place: [facility] - [facility] (SNF) [[id number]]    PRIMARY CARE PROVIDER AND CLINIC RESPONSIBLE AFTER TRANSFER: Steven Duane Semmler, MD, [address] [facility] / [facility] [id number]; Non-FMG Provider     Transferring providers: CHUCK Martino CNP; Michele Dai MD  Recent Hospitalization/ED: [facility] stay 7/23/23 to 7/27/23.  Date of SNF Admission: September 27, 2023  Date of SNF (anticipated) Discharge: August 09, 2023  Discharged to: previous independent home  Cognitive Scores: SLUMS: 20/30  Physical Function: Ambulating 50 ft with walker, SBA  DME: Wheelchair    CODE STATUS/ADVANCE DIRECTIVES DISCUSSION: No CPR- Pre-arrest intubation OK   ALLERGIES: Patient has no known allergies.    NURSING FACILITY COURSE:  Medication Changes/Rationale:   Lasix decreased from 80mg to 60mg due to DEJUAN,   Increased K-dur due to hypokalemia    Summary of nursing facility stay:   PMH of HLD, Dm2, CHF,, NSTEMI, Ckd3, HTN who was admitted 7/12-7/19 with RLE cellulitis. sepsis.  Was started on amiodarone for afib with RVR. Was started on Augmentin and discharged to TCU. 7/23 developed  abdominal pain, confusion and diarrhea and was sent to the ED. Had been on Augmentin for cellulitis. Tested positive for c.diff. Started on IV Flagyl, converted to PO Vanco. Augmentin changed to cefdinir for RLE cellulitis. Was started on PPI for gastritis. When medically stable was discharged to TCU for further rehab and medical management.      On 7/31 was sent to the ED with chest pain. Work-up negative for ACS, Did have elevated uric acid, concern for gouty arthritis in big toes. Was given a does of Toradol and discharged back  to TCU.     Cellulitis of right lower extremity  Completed antibiotics. Healing well.     Colitis due to Clostridium difficile  Completed course of vanco on 8/4. No further diarrhea, now having soft stools    Physical deconditioning  Mobility limited, made some progress with therapy. Longer stay in TCU was recommended but family feels he is depressed and that they can manage him at home and are requesting discharge home.  He will continue with home care. Will have patient driven discharge home on 8/9 with demetrius.     Diastolic congestive heart failure, unspecified HF chronicity (H)  Essential hypertension  Echo with EF of 50-55%, severe aortic stenosis. Weight stable ~220lbs,  -130  - continue metoprolol XL, atorvastatin  - decreasing lasix today from 80mg to 60mg due to elevated creatinine,   - daily weights  - follows with cardiology  - Med Therapy Management Referral    Acute idiopathic gout involving toe, unspecified laterality  Elevated uric acid, pain in bilat big toes is improving. Was given does of Toradol x1 in ED. Erythema resolved and tenderness significantly improved.     Reactive depression  Anxiety  Family feels he is depressed in TCU. Also report some anxious behaviors even before hospitalization. Agreeable to start low dose of Celexa  - Celexa 5mg daily  - follow-up with PCP    Dysphagia  Difficulty taking pills, daughter feels is anxiety related. Declined SLP evaluation or liquid potassium  - crush pill as needed  - follow-up with PCP    Type 2 diabetes mellitus with diabetic polyneuropathy, without long-term current use of insulin (H)  A1C 7.2%, diet controlled, -160.     ACP (advance care planning)  Reviewed code status with patient today per request of daughter. She states family is concerned he does not understand what CPR entails. Reviewed resuscitation and CPR with patient today induce risk of broken ribs and low survival rate outside the hospital. He changed code status to DNR,  intubation ok    Stage 3a chronic kidney disease (H)  Baseline 1.1-1.4, elevated to 1.6 on labs today. Suspect due to decreased instak  - decreased lasix to 60mg daily  - encourage fluid intake  - follow-up BMP with PCP    Chest pain, unspecified type  Seen in ED on 7/31, negative for ACS. No further chest pain    Hypertrophic toenail  Trimmed toenails in TCU on 8/4    Chronic atrial fibrillation (H)  Aortic valve stenosis, etiology of cardiac valve disease unspecified  Had RVR while IP, running 60-80 while in TCU.   - Eliquis BID  - metoprolol XL 50mg daily     Chronic pain of both shoulders  Felt to be arthritis  - APAP PRN, bengay PRN, lidocaine patch,   - follow-up with PCP    Gastritis  New on PPI, felt likely due to stress  - continue protonix, will wean off when able.     Discharge Medications:  MED REC REQUIRED{Post Medication Reconciliation Status:  Medication reconciliation previously completed during another office visit    Current Outpatient Medications   Medication Sig Dispense Refill     furosemide (LASIX) 20 MG tablet Take 3 tablets (60 mg) by mouth every morning       acetaminophen (TYLENOL) 500 MG tablet Take 2 tablets (1,000 mg) by mouth every 8 hours as needed for mild pain or fever  0     amiodarone (PACERONE) 200 MG tablet Take 1 tablet (200 mg) by mouth daily       atorvastatin (LIPITOR) 20 MG tablet Take 20 mg by mouth At Bedtime       cholecalciferol 50 MCG (2000 UT) CAPS Take 2,000 Units by mouth daily       citalopram (CELEXA) 10 MG tablet Take 0.5 tablets (5 mg) by mouth daily       ELIQUIS ANTICOAGULANT 5 MG tablet Take 5 mg by mouth 2 times daily       Lidocaine (LIDOCARE) 4 % Patch Place 2 patches onto the skin every 24 hours To prevent lidocaine toxicity, patient should be patch free for 12 hrs daily.       menthol, Topical Analgesic, 2.5% (BENGAY VANISHING SCENT) 2.5 % GEL topical gel Apply topically 4 times daily as needed for other (pain) Apply to shoulders at times when Lidoderm  "patch is absent       metoprolol succinate ER (TOPROL XL) 25 MG 24 hr tablet Take 2 tablets (50 mg) by mouth daily       ondansetron (ZOFRAN) 4 MG tablet Take 1 tablet by mouth every 8 hours as needed       pantoprazole (PROTONIX) 40 MG EC tablet Take 1 tablet (40 mg) by mouth every morning (before breakfast) for 26 days 26 tablet 0     potassium chloride ER (K-TAB/KLOR-CON) 10 MEQ CR tablet 20 meq q AM, and 10 meq q PM 6 tablet 0     Controlled medications:   not applicable/none     Past Medical History:   Past Medical History:   Diagnosis Date     Colonic diverticulum      Hyperlipidemia      Hypertension      Obesity (BMI 30-39.9)      Osteoarthritis      Type 2 diabetes mellitus (H)      Physical Exam:   Vitals: /72   Pulse 64   Temp 98.1  F (36.7  C)   Resp 18   Ht 1.803 m (5' 11\")   Wt 100.2 kg (220 lb 12.8 oz)   SpO2 97%   BMI 30.80 kg/m    BMI: Body mass index is 30.8 kg/m .  GENERAL APPEARANCE:  Alert, in no distress, cooperative,   RESP:  lungs clear to auscultation , no respiratory distress   CV:  regular rate and rhythm, no murmur, rub, or gallop, 1+ RLE edema  ABDOMEN:  bowel sounds normal,   M/S:   no gross joint deformities, generalized weakness  SKIN: isai discoloration RLE, dry flakey skin RLE  NEURO:   Cn 2-12 grossly intact,   PSYCH:  affect and mood normal, forgetful      SNF Labs: Recent labs in [facility] reviewed by me today.  and Most Recent 3 CBC's:  Recent Labs   Lab Test 07/31/23  1229 07/27/23  0448 07/25/23  0405   WBC 15.6* 13.4* 18.2*   HGB 16.3 15.5 14.3   MCV 90 91 91   * 539* 410     Most Recent 3 BMP's:  Recent Labs   Lab Test 08/07/23  0700 08/03/23  0650 07/31/23  1229 07/31/23  1115     --  141 145   POTASSIUM 3.7 2.9* 2.9* 2.8*   CHLORIDE 99  --  96* 99   CO2 32*  --  31* 32*   BUN 23.0  --  23.8* 23.1*   CR 1.60*  --  1.30* 1.38*   ANIONGAP 11  --  14 14   JERRY 9.4  --  9.4 9.1   *  --  153* 167*       DISCHARGE PLAN:  Follow up labs: Recommend " follow-up BMP with PCP  Medical Follow Up:   Follow up with primary care provider in 1-2 weeks  [facility] scheduled appointments: None.  Discharge Services: Home Care: Physical Therapy, Occupational Therapy, Registered Nurse, Home Health Aide.  Discharge Instructions Verbalized to Patient at Discharge:   Weigh yourself daily in the morning and keep a record. Call your primary clinic: a) if you are more short of breath, or b) if your weight changes more than 3 pounds in one day or more than 5 pounds in one week.     TOTAL DISCHARGE TIME: Greater than 30 minutes  Electronically signed by:  CHUCK Cedeno CNP    Documentation of Face to Face and Certification for Home Health Services    I certify that services are/were furnished while this patient was under the care of a physician and that a physician or an allowed non-physician practitioner (NPP), had a face-to-face encounter that meets the physician face-to-face encounter requirements. The encounter was in whole, or in part, related to the primary reason for home health. The patient is confined to his/her home and needs intermittent skilled nursing, physical therapy, speech-language pathology, or the continued need for occupational therapy. A plan of care has been established by a physician and is periodically reviewed by a physician.  Date of Face-to-Face Encounter: 8/7/2023.    I certify that, based on my findings, the following services are medically necessary home health services: Nursing, Occupational Therapy, Physical Therapy, and home health aide .    My clinical findings support the need for the above skilled services because: Requires assistance of another person or specialized equipment to access medical services because patient: Requires supervision of another for safe transfer...    Patient to re-establish plan of care with their PCP within 7-10 days after leaving the facility to reestablish care.  Medicare certified PECSAÚL provider: Yeimy  CHUCK Ugalde CNP  Date: August 7, 2023    Dr.Yasser Suhas MD, signing F2F and only signing for initial order. Please send all follow up questions and concerns or needed follow up signatures to the PCP, who [facility] has on file as:  Semmler, Steven Duane.          Sincerely,        CHUCK Cedeno CNP

## 2023-08-07 NOTE — PROGRESS NOTES
[facility] GERIATRICS DISCHARGE SUMMARY  Patient Name: Alvin Newton  YOB: 1934  [place] Medical Record Number: [id number]  [facility] [[id number]]    PRIMARY CARE PROVIDER AND CLINIC RESPONSIBLE AFTER TRANSFER: Steven Duane Semmler, MD, [address] [facility] / [facility] [id number]; Non-FMG Provider     Transferring providers: CHUCK Martino CNP; Michele Dai MD  Recent Hospitalization/ED: [facility] stay 7/23/23 to 7/27/23.  Date of SNF Admission: September 27, 2023  Date of SNF (anticipated) Discharge: August 09, 2023  Discharged to: previous independent home  Cognitive Scores: SLUMS: 20/30  Physical Function: Ambulating 50 ft with walker, SBA  DME: Wheelchair    CODE STATUS/ADVANCE DIRECTIVES DISCUSSION: No CPR- Pre-arrest intubation OK   ALLERGIES: Patient has no known allergies.    NURSING FACILITY COURSE:  Medication Changes/Rationale:   Lasix decreased from 80mg to 60mg due to DEJUAN,   Increased K-dur due to hypokalemia    Summary of nursing facility stay:   PMH of HLD, Dm2, CHF,, NSTEMI, Ckd3, HTN who was admitted 7/12-7/19 with RLE cellulitis. sepsis.  Was started on amiodarone for afib with RVR. Was started on Augmentin and discharged to TCU. 7/23 developed  abdominal pain, confusion and diarrhea and was sent to the ED. Had been on Augmentin for cellulitis. Tested positive for c.diff. Started on IV Flagyl, converted to PO Vanco. Augmentin changed to cefdinir for RLE cellulitis. Was started on PPI for gastritis. When medically stable was discharged to TCU for further rehab and medical management.      On 7/31 was sent to the ED with chest pain. Work-up negative for ACS, Did have elevated uric acid, concern for gouty arthritis in big toes. Was given a does of Toradol and discharged back to TCU.     Cellulitis of right lower extremity  Completed antibiotics. Healing well.      Colitis due to Clostridium difficile  Completed course of vanco on 8/4. No further diarrhea, now having soft stools    Physical deconditioning  Mobility limited, made some progress with therapy. Longer stay in TCU was recommended but family feels he is depressed and that they can manage him at home and are requesting discharge home.  He will continue with home care. Will have patient driven discharge home on 8/9 with demetrius.     Diastolic congestive heart failure, unspecified HF chronicity (H)  Essential hypertension  Echo with EF of 50-55%, severe aortic stenosis. Weight stable ~220lbs,  -130  - continue metoprolol XL, atorvastatin  - decreasing lasix today from 80mg to 60mg due to elevated creatinine,   - daily weights  - follows with cardiology  - Med Therapy Management Referral    Acute idiopathic gout involving toe, unspecified laterality  Elevated uric acid, pain in bilat big toes is improving. Was given does of Toradol x1 in ED. Erythema resolved and tenderness significantly improved.     Reactive depression  Anxiety  Family feels he is depressed in TCU. Also report some anxious behaviors even before hospitalization. Agreeable to start low dose of Celexa  - Celexa 5mg daily  - follow-up with PCP    Dysphagia  Difficulty taking pills, daughter feels is anxiety related. Declined SLP evaluation or liquid potassium  - crush pill as needed  - follow-up with PCP    Type 2 diabetes mellitus with diabetic polyneuropathy, without long-term current use of insulin (H)  A1C 7.2%, diet controlled, -160.     ACP (advance care planning)  Reviewed code status with patient today per request of daughter. She states family is concerned he does not understand what CPR entails. Reviewed resuscitation and CPR with patient today induce risk of broken ribs and low survival rate outside the hospital. He changed code status to DNR, intubation ok    Stage 3a chronic kidney disease (H)  Baseline 1.1-1.4, elevated to 1.6  on labs today. Suspect due to decreased instak  - decreased lasix to 60mg daily  - encourage fluid intake  - follow-up BMP with PCP    Chest pain, unspecified type  Seen in ED on 7/31, negative for ACS. No further chest pain    Hypertrophic toenail  Trimmed toenails in TCU on 8/4    Chronic atrial fibrillation (H)  Aortic valve stenosis, etiology of cardiac valve disease unspecified  Had RVR while IP, running 60-80 while in TCU.   - Eliquis BID  - metoprolol XL 50mg daily     Chronic pain of both shoulders  Felt to be arthritis  - APAP PRN, bengay PRN, lidocaine patch,   - follow-up with PCP    Gastritis  New on PPI, felt likely due to stress  - continue protonix, will wean off when able.     Discharge Medications:  MED REC REQUIRED{Post Medication Reconciliation Status:  Medication reconciliation previously completed during another office visit    Current Outpatient Medications   Medication Sig Dispense Refill    furosemide (LASIX) 20 MG tablet Take 3 tablets (60 mg) by mouth every morning      acetaminophen (TYLENOL) 500 MG tablet Take 2 tablets (1,000 mg) by mouth every 8 hours as needed for mild pain or fever  0    amiodarone (PACERONE) 200 MG tablet Take 1 tablet (200 mg) by mouth daily      atorvastatin (LIPITOR) 20 MG tablet Take 20 mg by mouth At Bedtime      cholecalciferol 50 MCG (2000 UT) CAPS Take 2,000 Units by mouth daily      citalopram (CELEXA) 10 MG tablet Take 0.5 tablets (5 mg) by mouth daily      ELIQUIS ANTICOAGULANT 5 MG tablet Take 5 mg by mouth 2 times daily      Lidocaine (LIDOCARE) 4 % Patch Place 2 patches onto the skin every 24 hours To prevent lidocaine toxicity, patient should be patch free for 12 hrs daily.      menthol, Topical Analgesic, 2.5% (BENGAY VANISHING SCENT) 2.5 % GEL topical gel Apply topically 4 times daily as needed for other (pain) Apply to shoulders at times when Lidoderm patch is absent      metoprolol succinate ER (TOPROL XL) 25 MG 24 hr tablet Take 2 tablets (50 mg)  "by mouth daily      ondansetron (ZOFRAN) 4 MG tablet Take 1 tablet by mouth every 8 hours as needed      pantoprazole (PROTONIX) 40 MG EC tablet Take 1 tablet (40 mg) by mouth every morning (before breakfast) for 26 days 26 tablet 0    potassium chloride ER (K-TAB/KLOR-CON) 10 MEQ CR tablet 20 meq q AM, and 10 meq q PM 6 tablet 0     Controlled medications:   not applicable/none     Past Medical History:   Past Medical History:   Diagnosis Date    Colonic diverticulum     Hyperlipidemia     Hypertension     Obesity (BMI 30-39.9)     Osteoarthritis     Type 2 diabetes mellitus (H)      Physical Exam:   Vitals: /72   Pulse 64   Temp 98.1  F (36.7  C)   Resp 18   Ht 1.803 m (5' 11\")   Wt 100.2 kg (220 lb 12.8 oz)   SpO2 97%   BMI 30.80 kg/m    BMI: Body mass index is 30.8 kg/m .  GENERAL APPEARANCE:  Alert, in no distress, cooperative,   RESP:  lungs clear to auscultation , no respiratory distress   CV:  regular rate and rhythm, no murmur, rub, or gallop, 1+ RLE edema  ABDOMEN:  bowel sounds normal,   M/S:   no gross joint deformities, generalized weakness  SKIN: isai discoloration RLE, dry flakey skin RLE  NEURO:   Cn 2-12 grossly intact,   PSYCH:  affect and mood normal, forgetful      SNF Labs: Recent labs in [facility] reviewed by me today.  and Most Recent 3 CBC's:  Recent Labs   Lab Test 07/31/23  1229 07/27/23  0448 07/25/23  0405   WBC 15.6* 13.4* 18.2*   HGB 16.3 15.5 14.3   MCV 90 91 91   * 539* 410     Most Recent 3 BMP's:  Recent Labs   Lab Test 08/07/23  0700 08/03/23  0650 07/31/23  1229 07/31/23  1115     --  141 145   POTASSIUM 3.7 2.9* 2.9* 2.8*   CHLORIDE 99  --  96* 99   CO2 32*  --  31* 32*   BUN 23.0  --  23.8* 23.1*   CR 1.60*  --  1.30* 1.38*   ANIONGAP 11  --  14 14   JERRY 9.4  --  9.4 9.1   *  --  153* 167*       DISCHARGE PLAN:  Follow up labs: Recommend follow-up BMP with PCP  Medical Follow Up:   Follow up with primary care provider in 1-2 weeks  Current [facility] " scheduled appointments: None.  Discharge Services: Home Care: Physical Therapy, Occupational Therapy, Registered Nurse, Home Health Aide.  Discharge Instructions Verbalized to Patient at Discharge:   Weigh yourself daily in the morning and keep a record. Call your primary clinic: a) if you are more short of breath, or b) if your weight changes more than 3 pounds in one day or more than 5 pounds in one week.     TOTAL DISCHARGE TIME: Greater than 30 minutes  Electronically signed by:  CHUCK Cedeno CNP    Documentation of Face to Face and Certification for Home Health Services    I certify that services are/were furnished while this patient was under the care of a physician and that a physician or an allowed non-physician practitioner (NPP), had a face-to-face encounter that meets the physician face-to-face encounter requirements. The encounter was in whole, or in part, related to the primary reason for home health. The patient is confined to his/her home and needs intermittent skilled nursing, physical therapy, speech-language pathology, or the continued need for occupational therapy. A plan of care has been established by a physician and is periodically reviewed by a physician.  Date of Face-to-Face Encounter: 8/7/2023.    I certify that, based on my findings, the following services are medically necessary home health services: Nursing, Occupational Therapy, Physical Therapy, and home health aide .    My clinical findings support the need for the above skilled services because: Requires assistance of another person or specialized equipment to access medical services because patient: Requires supervision of another for safe transfer...    Patient to re-establish plan of care with their PCP within 7-10 days after leaving the facility to reestablish care.  Medicare certified PECOS provider: CHUCK Cedeno CNP  Date: August 7, 2023    Dr.Yasser Suhas MD, signing F2F and only signing for initial  order. Please send all follow up questions and concerns or needed follow up signatures to the PCP, who [facility] has on file as:  Semmler, Steven Duane.

## 2023-08-07 NOTE — LETTER
8/7/2023        RE: Alvin Newton  [address] [facility] [id number]        [facility] GERIATRICS DISCHARGE SUMMARY  Patient Name: Alvin Newton  YOB: 1934  [facility] Medical Record Number: [id number]  [facility][id number]]    PRIMARY CARE PROVIDER AND CLINIC RESPONSIBLE AFTER TRANSFER: Steven Duane Semmler, MD, [address] [facility] / [facility] [address]; Non-[facility] Provider     Transferring providers: CHUCK Martino CNP; Michele Dai MD  Recent Hospitalization/ED: [facility] stay 7/23/23 to 7/27/23.  Date of SNF Admission: September 27, 2023  Date of SNF (anticipated) Discharge: August 09, 2023  Discharged to: previous independent home  Cognitive Scores: SLUMS: 20/30  Physical Function: Ambulating 50 ft with walker, SBA  DME: Wheelchair    CODE STATUS/ADVANCE DIRECTIVES DISCUSSION: No CPR- Pre-arrest intubation OK   ALLERGIES: Patient has no known allergies.    NURSING FACILITY COURSE:  Medication Changes/Rationale:   Lasix decreased from 80mg to 60mg due to DEJUAN,   Increased K-dur due to hypokalemia    Summary of nursing facility stay:   PMH of HLD, Dm2, CHF,, NSTEMI, Ckd3, HTN who was admitted 7/12-7/19 with RLE cellulitis. sepsis.  Was started on amiodarone for afib with RVR. Was started on Augmentin and discharged to TCU. 7/23 developed  abdominal pain, confusion and diarrhea and was sent to the ED. Had been on Augmentin for cellulitis. Tested positive for c.diff. Started on IV Flagyl, converted to PO Vanco. Augmentin changed to cefdinir for RLE cellulitis. Was started on PPI for gastritis. When medically stable was discharged to TCU for further rehab and medical management.      On 7/31 was sent to the ED with chest pain. Work-up negative for ACS, Did have elevated uric acid, concern for gouty arthritis in big toes. Was given a does of Toradol and discharged back  to TCU.     Cellulitis of right lower extremity  Completed antibiotics. Healing well.     Colitis due to Clostridium difficile  Completed course of vanco on 8/4. No further diarrhea, now having soft stools    Physical deconditioning  Mobility limited, made some progress with therapy. Longer stay in TCU was recommended but family feels he is depressed and that they can manage him at home and are requesting discharge home.  He will continue with home care. Will have patient driven discharge home on 8/9 with demetrius.     Diastolic congestive heart failure, unspecified HF chronicity (H)  Essential hypertension  Echo with EF of 50-55%, severe aortic stenosis. Weight stable ~220lbs,  -130  - continue metoprolol XL, atorvastatin  - decreasing lasix today from 80mg to 60mg due to elevated creatinine,   - daily weights  - follows with cardiology  - Med Therapy Management Referral    Acute idiopathic gout involving toe, unspecified laterality  Elevated uric acid, pain in bilat big toes is improving. Was given does of Toradol x1 in ED. Erythema resolved and tenderness significantly improved.     Reactive depression  Anxiety  Family feels he is depressed in TCU. Also report some anxious behaviors even before hospitalization. Agreeable to start low dose of Celexa  - Celexa 5mg daily  - follow-up with PCP    Dysphagia  Difficulty taking pills, daughter feels is anxiety related. Declined SLP evaluation or liquid potassium  - crush pill as needed  - follow-up with PCP    Type 2 diabetes mellitus with diabetic polyneuropathy, without long-term current use of insulin (H)  A1C 7.2%, diet controlled, -160.     ACP (advance care planning)  Reviewed code status with patient today per request of daughter. She states family is concerned he does not understand what CPR entails. Reviewed resuscitation and CPR with patient today induce risk of broken ribs and low survival rate outside the hospital. He changed code status to DNR,  intubation ok    Stage 3a chronic kidney disease (H)  Baseline 1.1-1.4, elevated to 1.6 on labs today. Suspect due to decreased instak  - decreased lasix to 60mg daily  - encourage fluid intake  - follow-up BMP with PCP    Chest pain, unspecified type  Seen in ED on 7/31, negative for ACS. No further chest pain    Hypertrophic toenail  Trimmed toenails in TCU on 8/4    Chronic atrial fibrillation (H)  Aortic valve stenosis, etiology of cardiac valve disease unspecified  Had RVR while IP, running 60-80 while in TCU.   - Eliquis BID  - metoprolol XL 50mg daily     Chronic pain of both shoulders  Felt to be arthritis  - APAP PRN, bengay PRN, lidocaine patch,   - follow-up with PCP    Gastritis  New on PPI, felt likely due to stress  - continue protonix, will wean off when able.     Discharge Medications:  MED REC REQUIRED{Post Medication Reconciliation Status:  Medication reconciliation previously completed during another office visit    Current Outpatient Medications   Medication Sig Dispense Refill     furosemide (LASIX) 20 MG tablet Take 3 tablets (60 mg) by mouth every morning       acetaminophen (TYLENOL) 500 MG tablet Take 2 tablets (1,000 mg) by mouth every 8 hours as needed for mild pain or fever  0     amiodarone (PACERONE) 200 MG tablet Take 1 tablet (200 mg) by mouth daily       atorvastatin (LIPITOR) 20 MG tablet Take 20 mg by mouth At Bedtime       cholecalciferol 50 MCG (2000 UT) CAPS Take 2,000 Units by mouth daily       citalopram (CELEXA) 10 MG tablet Take 0.5 tablets (5 mg) by mouth daily       ELIQUIS ANTICOAGULANT 5 MG tablet Take 5 mg by mouth 2 times daily       Lidocaine (LIDOCARE) 4 % Patch Place 2 patches onto the skin every 24 hours To prevent lidocaine toxicity, patient should be patch free for 12 hrs daily.       menthol, Topical Analgesic, 2.5% (BENGAY VANISHING SCENT) 2.5 % GEL topical gel Apply topically 4 times daily as needed for other (pain) Apply to shoulders at times when Lidoderm  "patch is absent       metoprolol succinate ER (TOPROL XL) 25 MG 24 hr tablet Take 2 tablets (50 mg) by mouth daily       ondansetron (ZOFRAN) 4 MG tablet Take 1 tablet by mouth every 8 hours as needed       pantoprazole (PROTONIX) 40 MG EC tablet Take 1 tablet (40 mg) by mouth every morning (before breakfast) for 26 days 26 tablet 0     potassium chloride ER (K-TAB/KLOR-CON) 10 MEQ CR tablet 20 meq q AM, and 10 meq q PM 6 tablet 0     Controlled medications:   not applicable/none     Past Medical History:   Past Medical History:   Diagnosis Date     Colonic diverticulum      Hyperlipidemia      Hypertension      Obesity (BMI 30-39.9)      Osteoarthritis      Type 2 diabetes mellitus (H)      Physical Exam:   Vitals: /72   Pulse 64   Temp 98.1  F (36.7  C)   Resp 18   Ht 1.803 m (5' 11\")   Wt 100.2 kg (220 lb 12.8 oz)   SpO2 97%   BMI 30.80 kg/m    BMI: Body mass index is 30.8 kg/m .  GENERAL APPEARANCE:  Alert, in no distress, cooperative,   RESP:  lungs clear to auscultation , no respiratory distress   CV:  regular rate and rhythm, no murmur, rub, or gallop, 1+ RLE edema  ABDOMEN:  bowel sounds normal,   M/S:   no gross joint deformities, generalized weakness  SKIN: isai discoloration RLE, dry flakey skin RLE  NEURO:   Cn 2-12 grossly intact,   PSYCH:  affect and mood normal, forgetful      SNF Labs: Recent labs in [facility] reviewed by me today.  and Most Recent 3 CBC's:  Recent Labs   Lab Test 07/31/23  1229 07/27/23  0448 07/25/23  0405   WBC 15.6* 13.4* 18.2*   HGB 16.3 15.5 14.3   MCV 90 91 91   * 539* 410     Most Recent 3 BMP's:  Recent Labs   Lab Test 08/07/23  0700 08/03/23  0650 07/31/23  1229 07/31/23  1115     --  141 145   POTASSIUM 3.7 2.9* 2.9* 2.8*   CHLORIDE 99  --  96* 99   CO2 32*  --  31* 32*   BUN 23.0  --  23.8* 23.1*   CR 1.60*  --  1.30* 1.38*   ANIONGAP 11  --  14 14   JERRY 9.4  --  9.4 9.1   *  --  153* 167*       DISCHARGE PLAN:  Follow up labs: Recommend " follow-up BMP with PCP  Medical Follow Up:   Follow up with primary care provider in 1-2 weeks  [facility] scheduled appointments: None.  Discharge Services: Home Care: Physical Therapy, Occupational Therapy, Registered Nurse, Home Health Aide.  Discharge Instructions Verbalized to Patient at Discharge:   Weigh yourself daily in the morning and keep a record. Call your primary clinic: a) if you are more short of breath, or b) if your weight changes more than 3 pounds in one day or more than 5 pounds in one week.     TOTAL DISCHARGE TIME: Greater than 30 minutes  Electronically signed by:  CHUCK Cedeno CNP    Documentation of Face to Face and Certification for Home Health Services    I certify that services are/were furnished while this patient was under the care of a physician and that a physician or an allowed non-physician practitioner (NPP), had a face-to-face encounter that meets the physician face-to-face encounter requirements. The encounter was in whole, or in part, related to the primary reason for home health. The patient is confined to his/her home and needs intermittent skilled nursing, physical therapy, speech-language pathology, or the continued need for occupational therapy. A plan of care has been established by a physician and is periodically reviewed by a physician.  Date of Face-to-Face Encounter: 8/7/2023.    I certify that, based on my findings, the following services are medically necessary home health services: Nursing, Occupational Therapy, Physical Therapy, and home health aide .    My clinical findings support the need for the above skilled services because: Requires assistance of another person or specialized equipment to access medical services because patient: Requires supervision of another for safe transfer...    Patient to re-establish plan of care with their PCP within 7-10 days after leaving the facility to reestablish care.  Medicare certified [facility] provider: Yeimy  CHUCK Ugalde CNP  Date: August 7, 2023    Dr.Yasser Suhas MD, signing F2F and only signing for initial order. Please send all follow up questions and concerns or needed follow up signatures to the PCP, who [facility] has on file as:  Semmler, Steven Duane.        Sincerely,        CHUCK Cedeno CNP

## 2023-08-08 ENCOUNTER — DOCUMENTATION ONLY (OUTPATIENT)
Dept: OTHER | Facility: CLINIC | Age: 88
End: 2023-08-08
Payer: COMMERCIAL

## 2023-08-16 ENCOUNTER — HOSPITAL ENCOUNTER (EMERGENCY)
Facility: CLINIC | Age: 88
Discharge: OTHER INSTITUTION | End: 2023-08-16
Attending: EMERGENCY MEDICINE | Admitting: EMERGENCY MEDICINE
Payer: COMMERCIAL

## 2023-08-16 VITALS
SYSTOLIC BLOOD PRESSURE: 115 MMHG | DIASTOLIC BLOOD PRESSURE: 68 MMHG | TEMPERATURE: 96.7 F | BODY MASS INDEX: 32.2 KG/M2 | HEART RATE: 100 BPM | OXYGEN SATURATION: 98 % | HEIGHT: 71 IN | WEIGHT: 230 LBS | RESPIRATION RATE: 16 BRPM

## 2023-08-16 DIAGNOSIS — N17.9 ACUTE KIDNEY INJURY SUPERIMPOSED ON CKD (H): ICD-10-CM

## 2023-08-16 DIAGNOSIS — Z86.19 H/O CLOSTRIDIUM DIFFICILE INFECTION: ICD-10-CM

## 2023-08-16 DIAGNOSIS — N18.9 ACUTE KIDNEY INJURY SUPERIMPOSED ON CKD (H): ICD-10-CM

## 2023-08-16 DIAGNOSIS — I48.91 ATRIAL FIBRILLATION WITH RAPID VENTRICULAR RESPONSE (H): ICD-10-CM

## 2023-08-16 LAB
ALBUMIN SERPL BCG-MCNC: 3.4 G/DL (ref 3.5–5.2)
ALP SERPL-CCNC: 183 U/L (ref 40–129)
ALT SERPL W P-5'-P-CCNC: 20 U/L (ref 0–70)
ANION GAP SERPL CALCULATED.3IONS-SCNC: 18 MMOL/L (ref 7–15)
AST SERPL W P-5'-P-CCNC: 23 U/L (ref 0–45)
BASOPHILS # BLD AUTO: 0 10E3/UL (ref 0–0.2)
BASOPHILS NFR BLD AUTO: 0 %
BILIRUB SERPL-MCNC: 3.4 MG/DL
BUN SERPL-MCNC: 21.6 MG/DL (ref 8–23)
CALCIUM SERPL-MCNC: 9.7 MG/DL (ref 8.8–10.2)
CHLORIDE SERPL-SCNC: 96 MMOL/L (ref 98–107)
CREAT SERPL-MCNC: 1.83 MG/DL (ref 0.67–1.17)
DEPRECATED HCO3 PLAS-SCNC: 23 MMOL/L (ref 22–29)
EOSINOPHIL # BLD AUTO: 0.1 10E3/UL (ref 0–0.7)
EOSINOPHIL NFR BLD AUTO: 1 %
ERYTHROCYTE [DISTWIDTH] IN BLOOD BY AUTOMATED COUNT: 14.9 % (ref 10–15)
GFR SERPL CREATININE-BSD FRML MDRD: 35 ML/MIN/1.73M2
GLUCOSE SERPL-MCNC: 132 MG/DL (ref 70–99)
HCT VFR BLD AUTO: 45.4 % (ref 40–53)
HGB BLD-MCNC: 15.6 G/DL (ref 13.3–17.7)
HOLD SPECIMEN: NORMAL
HOLD SPECIMEN: NORMAL
IMM GRANULOCYTES # BLD: 0 10E3/UL
IMM GRANULOCYTES NFR BLD: 0 %
LACTATE SERPL-SCNC: 2 MMOL/L (ref 0.7–2)
LACTATE SERPL-SCNC: 2.6 MMOL/L (ref 0.7–2)
LYMPHOCYTES # BLD AUTO: 2.8 10E3/UL (ref 0.8–5.3)
LYMPHOCYTES NFR BLD AUTO: 28 %
MCH RBC QN AUTO: 29.6 PG (ref 26.5–33)
MCHC RBC AUTO-ENTMCNC: 34.4 G/DL (ref 31.5–36.5)
MCV RBC AUTO: 86 FL (ref 78–100)
MONOCYTES # BLD AUTO: 1.2 10E3/UL (ref 0–1.3)
MONOCYTES NFR BLD AUTO: 12 %
NEUTROPHILS # BLD AUTO: 5.6 10E3/UL (ref 1.6–8.3)
NEUTROPHILS NFR BLD AUTO: 59 %
NRBC # BLD AUTO: 0 10E3/UL
NRBC BLD AUTO-RTO: 0 /100
PLATELET # BLD AUTO: 222 10E3/UL (ref 150–450)
POTASSIUM SERPL-SCNC: 4 MMOL/L (ref 3.4–5.3)
PROT SERPL-MCNC: 7.4 G/DL (ref 6.4–8.3)
RBC # BLD AUTO: 5.27 10E6/UL (ref 4.4–5.9)
SODIUM SERPL-SCNC: 137 MMOL/L (ref 136–145)
WBC # BLD AUTO: 9.7 10E3/UL (ref 4–11)

## 2023-08-16 PROCEDURE — 99285 EMERGENCY DEPT VISIT HI MDM: CPT | Mod: 25 | Performed by: EMERGENCY MEDICINE

## 2023-08-16 PROCEDURE — 258N000003 HC RX IP 258 OP 636: Performed by: EMERGENCY MEDICINE

## 2023-08-16 PROCEDURE — 83605 ASSAY OF LACTIC ACID: CPT | Performed by: EMERGENCY MEDICINE

## 2023-08-16 PROCEDURE — 96360 HYDRATION IV INFUSION INIT: CPT | Performed by: EMERGENCY MEDICINE

## 2023-08-16 PROCEDURE — 36415 COLL VENOUS BLD VENIPUNCTURE: CPT | Performed by: EMERGENCY MEDICINE

## 2023-08-16 PROCEDURE — 93010 ELECTROCARDIOGRAM REPORT: CPT | Performed by: EMERGENCY MEDICINE

## 2023-08-16 PROCEDURE — 80053 COMPREHEN METABOLIC PANEL: CPT | Performed by: EMERGENCY MEDICINE

## 2023-08-16 PROCEDURE — 85025 COMPLETE CBC W/AUTO DIFF WBC: CPT | Performed by: EMERGENCY MEDICINE

## 2023-08-16 PROCEDURE — 93005 ELECTROCARDIOGRAM TRACING: CPT | Performed by: EMERGENCY MEDICINE

## 2023-08-16 PROCEDURE — 250N000013 HC RX MED GY IP 250 OP 250 PS 637: Performed by: EMERGENCY MEDICINE

## 2023-08-16 RX ORDER — POTASSIUM CHLORIDE 1500 MG/1
1 TABLET, EXTENDED RELEASE ORAL EVERY MORNING
Status: ON HOLD | COMMUNITY
Start: 2023-08-09 | End: 2024-01-22

## 2023-08-16 RX ORDER — METOPROLOL SUCCINATE 25 MG/1
25 TABLET, EXTENDED RELEASE ORAL DAILY
Status: DISCONTINUED | OUTPATIENT
Start: 2023-08-16 | End: 2023-08-17 | Stop reason: HOSPADM

## 2023-08-16 RX ORDER — FUROSEMIDE 20 MG
1 TABLET ORAL DAILY
COMMUNITY
End: 2023-09-05

## 2023-08-16 RX ORDER — ONDANSETRON 4 MG/1
TABLET, ORALLY DISINTEGRATING ORAL
COMMUNITY
Start: 2023-08-09 | End: 2023-09-05

## 2023-08-16 RX ORDER — POTASSIUM CHLORIDE 750 MG/1
1 TABLET, EXTENDED RELEASE ORAL EVERY EVENING
Status: ON HOLD | COMMUNITY
Start: 2023-08-09 | End: 2023-08-24

## 2023-08-16 RX ORDER — AMIODARONE HYDROCHLORIDE 200 MG/1
200 TABLET ORAL DAILY
Status: DISCONTINUED | OUTPATIENT
Start: 2023-08-16 | End: 2023-08-17 | Stop reason: HOSPADM

## 2023-08-16 RX ADMIN — AMIODARONE HYDROCHLORIDE 200 MG: 200 TABLET ORAL at 18:44

## 2023-08-16 RX ADMIN — SODIUM CHLORIDE, POTASSIUM CHLORIDE, SODIUM LACTATE AND CALCIUM CHLORIDE 1000 ML: 600; 310; 30; 20 INJECTION, SOLUTION INTRAVENOUS at 17:00

## 2023-08-16 RX ADMIN — METOPROLOL SUCCINATE 25 MG: 25 TABLET, EXTENDED RELEASE ORAL at 18:42

## 2023-08-16 ASSESSMENT — ACTIVITIES OF DAILY LIVING (ADL)
ADLS_ACUITY_SCORE: 35

## 2023-08-16 NOTE — ED TRIAGE NOTES
Pt was discharged from the TCU 8/9. Had C-diff while there and completed treatment. Here today d/t lack of oral intake and diarrhea over the last two days. Had formed stool prior to the diarrhea.      Triage Assessment       Row Name 08/16/23 6983       Triage Assessment (Adult)    Airway WDL WDL       Respiratory WDL    Respiratory WDL WDL       Skin Circulation/Temperature WDL    Skin Circulation/Temperature WDL WDL       Cardiac WDL    Cardiac WDL WDL       Peripheral/Neurovascular WDL    Peripheral Neurovascular WDL WDL       Cognitive/Neuro/Behavioral WDL    Cognitive/Neuro/Behavioral WDL WDL

## 2023-08-16 NOTE — ED NOTES
Pt was able to swallow his pills. One at a time, sitting very upright and with water. Pt belches after intake, which is normal for him.

## 2023-08-16 NOTE — MEDICATION SCRIBE - ADMISSION MEDICATION HISTORY
Medication Scribe Admission Medication History    Admission medication history is complete. The information provided in this note is only as accurate as the sources available at the time of the update.    Medication reconciliation/reorder completed by provider prior to medication history? No    Information Source(s): Family member via in-person    Pertinent Information: daughter and spouse    Changes made to PTA medication list:  Added: None  Deleted: vit D  Changed: furosemide 3 tabs to one tab daily     Medication Affordability:  Not including over the counter (OTC) medications, was there a time in the past 3 months when you did not take your medications as prescribed because of cost?: No    Allergies reviewed with patient and updates made in EHR: yes    Medication History Completed By: Rekha Garcia 8/16/2023 6:14 PM    Prior to Admission medications    Medication Sig Last Dose Taking? Auth Provider Long Term End Date   acetaminophen (TYLENOL) 500 MG tablet Take 2 tablets (1,000 mg) by mouth every 8 hours as needed for mild pain or fever Past Month at unknown Yes Gomez Hollingsworth MD     amiodarone (PACERONE) 200 MG tablet Take 1 tablet (200 mg) by mouth daily 8/15/2023 at am Yes Matt Valera MD Yes    atorvastatin (LIPITOR) 20 MG tablet Take 20 mg by mouth At Bedtime 8/15/2023 at hs Yes Reported, Patient Yes    citalopram (CELEXA) 10 MG tablet Take 0.5 tablets (5 mg) by mouth daily 8/15/2023 at pm Yes Yeimy Mary APRN CNP Yes    ELIQUIS ANTICOAGULANT 5 MG tablet Take 5 mg by mouth 2 times daily 8/16/2023 at am Yes Reported, Patient     furosemide (LASIX) 20 MG tablet Take 1 tablet by mouth daily 8/16/2023 at am Yes Reported, Patient No    Lidocaine (LIDOCARE) 4 % Patch Place 2 patches onto the skin every 24 hours To prevent lidocaine toxicity, patient should be patch free for 12 hrs daily. 8/16/2023 at 1000 Yes Matt Valera MD     menthol, Topical Analgesic, 2.5% (BENGAY  VANISHING SCENT) 2.5 % GEL topical gel Apply topically 4 times daily as needed for other (pain) Apply to shoulders at times when Lidoderm patch is absent Past Month at unknown Yes Matt Valera MD     metoprolol succinate ER (TOPROL XL) 25 MG 24 hr tablet Take 2 tablets (50 mg) by mouth daily 8/15/2023 at am Yes Sindhu Patel, NP Yes    ondansetron (ZOFRAN ODT) 4 MG ODT tab DISSOLVE 1 TABLET IN MOUTH EVERY 8 HOURS AS NEEDED  Yes Reported, Patient     pantoprazole (PROTONIX) 40 MG EC tablet Take 1 tablet (40 mg) by mouth every morning (before breakfast) for 26 days 8/16/2023 at am Yes Gomez Hollingsworth MD  8/23/23   potassium chloride ER (KLOR-CON M) 10 MEQ CR tablet Take 1 tablet by mouth every evening 8/14/2023 at am Yes Reported, Patient     potassium chloride ER (KLOR-CON M) 20 MEQ CR tablet Take 1 tablet by mouth every morning 8/14/2023 at pm Yes Reported, Patient

## 2023-08-16 NOTE — ED PROVIDER NOTES
History     Chief Complaint   Patient presents with    Generalized Weakness    Diarrhea     HPI  Alvin Newton is a 89 year old male with history notable for recent C. difficile infection and completed course of oral vancomycin on 8/4/2023, atrial fibrillation on apixaban, metoprolol, amiodarone, diabetes type 2, congestive heart failure, coronary artery disease, CKD 3, with decreased appetite and 3 days of recurrent loose stools.  He is accompanied by his wife and his daughter.  His mild generalized abdominal discomfort.  No watery or foul-smelling stools.  Does not seem like prior episodes of C. difficile.  Loose stool started approximately 3 days ago and had 5 on the first day and 3 for the past 2 days.  Mucus but no blood.        The patient's PMHx, Surgical Hx, Allergies, and Medications were all reviewed with the patient.    Allergies:  No Known Allergies    Problem List:    Patient Active Problem List    Diagnosis Date Noted    Superficial gastritis without hemorrhage 07/27/2023     Priority: Medium    Colitis due to Clostridium difficile 07/25/2023     Priority: Medium    Stage 3a chronic kidney disease (H) 07/25/2023     Priority: Medium    Altered mental status, unspecified altered mental status type 07/23/2023     Priority: Medium    Diarrhea, unspecified type 07/23/2023     Priority: Medium    Aortic stenosis 07/14/2023     Priority: Medium    Elevated brain natriuretic peptide (BNP) level 07/14/2023     Priority: Medium    Ascending aorta dilatation (H) 07/14/2023     Priority: Medium    Peripheral edema 07/14/2023     Priority: Medium    Positive urine culture 07/14/2023     Priority: Medium    Medication induced coagulopathy (H) 07/14/2023     Priority: Medium    Streptococcal bacteremia 07/13/2023     Priority: Medium    Thrombocytopenia (H) 07/13/2023     Priority: Medium    Hyperbilirubinemia 07/13/2023     Priority: Medium    Hypophosphatemia 07/13/2023     Priority: Medium    Hypoalbuminemia  07/13/2023     Priority: Medium    Pyuria 07/13/2023     Priority: Medium    Paroxysmal atrial fibrillation with RVR (H) 07/13/2023     Priority: Medium    Hypomagnesemia 07/13/2023     Priority: Medium    Chronic pain of both shoulders 07/13/2023     Priority: Medium    Septic shock (H) 07/12/2023     Priority: Medium    Cellulitis of right lower extremity 07/11/2023     Priority: Medium    Severe sepsis (H) 07/11/2023     Priority: Medium    Diastolic congestive heart failure, unspecified HF chronicity (H) 03/31/2023     Priority: Medium    Impaired fasting glucose 06/20/2022     Priority: Medium    Osteoarthritis of pelvis 06/20/2022     Priority: Medium     Dec 16, 2003 Entered By: JOHN GROVE Comment: Morgan  S-P HIP REPLACEMENT      Primary localized osteoarthrosis of shoulder region 06/20/2022     Priority: Medium    Reason for consultation 06/20/2022     Priority: Medium    Right bundle branch block 06/20/2022     Priority: Medium    Fall, initial encounter 06/06/2022     Priority: Medium    Closed fracture of first lumbar vertebra, unspecified fracture morphology, initial encounter (H) 06/06/2022     Priority: Medium    Fracture of L1 vertebra (H) 06/06/2022     Priority: Medium    Carpal tunnel syndrome, bilateral 11/18/2021     Priority: Medium    BPH with urinary obstruction 06/22/2020     Priority: Medium    NSTEMI (non-ST elevated myocardial infarction) (H) 12/27/2017     Priority: Medium    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) 12/06/2017     Priority: Medium    Essential hypertension 04/04/2016     Priority: Medium    Mixed hyperlipidemia 01/02/2009     Priority: Medium    Pure hypercholesterolemia 01/01/1999     Priority: Medium    Other ill-defined and unknown causes of morbidity and mortality 01/01/1985     Priority: Medium     Dec 16, 2003 Entered By: JOHN GROVE Comment: S-P KATERINE          Past Medical History:    Past Medical History:   Diagnosis Date    Colonic diverticulum   "   Hyperlipidemia     Hypertension     Obesity (BMI 30-39.9)     Osteoarthritis     Type 2 diabetes mellitus (H)        Past Surgical History:    Past Surgical History:   Procedure Laterality Date    ARTHROPLASTY HIP BILATERAL  1987    HC ESOPHAGOSCOPY, DIAGNOSTIC  2003    LAPAROSCOPIC CHOLECYSTECTOMY N/A 12/28/2017    Procedure: LAPAROSCOPIC CHOLECYSTECTOMY;  LAPAROSCOPIC CHOLECYSTECTOMY;  Surgeon: Heber Gonzalez MD;  Location: [facility] OR    TRANSRECTAL ULTRASONIC, TRANSURETHRAL RESECTION (TUR) OF PROSTATE CYST  1990       Family History:    No family history on file.    Social History:  Marital Status:   [2]  Social History     Tobacco Use    Smoking status: Never    Smokeless tobacco: Never   Substance Use Topics    Alcohol use: Yes     Comment: 0-1 beer daily    Drug use: No        Medications:    acetaminophen (TYLENOL) 500 MG tablet  amiodarone (PACERONE) 200 MG tablet  atorvastatin (LIPITOR) 20 MG tablet  citalopram (CELEXA) 10 MG tablet  ELIQUIS ANTICOAGULANT 5 MG tablet  furosemide (LASIX) 20 MG tablet  Lidocaine (LIDOCARE) 4 % Patch  menthol, Topical Analgesic, 2.5% (BENGAY VANISHING SCENT) 2.5 % GEL topical gel  metoprolol succinate ER (TOPROL XL) 25 MG 24 hr tablet  ondansetron (ZOFRAN ODT) 4 MG ODT tab  pantoprazole (PROTONIX) 40 MG EC tablet  potassium chloride ER (KLOR-CON M) 10 MEQ CR tablet  potassium chloride ER (KLOR-CON M) 20 MEQ CR tablet          Review of Systems  Pertinent positives and negatives mentioned in HPI    Physical Exam   BP: 108/62  Pulse: 81  Temp: (!) 96.7  F (35.9  C)  Resp: 18  Height: 180.3 cm (5' 11\")  Weight: 104.3 kg (230 lb)  SpO2: 100 %    Physical Exam  GEN: Hard of hearing.  Appears distressed but nontoxic.  HENT: Oral mucosa dry   CV : Tachycardic rate.  Rhythm.  PULM: Normal effort. No wheezes, rales, or rhonchi bilaterally.  ABD: Soft, mild generalized tenderness, non-distended. No rebound or guarding.   NEURO: Normal speech. Following commands. CN II-XII " grossly intact. Answering questions and interacting appropriately.   EXT: No gross deformity. Trace lower extremity edema.   INT: Warm. No diaphoresis. Normal color.        ED Course        Procedures         EKG: Interpreted by myself.  Right bundle branch block pattern with rate of 103 bpm.  Irregular rhythm with no discrete P waves.  QRS complexes.  Significant baseline artifact limits interpretation.  No obvious ST segment elevations.  Impression atrial fibrillation with rapid ventricular the spots and right bundle branch block.  No significant change compared to 7/31/2023.    Critical Care time:  none          Results for orders placed or performed during the hospital encounter of 08/16/23 (from the past 24 hour(s))   [place] Draw    Narrative    The following orders were created for panel order [place] Draw.  Procedure                               Abnormality         Status                     ---------                               -----------         ------                     Extra Blue Top Tube[[id number]]                              Final result               Extra Red Top Tube[[id number]]                               Final result                 Please view results for these tests on the individual orders.   Lactic acid whole blood   Result Value Ref Range    Lactic Acid 2.6 (H) 0.7 - 2.0 mmol/L   Extra Blue Top Tube   Result Value Ref Range    Hold Specimen JIC    Extra Red Top Tube   Result Value Ref Range    Hold Specimen JIC    CBC with platelets, differential    Narrative    The following orders were created for panel order CBC with platelets, differential.  Procedure                               Abnormality         Status                     ---------                               -----------         ------                     CBC with platelets and d...[[id number]]                      Final result                 Please view results for these tests on the individual orders.   Comprehensive  metabolic panel   Result Value Ref Range    Sodium 137 136 - 145 mmol/L    Potassium 4.0 3.4 - 5.3 mmol/L    Chloride 96 (L) 98 - 107 mmol/L    Carbon Dioxide (CO2) 23 22 - 29 mmol/L    Anion Gap 18 (H) 7 - 15 mmol/L    Urea Nitrogen 21.6 8.0 - 23.0 mg/dL    Creatinine 1.83 (H) 0.67 - 1.17 mg/dL    Calcium 9.7 8.8 - 10.2 mg/dL    Glucose 132 (H) 70 - 99 mg/dL    Alkaline Phosphatase 183 (H) 40 - 129 U/L    AST 23 0 - 45 U/L    ALT 20 0 - 70 U/L    Protein Total 7.4 6.4 - 8.3 g/dL    Albumin 3.4 (L) 3.5 - 5.2 g/dL    Bilirubin Total 3.4 (H) <=1.2 mg/dL    GFR Estimate 35 (L) >60 mL/min/1.73m2   CBC with platelets and differential   Result Value Ref Range    WBC Count 9.7 4.0 - 11.0 10e3/uL    RBC Count 5.27 4.40 - 5.90 10e6/uL    Hemoglobin 15.6 13.3 - 17.7 g/dL    Hematocrit 45.4 40.0 - 53.0 %    MCV 86 78 - 100 fL    MCH 29.6 26.5 - 33.0 pg    MCHC 34.4 31.5 - 36.5 g/dL    RDW 14.9 10.0 - 15.0 %    Platelet Count 222 150 - 450 10e3/uL    % Neutrophils 59 %    % Lymphocytes 28 %    % Monocytes 12 %    % Eosinophils 1 %    % Basophils 0 %    % Immature Granulocytes 0 %    NRBCs per 100 WBC 0 <1 /100    Absolute Neutrophils 5.6 1.6 - 8.3 10e3/uL    Absolute Lymphocytes 2.8 0.8 - 5.3 10e3/uL    Absolute Monocytes 1.2 0.0 - 1.3 10e3/uL    Absolute Eosinophils 0.1 0.0 - 0.7 10e3/uL    Absolute Basophils 0.0 0.0 - 0.2 10e3/uL    Absolute Immature Granulocytes 0.0 <=0.4 10e3/uL    Absolute NRBCs 0.0 10e3/uL       Medications   amiodarone (PACERONE) tablet 200 mg (200 mg Oral $Given 8/16/23 1896)   metoprolol succinate ER (TOPROL XL) 24 hr tablet 25 mg (25 mg Oral $Given 8/16/23 1842)   lactated ringers BOLUS 1,000 mL (0 mLs Intravenous Stopped 8/16/23 1759)         Creatinine   Date Value Ref Range Status   08/16/2023 1.83 (H) 0.67 - 1.17 mg/dL Final   08/07/2023 1.60 (H) 0.67 - 1.17 mg/dL Final   07/31/2023 1.30 (H) 0.67 - 1.17 mg/dL Final   07/31/2023 1.38 (H) 0.67 - 1.17 mg/dL Final   07/27/2023 1.15 0.67 - 1.17 mg/dL  Final   07/25/2023 1.16 0.67 - 1.17 mg/dL Final   12/31/2017 1.11 0.66 - 1.25 mg/dL Final   12/30/2017 1.30 (H) 0.66 - 1.25 mg/dL Final   12/29/2017 1.57 (H) 0.66 - 1.25 mg/dL Final   12/29/2017 1.59 (H) 0.66 - 1.25 mg/dL Final   12/28/2017 1.26 (H) 0.66 - 1.25 mg/dL Final   12/26/2017 0.90 0.66 - 1.25 mg/dL Final       Assessments & Plan (with Medical Decision Making)   89 year old male with past medical history notable for recent C. difficile infection and completed course of oral vancomycin on 8/4, atrial fibrillation on apixaban, metoprolol, amiodarone,, congestive heart failure and furosemide, who comes in with family members from home with 3 days of loose stools and decreased oral intake.  Daughter states he has had issues with food for some time and she is concerned there is a swallowing issue.  Has not had a swallow study or endoscopy.  I think this would be a good place to start.  He is tolerating oral secretions and able to drink fluids without much difficulty.  Neither of these are easily accomplished from the emergency department.  Initial vital signs reassuring but then was tachycardic.  Heart rate in the 90s during my evaluation.  Trace edema of lower extremities.  Oromucosa is dry.  1 L of lactated Ringer's solution infusing.  Lungs clear.  No signs of cellulitis.  Daughter states that he did not take his amiodarone or metoprolol this morning and that she did drop a stool sample off at his clinic which is to the [facility].    Afebrile in department.  CBC is normal.  Lactic acid elevated 2.6.  Will recheck after IV fluids.  CMP with elevated anion gap but normal bicarbonate and creatinine is now elevated to 1.83 which is up from 1.69 days ago and was 1.3 and late July and 1.16 on 7/27.  This is likely related to combination of reported decreased intake, GI losses and diuretic use.  T. bili slightly evaded at 3.4 , 2 weeks ago was 2.0.  Unclear clinical significance.  He is status post  cholecystectomy.    Given increase of creatinine I recommend hospitalization and will also need further evaluation of swallowing issues.  He does have a virtual appointment with GI tomorrow.  Uncertain how this can be accomplished while hospitalized I defer to excepting team.  Since he will be admitted to the [facility] system we will resend C. difficile panel through our lab.  We will hold off on antibiotic therapy at this time. No obvious source of infection and recent C.diff.  He did have small amount of stool which was loose but formed.    Case discussed with Dr. Harris at [facility] who accepted admission.       I have reviewed the nursing notes.         New Prescriptions    No medications on file       Final diagnoses:   Acute kidney injury superimposed on CKD (H)   H/O Clostridium difficile infection   Atrial fibrillation with rapid ventricular response (H)     Leonid Kellogg MD    8/16/2023   [facility] EMERGENCY DEPT    Disclaimer: This note consists of words and symbols derived from keyboarding and dictation using voice recognition software.  As a result, there may be errors that have gone undetected.  Please consider this when interpreting information found in this note.               Leonid Kellogg MD  08/16/23 1948

## 2023-08-17 ENCOUNTER — HOSPITAL ENCOUNTER (INPATIENT)
Facility: CLINIC | Age: 88
LOS: 7 days | Discharge: NURSING, REHAB OR LONG-TERM CARE | DRG: 683 | End: 2023-08-24
Attending: INTERNAL MEDICINE | Admitting: INTERNAL MEDICINE
Payer: COMMERCIAL

## 2023-08-17 ENCOUNTER — APPOINTMENT (OUTPATIENT)
Dept: SPEECH THERAPY | Facility: CLINIC | Age: 88
DRG: 683 | End: 2023-08-17
Attending: INTERNAL MEDICINE
Payer: COMMERCIAL

## 2023-08-17 DIAGNOSIS — Z86.19 HISTORY OF CLOSTRIDIUM DIFFICILE INFECTION: ICD-10-CM

## 2023-08-17 DIAGNOSIS — R93.3 ABNORMAL ESOPHAGRAM: ICD-10-CM

## 2023-08-17 DIAGNOSIS — K22.4 ESOPHAGEAL DYSMOTILITY: ICD-10-CM

## 2023-08-17 DIAGNOSIS — N18.31 STAGE 3A CHRONIC KIDNEY DISEASE (H): Primary | ICD-10-CM

## 2023-08-17 DIAGNOSIS — A04.72 CLOSTRIDIUM DIFFICILE DIARRHEA: ICD-10-CM

## 2023-08-17 DIAGNOSIS — E11.42 TYPE 2 DIABETES MELLITUS WITH DIABETIC POLYNEUROPATHY, WITHOUT LONG-TERM CURRENT USE OF INSULIN (H): ICD-10-CM

## 2023-08-17 PROBLEM — M10.9 ACUTE GOUT INVOLVING TOE OF LEFT FOOT: Status: ACTIVE | Noted: 2023-08-17

## 2023-08-17 PROBLEM — R13.10 DYSPHAGIA: Status: ACTIVE | Noted: 2023-08-17

## 2023-08-17 PROBLEM — N17.9 AKI (ACUTE KIDNEY INJURY) (H): Status: ACTIVE | Noted: 2023-08-17

## 2023-08-17 PROBLEM — R63.8 INADEQUATE ORAL INTAKE: Status: ACTIVE | Noted: 2023-08-17

## 2023-08-17 LAB
ALBUMIN SERPL BCG-MCNC: 2.9 G/DL (ref 3.5–5.2)
ALP SERPL-CCNC: 155 U/L (ref 40–129)
ALT SERPL W P-5'-P-CCNC: 15 U/L (ref 0–70)
ANION GAP SERPL CALCULATED.3IONS-SCNC: 14 MMOL/L (ref 7–15)
AST SERPL W P-5'-P-CCNC: 24 U/L (ref 0–45)
BILIRUB DIRECT SERPL-MCNC: 0.53 MG/DL (ref 0–0.3)
BILIRUB SERPL-MCNC: 3.3 MG/DL
BUN SERPL-MCNC: 20.2 MG/DL (ref 8–23)
CALCIUM SERPL-MCNC: 8.9 MG/DL (ref 8.8–10.2)
CHLORIDE SERPL-SCNC: 99 MMOL/L (ref 98–107)
CREAT SERPL-MCNC: 1.6 MG/DL (ref 0.67–1.17)
DEPRECATED HCO3 PLAS-SCNC: 23 MMOL/L (ref 22–29)
GFR SERPL CREATININE-BSD FRML MDRD: 41 ML/MIN/1.73M2
GLUCOSE BLDC GLUCOMTR-MCNC: 104 MG/DL (ref 70–99)
GLUCOSE BLDC GLUCOMTR-MCNC: 123 MG/DL (ref 70–99)
GLUCOSE BLDC GLUCOMTR-MCNC: 146 MG/DL (ref 70–99)
GLUCOSE BLDC GLUCOMTR-MCNC: 171 MG/DL (ref 70–99)
GLUCOSE BLDC GLUCOMTR-MCNC: 195 MG/DL (ref 70–99)
GLUCOSE SERPL-MCNC: 133 MG/DL (ref 70–99)
HOLD SPECIMEN: NORMAL
MAGNESIUM SERPL-MCNC: 1.5 MG/DL (ref 1.7–2.3)
MAGNESIUM SERPL-MCNC: 2.3 MG/DL (ref 1.7–2.3)
PHOSPHATE SERPL-MCNC: 3.4 MG/DL (ref 2.5–4.5)
POTASSIUM SERPL-SCNC: 3.3 MMOL/L (ref 3.4–5.3)
POTASSIUM SERPL-SCNC: 3.8 MMOL/L (ref 3.4–5.3)
PROT SERPL-MCNC: 6.3 G/DL (ref 6.4–8.3)
SODIUM SERPL-SCNC: 136 MMOL/L (ref 136–145)

## 2023-08-17 PROCEDURE — 250N000012 HC RX MED GY IP 250 OP 636 PS 637: Performed by: INTERNAL MEDICINE

## 2023-08-17 PROCEDURE — 258N000003 HC RX IP 258 OP 636: Performed by: FAMILY MEDICINE

## 2023-08-17 PROCEDURE — 84132 ASSAY OF SERUM POTASSIUM: CPT | Performed by: FAMILY MEDICINE

## 2023-08-17 PROCEDURE — 80053 COMPREHEN METABOLIC PANEL: CPT | Performed by: INTERNAL MEDICINE

## 2023-08-17 PROCEDURE — 258N000003 HC RX IP 258 OP 636: Performed by: INTERNAL MEDICINE

## 2023-08-17 PROCEDURE — 92610 EVALUATE SWALLOWING FUNCTION: CPT | Mod: GN | Performed by: SPEECH-LANGUAGE PATHOLOGIST

## 2023-08-17 PROCEDURE — 250N000013 HC RX MED GY IP 250 OP 250 PS 637: Performed by: FAMILY MEDICINE

## 2023-08-17 PROCEDURE — 120N000001 HC R&B MED SURG/OB

## 2023-08-17 PROCEDURE — 82248 BILIRUBIN DIRECT: CPT | Performed by: INTERNAL MEDICINE

## 2023-08-17 PROCEDURE — 36415 COLL VENOUS BLD VENIPUNCTURE: CPT | Performed by: FAMILY MEDICINE

## 2023-08-17 PROCEDURE — 83735 ASSAY OF MAGNESIUM: CPT | Performed by: FAMILY MEDICINE

## 2023-08-17 PROCEDURE — 83735 ASSAY OF MAGNESIUM: CPT | Performed by: INTERNAL MEDICINE

## 2023-08-17 PROCEDURE — 84100 ASSAY OF PHOSPHORUS: CPT | Performed by: FAMILY MEDICINE

## 2023-08-17 PROCEDURE — 250N000013 HC RX MED GY IP 250 OP 250 PS 637: Performed by: INTERNAL MEDICINE

## 2023-08-17 PROCEDURE — 36415 COLL VENOUS BLD VENIPUNCTURE: CPT | Performed by: INTERNAL MEDICINE

## 2023-08-17 PROCEDURE — 250N000011 HC RX IP 250 OP 636: Mod: JZ | Performed by: FAMILY MEDICINE

## 2023-08-17 RX ORDER — PREDNISONE 20 MG/1
40 TABLET ORAL DAILY
Status: DISCONTINUED | OUTPATIENT
Start: 2023-08-17 | End: 2023-08-20

## 2023-08-17 RX ORDER — POTASSIUM CHLORIDE 7.45 MG/ML
10 INJECTION INTRAVENOUS
Status: COMPLETED | OUTPATIENT
Start: 2023-08-17 | End: 2023-08-17

## 2023-08-17 RX ORDER — GUAIFENESIN 600 MG/1
15 TABLET, EXTENDED RELEASE ORAL DAILY
Status: DISCONTINUED | OUTPATIENT
Start: 2023-08-18 | End: 2023-08-24 | Stop reason: HOSPADM

## 2023-08-17 RX ORDER — NICOTINE POLACRILEX 4 MG
15-30 LOZENGE BUCCAL
Status: DISCONTINUED | OUTPATIENT
Start: 2023-08-17 | End: 2023-08-24 | Stop reason: HOSPADM

## 2023-08-17 RX ORDER — AMIODARONE HYDROCHLORIDE 200 MG/1
200 TABLET ORAL DAILY
Status: DISCONTINUED | OUTPATIENT
Start: 2023-08-17 | End: 2023-08-18

## 2023-08-17 RX ORDER — MULTIPLE VITAMINS W/ MINERALS TAB 9MG-400MCG
1 TAB ORAL DAILY
Status: DISCONTINUED | OUTPATIENT
Start: 2023-08-17 | End: 2023-08-17

## 2023-08-17 RX ORDER — MAGNESIUM SULFATE HEPTAHYDRATE 40 MG/ML
4 INJECTION, SOLUTION INTRAVENOUS ONCE
Status: COMPLETED | OUTPATIENT
Start: 2023-08-17 | End: 2023-08-17

## 2023-08-17 RX ORDER — DEXTROSE MONOHYDRATE 25 G/50ML
25-50 INJECTION, SOLUTION INTRAVENOUS
Status: DISCONTINUED | OUTPATIENT
Start: 2023-08-17 | End: 2023-08-24 | Stop reason: HOSPADM

## 2023-08-17 RX ORDER — SODIUM CHLORIDE, SODIUM LACTATE, POTASSIUM CHLORIDE, CALCIUM CHLORIDE 600; 310; 30; 20 MG/100ML; MG/100ML; MG/100ML; MG/100ML
INJECTION, SOLUTION INTRAVENOUS CONTINUOUS
Status: DISCONTINUED | OUTPATIENT
Start: 2023-08-17 | End: 2023-08-17

## 2023-08-17 RX ORDER — METHYLPREDNISOLONE SODIUM SUCCINATE 40 MG/ML
40 INJECTION, POWDER, LYOPHILIZED, FOR SOLUTION INTRAMUSCULAR; INTRAVENOUS EVERY 24 HOURS
Status: DISCONTINUED | OUTPATIENT
Start: 2023-08-17 | End: 2023-08-20

## 2023-08-17 RX ORDER — DEXTROSE MONOHYDRATE, SODIUM CHLORIDE, AND POTASSIUM CHLORIDE 50; 1.49; 4.5 G/1000ML; G/1000ML; G/1000ML
INJECTION, SOLUTION INTRAVENOUS CONTINUOUS
Status: DISCONTINUED | OUTPATIENT
Start: 2023-08-17 | End: 2023-08-19

## 2023-08-17 RX ORDER — ENOXAPARIN SODIUM 100 MG/ML
0.75 INJECTION SUBCUTANEOUS EVERY 12 HOURS
Status: DISCONTINUED | OUTPATIENT
Start: 2023-08-17 | End: 2023-08-19

## 2023-08-17 RX ORDER — LIDOCAINE 40 MG/G
CREAM TOPICAL
Status: DISCONTINUED | OUTPATIENT
Start: 2023-08-17 | End: 2023-08-21

## 2023-08-17 RX ORDER — ONDANSETRON 2 MG/ML
4 INJECTION INTRAMUSCULAR; INTRAVENOUS EVERY 6 HOURS PRN
Status: DISCONTINUED | OUTPATIENT
Start: 2023-08-17 | End: 2023-08-24 | Stop reason: HOSPADM

## 2023-08-17 RX ORDER — LIDOCAINE 4 G/G
2 PATCH TOPICAL EVERY 24 HOURS
Status: DISCONTINUED | OUTPATIENT
Start: 2023-08-17 | End: 2023-08-24 | Stop reason: HOSPADM

## 2023-08-17 RX ORDER — POTASSIUM CHLORIDE 1.5 G/1.58G
40 POWDER, FOR SOLUTION ORAL ONCE
Status: COMPLETED | OUTPATIENT
Start: 2023-08-17 | End: 2023-08-17

## 2023-08-17 RX ORDER — ATORVASTATIN CALCIUM 10 MG/1
20 TABLET, FILM COATED ORAL AT BEDTIME
Status: DISCONTINUED | OUTPATIENT
Start: 2023-08-17 | End: 2023-08-24 | Stop reason: HOSPADM

## 2023-08-17 RX ORDER — SIMETHICONE 80 MG
80 TABLET,CHEWABLE ORAL 4 TIMES DAILY PRN
Status: DISCONTINUED | OUTPATIENT
Start: 2023-08-17 | End: 2023-08-24 | Stop reason: HOSPADM

## 2023-08-17 RX ORDER — PROCHLORPERAZINE MALEATE 5 MG
5 TABLET ORAL EVERY 6 HOURS PRN
Status: DISCONTINUED | OUTPATIENT
Start: 2023-08-17 | End: 2023-08-24 | Stop reason: HOSPADM

## 2023-08-17 RX ORDER — METRONIDAZOLE 500 MG/100ML
500 INJECTION, SOLUTION INTRAVENOUS EVERY 8 HOURS
Status: DISCONTINUED | OUTPATIENT
Start: 2023-08-17 | End: 2023-08-20

## 2023-08-17 RX ORDER — ONDANSETRON 4 MG/1
4 TABLET, ORALLY DISINTEGRATING ORAL EVERY 6 HOURS PRN
Status: DISCONTINUED | OUTPATIENT
Start: 2023-08-17 | End: 2023-08-24 | Stop reason: HOSPADM

## 2023-08-17 RX ORDER — PANTOPRAZOLE SODIUM 40 MG/1
40 TABLET, DELAYED RELEASE ORAL
Status: DISCONTINUED | OUTPATIENT
Start: 2023-08-17 | End: 2023-08-19

## 2023-08-17 RX ORDER — VANCOMYCIN HYDROCHLORIDE 125 MG/1
125 CAPSULE ORAL 4 TIMES DAILY
Status: DISCONTINUED | OUTPATIENT
Start: 2023-08-17 | End: 2023-08-17

## 2023-08-17 RX ORDER — METOPROLOL SUCCINATE 50 MG/1
50 TABLET, EXTENDED RELEASE ORAL DAILY
Status: DISCONTINUED | OUTPATIENT
Start: 2023-08-17 | End: 2023-08-24 | Stop reason: HOSPADM

## 2023-08-17 RX ORDER — POTASSIUM CHLORIDE 1500 MG/1
40 TABLET, EXTENDED RELEASE ORAL ONCE
Status: COMPLETED | OUTPATIENT
Start: 2023-08-17 | End: 2023-08-17

## 2023-08-17 RX ORDER — PROCHLORPERAZINE 25 MG
12.5 SUPPOSITORY, RECTAL RECTAL EVERY 12 HOURS PRN
Status: DISCONTINUED | OUTPATIENT
Start: 2023-08-17 | End: 2023-08-24 | Stop reason: HOSPADM

## 2023-08-17 RX ORDER — SIMETHICONE 125 MG
125 TABLET,CHEWABLE ORAL 4 TIMES DAILY PRN
Status: ON HOLD | COMMUNITY
End: 2023-08-24

## 2023-08-17 RX ADMIN — METHYLPREDNISOLONE SODIUM SUCCINATE 40 MG: 40 INJECTION, POWDER, FOR SOLUTION INTRAMUSCULAR; INTRAVENOUS at 10:54

## 2023-08-17 RX ADMIN — METRONIDAZOLE 500 MG: 500 INJECTION, SOLUTION INTRAVENOUS at 20:07

## 2023-08-17 RX ADMIN — LIDOCAINE 1 PATCH: 560 PATCH PERCUTANEOUS; TOPICAL; TRANSDERMAL at 20:08

## 2023-08-17 RX ADMIN — VANCOMYCIN HYDROCHLORIDE 125 MG: 125 CAPSULE ORAL at 01:36

## 2023-08-17 RX ADMIN — POTASSIUM CHLORIDE 10 MEQ: 7.46 INJECTION, SOLUTION INTRAVENOUS at 10:54

## 2023-08-17 RX ADMIN — ENOXAPARIN SODIUM 80 MG: 80 INJECTION SUBCUTANEOUS at 20:07

## 2023-08-17 RX ADMIN — POTASSIUM CHLORIDE, DEXTROSE MONOHYDRATE AND SODIUM CHLORIDE: 150; 5; 450 INJECTION, SOLUTION INTRAVENOUS at 14:49

## 2023-08-17 RX ADMIN — INSULIN ASPART 1 UNITS: 100 INJECTION, SOLUTION INTRAVENOUS; SUBCUTANEOUS at 17:56

## 2023-08-17 RX ADMIN — SODIUM CHLORIDE, POTASSIUM CHLORIDE, SODIUM LACTATE AND CALCIUM CHLORIDE: 600; 310; 30; 20 INJECTION, SOLUTION INTRAVENOUS at 11:56

## 2023-08-17 RX ADMIN — DICLOFENAC SODIUM 4 G: 10 GEL TOPICAL at 20:08

## 2023-08-17 RX ADMIN — POTASSIUM CHLORIDE 10 MEQ: 7.46 INJECTION, SOLUTION INTRAVENOUS at 14:49

## 2023-08-17 RX ADMIN — METRONIDAZOLE 500 MG: 500 INJECTION, SOLUTION INTRAVENOUS at 12:51

## 2023-08-17 RX ADMIN — MAGNESIUM SULFATE HEPTAHYDRATE 4 G: 40 INJECTION, SOLUTION INTRAVENOUS at 14:06

## 2023-08-17 RX ADMIN — PANTOPRAZOLE SODIUM 40 MG: 40 TABLET, DELAYED RELEASE ORAL at 08:09

## 2023-08-17 RX ADMIN — SODIUM CHLORIDE, POTASSIUM CHLORIDE, SODIUM LACTATE AND CALCIUM CHLORIDE: 600; 310; 30; 20 INJECTION, SOLUTION INTRAVENOUS at 01:01

## 2023-08-17 ASSESSMENT — ACTIVITIES OF DAILY LIVING (ADL)
ADLS_ACUITY_SCORE: 35
ADLS_ACUITY_SCORE: 33
ADLS_ACUITY_SCORE: 35
ADLS_ACUITY_SCORE: 35
ADLS_ACUITY_SCORE: 38
ADLS_ACUITY_SCORE: 33
ADLS_ACUITY_SCORE: 35
ADLS_ACUITY_SCORE: 38
ADLS_ACUITY_SCORE: 38

## 2023-08-17 NOTE — PROVIDER NOTIFICATION
Dr. Yeh notified via [employer] that patient has pain in his left great toe. The entire toe is warm and red. His daughter also requested nurse notify that she has concerns regarding possible cellulitis. Also, noted that patient's entire right shin is isai and warm. MD ordered prednisone for suspected gout exacerbation in toe. Stated that shin should be assessed by day provider.

## 2023-08-17 NOTE — H&P
"[facility]    History and Physical - Hospitalist Service       Date of Admission:  8/17/2023    Assessment & Plan   DEJUAN  -prerenal due to poor oral intake and GI losses  -cont IVF; repeat CMP in AM  -baseline creatinine 1.15 (1.6 ten days ago)    Dysphagia  Weight loss  gastritis  -seems more esophageal; has outpatient GI referral  -SLP consult in AM  -cont PPI    C diff  -diarrhea has never improved despite completion of a course or oral vanco   -resume oral vanco for now    Paroxysmal afib  -cont amiodarone, metoprolol, apixaban    Type 2 DM  -A1C 7.2  -ISS     Chronic diastolic congestive heart failure  Aortic stenosis  Essential hypertension  -cont metoprolol; hold Lasix      HLD  -cont atorvastatin    Hyperbilirubinemia  -s/p aruelio; cont monitor    Gout, left great toe  -avoiding NSAIDs and colchicine with impaired renal function  -prednisone 40mg daily       Diet: mod carb  DVT Prophylaxis: DOAC  Eastman Catheter: Not present  Lines: None     Code Status:  DNR; ok with intubation    Clinically Significant Risk Factors Present on Admission   # Drug Induced Coagulation Defect: home medication list includes an anticoagulant medication   # Acute Kidney Injury, unspecified: based on a >150% or 0.3 mg/dL increase in last creatinine compared to past 90 day average, will monitor renal function  # Hypertension: Noted on problem list  # Chronic heart failure with preserved ejection fraction: heart failure noted on problem list and last echo with EF >50%    # DMII: A1C = 7.2 % (Ref range: <5.7 %) within past 6 months    # Obesity: Estimated body mass index is 32.08 kg/m  as calculated from the following:    Height as of 8/16/23: 1.803 m (5' 11\").    Weight as of 8/16/23: 104.3 kg (230 lb).              Disposition Plan      Expected Discharge Date: 08/19/2023                The patient's care was discussed with the Patient.        RYAN FITZPATRICK MD  [facility] " [place]  Securely message with the Vocera Web Console (learn more here)  Text page via [facility] Paging/Directory      Visit/Communication Style   Virtual (Video) communication was used to evaluate Alvin.  Alvin consented to the use of video communication: yes  Video START time: , 8/17/2023  Video STOP time: , 8/17/2023   Patient's location: [facility]   Provider's location during the visit: remote [place] [facility] site        ______________________________________________________________________    Chief Complaint   dehydration    History of Present Illness   89yoM with HTN, HLD, diastolic HF, DM, and paroxysmal afib presented to [facility] ED with weakness, poor po intake, diarrhea, and dehydration.  He was admitted in late July to [facility] with Cdiff related to antibiotics had received earlier in July for cellulitis.  He completed a course of oral vanco.  He says the diarrhea never resolved.  He has 2-5 watery brown bowel movements daily.  He has been having ongoing issues with swallowing for several weeks.  Food seems to get stuck in his chest and he vomits if he eats/drinks too much.  He already has a poor appetite and is subsequently not taking in much at all.  He thinks he has lost ~40 pounds in 5-6 weeks.  His daughter has been staying with him and wife (she lives in [place]) but they have been unable to keep up with his oral intake at home with small sips and bites.  He has some mild abdominal discomfort.  Outpatient GI referral was made by his PCP already.      Review of Systems    General: negative for fever, chills, sweats,   Eyes: negative for blurred vision, loss of vision  Ear Nose and Throat: negative for pharyngitis, speech or swallowing difficulties  Respiratory:  negative for sputum production, wheezing, ESQUIVEL, pleuritic pain, sob or cough  Cardiology:  negative for chest pain, palpitations, orthopnea, PND, edema, syncope   Gastrointestinal: negative for constipation,  hematemesis, melena or hematochezia  Genitourinary: negative for frequency, urgency, dysuria, hematuria   Neurological: negative for focal weakness, paresthesia    Past Medical History    I have reviewed this patient's medical history and updated it with pertinent information if needed.   Past Medical History:   Diagnosis Date    Colonic diverticulum     Hyperlipidemia     Hypertension     Obesity (BMI 30-39.9)     Osteoarthritis     Type 2 diabetes mellitus (H)        Past Surgical History   I have reviewed this patient's surgical history and updated it with pertinent information if needed.  Past Surgical History:   Procedure Laterality Date    ARTHROPLASTY HIP BILATERAL  1987    HC ESOPHAGOSCOPY, DIAGNOSTIC  2003    LAPAROSCOPIC CHOLECYSTECTOMY N/A 12/28/2017    Procedure: LAPAROSCOPIC CHOLECYSTECTOMY;  LAPAROSCOPIC CHOLECYSTECTOMY;  Surgeon: Heber Gonzalez MD;  Location: [facility] OR    TRANSRECTAL ULTRASONIC, TRANSURETHRAL RESECTION (TUR) OF PROSTATE CYST  1990       Social History   I have reviewed this patient's social history and updated it with pertinent information if needed.  Social History     Tobacco Use    Smoking status: Never    Smokeless tobacco: Never   Substance Use Topics    Alcohol use: Yes     Comment: 0-1 beer daily    Drug use: No       Family History   No significant family history    Prior to Admission Medications   Prior to Admission Medications   Prescriptions Last Dose Informant Patient Reported? Taking?   ELIQUIS ANTICOAGULANT 5 MG tablet  Spouse/Significant Other, Daughter Yes No   Sig: Take 5 mg by mouth 2 times daily   Lidocaine (LIDOCARE) 4 % Patch  Spouse/Significant Other, Daughter No No   Sig: Place 2 patches onto the skin every 24 hours To prevent lidocaine toxicity, patient should be patch free for 12 hrs daily.   acetaminophen (TYLENOL) 500 MG tablet  Spouse/Significant Other, Daughter No No   Sig: Take 2 tablets (1,000 mg) by mouth every 8 hours as needed for mild pain or  fever   amiodarone (PACERONE) 200 MG tablet  Spouse/Significant Other, Daughter No No   Sig: Take 1 tablet (200 mg) by mouth daily   atorvastatin (LIPITOR) 20 MG tablet  Spouse/Significant Other, Daughter Yes No   Sig: Take 20 mg by mouth At Bedtime   citalopram (CELEXA) 10 MG tablet  Spouse/Significant Other, Daughter No No   Sig: Take 0.5 tablets (5 mg) by mouth daily   furosemide (LASIX) 20 MG tablet  Daughter, Spouse/Significant Other Yes No   Sig: Take 1 tablet by mouth daily   menthol, Topical Analgesic, 2.5% (BENGAY VANISHING SCENT) 2.5 % GEL topical gel  Spouse/Significant Other, Daughter No No   Sig: Apply topically 4 times daily as needed for other (pain) Apply to shoulders at times when Lidoderm patch is absent   metoprolol succinate ER (TOPROL XL) 25 MG 24 hr tablet  Spouse/Significant Other, Daughter No No   Sig: Take 2 tablets (50 mg) by mouth daily   ondansetron (ZOFRAN ODT) 4 MG ODT tab  Spouse/Significant Other, Daughter Yes No   Sig: DISSOLVE 1 TABLET IN MOUTH EVERY 8 HOURS AS NEEDED   pantoprazole (PROTONIX) 40 MG EC tablet  Spouse/Significant Other, Daughter No No   Sig: Take 1 tablet (40 mg) by mouth every morning (before breakfast) for 26 days   potassium chloride ER (KLOR-CON M) 10 MEQ CR tablet  Spouse/Significant Other, Daughter Yes No   Sig: Take 1 tablet by mouth every evening   potassium chloride ER (KLOR-CON M) 20 MEQ CR tablet  Spouse/Significant Other, Daughter Yes No   Sig: Take 1 tablet by mouth every morning      Facility-Administered Medications: None     Allergies   No Known Allergies    Physical Exam   Vital Signs: Temp: 97.9  F (36.6  C) Temp src: Oral BP: 131/71 Pulse: 95   Resp: 20 SpO2: 99 % O2 Device: None (Room air)    Weight: 0 lbs 0 oz    Gen:  Well-developed, well-nourished, in no acute distress, lying semi-supine in hospital stretcher  HEENT:  Anicteric sclera, PER, hard of hearing  Resp:  No accessory muscle use, breath sounds clear; no wheezes no rales no  rhonchi  Card:  irreg irreg  Abd:  Soft per RN exam, TTP on right side, non-distended, normoactive bowel sounds are present  Musc:  Normal strength and movement of the major muscle groups without obvious deformity  Psych:  not anxious, not agitated    Data     Recent Labs   Lab 08/16/23  1610   WBC 9.7   HGB 15.6   MCV 86         POTASSIUM 4.0   CHLORIDE 96*   CO2 23   BUN 21.6   CR 1.83*   ANIONGAP 18*   JERRY 9.7   *   ALBUMIN 3.4*   PROTTOTAL 7.4   BILITOTAL 3.4*   ALKPHOS 183*   ALT 20   AST 23         No results found for this or any previous visit (from the past 24 hour(s)).

## 2023-08-17 NOTE — PLAN OF CARE
"Goal Outcome Evaluation:      Plan of Care Reviewed With: patient    Overall Patient Progress: no change    Pt A&Ox4 throughout shift. VSS. RA. Pt has poor oral intake due to nausea/vomiting and possible dysphagia. SLP consult planned.    Up with assist of 1 x2 overnight. Had 2 loose BMs. Has pain in right shoulder, but stated it is his usual pain and did not want pain medication.     Pt stated pain in left great toe. Toe appeared red and was warm to touch. Dr. Yeh notified and ordered prednisone to treat suspected gout exacerbation. Also, right shin isai with old sore in middle. Dr. Yeh suggested hospitalist to assess 8/17 am.     /62 (BP Location: Left arm)   Pulse 71   Temp 97.9  F (36.6  C) (Oral)   Resp 20   Ht 1.803 m (5' 10.98\")   Wt 101.1 kg (222 lb 14.4 oz)   SpO2 99%   BMI 31.10 kg/m          "

## 2023-08-17 NOTE — ED NOTES
Patient family wanting info to be passed on that when patient needs meds he needs to be fully sitting up with his chin tucked. He needs a small drink before he takes his meds. He can only take one pill at a time.   Also wanting info passed on that patient is having pain in his right shoulder and to exercise care when moving the patient.   Patient also has a wound on his coccyx that causes him a lot of pain when being transferred.

## 2023-08-17 NOTE — PLAN OF CARE
Goal Outcome Evaluation:      Plan of Care Reviewed With: patient    Overall Patient Progress: no changeOverall Patient Progress: no change    Outcome Evaluation: Pt A&Ox4. VSS on RA. Limited urine ouput, loose BM x1. D5 1/2 w/ 20 K+ running at 100 mL/hr. 2 out of 4 bags tolerated for K+ replacement. Recheck now 3.8. Mag also replaced. Both will be rechecked in AM. SLP consult done today, recommending puree diet with thin liquids. Pt seems to tolerate sherbet and popsicles fairly well. Emesis with any other attempts of PO intake. Most meds modified to IV. Flagyl for abx admin. Ambulates A-1 GBW. Mepilex to bottom changed, as pt has open pink area to buttocks. L great toe also red and puffy, believed to be a flare up of gout. Esophogram will be performed tomorrow. Pt expresses some anxiety regarding this procedure. He would like anti-nausea meds prior to prep.

## 2023-08-17 NOTE — LETTER
Transition Communication Hand-off for Care Transitions to Next Level of Care Provider    Name: Alvin Newton  : 5/10/1934  MRN #: [id number]  Primary Care Provider: Steven Duane Semmler     Primary Clinic: [facility] [address]     Reason for Hospitalization:  DEJUAN (acute kidney injury) (H) [N17.9]  Admit Date/Time: 2023 12:02 AM  Discharge Date: 23  Payor Source: Payor: [facility] / Plan: [facility] MEDICARE / Product Type: HMO /     Readmission Assessment Measure (TRUDI) Risk Score/category: High         Reason for Communication Hand-off Referral: Multiple providers/specialties    [place] [facility] (Phone: [phone] Fax: [phone])     Discharge Plan: TCU at [facility]     Flowsheet Row Most Recent Value   Disposition Comments              Concern for non-adherence with plan of care:   Y/N : No    Discharge Needs Assessment:  Needs      Flowsheet Row Most Recent Value   Equipment Currently Used at Home walker, standard, wheelchair, manual, shower chair, raised toilet seat, dressing device   # of Referrals Placed by CM External Care Coordination, Post Acute Facilities, Transportation          Follow-up plan:    Future Appointments   Date Time Provider Department Center   2023  3:00 PM Z[facility] Perez, [facility]       Any outstanding tests or procedures:        Referrals       Future Labs/Procedures    Medication Therapy Management Referral     Process Instructions:        This referral will be filtered to a centralized scheduling office at [place] [facility] and the patient will receive a call to schedule an appointment at a [place] location most convenient for them.    Scheduling Instructions:    MTM referral reason  Total Score: 1           Patient has 5 PTA or Discharge Medications AND one of the following   diagnoses: DM,HF,COPD, or AMI DX       Comments:    This service is designed to help you get the most from your  medications.  A specially trained pharmacist will work closely with you and your doctors  to solve any problems related to your medications and to help you get the   best results from taking them.      The Medication Therapy Management staff will call you to schedule an appointment.                [facility]  [facility] [phone]/ [place] [phone]  Care Management  AVS/Discharge Summary is the source of truth; this is a helpful guide for improved communication of patient story

## 2023-08-17 NOTE — PROGRESS NOTES
"S-(situation): Patient arrives to room 269 via EMS from [facility].    B-(background): Presented in ED with ongoing diarrhea, dehydration. Found to have DEJUAN and suspected continued C diff infection.    A-(assessment): Patient has had diarrhea ongoing for a \"couple weeks\" which has not improved with treatment for C diff. Has had significant weight loss and weakness. He stated he vomits if he eats more than a few bites or drinks of anything.     Patient is hard of hearing, A & O x4 but stated he does have occasional memory issues.     Also noted that patient has redness, warmth and pain in great toe on left foot and redness on shin of right leg.     R-(recommendations): Orders reviewed with patient. Will monitor patient per MD orders.     Inpatient nursing criteria listed below were met:    Health care directives status obtained and documented: Yes  VTE ordered/documented: Yes  Skin issues/needs documented:Yes  Isolation addressed and Signage used: NA  Fall Prevention: Care plan updated NA Education given and documented NA  Care Plan initiated and Co-Morbidities added: Yes  Education Assessment documented:Yes  Admission Education Documented: Yes  If present CAUTI/CLABI Education done: NA  New medication patient education completed and documented (Possible Side Effects of Common Medications handout): Yes  Allergies Reviewed: Yes  Admission Medication Reconciliation completed: Yes  Home medications if not able to send immediately home with family stored here: NA  Reminder note placed in discharge instructions regarding home meds: NA  Individualized care needs/preferences addressed and charted: Yes  Provider Notified that patient has arrived to the unit: Yes       "

## 2023-08-17 NOTE — PROGRESS NOTES
"CLINICAL NUTRITION SERVICES - ASSESSMENT NOTE     Nutrition Prescription    RECOMMENDATIONS FOR MDs/PROVIDERS TO ORDER:  Diet adv vs. nutrition support within 2-3 days    Malnutrition Status:    Moderate malnutrition in the context of acute on chronic illness    Per RD 7/25/23, pt met criteria for moderate malnutrition in the context of acute illness    Recommendations already ordered by Registered Dietitian (RD):  *Update: pt is NPO with no exceptions currently. Will offer intervention as able pending diet advancement.     Will offer Magic cup vanilla once daily with lunch once diet advances - plan to meet with pt as able to determine flavor preference, tolerance    Pt at risk for refeeding syndrome - order for updated phos lab to determine plan for increasing supplement frequency *updated phos is WDL but unsure if pt has had PO intake since admission    Order for multivitamin with minerals due to NPO status, poor PO intake     Future/Additional Recommendations:  Will follow to monitor NPO status, SLP recs, diet advancement, weight changes, and GI symptoms/BMs     REASON FOR ASSESSMENT  Alvin Newton is a/an 89 year old male assessed by the dietitian for: identified at risk via screening criteria    MST score of 5: recent weight loss of 34 lbs or more, poor appetite noted    NUTRITION HISTORY  Pt admitted from [facility] ED with weakness, poor PO intake, diarrhea,and dehydration. Was recently admitted to [facility] with C.diff related to antibiotic use in July for cellulitis. Completed oral vanco course. Diarrhea never went away - still having 2-5 watery brown BMs daily. Ongoing issues with swallowing the pats few weeks - food gets \"stuck\" in chest, vomits if he eats or drinks too much. Has poor appetite recently at baseline so PO intake is even worse. Pt believes he has lost about 40 lbs in 5-6 weeks. Pt and family unable to increase oral intake beyond a few bites and sips at home. Mild abdominal discomfort - does have an " "OP GI referral.     Dxhx of BPH with a.fib, urinary obstruction, chronic pain of both shoulders, aortic stenosis, HTN, morbid obesity, T2DM with diabetic polyneuropathy, NSTEMI, mixed hyperlipidemia, carpal tunnel syndrome bilateral, closed fracture of first lumbar vertebra, osteoarthritis of pelvis, primary localized osteoarthrosis of shoulder region, pure hypercholesterolemia, right bundle branch block     Per IDT morning rounding, pt reports that food is getting stuck in his chest. Severe dysphagia, is on PPI for heartburn (?)     Per chart review, was recently evaluated by RD at [facility] 7/25 - does not like Ensure because has thrown it up in the past. Was open to a trial of magic cup at that time - writer unsure if pt ended up liking supplement or not.    CURRENT NUTRITION ORDERS  Diet: Orders Placed This Encounter      Moderate Consistent Carb (60 g CHO per Meal) Diet    Intake/Tolerance: no intake recorded yet in flow sheets. GI is abnormal - C.diff infection, abdominal discomfort, diarrhea, nausea intermittent, abdomen is firm in all quadrants. Last BM was today 8/17 - loose, brown.     LABS  Labs reviewed - potassium low today at 3.3, elevated creatinine, low GFR, low magnesium, elevated alkaline phosphatase, elevated bilirubin, elevated BGs    MEDICATIONS  Medications reviewed - pacerone, eliquis, lipitor, celexa, fidaxomicin, insulin, lidocaine patch, toprol XL, protonix, potassium chloride IV, prednisone, lactated ringers IVF at 100 mL/hr = 2,400 mL per day    ANTHROPOMETRICS  Height: 180.3 cm (5' 10.984\")  Most Recent Weight: 101.1 kg (222 lb 14.4 oz) via standing scale   IBW: 172 lbs  ADJ BW: 185 lbs  BMI: Obesity Grade I BMI 30-34.9  Weight History:   Wt Readings from Last 15 Encounters:   08/17/23 101.1 kg (222 lb 14.4 oz)   08/16/23 104.3 kg (230 lb)   08/07/23 100.2 kg (220 lb 12.8 oz)   08/03/23 103.3 kg (227 lb 12.8 oz)   07/31/23 104.3 kg (230 lb)   07/31/23 103.4 kg (228 lb)   07/26/23 108.2 kg " (238 lb 8.6 oz)   07/24/23 107 kg (236 lb)   07/19/23 112.3 kg (247 lb 9.6 oz)   07/11/23 118.1 kg (260 lb 4.8 oz)   12/21/22 106.6 kg (235 lb)   09/22/22 111.4 kg (245 lb 11.2 oz)   07/26/22 111.8 kg (246 lb 6.4 oz)   07/21/22 113.1 kg (249 lb 6.4 oz)   07/18/22 110.5 kg (243 lb 9.6 oz)   Per chart review:   224 lbs on 8/11/23  259 lbs on 3/31/23 (shoes on)  247 lbs on 10/18/22    14.3% weight loss in less than 6 months (significant for severe malnutrition)     Dosing Weight: 84.1 kg using ADJ BW    ASSESSED NUTRITION NEEDS  Estimated Energy Needs: 2,103-2,523 kcals/day (25 - 30 kcals/kg)  Justification: Increased needs and Repletion  Estimated Protein Needs:  grams protein/day (0.6 - 1.2 grams of pro/kg)  Justification: CKD, Hypercatabolism with acute illness, Increased needs, and Repletion  *pending kidney status  Estimated Fluid Needs: 2,523 mL/day (30 mL/kg)   Justification: Maintenance    PHYSICAL FINDINGS  See malnutrition section below.  Per chart review, pt does have a coccyx wound partial thickness     MALNUTRITION  % Intake: < 75% for > 7 days (moderate) *at least, estimated per chart review, will verify with pt  % Weight Loss: > 10% in less than 6 months (severe)  Subcutaneous Fat Loss: Unable to assess - will meet with pt as able  Muscle Loss: Unable to assess - will meet with pt as able  Fluid Accumulation/Edema: None noted  Malnutrition Diagnosis: Moderate malnutrition in the context of acute on chronic illness    NUTRITION DIAGNOSIS  Malnutrition related to poor appetite, potential dysphagia, acute on chronic illness as evidenced by < 75% for > 7 days (moderate), weight loss of > 10% in less than 6 months (severe), increased needs above baseline, and report of severe difficulty swallowing food and fluids    INTERVENTIONS  Implementation  Nutrition Education: Will be provided if education needs arise     Collaboration with other providers  Multivitamin with minerals - due to NPO status and poor  PO intake lately    No other interventions at this time due to NPO status - will continue to monitor and offer interventions as diet advances     Goals  Diet adv vs. nutrition support within 2-3 days  Pt weight will remain stable during admission  Pt will tolerate diet advancement pending SLP recs     Monitoring/Evaluation  Progress toward goals will be monitored and evaluated per protocol.  Khadijah Donaldson RD, LD  Clinical Dietitian  Office: [phone]  Weekend pager: [phone]

## 2023-08-17 NOTE — LETTER
Alvin Newton  [address] [facility] [address]  August 30, 2023    Dear Alvin,   This letter is to inform you of the results of your pathology report on your upper endoscopy (EGD).    Your pathology report was:  Normal.    You had a Schatzki ring which is a benign narrowing of the esophagus.  If you develop continued symptoms, return for repeat dilation and endoscopy.    Final Diagnosis   A: Small intestine, duodenum, biopsy:  -Normal small intestinal mucosa without evidence of untreated sprue, peptic duodenitis or other microscopic alteration     B: Stomach, biopsy:  - Normal antral mucosa with minimal chronic inflammation  -No evidence of acute inflammation, intestinal metaplasia or dysplasia  -No evidence of Helicobacter-pylori like organisms on routine/H&E stain     C: Mid esophagus, biopsies:  -Normal squamous mucosa without inflammation, including without eosinophils; no evidence of columnar mucosa          If you have any questions or concerns please do not hesitate to call my office at [phone].  Sincerely,   Antolin Cyr,    [facility]

## 2023-08-17 NOTE — MEDICATION SCRIBE - ADMISSION MEDICATION HISTORY
Medication Scribe Admission Medication History    Admission medication history is complete. The information provided in this note is only as accurate as the sources available at the time of the update.    Medication reconciliation/reorder completed by provider prior to medication history? No    Information Source(s): Family member via in-person    Pertinent Information: PTA medications reviewed in room with daughter and spouse 8/16/23, before transfer to [facility]..    Changes made to PTA medication list:  Added: None  Deleted: vit D  Changed: furosemide 3 tabs to one tab daily    Medication Affordability:  Not including over the counter (OTC) medications, was there a time in the past 3 months when you did not take your medications as prescribed because of cost?: No    Allergies reviewed with patient and updates made in EHR: yes    Medication History Completed By: Rekha Garcia 8/17/2023 9:13 AM    Prior to Admission medications    Medication Sig Last Dose Taking? Auth Provider Long Term End Date   acetaminophen (TYLENOL) 500 MG tablet Take 2 tablets (1,000 mg) by mouth every 8 hours as needed for mild pain or fever Past Month at unknown Yes Gomez Hollingsworth MD     amiodarone (PACERONE) 200 MG tablet Take 1 tablet (200 mg) by mouth daily 8/15/2023 at am Yes Matt Valera MD Yes    atorvastatin (LIPITOR) 20 MG tablet Take 20 mg by mouth At Bedtime 8/15/2023 at am Yes Reported, Patient Yes    citalopram (CELEXA) 10 MG tablet Take 0.5 tablets (5 mg) by mouth daily 8/15/2023 at pm Yes Yeimy Mary APRN CNP Yes    ELIQUIS ANTICOAGULANT 5 MG tablet Take 5 mg by mouth 2 times daily 8/16/2023 at am Yes Reported, Patient     furosemide (LASIX) 20 MG tablet Take 1 tablet by mouth daily 8/16/2023 at am Yes Reported, Patient No    Lidocaine (LIDOCARE) 4 % Patch Place 2 patches onto the skin every 24 hours To prevent lidocaine toxicity, patient should be patch free for 12 hrs daily. 8/16/2023 at  am Yes Matt Vaelra MD     menthol, Topical Analgesic, 2.5% (BENGAY VANISHING SCENT) 2.5 % GEL topical gel Apply topically 4 times daily as needed for other (pain) Apply to shoulders at times when Lidoderm patch is absent Unknown at unknown Yes Matt Valera MD     metoprolol succinate ER (TOPROL XL) 25 MG 24 hr tablet Take 2 tablets (50 mg) by mouth daily 8/15/2023 at am Yes Sindhu Patel, SANTHOSH Yes    pantoprazole (PROTONIX) 40 MG EC tablet Take 1 tablet (40 mg) by mouth every morning (before breakfast) for 26 days 8/16/2023 at am Yes Gomez Hollingsworth MD  8/23/23   potassium chloride ER (KLOR-CON M) 10 MEQ CR tablet Take 1 tablet by mouth every evening 8/14/2023 at am Yes Reported, Patient     potassium chloride ER (KLOR-CON M) 20 MEQ CR tablet Take 1 tablet by mouth every morning 8/14/2023 at am Yes Reported, Patient     simethicone (MYLICON) 125 MG chewable tablet Take 125 mg by mouth 4 times daily as needed for intestinal gas 8/16/2023 at 0800 Yes Reported, Patient     ondansetron (ZOFRAN ODT) 4 MG ODT tab DISSOLVE 1 TABLET IN MOUTH EVERY 8 HOURS AS NEEDED  at unknown  Reported, Patient

## 2023-08-17 NOTE — PROGRESS NOTES
"   08/17/23 1400   Appointment Info   Signing Clinician's Name / Credentials (SLP) Aggie Andersen MA, CCC-SLP   General Information   Onset of Illness/Injury or Date of Surgery 08/17/23  (admin date)   Referring Physician Arlene Yeh MD   Patient/Family Therapy Goal Statement (SLP) To be able to eat/drink   Pertinent History of Current Problem Per chart review,  Pt admitted from [facility] ED with weakness, poor PO intake, diarrhea,and dehydration. Was recently admitted to [facility] with C.diff related to antibiotic use in July for cellulitis. Completed oral vanco course. Diarrhea never went away - still having 2-5 watery brown BMs daily. Ongoing issues with swallowing the pats few weeks - food gets \"stuck\" in chest, vomits if he eats or drinks too much. Has poor appetite recently at baseline so PO intake is even worse. Pt believes he has lost about 40 lbs in 5-6 weeks. Pt and family unable to increase oral intake beyond a few bites and sips at home. Mild abdominal discomfort - does have an OP GI referral.  Orders received for clinical bedside swallow evaluation.   General Observations Pt sititng upright in bed with wife and daughters at bedside. Pt reporting that he can't eat or drink anything or he will vomit.       Present no   Language English   Type of Evaluation   Type of Evaluation Swallow Evaluation   Oral Motor   Oral Musculature generally intact   Mucosal Quality good   Dentition (Oral Motor)   Dentition (Oral Motor) adequate dentition   Lip Function (Oral Motor)   Lip Range of Motion (Oral Motor) WNL   Tongue Function (Oral Motor)   Tongue ROM (Oral Motor) WNL   Jaw Function (Oral Motor)   Jaw Function (Oral Motor) WNL   Cough/Swallow/Gag Reflex (Oral Motor)   Volitional Throat Clear/Cough (Oral Motor) WNL   Vocal Quality/Secretion Management (Oral Motor)   Vocal Quality (Oral Motor) WNL   Secretion Management (Oral Motor) WNL   General Swallowing Observations   Past History of " "Dysphagia Patient s daughter present at time of eval and reporting that she is an SLP. Daughter reporting that pt has had difficulties with oral phase of swallow for quite some time. She reports that he will chew for extended periods of time and then spit things such as grape skin out. She report he will also have difficulties with things such as rice with occasional pocketing and/or delayed initiation of swallow. Current complaints appear to largely be esophageal as pt has difficulties keeping anything down and feels things get stuck in his chest.   Current Diet/Method of Nutritional Intake (General Swallowing Observations, NIS) NPO   Swallowing Evaluation Clinical swallow evaluation   Clinical Swallow Evaluation   Feeding Assistance minimal assistance required   Clinical Swallow Evaluation Textures Trialed thin liquids;pureed   Clinical Swallow Eval: Thin Liquid Texture Trial   Mode of Presentation, Thin Liquids cup;self-fed   Volume of Liquid or Food Presented 3 tablespoons   Oral Phase of Swallow WFL   Pharyngeal Phase of Swallow intact   Diagnostic Statement Functional swallow with single sips of thin liquid. No overt s/sx of penetration/aspiration.   Clinical Swallow Evaluation: Puree Solid Texture Trial   Mode of Presentation, Puree spoon;fed by clinician   Volume of Puree Presented 1.5 tablespoons   Oral Phase, Puree WFL   Pharyngeal Phase, Puree intact   Diagnostic Statement Functional swallow with small trials of puree. No overt s/sx of penetration/aspiration.   Esophageal Phase of Swallow   Patient reports or presents with symptoms of esophageal dysphagia Yes   Esophageal comments vomiting, feeling of food getting \"stuck\" in chest   Swallowing Recommendations   Diet Consistency Recommendations thin liquids (level 0);pureed (level 4)   Mode of Delivery Recommendations bolus size, small;slow rate of intake   Swallowing Maneuver Recommendations alternate food and liquid intake   Medication Administration " Recommendations, Swallowing (SLP) Pills with thin liquid or in food carrier   Instrumental Assessment Recommendations VFSS (videofluoroscopic swallowing study)   Comment, Swallowing Recommendations Patient presents with functional oropharyngeal swallow with conservative trials. No overt s/sx of penetration or aspiration with thin or puree thick consistencies. Unable to trial advanced textures this date d/t pt feeling they will get  stuck  or make him through up.   General Therapy Interventions   Planned Therapy Interventions Dysphagia Treatment   Dysphagia treatment Compensatory strategies for swallowing   Intervention Comments Patient presents with functional oropharyngeal swallow with conservative trials. No overt s/sx of penetration or aspiration with thin or puree thick consistencies. Unable to trial advanced textures this date d/t pt feeling they will get  stuck  or make him through up. Recommend puree diet with thin liquids. Small sips/bites. D/t changes in swallow function and reports of food getting  stuck , recommend VFSS with esophagram to objectively assess oral, pharyngeal, and esophageal phases of swallow and to determine appropriate diet and/or intervention.   Clinical Impression   Criteria for Skilled Therapeutic Interventions Met (SLP Eval) Yes, treatment indicated   SLP Diagnosis dysphagia   Risks & Benefits of therapy have been explained evaluation/treatment results reviewed;care plan/treatment goals reviewed;participants voiced agreement with care plan;participants included;patient;spouse/significant other;daughter   Clinical Impression Comments Patient presents with functional oropharyngeal swallow with conservative trials. No overt s/sx of penetration or aspiration with thin or puree thick consistencies. Unable to trial advanced textures this date d/t pt feeling they will get  stuck  or make him through up. Recommend puree diet with thin liquids. Small sips/bites. D/t changes in swallow function  and reports of food getting  stuck , recommend VFSS with esophagram to objectively assess oral, pharyngeal, and esophageal phases of swallow and to determine appropriate diet and/or intervention.   SLP Total Evaluation Time   Eval: oral/pharyngeal swallow function, clinical swallow Minutes (49613) 20   SLP Goals   Therapy Frequency (SLP Eval) 2 times/wk   SLP Predicted Duration/Target Date for Goal Attainment 08/23/23   SLP Goals Swallow   SLP: Safely tolerate diet without signs/symptoms of aspiration Minced & moist diet;Thin liquids;With use of compensatory swallow strategies   SLP Discharge Planning   SLP Plan SLP will continue to follow and advance diet as warranted, Pt presenting with significant esophageal complaints.   SLP Discharge Recommendation home with assist   SLP Brief overview of current status  Puree diet; thin liquids   Total Session Time   Total Session Time (sum of timed and untimed services) 20       Thank you for this referral!    Aggie Andersen MA, [facility]-SLP  [facility]  [phone]

## 2023-08-17 NOTE — PROGRESS NOTES
"Patient unable to swallow liquid potassium replacement this morning due to it \"getting stuck\" and patient vomiting it back out.  Per patient and family this is what has been having with increasing frequency in recent weeks and patient's PO intake has been minimal, which is likely what is causing the weight loss and acute kidney injury current present.      Left toe pain is much improved after steroid initiation    ID recommending repeat testing for c diff occur today    Plan:  -make patient NPO and have speech therapy evaluation today  -change PO Vanco to IV Flagyl for ongoing treatment of suspected C diff  -order c diff toxin B PCR with reflex to C diff Antigen and Toxins A/B EIA  -change prednisone to IV solumedrol once daily  -change PO Protonix to IV formulation  -hold all other home medications   -since apixaban has to be held, start lovenox subcutaneous therapeutic dosing this evening  -placed order for esophagram to occur tomorrow when radiology present on site  -change IV fluids to be D5 1/2 NS with 20 mEq KCl at 100 ml/hour    Continue to monitor patient closely.    Electronically Signed:  Arlene Lawrence MD    "

## 2023-08-18 ENCOUNTER — APPOINTMENT (OUTPATIENT)
Dept: GENERAL RADIOLOGY | Facility: CLINIC | Age: 88
DRG: 683 | End: 2023-08-18
Attending: FAMILY MEDICINE
Payer: COMMERCIAL

## 2023-08-18 ENCOUNTER — ANESTHESIA EVENT (OUTPATIENT)
Dept: GASTROENTEROLOGY | Facility: CLINIC | Age: 88
DRG: 683 | End: 2023-08-18
Payer: COMMERCIAL

## 2023-08-18 PROBLEM — R63.4 UNINTENTIONAL WEIGHT LOSS: Status: ACTIVE | Noted: 2023-08-18

## 2023-08-18 PROBLEM — R93.3 ABNORMAL ESOPHAGRAM: Status: ACTIVE | Noted: 2023-08-18

## 2023-08-18 LAB
ALBUMIN SERPL BCG-MCNC: 2.7 G/DL (ref 3.5–5.2)
ALP SERPL-CCNC: 138 U/L (ref 40–129)
ALT SERPL W P-5'-P-CCNC: 15 U/L (ref 0–70)
ANION GAP SERPL CALCULATED.3IONS-SCNC: 11 MMOL/L (ref 7–15)
AST SERPL W P-5'-P-CCNC: 17 U/L (ref 0–45)
BILIRUB SERPL-MCNC: 2 MG/DL
BUN SERPL-MCNC: 18 MG/DL (ref 8–23)
C DIFF GDH STL QL IA: POSITIVE
C DIFF TOX A+B STL QL IA: POSITIVE
C DIFF TOX B STL QL: POSITIVE
CALCIUM SERPL-MCNC: 8.4 MG/DL (ref 8.8–10.2)
CHLORIDE SERPL-SCNC: 99 MMOL/L (ref 98–107)
CREAT SERPL-MCNC: 1.18 MG/DL (ref 0.67–1.17)
DEPRECATED HCO3 PLAS-SCNC: 24 MMOL/L (ref 22–29)
ERYTHROCYTE [DISTWIDTH] IN BLOOD BY AUTOMATED COUNT: 15.2 % (ref 10–15)
GFR SERPL CREATININE-BSD FRML MDRD: 59 ML/MIN/1.73M2
GLUCOSE BLDC GLUCOMTR-MCNC: 144 MG/DL (ref 70–99)
GLUCOSE BLDC GLUCOMTR-MCNC: 157 MG/DL (ref 70–99)
GLUCOSE BLDC GLUCOMTR-MCNC: 174 MG/DL (ref 70–99)
GLUCOSE BLDC GLUCOMTR-MCNC: 178 MG/DL (ref 70–99)
GLUCOSE BLDC GLUCOMTR-MCNC: 206 MG/DL (ref 70–99)
GLUCOSE SERPL-MCNC: 185 MG/DL (ref 70–99)
HCT VFR BLD AUTO: 41.7 % (ref 40–53)
HGB BLD-MCNC: 13.8 G/DL (ref 13.3–17.7)
LACTATE SERPL-SCNC: 1.4 MMOL/L (ref 0.7–2)
MAGNESIUM SERPL-MCNC: 2.1 MG/DL (ref 1.7–2.3)
MCH RBC QN AUTO: 29.3 PG (ref 26.5–33)
MCHC RBC AUTO-ENTMCNC: 33.1 G/DL (ref 31.5–36.5)
MCV RBC AUTO: 89 FL (ref 78–100)
PLATELET # BLD AUTO: 180 10E3/UL (ref 150–450)
POTASSIUM SERPL-SCNC: 3.7 MMOL/L (ref 3.4–5.3)
PROT SERPL-MCNC: 5.9 G/DL (ref 6.4–8.3)
RBC # BLD AUTO: 4.71 10E6/UL (ref 4.4–5.9)
SODIUM SERPL-SCNC: 134 MMOL/L (ref 136–145)
WBC # BLD AUTO: 9.4 10E3/UL (ref 4–11)

## 2023-08-18 PROCEDURE — 250N000009 HC RX 250

## 2023-08-18 PROCEDURE — 250N000011 HC RX IP 250 OP 636: Mod: JZ | Performed by: FAMILY MEDICINE

## 2023-08-18 PROCEDURE — 74220 X-RAY XM ESOPHAGUS 1CNTRST: CPT

## 2023-08-18 PROCEDURE — C9113 INJ PANTOPRAZOLE SODIUM, VIA: HCPCS | Mod: JZ | Performed by: FAMILY MEDICINE

## 2023-08-18 PROCEDURE — 120N000001 HC R&B MED SURG/OB

## 2023-08-18 PROCEDURE — 83605 ASSAY OF LACTIC ACID: CPT | Performed by: FAMILY MEDICINE

## 2023-08-18 PROCEDURE — 99233 SBSQ HOSP IP/OBS HIGH 50: CPT | Performed by: FAMILY MEDICINE

## 2023-08-18 PROCEDURE — 36415 COLL VENOUS BLD VENIPUNCTURE: CPT | Performed by: FAMILY MEDICINE

## 2023-08-18 PROCEDURE — 87493 C DIFF AMPLIFIED PROBE: CPT | Performed by: FAMILY MEDICINE

## 2023-08-18 PROCEDURE — 83735 ASSAY OF MAGNESIUM: CPT | Performed by: FAMILY MEDICINE

## 2023-08-18 PROCEDURE — 258N000003 HC RX IP 258 OP 636: Performed by: FAMILY MEDICINE

## 2023-08-18 PROCEDURE — 250N000013 HC RX MED GY IP 250 OP 250 PS 637: Performed by: FAMILY MEDICINE

## 2023-08-18 PROCEDURE — 250N000013 HC RX MED GY IP 250 OP 250 PS 637: Performed by: INTERNAL MEDICINE

## 2023-08-18 PROCEDURE — 87324 CLOSTRIDIUM AG IA: CPT | Performed by: FAMILY MEDICINE

## 2023-08-18 PROCEDURE — 99418 PROLNG IP/OBS E/M EA 15 MIN: CPT | Performed by: FAMILY MEDICINE

## 2023-08-18 PROCEDURE — 80053 COMPREHEN METABOLIC PANEL: CPT | Performed by: FAMILY MEDICINE

## 2023-08-18 PROCEDURE — 85027 COMPLETE CBC AUTOMATED: CPT | Performed by: FAMILY MEDICINE

## 2023-08-18 RX ORDER — WATER 10 ML/10ML
INJECTION INTRAMUSCULAR; INTRAVENOUS; SUBCUTANEOUS
Status: COMPLETED
Start: 2023-08-18 | End: 2023-08-18

## 2023-08-18 RX ORDER — ENOXAPARIN SODIUM 100 MG/ML
0.75 INJECTION SUBCUTANEOUS ONCE
Status: COMPLETED | OUTPATIENT
Start: 2023-08-18 | End: 2023-08-18

## 2023-08-18 RX ORDER — ENOXAPARIN SODIUM 100 MG/ML
0.75 INJECTION SUBCUTANEOUS ONCE
Status: DISCONTINUED | OUTPATIENT
Start: 2023-08-18 | End: 2023-08-18

## 2023-08-18 RX ORDER — METOPROLOL TARTRATE 25 MG/1
25 TABLET, FILM COATED ORAL ONCE
Status: COMPLETED | OUTPATIENT
Start: 2023-08-18 | End: 2023-08-18

## 2023-08-18 RX ORDER — AMIODARONE HYDROCHLORIDE 200 MG/1
200 TABLET ORAL DAILY
Status: DISCONTINUED | OUTPATIENT
Start: 2023-08-18 | End: 2023-08-24 | Stop reason: HOSPADM

## 2023-08-18 RX ADMIN — INSULIN ASPART 1 UNITS: 100 INJECTION, SOLUTION INTRAVENOUS; SUBCUTANEOUS at 12:24

## 2023-08-18 RX ADMIN — METRONIDAZOLE 500 MG: 500 INJECTION, SOLUTION INTRAVENOUS at 04:14

## 2023-08-18 RX ADMIN — CITALOPRAM HYDROBROMIDE 5 MG: 10 TABLET ORAL at 20:08

## 2023-08-18 RX ADMIN — DICLOFENAC SODIUM 4 G: 10 GEL TOPICAL at 08:59

## 2023-08-18 RX ADMIN — POTASSIUM CHLORIDE, DEXTROSE MONOHYDRATE AND SODIUM CHLORIDE: 150; 5; 450 INJECTION, SOLUTION INTRAVENOUS at 14:37

## 2023-08-18 RX ADMIN — ENOXAPARIN SODIUM 80 MG: 80 INJECTION SUBCUTANEOUS at 08:59

## 2023-08-18 RX ADMIN — METHYLPREDNISOLONE SODIUM SUCCINATE 40 MG: 40 INJECTION, POWDER, FOR SOLUTION INTRAMUSCULAR; INTRAVENOUS at 10:54

## 2023-08-18 RX ADMIN — POTASSIUM CHLORIDE, DEXTROSE MONOHYDRATE AND SODIUM CHLORIDE: 150; 5; 450 INJECTION, SOLUTION INTRAVENOUS at 03:08

## 2023-08-18 RX ADMIN — METRONIDAZOLE 500 MG: 500 INJECTION, SOLUTION INTRAVENOUS at 20:09

## 2023-08-18 RX ADMIN — AMIODARONE HYDROCHLORIDE 200 MG: 200 TABLET ORAL at 16:41

## 2023-08-18 RX ADMIN — DICLOFENAC SODIUM 4 G: 10 GEL TOPICAL at 16:41

## 2023-08-18 RX ADMIN — INSULIN ASPART 1 UNITS: 100 INJECTION, SOLUTION INTRAVENOUS; SUBCUTANEOUS at 08:59

## 2023-08-18 RX ADMIN — ENOXAPARIN SODIUM 80 MG: 80 INJECTION SUBCUTANEOUS at 16:41

## 2023-08-18 RX ADMIN — WATER 1 ML: 1 INJECTION INTRAMUSCULAR; INTRAVENOUS; SUBCUTANEOUS at 10:54

## 2023-08-18 RX ADMIN — DICLOFENAC SODIUM 4 G: 10 GEL TOPICAL at 12:24

## 2023-08-18 RX ADMIN — METOPROLOL TARTRATE 25 MG: 25 TABLET, FILM COATED ORAL at 16:41

## 2023-08-18 RX ADMIN — DICLOFENAC SODIUM 4 G: 10 GEL TOPICAL at 20:09

## 2023-08-18 RX ADMIN — INSULIN ASPART 1 UNITS: 100 INJECTION, SOLUTION INTRAVENOUS; SUBCUTANEOUS at 17:23

## 2023-08-18 RX ADMIN — PANTOPRAZOLE SODIUM 40 MG: 40 INJECTION, POWDER, FOR SOLUTION INTRAVENOUS at 09:00

## 2023-08-18 RX ADMIN — METRONIDAZOLE 500 MG: 500 INJECTION, SOLUTION INTRAVENOUS at 10:55

## 2023-08-18 ASSESSMENT — ACTIVITIES OF DAILY LIVING (ADL)
ADLS_ACUITY_SCORE: 39
ADLS_ACUITY_SCORE: 36
ADLS_ACUITY_SCORE: 38
ADLS_ACUITY_SCORE: 36
ADLS_ACUITY_SCORE: 38
ADLS_ACUITY_SCORE: 39
ADLS_ACUITY_SCORE: 36
ADLS_ACUITY_SCORE: 38

## 2023-08-18 NOTE — PROGRESS NOTES
"Pt reported to nursing that he lifted his head up and turned his head side to side, he then \"blacked out.\" Patient told writer that this happened about 3 minutes before writer came into room. Pt denies pain, dizziness or lightheadedness. Pt vitals were assessed. Pt's heart rate was irregular  but primarily low 100's. Pt has history of afib. Dr Lawrence and charge nurse updated.  "

## 2023-08-18 NOTE — PROGRESS NOTES
[facility]    Medicine Progress Note - Hospitalist Service    Date of Admission:  8/17/2023    Assessment & Plan   Patient is an 89-year-old gentleman who presented with worsening weakness, ongoing dehydration, and significant weight loss in the last month.  He has had complicated medical course over the past 2 months with hospitalization for severe sepsis in June followed by a hospitalization in July for C. difficile colitis.  Since discharging from July patient has had difficulty with minimal oral intake with a 25 pound weight loss and ongoing diarrhea despite completion of vancomycin dosing.  Due to failure to thrive he was brought by his family to the emergency department where he was found to have acute kidney injury and severe dehydration as well as electrolyte abnormalities suspected secondary to poor oral intake.  Renal function and electrolytes have improved however patient continues to have very poor oral intake and vomits easily when attempting to consume food.  Speech therapy evaluated and recommended puréed diet with thin liquids.  Esophagram today shows severe esophageal dysmotility as well as diffuse narrowing in the distal esophagus without focal stricture that resolves in the distal third of the esophagus.    In discussion with Dr. Warner, GI provider from [facility] recommendation is for patient to undergo go an EGD to evaluate etiology for narrowing with consideration of biopsy if appropriate.  If EGD is nondiagnostic, will consider high-resolution manometry which would need to be done in an outpatient setting shortly after discharge.  Discussed with Dr. Emmanuel, general surgeon on call, and the general surgeon tomorrow is able to perform EGD recommended.  Will restart pureed diet for tonight but make NPO after midnight, hold Lovenox after this afternoon dose.      Discussed in detail the results obtained so far with patient and family and all questions answered.      Principal Problem:    DEJUAN (acute kidney injury) superimposed on CKD stage 3a    Dehydration     Assessment: present at time of admission with creatinine of 1.83 with baseline recnetly of 1.1-1.3.  has improved to 1.18 following IV fluid resuscitation.  Thought to be caused by inadequate PO intake as outlined below    Plan:   -will continue with IV fluids given minimal PO intake and need for NPO after midnight  -continue to monitor creatinine for stabilization     Active Problems:    Inadequate oral intake    Dysphagia    Unintentional weight loss    Abnormal esophagram     Assessment: patient has had a 37 lb weight loss in the past 5 months and 25 of that is in the past month, unable to swallow more than small amount of pureed or clear liquids at a time or he vomits.  Has variable tolerance of pills.  Speech therapy has evaluated and no difficulty with actual swallow function suspected.  Esophagram today is abnormal in that there is severe esophageal dysmotility which causes a significant delay of passage of the contrast bolus.  In the distal esophagus there is also diffusely narrowing but without a focal stricture present.  Once the contrast reaches the distal third of the esophagus it passes easily through the GE junction.     Plan:   -proceed with EDG tomorrow for further evaluation as above   -Lovenox now and then hold in case biopsies are needed  -if EGD non-diagnostic, will need to have high resolution manometry which has to be done as an outpatient for furhter evaluation   -plan to restart pureed diet, thin liquids for tonight until she needs to be NPO and then restart after procedure tomorrow  -nutritionist to help evaluate how to maximize caloric intake  -did start conversation with patient and family about if supplemental nutrition is needed (TPN vs enteric) if patient would want to proceed and patient will think about what his wishes are.        Colitis due to Clostridium difficile    Diarrhea  continues    Assessment: patient has continued to have persistent diarrhea since PO Vanco was completed.  Patient has been started on IV Flagyl due to frequent NPO status and diarrhea has slowed.  C diff testing positive and has been sent for antigen and A/B toxins pending to evaluate for acute infection vs colonization     Plan:   -continue IV Flagyl for now  -await C diff testing       Hypoalbuminemia    Assessment: noted at admission at 3.4 and has decreased to 2.7 following fluid resuscitation.  Likely secondary to poor PO intake in last weeks    Plan:   -intermittent monitoring going forward      Hypomagnesemia    Hypokalemia    Assessment: magnesium of 1.5 at admission and potassium 3.3, thought secondary to poor nutritional intake in recent weeks. Improved to normal with supplementation    Plan:   -continue with high replacement supplementations as needed      Acute gout involving toe of left foot      Assessment: present at time of admission with patient having rapid improvement with steroids and minimal pain present today compared to yesterday.     Plan:   -continue IV solumedrol 40 mg daily - today is day 2      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)    Assessment: patient has not been on medication recently due to poor appetite with hemoglobin A1C last month of 7.2.  Patient was placed on dextrose containing IV fluids to avoid hypoglycemia.  Blood sugars have been 104-195 in the past 24 hours.     Plan:   -will continue dextrose containing IV fluids for now given anticipated NPO status after midnight tonight  -continue to monitor blood sugars every 4 hours   -Low resistance sliding scale insulin Novolog      Primary localized osteoarthrosis of shoulder region    Assessment: ongoing issue    Plan:   -continue Lidocaine patch  -started Voltaren cream as needed during this stay       Paroxysmal atrial fibrillation with RVR (H)    Medication induced coagulopathy (H)    Assessment: previous  history, patient normally on amiodarone, Metoprolol XL and Eliquis which have been held due to NPO status.  Patient has been bridged with therapeutic Lovenox  Patient did have short period of increased HR in the 110-120 this afternoon which decreased to the 80s on recheck.    Plan:   -restart amiodarone today   -will restart metoprolol with short acting dose today and then restart XL dosing tomorrow   -continue to hold Eliquis  -Give final dose of Lovenox subcutaneous this afternoon but hold after in case biopsies are needed tomorrow during EGD      Chronic diastolic congestive heart failure    Essential hypertension      Aortic stenosis    Assessment: Patient normally on metoprolol XL 50 mg daily, lasix 20 mg daily.  Diuretics have been on hold secondary to DEJUAN.    Plan:   -will restart metoprolol as blood pressure is increasing and more often hypertensive in the past 12 hours.   -continue to hold Lasix given ongoing issues above      Hyperbilirubinemia    Assessment: patient has chronic elevation in the upper 1-3 range in recent years (since 2017).  Currently is 2.0    Plan:   -continue to monitor       BPH with urinary obstruction    Assessment: noted to be present but patient is not currently on medication and is not overly symptomatic from this.     Plan:   -continue to monitor       Mixed hyperlipidemia    Assessment: on atorvastatin    Plan:   -continue to hold home regimen for now given ongoing swallowing difficulty        Diet: NPO for Medical/Clinical Reasons Except for: Meds    DVT Prophylaxis: Enoxaparin (Lovenox) SQ  Eastman Catheter: Not present  Lines: None     Cardiac Monitoring: None  Code Status: DNR - pre-intubation and during code intubation okay    Clinically Significant Risk Factors        # Hypokalemia: Lowest K = 3.3 mmol/L in last 2 days, will replace as needed    # Hypercalcemia: corrected calcium is >10.1, will monitor as appropriate  # Hypomagnesemia: Lowest Mg = 1.5 mg/dL in last 2 days,  "will replace as needed   # Hypoalbuminemia: Lowest albumin = 2.7 g/dL at 8/18/2023  6:08 AM, will monitor as appropriate     # Hypertension: Noted on problem list    # Chronic heart failure with preserved ejection fraction: heart failure noted on problem list and last echo with EF >50%      # DMII: A1C = 7.2 % (Ref range: <5.7 %) within past 6 months, PRESENT ON ADMISSION  # Obesity: Estimated body mass index is 31.1 kg/m  as calculated from the following:    Height as of this encounter: 1.803 m (5' 10.98\").    Weight as of this encounter: 101.1 kg (222 lb 14.4 oz)., PRESENT ON ADMISSION  # Moderate Malnutrition: based on nutrition assessment, PRESENT ON ADMISSION          Disposition Plan      Expected Discharge Date: 08/20/2023                  Arlene Lawrence MD  Hospitalist Service  [facility]  Securely message with [employer] (more info)  Text page via [facility] Paging/Directory   ______________________________________________________________________    Interval History   Patient reports having slightly more energy today and feeling better overall than he has in weeks but still not himself.  No pain.  No urge to eat.  Frustrated by how sick he is still feeling and that he still cannot eat but denies any new symptoms.  Blood pressure hypertensive x2 overnight and HR continues to be normal to mildly tachycardic.  Other vitals normal and stable. No new nursing concerns.     Physical Exam   Vital Signs: Temp: 97.9  F (36.6  C) Temp src: Oral BP: 129/74 Pulse: 107   Resp: 18 SpO2: 97 % O2 Device: None (Room air)    Weight: 222 lbs 14.4 oz    Constitutional: awake, alert, cooperative, no apparent distress but continues to appear disheartened and depressed mood currently, and appears stated age  Respiratory: No increased work of breathing, good air exchange, clear to auscultation bilaterally, no crackles or wheezing  Cardiovascular: RRR in the 80s  GI: bowel sounds present, abdomen " soft and non-tender   Neurologic: Awake, alert, oriented to name, place and situation, heard of hearing     Medical Decision Making       75 MINUTES SPENT BY ME on the date of service doing chart review, history, exam, documentation & further activities per the note.      Data     I have personally reviewed the following data over the past 24 hrs:    9.4  \   13.8   / 180     134 (L) 99 18.0 /  206 (H)   3.7 24 1.18 (H) \     ALT: 15 AST: 17 AP: 138 (H) TBILI: 2.0 (H)   ALB: 2.7 (L) TOT PROTEIN: 5.9 (L) LIPASE: N/A     Procal: N/A CRP: N/A Lactic Acid: 1.4

## 2023-08-18 NOTE — PLAN OF CARE
Goal Outcome Evaluation:      Plan of Care Reviewed With: patient    Overall Patient Progress: no changeOverall Patient Progress: no change    Outcome Evaluation: Pt is A&Ox4. Pt has been NPO this shift for procedure and awaiting recommendations. Pt is now being placed on his diet with plan to be NPO at [facility] for EGD tomorrow AM. Pt is up with 1A,gb and walker. Pt is steady. Pt had one loose stool this shift and was positive for C-toib-zweojlz testing being done, pt remains on enteric precautions. Pt has denied nausea and has had no emesis this shift. IV fluids D5 NS with 20 kcl infusing at 100ml/hr. Esophagram done this morning severe esophageal dysmotility noted. Pt reports improvement in discomfort with use of Volteran cream on right shoulder. Pt had had family in room with him most of the day.

## 2023-08-18 NOTE — PROGRESS NOTES
Spoke with Dr. Lawrence regarding the patient's dysphagia. Barium is suggestive of narrowing and dysmotility. Recommended evaluation with endoscopy and biopsy if any concerning findings for malignancy. If normal, and no obvious stricture noted, the patient could be set up for high resolution esophageal manometry.     Patient was not physically seen by myself and is not currently under my direct care.     Edgard Warner DO   of Medicine  Director, Esophageal Disorders Program  Division of Gastroenterology, Hepatology, and Nutrition  [facility]

## 2023-08-18 NOTE — CONSULTS
Nutrition Therapy Update: Brief Note    Writer reviewing chart due to provider order - patient can only have pureed foods and thin liquids, 37 lb unintentional weight loss in past 5 months, assist in finding best caloric support    RD has completed full assessment and currently following, will complete consult due to this and appreciate plan of care update. Previous diet of NPO is now being held - current diet for pureed and thin liquids starting today at 1458. Per chart review, will be NPO again at midnight per procedure/surgery.     Pt is at very high risk for refeeding syndrome due to prolonged inadequate intake, weight loss, and recent NPO status. Will start off with low calorie administration - pt does not like Ensures, so will order magic cup (any flavor) this evening if pt able to have dinner.     RD is not working this weekend - will update covering RD on plan to support calorie intake. Anticipate slow administration of calories to minimize refeeding risk. Will discuss with covering RD on possible diet education this weekend via phone (otherwise will provide education and reinforcement once back on-site Monday 8/21).    Ordered phos lab checks for the next few days to assess for refeeding risk. Do have very high calorie option for supplementation (Vital Cuisine) to offer pt but want to assess refeeding risk and increase calories minimally to start.    RD will continue to follow and remain available for additional recommendations as pt's intake is initiated and increased.    Khadijah Donaldson RD, LD  Clinical Dietitian  Office: [phone]  Weekend pager: [phone]

## 2023-08-18 NOTE — PLAN OF CARE
"Goal Outcome Evaluation:      Plan of Care Reviewed With: patient    Overall Patient Progress: improvingOverall Patient Progress: improving    Outcome Evaluation: A & O x4, able to make needs known.  Takes pills whole.  VSS.  On RA w SpO2 > 90%.  RFA and LAC paola IV's patent, infusing cont. D5/NS/KCL @ 100mL/hr.  Continent BB, +1 assist w walker to bathroom.  C/o R shoulder pain, given scheduled lidocaine patch and cream, effective.  No c/o nausea or episodes of emesis throughout shift.  Per report, shallow pressure wound on sacrum likely d/t diarrhea irritation, mepilex in place for protection.    /74 (BP Location: Left arm, Patient Position: Supine)   Pulse 107   Temp 97.9  F (36.6  C) (Oral)   Resp 18   Ht 1.803 m (5' 10.98\")   Wt 101.1 kg (222 lb 14.4 oz)   SpO2 97%   BMI 31.10 kg/m      Jing Trujillo RN on 8/18/2023 at 3:00 AM      "

## 2023-08-19 ENCOUNTER — ANESTHESIA (OUTPATIENT)
Dept: GASTROENTEROLOGY | Facility: CLINIC | Age: 88
DRG: 683 | End: 2023-08-19
Payer: COMMERCIAL

## 2023-08-19 PROBLEM — E44.0 MODERATE MALNUTRITION (H): Status: ACTIVE | Noted: 2023-08-17

## 2023-08-19 PROBLEM — K22.4 ESOPHAGEAL DYSMOTILITY: Status: ACTIVE | Noted: 2023-07-27

## 2023-08-19 PROBLEM — Z86.19 HISTORY OF CLOSTRIDIUM DIFFICILE INFECTION: Status: ACTIVE | Noted: 2023-07-25

## 2023-08-19 PROBLEM — E87.6 HYPOKALEMIA: Status: ACTIVE | Noted: 2023-08-19

## 2023-08-19 PROBLEM — A04.72 CLOSTRIDIUM DIFFICILE DIARRHEA: Status: ACTIVE | Noted: 2023-07-23

## 2023-08-19 PROBLEM — E87.1 HYPONATREMIA: Status: ACTIVE | Noted: 2023-08-19

## 2023-08-19 LAB
ALBUMIN SERPL BCG-MCNC: 2.6 G/DL (ref 3.5–5.2)
ALP SERPL-CCNC: 122 U/L (ref 40–129)
ALT SERPL W P-5'-P-CCNC: 14 U/L (ref 0–70)
ANION GAP SERPL CALCULATED.3IONS-SCNC: 10 MMOL/L (ref 7–15)
AST SERPL W P-5'-P-CCNC: 14 U/L (ref 0–45)
BILIRUB SERPL-MCNC: 1.3 MG/DL
BUN SERPL-MCNC: 18.8 MG/DL (ref 8–23)
CALCIUM SERPL-MCNC: 8.4 MG/DL (ref 8.8–10.2)
CHLORIDE SERPL-SCNC: 101 MMOL/L (ref 98–107)
CREAT SERPL-MCNC: 1.08 MG/DL (ref 0.67–1.17)
DEPRECATED HCO3 PLAS-SCNC: 22 MMOL/L (ref 22–29)
GFR SERPL CREATININE-BSD FRML MDRD: 66 ML/MIN/1.73M2
GLUCOSE BLDC GLUCOMTR-MCNC: 152 MG/DL (ref 70–99)
GLUCOSE BLDC GLUCOMTR-MCNC: 155 MG/DL (ref 70–99)
GLUCOSE BLDC GLUCOMTR-MCNC: 168 MG/DL (ref 70–99)
GLUCOSE BLDC GLUCOMTR-MCNC: 172 MG/DL (ref 70–99)
GLUCOSE BLDC GLUCOMTR-MCNC: 184 MG/DL (ref 70–99)
GLUCOSE BLDC GLUCOMTR-MCNC: 203 MG/DL (ref 70–99)
GLUCOSE SERPL-MCNC: 201 MG/DL (ref 70–99)
HGB BLD-MCNC: 12.9 G/DL (ref 13.3–17.7)
MAGNESIUM SERPL-MCNC: 2 MG/DL (ref 1.7–2.3)
PHOSPHATE SERPL-MCNC: 3.1 MG/DL (ref 2.5–4.5)
POTASSIUM SERPL-SCNC: 4 MMOL/L (ref 3.4–5.3)
PROT SERPL-MCNC: 5.5 G/DL (ref 6.4–8.3)
SODIUM SERPL-SCNC: 133 MMOL/L (ref 136–145)
UPPER GI ENDOSCOPY: NORMAL

## 2023-08-19 PROCEDURE — 80053 COMPREHEN METABOLIC PANEL: CPT | Performed by: FAMILY MEDICINE

## 2023-08-19 PROCEDURE — 83735 ASSAY OF MAGNESIUM: CPT | Performed by: FAMILY MEDICINE

## 2023-08-19 PROCEDURE — 250N000013 HC RX MED GY IP 250 OP 250 PS 637: Performed by: SURGERY

## 2023-08-19 PROCEDURE — 258N000003 HC RX IP 258 OP 636: Performed by: PEDIATRICS

## 2023-08-19 PROCEDURE — 85018 HEMOGLOBIN: CPT | Performed by: FAMILY MEDICINE

## 2023-08-19 PROCEDURE — 0D748ZZ DILATION OF ESOPHAGOGASTRIC JUNCTION, VIA NATURAL OR ARTIFICIAL OPENING ENDOSCOPIC: ICD-10-PCS | Performed by: SURGERY

## 2023-08-19 PROCEDURE — 43245 EGD DILATE STRICTURE: CPT | Performed by: SURGERY

## 2023-08-19 PROCEDURE — 258N000003 HC RX IP 258 OP 636: Performed by: FAMILY MEDICINE

## 2023-08-19 PROCEDURE — 88305 TISSUE EXAM BY PATHOLOGIST: CPT | Mod: TC | Performed by: SURGERY

## 2023-08-19 PROCEDURE — 250N000011 HC RX IP 250 OP 636: Mod: JZ | Performed by: FAMILY MEDICINE

## 2023-08-19 PROCEDURE — 0DB28ZX EXCISION OF MIDDLE ESOPHAGUS, VIA NATURAL OR ARTIFICIAL OPENING ENDOSCOPIC, DIAGNOSTIC: ICD-10-PCS | Performed by: SURGERY

## 2023-08-19 PROCEDURE — 370N000017 HC ANESTHESIA TECHNICAL FEE, PER MIN: Performed by: SURGERY

## 2023-08-19 PROCEDURE — 99418 PROLNG IP/OBS E/M EA 15 MIN: CPT | Performed by: PEDIATRICS

## 2023-08-19 PROCEDURE — 120N000001 HC R&B MED SURG/OB

## 2023-08-19 PROCEDURE — C9113 INJ PANTOPRAZOLE SODIUM, VIA: HCPCS | Mod: JZ | Performed by: FAMILY MEDICINE

## 2023-08-19 PROCEDURE — 43239 EGD BIOPSY SINGLE/MULTIPLE: CPT | Performed by: SURGERY

## 2023-08-19 PROCEDURE — 250N000009 HC RX 250: Performed by: NURSE ANESTHETIST, CERTIFIED REGISTERED

## 2023-08-19 PROCEDURE — 0DB98ZX EXCISION OF DUODENUM, VIA NATURAL OR ARTIFICIAL OPENING ENDOSCOPIC, DIAGNOSTIC: ICD-10-PCS | Performed by: SURGERY

## 2023-08-19 PROCEDURE — 99233 SBSQ HOSP IP/OBS HIGH 50: CPT | Performed by: PEDIATRICS

## 2023-08-19 PROCEDURE — 250N000011 HC RX IP 250 OP 636: Mod: JZ | Performed by: SURGERY

## 2023-08-19 PROCEDURE — 0DB68ZX EXCISION OF STOMACH, VIA NATURAL OR ARTIFICIAL OPENING ENDOSCOPIC, DIAGNOSTIC: ICD-10-PCS | Performed by: SURGERY

## 2023-08-19 PROCEDURE — 250N000013 HC RX MED GY IP 250 OP 250 PS 637: Performed by: PEDIATRICS

## 2023-08-19 PROCEDURE — 84100 ASSAY OF PHOSPHORUS: CPT | Performed by: FAMILY MEDICINE

## 2023-08-19 PROCEDURE — 250N000013 HC RX MED GY IP 250 OP 250 PS 637: Performed by: FAMILY MEDICINE

## 2023-08-19 PROCEDURE — 250N000011 HC RX IP 250 OP 636: Performed by: NURSE ANESTHETIST, CERTIFIED REGISTERED

## 2023-08-19 PROCEDURE — 43249 ESOPH EGD DILATION <30 MM: CPT | Performed by: SURGERY

## 2023-08-19 PROCEDURE — 36415 COLL VENOUS BLD VENIPUNCTURE: CPT | Performed by: FAMILY MEDICINE

## 2023-08-19 RX ORDER — FAMOTIDINE 20 MG/1
40 TABLET, FILM COATED ORAL 2 TIMES DAILY
Status: DISCONTINUED | OUTPATIENT
Start: 2023-08-19 | End: 2023-08-24 | Stop reason: HOSPADM

## 2023-08-19 RX ORDER — VANCOMYCIN HYDROCHLORIDE 125 MG/1
125 CAPSULE ORAL 4 TIMES DAILY
Status: DISCONTINUED | OUTPATIENT
Start: 2023-08-19 | End: 2023-08-24 | Stop reason: HOSPADM

## 2023-08-19 RX ORDER — PROPOFOL 10 MG/ML
INJECTION, EMULSION INTRAVENOUS CONTINUOUS PRN
Status: DISCONTINUED | OUTPATIENT
Start: 2023-08-19 | End: 2023-08-19

## 2023-08-19 RX ORDER — PROPOFOL 10 MG/ML
INJECTION, EMULSION INTRAVENOUS PRN
Status: DISCONTINUED | OUTPATIENT
Start: 2023-08-19 | End: 2023-08-19

## 2023-08-19 RX ORDER — LIDOCAINE HYDROCHLORIDE 20 MG/ML
INJECTION, SOLUTION INFILTRATION; PERINEURAL PRN
Status: DISCONTINUED | OUTPATIENT
Start: 2023-08-19 | End: 2023-08-19

## 2023-08-19 RX ORDER — LIDOCAINE 40 MG/G
CREAM TOPICAL
Status: DISCONTINUED | OUTPATIENT
Start: 2023-08-19 | End: 2023-08-24 | Stop reason: HOSPADM

## 2023-08-19 RX ORDER — SODIUM CHLORIDE, SODIUM LACTATE, POTASSIUM CHLORIDE, CALCIUM CHLORIDE 600; 310; 30; 20 MG/100ML; MG/100ML; MG/100ML; MG/100ML
INJECTION, SOLUTION INTRAVENOUS CONTINUOUS
Status: DISCONTINUED | OUTPATIENT
Start: 2023-08-19 | End: 2023-08-20

## 2023-08-19 RX ORDER — MAGNESIUM SULFATE HEPTAHYDRATE 40 MG/ML
2 INJECTION, SOLUTION INTRAVENOUS ONCE
Status: COMPLETED | OUTPATIENT
Start: 2023-08-19 | End: 2023-08-19

## 2023-08-19 RX ADMIN — DICLOFENAC SODIUM 4 G: 10 GEL TOPICAL at 16:16

## 2023-08-19 RX ADMIN — VANCOMYCIN HYDROCHLORIDE 125 MG: 125 CAPSULE ORAL at 20:06

## 2023-08-19 RX ADMIN — PANTOPRAZOLE SODIUM 40 MG: 40 INJECTION, POWDER, FOR SOLUTION INTRAVENOUS at 09:24

## 2023-08-19 RX ADMIN — DICLOFENAC SODIUM 4 G: 10 GEL TOPICAL at 20:07

## 2023-08-19 RX ADMIN — INSULIN ASPART 1 UNITS: 100 INJECTION, SOLUTION INTRAVENOUS; SUBCUTANEOUS at 17:58

## 2023-08-19 RX ADMIN — FAMOTIDINE 40 MG: 20 TABLET, FILM COATED ORAL at 20:06

## 2023-08-19 RX ADMIN — METHYLPREDNISOLONE SODIUM SUCCINATE 40 MG: 40 INJECTION, POWDER, FOR SOLUTION INTRAMUSCULAR; INTRAVENOUS at 11:31

## 2023-08-19 RX ADMIN — DICLOFENAC SODIUM 4 G: 10 GEL TOPICAL at 09:25

## 2023-08-19 RX ADMIN — METRONIDAZOLE 500 MG: 500 INJECTION, SOLUTION INTRAVENOUS at 11:31

## 2023-08-19 RX ADMIN — METOPROLOL SUCCINATE 50 MG: 50 TABLET, EXTENDED RELEASE ORAL at 13:40

## 2023-08-19 RX ADMIN — MAGNESIUM SULFATE HEPTAHYDRATE 2 G: 40 INJECTION, SOLUTION INTRAVENOUS at 09:24

## 2023-08-19 RX ADMIN — VANCOMYCIN HYDROCHLORIDE 125 MG: 125 CAPSULE ORAL at 17:58

## 2023-08-19 RX ADMIN — AMIODARONE HYDROCHLORIDE 200 MG: 200 TABLET ORAL at 13:40

## 2023-08-19 RX ADMIN — POTASSIUM CHLORIDE, DEXTROSE MONOHYDRATE AND SODIUM CHLORIDE: 150; 5; 450 INJECTION, SOLUTION INTRAVENOUS at 04:07

## 2023-08-19 RX ADMIN — PROPOFOL 40 MG: 10 INJECTION, EMULSION INTRAVENOUS at 08:28

## 2023-08-19 RX ADMIN — METRONIDAZOLE 500 MG: 500 INJECTION, SOLUTION INTRAVENOUS at 03:55

## 2023-08-19 RX ADMIN — METRONIDAZOLE 500 MG: 500 INJECTION, SOLUTION INTRAVENOUS at 20:06

## 2023-08-19 RX ADMIN — PROPOFOL 20 MG: 10 INJECTION, EMULSION INTRAVENOUS at 08:30

## 2023-08-19 RX ADMIN — INSULIN ASPART 1 UNITS: 100 INJECTION, SOLUTION INTRAVENOUS; SUBCUTANEOUS at 09:25

## 2023-08-19 RX ADMIN — PROPOFOL 100 MCG/KG/MIN: 10 INJECTION, EMULSION INTRAVENOUS at 08:28

## 2023-08-19 RX ADMIN — APIXABAN 2.5 MG: 2.5 TABLET, FILM COATED ORAL at 20:06

## 2023-08-19 RX ADMIN — CITALOPRAM HYDROBROMIDE 5 MG: 10 TABLET ORAL at 20:06

## 2023-08-19 RX ADMIN — DICLOFENAC SODIUM 4 G: 10 GEL TOPICAL at 11:34

## 2023-08-19 RX ADMIN — POTASSIUM CHLORIDE, DEXTROSE MONOHYDRATE AND SODIUM CHLORIDE: 150; 5; 450 INJECTION, SOLUTION INTRAVENOUS at 09:24

## 2023-08-19 RX ADMIN — Medication 15 ML: at 13:40

## 2023-08-19 RX ADMIN — SODIUM CHLORIDE, POTASSIUM CHLORIDE, SODIUM LACTATE AND CALCIUM CHLORIDE: 600; 310; 30; 20 INJECTION, SOLUTION INTRAVENOUS at 16:16

## 2023-08-19 RX ADMIN — LIDOCAINE HYDROCHLORIDE 50 MG: 20 INJECTION, SOLUTION INFILTRATION; PERINEURAL at 08:28

## 2023-08-19 RX ADMIN — INSULIN ASPART 1 UNITS: 100 INJECTION, SOLUTION INTRAVENOUS; SUBCUTANEOUS at 11:34

## 2023-08-19 ASSESSMENT — ACTIVITIES OF DAILY LIVING (ADL)
ADLS_ACUITY_SCORE: 35
ADLS_ACUITY_SCORE: 36

## 2023-08-19 ASSESSMENT — ENCOUNTER SYMPTOMS: DYSRHYTHMIAS: 1

## 2023-08-19 NOTE — CONSULTS
89 year old year old male here for upper endoscopy for abnormal swallow study, dysphagia. Ongoing last 2 months.  He regurgitates food.  He has lost 40 lbs of weight.          Patient Active Problem List   Diagnosis    NSTEMI (non-ST elevated myocardial infarction) (H)    BPH with urinary obstruction    Essential hypertension    Mixed hyperlipidemia    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)    Carpal tunnel syndrome, bilateral    Fall, initial encounter    Closed fracture of first lumbar vertebra, unspecified fracture morphology, initial encounter (H)    Fracture of L1 vertebra (H)    Impaired fasting glucose    Osteoarthritis of pelvis    Other ill-defined and unknown causes of morbidity and mortality    Primary localized osteoarthrosis of shoulder region    Pure hypercholesterolemia    Reason for consultation    Right bundle branch block    Cellulitis of right lower extremity    Severe sepsis (H)    Septic shock (H)    Streptococcal bacteremia    Thrombocytopenia (H)    Hyperbilirubinemia    Hypophosphatemia    Hypoalbuminemia    Pyuria    Paroxysmal atrial fibrillation with RVR (H)    Hypomagnesemia    Chronic pain of both shoulders    Aortic stenosis    Elevated brain natriuretic peptide (BNP) level    Ascending aorta dilatation (H)    Peripheral edema    Positive urine culture    Medication induced coagulopathy (H)    Diastolic congestive heart failure, unspecified HF chronicity (H)    Altered mental status, unspecified altered mental status type    Diarrhea, unspecified type    Colitis due to Clostridium difficile    Stage 3a chronic kidney disease (H)    Superficial gastritis without hemorrhage    DEJUAN (acute kidney injury) (H)    Inadequate oral intake    Dysphagia    Acute gout involving toe of left foot    Unintentional weight loss    Abnormal esophagram       Past Medical History:   Diagnosis Date    Colonic diverticulum     Hyperlipidemia     Hypertension     Obesity (BMI 30-39.9)      Osteoarthritis     Type 2 diabetes mellitus (H)        Past Surgical History:   Procedure Laterality Date    ARTHROPLASTY HIP BILATERAL  1987     ESOPHAGOSCOPY, DIAGNOSTIC  2003    LAPAROSCOPIC CHOLECYSTECTOMY N/A 12/28/2017    Procedure: LAPAROSCOPIC CHOLECYSTECTOMY;  LAPAROSCOPIC CHOLECYSTECTOMY;  Surgeon: Heber Gonzalez MD;  Location: [facility] OR    TRANSRECTAL ULTRASONIC, TRANSURETHRAL RESECTION (TUR) OF PROSTATE CYST  1990       No family history on file.    No current outpatient medications on file.       No Known Allergies    Pt reports that he has never smoked. He has never used smokeless tobacco. He reports current alcohol use. He reports that he does not use drugs.    Exam:    Awake, Alert OX3  Lungs - CTA bilaterally  CV - RRR, no murmurs, distal pulses intact  Abd - soft, non-distended, non-tender, +BS  Extr - No cyanosis or edema    A/P 89 year old year old male in need of upper endoscopy for abnormal barium swallow, dysphaia. Risks, benefits, alternatives, and complications were discussed including the possibility of perforation and the patient agreed to proceed.    Antolin Cyr, DO on 8/19/2023 at 7:53 AM

## 2023-08-19 NOTE — PLAN OF CARE
Goal Outcome Evaluation:      Plan of Care Reviewed With: patient    Overall Patient Progress: no changeOverall Patient Progress: no change    Outcome Evaluation: Patient has had 1 loose stool. He was able to take bites for supper without any nausea . Plan for EGD in the morning. No complaints of pain. Up with stand by assist within the room.

## 2023-08-19 NOTE — PLAN OF CARE
Goal Outcome Evaluation:      Plan of Care Reviewed With: patient    Overall Patient Progress: no changeOverall Patient Progress: no change    Outcome Evaluation: Pt A&Ox4, using call light appropriately. Refused to get out of bed this shift, up once to bathroom. EGD this AM, schatzki ring found and dilated per provider note. NPO until 1300 today, clear liquid diet until tomorrow. Tolerated minimal PO intake so far this afternoon. No pain.

## 2023-08-19 NOTE — BRIEF OP NOTE
[facility]    Brief Operative Note    Pre-operative diagnosis: Abnormal esophagram [R93.3]  Post-operative diagnosis 1. Schatzki ring    Procedure: Procedure(s):  ESOPHAGOGASTRODUODENOSCOPY, WITH BIOPSY  Esophagoscopy, gastroscopy, duodenoscopy (EGD), dilatation, combined  Surgeon: Surgeon(s) and Role:     * Antolin Cyr DO - Primary  Anesthesia: MAC   Estimated Blood Loss: 0 mL from 8/19/2023  8:25 AM to 8/19/2023  8:51 AM      Drains: None  Specimens:   ID Type Source Tests Collected by Time Destination   1 : duodenal biopsies Biopsy Small Intestine, Duodenum SURGICAL PATHOLOGY EXAM Antolin Cyr,  8/19/2023  8:35 AM    2 : antral biopsies Biopsy Stomach, Antrum SURGICAL PATHOLOGY EXAM Antolin Cyr,  8/19/2023  8:35 AM    3 : mid esophageal biopsies Biopsy Esophagus, Mid SURGICAL PATHOLOGY EXAM Antolin Cyr,  8/19/2023  8:43 AM      Findings:   None.  Complications: None.  Implants: * No implants in log *    NPO for 4 hours.  Clear liquids remainder of day.    Antolin Cyr,  on 8/19/2023 at 8:52 AM

## 2023-08-19 NOTE — ANESTHESIA POSTPROCEDURE EVALUATION
Patient: Alvin Newton    Procedure: Procedure(s):  ESOPHAGOGASTRODUODENOSCOPY, WITH BIOPSY  Esophagoscopy, gastroscopy, duodenoscopy (EGD), dilatation, combined       Anesthesia Type:  MAC    Note:  Disposition: Inpatient   Postop Pain Control: Uneventful            Sign Out: Well controlled pain   PONV: No   Neuro/Psych: Uneventful            Sign Out: Acceptable/Baseline neuro status   Airway/Respiratory: Uneventful            Sign Out: Acceptable/Baseline resp. status   CV/Hemodynamics: Uneventful            Sign Out: Acceptable CV status   Other NRE: NONE   DID A NON-ROUTINE EVENT OCCUR? No    Event details/Postop Comments:  Pt was happy with anesthesia care.  No complications.  I will follow up with the pt if needed.           Last vitals:  Vitals:    08/18/23 2209 08/19/23 0744 08/19/23 0900   BP: 113/73 110/71 97/73   Pulse: 118 105 117   Resp: 20 21 16   Temp: 97.8  F (36.6  C) 97.9  F (36.6  C) 98  F (36.7  C)   SpO2: 95% 96% 98%       Electronically Signed By: CHUCK Enciso CRNA  August 19, 2023  9:03 AM

## 2023-08-19 NOTE — PROGRESS NOTES
[facility]    Medicine Progress Note - Hospitalist Service    Date of Admission:  8/17/2023    Assessment & Plan   89-year-old man with type 2 diabetes, hypertension, stage III chronic kidney disease, paroxysmal atrial fibrillation, chronic diastolic heart failure, severe aortic stenosis, BPH, and history of clostridia difficile infection July 2023 treated with 10-day course of oral vancomycin who presented with worsening weakness, poor oral intake, persistent diarrhea, and 25 pound weight loss in the last month.  Due to concerns for acute kidney injury, severe dehydration, electrolyte abnormalities, and inadequate oral intake, he was admitted.      DEJUAN (acute kidney injury) superimposed on CKD stage 3a  Hypovolemia  Presented with creatinine of 1.83 with recent baseline creatinine 1.1-1.3.  Suspect DEJUAN was due to hypovolemia from the combination of inadequate oral intake and diarrhea.  Renal function has recovered to baseline following IV fluid treatment.    -continue IV fluids as he starts to advance oral intake after his EGD procedure today but switch to lactated Ringer's infusion  -Ordered recheck of renal function    Dysphagia  Schatzki ring  Esophageal dysmotility  Moderate malnutrition  Patient has had a 37 lb weight loss in the past 5 months and 25 pounds in the past month.he has been unable to swallow more than small amount of pureed or clear liquids at a time because of vomiting if he tries to consume larger amounts.  He has had variable tolerance of pills.  He had been diagnosed with moderate malnutrition in July 2023 with persistent signs of moderate malnutrition during this hospitalization.  Speech therapy has evaluated and no difficulty with actual swallow function was suspected.  Esophagram was abnormal with severe esophageal dysmotility proximally and diffuse narrowing distally.  Diagnostic EGD performed 8/19 demonstrated Schatzki ring that was dilated and minimal if  any lower esophageal sphincter.  Esophageal as well as gastric and duodenal mucosal biopsies were obtained with results pending at this time.  -Discussed results of EGD with surgeon Dr. Cyr today who suggested the following dietary plan today  -Restricting diet to clear liquids today after procedure at recommendation of surgeon but may resume oral medications  -Continue antacid therapy but switch from PPI to Pepcid because of comorbid persistent clostridia difficile infection  -Because EGD was diagnostic, not planning to recommend high resolution manometry as an outpatient at this time and this can be reassessed at outpatient follow-up  -If he tolerates clear liquid diet today anticipate advancing to puréed solids tomorrow  -Registered dietitian assisting with recommendation to optimize his nutritional status   -Monitoring phosphorus daily as diet advances because of his high risk for refeeding syndrome    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  Patient was empirically started on IV Flagyl during this hospitalization while awaiting additional test results and diarrhea has slowed.  C diff PCR testing was positive and subsequent antigen and A/B toxin testing results were also positive indicative of persistent active cluster difficile infection with diarrhea.   Suspect diarrhea contributed to hypovolemia and acute kidney injury.  -Resume oral vancomycin 125 mg 4 times daily and anticipate a protracted tapering course per treatment guidelines for recurrent closer to difficile  -continue IV Flagyl for now    Hypoalbuminemia  Hypoalbuminemia noted at admission at 3.4 and decreased to 2.7 following fluid resuscitation.  Suspect hypoalbuminemia is due to malnutrition.  -intermittent monitoring of albumin as indicated     Hypomagnesemia  Hypokalemia  Magnesium 1.5 and potassium 3.3  attributed to poor nutritional intake and diarrhea.  Magnesium and potassium normalized with supplementation  -continue with potassium and magnesium supplementation as indicated by respective treatment protocols  -Monitor potassium and magnesium    Acute gout involving toe of left foot    Acute gout of left foot was present at time of admission with rapid improvement after starting treatment with steroids.   -continue IV solumedrol 40 mg daily - today is day 3      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)    Assessment: patient has not been on medication recently due to poor appetite with hemoglobin A1C last month of 7.2.  Patient was placed on dextrose containing IV fluids to avoid hypoglycemia.  Blood sugars have been 144-206 in the past day and his diet is now advancing after his EGD procedure today.   -will discontinue dextrose containing IV fluids today and switch to lactated Ringer's infusion instead  -monitor blood sugars 4 times daily  -Continue low resistance sliding scale insulin Novolog    Primary localized osteoarthrosis of shoulder region  Chronic ongoing issue  -continue Lidocaine patch  -started Voltaren cream as needed during this stay     Paroxysmal atrial fibrillation  Medication induced coagulopathy  Previous history of atrial fibrillation normally treated chronically with amiodarone, Metoprolol XL and Eliquis which were held due to NPO status.  Patient treated with therapeutic Lovenox for bridging while Eliquis was held.  Patient did have a transient period of increased HR in the 110-120 on 8/18 which resolved without acute intervention and has not since recurred.  Eliquis causes coagulopathy, but bleeding has not been suspected during hospitalization.  -Continue chronic dose of amiodarone   -Resume chronic dose of metoprolol XL  -Resume chronic dose of Eliquis    Severe aortic stenosis  Chronic diastolic congestive heart failure  Essential hypertension    Patient has chronic  "severe aortic stenosis and diastolic heart failure both of which has been clinically stable during hospitalization.  He chronically is treated with  metoprolol XL 50 mg daily and lasix 20 mg daily.  Diuretics have been held secondary to DEJUAN.  -Restarting Toprol-XL today  -continue to hold chronic Lasix today unless there is increasing concern for worsening heart failure    Hyperbilirubinemia  Patient has chronic hyperbilirubinemia with bilirubin ranging 1-3 since 2017.  Bilirubin is trending down and is now normal  -monitor bilirubin as indicated    BPH with urinary obstruction  Reported history of BPH with urinary obstruction but patient is not currently on medication to treat BPH and is not overtly symptomatic from it.   -continue clinical monitoring of spontaneous voiding capacity    Mixed hyperlipidemia  Chronic hyperlipidemia is treated with atorvastatin which has been held so far during this hospitalization.  -continue to hold atorvastatin    Obesity  Chronic with BMI 31 even after recent weight loss  -No acute intervention       Diet: Clear Liquid Diet  Snacks/Supplements Adult: Gelatein Plus; With Meals  Snacks/Supplements Adult: Magic Cup; With Meals    DVT Prophylaxis: DOAC  Eastman Catheter: Not present  Lines: None     Cardiac Monitoring: None  Code Status:  Special code    Clinically Significant Risk Factors              # Hypoalbuminemia: Lowest albumin = 2.6 g/dL at 8/19/2023  5:41 AM, will monitor as appropriate     # Hypertension: Noted on problem list  # Chronic heart failure with preserved ejection fraction: heart failure noted on problem list and last echo with EF >50%      # DMII: A1C = 7.2 % (Ref range: <5.7 %) within past 6 months, PRESENT ON ADMISSION  # Obesity: Estimated body mass index is 31.1 kg/m  as calculated from the following:    Height as of this encounter: 1.803 m (5' 10.98\").    Weight as of this encounter: 101.1 kg (222 lb 14.4 oz)., PRESENT ON ADMISSION  # Moderate Malnutrition: " based on nutrition assessment, PRESENT ON ADMISSION          Disposition Plan      Expected Discharge Date: 08/21/2023        Discharge Comments: Monday?          Matt Valera MD  Hospitalist Service  [facility]  Securely message with happin! (more info)  Text page via [facility] Paging/Directory   ______________________________________________________________________    Interval History   There were no significant overnight events.  Today he underwent diagnostic and therapeutic EGD.  He was found to have Schatzki ring in the distal esophagus that was dilated uneventfully.  No other gross abnormalities were reported but biopsies were obtained of the esophageal, gastric, and duodenal mucosa.  Since his procedure this morning, patient says he is feeling better.  He just generally feels better overall.  He has been able to swallow liquids this afternoon and is not having dysphagia or odynophagia.  He had 1 episode of belching some gas but has not vomited.  He denies abdominal pain.  Diarrhea continues but seems to be slowing.  He remains afebrile.  He has been hemodynamically stable with intermittent mild tachycardia that subsides spontaneously.  Oxygenation has been normal.  He says he has been up ambulating to his bathroom in his room without difficulty.    Physical Exam   Vital Signs: Temp: 98  F (36.7  C) Temp src: Oral BP: 124/81 Pulse: 110   Resp: 20 SpO2: 96 % O2 Device: None (Room air)   Patient Vitals for the past 24 hrs:   BP Temp Temp src Pulse Resp SpO2   08/19/23 1538 (!) 136/95 97.9  F (36.6  C) Oral 96 19 96 %   08/19/23 1136 124/81 -- -- -- 20 96 %   08/19/23 1037 -- -- -- -- -- 95 %   08/19/23 0937 115/86 -- -- 110 20 97 %   08/19/23 0924 119/87 -- -- 93 20 97 %   08/19/23 0900 97/73 98  F (36.7  C) Oral 117 16 98 %   08/19/23 0744 110/71 97.9  F (36.6  C) Oral 105 21 96 %   08/18/23 2209 113/73 97.8  F (36.6  C) Oral 118 20 95 %   08/18/23 1640 121/86 -- -- 93 -- --    08/18/23 1600 -- -- -- -- -- 98 %       Weight: 222 lbs 14.4 oz Body mass index is 31.1 kg/m .    Vitals:    08/17/23 0010   Weight: 101.1 kg (222 lb 14.4 oz)         General Appearance: Elderly man without signs of acute distress while resting in bed  Respiratory: Normal respiratory effort, clear lungs  Cardiovascular: Regular rate and rhythm, systolic murmur heard at the right upper sternal border, good radial pulse, brisk capillary refill  GI: Nondistended abdomen, soft, nontender  Skin: Pale color  Neuro: Alert and maintains wakefulness and attention    Medical Decision Making       82 MINUTES SPENT BY ME on the date of service doing chart review, history, exam, documentation & further activities per the note.      Data     I have personally reviewed the following data over the past 24 hrs:    N/A  \   12.9 (L)   / N/A     133 (L) 101 18.8 /  152 (H)   4.0 22 1.08 \     ALT: 14 AST: 14 AP: 122 TBILI: 1.3 (H)   ALB: 2.6 (L) TOT PROTEIN: 5.5 (L) LIPASE: N/A         EGD results reviewed: Schatzki ring that was dilated    Recent Labs   Lab 08/19/23  1130 08/19/23  0742 08/19/23  0541 08/18/23  0822 08/18/23  0608 08/17/23  2050 08/17/23  1657 08/17/23  0746 08/17/23  0613 08/17/23  0108 08/16/23  1610   WBC  --   --   --   --  9.4  --   --   --   --   --  9.7   HGB  --   --  12.9*  --  13.8  --   --   --   --   --  15.6   MCV  --   --   --   --  89  --   --   --   --   --  86   PLT  --   --   --   --  180  --   --   --   --   --  222   NA  --   --  133*  --  134*  --   --   --  136  --  137   POTASSIUM  --   --  4.0  --  3.7  --  3.8  --  3.3*  --  4.0   CHLORIDE  --   --  101  --  99  --   --   --  99  --  96*   CO2  --   --  22  --  24  --   --   --  23  --  23   BUN  --   --  18.8  --  18.0  --   --   --  20.2  --  21.6   CR  --   --  1.08  --  1.18*  --   --   --  1.60*  --  1.83*   ANIONGAP  --   --  10  --  11  --   --   --  14  --  18*   JERRY  --   --  8.4*  --  8.4*  --   --   --  8.9  --  9.7   *  172* 201*   < > 185*   < >  --    < > 133*   < > 132*   ALBUMIN  --   --  2.6*  --  2.7*  --   --   --  2.9*  --  3.4*   PROTTOTAL  --   --  5.5*  --  5.9*  --   --   --  6.3*  --  7.4   BILITOTAL  --   --  1.3*  --  2.0*  --   --   --  3.3*  --  3.4*   ALKPHOS  --   --  122  --  138*  --   --   --  155*  --  183*   ALT  --   --  14  --  15  --   --   --  15  --  20   AST  --   --  14  --  17  --   --   --  24  --  23    < > = values in this interval not displayed.

## 2023-08-19 NOTE — ANESTHESIA PREPROCEDURE EVALUATION
"Anesthesia Pre-Procedure Evaluation    Patient: Gordon Newton   MRN: [id number] : 5/10/1934        Procedure : Procedure(s):  ESOPHAGOGASTRODUODENOSCOPY          Past Medical History:   Diagnosis Date    Colonic diverticulum     Hyperlipidemia     Hypertension     Obesity (BMI 30-39.9)     Osteoarthritis     Type 2 diabetes mellitus (H)       Past Surgical History:   Procedure Laterality Date    ARTHROPLASTY HIP BILATERAL       ESOPHAGOSCOPY, DIAGNOSTIC      LAPAROSCOPIC CHOLECYSTECTOMY N/A 2017    Procedure: LAPAROSCOPIC CHOLECYSTECTOMY;  LAPAROSCOPIC CHOLECYSTECTOMY;  Surgeon: Heber Gonzalez MD;  Location:  OR    TRANSRECTAL ULTRASONIC, TRANSURETHRAL RESECTION (TUR) OF PROSTATE CYST        No Known Allergies   Social History     Tobacco Use    Smoking status: Never    Smokeless tobacco: Never   Substance Use Topics    Alcohol use: Yes     Comment: 0-1 beer daily      Wt Readings from Last 1 Encounters:   23 101.1 kg (222 lb 14.4 oz)        Anesthesia Evaluation   Pt has had prior anesthetic.     No history of anesthetic complications       ROS/MED HX  ENT/Pulmonary:       Neurologic:       Cardiovascular: Comment: Aortic \"dilation\"    (+)  hypertension- -   -  - -      CHF                  dysrhythmias, a-fib,        Previous cardiac testing   Echo: Date: 23 Results:  Details      Reading Physician Reading Date Result Priority  Nish Haynes MD  [phone] 2023     Narrative & Impression  [id number]  Critical access hospital  [id number]  [id number]^PHOEBE^FREDERICK^PEDRO     [facility]  Echocardiography Laboratory  [address] [place] [address]     Name: GORDON NEWTON  MRN: [id number]  : 05/10/1934  Study Date: 2023 08:10 AM  Age: 89 yrs  Gender: Male  Patient Location: [facility]  Reason For Study: Aortic Valve Disorder  History: PAF, DM, HTN, HYPERLIPIDEMIA  Ordering Physician: FREDERICK SANDHU  Referring Physician: DRU VERMA  Performed By: " Shaye Powell     BSA: 2.3 m2  Height: 71 in  Weight: 236 lb  HR: 76  BP: 135/93 mmHg  ______________________________________________________________________________  Procedure  Complete Portable Echo Adult. Optison (NDC #6658-5987) given intravenously.  Poor acoustic windows. Technically difficult study.Extremely difficult  acoustic windows despite the use of contrast for endcardial border definition.  ______________________________________________________________________________  Interpretation Summary     The left ventricle is normal in size.  There is moderate concentric left ventricular hypertrophy.Left ventricular  systolic function is mildly reduced.  The visual ejection fraction is 50-55%. No regional wall motion abnormalities  noted.  Severe valvular aortic stenosis. (severe low-flow, low gradient AS with SVI of  only 15 ml/m2). Visually, the AV appears to be severely stenotic.  The calculated aortic valve area is 0.7cm2. The mean AoV pressure gradient is  21mmHg. The peak AoV pressure gradient is 31mmHg.  The right ventricle is mildly dilated. Mildly decreased right ventricular  systolic function  The rhythm was rapid atrial fibrillation. -110 during most of the study.     The study was technically difficult. Compared to the previous study, the AS  appears to be worse and afib with RVR is now present.  ______________________________________________________________________________  Left Ventricle  The left ventricle is normal in size. There is moderate concentric left  ventricular hypertrophy. Diastolic Doppler findings (E/E' ratio and/or other  parameters) suggest left ventricular filling pressures are increased. Left  ventricular systolic function is mildly reduced. The visual ejection fraction  is 50-55%. No regional wall motion abnormalities noted.     Right Ventricle  The right ventricle is mildly dilated. Mildly decreased right ventricular  systolic function.     Atria  Normal left atrial  size. Right atrial size is normal. There is no atrial shunt  seen.     Mitral Valve  The mitral valve is normal in structure and function. There is trace mitral  regurgitation.     Tricuspid Valve  The tricuspid valve is normal in structure and function. Right ventricle  systolic pressure estimate normal. There is trace tricuspid regurgitation.     Aortic Valve  No aortic regurgitation is present. The calculated aortic valve area is  0.7cm2. The mean AoV pressure gradient is 21mmHg. The peak AoV pressure  gradient is 31mmHg. Severe valvular aortic stenosis.     Pulmonic Valve  The pulmonic valve is not well seen, but is grossly normal. There is trace  pulmonic valvular regurgitation.     Vessels  Normal size aorta.     Pericardium  There is no pericardial effusion.     Rhythm  The rhythm was rapid atrial fibrillation.  ______________________________________________________________________________  MMode/2D Measurements & Calculations  IVSd: 1.7 cm     LVIDd: 3.9 cm  LVIDs: 3.5 cm  LVPWd: 1.7 cm  FS: 11.6 %  LV mass(C)d: 270.3 grams  LV mass(C)dI: 119.5 grams/m2  Ao root diam: 3.8 cm  LA dimension: 4.8 cm  asc Aorta Diam: 3.9 cm  LA/Ao: 1.3  LVOT diam: 2.2 cm  LVOT area: 3.9 cm2  LA Volume (BP): 66.7 ml  LA Volume Index (BP): 29.5 ml/m2  RWT: 0.85  TAPSE: 1.2 cm     Doppler Measurements & Calculations  MV E max ravi: 87.0 cm/sec  MV dec time: 0.18 sec  Ao V2 max: 273.1 cm/sec  Ao max P.7 mmHg  Ao V2 mean: 200.6 cm/sec  Ao mean P.7 mmHg  Ao V2 VTI: 49.5 cm  KISHA(I,D): 0.67 cm2  KISHA(V,D): 0.64 cm2  LV V1 max P.78 mmHg  LV V1 max: 44.1 cm/sec  LV V1 VTI: 8.4 cm  SV(LVOT): 33.1 ml  SI(LVOT): 14.6 ml/m2  PA V2 max: 68.5 cm/sec  PA max P.9 mmHg  PI end-d ravi: 149.3 cm/sec  AV Ravi Ratio (DI): 0.16  KISHA Index (cm2/m2): 0.30  Medial E/e': 18.2  RV S Ravi: 7.7 cm/sec     ______________________________________________________________________________  Report approved by: Estuardo Chew 2023 12:04 PM              Component 1 mo ago  LVEF 50-55%        Stress Test:  Date: Results:    ECG Reviewed:  Date: 8-16-23 Results:  Atrial fibrillation   -Right bundle branch block and right axis -possible right ventricular hypertrophy -consider pulmonary disease or posterior fasicular block.    Cath:  Date: Results:      METS/Exercise Tolerance:     Hematologic:       Musculoskeletal:       GI/Hepatic:       Renal/Genitourinary:       Endo:     (+)  type II DM, Last HgA1c: 7.2, date: 7-23-23, Not using insulin, - not using insulin pump.                Psychiatric/Substance Use:       Infectious Disease:       Malignancy:       Other:            Physical Exam    Airway        Mallampati: II   TM distance: > 3 FB   Neck ROM: limited   Mouth opening: > 3 cm    Respiratory Devices and Support         Dental           Cardiovascular             Pulmonary                   OUTSIDE LABS:  CBC:   Lab Results   Component Value Date    WBC 9.4 08/18/2023    WBC 9.7 08/16/2023    HGB 12.9 (L) 08/19/2023    HGB 13.8 08/18/2023    HCT 41.7 08/18/2023    HCT 45.4 08/16/2023     08/18/2023     08/16/2023     BMP:   Lab Results   Component Value Date     (L) 08/19/2023     (L) 08/18/2023    POTASSIUM 4.0 08/19/2023    POTASSIUM 3.7 08/18/2023    CHLORIDE 101 08/19/2023    CHLORIDE 99 08/18/2023    CO2 22 08/19/2023    CO2 24 08/18/2023    BUN 18.8 08/19/2023    BUN 18.0 08/18/2023    CR 1.08 08/19/2023    CR 1.18 (H) 08/18/2023     (H) 08/19/2023     (H) 08/19/2023     COAGS:   Lab Results   Component Value Date    PTT 31 06/06/2022    INR 1.59 (H) 06/06/2022     POC:   Lab Results   Component Value Date     (H) 12/31/2017     HEPATIC:   Lab Results   Component Value Date    ALBUMIN 2.6 (L) 08/19/2023    PROTTOTAL 5.5 (L) 08/19/2023    ALT 14 08/19/2023    AST 14 08/19/2023    ALKPHOS 122 08/19/2023    BILITOTAL 1.3 (H) 08/19/2023     OTHER:   Lab Results   Component Value Date    LACT 1.4  08/18/2023    A1C 7.2 (H) 07/23/2023    JERRY 8.4 (L) 08/19/2023    PHOS 3.1 08/19/2023    MAG 2.0 08/19/2023    LIPASE 22 07/23/2023    TSH 1.54 07/14/2023    SED 32 (H) 07/23/2023       Anesthesia Plan    ASA Status:  3, emergent    NPO Status:  NPO Appropriate    Anesthesia Type: MAC.     - Reason for MAC: straight local not clinically adequate   Induction: Intravenous, Propofol.   Maintenance: TIVA.        Consents    Anesthesia Plan(s) and associated risks, benefits, and realistic alternatives discussed. Questions answered and patient/representative(s) expressed understanding.     - Discussed:     - Discussed with:  Patient      - Extended Intubation/Ventilatory Support Discussed: No.      - Patient is DNR/DNI Status: No     Use of blood products discussed: No .     Postoperative Care       PONV prophylaxis: Background Propofol Infusion     Comments:    Other Comments: The risks and benefits of anesthesia, and the alternatives where applicable, have been discussed with the patient, and they wish to proceed.               CHUCK Enciso CRNA

## 2023-08-19 NOTE — ANESTHESIA CARE TRANSFER NOTE
Patient: Alvin Newton    Procedure: Procedure(s):  ESOPHAGOGASTRODUODENOSCOPY, WITH BIOPSY  Esophagoscopy, gastroscopy, duodenoscopy (EGD), dilatation, combined       Diagnosis: Abnormal esophagram [R93.3]  Diagnosis Additional Information: No value filed.    Anesthesia Type:   MAC     Note:    Oropharynx: oropharynx clear of all foreign objects and spontaneously breathing  Level of Consciousness: drowsy  Oxygen Supplementation: nasal cannula    Independent Airway: airway patency satisfactory and stable  Dentition: dentition unchanged  Vital Signs Stable: post-procedure vital signs reviewed and stable  Report to RN Given: handoff report given  Patient transferred to: Medical/Surgical Unit    Handoff Report: Identifed the Patient, Identified the Reponsible Provider, Reviewed the pertinent medical history, Discussed the surgical course, Reviewed Intra-OP anesthesia mangement and issues during anesthesia, Set expectations for post-procedure period and Allowed opportunity for questions and acknowledgement of understanding      Vitals:  Vitals Value Taken Time   BP 97/73 08/19/23 0900   Temp 98  F (36.7  C) 08/19/23 0900   Pulse 117 08/19/23 0900   Resp 16 08/19/23 0900   SpO2 98 % 08/19/23 0902   Vitals shown include unvalidated device data.    Electronically Signed By: CHUCK Enciso CRNA  August 19, 2023  9:03 AM

## 2023-08-19 NOTE — PROVIDER NOTIFICATION
"  \"Pt is back to floor from EGD, orders to be NPO with meds until 1300 then switch to clears, but the team that brought him back to the floor stated strict NPO. Just want to clarify that I can give him his PO medications from this morning (amiodarone, metoprolol, multivitamin). Thanks\"    Matt Valera - 9:56 am  strict NPO until 1 pm      Medication due times moved to 1300, will give at that time  "

## 2023-08-19 NOTE — PLAN OF CARE
Goal Outcome Evaluation:      Plan of Care Reviewed With: patient    Overall Patient Progress: no changeOverall Patient Progress: no change    Outcome Evaluation: Patient A/O x4. Denies pain. NPO for EGD. SBA with ativity. Denied any GI symptoms such as nausea, no stools. VSS.

## 2023-08-20 LAB
ANION GAP SERPL CALCULATED.3IONS-SCNC: 9 MMOL/L (ref 7–15)
BUN SERPL-MCNC: 17.9 MG/DL (ref 8–23)
CALCIUM SERPL-MCNC: 8.7 MG/DL (ref 8.8–10.2)
CHLORIDE SERPL-SCNC: 101 MMOL/L (ref 98–107)
CREAT SERPL-MCNC: 0.98 MG/DL (ref 0.67–1.17)
DEPRECATED HCO3 PLAS-SCNC: 22 MMOL/L (ref 22–29)
GFR SERPL CREATININE-BSD FRML MDRD: 74 ML/MIN/1.73M2
GLUCOSE BLDC GLUCOMTR-MCNC: 149 MG/DL (ref 70–99)
GLUCOSE BLDC GLUCOMTR-MCNC: 155 MG/DL (ref 70–99)
GLUCOSE BLDC GLUCOMTR-MCNC: 165 MG/DL (ref 70–99)
GLUCOSE BLDC GLUCOMTR-MCNC: 216 MG/DL (ref 70–99)
GLUCOSE SERPL-MCNC: 165 MG/DL (ref 70–99)
HGB BLD-MCNC: 13.5 G/DL (ref 13.3–17.7)
MAGNESIUM SERPL-MCNC: 2.3 MG/DL (ref 1.7–2.3)
PHOSPHATE SERPL-MCNC: 3 MG/DL (ref 2.5–4.5)
POTASSIUM SERPL-SCNC: 4.3 MMOL/L (ref 3.4–5.3)
SODIUM SERPL-SCNC: 132 MMOL/L (ref 136–145)

## 2023-08-20 PROCEDURE — 250N000013 HC RX MED GY IP 250 OP 250 PS 637: Performed by: FAMILY MEDICINE

## 2023-08-20 PROCEDURE — 250N000013 HC RX MED GY IP 250 OP 250 PS 637: Performed by: SURGERY

## 2023-08-20 PROCEDURE — 250N000011 HC RX IP 250 OP 636: Performed by: SURGERY

## 2023-08-20 PROCEDURE — 120N000001 HC R&B MED SURG/OB

## 2023-08-20 PROCEDURE — 250N000013 HC RX MED GY IP 250 OP 250 PS 637: Performed by: INTERNAL MEDICINE

## 2023-08-20 PROCEDURE — 84100 ASSAY OF PHOSPHORUS: CPT | Performed by: SURGERY

## 2023-08-20 PROCEDURE — 82310 ASSAY OF CALCIUM: CPT | Performed by: PEDIATRICS

## 2023-08-20 PROCEDURE — 83735 ASSAY OF MAGNESIUM: CPT | Performed by: SURGERY

## 2023-08-20 PROCEDURE — 258N000003 HC RX IP 258 OP 636: Performed by: PEDIATRICS

## 2023-08-20 PROCEDURE — 85018 HEMOGLOBIN: CPT | Performed by: PEDIATRICS

## 2023-08-20 PROCEDURE — 250N000013 HC RX MED GY IP 250 OP 250 PS 637: Performed by: PEDIATRICS

## 2023-08-20 PROCEDURE — 36415 COLL VENOUS BLD VENIPUNCTURE: CPT | Performed by: PEDIATRICS

## 2023-08-20 PROCEDURE — 250N000011 HC RX IP 250 OP 636: Mod: JZ | Performed by: INTERNAL MEDICINE

## 2023-08-20 PROCEDURE — 99232 SBSQ HOSP IP/OBS MODERATE 35: CPT | Performed by: PEDIATRICS

## 2023-08-20 RX ORDER — PREDNISONE 20 MG/1
20 TABLET ORAL DAILY
Status: COMPLETED | OUTPATIENT
Start: 2023-08-21 | End: 2023-08-23

## 2023-08-20 RX ORDER — METRONIDAZOLE 500 MG/100ML
500 INJECTION, SOLUTION INTRAVENOUS EVERY 8 HOURS
Status: DISCONTINUED | OUTPATIENT
Start: 2023-08-20 | End: 2023-08-20

## 2023-08-20 RX ORDER — METRONIDAZOLE 500 MG/1
500 TABLET ORAL 3 TIMES DAILY
Status: DISCONTINUED | OUTPATIENT
Start: 2023-08-20 | End: 2023-08-20

## 2023-08-20 RX ADMIN — APIXABAN 2.5 MG: 2.5 TABLET, FILM COATED ORAL at 21:21

## 2023-08-20 RX ADMIN — FAMOTIDINE 40 MG: 20 TABLET, FILM COATED ORAL at 08:25

## 2023-08-20 RX ADMIN — INSULIN ASPART 1 UNITS: 100 INJECTION, SOLUTION INTRAVENOUS; SUBCUTANEOUS at 17:02

## 2023-08-20 RX ADMIN — LIDOCAINE 2 PATCH: 560 PATCH PERCUTANEOUS; TOPICAL; TRANSDERMAL at 19:38

## 2023-08-20 RX ADMIN — DICLOFENAC SODIUM 4 G: 10 GEL TOPICAL at 17:02

## 2023-08-20 RX ADMIN — INSULIN ASPART 1 UNITS: 100 INJECTION, SOLUTION INTRAVENOUS; SUBCUTANEOUS at 08:09

## 2023-08-20 RX ADMIN — METRONIDAZOLE 500 MG: 500 INJECTION, SOLUTION INTRAVENOUS at 03:44

## 2023-08-20 RX ADMIN — FAMOTIDINE 40 MG: 20 TABLET, FILM COATED ORAL at 21:21

## 2023-08-20 RX ADMIN — AMIODARONE HYDROCHLORIDE 200 MG: 200 TABLET ORAL at 08:25

## 2023-08-20 RX ADMIN — CITALOPRAM HYDROBROMIDE 5 MG: 10 TABLET ORAL at 21:21

## 2023-08-20 RX ADMIN — DICLOFENAC SODIUM 4 G: 10 GEL TOPICAL at 08:29

## 2023-08-20 RX ADMIN — METOPROLOL SUCCINATE 50 MG: 50 TABLET, EXTENDED RELEASE ORAL at 08:25

## 2023-08-20 RX ADMIN — DICLOFENAC SODIUM 4 G: 10 GEL TOPICAL at 11:52

## 2023-08-20 RX ADMIN — VANCOMYCIN HYDROCHLORIDE 125 MG: 125 CAPSULE ORAL at 08:25

## 2023-08-20 RX ADMIN — SODIUM CHLORIDE, POTASSIUM CHLORIDE, SODIUM LACTATE AND CALCIUM CHLORIDE: 600; 310; 30; 20 INJECTION, SOLUTION INTRAVENOUS at 11:52

## 2023-08-20 RX ADMIN — INSULIN ASPART 1 UNITS: 100 INJECTION, SOLUTION INTRAVENOUS; SUBCUTANEOUS at 11:51

## 2023-08-20 RX ADMIN — MICONAZOLE NITRATE 1 G: 2 POWDER TOPICAL at 21:20

## 2023-08-20 RX ADMIN — VANCOMYCIN HYDROCHLORIDE 125 MG: 125 CAPSULE ORAL at 19:41

## 2023-08-20 RX ADMIN — VANCOMYCIN HYDROCHLORIDE 125 MG: 125 CAPSULE ORAL at 17:02

## 2023-08-20 RX ADMIN — APIXABAN 2.5 MG: 2.5 TABLET, FILM COATED ORAL at 08:25

## 2023-08-20 RX ADMIN — METHYLPREDNISOLONE SODIUM SUCCINATE 40 MG: 40 INJECTION, POWDER, FOR SOLUTION INTRAMUSCULAR; INTRAVENOUS at 11:51

## 2023-08-20 RX ADMIN — VANCOMYCIN HYDROCHLORIDE 125 MG: 125 CAPSULE ORAL at 12:02

## 2023-08-20 RX ADMIN — METRONIDAZOLE 500 MG: 500 INJECTION, SOLUTION INTRAVENOUS at 11:51

## 2023-08-20 ASSESSMENT — ACTIVITIES OF DAILY LIVING (ADL)
DEPENDENT_IADLS:: CLEANING;COOKING;LAUNDRY;SHOPPING;MEAL PREPARATION;MONEY MANAGEMENT;TRANSPORTATION
ADLS_ACUITY_SCORE: 35
ADLS_ACUITY_SCORE: 38
ADLS_ACUITY_SCORE: 35

## 2023-08-20 NOTE — PROGRESS NOTES
[facility]    Medicine Progress Note - Hospitalist Service    Date of Admission:  8/17/2023    Assessment & Plan   89-year-old man with type 2 diabetes, hypertension, stage III chronic kidney disease, paroxysmal atrial fibrillation, chronic diastolic heart failure, severe aortic stenosis, BPH, and history of clostridia difficile infection July 2023 treated with 10-day course of oral vancomycin who presented with worsening weakness, poor oral intake, persistent diarrhea, and 25 pound weight loss in the last month.  Due to concerns for acute kidney injury, severe dehydration, electrolyte abnormalities, and inadequate oral intake, he was admitted.      DEJUAN (acute kidney injury) superimposed on CKD stage 3a  Hypovolemia  Presented with creatinine of 1.83 with recent baseline creatinine 1.1-1.3.  Suspect DEJUAN was due to hypovolemia from the combination of inadequate oral intake and diarrhea.  Renal function has recovered to baseline following IV fluid treatment.    -discontinue IV fluids today  -Ordered recheck of renal function    Dysphagia  Schatzki ring  Esophageal dysmotility  Moderate malnutrition  Patient has had a 37 lb weight loss in the past 5 months and 25 pounds in the past month.he has been unable to swallow more than small amount of pureed or clear liquids at a time because of vomiting if he tries to consume larger amounts.  He has had variable tolerance of pills.  He had been diagnosed with moderate malnutrition in July 2023 with persistent signs of moderate malnutrition during this hospitalization.  Speech therapy has evaluated and no difficulty with actual swallow function was suspected.  Esophagram was abnormal with severe esophageal dysmotility proximally and diffuse narrowing distally.  Diagnostic EGD performed 8/19 demonstrated Schatzki ring that was dilated and minimal if any lower esophageal sphincter.  Esophageal as well as gastric and duodenal mucosal biopsies were  obtained with results pending at this time.  He is tolerating liquid diet and oral medications after dilation procedure without difficulty.  So far he has not demonstrated overt signs of refeeding syndrome.  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  Patient was empirically started on IV Flagyl during this hospitalization while awaiting additional test results and diarrhea has slowed.  C diff PCR testing was positive and subsequent antigen and A/B toxin testing results were also positive indicative of persistent active clostridia difficile infection with diarrhea.   Suspect diarrhea contributed to hypovolemia and acute kidney injury.  Diarrhea is improving.  -Continue oral vancomycin 125 mg 4 times daily and anticipate a protracted tapering course per treatment guidelines for recurrent clostridia difficile  -discontinue IV Flagyl today    Hypoalbuminemia  Hypoalbuminemia noted at admission at 3.4 and decreased to 2.7 following fluid resuscitation.  Suspect hypoalbuminemia is due to malnutrition.  -intermittent monitoring of albumin as indicated     Hypomagnesemia  Hypokalemia  Magnesium 1.5 and potassium 3.3 attributed to poor nutritional intake and diarrhea.  Magnesium and potassium normalized with supplementation  -continue with potassium and magnesium supplementation as indicated by  respective treatment protocols  -Monitor potassium and magnesium    Acute gout involving great toe of left foot    Acute gout of great toe of left foot (podagra) was present at time of admission with rapid improvement after starting treatment with steroids.  Acute gout symptoms are nearly resolved.  -discontinue IV solumedrol - today was day 4  -Start prednisone 20 mg daily for 3 more days, first dose tomorrow      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)    Assessment: patient has not been on medication recently due to poor appetite with hemoglobin A1C last month of 7.2.  Patient was placed on dextrose containing IV fluids to avoid hypoglycemia.  Blood sugars have been 149-203 in the past day as his diet advanced after his EGD procedure  -monitor blood sugars 4 times daily  -Continue low resistance sliding scale insulin Novolog    Primary localized osteoarthrosis of shoulder region  Chronic ongoing issue  -continue Lidocaine patch  -started Voltaren cream as needed during this stay     Paroxysmal atrial fibrillation  Medication induced coagulopathy  Previous history of atrial fibrillation normally treated chronically with amiodarone, Metoprolol XL and Eliquis which were held due to NPO status.  Patient treated with therapeutic Lovenox for bridging while Eliquis was held.  Patient did have a transient period of increased HR in the 110-120 on 8/18 which resolved without acute intervention and has not since recurred.  Eliquis causes coagulopathy, but bleeding has not been suspected during hospitalization.  -Continue chronic dose of amiodarone   -Continue chronic dose of metoprolol XL  -Continue chronic dose of Eliquis    Severe aortic stenosis  Chronic diastolic congestive heart failure  Essential hypertension    Patient has chronic severe aortic stenosis and diastolic heart failure both of which has been clinically stable during hospitalization.  He chronically is treated with  metoprolol XL 50 mg  "daily and lasix 20 mg daily.  Diuretics have been held secondary to DEJUAN.  -Continue Toprol-XL  -continue to hold chronic Lasix today unless there is increasing concern for worsening heart failure    Hyperbilirubinemia  Patient has chronic hyperbilirubinemia with bilirubin ranging 1-3 since 2017.  Bilirubin is trending down and is now normal  -monitor bilirubin as indicated    BPH with urinary obstruction  Reported history of BPH with urinary obstruction but patient is not currently on medication to treat BPH and is not overtly symptomatic from it.   -continue clinical monitoring of spontaneous voiding capacity    Mixed hyperlipidemia  Chronic hyperlipidemia is treated with atorvastatin which has been held so far during this hospitalization.  -continue to hold atorvastatin    Obesity  Chronic with BMI 31 even after recent weight loss  -No acute intervention       Diet: Clear Liquid Diet  Snacks/Supplements Adult: Gelatein Plus; With Meals  Snacks/Supplements Adult: Magic Cup; With Meals    DVT Prophylaxis: Enoxaparin (Lovenox) SQ  Eastman Catheter: Not present  Lines: None     Cardiac Monitoring: None  Code Status:  Special    Clinically Significant Risk Factors              # Hypoalbuminemia: Lowest albumin = 2.6 g/dL at 8/19/2023  5:41 AM, will monitor as appropriate     # Hypertension: Noted on problem list  # Chronic heart failure with preserved ejection fraction: heart failure noted on problem list and last echo with EF >50%      # DMII: A1C = 7.2 % (Ref range: <5.7 %) within past 6 months, PRESENT ON ADMISSION  # Obesity: Estimated body mass index is 31.1 kg/m  as calculated from the following:    Height as of this encounter: 1.803 m (5' 10.98\").    Weight as of this encounter: 101.1 kg (222 lb 14.4 oz)., PRESENT ON ADMISSION  # Moderate Malnutrition: based on nutrition assessment, PRESENT ON ADMISSION          Disposition Plan     Expected Discharge Date: 08/21/2023        Discharge Comments: Monday?      "     Matt Valera MD  Hospitalist Service  [facility]  Securely message with [employer] (more info)  Text page via [facility] Paging/Directory   ______________________________________________________________________    Interval History   Patient says he feels much better today.  He is drinking liquids without any difficulty.  He had a little trouble swallowing some of the larger medication tablets today but was able to swallow them.  He is hungry and would like to try to eat some solid food today.  He gets full quickly so does not anticipate he will be eating much at a time.  Diarrhea is improving with only 2 diarrheal stools yesterday and none so far today.  Stools do remain loose.  He denies any abdominal pain.  He remains afebrile and hemodynamically stable with blood pressure trending upward.  Oxygenation has been normal.  He is voiding spontaneously.    Physical Exam   Vital Signs: Temp: 97.7  F (36.5  C) Temp src: Oral BP: 120/65 Pulse: 73   Resp: 20 SpO2: 96 % O2 Device: None (Room air)    Patient Vitals for the past 24 hrs:   BP Temp Temp src Pulse Resp SpO2   08/20/23 0825 120/65 -- -- 73 -- --   08/20/23 0742 -- 97.7  F (36.5  C) Oral -- 20 --   08/19/23 2227 (!) 134/95 97.9  F (36.6  C) Oral 55 20 96 %   08/19/23 1538 (!) 136/95 97.9  F (36.6  C) Oral 96 19 96 %     Weight: 222 lbs 14.4 oz    Intake/Output Summary (Last 24 hours) at 8/20/2023 1341  Last data filed at 8/20/2023 0600  Gross per 24 hour   Intake 1565 ml   Output --   Net 1565 ml     He voided twice yesterday and had two loose bowel movements yesterday    General Appearance: Chronically ill-appearing elderly man without signs of acute distress  Respiratory: Normal respiratory effort, clear lungs  Cardiovascular: Regular rate and rhythm, systolic murmur at the right upper sternal border  GI: Normal bowel sounds, nondistended abdomen, soft, no abdominal tenderness     Medical Decision Making             Data     I have  personally reviewed the following data over the past 24 hrs:    N/A  \   13.5   / N/A     132 (L) 101 17.9 /  155 (H)   4.3 22 0.98 \       Magnesium 2.3, phosphorus 3.0  Blood sugars ranged 149-203 over the last day    Recent Labs   Lab 08/20/23  1137 08/20/23  0801 08/20/23  0541 08/19/23  0742 08/19/23  0541 08/18/23  0822 08/18/23  0608 08/17/23  0108 08/16/23  1610   WBC  --   --   --   --   --   --  9.4  --  9.7   HGB  --   --  13.5  --  12.9*  --  13.8  --  15.6   MCV  --   --   --   --   --   --  89  --  86   PLT  --   --   --   --   --   --  180  --  222   NA  --   --  132*  --  133*  --  134*   < > 137   POTASSIUM  --   --  4.3  --  4.0  --  3.7   < > 4.0   CHLORIDE  --   --  101  --  101  --  99   < > 96*   CO2  --   --  22  --  22  --  24   < > 23   BUN  --   --  17.9  --  18.8  --  18.0   < > 21.6   CR  --   --  0.98  --  1.08  --  1.18*   < > 1.83*   ANIONGAP  --   --  9  --  10  --  11   < > 18*   JERRY  --   --  8.7*  --  8.4*  --  8.4*   < > 9.7   * 149* 165*   < > 201*   < > 185*   < > 132*   ALBUMIN  --   --   --   --  2.6*  --  2.7*   < > 3.4*   PROTTOTAL  --   --   --   --  5.5*  --  5.9*   < > 7.4   BILITOTAL  --   --   --   --  1.3*  --  2.0*   < > 3.4*   ALKPHOS  --   --   --   --  122  --  138*   < > 183*   ALT  --   --   --   --  14  --  15   < > 20   AST  --   --   --   --  14  --  17   < > 23    < > = values in this interval not displayed.

## 2023-08-20 NOTE — PLAN OF CARE
"Goal Outcome Evaluation:                 Outcome Evaluation: Pt resting comfortably throughout night. Mepilex applied to coccyx, CDI. LSC on RA. IVF LR @ 50 ml/hr.  @ HS. No BM this shift. BP (!) 134/95 (BP Location: Left arm)   Pulse 55   Temp 97.9  F (36.6  C) (Oral)   Resp 20   Ht 1.803 m (5' 10.98\")   Wt 101.1 kg (222 lb 14.4 oz)   SpO2 96%   BMI 31.10 kg/m          "

## 2023-08-20 NOTE — PLAN OF CARE
Goal Outcome Evaluation:      Plan of Care Reviewed With: patient    Overall Patient Progress: improvingOverall Patient Progress: improving    Outcome Evaluation: Pt A/O x4. Up with assist x1 with gait belt/walker. Denies pain. Patient states that he has been able to swallow eaiser. Tolerated clears for breakfast and lunch, no nausea/vomiting/gaging. Patient will advance to solids for dinner.

## 2023-08-20 NOTE — PLAN OF CARE
Goal Outcome Evaluation:      Plan of Care Reviewed With: patient    Overall Patient Progress: improvingOverall Patient Progress: improving    Outcome Evaluation: Patient has been tolerating clear liquids this afternoon. No pain or nausea. 1 very small loose stool. Vitals WNL. Oral Vanco started today.

## 2023-08-20 NOTE — CONSULTS
Care Management Initial Consult    General Information  Assessment completed with: Patient, Children, Daughter Fuad  Type of CM/SW Visit: Initial Assessment    Primary Care Provider verified and updated as needed: Yes   Readmission within the last 30 days: current reason for admission unrelated to previous admission      Reason for Consult: discharge planning  Advance Care Planning: Advance Care Planning Reviewed: no concerns identified          Communication Assessment  Patient's communication style: spoken language (English or Bilingual)    Hearing Difficulty or Deaf: yes   Wear Glasses or Blind: yes    Cognitive  Cognitive/Neuro/Behavioral: WDL  Level of Consciousness: alert  Arousal Level: opens eyes spontaneously  Orientation: oriented x 4  Mood/Behavior: cooperative, calm  Best Language: 0 - No aphasia  Speech: clear    Living Environment:   People in home: spouse     Current living Arrangements: other (see comments) (town home)      Able to return to prior arrangements: yes       Family/Social Support:  Care provided by: self, spouse/significant other, homecare agency  Provides care for: no one  Marital Status:   Wife, Children          Description of Support System: Supportive, Involved         Current Resources:   Patient receiving home care services: Yes  Skilled Home Care Services: Skilled Nursing, Home Health Aid, Occupational Therapy, Physical Therapy, Speech Therapy  Community Resources: Home Care  Equipment currently used at home: walker, standard, wheelchair, manual, shower chair, raised toilet seat  Supplies currently used at home:      Employment/Financial:  Employment Status: retired        Financial Concerns: No concerns identified           Does the patient's insurance plan have a 3 day qualifying hospital stay waiver?  No    Lifestyle & Psychosocial Needs:  Social Determinants of Health     Tobacco Use: Low Risk  (7/11/2023)    Patient History     Smoking Tobacco Use: Never     Smokeless  "Tobacco Use: Never     Passive Exposure: Not on file   Alcohol Use: Not on file   Financial Resource Strain: Not on file   Food Insecurity: Not on file   Transportation Needs: Not on file   Physical Activity: Not on file   Stress: Not on file   Social Connections: Not on file   Intimate Partner Violence: Not on file   Depression: Not at risk (7/21/2022)    PHQ-2     PHQ-2 Score: 0   Housing Stability: Not on file       Functional Status:  Prior to admission patient needed assistance:   Dependent ADLs:: Ambulation-walker, Bathing, Dressing  Dependent IADLs:: Cleaning, Cooking, Laundry, Shopping, Meal Preparation, Money Management, Transportation       Values/Beliefs:  Spiritual, Cultural Beliefs, Buddhist Practices, Values that affect care: no               Additional Information:  Per MD at IDT rounds pt is not medically stable for discharge today. CM consulted due to discharge planning. CM met bedside with pt. Pt currently lives in a one level [facility] home with this wife Rosie. Pt has been home since 8/9/23 from [facility] (Phone: [phone] Fax: [phone]). Pt stated he would like to return home, and is feeling better after getting \"some calories\" per pt. Pt ambulated in arguello with A1 and a walker with NA today per bedside RN. Pt stated he has homecare set up by his daughter Fuad but didn't know the name.    CM placed call to pts daughter Fuad Newton #[phone] to receive more information with permission from pt. Per Fuad Pts wife minimally assists patient, but his children are involved and supportive. Pts daughter Fuad is main contact for pt and is a SLP, she lives farther away but is a big support for pt. Pt has 3 kids who are all coming into town within the next few days to assist when he goes home. Fuad stated that TCU was really hard on pt and he cried everyday, pts mood has improved since starting antidepressant. Pt came home with [facility] (Phone: [phone]) " "services PT/OT/RN/HHA/SW/SLP which had just started before pt came into the hospital again. Fuad [facility] of \"[facility]\", which will be doing an assessment and can provide overnight cares and additional help at home. Fuad asked that pt be meeting caloric needs before discharging, CM asked MD to speak with children when they are bedside tomorrow. MD will be here tomorrow and stated he will. Fuad in agreement with plan of pt returning home and resuming [facility] hc. Fuad stated family will provide transportation home.    CRISTIANO sent email to [facility] Liaison Delmy Chu #[phone] to confirm HC is able to resume upon discharge. CM sent referral to [facility].  CM to remain involved for discharge planning.    Plan: Home with [facility] (Phone: [phone]) PT/OT/RN/HHA/SW/SLP   Transport: family    Diane Griggs RNCM  Care Transitions Registered Nurse  Tele: [phone]       "

## 2023-08-21 ENCOUNTER — TELEPHONE (OUTPATIENT)
Dept: SURGERY | Facility: CLINIC | Age: 88
End: 2023-08-21
Payer: COMMERCIAL

## 2023-08-21 LAB
ANION GAP SERPL CALCULATED.3IONS-SCNC: 10 MMOL/L (ref 7–15)
BASE EXCESS BLDV CALC-SCNC: 0.5 MMOL/L (ref -7.7–1.9)
BUN SERPL-MCNC: 19.8 MG/DL (ref 8–23)
CALCIUM SERPL-MCNC: 8.9 MG/DL (ref 8.8–10.2)
CHLORIDE SERPL-SCNC: 102 MMOL/L (ref 98–107)
CREAT SERPL-MCNC: 1.01 MG/DL (ref 0.67–1.17)
DEPRECATED HCO3 PLAS-SCNC: 21 MMOL/L (ref 22–29)
GFR SERPL CREATININE-BSD FRML MDRD: 71 ML/MIN/1.73M2
GLUCOSE BLDC GLUCOMTR-MCNC: 132 MG/DL (ref 70–99)
GLUCOSE BLDC GLUCOMTR-MCNC: 179 MG/DL (ref 70–99)
GLUCOSE BLDC GLUCOMTR-MCNC: 191 MG/DL (ref 70–99)
GLUCOSE BLDC GLUCOMTR-MCNC: 208 MG/DL (ref 70–99)
GLUCOSE SERPL-MCNC: 181 MG/DL (ref 70–99)
HCO3 BLDV-SCNC: 25 MMOL/L (ref 21–28)
HOLD SPECIMEN: NORMAL
MAGNESIUM SERPL-MCNC: 2.1 MG/DL (ref 1.7–2.3)
O2/TOTAL GAS SETTING VFR VENT: 21 %
PCO2 BLDV: 40 MM HG (ref 40–50)
PH BLDV: 7.41 [PH] (ref 7.32–7.43)
PHOSPHATE SERPL-MCNC: 2.8 MG/DL (ref 2.5–4.5)
PO2 BLDV: 46 MM HG (ref 25–47)
POTASSIUM SERPL-SCNC: 4.5 MMOL/L (ref 3.4–5.3)
SODIUM SERPL-SCNC: 133 MMOL/L (ref 136–145)

## 2023-08-21 PROCEDURE — 250N000013 HC RX MED GY IP 250 OP 250 PS 637: Performed by: SURGERY

## 2023-08-21 PROCEDURE — 250N000013 HC RX MED GY IP 250 OP 250 PS 637: Performed by: PEDIATRICS

## 2023-08-21 PROCEDURE — 84100 ASSAY OF PHOSPHORUS: CPT | Performed by: SURGERY

## 2023-08-21 PROCEDURE — 120N000001 HC R&B MED SURG/OB

## 2023-08-21 PROCEDURE — 36415 COLL VENOUS BLD VENIPUNCTURE: CPT | Performed by: PEDIATRICS

## 2023-08-21 PROCEDURE — 999N000104 HC STATISTIC NO CHARGE: Performed by: SPEECH-LANGUAGE PATHOLOGIST

## 2023-08-21 PROCEDURE — 83735 ASSAY OF MAGNESIUM: CPT | Performed by: PEDIATRICS

## 2023-08-21 PROCEDURE — 250N000012 HC RX MED GY IP 250 OP 636 PS 637: Performed by: PEDIATRICS

## 2023-08-21 PROCEDURE — 250N000013 HC RX MED GY IP 250 OP 250 PS 637: Performed by: FAMILY MEDICINE

## 2023-08-21 PROCEDURE — 80048 BASIC METABOLIC PNL TOTAL CA: CPT | Performed by: PEDIATRICS

## 2023-08-21 PROCEDURE — 99233 SBSQ HOSP IP/OBS HIGH 50: CPT | Performed by: PEDIATRICS

## 2023-08-21 PROCEDURE — 82803 BLOOD GASES ANY COMBINATION: CPT | Performed by: PEDIATRICS

## 2023-08-21 RX ADMIN — FAMOTIDINE 40 MG: 20 TABLET, FILM COATED ORAL at 19:53

## 2023-08-21 RX ADMIN — DICLOFENAC SODIUM 4 G: 10 GEL TOPICAL at 09:29

## 2023-08-21 RX ADMIN — VANCOMYCIN HYDROCHLORIDE 125 MG: 125 CAPSULE ORAL at 09:14

## 2023-08-21 RX ADMIN — INSULIN ASPART 1 UNITS: 100 INJECTION, SOLUTION INTRAVENOUS; SUBCUTANEOUS at 09:17

## 2023-08-21 RX ADMIN — METOPROLOL SUCCINATE 50 MG: 50 TABLET, EXTENDED RELEASE ORAL at 09:14

## 2023-08-21 RX ADMIN — VANCOMYCIN HYDROCHLORIDE 125 MG: 125 CAPSULE ORAL at 17:10

## 2023-08-21 RX ADMIN — FAMOTIDINE 40 MG: 20 TABLET, FILM COATED ORAL at 09:14

## 2023-08-21 RX ADMIN — APIXABAN 2.5 MG: 2.5 TABLET, FILM COATED ORAL at 19:54

## 2023-08-21 RX ADMIN — INSULIN ASPART 1 UNITS: 100 INJECTION, SOLUTION INTRAVENOUS; SUBCUTANEOUS at 17:12

## 2023-08-21 RX ADMIN — DICLOFENAC SODIUM 4 G: 10 GEL TOPICAL at 17:14

## 2023-08-21 RX ADMIN — AMIODARONE HYDROCHLORIDE 200 MG: 200 TABLET ORAL at 09:14

## 2023-08-21 RX ADMIN — DICLOFENAC SODIUM 4 G: 10 GEL TOPICAL at 19:54

## 2023-08-21 RX ADMIN — VANCOMYCIN HYDROCHLORIDE 125 MG: 125 CAPSULE ORAL at 13:06

## 2023-08-21 RX ADMIN — VANCOMYCIN HYDROCHLORIDE 125 MG: 125 CAPSULE ORAL at 19:54

## 2023-08-21 RX ADMIN — MICONAZOLE NITRATE 1 G: 2 POWDER TOPICAL at 19:55

## 2023-08-21 RX ADMIN — PREDNISONE 20 MG: 20 TABLET ORAL at 09:26

## 2023-08-21 RX ADMIN — APIXABAN 2.5 MG: 2.5 TABLET, FILM COATED ORAL at 09:14

## 2023-08-21 RX ADMIN — CITALOPRAM HYDROBROMIDE 5 MG: 10 TABLET ORAL at 19:53

## 2023-08-21 ASSESSMENT — ACTIVITIES OF DAILY LIVING (ADL)
ADLS_ACUITY_SCORE: 38
ADLS_ACUITY_SCORE: 36
ADLS_ACUITY_SCORE: 38
ADLS_ACUITY_SCORE: 36
ADLS_ACUITY_SCORE: 38
ADLS_ACUITY_SCORE: 36

## 2023-08-21 NOTE — PLAN OF CARE
"Goal Outcome Evaluation:      Plan of Care Reviewed With: patient    Overall Patient Progress: improvingOverall Patient Progress: improving    Outcome Evaluation: Pt A&O x4. Ambulating Ax1, gaitbelt and walker. BM x1, loose. . Edema +2 bilaterally lower legs and feet. /71 (BP Location: Left arm, Patient Position: Semi-Cuenca's, Cuff Size: Adult Regular)   Pulse 60   Temp 97.4  F (36.3  C) (Oral)   Resp 18   Ht 1.803 m (5' 10.98\")   Wt 101.1 kg (222 lb 14.4 oz)   SpO2 98%   BMI 31.10 kg/m  .         "

## 2023-08-21 NOTE — TELEPHONE ENCOUNTER
Type of surgery: ESOPHAGOGASTRODUODENOSCOPY (N/A)   Location of surgery: [facility] OR  Date and time of surgery: 08/19/2023  Surgeon: MALAIKA  Pre-Op Appt Date: 08/16/2023 SEEN ON CALL   Post-Op Appt Date: TBD   Packet sent out: No  Pre-cert/Authorization completed:  No  Date:

## 2023-08-21 NOTE — PROGRESS NOTES
[facility]    Medicine Progress Note - Hospitalist Service    Date of Admission:  8/17/2023    Assessment & Plan   89-year-old man with type 2 diabetes, hypertension, stage III chronic kidney disease, paroxysmal atrial fibrillation, chronic diastolic heart failure, severe aortic stenosis, BPH, and history of clostridia difficile infection July 2023 treated with 10-day course of oral vancomycin who presented with worsening weakness, poor oral intake, persistent diarrhea, and 25 pound weight loss in the last month.  Due to concerns for acute kidney injury, severe dehydration, electrolyte abnormalities, and inadequate oral intake, he was admitted.      DEJUAN (acute kidney injury) superimposed on CKD stage 3a  Hypovolemia  Presented with creatinine of 1.83 with recent baseline creatinine 1.1-1.3.  Suspect DEJUAN was due to hypovolemia from the combination of inadequate oral intake and diarrhea.  Renal function has recovered to baseline following IV fluid treatment.  IV fluids were stopped 8/20 and renal function has remained stable so far.  -Ordered recheck of renal function    Dysphagia  Schatzki ring  Esophageal dysmotility  Moderate malnutrition  Patient has had a 37 lb weight loss in the past 5 months and 25 pounds in the past month.he has been unable to swallow more than small amount of pureed or clear liquids at a time because of vomiting if he tries to consume larger amounts.  He has had variable tolerance of pills.  He had been diagnosed with moderate malnutrition in July 2023 with persistent signs of moderate malnutrition during this hospitalization.  Speech therapy has evaluated and no difficulty with actual swallow function was suspected.  Esophagram was abnormal with severe esophageal dysmotility proximally and diffuse narrowing distally.  Diagnostic EGD performed 8/19 demonstrated Schatzki ring that was dilated and minimal if any lower esophageal sphincter.  Esophageal as well as  gastric and duodenal mucosal biopsies were obtained with results pending at this time.  He is tolerating advancing diet and oral medications after dilation procedure without difficulty.  So far he has not demonstrated overt signs of refeeding syndrome.  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  Patient was empirically started on IV Flagyl during this hospitalization while awaiting additional test results and diarrhea has slowed.  C diff PCR testing was positive and subsequent antigen and A/B toxin testing results were also positive indicative of persistent active clostridia difficile infection with diarrhea.   Suspect diarrhea contributed to hypovolemia and acute kidney injury.  Diarrhea is improving.  -Continue oral vancomycin 125 mg 4 times daily and anticipate a protracted tapering course per treatment guidelines for recurrent clostridia difficile    Hypoalbuminemia  Hypoalbuminemia noted at admission at 3.4 and decreased to 2.7 following fluid resuscitation.  Suspect hypoalbuminemia is due to malnutrition.  -intermittent monitoring of albumin as indicated     Hypomagnesemia  Hypokalemia  Magnesium 1.5 and potassium 3.3 attributed to poor nutritional intake and diarrhea.  Magnesium and potassium normalized with supplementation  -continue with potassium and magnesium  supplementation as indicated by respective treatment protocols  -Monitor potassium and magnesium    Acute gout involving great toe of left foot    Acute gout of great toe of left foot (podagra) was present at time of admission with rapid improvement after starting treatment with steroids.  Acute gout symptoms are nearly resolved.  -discontinue IV solumedrol - today was day 4  -Start prednisone 20 mg daily for 3 more days, first dose today    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)  Patient has not been on medication recently due to poor appetite with hemoglobin A1C last month of 7.2.  Patient was placed on dextrose containing IV fluids to avoid hypoglycemia but IV fluids have since been stopped.  Blood sugars have been 149-216 in the past day as his diet advances   -monitor blood sugars 4 times daily  -Continue low resistance sliding scale insulin Novolog    Primary localized osteoarthrosis of shoulder region  Chronic ongoing issue  -continue Lidocaine patch  -started Voltaren cream as needed during this stay     Paroxysmal atrial fibrillation  Medication induced coagulopathy  Previous history of atrial fibrillation normally treated chronically with amiodarone, Metoprolol XL and Eliquis which were held due to NPO status.  Patient treated with therapeutic Lovenox for bridging while Eliquis was held.  Patient did have a transient period of increased HR in the 110-120 on 8/18 which resolved without acute intervention and has not since recurred.  Eliquis causes coagulopathy, but bleeding has not been suspected during hospitalization.  -Continue chronic dose of amiodarone   -Continue chronic dose of metoprolol XL  -Continue chronic dose of Eliquis    Severe aortic stenosis  Chronic diastolic congestive heart failure  Essential hypertension    Patient has chronic severe aortic stenosis and diastolic heart failure both of which has been clinically stable during hospitalization.  He chronically is treated  "with  metoprolol XL 50 mg daily and lasix 20 mg daily.  Diuretics have been held secondary to DEJUAN.  -Continue Toprol-XL  -continue to hold chronic Lasix today unless there is increasing concern for worsening heart failure    Hyperbilirubinemia  Patient has chronic hyperbilirubinemia with bilirubin ranging 1-3 since 2017.  Bilirubin is trending down and is now normal  -monitor bilirubin as indicated    BPH with urinary obstruction  Reported history of BPH with urinary obstruction but patient is not currently on medication to treat BPH and is not overtly symptomatic from it.   -continue clinical monitoring of spontaneous voiding capacity    Mixed hyperlipidemia  Chronic hyperlipidemia is treated with atorvastatin which has been held so far during this hospitalization.  -continue to hold atorvastatin    Obesity  Chronic with BMI 31 even after recent weight loss  -No acute intervention       Diet: Combination Diet Moderate Consistent Carb (60 g CHO per Meal) Diet; 2 gm NA Diet  Snacks/Supplements Adult: Magic Cup; With Meals    DVT Prophylaxis: Enoxaparin (Lovenox) SQ  Eastman Catheter: Not present  Lines: None     Cardiac Monitoring: None  Code Status:  special    Clinically Significant Risk Factors              # Hypoalbuminemia: Lowest albumin = 2.6 g/dL at 8/19/2023  5:41 AM, will monitor as appropriate     # Hypertension: Noted on problem list  # Chronic heart failure with preserved ejection fraction: heart failure noted on problem list and last echo with EF >50%      # DMII: A1C = 7.2 % (Ref range: <5.7 %) within past 6 months   # Obesity: Estimated body mass index is 31.1 kg/m  as calculated from the following:    Height as of this encounter: 1.803 m (5' 10.98\").    Weight as of this encounter: 101.1 kg (222 lb 14.4 oz).   # Moderate Malnutrition: based on nutrition assessment           Disposition Plan     Expected Discharge Date: 08/23/2023      Destination: home with help/services  Discharge Comments: Few days, " home and resume homecare services          Matt Valera MD  Hospitalist Service  [facility]  Securely message with [employer] (more info)  Text page via [employer] Paging/Directory   ______________________________________________________________________    Interval History   After eating rice at supper last night the patient had abdominal cramping and bloating later in the evening and had a bowel movement subsequently.  His abdominal cramping and pain subsided although he continues to feel somewhat bloated.  He was able to eat cream of wheat this morning without difficulty.  He is not having any trouble swallowing liquids, pills, or food.  There were no other significant overnight events.  Patient offers no new concerns today.  He remains afebrile and hemodynamically stable.  Oxygenation has been normal.  He is voiding spontaneously.  He continues to have loose bowel movements but frequency has decreased.  He is ambulating independently using a walker.  He continues to feel generally weak.    Physical Exam   Vital Signs: Temp: 97.4  F (36.3  C) Temp src: Oral BP: 123/77 Pulse: 64   Resp: 20 SpO2: 97 % O2 Device: None (Room air)    Patient Vitals for the past 24 hrs:   BP Temp Temp src Pulse Resp SpO2   08/21/23 0811 123/77 97.4  F (36.3  C) Oral 64 20 97 %   08/20/23 2332 118/71 97.4  F (36.3  C) Oral 60 18 98 %   08/20/23 1444 133/85 97.5  F (36.4  C) Oral 85 18 98 %     Weight: 222 lbs 14.4 oz      Intake/Output Summary (Last 24 hours) at 8/21/2023 1030  Last data filed at 8/21/2023 0900  Gross per 24 hour   Intake 480 ml   Output --   Net 480 ml     Patient voided twice during the day yesterday and twice overnight and had 3 bowel movements in the last day    General Appearance: Elderly man without signs of acute distress  GI: Normal bowel sounds, nondistended abdomen, soft, no abdominal tenderness  Neuro: Alert and maintains wakefulness and attention, no confusion    Medical Decision  Making             Data     I have personally reviewed the following data over the past 24 hrs:    N/A  \   N/A   / N/A     133 (L) 102 19.8 /  179 (H)   4.5 21 (L) 1.01 \       Blood sugars range 149-216 over the last day with 2 out of 5 blood sugar readings greater than 180    Venous Blood Gas  Recent Labs   Lab 08/21/23  0613   PHV 7.41   PCO2V 40   PO2V 46   HCO3V 25   EVER 0.5   O2PER 21     Recent Labs   Lab 08/21/23  0808 08/21/23  0613 08/20/23  2135 08/20/23  0801 08/20/23  0541 08/19/23  0742 08/19/23  0541 08/18/23  0822 08/18/23  0608 08/17/23  0108 08/16/23  1610   WBC  --   --   --   --   --   --   --   --  9.4  --  9.7   HGB  --   --   --   --  13.5  --  12.9*  --  13.8  --  15.6   MCV  --   --   --   --   --   --   --   --  89  --  86   PLT  --   --   --   --   --   --   --   --  180  --  222   NA  --  133*  --   --  132*  --  133*  --  134*   < > 137   POTASSIUM  --  4.5  --   --  4.3  --  4.0  --  3.7   < > 4.0   CHLORIDE  --  102  --   --  101  --  101  --  99   < > 96*   CO2  --  21*  --   --  22  --  22  --  24   < > 23   BUN  --  19.8  --   --  17.9  --  18.8  --  18.0   < > 21.6   CR  --  1.01  --   --  0.98  --  1.08  --  1.18*   < > 1.83*   ANIONGAP  --  10  --   --  9  --  10  --  11   < > 18*   JERRY  --  8.9  --   --  8.7*  --  8.4*  --  8.4*   < > 9.7   * 181* 216*   < > 165*   < > 201*   < > 185*   < > 132*   ALBUMIN  --   --   --   --   --   --  2.6*  --  2.7*   < > 3.4*   PROTTOTAL  --   --   --   --   --   --  5.5*  --  5.9*   < > 7.4   BILITOTAL  --   --   --   --   --   --  1.3*  --  2.0*   < > 3.4*   ALKPHOS  --   --   --   --   --   --  122  --  138*   < > 183*   ALT  --   --   --   --   --   --  14  --  15   < > 20   AST  --   --   --   --   --   --  14  --  17   < > 23    < > = values in this interval not displayed.

## 2023-08-21 NOTE — PLAN OF CARE
Speech Language Therapy Discharge Summary    Reason for therapy discharge:    All goals and outcomes met, no further needs identified.    Progress towards therapy goal(s). See goals on Care Plan in [facility] electronic health record for goal details.  Goals met    Diagnostic EGD performed 8/19 demonstrated Schatzki ring that was dilated and minimal if any lower esophageal sphincter. He is tolerating advancing diet and oral medications after dilation procedure without difficulty.     Therapy recommendation(s):    No further therapy is recommended.

## 2023-08-21 NOTE — PROGRESS NOTES
Hospitalist progress note    Patient and his family requested that I meet with them to review their questions and concerns.  His spouse and son were present at the bedside and one of his daughters was on the telephone.  They expressed concern about his clinical course in the last several months with poor oral intake, swallowing problems, weight loss, and diarrhea and his declining functional status.  They indicated they were worried that these problems would worsen again after his release from the hospital.    We reviewed his clinical course, diagnoses, and treatments with particular emphasis on new diagnosis of esophageal Schatzki ring that was dilated during this hospital stay and diagnosis of recurrent C. difficile infection for which vancomycin therapy has been restarted.  We discussed antacid treatment for suspected esophageal reflux.  Prognosis for recovery of swallowing function and recovery from diarrheal illness was discussed.  His course over the last 2 days after the esophageal dilation with him tolerating advancing diet without difficulty and with his diarrheal symptoms slowing was reviewed.  We discussed his improving functional status including his ability to ambulate independently using a walker with wheels.    Their questions and concerns were answered and addressed.    Total visit time today 53 minutes including 30 minutes spent with rounding earlier this morning and 23 minutes in face-to-face discussion with the patient and his family this afternoon.    Matt Valera MD

## 2023-08-21 NOTE — PROGRESS NOTES
"4 hour update note.  VSS.  Pt remains on RA w SpO2 > 90%.  C/o R shoulder pain, given scheduled Voltaren.  Pt's blood glucose michaela to 208, given one unit novolog w dinner.  Pt continued his PO vancomycin.    /78 (BP Location: Right arm)   Pulse 78   Temp 97.9  F (36.6  C) (Oral)   Resp 18   Ht 1.803 m (5' 10.98\")   Wt 101.1 kg (222 lb 14.4 oz)   SpO2 92%   BMI 31.10 kg/m      Jing Trujillo RN on 8/21/2023 at 6:43 PM    "

## 2023-08-21 NOTE — PLAN OF CARE
Goal Outcome Evaluation:      Plan of Care Reviewed With: patient    Overall Patient Progress: no changeOverall Patient Progress: no change    Outcome Evaluation: Pt A/Ox4. Denies pain. Up with assist x1, walker and gait belt. Tolerated soilds tonight with dinner.VSS. Electrolytes will be checked in the AM.

## 2023-08-22 LAB
ANION GAP SERPL CALCULATED.3IONS-SCNC: 11 MMOL/L (ref 7–15)
BUN SERPL-MCNC: 20.4 MG/DL (ref 8–23)
CALCIUM SERPL-MCNC: 8.8 MG/DL (ref 8.8–10.2)
CHLORIDE SERPL-SCNC: 101 MMOL/L (ref 98–107)
CREAT SERPL-MCNC: 1.03 MG/DL (ref 0.67–1.17)
DEPRECATED HCO3 PLAS-SCNC: 21 MMOL/L (ref 22–29)
GFR SERPL CREATININE-BSD FRML MDRD: 69 ML/MIN/1.73M2
GLUCOSE BLDC GLUCOMTR-MCNC: 131 MG/DL (ref 70–99)
GLUCOSE BLDC GLUCOMTR-MCNC: 144 MG/DL (ref 70–99)
GLUCOSE BLDC GLUCOMTR-MCNC: 203 MG/DL (ref 70–99)
GLUCOSE BLDC GLUCOMTR-MCNC: 241 MG/DL (ref 70–99)
GLUCOSE SERPL-MCNC: 150 MG/DL (ref 70–99)
HOLD SPECIMEN: NORMAL
MAGNESIUM SERPL-MCNC: 2 MG/DL (ref 1.7–2.3)
PATH REPORT.COMMENTS IMP SPEC: NORMAL
PATH REPORT.COMMENTS IMP SPEC: NORMAL
PATH REPORT.FINAL DX SPEC: NORMAL
PATH REPORT.GROSS SPEC: NORMAL
PATH REPORT.MICROSCOPIC SPEC OTHER STN: NORMAL
PATH REPORT.RELEVANT HX SPEC: NORMAL
PHOSPHATE SERPL-MCNC: 2.9 MG/DL (ref 2.5–4.5)
PHOTO IMAGE: NORMAL
POTASSIUM SERPL-SCNC: 4.4 MMOL/L (ref 3.4–5.3)
SODIUM SERPL-SCNC: 133 MMOL/L (ref 136–145)

## 2023-08-22 PROCEDURE — 250N000013 HC RX MED GY IP 250 OP 250 PS 637: Performed by: FAMILY MEDICINE

## 2023-08-22 PROCEDURE — 83735 ASSAY OF MAGNESIUM: CPT | Performed by: PEDIATRICS

## 2023-08-22 PROCEDURE — 250N000012 HC RX MED GY IP 250 OP 636 PS 637: Performed by: PEDIATRICS

## 2023-08-22 PROCEDURE — 120N000001 HC R&B MED SURG/OB

## 2023-08-22 PROCEDURE — 250N000013 HC RX MED GY IP 250 OP 250 PS 637: Performed by: SURGERY

## 2023-08-22 PROCEDURE — 250N000013 HC RX MED GY IP 250 OP 250 PS 637: Performed by: INTERNAL MEDICINE

## 2023-08-22 PROCEDURE — 250N000013 HC RX MED GY IP 250 OP 250 PS 637: Performed by: PEDIATRICS

## 2023-08-22 PROCEDURE — 36415 COLL VENOUS BLD VENIPUNCTURE: CPT | Performed by: PEDIATRICS

## 2023-08-22 PROCEDURE — 99232 SBSQ HOSP IP/OBS MODERATE 35: CPT | Performed by: PEDIATRICS

## 2023-08-22 PROCEDURE — 88305 TISSUE EXAM BY PATHOLOGIST: CPT | Mod: 26 | Performed by: PATHOLOGY

## 2023-08-22 PROCEDURE — 80048 BASIC METABOLIC PNL TOTAL CA: CPT | Performed by: PEDIATRICS

## 2023-08-22 PROCEDURE — 84100 ASSAY OF PHOSPHORUS: CPT | Performed by: PEDIATRICS

## 2023-08-22 RX ORDER — MAGNESIUM OXIDE 400 MG/1
400 TABLET ORAL EVERY 4 HOURS
Status: COMPLETED | OUTPATIENT
Start: 2023-08-22 | End: 2023-08-22

## 2023-08-22 RX ORDER — FUROSEMIDE 20 MG
20 TABLET ORAL DAILY
Status: DISCONTINUED | OUTPATIENT
Start: 2023-08-22 | End: 2023-08-24 | Stop reason: HOSPADM

## 2023-08-22 RX ADMIN — VANCOMYCIN HYDROCHLORIDE 125 MG: 125 CAPSULE ORAL at 17:20

## 2023-08-22 RX ADMIN — Medication 400 MG: at 17:20

## 2023-08-22 RX ADMIN — APIXABAN 2.5 MG: 2.5 TABLET, FILM COATED ORAL at 09:21

## 2023-08-22 RX ADMIN — PREDNISONE 20 MG: 20 TABLET ORAL at 09:21

## 2023-08-22 RX ADMIN — VANCOMYCIN HYDROCHLORIDE 125 MG: 125 CAPSULE ORAL at 09:21

## 2023-08-22 RX ADMIN — Medication 15 ML: at 09:21

## 2023-08-22 RX ADMIN — VANCOMYCIN HYDROCHLORIDE 125 MG: 125 CAPSULE ORAL at 20:23

## 2023-08-22 RX ADMIN — LIDOCAINE 2 PATCH: 560 PATCH PERCUTANEOUS; TOPICAL; TRANSDERMAL at 20:23

## 2023-08-22 RX ADMIN — FAMOTIDINE 40 MG: 20 TABLET, FILM COATED ORAL at 09:21

## 2023-08-22 RX ADMIN — DICLOFENAC SODIUM 4 G: 10 GEL TOPICAL at 17:24

## 2023-08-22 RX ADMIN — CITALOPRAM HYDROBROMIDE 5 MG: 10 TABLET ORAL at 20:23

## 2023-08-22 RX ADMIN — DICLOFENAC SODIUM 4 G: 10 GEL TOPICAL at 09:25

## 2023-08-22 RX ADMIN — AMIODARONE HYDROCHLORIDE 200 MG: 200 TABLET ORAL at 09:21

## 2023-08-22 RX ADMIN — INSULIN ASPART 1 UNITS: 100 INJECTION, SOLUTION INTRAVENOUS; SUBCUTANEOUS at 12:19

## 2023-08-22 RX ADMIN — METOPROLOL SUCCINATE 50 MG: 50 TABLET, EXTENDED RELEASE ORAL at 09:21

## 2023-08-22 RX ADMIN — MICONAZOLE NITRATE 1 G: 2 POWDER TOPICAL at 20:25

## 2023-08-22 RX ADMIN — ATORVASTATIN CALCIUM 20 MG: 10 TABLET, FILM COATED ORAL at 20:23

## 2023-08-22 RX ADMIN — DICLOFENAC SODIUM 4 G: 10 GEL TOPICAL at 20:24

## 2023-08-22 RX ADMIN — MICONAZOLE NITRATE 1 G: 2 POWDER TOPICAL at 09:25

## 2023-08-22 RX ADMIN — FUROSEMIDE 20 MG: 20 TABLET ORAL at 12:19

## 2023-08-22 RX ADMIN — APIXABAN 5 MG: 5 TABLET, FILM COATED ORAL at 20:23

## 2023-08-22 RX ADMIN — Medication 400 MG: at 14:52

## 2023-08-22 RX ADMIN — FAMOTIDINE 40 MG: 20 TABLET, FILM COATED ORAL at 20:23

## 2023-08-22 RX ADMIN — DICLOFENAC SODIUM 4 G: 10 GEL TOPICAL at 12:19

## 2023-08-22 RX ADMIN — VANCOMYCIN HYDROCHLORIDE 125 MG: 125 CAPSULE ORAL at 12:19

## 2023-08-22 RX ADMIN — INSULIN ASPART 2 UNITS: 100 INJECTION, SOLUTION INTRAVENOUS; SUBCUTANEOUS at 17:23

## 2023-08-22 ASSESSMENT — ACTIVITIES OF DAILY LIVING (ADL)
ADLS_ACUITY_SCORE: 38

## 2023-08-22 NOTE — PLAN OF CARE
Goal Outcome Evaluation:      Plan of Care Reviewed With: patient    Overall Patient Progress: improvingOverall Patient Progress: improving    Outcome Evaluation: 4 Hour Shift Note: Patient reports feeling well. He denies any difficulty with swallowing, throat pain or nausea after eating. Pt reports feeling full easily but knows to take oral intake slowly. No loose stools this evening. Buttocks wounds are improving.

## 2023-08-22 NOTE — PLAN OF CARE
Goal Outcome Evaluation:      Plan of Care Reviewed With: patient    Overall Patient Progress: improvingOverall Patient Progress: improving    Outcome Evaluation: Pt A&Ox4, uses call light for needs. Mepilex changed to coccyx. Tolerating PO intake. Up out of bed, in chair and ambulating this shift.

## 2023-08-22 NOTE — PLAN OF CARE
Goal Outcome Evaluation:      Plan of Care Reviewed With: patient    Overall Patient Progress: improvingOverall Patient Progress: improving    Outcome Evaluation: A&Ox4. VSS on rm. Mepilex to wounds on buttocks. Denies pain and nausea. Denies difficulty swallowing. Tolerating oral intake. No loose stools overnight.       Azithromycin Pregnancy And Lactation Text: This medication is considered safe during pregnancy and is also secreted in breast milk.

## 2023-08-22 NOTE — PROGRESS NOTES
[facility]    Medicine Progress Note - Hospitalist Service    Date of Admission:  8/17/2023    Assessment & Plan   89-year-old man with type 2 diabetes, hypertension, stage III chronic kidney disease, paroxysmal atrial fibrillation, chronic diastolic heart failure, severe aortic stenosis, BPH, and history of clostridia difficile infection July 2023 treated with 10-day course of oral vancomycin who presented with worsening weakness, poor oral intake, persistent diarrhea, and 25 pound weight loss in the last month.  Due to concerns for acute kidney injury, severe dehydration, electrolyte abnormalities, and inadequate oral intake, he was admitted.      DEJUAN (acute kidney injury) superimposed on CKD stage 3a  Hypovolemia  Presented with creatinine of 1.83 with recent baseline creatinine 1.1-1.3.  Suspect DEJUAN was due to hypovolemia from the combination of inadequate oral intake and diarrhea.  Renal function has recovered to baseline following IV fluid treatment.  IV fluids were stopped 8/20 and renal function has since remained stable.  -Ordered recheck of renal function    Dysphagia  Schatzki ring  Esophageal dysmotility  Moderate malnutrition  Patient has had a 37 lb weight loss in the past 5 months and 25 pounds in the past month.he has been unable to swallow more than small amount of pureed or clear liquids at a time because of vomiting if he tries to consume larger amounts.  He has had variable tolerance of pills.  He had been diagnosed with moderate malnutrition in July 2023 with persistent signs of moderate malnutrition during this hospitalization.  Speech therapy has evaluated and no difficulty with actual swallow function was suspected.  Esophagram was abnormal with severe esophageal dysmotility proximally and diffuse narrowing distally.  Diagnostic EGD performed 8/19 demonstrated Schatzki ring that was dilated and minimal if any lower esophageal sphincter.  Esophageal as well as  gastric and duodenal mucosal biopsies were obtained with results pending at this time.  He is tolerating advancing diet and oral medications after dilation procedure without difficulty.  He has not demonstrated signs of refeeding syndrome.  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  Patient was empirically started on IV Flagyl during this hospitalization while awaiting additional test results and diarrhea has slowed.  C diff PCR testing was positive and subsequent antigen and A/B toxin testing results were also positive indicative of persistent active clostridia difficile infection with diarrhea.   Suspect diarrhea contributed to hypovolemia and acute kidney injury.  Diarrhea is improving.  -Continue oral vancomycin 125 mg 4 times daily and anticipate a protracted tapering course per treatment guidelines for recurrent clostridia difficile    Hypoalbuminemia  Hypoalbuminemia noted at admission at 3.4 and decreased to 2.7 following fluid resuscitation.  Suspect hypoalbuminemia is due to malnutrition.  -intermittent monitoring of albumin as indicated     Hypomagnesemia  Hypokalemia  Hyponatremia  Magnesium 1.5 and potassium 3.3 attributed to poor nutritional intake and diarrhea.  Magnesium and potassium normalized with supplementation.  Serum sodium was normal at admission but has since become hyponatremic.  He is not overtly symptomatic from hyponatremia.  -continue with  potassium and magnesium supplementation as indicated by respective treatment protocols  -Monitor potassium and magnesium  -Ordered recheck of sodium after restarting chronic dose of Lasix today    Acute gout involving great toe of left foot    Acute gout of great toe of left foot (podagra) was present at time of admission with rapid improvement after starting treatment with steroids.  Acute gout symptoms are nearly resolved.  -discontinue IV solumedrol - today was day 4  -Start prednisone 20 mg daily for 3 more days, second dose today    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)  Patient has not been on medication recently due to poor appetite with hemoglobin A1C last month of 7.2.  Patient was placed on dextrose containing IV fluids to avoid hypoglycemia but IV fluids have since been stopped.  Blood sugars have been 132-208 in the past day as his diet advances   -monitor blood sugars 4 times daily  -Continue low resistance sliding scale insulin Novolog    Primary localized osteoarthrosis of shoulder region  Chronic ongoing issue  -continue Lidocaine patch  -started Voltaren cream as needed during this stay     Paroxysmal atrial fibrillation  Medication induced coagulopathy  Previous history of atrial fibrillation normally treated chronically with amiodarone, Metoprolol XL and Eliquis which were held due to NPO status.  Patient treated with therapeutic Lovenox for bridging while Eliquis was held.  Patient did have a transient period of increased HR in the 110-120 on 8/18 which resolved without acute intervention and has not since recurred.  Eliquis causes coagulopathy, but bleeding has not been suspected during hospitalization.  -Continue chronic dose of amiodarone   -Continue chronic dose of metoprolol XL  -Continue chronic dose of Eliquis    Severe aortic stenosis  Chronic diastolic congestive heart failure  Essential hypertension    Patient has chronic severe aortic stenosis and diastolic heart  "failure both of which has been clinically stable during hospitalization.  He chronically is treated with  metoprolol XL 50 mg daily and lasix 20 mg daily.  Diuretics have been held secondary to DEJUAN.  -Continue Toprol-XL  -Resume chronic Lasix today    Hyperbilirubinemia  Patient has chronic hyperbilirubinemia with bilirubin ranging 1-3 since 2017.  Bilirubin is trending down and is now normal  -monitor bilirubin as indicated    BPH with urinary obstruction  Reported history of BPH with urinary obstruction but patient is not currently on medication to treat BPH and is not overtly symptomatic from it.   -continue clinical monitoring of spontaneous voiding capacity    Mixed hyperlipidemia  Chronic hyperlipidemia is treated with atorvastatin which was held during this hospitalization.  -Resume chronic dose of atorvastatin    Obesity  Chronic with BMI 31 even after recent weight loss  -No acute intervention       Diet: Snacks/Supplements Adult: Magic Cup; With Meals  Regular Diet Adult    DVT Prophylaxis: DOAC  Eastman Catheter: Not present  Lines: None     Cardiac Monitoring: None  Code Status:  Special    Clinically Significant Risk Factors              # Hypoalbuminemia: Lowest albumin = 2.6 g/dL at 8/19/2023  5:41 AM, will monitor as appropriate     # Hypertension: Noted on problem list  # Chronic heart failure with preserved ejection fraction: heart failure noted on problem list and last echo with EF >50%      # DMII: A1C = 7.2 % (Ref range: <5.7 %) within past 6 months   # Obesity: Estimated body mass index is 31.21 kg/m  as calculated from the following:    Height as of this encounter: 1.803 m (5' 10.98\").    Weight as of this encounter: 101.5 kg (223 lb 11.2 oz).   # Moderate Malnutrition: based on nutrition assessment           Disposition Plan      Expected Discharge Date: 08/23/2023      Destination: home with help/services  Discharge Comments: Wed/thurs, home and resume homecare services          Matt MON" MD [place]  Hospitalist Service  [facility]  Securely message with [place] (more info)  Text page via [facility] Paging/Directory   ______________________________________________________________________    Interval History   There were no significant overnight events.  Patient reports that he ate supper well last night and breakfast this morning without any difficulty.  He is surprised with how much he is eating.  He has had no problems with swallowing.  He had perhaps a few episodes of some belching but nothing sustained.  He has some abdominal fullness but no uncomfortable bloating and no abdominal cramping.  He did not have a bowel movement yesterday nor has he had a bowel movement yet today.  He offers no new complaints.  He continues to feel weak but has been ambulating using a walker with wheels.  He has been afebrile and hemodynamically stable.  Oxygenation is normal.  He is voiding spontaneously.    Physical Exam   Vital Signs: Temp: 97.4  F (36.3  C) Temp src: Oral BP: (!) 142/93 Pulse: 60   Resp: 18 SpO2: 92 % O2 Device: None (Room air)    Patient Vitals for the past 24 hrs:   BP Temp Temp src Pulse Resp SpO2 Weight   08/22/23 0726 (!) 142/93 97.4  F (36.3  C) Oral 60 18 92 % --   08/22/23 0546 -- -- -- -- -- -- 101.5 kg (223 lb 11.2 oz)   08/21/23 2319 119/78 97.6  F (36.4  C) Oral 61 18 96 % --   08/21/23 1522 119/78 97.9  F (36.6  C) Oral 78 18 92 % --     Weight: 223 lbs 11.2 oz    General Appearance: Elderly man without signs of acute distress sitting up in a chair  Cardiovascular: Mild peripheral edema in the lower legs bilaterally  GI: Normal bowel sounds, nondistended abdomen, soft, no abdominal tenderness       Medical Decision Making             Data     I have personally reviewed the following data over the past 24 hrs:    N/A  \   N/A   / N/A     133 (L) 101 20.4 /  131 (H)   4.4 21 (L) 1.03 \       Magnesium 2.0, phosphorus 2.9  Blood sugars ranged 132-208 over the  last day with 2 out of 6 blood sugar readings greater than 180    Recent Labs   Lab 08/22/23  0814 08/22/23  0547 08/21/23 2050 08/21/23  0808 08/21/23  0613 08/20/23  0801 08/20/23  0541 08/19/23  0742 08/19/23  0541 08/18/23  0822 08/18/23  0608 08/17/23  0108 08/16/23  1610   WBC  --   --   --   --   --   --   --   --   --   --  9.4  --  9.7   HGB  --   --   --   --   --   --  13.5  --  12.9*  --  13.8  --  15.6   MCV  --   --   --   --   --   --   --   --   --   --  89  --  86   PLT  --   --   --   --   --   --   --   --   --   --  180  --  222   NA  --  133*  --   --  133*  --  132*  --  133*  --  134*   < > 137   POTASSIUM  --  4.4  --   --  4.5  --  4.3  --  4.0  --  3.7   < > 4.0   CHLORIDE  --  101  --   --  102  --  101  --  101  --  99   < > 96*   CO2  --  21*  --   --  21*  --  22  --  22  --  24   < > 23   BUN  --  20.4  --   --  19.8  --  17.9  --  18.8  --  18.0   < > 21.6   CR  --  1.03  --   --  1.01  --  0.98  --  1.08  --  1.18*   < > 1.83*   ANIONGAP  --  11  --   --  10  --  9  --  10  --  11   < > 18*   JERRY  --  8.8  --   --  8.9  --  8.7*  --  8.4*  --  8.4*   < > 9.7   * 150* 191*   < > 181*   < > 165*   < > 201*   < > 185*   < > 132*   ALBUMIN  --   --   --   --   --   --   --   --  2.6*  --  2.7*   < > 3.4*   PROTTOTAL  --   --   --   --   --   --   --   --  5.5*  --  5.9*   < > 7.4   BILITOTAL  --   --   --   --   --   --   --   --  1.3*  --  2.0*   < > 3.4*   ALKPHOS  --   --   --   --   --   --   --   --  122  --  138*   < > 183*   ALT  --   --   --   --   --   --   --   --  14  --  15   < > 20   AST  --   --   --   --   --   --   --   --  14  --  17   < > 23    < > = values in this interval not displayed.

## 2023-08-23 ENCOUNTER — APPOINTMENT (OUTPATIENT)
Dept: PHYSICAL THERAPY | Facility: CLINIC | Age: 88
DRG: 683 | End: 2023-08-23
Attending: PEDIATRICS
Payer: COMMERCIAL

## 2023-08-23 ENCOUNTER — APPOINTMENT (OUTPATIENT)
Dept: OCCUPATIONAL THERAPY | Facility: CLINIC | Age: 88
DRG: 683 | End: 2023-08-23
Attending: INTERNAL MEDICINE
Payer: COMMERCIAL

## 2023-08-23 PROBLEM — T14.8XXA OPEN WOUND: Status: ACTIVE | Noted: 2023-08-23

## 2023-08-23 LAB
ANION GAP SERPL CALCULATED.3IONS-SCNC: 10 MMOL/L (ref 7–15)
BUN SERPL-MCNC: 21.8 MG/DL (ref 8–23)
CALCIUM SERPL-MCNC: 8.8 MG/DL (ref 8.8–10.2)
CHLORIDE SERPL-SCNC: 100 MMOL/L (ref 98–107)
CREAT SERPL-MCNC: 1.06 MG/DL (ref 0.67–1.17)
DEPRECATED HCO3 PLAS-SCNC: 25 MMOL/L (ref 22–29)
GFR SERPL CREATININE-BSD FRML MDRD: 67 ML/MIN/1.73M2
GLUCOSE BLDC GLUCOMTR-MCNC: 130 MG/DL (ref 70–99)
GLUCOSE BLDC GLUCOMTR-MCNC: 150 MG/DL (ref 70–99)
GLUCOSE BLDC GLUCOMTR-MCNC: 172 MG/DL (ref 70–99)
GLUCOSE BLDC GLUCOMTR-MCNC: 208 MG/DL (ref 70–99)
GLUCOSE BLDC GLUCOMTR-MCNC: 260 MG/DL (ref 70–99)
GLUCOSE SERPL-MCNC: 155 MG/DL (ref 70–99)
HOLD SPECIMEN: NORMAL
MAGNESIUM SERPL-MCNC: 1.8 MG/DL (ref 1.7–2.3)
PHOSPHATE SERPL-MCNC: 2.8 MG/DL (ref 2.5–4.5)
POTASSIUM SERPL-SCNC: 4.2 MMOL/L (ref 3.4–5.3)
SODIUM SERPL-SCNC: 135 MMOL/L (ref 136–145)

## 2023-08-23 PROCEDURE — 250N000013 HC RX MED GY IP 250 OP 250 PS 637: Performed by: SURGERY

## 2023-08-23 PROCEDURE — 97165 OT EVAL LOW COMPLEX 30 MIN: CPT | Mod: GO | Performed by: SPECIALIST/TECHNOLOGIST

## 2023-08-23 PROCEDURE — 97535 SELF CARE MNGMENT TRAINING: CPT | Mod: GO | Performed by: SPECIALIST/TECHNOLOGIST

## 2023-08-23 PROCEDURE — 97161 PT EVAL LOW COMPLEX 20 MIN: CPT | Mod: GP | Performed by: PHYSICAL THERAPIST

## 2023-08-23 PROCEDURE — 250N000013 HC RX MED GY IP 250 OP 250 PS 637: Performed by: INTERNAL MEDICINE

## 2023-08-23 PROCEDURE — 80048 BASIC METABOLIC PNL TOTAL CA: CPT | Performed by: PEDIATRICS

## 2023-08-23 PROCEDURE — 99231 SBSQ HOSP IP/OBS SF/LOW 25: CPT | Performed by: PEDIATRICS

## 2023-08-23 PROCEDURE — 36415 COLL VENOUS BLD VENIPUNCTURE: CPT | Performed by: PEDIATRICS

## 2023-08-23 PROCEDURE — 250N000012 HC RX MED GY IP 250 OP 636 PS 637: Performed by: PEDIATRICS

## 2023-08-23 PROCEDURE — 84100 ASSAY OF PHOSPHORUS: CPT | Performed by: PEDIATRICS

## 2023-08-23 PROCEDURE — 120N000001 HC R&B MED SURG/OB

## 2023-08-23 PROCEDURE — 83735 ASSAY OF MAGNESIUM: CPT | Performed by: PEDIATRICS

## 2023-08-23 PROCEDURE — 250N000013 HC RX MED GY IP 250 OP 250 PS 637: Performed by: PEDIATRICS

## 2023-08-23 RX ORDER — MAGNESIUM OXIDE 400 MG/1
400 TABLET ORAL EVERY 4 HOURS
Status: COMPLETED | OUTPATIENT
Start: 2023-08-23 | End: 2023-08-23

## 2023-08-23 RX ADMIN — METOPROLOL SUCCINATE 50 MG: 50 TABLET, EXTENDED RELEASE ORAL at 09:09

## 2023-08-23 RX ADMIN — VANCOMYCIN HYDROCHLORIDE 125 MG: 125 CAPSULE ORAL at 12:15

## 2023-08-23 RX ADMIN — VANCOMYCIN HYDROCHLORIDE 125 MG: 125 CAPSULE ORAL at 16:42

## 2023-08-23 RX ADMIN — APIXABAN 5 MG: 5 TABLET, FILM COATED ORAL at 09:09

## 2023-08-23 RX ADMIN — DICLOFENAC SODIUM 4 G: 10 GEL TOPICAL at 20:43

## 2023-08-23 RX ADMIN — PREDNISONE 20 MG: 20 TABLET ORAL at 09:09

## 2023-08-23 RX ADMIN — MICONAZOLE NITRATE 1 G: 2 POWDER TOPICAL at 20:43

## 2023-08-23 RX ADMIN — INSULIN ASPART 1 UNITS: 100 INJECTION, SOLUTION INTRAVENOUS; SUBCUTANEOUS at 12:15

## 2023-08-23 RX ADMIN — FAMOTIDINE 40 MG: 20 TABLET, FILM COATED ORAL at 20:38

## 2023-08-23 RX ADMIN — FUROSEMIDE 20 MG: 20 TABLET ORAL at 09:09

## 2023-08-23 RX ADMIN — APIXABAN 5 MG: 5 TABLET, FILM COATED ORAL at 20:38

## 2023-08-23 RX ADMIN — DICLOFENAC SODIUM 4 G: 10 GEL TOPICAL at 09:10

## 2023-08-23 RX ADMIN — DICLOFENAC SODIUM 4 G: 10 GEL TOPICAL at 12:15

## 2023-08-23 RX ADMIN — AMIODARONE HYDROCHLORIDE 200 MG: 200 TABLET ORAL at 09:09

## 2023-08-23 RX ADMIN — MICONAZOLE NITRATE 1 G: 2 POWDER TOPICAL at 09:11

## 2023-08-23 RX ADMIN — VANCOMYCIN HYDROCHLORIDE 125 MG: 125 CAPSULE ORAL at 20:38

## 2023-08-23 RX ADMIN — Medication 15 ML: at 09:10

## 2023-08-23 RX ADMIN — INSULIN ASPART 1 UNITS: 100 INJECTION, SOLUTION INTRAVENOUS; SUBCUTANEOUS at 17:15

## 2023-08-23 RX ADMIN — Medication 400 MG: at 12:15

## 2023-08-23 RX ADMIN — Medication 400 MG: at 09:09

## 2023-08-23 RX ADMIN — FAMOTIDINE 40 MG: 20 TABLET, FILM COATED ORAL at 09:09

## 2023-08-23 RX ADMIN — ATORVASTATIN CALCIUM 20 MG: 10 TABLET, FILM COATED ORAL at 20:38

## 2023-08-23 RX ADMIN — LIDOCAINE 2 PATCH: 560 PATCH PERCUTANEOUS; TOPICAL; TRANSDERMAL at 20:38

## 2023-08-23 RX ADMIN — DICLOFENAC SODIUM 4 G: 10 GEL TOPICAL at 16:42

## 2023-08-23 RX ADMIN — VANCOMYCIN HYDROCHLORIDE 125 MG: 125 CAPSULE ORAL at 09:09

## 2023-08-23 RX ADMIN — CITALOPRAM HYDROBROMIDE 5 MG: 10 TABLET ORAL at 20:38

## 2023-08-23 ASSESSMENT — ACTIVITIES OF DAILY LIVING (ADL)
ADLS_ACUITY_SCORE: 38
ADLS_ACUITY_SCORE: 38
ADLS_ACUITY_SCORE: 39
ADLS_ACUITY_SCORE: 39
ADLS_ACUITY_SCORE: 38
ADLS_ACUITY_SCORE: 39
ADLS_ACUITY_SCORE: 36
ADLS_ACUITY_SCORE: 36
ADLS_ACUITY_SCORE: 39
ADLS_ACUITY_SCORE: 36

## 2023-08-23 NOTE — PLAN OF CARE
"Goal Outcome Evaluation:      Plan of Care Reviewed With: patient    Overall Patient Progress: improvingOverall Patient Progress: improving    Outcome Evaluation: A&Ox4. Sats 90s RA. LS clear. Ax1 with GB and walker. No BMs overnight. . Mepilex on coccyx. Possible discharge today.    /80 (BP Location: Left arm)   Pulse 63   Temp 97.4  F (36.3  C) (Oral)   Resp 18   Ht 1.803 m (5' 10.98\")   Wt 85.7 kg (188 lb 14.4 oz)   SpO2 96%   BMI 26.36 kg/m       "

## 2023-08-23 NOTE — PROGRESS NOTES
4 hour shift note;  Pt A&Ox4. Denies pain. Up with SBA to bathroom. Voiding adequate amts. Mepilex in place to coccyx. No looses tools. Took meds w/o issues.

## 2023-08-23 NOTE — PROGRESS NOTES
[id number] Shift note:     Pt did not have any loose stool this shift. Appetite is good. Afebrile. VSS.

## 2023-08-23 NOTE — PROGRESS NOTES
08/23/23 1300   Appointment Info   Signing Clinician's Name / Credentials (PT) Mejia Wolfe, PT, DPT, OCS   Living Environment   People in Home spouse   Current Living Arrangements house   Transportation Anticipated family or friend will provide   Living Environment Comments Single level home.   Self-Care   Usual Activity Tolerance fair   Current Activity Tolerance poor   Equipment Currently Used at Home cane, straight;raised toilet seat;walker, rolling   Fall history within last six months yes   Number of times patient has fallen within last six months 1   General Information   Onset of Illness/Injury or Date of Surgery 08/17/23   Referring Physician Dr. Matt Valera   Patient/Family Therapy Goals Statement (PT) Return home.   Pertinent History of Current Problem (include personal factors and/or comorbidities that impact the POC) 89yoM with HTN, HLD, diastolic HF, DM, and paroxysmal afib presented to [facility] ED with weakness, poor po intake, diarrhea, and dehydration.  He was admitted in late July to [facility] with Cdiff related to antibiotics had received earlier in July for cellulitis.  General weakness progressed over the past 2-3 months.   Existing Precautions/Restrictions no known precautions/restrictions   Weight-Bearing Status - LUE full weight-bearing   Weight-Bearing Status - RUE full weight-bearing   Weight-Bearing Status - LLE full weight-bearing   Weight-Bearing Status - RLE full weight-bearing   Heart Disease Risk Factors Lack of physical activity;High blood pressure;Medical history;Age;Gender   General Observations Pt reports R shoulder adhesive capsulitis and has observable restriction of R UE ROM.   Cognition   Affect/Mental Status (Cognition) WNL   Orientation Status (Cognition) oriented x 3   Follows Commands (Cognition) WNL   Pain Assessment   Patient Currently in Pain No   Integumentary/Edema   Integumentary/Edema no deficits were identifed   Posture    Posture Forward head position   Range of  Motion (ROM)   Range of Motion ROM is WNL   Strength (Manual Muscle Testing)   Strength (Manual Muscle Testing) strength is WFL   Strength Comments Globally 4-4+/5 for LE's.   Transfers   Transfers sit-stand transfer   Sit-Stand Transfer   Sit-Stand [place] (Transfers) modified independence   Assistive Device (Sit-Stand Transfers) walker, front-wheeled   Gait/Stairs (Locomotion)   [place] Level (Gait) supervision;modified independence   Assistive Device (Gait) walker, front-wheeled   Distance in Feet 120   Pattern (Gait) 4-point;step-through   Deviations/Abnormal Patterns (Gait) gait speed decreased   Comment, (Gait/Stairs) Forward flexed posture, improves with verbal cues.  Intermittent standing rest breaks.   Balance   Balance Comments Able to stand independently with hand on 2WW.   Sensory Examination   Sensory Perception patient reports no sensory changes   Coordination   Coordination no deficits were identified   Muscle Tone   Muscle Tone no deficits were identified   Clinical Impression   Criteria for Skilled Therapeutic Intervention Yes, treatment indicated   PT Diagnosis (PT) Generalized weakness   Influenced by the following impairments Weakness, impaired activity tolerance.   Functional limitations due to impairments Walking, standing, transfers.   Clinical Presentation (PT Evaluation Complexity) Stable/Uncomplicated   Clinical Presentation Rationale Clinical judgement   Clinical Decision Making (Complexity) low complexity   Planned Therapy Interventions (PT) balance training;bed mobility training;gait training;home exercise program;neuromuscular re-education   Risk & Benefits of therapy have been explained evaluation/treatment results reviewed;care plan/treatment goals reviewed;risks/benefits reviewed;current/potential barriers reviewed;participants voiced agreement with care plan;participants included;patient;son   Clinical Impression Comments Pt is an 89 y.o. male who was seen for PT  evaluation secondary to weakness.  Pt demonstrated appropriate functional mobility to mobilize within his home with prior equipment.  Pt expressed concerns about being home and his wife assisting him with tasks such as paola-cares, dressing, and bathing.  Discussed OT assessment to determine assistance required for ADL's.   PT Total Evaluation Time   PT Eval, Low Complexity Minutes (01874) 30   Physical Therapy Goals   PT Frequency One time eval and treatment only   PT Predicted Duration/Target Date for Goal Attainment 08/23/23   PT Goals Gait;Transfers   PT: Transfers Modified independent;Sit to/from stand;Assistive device;Goal Met   PT: Gait Supervision/stand-by assist;Rolling walker;100 feet;Goal Met   PT Discharge Planning   PT Plan Hold, Resume HHPT when he returns home.   PT Discharge Recommendation (DC Rec) home with home care physical therapy   PT Rationale for DC Rec Pt is from home in [facility] with no stairs to enter.  Pt uses wheelchair for mobility mostly but will use a walker to enter the bathroom.  Pt demonstrated appropriate mobility and transfers ability to return to prior living environment.  Pt would benefit from homecare PT to improve strength and baseline function to prior level of function.   PT Brief overview of current status modified independence with transfer with use of 2WW. Ambulation 120 feet with 2WW with supervision to modified independence for short distances.  Steady with no LOB, brief standing rest breaks.   Total Session Time   Total Session Time (sum of timed and untimed services) 30

## 2023-08-23 NOTE — PLAN OF CARE
Goal Outcome Evaluation:      Plan of Care Reviewed With: patient    Overall Patient Progress: improvingOverall Patient Progress: improving    Outcome Evaluation: A&Ox4, using call light appropriately for needs. Ambulates assist of 1, walker, gait belt. Ambulated in halls x2 so far this shift. Plan to TCU at discharge.

## 2023-08-23 NOTE — PROGRESS NOTES
"   08/23/23 1400   Appointment Info   Signing Clinician's Name / Credentials (OT) Jayshree Marshall, OTR/L   Living Environment   People in Home spouse   Current Living Arrangements house   Home Accessibility no concerns   Transportation Anticipated family or friend will provide   Living Environment Comments Live with spouse in single level home. Has FWW, WC, 4WW. Has walk in shower, shower chair, raised toilet seat and bidet. Sleeps in lift chair.   Self-Care   Usual Activity Tolerance moderate   Current Activity Tolerance fair   Equipment Currently Used at Home walker, standard;wheelchair, manual;shower chair;raised toilet seat;dressing device   Fall history within last six months yes   Number of times patient has fallen within last six months 1   Activity/Exercise/Self-Care Comment Wife assists with dressing (socks, shirt), she is unable to assist with bathing and toilet hygiene. Patient is concerned about her ability to continue assisting with dressing and he own ability to effectively complete pericares secondary to open coccyx wound   Instrumental Activities of Daily Living (IADL)   IADL Comments Wife does meals, housekeeping, groceries, medications, driving. Daughter assists with these as well.   General Information   Onset of Illness/Injury or Date of Surgery 08/17/23   Referring Physician Soto Sánchez   Patient/Family Therapy Goal Statement (OT) To be able to do the things I used to    Per providers recent progress note, \"89-year-old man with type 2 diabetes, hypertension, stage III chronic kidney disease, paroxysmal atrial fibrillation, chronic diastolic heart failure, severe aortic stenosis, BPH, and history of clostridia difficile infection July 2023 treated with 10-day course of oral vancomycin who presented with worsening weakness, poor oral intake, persistent diarrhea, and 25 pound weight loss in the last month. Due to concerns for acute kidney injury, severe dehydration, electrolyte abnormalities, and " "inadequate oral intake, he was admitted.\"    General Observations and Info OT Orders for Eval and Treat, \" recent acute illness, open wound coccyx, weakness, problems with perineal cares\"   Cognitive Status Examination   Orientation Status orientation to person, place and time   Cognitive Status Comments Patient engages in discussion appropriately. No overt cognitive deficits identified   Visual Perception   Visual Impairment/Limitations corrective lenses full-time   Pain Assessment   Patient Currently in Pain No   Posture   Posture forward head position   Range of Motion Comprehensive   Comment, General Range of Motion R shoulder AROM significantly limited secondary to baseline adhesive capsulitis, otherwise ROM is WFL   Strength Comprehensive (MMT)   Comment, General Manual Muscle Testing (MMT) Assessment General weakness present secondary to prolonged illness. R shoulder MMT not tested, L shoulder/elbow/ strength WFL   Coordination   Upper Extremity Coordination No deficits were identified   Bed Mobility   Comment (Bed Mobility) Hand hold assist for supine to sit. Patient sleeps in lift recliner at baseline   Transfers   Transfer Comments SBA, slow but no LOB for sit<>stand   Balance   Balance Assessment no deficits were identified   Balance Comments Defer to PT   Activities of Daily Living   BADL Assessment/Intervention upper body dressing;lower body dressing;toileting   Upper Body Dressing Assessment/Training   Comment, (Upper Body Dressing) Patient requires assist for upper body dressing at baseline, uses a back scratcher to complete at home. Has grabbers but prefers back scratcher   Lower Body Dressing Assessment/Training   Comment, (Lower Body Dressing) Wife assists with socks at baseline, patient able to manage briefs over hips with toileting independently (SBA for safety). Uses back scratcher as dressing stick to assist with threading feet through clothing for LB dressing. Has grabbers x3 in home " environment   Toileting   Comment, (Toileting) SBA with toilet rails for transfer, independent management with briefs up/down over hips. Patient able to successfully complete light bowel hygiene this date, however, he reports that he is concerned with his ability to do this consistently with increased cleaning demands due to open coccyx wound.   Clinical Impression   Criteria for Skilled Therapeutic Interventions Met (OT) Yes, treatment indicated   OT Diagnosis ADL impairments present at baseline   Influenced by the following impairments R UE AROM, weakness, open coccyx wound, prolonged illness.   OT Problem List-Impairments impacting ADL problems related to;activity tolerance impaired;fear & anxiety;range of motion (ROM);strength   ADL comments/analysis Near baseline, however, wife may no longer be able to assist   Assessment of Occupational Performance 1-3 Performance Deficits   Identified Performance Deficits Pericares, UB/LB dressing   Planned Therapy Interventions (OT) ADL retraining;strengthening   Intervention Comments AE Educationn, Compensatory Stratgies   Clinical Decision Making Complexity (OT) low complexity   Anticipated Equipment Needs Upon Discharge (OT) other (see comments)  (Bidet, toilet aid)   Risk & Benefits of therapy have been explained evaluation/treatment results reviewed;care plan/treatment goals reviewed;risks/benefits reviewed;current/potential barriers reviewed;participants voiced agreement with care plan;participants included;patient   Clinical Impression Comments Patient presents with weakness secondary to illness and intermittent hositalizations for recent months due to poor oral intake and dehydration. Patient will benefit from ADL retraining, compensatory methods, and AE education to support safety and independence with ADL routine in home environment   OT Total Evaluation Time   OT Eval, Low Complexity Minutes (94432) 20   OT Goals   Therapy Frequency (OT) 3 times/wk   OT Predicted  "Duration/Target Date for Goal Attainment 08/29/23   OT Goals Lower Body Dressing;Upper Body Dressing;Toilet Transfer/Toileting   OT: Upper Body Dressing Modified independent   OT: Lower Body Dressing Modified independent   OT: Toilet Transfer/Toileting Modified independent   Interventions   Interventions Quick Adds Self-Care/Home Management   Self-Care/Home Management   Self-Care/Home Mgmt/ADL, Compensatory, Meal Prep Minutes (47072) 40   Symptoms Noted During/After Treatment (Meal Preparation/Planning Training) fatigue   Treatment Detail/Skilled Intervention Patient supine in bed, just finishing with nursing on OT entrance, is agreeable to OT for ADLs. Patient educated on use of bed rails for bed mobility, requires hand hold assist due to R UE deficits and general weakness. SBA for room mobility with FWW, completes toilet transfer and mgmt of briefs with SBA, mod ind with seated light bowel hygiene with patient requesting therapist complete a \"second pass\" to ensure thorough hygiene due to concern for his coccyxwound (covered with mepelex). Patient educated on bidet and toilet aid/toilet wand for independence with marlene. He states he has a bidet but has not been using as it doesn't get him clean enough. Therapist provided handout and education on where to purchase, explained different types of toilet aids available and made recommendation based on patient's anticiapted need. Patient continues to report concern for level of assist at home, is worried his spouse is unable to continue providing assistance for ADLs. Therapist educated patient on TCU vs. SNF for short stay rehab/medical management versus [facility]CO type residental living environments if his level of care continues to exceed family's ability to provide. Educated on recommendation for home healthcare including therapies for ongoing strengthening. Mod ind with bed rails for sit>supine and cues for scooting up in bed. Call light left within reach, bed alarm " activated upon OT exit.   OT Discharge Planning   OT Plan 1/3 WED: AE Education,   OT Discharge Recommendation (DC Rec) home with assist;home with home care occupational therapy;Transitional Care Facility   OT Rationale for DC Rec Here from main level home with spouse and WC/FWW/4WW for mobility. Had assist at home with UB dressing and socks. Uses a back scratcher as dressing stick in home environement. Wife completes IADLs with some assist from daughter. Patient demonstrates at/near baseline ADL and mobility performance, however patient is concerned with his wifes ability to continue assisting him. He is also concerned with his ability to complete thorough pericare consistently with an open coccyx wound to consider. If spouse/family are able to continue providing baseline level of assistance and intermittent assistance with pericares, patient is recommended to discharge to home with resuming homecare for RN/HHA/OT/PT. If family is unable to support his needs, patient will benefit from TCU/SNF stay for continued healing of open wound, ongoing strengthening, and continued ADL retraining to support safety/ind with self care routine.   OT Brief overview of current status SBA for transfers and room mobility with FWW, hand hold assist with supine>sit, SBA for toilet transfer and mgmt of brief over hips, ind light bowel hygiene. Education on recommendation for use of toilet wand/bidet for thorough pericares   Total Session Time   Timed Code Treatment Minutes 40   Total Session Time (sum of timed and untimed services) 60   Thank you for your referral.  Jayshree Marshall, OTR/RODGER     [facility]  O: [phone]  E: [email or web address]

## 2023-08-23 NOTE — PROGRESS NOTES
[facility]    Medicine Progress Note - Hospitalist Service    Date of Admission:  8/17/2023    Assessment & Plan   89-year-old man with type 2 diabetes, hypertension, stage III chronic kidney disease, paroxysmal atrial fibrillation, chronic diastolic heart failure, severe aortic stenosis, BPH, and history of clostridia difficile infection July 2023 treated with 10-day course of oral vancomycin who presented with worsening weakness, poor oral intake, persistent diarrhea, and 25 pound weight loss in the last month.  Due to concerns for acute kidney injury, severe dehydration, electrolyte abnormalities, and inadequate oral intake, he was admitted.      DEJUAN (acute kidney injury) superimposed on CKD stage 3a  Hypovolemia  Presented with creatinine of 1.83 with recent baseline creatinine 1.1-1.3.  Suspect DEJUAN was due to hypovolemia from the combination of inadequate oral intake and diarrhea.  Renal function has recovered to baseline following IV fluid treatment.  IV fluids were stopped 8/20 and renal function has since remained stable.  -Ordered recheck of renal function    Dysphagia  Schatzki ring  Esophageal dysmotility  Moderate malnutrition  Patient has had a 37 lb weight loss in the past 5 months and 25 pounds in the past month.he has been unable to swallow more than small amount of pureed or clear liquids at a time because of vomiting if he tries to consume larger amounts.  He has had variable tolerance of pills.  He had been diagnosed with moderate malnutrition in July 2023 with persistent signs of moderate malnutrition during this hospitalization.  Speech therapy has evaluated and no difficulty with actual swallow function was suspected.  Esophagram was abnormal with severe esophageal dysmotility proximally and diffuse narrowing distally.  Diagnostic EGD performed 8/19 demonstrated Schatzki ring that was dilated and minimal if any lower esophageal sphincter.  Esophageal as well as  gastric and duodenal mucosal biopsies were obtained with results pending at this time.  He is tolerating advancing diet and oral medications after dilation procedure without difficulty.  He has not demonstrated signs of refeeding syndrome.  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  Patient was empirically started on IV Flagyl during this hospitalization while awaiting additional test results and diarrhea has slowed.  C diff PCR testing was positive and subsequent antigen and A/B toxin testing results were also positive indicative of persistent active clostridia difficile infection with diarrhea.   Suspect diarrhea contributed to hypovolemia and acute kidney injury.  Diarrhea is improving.  -Continue oral vancomycin 125 mg 4 times daily and anticipate a protracted tapering course per treatment guidelines for recurrent clostridia difficile    Hypoalbuminemia  Hypoalbuminemia noted at admission at 3.4 and decreased to 2.7 following fluid resuscitation.  Suspect hypoalbuminemia is due to malnutrition.  -intermittent monitoring of albumin as indicated     Hypomagnesemia  Hypokalemia  Hyponatremia  Magnesium 1.5 and potassium 3.3 attributed to poor nutritional intake and diarrhea.  Magnesium and potassium normalized with supplementation.  Serum sodium was normal at admission but has since become hyponatremic.  He is not  overtly symptomatic from hyponatremia.  -continue with potassium and magnesium supplementation as indicated by respective treatment protocols  -Monitor potassium and magnesium  -Ordered recheck of sodium after restarting chronic dose of Lasix today    Acute gout involving great toe of left foot    Acute gout of great toe of left foot (podagra) was present at time of admission with rapid improvement after starting treatment with steroids.  Acute gout symptoms are nearly resolved.  -discontinue IV solumedrol - today was day 4  -Start prednisone 20 mg daily for 3 more days, final dose today    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)  Patient has not been on medication recently due to poor appetite with hemoglobin A1C last month of 7.2.  Patient was placed on dextrose containing IV fluids to avoid hypoglycemia but IV fluids have since been stopped.  Blood sugars have been 132-208 in the past day as his diet advances   -monitor blood sugars 4 times daily  -Continue low resistance sliding scale insulin Novolog    Primary localized osteoarthrosis of shoulder region  Chronic ongoing issue  -continue Lidocaine patch  -started Voltaren cream as needed during this stay     Paroxysmal atrial fibrillation  Medication induced coagulopathy  Previous history of atrial fibrillation normally treated chronically with amiodarone, Metoprolol XL and Eliquis which were held due to NPO status.  Patient treated with therapeutic Lovenox for bridging while Eliquis was held.  Patient did have a transient period of increased HR in the 110-120 on 8/18 which resolved without acute intervention and has not since recurred.  Eliquis causes coagulopathy, but bleeding has not been suspected during hospitalization.  -Continue chronic dose of amiodarone   -Continue chronic dose of metoprolol XL  -Continue chronic dose of Eliquis    Severe aortic stenosis  Chronic diastolic congestive heart failure  Essential hypertension    Patient has  "chronic severe aortic stenosis and diastolic heart failure both of which has been clinically stable during hospitalization.  He chronically is treated with  metoprolol XL 50 mg daily and lasix 20 mg daily.  Diuretics have been held secondary to DEJUAN.  -Continue Toprol-XL  -Resume chronic Lasix today    Hyperbilirubinemia  Patient has chronic hyperbilirubinemia with bilirubin ranging 1-3 since 2017.  Bilirubin is trending down and is now normal  -monitor bilirubin as indicated    BPH with urinary obstruction  Reported history of BPH with urinary obstruction but patient is not currently on medication to treat BPH and is not overtly symptomatic from it.   -continue clinical monitoring of spontaneous voiding capacity    Mixed hyperlipidemia  Chronic hyperlipidemia is treated with atorvastatin which was held during this hospitalization.  -Resume chronic dose of atorvastatin    Obesity  Chronic with BMI 31 even after recent weight loss  -No acute intervention       Diet: Snacks/Supplements Adult: Magic Cup; With Meals  Regular Diet Adult    DVT Prophylaxis: Enoxaparin (Lovenox) SQ  Eastman Catheter: Not present  Lines: None     Cardiac Monitoring: None  Code Status:  Special    Clinically Significant Risk Factors              # Hypoalbuminemia: Lowest albumin = 2.6 g/dL at 8/19/2023  5:41 AM, will monitor as appropriate     # Hypertension: Noted on problem list  # Chronic heart failure with preserved ejection fraction: heart failure noted on problem list and last echo with EF >50%      # DMII: A1C = 7.2 % (Ref range: <5.7 %) within past 6 months   # Obesity: Estimated body mass index is 32.73 kg/m  as calculated from the following:    Height as of this encounter: 1.803 m (5' 10.98\").    Weight as of this encounter: 106.4 kg (234 lb 9.6 oz).   # Moderate Malnutrition: based on nutrition assessment           Disposition Plan     Expected Discharge Date: 08/23/2023      Destination: home with help/services  Discharge Comments: " Wed/thurs, home and resume homecare services        Patient is medically ready for discharge but he and his family continue to express concern about his limited functional capacity and are asking whether it would be appropriate for him to discharge to TCU for rehab rather than home until he is stronger.    Matt Valera MD  Hospitalist Service  [facility]  Securely message with [employer] (more info)  Text page via [facility] Paging/Directory   ______________________________________________________________________    Interval History   There were no significant overnight events.  He is eating well.  He thinks he may have had a slight stool yesterday although is not sure.  Last definite bowel movement was 2 days ago.  He is noticing increasing urinary output after his chronic dose of Lasix was restarted yesterday.  He generally continues to feel good.  He remains weak and continues to need help to get out of a chair although has been able to ambulate independently using a walker.  He has been afebrile and hemodynamically stable with normal oxygenation.    Physical Exam   Vital Signs: Temp: 97.6  F (36.4  C) Temp src: Oral BP: (!) 147/84 Pulse: 70   Resp: 18 SpO2: 98 % O2 Device: None (Room air)    Weight: 234 lbs 9.6 oz    General Appearance: Elderly man without signs of acute distress sitting up in a chair  Neuro: Alert and maintains wakefulness and attention, no confusion    Medical Decision Making             Data     I have personally reviewed the following data over the past 24 hrs:    N/A  \   N/A   / N/A     135 (L) 100 21.8 /  150 (H)   4.2 25 1.06 \       Recent Labs   Lab 08/23/23  1146 08/23/23  0813 08/23/23  0529 08/22/23  0814 08/22/23  0547 08/21/23  0808 08/21/23  0613 08/20/23  0801 08/20/23  0541 08/19/23  0742 08/19/23  0541 08/18/23  0822 08/18/23  0608 08/17/23  0108 08/16/23  1610   WBC  --   --   --   --   --   --   --   --   --   --   --   --  9.4  --  9.7   HGB  --    --   --   --   --   --   --   --  13.5  --  12.9*  --  13.8  --  15.6   MCV  --   --   --   --   --   --   --   --   --   --   --   --  89  --  86   PLT  --   --   --   --   --   --   --   --   --   --   --   --  180  --  222   NA  --   --  135*  --  133*  --  133*  --  132*  --  133*  --  134*   < > 137   POTASSIUM  --   --  4.2  --  4.4  --  4.5  --  4.3  --  4.0  --  3.7   < > 4.0   CHLORIDE  --   --  100  --  101  --  102  --  101  --  101  --  99   < > 96*   CO2  --   --  25  --  21*  --  21*  --  22  --  22  --  24   < > 23   BUN  --   --  21.8  --  20.4  --  19.8  --  17.9  --  18.8  --  18.0   < > 21.6   CR  --   --  1.06  --  1.03  --  1.01  --  0.98  --  1.08  --  1.18*   < > 1.83*   ANIONGAP  --   --  10  --  11  --  10  --  9  --  10  --  11   < > 18*   JERRY  --   --  8.8  --  8.8  --  8.9  --  8.7*  --  8.4*  --  8.4*   < > 9.7   * 130* 155*   < > 150*   < > 181*   < > 165*   < > 201*   < > 185*   < > 132*   ALBUMIN  --   --   --   --   --   --   --   --   --   --  2.6*  --  2.7*   < > 3.4*   PROTTOTAL  --   --   --   --   --   --   --   --   --   --  5.5*  --  5.9*   < > 7.4   BILITOTAL  --   --   --   --   --   --   --   --   --   --  1.3*  --  2.0*   < > 3.4*   ALKPHOS  --   --   --   --   --   --   --   --   --   --  122  --  138*   < > 183*   ALT  --   --   --   --   --   --   --   --   --   --  14  --  15   < > 20   AST  --   --   --   --   --   --   --   --   --   --  14  --  17   < > 23    < > = values in this interval not displayed.     Magnesium 1.8, phosphorus level was ordered and is pending at this time

## 2023-08-23 NOTE — PROGRESS NOTES
Care Management Follow Up    Length of Stay (days): 6    Expected Discharge Date: 08/24/2023     Concerns to be Addressed: discharge planning     Patient plan of care discussed at interdisciplinary rounds: Yes    Anticipated Discharge Disposition: Home Care  Disposition Comments: home with resuming [facility]  Anticipated Discharge Services:    Anticipated Discharge DME:      Patient/family educated on Medicare website which has current facility and service quality ratings: no  Education Provided on the Discharge Plan: Yes  Patient/Family in Agreement with the Plan: yes    Referrals Placed by CM/SW: External Care Coordination, Homecare  Private pay costs discussed: Not applicable    Additional Information:  Writer spoke with patient and family about their desire to have patient go to a TCU for strengthening daughter Fuad on the phone along with wife Rosie who was present in the room. Writer explained the need for a PT/OT eval to qualify and they verbalized understanding. Dr Valera ordered PT/OT evals and referrals will be sent. Patient and family would like Referrals to be sent to   Destination    Service Provider Request Status Selected Services Address Phone Fax Patient Preferred   [facility] ([facility])  Pending - Request Sent N/A [id number] [facility] [id number] [phone] --   [facility] - REFERRAL ONLY (SNF)  Pending - Request Sent N/A [id number] [facility] [id number] [phone] --   [facility]  Pending - Request Sent N/A [id number] [facility] [id number] [phone] --   1st choice being [facility] as patient has been there before.    Family feels they can transport patient when he is ready for discharge. Please call Fuad the daughter with updates or questions. [phone].  Juana Robert RN   Inpatient Care Coordinator  [facility] [phone]  [facility]  [phone]   Juana Robert RN

## 2023-08-24 ENCOUNTER — APPOINTMENT (OUTPATIENT)
Dept: OCCUPATIONAL THERAPY | Facility: CLINIC | Age: 88
DRG: 683 | End: 2023-08-24
Attending: INTERNAL MEDICINE
Payer: COMMERCIAL

## 2023-08-24 VITALS
BODY MASS INDEX: 32.53 KG/M2 | WEIGHT: 232.4 LBS | HEIGHT: 71 IN | SYSTOLIC BLOOD PRESSURE: 105 MMHG | DIASTOLIC BLOOD PRESSURE: 69 MMHG | TEMPERATURE: 97.5 F | OXYGEN SATURATION: 98 % | HEART RATE: 71 BPM | RESPIRATION RATE: 20 BRPM

## 2023-08-24 PROBLEM — G89.29 OTHER CHRONIC PAIN: Status: ACTIVE | Noted: 2023-07-19

## 2023-08-24 PROBLEM — M25.512 PAIN IN LEFT SHOULDER: Status: ACTIVE | Noted: 2023-07-19

## 2023-08-24 PROBLEM — M25.511 PAIN IN RIGHT SHOULDER: Status: ACTIVE | Noted: 2023-07-19

## 2023-08-24 PROBLEM — A04.72 ENTEROCOLITIS DUE TO CLOSTRIDIUM DIFFICILE, NOT SPECIFIED AS RECURRENT: Status: ACTIVE | Noted: 2023-07-27

## 2023-08-24 PROBLEM — B95.5 UNSPECIFIED STREPTOCOCCUS AS THE CAUSE OF DISEASES CLASSIFIED ELSEWHERE: Status: ACTIVE | Noted: 2023-07-19

## 2023-08-24 LAB
GLUCOSE BLDC GLUCOMTR-MCNC: 123 MG/DL (ref 70–99)
GLUCOSE BLDC GLUCOMTR-MCNC: 144 MG/DL (ref 70–99)
HOLD SPECIMEN: NORMAL
MAGNESIUM SERPL-MCNC: 1.7 MG/DL (ref 1.7–2.3)
POTASSIUM SERPL-SCNC: 4.1 MMOL/L (ref 3.4–5.3)

## 2023-08-24 PROCEDURE — 250N000013 HC RX MED GY IP 250 OP 250 PS 637: Performed by: SURGERY

## 2023-08-24 PROCEDURE — 97535 SELF CARE MNGMENT TRAINING: CPT | Mod: GO

## 2023-08-24 PROCEDURE — 250N000013 HC RX MED GY IP 250 OP 250 PS 637: Performed by: PEDIATRICS

## 2023-08-24 PROCEDURE — 99239 HOSP IP/OBS DSCHRG MGMT >30: CPT | Performed by: NURSE PRACTITIONER

## 2023-08-24 PROCEDURE — 83735 ASSAY OF MAGNESIUM: CPT | Performed by: PEDIATRICS

## 2023-08-24 PROCEDURE — 36415 COLL VENOUS BLD VENIPUNCTURE: CPT | Performed by: PEDIATRICS

## 2023-08-24 PROCEDURE — 84132 ASSAY OF SERUM POTASSIUM: CPT | Performed by: PEDIATRICS

## 2023-08-24 RX ORDER — MAGNESIUM OXIDE 400 MG/1
400 TABLET ORAL EVERY 4 HOURS
Status: COMPLETED | OUTPATIENT
Start: 2023-08-24 | End: 2023-08-24

## 2023-08-24 RX ORDER — VANCOMYCIN HYDROCHLORIDE 125 MG/1
125 CAPSULE ORAL DAILY
DISCHARGE
Start: 2023-09-07 | End: 2023-09-14

## 2023-08-24 RX ORDER — FAMOTIDINE 40 MG/1
40 TABLET, FILM COATED ORAL 2 TIMES DAILY
Status: ON HOLD | DISCHARGE
Start: 2023-08-24 | End: 2024-02-02

## 2023-08-24 RX ORDER — VANCOMYCIN HYDROCHLORIDE 125 MG/1
125 CAPSULE ORAL 2 TIMES DAILY
DISCHARGE
Start: 2023-08-30 | End: 2023-09-06

## 2023-08-24 RX ORDER — VANCOMYCIN HYDROCHLORIDE 125 MG/1
125 CAPSULE ORAL 4 TIMES DAILY
DISCHARGE
Start: 2023-08-24 | End: 2023-08-29

## 2023-08-24 RX ADMIN — AMIODARONE HYDROCHLORIDE 200 MG: 200 TABLET ORAL at 08:34

## 2023-08-24 RX ADMIN — INSULIN ASPART 1 UNITS: 100 INJECTION, SOLUTION INTRAVENOUS; SUBCUTANEOUS at 12:12

## 2023-08-24 RX ADMIN — VANCOMYCIN HYDROCHLORIDE 125 MG: 125 CAPSULE ORAL at 12:12

## 2023-08-24 RX ADMIN — METOPROLOL SUCCINATE 50 MG: 50 TABLET, EXTENDED RELEASE ORAL at 08:34

## 2023-08-24 RX ADMIN — DICLOFENAC SODIUM 4 G: 10 GEL TOPICAL at 08:35

## 2023-08-24 RX ADMIN — Medication 400 MG: at 08:34

## 2023-08-24 RX ADMIN — Medication 400 MG: at 12:12

## 2023-08-24 RX ADMIN — MICONAZOLE NITRATE 1 G: 2 POWDER TOPICAL at 10:13

## 2023-08-24 RX ADMIN — DICLOFENAC SODIUM 4 G: 10 GEL TOPICAL at 12:13

## 2023-08-24 RX ADMIN — VANCOMYCIN HYDROCHLORIDE 125 MG: 125 CAPSULE ORAL at 08:34

## 2023-08-24 RX ADMIN — FAMOTIDINE 40 MG: 20 TABLET, FILM COATED ORAL at 08:34

## 2023-08-24 RX ADMIN — Medication 15 ML: at 08:34

## 2023-08-24 RX ADMIN — APIXABAN 5 MG: 5 TABLET, FILM COATED ORAL at 08:34

## 2023-08-24 ASSESSMENT — ACTIVITIES OF DAILY LIVING (ADL)
ADLS_ACUITY_SCORE: 38
ADLS_ACUITY_SCORE: 39
ADLS_ACUITY_SCORE: 38
ADLS_ACUITY_SCORE: 39
ADLS_ACUITY_SCORE: 38

## 2023-08-24 NOTE — PROGRESS NOTES
Care Management Discharge Note    Discharge Date: 08/24/2023     Discharge Disposition: Transitional Care - [facility] (Phone: [phone] Fax: [phone])     Discharge Services:      Discharge DME:      Discharge Transportation: family or friend will provide @ 1230    Private pay costs discussed: Not applicable    Does the patient's insurance plan have a 3 day qualifying hospital stay waiver?  No    PAS Confirmation Code: [id number]  Patient/family educated on Medicare website which has current facility and service quality ratings: yes    Education Provided on the Discharge Plan: Yes  Persons Notified of Discharge Plans: Fuad Walls  Patient/Family in Agreement with the Plan: yes    Handoff Referral Completed: Yes    Additional Information:  Patient is medically ready for discharge today. [facility] is [facility] (Phone: [phone] Fax: [phone])  TCU.  Updated daughter Fuad and she is in agreement with placement at [facility].  Fuad will transport between [id number].    Updated Nadia, at [facility], with discharge time.    HEATH Up  [facility] [phone]/ [facility] [phone]  Care Management

## 2023-08-24 NOTE — DISCHARGE SUMMARY
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  Hypoalbuminemia  Hypomagnesemia  Hypokalemia  Hyponatremia  Acute gout involving great toe of the left foot  Diabetes type 2 with diabetic polyneuropathy  Osteoarthritis of shoulder  Paroxysmal atrial fibrillation  Medication induced coagulopathy  Severe aortic stenosis  Chronic diastolic congestive heart failure  Essential hypertension  Hyperbilirubinemia  BPH  Hyperlipidemia  Obesity    Clinically Significant Risk Factors     # DMII: A1C = 7.2 % (Ref range: <5.7 %) within past 6 months  # Obesity: Estimated body mass index is 32.43 kg/m  as calculated from the following:    Height as of this encounter: 1.803 m (5' 10.98\").    Weight as of this encounter: 105.4 kg (232 lb 6.4 oz).  # Moderate Malnutrition: based on nutrition assessment      Follow-ups Needed After Discharge   Follow up basic metabolic panel and magnesium in 1 week    Unresulted Labs Ordered in the Past 30 Days of this Admission       No orders found from 7/18/2023 to 8/18/2023.        These results will be followed up by NA    Discharge Disposition   Discharged to rehabilitation facility  Condition at discharge: Good    Hospital Course   89-year-old man with type 2 diabetes, hypertension, stage III chronic kidney disease, paroxysmal atrial fibrillation, chronic diastolic heart failure, severe aortic stenosis, BPH, and history of clostridia difficile infection July 2023 treated with 10-day course of oral vancomycin who presented with worsening weakness, poor oral intake, persistent diarrhea, and 25 pound weight loss in " the last month.  Due to concerns for acute kidney injury, severe dehydration, electrolyte abnormalities, and inadequate oral intake, he was admitted.       DEJUAN (acute kidney injury) superimposed on CKD stage 3a  Hypovolemia  Presented with creatinine of 1.83 with recent baseline creatinine 1.1-1.3.  Suspect DEJUAN was due to hypovolemia from the combination of inadequate oral intake and diarrhea.  Renal function has recovered to baseline following IV fluid treatment.  IV fluids were stopped 8/20 and renal function has since remained stable.  -follow up BMP in 1 week     Dysphagia  Schatzki ring  Esophageal dysmotility  Moderate malnutrition  Patient has had a 37 lb weight loss in the past 5 months and 25 pounds in the past month.he has been unable to swallow more than small amount of pureed or clear liquids at a time because of vomiting if he tries to consume larger amounts.  He has had variable tolerance of pills.  He had been diagnosed with moderate malnutrition in July 2023 with persistent signs of moderate malnutrition during this hospitalization.  Speech therapy has evaluated and no difficulty with actual swallow function was suspected.  Esophagram was abnormal with severe esophageal dysmotility proximally and diffuse narrowing distally.  Diagnostic EGD performed 8/19 demonstrated Schatzki ring that was dilated and minimal if any lower esophageal sphincter.  Esophageal as well as gastric and duodenal mucosal biopsies were obtained with normal results.   He is tolerating advancing diet and oral medications after dilatation  procedure without difficulty.  He has not demonstrated signs of refeeding syndrome.  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  Patient was empirically started on IV Flagyl during this hospitalization while awaiting additional test results and diarrhea has slowed.  C diff PCR testing was positive and subsequent antigen and A/B toxin testing results were also positive indicative of persistent active clostridia difficile infection with diarrhea.   Suspect diarrhea contributed to hypovolemia and acute kidney injury.  Diarrhea is improving.  -Continue oral vancomycin 125 mg 4 times daily for total of 10 days and will taper over the next 2-3 weeks.  Once taper is complete can consider further pulse dosing regimen of Vanco 125mg every day 2-3 days for 2-8 weeks.      Hypoalbuminemia  Hypoalbuminemia noted at admission at 3.4 and decreased to 2.7 following fluid resuscitation.  Suspect hypoalbuminemia is due to malnutrition.  -intermittent monitoring of albumin as indicated      Hypomagnesemia  Hypokalemia  Hyponatremia  Magnesium 1.5 and potassium 3.3 attributed to poor nutritional intake and diarrhea.  Magnesium and potassium normalized with supplementation.  Serum sodium was normal at admission but has since become hyponatremic.  He is not overtly symptomatic from hyponatremia.  -potassium supplement daily  -Monitor check BMP, magnesium in 1 week     Acute gout involving great toe of left foot    Acute gout of great toe of left foot (podagra) was present at time of admission with rapid improvement after starting treatment with steroids.  Acute gout symptoms are nearly resolved.  -completed course of steriods     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)  Patient has not been on medication recently due to poor appetite with hemoglobin A1C last month of 7.2.  Patient was placed on dextrose containing IV fluids to avoid hypoglycemia but IV fluids have since been stopped.  Blood sugars have been 150-260 in the past day as his diet advances   -monitor blood sugars  4 times daily  -Continue low resistance sliding scale insulin Novolog     Primary localized osteoarthrosis of shoulder region  Chronic ongoing issue  -continue Lidocaine patch  -started Voltaren cream as needed during this stay      Paroxysmal atrial fibrillation  Medication induced coagulopathy  Previous history of atrial fibrillation normally treated chronically with amiodarone, Metoprolol XL and Eliquis which were held due to NPO status.  Patient treated with therapeutic Lovenox for bridging while Eliquis was held.  Patient did have a transient period of increased HR in the 110-120 on 8/18 which resolved without acute intervention and has not since recurred.  Eliquis causes coagulopathy, but bleeding has not been suspected during hospitalization.  -Continue chronic dose of amiodarone   -Continue chronic dose of metoprolol XL  -Continue chronic dose of Eliquis     Severe aortic stenosis  Chronic diastolic congestive heart failure  Essential hypertension    Patient has chronic severe aortic stenosis and diastolic heart failure both of which has been clinically stable during hospitalization.  He chronically is treated with  metoprolol XL 50 mg daily and lasix 20 mg daily.  Diuretics have been held secondary to DEJUAN.  -Continue Toprol-XL  -Resume chronic Lasix today     Hyperbilirubinemia  Patient has chronic hyperbilirubinemia with bilirubin ranging 1-3 since 2017.  Bilirubin is trending down and is now normal  -monitor bilirubin as indicated     BPH with urinary obstruction  Reported history of BPH with urinary obstruction but patient is not currently on medication to treat BPH and is not overtly symptomatic from it.   -continue clinical monitoring of spontaneous voiding capacity     Mixed hyperlipidemia  Chronic hyperlipidemia is treated with atorvastatin which was held during this hospitalization.  -Resume chronic dose of atorvastatin     Obesity  Chronic with BMI 31 even after recent weight loss  -No acute  intervention          Consultations This Hospital Stay   SPEECH LANGUAGE PATH ADULT IP CONSULT  CARE MANAGEMENT / SOCIAL WORK IP CONSULT  NUTRITION SERVICES ADULT IP CONSULT  NUTRITION SERVICES ADULT IP CONSULT  PHYSICAL THERAPY ADULT IP CONSULT  OCCUPATIONAL THERAPY ADULT IP CONSULT    Code Status   Special Code    Time Spent on this Encounter   I, Vane Caraballo CNP, personally saw the patient today and spent greater than 30 minutes discharging this patient.       Vane Caraballo CNP  [facility] MEDICAL SURGICAL  [address] [place] [address]  Phone: [phone]  ______________________________________________________________________    Physical Exam   Vital Signs: Temp: 97.5  F (36.4  C) Temp src: Oral BP: 105/69 Pulse: 71   Resp: 20 SpO2: 98 % O2 Device: None (Room air)    Weight: 232 lbs 6.4 oz  General Appearance: Alert, oriented.  No acute distress  Respiratory: lung sounds diminished, resp effort easy  Cardiovascular: irreg, irreg, rate WNL  GI: abdomen is soft and nontender, active bowel sounds  Skin: warm, dry.  Did not visualize coccyx today  Other: -        Primary Care Physician   Steven Duane Semmler    Discharge Orders       Significant Results and Procedures   Most Recent 3 CBC's:  Recent Labs   Lab Test 08/20/23  0541 08/19/23  0541 08/18/23  0608 08/16/23  1610 07/31/23  1229   WBC  --   --  9.4 9.7 15.6*   HGB 13.5 12.9* 13.8 15.6 16.3   MCV  --   --  89 86 90   PLT  --   --  180 222 474*     Most Recent 3 BMP's:  Recent Labs   Lab Test 08/24/23  0741 08/24/23  0623 08/23/23  1958 08/23/23  1640 08/23/23  0813 08/23/23  0529 08/22/23  0814 08/22/23  0547 08/21/23  0808 08/21/23  0613   NA  --   --   --   --   --  135*  --  133*  --  133*   POTASSIUM  --  4.1  --   --   --  4.2  --  4.4  --  4.5   CHLORIDE  --   --   --   --   --  100  --  101  --  102   CO2  --   --   --   --   --  25  --  21*  --  21*   BUN  --   --   --   --   --  21.8  --  20.4  --  19.8   CR  --   --    --   --   --  1.06  --  1.03  --  1.01   ANIONGAP  --   --   --   --   --  10  --  11  --  10   JERRY  --   --   --   --   --  8.8  --  8.8  --  8.9   *  --  260* 208*   < > 155*   < > 150*   < > 181*    < > = values in this interval not displayed.     Most Recent Hemoglobin A1c:  Recent Labs   Lab Test 07/23/23  1838   A1C 7.2*   ,   Results for orders placed or performed during the hospital encounter of 08/17/23   XR Esophagram    Narrative    XR ESOPHAGRAM SINGLE CONTRAST 8/18/2023 10:34 AM    HISTORY: patient having minimal intake in the past 2 months due to  vomiting after attempting to eat and feels it is getting stuck mid  chest, aspiration risk    COMPARISON: None.    Technique:  Barium was administered orally and spot images of the  esophagus were obtained during fluoroscopic imaging. Total  fluoroscopic time for this procedure was 1.8 minutes . Total number of  images for this procedure was 12.    Patient has limited mobility, images were performed with the patient  on the table at a 45 degree angle.    Findings:    Severe esophageal dysmotility. There are tertiary contractions  throughout the entire esophagus from the level of the thoracic inlet  to the GE junction. These tertiary contractions cause a significant  delay in passage of the majority of the bolus which remains in the  upper and mid thoracic esophagus for multiple minutes. After a small  sip of water, the barium passes through the lower esophagus and into  the stomach. The distal approximately 4 cm of the esophagus is mildly  diffusely narrowed, no discrete focal stricture or large standing  column of barium to suggest obstruction. Once contrast reaches the  distal esophagus, it passes easily through the GE junction.      No esophageal hiatal hernia. The patient was unable to perform reflux  maneuvers.      Impression    Impression:  Severe esophageal dysmotility causes a significant delay  in passage of the contrast bolus. The distal  esophagus appears mildly  diffusely narrowed without a focal stricture. Once contrast reaches  the distal third of the esophagus, it passes easily through the GE  junction..    JULIETA SMITH MD         SYSTEM ID:  [id number]       Discharge Medications   Current Discharge Medication List        CONTINUE these medications which have NOT CHANGED    Details   acetaminophen (TYLENOL) 500 MG tablet Take 2 tablets (1,000 mg) by mouth every 8 hours as needed for mild pain or fever  Refills: 0    Associated Diagnoses: Hypomagnesemia      amiodarone (PACERONE) 200 MG tablet Take 1 tablet (200 mg) by mouth daily    Associated Diagnoses: Paroxysmal atrial fibrillation with RVR (H)      atorvastatin (LIPITOR) 20 MG tablet Take 20 mg by mouth At Bedtime      citalopram (CELEXA) 10 MG tablet Take 0.5 tablets (5 mg) by mouth daily    Associated Diagnoses: Anxiety      ELIQUIS ANTICOAGULANT 5 MG tablet Take 5 mg by mouth 2 times daily      furosemide (LASIX) 20 MG tablet Take 1 tablet by mouth daily      Lidocaine (LIDOCARE) 4 % Patch Place 2 patches onto the skin every 24 hours To prevent lidocaine toxicity, patient should be patch free for 12 hrs daily.    Associated Diagnoses: Chronic pain of both shoulders      menthol, Topical Analgesic, 2.5% (BENGAY VANISHING SCENT) 2.5 % GEL topical gel Apply topically 4 times daily as needed for other (pain) Apply to shoulders at times when Lidoderm patch is absent    Associated Diagnoses: Chronic pain of both shoulders      metoprolol succinate ER (TOPROL XL) 25 MG 24 hr tablet Take 2 tablets (50 mg) by mouth daily      !! potassium chloride ER (KLOR-CON M) 10 MEQ CR tablet Take 1 tablet by mouth every evening      !! potassium chloride ER (KLOR-CON M) 20 MEQ CR tablet Take 1 tablet by mouth every morning      simethicone (MYLICON) 125 MG chewable tablet Take 125 mg by mouth 4 times daily as needed for intestinal gas      ondansetron (ZOFRAN ODT) 4 MG ODT tab DISSOLVE 1 TABLET IN MOUTH  EVERY 8 HOURS AS NEEDED       !! - Potential duplicate medications found. Please discuss with provider.        STOP taking these medications       pantoprazole (PROTONIX) 40 MG EC tablet Comments:   Reason for Stopping:             Allergies   No Known Allergies

## 2023-08-24 NOTE — PLAN OF CARE
"Goal Outcome Evaluation:      Plan of Care Reviewed With: patient    Overall Patient Progress: improvingOverall Patient Progress: improving    Outcome Evaluation: A&Ox4, calls appropriately. Assist x1 with walker and GB. No BM overnight. Lidocaine patches in place on R shoulder. Protective mepilex on sacrum. Denies pain this shift.    BP (!) 141/80 (BP Location: Left arm)   Pulse 60   Temp 97.5  F (36.4  C) (Oral)   Resp 18   Ht 1.803 m (5' 10.98\")   Wt 105.4 kg (232 lb 6.4 oz)   SpO2 90%   BMI 32.43 kg/m             "

## 2023-08-24 NOTE — PLAN OF CARE
Occupational Therapy Discharge Summary    Reason for therapy discharge:    Discharged to transitional care facility.    Progress towards therapy goal(s). See goals on Care Plan in [facility] electronic health record for goal details.  Goals partially met.  Barriers to achieving goals:   limited tolerance for therapy and discharge from facility.    Therapy recommendation(s):    Continued therapy is recommended.  Rationale/Recommendations:  to progress towards safe IND with ADLs.      Thank you for the referral.   FERMIN James   [facility]    Email: [email or web address]  Phone: [phone]

## 2023-08-24 NOTE — PROGRESS NOTES
[facility] GERIATRICS  INITIAL VISIT NOTE  August 28, 2023    PRIMARY CARE PROVIDER AND CLINIC: Semmler, Steven Duane [address] / [facility] [address]    [facility] Medical Record Number: [id number]  [facility] of [facility] where encounter took [facility] [[id number]]    Chief Complaint   Patient presents with    Hospital F/U     [facility] 8/17/2023 - 8/24/2023     HPI:    Alvin Newton is a 89 year old (5/10/1934) male was admitted to the above facility from [facility]. Hospital stay 8/17/23 through 8/24/23 where they were admitted for dehydration, DEJUAN Now admitted to this facility for rehab, medical management, and nursing care.      History obtained from: facility chart records, facility staff, patient report, and [facility] chart review.      Brief Hospital Course: PMH of Dm2, HTN, CKD, 3, paroxysmal Afib, diastolic CHF, severe aortic stenosis, BPH, who presented with weakness and poor oral intake and diarrhea. Had DEJUAN, felt to be dehydration. Had esophagram done due to difficulty being able to swallow, showed severe esophageal dysmotility. Had diagnostic EGD on 8/19 which showed Schatzki ring, which was dilated. He was becky to tolerate medications and diet after procedure without issues. Was started on IV Flagyl due to recent C.diff infection, and diarrhea improved. Did have positive C.diff test. Was started prolonged taper of oral vancomycin. Completed course of steroids for gout. Lasix was held initially, but was resumed prior to discharge. Due to ongoing weakness, TCU recommended. When medically stable was discharged to TCU for further rehab and medical management.     TCU Course: Seen today shortly after therapy. He feels that it went well. Has been able to walk up to 200ft, but fatiges easily. RAMIREZ 39/56 indicating falls risk. He continues to have some loose stools, but feel that are getting better. Appetite is ok, is  eating better, but still has problems with some pills. Denies any SOB. No chest pain. He slept well last night.     CODE STATUS/ADVANCE DIRECTIVES: DNR / DNI    ALLERGIES:  No Known Allergies    PAST MEDICAL HISTORY:   Past Medical History:   Diagnosis Date    Colonic diverticulum     Hyperlipidemia     Hypertension     Obesity (BMI 30-39.9)     Osteoarthritis     Type 2 diabetes mellitus (H)      PAST SURGICAL HISTORY:   Past Surgical History:   Procedure Laterality Date    ARTHROPLASTY HIP BILATERAL  1987    ESOPHAGOSCOPY, GASTROSCOPY, DUODENOSCOPY (EGD), COMBINED N/A 8/19/2023    Procedure: ESOPHAGOGASTRODUODENOSCOPY, WITH BIOPSY;  Surgeon: Antolin Cyr DO;  [facility]    ESOPHAGOSCOPY, GASTROSCOPY, DUODENOSCOPY (EGD), DILATATION, COMBINED N/A 8/19/2023    Procedure: Esophagoscopy, gastroscopy, duodenoscopy, dilatation, combined;  Surgeon: Antolin Cyr DO;  Location: [facility]    HC ESOPHAGOSCOPY, DIAGNOSTIC  2003    LAPAROSCOPIC CHOLECYSTECTOMY N/A 12/28/2017    Procedure: LAPAROSCOPIC CHOLECYSTECTOMY;  LAPAROSCOPIC CHOLECYSTECTOMY;  Surgeon: Heber Gonzalez MD;  Location: [facility]    TRANSRECTAL ULTRASONIC, TRANSURETHRAL RESECTION (TUR) OF PROSTATE CYST  1990         SOCIAL HISTORY:   Patient's living condition: lives with spouse    MEDICATIONS  Post Discharge Medication Reconciliation Status: discharge medications reconciled and changed, per note/orders.  Current Outpatient Medications   Medication Sig Dispense Refill    acetaminophen (TYLENOL) 500 MG tablet Take 2 tablets (1,000 mg) by mouth every 8 hours as needed for mild pain or fever  0    amiodarone (PACERONE) 200 MG tablet Take 1 tablet (200 mg) by mouth daily      atorvastatin (LIPITOR) 20 MG tablet Take 20 mg by mouth At Bedtime      citalopram (CELEXA) 10 MG tablet Take 0.5 tablets (5 mg) by mouth daily      ELIQUIS ANTICOAGULANT 5 MG tablet Take 5 mg by mouth 2 times daily      famotidine (PEPCID) 40 MG tablet Take 1  "tablet (40 mg) by mouth 2 times daily      furosemide (LASIX) 20 MG tablet Take 1 tablet by mouth daily      insulin aspart (NOVOLOG PEN) 100 UNIT/ML pen Inject 1-3 Units Subcutaneous 3 times daily (before meals) 15 mL     Lidocaine (LIDOCARE) 4 % Patch Place 2 patches onto the skin every 24 hours To prevent lidocaine toxicity, patient should be patch free for 12 hrs daily.      menthol, Topical Analgesic, 2.5% (BENGAY VANISHING SCENT) 2.5 % GEL topical gel Apply topically 4 times daily as needed for other (pain) Apply to shoulders at times when Lidoderm patch is absent      metoprolol succinate ER (TOPROL XL) 25 MG 24 hr tablet Take 2 tablets (50 mg) by mouth daily      ondansetron (ZOFRAN ODT) 4 MG ODT tab DISSOLVE 1 TABLET IN MOUTH EVERY 8 HOURS AS NEEDED      potassium chloride ER (KLOR-CON M) 20 MEQ CR tablet Take 1 tablet by mouth every morning      [START ON 8/30/2023] vancomycin (VANCOCIN) 125 MG capsule Take 1 capsule (125 mg) by mouth 2 times daily for 7 days      vancomycin (VANCOCIN) 125 MG capsule Take 1 capsule (125 mg) by mouth 4 times daily for 5 days      [START ON 9/7/2023] vancomycin (VANCOCIN) 125 MG capsule Take 1 capsule (125 mg) by mouth daily for 7 days       ROS:  10 point ROS neg other than the symptoms noted above in the HPI.      PHYSICAL EXAM:  /82   Pulse 68   Temp 97.6  F (36.4  C)   Resp 18   Ht 1.803 m (5' 11\")   Wt 101.6 kg (224 lb)   SpO2 94%   BMI 31.24 kg/m    Physical Exam  Cardiovascular:      Rate and Rhythm: Normal rate. Rhythm irregular.      Heart sounds: Normal heart sounds.   Pulmonary:      Effort: Pulmonary effort is normal.      Breath sounds: Normal breath sounds.   Abdominal:      General: Bowel sounds are normal.   Musculoskeletal:      Right lower leg: Edema present.      Left lower leg: Edema present.   Skin:     Comments: Coccyx area mildly denuded   Neurological:      Mental Status: He is alert.   Psychiatric:         Mood and Affect: Mood normal. "          LABORATORY/IMAGING DATA:  Reviewed as per [facility] and/or [facility]    ASSESSMENT/PLAN:  Colitis due to Clostridium difficile  Continues with diarrhea  -continue vancomycin taper  - contact precautions   - Calmoseptine to buttocks to protect skin    DEJUAN (acute kidney injury) (H)  In  the setting of dehydration    Schatzki's ring of distal esophagus  Dysphagia, unspecified type  S/p dilation. Feels that he is eating better. Tolerating regular diet.  Discussed with patient, nursing staff, therapy  - SLP to eval and treat  - consider follow-up manometry after TCU discharge.   - continue pepcid 40mg BID     Diastolic congestive heart failure, unspecified HF chronicity (H)  Essential hypertension  Aortic valve stenosis, etiology of cardiac valve disease unspecified  Echo with EF 50-55%, severe aortic stenosis. Weight stable. BP variable, 100-130, Appears compensated.  Discussed with patient, nursing staff.   - continue atorvastatin, metoprolol XL, lasix 20mg daily  - daily weights  - BMP to monitor for stability.         Type 2 diabetes mellitus with diabetic polyneuropathy, without long-term current use of insulin (H)  A1C 7.2%, -170, Diet controlled  - sliding scale insulin TID with meals  -     Chronic atrial fibrillation (H)  HR controlled 60  - continue amiodarone, metoprolol Xl  - anticoagulated on Eliquis  - monitor and adjust      Physical deconditioning  In the setting of recent hospitalization   - PT/OT    Reactive depression  Anxiety  - continue citalopram    Malnutrition  In the setting of recent recent esophageal ring, improved after dilation  - dietician to follow in TCU  - monitor  - diet as tolerated      Orders:   BMP, MG on 8/31  SLP to eval and treat  Discontinue mepilex to coccyx  Calmoseptine TID and PRN to buttocks for moisture related dermatitis    Total time spent with patient visit at the skilled nursing facility was 50 minutes including patient visit and review of past records.  Total time spent reviewing records from hospitalization within my organization including review of labs, procedure notes, review of TCU facility records, medication reconciliation discussion of plan of care with nursing staff and therapy, time spent on documentation as well as discussion with patient including review of medications, discussion of plan of care and patient education as stated above.       Electronically signed by:  CHUCK Cedeno CNP

## 2023-08-24 NOTE — PROGRESS NOTES
Pt has been up with assist of one, steady with gait belt and walker.   He did have one loose stool.  He states that he can feel his strength improving.   No complaints of pain and or any other complaints.   Report given to receiving nurse.

## 2023-08-24 NOTE — PROGRESS NOTES
S-(situation): Patient discharged to [facility] via wheelchair with family    B-(background): Admitted d/t DEJUAN, electrolyte imbalance    A-(assessment): A and Ox4. Buttocks area red, barrier cream applied and protective mepilex dressing. VSS and afebrile. Tolerated diet  Last bowel movement: 8/23/24     R-(recommendations):Report called to [facility]. Listed belongings gathered and sent with patient.     Discharge Nursing Criteria:     Care Plan and Patient education resolved: Yes    Vaccines  Influenza status verified at discharge:  No    Intentionally Retained Items No    MISC  Home medications returned to patient: NA  Medication Bin checked and emptied on discharge Yes  All paperwork sent with patient/Copy of AVS given to patient or family Yes.  [facility]    \

## 2023-08-24 NOTE — PLAN OF CARE
Physical Therapy Discharge Summary    Reason for therapy discharge:    Discharged to transitional care facility.    Progress towards therapy goal(s). See goals on Care Plan in [facility] electronic health record for goal details.  Goals partially met.  Barriers to achieving goals:   discharge from facility.    Therapy recommendation(s):    PT advices HHPT services to address global strength, conditioning and functional mobility; similar recommendations for TCU interventions.       Thank you for your referral.  Zara Perez, PT, DPT, ATC, LAT    [facility]  O: [phone]  E: [email or web address]

## 2023-08-26 ENCOUNTER — PATIENT OUTREACH (OUTPATIENT)
Dept: CARE COORDINATION | Facility: CLINIC | Age: 88
End: 2023-08-26
Payer: COMMERCIAL

## 2023-08-26 NOTE — PROGRESS NOTES
[facility]    Background: Transitional Care Management program identified per system criteria and reviewed by [facility] team for possible outreach.    [facility] staff made initial outreach on 08/25/2023 and left a message at that time providing [facility] main number to call for questions or follow up, [phone].    Upon further chart review, patient has discharged to a Memory Care, Long-term Care, Assisted Living or Group Home where patient is receiving on-site support with their daily cares, including support with hospital follow up plan.    Plan: Transitional Care Management episode addressed appropriately per reason noted above.      Caridad Rodriguez  Community Health Worker  [facility], [facility]    *Connected Care Resource Team does NOT follow patient ongoing. Referrals are identified based on internal discharge reports and the outreach is to ensure patient has an understanding of their discharge instructions.

## 2023-08-28 ENCOUNTER — TRANSITIONAL CARE UNIT VISIT (OUTPATIENT)
Dept: GERIATRICS | Facility: CLINIC | Age: 88
End: 2023-08-28
Payer: COMMERCIAL

## 2023-08-28 VITALS
HEIGHT: 71 IN | SYSTOLIC BLOOD PRESSURE: 131 MMHG | RESPIRATION RATE: 18 BRPM | OXYGEN SATURATION: 94 % | DIASTOLIC BLOOD PRESSURE: 82 MMHG | WEIGHT: 224 LBS | HEART RATE: 68 BPM | TEMPERATURE: 97.6 F | BODY MASS INDEX: 31.36 KG/M2

## 2023-08-28 DIAGNOSIS — I35.0 AORTIC VALVE STENOSIS, ETIOLOGY OF CARDIAC VALVE DISEASE UNSPECIFIED: ICD-10-CM

## 2023-08-28 DIAGNOSIS — A04.72 COLITIS DUE TO CLOSTRIDIUM DIFFICILE: Primary | ICD-10-CM

## 2023-08-28 DIAGNOSIS — I50.30 DIASTOLIC CONGESTIVE HEART FAILURE, UNSPECIFIED HF CHRONICITY (H): ICD-10-CM

## 2023-08-28 DIAGNOSIS — F41.9 ANXIETY: ICD-10-CM

## 2023-08-28 DIAGNOSIS — R53.81 PHYSICAL DECONDITIONING: ICD-10-CM

## 2023-08-28 DIAGNOSIS — E44.0 MODERATE PROTEIN-CALORIE MALNUTRITION (H): ICD-10-CM

## 2023-08-28 DIAGNOSIS — F32.9 REACTIVE DEPRESSION: ICD-10-CM

## 2023-08-28 DIAGNOSIS — I10 ESSENTIAL HYPERTENSION: ICD-10-CM

## 2023-08-28 DIAGNOSIS — R13.10 DYSPHAGIA, UNSPECIFIED TYPE: ICD-10-CM

## 2023-08-28 DIAGNOSIS — E11.42 TYPE 2 DIABETES MELLITUS WITH DIABETIC POLYNEUROPATHY, WITHOUT LONG-TERM CURRENT USE OF INSULIN (H): ICD-10-CM

## 2023-08-28 DIAGNOSIS — N17.9 AKI (ACUTE KIDNEY INJURY) (H): ICD-10-CM

## 2023-08-28 DIAGNOSIS — K22.2 SCHATZKI'S RING OF DISTAL ESOPHAGUS: ICD-10-CM

## 2023-08-28 DIAGNOSIS — E44.0 MODERATE MALNUTRITION (H): ICD-10-CM

## 2023-08-28 DIAGNOSIS — I48.20 CHRONIC ATRIAL FIBRILLATION (H): ICD-10-CM

## 2023-08-28 PROCEDURE — 99310 SBSQ NF CARE HIGH MDM 45: CPT | Performed by: NURSE PRACTITIONER

## 2023-08-28 NOTE — LETTER
8/28/2023        RE: Alvin Newton  [address]  [place] [address]        [facility] GERIATRICS  INITIAL VISIT NOTE  August 28, 2023    PRIMARY CARE PROVIDER AND CLINIC: Semmler, Steven Duane [address] [facility] [address]    [facility] Medical Record Number: [id number]  Place of Service where encounter took place: [facility] - [facility] ([facility]) [[id number]]    Chief Complaint   Patient presents with     Hospital F/U     [facility] 8/17/2023 - 8/24/2023     HPI:    Alvin Newton is a 89 year old (5/10/1934) male was admitted to the above facility from [facility]. Hospital stay 8/17/23 through 8/24/23 where they were admitted for dehydration, DEJUAN Now admitted to this facility for rehab, medical management, and nursing care.      History obtained from: facility chart records, facility staff, patient report, and [facility] chart review.      Brief Hospital Course: PMH of Dm2, HTN, CKD, 3, paroxysmal Afib, diastolic CHF, severe aortic stenosis, BPH, who presented with weakness and poor oral intake and diarrhea. Had DEJUAN, felt to be dehydration. Had esophagram done due to difficulty being able to swallow, showed severe esophageal dysmotility. Had diagnostic EGD on 8/19 which showed Schatzki ring, which was dilated. He was becky to tolerate medications and diet after procedure without issues. Was started on IV Flagyl due to recent C.diff infection, and diarrhea improved. Did have positive C.diff test. Was started prolonged taper of oral vancomycin. Completed course of steroids for gout. Lasix was held initially, but was resumed prior to discharge. Due to ongoing weakness, TCU recommended. When medically stable was discharged to TCU for further rehab and medical management.     TCU Course: Seen today shortly after therapy. He feels that it went well. Has been able to walk up to 200ft, but fatiges easily. RAMIREZ 39/56 indicating falls risk.  He continues to have some loose stools, but feel that are getting better. Appetite is ok, is eating better, but still has problems with some pills. Denies any SOB. No chest pain. He slept well last night.     CODE STATUS/ADVANCE DIRECTIVES: DNR / DNI    ALLERGIES:  No Known Allergies    PAST MEDICAL HISTORY:   Past Medical History:   Diagnosis Date     Colonic diverticulum      Hyperlipidemia      Hypertension      Obesity (BMI 30-39.9)      Osteoarthritis      Type 2 diabetes mellitus (H)      PAST SURGICAL HISTORY:   Past Surgical History:   Procedure Laterality Date     ARTHROPLASTY HIP BILATERAL  1987     ESOPHAGOSCOPY, GASTROSCOPY, DUODENOSCOPY (EGD), COMBINED N/A 8/19/2023    Procedure: ESOPHAGOGASTRODUODENOSCOPY, WITH BIOPSY;  Surgeon: Antolin Cyr DO;  [facility]: [facility]     ESOPHAGOSCOPY, GASTROSCOPY, DUODENOSCOPY (EGD), DILATATION, COMBINED N/A 8/19/2023    Procedure: Esophagoscopy, gastroscopy, duodenoscopy, dilatation, combined;  Surgeon: Antolin Cyr DO;  Location: [facility]     HC ESOPHAGOSCOPY, DIAGNOSTIC  2003     LAPAROSCOPIC CHOLECYSTECTOMY N/A 12/28/2017    Procedure: LAPAROSCOPIC CHOLECYSTECTOMY;  LAPAROSCOPIC CHOLECYSTECTOMY;  Surgeon: Heber Gonzalez MD;  Location: [facility]     TRANSRECTAL ULTRASONIC, TRANSURETHRAL RESECTION (TUR) OF PROSTATE CYST  1990         SOCIAL HISTORY:   Patient's living condition: lives with spouse    MEDICATIONS  Post Discharge Medication Reconciliation Status: discharge medications reconciled and changed, per note/orders.  Current Outpatient Medications   Medication Sig Dispense Refill     acetaminophen (TYLENOL) 500 MG tablet Take 2 tablets (1,000 mg) by mouth every 8 hours as needed for mild pain or fever  0     amiodarone (PACERONE) 200 MG tablet Take 1 tablet (200 mg) by mouth daily       atorvastatin (LIPITOR) 20 MG tablet Take 20 mg by mouth At Bedtime       citalopram (CELEXA) 10 MG tablet Take 0.5 tablets (5 mg) by mouth daily        "ELIQUIS ANTICOAGULANT 5 MG tablet Take 5 mg by mouth 2 times daily       famotidine (PEPCID) 40 MG tablet Take 1 tablet (40 mg) by mouth 2 times daily       furosemide (LASIX) 20 MG tablet Take 1 tablet by mouth daily       insulin aspart (NOVOLOG PEN) 100 UNIT/ML pen Inject 1-3 Units Subcutaneous 3 times daily (before meals) 15 mL      Lidocaine (LIDOCARE) 4 % Patch Place 2 patches onto the skin every 24 hours To prevent lidocaine toxicity, patient should be patch free for 12 hrs daily.       menthol, Topical Analgesic, 2.5% (BENGAY VANISHING SCENT) 2.5 % GEL topical gel Apply topically 4 times daily as needed for other (pain) Apply to shoulders at times when Lidoderm patch is absent       metoprolol succinate ER (TOPROL XL) 25 MG 24 hr tablet Take 2 tablets (50 mg) by mouth daily       ondansetron (ZOFRAN ODT) 4 MG ODT tab DISSOLVE 1 TABLET IN MOUTH EVERY 8 HOURS AS NEEDED       potassium chloride ER (KLOR-CON M) 20 MEQ CR tablet Take 1 tablet by mouth every morning       [START ON 8/30/2023] vancomycin (VANCOCIN) 125 MG capsule Take 1 capsule (125 mg) by mouth 2 times daily for 7 days       vancomycin (VANCOCIN) 125 MG capsule Take 1 capsule (125 mg) by mouth 4 times daily for 5 days       [START ON 9/7/2023] vancomycin (VANCOCIN) 125 MG capsule Take 1 capsule (125 mg) by mouth daily for 7 days       ROS:  10 point ROS neg other than the symptoms noted above in the HPI.      PHYSICAL EXAM:  /82   Pulse 68   Temp 97.6  F (36.4  C)   Resp 18   Ht 1.803 m (5' 11\")   Wt 101.6 kg (224 lb)   SpO2 94%   BMI 31.24 kg/m    Physical Exam  Cardiovascular:      Rate and Rhythm: Normal rate. Rhythm irregular.      Heart sounds: Normal heart sounds.   Pulmonary:      Effort: Pulmonary effort is normal.      Breath sounds: Normal breath sounds.   Abdominal:      General: Bowel sounds are normal.   Musculoskeletal:      Right lower leg: Edema present.      Left lower leg: Edema present.   Skin:     Comments: Coccyx " area mildly denuded   Neurological:      Mental Status: He is alert.   Psychiatric:         Mood and Affect: Mood normal.          LABORATORY/IMAGING DATA:  Reviewed as per [facility] and/or [facility]    ASSESSMENT/PLAN:  Colitis due to Clostridium difficile  Continues with diarrhea  -continue vancomycin taper  - contact precautions   - Calmoseptine to buttocks to protect skin    DEJUAN (acute kidney injury) (H)  In  the setting of dehydration    Schatzki's ring of distal esophagus  Dysphagia, unspecified type  S/p dilation. Feels that he is eating better. Tolerating regular diet.  Discussed with patient, nursing staff, therapy  - SLP to eval and treat  - consider follow-up manometry after TCU discharge.   - continue pepcid 40mg BID     Diastolic congestive heart failure, unspecified HF chronicity (H)  Essential hypertension  Aortic valve stenosis, etiology of cardiac valve disease unspecified  Echo with EF 50-55%, severe aortic stenosis. Weight stable. BP variable, 100-130, Appears compensated.  Discussed with patient, nursing staff.   - continue atorvastatin, metoprolol XL, lasix 20mg daily  - daily weights  - BMP to monitor for stability.         Type 2 diabetes mellitus with diabetic polyneuropathy, without long-term current use of insulin (H)  A1C 7.2%, -170, Diet controlled  - sliding scale insulin TID with meals  -     Chronic atrial fibrillation (H)  HR controlled 60  - continue amiodarone, metoprolol Xl  - anticoagulated on Eliquis  - monitor and adjust      Physical deconditioning  In the setting of recent hospitalization   - PT/OT    Reactive depression  Anxiety  - continue citalopram    Malnutrition  In the setting of recent recent esophageal ring, improved after dilation  - dietician to follow in TCU  - monitor  - diet as tolerated      Orders:   BMP, MG on 8/31  SLP to eval and treat  Discontinue mepilex to coccyx  Calmoseptine TID and PRN to buttocks for moisture related dermatitis    Total time  spent with patient visit at the skilled nursing facility was 50 minutes including patient visit and review of past records. Total time spent reviewing records from hospitalization within my organization including review of labs, procedure notes, review of TCU facility records, medication reconciliation discussion of plan of care with nursing staff and therapy, time spent on documentation as well as discussion with patient including review of medications, discussion of plan of care and patient education as stated above.       Electronically signed by:  CHUCK Cedeno CNP       Sincerely,        CHUCK Cedeno CNP

## 2023-08-30 ENCOUNTER — LAB REQUISITION (OUTPATIENT)
Dept: LAB | Facility: CLINIC | Age: 88
End: 2023-08-30

## 2023-08-30 DIAGNOSIS — I50.9 HEART FAILURE, UNSPECIFIED (H): ICD-10-CM

## 2023-08-30 DIAGNOSIS — R76.12 NONSPECIFIC REACTION TO CELL MEDIATED IMMUNITY MEASUREMENT OF GAMMA INTERFERON ANTIGEN RESPONSE WITHOUT ACTIVE TUBERCULOSIS: ICD-10-CM

## 2023-08-31 LAB
ANION GAP SERPL CALCULATED.3IONS-SCNC: 8 MMOL/L (ref 7–15)
BUN SERPL-MCNC: 14.1 MG/DL (ref 8–23)
CALCIUM SERPL-MCNC: 8.4 MG/DL (ref 8.8–10.2)
CHLORIDE SERPL-SCNC: 103 MMOL/L (ref 98–107)
CREAT SERPL-MCNC: 1.14 MG/DL (ref 0.67–1.17)
DEPRECATED HCO3 PLAS-SCNC: 27 MMOL/L (ref 22–29)
GFR SERPL CREATININE-BSD FRML MDRD: 61 ML/MIN/1.73M2
GLUCOSE SERPL-MCNC: 103 MG/DL (ref 70–99)
MAGNESIUM SERPL-MCNC: 1.6 MG/DL (ref 1.7–2.3)
POTASSIUM SERPL-SCNC: 4.4 MMOL/L (ref 3.4–5.3)
SODIUM SERPL-SCNC: 138 MMOL/L (ref 136–145)

## 2023-08-31 PROCEDURE — 83735 ASSAY OF MAGNESIUM: CPT | Performed by: NURSE PRACTITIONER

## 2023-08-31 PROCEDURE — 36415 COLL VENOUS BLD VENIPUNCTURE: CPT | Performed by: NURSE PRACTITIONER

## 2023-08-31 PROCEDURE — 80048 BASIC METABOLIC PNL TOTAL CA: CPT | Performed by: NURSE PRACTITIONER

## 2023-08-31 PROCEDURE — 86481 TB AG RESPONSE T-CELL SUSP: CPT | Performed by: NURSE PRACTITIONER

## 2023-09-01 LAB
GAMMA INTERFERON BACKGROUND BLD IA-ACNC: 0.12 IU/ML
M TB IFN-G BLD-IMP: NEGATIVE
M TB IFN-G CD4+ BCKGRND COR BLD-ACNC: 3.57 IU/ML
MITOGEN IGNF BCKGRD COR BLD-ACNC: 0.01 IU/ML
MITOGEN IGNF BCKGRD COR BLD-ACNC: 0.02 IU/ML
QUANTIFERON MITOGEN: 3.69 IU/ML
QUANTIFERON NIL TUBE: 0.12 IU/ML
QUANTIFERON TB1 TUBE: 0.14 IU/ML
QUANTIFERON TB2 TUBE: 0.13

## 2023-09-01 NOTE — PROGRESS NOTES
[facility] GERIATRICS DISCHARGE SUMMARY  Patient Name: Alvin Newton  YOB: 1934  [place] Medical Record Number: [id number]  Place of Service Where Encounter Took Place: [facility] ([facility]) [[id number]]    PRIMARY CARE PROVIDER AND CLINIC RESPONSIBLE AFTER TRANSFER: Steven Duane Semmler, MD, [address] [facility] [address]; Non-FMG Provider     Transferring providers: CHUCK Martino CNP; Michele Dai MD  Recent Hospitalization/ED: [facility] stay 8/17/23 to 8/24/23.  Date of SNF Admission: August 24, 2023  Date of [facility] (anticipated) Discharge: August 07, 2023  Discharged to: previous independent home  Cognitive Scores: SLUMS: 20/30  Physical Function: Ambulating 120 ft with SBA  DME: No new DME needed    CODE STATUS/ADVANCE DIRECTIVES DISCUSSION: No CPR- Do NOT Intubate   ALLERGIES: Patient has no known allergies.    NURSING FACILITY COURSE:  Medication Changes/Rationale:   Started on Slo-mag 1 tab daily for low Mg    Summary of nursing facility stay:   Brief Hospital Course: PMH of Dm2, HTN, CKD, 3, paroxysmal Afib, diastolic CHF, severe aortic stenosis, BPH, who presented with weakness and poor oral intake and diarrhea. Had DEJUAN, felt to be dehydration. Had esophagram done due to difficulty being able to swallow, showed severe esophageal dysmotility. Had diagnostic EGD on 8/19 which showed Schatzki ring, which was dilated. He was becky to tolerate medications and diet after procedure without issues. Was started on IV Flagyl due to recent C.diff infection, and diarrhea improved. Did have positive C.diff test. Was started prolonged taper of oral vancomycin. Completed course of steroids for gout. Lasix was held initially, but was resumed prior to discharge. Due to ongoing weakness, TCU recommended. When medically stable was discharged to TCU for further rehab and medical management.      Colitis due to Clostridium  difficile  Diarrhea improving.   - Med Therapy Management Referral  - continue vancomycin taper  - Calmoseptine to buttocks to protect skin     DEJUAN (acute kidney injury) (H)  In the setting of dehydration, resolved    Hypomagnesemia  Mg level of 1.6   - started on Slo-Mag    Schatzki's ring of distal esophagus  Dysphagia, unspecified type  S/p dilation. Feels that he is eating better. Followed by SLP in TCU. Tolerating regular diet.  - consider follow-up manometry after TCU discharge.   - continue pepcid 40mg BID     Diastolic congestive heart failure, unspecified HF chronicity (H)  Essential hypertension  Aortic valve stenosis, etiology of cardiac valve disease unspecified  Echo with EF 50-55%, severe aortic stenosis. Weight stable. BP variable, 100-130, Appears compensated.  Discussed with patient, nursing staff.   - continue atorvastatin, metoprolol XL, toresemide 20mg daily  - daily weight      Type 2 diabetes mellitus with diabetic polyneuropathy, without long-term current use of insulin (H)  A1C 7.2%. BG running 100-150, Was on sliding scale insulin in TCU  - diet contorlled     Chronic atrial fibrillation (H)  HR controlled 60-70  - continue metoprolol  - anticoagulated on Eliquis 5mg BID    Physical deconditioning  Made progress with therapy, recommended discharge at end of week but patient request discharge on 9/7  - Home Care Referral    Moderate protein-calorie malnutrition (H)  Recent weight loss in setting esophageal issues, intake improved. No vomiting in TCU  - diet as tolerated    Anxiety  Reactive depression  Mood stable  - continue citalopram 5mg daily  - follow-up with PCP    Discharge Medications:  MED REC REQUIRED  Post Medication Reconciliation Status:  Medication reconciliation previously completed during another office visit    Current Outpatient Medications   Medication Sig Dispense Refill    acetaminophen (TYLENOL) 500 MG tablet Take 2 tablets (1,000 mg) by mouth every 8 hours as needed for  "mild pain or fever  0    amiodarone (PACERONE) 200 MG tablet Take 1 tablet (200 mg) by mouth daily      atorvastatin (LIPITOR) 20 MG tablet Take 20 mg by mouth At Bedtime      citalopram (CELEXA) 10 MG tablet Take 0.5 tablets (5 mg) by mouth daily      ELIQUIS ANTICOAGULANT 5 MG tablet Take 5 mg by mouth 2 times daily      famotidine (PEPCID) 40 MG tablet Take 1 tablet (40 mg) by mouth 2 times daily      furosemide (LASIX) 20 MG tablet Take 1 tablet by mouth daily      insulin aspart (NOVOLOG PEN) 100 UNIT/ML pen Inject 1-3 Units Subcutaneous 3 times daily (before meals) 15 mL     Lidocaine (LIDOCARE) 4 % Patch Place 2 patches onto the skin every 24 hours To prevent lidocaine toxicity, patient should be patch free for 12 hrs daily.      menthol, Topical Analgesic, 2.5% (BENGAY VANISHING SCENT) 2.5 % GEL topical gel Apply topically 4 times daily as needed for other (pain) Apply to shoulders at times when Lidoderm patch is absent      metoprolol succinate ER (TOPROL XL) 25 MG 24 hr tablet Take 2 tablets (50 mg) by mouth daily      ondansetron (ZOFRAN ODT) 4 MG ODT tab DISSOLVE 1 TABLET IN MOUTH EVERY 8 HOURS AS NEEDED      potassium chloride ER (KLOR-CON M) 20 MEQ CR tablet Take 1 tablet by mouth every morning      vancomycin (VANCOCIN) 125 MG capsule Take 1 capsule (125 mg) by mouth 2 times daily for 7 days      [START ON 9/7/2023] vancomycin (VANCOCIN) 125 MG capsule Take 1 capsule (125 mg) by mouth daily for 7 days       Controlled medications:   NA     Past Medical History:   Past Medical History:   Diagnosis Date    Colonic diverticulum     Hyperlipidemia     Hypertension     Obesity (BMI 30-39.9)     Osteoarthritis     Type 2 diabetes mellitus (H)      Physical Exam:   Vitals: /70   Pulse 69   Temp 97.6  F (36.4  C)   Resp 18   Ht 1.803 m (5' 11\")   Wt 99.3 kg (219 lb)   SpO2 97%   BMI 30.54 kg/m    BMI: Body mass index is 30.54 kg/m .  GENERAL APPEARANCE:  Alert, in no distress, cooperative, "   RESP:  lungs clear to auscultation , no respiratory distress   CV:  regular rate and rhythm, no murmur, rub, or gallop, no edema  ABDOMEN:  bowel sounds normal,   M/S:   no gross joint deformities, mild generalized muscle weaknes  NEURO:   Cn 2-12 grossly intact,   PSYCH:  oriented X 3, affect and mood normal       SNF Labs: Recent labs in [facility] reviewed by me today.  and Most Recent 3 CBC's:  Recent Labs   Lab Test 08/20/23  0541 08/19/23  0541 08/18/23  0608 08/16/23  1610 07/31/23  1229   WBC  --   --  9.4 9.7 15.6*   HGB 13.5 12.9* 13.8 15.6 16.3   MCV  --   --  89 86 90   PLT  --   --  180 222 474*     Most Recent 3 BMP's:  Recent Labs   Lab Test 08/31/23  0723 08/24/23  1210 08/24/23  0741 08/24/23  0623 08/23/23  0813 08/23/23  0529 08/22/23  0814 08/22/23  0547     --   --   --   --  135*  --  133*   POTASSIUM 4.4  --   --  4.1  --  4.2  --  4.4   CHLORIDE 103  --   --   --   --  100  --  101   CO2 27  --   --   --   --  25  --  21*   BUN 14.1  --   --   --   --  21.8  --  20.4   CR 1.14  --   --   --   --  1.06  --  1.03   ANIONGAP 8  --   --   --   --  10  --  11   JERRY 8.4*  --   --   --   --  8.8  --  8.8   * 144* 123*  --    < > 155*   < > 150*    < > = values in this interval not displayed.       DISCHARGE PLAN:  Follow up labs: No labs orders/due  Medical Follow Up:   Follow up with primary care provider in 2 weeks  [facility] scheduled appointments: None.  Discharge Services: Home Care: Physical Therapy, Occupational Therapy, Registered Nurse, Home Health Aide. From: [facility].  Discharge Instructions Verbalized to Patient at Discharge:   Weigh yourself daily in the morning and keep a record. Call your primary clinic: a) if you are more short of breath, or b) if your weight changes more than 3 pounds in one day or more than 5 pounds in one week.     TOTAL DISCHARGE TIME: Greater than 30 minutes  Electronically signed by:  CHUCK Cedeno CNP    Home care Face to  Face documentation done in [facility] attached to Home care orders for [facility].

## 2023-09-05 ENCOUNTER — DISCHARGE SUMMARY NURSING HOME (OUTPATIENT)
Dept: GERIATRICS | Facility: CLINIC | Age: 88
End: 2023-09-05
Payer: COMMERCIAL

## 2023-09-05 VITALS
SYSTOLIC BLOOD PRESSURE: 118 MMHG | RESPIRATION RATE: 18 BRPM | TEMPERATURE: 97.6 F | HEIGHT: 71 IN | BODY MASS INDEX: 30.66 KG/M2 | DIASTOLIC BLOOD PRESSURE: 70 MMHG | OXYGEN SATURATION: 97 % | HEART RATE: 69 BPM | WEIGHT: 219 LBS

## 2023-09-05 DIAGNOSIS — N17.9 AKI (ACUTE KIDNEY INJURY) (H): ICD-10-CM

## 2023-09-05 DIAGNOSIS — I10 ESSENTIAL HYPERTENSION: ICD-10-CM

## 2023-09-05 DIAGNOSIS — E11.42 TYPE 2 DIABETES MELLITUS WITH DIABETIC POLYNEUROPATHY, WITHOUT LONG-TERM CURRENT USE OF INSULIN (H): ICD-10-CM

## 2023-09-05 DIAGNOSIS — F32.9 REACTIVE DEPRESSION: ICD-10-CM

## 2023-09-05 DIAGNOSIS — I50.30 DIASTOLIC CONGESTIVE HEART FAILURE, UNSPECIFIED HF CHRONICITY (H): ICD-10-CM

## 2023-09-05 DIAGNOSIS — R13.10 DYSPHAGIA, UNSPECIFIED TYPE: ICD-10-CM

## 2023-09-05 DIAGNOSIS — I35.0 AORTIC VALVE STENOSIS, ETIOLOGY OF CARDIAC VALVE DISEASE UNSPECIFIED: ICD-10-CM

## 2023-09-05 DIAGNOSIS — A04.72 COLITIS DUE TO CLOSTRIDIUM DIFFICILE: Primary | ICD-10-CM

## 2023-09-05 DIAGNOSIS — E83.42 HYPOMAGNESEMIA: ICD-10-CM

## 2023-09-05 DIAGNOSIS — R53.81 PHYSICAL DECONDITIONING: ICD-10-CM

## 2023-09-05 DIAGNOSIS — K22.2 SCHATZKI'S RING OF DISTAL ESOPHAGUS: ICD-10-CM

## 2023-09-05 DIAGNOSIS — E44.0 MODERATE PROTEIN-CALORIE MALNUTRITION (H): ICD-10-CM

## 2023-09-05 DIAGNOSIS — I48.20 CHRONIC ATRIAL FIBRILLATION (H): ICD-10-CM

## 2023-09-05 DIAGNOSIS — F41.9 ANXIETY: ICD-10-CM

## 2023-09-05 PROCEDURE — 99316 NF DSCHRG MGMT 30 MIN+: CPT | Performed by: NURSE PRACTITIONER

## 2023-09-05 RX ORDER — MAGNESIUM CHLORIDE 71.5 G/G
1 TABLET ORAL DAILY
Qty: 30 TABLET | Refills: 0 | Status: ON HOLD | OUTPATIENT
Start: 2023-09-05 | End: 2024-01-22

## 2023-09-05 RX ORDER — TORSEMIDE 20 MG/1
20 TABLET ORAL DAILY
COMMUNITY
Start: 2023-09-05 | End: 2024-02-28

## 2023-09-05 NOTE — LETTER
9/5/2023        RE: Alvin Newton  [address] [id number]        [facility] GERIATRICS DISCHARGE SUMMARY  Patient Name: Alvin Newton  YOB: 1934  [place] Medical Record Number: [id number]  Place of Service Where Encounter Took Place: [facility] (SNF) [[id number]]    PRIMARY CARE PROVIDER AND CLINIC RESPONSIBLE AFTER TRANSFER: Steven Duane Semmler, MD, [address] [facility] / [facility] [address]; Non-FMG Provider     Transferring providers: CHUCK Martino CNP; Michele Dai MD  Recent Hospitalization/ED: [facility] stay 8/17/23 to 8/24/23.  Date of SNF Admission: August 24, 2023  Date of SNF (anticipated) Discharge: August 07, 2023  Discharged to: previous independent home  Cognitive Scores: SLUMS: 20/30  Physical Function: Ambulating 120 ft with SBA  DME: No new DME needed    CODE STATUS/ADVANCE DIRECTIVES DISCUSSION: No CPR- Do NOT Intubate   ALLERGIES: Patient has no known allergies.    NURSING FACILITY COURSE:  Medication Changes/Rationale:   Started on Slo-mag 1 tab daily for low Mg    Summary of nursing facility stay:   Brief Hospital Course: PMH of Dm2, HTN, CKD, 3, paroxysmal Afib, diastolic CHF, severe aortic stenosis, BPH, who presented with weakness and poor oral intake and diarrhea. Had DEJUAN, felt to be dehydration. Had esophagram done due to difficulty being able to swallow, showed severe esophageal dysmotility. Had diagnostic EGD on 8/19 which showed Schatzki ring, which was dilated. He was becky to tolerate medications and diet after procedure without issues. Was started on IV Flagyl due to recent C.diff infection, and diarrhea improved. Did have positive C.diff test. Was started prolonged taper of oral vancomycin. Completed course of steroids for gout. Lasix was held initially, but was resumed prior to discharge. Due to ongoing weakness, TCU recommended. When medically stable was  discharged to TCU for further rehab and medical management.      Colitis due to Clostridium difficile  Diarrhea improving.   - Med Therapy Management Referral  - continue vancomycin taper  - Calmoseptine to buttocks to protect skin     DEJUAN (acute kidney injury) (H)  In the setting of dehydration, resolved    Hypomagnesemia  Mg level of 1.6   - started on Slo-Mag    Schatzki's ring of distal esophagus  Dysphagia, unspecified type  S/p dilation. Feels that he is eating better. Followed by SLP in TCU. Tolerating regular diet.  - consider follow-up manometry after TCU discharge.   - continue pepcid 40mg BID     Diastolic congestive heart failure, unspecified HF chronicity (H)  Essential hypertension  Aortic valve stenosis, etiology of cardiac valve disease unspecified  Echo with EF 50-55%, severe aortic stenosis. Weight stable. BP variable, 100-130, Appears compensated.  Discussed with patient, nursing staff.   - continue atorvastatin, metoprolol XL, toresemide 20mg daily  - daily weight      Type 2 diabetes mellitus with diabetic polyneuropathy, without long-term current use of insulin (H)  A1C 7.2%. BG running 100-150, Was on sliding scale insulin in TCU  - diet contorlled     Chronic atrial fibrillation (H)  HR controlled 60-70  - continue metoprolol  - anticoagulated on Eliquis 5mg BID    Physical deconditioning  Made progress with therapy, recommended discharge at end of week but patient request discharge on 9/7  - Home Care Referral    Moderate protein-calorie malnutrition (H)  Recent weight loss in setting esophageal issues, intake improved. No vomiting in TCU  - diet as tolerated    Anxiety  Reactive depression  Mood stable  - continue citalopram 5mg daily  - follow-up with PCP    Discharge Medications:  MED REC REQUIRED  Post Medication Reconciliation Status:  Medication reconciliation previously completed during another office visit    Current Outpatient Medications   Medication Sig Dispense Refill      "acetaminophen (TYLENOL) 500 MG tablet Take 2 tablets (1,000 mg) by mouth every 8 hours as needed for mild pain or fever  0     amiodarone (PACERONE) 200 MG tablet Take 1 tablet (200 mg) by mouth daily       atorvastatin (LIPITOR) 20 MG tablet Take 20 mg by mouth At Bedtime       citalopram (CELEXA) 10 MG tablet Take 0.5 tablets (5 mg) by mouth daily       ELIQUIS ANTICOAGULANT 5 MG tablet Take 5 mg by mouth 2 times daily       famotidine (PEPCID) 40 MG tablet Take 1 tablet (40 mg) by mouth 2 times daily       furosemide (LASIX) 20 MG tablet Take 1 tablet by mouth daily       insulin aspart (NOVOLOG PEN) 100 UNIT/ML pen Inject 1-3 Units Subcutaneous 3 times daily (before meals) 15 mL      Lidocaine (LIDOCARE) 4 % Patch Place 2 patches onto the skin every 24 hours To prevent lidocaine toxicity, patient should be patch free for 12 hrs daily.       menthol, Topical Analgesic, 2.5% (BENGAY VANISHING SCENT) 2.5 % GEL topical gel Apply topically 4 times daily as needed for other (pain) Apply to shoulders at times when Lidoderm patch is absent       metoprolol succinate ER (TOPROL XL) 25 MG 24 hr tablet Take 2 tablets (50 mg) by mouth daily       ondansetron (ZOFRAN ODT) 4 MG ODT tab DISSOLVE 1 TABLET IN MOUTH EVERY 8 HOURS AS NEEDED       potassium chloride ER (KLOR-CON M) 20 MEQ CR tablet Take 1 tablet by mouth every morning       vancomycin (VANCOCIN) 125 MG capsule Take 1 capsule (125 mg) by mouth 2 times daily for 7 days       [START ON 9/7/2023] vancomycin (VANCOCIN) 125 MG capsule Take 1 capsule (125 mg) by mouth daily for 7 days       Controlled medications:   NA     Past Medical History:   Past Medical History:   Diagnosis Date     Colonic diverticulum      Hyperlipidemia      Hypertension      Obesity (BMI 30-39.9)      Osteoarthritis      Type 2 diabetes mellitus (H)      Physical Exam:   Vitals: /70   Pulse 69   Temp 97.6  F (36.4  C)   Resp 18   Ht 1.803 m (5' 11\")   Wt 99.3 kg (219 lb)   SpO2 97%  "  BMI 30.54 kg/m    BMI: Body mass index is 30.54 kg/m .  GENERAL APPEARANCE:  Alert, in no distress, cooperative,   RESP:  lungs clear to auscultation , no respiratory distress   CV:  regular rate and rhythm, no murmur, rub, or gallop, no edema  ABDOMEN:  bowel sounds normal,   M/S:   no gross joint deformities, mild generalized muscle weaknes  NEURO:   Cn 2-12 grossly intact,   PSYCH:  oriented X 3, affect and mood normal       SNF Labs: Recent labs in [facility] reviewed by me today.  and Most Recent 3 CBC's:  Recent Labs   Lab Test 08/20/23  0541 08/19/23  0541 08/18/23  0608 08/16/23  1610 07/31/23  1229   WBC  --   --  9.4 9.7 15.6*   HGB 13.5 12.9* 13.8 15.6 16.3   MCV  --   --  89 86 90   PLT  --   --  180 222 474*     Most Recent 3 BMP's:  Recent Labs   Lab Test 08/31/23  0723 08/24/23  1210 08/24/23  0741 08/24/23  0623 08/23/23  0813 08/23/23  0529 08/22/23  0814 08/22/23  0547     --   --   --   --  135*  --  133*   POTASSIUM 4.4  --   --  4.1  --  4.2  --  4.4   CHLORIDE 103  --   --   --   --  100  --  101   CO2 27  --   --   --   --  25  --  21*   BUN 14.1  --   --   --   --  21.8  --  20.4   CR 1.14  --   --   --   --  1.06  --  1.03   ANIONGAP 8  --   --   --   --  10  --  11   JERRY 8.4*  --   --   --   --  8.8  --  8.8   * 144* 123*  --    < > 155*   < > 150*    < > = values in this interval not displayed.       DISCHARGE PLAN:  Follow up labs: No labs orders/due  Medical Follow Up:   Follow up with primary care provider in 2 weeks  [facility] scheduled appointments: None.  Discharge Services: Home Care: Physical Therapy, Occupational Therapy, Registered Nurse, Home Health Aide. From: [facility].  Discharge Instructions Verbalized to Patient at Discharge:   Weigh yourself daily in the morning and keep a record. Call your primary clinic: a) if you are more short of breath, or b) if your weight changes more than 3 pounds in one day or more than 5 pounds in one week.     TOTAL  DISCHARGE TIME: Greater than 30 minutes  Electronically signed by:  CHUCK Cedeno CNP    Home care Face to Face documentation done in EPIC attached to Home care orders for [facility].       Sincerely,        CHUCK Cedeno CNP

## 2023-09-15 DIAGNOSIS — F41.9 ANXIETY: ICD-10-CM

## 2023-09-15 RX ORDER — CITALOPRAM HYDROBROMIDE 10 MG/1
5 TABLET ORAL DAILY
Qty: 15 TABLET | Refills: 0 | OUTPATIENT
Start: 2023-09-15

## 2023-09-16 DIAGNOSIS — F41.9 ANXIETY: ICD-10-CM

## 2023-09-18 RX ORDER — POTASSIUM CHLORIDE 1500 MG/1
TABLET, EXTENDED RELEASE ORAL
Qty: 30 TABLET | Refills: 0 | OUTPATIENT
Start: 2023-09-18

## 2023-09-18 RX ORDER — CITALOPRAM HYDROBROMIDE 10 MG/1
5 TABLET ORAL DAILY
Qty: 15 TABLET | Refills: 0 | OUTPATIENT
Start: 2023-09-18

## 2023-09-26 DIAGNOSIS — I48.0 PAROXYSMAL ATRIAL FIBRILLATION WITH RVR (H): ICD-10-CM

## 2023-09-26 RX ORDER — AMIODARONE HYDROCHLORIDE 200 MG/1
200 TABLET ORAL EVERY MORNING
Qty: 30 TABLET | Refills: 0 | OUTPATIENT
Start: 2023-09-26

## 2023-09-27 DIAGNOSIS — I48.0 PAROXYSMAL ATRIAL FIBRILLATION WITH RVR (H): ICD-10-CM

## 2023-09-27 RX ORDER — AMIODARONE HYDROCHLORIDE 200 MG/1
200 TABLET ORAL EVERY MORNING
Qty: 30 TABLET | Refills: 0 | OUTPATIENT
Start: 2023-09-27

## 2023-11-19 ENCOUNTER — HEALTH MAINTENANCE LETTER (OUTPATIENT)
Age: 88
End: 2023-11-19

## 2024-01-22 ENCOUNTER — APPOINTMENT (OUTPATIENT)
Dept: RADIOLOGY | Facility: HOSPITAL | Age: 89
DRG: 466 | End: 2024-01-22
Attending: EMERGENCY MEDICINE
Payer: COMMERCIAL

## 2024-01-22 ENCOUNTER — APPOINTMENT (OUTPATIENT)
Dept: RADIOLOGY | Facility: HOSPITAL | Age: 89
DRG: 466 | End: 2024-01-22
Payer: COMMERCIAL

## 2024-01-22 ENCOUNTER — APPOINTMENT (OUTPATIENT)
Dept: CT IMAGING | Facility: HOSPITAL | Age: 89
DRG: 466 | End: 2024-01-22
Attending: EMERGENCY MEDICINE
Payer: COMMERCIAL

## 2024-01-22 ENCOUNTER — APPOINTMENT (OUTPATIENT)
Dept: CT IMAGING | Facility: HOSPITAL | Age: 89
DRG: 466 | End: 2024-01-22
Payer: COMMERCIAL

## 2024-01-22 ENCOUNTER — HOSPITAL ENCOUNTER (INPATIENT)
Facility: HOSPITAL | Age: 89
LOS: 11 days | Discharge: NURSING, REHAB OR LONG-TERM CARE | DRG: 466 | End: 2024-02-02
Attending: EMERGENCY MEDICINE | Admitting: FAMILY MEDICINE
Payer: COMMERCIAL

## 2024-01-22 DIAGNOSIS — K22.4 ESOPHAGEAL DYSMOTILITY: ICD-10-CM

## 2024-01-22 DIAGNOSIS — Z96.649 PERIPROSTHETIC FRACTURE OF SHAFT OF FEMUR: Primary | ICD-10-CM

## 2024-01-22 DIAGNOSIS — T81.89XA IMPAIRED SKIN INTEGRITY ASSOCIATED WITH SURGICAL INCISION: ICD-10-CM

## 2024-01-22 DIAGNOSIS — S72.002A HIP FRACTURE, LEFT, CLOSED, INITIAL ENCOUNTER (H): ICD-10-CM

## 2024-01-22 DIAGNOSIS — M97.8XXA PERIPROSTHETIC FRACTURE OF SHAFT OF FEMUR: Primary | ICD-10-CM

## 2024-01-22 DIAGNOSIS — F41.9 ANXIETY: ICD-10-CM

## 2024-01-22 DIAGNOSIS — R23.9 IMPAIRED SKIN INTEGRITY ASSOCIATED WITH SURGICAL INCISION: ICD-10-CM

## 2024-01-22 LAB
ABO/RH(D): NORMAL
ANION GAP SERPL CALCULATED.3IONS-SCNC: 6 MMOL/L (ref 7–15)
ANTIBODY SCREEN: NEGATIVE
APTT PPP: 35 SECONDS (ref 22–38)
BASOPHILS # BLD AUTO: 0.1 10E3/UL (ref 0–0.2)
BASOPHILS NFR BLD AUTO: 1 %
BUN SERPL-MCNC: 24.7 MG/DL (ref 8–23)
CALCIUM SERPL-MCNC: 9 MG/DL (ref 8.8–10.2)
CHLORIDE SERPL-SCNC: 104 MMOL/L (ref 98–107)
CREAT SERPL-MCNC: 1.34 MG/DL (ref 0.67–1.17)
DEPRECATED HCO3 PLAS-SCNC: 29 MMOL/L (ref 22–29)
EGFRCR SERPLBLD CKD-EPI 2021: 51 ML/MIN/1.73M2
EOSINOPHIL # BLD AUTO: 0.1 10E3/UL (ref 0–0.7)
EOSINOPHIL NFR BLD AUTO: 2 %
ERYTHROCYTE [DISTWIDTH] IN BLOOD BY AUTOMATED COUNT: 13.4 % (ref 10–15)
GLUCOSE BLDC GLUCOMTR-MCNC: 135 MG/DL (ref 70–99)
GLUCOSE BLDC GLUCOMTR-MCNC: 150 MG/DL (ref 70–99)
GLUCOSE BLDC GLUCOMTR-MCNC: 162 MG/DL (ref 70–99)
GLUCOSE SERPL-MCNC: 161 MG/DL (ref 70–99)
HBA1C MFR BLD: 6.8 %
HCT VFR BLD AUTO: 41.4 % (ref 40–53)
HGB BLD-MCNC: 13.3 G/DL (ref 13.3–17.7)
IMM GRANULOCYTES # BLD: 0 10E3/UL
IMM GRANULOCYTES NFR BLD: 0 %
INR PPP: 1.69 (ref 0.85–1.15)
LYMPHOCYTES # BLD AUTO: 2.8 10E3/UL (ref 0.8–5.3)
LYMPHOCYTES NFR BLD AUTO: 45 %
MCH RBC QN AUTO: 29.5 PG (ref 26.5–33)
MCHC RBC AUTO-ENTMCNC: 32.1 G/DL (ref 31.5–36.5)
MCV RBC AUTO: 92 FL (ref 78–100)
MONOCYTES # BLD AUTO: 0.8 10E3/UL (ref 0–1.3)
MONOCYTES NFR BLD AUTO: 13 %
NEUTROPHILS # BLD AUTO: 2.5 10E3/UL (ref 1.6–8.3)
NEUTROPHILS NFR BLD AUTO: 39 %
NRBC # BLD AUTO: 0 10E3/UL
NRBC BLD AUTO-RTO: 0 /100
PLATELET # BLD AUTO: 228 10E3/UL (ref 150–450)
POTASSIUM SERPL-SCNC: 4.4 MMOL/L (ref 3.4–5.3)
RBC # BLD AUTO: 4.51 10E6/UL (ref 4.4–5.9)
SODIUM SERPL-SCNC: 139 MMOL/L (ref 135–145)
SPECIMEN EXPIRATION DATE: NORMAL
WBC # BLD AUTO: 6.3 10E3/UL (ref 4–11)

## 2024-01-22 PROCEDURE — 36415 COLL VENOUS BLD VENIPUNCTURE: CPT | Performed by: EMERGENCY MEDICINE

## 2024-01-22 PROCEDURE — 73552 X-RAY EXAM OF FEMUR 2/>: CPT | Mod: LT

## 2024-01-22 PROCEDURE — 73502 X-RAY EXAM HIP UNI 2-3 VIEWS: CPT

## 2024-01-22 PROCEDURE — 99285 EMERGENCY DEPT VISIT HI MDM: CPT | Mod: 25

## 2024-01-22 PROCEDURE — 250N000013 HC RX MED GY IP 250 OP 250 PS 637: Performed by: STUDENT IN AN ORGANIZED HEALTH CARE EDUCATION/TRAINING PROGRAM

## 2024-01-22 PROCEDURE — 85610 PROTHROMBIN TIME: CPT | Performed by: EMERGENCY MEDICINE

## 2024-01-22 PROCEDURE — 80048 BASIC METABOLIC PNL TOTAL CA: CPT | Performed by: EMERGENCY MEDICINE

## 2024-01-22 PROCEDURE — 93005 ELECTROCARDIOGRAM TRACING: CPT

## 2024-01-22 PROCEDURE — 73700 CT LOWER EXTREMITY W/O DYE: CPT | Mod: LT

## 2024-01-22 PROCEDURE — 72125 CT NECK SPINE W/O DYE: CPT

## 2024-01-22 PROCEDURE — 250N000013 HC RX MED GY IP 250 OP 250 PS 637

## 2024-01-22 PROCEDURE — 85025 COMPLETE CBC W/AUTO DIFF WBC: CPT | Performed by: EMERGENCY MEDICINE

## 2024-01-22 PROCEDURE — 120N000001 HC R&B MED SURG/OB

## 2024-01-22 PROCEDURE — 250N000011 HC RX IP 250 OP 636: Performed by: STUDENT IN AN ORGANIZED HEALTH CARE EDUCATION/TRAINING PROGRAM

## 2024-01-22 PROCEDURE — 83036 HEMOGLOBIN GLYCOSYLATED A1C: CPT

## 2024-01-22 PROCEDURE — 99222 1ST HOSP IP/OBS MODERATE 55: CPT | Mod: AI

## 2024-01-22 PROCEDURE — 73700 CT LOWER EXTREMITY W/O DYE: CPT | Mod: LT,XS

## 2024-01-22 PROCEDURE — 85730 THROMBOPLASTIN TIME PARTIAL: CPT | Performed by: EMERGENCY MEDICINE

## 2024-01-22 PROCEDURE — 250N000012 HC RX MED GY IP 250 OP 636 PS 637

## 2024-01-22 PROCEDURE — 70450 CT HEAD/BRAIN W/O DYE: CPT

## 2024-01-22 PROCEDURE — 86900 BLOOD TYPING SEROLOGIC ABO: CPT | Performed by: EMERGENCY MEDICINE

## 2024-01-22 RX ORDER — ACETAMINOPHEN 325 MG/1
650 TABLET ORAL EVERY 4 HOURS PRN
Status: DISCONTINUED | OUTPATIENT
Start: 2024-01-22 | End: 2024-01-22

## 2024-01-22 RX ORDER — LIDOCAINE 40 MG/G
CREAM TOPICAL
Status: DISCONTINUED | OUTPATIENT
Start: 2024-01-22 | End: 2024-01-24

## 2024-01-22 RX ORDER — POTASSIUM CHLORIDE 750 MG/1
20 TABLET, EXTENDED RELEASE ORAL DAILY
COMMUNITY
End: 2024-02-28

## 2024-01-22 RX ORDER — HEPARIN SODIUM 5000 [USP'U]/.5ML
5000 INJECTION, SOLUTION INTRAVENOUS; SUBCUTANEOUS EVERY 12 HOURS
Status: COMPLETED | OUTPATIENT
Start: 2024-01-22 | End: 2024-01-23

## 2024-01-22 RX ORDER — METHOCARBAMOL 500 MG/1
500 TABLET, FILM COATED ORAL 3 TIMES DAILY PRN
Status: DISCONTINUED | OUTPATIENT
Start: 2024-01-22 | End: 2024-01-29

## 2024-01-22 RX ORDER — FAMOTIDINE 20 MG/1
20 TABLET, FILM COATED ORAL 2 TIMES DAILY
Status: DISCONTINUED | OUTPATIENT
Start: 2024-01-22 | End: 2024-01-26

## 2024-01-22 RX ORDER — HYDROMORPHONE HCL IN WATER/PF 6 MG/30 ML
.2-.4 PATIENT CONTROLLED ANALGESIA SYRINGE INTRAVENOUS
Status: DISCONTINUED | OUTPATIENT
Start: 2024-01-22 | End: 2024-01-24

## 2024-01-22 RX ORDER — AMOXICILLIN 250 MG
1 CAPSULE ORAL 2 TIMES DAILY
Status: DISCONTINUED | OUTPATIENT
Start: 2024-01-22 | End: 2024-01-25

## 2024-01-22 RX ORDER — AMIODARONE HYDROCHLORIDE 200 MG/1
200 TABLET ORAL DAILY
Status: DISCONTINUED | OUTPATIENT
Start: 2024-01-22 | End: 2024-02-02 | Stop reason: HOSPADM

## 2024-01-22 RX ORDER — METOPROLOL SUCCINATE 25 MG/1
25 TABLET, EXTENDED RELEASE ORAL DAILY
Status: DISCONTINUED | OUTPATIENT
Start: 2024-01-22 | End: 2024-02-02 | Stop reason: HOSPADM

## 2024-01-22 RX ORDER — DEXTROSE MONOHYDRATE 25 G/50ML
25-50 INJECTION, SOLUTION INTRAVENOUS
Status: DISCONTINUED | OUTPATIENT
Start: 2024-01-22 | End: 2024-02-02 | Stop reason: HOSPADM

## 2024-01-22 RX ORDER — CALCIUM CARBONATE 500 MG/1
1000 TABLET, CHEWABLE ORAL 4 TIMES DAILY PRN
Status: DISCONTINUED | OUTPATIENT
Start: 2024-01-22 | End: 2024-02-02 | Stop reason: HOSPADM

## 2024-01-22 RX ORDER — TORSEMIDE 20 MG/1
20 TABLET ORAL DAILY
Status: DISCONTINUED | OUTPATIENT
Start: 2024-01-22 | End: 2024-02-02 | Stop reason: HOSPADM

## 2024-01-22 RX ORDER — NALOXONE HYDROCHLORIDE 0.4 MG/ML
0.2 INJECTION, SOLUTION INTRAMUSCULAR; INTRAVENOUS; SUBCUTANEOUS
Status: DISCONTINUED | OUTPATIENT
Start: 2024-01-22 | End: 2024-02-02 | Stop reason: HOSPADM

## 2024-01-22 RX ORDER — PNV NO.95/FERROUS FUM/FOLIC AC 28MG-0.8MG
250 TABLET ORAL DAILY
Status: DISCONTINUED | OUTPATIENT
Start: 2024-01-22 | End: 2024-02-02 | Stop reason: HOSPADM

## 2024-01-22 RX ORDER — ACETAMINOPHEN 500 MG
1000 TABLET ORAL EVERY 8 HOURS PRN
Status: DISCONTINUED | OUTPATIENT
Start: 2024-01-22 | End: 2024-01-24

## 2024-01-22 RX ORDER — NALOXONE HYDROCHLORIDE 0.4 MG/ML
0.4 INJECTION, SOLUTION INTRAMUSCULAR; INTRAVENOUS; SUBCUTANEOUS
Status: DISCONTINUED | OUTPATIENT
Start: 2024-01-22 | End: 2024-02-02 | Stop reason: HOSPADM

## 2024-01-22 RX ORDER — OXYCODONE HYDROCHLORIDE 5 MG/1
5 TABLET ORAL EVERY 4 HOURS PRN
Status: DISCONTINUED | OUTPATIENT
Start: 2024-01-22 | End: 2024-01-24

## 2024-01-22 RX ORDER — ATORVASTATIN CALCIUM 10 MG/1
20 TABLET, FILM COATED ORAL AT BEDTIME
Status: DISCONTINUED | OUTPATIENT
Start: 2024-01-22 | End: 2024-02-02 | Stop reason: HOSPADM

## 2024-01-22 RX ORDER — ACETAMINOPHEN 325 MG/1
975 TABLET ORAL EVERY 6 HOURS
Status: DISCONTINUED | OUTPATIENT
Start: 2024-01-22 | End: 2024-01-29

## 2024-01-22 RX ORDER — POTASSIUM CHLORIDE 750 MG/1
20 TABLET, EXTENDED RELEASE ORAL DAILY
Status: DISCONTINUED | OUTPATIENT
Start: 2024-01-22 | End: 2024-02-02 | Stop reason: HOSPADM

## 2024-01-22 RX ORDER — ONDANSETRON 2 MG/ML
4 INJECTION INTRAMUSCULAR; INTRAVENOUS EVERY 6 HOURS PRN
Status: DISCONTINUED | OUTPATIENT
Start: 2024-01-22 | End: 2024-01-24

## 2024-01-22 RX ORDER — ACETAMINOPHEN 650 MG/1
650 SUPPOSITORY RECTAL EVERY 4 HOURS PRN
Status: DISCONTINUED | OUTPATIENT
Start: 2024-01-22 | End: 2024-01-22

## 2024-01-22 RX ORDER — ONDANSETRON 4 MG/1
4 TABLET, ORALLY DISINTEGRATING ORAL EVERY 6 HOURS PRN
Status: DISCONTINUED | OUTPATIENT
Start: 2024-01-22 | End: 2024-01-24

## 2024-01-22 RX ORDER — AMOXICILLIN 250 MG
2 CAPSULE ORAL 2 TIMES DAILY
Status: DISCONTINUED | OUTPATIENT
Start: 2024-01-22 | End: 2024-02-02 | Stop reason: HOSPADM

## 2024-01-22 RX ORDER — METOPROLOL SUCCINATE 25 MG/1
12.5 TABLET, EXTENDED RELEASE ORAL DAILY
Status: ON HOLD | COMMUNITY
End: 2024-06-21

## 2024-01-22 RX ORDER — HYDROMORPHONE HCL IN WATER/PF 6 MG/30 ML
.1-.2 PATIENT CONTROLLED ANALGESIA SYRINGE INTRAVENOUS
Status: DISCONTINUED | OUTPATIENT
Start: 2024-01-22 | End: 2024-01-22

## 2024-01-22 RX ORDER — NICOTINE POLACRILEX 4 MG
15-30 LOZENGE BUCCAL
Status: DISCONTINUED | OUTPATIENT
Start: 2024-01-22 | End: 2024-02-02 | Stop reason: HOSPADM

## 2024-01-22 RX ORDER — POLYETHYLENE GLYCOL 3350 17 G/17G
17 POWDER, FOR SOLUTION ORAL 2 TIMES DAILY PRN
Status: DISCONTINUED | OUTPATIENT
Start: 2024-01-22 | End: 2024-02-02 | Stop reason: HOSPADM

## 2024-01-22 RX ADMIN — INSULIN ASPART 1 UNITS: 100 INJECTION, SOLUTION INTRAVENOUS; SUBCUTANEOUS at 17:52

## 2024-01-22 RX ADMIN — POTASSIUM CHLORIDE 20 MEQ: 750 TABLET, EXTENDED RELEASE ORAL at 17:42

## 2024-01-22 RX ADMIN — Medication 250 MG: at 19:33

## 2024-01-22 RX ADMIN — ACETAMINOPHEN 975 MG: 325 TABLET, FILM COATED ORAL at 19:33

## 2024-01-22 RX ADMIN — TORSEMIDE 20 MG: 20 TABLET ORAL at 17:51

## 2024-01-22 RX ADMIN — AMIODARONE HYDROCHLORIDE 200 MG: 200 TABLET ORAL at 17:51

## 2024-01-22 RX ADMIN — ACETAMINOPHEN 975 MG: 325 TABLET, FILM COATED ORAL at 13:53

## 2024-01-22 RX ADMIN — METOPROLOL SUCCINATE 25 MG: 25 TABLET, EXTENDED RELEASE ORAL at 17:42

## 2024-01-22 RX ADMIN — HYDROMORPHONE HYDROCHLORIDE 0.2 MG: 0.2 INJECTION, SOLUTION INTRAMUSCULAR; INTRAVENOUS; SUBCUTANEOUS at 22:31

## 2024-01-22 RX ADMIN — METHOCARBAMOL 500 MG: 500 TABLET ORAL at 22:30

## 2024-01-22 RX ADMIN — HYDROMORPHONE HYDROCHLORIDE 0.2 MG: 0.2 INJECTION, SOLUTION INTRAMUSCULAR; INTRAVENOUS; SUBCUTANEOUS at 13:53

## 2024-01-22 RX ADMIN — HYDROMORPHONE HYDROCHLORIDE 0.2 MG: 0.2 INJECTION, SOLUTION INTRAMUSCULAR; INTRAVENOUS; SUBCUTANEOUS at 17:42

## 2024-01-22 RX ADMIN — FAMOTIDINE 20 MG: 20 TABLET ORAL at 22:32

## 2024-01-22 RX ADMIN — ATORVASTATIN CALCIUM 20 MG: 10 TABLET, FILM COATED ORAL at 22:30

## 2024-01-22 RX ADMIN — SENNOSIDES AND DOCUSATE SODIUM 2 TABLET: 8.6; 5 TABLET ORAL at 22:30

## 2024-01-22 RX ADMIN — HEPARIN SODIUM 5000 UNITS: 10000 INJECTION, SOLUTION INTRAVENOUS; SUBCUTANEOUS at 19:33

## 2024-01-22 ASSESSMENT — ACTIVITIES OF DAILY LIVING (ADL)
ADLS_ACUITY_SCORE: 25
ADLS_ACUITY_SCORE: 35
ADLS_ACUITY_SCORE: 25
ADLS_ACUITY_SCORE: 31
ADLS_ACUITY_SCORE: 35
ADLS_ACUITY_SCORE: 27
ADLS_ACUITY_SCORE: 31

## 2024-01-22 NOTE — ED NOTES
Bed: [facility]-  Expected date:   Expected time:   Means of arrival:   Comments:  89M hip pain, 100 fentanyl given

## 2024-01-22 NOTE — CONSULTS
ORTHOPEDIC CONSULTATION    Consultation  Alvin Newton,  5/10/1934, MRN [id number]    [facility]  Hip fracture, left, closed, initial encounter (H) [S72.002A]    PCP: Semmler, Steven Duane, [phone]   Code status:  No CPR- Pre-arrest intubation OK       Extended Emergency Contact Information  Primary Emergency Contact: Rosie Newton  Address:  [place] [address] [facility]  Home Phone: [phone]  Mobile Phone: [phone]  Relation: Spouse  Secondary Emergency Contact: Fuad Newton   [place]  Mobile Phone: [phone]  Relation: Daughter         IMPRESSION:  Acute left closed periprosthetic proximal femur fracture with mild displacement following mechanical fall  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      PLAN:  This patient was discussed with Dr. Ramos, on-call surgeon for [facility] and they are in agreement with the following plan.     - CT left hip and femur for surgical planning   - Plan for surgical ORIF 24 as patient took Eliquis dose last night  - Hold Eliquis today, will plan for subQ heparin   - Pain control Tylenol 1,000 mg QID, oxycodone 5-10 mg q4 hours PRN, IV Dilaudid 0.1-0.2 [id number] PRN breakthrough pain  - OK to eat today, NPO at 0000    - If possible to have patient transferred to [facility] due to surgical equipment for periprosthetic hip fracture repair, ideal. However no bed availability for transfer. Patient to be admitted at [facility]      Thank you for including [facility] in the care of Alvin Newton. It has been a pleasure participating in [facility].      CHIEF COMPLAINT: Hip fracture, left, closed, initial encounter (H)    HISTORY OF PRESENT ILLNESS:  The patient is seen in orthopedic consultation at the request of Dr. Burns.  The patient is a 89 year old male with moderate pain of the left  hip. The patient reports that today he was attempting to get up with his cane,  reached to grab it resulting in a fall as he had grabbed a grabbing assistive device. Fell he thinks onto his left side, had immediate pain in the left hip. Unable to get up on his own, EMS were called and transported him to [facility] ER. The patient describes their pain as sharp pain with any movement, achey at rest. They report that their pain does not radiate. The patient reports that their pain is alleviated by lying still and is exacerbated by weightbearing or any movement of the left lower extremity.    He does take Eliquis, last dose 1/21/24.    PAST MEDICAL HISTORY:  Past Medical History:   Diagnosis Date    Colonic diverticulum     Hyperlipidemia     Hypertension     Obesity (BMI 30-39.9)     Osteoarthritis     Type 2 diabetes mellitus (H)         ALLERGIES:   Review of patient's allergies indicates No Known Allergies      MEDICATIONS UPON ADMISSION:  Medications were reviewed.  They include:   (Not in a hospital admission)        SOCIAL HISTORY:   he  reports that he has never smoked. He has never used smokeless tobacco. He reports current alcohol use. He reports that he does not use drugs.    FAMILY HISTORY:  family history is not on file.      REVIEW OF SYSTEMS:   Reviewed with patient. See HPI, otherwise negative       PHYSICAL EXAMINATION:  Vitals: Temp:  [97.5  F (36.4  C)] 97.5  F (36.4  C)  Pulse:  [76-79] 77  Resp:  [17] 17  BP: (118-144)/(61-76) 127/66  SpO2:  [95 %-98 %] 97 %  General: On examination, the patient is NAD, awake, and sleepy and oriented to person, place, and general circumstances   SKIN: There is no evidence of open wound surrounding the left hip, thigh, knee.   Pulses:  posterior tibial pulse is intact and equal bilaterally  Sensation: intact and equal bilaterally to the distal lower extremities.  Tenderness: focal tenderness to palpation lateral proximal hip, and lateral pelvis.   ROM: gentle log roll of the left lower extremity elicits pain in left hip.   Motor: 5/5 planar and  dorsiflexion LLE.   Contralateral side: Full range of motion, Negative joint instability findings, 5/5 motor groups about the joint, Non-tender.       RADIOGRAPHIC EVALUATION:  Personally reviewed    Results for orders placed or performed during the hospital encounter of 01/22/24   XR Pelvis and Hip Left 2 Views    Impression    IMPRESSION: Mildly displaced oblique periprosthetic left proximal femoral fracture centered at the mid-distal femoral stem particularly lateral and posterior. Bilateral total hip arthroplasties with asymmetric polyethylene liner wear superolateral, left   worse than right. No displaced pelvic fracture is identified. Degenerative change lower lumbar spine and both SI joints. No offset or widening at the symphysis pubis.    NOTE: ABNORMAL REPORT    THE DICTATION ABOVE DESCRIBES AN ABNORMALITY FOR WHICH FOLLOW-UP IS NEEDED.              Head CT w/o contrast    Impression    IMPRESSION:  1.  No acute intracranial abnormality.   CT Cervical Spine w/o Contrast    Impression    IMPRESSION:  1.  No fracture or posttraumatic subluxation.  2.  Multilevel ankylosis.   CT Femur Thigh Left w/o Contrast    Impression    IMPRESSION: Left total hip arthroplasty with an acute mildly displaced periprosthetic fracture involving the femoral component at the level of the inter and subtrochanteric regions.     CT Hip Left w/o Contrast    Impression    IMPRESSION: Left total hip arthroplasty with an acute mildly displaced periprosthetic fracture involving the femoral component at the level of the inter and subtrochanteric regions.     XR Femur Left 2 Views    Impression    IMPRESSION: Left total hip arthroplasty with an acute mildly displaced periprosthetic fracture involving the proximal femoral shaft extending to the intertrochanteric region. Small area of benign cortical thickening along the lateral margin of the mid   left femoral diaphysis.                    PERTINENT LABS:  Lab Results: personally reviewed.      Lab Results   Component Value Date     01/22/2024     12/30/2017    CO2 29 01/22/2024    CO2 24 07/15/2022    CO2 26 12/30/2017    BUN 24.7 01/22/2024    BUN 13 07/15/2022    BUN 26 12/31/2017      Lab Results   Component Value Date    WBC 6.3 01/22/2024          HGB 13.3 01/22/2024          HCT 41.4 01/22/2024          MCV 92 01/22/2024           01/22/2024               Angelina Devine PA-C/Dr. Ramos  Date: 1/22/2024  Time: 12:16 PM    CC1:   Cristy Burns MD    CC2:   Semmler, Steven Duane

## 2024-01-22 NOTE — H&P
"[facility]    History and Physical - Hospitalist Service       Date of Admission:  1/22/2024    Assessment & Plan      Alvin Newton is a 89 year old male admitted on 1/22/2024. He has a history of pAF on Eliquis, bilateral hip replacements (33 years ago), HFpEF, T2DM, hypertension, severe aortic stenosis, possible amyloidosis (work-up in process), MCI and is admitted for left femur fracture.     Medication reconciliation not completed at the time of this documentation. Once medication reconciliation completed by pharmacy team, will plan to reorder medications as outlined below.     Left femur fracture  Mechanical fall   Chronic anticoagulation (Eliquis)  Presented to ED 1/22 for a mechanical fall at home. Tried to reach for his cane for stability but put his weight on his \"grabber device\" instead and fell forward. Reports he did not hit his head or lose consciousness. CT head with no acute abnormality. XR pelvis/left hip revealed a left proximal femoral fracture. History of bilateral hip replacements about 33 years ago. On Eliquis for atrial fibrillation, most recent dose 1/21 PM. ED provider discussed with orthopedic surgery, who plan for OR tomorrow due to recent anticoagulation use.   - Orthopedic surgery consulted, appreciate recommendations    - CT left femur  - CT left hip   - XR left femur  - Plan for ORIF tomorrow 1/23 (due to anticoagulation)   - NPO at midnight   - Hold Eliquis today  - Daily BMP, CBC  - Pain control: scheduled tylenol, PRN PO oxycodone, PRN IV Dilaudid   - Will not plan for repeat head CT at this time as patient states he did not hit his head when he fell and no signs of head trauma. If patient becomes acutely confused or has any neurologic deficits, recommend STAT head CT.     Preop clearance  Patient's risk factors include HTN, HLD, T2DM (well-controlled), atrial fibrillation on Eliquis, severe aortic stenosis. No recent chest pain or worsening of chronic " dyspnea on exertion. EKG sinus rhythm with 1st degree AV block and known RBBB. Patient's functional capacity is Poor: < 4 METS: Vacuuming, activities of daily living, walking 2mph, writing. Most recent dose of Eliquis 1/21 PM -- timing of surgery based on anticoagulation use per orthopedic surgery team.   Revised Cardiac Risk Index for Pre-Operative Risk  High risk surgery? No  History of ischemic heart disease? No  History of CHF? YES - HFpEF  History of cerebrovascular disease? No  Pre-op treatment w/ insulin? No  Pre-op creatinine > 2.0? No  1 point = class II risk 0.9% risk of major cardiac event    - Okay to proceed with surgery   - Timing of operation per orthopedic surgery team given recent Eliquis dose 1/21 PM    At risk for delirium  History of hospital-induced delirium per family. Known mild cognitive impairment at baseline. Discussed strategies to help prevent delirium with family - including continuing to reorient patient to place/time, maintaining a normal sleep cycle, time with family at bedside.   - Scheduled Tylenol   - Cluster overnight cares as able to maximize nighttime sleep   - Continue to reorient patient to place/time  Interdisciplinary Nonpharmacological Prevention of Delirium   General Supportive Measures:  Assure adequate hydration and nutrition.   Schedule toileting.   Appropriate assessment and treatment of pain.  Re-orient Patient:   Ensure clock has correct time and white board has correct date.   Encourage presence of family members for reassurance.   Have family/caregiver bring in familiar objects, pictures.    Sensory:   Use eyeglasses, hearing aids, or voice amplifiers as appropriate.   Normalize sleep-wake cycle:   Discourage too much daytime napping, aim for uninterrupted periods of sleep at night.   During the Day:   Keep room well lighted with lights on and shades open.   During Night:   Keep room quiet with low level lighting.     Paroxysmal atrial fibrillation  History of pAF  on Eliquis. Most recent dose of Eliquis was 1/21 PM. INR 1.69 on admission. Follows with [facility] cardiology.   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 Cardiac MRI in October concerning for infiltrative cardiomyopathy. Work-up for amyloidosis in process by cardiology and [facility]. Biopsies of bone marrow and fat pad negative for AL amyloid. Per chart review, plan at this time is to follow-up with cardiology regarding possibly myocardial biopsy and continued amyloid/infiltrative cardiomyopathy work-up.   - PTA metoprolol, Lipitor, torsemide   - Follow-up with cardiologist as scheduled     Type 2 diabetes mellitus   History of T2DM without long-term use of insulin. PTA medications: none. Most recent A1C 6.8% on admission.   - POC glucose checks QID   - Medium resistance SSI     CKD   Creatinine 1.34 on admission. Unclear baseline per chart review - Cr had been 1.0-1.2 summer 2023, but more recently 1.4-1.6 in cardiology clinic in December. Will continue to monitor.   - Daily BMP   - Avoid nephrotoxins    Depression/Anxiety  - PTA Celexa    GERD  - PTA Pepcid          Diet: Combination Diet Regular Diet Adult  NPO per Anesthesia Guidelines for Procedure/Surgery Except for: MedsRegular adult diet, NPO at midnight   DVT Prophylaxis: VTE Prophylaxis contraindicated due to planned surgery, hip fracture   Eastman Catheter: Not present  Fluids: PO  Lines: None     Cardiac Monitoring: None  Code Status: No CPR- Pre-arrest intubation OK      Clinically Significant Risk Factors Present on Admission               # Drug Induced Coagulation Defect: home medication list includes an anticoagulant medication    # Hypertension: Noted on problem list  # Chronic heart failure with preserved ejection fraction: heart failure noted on problem  "list and last echo with EF >50%    # DMII: A1C = 6.8 % (Ref range: <5.7 %) within past 6 months    # Obesity: Estimated body mass index is 32.22 kg/m  as calculated from the following:    Height as of this encounter: 1.803 m (5' 11\").    Weight as of this encounter: 104.8 kg (231 lb).              Disposition Plan      Expected Discharge Date: 01/24/2024                The patient's care was discussed with the Attending Physician, Dr. Flores .      Caridad Butcher MD  Hospitalist Service  [facility]  Securely message with [employer] (more info)  Text page via [facility] Paging/Directory   ______________________________________________________________________    Chief Complaint   Fall    History is obtained from the patient    History of Present Illness   Alvin Newton is a 89 year old male who has a history of pAF on Eliquis, bilateral hip replacements (33 years ago), HFpEF, T2DM, hypertension, severe aortic stenosis, possible amyloidosis (work-up in process) and is admitted for left femur fracture.     Patient reports that he was reaching for his cane this morning, accidentally grabbed his \"grabber\" instead, put weight on it like a cane, and fell forward. He had significant left leg pain and was unable to get up. He states he did not hit his head or lose consciousness.     Patient is on Eliquis for a fib. Last dose 1/21 PM.     Significant cardiac history including HFpEF likely 2/2 infiltrative cardiomyopathy (amyloidosis work-up in process), severe aortic stenosis, pAF. Patient reports no recent chest pain or worsening of his chronic dyspnea on exertion. He is able to walk up about 5 steps and walk a couple blocks before needing to catch his breath.     Patient reports his pain is currently well controlled. He denies any discomfort currently.     Patient's daughter states that he has a history of hospital-induced delirium and she is understandably worried about that being a problem this hospital " stay. Discussed ways to help prevent delirium.     Discussed code status, which family has discussed in the past. Patient is clear that he would NOT want chest compressions. He is okay with intubation if needed.     Patient lives at home with his wife.       Past Medical History    Past Medical History:   Diagnosis Date    Colonic diverticulum     Hyperlipidemia     Hypertension     Obesity (BMI 30-39.9)     Osteoarthritis     Type 2 diabetes mellitus (H)        Past Surgical History   Past Surgical History:   Procedure Laterality Date    ARTHROPLASTY HIP BILATERAL  1987    ESOPHAGOSCOPY, GASTROSCOPY, DUODENOSCOPY (EGD), COMBINED N/A 8/19/2023    Procedure: ESOPHAGOGASTRODUODENOSCOPY, WITH BIOPSY;  Surgeon: Antolin Cyr DO;  Location: [facility]    ESOPHAGOSCOPY, GASTROSCOPY, DUODENOSCOPY (EGD), DILATATION, COMBINED N/A 8/19/2023    Procedure: Esophagoscopy, gastroscopy, duodenoscopy, dilatation, combined;  Surgeon: Antolin Cyr DO;  Location: [facility]    HC ESOPHAGOSCOPY, DIAGNOSTIC  2003    LAPAROSCOPIC CHOLECYSTECTOMY N/A 12/28/2017    Procedure: LAPAROSCOPIC CHOLECYSTECTOMY;  LAPAROSCOPIC CHOLECYSTECTOMY;  Surgeon: Heber Gonzalez MD;  Location:  OR    TRANSRECTAL ULTRASONIC, TRANSURETHRAL RESECTION (TUR) OF PROSTATE CYST  1990       Prior to Admission Medications   Prior to Admission Medications   Prescriptions Last Dose Informant Patient Reported? Taking?   ELIQUIS ANTICOAGULANT 5 MG tablet  Spouse/Significant Other, Daughter Yes No   Sig: Take 5 mg by mouth 2 times daily   Lidocaine (LIDOCARE) 4 % Patch  Spouse/Significant Other, Daughter No No   Sig: Place 2 patches onto the skin every 24 hours To prevent lidocaine toxicity, patient should be patch free for 12 hrs daily.   Magnesium Cl-Calcium Carbonate (SLOW-MAG) 71.5-119 MG TBEC   No No   Sig: Take 1 tablet by mouth daily   acetaminophen (TYLENOL) 500 MG tablet  Spouse/Significant Other, Daughter No No   Sig: Take 2 tablets (1,000  mg) by mouth every 8 hours as needed for mild pain or fever   amiodarone (PACERONE) 200 MG tablet  Spouse/Significant Other, Daughter No No   Sig: Take 1 tablet (200 mg) by mouth daily   atorvastatin (LIPITOR) 20 MG tablet  Spouse/Significant Other, Daughter Yes No   Sig: Take 20 mg by mouth At Bedtime   citalopram (CELEXA) 10 MG tablet  Spouse/Significant Other, Daughter No No   Sig: Take 0.5 tablets (5 mg) by mouth daily   famotidine (PEPCID) 40 MG tablet   No No   Sig: Take 1 tablet (40 mg) by mouth 2 times daily   menthol, Topical Analgesic, 2.5% (BENGAY VANISHING SCENT) 2.5 % GEL topical gel  Spouse/Significant Other, Daughter No No   Sig: Apply topically 4 times daily as needed for other (pain) Apply to shoulders at times when Lidoderm patch is absent   metoprolol succinate ER (TOPROL XL) 25 MG 24 hr tablet  Spouse/Significant Other, Daughter No No   Sig: Take 2 tablets (50 mg) by mouth daily   potassium chloride ER (KLOR-CON M) 20 MEQ CR tablet  Spouse/Significant Other, Daughter Yes No   Sig: Take 1 tablet by mouth every morning   torsemide (DEMADEX) 20 MG tablet   Yes No   Sig: Take 1 tablet (20 mg) by mouth daily      Facility-Administered Medications: None         Physical Exam   Vital Signs: Temp: 97.8  F (36.6  C) Temp src: Oral BP: 139/82 Pulse: 80   Resp: 18 SpO2: 99 % O2 Device: None (Room air)    Weight: 231 lbs 0 oz    GEN: Pleasant male, in no acute distress, laying in bed.   HEENT: Normocephalic, atraumatic. No bruises noted. Extraoccular eye movements intact. Anicteric sclera. Moist mucous membranes.   NECK: Supple. No cervical or supraclavicular adenopathy.   PULM: Non-labored breathing. No use of accessory muscles. Clear to ausculation bilaterally. No wheezes or crackles.   CV: Regular rate and rhythm. Systolic murmur present.   ABDOMEN: Normoactive bowel sounds. Non-tender to palpation. Non-distended.    EXTREMITES: 1+ peripheral edema to ankles bilaterally, at baseline per patient.   NEURO:   Awake. Oriented to person and situation. Did not assess orientation to time and place. Cranial nerves 2-12 grossly intact. Moving all extremities.   PSYCH: Calm. Appropriate affect, insight, judgment.      Medical Decision Making     Please see A&P for additional details of medical decision making.      Data   ------------------------- PAST 24 HR DATA REVIEWED -----------------------------------------------    I have personally reviewed the following data over the past 24 hrs:    6.3  \   13.3   / 228     139 104 24.7 (H) /  161 (H)   4.4 29 1.34 (H) \     TSH: N/A T4: N/A A1C: 6.8 (H)     INR:  1.69 (H) PTT:  35   D-dimer:  N/A Fibrinogen:  N/A       Imaging results reviewed over the past 24 hrs:   Recent Results (from the past 24 hour(s))   Head CT w/o contrast    Narrative    EXAM: CT HEAD W/O CONTRAST  LOCATION: [facility]  DATE: 1/22/2024    INDICATION: Fall, on Eliquis, head injury.  COMPARISON: None.  TECHNIQUE: Routine CT Head without IV contrast. Multiplanar reformats. Dose reduction techniques were used.    FINDINGS:  No evidence of intracranial hemorrhage, mass, or hydrocephalus. Generalized volume loss with background of nonspecific white matter hypoattenuation presumably representing chronic small vessel ischemic change. No acute osseous abnormality. Severe   paranasal sinus mucosal thickening with secretions layering within the right sphenoid sinus.      Impression    IMPRESSION:  1.  No acute intracranial abnormality.   CT Cervical Spine w/o Contrast    Narrative    EXAM: CT CERVICAL SPINE W/O CONTRAST  LOCATION: [facility]  DATE: 1/22/2024    INDICATION: Fall, neck injury.  COMPARISON: None.  TECHNIQUE: Routine CT Cervical Spine without IV contrast. Multiplanar reformats. Dose reduction techniques were used.    FINDINGS:  Flowing marginal osteophytes with ankylosis from C2 through the visualized thoracic spine.    No evidence of acute fracture or  posttraumatic subluxation. Mild spinal canal stenoses at multiple levels. Moderate to severe foraminal stenoses at multiple levels. Incidental partial nonunion of the posterior aspect of the posterior elements of C7.   Presumed atelectasis at the lung apices. Scattered vascular calcifications.      Impression    IMPRESSION:  1.  No fracture or posttraumatic subluxation.  2.  Multilevel ankylosis.   XR Pelvis and Hip Left 2 Views    Narrative    EXAM: XR PELVIS AND HIP LEFT 2 VIEWS  LOCATION: [facility]  DATE: 1/22/2024    INDICATION: Fall. Hip pain.  COMPARISON: None.      Impression    IMPRESSION: Mildly displaced oblique periprosthetic left proximal femoral fracture centered at the mid-distal femoral stem particularly lateral and posterior. Bilateral total hip arthroplasties with asymmetric polyethylene liner wear superolateral, left   worse than right. No displaced pelvic fracture is identified. Degenerative change lower lumbar spine and both SI joints. No offset or widening at the symphysis pubis.    NOTE: ABNORMAL REPORT    THE DICTATION ABOVE DESCRIBES AN ABNORMALITY FOR WHICH FOLLOW-UP IS NEEDED.              CT Femur Thigh Left w/o Contrast    Narrative    EXAM: CT HIP LEFT W/O CONTRAST, CT FEMUR THIGH LEFT W/O CONTRAST  LOCATION: [facility]  DATE: 1/22/2024    INDICATION: periprosthetic left femur fracture. Left hip and thigh pain.  COMPARISON: 01/22/2024 radiographs.  TECHNIQUE: Noncontrast. Axial, sagittal and coronal thin-section reconstruction. Dose reduction techniques were used.     FINDINGS:     BONES:  -Left total hip arthroplasty with an acute mildly displaced periprosthetic fracture involving the femoral component in the inter and subtrochanteric regions. Slight anterolateral displacement of a moderate-sized cortical fracture fragment, as seen on   series 6, image 27. No evidence of additional periprosthetic fractures. There is some localized  cystic change at the interface between the acetabular component and superolateral portion of the acetabulum. No pubic rami or sacral ala fractures. There is   ankylosis of both SI joints. Advanced multilevel degenerative disc disease changes in the lower lumbar spine.    SOFT TISSUES:  -Mild global atrophy of the left thigh musculature. No discrete fluid collection/hematoma.      Impression    IMPRESSION: Left total hip arthroplasty with an acute mildly displaced periprosthetic fracture involving the femoral component at the level of the inter and subtrochanteric regions.     CT Hip Left w/o Contrast    Narrative    EXAM: CT HIP LEFT W/O CONTRAST, CT FEMUR THIGH LEFT W/O CONTRAST  LOCATION: [facility]  DATE: 1/22/2024    INDICATION: periprosthetic left femur fracture. Left hip and thigh pain.  COMPARISON: 01/22/2024 radiographs.  TECHNIQUE: Noncontrast. Axial, sagittal and coronal thin-section reconstruction. Dose reduction techniques were used.     FINDINGS:     BONES:  -Left total hip arthroplasty with an acute mildly displaced periprosthetic fracture involving the femoral component in the inter and subtrochanteric regions. Slight anterolateral displacement of a moderate-sized cortical fracture fragment, as seen on   series 6, image 27. No evidence of additional periprosthetic fractures. There is some localized cystic change at the interface between the acetabular component and superolateral portion of the acetabulum. No pubic rami or sacral ala fractures. There is   ankylosis of both SI joints. Advanced multilevel degenerative disc disease changes in the lower lumbar spine.    SOFT TISSUES:  -Mild global atrophy of the left thigh musculature. No discrete fluid collection/hematoma.      Impression    IMPRESSION: Left total hip arthroplasty with an acute mildly displaced periprosthetic fracture involving the femoral component at the level of the inter and subtrochanteric regions.     XR  Femur Left 2 Views    Narrative    EXAM: XR FEMUR LEFT 2 VIEWS  LOCATION: [facility]  DATE: 1/22/2024    INDICATION: Periprosthetic femur fx, hip pain.  COMPARISON: None.      Impression    IMPRESSION: Left total hip arthroplasty with an acute mildly displaced periprosthetic fracture involving the proximal femoral shaft extending to the intertrochanteric region. Small area of benign cortical thickening along the lateral margin of the mid   left femoral diaphysis.

## 2024-01-22 NOTE — PLAN OF CARE
Problem: Adult Inpatient Plan of Care  Goal: Plan of Care Review  Description: The Plan of Care Review/Shift note should be completed every shift.  The Outcome Evaluation is a brief statement about your assessment that the patient is improving, declining, or no change.  This information will be displayed automatically on your shift  note.  Outcome: Progressing   Goal Outcome Evaluation:  Pt and family informed by Ortho PA regarding surgery on Wednesday. IV dilaudid and tylenol given.

## 2024-01-22 NOTE — PHARMACY-ADMISSION MEDICATION HISTORY
Pharmacist Admission Medication History    Admission medication history is complete. The information provided in this note is only as accurate as the sources available at the time of the update.    Information Source(s): Patient, Family member, Clinic records, and CareEverywhere/SureScripts via in-person    Pertinent Information: none    Changes made to PTA medication list:  Added: None  Deleted: BenGay, lidocaine, and citalopram  Changed: Toprol         Allergies reviewed with patient and updates made in EHR: yes    Medication History Completed By: Pablo Graham RPH 1/22/2024 2:13 PM    PTA Med List   Medication Sig Last Dose    acetaminophen (TYLENOL) 500 MG tablet Take 2 tablets (1,000 mg) by mouth every 8 hours as needed for mild pain or fever Unknown    amiodarone (PACERONE) 200 MG tablet Take 1 tablet (200 mg) by mouth daily 1/21/2024    atorvastatin (LIPITOR) 20 MG tablet Take 20 mg by mouth At Bedtime 1/21/2024    ELIQUIS ANTICOAGULANT 5 MG tablet Take 5 mg by mouth 2 times daily 1/21/2024    famotidine (PEPCID) 40 MG tablet Take 1 tablet (40 mg) by mouth 2 times daily 1/21/2024    Magnesium Oxide 250 MG TABS Take 250 mg by mouth daily 1/21/2024    metoprolol succinate ER (TOPROL XL) 25 MG 24 hr tablet Take 25 mg by mouth daily 1/21/2024    potassium chloride ER (K-TAB/KLOR-CON) 10 MEQ CR tablet Take 20 mEq by mouth daily 1/21/2024    torsemide (DEMADEX) 20 MG tablet Take 1 tablet (20 mg) by mouth daily 1/21/2024

## 2024-01-22 NOTE — ED TRIAGE NOTES
[id number] Pt BIB EMS from home due to fall on L hip. Pt currently on eliquis, denies head injury and LOC. Pt does have hx of bilateral hip replacement 33 yrs ago, c/o of left hip pain. Total of 100mcg of fentanyl administered by EMS en route.     Triage Assessment (Adult)       Row Name 01/22/24 0946          Triage Assessment    Airway WDL WDL        Respiratory WDL    Respiratory WDL WDL        Skin Circulation/Temperature WDL    Skin Circulation/Temperature WDL WDL        Cardiac WDL    Cardiac WDL WDL  afib baseline     Cardiac Rhythm Atrial fibrillation        Peripheral/Neurovascular WDL    Peripheral Neurovascular WDL WDL

## 2024-01-22 NOTE — ED NOTES
"[facility] ED Handoff Report    ED Chief Complaint: Fall, L hip pain    ED Diagnosis:  (S72.002A) Hip fracture, left, closed, initial encounter (H)  Comment: L hip fracture of femoral neck around hip prosthesis. Pt denies any LOC or injury to head, currently on eliquis for a.fib.  Plan: Orthopedics has been consulted and no surgery plans for 1/22/24 as pt is currently on anticoagulant. Initial plan was to transfer to [facility] due to complexity of hip fracture, but due to no bed availability, pt is to be admitted at [facility] for the time being.       PMH:    Past Medical History:   Diagnosis Date    Colonic diverticulum     Hyperlipidemia     Hypertension     Obesity (BMI 30-39.9)     Osteoarthritis     Type 2 diabetes mellitus (H)         Code Status:  No CPR- Pre-arrest intubation OK     Falls Risk: Yes Band: Applied    Current Living Situation/Residence: lives with a significant other     Elimination Status: Continent: Yes     Activity Level: SBA w/ walker    Patients Preferred Language:  English     Needed: No    Vital Signs:  /78   Pulse 74   Temp 97.5  F (36.4  C) (Oral)   Resp 17   Ht 1.803 m (5' 11\")   Wt 104.8 kg (231 lb)   SpO2 97%   BMI 32.22 kg/m       Cardiac Rhythm: A. fib    Pain Score: 6/10    Is the Patient Confused:  No    Last Food or Drink: 1/21/24    Focused Assessment:  Able to move all extremities aside from LLE. Pt does report pain upon movement but reports being able to tolerate when not moving his hips around. No obvious swelling or bruising at this time.    Tests Performed: Done: Labs and Imaging    Treatments Provided:  100mcg fentanyl and 500mL NS bolus prior to arrival by EMS    Family Dynamics/Concerns: No    Family Updated On Visitor Policy: Yes    Plan of Care Communicated to Family: Yes    Who Was Updated about Plan of Care: wife and daughter    Belongings Checklist Done and Signed by Patient: Yes    Medications sent with patient: N/A    Covid: " asymptomatic    Additional Information:     RN: Jai Gomez RN 1/22/2024 12:36 PM

## 2024-01-22 NOTE — TREATMENT PLAN
I spoke with Dr. Burns regarding the patient's left periprosthetic femur fracture s/p mechanical fall. This case was also discussed with on-call surgeon Dr. Aurelio Ramos, [facility]. The patient will require a left hip ORIF, but due to him taking his Eliquis last night we would prefer to proceed with surgery tomorrow. He will be NPO at midnight tonight. CT femur, hip and full length left femur x-rays ordered. Formal consult to follow this afternoon.     An Trivedi PA-C  [facility]

## 2024-01-22 NOTE — ED PROVIDER NOTES
EMERGENCY DEPARTMENT ENCOUNTER     NAME: Alvin Newton   AGE: 89 year old male   YOB: 1934   MRN: [id number]   EVALUATION DATE & TIME: 1/22/2024  9:32 AM   PCP: Semmler, Steven Duane     Chief Complaint   Patient presents with    Hip Pain    Fall   :    FINAL IMPRESSION       1. Hip fracture, left, closed, initial encounter (H)           ED COURSE & MEDICAL DECISION MAKING    9:41 AM Met with the patient and performed my initial exam.  11:12 AM Spoke to Dr Bergstedt, [facility]. Patient will be admitted.  11:18 AM Spoke to An GAMINO, [facility].  11:37 AM Spoke to An GAMINO, from [facility] again. Recommended patient be transfer to [facility].  11:55 AM There is no beds at [facility], per charge nurse.    Pertinent Labs & Imaging studies reviewed. (See chart for details)   89 year old male  presents to the Emergency Department for evaluation of a mechanical fall when he attempted to grab onto his cane but it was actually his grabbing device.  He is complaining of left hip pain and received fentanyl via EMS. Initial Vitals Reviewed. Initial exam notable for patient who appears under the influence of fentanyl and has a distal extremity that is neurovascularly intact but tenderness over the anterior and left hip.  He is anticoagulated on Eliquis so I added a CT scan of his head and cervical spine to rule out concurrent injuries and fortunately these are negative.  X-ray of the hip and pelvis does show a femoral neck fracture which is complicated by the fact that he has had bilateral hip replacements many years ago.  Preoperative labs have been obtained.  He does have a cardiac history and will likely need clearance and holding anticoagulation prior to surgery.  I discussed the case several times with orthopedics and in an ideal world they would transfer him to [facility] due to a need for certain equipment for his surgery.  Unfortunately, we have spoken with sock and bedboard and there  is no availability for transfer so we will proceed with admission here for the time being.  Case discussed with [facility] resident who accepted the patient for admission.  It sounds like orthopedics does not have plans for operative management today.           At the conclusion of the encounter I discussed the results of all of the tests and the disposition. The questions were answered. The patient or family acknowledged understanding and was agreeable with the care plan.     0 minutes critical care time, see procedure note below for details if relevant    Medical Decision Making    History:  Supplemental history from: Documented in chart, EMS  External Record(s) reviewed: Documented in chart    Work Up:  Chart documentation includes differential considered and any EKGs or imaging independently interpreted by provider, where specified.  In additional to work up documented, I considered the following work up: Documented in chart, if applicable.    External consultation:  Discussion of management with another provider: Documented in chart, if applicable    Complicating factors:  Care impacted by chronic illness: Chronic Kidney Disease, Diabetes, Heart Disease, and Hypertension  Care affected by social determinants of health: access to care    Disposition considerations: Admit.        MEDICATIONS GIVEN IN THE EMERGENCY:   Medications - No data to display   NEW PRESCRIPTIONS STARTED AT TODAY'S ER VISIT   New Prescriptions    No medications on file     ================================================================   HISTORY OF PRESENT ILLNESS       Patient information was obtained from: EMS and patient   Use of Intrepreter: N/A    Alvin Newton is a 89 year old male with history of T2DM, HTN, DEJUAN, stage III chronic kidney disease, paroxysmal atrial fibrillation, chronic diastolic heart failure, severe aortic stenosis, BPH, and clostridia difficile infection, who presents with a fall and left hip  "pain.    Per EMS, patient was walking in his house and reached to use his cane and instead accidentally grabbed a grabber tool to put weight and fell. No injury to head or LOC. Mentions left hip pain and has a small abrasion to his right wrist. He has bilateral hip replacement 33 years ago, an extensive heart history with a-fib that may be due to issues with heart valves and had surgery done that \"failed\", per patient's wife. He is on Eliquis. No history of dementia. No other reported complaints or concerns at this time.  ================================================================        PAST HISTORY     PAST MEDICAL HISTORY:   Past Medical History:   Diagnosis Date    Colonic diverticulum     Hyperlipidemia     Hypertension     Obesity (BMI 30-39.9)     Osteoarthritis     Type 2 diabetes mellitus (H)       PAST SURGICAL HISTORY:   Past Surgical History:   Procedure Laterality Date    ARTHROPLASTY HIP BILATERAL  1987    ESOPHAGOSCOPY, GASTROSCOPY, DUODENOSCOPY (EGD), COMBINED N/A 8/19/2023    Procedure: ESOPHAGOGASTRODUODENOSCOPY, WITH BIOPSY;  Surgeon: Antolin Cyr DO;  Location:  GI    ESOPHAGOSCOPY, GASTROSCOPY, DUODENOSCOPY (EGD), DILATATION, COMBINED N/A 8/19/2023    Procedure: Esophagoscopy, gastroscopy, duodenoscopy, dilatation, combined;  Surgeon: Antolin Cyr DO;  Location:  GI     ESOPHAGOSCOPY, DIAGNOSTIC  2003    LAPAROSCOPIC CHOLECYSTECTOMY N/A 12/28/2017    Procedure: LAPAROSCOPIC CHOLECYSTECTOMY;  LAPAROSCOPIC CHOLECYSTECTOMY;  Surgeon: Heber Gonzalez MD;  Location:  OR    TRANSRECTAL ULTRASONIC, TRANSURETHRAL RESECTION (TUR) OF PROSTATE CYST  1990      CURRENT MEDICATIONS:   acetaminophen (TYLENOL) 500 MG tablet  amiodarone (PACERONE) 200 MG tablet  atorvastatin (LIPITOR) 20 MG tablet  citalopram (CELEXA) 10 MG tablet  ELIQUIS ANTICOAGULANT 5 MG tablet  famotidine (PEPCID) 40 MG tablet  Lidocaine (LIDOCARE) 4 % Patch  Magnesium Cl-Calcium Carbonate " "(SLOW-MAG) 71.5-119 MG TBEC  menthol, Topical Analgesic, 2.5% (BENGAY VANISHING SCENT) 2.5 % GEL topical gel  metoprolol succinate ER (TOPROL XL) 25 MG 24 hr tablet  potassium chloride ER (KLOR-CON M) 20 MEQ CR tablet  torsemide (DEMADEX) 20 MG tablet      ALLERGIES:   No Known Allergies   FAMILY HISTORY:   No family history on file.   SOCIAL HISTORY:   Social History     Socioeconomic History    Marital status:    Tobacco Use    Smoking status: Never    Smokeless tobacco: Never   Substance and Sexual Activity    Alcohol use: Yes     Comment: 0-1 beer daily    Drug use: No        VITALS  Patient Vitals for the past 24 hrs:   BP Temp Temp src Pulse Resp SpO2 Height Weight   01/22/24 1130 127/66 -- -- 77 -- 97 % -- --   01/22/24 1115 118/61 -- -- 76 -- 95 % -- --   01/22/24 1100 (!) 144/72 -- -- 79 -- 98 % -- --   01/22/24 1000 135/76 -- -- 77 -- 96 % -- --   01/22/24 0945 133/71 97.5  F (36.4  C) Oral 76 17 97 % 1.803 m (5' 11\") 104.8 kg (231 lb)        ================================================================    PHYSICAL EXAM     VITAL SIGNS: /66   Pulse 77   Temp 97.5  F (36.4  C) (Oral)   Resp 17   Ht 1.803 m (5' 11\")   Wt 104.8 kg (231 lb)   SpO2 97%   BMI 32.22 kg/m     Constitutional:  Awake, no acute distress   HENT:  Atraumatic, oropharynx without exudate or erythema, membranes moist  Lymph:  No adenopathy  Eyes: EOM intact, PERRL, no injection  Neck: Supple  Respiratory:  Clear to auscultation bilaterally, no wheezes or crackles   Cardiovascular:  Regular rate and rhythm, single S1 and S2   GI:  Soft, nontender, nondistended, no rebound or guarding   Musculoskeletal:  Moves all extremities, no lower extremity edema, no deformities    Skin:  Warm, dry  Neurologic:  Alert and oriented x3, no focal deficits noted       ================================================================  LAB       All pertinent labs reviewed and interpreted.   Labs Ordered and Resulted from Time of ED " Arrival to Time of ED Departure   INR - Abnormal       Result Value    INR 1.69 (*)    BASIC METABOLIC PANEL - Abnormal    Sodium 139      Potassium 4.4      Chloride 104      Carbon Dioxide (CO2) 29      Anion Gap 6 (*)     Urea Nitrogen 24.7 (*)     Creatinine 1.34 (*)     GFR Estimate 51 (*)     Calcium 9.0      Glucose 161 (*)    PARTIAL THROMBOPLASTIN TIME - Normal    aPTT 35     CBC WITH PLATELETS AND DIFFERENTIAL    WBC Count 6.3      RBC Count 4.51      Hemoglobin 13.3      Hematocrit 41.4      MCV 92      MCH 29.5      MCHC 32.1      RDW 13.4      Platelet Count 228      % Neutrophils 39      % Lymphocytes 45      % Monocytes 13      % Eosinophils 2      % Basophils 1      % Immature Granulocytes 0      NRBCs per 100 WBC 0      Absolute Neutrophils 2.5      Absolute Lymphocytes 2.8      Absolute Monocytes 0.8      Absolute Eosinophils 0.1      Absolute Basophils 0.1      Absolute Immature Granulocytes 0.0      Absolute NRBCs 0.0     TYPE AND SCREEN, ADULT    ABO/RH(D) AB NEG      Antibody Screen Negative      SPECIMEN EXPIRATION DATE [id number]     ABO/RH TYPE AND SCREEN        ===============================================================  RADIOLOGY       Reviewed all pertinent imaging. Please see official radiology report.   XR Pelvis and Hip Left 2 Views   Preliminary Result   IMPRESSION: Mildly displaced oblique periprosthetic left proximal femoral fracture centered at the mid-distal femoral stem particularly lateral and posterior. Bilateral total hip arthroplasties with asymmetric polyethylene liner wear superolateral, left    worse than right. No displaced pelvic fracture is identified. Degenerative change lower lumbar spine and both SI joints. No offset or widening at the symphysis pubis.      NOTE: ABNORMAL REPORT      THE DICTATION ABOVE DESCRIBES AN ABNORMALITY FOR WHICH FOLLOW-UP IS NEEDED.                      CT Cervical Spine w/o Contrast   Final Result   IMPRESSION:   1.  No fracture or  posttraumatic subluxation.   2.  Multilevel ankylosis.      Head CT w/o contrast   Final Result   IMPRESSION:   1.  No acute intracranial abnormality.      CT Femur Thigh Left w/o Contrast    (Results Pending)   CT Hip Left w/o Contrast    (Results Pending)   XR Femur Left 2 Views    (Results Pending)         ================================================================  EKG     EKG reviewed and interpreted by me shows an irregular rhythm consistent with A-fib and looking similar to his previous in 2017 with a bundle branch block, rate of 71, no acute ST or T wave changes since previous    I have independently reviewed and interpreted the EKG(s) documented above.     ================================================================  PROCEDURES         I, Barbara Macdonald, am serving as a scribe to document services personally performed by Dr. Burns based on my observation and the provider's statements to me. I, Cristy Burns MD attest that Barbara Macdonald is acting in a scribe capacity, has observed my performance of the services and has documented them in accordance with my direction.   Cristy Burns M.D.   Emergency Medicine   [facility] EMERGENCY DEPARTMENT  [address] [place] [id number]  [phone]  Dept: [phone]      Cristy Burns MD  01/22/24 5194

## 2024-01-23 LAB
ANION GAP SERPL CALCULATED.3IONS-SCNC: 8 MMOL/L (ref 7–15)
ATRIAL RATE - MUSE: 76 BPM
BUN SERPL-MCNC: 27.6 MG/DL (ref 8–23)
CALCIUM SERPL-MCNC: 9.1 MG/DL (ref 8.8–10.2)
CHLORIDE SERPL-SCNC: 107 MMOL/L (ref 98–107)
CREAT SERPL-MCNC: 1.41 MG/DL (ref 0.67–1.17)
DEPRECATED HCO3 PLAS-SCNC: 28 MMOL/L (ref 22–29)
DIASTOLIC BLOOD PRESSURE - MUSE: 78 MMHG
EGFRCR SERPLBLD CKD-EPI 2021: 48 ML/MIN/1.73M2
ERYTHROCYTE [DISTWIDTH] IN BLOOD BY AUTOMATED COUNT: 13.2 % (ref 10–15)
GLUCOSE BLDC GLUCOMTR-MCNC: 121 MG/DL (ref 70–99)
GLUCOSE BLDC GLUCOMTR-MCNC: 128 MG/DL (ref 70–99)
GLUCOSE BLDC GLUCOMTR-MCNC: 139 MG/DL (ref 70–99)
GLUCOSE BLDC GLUCOMTR-MCNC: 141 MG/DL (ref 70–99)
GLUCOSE BLDC GLUCOMTR-MCNC: 154 MG/DL (ref 70–99)
GLUCOSE SERPL-MCNC: 140 MG/DL (ref 70–99)
HCT VFR BLD AUTO: 39.6 % (ref 40–53)
HGB BLD-MCNC: 13.1 G/DL (ref 13.3–17.7)
INTERPRETATION ECG - MUSE: NORMAL
MCH RBC QN AUTO: 29.4 PG (ref 26.5–33)
MCHC RBC AUTO-ENTMCNC: 33.1 G/DL (ref 31.5–36.5)
MCV RBC AUTO: 89 FL (ref 78–100)
P AXIS - MUSE: 82 DEGREES
PLATELET # BLD AUTO: 207 10E3/UL (ref 150–450)
POTASSIUM SERPL-SCNC: 4.3 MMOL/L (ref 3.4–5.3)
PR INTERVAL - MUSE: 256 MS
QRS DURATION - MUSE: 178 MS
QT - MUSE: 452 MS
QTC - MUSE: 508 MS
R AXIS - MUSE: 205 DEGREES
RBC # BLD AUTO: 4.45 10E6/UL (ref 4.4–5.9)
SODIUM SERPL-SCNC: 143 MMOL/L (ref 135–145)
SYSTOLIC BLOOD PRESSURE - MUSE: 137 MMHG
T AXIS - MUSE: 13 DEGREES
VENTRICULAR RATE- MUSE: 76 BPM
WBC # BLD AUTO: 6.9 10E3/UL (ref 4–11)

## 2024-01-23 PROCEDURE — 250N000013 HC RX MED GY IP 250 OP 250 PS 637

## 2024-01-23 PROCEDURE — 250N000011 HC RX IP 250 OP 636: Performed by: STUDENT IN AN ORGANIZED HEALTH CARE EDUCATION/TRAINING PROGRAM

## 2024-01-23 PROCEDURE — 80048 BASIC METABOLIC PNL TOTAL CA: CPT

## 2024-01-23 PROCEDURE — 36415 COLL VENOUS BLD VENIPUNCTURE: CPT

## 2024-01-23 PROCEDURE — 120N000001 HC R&B MED SURG/OB

## 2024-01-23 PROCEDURE — 99232 SBSQ HOSP IP/OBS MODERATE 35: CPT | Mod: GC

## 2024-01-23 PROCEDURE — 85027 COMPLETE CBC AUTOMATED: CPT

## 2024-01-23 PROCEDURE — 250N000013 HC RX MED GY IP 250 OP 250 PS 637: Performed by: STUDENT IN AN ORGANIZED HEALTH CARE EDUCATION/TRAINING PROGRAM

## 2024-01-23 RX ADMIN — METHOCARBAMOL 500 MG: 500 TABLET ORAL at 16:00

## 2024-01-23 RX ADMIN — HYDROMORPHONE HYDROCHLORIDE 0.4 MG: 0.2 INJECTION, SOLUTION INTRAMUSCULAR; INTRAVENOUS; SUBCUTANEOUS at 08:55

## 2024-01-23 RX ADMIN — SENNOSIDES AND DOCUSATE SODIUM 1 TABLET: 8.6; 5 TABLET ORAL at 20:41

## 2024-01-23 RX ADMIN — POTASSIUM CHLORIDE 20 MEQ: 750 TABLET, EXTENDED RELEASE ORAL at 08:05

## 2024-01-23 RX ADMIN — AMIODARONE HYDROCHLORIDE 200 MG: 200 TABLET ORAL at 08:06

## 2024-01-23 RX ADMIN — ACETAMINOPHEN 975 MG: 325 TABLET, FILM COATED ORAL at 13:56

## 2024-01-23 RX ADMIN — METOPROLOL SUCCINATE 25 MG: 25 TABLET, EXTENDED RELEASE ORAL at 08:06

## 2024-01-23 RX ADMIN — HEPARIN SODIUM 5000 UNITS: 10000 INJECTION, SOLUTION INTRAVENOUS; SUBCUTANEOUS at 07:57

## 2024-01-23 RX ADMIN — ACETAMINOPHEN 975 MG: 325 TABLET, FILM COATED ORAL at 02:37

## 2024-01-23 RX ADMIN — FAMOTIDINE 20 MG: 20 TABLET ORAL at 20:41

## 2024-01-23 RX ADMIN — SENNOSIDES AND DOCUSATE SODIUM 1 TABLET: 8.6; 5 TABLET ORAL at 08:05

## 2024-01-23 RX ADMIN — OXYCODONE HYDROCHLORIDE 5 MG: 5 TABLET ORAL at 20:43

## 2024-01-23 RX ADMIN — TORSEMIDE 20 MG: 20 TABLET ORAL at 08:05

## 2024-01-23 RX ADMIN — METHOCARBAMOL 500 MG: 500 TABLET ORAL at 08:55

## 2024-01-23 RX ADMIN — ACETAMINOPHEN 975 MG: 325 TABLET, FILM COATED ORAL at 08:05

## 2024-01-23 RX ADMIN — ATORVASTATIN CALCIUM 20 MG: 10 TABLET, FILM COATED ORAL at 20:41

## 2024-01-23 RX ADMIN — HEPARIN SODIUM 5000 UNITS: 10000 INJECTION, SOLUTION INTRAVENOUS; SUBCUTANEOUS at 19:13

## 2024-01-23 RX ADMIN — FAMOTIDINE 20 MG: 20 TABLET ORAL at 08:04

## 2024-01-23 RX ADMIN — INSULIN ASPART 1 UNITS: 100 INJECTION, SOLUTION INTRAVENOUS; SUBCUTANEOUS at 08:05

## 2024-01-23 RX ADMIN — ACETAMINOPHEN 975 MG: 325 TABLET, FILM COATED ORAL at 19:09

## 2024-01-23 ASSESSMENT — ACTIVITIES OF DAILY LIVING (ADL)
ADLS_ACUITY_SCORE: 35
ADLS_ACUITY_SCORE: 35
ADLS_ACUITY_SCORE: 39
ADLS_ACUITY_SCORE: 35
ADLS_ACUITY_SCORE: 35
ADLS_ACUITY_SCORE: 42
ADLS_ACUITY_SCORE: 42
ADLS_ACUITY_SCORE: 39
ADLS_ACUITY_SCORE: 42
ADLS_ACUITY_SCORE: 39
ADLS_ACUITY_SCORE: 39
ADLS_ACUITY_SCORE: 31

## 2024-01-23 NOTE — PLAN OF CARE
Problem: Adult Inpatient Plan of Care  Goal: Absence of Hospital-Acquired Illness or Injury  Intervention: Identify and Manage Fall Risk  Recent Flowsheet Documentation  Taken 1/22/2024 1700 by Milla Dominguez RN  Safety Promotion/Fall Prevention: increased rounding and observation     Problem: Risk for Delirium  Goal: Improved Behavioral Control  Intervention: Minimize Safety Risk  Recent Flowsheet Documentation  Taken 1/22/2024 1700 by Milla Dominguez RN  Communication Enhancement Strategies:   extra time allowed for response   communication board used  Enhanced Safety Measures: pain management     Problem: Risk for Delirium  Goal: Improved Attention and Thought Clarity  Intervention: Maximize Cognitive Function  Recent Flowsheet Documentation  Taken 1/22/2024 1700 by Milla Dominguez RN  Sensory Stimulation Regulation:   care clustered   television on  Reorientation Measures: clock in view   Goal Outcome Evaluation:       Patient showed signs of pain with cares. Confused, wondered why family could not take him, reassured family will take him after his surgery to fix his broken hip. IV dilaudid given for pain rating 7/10 which was effective. VSS. IV saline locked.

## 2024-01-23 NOTE — PLAN OF CARE
Problem: Adult Inpatient Plan of Care  Goal: Plan of Care Review  Description: The Plan of Care Review/Shift note should be completed every shift.  The Outcome Evaluation is a brief statement about your assessment that the patient is improving, declining, or no change.  This information will be displayed automatically on your shift  note.  Outcome: Progressing   Goal Outcome Evaluation:       Explained to pt about surgery tomorrow. Medicated pt with IV dilaudid and oral robaxin for pain and spasms with good relief. Pt incontinent as well as using urinal with encouragement. Instructed pt on I/S. Pt got it to 3000. SCD's applied. Pt turned.

## 2024-01-23 NOTE — PLAN OF CARE
Problem: Risk for Delirium  Goal: Improved Behavioral Control  Intervention: Minimize Safety Risk  Recent Flowsheet Documentation  Taken 1/23/2024 0130 by Milla Dominguez RN  Communication Enhancement Strategies:   extra time allowed for response   communication board used  Enhanced Safety Measures: pain management  Taken 1/22/2024 1700 by Milla Dominguez RN  Communication Enhancement Strategies:   extra time allowed for response   communication board used  Enhanced Safety Measures: pain management     Problem: Risk for Delirium  Goal: Improved Attention and Thought Clarity  Intervention: Maximize Cognitive Function  Recent Flowsheet Documentation  Taken 1/23/2024 0130 by Milla Dominguez RN  Sensory Stimulation Regulation:   care clustered   television on  Reorientation Measures: clock in view  Taken 1/22/2024 1700 by Milla Dominguez RN  Sensory Stimulation Regulation:   care clustered   television on  Reorientation Measures: clock in view   Goal Outcome Evaluation:       Patient received IV dilaudid and Robaxin for pain control. On room air, oxygen saturation above 90%. Patient intromittently confused, he wants to get his surgery done this morning so he can go home with his family, reassured surgery would sooner not later.

## 2024-01-23 NOTE — PROGRESS NOTES
"[facility]    Medicine Progress Note - Hospitalist Service    Date of Admission:  1/22/2024    Assessment & Plan      Alvin Newton is a 89 year old male admitted on 1/22/2024. He has a history of pAF on Eliquis, bilateral hip replacements (33 years ago), HFpEF, T2DM, hypertension, severe aortic stenosis, possible amyloidosis (work-up in process), mild cognitive impairment and is admitted for left femur fracture.     Left femur fracture  Mechanical fall   Chronic anticoagulation (Eliquis)  Presented to ED 1/22 for a mechanical fall at home. Tried to reach for his cane for stability but put his weight on his \"grabber device\" instead and fell forward. Reports he did not hit his head or lose consciousness. CT head with no acute abnormality. XR pelvis/left hip revealed a left proximal femoral fracture. History of bilateral hip replacements about 33 years ago. On Eliquis for atrial fibrillation, most recent dose 1/21 PM. ED provider discussed with orthopedic surgery, who plan for OR tomorrow due to recent anticoagulation use.   - Orthopedic surgery consulted, appreciate recommendations    - Plan for ORIF tomorrow 1/24   - NPO at midnight   - SubQ heparin today  - Hold Eliquis today  - Daily BMP, CBC  - Pain control: scheduled tylenol, PRN PO oxycodone, PRN IV Dilaudid   - Will not plan for repeat head CT at this time as patient states he did not hit his head when he fell and no signs of head trauma. If patient becomes acutely confused or has any neurologic deficits, recommend STAT head CT.       At risk for delirium  History of hospital-induced delirium per family. Known mild cognitive impairment at baseline. Discussed strategies to help prevent delirium with family - including continuing to reorient patient to place/time, maintaining a normal sleep cycle, time with family at bedside.   - Scheduled Tylenol   - Cluster overnight cares as able to maximize nighttime sleep   - Continue to reorient " patient to place/time    Paroxysmal atrial fibrillation  History of pAF on Eliquis. Most recent dose of Eliquis was 1/21 PM. INR 1.69 on admission. Follows with [facility] cardiology.   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 Cardiac MRI in October concerning for infiltrative cardiomyopathy. Work-up for amyloidosis in process by cardiology and [facility]ology. Biopsies of bone marrow and fat pad negative for AL amyloid. Per chart review, plan at this time is to follow-up with cardiology regarding possibly myocardial biopsy and continued amyloid/infiltrative cardiomyopathy work-up.   - PTA metoprolol, Lipitor, torsemide   - Follow-up with cardiologist as scheduled     Type 2 diabetes mellitus   History of T2DM without long-term use of insulin. PTA medications: none. Most recent A1C 6.8% on admission.   - POC glucose checks QID   - Medium resistance SSI     CKD 3a  Creatinine 1.34 on admission. Unclear baseline per chart review - Cr had been 1.0-1.2 summer 2023, but more recently 1.4-1.6 in cardiology clinic in December. Will continue to monitor.   - Daily BMP   - Avoid nephrotoxins    Depression/Anxiety  - PTA Celexa    GERD  - PTA Pepcid            Diet: Regular Diet Adult  NPO per Anesthesia Guidelines for Procedure/Surgery Except for: Meds, Ice Chips    DVT Prophylaxis: Heparin subcutaneous -- transition to PTA DOAC after procedure  Eastman Catheter: Not present  Lines: None     Cardiac Monitoring: None  Code Status: No CPR- Pre-arrest intubation OK      Clinically Significant Risk Factors               # Coagulation Defect: INR = 1.69 (Ref range: 0.85 - 1.15) and/or PTT = 35 Seconds (Ref range: 22 - 38 Seconds), will monitor for bleeding    # Hypertension: Noted on problem list  # Chronic heart failure  "with preserved ejection fraction: heart failure noted on problem list and last echo with EF >50%      # DMII: A1C = 6.8 % (Ref range: <5.7 %) within past 6 months, PRESENT ON ADMISSION  # Obesity: Estimated body mass index is 32.22 kg/m  as calculated from the following:    Height as of this encounter: 1.803 m (5' 11\").    Weight as of this encounter: 104.8 kg (231 lb)., PRESENT ON ADMISSION            Disposition Plan      Expected Discharge Date: 01/26/2024    Discharge Delays: Procedure Pending (enter procedure & time in comments)              The patient's care was discussed with the Attending Physician, Dr. Garcia .    Adriana Lombardo MD  Hospitalist Service  [facility]  Securely message with [employer] (more info)  Text page via [employer] Paging/Directory   ______________________________________________________________________    Interval History   Patient feeling well this morning. Notes mild pain. Encouraged to call for pain meds if needed. Understands plans for OR tomorrow. Family aware as well. Denies any shortness of breath or chest pain.    Physical Exam   Vital Signs: Temp: 98  F (36.7  C) Temp src: Oral BP: 125/65 Pulse: 77   Resp: 18 SpO2: 97 % O2 Device: None (Room air)    Weight: 231 lbs 0 oz  GENERAL: Healthy, alert and no distress  RESP:  Lungs clear throughout. No wheeze or crackles.   CV: Heart RRR. Systolic murmur. Pulses intact  Abdomen: Soft, nontender, nondistended.  MSK: left leg slightly shortened and externally rotated   SKIN: Visible skin clear. No significant rash, abnormal pigmentation or lesions.  NEURO: Cranial nerves grossly intact.            Data     I have personally reviewed the following data over the past 24 hrs:    6.9  \   13.1 (L)   / 207     143 107 27.6 (H) /  139 (H)   4.3 28 1.41 (H) \       Imaging results reviewed over the past 24 hrs:   No results found for this or any previous visit (from the past 24 hour(s)).  "

## 2024-01-24 ENCOUNTER — APPOINTMENT (OUTPATIENT)
Dept: RADIOLOGY | Facility: HOSPITAL | Age: 89
DRG: 466 | End: 2024-01-24
Attending: STUDENT IN AN ORGANIZED HEALTH CARE EDUCATION/TRAINING PROGRAM
Payer: COMMERCIAL

## 2024-01-24 ENCOUNTER — ANCILLARY PROCEDURE (OUTPATIENT)
Dept: ULTRASOUND IMAGING | Facility: HOSPITAL | Age: 89
End: 2024-01-24
Attending: STUDENT IN AN ORGANIZED HEALTH CARE EDUCATION/TRAINING PROGRAM
Payer: COMMERCIAL

## 2024-01-24 ENCOUNTER — APPOINTMENT (OUTPATIENT)
Dept: RADIOLOGY | Facility: HOSPITAL | Age: 89
DRG: 466 | End: 2024-01-24
Attending: INTERNAL MEDICINE
Payer: COMMERCIAL

## 2024-01-24 ENCOUNTER — ANESTHESIA (OUTPATIENT)
Dept: SURGERY | Facility: HOSPITAL | Age: 89
DRG: 466 | End: 2024-01-24
Payer: COMMERCIAL

## 2024-01-24 ENCOUNTER — ANESTHESIA EVENT (OUTPATIENT)
Dept: SURGERY | Facility: HOSPITAL | Age: 89
DRG: 466 | End: 2024-01-24
Payer: COMMERCIAL

## 2024-01-24 LAB
ANION GAP SERPL CALCULATED.3IONS-SCNC: 12 MMOL/L (ref 7–15)
ANION GAP SERPL CALCULATED.3IONS-SCNC: 13 MMOL/L (ref 7–15)
ANION GAP SERPL CALCULATED.3IONS-SCNC: 5 MMOL/L (ref 7–15)
APTT PPP: 33 SECONDS (ref 22–38)
BUN SERPL-MCNC: 30.2 MG/DL (ref 8–23)
BUN SERPL-MCNC: 30.3 MG/DL (ref 8–23)
BUN SERPL-MCNC: 30.9 MG/DL (ref 8–23)
CALCIUM SERPL-MCNC: 8 MG/DL (ref 8.8–10.2)
CALCIUM SERPL-MCNC: 8.1 MG/DL (ref 8.8–10.2)
CALCIUM SERPL-MCNC: 9.2 MG/DL (ref 8.8–10.2)
CHLORIDE SERPL-SCNC: 104 MMOL/L (ref 98–107)
CHLORIDE SERPL-SCNC: 107 MMOL/L (ref 98–107)
CHLORIDE SERPL-SCNC: 107 MMOL/L (ref 98–107)
CREAT SERPL-MCNC: 1.3 MG/DL (ref 0.67–1.17)
CREAT SERPL-MCNC: 1.3 MG/DL (ref 0.67–1.17)
CREAT SERPL-MCNC: 1.72 MG/DL (ref 0.67–1.17)
DEPRECATED HCO3 PLAS-SCNC: 22 MMOL/L (ref 22–29)
DEPRECATED HCO3 PLAS-SCNC: 22 MMOL/L (ref 22–29)
DEPRECATED HCO3 PLAS-SCNC: 31 MMOL/L (ref 22–29)
EGFRCR SERPLBLD CKD-EPI 2021: 38 ML/MIN/1.73M2
EGFRCR SERPLBLD CKD-EPI 2021: 53 ML/MIN/1.73M2
EGFRCR SERPLBLD CKD-EPI 2021: 53 ML/MIN/1.73M2
ERYTHROCYTE [DISTWIDTH] IN BLOOD BY AUTOMATED COUNT: 13.5 % (ref 10–15)
ERYTHROCYTE [DISTWIDTH] IN BLOOD BY AUTOMATED COUNT: 13.7 % (ref 10–15)
GLUCOSE BLDC GLUCOMTR-MCNC: 121 MG/DL (ref 70–99)
GLUCOSE BLDC GLUCOMTR-MCNC: 133 MG/DL (ref 70–99)
GLUCOSE BLDC GLUCOMTR-MCNC: 140 MG/DL (ref 70–99)
GLUCOSE BLDC GLUCOMTR-MCNC: 151 MG/DL (ref 70–99)
GLUCOSE BLDC GLUCOMTR-MCNC: 166 MG/DL (ref 70–99)
GLUCOSE SERPL-MCNC: 138 MG/DL (ref 70–99)
GLUCOSE SERPL-MCNC: 184 MG/DL (ref 70–99)
GLUCOSE SERPL-MCNC: 198 MG/DL (ref 70–99)
HCT VFR BLD AUTO: 28.6 % (ref 40–53)
HCT VFR BLD AUTO: 40.3 % (ref 40–53)
HGB BLD-MCNC: 13.2 G/DL (ref 13.3–17.7)
HGB BLD-MCNC: 9.1 G/DL (ref 13.3–17.7)
HGB BLD-MCNC: 9.4 G/DL (ref 13.3–17.7)
HOLD SPECIMEN: NORMAL
INR PPP: 1.81 (ref 0.85–1.15)
LACTATE SERPL-SCNC: 2.1 MMOL/L (ref 0.7–2)
LACTATE SERPL-SCNC: 2.1 MMOL/L (ref 0.7–2)
MCH RBC QN AUTO: 29.6 PG (ref 26.5–33)
MCH RBC QN AUTO: 29.8 PG (ref 26.5–33)
MCHC RBC AUTO-ENTMCNC: 32.8 G/DL (ref 31.5–36.5)
MCHC RBC AUTO-ENTMCNC: 32.9 G/DL (ref 31.5–36.5)
MCV RBC AUTO: 90 FL (ref 78–100)
MCV RBC AUTO: 91 FL (ref 78–100)
PLATELET # BLD AUTO: 206 10E3/UL (ref 150–450)
PLATELET # BLD AUTO: 235 10E3/UL (ref 150–450)
PLATELET # BLD AUTO: 243 10E3/UL (ref 150–450)
POTASSIUM SERPL-SCNC: 4.1 MMOL/L (ref 3.4–5.3)
POTASSIUM SERPL-SCNC: 4.4 MMOL/L (ref 3.4–5.3)
POTASSIUM SERPL-SCNC: 4.8 MMOL/L (ref 3.4–5.3)
RBC # BLD AUTO: 3.15 10E6/UL (ref 4.4–5.9)
RBC # BLD AUTO: 4.46 10E6/UL (ref 4.4–5.9)
SODIUM SERPL-SCNC: 140 MMOL/L (ref 135–145)
SODIUM SERPL-SCNC: 141 MMOL/L (ref 135–145)
SODIUM SERPL-SCNC: 142 MMOL/L (ref 135–145)
TROPONIN T SERPL HS-MCNC: 36 NG/L
WBC # BLD AUTO: 13.4 10E3/UL (ref 4–11)
WBC # BLD AUTO: 7.7 10E3/UL (ref 4–11)

## 2024-01-24 PROCEDURE — 272N000580 HC KIT, 4 OR 5FR DUAL LUMEN POWER PICC

## 2024-01-24 PROCEDURE — 0SRB029 REPLACEMENT OF LEFT HIP JOINT WITH METAL ON POLYETHYLENE SYNTHETIC SUBSTITUTE, CEMENTED, OPEN APPROACH: ICD-10-PCS | Performed by: STUDENT IN AN ORGANIZED HEALTH CARE EDUCATION/TRAINING PROGRAM

## 2024-01-24 PROCEDURE — 36569 INSJ PICC 5 YR+ W/O IMAGING: CPT

## 2024-01-24 PROCEDURE — 258N000003 HC RX IP 258 OP 636: Performed by: STUDENT IN AN ORGANIZED HEALTH CARE EDUCATION/TRAINING PROGRAM

## 2024-01-24 PROCEDURE — 999N000182 XR SURGERY CARM FLUORO GREATER THAN 5 MIN

## 2024-01-24 PROCEDURE — 999N000065 XR FEMUR PORT LEFT 2 VIEWS

## 2024-01-24 PROCEDURE — 93010 ELECTROCARDIOGRAM REPORT: CPT | Performed by: INTERNAL MEDICINE

## 2024-01-24 PROCEDURE — 250N000011 HC RX IP 250 OP 636: Mod: JZ | Performed by: STUDENT IN AN ORGANIZED HEALTH CARE EDUCATION/TRAINING PROGRAM

## 2024-01-24 PROCEDURE — 80048 BASIC METABOLIC PNL TOTAL CA: CPT | Performed by: INTERNAL MEDICINE

## 2024-01-24 PROCEDURE — 72170 X-RAY EXAM OF PELVIS: CPT

## 2024-01-24 PROCEDURE — 80048 BASIC METABOLIC PNL TOTAL CA: CPT

## 2024-01-24 PROCEDURE — 83605 ASSAY OF LACTIC ACID: CPT | Performed by: STUDENT IN AN ORGANIZED HEALTH CARE EDUCATION/TRAINING PROGRAM

## 2024-01-24 PROCEDURE — 250N000011 HC RX IP 250 OP 636: Performed by: STUDENT IN AN ORGANIZED HEALTH CARE EDUCATION/TRAINING PROGRAM

## 2024-01-24 PROCEDURE — 250N000011 HC RX IP 250 OP 636: Mod: JZ

## 2024-01-24 PROCEDURE — 250N000009 HC RX 250

## 2024-01-24 PROCEDURE — 99232 SBSQ HOSP IP/OBS MODERATE 35: CPT | Mod: GC

## 2024-01-24 PROCEDURE — 370N000017 HC ANESTHESIA TECHNICAL FEE, PER MIN: Performed by: STUDENT IN AN ORGANIZED HEALTH CARE EDUCATION/TRAINING PROGRAM

## 2024-01-24 PROCEDURE — 85027 COMPLETE CBC AUTOMATED: CPT

## 2024-01-24 PROCEDURE — 83605 ASSAY OF LACTIC ACID: CPT | Performed by: INTERNAL MEDICINE

## 2024-01-24 PROCEDURE — 250N000009 HC RX 250: Performed by: STUDENT IN AN ORGANIZED HEALTH CARE EDUCATION/TRAINING PROGRAM

## 2024-01-24 PROCEDURE — 85610 PROTHROMBIN TIME: CPT | Performed by: STUDENT IN AN ORGANIZED HEALTH CARE EDUCATION/TRAINING PROGRAM

## 2024-01-24 PROCEDURE — 85049 AUTOMATED PLATELET COUNT: CPT | Performed by: INTERNAL MEDICINE

## 2024-01-24 PROCEDURE — 250N000013 HC RX MED GY IP 250 OP 250 PS 637: Performed by: STUDENT IN AN ORGANIZED HEALTH CARE EDUCATION/TRAINING PROGRAM

## 2024-01-24 PROCEDURE — 200N000001 HC R&B ICU

## 2024-01-24 PROCEDURE — 999N000065 XR CHEST PICC LINE PLACEMENT 1 VIEW

## 2024-01-24 PROCEDURE — C1713 ANCHOR/SCREW BN/BN,TIS/BN: HCPCS | Performed by: STUDENT IN AN ORGANIZED HEALTH CARE EDUCATION/TRAINING PROGRAM

## 2024-01-24 PROCEDURE — C1776 JOINT DEVICE (IMPLANTABLE): HCPCS | Performed by: STUDENT IN AN ORGANIZED HEALTH CARE EDUCATION/TRAINING PROGRAM

## 2024-01-24 PROCEDURE — 272N000001 HC OR GENERAL SUPPLY STERILE: Performed by: STUDENT IN AN ORGANIZED HEALTH CARE EDUCATION/TRAINING PROGRAM

## 2024-01-24 PROCEDURE — P9045 ALBUMIN (HUMAN), 5%, 250 ML: HCPCS | Mod: JZ | Performed by: STUDENT IN AN ORGANIZED HEALTH CARE EDUCATION/TRAINING PROGRAM

## 2024-01-24 PROCEDURE — 250N000025 HC SEVOFLURANE, PER MIN: Performed by: STUDENT IN AN ORGANIZED HEALTH CARE EDUCATION/TRAINING PROGRAM

## 2024-01-24 PROCEDURE — 3E043XZ INTRODUCTION OF VASOPRESSOR INTO CENTRAL VEIN, PERCUTANEOUS APPROACH: ICD-10-PCS | Performed by: NURSE PRACTITIONER

## 2024-01-24 PROCEDURE — 36415 COLL VENOUS BLD VENIPUNCTURE: CPT

## 2024-01-24 PROCEDURE — 73552 X-RAY EXAM OF FEMUR 2/>: CPT | Mod: LT

## 2024-01-24 PROCEDURE — 250N000013 HC RX MED GY IP 250 OP 250 PS 637

## 2024-01-24 PROCEDURE — 85018 HEMOGLOBIN: CPT | Performed by: INTERNAL MEDICINE

## 2024-01-24 PROCEDURE — 85730 THROMBOPLASTIN TIME PARTIAL: CPT | Performed by: STUDENT IN AN ORGANIZED HEALTH CARE EDUCATION/TRAINING PROGRAM

## 2024-01-24 PROCEDURE — 272N000004 HC RX 272: Performed by: STUDENT IN AN ORGANIZED HEALTH CARE EDUCATION/TRAINING PROGRAM

## 2024-01-24 PROCEDURE — 250N000011 HC RX IP 250 OP 636

## 2024-01-24 PROCEDURE — P9045 ALBUMIN (HUMAN), 5%, 250 ML: HCPCS | Mod: JZ

## 2024-01-24 PROCEDURE — 258N000001 HC RX 258: Performed by: STUDENT IN AN ORGANIZED HEALTH CARE EDUCATION/TRAINING PROGRAM

## 2024-01-24 PROCEDURE — 258N000003 HC RX IP 258 OP 636: Performed by: INTERNAL MEDICINE

## 2024-01-24 PROCEDURE — 93005 ELECTROCARDIOGRAM TRACING: CPT

## 2024-01-24 PROCEDURE — 258N000003 HC RX IP 258 OP 636

## 2024-01-24 PROCEDURE — 93005 ELECTROCARDIOGRAM TRACING: CPT | Performed by: INTERNAL MEDICINE

## 2024-01-24 PROCEDURE — 84484 ASSAY OF TROPONIN QUANT: CPT | Performed by: INTERNAL MEDICINE

## 2024-01-24 PROCEDURE — 85027 COMPLETE CBC AUTOMATED: CPT | Performed by: STUDENT IN AN ORGANIZED HEALTH CARE EDUCATION/TRAINING PROGRAM

## 2024-01-24 PROCEDURE — 0Y3B0ZZ CONTROL BLEEDING IN LEFT LOWER EXTREMITY, OPEN APPROACH: ICD-10-PCS | Performed by: STUDENT IN AN ORGANIZED HEALTH CARE EDUCATION/TRAINING PROGRAM

## 2024-01-24 PROCEDURE — 80048 BASIC METABOLIC PNL TOTAL CA: CPT | Performed by: STUDENT IN AN ORGANIZED HEALTH CARE EDUCATION/TRAINING PROGRAM

## 2024-01-24 PROCEDURE — 250N000009 HC RX 250: Performed by: NURSE PRACTITIONER

## 2024-01-24 PROCEDURE — 0SPB0JZ REMOVAL OF SYNTHETIC SUBSTITUTE FROM LEFT HIP JOINT, OPEN APPROACH: ICD-10-PCS | Performed by: STUDENT IN AN ORGANIZED HEALTH CARE EDUCATION/TRAINING PROGRAM

## 2024-01-24 PROCEDURE — 250N000009 HC RX 250: Performed by: INTERNAL MEDICINE

## 2024-01-24 PROCEDURE — 710N000010 HC RECOVERY PHASE 1, LEVEL 2, PER MIN: Performed by: STUDENT IN AN ORGANIZED HEALTH CARE EDUCATION/TRAINING PROGRAM

## 2024-01-24 PROCEDURE — 360N000077 HC SURGERY LEVEL 4, PER MIN: Performed by: STUDENT IN AN ORGANIZED HEALTH CARE EDUCATION/TRAINING PROGRAM

## 2024-01-24 PROCEDURE — 99291 CRITICAL CARE FIRST HOUR: CPT | Mod: FT | Performed by: NURSE PRACTITIONER

## 2024-01-24 PROCEDURE — 999N000141 HC STATISTIC PRE-PROCEDURE NURSING ASSESSMENT: Performed by: STUDENT IN AN ORGANIZED HEALTH CARE EDUCATION/TRAINING PROGRAM

## 2024-01-24 DEVICE — MODULAR HIP SYSTEM
Type: IMPLANTABLE DEVICE | Site: HIP | Status: FUNCTIONAL
Brand: RESTORATION

## 2024-01-24 DEVICE — TRIDENT X3 0 DEGREE POLYETHYLENE INSERT
Type: IMPLANTABLE DEVICE | Site: HIP | Status: FUNCTIONAL
Brand: TRIDENT X3 INSERT

## 2024-01-24 DEVICE — FULL DOSE BONE CEMENT, 10 PACK CATALOG NUMBER IS 6191-1-010
Type: IMPLANTABLE DEVICE | Site: HIP | Status: FUNCTIONAL
Brand: SIMPLEX

## 2024-01-24 DEVICE — V40 FEMORAL HEAD
Type: IMPLANTABLE DEVICE | Site: HIP | Status: FUNCTIONAL
Brand: LFIT

## 2024-01-24 DEVICE — 1.7MM CABLE WITH CRIMP 750MM-STERILE: Type: IMPLANTABLE DEVICE | Site: HIP | Status: FUNCTIONAL

## 2024-01-24 RX ORDER — LIDOCAINE 40 MG/G
CREAM TOPICAL
Status: DISCONTINUED | OUTPATIENT
Start: 2024-01-24 | End: 2024-01-24 | Stop reason: HOSPADM

## 2024-01-24 RX ORDER — LIDOCAINE HYDROCHLORIDE 10 MG/ML
INJECTION, SOLUTION INFILTRATION; PERINEURAL PRN
Status: DISCONTINUED | OUTPATIENT
Start: 2024-01-24 | End: 2024-01-24

## 2024-01-24 RX ORDER — METOPROLOL TARTRATE 1 MG/ML
2.5 INJECTION, SOLUTION INTRAVENOUS EVERY 4 HOURS PRN
Status: DISCONTINUED | OUTPATIENT
Start: 2024-01-24 | End: 2024-01-28

## 2024-01-24 RX ORDER — SODIUM CHLORIDE, SODIUM LACTATE, POTASSIUM CHLORIDE, CALCIUM CHLORIDE 600; 310; 30; 20 MG/100ML; MG/100ML; MG/100ML; MG/100ML
INJECTION, SOLUTION INTRAVENOUS CONTINUOUS
Status: DISCONTINUED | OUTPATIENT
Start: 2024-01-24 | End: 2024-01-24 | Stop reason: HOSPADM

## 2024-01-24 RX ORDER — ACETAMINOPHEN 325 MG/1
975 TABLET ORAL EVERY 8 HOURS
Status: DISCONTINUED | OUTPATIENT
Start: 2024-01-24 | End: 2024-01-24

## 2024-01-24 RX ORDER — BISACODYL 10 MG
10 SUPPOSITORY, RECTAL RECTAL DAILY PRN
Status: DISCONTINUED | OUTPATIENT
Start: 2024-01-24 | End: 2024-02-02 | Stop reason: HOSPADM

## 2024-01-24 RX ORDER — FENTANYL CITRATE 50 UG/ML
INJECTION, SOLUTION INTRAMUSCULAR; INTRAVENOUS PRN
Status: DISCONTINUED | OUTPATIENT
Start: 2024-01-24 | End: 2024-01-24

## 2024-01-24 RX ORDER — SODIUM CHLORIDE, SODIUM LACTATE, POTASSIUM CHLORIDE, CALCIUM CHLORIDE 600; 310; 30; 20 MG/100ML; MG/100ML; MG/100ML; MG/100ML
INJECTION, SOLUTION INTRAVENOUS CONTINUOUS
Status: DISCONTINUED | OUTPATIENT
Start: 2024-01-24 | End: 2024-01-26

## 2024-01-24 RX ORDER — ACETAMINOPHEN 325 MG/1
975 TABLET ORAL ONCE
Status: DISCONTINUED | OUTPATIENT
Start: 2024-01-24 | End: 2024-01-24

## 2024-01-24 RX ORDER — CEFAZOLIN SODIUM 2 G/100ML
2 INJECTION, SOLUTION INTRAVENOUS EVERY 8 HOURS
Qty: 200 ML | Refills: 0 | Status: COMPLETED | OUTPATIENT
Start: 2024-01-24 | End: 2024-01-25

## 2024-01-24 RX ORDER — ESMOLOL HYDROCHLORIDE 10 MG/ML
INJECTION INTRAVENOUS PRN
Status: DISCONTINUED | OUTPATIENT
Start: 2024-01-24 | End: 2024-01-24

## 2024-01-24 RX ORDER — ONDANSETRON 2 MG/ML
4 INJECTION INTRAMUSCULAR; INTRAVENOUS EVERY 6 HOURS PRN
Status: DISCONTINUED | OUTPATIENT
Start: 2024-01-24 | End: 2024-01-29

## 2024-01-24 RX ORDER — PROPOFOL 10 MG/ML
INJECTION, EMULSION INTRAVENOUS PRN
Status: DISCONTINUED | OUTPATIENT
Start: 2024-01-24 | End: 2024-01-24

## 2024-01-24 RX ORDER — BUPIVACAINE HYDROCHLORIDE 2.5 MG/ML
INJECTION, SOLUTION EPIDURAL; INFILTRATION; INTRACAUDAL
Status: COMPLETED | OUTPATIENT
Start: 2024-01-24 | End: 2024-01-24

## 2024-01-24 RX ORDER — ONDANSETRON 4 MG/1
4 TABLET, ORALLY DISINTEGRATING ORAL EVERY 6 HOURS PRN
Status: DISCONTINUED | OUTPATIENT
Start: 2024-01-24 | End: 2024-02-02 | Stop reason: HOSPADM

## 2024-01-24 RX ORDER — HYDROMORPHONE HYDROCHLORIDE 1 MG/ML
0.4 INJECTION, SOLUTION INTRAMUSCULAR; INTRAVENOUS; SUBCUTANEOUS EVERY 5 MIN PRN
Status: DISCONTINUED | OUTPATIENT
Start: 2024-01-24 | End: 2024-01-24 | Stop reason: HOSPADM

## 2024-01-24 RX ORDER — PHENYLEPHRINE HCL IN 0.9% NACL 50MG/250ML
.5-1.25 PLASTIC BAG, INJECTION (ML) INTRAVENOUS CONTINUOUS
Status: DISCONTINUED | OUTPATIENT
Start: 2024-01-24 | End: 2024-01-25

## 2024-01-24 RX ORDER — ONDANSETRON 2 MG/ML
4 INJECTION INTRAMUSCULAR; INTRAVENOUS EVERY 30 MIN PRN
Status: DISCONTINUED | OUTPATIENT
Start: 2024-01-24 | End: 2024-01-24 | Stop reason: HOSPADM

## 2024-01-24 RX ORDER — ONDANSETRON 4 MG/1
4 TABLET, ORALLY DISINTEGRATING ORAL EVERY 30 MIN PRN
Status: DISCONTINUED | OUTPATIENT
Start: 2024-01-24 | End: 2024-01-24 | Stop reason: HOSPADM

## 2024-01-24 RX ORDER — NALOXONE HYDROCHLORIDE 0.4 MG/ML
0.2 INJECTION, SOLUTION INTRAMUSCULAR; INTRAVENOUS; SUBCUTANEOUS
Status: DISCONTINUED | OUTPATIENT
Start: 2024-01-24 | End: 2024-01-24 | Stop reason: HOSPADM

## 2024-01-24 RX ORDER — VANCOMYCIN HYDROCHLORIDE 1 G/20ML
INJECTION, POWDER, LYOPHILIZED, FOR SOLUTION INTRAVENOUS PRN
Status: DISCONTINUED | OUTPATIENT
Start: 2024-01-24 | End: 2024-01-24 | Stop reason: HOSPADM

## 2024-01-24 RX ORDER — PROCHLORPERAZINE MALEATE 5 MG
5 TABLET ORAL EVERY 6 HOURS PRN
Status: DISCONTINUED | OUTPATIENT
Start: 2024-01-24 | End: 2024-01-29

## 2024-01-24 RX ORDER — NALOXONE HYDROCHLORIDE 0.4 MG/ML
0.4 INJECTION, SOLUTION INTRAMUSCULAR; INTRAVENOUS; SUBCUTANEOUS
Status: DISCONTINUED | OUTPATIENT
Start: 2024-01-24 | End: 2024-01-24 | Stop reason: HOSPADM

## 2024-01-24 RX ORDER — SODIUM CHLORIDE, SODIUM LACTATE, POTASSIUM CHLORIDE, CALCIUM CHLORIDE 600; 310; 30; 20 MG/100ML; MG/100ML; MG/100ML; MG/100ML
INJECTION, SOLUTION INTRAVENOUS CONTINUOUS PRN
Status: DISCONTINUED | OUTPATIENT
Start: 2024-01-24 | End: 2024-01-24

## 2024-01-24 RX ORDER — ETOMIDATE 2 MG/ML
INJECTION INTRAVENOUS PRN
Status: DISCONTINUED | OUTPATIENT
Start: 2024-01-24 | End: 2024-01-24

## 2024-01-24 RX ORDER — HYDROMORPHONE HYDROCHLORIDE 1 MG/ML
0.2 INJECTION, SOLUTION INTRAMUSCULAR; INTRAVENOUS; SUBCUTANEOUS EVERY 5 MIN PRN
Status: DISCONTINUED | OUTPATIENT
Start: 2024-01-24 | End: 2024-01-24 | Stop reason: HOSPADM

## 2024-01-24 RX ORDER — LABETALOL HYDROCHLORIDE 5 MG/ML
10 INJECTION, SOLUTION INTRAVENOUS
Status: DISCONTINUED | OUTPATIENT
Start: 2024-01-24 | End: 2024-01-24 | Stop reason: HOSPADM

## 2024-01-24 RX ORDER — CEFAZOLIN SODIUM/WATER 2 G/20 ML
2 SYRINGE (ML) INTRAVENOUS
Status: COMPLETED | OUTPATIENT
Start: 2024-01-24 | End: 2024-01-24

## 2024-01-24 RX ORDER — AMOXICILLIN 250 MG
1 CAPSULE ORAL 2 TIMES DAILY
Status: DISCONTINUED | OUTPATIENT
Start: 2024-01-24 | End: 2024-01-24

## 2024-01-24 RX ORDER — CEFAZOLIN SODIUM/WATER 2 G/20 ML
2 SYRINGE (ML) INTRAVENOUS SEE ADMIN INSTRUCTIONS
Status: DISCONTINUED | OUTPATIENT
Start: 2024-01-24 | End: 2024-01-24 | Stop reason: HOSPADM

## 2024-01-24 RX ORDER — PHENYLEPHRINE HCL IN 0.9% NACL 50MG/250ML
PLASTIC BAG, INJECTION (ML) INTRAVENOUS
Status: DISCONTINUED
Start: 2024-01-24 | End: 2024-01-24 | Stop reason: HOSPADM

## 2024-01-24 RX ORDER — FENTANYL CITRATE 50 UG/ML
25 INJECTION, SOLUTION INTRAMUSCULAR; INTRAVENOUS EVERY 5 MIN PRN
Status: DISCONTINUED | OUTPATIENT
Start: 2024-01-24 | End: 2024-01-24 | Stop reason: HOSPADM

## 2024-01-24 RX ORDER — TRANEXAMIC ACID 650 MG/1
1950 TABLET ORAL ONCE
Status: COMPLETED | OUTPATIENT
Start: 2024-01-24 | End: 2024-01-24

## 2024-01-24 RX ORDER — ACETAMINOPHEN 325 MG/1
975 TABLET ORAL ONCE
Status: DISCONTINUED | OUTPATIENT
Start: 2024-01-24 | End: 2024-01-24 | Stop reason: HOSPADM

## 2024-01-24 RX ORDER — KETAMINE HYDROCHLORIDE 10 MG/ML
INJECTION INTRAMUSCULAR; INTRAVENOUS PRN
Status: DISCONTINUED | OUTPATIENT
Start: 2024-01-24 | End: 2024-01-24

## 2024-01-24 RX ORDER — ONDANSETRON 2 MG/ML
INJECTION INTRAMUSCULAR; INTRAVENOUS PRN
Status: DISCONTINUED | OUTPATIENT
Start: 2024-01-24 | End: 2024-01-24

## 2024-01-24 RX ORDER — DEXAMETHASONE SODIUM PHOSPHATE 10 MG/ML
INJECTION, SOLUTION INTRAMUSCULAR; INTRAVENOUS PRN
Status: DISCONTINUED | OUTPATIENT
Start: 2024-01-24 | End: 2024-01-24

## 2024-01-24 RX ORDER — VASOPRESSIN IN 0.9 % NACL 2 UNIT/2ML
SYRINGE (ML) INTRAVENOUS PRN
Status: DISCONTINUED | OUTPATIENT
Start: 2024-01-24 | End: 2024-01-24

## 2024-01-24 RX ORDER — OXYCODONE HYDROCHLORIDE 5 MG/1
5 TABLET ORAL
Status: DISCONTINUED | OUTPATIENT
Start: 2024-01-24 | End: 2024-01-24 | Stop reason: HOSPADM

## 2024-01-24 RX ORDER — LIDOCAINE 40 MG/G
CREAM TOPICAL
Status: DISCONTINUED | OUTPATIENT
Start: 2024-01-24 | End: 2024-02-02 | Stop reason: HOSPADM

## 2024-01-24 RX ORDER — FLUMAZENIL 0.1 MG/ML
0.2 INJECTION, SOLUTION INTRAVENOUS
Status: DISCONTINUED | OUTPATIENT
Start: 2024-01-24 | End: 2024-01-24 | Stop reason: HOSPADM

## 2024-01-24 RX ORDER — TRANEXAMIC ACID 100 MG/ML
INJECTION, SOLUTION INTRAVENOUS
Status: DISPENSED
Start: 2024-01-24 | End: 2024-01-24

## 2024-01-24 RX ORDER — OXYCODONE HYDROCHLORIDE 5 MG/1
5 TABLET ORAL EVERY 4 HOURS PRN
Status: DISCONTINUED | OUTPATIENT
Start: 2024-01-24 | End: 2024-01-25

## 2024-01-24 RX ORDER — FENTANYL CITRATE 50 UG/ML
50 INJECTION, SOLUTION INTRAMUSCULAR; INTRAVENOUS EVERY 5 MIN PRN
Status: DISCONTINUED | OUTPATIENT
Start: 2024-01-24 | End: 2024-01-24 | Stop reason: HOSPADM

## 2024-01-24 RX ORDER — FENTANYL CITRATE 50 UG/ML
25-50 INJECTION, SOLUTION INTRAMUSCULAR; INTRAVENOUS
Status: DISCONTINUED | OUTPATIENT
Start: 2024-01-24 | End: 2024-01-24 | Stop reason: HOSPADM

## 2024-01-24 RX ORDER — ACETAMINOPHEN 325 MG/1
650 TABLET ORAL EVERY 4 HOURS PRN
Status: DISCONTINUED | OUTPATIENT
Start: 2024-01-27 | End: 2024-01-25

## 2024-01-24 RX ORDER — LIDOCAINE 40 MG/G
CREAM TOPICAL
Status: ACTIVE | OUTPATIENT
Start: 2024-01-24 | End: 2024-01-27

## 2024-01-24 RX ORDER — POLYETHYLENE GLYCOL 3350 17 G/17G
17 POWDER, FOR SOLUTION ORAL DAILY
Status: DISCONTINUED | OUTPATIENT
Start: 2024-01-25 | End: 2024-02-02 | Stop reason: HOSPADM

## 2024-01-24 RX ORDER — TRANEXAMIC ACID 100 MG/ML
INJECTION, SOLUTION INTRAVENOUS
Status: DISCONTINUED
Start: 2024-01-24 | End: 2024-01-24 | Stop reason: HOSPADM

## 2024-01-24 RX ADMIN — ESMOLOL HYDROCHLORIDE 20 MG: 10 INJECTION, SOLUTION INTRAVENOUS at 13:00

## 2024-01-24 RX ADMIN — PHENYLEPHRINE HYDROCHLORIDE 100 MCG: 10 INJECTION INTRAVENOUS at 10:54

## 2024-01-24 RX ADMIN — FENTANYL CITRATE 100 MCG: 50 INJECTION INTRAMUSCULAR; INTRAVENOUS at 08:24

## 2024-01-24 RX ADMIN — ALBUMIN HUMAN: 0.05 INJECTION, SOLUTION INTRAVENOUS at 12:00

## 2024-01-24 RX ADMIN — Medication 2 UNITS: at 11:17

## 2024-01-24 RX ADMIN — PROPOFOL 30 MG: 10 INJECTION, EMULSION INTRAVENOUS at 08:48

## 2024-01-24 RX ADMIN — Medication 1 UNITS: at 10:54

## 2024-01-24 RX ADMIN — VANCOMYCIN HYDROCHLORIDE 1500 MG: 5 INJECTION, POWDER, LYOPHILIZED, FOR SOLUTION INTRAVENOUS at 08:18

## 2024-01-24 RX ADMIN — PHENYLEPHRINE HYDROCHLORIDE 100 MCG: 10 INJECTION INTRAVENOUS at 12:39

## 2024-01-24 RX ADMIN — SUGAMMADEX 200 MG: 100 INJECTION, SOLUTION INTRAVENOUS at 13:15

## 2024-01-24 RX ADMIN — ACETAMINOPHEN 975 MG: 325 TABLET, FILM COATED ORAL at 00:56

## 2024-01-24 RX ADMIN — SODIUM CHLORIDE, POTASSIUM CHLORIDE, SODIUM LACTATE AND CALCIUM CHLORIDE: 600; 310; 30; 20 INJECTION, SOLUTION INTRAVENOUS at 09:00

## 2024-01-24 RX ADMIN — Medication 1 UNITS: at 10:56

## 2024-01-24 RX ADMIN — ALBUMIN HUMAN 12.5 G: 0.05 INJECTION, SOLUTION INTRAVENOUS at 14:11

## 2024-01-24 RX ADMIN — PHENYLEPHRINE HYDROCHLORIDE 50 MCG: 10 INJECTION INTRAVENOUS at 09:40

## 2024-01-24 RX ADMIN — LIDOCAINE HYDROCHLORIDE 0.3 ML: 10 INJECTION, SOLUTION EPIDURAL; INFILTRATION; INTRACAUDAL; PERINEURAL at 22:00

## 2024-01-24 RX ADMIN — Medication 20 MG: at 08:08

## 2024-01-24 RX ADMIN — PHENYLEPHRINE HYDROCHLORIDE 100 MCG: 10 INJECTION INTRAVENOUS at 11:02

## 2024-01-24 RX ADMIN — Medication 1 UNITS: at 10:24

## 2024-01-24 RX ADMIN — FENTANYL CITRATE 25 MCG: 50 INJECTION, SOLUTION INTRAMUSCULAR; INTRAVENOUS at 07:25

## 2024-01-24 RX ADMIN — TRANEXAMIC ACID 1 G: 1 INJECTION, SOLUTION INTRAVENOUS at 10:25

## 2024-01-24 RX ADMIN — ONDANSETRON 4 MG: 2 INJECTION INTRAMUSCULAR; INTRAVENOUS at 11:30

## 2024-01-24 RX ADMIN — PHENYLEPHRINE HYDROCHLORIDE 100 MCG: 10 INJECTION INTRAVENOUS at 10:44

## 2024-01-24 RX ADMIN — Medication 1 UNITS: at 10:12

## 2024-01-24 RX ADMIN — ESMOLOL HYDROCHLORIDE 10 MG: 10 INJECTION, SOLUTION INTRAVENOUS at 13:09

## 2024-01-24 RX ADMIN — ETOMIDATE 12 MG: 2 INJECTION, SOLUTION INTRAVENOUS at 08:24

## 2024-01-24 RX ADMIN — PHENYLEPHRINE HYDROCHLORIDE 100 MCG: 10 INJECTION INTRAVENOUS at 11:37

## 2024-01-24 RX ADMIN — Medication 1 UNITS: at 11:02

## 2024-01-24 RX ADMIN — METOPROLOL TARTRATE 2.5 MG: 5 INJECTION INTRAVENOUS at 21:15

## 2024-01-24 RX ADMIN — PHENYLEPHRINE HYDROCHLORIDE 100 MCG: 10 INJECTION INTRAVENOUS at 08:38

## 2024-01-24 RX ADMIN — HYDROMORPHONE HYDROCHLORIDE 0.2 MG: 1 INJECTION, SOLUTION INTRAMUSCULAR; INTRAVENOUS; SUBCUTANEOUS at 12:44

## 2024-01-24 RX ADMIN — ESMOLOL HYDROCHLORIDE 20 MG: 10 INJECTION, SOLUTION INTRAVENOUS at 13:19

## 2024-01-24 RX ADMIN — Medication 1 UNITS: at 10:44

## 2024-01-24 RX ADMIN — ROCURONIUM BROMIDE 20 MG: 50 INJECTION, SOLUTION INTRAVENOUS at 11:30

## 2024-01-24 RX ADMIN — SODIUM CHLORIDE, POTASSIUM CHLORIDE, SODIUM LACTATE AND CALCIUM CHLORIDE: 600; 310; 30; 20 INJECTION, SOLUTION INTRAVENOUS at 07:15

## 2024-01-24 RX ADMIN — Medication 20 MG: at 09:19

## 2024-01-24 RX ADMIN — ROCURONIUM BROMIDE 20 MG: 50 INJECTION, SOLUTION INTRAVENOUS at 10:46

## 2024-01-24 RX ADMIN — ROCURONIUM BROMIDE 30 MG: 50 INJECTION, SOLUTION INTRAVENOUS at 09:37

## 2024-01-24 RX ADMIN — Medication 2 G: at 08:18

## 2024-01-24 RX ADMIN — Medication 2 G: at 12:16

## 2024-01-24 RX ADMIN — FENTANYL CITRATE 50 MCG: 50 INJECTION INTRAMUSCULAR; INTRAVENOUS at 15:25

## 2024-01-24 RX ADMIN — TRANEXAMIC ACID 1950 MG: 650 TABLET ORAL at 07:10

## 2024-01-24 RX ADMIN — ALBUMIN HUMAN: 0.05 INJECTION, SOLUTION INTRAVENOUS at 10:20

## 2024-01-24 RX ADMIN — PROPOFOL 20 MG: 10 INJECTION, EMULSION INTRAVENOUS at 10:03

## 2024-01-24 RX ADMIN — Medication 1 UNITS: at 12:47

## 2024-01-24 RX ADMIN — SODIUM CHLORIDE, POTASSIUM CHLORIDE, SODIUM LACTATE AND CALCIUM CHLORIDE: 600; 310; 30; 20 INJECTION, SOLUTION INTRAVENOUS at 11:05

## 2024-01-24 RX ADMIN — Medication 2 UNITS: at 11:29

## 2024-01-24 RX ADMIN — PHENYLEPHRINE HYDROCHLORIDE 0.3 MCG/KG/MIN: 10 INJECTION INTRAVENOUS at 08:35

## 2024-01-24 RX ADMIN — FENTANYL CITRATE 25 MCG: 50 INJECTION INTRAMUSCULAR; INTRAVENOUS at 12:50

## 2024-01-24 RX ADMIN — PHENYLEPHRINE HYDROCHLORIDE 200 MCG: 10 INJECTION INTRAVENOUS at 10:09

## 2024-01-24 RX ADMIN — HYDROMORPHONE HYDROCHLORIDE 0.3 MG: 1 INJECTION, SOLUTION INTRAMUSCULAR; INTRAVENOUS; SUBCUTANEOUS at 13:44

## 2024-01-24 RX ADMIN — PHENYLEPHRINE HYDROCHLORIDE 300 MCG: 10 INJECTION INTRAVENOUS at 12:56

## 2024-01-24 RX ADMIN — PHENYLEPHRINE HYDROCHLORIDE 100 MCG: 10 INJECTION INTRAVENOUS at 12:22

## 2024-01-24 RX ADMIN — Medication 2 UNITS: at 11:54

## 2024-01-24 RX ADMIN — SODIUM CHLORIDE, POTASSIUM CHLORIDE, SODIUM LACTATE AND CALCIUM CHLORIDE 1000 ML: 600; 310; 30; 20 INJECTION, SOLUTION INTRAVENOUS at 19:45

## 2024-01-24 RX ADMIN — Medication 1 UNITS: at 09:51

## 2024-01-24 RX ADMIN — LIDOCAINE HYDROCHLORIDE 5 ML: 10 INJECTION, SOLUTION INFILTRATION; PERINEURAL at 08:24

## 2024-01-24 RX ADMIN — PHENYLEPHRINE HYDROCHLORIDE 200 MCG: 10 INJECTION INTRAVENOUS at 13:09

## 2024-01-24 RX ADMIN — HYDROMORPHONE HYDROCHLORIDE 0.5 MG: 1 INJECTION, SOLUTION INTRAMUSCULAR; INTRAVENOUS; SUBCUTANEOUS at 09:26

## 2024-01-24 RX ADMIN — Medication 1 UNITS: at 10:09

## 2024-01-24 RX ADMIN — PHENYLEPHRINE HYDROCHLORIDE 50 MCG: 10 INJECTION INTRAVENOUS at 08:24

## 2024-01-24 RX ADMIN — PHENYLEPHRINE HYDROCHLORIDE 100 MCG: 10 INJECTION INTRAVENOUS at 10:24

## 2024-01-24 RX ADMIN — PHENYLEPHRINE HYDROCHLORIDE 100 MCG: 10 INJECTION INTRAVENOUS at 12:47

## 2024-01-24 RX ADMIN — PHENYLEPHRINE HYDROCHLORIDE 100 MCG: 10 INJECTION INTRAVENOUS at 13:19

## 2024-01-24 RX ADMIN — Medication 1 UNITS: at 12:22

## 2024-01-24 RX ADMIN — Medication 1 UNITS: at 11:08

## 2024-01-24 RX ADMIN — DEXAMETHASONE SODIUM PHOSPHATE 10 MG: 10 INJECTION, SOLUTION INTRAMUSCULAR; INTRAVENOUS at 09:15

## 2024-01-24 RX ADMIN — Medication 1 UNITS: at 10:42

## 2024-01-24 RX ADMIN — PHENYLEPHRINE HYDROCHLORIDE 200 MCG: 10 INJECTION INTRAVENOUS at 11:16

## 2024-01-24 RX ADMIN — Medication 1 UNITS: at 12:43

## 2024-01-24 RX ADMIN — SODIUM CHLORIDE, POTASSIUM CHLORIDE, SODIUM LACTATE AND CALCIUM CHLORIDE: 600; 310; 30; 20 INJECTION, SOLUTION INTRAVENOUS at 15:20

## 2024-01-24 RX ADMIN — ROCURONIUM BROMIDE 50 MG: 50 INJECTION, SOLUTION INTRAVENOUS at 08:24

## 2024-01-24 RX ADMIN — PROPOFOL 10 MG: 10 INJECTION, EMULSION INTRAVENOUS at 10:33

## 2024-01-24 RX ADMIN — Medication 1 UNITS: at 10:22

## 2024-01-24 RX ADMIN — BUPIVACAINE HYDROCHLORIDE 30 ML: 2.5 INJECTION, SOLUTION EPIDURAL; INFILTRATION; INTRACAUDAL; PERINEURAL at 07:25

## 2024-01-24 RX ADMIN — SODIUM CHLORIDE, POTASSIUM CHLORIDE, SODIUM LACTATE AND CALCIUM CHLORIDE: 600; 310; 30; 20 INJECTION, SOLUTION INTRAVENOUS at 13:26

## 2024-01-24 RX ADMIN — HYDROMORPHONE HYDROCHLORIDE 0.2 MG: 0.2 INJECTION, SOLUTION INTRAMUSCULAR; INTRAVENOUS; SUBCUTANEOUS at 04:54

## 2024-01-24 RX ADMIN — PHENYLEPHRINE HYDROCHLORIDE 200 MCG: 10 INJECTION INTRAVENOUS at 11:29

## 2024-01-24 RX ADMIN — TRANEXAMIC ACID 1 G: 1 INJECTION, SOLUTION INTRAVENOUS at 09:27

## 2024-01-24 RX ADMIN — CEFAZOLIN SODIUM 2 G: 2 INJECTION, SOLUTION INTRAVENOUS at 21:03

## 2024-01-24 RX ADMIN — PHENYLEPHRINE HYDROCHLORIDE 100 MCG: 10 INJECTION INTRAVENOUS at 09:29

## 2024-01-24 ASSESSMENT — ACTIVITIES OF DAILY LIVING (ADL)
ADLS_ACUITY_SCORE: 41
ADLS_ACUITY_SCORE: 39
ADLS_ACUITY_SCORE: 41
ADLS_ACUITY_SCORE: 39
ADLS_ACUITY_SCORE: 41
ADLS_ACUITY_SCORE: 41
ADLS_ACUITY_SCORE: 39
ADLS_ACUITY_SCORE: 41
ADLS_ACUITY_SCORE: 41

## 2024-01-24 ASSESSMENT — ENCOUNTER SYMPTOMS: DYSRHYTHMIAS: 1

## 2024-01-24 NOTE — ANESTHESIA PREPROCEDURE EVALUATION
Anesthesia Pre-Procedure Evaluation    Patient: Alvin Newton   MRN: [id number] : 5/10/1934        Procedure : Procedure(s):  OPEN REDUCTION INTERNAL FIXATION, FRACTURE, HIP, PERIPROSTHETIC          Past Medical History:   Diagnosis Date    Colonic diverticulum     Hyperlipidemia     Hypertension     Obesity (BMI 30-39.9)     Osteoarthritis     Type 2 diabetes mellitus (H)       Past Surgical History:   Procedure Laterality Date    ARTHROPLASTY HIP BILATERAL      ESOPHAGOSCOPY, GASTROSCOPY, DUODENOSCOPY (EGD), COMBINED N/A 2023    Procedure: ESOPHAGOGASTRODUODENOSCOPY, WITH BIOPSY;  Surgeon: Antolin Cyr DO;  Location: [facility]    ESOPHAGOSCOPY, GASTROSCOPY, DUODENOSCOPY (EGD), DILATATION, COMBINED N/A 2023    Procedure: Esophagoscopy, gastroscopy, duodenoscopy, dilatation, combined;  Surgeon: Antolin Cyr DO;  Location: [facility]    HC ESOPHAGOSCOPY, DIAGNOSTIC      LAPAROSCOPIC CHOLECYSTECTOMY N/A 2017    Procedure: LAPAROSCOPIC CHOLECYSTECTOMY;  LAPAROSCOPIC CHOLECYSTECTOMY;  Surgeon: Heber Gonzalez MD;  Location: [facility]    TRANSRECTAL ULTRASONIC, TRANSURETHRAL RESECTION (TUR) OF PROSTATE CYST        No Known Allergies   Social History     Tobacco Use    Smoking status: Never    Smokeless tobacco: Never   Substance Use Topics    Alcohol use: Yes     Comment: 0-1 beer daily      Wt Readings from Last 1 Encounters:   24 104.8 kg (231 lb)        Anesthesia Evaluation   Pt has had prior anesthetic.     No history of anesthetic complications       ROS/MED HX  ENT/Pulmonary:  - neg pulmonary ROS     Neurologic:  - neg neurologic ROS     Cardiovascular: Comment: MR cardiac 10/2023: 1. LV cavity size is normal. Moderate asymmetric left ventricular hypertrophy with basal anteroseptal   segment measuring 16mm.  Quantitative LVEF 56 %.   2. No MRI evidence of systolic anterior motion of the mitral valve or LVOT obstruction.   3. Marked increase in native  myocardial T1 time and calculated ECV.    4. Nonischemic fibrosis with subepicardial hyperenhancement in the basal inferior and inferoseptal segments   and mid myocardial hyperenhancement in the basal inferolateral segment.   5. RV cavity size is normal. RV systolic function is mildly decreased globally. Calculated RVEF is 35%.   6. Severely restricted aortic valve opening.   7. No significant aortic or pericardial disease is appreciated.       (+)  hypertension- -   -  - -      CHF                  dysrhythmias, a-fib,  valvular problems/murmurs type: AS Severe.    Previous cardiac testing   Echo: Date: 7/14/2023 Results:  Interpretation Summary     The left ventricle is normal in size.  There is moderate concentric left ventricular hypertrophy.Left ventricular  systolic function is mildly reduced.  The visual ejection fraction is 50-55%. No regional wall motion abnormalities  noted.  Severe valvular aortic stenosis. (severe low-flow, low gradient AS with SVI of  only 15 ml/m2). Visually, the AV appears to be severely stenotic.  The calculated aortic valve area is 0.7cm2. The mean AoV pressure gradient is  21mmHg. The peak AoV pressure gradient is 31mmHg.  The right ventricle is mildly dilated. Mildly decreased right ventricular  systolic function  The rhythm was rapid atrial fibrillation. -110 during most of the study.     The study was technically difficult. Compared to the previous study, the AS  appears to be worse and afib with RVR is now present.    Stress Test:  Date: Results:    ECG Reviewed:  Date: Results:    Cath:  Date: Results:      METS/Exercise Tolerance:     Hematologic:  - neg hematologic  ROS     Musculoskeletal:       GI/Hepatic:  - neg GI/hepatic ROS     Renal/Genitourinary:     (+) renal disease, type: CRI,            Endo:     (+)  type II DM,             Obesity,       Psychiatric/Substance Use:  - neg psychiatric ROS     Infectious Disease:  - neg infectious disease ROS     Malignancy:   - neg malignancy ROS     Other:  - neg other ROS    (+)  , H/O Chronic Pain,         Physical Exam    Airway  airway exam normal      Mallampati: II   TM distance: > 3 FB   Neck ROM: full   Mouth opening: > 3 cm    Respiratory Devices and Support         Dental       (+) Modest Abnormalities - crowns, retainers, 1 or 2 missing teeth and Removable bridges or other hardware      Cardiovascular          Rhythm and rate: irregular and normal   (+) murmur       Pulmonary   pulmonary exam normal        breath sounds clear to auscultation           OUTSIDE LABS:  CBC:   Lab Results   Component Value Date    WBC 7.7 01/24/2024    WBC 6.9 01/23/2024    HGB 13.2 (L) 01/24/2024    HGB 13.1 (L) 01/23/2024    HCT 40.3 01/24/2024    HCT 39.6 (L) 01/23/2024     01/24/2024     01/23/2024     BMP:   Lab Results   Component Value Date     01/24/2024     01/23/2024    POTASSIUM 4.1 01/24/2024    POTASSIUM 4.3 01/23/2024    CHLORIDE 104 01/24/2024    CHLORIDE 107 01/23/2024    CO2 31 (H) 01/24/2024    CO2 28 01/23/2024    BUN 30.9 (H) 01/24/2024    BUN 27.6 (H) 01/23/2024    CR 1.72 (H) 01/24/2024    CR 1.41 (H) 01/23/2024     (H) 01/24/2024     (H) 01/24/2024     COAGS:   Lab Results   Component Value Date    PTT 35 01/22/2024    INR 1.69 (H) 01/22/2024     POC:   Lab Results   Component Value Date     (H) 12/31/2017     HEPATIC:   Lab Results   Component Value Date    ALBUMIN 2.6 (L) 08/19/2023    PROTTOTAL 5.5 (L) 08/19/2023    ALT 14 08/19/2023    AST 14 08/19/2023    ALKPHOS 122 08/19/2023    BILITOTAL 1.3 (H) 08/19/2023     OTHER:   Lab Results   Component Value Date    LACT 1.4 08/18/2023    A1C 6.8 (H) 01/22/2024    JERRY 9.2 01/24/2024    PHOS 2.8 08/23/2023    MAG 1.6 (L) 08/31/2023    LIPASE 22 07/23/2023    TSH 1.54 07/14/2023    SED 32 (H) 07/23/2023       Anesthesia Plan    ASA Status:  3    NPO Status:  NPO Appropriate    Anesthesia Type: General.     - Airway: ETT    Induction: Intravenous.   Maintenance: Balanced.   Techniques and Equipment:     - Lines/Monitors: 2nd IV, Arterial Line     Consents    Anesthesia Plan(s) and associated risks, benefits, and realistic alternatives discussed. Questions answered and patient/representative(s) expressed understanding.     - Discussed: Risks, Benefits and Alternatives for BOTH SEDATION and the PROCEDURE were discussed     - Discussed with:  Patient      - Extended Intubation/Ventilatory Support Discussed: Yes.      - Patient is DNR/DNI Status: Yes             Suspend during perioperative period? Yes.             Agree to: chemical resuscitation, Other (No CPR - OK w/ medications and defibrillation/cardioversion).        Postoperative Care    Pain management: IV analgesics, Oral pain medications, Peripheral nerve block (Single Shot).   PONV prophylaxis: Ondansetron (or other 5HT-3), Dexamethasone or Solumedrol     Comments:    Other Comments: Pre-induction arterial line  Etomidate for induction  Phenylephrine gtt in line. Vaso + Epi available.           Reyes Pina MD    I have reviewed the pertinent notes and labs in the chart from the past 30 days and (re)examined the patient.  Any updates or changes from those notes are reflected in this note.

## 2024-01-24 NOTE — CONSULTS
Critical Care Progress Note  1/24/2024   4:04 PM    Admit Date: 1/22/2024  ICU Admission Day: 1/24/2024   Code Status: DNR      Problem List:   Principal Problem:    Hip fracture, left, closed, initial encounter (H)         Plan by System:     Neuro/Psych: Acute pain - left hip fx now s/p left ORIF. Fall at home leading to fx. Hx of mild cognitive impairment, possible neurocognitive disorder. Hx of visual disturbances and delirium on previous hospitalizations.    - PRN dilaudid available for severe pain    - avoid benzos    - delirium prevention as best we can; cluster cares at night, active during the days. Try to move out of ICU as soon as safe.    - patient is [place]; speak loudly, slowly, clearly. Use hearing aids.     Pulmonary: no acute issues    - Doing well on 2L NC   - Encourage pulmonary toileting - IS/flutter/activity     CV: hypotension - post surgical, blood loss, hypovolemia. Hx of severe low-flow low gradient AoStenosis, afib, HFpEF. Being worked up for amyloid heart disease    -  phenylephrine for MAP goal 65   - Careful fluid admin with hx AoStenosis.    - resume diuretics in AM    - will need central line if pressor needs surpass 24-hours.    - Eliquis on hold    - Amiodarone and metoprolol in AM     GI/: no acute issues   - Diet: clears   - Last BM: pta    - Bowel meds: miralax    Renal: DEJUAN - improving    - strict I/O    - careful fluid admin. Resume diuretics in AM.     ID: post op infection precautions   - periop cefazolin     Heme/Coag:  acute blood loss anemia - EBL  3L   - DVT proph: eliquis on hold     Endocrine: T2DM  -  SSI    Family: updated at bedside      Clinically Significant Risk Factors                # Coagulation Defect: INR = 1.81 (Ref range: 0.85 - 1.15) and/or PTT = 33 Seconds (Ref range: 22 - 38 Seconds), will monitor for bleeding    # Hypertension: Noted on problem list  # Chronic heart failure with preserved ejection fraction: heart failure noted on problem list and last  "echo with EF >50%      # DMII: A1C = 6.8 % (Ref range: <5.7 %) within past 6 months, PRESENT ON ADMISSION  # Obesity: Estimated body mass index is 32.22 kg/m  as calculated from the following:    Height as of this encounter: 1.803 m (5' 11\").    Weight as of this encounter: 104.8 kg (231 lb)., PRESENT ON ADMISSION           Code Status: No CPR- Pre-arrest intubation OK           Dispo: ICU for pressors     Bria Loepz, [facility] Pulmonary/Critical Care    Total critical care time: 32-minutes  I have personally provided 32 minutes of critical care time exclusive of time spent on separately billable procedures    _______________________________________________________________    HPI: 89 year old man with history of severe aortic stenosis, afib, HFpEF, mild cognitive impairment, DEJUAN. Admitted 1/22 after he fell and fractured his left femur at home. Underwent ORIF today with ortho. Large EBL of 3-liters and developed some hypotension post operatively requiring addition of phenylephrine. He was admitted to ICU for further monitoring.     ROS: says he hurts \"everywhere\"; no shortness of breath or chest pain, no nausea.     Events over last 24-hours: as above    Objective:     Vitals:    01/24/24 1430 01/24/24 1435 01/24/24 1445 01/24/24 1525   BP: 128/55      BP Location: Left arm      Pulse: 89 97  85   Resp: 17 17  14   Temp:    98  F (36.7  C)   TempSrc:    Oral   SpO2: 100% 100% 99% 99%   Weight:       Height:              I/O:   Intake/Output Summary (Last 24 hours) at 1/24/2024 1604  Last data filed at 1/24/2024 1520  Gross per 24 hour   Intake 4540 ml   Output 4050 ml   Net 490 ml     Wt Readings from Last 3 Encounters:   01/22/24 104.8 kg (231 lb)   09/05/23 99.3 kg (219 lb)   08/28/23 101.6 kg (224 lb)      Weight change:     Physical Exam:  Gen: no distress  HEENMT: AT/NC  NEURO: hard of hearing, otherwise nonfocal. Moving all extremities.   CARDIOVASCULAR: IRR S1S2 harsh systolic " murmur  PULMONARY: unlabored on NC, lungs are clear and equal.   GASTROINTESTINAL: soft ntnd  INTEGUMENT: visible skin intact. Left leg dressing dry intact  PSYCH: calm    Labs:   Recent Labs   Lab 01/24/24  1437      CO2 22   BUN 30.2*       Recent Labs   Lab 01/24/24  1437   WBC 13.4*   HGB 9.4*   HCT 28.6*          Micro:   None this admission    Imaging: all imaging personalized reviewed         Bria Lopez, CNP  [facility] Pulmonary/Critical Care

## 2024-01-24 NOTE — ANESTHESIA PROCEDURE NOTES
"Fascia iliaca Procedure Note    Pre-Procedure   Staff -        Anesthesiologist:  Reyes Pina MD       Performed By: anesthesiologist       Location: pre-op       Procedure Start/Stop Times: 1/24/2024 7:24 AM and 1/24/2024 7:28 AM       Pre-Anesthestic Checklist: patient identified, IV checked, site marked, risks and benefits discussed, informed consent, monitors and equipment checked, pre-op evaluation, at physician/surgeon's request and post-op pain management  Timeout:       Correct Patient: Yes        Correct Procedure: Yes        Correct Site: Yes        Correct Position: Yes        Correct Laterality: Yes        Site Marked: Yes  Procedure Documentation  Procedure: Fascia iliaca       Diagnosis: POST OP PAIN CONTROL       Laterality: left       Patient Position: supine       Skin prep: Chloraprep       Needle Type: short bevel       Needle Gauge: 21.        Needle Length (millimeters): 110        Ultrasound guided       1. Ultrasound was used to identify targeted nerve, plexus, vascular marker, or fascial plane and place a needle adjacent to it in real-time.       2. Ultrasound was used to visualize the spread of anesthetic in close proximity to the above referenced structure.       3. A permanent image is entered into the patient's record.    Assessment/Narrative         The placement was negative for: blood aspirated, painful injection and site bleeding       Paresthesias: No.       Bolus given via needle..        Secured via.        Insertion/Infusion Method: Single Shot       Complications: none    Medication(s) Administered   Bupivacaine 0.25% PF (Infiltration) - Infiltration   30 mL - 1/24/2024 7:25:00 AM  Medication Administration Time: 1/24/2024 7:24 AM      FOR [facility] [place] [place]:   Pain Team Contact information: please page the Pain Team Via [employer]. Search \"Pain\". During daytime hours, please page the attending first. At night please page the resident first.      "

## 2024-01-24 NOTE — ANESTHESIA PROCEDURE NOTES
Airway       Patient location during procedure: OR       Procedure Start/Stop Times: 1/24/2024 8:27 AM  Staff -        CRNA: Bria Elkins APRN CRNA       Performed By: CRNAIndications and Patient Condition       Indications for airway management: paola-procedural and airway protection       Induction type:intravenous       Mask difficulty assessment: 2 - vent by mask + OA or adjuvant +/- NMBA    Final Airway Details       Final airway type: endotracheal airway       Successful airway: ETT - single  Endotracheal Airway Details        ETT size (mm): 8.0       Cuffed: yes       Cuff volume (mL): 10       Successful intubation technique: video laryngoscopy       VL Blade Size: MAC 4       Grade View of Cords: 1       Adjucts: stylet       Position: Right       Measured from: lips       Secured at (cm): 24       Bite block used: None    Post intubation assessment        Placement verified by: capnometry, equal breath sounds and chest rise        Number of attempts at approach: 1       Secured with: tape       Ease of procedure: easy       Dentition: Intact    Medication(s) Administered   Medication Administration Time: 1/24/2024 8:27 AM

## 2024-01-24 NOTE — ANESTHESIA POSTPROCEDURE EVALUATION
Patient: Alvin Newton    Procedure: Procedure(s):  OPEN REDUCTION INTERNAL FIXATION, FRACTURE, HIP, PERIPROSTHETIC  REVISION HIP, TOTAL ARTHROPLASTY       Anesthesia Type:  General    Note:  Disposition: Inpatient; ICU            ICU Sign Out: Anesthesiologist/ICU physician sign out WAS performed   Postop Pain Control: Uneventful            Sign Out: Well controlled pain   PONV: No   Neuro/Psych: Uneventful            Sign Out: Acceptable/Baseline neuro status   Airway/Respiratory: Uneventful            Sign Out: Acceptable/Baseline resp. status   CV/Hemodynamics: Uneventful            Sign Out: Acceptable CV status; No obvious fluid overload (Ongoing pressor reuirements)   Other NRE: NONE   DID A NON-ROUTINE EVENT OCCUR? No    Event details/Postop Comments:  Patient with ongoing pressor requirements, improving with resuscitation. Discussed w/ surgeon Dr. Ramos and given significant intraoperative blood loss (>1.5L), severe aortic stenosis, and ongoing pressor requirements (0.8mcg/kg/min --> 0.5 mcg/kg/min at time of signout), will plan for admission to ICU. Case discussed w/ admitting ICU MD.            Last vitals:  Vitals Value Taken Time   /55 01/24/24 1430   Temp     Pulse 92 01/24/24 1437   Resp 16 01/24/24 1437   SpO2 99 % 01/24/24 1445   Vitals shown include unfiled device data.    Electronically Signed By: Reyes Pina MD  January 24, 2024  3:22 PM

## 2024-01-24 NOTE — PLAN OF CARE
"The patient is receiving Tylenol and Robaxin for pain control. Ice pack applied to left thigh. Bedrest with frequent turns. Heels elevated on pillows, Mepilex on heels for skin protection.  Lungs are clear, using incentive spirometer, reached 2500ml. Heparin injection given as ordered. SCD's in place. /64 (BP Location: Left arm)   Pulse 80   Temp 97.4  F (36.3  C) (Oral)   Resp 18   Ht 1.803 m (5' 11\")   Wt 104.8 kg (231 lb)   SpO2 98%   BMI 32.22 kg/m       Goal Outcome Evaluation:    Problem: Adult Inpatient Plan of Care  Goal: Absence of Hospital-Acquired Illness or Injury  Intervention: Identify and Manage Fall Risk  Recent Flowsheet Documentation  Taken 1/23/2024 1601 by Beckie Bauman RN  Safety Promotion/Fall Prevention: increased rounding and observation  Intervention: Prevent Skin Injury  Recent Flowsheet Documentation  Taken 1/23/2024 1601 by Beckie Bauman RN  Body Position: heels elevated  Intervention: Prevent and Manage VTE (Venous Thromboembolism) Risk  Recent Flowsheet Documentation  Taken 1/23/2024 1601 by Beckie Bauman RN  VTE Prevention/Management: SCDs (sequential compression devices) on  Goal: Optimal Comfort and Wellbeing  Intervention: Monitor Pain and Promote Comfort  Recent Flowsheet Documentation  Taken 1/23/2024 1909 by Beckie Bauman RN  Pain Management Interventions:   medication (see MAR)   emotional support   cold applied   pillow support provided  Taken 1/23/2024 1554 by Beckie Bauman RN  Pain Management Interventions:   medication (see MAR)   cold applied   environmental changes   pillow support provided     Problem: Risk for Delirium  Goal: Improved Behavioral Control  Intervention: Minimize Safety Risk  Recent Flowsheet Documentation  Taken 1/23/2024 1601 by Beckie Bauman RN  Communication Enhancement Strategies:   extra time allowed for response   communication board used  Enhanced Safety Measures: pain management  Goal: Improved Attention and Thought " Clarity  Intervention: Maximize Cognitive Function  Recent Flowsheet Documentation  Taken 1/23/2024 1601 by Beckie Bauman, RN  Sensory Stimulation Regulation:   care clustered   television on  Reorientation Measures: clock in view

## 2024-01-24 NOTE — PROGRESS NOTES
Orthopedic Surgery progress note:  Alvin is doing well this morning.  He reports that his left leg is not terribly bothersome for him at this time without movement.  He is ready for surgical intervention.    I spoke with his wife Rosie, daughters Fuad and Sabine this morning regarding surgical intervention.  Reviewed that we will fix his fracture, however the decision about whether we retain or revise his stem will be made intraoperatively.  I also explained to them that I will evaluate his acetabular component as this will require a head and liner exchange regardless, and if there is any concern I will remove this as well.  They were understanding of the treatment plan.      PE  89M in good spirits, alert and oriented.     LLE  Leg shortened, externally rotated. Fires GSC, EHL, TA. SILT SP, DP, TN  2+ DP          H&H: 13.2&40.3  Cr: 1.72      89M with left periprosthetic femur fracture in the setting of eccentric polyethylene wear that will undergo ORIF L femur with head/liner exchange, possible single vs two component revision.       Preoperatively, the nature of the procedure, risks and benefits, as well as alternatives including nonsurgical management were discussed in detail with the patient. I reviewed and discussed the patient's condition and relevant images with the patient and his family. We discussed options for further evaluation and treatment, including conservative non-operative management versus surgical intervention.      From a conservative standpoint, the patient can pursue non-operative management, which would include nonweightbearing, and close observation.  It would be nearly impossible to immobilize this fracture adequately to allow for healing and we explained that this is not a viable option given the high likelihood of increased morbidity with bedrest that could result in bedsores, pneumonia, and worsening multiorgan dysfunction without a reasonable likelihood of fracture union.    From a  surgical standpoint, we discussed open reduction and internal fixation with head and liner exchange, possible total hip arthroplasty revision.  I explained to Alvin and his family that we will fix the fracture either way, however our decision making regarding the stem will be dependent on the stability of the component when stressed intraoperatively.  I explained to them that should the stem be stable we will plan for open reduction and internal fixation of the fracture.  Should the stem be loose we will remove it, revise the fixation with the appropriate taper fluted stems, and fix the fracture around this.  Additionally, we reviewed the acetabular component.  I explained to them that at this point it does appear well-fixed however I will stress this intraoperatively as well.  This will require a liner exchange at the minimum given the eccentric wear however should the component demonstrate any new concerning features we will revise this as well.      We also discussed at length the risks and benefits of open reduction and internal fixation with possible revision total hip arthroplasty surgery. Our discussion included but was not limited to the risk of pain, bleeding, infection, blood clots (DVT, PE), wound issues, continued chronic pain in the hip, post-operative joint stiffness, painful arc of motion, difficulty with ambulation, iatrogenic fracture, damage to nearby neurovascular structures, hip instability/dislocation, allergic reaction to implanted components, implant wear, loosening and/or failure requiring revision, and possible post-operative leg length discrepancy (apparent or actual). The possibility of intra-operative and/or post-operative medical complications such as anesthesia complications or reactions, respiratory and cardiovascular events, stroke, heart attack and/or death were also discussed. In the case of infection of the joint, the patient understands that this will require prolonged IV  antibiotic therapy and possible multiple operative procedures in the future.     The patient demonstrated an understanding of these risks as well as the potential benefits of  surgery which would include possible improvement in pain, range of motion, and early ambulation. The patient and his family demonstrated an understanding of the indications of surgery and all possible complications, and together we have decided to proceed with surgery. Specific details of the surgical procedure, hospitalization, recovery, rehabilitation, and long-term precautions were also presented. The final choices will be made at the time of procedure to match the anatomy and condition of the bone, ligaments, tendons, and muscles. All of patients questions were answered preoperatively at which time informed consent was taken.      Plan:  OR this morning for ORIF L periprosthetic fracture, head and liner exchange, and possible two component exchange  Maintain NPO        Aurelio Ramos MD  [facility]

## 2024-01-24 NOTE — OP NOTE
Operative Report    PATIENT Alvin Newton   DATE OF SURGERY:  1/22/2024 - 1/24/2024      PREOPERATIVE DIAGNOSIS   Left Rembert B2 periprosthetic fracture same  Eccentric polyethylene wear     POSTOPERATIVE DIAGNOSIS   Same    PROCEDURE PERFORMED   Revision total hip arthroplasty, both components  Open reduction internal fixation left periprosthetic femur fracture    IMPLANTS  Implant Name Type Inv. Item Serial No.  Lot No. LRB No. Used Action   femoral stem      Left 1 Explanted   acetabular liner      Left 1 Explanted   IMP CABLE W/CRIMP SYN 1.0B191OP 298.801.01S - [id number] Metallic Hardware/[place] IMP CABLE W/CRIMP SYN [id number] 298.801.01S  [facility] [employer] [id number] Left 1 Implanted   IMP CABLE W/CRIMP SYN 1.4Q916CG 298.801.01S - [id number] Metallic Hardware/[place] IMP CABLE W/CRIMP SYN 1.7F499LR 298.801.01S  [facility] [employer] [id number] Left 1 Implanted   IMP CABLE W/CRIMP SYN 1.6Z499AU 298.801.01S - [id number] Metallic Hardware/[place] IMP CABLE W/CRIMP SYN 1.4Q769LH 298.801.01S  [facility] [employer] [id number] Left 1 Implanted   IMP CABLE W/CRIMP SYN 1.1N806BN 298.801.01S - [id number] Metallic Hardware/[place] IMP CABLE W/CRIMP SYN 1.6Q126MJ 298.801.01S  [facility] [employer] [id number] Left 1 Implanted   IMP CABLE W/CRIMP SYN 1.6U124CL 298.801.01S - [id number] Metallic Hardware/[place] IMP CABLE W/CRIMP SYN 1.6P259SA 298.801.01S  [facility] [employer] [id number] Left 1 Implanted   IMP STEM FEM RSTRTN MOD CONICAL DISTAL [id number] - [id number] Total Joint Component/Insert IMP STEM FEM RSTRTN MOD CONICAL DISTAL 76C190AU  [employer] [id number] Left 1 Implanted   INSERT ACE 36MM 0D F HIP X3 TRDNT 723-00-36F - [id number] Total Joint Component/Insert INSERT ACE 36MM 0D F HIP X3 TRDNT 723-00-36F  [employer] [id number] Left 1 Implanted   BONE CEMENT SIMPLEX FULL DOSE 6191-1-001 - [id number] Cement, Bone BONE CEMENT SIMPLEX FULL DOSE 6191-1-001  [employer] [id number] Left 1 Implanted   BONE CEMENT SIMPLEX FULL DOSE  6191-1-001 - [id number] Cement, Bone BONE CEMENT SIMPLEX FULL DOSE 6191-1-001  [employer] [id number] Left 1 Implanted   IMP STEM FEM MOD REV HIP PROX BODY/BOLT STD 23MM 6276-1-023 - [id number] Total Joint Component/Insert IMP STEM FEM MOD REV HIP PROX BODY/BOLT STD 23MM 6276-1-023  [employer] [id number] Left 1 Implanted   IMP HEAD FEMORAL STRK LFIT V40 36MM +0 6260-9-136 - [id number] Total Joint Component/Insert IMP HEAD FEMORAL STRK LFIT V40 36MM +0 6260-9-136  [employer] [id number] Left 1 Implanted   femoral head      Left 1 Explanted       SURGEON  Aurelio Ramos MD     ASSISTANT   An Trivedi PA-C; assistant was required for patient positioning, surgical assistance, wound closure and monitoring patient's safety throughout the case.    ANESTHESIA  General      FINDINGS:  Leblanc B2 periprosthetic fracture  Significant pitting and eccentric wear of the polyethylene  Synovial staining secondary to polyethylene wear  Deep branch of the profunda hit during the passing of the distal prophylactic cable.  Pressure and packing applied.  Vascular surgery intraoperatively consulted.  Bleeding controlled with pressure, no intervention per vascular     SPECIMENS:  none    ESTIMATED BLOOD LOSS:  1500 cc    COMPLICATIONS   None.        INDICATION FOR PROCEDURE  Alvin Newton is a 89 year old male with a past medical history notable for mild cognitive impairment, aortic stenosis, type 2 diabetes, atrial fibrillation on Eliquis, heart failure with preserved ejection fraction presumed to be secondary to infiltrative cardiomyopathy with workup ongoing that sustained a left periprosthetic fracture following a ground-level fall.  Alvin was seen in the emergency department on 1/22/2024 during which time it was discussed with him that he had sustained a periprosthetic fracture that would require intervention.  His last dose of Eliquis was 1/21 in the evening.  We discussed the imaging, and options available to him.   I did explain that he would require open reduction and internal fixation of his fracture however whether or not we did a revision total hip arthroplasty would be dependent on the stability of the stem.  We would perform a head&liner exchange given the eccentric wear noted on the radiographs.  After discussing the risk, benefits, and alternatives he and his family elected to proceed with surgical intervention.    PREOPERATIVE EXAMINATION:   89-year-old male in no acute distress    Focused examination of the left lower extremity demonstrates shortened and externally rotated limb.  There is a well-healed posterior incision.  He fires his gastrosoleus,'s, EHL and tibialis anterior.  Sensation is intact to light touch in the supraspinal, deep peroneal tibial nerve distributions.  Palpable DP pulse    INFORMED CONSENT  Alvin Newton was identified in the preoperative holding area and was identified using medical record number, name, and date of birth, all of which were confirmed. The operative extremity was marked using an indelible marker. Once again, all risks and benefits as well as alternatives to surgical intervention were discussed with the patient and his family in detail and all their questions were answered. Risks discussed included but were not limited to:  persistent pain, infection, bleeding, scarring, stiffness, thromboembolic events, fracture, malalignment/malrotation, malunion/nonunion, implant complications, severe limb dysfunction, loss of limb, and loss of life. Signed informed consent was obtained and they wished to proceed with surgery as scheduled.     DESCRIPTION OF PROCEDURE   Alvin Newton was brought back to the operating room.  General Anesthesia was achieved without difficulty.  The patient was then transferred to the OR table.  All bony prominences were well-padded. The patient was then prepped and draped in the usual sterile fashion.    A timeout was performed prior to the procedure.  Three  separate staff members confirmed the patient's name, correct site and side of surgery and procedure being performed.  Antibiotics including vancomycin and cefazolin were confirmed to be given prior to incision.  TXA was administered.    We began utilizing the previous posterior based incision.  We used a combination of sharp dissection as well as Bovie electrocautery to ensure hemostasis as we made our way down.  Once we got down to the fascial level we did elevate flaps and extend our incision distally as  we would need this visualization for fixation of the fracture.  I went ahead and incised the fascia and noted that there was significant bruising within the bursa consistent with his injury.  There was a significant amount of fracture hematoma that was evacuated.  At this time I went ahead and started with my posterior approach to the hip joint staying just posterior to the vastus lateralis and extended this underneath the abductors proximally until I had a nice sleeve of tissue that I could repair at the end.  I made my way down into the capsular layer.  At this time became apparent that there was significant polyethylene wear as we could see the plastic liner was worn eccentrically at the superolateral portion with pitting here with associated significant synovial staining.  At this time I tagged my capsular leaflet and went performed a controlled dislocation.  I was able to get the femur out of the wound.  I was able to better evaluate the fracture site at this time.  It appeared that this was a primarily three-part fracture.  There was a greater trochanter piece, a medial lesser trochanteric fragment, and then there was the distal stem piece. Following this I went ahead and turned my attention to stem integrity examination.  I was able to disimpact the head but at this time I noticed that the stem appeared to be moving freely.  I was able to pull this out by hand.  At this time became quite apparent that  revision fixation would be necessary.  Given this I went ahead and first turned my attention to the liner as I wanted to have an idea what we would have here.  The acetabulum was explosed circumferentially utilizing retractors anteriorly, inferiorly and posteriorly.  I removed all synovial tissue that had staining consistent with polyethylene wear.  I cleaned off the edges of my cup to visualize the plastic/metal interface, and was able to remove the liner.  At this time it became apparent that this was a monoblock implant.  Given this I knew at this point that I would not be able to reimplant a liner but would rather have to cement one in.  After I removed the liner,I tested the stability of this shell.  I placed 2 Kocher's on and pulled trying to assess for any weakness/failure. Nothing moved.  The entire pelvis moved as a unit.  Given this I felt that the shell remained well-fixed.  At this time having preliminary identified our plan for acetabular component we turned our attention back to the femoral fracture.  I made my way down elevating the lateral muscular aspect of the femur using a subvastus approach while maintaining the anterosuperior attachment.  I took this down past the fracture site as I new would have to place to prophylactic cables.  Evaluation of the preoperative imaging did demonstrate quite a significant mount of hypertrophic bone. Given this I knew there would be some abnormality in this region.  I did come across this and it appeared rather benign without any concerning feature.  At this time I turned my attention to passing my prophylactic cables.  I passed the  first prophylactic cable distal to my fracture line.  I passed this nicely and had no issues tightening this and provisionally holding tension.  Following this I did want to place 1 more cable to his I knew I would be reaming and did not want to stress his bone distally.  I went ahead and placed blunt retractors on the anterolateral  aspect of the femur.  I did notice that I was in the center of the hypertrophic bone region and there was some altered anatomy here as there was a large amount of scar along the posterior aspect of the femur.  I went ahead and utilized the cable passer staying hard on bone and when I passed it through the tissue on the posterior aspect it became apparent that there was a vessel that had been hit.  It was tough to discern whether this was on the anterior or posterior aspect given the amount of blood.  I do feel this was a  and given that I went ahead and explored the leash.  I was able to find 1 vessel and coagulate it.  I could not identify the other end which had either retracted into the medial or posterior aspect.  At this time given that I could not adequately visualize it and I had lost about 750 mL in a short time,of blood I went ahead and packed the wound and held pressure.  I called vascular surgery to come in.  They arrived approximately 5 minutes later at which point I had held pressure for 5 minutes. The patient remained stable this entire time. Evaluation of the wound upon removal of the lap sponges at this time demonstrated no obvious bleeding or concerning feature. They did not feel this needed formal exploration, and stated that there was nothing to tie off or go after.  They recommended just placing thrombin foam gel at the end of the case as well as placing a lap sponge throughout the remainder of the case. We went ahead and passed a cable at this time in this region and provisionally tightened it.  There was no apparent bleeding.  Following this we went ahead and turned our attention to the fracture site more proximal.  Given that there were 3 pieces I wanted to address each fragment individually.  I went ahead and focused on my main greater trochanter fracture fragment and wanted to reattach this to the shaft as I had a good read on the lateral cortex.  I was able to do this and  provisionally held this with a cable.  I went ahead and had my assistant pull traction and external rotation which allow me to key this in nicely.  I tightened the cable provisionally.  I took an x-ray at this time and was pleased with the appearance.  Following this I went ahead and addressed my calcar piece.  I performed a similar maneuver for reduction and was able to keep this in nicely and placed a cable in this region as well.  I once again took a x-ray at this time was pleased with the appearance.  At this time we turned our attention to femoral preparation.  I did feel that there was a significant amount of osteolysis within the greater trochanter from the polyethylene wear that I now noticed with the femur dislocated, internally rotated and flexed.  Given this I did not want to stress this region with my reaming and subsequent preparation.  Therefore I went ahead and placed 1 more cable proximally.  I did this by utilizing a drill hole through the lesser trochanter which I placed a wire through.  I did this because there was no calcar truly left to anchor the cable on and I wanted to make sure it was not rubbing against the implant.  I was able to pass this around the greater trochanter and this offered me protection during my preparation.  I went ahead and used the canal finder and reamed according to manufacture technique guide.  I was able to get up into a 21 mm reamer which had significant endosteal contact and chatter with cortical bone and appropriate scratch fit.  I went ahead and left my reamer in place and took another x-ray.  I verified I was in a center center position.  Pleased with this we went ahead and inserted the 21 mm x 155 length distal body.  Following this we turned our attention back to the proximal portion.  I did want to ensure I would give him some length and offset given this was a revision and there was significant polyethylene wear therefore I did not want to jeopardize his  stability moving forward.  I went ahead and reamed up to the 23 standard body which offered me 6 mm of additional offset.  At this time we went ahead and placed a -5 head and trialed this with a trial liner.  Given the fact that this was a monoblock the liner trial did not have anything to sit into and would rotate.  At this time I made the decision that we would proceed with cementation of a 36 mm liner as I could get a 36 mm head with my restoration modular.  I went ahead and utilized a bur to roughen up the metal surface of the acetabulum and did the same on the back of the plastic.  I selected a neutral 36F, and I was able to cement this in nicely.  I cemented this with a little less abduction and more anteversion.  I also did build up some offset with this.  I allowed this to harden.  Once this was harden I turned my attention back to trialing.  I placed the -2.5 36 mm head and was able to reduce this.  I did notice that during trialing I had quite significant amount of anterior impingement as I was dislocating at about 40 degrees of flexion when the hip was flexed to 90 degrees.  I was not satisfied with this.  I went ahead and removed some of the anterior capsular scar as I could see this was clearly impinging.  I retrialed with a standard 36 head and was able to obtain excellent stability up until 80 degrees of internal rotation at 90 degrees of flexion.  I was able to dislocate this with a bone hook.  I was happy with this.  I dislocated the stem at this point and selected the 23 real implant body which I impacted and secured with the appropriate screw with the appropriate anteversion.  I went ahead and placed the standard 36 head and this was reduced.  At this time we took a final x-ray before final crimping all of our cables.  I was pleased with the appearance.  We went ahead and copiously irrigated the wound with 3 L of saline followed by Betadine 3-minute wash.  This was washed out with saline followed  by Irrisept.  We next turned our attention to closure.  The capsular layer was reapproximated utilizing Ethibond suture.  The distal septum between the vastus lateralis and the posterior aspect was closed with #1 Vicryl in interrupted figure-of-eight's followed by an Ethibond suture.  The vastus origin along the ridge was closed with #1 Vicryl.  Following this we performed another irrigation and placed 2 g of vancomycin within the wound.  We turned our attention to fascial closure at this time and closed this with interrupted #1 Vicryl's.  This was oversewn with strata fix suture.  Following this we turned our attention to the superficial layer which was once again washed out with Irrisept.  We then closed this with interrupted 2-0 Vicryl sutures.  This was oversewn with 3-0 Monocryl in a subcuticular running fashion.  A Prevena wound VAC was placed.      At the conclusion of our case, our counts were correct and the patient was awakened from anesthesia in stable condition taken to the recovery area.    POSTOPERATIVE EXAM:  Resting in PACU. Hemodynamically stable, confused, but following commands    Prevena holding suction  LLE   Fires GSC, EHL, TA  SILT SP, DP, TN  2+ DP    Imaging demonstrates acceptable implant position without complicaiton    POSTOPERATIVE PLAN:  Return to medicine  Foot flat weightbearing with a walker at all times.  No hip precautions.  Perioperative cefazolin.  7 days of Duricef.  Postop femur and hip x-rays to be obtained in the recovery area  Eliquis 5 mg twice daily to resume tomorrow, okay from orthopedic standpoint  P.o. pain medication, IV for breakthrough  Maintain Prevena in place until 2-week follow-up  Appreciate excellent cares of the medicine service, please continue to optimize glucose control in the perioperative period  Patient will recover in ICU following discussion with anesthesia. Patient is stable, however given history of aoritic stenosis and EBL, observation in ICU is  deemed more appropriate post-operatively than general care      Aurelio Ramos MD   [facility]

## 2024-01-24 NOTE — PROGRESS NOTES
"[facility]    Medicine Progress Note - Hospitalist Service    Date of Admission:  1/22/2024    Assessment & Plan      Alvin Newton is a 89 year old male admitted on 1/22/2024. He has a history of pAF on Eliquis, bilateral hip replacements (33 years ago), HFpEF, T2DM, hypertension, severe aortic stenosis, possible amyloidosis (work-up in process), mild cognitive impairment and is admitted for left femur fracture.     Left femur fracture  Mechanical fall   Chronic anticoagulation (Eliquis)  Presented to ED 1/22 for a mechanical fall at home. Tried to reach for his cane for stability but put his weight on his \"grabber device\" instead and fell forward. Reports he did not hit his head or lose consciousness. CT head with no acute abnormality. XR pelvis/left hip revealed a left proximal femoral fracture. History of bilateral hip replacements about 33 years ago. On Eliquis for atrial fibrillation, most recent dose 1/21 PM.   - Orthopedic surgery consulted, appreciate recommendations    - Plan for OR this morning  - Hold Eliquis   - Daily BMP, CBC  - Pain control: scheduled tylenol, PRN PO oxycodone, PRN IV Dilaudid   - Will not plan for repeat head CT at this time as patient states he did not hit his head when he fell and no signs of head trauma. If patient becomes acutely confused or has any neurologic deficits, recommend STAT head CT.       At risk for delirium  History of hospital-induced delirium per family. Known mild cognitive impairment at baseline. Discussed strategies to help prevent delirium with family - including continuing to reorient patient to place/time, maintaining a normal sleep cycle, time with family at bedside.   - Scheduled Tylenol   - Cluster overnight cares as able to maximize nighttime sleep   - Continue to reorient patient to place/time    Paroxysmal atrial fibrillation  History of pAF on Eliquis. Most recent dose of Eliquis was 1/21 PM. INR 1.69 on admission. Follows with [facility].   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 Cardiac MRI in October concerning for infiltrative cardiomyopathy. Work-up for amyloidosis in process by cardiology and [facility]. Biopsies of bone marrow and fat pad negative for AL amyloid. Per chart review, plan at this time is to follow-up with cardiology regarding possibly myocardial biopsy and continued amyloid/infiltrative cardiomyopathy work-up.   - PTA metoprolol, Lipitor, torsemide   - Follow-up with cardiologist as scheduled     Type 2 diabetes mellitus   History of T2DM without long-term use of insulin. PTA medications: none. Most recent A1C 6.8% on admission.   - POC glucose checks QID   - Medium resistance SSI     DEJUAN on CKD 3a  Creatinine 1.34 on admission. Unclear baseline per chart review - Cr had been 1.0-1.2 summer 2023, but more recently 1.4-1.6 in cardiology clinic in December. Will continue to monitor. Cr bump to 1.74 this morning -- possibly in setting of NPO status overnight.   - Daily BMP   - Avoid nephrotoxins    Depression/Anxiety  - PTA Celexa    GERD  - PTA Pepcid            Diet: NPO per Anesthesia Guidelines for Procedure/Surgery Except for: Meds    DVT Prophylaxis: On hold for OR this morning  Eastman Catheter: Not present  Lines: PRESENT           Cardiac Monitoring: None  Code Status: No CPR- Pre-arrest intubation OK      Clinically Significant Risk Factors               # Coagulation Defect: INR = 1.69 (Ref range: 0.85 - 1.15) and/or PTT = 35 Seconds (Ref range: 22 - 38 Seconds), will monitor for bleeding    # Hypertension: Noted on problem list  # Chronic heart failure with preserved ejection fraction: heart failure noted on problem list and last echo with EF >50%      # DMII: A1C = 6.8 % (Ref  "range: <5.7 %) within past 6 months, PRESENT ON ADMISSION  # Obesity: Estimated body mass index is 32.22 kg/m  as calculated from the following:    Height as of this encounter: 1.803 m (5' 11\").    Weight as of this encounter: 104.8 kg (231 lb)., PRESENT ON ADMISSION            Disposition Plan     Expected Discharge Date: 01/26/2024    Discharge Delays: Procedure Pending (enter procedure & time in comments)              The patient's care was discussed with the Attending Physician, Dr. Flores .    Adriana Lombardo MD  Hospitalist Service  [facility]  Securely message with [employer] (more info)  Text page via [employer] Paging/Directory   ______________________________________________________________________    Interval History   Patient seen in pre-op. Notes minimal pain without moving currently. Denies any other concerns. No questions today.    Physical Exam   Vital Signs: Temp: 98  F (36.7  C) Temp src: Oral BP: 106/61 Pulse: 90   Resp: 15 SpO2: 98 % O2 Device: Nasal cannula Oxygen Delivery: 2 LPM  Weight: 231 lbs 0 oz  GENERAL: Healthy, alert and no distress  RESP:  Lungs clear throughout. No wheeze or crackles.   CV: Heart RRR. Systolic murmur. Pulses intact  Abdomen: Soft, nontender, nondistended.  MSK: left leg slightly shortened and externally rotated   SKIN: Visible skin clear. No significant rash, abnormal pigmentation or lesions.  NEURO: Cranial nerves grossly intact.            Data     I have personally reviewed the following data over the past 24 hrs:    7.7  \   13.2 (L)   / 206     140 104 30.9 (H) /  138 (H)   4.1 31 (H) 1.72 (H) \       Imaging results reviewed over the past 24 hrs:   Recent Results (from the past 24 hour(s))   POC US Guidance Needle Placement    Narrative    Ultrasound was performed as guidance to an anesthesia procedure.  Click   \"PACS images\" hyperlink below to view any stored images.  For specific   procedure details, view procedure note authored by anesthesia. "

## 2024-01-24 NOTE — PROGRESS NOTES
Pt c/o pain 9/10L hip fx, given Dilaudid iv .2mg at 0454AM. NPO maintained. Pre op HCG wipes applied, nasal antiseptic applied, . Pt was picked for OR at 0605AM.   sensation is normal and strength is normal.

## 2024-01-24 NOTE — ANESTHESIA CARE TRANSFER NOTE
Patient: Alvin Newton    Procedure: Procedure(s):  OPEN REDUCTION INTERNAL FIXATION, FRACTURE, HIP, PERIPROSTHETIC  REVISION HIP, TOTAL ARTHROPLASTY       Diagnosis: Hip fracture, left, closed, initial encounter (H) [S72.002A]  Diagnosis Additional Information: No value filed.    Anesthesia Type:   General     Note:    Oropharynx: oropharynx clear of all foreign objects  Level of Consciousness: awake  Oxygen Supplementation: nasal cannula  Level of Supplemental Oxygen (L/min / FiO2): 4  Independent Airway: airway patency satisfactory and stable    Vital Signs Stable: post-procedure vital signs reviewed and stable  Report to RN Given: handoff report given  Patient transferred to: PACU  Comments: MD Pina/surgeon present in PACU following up with PACU RN r/t BP, HR, and fluids management, possible requiring ICU  Handoff Report: Identifed the Patient, Identified the Reponsible Provider, Reviewed the pertinent medical history, Discussed the surgical course, Reviewed Intra-OP anesthesia mangement and issues during anesthesia, Set expectations for post-procedure period and Allowed opportunity for questions and acknowledgement of understanding      Vitals:  Vitals Value Taken Time   /51 01/24/24 1345   Temp     Pulse 88 01/24/24 1350   Resp 12 01/24/24 1350   SpO2 99 % 01/24/24 1350   Vitals shown include unfiled device data.    Electronically Signed By: CHUCK Chin CRNA  January 24, 2024  1:51 PM

## 2024-01-24 NOTE — ANESTHESIA PROCEDURE NOTES
Arterial Line Procedure Note    Pre-Procedure   Staff -        Resident/Fellow: Reyes Pina MD       Performed By: resident       Location: OR       Pre-Anesthestic Checklist: patient identified, IV checked, risks and benefits discussed, informed consent, monitors and equipment checked, pre-op evaluation and at physician/surgeon's request  Timeout:       Correct Patient: Yes        Correct Procedure: Yes        Correct Site: Yes        Correct Position: Yes   Line Placement:   This line was placed Pre Induction starting at 1/24/2024 8:17 AM and ending at 1/24/2024 8:22 AM  Procedure   Procedure: arterial line       Laterality: left       Insertion Site: radial.  Sterile Prep        Standard elements of sterile barrier followed       Skin prep: Chloraprep  Insertion/Injection        Technique: ultrasound guided and Seldinger Technique        1. Ultrasound was used to evaluate the access site.       2. Artery evaluated via ultrasound for patency/adequacy.       3. Using real-time ultrasound the needle/catheter was observed entering the artery/vein.       4. Permanent image was captured and entered into the patient's record.       Catheter Type/Size: 20 G, 12 cm  Narrative         Secured by: other       Tegaderm dressing used.       Complications: None apparent,        Arterial waveform: Yes        IBP within 10% of NIBP: Yes

## 2024-01-25 ENCOUNTER — APPOINTMENT (OUTPATIENT)
Dept: OCCUPATIONAL THERAPY | Facility: HOSPITAL | Age: 89
DRG: 466 | End: 2024-01-25
Payer: COMMERCIAL

## 2024-01-25 ENCOUNTER — TELEPHONE (OUTPATIENT)
Dept: FAMILY MEDICINE | Facility: CLINIC | Age: 89
End: 2024-01-25
Payer: COMMERCIAL

## 2024-01-25 ENCOUNTER — APPOINTMENT (OUTPATIENT)
Dept: PHYSICAL THERAPY | Facility: HOSPITAL | Age: 89
DRG: 466 | End: 2024-01-25
Payer: COMMERCIAL

## 2024-01-25 PROBLEM — E85.9 AMYLOIDOSIS (H): Status: ACTIVE | Noted: 2024-01-25

## 2024-01-25 PROBLEM — D62 POSTOPERATIVE ANEMIA DUE TO ACUTE BLOOD LOSS: Status: ACTIVE | Noted: 2024-01-25

## 2024-01-25 PROBLEM — N17.9 ACUTE KIDNEY FAILURE, UNSPECIFIED (H): Status: ACTIVE | Noted: 2023-08-24

## 2024-01-25 PROBLEM — E11.42 TYPE 2 DIABETES MELLITUS WITH DIABETIC POLYNEUROPATHY (H): Status: ACTIVE | Noted: 2017-12-06

## 2024-01-25 PROBLEM — I95.9 HYPOTENSION, UNSPECIFIED HYPOTENSION TYPE: Status: ACTIVE | Noted: 2024-01-25

## 2024-01-25 PROBLEM — D47.2 MONOCLONAL GAMMOPATHY OF UNKNOWN SIGNIFICANCE (MGUS): Status: ACTIVE | Noted: 2024-01-25

## 2024-01-25 LAB
ANION GAP SERPL CALCULATED.3IONS-SCNC: 9 MMOL/L (ref 7–15)
ATRIAL RATE - MUSE: 94 BPM
BUN SERPL-MCNC: 31 MG/DL (ref 8–23)
CALCIUM SERPL-MCNC: 8.2 MG/DL (ref 8.8–10.2)
CHLORIDE SERPL-SCNC: 107 MMOL/L (ref 98–107)
CREAT SERPL-MCNC: 1.24 MG/DL (ref 0.67–1.17)
DEPRECATED HCO3 PLAS-SCNC: 25 MMOL/L (ref 22–29)
DIASTOLIC BLOOD PRESSURE - MUSE: NORMAL MMHG
EGFRCR SERPLBLD CKD-EPI 2021: 56 ML/MIN/1.73M2
ERYTHROCYTE [DISTWIDTH] IN BLOOD BY AUTOMATED COUNT: 13.7 % (ref 10–15)
GLUCOSE BLDC GLUCOMTR-MCNC: 133 MG/DL (ref 70–99)
GLUCOSE BLDC GLUCOMTR-MCNC: 143 MG/DL (ref 70–99)
GLUCOSE BLDC GLUCOMTR-MCNC: 148 MG/DL (ref 70–99)
GLUCOSE BLDC GLUCOMTR-MCNC: 156 MG/DL (ref 70–99)
GLUCOSE BLDC GLUCOMTR-MCNC: 180 MG/DL (ref 70–99)
GLUCOSE SERPL-MCNC: 186 MG/DL (ref 70–99)
HCT VFR BLD AUTO: 25.4 % (ref 40–53)
HGB BLD-MCNC: 7.9 G/DL (ref 13.3–17.7)
HGB BLD-MCNC: 8.3 G/DL (ref 13.3–17.7)
INTERPRETATION ECG - MUSE: NORMAL
MCH RBC QN AUTO: 29.6 PG (ref 26.5–33)
MCHC RBC AUTO-ENTMCNC: 32.7 G/DL (ref 31.5–36.5)
MCV RBC AUTO: 91 FL (ref 78–100)
P AXIS - MUSE: NORMAL DEGREES
PLATELET # BLD AUTO: 233 10E3/UL (ref 150–450)
POTASSIUM SERPL-SCNC: 4.4 MMOL/L (ref 3.4–5.3)
PR INTERVAL - MUSE: NORMAL MS
QRS DURATION - MUSE: 170 MS
QT - MUSE: 410 MS
QTC - MUSE: 554 MS
R AXIS - MUSE: 199 DEGREES
RBC # BLD AUTO: 2.8 10E6/UL (ref 4.4–5.9)
SODIUM SERPL-SCNC: 141 MMOL/L (ref 135–145)
SYSTOLIC BLOOD PRESSURE - MUSE: NORMAL MMHG
T AXIS - MUSE: 6 DEGREES
VENTRICULAR RATE- MUSE: 110 BPM
WBC # BLD AUTO: 12.8 10E3/UL (ref 4–11)

## 2024-01-25 PROCEDURE — 97110 THERAPEUTIC EXERCISES: CPT | Mod: GP

## 2024-01-25 PROCEDURE — 250N000009 HC RX 250: Performed by: INTERNAL MEDICINE

## 2024-01-25 PROCEDURE — 258N000003 HC RX IP 258 OP 636: Performed by: STUDENT IN AN ORGANIZED HEALTH CARE EDUCATION/TRAINING PROGRAM

## 2024-01-25 PROCEDURE — 250N000011 HC RX IP 250 OP 636: Performed by: STUDENT IN AN ORGANIZED HEALTH CARE EDUCATION/TRAINING PROGRAM

## 2024-01-25 PROCEDURE — 97165 OT EVAL LOW COMPLEX 30 MIN: CPT | Mod: GO

## 2024-01-25 PROCEDURE — 99291 CRITICAL CARE FIRST HOUR: CPT | Mod: FT | Performed by: NURSE PRACTITIONER

## 2024-01-25 PROCEDURE — 250N000013 HC RX MED GY IP 250 OP 250 PS 637: Performed by: PAIN MEDICINE

## 2024-01-25 PROCEDURE — 250N000013 HC RX MED GY IP 250 OP 250 PS 637: Performed by: STUDENT IN AN ORGANIZED HEALTH CARE EDUCATION/TRAINING PROGRAM

## 2024-01-25 PROCEDURE — 250N000009 HC RX 250: Performed by: PAIN MEDICINE

## 2024-01-25 PROCEDURE — 85018 HEMOGLOBIN: CPT | Performed by: STUDENT IN AN ORGANIZED HEALTH CARE EDUCATION/TRAINING PROGRAM

## 2024-01-25 PROCEDURE — 99223 1ST HOSP IP/OBS HIGH 75: CPT | Performed by: PAIN MEDICINE

## 2024-01-25 PROCEDURE — 258N000003 HC RX IP 258 OP 636: Performed by: NURSE PRACTITIONER

## 2024-01-25 PROCEDURE — 200N000001 HC R&B ICU

## 2024-01-25 PROCEDURE — 97162 PT EVAL MOD COMPLEX 30 MIN: CPT | Mod: GP

## 2024-01-25 PROCEDURE — 85027 COMPLETE CBC AUTOMATED: CPT | Performed by: STUDENT IN AN ORGANIZED HEALTH CARE EDUCATION/TRAINING PROGRAM

## 2024-01-25 PROCEDURE — 99207 PR NO CHARGE LOS: CPT | Performed by: FAMILY MEDICINE

## 2024-01-25 PROCEDURE — 80048 BASIC METABOLIC PNL TOTAL CA: CPT | Performed by: STUDENT IN AN ORGANIZED HEALTH CARE EDUCATION/TRAINING PROGRAM

## 2024-01-25 RX ORDER — LIDOCAINE 50 MG/G
OINTMENT TOPICAL 3 TIMES DAILY
Status: DISCONTINUED | OUTPATIENT
Start: 2024-01-25 | End: 2024-02-02 | Stop reason: HOSPADM

## 2024-01-25 RX ORDER — PHENYLEPHRINE HCL IN 0.9% NACL 50MG/250ML
.1-6 PLASTIC BAG, INJECTION (ML) INTRAVENOUS CONTINUOUS
Status: DISCONTINUED | OUTPATIENT
Start: 2024-01-25 | End: 2024-01-26

## 2024-01-25 RX ORDER — HYDROMORPHONE HYDROCHLORIDE 2 MG/1
2 TABLET ORAL
Status: DISCONTINUED | OUTPATIENT
Start: 2024-01-25 | End: 2024-01-29

## 2024-01-25 RX ADMIN — FAMOTIDINE 20 MG: 20 TABLET ORAL at 00:10

## 2024-01-25 RX ADMIN — ACETAMINOPHEN 975 MG: 325 TABLET, FILM COATED ORAL at 07:41

## 2024-01-25 RX ADMIN — DICLOFENAC 2 G: 10 GEL TOPICAL at 20:26

## 2024-01-25 RX ADMIN — SODIUM CHLORIDE, POTASSIUM CHLORIDE, SODIUM LACTATE AND CALCIUM CHLORIDE 500 ML: 600; 310; 30; 20 INJECTION, SOLUTION INTRAVENOUS at 13:01

## 2024-01-25 RX ADMIN — ACETAMINOPHEN 975 MG: 325 TABLET, FILM COATED ORAL at 00:40

## 2024-01-25 RX ADMIN — FAMOTIDINE 20 MG: 20 TABLET ORAL at 08:39

## 2024-01-25 RX ADMIN — POTASSIUM CHLORIDE 20 MEQ: 750 TABLET, EXTENDED RELEASE ORAL at 08:57

## 2024-01-25 RX ADMIN — ATORVASTATIN CALCIUM 20 MG: 10 TABLET, FILM COATED ORAL at 20:12

## 2024-01-25 RX ADMIN — ACETAMINOPHEN 975 MG: 325 TABLET, FILM COATED ORAL at 20:12

## 2024-01-25 RX ADMIN — INSULIN ASPART 1 UNITS: 100 INJECTION, SOLUTION INTRAVENOUS; SUBCUTANEOUS at 08:56

## 2024-01-25 RX ADMIN — INSULIN ASPART 1 UNITS: 100 INJECTION, SOLUTION INTRAVENOUS; SUBCUTANEOUS at 15:42

## 2024-01-25 RX ADMIN — INSULIN ASPART 1 UNITS: 100 INJECTION, SOLUTION INTRAVENOUS; SUBCUTANEOUS at 12:30

## 2024-01-25 RX ADMIN — CEFAZOLIN SODIUM 2 G: 2 INJECTION, SOLUTION INTRAVENOUS at 04:06

## 2024-01-25 RX ADMIN — SENNOSIDES AND DOCUSATE SODIUM 2 TABLET: 8.6; 5 TABLET ORAL at 20:12

## 2024-01-25 RX ADMIN — ATORVASTATIN CALCIUM 20 MG: 10 TABLET, FILM COATED ORAL at 00:10

## 2024-01-25 RX ADMIN — HYDROMORPHONE HYDROCHLORIDE 2 MG: 2 TABLET ORAL at 17:19

## 2024-01-25 RX ADMIN — SENNOSIDES AND DOCUSATE SODIUM 2 TABLET: 8.6; 5 TABLET ORAL at 08:57

## 2024-01-25 RX ADMIN — FAMOTIDINE 20 MG: 20 TABLET ORAL at 20:12

## 2024-01-25 RX ADMIN — ACETAMINOPHEN 975 MG: 325 TABLET, FILM COATED ORAL at 12:47

## 2024-01-25 RX ADMIN — SODIUM CHLORIDE, POTASSIUM CHLORIDE, SODIUM LACTATE AND CALCIUM CHLORIDE: 600; 310; 30; 20 INJECTION, SOLUTION INTRAVENOUS at 20:36

## 2024-01-25 RX ADMIN — LIDOCAINE: 50 OINTMENT TOPICAL at 20:25

## 2024-01-25 RX ADMIN — Medication 0.9 MCG/KG/MIN: at 04:03

## 2024-01-25 RX ADMIN — DICLOFENAC 2 G: 10 GEL TOPICAL at 17:23

## 2024-01-25 RX ADMIN — HYDROMORPHONE HYDROCHLORIDE 0.2 MG: 1 INJECTION, SOLUTION INTRAMUSCULAR; INTRAVENOUS; SUBCUTANEOUS at 08:27

## 2024-01-25 RX ADMIN — AMIODARONE HYDROCHLORIDE 200 MG: 200 TABLET ORAL at 08:39

## 2024-01-25 RX ADMIN — Medication 250 MG: at 08:39

## 2024-01-25 RX ADMIN — SENNOSIDES AND DOCUSATE SODIUM 1 TABLET: 8.6; 5 TABLET ORAL at 00:10

## 2024-01-25 RX ADMIN — LIDOCAINE: 50 OINTMENT TOPICAL at 15:45

## 2024-01-25 ASSESSMENT — ACTIVITIES OF DAILY LIVING (ADL)
ADLS_ACUITY_SCORE: 37
DEPENDENT_IADLS:: TRANSPORTATION;MEDICATION MANAGEMENT;SHOPPING
ADLS_ACUITY_SCORE: 37
ADLS_ACUITY_SCORE: 41

## 2024-01-25 NOTE — TELEPHONE ENCOUNTER
Pharmacist from  pain management calling regarding patients medications.   Asking if Dr. Lawrence is managing Xanax and Gabapentin for this patient.   Reviewed chart.   Patient has never seen Dr. Lawrence here at [facility] [place].   Primary is Dr. Lyons.   No Xanax on medication list current or historical.   Gabapentin is on historical medication list however from a Dr. Velazco.   Reports this is also what the patients family states, he is not on either medication and not a patient of Dr. Lawrence.   Name, birth date, address verified.     Flori Wolfe RN on 1/25/2024 at 3:17 PM

## 2024-01-25 NOTE — PROGRESS NOTES
01/25/24 1331   Appointment Info   Signing Clinician's Name / Credentials (PT) Maggie iSngh DPT   Living Environment   People in Home spouse   Current Living Arrangements house  (1 level townhouse)   Home Accessibility no concerns   Self-Care   Usual Activity Tolerance moderate   Current Activity Tolerance poor   Equipment Currently Used at Home cane, quad;grab bar, tub/shower   Fall history within last six months yes   Number of times patient has fallen within last six months 1   Activity/Exercise/Self-Care Comment ind w/ showering and toileting, assist with dressing from spouse. Recently started sleeping in bed again, had been in recliner   General Information   Onset of Illness/Injury or Date of Surgery 01/22/24   Referring Physician Aurelio Ramos MD   Patient/Family Therapy Goals Statement (PT) TCU   Pertinent History of Current Problem (include personal factors and/or comorbidities that impact the POC) Alvin Newton is a 89 year old male admitted on 1/22/2024. He has a history of pAF on Eliquis, bilateral hip replacements (33 years ago), HFpEF, T2DM, hypertension, severe aortic stenosis, possible amyloidosis (work-up in process), mild cognitive impairment and is admitted for left femur fracture. Unfortunately significant blood loss causing hypotension and transfer to ICU for pressors.   Existing Precautions/Restrictions fall  (no hip precautions, foot flat WB)   Weight-Bearing Status - LLE   (foot flat)   Cognition   Affect/Mental Status (Cognition) anxious;confused   Pain Assessment   Patient Currently in Pain Yes, see Vital Sign flowsheet   Range of Motion (ROM)   Range of Motion ROM deficits secondary to surgical procedure;ROM deficits secondary to pain;ROM deficits secondary to weakness   Strength (Manual Muscle Testing)   Strength (Manual Muscle Testing) Deficits observed during functional mobility   Bed Mobility   Bed Mobility supine-sit;rolling right   Rolling Right [place] (Bed Mobility)  dependent (less than 25% patient effort);2 person assist   Supine-Sit [place] (Bed Mobility) dependent (less than 25% patient effort);2 person assist   Bed Mobility Limitations decreased ability to use legs for bridging/pushing;cognitive deficits   Impairments Contributing to Impaired Bed Mobility pain;decreased strength   Comment, (Bed Mobility) attempted to get pt to sit EOB, pt unable to fully sit EOB, able to FDC get pt sitting, pt very resistive, inc pain, inc confusion. Likely needs to be lifted to chair via elham and attempt standing from chair. throughout attempt at sitting, pt's BP was dropping, on drip at this time, okay'd per RN to trial sitting EOB if able. dependent supine scoot x 2   Transfers   Comment, (Transfers) not safe to trial, unable to get sitting EOB   Gait/Stairs (Locomotion)   Comment, (Gait/Stairs) unable to trial, unable to get sitting EOB   Clinical Impression   Criteria for Skilled Therapeutic Intervention Yes, treatment indicated   PT Diagnosis (PT) impaired functional mobility, gait abnormality   Influenced by the following impairments decreased strength, decreased ROM, pain, confusion   Functional limitations due to impairments gait, transfers, bed mob   Clinical Presentation (PT Evaluation Complexity) evolving   Clinical Presentation Rationale pt presents as medically diagnosed   Clinical Decision Making (Complexity) moderate complexity   Planned Therapy Interventions (PT) balance training;bed mobility training;gait training;home exercise program;neuromuscular re-education;patient/family education;ROM (range of motion);strengthening;transfer training   Risk & Benefits of therapy have been explained evaluation/treatment results reviewed;patient;daughter   PT Total Evaluation Time   PT Eval, Moderate Complexity Minutes (56535) 15   Physical Therapy Goals   PT Frequency 5x/week   PT Predicted Duration/Target Date for Goal Attainment 02/01/24   PT Goals Bed  Mobility;Transfers;Gait   PT: Bed Mobility Moderate assist;Supine to/from sit;Rolling   PT: Transfers Moderate assist;Sit to/from stand;Bed to/from chair;Assistive device   Interventions   Interventions Quick Adds Therapeutic Procedure   Therapeutic Procedure/Exercise   Ther. Procedure: strength, endurance, ROM, flexibillity Minutes (37596) 10   Symptoms Noted During/After Treatment increased pain   Treatment Detail/Skilled Intervention AP, QS, GS x 10 reps BLE with SBA. RLE HS x 10 reps with CGA. LLE HS x 5 reps x 2 sets mod-maxA x 2, inc pain with activity, needing inc v/c for breathing techniques   PT Discharge Planning   PT Plan would try to elham pt to chair first and then stand, would not try bed mob just yet, heavy assist of 2. foot flat WB LLE, no hip precautions per orders   PT Discharge Recommendation (DC Rec) Transitional Care Facility   PT Rationale for DC Rec pt heavy assist of 2 to attempt to get to EOB, unable to get to EOB without pt resisting.   PT Brief overview of current status attempted EOB, pt heavy assist of 2 to partway get to EOB. BLE ex   PT Equipment Needed at Discharge lift device;wheelchair   Total Session Time   Timed Code Treatment Minutes 10   Total Session Time (sum of timed and untimed services) 25

## 2024-01-25 NOTE — PROCEDURES
[facility]    Double Lumen PICC Placement    Date/Time: 1/24/2024 10:37 PM    Performed by: Suzan Logan RN  Authorized by: Bria Lopez APRN CNP  Indications: vascular access (Pressors, lab draws, meds, antibiotics, fluids)      UNIVERSAL PROTOCOL   Site Marked: NA  Prior Images Obtained and Reviewed:  NA  Required items: Required blood products, implants, devices and special equipment available    Patient identity confirmed:  Verbally with patient, arm band and provided demographic data  NA - No sedation, light sedation, or local anesthesia  Confirmation Checklist:  Patient's identity using two indicators, relevant allergies, procedure was appropriate and matched the consent or emergent situation and correct equipment/implants were available  Time out: Immediately prior to the procedure a time out was called    Universal Protocol: the Joint Commission Universal Protocol was followed    Preparation: Patient was prepped and draped in usual sterile fashion    ESBL (mL):  2     ANESTHESIA    Anesthesia:  Local infiltration  Local Anesthetic:  Lidocaine 1% without epinephrine  Anesthetic Total (mL):  0.3      SEDATION    Patient Sedated: No        Preparation: skin prepped with 2% chlorhexidine  Skin prep agent: skin prep agent completely dried prior to procedure  Sterile barriers: maximum sterile barriers were used: cap, mask, sterile gown, sterile gloves, and large sterile sheet  Hand hygiene: hand hygiene performed prior to central venous catheter insertion  Type of line used: PICC  Catheter type: double lumen  Lumen type: valved and power PICC  Lumen Identification: Purple and Red  Catheter size: 5 Fr  Brand: Bard  Lot number: [id number]  Placement method: MST and ultrasound  Number of attempts: 1  Difficulty threading catheter: no  Successful placement: yes  Orientation: right  Catheter to Vein (%): 21  Location: basilic vein  Tip Location: SVC/RA Junction  Arm circumference:  adults 10 cm  Extremity circumference: 34  Visible catheter length: 1  Total catheter length: 48  Dressing and securement: antibiotic disc placed, statlock and transparent dressing  Post procedure assessment: blood return through all ports, free fluid flow and placement verified by x-ray  PROCEDURE   Patient Tolerance:  Patient tolerated the procedure well with no immediate complicationsDescribe Procedure: Order verified. Consent obtained from patient's daughter via telephone and verbally with patient. Accessed right upper arm basilic on first attempt using ultrasound guidance. Catheter advanced without difficulty. Brisk blood return and flushes easily both lumens. Patient tolerated procedure well. Placement confirmed by xray. Per Radiologist tip is at the junction of the SVC and right atrium. Primary RN aware PICC is good to use. Educational material left at bedside.   Disposal: sharps and needle count correct at the end of procedure, needles and guidewire disposed in sharps container

## 2024-01-25 NOTE — PROGRESS NOTES
Occupational Therapy recommendation is TCU.  Patient is currently assist of 2 for bed mobility.  CM placed referrals to [facility] [place] and [facility].

## 2024-01-25 NOTE — CONSULTS
[facility] ACUTE INPATIENT PAIN SERVICE    [facility], [facility], [facility], [facility] [place], [place]   PAIN consult    Assessment/Plan:  Alvin Newton is a 89 year old male who was admitted on 1/22/2024.  I was asked to see the patient for postop pain. Admitted for fall and sustained fracture, and subsequently underwent open reduction internal fixation. History of A-fib, HF, CKD, anxiety/depression, GERD, mild cognitive impairment.    shows almost 2000 tablets of Xanax filled this year.  Although family indicates he is not actually taking Xanax.. Describes pain as present in the shoulders as well as the left hip.  I waited with the patient as he worked with physical therapy and was a very heavy assist of 2 with significant pain with movement.  Unfortunately he has not received any oral pain medications since 1/23.    PLAN: Acute left hip pain, status post left ORIF.  He is opioid naïve.  Delirium and DEJUAN limiting ability to treat pain with opioids.  I would focus on oral opioids.  It appears that he was not actually taking Xanax so I would not resume this medication.  But it is unclear to me why this medication has been filled, frequently, this year if he is not taking it.   Multimodal Medication Therapy:   Adjuvants: Avoid NSAIDs- Estimated Creatinine Clearance: 49.8 mL/min (A) (based on SCr of 1.24 mg/dL (H)).  Add lidocaine and diclofenac to the shoulders.  Agree with Tylenol.  Has as needed Robaxin available.  Opioids: Discontinue oxycodone and try oral Dilaudid 2 mg every 3 as needed  IV dilaudid 0.1mg Q2h PRN - second line for pain   Non-medication interventions- Ice   Constipation Prophylaxis-senna ordered, along with magnesium, along with MiraLAX-would monitor closely for diarrhea  Follow up /Discharge Recommendations - We recommend prescribing the following at the time of discharge:  MICHAEL Valle MD is the physician listed as prescribing the gabapentin as well as the  Xanax          Subjective:    Today I met with Alvin at the bedside.  He is preparing to work with physical therapy.  He describes bilateral chronic shoulder pain.  He also describes new acute pain in the incision.  He had great difficulty in working with physical therapy and is very stiff and appears very scared to move.  Discussed with patient's wife and his daughter.  They deny any use of gabapentin.  They deny any use of anxiety medications.  He was on citalopram.  It was very helpful for him.  Unfortunately this the talo pram did cause some significant hallucinations so he did need to stop that.  They describe that he has been quite sick over the last year.  In and out of TCU.     Discussed with pain pharmacist.  We talked about the discrepancy between the report of medications and what is listed on the prescription monitoring program report.  Discussed with ICU attending.      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)    Carpal tunnel syndrome, bilateral    Fall, initial encounter    Closed fracture of first lumbar vertebra, unspecified fracture morphology, initial encounter (H)    Fracture of L1 vertebra (H)    Impaired fasting glucose    Osteoarthritis of pelvis    Other ill-defined and unknown causes of morbidity and mortality    Primary localized osteoarthrosis of shoulder region    Pure hypercholesterolemia    Reason for consultation    Right bundle branch block    Cellulitis of right lower extremity    Severe sepsis (H)    Septic shock (H)    Streptococcal bacteremia    Thrombocytopenia (H24)    Hyperbilirubinemia    Hypophosphatemia    Hypoalbuminemia    Pyuria    Paroxysmal atrial fibrillation with RVR (H)    Hypomagnesemia    Chronic pain of both shoulders     Severe aortic stenosis    Elevated brain natriuretic peptide (BNP) level    Ascending aorta dilatation (H24)    Peripheral edema    Positive urine culture    Medication induced coagulopathy  (H24)    Chronic diastolic (congestive) heart failure (H)    Altered mental status, unspecified altered mental status type    Clostridium difficile diarrhea    History of Clostridium difficile infection July 2023    Stage 3a chronic kidney disease (H)    Esophageal dysmotility    DEJUAN (acute kidney injury) (H24)    Moderate malnutrition (H24)    Schatzki's ring of distal esophagus-dilated 8/19/23    Acute gout involving toe of left foot    Unintentional weight loss    Abnormal esophagram    Hypokalemia    Hyponatremia    Open wound-coccyx/buttocks    Enterocolitis due to Clostridium difficile, not specified as recurrent    Other chronic pain    Pain in left shoulder    Pain in right shoulder    Unspecified streptococcus as the cause of diseases classified elsewhere    Esophageal dysphagia    Hip fracture, left, closed, initial encounter (H)    Acute kidney failure, unspecified (H24)    Amyloidosis (H)    Monoclonal gammopathy of unknown significance (MGUS)    Hypotension, unspecified hypotension type    Postoperative anemia due to acute blood loss        History   Drug Use No         Tobacco Use      Smoking status: Never      Smokeless tobacco: Never         acetaminophen  975 mg Oral Q6H    amiodarone  200 mg Oral Daily    [Held by provider] apixaban ANTICOAGULANT  5 mg Oral BID    atorvastatin  20 mg Oral At Bedtime    famotidine  20 mg Oral BID    insulin aspart  1-7 Units Subcutaneous TID AC    insulin aspart  1-5 Units Subcutaneous At Bedtime    Magnesium Oxide  250 mg Oral Daily    [Held by provider] metoprolol succinate ER  25 mg Oral Daily    polyethylene glycol  17 g Oral Daily    potassium chloride ER  20 mEq Oral Daily    senna-docusate  1 tablet Oral BID    Or    senna-docusate  2 tablet Oral BID    sodium chloride  "(PF)  3 mL Intracatheter Q8H    sodium chloride (PF)  3 mL Intracatheter Q8H    torsemide  20 mg Oral Daily       Objective:  Vital signs in last 24 hours:  B/P: 110/64, T: 98.7, P: 86, R: 13   Blood pressure 110/64, pulse 86, temperature 98.7  F (37.1  C), temperature source Axillary, resp. rate 13, height 1.803 m (5' 11\"), weight 104.8 kg (231 lb), SpO2 100%.      Weight:   Wt Readings from Last 2 Encounters:   01/22/24 104.8 kg (231 lb)   09/05/23 99.3 kg (219 lb)           Intake/Output:    Intake/Output Summary (Last 24 hours) at 1/25/2024 1448  Last data filed at 1/25/2024 1200  Gross per 24 hour   Intake 2963.47 ml   Output 1025 ml   Net 1938.47 ml        Review of Systems:   As per subjective, all others negative.    Physical Exam:     General Appearance:  Alert, cooperative, no distress  Patient is sitting up in bed   Head:  Normocephalic, without obvious abnormality, atraumatic   Eyes:  PERRL, conjunctiva/corneas clear, EOM's intact   ENT/Throat: Lips dry   Lymph/Neck: Supple, symmetrical, trachea midline, no adenopathy, thyroid: not enlarged, symmetric    Lungs:    , respirations unlabored   Chest Wall:  No tenderness or deformity   Cardiovascular/Heart:  Denies SOB    Abdomen:   Soft,    Musculoskeletal: Extremities with incision on left hip    Skin: Skin is dry     Neurologic: Alert and Talkative           Imaging:  Personally Reviewed.    Results for orders placed or performed during the hospital encounter of 01/22/24   XR Pelvis and Hip Left 2 Views    Impression    IMPRESSION: Mildly displaced oblique periprosthetic left proximal femoral fracture centered at the mid-distal femoral stem particularly lateral and posterior. Bilateral total hip arthroplasties with asymmetric polyethylene liner wear superolateral, left   worse than right. No displaced pelvic fracture is identified. Degenerative change lower lumbar spine and both SI joints. No offset or widening at the symphysis pubis.    NOTE: ABNORMAL " REPORT    THE DICTATION ABOVE DESCRIBES AN ABNORMALITY FOR WHICH FOLLOW-UP IS NEEDED.              Head CT w/o contrast    Impression    IMPRESSION:  1.  No acute intracranial abnormality.   CT Cervical Spine w/o Contrast    Impression    IMPRESSION:  1.  No fracture or posttraumatic subluxation.  2.  Multilevel ankylosis.   CT Femur Thigh Left w/o Contrast    Impression    IMPRESSION: Left total hip arthroplasty with an acute mildly displaced periprosthetic fracture involving the femoral component at the level of the inter and subtrochanteric regions.     CT Hip Left w/o Contrast    Impression    IMPRESSION: Left total hip arthroplasty with an acute mildly displaced periprosthetic fracture involving the femoral component at the level of the inter and subtrochanteric regions.     XR Femur Left 2 Views    Impression    IMPRESSION: Left total hip arthroplasty with an acute mildly displaced periprosthetic fracture involving the proximal femoral shaft extending to the intertrochanteric region. Small area of benign cortical thickening along the lateral margin of the mid   left femoral diaphysis.               XR Femur Port Left 2 Views    Impression    IMPRESSION: Since the prior study, the femoral component has been replaced with a longstem femoral component and there are new cerclage wires around the proximal femur. These findings extend across the previously seen periprosthetic fracture, which is   now affixed into excellent position and alignment. There is no evidence of complication. Mild degenerative changes and small effusion in the knee incidentally noted.   XR Pelvis 1/2 Views    Impression    IMPRESSION: The femoral component of the left total hip arthroplasty has been replaced with a longstem component and there are new cerclage wires around the proximal femur, all extending across the previously seen periprosthetic fracture in the proximal   shaft of the femur. There is excellent position/alignment on this  view. No complication evident. Please note that the distal end of the stem of the femoral component was not included in the field-of-view of this single image.   XR Chest PICC Placement 1 View    Impression    IMPRESSION: Right PICC to distal SVC.        Lab Results:  Personally Reviewed.   Last Comprehensive Metabolic Panel:  Sodium   Date Value Ref Range Status   01/25/2024 141 135 - 145 mmol/L Final     Comment:     Reference intervals for this test were updated on 09/26/2023 to more accurately reflect our healthy population. There may be differences in the flagging of prior results with similar values performed with this method. Interpretation of those prior results can be made in the context of the updated reference intervals.    12/30/2017 138 133 - 144 mmol/L Final     Potassium   Date Value Ref Range Status   01/25/2024 4.4 3.4 - 5.3 mmol/L Final   07/15/2022 3.7 3.4 - 5.3 mmol/L Final   12/31/2017 3.5 3.4 - 5.3 mmol/L Final     Chloride   Date Value Ref Range Status   01/25/2024 107 98 - 107 mmol/L Final   07/15/2022 106 94 - 109 mmol/L Final   12/30/2017 104 94 - 109 mmol/L Final     Carbon Dioxide   Date Value Ref Range Status   12/30/2017 26 20 - 32 mmol/L Final     Carbon Dioxide (CO2)   Date Value Ref Range Status   01/25/2024 25 22 - 29 mmol/L Final   07/15/2022 24 20 - 32 mmol/L Final     Anion Gap   Date Value Ref Range Status   01/25/2024 9 7 - 15 mmol/L Final   07/15/2022 9 3 - 14 mmol/L Final   12/30/2017 8 3 - 14 mmol/L Final     Glucose   Date Value Ref Range Status   07/15/2022 97 70 - 99 mg/dL Final   12/30/2017 145 (H) 70 - 99 mg/dL Final     GLUCOSE BY METER POCT   Date Value Ref Range Status   01/25/2024 143 (H) 70 - 99 mg/dL Final     Urea Nitrogen   Date Value Ref Range Status   01/25/2024 31.0 (H) 8.0 - 23.0 mg/dL Final   07/15/2022 13 7 - 30 mg/dL Final   12/31/2017 26 7 - 30 mg/dL Final     Creatinine   Date Value Ref Range Status   01/25/2024 1.24 (H) 0.67 - 1.17 mg/dL Final  "  12/31/2017 1.11 0.66 - 1.25 mg/dL Final     GFR Estimate   Date Value Ref Range Status   01/25/2024 56 (L) >60 mL/min/1.73m2 Final   12/31/2017 63 >60 mL/min/1.7m2 Final     Comment:     Non  GFR Calc     Calcium   Date Value Ref Range Status   01/25/2024 8.2 (L) 8.8 - 10.2 mg/dL Final   12/30/2017 7.9 (L) 8.5 - 10.1 mg/dL Final        UA: No results found for: \"UAMP\", \"UBARB\", \"BENZODIAZEUR\", \"UCANN\", \"UCOC\", \"OPIT\", \"UPCP\"           Please see A&P for additional details of medical decision making.  MANAGEMENT DISCUSSED with the following over the past 24 hours: Discussed with wife, daughter, and NP ICU    NOTE(S)/MEDICAL RECORDS REVIEWED over the past 24 hours: ICU and ortho  Tests personally interpreted in the past 24 hours:  - Xray showing acute mildly displaced periprosthetic fracture involving the proximal femoral shaft extending to the intertrochanteric region.  Tests REVIEWED in the past 24 hours:  - CrtCl  SUPPLEMENTAL HISTORY, in addition to the patient's history, over the past 24 hours obtained from:   - Spouse or significant other  Medical complexity over the past 24 hours:  -------------------------- HIGH RISK FOR MORBIDITY -------------------------------------------------------------  - Parenteral (IV) CONTROLLED SUBSTANCES ordered        Josi Palacios APRN, CNS-BC, CNP, ACHPN  Acute Care Pain Management Program   Hours of pain coverage Mon-Fri 8-1600, afterhours please call the house officer    [employer] (CHAD, RONs, SD, RH)   Page via [employer]- Click HERE to page Josi or Jordan text web console -Click for Jordan            "

## 2024-01-25 NOTE — PROGRESS NOTES
"[facility]    Medicine Progress Note - Hospitalist Service    Date of Admission:  1/22/2024    Assessment & Plan   Alvin Newton is a 89 year old male admitted on 1/22/2024. He has a history of pAF on Eliquis, bilateral hip replacements (33 years ago), HFpEF, T2DM, hypertension, severe aortic stenosis, possible amyloidosis (work-up in process), mild cognitive impairment and is admitted for left femur fracture. Unfortunately significant blood loss causing hypotension and transfer to ICU for pressors.    Medicine to assume care when patient able to stepdown from ICU.     Hypotension   Transferred to ICU on 1/24 after procedure after losing 3L of blood during surgery. Continues on pressor support. History of HFpEF and severe aortic stenosis -- cautious fluid administration.   - management per ICU      Left femur fracture s/p repair 1/24  Mechanical fall   Chronic anticoagulation (Eliquis)  Presented to ED 1/22 for a mechanical fall at home. Tried to reach for his cane for stability but put his weight on his \"grabber device\" instead and fell forward. Reports he did not hit his head or lose consciousness. CT head with no acute abnormality. XR pelvis/left hip revealed a left proximal femoral fracture. History of bilateral hip replacements about 33 years ago. On Eliquis for atrial fibrillation, most recent dose 1/21 PM. Underwent repair on on 1/24.  - Orthopedic surgery consulted   - ADAT  - PT/OT  - Hold Eliquis   - Daily BMP, CBC  - Pain control: scheduled tylenol, PRN PO oxycodone, PRN IV Dilaudid     Delirium  History of hospital-induced delirium per family. Known mild cognitive impairment at baseline. Some delirium post operatively.   - Scheduled Tylenol   - Cluster overnight cares as able to maximize nighttime sleep   - Continue to reorient patient to place/time     A-fib with RVR, improved  History of pAF on Eliquis. Most recent dose of Eliquis was 1/21 PM. INR 1.69 on admission. Follows " with [facility]. Went into RVR after procedure which has now resolved  - PTA metoprolol, amiodarone   - Hold PTA Eliquis in preparation for hip fracture -- restart when able per ortho    DEJUAN on CKD 3a, improved  Creatinine 1.34 on admission. Unclear baseline per chart review - Cr had been 1.0-1.2 summer 2023, but more recently 1.4-1.6 in cardiology clinic in December. Will continue to monitor. Cr bump to 1.74 morning of procedure. Now improving with fluids.   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 Cardiac MRI in October concerning for infiltrative cardiomyopathy. Work-up for amyloidosis in process by cardiology and [facility]. Biopsies of bone marrow and fat pad negative for AL amyloid. Per chart review, plan at this time is to follow-up with cardiology regarding possibly myocardial biopsy and continued amyloid/infiltrative cardiomyopathy work-up.   - PTA metoprolol, Lipitor, torsemide   - Follow-up with cardiologist as scheduled      Type 2 diabetes mellitus   History of T2DM without long-term use of insulin. PTA medications: none. Most recent A1C 6.8% on admission.   - POC glucose checks QID   - Medium resistance SSI        Depression/Anxiety  - PTA Celexa     GERD  - PTA Pepcid               Diet: Advance Diet as Tolerated: Regular Diet Adult    DVT Prophylaxis: On hold  Eastman Catheter: PRESENT, indication: /GI/GYN Pelvic Procedure  Lines: PRESENT      PICC 01/24/24 Double Lumen Right Basilic-Site Assessment: WDL  Arterial Line 01/24/24 Radial-Site Assessment: WDL      Cardiac Monitoring: None  Code Status: No CPR- Pre-arrest intubation OK      Clinically Significant Risk Factors               # Coagulation Defect: INR = 1.81 (Ref range: 0.85 - 1.15) and/or PTT = 33 Seconds (Ref range: 22 - 38 Seconds), will monitor for bleeding    # Hypertension: Noted  "on problem list  # Chronic heart failure with preserved ejection fraction: heart failure noted on problem list and last echo with EF >50%      # DMII: A1C = 6.8 % (Ref range: <5.7 %) within past 6 months, PRESENT ON ADMISSION  # Obesity: Estimated body mass index is 32.22 kg/m  as calculated from the following:    Height as of this encounter: 1.803 m (5' 11\").    Weight as of this encounter: 104.8 kg (231 lb)., PRESENT ON ADMISSION     # Financial/Environmental Concerns: none         Disposition Plan      Expected Discharge Date: 01/26/2024                  The patient's care was discussed with the Attending Physician, Dr. Ling .    Adriana Lombardo MD  Hospitalist Service  [facility]  Securely message with i[employer] (more info)  Text page via [facility] Paging/Directory   ______________________________________________________________________    Interval History   Patient seen in ICU with family present. Noting pain all over. Discussed medications that are available. Continues on pressors for blood pressure support.     Physical Exam   Vital Signs: Temp: 98.7  F (37.1  C) Temp src: Axillary BP: 110/64 Pulse: 87   Resp: 17 SpO2: 99 % O2 Device: Nasal cannula Oxygen Delivery: 1 LPM  Weight: 231 lbs 0 oz  GENERAL: Alert and no distress  RESP:  Lungs clear throughout. No wheeze or crackles.   CV: Heart RRR. Systolic murmur. Pulses intact  Abdomen: Soft, nontender, nondistended.  MSK: left hip clean and dry. Wound vac in place  SKIN: Visible skin clear. No significant rash, abnormal pigmentation or lesions.  NEURO: Cranial nerves grossly intact.      Data     I have personally reviewed the following data over the past 24 hrs:    12.8 (H)  \   8.3 (L)   / 233     141 107 31.0 (H) /  143 (H)   4.4 25 1.24 (H) \     Trop: 36 (H) BNP: N/A     Procal: N/A CRP: N/A Lactic Acid: 2.1 (H)       INR:  1.81 (H) PTT:  33   D-dimer:  N/A Fibrinogen:  N/A       Imaging results reviewed over the past 24 hrs:   Recent " Results (from the past 24 hour(s))   XR Pelvis 1/2 Views    Narrative    EXAM: XR PELVIS 1/2 VIEWS  LOCATION: [facility]  DATE: 1/24/2024    INDICATION: Status post hip surgery.  COMPARISON: 01/22/2024 x-ray.      Impression    IMPRESSION: The femoral component of the left total hip arthroplasty has been replaced with a longstem component and there are new cerclage wires around the proximal femur, all extending across the previously seen periprosthetic fracture in the proximal   shaft of the femur. There is excellent position/alignment on this view. No complication evident. Please note that the distal end of the stem of the femoral component was not included in the field-of-view of this single image.   XR Femur Port Left 2 Views    Narrative    EXAM: XR FEMUR PORT LEFT 2 VIEWS  LOCATION: [facility]  DATE: 1/24/2024    INDICATION: Status post hip surgery.  COMPARISON: Radiographs from 01/22/2024.      Impression    IMPRESSION: Since the prior study, the femoral component has been replaced with a longstem femoral component and there are new cerclage wires around the proximal femur. These findings extend across the previously seen periprosthetic fracture, which is   now affixed into excellent position and alignment. There is no evidence of complication. Mild degenerative changes and small effusion in the knee incidentally noted.   XR Chest PICC Placement 1 View    Narrative    XR CHEST PICC LINE PLACEMENT 1 VIEW  1/24/2024 10:05 PM CST      HISTORY: PICC placement.     COMPARISON: 07/23/2023.    FINDINGS: A right PICC is been placed and the tip is at the junction of SVC and right atrium. There is no pneumothorax. The heart size is normal. The lungs are clear. Degenerative disease in the spine and shoulders.      Impression    IMPRESSION: Right PICC to distal SVC.

## 2024-01-25 NOTE — PROGRESS NOTES
"Orthopedic Progress Note      Assessment: 1 Day Post-Op  S/P Procedure(s):  OPEN REDUCTION INTERNAL FIXATION, FRACTURE, HIP, PERIPROSTHETIC  REVISION HIP, TOTAL ARTHROPLASTY     Plan:   - Continue PT/OT.   - Weightbearing status: Flat foot weightbearing with walker at all times. No formal hip precautions.   - Antibiotics: Perioperative cefazolin. Upon discharge, 7 days Duricef 500 mg BID.  - Anticoagulation: Eliquis 5 mg BID, restart 1/25/24, in addition to SCDs, va stockings and early ambulation.  - Discharge planning: pending medical recovery and clearance, progression with therapy, and pain control     Subjective:  Pain: Well controlled currently     Patient doing well on POD #1. In ICU given medical needs and EBL of 1,500 ml. Patient seems to be doing well, responds to questions and directions. Pleasantly confused currently.  Hgb 8.3 (13.2 pre-op).     Objective:  /64   Pulse 87   Temp 98.7  F (37.1  C) (Axillary)   Resp 17   Ht 1.803 m (5' 11\")   Wt 104.8 kg (231 lb)   SpO2 99%   BMI 32.22 kg/m    The patient is Awake but sleepy.  Appears comfortable lying in bed.  Calves are soft and non-tender bilaterally  Brisk capillary refill in the toes.   Palpable left dorsalis pedis pulse. Foot warm & well-perfused.  Sensation is intact to light touch & equal bilaterally in the femoral, DP, SP & tibial nerve distributions.  ROM: Flexes slightly at the Left hip. Appropriately flexes & extends all toes bilaterally.   Motor: +4/5 dorsiflexion, plantar flexion & EHL bilaterally. Fires quad.   Leg lengths equal.  Left hip with wound vac in place, good seal. No surrounding erythema.       Pertinent Labs   Lab Results: personally reviewed.   Lab Results   Component Value Date    INR 1.81 (H) 01/24/2024    INR 1.69 (H) 01/22/2024    INR 1.59 (H) 06/06/2022     Lab Results   Component Value Date    WBC 12.8 (H) 01/25/2024    HGB 8.3 (L) 01/25/2024    HCT 25.4 (L) 01/25/2024    MCV 91 01/25/2024     " 01/25/2024     Lab Results   Component Value Date     01/25/2024    CO2 25 01/25/2024         Report completed by:  Angelina Devine PA-C/Dr. [facility]    Date: 1/25/2024  Time: 12:57 PM

## 2024-01-25 NOTE — PROGRESS NOTES
[facility] - ICU    RN Progress Note:            Pertinent Assessments:      Please refer to flowsheet rows for full assessment     Patient's orientation improved throughout the night. Initially patient was only oriented to self, had disorganized thinking and was unable to answer questions appropriately. Once patient was more awake, patient reported that he was at the hospital and how he broke his leg when he fell. Still disoriented to time.     Patient on 1L of O2 nasal cannula. LLE dressing is clean, dry, and intact. Wound vac in place. No output. CMS intact.    Currently A. Fib, rate controlled on telemetry. Patient did convert to NSR around 0430 but went back into A. Fib around 0700.    Eastman patent and draining.             Key Events - This Shift:     On Phenylephrine at 0.6. Titrated as necessary. On IVF.    Orientation fluctuates. Re-oriented when needed, effective, and patient able to go back to sleep.    Patient reports pain in LLE especially with movement. Patient resists turning from side to side,   weight shifting done frequently.    Patient has chronic shoulder pain, especially with movement.             Barriers to Discharge / Downgrade:     Pressors         Point of Contact Update YES-OR-NO: Yes  If No, reason:   Name:Fuad Newton (Daughter)  Phone Number: [phone]  Summary of Conversation: Daughter updated on current status of patient. Discussed placement of PICC line and reason for this. Daughter agreeable to PICC placement for the patient. Daughter reported baseline intermittent confusion at home, especially at night. Also reported severe confusion for the patient after previous surgeries.

## 2024-01-25 NOTE — PROGRESS NOTES
"   01/25/24 1330   Appointment Info   Signing Clinician's Name / Credentials (OT) Michaela Woodward OTR/L   Living Environment   People in Home spouse   Current Living Arrangements house  (1 level townhouse)   Home Accessibility no concerns   Transportation Anticipated health plan transportation   Self-Care   Usual Activity Tolerance moderate   Current Activity Tolerance poor   Equipment Currently Used at Home cane, quad;grab bar, tub/shower   Fall history within last six months yes   Number of times patient has fallen within last six months 1   Activity/Exercise/Self-Care Comment I with showering and toileting, assist with dressing   Instrumental Activities of Daily Living (IADL)   IADL Comments pt does help with some light household tasks however spouse does a majority of cleaning, cooking and laundry   General Information   Onset of Illness/Injury or Date of Surgery 01/22/24   Referring Physician Dr Ramos   Patient/Family Therapy Goal Statement (OT) none stated   Additional Occupational Profile Info/Pertinent History of Current Problem Presented to ED 1/22 for a mechanical fall at home. Tried to reach for his cane for stability but put his weight on his \"grabber device\" instead and fell forward. Reports he did not hit his head or lose consciousness. CT head with no acute abnormality. XR pelvis/left hip revealed a left proximal femoral fracture. History of bilateral hip replacements about 33 years ago. On Eliquis for atrial fibrillation, most recent dose 1/21 PM. Underwent repair on on 1/24.   Existing Precautions/Restrictions fall;no known precautions/restrictions   Left Lower Extremity (Weight-bearing Status) weight-bearing as tolerated (WBAT)  (flatfoot full WB)   Cognitive Status Examination   Follows Commands follows three-step commands;delayed response/completion  (pt is also [place] which is a contributing factor to direction following)   Memory Deficit short-term memory;minimal deficit   Pain Assessment "   Patient Currently in Pain Yes, see Vital Sign flowsheet  (L leg and B shldrs which is chronic)   Range of Motion Comprehensive   Comment, General Range of Motion very limited shldr ROM in B UE's   Strength Comprehensive (MMT)   Comment, General Manual Muscle Testing (MMT) Assessment not tested due to decreased ROM and pain which is chronic   Bed Mobility   Bed Mobility supine-sit;sit-supine   Supine-Sit [place] (Bed Mobility) maximum assist (25% patient effort);2 person assist   Sit-Supine [place] (Bed Mobility) dependent (less than 25% patient effort);2 person assist   Assistive Device (Bed Mobility) draw sheet   Transfers   Transfer Comments unable to trsfs this date   Activities of Daily Living   BADL Assessment/Intervention lower body dressing   Lower Body Dressing Assessment/Training   Position (Lower Body Dressing) supine   Weston Level (Lower Body Dressing) dependent (less than 25% patient effort);don;socks   Clinical Impression   Criteria for Skilled Therapeutic Interventions Met (OT) Yes, treatment indicated   OT Diagnosis decreased ADL independence   Influenced by the following impairments pain, weakness, cognition   OT Problem List-Impairments impacting ADL problems related to;activity tolerance impaired;cognition;mobility;strength;pain   Assessment of Occupational Performance 3-5 Performance Deficits   Identified Performance Deficits bed mob, trsfs, toileting, dressing   Planned Therapy Interventions (OT) ADL retraining;bed mobility training;cognition;transfer training;strengthening   Clinical Decision Making Complexity (OT) detailed assessment/moderate complexity   Risk & Benefits of therapy have been explained evaluation/treatment results reviewed;patient;daughter   OT Total Evaluation Time   OT Eval, Low Complexity Minutes (58378) 24   OT Goals   Therapy Frequency (OT) 5 times/week   OT Predicted Duration/Target Date for Goal Attainment 02/02/24   OT: Hygiene/Grooming minimal  assist;from wheelchair   OT: Transfer Maximum assist;with assistive device   OT: Cognitive Patient/caregiver will verbalize understanding of cognitive assessment results/recommendations as needed for safe discharge planning   OT Discharge Planning   OT Plan would not try bed mob yet, needs to be a elham lift to chair to try standing or any moving, upright sitting in bed for g/h, rolling, standing, would cotx for now   OT Discharge Recommendation (DC Rec) Transitional Care Facility   OT Rationale for DC Rec Pt will needs a TCU at discharge, unable to get sitting at EOB with maximal assist of 2 people, will be slow to progress due to chronic pain, if unable to progress would consider LTC with therapy but will continue to update progress   OT Brief overview of current status max assist of 2 for bed mob, unable to get fully sitting up due to some resistance as well from pt   Total Session Time   Total Session Time (sum of timed and untimed services) 24

## 2024-01-25 NOTE — PROGRESS NOTES
PICC line placed. Patient on phenylephrine gtt, IVF, and IV abx. IV metoprolol given x1 for tachycardia in 150s-170s persistently.     Patient in A. Fib but now controlled with heart rate in the 90s. Plan of care ongoing.

## 2024-01-25 NOTE — PROGRESS NOTES
Pain team update:     Pharmacist was asked to verfiy prescriptions from Dr. Eric Lawrence MD - [place], [place]. Per  xanax and gabapentin being filled regularly, most recently xanax 0.5 mg on 2023 #120 (30 day supply), and gabapentin 300 mg #180 (90 day supply), filled on 2023. Called provider office - Per RN, provider has never seen this patient per the chart, PCP listed as Dr. Lyons. Verified patients address and . Patient does pull up in their system but no visit with Dr. Lawrence.     Called [facility] - and these prescriptions are being filled for a different Alvin - which I can see is linked to this patients  in the system    Patient does not fill scripts for gabapentin or xanax - the  has two patients linked with same birthday and first names but different last names. Pt doesn't see providers or fill from pharmacist in [facility].     Of note xanax and gabapentin are not on surescripts so surescripts is accurate - discrepancy only on .     Will need to call the [place]  at [phone] tomorrow to notify them of this discrepancy. - can see the patients name in the  of the other Alvin who is linked under linked records- do not want to disclose that here.      Notified Pharmacist at [facility] and provider office - looks like the link patient's record are accurate so will notify  to unlink that other patient from this patients     Roxana Tate, IrisD, BCPS

## 2024-01-25 NOTE — PROGRESS NOTES
Critical Care Progress Note  1/25/2024   12:10 PM    Admit Date: 1/22/2024  ICU Admission Day: 1/24  Code Status: DNR      Problem List:   Principal Problem:    Hip fracture, left, closed, initial encounter (H)       Plan by System:      Neuro/Psych: Acute pain - left hip fx now s/p left ORIF. Fall at home leading to fx. Hx of mild cognitive impairment, possible neurocognitive disorder. Hx of visual disturbances and delirium on previous hospitalizations.               - PRN dilaudid available for severe pain               - avoid benzos               - delirium prevention as best we can; cluster cares at night, active during the days. Try to move out of ICU as soon as safe.               - patient is [place]; speak loudly, slowly, clearly. Use hearing aids.    - Consider acute pain team consult     Pulmonary: no acute issues               - Doing well on 1L NC   - Encourage pulmonary toileting - IS/flutter/activity      CV: hypotension - post surgical, blood loss, hypovolemia. Hx of severe low-flow low gradient AoStenosis, afib, HFpEF. Being worked up for amyloid heart disease               -  phenylephrine for MAP goal 65              - Careful fluid admin with hx AoStenosis.               - resume diuretics in AM               - PICC line placed overnight              - Eliquis on hold               - Amiodarone and metoprolol in AM. Hold further metoprolol until blood pressure improves.      GI/: no acute issues              - Diet: clears              - Last BM: pta               - Bowel meds: miralax     Renal: DEJUAN - improving               - strict I/O               - careful fluid admin. Resume diuretics in AM.      ID: post op infection precautions              - periop cefazolin             Heme/Coag:  acute blood loss anemia - EBL  3L              - DVT proph: eliquis on hold      Endocrine: T2DM  -  SSI     Family: updated at bedside      Clinically Significant Risk Factors                # Coagulation  "Defect: INR = 1.81 (Ref range: 0.85 - 1.15) and/or PTT = 33 Seconds (Ref range: 22 - 38 Seconds), will monitor for bleeding    # Hypertension: Noted on problem list  # Chronic heart failure with preserved ejection fraction: heart failure noted on problem list and last echo with EF >50%      # DMII: A1C = 6.8 % (Ref range: <5.7 %) within past 6 months, PRESENT ON ADMISSION  # Obesity: Estimated body mass index is 32.22 kg/m  as calculated from the following:    Height as of this encounter: 1.803 m (5' 11\").    Weight as of this encounter: 104.8 kg (231 lb)., PRESENT ON ADMISSION     # Financial/Environmental Concerns: none        Code Status: No CPR- Pre-arrest intubation OK       **Addendum: Spoke with Dr. Ramos.EBL of 3-liters recorded in error. Correct EBL is 1500mL**    Dispo: ICU    Bria Lopez, CNP  [facility] Pulmonary/Critical Care    Total critical care time: 30-minutes  I have personally provided 30 minutes of critical care time exclusive of time spent on separately billable procedures .   _______________________________________________________________    HPI: 89 year old man with history of severe aortic stenosis, afib, HFpEF, mild cognitive impairment, DEJUAN. Admitted 1/22 after he fell and fractured his left femur at home. Underwent ORIF today with ortho. Large EBL of 3-liters and developed some hypotension post operatively requiring addition of phenylephrine. He was admitted to ICU for further monitoring.      ROS: Hurts \"all over\"; denies nausea, shortness of breath, pain.     Events over last 24-hours: hypotensive and agitated overnight.     Objective:     Vitals:    01/25/24 0900 01/25/24 0930 01/25/24 1000 01/25/24 1200   BP:   110/56 110/64   BP Location:       Pulse: 85 85 83 87   Resp: 18 15 15 17   Temp:       TempSrc:       SpO2: 99% 100% 100% 99%   Weight:       Height:              I/O:   Intake/Output Summary (Last 24 hours) at 1/25/2024 1210  Last data filed at 1/25/2024 " 0800  Gross per 24 hour   Intake 4305.03 ml   Output 4375 ml   Net -69.97 ml     Wt Readings from Last 3 Encounters:   01/22/24 104.8 kg (231 lb)   09/05/23 99.3 kg (219 lb)   08/28/23 101.6 kg (224 lb)      Weight change:     Physical Exam:  Gen: awake, no distress  HEENMT: AT/NC  NEURO: hard of hearing, otherwise nonfocal  CARDIOVASCULAR: IRR S1S2 harsh systolic murmur  PULMONARY: unlabored; lungs are clear  GASTROINTESTINAL: soft ntnd  INTEGUMENT: Left hip surgical site clean, wound vac in place.   PSYCH: a bit confused.     Labs:   Recent Labs   Lab 01/25/24  0456      CO2 25   BUN 31.0*       Recent Labs   Lab 01/25/24  0456   WBC 12.8*   HGB 8.3*   HCT 25.4*          Micro:   None this admission    Imaging: all imaging personalized reviewed   1/24 CXR - right PICC in place; clear chest.       Bria Lopez, CNP  [facility] Pulmonary/Critical Care

## 2024-01-25 NOTE — PROGRESS NOTES
Patient woke up yelling for help at 1945. Writer went into room to assess patient. Patient was hypotensive and tachycardic in the 170s. Patient was tense, confused, and restless. Attempted to re-orient the patient, unsuccessful but patient did relax. Hypotension and tachycardia persisted. Increased phenylephrine drip. Notified Intensivist (Dr. Harry) and he came to the bedside.     New orders for EKG, labs, 1L bolus of LR, and PICC line placement.    Patient stabilized, heart rate and BP improved. EKG showed A. Fib with RVR. No signs of bleeding at left hip surgical site. Dressing clean, dry, and intact. CMS intact. Pulses checked with doppler, strong.    Daughter Fuad called and updated on plan of care. Agreeable with plan.

## 2024-01-25 NOTE — CONSULTS
Care Management Initial Consult    General Information  Assessment completed with: Brigitte Schofieldni  Type of CM/SW Visit: Initial Assessment    Primary Care Provider verified and updated as needed: Yes   Readmission within the last 30 days: no previous admission in last 30 days      Reason for Consult: discharge planning  Advance Care Planning: Advance Care Planning Reviewed: present on chart          Communication Assessment  Patient's communication style: spoken language (English or Bilingual)    Hearing Difficulty or Deaf: yes   Wear Glasses or Blind: yes    Cognitive  Cognitive/Neuro/Behavioral: .WDL except  Level of Consciousness: confused  Arousal Level: opens eyes spontaneously  Orientation: disoriented to, time, situation  Mood/Behavior: calm, cooperative     Speech: clear    Living Environment:   People in home: spouse  Rosie  Current living Arrangements: house (one-level townhouse)      Able to return to prior arrangements: yes       Family/Social Support:  Care provided by: self, spouse/significant other  Provides care for: no one  Marital Status:   Wife, Kanwal Velez       Description of Support System: Supportive, Involved         Current Resources:   Patient receiving home care services: Yes  Skilled Home Care Services: Skilled Nursing  Community Resources: Home Care  Equipment currently used at home: cane, quad  Supplies currently used at home: Compression Stockings, Hearing Aid Batteries    Employment/Financial:  Employment Status: retired     Employment/ Comments: Vet; elegible for services, not set up yet  Financial Concerns: none   Referral to Financial Worker: No       Does the patient's insurance plan have a 3 day qualifying hospital stay waiver?  No    Lifestyle & Psychosocial Needs:  Social Determinants of Health     Food Insecurity: Not on file   Depression: Not at risk (7/21/2022)    PHQ-2     PHQ-2 Score: 0   Housing Stability: Not on file   Tobacco Use: Low Risk   (1/24/2024)    Patient History     Smoking Tobacco Use: Never     Smokeless Tobacco Use: Never     Passive Exposure: Not on file   Financial Resource Strain: Not on file   Alcohol Use: Not on file   Transportation Needs: Not on file   Physical Activity: Not on file   Interpersonal Safety: Not on file   Stress: Not on file   Social Connections: Not on file       Functional Status:  Prior to admission patient needed assistance:   Dependent ADLs:: Ambulation-cane  Dependent IADLs:: Transportation, Medication Management, Shopping       Mental Health Status:  Mental Health Status: No Current Concerns       Chemical Dependency Status:  Chemical Dependency Status: No Current Concerns             Values/Beliefs:  Spiritual, Cultural Beliefs, Spiritism Practices, Values that affect care: no               Additional Information:  CM completed assessment with daughter, Fuad, over the phone.  CM introduced self and identified role.      Patient admitted after sustaining a fall and subsequently fractured his hip.  Patent tripped over his grabber that was stuck in his chair.  Patient had hip surgery 1/24.  Patient has hx of delirium from narcotics.  This started after receiving narcotics for back surgery in 2022, and since then patient has had sporadic confusion and hallucinations.  Patient has an OP neurology appointment on 2/14.  Patient also has had testing for amyloidosis which has been negative, though daughter questioning if needs to be tested again.     [place]  Patient has 3 children 2 who live in in [place] and 1 son who lives in [place].  Daughter, Fuad, visits about 1x/week to help with any needs (usually tech related needs).  At baseline patient is independent with most I/ADLs.  Patient uses a cane at times for mobility and needs assistance with putting on jacket and Velcro compression stockings from his wife. Patient is able to help with cleaning and cooking.  Patient  no longer drives due to confusion.  Wife and children provide transportation.  Wife sets up medication in a pill box as patient's fingers are too big to grab some pills (wife sometimes has a hard time counting out the pills).  Patient has lift chair.    [facility]  Nurse provides wound care for lower extremities.  Daughter would like medication set up added on.  Patient has completed home care PT/OT/HHA/SW.     Patient is a retired teacher, receiving pension.  Patient is also a , is eligable for services though not set up yet. [facility] supplies hearing aids and was going to get a fall alert tomorrow, 1/26.      Patient has previously been to [facility] (2022) and [facility] [address] (2023).  Daughter does not want patient to go to [facility]. TCU choices would be [facility], [facility], or [facility].     Family could transport at discharge if patient's mobility allows for it, otherwise they are agreeable to medical transport.     Plan: Will await PT/OT recommendations.     Fuad Walls: [phone]     CM will continue to monitor medical progression and aid in discharge planning as needed.       Elizabeth London RN

## 2024-01-26 ENCOUNTER — APPOINTMENT (OUTPATIENT)
Dept: PHYSICAL THERAPY | Facility: HOSPITAL | Age: 89
DRG: 466 | End: 2024-01-26
Payer: COMMERCIAL

## 2024-01-26 LAB
ABO/RH(D): NORMAL
ANION GAP SERPL CALCULATED.3IONS-SCNC: 7 MMOL/L (ref 7–15)
ANTIBODY SCREEN: NEGATIVE
APTT PPP: 80 SECONDS (ref 22–38)
APTT PPP: >300 SECONDS (ref 22–38)
BLD PROD TYP BPU: NORMAL
BLOOD COMPONENT TYPE: NORMAL
BUN SERPL-MCNC: 28.3 MG/DL (ref 8–23)
CA-I BLD-MCNC: 4.8 MG/DL (ref 4.4–5.2)
CALCIUM SERPL-MCNC: 8.2 MG/DL (ref 8.8–10.2)
CHLORIDE SERPL-SCNC: 108 MMOL/L (ref 98–107)
CODING SYSTEM: NORMAL
CORTIS SERPL-MCNC: 2.5 UG/DL
CREAT SERPL-MCNC: 1.03 MG/DL (ref 0.67–1.17)
CROSSMATCH: NORMAL
DEPRECATED HCO3 PLAS-SCNC: 25 MMOL/L (ref 22–29)
EGFRCR SERPLBLD CKD-EPI 2021: 69 ML/MIN/1.73M2
ERYTHROCYTE [DISTWIDTH] IN BLOOD BY AUTOMATED COUNT: 13.7 % (ref 10–15)
GLUCOSE BLDC GLUCOMTR-MCNC: 124 MG/DL (ref 70–99)
GLUCOSE BLDC GLUCOMTR-MCNC: 130 MG/DL (ref 70–99)
GLUCOSE BLDC GLUCOMTR-MCNC: 133 MG/DL (ref 70–99)
GLUCOSE BLDC GLUCOMTR-MCNC: 139 MG/DL (ref 70–99)
GLUCOSE SERPL-MCNC: 148 MG/DL (ref 70–99)
HCT VFR BLD AUTO: 23.2 % (ref 40–53)
HGB BLD-MCNC: 7.8 G/DL (ref 13.3–17.7)
ISSUE DATE AND TIME: NORMAL
MAGNESIUM SERPL-MCNC: 1.9 MG/DL (ref 1.7–2.3)
MAGNESIUM SERPL-MCNC: 2.1 MG/DL (ref 1.7–2.3)
MCH RBC QN AUTO: 30.5 PG (ref 26.5–33)
MCHC RBC AUTO-ENTMCNC: 33.6 G/DL (ref 31.5–36.5)
MCV RBC AUTO: 91 FL (ref 78–100)
PHOSPHATE SERPL-MCNC: 1.9 MG/DL (ref 2.5–4.5)
PHOSPHATE SERPL-MCNC: 2.1 MG/DL (ref 2.5–4.5)
PLATELET # BLD AUTO: 215 10E3/UL (ref 150–450)
POTASSIUM SERPL-SCNC: 4.5 MMOL/L (ref 3.4–5.3)
RBC # BLD AUTO: 2.56 10E6/UL (ref 4.4–5.9)
SODIUM SERPL-SCNC: 140 MMOL/L (ref 135–145)
SPECIMEN EXPIRATION DATE: NORMAL
UNIT ABO/RH: NORMAL
UNIT NUMBER: NORMAL
UNIT STATUS: NORMAL
UNIT TYPE ISBT: 2800
WBC # BLD AUTO: 11.9 10E3/UL (ref 4–11)

## 2024-01-26 PROCEDURE — 85730 THROMBOPLASTIN TIME PARTIAL: CPT | Performed by: STUDENT IN AN ORGANIZED HEALTH CARE EDUCATION/TRAINING PROGRAM

## 2024-01-26 PROCEDURE — 250N000009 HC RX 250: Performed by: INTERNAL MEDICINE

## 2024-01-26 PROCEDURE — 83735 ASSAY OF MAGNESIUM: CPT

## 2024-01-26 PROCEDURE — 86900 BLOOD TYPING SEROLOGIC ABO: CPT | Performed by: STUDENT IN AN ORGANIZED HEALTH CARE EDUCATION/TRAINING PROGRAM

## 2024-01-26 PROCEDURE — 250N000011 HC RX IP 250 OP 636

## 2024-01-26 PROCEDURE — 250N000013 HC RX MED GY IP 250 OP 250 PS 637: Performed by: PAIN MEDICINE

## 2024-01-26 PROCEDURE — 258N000003 HC RX IP 258 OP 636: Performed by: STUDENT IN AN ORGANIZED HEALTH CARE EDUCATION/TRAINING PROGRAM

## 2024-01-26 PROCEDURE — 250N000009 HC RX 250: Performed by: STUDENT IN AN ORGANIZED HEALTH CARE EDUCATION/TRAINING PROGRAM

## 2024-01-26 PROCEDURE — G0463 HOSPITAL OUTPT CLINIC VISIT: HCPCS

## 2024-01-26 PROCEDURE — 99231 SBSQ HOSP IP/OBS SF/LOW 25: CPT | Mod: GC

## 2024-01-26 PROCEDURE — 86923 COMPATIBILITY TEST ELECTRIC: CPT

## 2024-01-26 PROCEDURE — 250N000013 HC RX MED GY IP 250 OP 250 PS 637: Performed by: STUDENT IN AN ORGANIZED HEALTH CARE EDUCATION/TRAINING PROGRAM

## 2024-01-26 PROCEDURE — 82330 ASSAY OF CALCIUM: CPT

## 2024-01-26 PROCEDURE — 97110 THERAPEUTIC EXERCISES: CPT | Mod: GP

## 2024-01-26 PROCEDURE — 85027 COMPLETE CBC AUTOMATED: CPT | Performed by: STUDENT IN AN ORGANIZED HEALTH CARE EDUCATION/TRAINING PROGRAM

## 2024-01-26 PROCEDURE — 85730 THROMBOPLASTIN TIME PARTIAL: CPT

## 2024-01-26 PROCEDURE — 82533 TOTAL CORTISOL: CPT

## 2024-01-26 PROCEDURE — 83735 ASSAY OF MAGNESIUM: CPT | Performed by: STUDENT IN AN ORGANIZED HEALTH CARE EDUCATION/TRAINING PROGRAM

## 2024-01-26 PROCEDURE — P9016 RBC LEUKOCYTES REDUCED: HCPCS

## 2024-01-26 PROCEDURE — 80048 BASIC METABOLIC PNL TOTAL CA: CPT | Performed by: STUDENT IN AN ORGANIZED HEALTH CARE EDUCATION/TRAINING PROGRAM

## 2024-01-26 PROCEDURE — 200N000001 HC R&B ICU

## 2024-01-26 PROCEDURE — 84100 ASSAY OF PHOSPHORUS: CPT | Performed by: STUDENT IN AN ORGANIZED HEALTH CARE EDUCATION/TRAINING PROGRAM

## 2024-01-26 PROCEDURE — 250N000013 HC RX MED GY IP 250 OP 250 PS 637

## 2024-01-26 PROCEDURE — 84100 ASSAY OF PHOSPHORUS: CPT

## 2024-01-26 PROCEDURE — 99233 SBSQ HOSP IP/OBS HIGH 50: CPT | Performed by: PAIN MEDICINE

## 2024-01-26 PROCEDURE — 97530 THERAPEUTIC ACTIVITIES: CPT | Mod: GP

## 2024-01-26 RX ORDER — CEPHALEXIN 500 MG/1
500 CAPSULE ORAL EVERY 6 HOURS SCHEDULED
Status: DISCONTINUED | OUTPATIENT
Start: 2024-01-26 | End: 2024-01-26

## 2024-01-26 RX ORDER — MIDODRINE HYDROCHLORIDE 5 MG/1
5 TABLET ORAL
Status: DISCONTINUED | OUTPATIENT
Start: 2024-01-26 | End: 2024-02-02 | Stop reason: HOSPADM

## 2024-01-26 RX ORDER — CEPHALEXIN 250 MG/5ML
500 POWDER, FOR SUSPENSION ORAL 4 TIMES DAILY
Status: COMPLETED | OUTPATIENT
Start: 2024-01-26 | End: 2024-02-01

## 2024-01-26 RX ORDER — FAMOTIDINE 20 MG/1
40 TABLET, FILM COATED ORAL 2 TIMES DAILY
Status: DISCONTINUED | OUTPATIENT
Start: 2024-01-26 | End: 2024-01-29

## 2024-01-26 RX ORDER — HEPARIN SODIUM 10000 [USP'U]/100ML
0-5000 INJECTION, SOLUTION INTRAVENOUS CONTINUOUS
Status: DISCONTINUED | OUTPATIENT
Start: 2024-01-26 | End: 2024-01-27

## 2024-01-26 RX ADMIN — FAMOTIDINE 40 MG: 20 TABLET ORAL at 20:19

## 2024-01-26 RX ADMIN — POTASSIUM CHLORIDE 20 MEQ: 750 TABLET, EXTENDED RELEASE ORAL at 10:04

## 2024-01-26 RX ADMIN — ATORVASTATIN CALCIUM 20 MG: 10 TABLET, FILM COATED ORAL at 20:18

## 2024-01-26 RX ADMIN — SENNOSIDES AND DOCUSATE SODIUM 2 TABLET: 8.6; 5 TABLET ORAL at 20:18

## 2024-01-26 RX ADMIN — DICLOFENAC 2 G: 10 GEL TOPICAL at 16:07

## 2024-01-26 RX ADMIN — DICLOFENAC 2 G: 10 GEL TOPICAL at 09:59

## 2024-01-26 RX ADMIN — CEPHALEXIN 500 MG: 250 FOR SUSPENSION ORAL at 12:34

## 2024-01-26 RX ADMIN — LIDOCAINE: 50 OINTMENT TOPICAL at 20:20

## 2024-01-26 RX ADMIN — AMIODARONE HYDROCHLORIDE 200 MG: 200 TABLET ORAL at 09:58

## 2024-01-26 RX ADMIN — MIDODRINE HYDROCHLORIDE 5 MG: 5 TABLET ORAL at 16:06

## 2024-01-26 RX ADMIN — Medication 250 MG: at 10:03

## 2024-01-26 RX ADMIN — HYDROMORPHONE HYDROCHLORIDE 2 MG: 2 TABLET ORAL at 16:06

## 2024-01-26 RX ADMIN — ACETAMINOPHEN 975 MG: 325 TABLET, FILM COATED ORAL at 19:30

## 2024-01-26 RX ADMIN — SODIUM CHLORIDE, POTASSIUM CHLORIDE, SODIUM LACTATE AND CALCIUM CHLORIDE: 600; 310; 30; 20 INJECTION, SOLUTION INTRAVENOUS at 07:18

## 2024-01-26 RX ADMIN — SODIUM PHOSPHATE, MONOBASIC, MONOHYDRATE AND SODIUM PHOSPHATE, DIBASIC, ANHYDROUS 15 MMOL: 142; 276 INJECTION, SOLUTION INTRAVENOUS at 20:47

## 2024-01-26 RX ADMIN — HEPARIN SODIUM AND DEXTROSE 1200 UNITS/HR: 10000; 5 INJECTION INTRAVENOUS at 14:01

## 2024-01-26 RX ADMIN — Medication 0.4 MCG/KG/MIN: at 00:33

## 2024-01-26 RX ADMIN — DICLOFENAC 2 G: 10 GEL TOPICAL at 12:34

## 2024-01-26 RX ADMIN — CEPHALEXIN 500 MG: 250 FOR SUSPENSION ORAL at 18:00

## 2024-01-26 RX ADMIN — DICLOFENAC 2 G: 10 GEL TOPICAL at 20:19

## 2024-01-26 RX ADMIN — LIDOCAINE: 50 OINTMENT TOPICAL at 10:03

## 2024-01-26 RX ADMIN — ACETAMINOPHEN 975 MG: 325 TABLET, FILM COATED ORAL at 09:58

## 2024-01-26 RX ADMIN — HYDROMORPHONE HYDROCHLORIDE 2 MG: 2 TABLET ORAL at 00:47

## 2024-01-26 RX ADMIN — POLYETHYLENE GLYCOL 3350 17 G: 17 POWDER, FOR SOLUTION ORAL at 10:03

## 2024-01-26 RX ADMIN — TORSEMIDE 20 MG: 20 TABLET ORAL at 10:04

## 2024-01-26 RX ADMIN — HYDROMORPHONE HYDROCHLORIDE 2 MG: 2 TABLET ORAL at 10:41

## 2024-01-26 RX ADMIN — CEPHALEXIN 500 MG: 250 FOR SUSPENSION ORAL at 21:10

## 2024-01-26 RX ADMIN — SENNOSIDES AND DOCUSATE SODIUM 2 TABLET: 8.6; 5 TABLET ORAL at 10:04

## 2024-01-26 ASSESSMENT — ACTIVITIES OF DAILY LIVING (ADL)
ADLS_ACUITY_SCORE: 37

## 2024-01-26 NOTE — PROGRESS NOTES
"Orthopedic Progress Note      Assessment:  2 Days Post-Op  S/P Procedure(s):  Open reduction internal fixation left periprosthetic femur fracture  Revision total hip arthroplasty, both components     Plan:   - ICU team primary, appreciate continued excellent care for Mr. Newton  - Pain team consulted: appreciate recommendations for continued optimization of post-operative pain  - Continue PT/OT as tolerated. Heavy assist of 2 currently.    - Weightbearing status: Flat foot weightbearing with walker at all times. No formal hip precautions.   - Antibiotics: Perioperative cefazolin. Upon discharge, 7 days Duricef 500 mg BID.  - Anticoagulation: Eliquis held per medicine, in addition to SCDs, va stockings and early ambulation.  - Discharge planning: pending medical recovery and clearance, progression with therapy, and pain control     Subjective:  Pain: Fair control today, just had pain medication few minutes prior to my exam.      Patient doing well on POD #2. In ICU given medical needs and EBL of 1,500 ml. Patient seems to be doing well, responds to questions and directions. Pleasantly confused currently, but redirectable.  Hgb 7.8 today, down from 7.9 yesterday (13.2 pre-op).     Objective:  /64 (BP Location: Right arm)   Pulse 96   Temp 98.2  F (36.8  C)   Resp 13   Ht 1.803 m (5' 11\")   Wt 104.8 kg (231 lb)   SpO2 100%   BMI 32.22 kg/m    The patient is Awake but sleepy.  Appears comfortable lying in bed.  Calves are soft and non-tender bilaterally  Brisk capillary refill in the toes.   Palpable left dorsalis pedis pulse. Foot warm & well-perfused.  Sensation is intact to light touch & equal bilaterally in the femoral, DP, SP & tibial nerve distributions.  ROM: Flexes slightly at the Left hip. Appropriately flexes & extends all toes bilaterally.   Motor: +4/5 dorsiflexion, plantar flexion & EHL bilaterally. Fires quad.   Leg lengths equal.  Left hip with wound vac in place, good seal. No surrounding " erythema.       Pertinent Labs   Lab Results: personally reviewed.   Lab Results   Component Value Date    INR 1.81 (H) 01/24/2024    INR 1.69 (H) 01/22/2024    INR 1.59 (H) 06/06/2022     Lab Results   Component Value Date    WBC 11.9 (H) 01/26/2024    HGB 7.8 (L) 01/26/2024    HCT 23.2 (L) 01/26/2024    MCV 91 01/26/2024     01/26/2024     Lab Results   Component Value Date     01/26/2024    CO2 25 01/26/2024         Report completed by:  Angelina Devine PA-C/Dr. [facility]    Date: 1/26/2024  Time: 12:24 PM

## 2024-01-26 NOTE — DISCHARGE INSTRUCTIONS
L hip  Continue with Prevena @ - 125 mmHg - this dressing good for up to 2 weeks. Dressing to be removed on 2/7/24.  Prior to patient discharge, nursing to replace hospital pump with a home Prevena pump (order from stores - Supplies: Prevena Pump 14 Day PS# [id number] and switch hospital pump to this home pump).

## 2024-01-26 NOTE — PLAN OF CARE
"  Problem: Adult Inpatient Plan of Care  Goal: Plan of Care Review  Description: The Plan of Care Review/Shift note should be completed every shift.  The Outcome Evaluation is a brief statement about your assessment that the patient is improving, declining, or no change.  This information will be displayed automatically on your shift  note.  Flowsheets (Taken 1/26/2024 1257)  Plan of Care Reviewed With:   patient   child  Overall Patient Progress: no change  Goal: Absence of Hospital-Acquired Illness or Injury  Intervention: Prevent Skin Injury  Recent Flowsheet Documentation  Taken 1/26/2024 1256 by Jessica Carson RN  Body Position: turned  Taken 1/26/2024 1000 by Jessica Carson RN  Body Position: turned  Taken 1/26/2024 0800 by Jessica Carson RN  Body Position: turned   Goal Outcome Evaluation:      Plan of Care Reviewed With: patient, child    Overall Patient Progress: no changeOverall Patient Progress: no change     [facility] - ICU    RN Progress Note:            Pertinent Assessments:      Please refer to flowsheet rows for full assessment   Still painful in shoulders and hip- not tolerating taking oral meds very well even when crushed  \" too bitter \" updated daughter and son- eating well though             Key Events - This Shift:   Needs to move        SJN SAT (Sedation Awakening Trial): For use ONLY if intubated    SAT Safety Screen    If FAILED why?    SAT Performed    If FAILED why?               Barriers to Discharge / Downgrade:     Pain tolerance and movement         Point of Contact Update             "

## 2024-01-26 NOTE — PLAN OF CARE
[facility] - ICU    RN Progress Note:            Pertinent Assessments:      Please refer to flowsheet rows for full assessment     PRN dilaudid given around 0030 d/t increased pain (8/10), pt has been resting since, still arouses to voice when nurse enters room. Pt forgetful/confused at times, easily reoriented, unable to give nurse the specific date but able to explain where & why he's in the hospital along with hold and appropriate conversation. Q2hr turns/tilts completed, pt complaining of severe pain with any movement. Hipolito titrated throughout night to achieve MAP>65, pedal pulses along with post tib are dopplerable. VSS, afebrile           Key Events - This Shift:       Increased pain                Barriers to Discharge / Downgrade:     Hipolito

## 2024-01-26 NOTE — CONSULTS
[facility]  WOC Nurse Inpatient Assessment     Consulted for: Prevena management    Summary: L hip surgery - Prevena placement in OR. Had been working well per chart review. Suction head noted to have been removed from dressing. Dressing adjusted and connected to new VAC pump - Prevena settings. Prior to patient discharge, nursing to replace hospital pump with a home Prevena pump (order from stores - Supplies: Prevena Pump 14 Day PS# [id number] and switch hospital pump to this home pump).    Patient History (according to provider note(s):      Assessment:    L hip incision with Prevena dressing in place - this is good for 2 weeks. Added new section of dressing as old suction head had been removed somehow.  Good suction attained at - 125 mmHg.  WOC will follow weekly in case needs should arise.    Treatment Plan:   Continue with Prevena @ - 125 mmHg   Prior to patient discharge, nursing to replace hospital pump with a home Prevena pump (order from stores - Supplies: Prevena Pump 14 Day PS# [id number] and switch hospital pump to this home pump).    Orders: Written    RECOMMEND PRIMARY TEAM ORDER: None, at this time  Education provided: plan of care  Discussed plan of care with: Patient, Family, and Nurse  WOC nurse follow-up plan: signing off  Notify WOC if wound(s) deteriorate.  Nursing to notify the Provider(s) and re-consult the WOC Nurse if new skin concern.    DATA:     Current support surface: Standard  Low air loss (EJ pump, Isolibrium, Pulsate, skin guard, etc)  Containment of urine/stool: Incontinence Protocol  BMI: Body mass index is 32.22 kg/m .   Active diet order: Orders Placed This Encounter      Combination Diet Regular Diet; Very High Consistent Carb (90 g CHO per Meal) Diet     Output: I/O last 3 completed shifts:  In: 2511.48 [P.O.:160; I.V.:2351.48]  Out: 1475 [Urine:1475]     Labs:   Recent Labs   Lab 01/26/24  0508 01/24/24 2002 01/24/24  1437 01/23/24  0629 01/22/24  0953   HGB  7.8*   < > 9.4*   < > 13.3   INR  --   --  1.81*  --  1.69*   WBC 11.9*   < > 13.4*   < > 6.3   A1C  --   --   --   --  6.8*    < > = values in this interval not displayed.     Pressure injury risk assessment:   Sensory Perception: 3-->slightly limited  Moisture: 4-->rarely moist  Activity: 1-->bedfast  Mobility: 2-->very limited  Nutrition: 2-->probably inadequate  Friction and Shear: 2-->potential problem  Hunter Score: 14    Shanika Romero, MSN RN CWOCN  Pager no longer is use, please contact through [employer] group: [facility] [employer]

## 2024-01-26 NOTE — PROGRESS NOTES
"[facility]    Medicine Progress Note - Hospitalist Service    Date of Admission:  1/22/2024    Assessment & Plan   Alvin Newton is a 89 year old male admitted on 1/22/2024. He has a history of pAF on Eliquis, bilateral hip replacements (33 years ago), HFpEF, T2DM, hypertension, severe aortic stenosis, possible amyloidosis (work-up in process), mild cognitive impairment and is admitted for left femur fracture. Unfortunately significant blood loss causing hypotension and transfer to ICU for pressors.    ICU primary. Medicine to assume care when patient able to stepdown from ICU.     Hypotension   Transferred to ICU on 1/24 after procedure after losing 3L of blood during surgery. Continues on pressor support. History of HFpEF and severe aortic stenosis -- cautious fluid administration.   - management per ICU      Left femur fracture s/p repair 1/24  Mechanical fall   Chronic anticoagulation (Eliquis)  Presented to ED 1/22 for a mechanical fall at home. Tried to reach for his cane for stability but put his weight on his \"grabber device\" instead and fell forward. Reports he did not hit his head or lose consciousness. CT head with no acute abnormality. XR pelvis/left hip revealed a left proximal femoral fracture. History of bilateral hip replacements about 33 years ago. On Eliquis for atrial fibrillation, most recent dose 1/21 PM. Underwent repair on on 1/24.  - Orthopedic surgery consulted   - ADAT  - Pain team consulted   - scheduled tylenol, PRN PO dilaudid, PRN IV Dilaudid for severe pain   - diclofenac and lidocaine   - robaxin  - PT/OT  - Hold Eliquis   - Daily BMP, CBC     Delirium  History of hospital-induced delirium per family. Known mild cognitive impairment at baseline. Some delirium post operatively.   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 Cardiac MRI in October concerning for infiltrative cardiomyopathy. Work-up for amyloidosis in process by cardiology and [facility]. Biopsies of bone marrow and fat pad negative for AL amyloid. Per chart review, plan at this time is to follow-up with cardiology regarding possibly myocardial biopsy and continued amyloid/infiltrative cardiomyopathy work-up.   - echo today  - PTA metoprolol, Lipitor, torsemide   - Follow-up with cardiologist as scheduled     Normocytic anemia  Patient with hgb of 7-8s following procedure. Acute blood loss anemia.  - CBC in am  - Elliquis on hold     A-fib with RVR, improved  History of pAF on Eliquis. Most recent dose of Eliquis was 1/21 PM. INR 1.69 on admission. Follows with [facility] cardiology. Went into RVR after procedure which has now resolved.  - hold PTA metoprolol, continue PTA amiodarone   - Hold PTA Eliquis in preparation for hip fracture -- restart when able per ortho    DEJUAN on CKD 3a, improved  Creatinine 1.34 on admission. Unclear baseline per chart review - Cr had been 1.0-1.2 summer 2023, but more recently 1.4-1.6 in cardiology clinic in December. Will continue to monitor. Cr bump to 1.74 morning of procedure. Now improving with fluids.   - Daily BMP   - Avoid nephrotoxins     Type 2 diabetes mellitus   History of T2DM without long-term use of insulin. PTA medications: none. Most recent A1C 6.8% on admission.   - POC glucose checks QID   - Medium resistance SSI     Stress leukocytosis, improved  WBC of 13 after procedure. Improving. No signs of infection at this time  - CBC in am     Depression/Anxiety  - PTA Celexa     GERD  - PTA Pepcid               Diet: Combination Diet Regular Diet; Very High Consistent Carb (90 g CHO per Meal) Diet    DVT Prophylaxis: On hold  Eastman Catheter: PRESENT, indication: Strict 1-2 Hour I&O  Lines: PRESENT      PICC 01/24/24 Double Lumen  "Right Basilic-Site Assessment: WDL  Arterial Line 01/24/24 Radial-Site Assessment: WDL      Cardiac Monitoring: None  Code Status: No CPR- Pre-arrest intubation OK      Clinically Significant Risk Factors               # Coagulation Defect: INR = 1.81 (Ref range: 0.85 - 1.15) and/or PTT = 33 Seconds (Ref range: 22 - 38 Seconds), will monitor for bleeding    # Hypertension: Noted on problem list  # Chronic heart failure with preserved ejection fraction: heart failure noted on problem list and last echo with EF >50%      # DMII: A1C = 6.8 % (Ref range: <5.7 %) within past 6 months, PRESENT ON ADMISSION  # Obesity: Estimated body mass index is 32.22 kg/m  as calculated from the following:    Height as of this encounter: 1.803 m (5' 11\").    Weight as of this encounter: 104.8 kg (231 lb)., PRESENT ON ADMISSION       # Financial/Environmental Concerns: none         Disposition Plan     Expected Discharge Date: 01/29/2024                  The patient's care was discussed with the Attending Physician, Dr. Garcia .    Adriana Lombardo MD  Hospitalist Service  [facility]  Securely message with [email or web address] (more info)  Text page via [facility] Paging/Directory   ______________________________________________________________________    Interval History   Patient seen in ICU with family present. ICU primary. Family had no questions. Off pressors now for about an hour. Noting pain to left hip but no other pain. Denies shortness of breath or chest pain.     Physical Exam   Vital Signs: Temp: 98  F (36.7  C) Temp src: Oral BP: 110/64 Pulse: 91   Resp: 15 SpO2: 96 % O2 Device: None (Room air) Oxygen Delivery: 1 LPM  Weight: 231 lbs 0 oz  GENERAL: Alert and no distress  RESP:  Lungs clear throughout. No wheeze or crackles.   CV: Heart RRR. Systolic murmur. Pulses intact  Abdomen: Soft, nontender, nondistended.  MSK: left hip clean and dry. Wound vac in place  SKIN: Visible skin clear. No significant rash, abnormal " pigmentation or lesions.  NEURO: Cranial nerves grossly intact.      Data     I have personally reviewed the following data over the past 24 hrs:    11.9 (H)  \   7.8 (L)   / 215     140 108 (H) 28.3 (H) /  130 (H)   4.5 25 1.03 \       Imaging results reviewed over the past 24 hrs:   No results found for this or any previous visit (from the past 24 hour(s)).

## 2024-01-26 NOTE — PROGRESS NOTES
"[facility] ACUTE INPATIENT PAIN SERVICE    [facility], [facility], [facility], [facility], [place]   PAIN follow-up    Assessment/Plan:  Alvin Newton is a 89 year old male who was admitted on 1/22/2024.  I was asked to see the patient for postop pain. Admitted for fall and sustained fracture, and subsequently underwent open reduction internal fixation. History of A-fib, HF, CKD, anxiety/depression, GERD, mild cognitive impairment.    shows almost 2000 tablets of Xanax filled this year- but that was a  error- he is not on benzos at home. Pain is reported in the neck and hip. In 24 hours he has utilized 4mg PO dilaudid and 0.2mg IV dilaudid.     PLAN: Acute left hip pain, status post left ORIF.  Chronic shoulder pain. He is opioid naïve.  Delirium and DEJUAN limiting ability to treat pain with opioids.  continue PRN dilaudid.   Multimodal Medication Therapy:   Adjuvants: Avoid NSAIDs- Estimated Creatinine Clearance: 59.9 mL/min (based on SCr of 1.03 mg/dL).   Added lidocaine and diclofenac to the shoulders.  Agree with Tylenol.  Has as needed Robaxin available.  Opioids: Continue Dilaudid 2 mg every 3 as needed  IV dilaudid 0.1mg Q2h PRN - second line for pain   Non-medication interventions- Ice   Constipation Prophylaxis-senna ordered, along with magnesium, along with MiraLAX-would monitor closely for diarrhea  Follow up /Discharge Recommendations - We recommend prescribing the following at the time of discharge:  MICHAEL Valle MD is the physician listed as prescribing the gabapentin as well as the Xanax             Subjective:    Pt is sleepy when I enter. He states that his neck is a little sore. He acknowledges pain in the left hip. He is disoriented to time, he states it is \"the 5th\" and does not know the month. He does know that he is in the hospital although he is unaware of which one. Provided re-orientation. He is oriented to self. Discussed use of pain medication prior to therapy.    "         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)    Carpal tunnel syndrome, bilateral    Fall, initial encounter    Closed fracture of first lumbar vertebra, unspecified fracture morphology, initial encounter (H)    Fracture of L1 vertebra (H)    Impaired fasting glucose    Osteoarthritis of pelvis    Other ill-defined and unknown causes of morbidity and mortality    Primary localized osteoarthrosis of shoulder region    Pure hypercholesterolemia    Reason for consultation    Right bundle branch block    Cellulitis of right lower extremity    Severe sepsis (H)    Septic shock (H)    Streptococcal bacteremia    Thrombocytopenia (H24)    Hyperbilirubinemia    Hypophosphatemia    Hypoalbuminemia    Pyuria    Paroxysmal atrial fibrillation with RVR (H)    Hypomagnesemia    Chronic pain of both shoulders    Severe aortic stenosis    Elevated brain natriuretic peptide (BNP) level    Ascending aorta dilatation (H24)    Peripheral edema    Positive urine culture    Medication induced coagulopathy  (H24)    Chronic diastolic (congestive) heart failure (H)    Altered mental status, unspecified altered mental status type    Clostridium difficile diarrhea    History of Clostridium difficile infection July 2023    Stage 3a chronic kidney disease (H)    Esophageal dysmotility    DEJUAN (acute kidney injury) (H24)    Moderate malnutrition (H24)    Schatzki's ring of distal esophagus-dilated 8/19/23    Acute gout involving toe of left foot    Unintentional weight loss    Abnormal esophagram    Hypokalemia    Hyponatremia    Open wound-coccyx/buttocks    Enterocolitis due to Clostridium difficile, not specified as recurrent    Other chronic pain    Pain in left shoulder    Pain in right  "shoulder    Unspecified streptococcus as the cause of diseases classified elsewhere    Esophageal dysphagia    Hip fracture, left, closed, initial encounter (H)    Acute kidney failure, unspecified (H24)    Amyloidosis (H)    Monoclonal gammopathy of unknown significance (MGUS)    Hypotension, unspecified hypotension type    Postoperative anemia due to acute blood loss        History   Drug Use No         Tobacco Use      Smoking status: Never      Smokeless tobacco: Never         acetaminophen  975 mg Oral Q6H    amiodarone  200 mg Oral Daily    [Held by provider] apixaban ANTICOAGULANT  5 mg Oral BID    atorvastatin  20 mg Oral At Bedtime    diclofenac  2 g Topical 4x Daily    famotidine  20 mg Oral BID    insulin aspart  1-7 Units Subcutaneous TID AC    insulin aspart  1-5 Units Subcutaneous At Bedtime    lidocaine   Topical TID    Magnesium Oxide  250 mg Oral Daily    [Held by provider] metoprolol succinate ER  25 mg Oral Daily    polyethylene glycol  17 g Oral Daily    potassium chloride ER  20 mEq Oral Daily    senna-docusate  2 tablet Oral BID    sodium chloride (PF)  3 mL Intracatheter Q8H    sodium chloride (PF)  3 mL Intracatheter Q8H    torsemide  20 mg Oral Daily       Objective:  Vital signs in last 24 hours:  B/P: 110/64, T: 98, P: 91, R: 15   Blood pressure 110/64, pulse 91, temperature 98  F (36.7  C), temperature source Oral, resp. rate 15, height 1.803 m (5' 11\"), weight 104.8 kg (231 lb), SpO2 96%.      Weight:   Wt Readings from Last 2 Encounters:   01/22/24 104.8 kg (231 lb)   09/05/23 99.3 kg (219 lb)           Intake/Output:    Intake/Output Summary (Last 24 hours) at 1/26/2024 0841  Last data filed at 1/26/2024 0608  Gross per 24 hour   Intake 2213.35 ml   Output 1275 ml   Net 938.35 ml        Review of Systems:   As per subjective, all others negative.    Physical Exam:     General Appearance:  Alert, cooperative, no distress  Patient is sleeping when I enter   Head:  Normocephalic, without " obvious abnormality, atraumatic   Eyes:  PERRL, conjunctiva/corneas clear, EOM's intact   ENT/Throat: Lips dry     Lymph/Neck: Supple, symmetrical, trachea midline, no adenopathy, thyroid: not enlarged, symmetric    Lungs:   diminished to auscultation bilaterally, respirations unlabored   Chest Wall:  No tenderness or deformity   Cardiovascular/Heart:  Muffled    Abdomen:   Soft, non-tender, bowel sounds active all four quadrants   Musculoskeletal: Extremities with incision on left hip   Skin: Skin is with covered incision     Neurologic: Alert and oriented X 1  Talkative           Imaging:  Personally Reviewed.    Results for orders placed or performed during the hospital encounter of 01/22/24   XR Pelvis and Hip Left 2 Views    Impression    IMPRESSION: Mildly displaced oblique periprosthetic left proximal femoral fracture centered at the mid-distal femoral stem particularly lateral and posterior. Bilateral total hip arthroplasties with asymmetric polyethylene liner wear superolateral, left   worse than right. No displaced pelvic fracture is identified. Degenerative change lower lumbar spine and both SI joints. No offset or widening at the symphysis pubis.    NOTE: ABNORMAL REPORT    THE DICTATION ABOVE DESCRIBES AN ABNORMALITY FOR WHICH FOLLOW-UP IS NEEDED.              Head CT w/o contrast    Impression    IMPRESSION:  1.  No acute intracranial abnormality.   CT Cervical Spine w/o Contrast    Impression    IMPRESSION:  1.  No fracture or posttraumatic subluxation.  2.  Multilevel ankylosis.   CT Femur Thigh Left w/o Contrast    Impression    IMPRESSION: Left total hip arthroplasty with an acute mildly displaced periprosthetic fracture involving the femoral component at the level of the inter and subtrochanteric regions.     CT Hip Left w/o Contrast    Impression    IMPRESSION: Left total hip arthroplasty with an acute mildly displaced periprosthetic fracture involving the femoral component at the level of the  inter and subtrochanteric regions.     XR Femur Left 2 Views    Impression    IMPRESSION: Left total hip arthroplasty with an acute mildly displaced periprosthetic fracture involving the proximal femoral shaft extending to the intertrochanteric region. Small area of benign cortical thickening along the lateral margin of the mid   left femoral diaphysis.               XR Femur Port Left 2 Views    Impression    IMPRESSION: Since the prior study, the femoral component has been replaced with a longstem femoral component and there are new cerclage wires around the proximal femur. These findings extend across the previously seen periprosthetic fracture, which is   now affixed into excellent position and alignment. There is no evidence of complication. Mild degenerative changes and small effusion in the knee incidentally noted.   XR Pelvis 1/2 Views    Impression    IMPRESSION: The femoral component of the left total hip arthroplasty has been replaced with a longstem component and there are new cerclage wires around the proximal femur, all extending across the previously seen periprosthetic fracture in the proximal   shaft of the femur. There is excellent position/alignment on this view. No complication evident. Please note that the distal end of the stem of the femoral component was not included in the field-of-view of this single image.   XR Chest PICC Placement 1 View    Impression    IMPRESSION: Right PICC to distal SVC.        Lab Results:  Personally Reviewed.   Last Comprehensive Metabolic Panel:  Sodium   Date Value Ref Range Status   01/26/2024 140 135 - 145 mmol/L Final     Comment:     Reference intervals for this test were updated on 09/26/2023 to more accurately reflect our healthy population. There may be differences in the flagging of prior results with similar values performed with this method. Interpretation of those prior results can be made in the context of the updated reference intervals.   "  12/30/2017 138 133 - 144 mmol/L Final     Potassium   Date Value Ref Range Status   01/26/2024 4.5 3.4 - 5.3 mmol/L Final   07/15/2022 3.7 3.4 - 5.3 mmol/L Final   12/31/2017 3.5 3.4 - 5.3 mmol/L Final     Chloride   Date Value Ref Range Status   01/26/2024 108 (H) 98 - 107 mmol/L Final   07/15/2022 106 94 - 109 mmol/L Final   12/30/2017 104 94 - 109 mmol/L Final     Carbon Dioxide   Date Value Ref Range Status   12/30/2017 26 20 - 32 mmol/L Final     Carbon Dioxide (CO2)   Date Value Ref Range Status   01/26/2024 25 22 - 29 mmol/L Final   07/15/2022 24 20 - 32 mmol/L Final     Anion Gap   Date Value Ref Range Status   01/26/2024 7 7 - 15 mmol/L Final   07/15/2022 9 3 - 14 mmol/L Final   12/30/2017 8 3 - 14 mmol/L Final     Glucose   Date Value Ref Range Status   01/26/2024 148 (H) 70 - 99 mg/dL Final   07/15/2022 97 70 - 99 mg/dL Final   12/30/2017 145 (H) 70 - 99 mg/dL Final     GLUCOSE BY METER POCT   Date Value Ref Range Status   01/25/2024 133 (H) 70 - 99 mg/dL Final     Urea Nitrogen   Date Value Ref Range Status   01/26/2024 28.3 (H) 8.0 - 23.0 mg/dL Final   07/15/2022 13 7 - 30 mg/dL Final   12/31/2017 26 7 - 30 mg/dL Final     Creatinine   Date Value Ref Range Status   01/26/2024 1.03 0.67 - 1.17 mg/dL Final   12/31/2017 1.11 0.66 - 1.25 mg/dL Final     GFR Estimate   Date Value Ref Range Status   01/26/2024 69 >60 mL/min/1.73m2 Final   12/31/2017 63 >60 mL/min/1.7m2 Final     Comment:     Non  GFR Calc     Calcium   Date Value Ref Range Status   01/26/2024 8.2 (L) 8.8 - 10.2 mg/dL Final   12/30/2017 7.9 (L) 8.5 - 10.1 mg/dL Final        UA: No results found for: \"UAMP\", \"UBARB\", \"BENZODIAZEUR\", \"UCANN\", \"UCOC\", \"OPIT\", \"UPCP\"           Please see A&P for additional details of medical decision making.  MANAGEMENT DISCUSSED with the following over the past 24 hours: pt and nurse   NOTE(S)/MEDICAL RECORDS REVIEWED over the past 24 hours: pain notes, ICU notes, RN notes, care management notes "   Tests personally interpreted in the past 24 hours:  - Xray showing fracture   Tests REVIEWED in the past 24 hours:  - Crt 1.03  SUPPLEMENTAL HISTORY, in addition to the patient's history, over the past 24 hours obtained from:   - nurse  Medical complexity over the past 24 hours:  -------------------------- HIGH RISK FOR MORBIDITY -------------------------------------------------------------  - Parenteral (IV) CONTROLLED SUBSTANCES ordered        Josi Palacios APRN, CNS-BC, CNP, ACHPN  Acute Care Pain Management Program   Hours of pain coverage Mon-Fri 8-1600, afterhours please call the house officer    [employer] (CHAD, RONs, SD, RH)   Page via [employer]- Click HERE to page Josi or Jordan text web console -Click for Jordan

## 2024-01-26 NOTE — PROGRESS NOTES
"Care Management Follow Up    Length of Stay (days): 4    Expected Discharge Date: 01/29/2024     Concerns to be Addressed: discharge planning     Patient plan of care discussed at interdisciplinary rounds: Yes    Anticipated Discharge Disposition:  TCU     Anticipated Discharge Services:    Anticipated Discharge DME:  Per PT: lift device and wheelchair     Patient/family educated on Medicare website which has current facility and service quality ratings:  yes  Education Provided on the Discharge Plan:  yes  Patient/Family in Agreement with the Plan:  yes    Referrals Placed by CM/SW:  TCU  Private pay costs discussed: Possible transport costs    Additional Information:  PT recommendation is TCU 1/25: \"pt heavy assist of 2 to attempt to get to EOB, unable to get to EOB without pt resisting.\"    OT recommendation is TCU 1/25: \"Pt will needs a TCU at discharge, unable to get sitting at EOB with maximal assist of 2 people, will be slow to progress due to chronic pain, if unable to progress would consider LTC with therapy but will continue to update progress\"    Per RN 1/26 AM: \"Pt forgetful/confused at times, easily reoriented, unable to give nurse the specific date but able to explain where & why he's in the hospital along with hold and appropriate conversation.\"       Social History:  Patient lives with his spouse in a one-level townhouse.  Patient has 3 children 2 who live in in [place] and 1 son who lives in [place].  Daughter, Fuad, visits about 1x/week to help with any needs (usually tech related needs).  At baseline patient is independent with most I/ADLs.  Patient uses a cane at times for mobility and needs assistance with putting on jacket and Velcro compression stockings from his wife. Patient is able to help with cleaning and cooking.  Patient no longer drives due to confusion.  Wife and children provide transportation.  Wife sets up medication in a pill box as patient's fingers are too big to grab some " pills (wife sometimes has a hard time counting out the pills).  Patient has lift chair.     Patient currently has Home Care Nurse though [employer].  Nurse provides wound care for lower extremities.  Daughter would like medication set up added on.  Patient has completed home care PT/OT/HHA/SW.      Patient is a retired teacher, receiving pension.  Patient is also a , is eligable for services though not set up yet. [facility] supplies [facility] aids and was going to get a fall alert tomorrow, 1/26.       Patient has previously been to [facility] (2022) and [facility] (2023).  Daughter does not want patient to go to [facility]. TCU choices would be [facility], [facility], or [facility].      Family could transport at discharge if patient's mobility allows for it, otherwise they are agreeable to medical transport.        Plan: TCU, referrals pending.  CM, please resend initial snf referral to [facility] next week for review (no beds 1/26).  Patient not medically ready for discharge.  ECHO today.     Fuad Walls: [phone]      CM will continue to monitor medical progression and aid in discharge planning as needed.       Elizabeth London RN

## 2024-01-26 NOTE — PROGRESS NOTES
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 Admitted 1/22 after mechanical fall while reaching for his cane. Patient found to have mildly displaced left periprosthetic fracture with femoral involvement at inter & subtrochanteric regions. Patient underwent ORIF left periprosthetic femur fracture with total hip arthroplasty revision on 1/24/24. EBL > 1500 mL requiring phenylephrine infusion with subsequent admission to [facility].       Assessment and plan :     I have personally reviewed the daily labs, imaging studies, cultures and discussed the case with referring physician and consulting physicians.     My assessment and plan by system for this patient is as follows:    Neurology:   # Acute pain  # Mild Encephalopathy; likely metabolic  Patient has known history of hospital-induced delirium per family on previous inpatient admissions. Family describes mild delirium at baseline. It should be discussed Mini-cog outpatient score of 5 suggesting no impairment (2022). However, recent recent outpatient visit summary 12/5/2023 concerns for mild cognitive impairment; no formal testing identified.    - Pain team consulted  - Neuro exam per unit routine  - Delirium precautions  - Pain med: PRN oxycodone/dilaudid, scheduled APAP    Cardiovascular:   # Undifferentiated shock; likely hemorrhagic vs vaso-plegia - resolved  # Paroxysmal afib (eloquis)  # Severe aortic stenosis  #  HFrEF- mild  # HLD & HTN  Patient with  1.5L EBL intra-op requiring phenylephrine infusion to maintain MAP > 65. Phenylephrine infusion off this AM in setting of midodrine initiation and 1 uPRBC ordered. Cortisol level ordered for rule out adrenal insufficiency.    - Hemodynamic goals: MAP > 65  - Low intensity heparin infusion per ortho; will re-assess later when appropriate to start PTA Eloquis  - Hold PTA meds: Metoprolol  - Continue PTA Amiodarone & atorvastatin   - Continue Torsemide 20 mg daily    07/2023 TTE: LV mild reduced 50-55%, LV hypertrophy present, severe valvular aortic stenosis. (severe low-flow, low gradient AS with SVI of only 15 ml/m2), RV mildly dilated/decreased. The rhythm was rapid atrial fibrillation. -110 during most of the study.    # Possible amyloidosis (work-up in process)  Patient follows with cardiology outpatient. Patient has ongoing amyloidosis workup with Dr. White with tentative planned cardiac biopsy.  - Nothing to do acutely    Pulmonary/Ventilator Management:   # No acute concerns  - Supplemental oxygen to maintain SpO2 > 92%; currently RA  - Incentive spirometer Q2H while awake  - Encourage pulmonary hygiene    GI/Nutrition:  # GERD  # Obese  - Continue PTA pepcid  - ADAT regular diet  - Bowel regimen ordered    Renal/fluids/electrolytes:   # Non-oliguric DEJUAN - improved  Baseline creatinine ~ 1.0  - Strict I&O  - RN to manage electrolyte orders in place  - Daily CMP, mg, and phos ordered      Infectious Disease:   # No acute concerns  # Stress induced leukocytosis  - Continue paola-operative antibiotics per Orthopedics  - Ancef 1/24-1/26; transitioned to oral Keflex today per ortho plan  - CTM WBC and fever curve    Endocrine:   # Stress induced hyperglycemia  # DM 2 (No PTA medication)  - No indication for insulin sliding scale management at this time  - ICU hypoglycemia protocol in place   - Goal blood glucose < 180    Hematology/Oncology:   # Acute blood loss anemia  #  Stress induced leukocytosis  # Afib anticoagulation prophylaxis (Eloquis)  Patient intra-op EBL > 1.5L. One unit(s) PRBC given today 1/27/23 for Hgb 7.7. It should be described that Hgb prior to surgery was ~ 13.   - Daily CBC plt w/ diff  - Goal Hgb > 7.0  - Start Low intensity heparin infusion per ortho; will reassess at a later date when appropriate for Eloquis restart. PTT goal utilized I/s of PTA eloquis    MSK/Skin:  # Mechanical fall c/b mild displacement of left periprosthetic and femoral involvement  # S/P ORIF left periprosthetic femur fracture with total hip arthroplasty revision on 1/24/24 w/ Dr. Ramos  - PT consulted  - Plan per ortho described below:  POSTOPERATIVE PLAN per Ortho:  Foot flat weightbearing with a walker at all times.  No hip precautions.  Perioperative cefazolin.  7 days of Duricef.  Postop femur and hip x-rays to be obtained in the recovery area  Eliquis 5 mg twice daily to resume tomorrow, okay from orthopedic standpoint  P.o. pain medication, IV for breakthrough  Maintain Prevena in place until 2-week follow-up  Appreciate excellent cares of the medicine service, please continue to optimize glucose control in the perioperative period  Patient will recover in ICU following discussion with anesthesia. Patient is stable, however given history of aoritic stenosis and EBL, observation in ICU is deemed more appropriate post-operatively than general care    Clinically Significant Risk Factors               # Coagulation Defect: INR = 1.81 (Ref range: 0.85 - 1.15) and/or PTT = 33 Seconds (Ref range: 22 - 38 Seconds), will monitor for bleeding    # Hypertension: Noted on problem list  # Chronic heart failure with preserved ejection fraction: heart failure noted on problem list and last echo with EF >50%      # DMII: A1C = 6.8 % (Ref range: <5.7 %) within past 6 months, PRESENT ON ADMISSION  # Obesity: Estimated body mass index is 32.22 kg/m  as calculated from the following:    Height as of  "this encounter: 1.803 m (5' 11\").    Weight as of this encounter: 104.8 kg (231 lb)., PRESENT ON ADMISSION     # Financial/Environmental Concerns: none        Code Status: No CPR- Pre-arrest intubation OK         ICU Prophylaxis:   1. DVT: Heparin infusion  2. VAP: HOB 30 degrees, chlorhexidine rinse  3. Stress Ulcer: Pepcid  4. Restraints: None   5. Wound care - per unit routine   6. Feeding - Regular diet  7. Family Update: Updated bedside  8. Disposition - Tentative plan to transfer to hospitalist service this afternoon      Key goals for next 24 hours:   Hemodynamic stability  OOB with PT  Transfer to general medicine        Interim History/Overnight Events:     Phenylephrine turned off this AM. Midodrine started and one unit PRBC ordered. Cortisol ordered for further workup of adrenal insufficiency; consider corticotropin stimulation test if appropriate.          Key Medications:      acetaminophen  975 mg Oral Q6H    amiodarone  200 mg Oral Daily    [Held by provider] apixaban ANTICOAGULANT  5 mg Oral BID    atorvastatin  20 mg Oral At Bedtime    diclofenac  2 g Topical 4x Daily    famotidine  20 mg Oral BID    insulin aspart  1-7 Units Subcutaneous TID AC    insulin aspart  1-5 Units Subcutaneous At Bedtime    lidocaine   Topical TID    Magnesium Oxide  250 mg Oral Daily    [Held by provider] metoprolol succinate ER  25 mg Oral Daily    polyethylene glycol  17 g Oral Daily    potassium chloride ER  20 mEq Oral Daily    senna-docusate  2 tablet Oral BID    sodium chloride (PF)  3 mL Intracatheter Q8H    sodium chloride (PF)  3 mL Intracatheter Q8H    torsemide  20 mg Oral Daily      lactated ringers 75 mL/hr at 01/26/24 0718    phenylephrine 0.1 mcg/kg/min (01/26/24 0614)               Physical Examination:   Temp:  [97.8  F (36.6  C)-98.9  F (37.2  C)] 98  F (36.7  C)  Pulse:  [75-98] 91  Resp:  [10-30] 15  BP: (110)/(56-64) 110/64  MAP:  [58 mmHg-93 mmHg] 67 mmHg  Arterial Line BP: ()/(41-62) " 119/42  SpO2:  [73 %-100 %] 96 %    Intake/Output Summary (Last 24 hours) at 1/26/2024 0802  Last data filed at 1/26/2024 0608  Gross per 24 hour   Intake 2213.35 ml   Output 1275 ml   Net 938.35 ml     Wt Readings from Last 4 Encounters:   01/22/24 104.8 kg (231 lb)   09/05/23 99.3 kg (219 lb)   08/28/23 101.6 kg (224 lb)   08/24/23 105.4 kg (232 lb 6.4 oz)     Arterial Line BP: ()/(41-62) 119/42  MAP:  [58 mmHg-93 mmHg] 67 mmHg  BP - Mean:  [79-82] 82  Resp: 15    No lab results found in last 7 days.    GEN: not in distress  EYES: PERRL, Anicteric sclera.   HEENT:  Normocephalic, atraumatic, trachea midline, Pupils PERRLA  CV: RRR, no gallops, rubs, or murmurs  PULM/CHEST: Clear breath sounds bilaterally without rhonchi, crackles or wheeze, symmetric chest rise  GI: normal bowel sounds, soft, obese non-tender, no rebound tenderness or guarding, no masses  : wade catheter in place, urine yellow and clear  EXTREMITIES: No peripheral edema, moving all extremities, peripheral pulses intact  NEURO: AO x 4, forgetful, asking repeat questions, CAM +, and following commands  SKIN: Surgical incision left hip wound vac CDI  PSYCH:  Affect: appropriate             Data:   All data and imaging reviewed.     ROUTINE ICU LABS (Last four results)  CMP  Recent Labs   Lab 01/26/24  0508 01/25/24  2034 01/25/24  1641 01/25/24  1541 01/25/24  0807 01/25/24  0456 01/24/24  2236 01/24/24  2002 01/24/24  1728 01/24/24  1437     --   --   --   --  141  --  142  --  141   POTASSIUM 4.5  --   --   --   --  4.4  --  4.8  --  4.4   CHLORIDE 108*  --   --   --   --  107  --  107  --  107   CO2 25  --   --   --   --  25  --  22  --  22   ANIONGAP 7  --   --   --   --  9  --  13  --  12   * 133* 148* 180*   < > 186*   < > 184*   < > 198*   BUN 28.3*  --   --   --   --  31.0*  --  30.3*  --  30.2*   CR 1.03  --   --   --   --  1.24*  --  1.30*  --  1.30*   GFRESTIMATED 69  --   --   --   --  56*  --  53*  --  53*   JERRY  "8.2*  --   --   --   --  8.2*  --  8.1*  --  8.0*    < > = values in this interval not displayed.     CBC  Recent Labs   Lab 01/26/24  0508 01/25/24  1532 01/25/24  0456 01/24/24 2002 01/24/24  1437 01/24/24  0554   WBC 11.9*  --  12.8*  --  13.4* 7.7   RBC 2.56*  --  2.80*  --  3.15* 4.46   HGB 7.8* 7.9* 8.3* 9.1* 9.4* 13.2*   HCT 23.2*  --  25.4*  --  28.6* 40.3   MCV 91  --  91  --  91 90   MCH 30.5  --  29.6  --  29.8 29.6   MCHC 33.6  --  32.7  --  32.9 32.8   RDW 13.7  --  13.7  --  13.7 13.5     --  233 235 243 206     INR  Recent Labs   Lab 01/24/24  1437 01/22/24  0953   INR 1.81* 1.69*     Arterial Blood GasNo lab results found in last 7 days.    All cultures:  No results for input(s): \"CULT\" in the last 168 hours.  No results found for this or any previous visit (from the past 24 hour(s)).              Billing: This patient is critically ill: Yes. Total critical care time today 50 min. This does not include time spent on procedures or teaching.     CHUCK Cooper CNP on 1/26/2024 at 8:02 AM            "

## 2024-01-27 ENCOUNTER — APPOINTMENT (OUTPATIENT)
Dept: OCCUPATIONAL THERAPY | Facility: HOSPITAL | Age: 89
DRG: 466 | End: 2024-01-27
Payer: COMMERCIAL

## 2024-01-27 ENCOUNTER — APPOINTMENT (OUTPATIENT)
Dept: PHYSICAL THERAPY | Facility: HOSPITAL | Age: 89
DRG: 466 | End: 2024-01-27
Payer: COMMERCIAL

## 2024-01-27 LAB
ANION GAP SERPL CALCULATED.3IONS-SCNC: 7 MMOL/L (ref 7–15)
APTT PPP: 97 SECONDS (ref 22–38)
APTT PPP: >300 SECONDS (ref 22–38)
BUN SERPL-MCNC: 28.7 MG/DL (ref 8–23)
CALCIUM SERPL-MCNC: 8 MG/DL (ref 8.8–10.2)
CHLORIDE SERPL-SCNC: 108 MMOL/L (ref 98–107)
CREAT SERPL-MCNC: 1.15 MG/DL (ref 0.67–1.17)
DEPRECATED HCO3 PLAS-SCNC: 26 MMOL/L (ref 22–29)
EGFRCR SERPLBLD CKD-EPI 2021: 61 ML/MIN/1.73M2
ERYTHROCYTE [DISTWIDTH] IN BLOOD BY AUTOMATED COUNT: 14.4 % (ref 10–15)
GLUCOSE BLDC GLUCOMTR-MCNC: 121 MG/DL (ref 70–99)
GLUCOSE BLDC GLUCOMTR-MCNC: 126 MG/DL (ref 70–99)
GLUCOSE BLDC GLUCOMTR-MCNC: 131 MG/DL (ref 70–99)
GLUCOSE BLDC GLUCOMTR-MCNC: 174 MG/DL (ref 70–99)
GLUCOSE SERPL-MCNC: 138 MG/DL (ref 70–99)
HCT VFR BLD AUTO: 23.2 % (ref 40–53)
HGB BLD-MCNC: 7.9 G/DL (ref 13.3–17.7)
HGB BLD-MCNC: 9.4 G/DL (ref 13.3–17.7)
MAGNESIUM SERPL-MCNC: 1.9 MG/DL (ref 1.7–2.3)
MCH RBC QN AUTO: 30.4 PG (ref 26.5–33)
MCHC RBC AUTO-ENTMCNC: 34.1 G/DL (ref 31.5–36.5)
MCV RBC AUTO: 89 FL (ref 78–100)
PHOSPHATE SERPL-MCNC: 2.7 MG/DL (ref 2.5–4.5)
PLATELET # BLD AUTO: 155 10E3/UL (ref 150–450)
POTASSIUM SERPL-SCNC: 4 MMOL/L (ref 3.4–5.3)
RBC # BLD AUTO: 2.6 10E6/UL (ref 4.4–5.9)
SODIUM SERPL-SCNC: 141 MMOL/L (ref 135–145)
WBC # BLD AUTO: 7.3 10E3/UL (ref 4–11)

## 2024-01-27 PROCEDURE — 120N000001 HC R&B MED SURG/OB

## 2024-01-27 PROCEDURE — 84100 ASSAY OF PHOSPHORUS: CPT | Performed by: STUDENT IN AN ORGANIZED HEALTH CARE EDUCATION/TRAINING PROGRAM

## 2024-01-27 PROCEDURE — 83735 ASSAY OF MAGNESIUM: CPT | Performed by: STUDENT IN AN ORGANIZED HEALTH CARE EDUCATION/TRAINING PROGRAM

## 2024-01-27 PROCEDURE — 250N000013 HC RX MED GY IP 250 OP 250 PS 637: Performed by: PAIN MEDICINE

## 2024-01-27 PROCEDURE — 250N000013 HC RX MED GY IP 250 OP 250 PS 637: Performed by: STUDENT IN AN ORGANIZED HEALTH CARE EDUCATION/TRAINING PROGRAM

## 2024-01-27 PROCEDURE — 250N000009 HC RX 250: Performed by: NURSE PRACTITIONER

## 2024-01-27 PROCEDURE — 99232 SBSQ HOSP IP/OBS MODERATE 35: CPT | Mod: GC

## 2024-01-27 PROCEDURE — 97110 THERAPEUTIC EXERCISES: CPT | Mod: GP

## 2024-01-27 PROCEDURE — 250N000011 HC RX IP 250 OP 636: Performed by: STUDENT IN AN ORGANIZED HEALTH CARE EDUCATION/TRAINING PROGRAM

## 2024-01-27 PROCEDURE — 250N000013 HC RX MED GY IP 250 OP 250 PS 637

## 2024-01-27 PROCEDURE — 97530 THERAPEUTIC ACTIVITIES: CPT | Mod: GO

## 2024-01-27 PROCEDURE — 85027 COMPLETE CBC AUTOMATED: CPT | Performed by: STUDENT IN AN ORGANIZED HEALTH CARE EDUCATION/TRAINING PROGRAM

## 2024-01-27 PROCEDURE — 80048 BASIC METABOLIC PNL TOTAL CA: CPT | Performed by: STUDENT IN AN ORGANIZED HEALTH CARE EDUCATION/TRAINING PROGRAM

## 2024-01-27 PROCEDURE — 250N000011 HC RX IP 250 OP 636

## 2024-01-27 PROCEDURE — 85730 THROMBOPLASTIN TIME PARTIAL: CPT | Performed by: STUDENT IN AN ORGANIZED HEALTH CARE EDUCATION/TRAINING PROGRAM

## 2024-01-27 PROCEDURE — 85730 THROMBOPLASTIN TIME PARTIAL: CPT

## 2024-01-27 PROCEDURE — 97535 SELF CARE MNGMENT TRAINING: CPT | Mod: GO

## 2024-01-27 PROCEDURE — 85018 HEMOGLOBIN: CPT

## 2024-01-27 PROCEDURE — 97530 THERAPEUTIC ACTIVITIES: CPT | Mod: GP

## 2024-01-27 RX ORDER — COSYNTROPIN 0.25 MG/ML
250 INJECTION, POWDER, FOR SOLUTION INTRAMUSCULAR; INTRAVENOUS ONCE
Status: DISCONTINUED | OUTPATIENT
Start: 2024-01-28 | End: 2024-01-27

## 2024-01-27 RX ORDER — HYDROMORPHONE HCL IN WATER/PF 6 MG/30 ML
0.2 PATIENT CONTROLLED ANALGESIA SYRINGE INTRAVENOUS
Status: DISCONTINUED | OUTPATIENT
Start: 2024-01-27 | End: 2024-01-29

## 2024-01-27 RX ORDER — DEXAMETHASONE SODIUM PHOSPHATE 4 MG/ML
2 INJECTION, SOLUTION INTRA-ARTICULAR; INTRALESIONAL; INTRAMUSCULAR; INTRAVENOUS; SOFT TISSUE EVERY 6 HOURS
Status: DISCONTINUED | OUTPATIENT
Start: 2024-01-27 | End: 2024-01-27

## 2024-01-27 RX ORDER — COSYNTROPIN 0.25 MG/ML
250 INJECTION, POWDER, FOR SOLUTION INTRAMUSCULAR; INTRAVENOUS ONCE
Qty: 2 ML | Refills: 0 | Status: DISCONTINUED | OUTPATIENT
Start: 2024-01-27 | End: 2024-01-27

## 2024-01-27 RX ADMIN — POLYETHYLENE GLYCOL 3350 17 G: 17 POWDER, FOR SOLUTION ORAL at 08:53

## 2024-01-27 RX ADMIN — FAMOTIDINE 40 MG: 20 TABLET ORAL at 21:22

## 2024-01-27 RX ADMIN — HEPARIN SODIUM AND DEXTROSE 900 UNITS/HR: 10000; 5 INJECTION INTRAVENOUS at 10:07

## 2024-01-27 RX ADMIN — CEPHALEXIN 500 MG: 250 FOR SUSPENSION ORAL at 05:48

## 2024-01-27 RX ADMIN — DICLOFENAC 2 G: 10 GEL TOPICAL at 13:25

## 2024-01-27 RX ADMIN — METOPROLOL TARTRATE 2.5 MG: 5 INJECTION INTRAVENOUS at 16:01

## 2024-01-27 RX ADMIN — CEPHALEXIN 500 MG: 250 FOR SUSPENSION ORAL at 21:25

## 2024-01-27 RX ADMIN — MIDODRINE HYDROCHLORIDE 5 MG: 5 TABLET ORAL at 17:27

## 2024-01-27 RX ADMIN — SENNOSIDES AND DOCUSATE SODIUM 2 TABLET: 8.6; 5 TABLET ORAL at 21:22

## 2024-01-27 RX ADMIN — FAMOTIDINE 40 MG: 20 TABLET ORAL at 08:52

## 2024-01-27 RX ADMIN — HYDROMORPHONE HYDROCHLORIDE 2 MG: 2 TABLET ORAL at 13:34

## 2024-01-27 RX ADMIN — AMIODARONE HYDROCHLORIDE 200 MG: 200 TABLET ORAL at 08:52

## 2024-01-27 RX ADMIN — HYDROCORTISONE SODIUM SUCCINATE 100 MG: 100 INJECTION, POWDER, FOR SOLUTION INTRAMUSCULAR; INTRAVENOUS at 17:26

## 2024-01-27 RX ADMIN — ACETAMINOPHEN 975 MG: 325 TABLET, FILM COATED ORAL at 08:54

## 2024-01-27 RX ADMIN — MIDODRINE HYDROCHLORIDE 5 MG: 5 TABLET ORAL at 13:34

## 2024-01-27 RX ADMIN — SENNOSIDES AND DOCUSATE SODIUM 2 TABLET: 8.6; 5 TABLET ORAL at 08:53

## 2024-01-27 RX ADMIN — ACETAMINOPHEN 975 MG: 325 TABLET, FILM COATED ORAL at 18:35

## 2024-01-27 RX ADMIN — ACETAMINOPHEN 975 MG: 325 TABLET, FILM COATED ORAL at 00:35

## 2024-01-27 RX ADMIN — Medication 250 MG: at 08:53

## 2024-01-27 RX ADMIN — TORSEMIDE 20 MG: 20 TABLET ORAL at 08:55

## 2024-01-27 RX ADMIN — MIDODRINE HYDROCHLORIDE 5 MG: 5 TABLET ORAL at 08:53

## 2024-01-27 RX ADMIN — HYDROMORPHONE HYDROCHLORIDE 2 MG: 2 TABLET ORAL at 08:57

## 2024-01-27 RX ADMIN — CEPHALEXIN 500 MG: 250 FOR SUSPENSION ORAL at 17:27

## 2024-01-27 RX ADMIN — DICLOFENAC 2 G: 10 GEL TOPICAL at 08:52

## 2024-01-27 RX ADMIN — HYDROMORPHONE HYDROCHLORIDE 2 MG: 2 TABLET ORAL at 18:47

## 2024-01-27 RX ADMIN — HYDROMORPHONE HYDROCHLORIDE 0.2 MG: 0.2 INJECTION, SOLUTION INTRAMUSCULAR; INTRAVENOUS; SUBCUTANEOUS at 16:26

## 2024-01-27 RX ADMIN — HYDROMORPHONE HYDROCHLORIDE 2 MG: 2 TABLET ORAL at 00:34

## 2024-01-27 RX ADMIN — LIDOCAINE: 50 OINTMENT TOPICAL at 13:26

## 2024-01-27 RX ADMIN — LIDOCAINE: 50 OINTMENT TOPICAL at 08:52

## 2024-01-27 RX ADMIN — CEPHALEXIN 500 MG: 250 FOR SUSPENSION ORAL at 13:31

## 2024-01-27 RX ADMIN — ATORVASTATIN CALCIUM 20 MG: 10 TABLET, FILM COATED ORAL at 21:21

## 2024-01-27 ASSESSMENT — ACTIVITIES OF DAILY LIVING (ADL)
ADLS_ACUITY_SCORE: 40
ADLS_ACUITY_SCORE: 40
ADLS_ACUITY_SCORE: 37
ADLS_ACUITY_SCORE: 40
ADLS_ACUITY_SCORE: 37

## 2024-01-27 NOTE — PROGRESS NOTES
Care Management Follow Up    Length of Stay (days): 5    Expected Discharge Date: 01/29/2024     Concerns to be Addressed: discharge planning     Patient plan of care discussed at interdisciplinary rounds: Yes    Anticipated Discharge Disposition:  TCU - Referrals pending     Anticipated Discharge Services:  NA  Anticipated Discharge DME: NA    Patient/family educated on Medicare website which has current facility and service quality ratings:  yes  Education Provided on the Discharge Plan:  yes  Patient/Family in Agreement with the Plan:  yes    Referrals Placed by CM/SW:  Post acute facilities  Private pay costs discussed: Possible transport costs    Additional Information:  Chart reviewed, TCU referrals pending for discharge.     Social History:  Patient lives with his spouse in a one-level town[place]ement.  Patient has 3 children 2 who live in in [place] and 1 son who lives in [place].  Daughter, Fuad, visits about 1x/week to help with any needs (usually tech related needs).  At baseline patient is independent with most I/ADLs.  Patient uses a cane at times for mobility and needs assistance with putting on jacket and Velcro compression stockings from his wife. Patient is able to help with cleaning and cooking.  Patient no longer drives due to confusion.  Wife and children provide transportation.  Wife sets up medication in a pill box as patient's fingers are too big to grab some pills (wife sometimes has a hard time counting out the pills).  Patient has lift chair.     Patient currently has Home Care Nurse though [employer].  Nurse provides wound care for lower extremities.  Daughter would like medication set up added on.  Patient has completed home care PT/OT/HHA/SW.      Patient is a retired teacher, receiving pension.  Patient is also a , is eligable for services though not set up yet. [facility] supplies hearing aids and was going to get a fall alert tomorrow, 1/26.       Patient has previously been to [facility] (2022) and [facility] (2023).  Daughter does not want patient to go to [facility]. TCU choices would be [facility], [facility], or [facility].      Family could transport at discharge if patient's mobility allows for it, otherwise they are agreeable to medical transport.     Plan: TCU, referrals pending.  CM, please resend initial snf referral to [facility] next week for review (no beds 1/26).  Patient not medically ready for discharge.      Fuad Walls: [phone]      CM will continue to follow care progression and aide in discharge planning as needed.     Nisha Rivera RN

## 2024-01-27 NOTE — PROGRESS NOTES
"[facility]    Medicine Progress Note - Hospitalist Service    Date of Admission:  1/22/2024    Assessment & Plan   Alvin Newton is a 89 year old male admitted on 1/22/2024. He has a history of pAF on Eliquis, bilateral hip replacements (33 years ago), HFpEF, T2DM, hypertension, severe aortic stenosis, possible amyloidosis (work-up in process), mild cognitive impairment and is admitted for left femur fracture. Unfortunately significant blood loss causing hypotension and transfer to ICU for pressors. Transferred back to medical unit on 1/27 for continued management.    Hypotension, resolved   Transferred to ICU on 1/24 after procedure after losing 3L of blood during surgery. Required pressor support until 1/27. Received pRBC transfusion on 1/26. Likely hemorrhagic shock vs vaso-plegia. Also with low AM cortisol- likely component of adrenal insufficiency.  - start steroid taper     Left femur fracture s/p repair 1/24  Mechanical fall   Chronic anticoagulation (Eliquis)  Presented to ED 1/22 for a mechanical fall at home. Tried to reach for his cane for stability but put his weight on his \"grabber device\" instead and fell forward. Reports he did not hit his head or lose consciousness. CT head with no acute abnormality. XR pelvis/left hip revealed a left proximal femoral fracture. History of bilateral hip replacements about 33 years ago. On Eliquis for atrial fibrillation, most recent dose 1/21 PM. Underwent repair on on 1/24.  - Orthopedic surgery consulted    - WB LLE with walker   - PT/OT, TCU   - no hip precautions   - 7 days of Keflex 500mg q6h while in house, (1/26-2/1)   - hgb 7.9 this am, hold off on transfusion; if drops consider transfusion   - maintain Prevena until follow-up   - follow-up with Dr. Ramos 2/7 for wound check  - Pain team consulted   - scheduled tylenol, PRN PO dilaudid, PRN IV Dilaudid for severe pain   - diclofenac and lidocaine   - robaxin  - Daily BMP, CBC   "   Delirium  History of hospital-induced delirium per family. Known mild cognitive impairment at baseline. Some delirium post operatively.   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 Cardiac MRI in October concerning for infiltrative cardiomyopathy. Work-up for amyloidosis in process by cardiology and [place] oncology. Biopsies of bone marrow and fat pad negative for AL amyloid. Per chart review, plan at this time is to follow-up with cardiology regarding possibly myocardial biopsy and continued amyloid/infiltrative cardiomyopathy work-up.   - PTA metoprolol, Lipitor, torsemide   - Follow-up with cardiologist as scheduled     Normocytic anemia  Patient with hgb of 7-8s following procedure. Acute blood loss anemia.  - CBC in am  - consider transfusion if dropping given cardiovascular hx     A-fib with RVR, improved  History of pAF on Eliquis. Most recent dose of Eliquis was 1/21 PM. INR 1.69 on admission. Follows with [facility] cardiology. Went into RVR after procedure which has now resolved.  - hold PTA metoprolol, continue PTA amiodarone   - holding Eliquis for now given concern for ongoing bleeding    DEJUAN on CKD 3a, resolved  Creatinine 1.34 on admission. Unclear baseline per chart review - Cr had been 1.0-1.2 summer 2023, but more recently 1.4-1.6 in cardiology clinic in December. Will continue to monitor. Cr bump to 1.74 morning of procedure. Now improving with fluids.   - Daily BMP   - Avoid nephrotoxins     Type 2 diabetes mellitus   History of T2DM without long-term use of insulin. PTA medications: none. Most recent A1C 6.8% on admission.   - POC glucose checks QID   - Medium resistance SSI     Stress leukocytosis, resolved  WBC of 13 after procedure. Improving. No signs of  "infection at this time  - CBC in am     Depression/Anxiety  - PTA Celexa     GERD  - PTA Pepcid               Diet: Combination Diet Regular Diet; Very High Consistent Carb (90 g CHO per Meal) Diet    DVT Prophylaxis: On hold  Eastman Catheter: PRESENT, indication: Strict 1-2 Hour I&O  Lines: PRESENT      PICC 01/24/24 Double Lumen Right Basilic-Site Assessment: WDL      Cardiac Monitoring: None  Code Status: No CPR- Pre-arrest intubation OK      Clinically Significant Risk Factors                  # Hypertension: Noted on problem list  # Chronic heart failure with preserved ejection fraction: heart failure noted on problem list and last echo with EF >50%      # DMII: A1C = 6.8 % (Ref range: <5.7 %) within past 6 months   # Obesity: Estimated body mass index is 32.22 kg/m  as calculated from the following:    Height as of this encounter: 1.803 m (5' 11\").    Weight as of this encounter: 104.8 kg (231 lb).        # Financial/Environmental Concerns: none         Disposition Plan      Expected Discharge Date: 01/29/2024                  The patient's care was discussed with the Attending Physician, Dr. Garcia .    Fiona Mc MD  Hospitalist Service  [facility]  Securely message with [employer] (more info)  Text page via [employer] Paging/Directory   ______________________________________________________________________    Interval History   Patient seen in ICU with family present. ICU primary. Family had no questions. Off pressors now for about an hour. Noting pain to left hip but no other pain. Denies shortness of breath or chest pain.     Physical Exam   Vital Signs: Temp: 98.5  F (36.9  C) Temp src: Oral BP: 95/53 Pulse: 109   Resp: 12 SpO2: 97 % O2 Device: None (Room air)    Weight: 231 lbs 0 oz  GENERAL: Alert and no distress  RESP:  Lungs clear throughout. No wheeze or crackles.   CV: Heart RRR. Systolic murmur. Pulses intact  Abdomen: Soft, nontender, nondistended.  MSK: left hip clean and " dry. Wound vac in place  SKIN: Visible skin clear. No significant rash, abnormal pigmentation or lesions.  NEURO: Cranial nerves grossly intact.      Data     I have personally reviewed the following data over the past 24 hrs:    7.3  \   7.9 (L)   / 155     141 108 (H) 28.7 (H) /  138 (H)   4.0 26 1.15 \     INR:  N/A PTT:  97 (H)   D-dimer:  N/A Fibrinogen:  N/A       Imaging results reviewed over the past 24 hrs:   No results found for this or any previous visit (from the past 24 hour(s)).

## 2024-01-27 NOTE — PROGRESS NOTES
"Pt. Woke up and has been appropriate with his answers and then fell back asleep.  He states the pain is much better and said, \"I think you for that.\"  Pt. Now went back to sleep. Was repositioned earlier by fellow staff and I.    Heparin restarted at 900/ units per hour.  "

## 2024-01-27 NOTE — PROGRESS NOTES
Orthopedic Surgery progress note:  Alvin is doing okay this morning. He is sleeping, but appropriate when awoken. Reports leg pain with movement, but okay at rest. Denies CP, SOB, numbness, tingling.      AFVSS on RA    LLE  Prevena in place  Fires GSC, EHL, TA  SILT SP, DP, TN  [place] DP      Hgb - 7.9 (stable)  Cr - 1.15      89M s/p L MARIANA revision for B2 periprosthetic fracture on 1/24 that is recovering as expected and is now off of pressors.       Footflat WB LLE with walker, PT/OT, no hip precautions.   Heparin infusion per primary team, okay for eliquis from orthopedic perspective  PO pain medication, IV for breakthrough  Brandi-op IV AB complete. 7 day PO AB, Keflex 500mg Q6 while in house.   Holding on transfusion at this time at Hgb stable at 7.9. Should it drop, would recommend considering transfusion give cardiac history  Maintain prevena in place until follow up  Will require follow up with me before 2/7 for wound check      Aurelio Ramos MD  [facility]

## 2024-01-27 NOTE — PLAN OF CARE
Problem: Adult Inpatient Plan of Care  Goal: Plan of Care Review  Description: The Plan of Care Review/Shift note should be completed every shift.  The Outcome Evaluation is a brief statement about your assessment that the patient is improving, declining, or no change.  This information will be displayed automatically on your shift  note.  Outcome: Progressing   Goal Outcome Evaluation:    Pt is only alert to self. Vitals stable. Reported significant L hip surgical pain, PRN Dilaudid along with scheduled Tylenol given. Poor appetite. Use Salome lift getting pt back to bed. On heparin drip. Will continue to monitor.     Paul Vanegas RN

## 2024-01-28 LAB
ANION GAP SERPL CALCULATED.3IONS-SCNC: 6 MMOL/L (ref 7–15)
BUN SERPL-MCNC: 30.4 MG/DL (ref 8–23)
CALCIUM SERPL-MCNC: 8.8 MG/DL (ref 8.8–10.2)
CHLORIDE SERPL-SCNC: 107 MMOL/L (ref 98–107)
CREAT SERPL-MCNC: 1.23 MG/DL (ref 0.67–1.17)
DEPRECATED HCO3 PLAS-SCNC: 32 MMOL/L (ref 22–29)
EGFRCR SERPLBLD CKD-EPI 2021: 56 ML/MIN/1.73M2
ERYTHROCYTE [DISTWIDTH] IN BLOOD BY AUTOMATED COUNT: 14.2 % (ref 10–15)
GLUCOSE BLDC GLUCOMTR-MCNC: 131 MG/DL (ref 70–99)
GLUCOSE BLDC GLUCOMTR-MCNC: 132 MG/DL (ref 70–99)
GLUCOSE BLDC GLUCOMTR-MCNC: 163 MG/DL (ref 70–99)
GLUCOSE BLDC GLUCOMTR-MCNC: 178 MG/DL (ref 70–99)
GLUCOSE BLDC GLUCOMTR-MCNC: 201 MG/DL (ref 70–99)
GLUCOSE SERPL-MCNC: 176 MG/DL (ref 70–99)
HCT VFR BLD AUTO: 28 % (ref 40–53)
HGB BLD-MCNC: 9 G/DL (ref 13.3–17.7)
MAGNESIUM SERPL-MCNC: 2.1 MG/DL (ref 1.7–2.3)
MCH RBC QN AUTO: 29.3 PG (ref 26.5–33)
MCHC RBC AUTO-ENTMCNC: 32.1 G/DL (ref 31.5–36.5)
MCV RBC AUTO: 91 FL (ref 78–100)
PHOSPHATE SERPL-MCNC: 3.6 MG/DL (ref 2.5–4.5)
PLATELET # BLD AUTO: 222 10E3/UL (ref 150–450)
POTASSIUM SERPL-SCNC: 4.2 MMOL/L (ref 3.4–5.3)
RBC # BLD AUTO: 3.07 10E6/UL (ref 4.4–5.9)
SODIUM SERPL-SCNC: 145 MMOL/L (ref 135–145)
WBC # BLD AUTO: 7.8 10E3/UL (ref 4–11)

## 2024-01-28 PROCEDURE — 250N000011 HC RX IP 250 OP 636: Performed by: STUDENT IN AN ORGANIZED HEALTH CARE EDUCATION/TRAINING PROGRAM

## 2024-01-28 PROCEDURE — 250N000013 HC RX MED GY IP 250 OP 250 PS 637

## 2024-01-28 PROCEDURE — 250N000013 HC RX MED GY IP 250 OP 250 PS 637: Performed by: STUDENT IN AN ORGANIZED HEALTH CARE EDUCATION/TRAINING PROGRAM

## 2024-01-28 PROCEDURE — 250N000013 HC RX MED GY IP 250 OP 250 PS 637: Performed by: PAIN MEDICINE

## 2024-01-28 PROCEDURE — 83735 ASSAY OF MAGNESIUM: CPT | Performed by: STUDENT IN AN ORGANIZED HEALTH CARE EDUCATION/TRAINING PROGRAM

## 2024-01-28 PROCEDURE — 83970 ASSAY OF PARATHORMONE: CPT | Performed by: FAMILY MEDICINE

## 2024-01-28 PROCEDURE — 36415 COLL VENOUS BLD VENIPUNCTURE: CPT | Performed by: FAMILY MEDICINE

## 2024-01-28 PROCEDURE — 80048 BASIC METABOLIC PNL TOTAL CA: CPT | Performed by: STUDENT IN AN ORGANIZED HEALTH CARE EDUCATION/TRAINING PROGRAM

## 2024-01-28 PROCEDURE — 120N000001 HC R&B MED SURG/OB

## 2024-01-28 PROCEDURE — 85027 COMPLETE CBC AUTOMATED: CPT | Performed by: STUDENT IN AN ORGANIZED HEALTH CARE EDUCATION/TRAINING PROGRAM

## 2024-01-28 PROCEDURE — 250N000011 HC RX IP 250 OP 636

## 2024-01-28 PROCEDURE — 99232 SBSQ HOSP IP/OBS MODERATE 35: CPT | Mod: GC

## 2024-01-28 PROCEDURE — 84100 ASSAY OF PHOSPHORUS: CPT | Performed by: STUDENT IN AN ORGANIZED HEALTH CARE EDUCATION/TRAINING PROGRAM

## 2024-01-28 RX ADMIN — ATORVASTATIN CALCIUM 20 MG: 10 TABLET, FILM COATED ORAL at 20:49

## 2024-01-28 RX ADMIN — HYDROMORPHONE HYDROCHLORIDE 2 MG: 2 TABLET ORAL at 14:35

## 2024-01-28 RX ADMIN — ACETAMINOPHEN 975 MG: 325 TABLET, FILM COATED ORAL at 08:57

## 2024-01-28 RX ADMIN — METHOCARBAMOL 500 MG: 500 TABLET ORAL at 06:38

## 2024-01-28 RX ADMIN — MIDODRINE HYDROCHLORIDE 5 MG: 5 TABLET ORAL at 13:03

## 2024-01-28 RX ADMIN — LIDOCAINE: 50 OINTMENT TOPICAL at 08:59

## 2024-01-28 RX ADMIN — ACETAMINOPHEN 975 MG: 325 TABLET, FILM COATED ORAL at 13:03

## 2024-01-28 RX ADMIN — CEPHALEXIN 500 MG: 250 FOR SUSPENSION ORAL at 21:30

## 2024-01-28 RX ADMIN — FAMOTIDINE 40 MG: 20 TABLET ORAL at 08:57

## 2024-01-28 RX ADMIN — CEPHALEXIN 500 MG: 250 FOR SUSPENSION ORAL at 06:02

## 2024-01-28 RX ADMIN — LIDOCAINE: 50 OINTMENT TOPICAL at 20:57

## 2024-01-28 RX ADMIN — ACETAMINOPHEN 975 MG: 325 TABLET, FILM COATED ORAL at 19:01

## 2024-01-28 RX ADMIN — MIDODRINE HYDROCHLORIDE 5 MG: 5 TABLET ORAL at 08:57

## 2024-01-28 RX ADMIN — CEPHALEXIN 500 MG: 250 FOR SUSPENSION ORAL at 17:03

## 2024-01-28 RX ADMIN — INSULIN ASPART 1 UNITS: 100 INJECTION, SOLUTION INTRAVENOUS; SUBCUTANEOUS at 08:56

## 2024-01-28 RX ADMIN — DICLOFENAC 2 G: 10 GEL TOPICAL at 16:58

## 2024-01-28 RX ADMIN — HYDROCORTISONE SODIUM SUCCINATE 50 MG: 100 INJECTION, POWDER, FOR SOLUTION INTRAMUSCULAR; INTRAVENOUS at 16:56

## 2024-01-28 RX ADMIN — MIDODRINE HYDROCHLORIDE 5 MG: 5 TABLET ORAL at 17:03

## 2024-01-28 RX ADMIN — APIXABAN 5 MG: 5 TABLET, FILM COATED ORAL at 20:49

## 2024-01-28 RX ADMIN — CEPHALEXIN 500 MG: 250 FOR SUSPENSION ORAL at 13:03

## 2024-01-28 RX ADMIN — POTASSIUM CHLORIDE 20 MEQ: 750 TABLET, EXTENDED RELEASE ORAL at 08:58

## 2024-01-28 RX ADMIN — DICLOFENAC 2 G: 10 GEL TOPICAL at 08:58

## 2024-01-28 RX ADMIN — TORSEMIDE 20 MG: 20 TABLET ORAL at 08:58

## 2024-01-28 RX ADMIN — HYDROCORTISONE SODIUM SUCCINATE 50 MG: 100 INJECTION, POWDER, FOR SOLUTION INTRAMUSCULAR; INTRAVENOUS at 01:07

## 2024-01-28 RX ADMIN — SENNOSIDES AND DOCUSATE SODIUM 2 TABLET: 8.6; 5 TABLET ORAL at 20:49

## 2024-01-28 RX ADMIN — DICLOFENAC 2 G: 10 GEL TOPICAL at 20:48

## 2024-01-28 RX ADMIN — HYDROMORPHONE HYDROCHLORIDE 0.2 MG: 0.2 INJECTION, SOLUTION INTRAMUSCULAR; INTRAVENOUS; SUBCUTANEOUS at 06:21

## 2024-01-28 RX ADMIN — METOPROLOL SUCCINATE 25 MG: 25 TABLET, EXTENDED RELEASE ORAL at 09:03

## 2024-01-28 RX ADMIN — FAMOTIDINE 40 MG: 20 TABLET ORAL at 20:49

## 2024-01-28 RX ADMIN — SENNOSIDES AND DOCUSATE SODIUM 2 TABLET: 8.6; 5 TABLET ORAL at 08:57

## 2024-01-28 RX ADMIN — Medication 250 MG: at 08:56

## 2024-01-28 RX ADMIN — POLYETHYLENE GLYCOL 3350 17 G: 17 POWDER, FOR SOLUTION ORAL at 08:58

## 2024-01-28 RX ADMIN — AMIODARONE HYDROCHLORIDE 200 MG: 200 TABLET ORAL at 08:57

## 2024-01-28 ASSESSMENT — ACTIVITIES OF DAILY LIVING (ADL)
ADLS_ACUITY_SCORE: 38
ADLS_ACUITY_SCORE: 40
ADLS_ACUITY_SCORE: 40
ADLS_ACUITY_SCORE: 38

## 2024-01-28 NOTE — PROGRESS NOTES
"Orthopedic Progress Note      Assessment: 4 Days Post-Op  S/P Procedure(s):  Open reduction internal fixation left periprosthetic femur fracture  Revision total hip arthroplasty, both components     Plan:   - Continue PT/OT  - Pain management  - Weightbearing status: Footflat WB LLE with walker, PT/OT, no hip precautions.   - Anticoagulation: Okay with PO eliquis from ortho standpoing (held currently), in addition to SCDs, va stockings and early ambulation.  - Abx prophylaxis: 7 day PO AB, Keflex 500mg Q6 while in house.   - Hgb 9.0  - Discharge planning: anticipate TCU      Subjective:  Pain: moderate  Nausea, Vomiting:  No  Lightheadedness, Dizziness:  No  Neuro:  Patient denies new onset numbness or paresthesias    Patient reports he is doing okay this AM. Reports leg feels heavy and sore but improving daily. Denies fevers/chills/SOB overnight.    Objective:  /66 (BP Location: Left arm)   Pulse 94   Temp 97.6  F (36.4  C) (Oral)   Resp 18   Ht 1.803 m (5' 11\")   Wt 104.8 kg (231 lb)   SpO2 98%   BMI 32.22 kg/m    The patient is A&Ox3. Appears comfortable.   Sensation is intact.  Dorsiflexion and plantar flexion is intact.  Dorsalis pedis pulse intact.  Calves are soft and non-tender. Negative Rodriguez's.  The incision is covered. Prevena dressing C/D/I.    Prevena drain intact without appreciable drainage in vac container    Pertinent Labs   Lab Results: personally reviewed.   Lab Results   Component Value Date    INR 1.81 (H) 01/24/2024    INR 1.69 (H) 01/22/2024    INR 1.59 (H) 06/06/2022     Lab Results   Component Value Date    WBC 7.8 01/28/2024    HGB 9.0 (L) 01/28/2024    HCT 28.0 (L) 01/28/2024    MCV 91 01/28/2024     01/28/2024     Lab Results   Component Value Date     01/28/2024    CO2 32 (H) 01/28/2024         Report completed by:  Antolin Thompson PA-C, ERWIN  Date: 1/28/2024  Time: 8:26 AM    "

## 2024-01-28 NOTE — PROGRESS NOTES
Patient alert and oriented to self, but is forgetful. He understands that he is in the hospital.  Ate 50% of breakfast, drinking water and cranberry juice well. He stated that he had left heel pain, mepilex applied and feet elevated on pillow. Vac intact, wade draining well, positive bowel sounds. Notified by monitor tech of depressed ST, notified physician--patient has had this in the past. Vitals stable, patient appears to be  comfortable.

## 2024-01-28 NOTE — PROGRESS NOTES
"Patient alert, pleasant, confused. Wife and daughter here for transfer. Vitals stable, patient taking sips water. Eastman patent, Vac patent. Patient says \"ouch\" for any cares/touching--for accu check, moving pillow. Was in chair, back to bed using ceiling lift.   Patient calling out and rubbing leg after family left--medicated with po dilaudid. Will monitor.   "

## 2024-01-28 NOTE — PROGRESS NOTES
"[facility]    Medicine Progress Note - Hospitalist Service    Date of Admission:  1/22/2024    Assessment & Plan   Alvin Newton is a 89 year old male admitted on 1/22/2024. He has a history of pAF on Eliquis, bilateral hip replacements (33 years ago), HFpEF, T2DM, hypertension, severe aortic stenosis, possible amyloidosis (work-up in process), mild cognitive impairment and is admitted for left femur fracture. Unfortunately significant blood loss causing hypotension and transfer to ICU for pressors. Transferred back to medical unit on 1/27 for continued management. Ultimately will need TCU when pain better controled.      Hypotension, resolved   Transferred to ICU on 1/24 after procedure after losing 3L of blood during surgery. Required pressor support until 1/27. Received pRBC transfusion on 1/26. Likely hemorrhagic shock vs vaso-plegia. Also with low AM cortisol- likely component of adrenal insufficiency.  - start steroid taper     Left femur fracture s/p repair 1/24  Mechanical fall   Chronic anticoagulation (Eliquis)  Presented to ED 1/22 for a mechanical fall at home. Tried to reach for his cane for stability but put his weight on his \"grabber device\" instead and fell forward. Reports he did not hit his head or lose consciousness. CT head with no acute abnormality. XR pelvis/left hip revealed a left proximal femoral fracture. History of bilateral hip replacements about 33 years ago. On Eliquis for atrial fibrillation, most recent dose 1/21 PM prior to admission. Underwent repair on on 1/24. Complicated surgery in setting of bilateral hip replacements. Lost 3L of blood. Patient hgb now stabilized to 9. Now dealing with significant post op pain.  - Orthopedic surgery consulted               - WB LLE with walker              - PT/OT, TCU              - no hip precautions              - 7 days of Keflex 500mg q6h while in house, (1/26-2/1)     -switch to [facility] for outpatient to complete 7 " day course total if discharges              - maintain Prevena until follow-up              - follow-up with Dr. Ramos 2/7 for wound check  - Pain team consulted              - scheduled tylenol, PRN PO dilaudid, PRN IV Dilaudid for severe pain              - diclofenac and lidocaine              - robaxin  - Daily BMP, CBC     Delirium  History of hospital-induced delirium per family. Known mild cognitive impairment at baseline. Some delirium post operatively.   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 Cardiac MRI in October concerning for infiltrative cardiomyopathy. Work-up for amyloidosis in process by cardiology and [facility]. Biopsies of bone marrow and fat pad negative for AL amyloid. Per chart review, plan at this time is to follow-up with cardiology regarding possibly myocardial biopsy and continued amyloid/infiltrative cardiomyopathy work-up.   - PTA metoprolol, Lipitor, torsemide   - Follow-up with cardiologist as scheduled      Normocytic anemia  Patient with hgb of 7-8s following procedure. Acute blood loss anemia.  - CBC in am  - consider transfusion if dropping given cardiovascular hx     A-fib with RVR, improved  History of pAF on Eliquis. Most recent dose of Eliquis was 1/21 PM. INR 1.69 on admission. Follows with [facility] cardiology. Went into RVR after procedure which has now resolved.  - restart Eliquis   - restart PTA metoprolol  - continue PTA amiodarone      DEJUAN on CKD 3a, resolved  Creatinine 1.34 on admission. Unclear baseline per chart review - Cr had been 1.0-1.2 summer 2023, but more recently 1.4-1.6 in cardiology clinic in December. Will continue to monitor. Cr bump to 1.74 morning of procedure. Now improving with fluids.   - Daily BMP   -  "Avoid nephrotoxins     Type 2 diabetes mellitus   History of T2DM without long-term use of insulin. PTA medications: none. Most recent A1C 6.8% on admission.   - POC glucose checks QID   - Medium resistance SSI      Stress leukocytosis, resolved  WBC of 13 after procedure. Improving. No signs of infection at this time  - CBC in am     Depression/Anxiety  - PTA Celexa     GERD  - PTA Pepcid            Diet: Combination Diet Regular Diet; Very High Consistent Carb (90 g CHO per Meal) Diet    DVT Prophylaxis: DOAC  Eastman Catheter: PRESENT, indication: Strict 1-2 Hour I&O  Lines: PRESENT      PICC 01/24/24 Double Lumen Right Basilic-Site Assessment: WDL      Cardiac Monitoring: None  Code Status: No CPR- Pre-arrest intubation OK      Clinically Significant Risk Factors                  # Hypertension: Noted on problem list  # Chronic heart failure with preserved ejection fraction: heart failure noted on problem list and last echo with EF >50%      # DMII: A1C = 6.8 % (Ref range: <5.7 %) within past 6 months   # Obesity: Estimated body mass index is 32.22 kg/m  as calculated from the following:    Height as of this encounter: 1.803 m (5' 11\").    Weight as of this encounter: 104.8 kg (231 lb).      # Financial/Environmental Concerns: none         Disposition Plan     Expected Discharge Date: 01/29/2024                  The patient's care was discussed with the Attending Physician, Dr. Garcia .    Adriana Lombardo MD  Hospitalist Service  [facility]  Securely message with [employer] (more info)  Text page via [employer] Paging/Directory   ______________________________________________________________________    Interval History   Patient alert and eating. Off and on confusion. Daughter and wife present at bedside. Daughter expresses concern about leaving too early before patient pain better controlled. Just transferred out of the ICU yesterday. Blood pressure has remained stable.     Physical Exam   Vital " Signs: Temp: 97.6  F (36.4  C) Temp src: Oral BP: 121/61 Pulse: 95   Resp: 18 SpO2: 98 % O2 Device: None (Room air)    Weight: 231 lbs 0 oz  GENERAL: Alert and no distress  RESP:  Lungs clear throughout. No wheeze or crackles.   CV: Heart RRR. Systolic murmur. Pulses intact  Abdomen: Soft, nontender, nondistended.  MSK: left hip clean and dry. Wound vac in place  SKIN: Visible skin clear. No significant rash, abnormal pigmentation or lesions.  NEURO: Cranial nerves grossly intact.       Data     I have personally reviewed the following data over the past 24 hrs:    7.8  \   9.0 (L)   / 222     145 107 30.4 (H) /  132 (H)   4.2 32 (H) 1.23 (H) \     INR:  N/A PTT:  N/A   D-dimer:  N/A Fibrinogen:  N/A       Imaging results reviewed over the past 24 hrs:   No results found for this or any previous visit (from the past 24 hour(s)).

## 2024-01-28 NOTE — PLAN OF CARE
Goal Outcome Evaluation:      Plan of Care Reviewed With: patient    Overall Patient Progress: no change    Patient only oriented to self. Had a tough time giving all his medications. He was able to take the po meds - but had a tough time explaining the Voltaren cream for pain - thought I was offering him cream to drink and he refused. Tried several times to explain the difference to no avail. Patient on tele - NSR. Patient is no longer on the Heparin drip. Patient has bed alarm on for possible elopement at any time due to his confused state. K+, Mag, and Phos protocols are all am draw tomorrow. Patient takes po meds crushed in applesauce. He initially says he does not like applesauce and says he only eats it with medication. Bedtime blood sugar at 21:06 was 174 - no coverage needed today.

## 2024-01-29 ENCOUNTER — APPOINTMENT (OUTPATIENT)
Dept: PHYSICAL THERAPY | Facility: HOSPITAL | Age: 89
DRG: 466 | End: 2024-01-29
Payer: COMMERCIAL

## 2024-01-29 ENCOUNTER — APPOINTMENT (OUTPATIENT)
Dept: OCCUPATIONAL THERAPY | Facility: HOSPITAL | Age: 89
DRG: 466 | End: 2024-01-29
Payer: COMMERCIAL

## 2024-01-29 LAB
ALBUMIN SERPL BCG-MCNC: 2.8 G/DL (ref 3.5–5.2)
ALP SERPL-CCNC: 144 U/L (ref 40–150)
ALT SERPL W P-5'-P-CCNC: 16 U/L (ref 0–70)
ANION GAP SERPL CALCULATED.3IONS-SCNC: 9 MMOL/L (ref 7–15)
AST SERPL W P-5'-P-CCNC: 32 U/L (ref 0–45)
BILIRUB DIRECT SERPL-MCNC: 0.55 MG/DL (ref 0–0.3)
BILIRUB SERPL-MCNC: 1.9 MG/DL
BUN SERPL-MCNC: 31.8 MG/DL (ref 8–23)
CALCIUM SERPL-MCNC: 8.3 MG/DL (ref 8.8–10.2)
CHLORIDE SERPL-SCNC: 107 MMOL/L (ref 98–107)
CREAT SERPL-MCNC: 1.28 MG/DL (ref 0.67–1.17)
DEPRECATED HCO3 PLAS-SCNC: 28 MMOL/L (ref 22–29)
EGFRCR SERPLBLD CKD-EPI 2021: 53 ML/MIN/1.73M2
ERYTHROCYTE [DISTWIDTH] IN BLOOD BY AUTOMATED COUNT: 14.3 % (ref 10–15)
GLUCOSE BLDC GLUCOMTR-MCNC: 143 MG/DL (ref 70–99)
GLUCOSE BLDC GLUCOMTR-MCNC: 149 MG/DL (ref 70–99)
GLUCOSE BLDC GLUCOMTR-MCNC: 162 MG/DL (ref 70–99)
GLUCOSE BLDC GLUCOMTR-MCNC: 166 MG/DL (ref 70–99)
GLUCOSE BLDC GLUCOMTR-MCNC: 174 MG/DL (ref 70–99)
GLUCOSE SERPL-MCNC: 172 MG/DL (ref 70–99)
HCT VFR BLD AUTO: 26.9 % (ref 40–53)
HGB BLD-MCNC: 8.9 G/DL (ref 13.3–17.7)
MAGNESIUM SERPL-MCNC: 1.9 MG/DL (ref 1.7–2.3)
MCH RBC QN AUTO: 29.7 PG (ref 26.5–33)
MCHC RBC AUTO-ENTMCNC: 33.1 G/DL (ref 31.5–36.5)
MCV RBC AUTO: 90 FL (ref 78–100)
PHOSPHATE SERPL-MCNC: 3.1 MG/DL (ref 2.5–4.5)
PLATELET # BLD AUTO: 260 10E3/UL (ref 150–450)
POTASSIUM SERPL-SCNC: 3.6 MMOL/L (ref 3.4–5.3)
PROT SERPL-MCNC: 5.7 G/DL (ref 6.4–8.3)
RBC # BLD AUTO: 3 10E6/UL (ref 4.4–5.9)
SODIUM SERPL-SCNC: 144 MMOL/L (ref 135–145)
TSH SERPL DL<=0.005 MIU/L-ACNC: 0.31 UIU/ML (ref 0.3–4.2)
WBC # BLD AUTO: 8.2 10E3/UL (ref 4–11)

## 2024-01-29 PROCEDURE — 250N000013 HC RX MED GY IP 250 OP 250 PS 637: Performed by: STUDENT IN AN ORGANIZED HEALTH CARE EDUCATION/TRAINING PROGRAM

## 2024-01-29 PROCEDURE — 250N000013 HC RX MED GY IP 250 OP 250 PS 637

## 2024-01-29 PROCEDURE — 36415 COLL VENOUS BLD VENIPUNCTURE: CPT | Performed by: STUDENT IN AN ORGANIZED HEALTH CARE EDUCATION/TRAINING PROGRAM

## 2024-01-29 PROCEDURE — 250N000013 HC RX MED GY IP 250 OP 250 PS 637: Performed by: PAIN MEDICINE

## 2024-01-29 PROCEDURE — 120N000001 HC R&B MED SURG/OB

## 2024-01-29 PROCEDURE — 97530 THERAPEUTIC ACTIVITIES: CPT | Mod: GP

## 2024-01-29 PROCEDURE — 97112 NEUROMUSCULAR REEDUCATION: CPT | Mod: GP

## 2024-01-29 PROCEDURE — 83735 ASSAY OF MAGNESIUM: CPT | Performed by: INTERNAL MEDICINE

## 2024-01-29 PROCEDURE — 250N000011 HC RX IP 250 OP 636

## 2024-01-29 PROCEDURE — 97110 THERAPEUTIC EXERCISES: CPT | Mod: GP

## 2024-01-29 PROCEDURE — 99233 SBSQ HOSP IP/OBS HIGH 50: CPT | Mod: GC

## 2024-01-29 PROCEDURE — 85027 COMPLETE CBC AUTOMATED: CPT | Performed by: STUDENT IN AN ORGANIZED HEALTH CARE EDUCATION/TRAINING PROGRAM

## 2024-01-29 PROCEDURE — 80053 COMPREHEN METABOLIC PANEL: CPT | Performed by: STUDENT IN AN ORGANIZED HEALTH CARE EDUCATION/TRAINING PROGRAM

## 2024-01-29 PROCEDURE — 250N000013 HC RX MED GY IP 250 OP 250 PS 637: Performed by: FAMILY MEDICINE

## 2024-01-29 PROCEDURE — 84100 ASSAY OF PHOSPHORUS: CPT | Performed by: INTERNAL MEDICINE

## 2024-01-29 PROCEDURE — 80048 BASIC METABOLIC PNL TOTAL CA: CPT | Performed by: STUDENT IN AN ORGANIZED HEALTH CARE EDUCATION/TRAINING PROGRAM

## 2024-01-29 PROCEDURE — 82248 BILIRUBIN DIRECT: CPT | Performed by: FAMILY MEDICINE

## 2024-01-29 PROCEDURE — 99233 SBSQ HOSP IP/OBS HIGH 50: CPT | Performed by: PAIN MEDICINE

## 2024-01-29 PROCEDURE — 97535 SELF CARE MNGMENT TRAINING: CPT | Mod: GO

## 2024-01-29 PROCEDURE — 250N000011 HC RX IP 250 OP 636: Performed by: STUDENT IN AN ORGANIZED HEALTH CARE EDUCATION/TRAINING PROGRAM

## 2024-01-29 PROCEDURE — 84443 ASSAY THYROID STIM HORMONE: CPT | Performed by: FAMILY MEDICINE

## 2024-01-29 PROCEDURE — 82306 VITAMIN D 25 HYDROXY: CPT | Performed by: FAMILY MEDICINE

## 2024-01-29 RX ORDER — ACETAMINOPHEN 325 MG/1
975 TABLET ORAL 3 TIMES DAILY
Qty: 100 TABLET | Refills: 0 | Status: SHIPPED | OUTPATIENT
Start: 2024-01-29 | End: 2024-02-22

## 2024-01-29 RX ORDER — BUSPIRONE HYDROCHLORIDE 5 MG/1
2.5 TABLET ORAL 3 TIMES DAILY
Status: DISCONTINUED | OUTPATIENT
Start: 2024-01-29 | End: 2024-01-30

## 2024-01-29 RX ORDER — AMOXICILLIN 250 MG
2 CAPSULE ORAL 2 TIMES DAILY
Qty: 30 TABLET | Refills: 0 | Status: SHIPPED | OUTPATIENT
Start: 2024-01-29 | End: 2024-06-19

## 2024-01-29 RX ORDER — TRAZODONE HYDROCHLORIDE 50 MG/1
50 TABLET, FILM COATED ORAL AT BEDTIME
Status: DISCONTINUED | OUTPATIENT
Start: 2024-01-29 | End: 2024-01-30

## 2024-01-29 RX ORDER — ACETAMINOPHEN 325 MG/1
975 TABLET ORAL 3 TIMES DAILY
Status: DISCONTINUED | OUTPATIENT
Start: 2024-01-29 | End: 2024-02-02 | Stop reason: HOSPADM

## 2024-01-29 RX ORDER — FAMOTIDINE 20 MG/1
20 TABLET, FILM COATED ORAL DAILY
Status: DISCONTINUED | OUTPATIENT
Start: 2024-01-30 | End: 2024-02-02 | Stop reason: HOSPADM

## 2024-01-29 RX ADMIN — CEPHALEXIN 500 MG: 250 FOR SUSPENSION ORAL at 06:35

## 2024-01-29 RX ADMIN — MIDODRINE HYDROCHLORIDE 5 MG: 5 TABLET ORAL at 12:20

## 2024-01-29 RX ADMIN — APIXABAN 5 MG: 5 TABLET, FILM COATED ORAL at 20:06

## 2024-01-29 RX ADMIN — TRAZODONE HYDROCHLORIDE 50 MG: 50 TABLET ORAL at 22:01

## 2024-01-29 RX ADMIN — DICLOFENAC 2 G: 10 GEL TOPICAL at 22:02

## 2024-01-29 RX ADMIN — INSULIN ASPART 1 UNITS: 100 INJECTION, SOLUTION INTRAVENOUS; SUBCUTANEOUS at 17:05

## 2024-01-29 RX ADMIN — INSULIN ASPART 1 UNITS: 100 INJECTION, SOLUTION INTRAVENOUS; SUBCUTANEOUS at 08:44

## 2024-01-29 RX ADMIN — DICLOFENAC 2 G: 10 GEL TOPICAL at 12:21

## 2024-01-29 RX ADMIN — TORSEMIDE 20 MG: 20 TABLET ORAL at 08:52

## 2024-01-29 RX ADMIN — HYDROCORTISONE SODIUM SUCCINATE 25 MG: 100 INJECTION, POWDER, FOR SOLUTION INTRAMUSCULAR; INTRAVENOUS at 00:13

## 2024-01-29 RX ADMIN — MIDODRINE HYDROCHLORIDE 5 MG: 5 TABLET ORAL at 17:00

## 2024-01-29 RX ADMIN — ACETAMINOPHEN 975 MG: 325 TABLET ORAL at 22:00

## 2024-01-29 RX ADMIN — Medication 2.5 MG: at 22:01

## 2024-01-29 RX ADMIN — ACETAMINOPHEN 975 MG: 325 TABLET, FILM COATED ORAL at 08:52

## 2024-01-29 RX ADMIN — OXYCODONE HYDROCHLORIDE 2.5 MG: 5 TABLET ORAL at 11:08

## 2024-01-29 RX ADMIN — HYDROCORTISONE SODIUM SUCCINATE 25 MG: 100 INJECTION, POWDER, FOR SOLUTION INTRAMUSCULAR; INTRAVENOUS at 08:47

## 2024-01-29 RX ADMIN — MIDODRINE HYDROCHLORIDE 5 MG: 5 TABLET ORAL at 08:53

## 2024-01-29 RX ADMIN — APIXABAN 5 MG: 5 TABLET, FILM COATED ORAL at 08:53

## 2024-01-29 RX ADMIN — HYDROMORPHONE HYDROCHLORIDE 0.2 MG: 0.2 INJECTION, SOLUTION INTRAMUSCULAR; INTRAVENOUS; SUBCUTANEOUS at 03:16

## 2024-01-29 RX ADMIN — HYDROCORTISONE SODIUM SUCCINATE 25 MG: 100 INJECTION, POWDER, FOR SOLUTION INTRAMUSCULAR; INTRAVENOUS at 22:01

## 2024-01-29 RX ADMIN — LIDOCAINE: 50 OINTMENT TOPICAL at 22:02

## 2024-01-29 RX ADMIN — CEPHALEXIN 500 MG: 250 FOR SUSPENSION ORAL at 20:07

## 2024-01-29 RX ADMIN — INSULIN ASPART 1 UNITS: 100 INJECTION, SOLUTION INTRAVENOUS; SUBCUTANEOUS at 12:26

## 2024-01-29 RX ADMIN — Medication 250 MG: at 08:54

## 2024-01-29 RX ADMIN — ACETAMINOPHEN 975 MG: 325 TABLET, FILM COATED ORAL at 00:24

## 2024-01-29 RX ADMIN — SENNOSIDES AND DOCUSATE SODIUM 2 TABLET: 8.6; 5 TABLET ORAL at 22:01

## 2024-01-29 RX ADMIN — ACETAMINOPHEN 975 MG: 325 TABLET ORAL at 14:17

## 2024-01-29 RX ADMIN — POTASSIUM CHLORIDE 20 MEQ: 750 TABLET, EXTENDED RELEASE ORAL at 08:53

## 2024-01-29 RX ADMIN — HYDROMORPHONE HYDROCHLORIDE 2 MG: 2 TABLET ORAL at 02:32

## 2024-01-29 RX ADMIN — ATORVASTATIN CALCIUM 20 MG: 10 TABLET, FILM COATED ORAL at 22:01

## 2024-01-29 RX ADMIN — METOPROLOL SUCCINATE 25 MG: 25 TABLET, EXTENDED RELEASE ORAL at 08:53

## 2024-01-29 RX ADMIN — Medication 2.5 MG: at 14:59

## 2024-01-29 RX ADMIN — LIDOCAINE 1 G: 50 OINTMENT TOPICAL at 14:18

## 2024-01-29 RX ADMIN — FAMOTIDINE 40 MG: 20 TABLET ORAL at 08:53

## 2024-01-29 RX ADMIN — CEPHALEXIN 500 MG: 250 FOR SUSPENSION ORAL at 22:56

## 2024-01-29 RX ADMIN — AMIODARONE HYDROCHLORIDE 200 MG: 200 TABLET ORAL at 08:53

## 2024-01-29 RX ADMIN — LIDOCAINE 1 G: 50 OINTMENT TOPICAL at 08:46

## 2024-01-29 RX ADMIN — CEPHALEXIN 500 MG: 250 FOR SUSPENSION ORAL at 12:13

## 2024-01-29 RX ADMIN — DICLOFENAC 2 G: 10 GEL TOPICAL at 20:07

## 2024-01-29 RX ADMIN — DICLOFENAC 2 G: 10 GEL TOPICAL at 08:47

## 2024-01-29 ASSESSMENT — ACTIVITIES OF DAILY LIVING (ADL)
ADLS_ACUITY_SCORE: 39
ADLS_ACUITY_SCORE: 37
ADLS_ACUITY_SCORE: 38
ADLS_ACUITY_SCORE: 37
ADLS_ACUITY_SCORE: 38
ADLS_ACUITY_SCORE: 37
ADLS_ACUITY_SCORE: 35

## 2024-01-29 NOTE — PROGRESS NOTES
Care Management Follow Up    Length of Stay (days): 7    Expected Discharge Date: 01/31/2024     Concerns to be Addressed: discharge planning     Patient plan of care discussed at interdisciplinary rounds: Yes    Anticipated Discharge Disposition: Transitional Care     Anticipated Discharge Services: None  Anticipated Discharge DME: None    Patient/family educated on Medicare website which has current facility and service quality ratings: yes  Education Provided on the Discharge Plan: Yes  Patient/Family in Agreement with the Plan: yes    Referrals Placed by CM/SW: Post Acute Facilities  Private pay costs discussed: Not applicable    Additional Information:  1:06 PM  TCU referral resent to [facility] due to new admissions coordinator not having Epic access.     HEATH Jurado
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
## 2024-01-29 NOTE — SIGNIFICANT EVENT
Patient pulled the wound vac tubing off, the tubing was replaced and patient tolerated it. He was very tearing and stated that he was tired of being in pain and unable to move by himself.

## 2024-01-29 NOTE — PLAN OF CARE
Goal Outcome Evaluation:  Pt has stable VS. He is disoriented to time and situation. He is pleasant and cooperative with cares. At times he is a little agitated-pulling at wade,gown  and bed clothes. Left thigh and hip quite swollen and painful. Ice applied and oxycodone given before PT. He is tolerating a diabetic diet with tray set up.He had a large soft bm on commode. He is unable to stand or walk yet and overhead ceiling lift was used for commode and chair.

## 2024-01-29 NOTE — PROGRESS NOTES
"Orthopedic Progress Note      Assessment: 4 Days Post-Op  S/P Procedure(s):  Open reduction internal fixation left periprosthetic femur fracture  Revision total hip arthroplasty, both components     Plan:   - Continue PT/OT  - Pain management  - Weightbearing status: Footflat WB LLE with walker, PT/OT, no hip precautions.   - Anticoagulation: Okay with PO eliquis from ortho standpoint in addition to SCDs, va stockings and early ambulation.  - Abx prophylaxis: 7 day PO AB, Keflex 500mg Q6 while in house.   - Hgb 8.9  - Discharge planning: anticipate TCU      Subjective:  Pain: moderate  Nausea, Vomiting:  No  Lightheadedness, Dizziness:  No  Neuro:  Patient denies new onset numbness or paresthesias    Patient reports he is doing okay this AM. Reports leg feels heavy and sore but improving daily. Denies fevers/chills/SOB overnight.    Objective:  /67 (BP Location: Right arm)   Pulse 94   Temp 97.6  F (36.4  C) (Oral)   Resp 17   Ht 1.803 m (5' 11\")   Wt 104.8 kg (231 lb)   SpO2 98%   BMI 32.22 kg/m    The patient is A&Ox3. Appears comfortable.   Sensation is intact.  Dorsiflexion and plantar flexion is intact.  Dorsalis pedis pulse intact.  Calves are soft and non-tender. Negative Rodriguez's.  The incision is covered. Prevena dressing C/D/I.    Prevena drain intact without appreciable drainage in vac container    Pertinent Labs   Lab Results: personally reviewed.   Lab Results   Component Value Date    INR 1.81 (H) 01/24/2024    INR 1.69 (H) 01/22/2024    INR 1.59 (H) 06/06/2022     Lab Results   Component Value Date    WBC 8.2 01/29/2024    HGB 8.9 (L) 01/29/2024    HCT 26.9 (L) 01/29/2024    MCV 90 01/29/2024     01/29/2024     Lab Results   Component Value Date     01/29/2024    CO2 28 01/29/2024         MICHELLE ROJAS PA-C  01/29/2024      "

## 2024-01-29 NOTE — PROGRESS NOTES
"[facility]    Medicine Progress Note - Hospitalist Service    Date of Admission:  1/22/2024    Assessment & Plan   Alvin Newton is a 89 year old male admitted on 1/22/2024. He has a history of pAF on Eliquis, bilateral hip replacements (33 years ago), HFpEF, T2DM, hypertension, severe aortic stenosis, possible amyloidosis (work-up in process), mild cognitive impairment and is admitted for left femur fracture. Unfortunately significant blood loss causing hypotension and transfer to ICU for pressors. Transferred back to medical unit on 1/27 for continued management. Ultimately will need TCU when pain better controled.      Hypotension, resolved   Transferred to ICU on 1/24 after procedure after losing 3L of blood during surgery. Required pressor support until 1/27. Received pRBC transfusion on 1/26. Likely hemorrhagic shock vs vaso-plegia. Also with low AM cortisol- likely component of adrenal insufficiency.  - continue steroid taper   -1/29: 25 mg BID hydrocortisone for 3 days     Left femur fracture s/p repair 1/24  Mechanical fall   Chronic anticoagulation (Eliquis)  Presented to ED 1/22 for a mechanical fall at home. Tried to reach for his cane for stability but put his weight on his \"grabber device\" instead and fell forward. Reports he did not hit his head or lose consciousness. CT head with no acute abnormality. XR pelvis/left hip revealed a left proximal femoral fracture. History of bilateral hip replacements about 33 years ago. On Eliquis for atrial fibrillation, most recent dose 1/21 PM prior to admission. Underwent repair on on 1/24. Complicated surgery in setting of bilateral hip replacements. Lost 3L of blood. Patient hgb now stabilized to 9. Now dealing with significant post op pain.  - Orthopedic surgery consulted               - WB LLE with walker              - PT/OT, TCU              - no hip precautions              - 7 days of Keflex 500mg q6h while in house, (1/26-2/1) "     -switch to Duricef for outpatient to complete 7 day course total if discharges              - maintain Prevena until follow-up              - follow-up with Dr. Ramos 2/7 for wound check  - Pain team consulted              - scheduled tylenol  - discontinue PRN PO dilaudid, PRN IV Dilaudid for severe pain   - start 2.5mg oxycodone              - diclofenac and lidocaine              - robaxin  - Daily BMP, CBC     Delirium  History of hospital-induced delirium per family. Known mild cognitive impairment at baseline. Having off and on delirium post operatively. No agitation. Not requiring a 1:1.  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 Cardiac MRI in October concerning for infiltrative cardiomyopathy. Work-up for amyloidosis in process by cardiology and [facility]. Biopsies of bone marrow and fat pad negative for AL amyloid. Per chart review, plan at this time is to follow-up with cardiology regarding possibly myocardial biopsy and continued amyloid/infiltrative cardiomyopathy work-up.   - PTA metoprolol, Lipitor, torsemide   - Follow-up with cardiologist as scheduled      Normocytic anemia  Patient with hgb of 7-8s following procedure. Acute blood loss anemia.  - CBC in am  - consider transfusion if dropping given cardiovascular hx     A-fib with RVR, improved  History of pAF on Eliquis. Most recent dose of Eliquis was 1/21 PM on admission. INR 1.69 on admission. Follows with [facility] cardiology. Went into RVR after procedure which has now resolved.  - continue Eliquis   - continue PTA metoprolol  - continue PTA amiodarone      DEJUAN on CKD 3a, resolved  Creatinine 1.34 on admission. Unclear baseline per chart review - Cr had been 1.0-1.2 summer 2023, but more recently  "1.4-1.6 in cardiology clinic in December. Will continue to monitor. Cr bump to 1.74 morning of procedure. Now improving with fluids.   - Daily BMP   - Avoid nephrotoxins     Type 2 diabetes mellitus   History of T2DM without long-term use of insulin. PTA medications: none. Most recent A1C 6.8% on admission.   - POC glucose checks QID   - Medium resistance SSI      Stress leukocytosis, resolved  WBC of 13 after procedure. Improving. No signs of infection at this time  - CBC in am     Depression/Anxiety  - PTA Celexa     GERD  - PTA Pepcid            Diet: Combination Diet Regular Diet; Very High Consistent Carb (90 g CHO per Meal) Diet    DVT Prophylaxis: DOAC  Eastman Catheter: PRESENT, indication: Retention  Lines: None       Cardiac Monitoring: None  Code Status: No CPR- Pre-arrest intubation OK      Clinically Significant Risk Factors                  # Hypertension: Noted on problem list  # Chronic heart failure with preserved ejection fraction: heart failure noted on problem list and last echo with EF >50%      # DMII: A1C = 6.8 % (Ref range: <5.7 %) within past 6 months   # Obesity: Estimated body mass index is 32.22 kg/m  as calculated from the following:    Height as of this encounter: 1.803 m (5' 11\").    Weight as of this encounter: 104.8 kg (231 lb).        # Financial/Environmental Concerns: none         Disposition Plan      Expected Discharge Date: 01/30/2024                  The patient's care was discussed with the Attending Physician, Dr. Garcia .    Adriana Lombardo MD  Hospitalist Service  [facility]  Securely message with [employer] (more info)  Text page via [employer] Paging/Directory   ______________________________________________________________________    Interval History   Patient alert and eating. Off and on confusion. Patient stating he didn't participate with PT yesterday because of pain.    Physical Exam   Vital Signs: Temp: 97.6  F (36.4  C) Temp src: Oral BP: 137/67 " Pulse: 94   Resp: 17 SpO2: 98 % O2 Device: None (Room air)    Weight: 231 lbs 0 oz  GENERAL: Alert and no distress  RESP:  Lungs clear throughout. No wheeze or crackles.   CV: Heart RRR. Systolic murmur. Pulses intact  Abdomen: Soft, nontender, nondistended.  MSK: left hip clean and dry. Wound vac in place. No LE edema  SKIN: Visible skin clear. No significant rash, abnormal pigmentation or lesions.  NEURO: Cranial nerves grossly intact.       Data     I have personally reviewed the following data over the past 24 hrs:    8.2  \   8.9 (L)   / 260     144 107 31.8 (H) /  174 (H)   3.6 28 1.28 (H) \       Imaging results reviewed over the past 24 hrs:   No results found for this or any previous visit (from the past 24 hour(s)).

## 2024-01-29 NOTE — PROGRESS NOTES
"CLINICAL NUTRITION SERVICES - ASSESSMENT NOTE     Nutrition Prescription    RECOMMENDATIONS FOR MDs/PROVIDERS TO ORDER:    Malnutrition Status:    Not noted    Recommendations already ordered by Registered Dietitian (RD):  None at this time    Future/Additional Recommendations:  Monitor intake, weight, labs     REASON FOR ASSESSMENT  Alvin Newton is a/an 89 year old male assessed by the dietitian for LOS    HPI: 89 year old male who has a history of pAF on Eliquis, bilateral hip replacements (33 years ago), HFpEF, T2DM, hypertension, severe aortic stenosis, possible amyloidosis (work-up in process) and is admitted for left femur fracture.     S/p ORIF L periprosthetic femur fracture, revision total hip arthroplasty, both components 1/24/24    NUTRITION HISTORY  Met with pt and pt's family, pt eating lunch (soup and grilled cheese) so wife answers all the questions (pt also seems a little confused). Appetite has been ok, does not eat as much as eat used to, trying to watch his weight, UBW ~230#. Eats slower than usual, some issues chewing and swallowing but does not elaborate, noted some missing teeth. Does not take any supplements such as Boost or Ensure. No nutrition questions or concerns. Encouraged po intake.    CURRENT NUTRITION ORDERS  Diet: High Consistent Carbohydrate  Intake/Tolerance: 25-75% of meals per flowsheets    LABS  Labs reviewed    MEDICATIONS  Medications reviewed    ANTHROPOMETRICS  Height: 180.3 cm (5' 11\")  Most Recent Weight: 104.8 kg (231 lb)    IBW: 78.2 kg  BMI: Obesity Grade I BMI 30-34.9  Weight History:   Wt Readings from Last 20 Encounters:   01/22/24 104.8 kg (231 lb)   09/05/23 99.3 kg (219 lb)   08/28/23 101.6 kg (224 lb)   08/24/23 105.4 kg (232 lb 6.4 oz)   08/16/23 104.3 kg (230 lb)   08/07/23 100.2 kg (220 lb 12.8 oz)   08/03/23 103.3 kg (227 lb 12.8 oz)   07/31/23 104.3 kg (230 lb)   07/31/23 103.4 kg (228 lb)   07/26/23 108.2 kg (238 lb 8.6 oz)   07/24/23 107 kg (236 lb) "   07/19/23 112.3 kg (247 lb 9.6 oz)   07/11/23 118.1 kg (260 lb 4.8 oz)   12/21/22 106.6 kg (235 lb)   09/22/22 111.4 kg (245 lb 11.2 oz)   07/26/22 111.8 kg (246 lb 6.4 oz)   07/21/22 113.1 kg (249 lb 6.4 oz)   07/18/22 110.5 kg (243 lb 9.6 oz)   07/12/22 112.3 kg (247 lb 8 oz)   07/08/22 113.2 kg (249 lb 9.6 oz)   Admit wt likely stated    Dosing Weight: 84.9 kg (adj wt)    ASSESSED NUTRITION NEEDS  Estimated Energy Needs: 8853-5883 kcals/day (25 - 30 kcals/kg)  Justification: Maintenance  Estimated Protein Needs: 85+ grams protein/day (1+ grams of pro/kg)  Justification: Post-op and Wound healing  Estimated Fluid Needs: 4129-1572 mL/day (1 mL/kcal)   Justification: Maintenance    PHYSICAL FINDINGS  See malnutrition section below.  Incisional wound    MALNUTRITION:  % Weight Loss:  Weight loss does not meet criteria for malnutrition  % Intake:  Decreased intake does not meet criteria for malnutrition Subcutaneous Fat Loss:  None observed  Muscle Loss:  None observed  Fluid Retention:  Severe L hip, moderate ankles/feet    Malnutrition Diagnosis: Patient does not meet two of the above criteria necessary for diagnosing malnutrition    NUTRITION DIAGNOSIS  Inadequate oral intake related to current medical condition as evidenced by po intake < 50% of some meals.      INTERVENTIONS  Implementation  Nutrition Education: Will be provided if education needs arise   No interventions at this time     Goals  Patient to consume % of nutritionally adequate meals three times per day, or the equivalent with supplements/snacks.     Monitoring/Evaluation  Progress toward goals will be monitored and evaluated per protocol.

## 2024-01-29 NOTE — PROGRESS NOTES
"Patient fed himself dinner. Had moderate soft BM, able to bridge to sit on bedpan. Patient had a friend here visiting--became very agitated and wanted to go home--stated that he \"hurt.\"  Medicated with oxy po and patient is calm, smiling. Vitals stable, tele rhythm is atrial flutter. Will monitor.   "

## 2024-01-29 NOTE — SIGNIFICANT EVENT
Patient confused, and anxious, found that he had PICC line pulled off( he discontinued), peripheral iv, telemetry wires and box and wound vac. Patient PICC line and peripheral IV site dry and no bleeding noted. Areas cleaned. Patient had placed above ob the bedside table. Patient kept asking where he was. Wound vac tubing was replaced.

## 2024-01-29 NOTE — PLAN OF CARE
Problem: Adult Inpatient Plan of Care  Goal: Absence of Hospital-Acquired Illness or Injury  Intervention: Identify and Manage Fall Risk  Recent Flowsheet Documentation  Taken 1/29/2024 0000 by Heidi Diego RN  Safety Promotion/Fall Prevention: activity supervised  Taken 1/28/2024 2030 by Heidi Diego RN  Safety Promotion/Fall Prevention: activity supervised  Intervention: Prevent Skin Injury  Recent Flowsheet Documentation  Taken 1/29/2024 0000 by Heidi Diego RN  Body Position: position maintained  Taken 1/28/2024 2100 by Heidi Diego RN  Body Position:   legs elevated   log-rolled  Taken 1/28/2024 2030 by Heidi Diego RN  Body Position: position maintained  Intervention: Prevent Infection  Recent Flowsheet Documentation  Taken 1/29/2024 0000 by Heidi Diego RN  Infection Prevention: hand hygiene promoted  Taken 1/28/2024 2030 by Heidi Diego RN  Infection Prevention: hand hygiene promoted  Goal: Optimal Comfort and Wellbeing  Intervention: Provide Person-Centered Care  Recent Flowsheet Documentation  Taken 1/29/2024 0300 by Heidi Diego RN  Trust Relationship/Rapport:   emotional support provided   questions encouraged   thoughts/feelings acknowledged  Taken 1/29/2024 0000 by Heidi Diego RN  Trust Relationship/Rapport:   care explained   emotional support provided   choices provided   questions answered   questions encouraged   reassurance provided  Taken 1/28/2024 2030 by Heidi Diego RN  Trust Relationship/Rapport:   care explained   emotional support provided   choices provided   questions answered   questions encouraged   reassurance provided     Problem: Risk for Delirium  Goal: Optimal Coping  Intervention: Optimize Psychosocial Adjustment to Delirium  Recent Flowsheet Documentation  Taken 1/29/2024 0000 by Heidi Diego RN  Supportive Measures: active listening utilized  Taken 1/28/2024 2030 by Heidi Diego RN  Supportive Measures: active  listening utilized  Goal: Improved Behavioral Control  Intervention: Prevent and Manage Agitation  Recent Flowsheet Documentation  Taken 1/29/2024 0300 by Heidi Diego RN  Complementary Therapy: (patient medicated and ice placed) aromatherapy utilized  Taken 1/29/2024 0000 by Heidi Diego RN  Complementary Therapy:   essential oils utilized   music therapy provided  Taken 1/28/2024 2030 by Heidi Diego RN  Complementary Therapy:   essential oils utilized   music therapy provided  Intervention: Minimize Safety Risk  Recent Flowsheet Documentation  Taken 1/29/2024 0300 by Heidi Diego RN  Trust Relationship/Rapport:   emotional support provided   questions encouraged   thoughts/feelings acknowledged  Taken 1/29/2024 0000 by Heidi Diego RN  Communication Enhancement Strategies: call light answered in person  Trust Relationship/Rapport:   care explained   emotional support provided   choices provided   questions answered   questions encouraged   reassurance provided  Taken 1/28/2024 2030 by Heidi Diego RN  Communication Enhancement Strategies: call light answered in person  Trust Relationship/Rapport:   care explained   emotional support provided   choices provided   questions answered   questions encouraged   reassurance provided  Goal: Improved Attention and Thought Clarity  Intervention: Maximize Cognitive Function  Recent Flowsheet Documentation  Taken 1/29/2024 0000 by Heidi Diego RN  Sensory Stimulation Regulation: auditory stimulation minimized  Reorientation Measures:   calendar in view   clock in view  Taken 1/28/2024 2030 by Heidi Diego RN  Sensory Stimulation Regulation: auditory stimulation minimized  Reorientation Measures:   calendar in view   clock in view     Problem: Orthopaedic Fracture  Goal: Optimal Pain Control and Function  Intervention: Manage Acute Orthopaedic-Related Pain  Recent Flowsheet Documentation  Taken 1/29/2024 0300 by Heidi Diego  RN  Complementary Therapy: (patient medicated and ice placed) aromatherapy utilized  Taken 1/29/2024 0000 by Heidi Diego RN  Complementary Therapy:   essential oils utilized   music therapy provided  Taken 1/28/2024 2030 by Heidi Diego RN  Complementary Therapy:   essential oils utilized   music therapy provided  Goal: Effective Oxygenation and Ventilation  Intervention: Promote Airway Secretion Clearance  Recent Flowsheet Documentation  Taken 1/29/2024 0000 by Heidi Diego RN  Activity Management: activity adjusted per tolerance  Taken 1/28/2024 2100 by Heidi Diego RN  Activity Management: activity adjusted per tolerance  Taken 1/28/2024 2030 by Heidi Diego RN  Activity Management: activity adjusted per tolerance  Intervention: Optimize Oxygenation and Ventilation  Recent Flowsheet Documentation  Taken 1/29/2024 0000 by Heidi Diego RN  Head of Bed (HOB) Positioning: HOB at 20-30 degrees  Taken 1/28/2024 2100 by Heidi Diego RN  Head of Bed (HOB) Positioning: HOB at 20-30 degrees  Taken 1/28/2024 2030 by Heidi Diego RN  Head of Bed (HOB) Positioning: HOB at 20-30 degrees   Goal Outcome Evaluation:  Patient alert and oriented with periods of confusions and anxiety. He had periods very emotional and tearful, he stated that he wished his mom and dad will just take him home. When asked he stated that he is tired on being in pain and sad. He denied suicidal thoughts. CNA stayed with patient for 30minutes and he fall asleep. This am patient is calm,cooperate and he said he denies pain.wound vac patent, intact and running. Left LE warm and swollen, PP palpable and warm to touch.

## 2024-01-30 ENCOUNTER — APPOINTMENT (OUTPATIENT)
Dept: PHYSICAL THERAPY | Facility: HOSPITAL | Age: 89
DRG: 466 | End: 2024-01-30
Payer: COMMERCIAL

## 2024-01-30 ENCOUNTER — APPOINTMENT (OUTPATIENT)
Dept: OCCUPATIONAL THERAPY | Facility: HOSPITAL | Age: 89
DRG: 466 | End: 2024-01-30
Payer: COMMERCIAL

## 2024-01-30 LAB
ANION GAP SERPL CALCULATED.3IONS-SCNC: 10 MMOL/L (ref 7–15)
BUN SERPL-MCNC: 32.9 MG/DL (ref 8–23)
CALCIUM SERPL-MCNC: 8.7 MG/DL (ref 8.8–10.2)
CHLORIDE SERPL-SCNC: 104 MMOL/L (ref 98–107)
CREAT SERPL-MCNC: 1.44 MG/DL (ref 0.67–1.17)
DEPRECATED HCO3 PLAS-SCNC: 31 MMOL/L (ref 22–29)
EGFRCR SERPLBLD CKD-EPI 2021: 46 ML/MIN/1.73M2
ERYTHROCYTE [DISTWIDTH] IN BLOOD BY AUTOMATED COUNT: 14.6 % (ref 10–15)
GLUCOSE BLDC GLUCOMTR-MCNC: 123 MG/DL (ref 70–99)
GLUCOSE BLDC GLUCOMTR-MCNC: 130 MG/DL (ref 70–99)
GLUCOSE BLDC GLUCOMTR-MCNC: 154 MG/DL (ref 70–99)
GLUCOSE BLDC GLUCOMTR-MCNC: 161 MG/DL (ref 70–99)
GLUCOSE BLDC GLUCOMTR-MCNC: 161 MG/DL (ref 70–99)
GLUCOSE SERPL-MCNC: 163 MG/DL (ref 70–99)
HCT VFR BLD AUTO: 27.8 % (ref 40–53)
HGB BLD-MCNC: 9.2 G/DL (ref 13.3–17.7)
MAGNESIUM SERPL-MCNC: 2 MG/DL (ref 1.7–2.3)
MCH RBC QN AUTO: 29.8 PG (ref 26.5–33)
MCHC RBC AUTO-ENTMCNC: 33.1 G/DL (ref 31.5–36.5)
MCV RBC AUTO: 90 FL (ref 78–100)
PHOSPHATE SERPL-MCNC: 3.4 MG/DL (ref 2.5–4.5)
PLATELET # BLD AUTO: 317 10E3/UL (ref 150–450)
POTASSIUM SERPL-SCNC: 3.6 MMOL/L (ref 3.4–5.3)
POTASSIUM SERPL-SCNC: 3.7 MMOL/L (ref 3.4–5.3)
PTH-INTACT SERPL-MCNC: 70 PG/ML (ref 15–65)
RBC # BLD AUTO: 3.09 10E6/UL (ref 4.4–5.9)
SODIUM SERPL-SCNC: 145 MMOL/L (ref 135–145)
VIT D+METAB SERPL-MCNC: 22 NG/ML (ref 20–50)
WBC # BLD AUTO: 10.4 10E3/UL (ref 4–11)

## 2024-01-30 PROCEDURE — 250N000011 HC RX IP 250 OP 636

## 2024-01-30 PROCEDURE — 99232 SBSQ HOSP IP/OBS MODERATE 35: CPT | Performed by: PAIN MEDICINE

## 2024-01-30 PROCEDURE — 97110 THERAPEUTIC EXERCISES: CPT | Mod: GP

## 2024-01-30 PROCEDURE — 120N000001 HC R&B MED SURG/OB

## 2024-01-30 PROCEDURE — 250N000013 HC RX MED GY IP 250 OP 250 PS 637: Performed by: FAMILY MEDICINE

## 2024-01-30 PROCEDURE — 80048 BASIC METABOLIC PNL TOTAL CA: CPT

## 2024-01-30 PROCEDURE — 250N000013 HC RX MED GY IP 250 OP 250 PS 637

## 2024-01-30 PROCEDURE — 36415 COLL VENOUS BLD VENIPUNCTURE: CPT

## 2024-01-30 PROCEDURE — 97110 THERAPEUTIC EXERCISES: CPT | Mod: GO

## 2024-01-30 PROCEDURE — 85027 COMPLETE CBC AUTOMATED: CPT

## 2024-01-30 PROCEDURE — 250N000013 HC RX MED GY IP 250 OP 250 PS 637: Performed by: STUDENT IN AN ORGANIZED HEALTH CARE EDUCATION/TRAINING PROGRAM

## 2024-01-30 PROCEDURE — 250N000013 HC RX MED GY IP 250 OP 250 PS 637: Performed by: PAIN MEDICINE

## 2024-01-30 PROCEDURE — 83735 ASSAY OF MAGNESIUM: CPT

## 2024-01-30 PROCEDURE — 97530 THERAPEUTIC ACTIVITIES: CPT | Mod: GO

## 2024-01-30 PROCEDURE — 97530 THERAPEUTIC ACTIVITIES: CPT | Mod: GP

## 2024-01-30 PROCEDURE — 84100 ASSAY OF PHOSPHORUS: CPT

## 2024-01-30 PROCEDURE — 99232 SBSQ HOSP IP/OBS MODERATE 35: CPT | Mod: GC

## 2024-01-30 RX ORDER — VITAMIN B COMPLEX
50 TABLET ORAL DAILY
Status: DISCONTINUED | OUTPATIENT
Start: 2024-01-30 | End: 2024-02-02 | Stop reason: HOSPADM

## 2024-01-30 RX ORDER — METHOCARBAMOL 500 MG/1
500 TABLET, FILM COATED ORAL 4 TIMES DAILY PRN
Status: DISCONTINUED | OUTPATIENT
Start: 2024-01-30 | End: 2024-01-30

## 2024-01-30 RX ORDER — BUSPIRONE HYDROCHLORIDE 5 MG/1
5 TABLET ORAL 3 TIMES DAILY
Status: DISCONTINUED | OUTPATIENT
Start: 2024-01-30 | End: 2024-02-02 | Stop reason: HOSPADM

## 2024-01-30 RX ADMIN — BUSPIRONE HYDROCHLORIDE 5 MG: 5 TABLET ORAL at 20:27

## 2024-01-30 RX ADMIN — TRAMADOL HYDROCHLORIDE 25 MG: 50 TABLET, COATED ORAL at 09:50

## 2024-01-30 RX ADMIN — HYDROCORTISONE SODIUM SUCCINATE 25 MG: 100 INJECTION, POWDER, FOR SOLUTION INTRAMUSCULAR; INTRAVENOUS at 08:42

## 2024-01-30 RX ADMIN — CEPHALEXIN 500 MG: 250 FOR SUSPENSION ORAL at 06:08

## 2024-01-30 RX ADMIN — APIXABAN 5 MG: 5 TABLET, FILM COATED ORAL at 08:35

## 2024-01-30 RX ADMIN — SENNOSIDES AND DOCUSATE SODIUM 2 TABLET: 8.6; 5 TABLET ORAL at 20:27

## 2024-01-30 RX ADMIN — DICLOFENAC 2 G: 10 GEL TOPICAL at 20:27

## 2024-01-30 RX ADMIN — CEPHALEXIN 500 MG: 250 FOR SUSPENSION ORAL at 12:29

## 2024-01-30 RX ADMIN — Medication 50 MCG: at 12:29

## 2024-01-30 RX ADMIN — AMIODARONE HYDROCHLORIDE 200 MG: 200 TABLET ORAL at 08:34

## 2024-01-30 RX ADMIN — METOPROLOL SUCCINATE 25 MG: 25 TABLET, EXTENDED RELEASE ORAL at 08:34

## 2024-01-30 RX ADMIN — LIDOCAINE: 50 OINTMENT TOPICAL at 20:27

## 2024-01-30 RX ADMIN — TRAMADOL HYDROCHLORIDE 25 MG: 50 TABLET, COATED ORAL at 20:25

## 2024-01-30 RX ADMIN — CEPHALEXIN 500 MG: 250 FOR SUSPENSION ORAL at 17:07

## 2024-01-30 RX ADMIN — ACETAMINOPHEN 975 MG: 325 TABLET ORAL at 20:25

## 2024-01-30 RX ADMIN — LIDOCAINE 1 G: 50 OINTMENT TOPICAL at 14:10

## 2024-01-30 RX ADMIN — MIDODRINE HYDROCHLORIDE 5 MG: 5 TABLET ORAL at 17:07

## 2024-01-30 RX ADMIN — INSULIN ASPART 1 UNITS: 100 INJECTION, SOLUTION INTRAVENOUS; SUBCUTANEOUS at 17:07

## 2024-01-30 RX ADMIN — ACETAMINOPHEN 975 MG: 325 TABLET ORAL at 08:33

## 2024-01-30 RX ADMIN — ATORVASTATIN CALCIUM 20 MG: 10 TABLET, FILM COATED ORAL at 20:27

## 2024-01-30 RX ADMIN — Medication 2.5 MG: at 08:34

## 2024-01-30 RX ADMIN — MIDODRINE HYDROCHLORIDE 5 MG: 5 TABLET ORAL at 12:29

## 2024-01-30 RX ADMIN — DICLOFENAC 2 G: 10 GEL TOPICAL at 12:29

## 2024-01-30 RX ADMIN — DICLOFENAC 2 G: 10 GEL TOPICAL at 17:07

## 2024-01-30 RX ADMIN — DICLOFENAC 2 G: 10 GEL TOPICAL at 08:31

## 2024-01-30 RX ADMIN — Medication 250 MG: at 08:34

## 2024-01-30 RX ADMIN — ACETAMINOPHEN 975 MG: 325 TABLET ORAL at 14:09

## 2024-01-30 RX ADMIN — CEPHALEXIN 500 MG: 250 FOR SUSPENSION ORAL at 20:50

## 2024-01-30 RX ADMIN — OXYCODONE HYDROCHLORIDE 2.5 MG: 5 TABLET ORAL at 01:43

## 2024-01-30 RX ADMIN — INSULIN ASPART 1 UNITS: 100 INJECTION, SOLUTION INTRAVENOUS; SUBCUTANEOUS at 12:28

## 2024-01-30 RX ADMIN — FAMOTIDINE 20 MG: 20 TABLET ORAL at 08:34

## 2024-01-30 RX ADMIN — TORSEMIDE 20 MG: 20 TABLET ORAL at 08:34

## 2024-01-30 RX ADMIN — BUSPIRONE HYDROCHLORIDE 5 MG: 5 TABLET ORAL at 14:09

## 2024-01-30 RX ADMIN — POTASSIUM CHLORIDE 20 MEQ: 750 TABLET, EXTENDED RELEASE ORAL at 08:32

## 2024-01-30 RX ADMIN — Medication 1 MG: at 01:43

## 2024-01-30 RX ADMIN — MIDODRINE HYDROCHLORIDE 5 MG: 5 TABLET ORAL at 08:34

## 2024-01-30 RX ADMIN — LIDOCAINE 1 G: 50 OINTMENT TOPICAL at 08:31

## 2024-01-30 RX ADMIN — APIXABAN 5 MG: 5 TABLET, FILM COATED ORAL at 20:27

## 2024-01-30 ASSESSMENT — ACTIVITIES OF DAILY LIVING (ADL)
ADLS_ACUITY_SCORE: 39
ADLS_ACUITY_SCORE: 39
ADLS_ACUITY_SCORE: 37
ADLS_ACUITY_SCORE: 39
ADLS_ACUITY_SCORE: 37
ADLS_ACUITY_SCORE: 39
ADLS_ACUITY_SCORE: 37

## 2024-01-30 NOTE — PROGRESS NOTES
"Orthopedic Progress Note      Assessment: 5 Days Post-Op  S/P Procedure(s):  Open reduction internal fixation left periprosthetic femur fracture  Revision total hip arthroplasty, both components     Plan:   - Continue PT/OT  - Pain management  - Weightbearing status: Footflat WB LLE with walker, PT/OT, no hip precautions.   - Anticoagulation: Okay with PO eliquis from ortho standpoint in addition to SCDs, va stockings and early ambulation.  - Abx prophylaxis: 7 day PO AB, Keflex 500mg Q6 while in house.   - Hgb 8.9  - Of note patient does not that he has decided to pass away rather than trying to progress with recovery.  Will discuss with hospitalist, possible palliative care team consult indicated  - Discharge planning: anticipate TCU      Subjective:  Pain: moderate  Nausea, Vomiting:  No  Lightheadedness, Dizziness:  No  Neuro:  Patient denies new onset numbness or paresthesias    Patient reports he is doing okay this AM. He is having a lot of pain.  He voices frustration with his pain, lack of progression with therapy.  Overall he feels like in the last few years he has lost the ability to do many activities that he enjoys such as reading, writing, walking.  He states that he has decided to pass away at this point.    Objective:  /70 (BP Location: Left arm)   Pulse 63   Temp 97.7  F (36.5  C) (Oral)   Resp 18   Ht 1.803 m (5' 11\")   Wt 103.4 kg (227 lb 15.3 oz)   SpO2 98%   BMI 31.79 kg/m    The patient is A&Ox3. Appears comfortable.   Sensation is intact.  Dorsiflexion and plantar flexion is intact.  Dorsalis pedis pulse intact.  Calves are soft and non-tender. Negative Rodirguez's.  The incision is covered. Prevena dressing C/D/I.    Prevena drain intact without appreciable drainage in vac container    Pertinent Labs   Lab Results: personally reviewed.   Lab Results   Component Value Date    INR 1.81 (H) 01/24/2024    INR 1.69 (H) 01/22/2024    INR 1.59 (H) 06/06/2022     Lab Results   Component " Value Date    WBC 8.2 01/29/2024    HGB 8.9 (L) 01/29/2024    HCT 26.9 (L) 01/29/2024    MCV 90 01/29/2024     01/29/2024     Lab Results   Component Value Date     01/30/2024    CO2 31 (H) 01/30/2024         MICHELLE ROJAS PA-C  01/30/2024

## 2024-01-30 NOTE — PROGRESS NOTES
"[facility]    Medicine Progress Note - Hospitalist Service    Date of Admission:  1/22/2024    Assessment & Plan   Alvin Newton is a 89 year old male admitted on 1/22/2024. He has a history of pAF on Eliquis, bilateral hip replacements (33 years ago), HFpEF, T2DM, hypertension, severe aortic stenosis, possible amyloidosis (work-up in process), mild cognitive impairment and is admitted for left femur fracture. Unfortunately significant blood loss causing hypotension and transfer to ICU for pressors. Transferred back to medical unit on 1/27 for continued management. Ultimately will need TCU when pain better controled.      Delirium  Anxiety  History of hospital-induced delirium per family. Known mild cognitive impairment at baseline. Having delirium post operatively with really any medications. Significantly anxious and tearful. Limiting ability to work with PT. No agitation. Not requiring a 1:1.  - Buspar TID  - Scheduled Tylenol   - Cluster overnight cares as able to maximize nighttime sleep   - Continue to reorient patient to place/time    Hypotension, resolved   Transferred to ICU on 1/24 after procedure after losing 3L of blood during surgery. Required pressor support until 1/27. Received pRBC transfusion on 1/26. Likely hemorrhagic shock vs vaso-plegia. Also with low AM cortisol- likely component of adrenal insufficiency.  - discontinue steroids today -- likely not help delirium     Left femur fracture s/p repair 1/24  Mechanical fall   Chronic anticoagulation (Eliquis)  Presented to ED 1/22 for a mechanical fall at home. Tried to reach for his cane for stability but put his weight on his \"grabber device\" instead and fell forward. Reports he did not hit his head or lose consciousness. CT head with no acute abnormality. XR pelvis/left hip revealed a left proximal femoral fracture. History of bilateral hip replacements about 33 years ago. On Eliquis for atrial fibrillation, most recent " dose 1/21 PM prior to admission. Underwent repair on on 1/24. Complicated surgery in setting of bilateral hip replacements. Lost 3L of blood. Patient hgb now stabilized to 9. Now dealing with significant post op pain and delirium complicating management.  - Orthopedic surgery consulted               - WB LLE with walker              - PT/OT, TCU              - no hip precautions              - 7 days of Keflex 500mg q6h while in house, (1/26-2/1)     -switch to [facility] for outpatient to complete 7 day course total if discharges              - maintain Prevena until follow-up              - follow-up with Dr. 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 Cardiac MRI in October concerning for infiltrative cardiomyopathy. Work-up for amyloidosis in process by cardiology and [facility]ology. Biopsies of bone marrow and fat pad negative for AL amyloid. Per chart review, plan at this time is to follow-up with cardiology regarding possibly myocardial biopsy and continued amyloid/infiltrative cardiomyopathy work-up.   - PTA metoprolol, Lipitor, torsemide   - Follow-up with cardiologist as scheduled      Normocytic anemia  Patient with hgb of 7-8s following procedure. Acute blood loss anemia. Stable around 9s.   - CBC in am  - consider transfusion if dropping given cardiovascular hx     A-fib with RVR, improved  History of pAF on Eliquis. Most recent dose of Eliquis was 1/21 PM on admission. INR 1.69 on admission. Follows with [facility] cardiology. Went into RVR after procedure which has now resolved.  - continue Eliquis   - continue PTA metoprolol  - continue PTA amiodarone      DEJUAN on CKD 3a, resolved  Creatinine 1.34  "on admission. Unclear baseline per chart review - Cr had been 1.0-1.2 summer 2023, but more recently 1.4-1.6 in cardiology clinic in December. Will continue to monitor. Cr bump to 1.74 morning of procedure. Improved with fluids.   - Daily BMP   - Avoid nephrotoxins     Type 2 diabetes mellitus   History of T2DM without long-term use of insulin. PTA medications: none. Most recent A1C 6.8% on admission.   - POC glucose checks QID   - Medium resistance SSI      Stress leukocytosis, resolved  WBC of 13 after procedure. Improving. No signs of infection at this time  - CBC in am     Depression/Anxiety  - PTA Celexa     GERD  - PTA Pepcid            Diet: Combination Diet Regular Diet; Very High Consistent Carb (90 g CHO per Meal) Diet  Discharge Instruction - Regular Diet Adult    DVT Prophylaxis: DOAC  Eastman Catheter: PRESENT, indication: Retention  Lines: None       Cardiac Monitoring: None  Code Status: No CPR- Pre-arrest intubation OK      Clinically Significant Risk Factors              # Hypoalbuminemia: Lowest albumin = 2.8 g/dL at 1/29/2024  5:05 AM, will monitor as appropriate     # Hypertension: Noted on problem list  # Chronic heart failure with preserved ejection fraction: heart failure noted on problem list and last echo with EF >50%      # DMII: A1C = 6.8 % (Ref range: <5.7 %) within past 6 months   # Obesity: Estimated body mass index is 31.79 kg/m  as calculated from the following:    Height as of this encounter: 1.803 m (5' 11\").    Weight as of this encounter: 103.4 kg (227 lb 15.3 oz).        # Financial/Environmental Concerns: none         Disposition Plan      Expected Discharge Date: 01/31/2024    Discharge Delays: Placement - TCU  Destination: nursing home  Discharge Comments: TCU when ready          The patient's care was discussed with the Attending Physician, Dr. Rosenstein .    Adriana Lombardo MD  Hospitalist Service  [facility]  Securely message with Jordan (more " info)  Text page via [facility] Paging/Directory   ______________________________________________________________________    Interval History   Patient alert and eating. Tearful today during discussion. Feeling anxious about PT -- gets tense and limits his ability to work with them but daughter noting he moves around when not thinking about it. Discussed with PT and Josi with pain team today.     Physical Exam   Vital Signs: Temp: 97.7  F (36.5  C) Temp src: Oral BP: 132/70 Pulse: 63   Resp: 18 SpO2: 98 % O2 Device: None (Room air)    Weight: 227 lbs 15.29 oz  GENERAL: Alert and tearful, anxious  RESP:  Lungs clear throughout. No wheeze or crackles.   CV: Heart RRR. Systolic murmur. Pulses intact  Abdomen: Soft, nontender, nondistended.  MSK: left hip clean and dry. Wound vac in place. No LE edema  SKIN: Visible skin clear. No significant rash, abnormal pigmentation or lesions.  NEURO: Cranial nerves grossly intact.       Data     I have personally reviewed the following data over the past 24 hrs:    10.4  \   9.2 (L)   / 317     145 104 32.9 (H) /  123 (H)   3.7; 3.6 31 (H) 1.44 (H) \     ALT: N/A AST: N/A AP: N/A TBILI: N/A   ALB: N/A TOT PROTEIN: N/A LIPASE: N/A     TSH: N/A T4: N/A A1C: N/A       Imaging results reviewed over the past 24 hrs:   No results found for this or any previous visit (from the past 24 hour(s)).

## 2024-01-30 NOTE — PLAN OF CARE
Problem: Risk for Delirium  Goal: Improved Sleep  Outcome: Progressing     Problem: Orthopaedic Fracture  Goal: Optimal Pain Control and Function  Outcome: Progressing   Goal Outcome Evaluation:  Pt continues to c/o left leg pain relieved only briefly with oxycodone.  Pt had removed previous iv so now has a new site in left hand.  IV secured with coband and sleeve.  Wound vac dressing reinforced as pt unintentionally ripped the dressing next to the suction disk which caused a small leak. The disk is right where pt grabs his leg when he is having a spasm.  Pt remains confused to place, time and situation.

## 2024-01-30 NOTE — PLAN OF CARE
Problem: Adult Inpatient Plan of Care  Goal: Plan of Care Review  Description: The Plan of Care Review/Shift note should be completed every shift.  The Outcome Evaluation is a brief statement about your assessment that the patient is improving, declining, or no change.  This information will be displayed automatically on your shift  note.  Outcome: Progressing   Goal Outcome Evaluation:  VS remain stable-he is on RA. He was very confused and restless at 0730-pulled out IV. IV restarted . Tramadol given before 1130 PT/OT and he seemed more comfortable-he was able to stand up and sit down x3. His orientation improved with family here at lunchtime-almost as soon as family left pt became very confused again -needed to use commode. Used commode than lifted back to bed using ceiling lift.Mepilex placed on coccyx are whic was slightly red but not open.

## 2024-01-30 NOTE — PROGRESS NOTES
Care Management Follow Up    Length of Stay (days): 8    Expected Discharge Date: 02/01/2024     Concerns to be Addressed: discharge planning     Patient plan of care discussed at interdisciplinary rounds: Yes    Anticipated Discharge Disposition: Transitional Care     Anticipated Discharge Services: None  Anticipated Discharge DME: None    Patient/family educated on Medicare website which has current facility and service quality ratings: yes  Education Provided on the Discharge Plan: Yes  Patient/Family in Agreement with the Plan: yes    Referrals Placed by CM/SW: Post Acute Facilities  Private pay costs discussed: Not applicable    Additional Information:  3:26 PM  SW called and left a voicemail with [facility] admissions requesting a phone call back to [phone] to discuss Pt's referral. CM to follow up with facility.    HEATH Jurado

## 2024-01-30 NOTE — PROVIDER NOTIFICATION
"Pt restless in the bed.  Pt unable to straighten left leg due to significant increase in pain stating \"it's so tight, it is just so tight.\"  Pt grabbing the back of his leg trying to find a comfortable position.  Pt given prn oxycodone earlier in the shift with short term effect. Hospitalist paged to update.     [id number]--New order noted for Robaxin.  Pt has been able to fall to sleep, will reassess when pt wakes.  "

## 2024-01-30 NOTE — PROGRESS NOTES
[facility] ACUTE INPATIENT PAIN SERVICE    [facility], [facility], [facility], [facility], [place]   PAIN follow-up    Assessment/Plan:  Alvin Newton is a 89 year old male who was admitted on 1/22/2024.  I was asked to see the patient for postoperative pain. Admitted for fall with left femur fracture and underwent open reduction internal fixation on 1/24. History of A-fib on Eliquis, past hip replacements, HF, DM 2, HTN, aortic stenosis, mild cognitive impairment.    shows incorrect results from a different patient. Describes pain as mild, very confused.     PLAN:  Acute pain secondary to ORIF, encephalopathy is limiting treatment of pain with opioids.  Patient is opioid naïve at baseline.  Multimodal Medication Therapy:   Adjuvants: Tylenol and lidocaine, diclofenac to the shoulders  Opioids: discontinue oxycodone and trial tramadol at HS  Tramadol bid PRN   Non-medication interventions- Ice   Constipation Prophylaxis-senna  Follow up /Discharge Recommendations - We recommend prescribing the following at the time of discharge: Would likely offer 200 MME          Subjective:     Alvin is tearful and anxious when I enter. Some mild pain in the left hip. Discussed pain and delirium with daughter and nurse. Call placed to Dr. Lombardo.       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)    Carpal tunnel syndrome, bilateral    Fall, initial encounter    Closed fracture of first lumbar vertebra, unspecified fracture morphology, initial encounter (H)    Fracture of L1 vertebra (H)    Impaired fasting glucose    Osteoarthritis of pelvis    Other ill-defined and unknown causes of morbidity and mortality    Primary localized osteoarthrosis of shoulder region    Pure  hypercholesterolemia    Reason for consultation    Right bundle branch block    Cellulitis of right lower extremity    Severe sepsis (H)    Septic shock (H)    Streptococcal bacteremia    Thrombocytopenia (H24)    Hyperbilirubinemia    Hypophosphatemia    Hypoalbuminemia    Pyuria    Paroxysmal atrial fibrillation with RVR (H)    Hypomagnesemia    Chronic pain of both shoulders    Severe aortic stenosis    Elevated brain natriuretic peptide (BNP) level    Ascending aorta dilatation (H24)    Peripheral edema    Positive urine culture    Medication induced coagulopathy  (H24)    Chronic diastolic (congestive) heart failure (H)    Altered mental status, unspecified altered mental status type    Clostridium difficile diarrhea    History of Clostridium difficile infection July 2023    Stage 3a chronic kidney disease (H)    Esophageal dysmotility    DEJAUN (acute kidney injury) (H24)    Moderate malnutrition (H24)    Schatzki's ring of distal esophagus-dilated 8/19/23    Acute gout involving toe of left foot    Unintentional weight loss    Abnormal esophagram    Hypokalemia    Hyponatremia    Open wound-coccyx/buttocks    Enterocolitis due to Clostridium difficile, not specified as recurrent    Other chronic pain    Pain in left shoulder    Pain in right shoulder    Unspecified streptococcus as the cause of diseases classified elsewhere    Esophageal dysphagia    Hip fracture, left, closed, initial encounter (H)    Acute kidney failure, unspecified (H24)    Amyloidosis (H)    Monoclonal gammopathy of unknown significance (MGUS)    Hypotension, unspecified hypotension type    Postoperative anemia due to acute blood loss        History   Drug Use No         Tobacco Use      Smoking status: Never      Smokeless tobacco: Never         acetaminophen  975 mg Oral TID    amiodarone  200 mg Oral Daily    apixaban ANTICOAGULANT  5 mg Oral BID    atorvastatin  20 mg Oral At Bedtime    busPIRone  2.5 mg Oral TID    cephaleXin  500 mg  "Oral 4x Daily    diclofenac  2 g Topical 4x Daily    famotidine  20 mg Oral Daily    hydrocortisone sodium succinate PF  25 mg Intravenous BID    insulin aspart  1-7 Units Subcutaneous TID AC    insulin aspart  1-5 Units Subcutaneous At Bedtime    lidocaine   Topical TID    Magnesium Oxide  250 mg Oral Daily    metoprolol succinate ER  25 mg Oral Daily    midodrine  5 mg Oral TID w/meals    polyethylene glycol  17 g Oral Daily    potassium chloride ER  20 mEq Oral Daily    senna-docusate  2 tablet Oral BID    sodium chloride (PF)  3 mL Intracatheter Q8H    torsemide  20 mg Oral Daily    traMADol  25 mg Oral At Bedtime       Objective:  Vital signs in last 24 hours:  B/P: 137/67, T: 97.6, P: 94, R: 17   Blood pressure 132/70, pulse 63, temperature 97.7  F (36.5  C), temperature source Oral, resp. rate 18, height 1.803 m (5' 11\"), weight 103.4 kg (227 lb 15.3 oz), SpO2 98%.      Weight:   Wt Readings from Last 2 Encounters:   01/30/24 103.4 kg (227 lb 15.3 oz)   09/05/23 99.3 kg (219 lb)           Intake/Output:    Intake/Output Summary (Last 24 hours) at 1/29/2024 0836  Last data filed at 1/29/2024 0659  Gross per 24 hour   Intake 400 ml   Output 1800 ml   Net -1400 ml        Review of Systems:   As per subjective, all others negative.    Physical Exam:     General Appearance:  Alert, agitated    Head:  Normocephalic, without obvious abnormality, atraumatic   Eyes:  PERRL, conjunctiva/corneas clear, EOM's intact   ENT/Throat: Lips dry   Missing teeth     Lymph/Neck: Supple, symmetrical, trachea midline, no adenopathy, thyroid: not enlarged, symmetric    Lungs:   clear to auscultation bilaterally, respirations unlabored   Chest Wall:  No tenderness or deformity   Cardiovascular/Heart:  Muffled    Abdomen:   Soft, non-tender, bowel sounds active all four quadrants   Musculoskeletal: Extremities with covered incision    Skin: Skin is dry     Neurologic: Alert and confused           Imaging:  Personally " Reviewed.    Results for orders placed or performed during the hospital encounter of 01/22/24   XR Pelvis and Hip Left 2 Views    Impression    IMPRESSION: Mildly displaced oblique periprosthetic left proximal femoral fracture centered at the mid-distal femoral stem particularly lateral and posterior. Bilateral total hip arthroplasties with asymmetric polyethylene liner wear superolateral, left   worse than right. No displaced pelvic fracture is identified. Degenerative change lower lumbar spine and both SI joints. No offset or widening at the symphysis pubis.    NOTE: ABNORMAL REPORT    THE DICTATION ABOVE DESCRIBES AN ABNORMALITY FOR WHICH FOLLOW-UP IS NEEDED.              Head CT w/o contrast    Impression    IMPRESSION:  1.  No acute intracranial abnormality.   CT Cervical Spine w/o Contrast    Impression    IMPRESSION:  1.  No fracture or posttraumatic subluxation.  2.  Multilevel ankylosis.   CT Femur Thigh Left w/o Contrast    Impression    IMPRESSION: Left total hip arthroplasty with an acute mildly displaced periprosthetic fracture involving the femoral component at the level of the inter and subtrochanteric regions.     CT Hip Left w/o Contrast    Impression    IMPRESSION: Left total hip arthroplasty with an acute mildly displaced periprosthetic fracture involving the femoral component at the level of the inter and subtrochanteric regions.     XR Femur Left 2 Views    Impression    IMPRESSION: Left total hip arthroplasty with an acute mildly displaced periprosthetic fracture involving the proximal femoral shaft extending to the intertrochanteric region. Small area of benign cortical thickening along the lateral margin of the mid   left femoral diaphysis.               XR Femur Port Left 2 Views    Impression    IMPRESSION: Since the prior study, the femoral component has been replaced with a longstem femoral component and there are new cerclage wires around the proximal femur. These findings extend across  the previously seen periprosthetic fracture, which is   now affixed into excellent position and alignment. There is no evidence of complication. Mild degenerative changes and small effusion in the knee incidentally noted.   XR Pelvis 1/2 Views    Impression    IMPRESSION: The femoral component of the left total hip arthroplasty has been replaced with a longstem component and there are new cerclage wires around the proximal femur, all extending across the previously seen periprosthetic fracture in the proximal   shaft of the femur. There is excellent position/alignment on this view. No complication evident. Please note that the distal end of the stem of the femoral component was not included in the field-of-view of this single image.   XR Chest PICC Placement 1 View    Impression    IMPRESSION: Right PICC to distal SVC.        Lab Results:  Personally Reviewed.   Last Comprehensive Metabolic Panel:  Sodium   Date Value Ref Range Status   01/30/2024 145 135 - 145 mmol/L Final     Comment:     Reference intervals for this test were updated on 09/26/2023 to more accurately reflect our healthy population. There may be differences in the flagging of prior results with similar values performed with this method. Interpretation of those prior results can be made in the context of the updated reference intervals.    12/30/2017 138 133 - 144 mmol/L Final     Potassium   Date Value Ref Range Status   01/30/2024 3.7 3.4 - 5.3 mmol/L Final   01/30/2024 3.6 3.4 - 5.3 mmol/L Final   07/15/2022 3.7 3.4 - 5.3 mmol/L Final   12/31/2017 3.5 3.4 - 5.3 mmol/L Final     Chloride   Date Value Ref Range Status   01/30/2024 104 98 - 107 mmol/L Final   07/15/2022 106 94 - 109 mmol/L Final   12/30/2017 104 94 - 109 mmol/L Final     Carbon Dioxide   Date Value Ref Range Status   12/30/2017 26 20 - 32 mmol/L Final     Carbon Dioxide (CO2)   Date Value Ref Range Status   01/30/2024 31 (H) 22 - 29 mmol/L Final   07/15/2022 24 20 - 32 mmol/L Final  "    Anion Gap   Date Value Ref Range Status   01/30/2024 10 7 - 15 mmol/L Final   07/15/2022 9 3 - 14 mmol/L Final   12/30/2017 8 3 - 14 mmol/L Final     Glucose   Date Value Ref Range Status   01/30/2024 163 (H) 70 - 99 mg/dL Final   07/15/2022 97 70 - 99 mg/dL Final   12/30/2017 145 (H) 70 - 99 mg/dL Final     GLUCOSE BY METER POCT   Date Value Ref Range Status   01/30/2024 123 (H) 70 - 99 mg/dL Final     Urea Nitrogen   Date Value Ref Range Status   01/30/2024 32.9 (H) 8.0 - 23.0 mg/dL Final   07/15/2022 13 7 - 30 mg/dL Final   12/31/2017 26 7 - 30 mg/dL Final     Creatinine   Date Value Ref Range Status   01/30/2024 1.44 (H) 0.67 - 1.17 mg/dL Final   12/31/2017 1.11 0.66 - 1.25 mg/dL Final     GFR Estimate   Date Value Ref Range Status   01/30/2024 46 (L) >60 mL/min/1.73m2 Final   12/31/2017 63 >60 mL/min/1.7m2 Final     Comment:     Non  GFR Calc     Calcium   Date Value Ref Range Status   01/30/2024 8.7 (L) 8.8 - 10.2 mg/dL Final   12/30/2017 7.9 (L) 8.5 - 10.1 mg/dL Final        UA: No results found for: \"UAMP\", \"UBARB\", \"BENZODIAZEUR\", \"UCANN\", \"UCOC\", \"OPIT\", \"UPCP\"         Please see A&P for additional details of medical decision making.  MANAGEMENT DISCUSSED with the following over the past 24 hours: nurse and family    NOTE(S)/MEDICAL RECORDS REVIEWED over the past 24 hours: social work, nursing, and medicine MD   Tests personally interpreted in the past 24 hours:  - imaging showing as above  Tests REVIEWED in the past 24 hours:  - CrtCl  SUPPLEMENTAL HISTORY, in addition to the patient's history, over the past 24 hours obtained from:   - daughter  Medical complexity over the past 24 hours:  -------------------------- MODERATE RISK FOR MORBIDITY --------------------------------------------------  - Prescription DRUG MANAGEMENT performed    Josi Palacios APRN, CNS-BC, CNP, ACHPN  Acute Care Pain Management Program   Hours of pain coverage Mon-Fri 8-1600, afterhours please call the " house officer   [facility] (WW, JNs, SD, RH)   Page via [employer]- Click HERE to page Josi or Jordan text web console -Click for Jordan

## 2024-01-30 NOTE — PLAN OF CARE
"  Problem: Adult Inpatient Plan of Care  Goal: Plan of Care Review  Description: The Plan of Care Review/Shift note should be completed every shift.  The Outcome Evaluation is a brief statement about your assessment that the patient is improving, declining, or no change.  This information will be displayed automatically on your shift  note.  Outcome: Progressing  Goal: Patient-Specific Goal (Individualized)  Description: You can add care plan individualizations to a care plan. Examples of Individualization might be:  \"Parent requests to be called daily at 9am for status\", \"I have a hard time hearing out of my right ear\", or \"Do not touch me to wake me up as it startles  me\".  Outcome: Progressing  Goal: Absence of Hospital-Acquired Illness or Injury  Intervention: Identify and Manage Fall Risk  Recent Flowsheet Documentation  Taken 1/29/2024 1742 by Mary Anaya, RN  Safety Promotion/Fall Prevention:   activity supervised   patient and family education   room door open   lighting adjusted   increase visualization of patient   increased rounding and observation  Intervention: Prevent Skin Injury  Recent Flowsheet Documentation  Taken 1/29/2024 1700 by Mary Anaya, RN  Body Position:   turned   foot of bed elevated  Intervention: Prevent and Manage VTE (Venous Thromboembolism) Risk  Recent Flowsheet Documentation  Taken 1/29/2024 1620 by Mary Anaya, RN  VTE Prevention/Management: SCDs (sequential compression devices) off  Intervention: Prevent Infection  Recent Flowsheet Documentation  Taken 1/29/2024 1742 by Mary Anaya, RN  Infection Prevention: hand hygiene promoted     Goal Outcome Evaluation:  Patient alert to self. Patient has been pleasant and cooperative with cares. Up to bedside commode x 1 with lift for BM. Patient denies pain. Patient ate small dinner, encouraged fluids. Patient has patent wade. Patient has wound vac with no output.                       "

## 2024-01-31 ENCOUNTER — APPOINTMENT (OUTPATIENT)
Dept: PHYSICAL THERAPY | Facility: HOSPITAL | Age: 89
DRG: 466 | End: 2024-01-31
Payer: COMMERCIAL

## 2024-01-31 ENCOUNTER — APPOINTMENT (OUTPATIENT)
Dept: OCCUPATIONAL THERAPY | Facility: HOSPITAL | Age: 89
DRG: 466 | End: 2024-01-31
Payer: COMMERCIAL

## 2024-01-31 LAB
ANION GAP SERPL CALCULATED.3IONS-SCNC: 7 MMOL/L (ref 7–15)
BUN SERPL-MCNC: 33.2 MG/DL (ref 8–23)
CALCIUM SERPL-MCNC: 8.6 MG/DL (ref 8.8–10.2)
CHLORIDE SERPL-SCNC: 107 MMOL/L (ref 98–107)
CREAT SERPL-MCNC: 1.46 MG/DL (ref 0.67–1.17)
DEPRECATED HCO3 PLAS-SCNC: 32 MMOL/L (ref 22–29)
EGFRCR SERPLBLD CKD-EPI 2021: 46 ML/MIN/1.73M2
ERYTHROCYTE [DISTWIDTH] IN BLOOD BY AUTOMATED COUNT: 14.6 % (ref 10–15)
GLUCOSE BLDC GLUCOMTR-MCNC: 104 MG/DL (ref 70–99)
GLUCOSE BLDC GLUCOMTR-MCNC: 119 MG/DL (ref 70–99)
GLUCOSE BLDC GLUCOMTR-MCNC: 119 MG/DL (ref 70–99)
GLUCOSE BLDC GLUCOMTR-MCNC: 127 MG/DL (ref 70–99)
GLUCOSE BLDC GLUCOMTR-MCNC: 133 MG/DL (ref 70–99)
GLUCOSE SERPL-MCNC: 139 MG/DL (ref 70–99)
HCT VFR BLD AUTO: 29.2 % (ref 40–53)
HGB BLD-MCNC: 9.4 G/DL (ref 13.3–17.7)
MAGNESIUM SERPL-MCNC: 1.9 MG/DL (ref 1.7–2.3)
MCH RBC QN AUTO: 29.4 PG (ref 26.5–33)
MCHC RBC AUTO-ENTMCNC: 32.2 G/DL (ref 31.5–36.5)
MCV RBC AUTO: 91 FL (ref 78–100)
PHOSPHATE SERPL-MCNC: 3.4 MG/DL (ref 2.5–4.5)
PLATELET # BLD AUTO: 302 10E3/UL (ref 150–450)
POTASSIUM SERPL-SCNC: 3.1 MMOL/L (ref 3.4–5.3)
POTASSIUM SERPL-SCNC: 3.5 MMOL/L (ref 3.4–5.3)
RBC # BLD AUTO: 3.2 10E6/UL (ref 4.4–5.9)
SODIUM SERPL-SCNC: 146 MMOL/L (ref 135–145)
WBC # BLD AUTO: 8.1 10E3/UL (ref 4–11)

## 2024-01-31 PROCEDURE — 250N000013 HC RX MED GY IP 250 OP 250 PS 637

## 2024-01-31 PROCEDURE — 80048 BASIC METABOLIC PNL TOTAL CA: CPT

## 2024-01-31 PROCEDURE — 36415 COLL VENOUS BLD VENIPUNCTURE: CPT

## 2024-01-31 PROCEDURE — 250N000013 HC RX MED GY IP 250 OP 250 PS 637: Performed by: FAMILY MEDICINE

## 2024-01-31 PROCEDURE — 83735 ASSAY OF MAGNESIUM: CPT

## 2024-01-31 PROCEDURE — 84132 ASSAY OF SERUM POTASSIUM: CPT | Performed by: FAMILY MEDICINE

## 2024-01-31 PROCEDURE — 250N000013 HC RX MED GY IP 250 OP 250 PS 637: Performed by: STUDENT IN AN ORGANIZED HEALTH CARE EDUCATION/TRAINING PROGRAM

## 2024-01-31 PROCEDURE — 97530 THERAPEUTIC ACTIVITIES: CPT | Mod: GP

## 2024-01-31 PROCEDURE — 36415 COLL VENOUS BLD VENIPUNCTURE: CPT | Performed by: FAMILY MEDICINE

## 2024-01-31 PROCEDURE — 120N000001 HC R&B MED SURG/OB

## 2024-01-31 PROCEDURE — 85027 COMPLETE CBC AUTOMATED: CPT

## 2024-01-31 PROCEDURE — 99232 SBSQ HOSP IP/OBS MODERATE 35: CPT | Mod: GC

## 2024-01-31 PROCEDURE — 97535 SELF CARE MNGMENT TRAINING: CPT | Mod: GO

## 2024-01-31 PROCEDURE — 250N000013 HC RX MED GY IP 250 OP 250 PS 637: Performed by: PAIN MEDICINE

## 2024-01-31 PROCEDURE — 84100 ASSAY OF PHOSPHORUS: CPT

## 2024-01-31 PROCEDURE — 97110 THERAPEUTIC EXERCISES: CPT | Mod: GP

## 2024-01-31 PROCEDURE — 97110 THERAPEUTIC EXERCISES: CPT | Mod: GO

## 2024-01-31 RX ORDER — POTASSIUM CHLORIDE 1500 MG/1
40 TABLET, EXTENDED RELEASE ORAL ONCE
Status: COMPLETED | OUTPATIENT
Start: 2024-01-31 | End: 2024-01-31

## 2024-01-31 RX ADMIN — BUSPIRONE HYDROCHLORIDE 5 MG: 5 TABLET ORAL at 08:53

## 2024-01-31 RX ADMIN — MIDODRINE HYDROCHLORIDE 5 MG: 5 TABLET ORAL at 11:44

## 2024-01-31 RX ADMIN — DICLOFENAC 2 G: 10 GEL TOPICAL at 08:56

## 2024-01-31 RX ADMIN — POTASSIUM CHLORIDE 20 MEQ: 750 TABLET, EXTENDED RELEASE ORAL at 08:55

## 2024-01-31 RX ADMIN — DICLOFENAC 2 G: 10 GEL TOPICAL at 21:02

## 2024-01-31 RX ADMIN — AMIODARONE HYDROCHLORIDE 200 MG: 200 TABLET ORAL at 08:54

## 2024-01-31 RX ADMIN — CEPHALEXIN 500 MG: 250 FOR SUSPENSION ORAL at 21:01

## 2024-01-31 RX ADMIN — CEPHALEXIN 500 MG: 250 FOR SUSPENSION ORAL at 11:42

## 2024-01-31 RX ADMIN — APIXABAN 5 MG: 5 TABLET, FILM COATED ORAL at 21:01

## 2024-01-31 RX ADMIN — APIXABAN 5 MG: 5 TABLET, FILM COATED ORAL at 08:54

## 2024-01-31 RX ADMIN — LIDOCAINE: 50 OINTMENT TOPICAL at 13:36

## 2024-01-31 RX ADMIN — ATORVASTATIN CALCIUM 20 MG: 10 TABLET, FILM COATED ORAL at 21:01

## 2024-01-31 RX ADMIN — MIDODRINE HYDROCHLORIDE 5 MG: 5 TABLET ORAL at 16:30

## 2024-01-31 RX ADMIN — POLYETHYLENE GLYCOL 3350 17 G: 17 POWDER, FOR SOLUTION ORAL at 08:55

## 2024-01-31 RX ADMIN — DICLOFENAC 2 G: 10 GEL TOPICAL at 16:29

## 2024-01-31 RX ADMIN — FAMOTIDINE 20 MG: 20 TABLET ORAL at 08:55

## 2024-01-31 RX ADMIN — SENNOSIDES AND DOCUSATE SODIUM 2 TABLET: 8.6; 5 TABLET ORAL at 08:54

## 2024-01-31 RX ADMIN — CEPHALEXIN 500 MG: 250 FOR SUSPENSION ORAL at 06:49

## 2024-01-31 RX ADMIN — ACETAMINOPHEN 975 MG: 325 TABLET ORAL at 13:27

## 2024-01-31 RX ADMIN — Medication 50 MCG: at 08:54

## 2024-01-31 RX ADMIN — MIDODRINE HYDROCHLORIDE 5 MG: 5 TABLET ORAL at 08:54

## 2024-01-31 RX ADMIN — METOPROLOL SUCCINATE 25 MG: 25 TABLET, EXTENDED RELEASE ORAL at 08:54

## 2024-01-31 RX ADMIN — Medication 250 MG: at 08:53

## 2024-01-31 RX ADMIN — BUSPIRONE HYDROCHLORIDE 5 MG: 5 TABLET ORAL at 13:28

## 2024-01-31 RX ADMIN — BUSPIRONE HYDROCHLORIDE 5 MG: 5 TABLET ORAL at 21:05

## 2024-01-31 RX ADMIN — POTASSIUM CHLORIDE 40 MEQ: 1500 TABLET, EXTENDED RELEASE ORAL at 08:55

## 2024-01-31 RX ADMIN — LIDOCAINE: 50 OINTMENT TOPICAL at 08:56

## 2024-01-31 RX ADMIN — DICLOFENAC 2 G: 10 GEL TOPICAL at 13:36

## 2024-01-31 RX ADMIN — LIDOCAINE: 50 OINTMENT TOPICAL at 21:12

## 2024-01-31 RX ADMIN — ACETAMINOPHEN 975 MG: 325 TABLET ORAL at 08:54

## 2024-01-31 RX ADMIN — ACETAMINOPHEN 975 MG: 325 TABLET ORAL at 21:00

## 2024-01-31 RX ADMIN — TORSEMIDE 20 MG: 20 TABLET ORAL at 08:53

## 2024-01-31 RX ADMIN — TRAMADOL HYDROCHLORIDE 25 MG: 50 TABLET, COATED ORAL at 16:28

## 2024-01-31 RX ADMIN — SENNOSIDES AND DOCUSATE SODIUM 2 TABLET: 8.6; 5 TABLET ORAL at 21:01

## 2024-01-31 RX ADMIN — TRAMADOL HYDROCHLORIDE 25 MG: 50 TABLET, COATED ORAL at 21:01

## 2024-01-31 RX ADMIN — CEPHALEXIN 500 MG: 250 FOR SUSPENSION ORAL at 17:51

## 2024-01-31 ASSESSMENT — ACTIVITIES OF DAILY LIVING (ADL)
ADLS_ACUITY_SCORE: 39
ADLS_ACUITY_SCORE: 33
ADLS_ACUITY_SCORE: 39
ADLS_ACUITY_SCORE: 39
ADLS_ACUITY_SCORE: 35
ADLS_ACUITY_SCORE: 33
ADLS_ACUITY_SCORE: 39

## 2024-01-31 NOTE — PLAN OF CARE
Problem: Risk for Delirium  Goal: Improved Sleep  Outcome: Progressing   Goal Outcome Evaluation:  Pt had a much better night of sleep.  Slept all night with intermittent periods of wakefulness.  Remains confused to place, time and situation when awake.  Easily redirectable.  Wound vac intact.     Pain with movement this shift but relieved when repositioned and settled.

## 2024-01-31 NOTE — PROGRESS NOTES
[facility] ACUTE INPATIENT PAIN SERVICE    [facility], [facility], [facility], [facility], [place]   PAIN follow-up       Assessment/Plan:  Alvin Newton is a 89 year old male who was admitted on 1/22/2024.  I was asked to see the patient for postoperative pain. Admitted for fall with left femur fracture and underwent open reduction internal fixation on 1/24. History of A-fib on Eliquis, past hip replacements, HF, DM 2, HTN, aortic stenosis, mild cognitive impairment.    shows incorrect results from a different patient.     Pt sleeping upon arrival, easy to awaken, reports some pain in left hip - no signs of limiting pain during assessment - states pain medications effective. Wife in room. My first time meeting patient, confused, however pleasant and able to answer questions, no sedation, just confusion.     Pt has used x 1 dose of tramadol yesterday morning, and x 1 dose last night. Tolerating well - discussed with RN. No changes today.      PLAN:  Acute pain secondary to ORIF, encephalopathy is limiting treatment of pain with opioids.  Patient is opioid naïve at baseline.  Multimodal Medication Therapy:   Adjuvants: Tylenol 975 mg po TID and lidocaine, diclofenac to the shoulders  Opioids: tramadol 25 mg po at HS  Tramadol 25 mg bid PRN   Non-medication interventions- Ice   Constipation Prophylaxis-senna  Follow up /Discharge Recommendations - We recommend prescribing the following at the time of discharge: Would likely offer 200 MME      Roxana Tate, PharmD, BCPS  Acute Care Pain Management Program   [place]

## 2024-01-31 NOTE — PLAN OF CARE
Problem: Adult Inpatient Plan of Care  Goal: Plan of Care Review  Description: The Plan of Care Review/Shift note should be completed every shift.  The Outcome Evaluation is a brief statement about your assessment that the patient is improving, declining, or no change.  This information will be displayed automatically on your shift  note.  Outcome: Progressing  Flowsheets (Taken 1/31/2024 1031)  Plan of Care Reviewed With: patient   Goal Outcome Evaluation:plan for transitional care when able  Problem: Risk for Delirium  Goal: Optimal Coping  Outcome: Progressing-appears pain is under some control from before - still some confused but easily reoriented  Problem: Electrolyte Imbalance  Goal: Electrolyte Balance  Outcome: Progressing-monitoring   Problem: Oral Intake Inadequate  Goal: Improved Oral Intake  Outcome: Progressing-encourage to eat meals/assist to set up tray  Will medicate with pain medications as per mar    Plan of Care Reviewed With: patient  Patient alert to self and able to feed self.  Confusion and able to reorient as needed.  Up in chair with use of elham lift.  Eastman in place for retention.

## 2024-01-31 NOTE — PROGRESS NOTES
Care Management Follow Up    Length of Stay (days): 9    Expected Discharge Date: 02/01/2024     Concerns to be Addressed: discharge planning     Patient plan of care discussed at interdisciplinary rounds: Yes    Anticipated Discharge Disposition: Transitional Care     Anticipated Discharge Services: None  Anticipated Discharge DME: None    Patient/family educated on Medicare website which has current facility and service quality ratings: yes  Education Provided on the Discharge Plan: Yes  Patient/Family in Agreement with the Plan: yes    Referrals Placed by CM/SW: Post Acute Facilities  Private pay costs discussed: transportation costs    Additional Information:  10:05 AM  ALIREZA called and spoke to Meri at [facility] about Pt's pending TCU referral. Meri reported that she would be able to accept Pt to TCU potentially tomorrow. If she ends up not having a bed tomorrow, she could accept Pt on Friday.     11:48 AM  ALIREZA met and spoke with Pt's wife Rosie and daughter Fuad regarding accepting TCU placement. Family reported some nervousness with Pt going to [facility] as they have had poor experiences with TCUs in the past. Fuad and Rosie reported that they hope to discuss and tour the facility but understand that they would need to discharge to the facility tomorrow if medically cleared. Fuad reported she will call CM after reviewing.    1:36 PM  Fuad reached out to ALIREZA stating that she plans to tour [facility] with her mom this afternoon but she would like to more forward with accepting the bed at their facility. Fuad reported some concern about Pt being ready for discharge tomorrow and requested for MD to call her. ALIREZA sent a message to Resident MD with Fuad's request.     ALIREZA called and spoke to Meri on the phone who informed CM that she is still unsure if she would have a bed tomorrow. Meri requested for CM to follow up with her in the morning.      HEATH Jurado

## 2024-01-31 NOTE — PROGRESS NOTES
"[facility]    Medicine Progress Note - Hospitalist Service    Date of Admission:  1/22/2024    Assessment & Plan   Alvin Newton is a 89 year old male admitted on 1/22/2024. He has a history of pAF on Eliquis, bilateral hip replacements (33 years ago), HFpEF, T2DM, hypertension, severe aortic stenosis, possible amyloidosis (work-up in process), mild cognitive impairment and is admitted for left femur fracture. Unfortunately significant blood loss causing hypotension and transfer to ICU for pressors. Transferred back to medical unit on 1/27 for continued management. Ultimately will need TCU      Delirium  Anxiety, improving  History of hospital-induced delirium per family. Known mild cognitive impairment at baseline. Having delirium post operatively with really any medications. Anxious and tearful. Limiting ability to work with PT. No agitation. Not requiring a 1:1.   - Buspar TID  - Scheduled Tylenol   - Cluster overnight cares as able to maximize nighttime sleep   - Continue to reorient patient to place/time    Hypotension, resolved   Transferred to ICU on 1/24 after procedure after losing 3L of blood during surgery. Required pressor support until 1/27. Received pRBC transfusion on 1/26. Likely hemorrhagic shock vs vaso-plegia. Also with mildly low AM cortisol- likely component of adrenal insufficiency. Although hard to interpret when acutely sick. Discontinued steroids on 1/30.     Left femur fracture s/p repair 1/24  Mechanical fall   Chronic anticoagulation (Eliquis)  Presented to ED 1/22 for a mechanical fall at home. Tried to reach for his cane for stability but put his weight on his \"grabber device\" instead and fell forward. Reports he did not hit his head or lose consciousness. CT head with no acute abnormality. XR pelvis/left hip revealed a left proximal femoral fracture. History of bilateral hip replacements about 33 years ago. On Eliquis for atrial fibrillation, most recent dose " 1/21 PM prior to admission. Underwent repair on on 1/24. Complicated surgery in setting of bilateral hip replacements. Lost 3L of blood. Patient hgb now stabilized to 9. Now dealing with significant post op pain and delirium complicating management.  - Orthopedic surgery consulted               - WB LLE with walker              - PT/OT, TCU              - no hip precautions              - 7 days of Keflex 500mg q6h while in house, (1/26-2/1)     -switch to [facility] [place] to complete 7 day course total if discharges              - maintain Prevena until follow-up              - follow-up with Dr. 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 Cardiac MRI in October concerning for infiltrative cardiomyopathy. Work-up for amyloidosis in process by cardiology and [facility]. Biopsies of bone marrow and fat pad negative for AL amyloid. Per chart review, plan at this time is to follow-up with cardiology regarding possibly myocardial biopsy and continued amyloid/infiltrative cardiomyopathy work-up.   - PTA metoprolol, Lipitor, torsemide   - Follow-up with cardiologist as scheduled      Normocytic anemia  Patient with hgb of 7-8s following procedure. Acute blood loss anemia. Stable around 9s.   - CBC in am  - consider transfusion if dropping given cardiovascular hx     A-fib with RVR, improved  History of pAF on Eliquis. Most recent dose of Eliquis was 1/21 PM on admission. INR 1.69 on admission. Follows with [facility] cardiology. Went into RVR after procedure which has now resolved.  - continue Eliquis   - continue PTA metoprolol  - continue PTA amiodarone      DEJUAN on CKD 3a, resolved  Creatinine 1.34 on admission. Unclear baseline per chart  "review - Cr had been 1.0-1.2 summer 2023, but more recently 1.4-1.6 in cardiology clinic in December. Will continue to monitor. Cr bump to 1.74 morning of procedure. Improved with fluids.   - Daily BMP   - Avoid nephrotoxins     Type 2 diabetes mellitus   History of T2DM without long-term use of insulin. PTA medications: none. Most recent A1C 6.8% on admission.   - POC glucose checks QID   - Medium resistance SSI      Stress leukocytosis, resolved  WBC of 13 after procedure. Improving. No signs of infection at this time  - CBC in am     Depression/Anxiety  - PTA Celexa     GERD  - PTA Pepcid            Diet: Combination Diet Regular Diet; Very High Consistent Carb (90 g CHO per Meal) Diet  Discharge Instruction - Regular Diet Adult    DVT Prophylaxis: DOAC  Eastman Catheter: PRESENT, indication: Retention  Lines: None       Cardiac Monitoring: None  Code Status: No CPR- Pre-arrest intubation OK      Clinically Significant Risk Factors        # Hypokalemia: Lowest K = 3.1 mmol/L in last 2 days, will replace as needed  # Hypernatremia: Highest Na = 146 mmol/L in last 2 days, will monitor as appropriate      # Hypoalbuminemia: Lowest albumin = 2.8 g/dL at 1/29/2024  5:05 AM, will monitor as appropriate     # Hypertension: Noted on problem list  # Chronic heart failure with preserved ejection fraction: heart failure noted on problem list and last echo with EF >50%      # DMII: A1C = 6.8 % (Ref range: <5.7 %) within past 6 months   # Obesity: Estimated body mass index is 31.79 kg/m  as calculated from the following:    Height as of this encounter: 1.803 m (5' 11\").    Weight as of this encounter: 103.4 kg (227 lb 15.3 oz).        # Financial/Environmental Concerns: none         Disposition Plan      Expected Discharge Date: 02/01/2024    Discharge Delays: Other (Add Comment)  Destination: nursing home  Discharge Comments: TCU when ready          The patient's care was discussed with the Attending Physician, Dr. Rosenstein " .    Adriana Lombardo MD  Hospitalist Service  [facility]  Securely message with [employer] (more info)  Text page via [employer] Paging/Directory   ______________________________________________________________________    Interval History   Patient alert and eating. Happier today. Noting some ongoing pain but was able to work with PT more yesterday. Not tearful today.      Physical Exam   Vital Signs: Temp: 97.4  F (36.3  C) Temp src: Oral BP: 136/65 Pulse: 95   Resp: 18 SpO2: 95 % O2 Device: None (Room air)    Weight: 227 lbs 15.29 oz  GENERAL: Alert calm  RESP:  Lungs clear throughout. No wheeze or crackles.   CV: Heart RRR. Systolic murmur. Pulses intact  Abdomen: Soft, nontender, nondistended.  MSK: left hip clean and dry. Wound vac in place. No LE edema  SKIN: Visible skin clear. No significant rash, abnormal pigmentation or lesions.  NEURO: Cranial nerves grossly intact.       Data     I have personally reviewed the following data over the past 24 hrs:    8.1  \   9.4 (L)   / 302     146 (H) 107 33.2 (H) /  119 (H)   3.1 (L) 32 (H) 1.46 (H) \       Imaging results reviewed over the past 24 hrs:   No results found for this or any previous visit (from the past 24 hour(s)).

## 2024-01-31 NOTE — PLAN OF CARE
Problem: Adult Inpatient Plan of Care  Goal: Plan of Care Review  Description: The Plan of Care Review/Shift note should be completed every shift.  The Outcome Evaluation is a brief statement about your assessment that the patient is improving, declining, or no change.  This information will be displayed automatically on your shift  note.  Outcome: Progressing  Goal: Absence of Hospital-Acquired Illness or Injury  Intervention: Identify and Manage Fall Risk  Recent Flowsheet Documentation  Taken 1/30/2024 1707 by Mary Anaya, RN  Safety Promotion/Fall Prevention:   activity supervised   assistive device/personal items within reach   supervised activity  Intervention: Prevent and Manage VTE (Venous Thromboembolism) Risk  Recent Flowsheet Documentation  Taken 1/30/2024 1707 by Mary Anaya, RN  VTE Prevention/Management: (patient getting increasingly restless pulling and kicking) SCDs (sequential compression devices) off  Goal: Optimal Comfort and Wellbeing  Intervention: Monitor Pain and Promote Comfort  Recent Flowsheet Documentation  Taken 1/30/2024 2110 by Mary Anaya, RN  Pain Management Interventions:   medication (see MAR)   repositioned  Goal: Readiness for Transition of Care  Outcome: Progressing     Goal Outcome Evaluation:         Patient is alert and oriented to self only. Patient received scheduled tramadol and Tylenol for pain. Patient picked at wound vac and it was re-enforced. Patient remains elham lift to commode.

## 2024-01-31 NOTE — PROGRESS NOTES
"Orthopedic Progress Note      Assessment: 5 Days Post-Op  S/P Procedure(s):  Open reduction internal fixation left periprosthetic femur fracture  Revision total hip arthroplasty, both components     Plan:   - Continue PT/OT  - Pain management  - Weightbearing status: Footflat WB LLE with walker, PT/OT, no hip precautions.   - Anticoagulation: Okay with PO eliquis from ortho standpoint in addition to SCDs, va stockings and early ambulation.  - Abx prophylaxis: 7 day PO AB, Keflex 500mg Q6 while in house.   - Hgb 8.9  - Of note patient does not that he has decided to pass away rather than trying to progress with recovery.  Will discuss with hospitalist, possible palliative care team consult indicated  - Discharge planning: anticipate TCU      Subjective:  Pain: moderate  Nausea, Vomiting:  No  Lightheadedness, Dizziness:  No  Neuro:  Patient denies new onset numbness or paresthesias    Alvin is in slightly better spirits today.  Discussed progressing with therapy, he is still frustrated but did make some progress yesterday.      Objective:  /65 (BP Location: Left arm)   Pulse 95   Temp 97.4  F (36.3  C) (Oral)   Resp 18   Ht 1.803 m (5' 11\")   Wt 103.4 kg (227 lb 15.3 oz)   SpO2 95%   BMI 31.79 kg/m    The patient is A&Ox3. Appears comfortable.   Sensation is intact.  Dorsiflexion and plantar flexion is intact.  Dorsalis pedis pulse intact.  Calves are soft and non-tender. Negative Rodriguez's.  The incision is covered. Prevena dressing C/D/I.    Prevena drain intact without appreciable drainage in vac container    Pertinent Labs   Lab Results: personally reviewed.   Lab Results   Component Value Date    INR 1.81 (H) 01/24/2024    INR 1.69 (H) 01/22/2024    INR 1.59 (H) 06/06/2022     Lab Results   Component Value Date    WBC 8.1 01/31/2024    HGB 9.4 (L) 01/31/2024    HCT 29.2 (L) 01/31/2024    MCV 91 01/31/2024     01/31/2024     Lab Results   Component Value Date     (H) 01/31/2024    CO2 32 " (H) 01/31/2024         MICHELLE ROJAS PA-C  01/31/2024

## 2024-02-01 ENCOUNTER — APPOINTMENT (OUTPATIENT)
Dept: OCCUPATIONAL THERAPY | Facility: HOSPITAL | Age: 89
DRG: 466 | End: 2024-02-01
Payer: COMMERCIAL

## 2024-02-01 ENCOUNTER — APPOINTMENT (OUTPATIENT)
Dept: PHYSICAL THERAPY | Facility: HOSPITAL | Age: 89
DRG: 466 | End: 2024-02-01
Payer: COMMERCIAL

## 2024-02-01 LAB
ANION GAP SERPL CALCULATED.3IONS-SCNC: 8 MMOL/L (ref 7–15)
BUN SERPL-MCNC: 31.1 MG/DL (ref 8–23)
CALCIUM SERPL-MCNC: 8.8 MG/DL (ref 8.8–10.2)
CHLORIDE SERPL-SCNC: 106 MMOL/L (ref 98–107)
CREAT SERPL-MCNC: 1.38 MG/DL (ref 0.67–1.17)
DEPRECATED HCO3 PLAS-SCNC: 33 MMOL/L (ref 22–29)
EGFRCR SERPLBLD CKD-EPI 2021: 49 ML/MIN/1.73M2
ERYTHROCYTE [DISTWIDTH] IN BLOOD BY AUTOMATED COUNT: 15.3 % (ref 10–15)
GLUCOSE BLDC GLUCOMTR-MCNC: 116 MG/DL (ref 70–99)
GLUCOSE BLDC GLUCOMTR-MCNC: 117 MG/DL (ref 70–99)
GLUCOSE BLDC GLUCOMTR-MCNC: 121 MG/DL (ref 70–99)
GLUCOSE BLDC GLUCOMTR-MCNC: 124 MG/DL (ref 70–99)
GLUCOSE BLDC GLUCOMTR-MCNC: 136 MG/DL (ref 70–99)
GLUCOSE SERPL-MCNC: 131 MG/DL (ref 70–99)
HCT VFR BLD AUTO: 30.9 % (ref 40–53)
HGB BLD-MCNC: 9.9 G/DL (ref 13.3–17.7)
MAGNESIUM SERPL-MCNC: 2.1 MG/DL (ref 1.7–2.3)
MCH RBC QN AUTO: 29.4 PG (ref 26.5–33)
MCHC RBC AUTO-ENTMCNC: 32 G/DL (ref 31.5–36.5)
MCV RBC AUTO: 92 FL (ref 78–100)
PHOSPHATE SERPL-MCNC: 3.7 MG/DL (ref 2.5–4.5)
PLATELET # BLD AUTO: 347 10E3/UL (ref 150–450)
POTASSIUM SERPL-SCNC: 3.6 MMOL/L (ref 3.4–5.3)
RBC # BLD AUTO: 3.37 10E6/UL (ref 4.4–5.9)
SODIUM SERPL-SCNC: 147 MMOL/L (ref 135–145)
WBC # BLD AUTO: 8.8 10E3/UL (ref 4–11)

## 2024-02-01 PROCEDURE — 250N000013 HC RX MED GY IP 250 OP 250 PS 637

## 2024-02-01 PROCEDURE — 250N000013 HC RX MED GY IP 250 OP 250 PS 637: Performed by: STUDENT IN AN ORGANIZED HEALTH CARE EDUCATION/TRAINING PROGRAM

## 2024-02-01 PROCEDURE — 80048 BASIC METABOLIC PNL TOTAL CA: CPT

## 2024-02-01 PROCEDURE — 99232 SBSQ HOSP IP/OBS MODERATE 35: CPT | Mod: GC

## 2024-02-01 PROCEDURE — 84100 ASSAY OF PHOSPHORUS: CPT | Performed by: FAMILY MEDICINE

## 2024-02-01 PROCEDURE — 120N000001 HC R&B MED SURG/OB

## 2024-02-01 PROCEDURE — 99232 SBSQ HOSP IP/OBS MODERATE 35: CPT | Performed by: PAIN MEDICINE

## 2024-02-01 PROCEDURE — 36415 COLL VENOUS BLD VENIPUNCTURE: CPT

## 2024-02-01 PROCEDURE — 85027 COMPLETE CBC AUTOMATED: CPT

## 2024-02-01 PROCEDURE — 97110 THERAPEUTIC EXERCISES: CPT | Mod: GO

## 2024-02-01 PROCEDURE — 97110 THERAPEUTIC EXERCISES: CPT | Mod: GP

## 2024-02-01 PROCEDURE — 97530 THERAPEUTIC ACTIVITIES: CPT | Mod: GO

## 2024-02-01 PROCEDURE — 250N000013 HC RX MED GY IP 250 OP 250 PS 637: Performed by: PAIN MEDICINE

## 2024-02-01 PROCEDURE — 250N000013 HC RX MED GY IP 250 OP 250 PS 637: Performed by: FAMILY MEDICINE

## 2024-02-01 PROCEDURE — 83735 ASSAY OF MAGNESIUM: CPT | Performed by: FAMILY MEDICINE

## 2024-02-01 RX ADMIN — CEPHALEXIN 500 MG: 250 FOR SUSPENSION ORAL at 17:39

## 2024-02-01 RX ADMIN — BUSPIRONE HYDROCHLORIDE 5 MG: 5 TABLET ORAL at 09:05

## 2024-02-01 RX ADMIN — APIXABAN 5 MG: 5 TABLET, FILM COATED ORAL at 20:33

## 2024-02-01 RX ADMIN — CEPHALEXIN 500 MG: 250 FOR SUSPENSION ORAL at 06:18

## 2024-02-01 RX ADMIN — DICLOFENAC 2 G: 10 GEL TOPICAL at 20:40

## 2024-02-01 RX ADMIN — LIDOCAINE: 50 OINTMENT TOPICAL at 15:03

## 2024-02-01 RX ADMIN — DICLOFENAC 2 G: 10 GEL TOPICAL at 15:03

## 2024-02-01 RX ADMIN — ACETAMINOPHEN 975 MG: 325 TABLET ORAL at 15:02

## 2024-02-01 RX ADMIN — MIDODRINE HYDROCHLORIDE 5 MG: 5 TABLET ORAL at 09:09

## 2024-02-01 RX ADMIN — TRAMADOL HYDROCHLORIDE 25 MG: 50 TABLET, COATED ORAL at 01:14

## 2024-02-01 RX ADMIN — CEPHALEXIN 500 MG: 250 FOR SUSPENSION ORAL at 13:58

## 2024-02-01 RX ADMIN — TRAMADOL HYDROCHLORIDE 25 MG: 50 TABLET, COATED ORAL at 20:34

## 2024-02-01 RX ADMIN — CEPHALEXIN 500 MG: 250 FOR SUSPENSION ORAL at 21:57

## 2024-02-01 RX ADMIN — FAMOTIDINE 20 MG: 20 TABLET ORAL at 09:10

## 2024-02-01 RX ADMIN — LIDOCAINE: 50 OINTMENT TOPICAL at 20:41

## 2024-02-01 RX ADMIN — BUSPIRONE HYDROCHLORIDE 5 MG: 5 TABLET ORAL at 20:35

## 2024-02-01 RX ADMIN — APIXABAN 5 MG: 5 TABLET, FILM COATED ORAL at 09:08

## 2024-02-01 RX ADMIN — MIDODRINE HYDROCHLORIDE 5 MG: 5 TABLET ORAL at 13:51

## 2024-02-01 RX ADMIN — SENNOSIDES AND DOCUSATE SODIUM 2 TABLET: 8.6; 5 TABLET ORAL at 09:05

## 2024-02-01 RX ADMIN — TORSEMIDE 20 MG: 20 TABLET ORAL at 09:06

## 2024-02-01 RX ADMIN — POLYETHYLENE GLYCOL 3350 17 G: 17 POWDER, FOR SOLUTION ORAL at 09:06

## 2024-02-01 RX ADMIN — AMIODARONE HYDROCHLORIDE 200 MG: 200 TABLET ORAL at 09:05

## 2024-02-01 RX ADMIN — LIDOCAINE 1 G: 50 OINTMENT TOPICAL at 09:11

## 2024-02-01 RX ADMIN — ACETAMINOPHEN 975 MG: 325 TABLET ORAL at 20:34

## 2024-02-01 RX ADMIN — BUSPIRONE HYDROCHLORIDE 5 MG: 5 TABLET ORAL at 15:02

## 2024-02-01 RX ADMIN — Medication 250 MG: at 09:06

## 2024-02-01 RX ADMIN — MIDODRINE HYDROCHLORIDE 5 MG: 5 TABLET ORAL at 17:34

## 2024-02-01 RX ADMIN — DICLOFENAC 2 G: 10 GEL TOPICAL at 09:13

## 2024-02-01 RX ADMIN — ACETAMINOPHEN 975 MG: 325 TABLET ORAL at 09:10

## 2024-02-01 RX ADMIN — DICLOFENAC 2 G: 10 GEL TOPICAL at 17:39

## 2024-02-01 RX ADMIN — POTASSIUM CHLORIDE 20 MEQ: 750 TABLET, EXTENDED RELEASE ORAL at 09:07

## 2024-02-01 RX ADMIN — SENNOSIDES AND DOCUSATE SODIUM 2 TABLET: 8.6; 5 TABLET ORAL at 20:33

## 2024-02-01 RX ADMIN — ATORVASTATIN CALCIUM 20 MG: 10 TABLET, FILM COATED ORAL at 20:34

## 2024-02-01 RX ADMIN — Medication 50 MCG: at 09:09

## 2024-02-01 RX ADMIN — METOPROLOL SUCCINATE 25 MG: 25 TABLET, EXTENDED RELEASE ORAL at 09:07

## 2024-02-01 RX ADMIN — Medication 1 MG: at 01:15

## 2024-02-01 ASSESSMENT — ACTIVITIES OF DAILY LIVING (ADL)
ADLS_ACUITY_SCORE: 33

## 2024-02-01 NOTE — PLAN OF CARE
Problem: Risk for Delirium  Goal: Improved Behavioral Control  Outcome: Progressing   Goal Outcome Evaluation:         Pt calm & compliant on shift. Continues to be confused but alert to self/situation. Family has been present at bedside for a majority of shift.     Bed extender placed on bed for L heel blister, intact. Cont to elevate. Sacral mepilex on, peeled back, reddened, returned placement. Vac functioning at 125 per order.

## 2024-02-01 NOTE — PROGRESS NOTES
"Orthopedic Progress Note      Assessment: 8 Days Post-Op  S/P Procedure(s):  Open reduction internal fixation left periprosthetic femur fracture  Revision total hip arthroplasty, both components     Plan:   - Continue PT/OT  - Pain management per pain team.  Discussed pain control today, seems to have good pain control on tramadol, may be exacerbating some confusion  - Weightbearing status: Footflat WB LLE with walker, PT/OT, no hip precautions.   - Anticoagulation: Okay with PO eliquis from ortho standpoint in addition to SCDs, va stockings and early ambulation.  - Abx prophylaxis: 7 day PO AB, Keflex 500mg Q6 while in house.   - Hgb 9.9  - Prevena to remain in place at discharge until follow up appointment with Dr. Ramos  - Discharge planning: anticipate TCU.  Okay to discharge from an orthopedic standpoint.        Subjective:  Pain: moderate  Nausea, Vomiting:  No  Lightheadedness, Dizziness:  No  Neuro:  Patient denies new onset numbness or paresthesias    Alvin voices some frustration today with pain and difficulty moving.  He wants to see family today.  He is slightly confused but able to answer questions appropriately    Objective:  /65 (BP Location: Left arm)   Pulse 89   Temp 97.4  F (36.3  C) (Oral)   Resp 18   Ht 1.803 m (5' 11\")   Wt 103.4 kg (227 lb 15.3 oz)   SpO2 97%   BMI 31.79 kg/m    The patient is A&Ox3. Appears comfortable.   Sensation is intact.  Dorsiflexion and plantar flexion is intact.  Dorsalis pedis pulse intact.  Calves are soft and non-tender. Negative Rodriguez's.  The incision is covered. Prevena dressing C/D/I.    Prevena drain intact without appreciable drainage in vac container    Pertinent Labs   Lab Results: personally reviewed.   Lab Results   Component Value Date    INR 1.81 (H) 01/24/2024    INR 1.69 (H) 01/22/2024    INR 1.59 (H) 06/06/2022     Lab Results   Component Value Date    WBC 8.8 02/01/2024    HGB 9.9 (L) 02/01/2024    HCT 30.9 (L) 02/01/2024    MCV 92 " 02/01/2024     02/01/2024     Lab Results   Component Value Date     (H) 02/01/2024    CO2 33 (H) 02/01/2024         MICHELLE ROJAS PA-C  02/01/2024

## 2024-02-01 NOTE — PROGRESS NOTES
"[facility] ACUTE INPATIENT PAIN SERVICE    [facility], [facility], [facility], [facility], [place]   PAIN follow-up    Assessment/Plan:  Alvin Newton is a 89 year old male who was admitted on 1/22/2024.  I was asked to see the patient for postoperative pain. Admitted for fall with left femur fracture and underwent open reduction internal fixation on 1/24. History of A-fib on Eliquis, past hip replacements, HF, DM 2, HTN, aortic stenosis, mild cognitive impairment.    shows incorrect results from a different patient. Describes pain as mild, still somewhat confused.      PLAN:  Acute pain secondary to ORIF, encephalopathy is limiting treatment of pain with opioids.  Patient is opioid naïve at baseline.  Multimodal Medication Therapy:   Adjuvants: Tylenol and lidocaine, diclofenac to the shoulders  Opioids: tramadol at HS  Tramadol bid PRN   Non-medication interventions- Ice   Constipation Prophylaxis-senna  Follow up /Discharge Recommendations - We recommend prescribing the following at the time of discharge: Would likely offer 200 MME                 Subjective:    Pt states he is disappointed with lack of physical progress. States he \"feels lethargic\" and is worried that he has not seen his wife in a week. Of note, she comes daily.     Left hip pain. Shoulder pain at baseline.         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)    Carpal tunnel syndrome, bilateral    Fall, initial encounter    Closed fracture of first lumbar vertebra, unspecified fracture morphology, initial encounter (H)    Fracture of L1 vertebra (H)    Impaired fasting glucose    Osteoarthritis of pelvis    Other ill-defined and unknown causes of morbidity and mortality    Primary localized " osteoarthrosis of shoulder region    Pure hypercholesterolemia    Reason for consultation    Right bundle branch block    Cellulitis of right lower extremity    Severe sepsis (H)    Septic shock (H)    Streptococcal bacteremia    Thrombocytopenia (H24)    Hyperbilirubinemia    Hypophosphatemia    Hypoalbuminemia    Pyuria    Paroxysmal atrial fibrillation with RVR (H)    Hypomagnesemia    Chronic pain of both shoulders    Severe aortic stenosis    Elevated brain natriuretic peptide (BNP) level    Ascending aorta dilatation (H24)    Peripheral edema    Positive urine culture    Medication induced coagulopathy  (H24)    Chronic diastolic (congestive) heart failure (H)    Altered mental status, unspecified altered mental status type    Clostridium difficile diarrhea    History of Clostridium difficile infection July 2023    Stage 3a chronic kidney disease (H)    Esophageal dysmotility    DEJUAN (acute kidney injury) (H24)    Moderate malnutrition (H24)    Schatzki's ring of distal esophagus-dilated 8/19/23    Acute gout involving toe of left foot    Unintentional weight loss    Abnormal esophagram    Hypokalemia    Hyponatremia    Open wound-coccyx/buttocks    Enterocolitis due to Clostridium difficile, not specified as recurrent    Other chronic pain    Pain in left shoulder    Pain in right shoulder    Unspecified streptococcus as the cause of diseases classified elsewhere    Esophageal dysphagia    Hip fracture, left, closed, initial encounter (H)    Acute kidney failure, unspecified (H24)    Amyloidosis (H)    Monoclonal gammopathy of unknown significance (MGUS)    Hypotension, unspecified hypotension type    Postoperative anemia due to acute blood loss        History   Drug Use No         Tobacco Use      Smoking status: Never      Smokeless tobacco: Never         acetaminophen  975 mg Oral TID    amiodarone  200 mg Oral Daily    apixaban ANTICOAGULANT  5 mg Oral BID    atorvastatin  20 mg Oral At Bedtime     "busPIRone  5 mg Oral TID    cephaleXin  500 mg Oral 4x Daily    diclofenac  2 g Topical 4x Daily    famotidine  20 mg Oral Daily    insulin aspart  1-7 Units Subcutaneous TID AC    insulin aspart  1-5 Units Subcutaneous At Bedtime    lidocaine   Topical TID    Magnesium Oxide  250 mg Oral Daily    metoprolol succinate ER  25 mg Oral Daily    midodrine  5 mg Oral TID w/meals    polyethylene glycol  17 g Oral Daily    potassium chloride ER  20 mEq Oral Daily    senna-docusate  2 tablet Oral BID    sodium chloride (PF)  3 mL Intracatheter Q8H    torsemide  20 mg Oral Daily    traMADol  25 mg Oral At Bedtime    cholecalciferol  50 mcg Oral Daily       Objective:  Vital signs in last 24 hours:  B/P: 119/65, T: 97.4, P: 89, R: 18   Blood pressure 119/65, pulse 89, temperature 97.4  F (36.3  C), temperature source Oral, resp. rate 18, height 1.803 m (5' 11\"), weight 103.4 kg (227 lb 15.3 oz), SpO2 97%.      Weight:   Wt Readings from Last 2 Encounters:   01/30/24 103.4 kg (227 lb 15.3 oz)   09/05/23 99.3 kg (219 lb)           Intake/Output:    Intake/Output Summary (Last 24 hours) at 2/1/2024 0835  Last data filed at 1/31/2024 2000  Gross per 24 hour   Intake 250 ml   Output 1295 ml   Net -1045 ml        Review of Systems:   As per subjective, all others negative.    Physical Exam:     General Appearance:  Alert, cooperative, no distress  Patient is laying in bed    Head:  Normocephalic, without obvious abnormality, atraumatic   Eyes:  PERRL, conjunctiva/corneas clear, EOM's intact   ENT/Throat: Lips intact     Lymph/Neck: Supple, symmetrical, trachea midline, no adenopathy, thyroid: not enlarged, symmetric    Lungs:   clear to auscultation bilaterally, respirations unlabored   Chest Wall:  No tenderness or deformity   Cardiovascular/Heart:  No SOB   Abdomen:   Soft, non-tender, bowel sounds active all four quadrants   Musculoskeletal: Extremities with left hip incision - VAC   Skin: Skin is pale    Neurologic: Alert and " confused          Imaging:  Personally Reviewed.    Results for orders placed or performed during the hospital encounter of 01/22/24   XR Pelvis and Hip Left 2 Views    Impression    IMPRESSION: Mildly displaced oblique periprosthetic left proximal femoral fracture centered at the mid-distal femoral stem particularly lateral and posterior. Bilateral total hip arthroplasties with asymmetric polyethylene liner wear superolateral, left   worse than right. No displaced pelvic fracture is identified. Degenerative change lower lumbar spine and both SI joints. No offset or widening at the symphysis pubis.    NOTE: ABNORMAL REPORT    THE DICTATION ABOVE DESCRIBES AN ABNORMALITY FOR WHICH FOLLOW-UP IS NEEDED.              Head CT w/o contrast    Impression    IMPRESSION:  1.  No acute intracranial abnormality.   CT Cervical Spine w/o Contrast    Impression    IMPRESSION:  1.  No fracture or posttraumatic subluxation.  2.  Multilevel ankylosis.   CT Femur Thigh Left w/o Contrast    Impression    IMPRESSION: Left total hip arthroplasty with an acute mildly displaced periprosthetic fracture involving the femoral component at the level of the inter and subtrochanteric regions.     CT Hip Left w/o Contrast    Impression    IMPRESSION: Left total hip arthroplasty with an acute mildly displaced periprosthetic fracture involving the femoral component at the level of the inter and subtrochanteric regions.     XR Femur Left 2 Views    Impression    IMPRESSION: Left total hip arthroplasty with an acute mildly displaced periprosthetic fracture involving the proximal femoral shaft extending to the intertrochanteric region. Small area of benign cortical thickening along the lateral margin of the mid   left femoral diaphysis.               XR Femur Port Left 2 Views    Impression    IMPRESSION: Since the prior study, the femoral component has been replaced with a longstem femoral component and there are new cerclage wires around the  proximal femur. These findings extend across the previously seen periprosthetic fracture, which is   now affixed into excellent position and alignment. There is no evidence of complication. Mild degenerative changes and small effusion in the knee incidentally noted.   XR Pelvis 1/2 Views    Impression    IMPRESSION: The femoral component of the left total hip arthroplasty has been replaced with a longstem component and there are new cerclage wires around the proximal femur, all extending across the previously seen periprosthetic fracture in the proximal   shaft of the femur. There is excellent position/alignment on this view. No complication evident. Please note that the distal end of the stem of the femoral component was not included in the field-of-view of this single image.   XR Chest PICC Placement 1 View    Impression    IMPRESSION: Right PICC to distal SVC.        Lab Results:  Personally Reviewed.   Last Comprehensive Metabolic Panel:  Sodium   Date Value Ref Range Status   02/01/2024 147 (H) 135 - 145 mmol/L Final     Comment:     Reference intervals for this test were updated on 09/26/2023 to more accurately reflect our healthy population. There may be differences in the flagging of prior results with similar values performed with this method. Interpretation of those prior results can be made in the context of the updated reference intervals.    12/30/2017 138 133 - 144 mmol/L Final     Potassium   Date Value Ref Range Status   02/01/2024 3.6 3.4 - 5.3 mmol/L Final   07/15/2022 3.7 3.4 - 5.3 mmol/L Final   12/31/2017 3.5 3.4 - 5.3 mmol/L Final     Chloride   Date Value Ref Range Status   02/01/2024 106 98 - 107 mmol/L Final   07/15/2022 106 94 - 109 mmol/L Final   12/30/2017 104 94 - 109 mmol/L Final     Carbon Dioxide   Date Value Ref Range Status   12/30/2017 26 20 - 32 mmol/L Final     Carbon Dioxide (CO2)   Date Value Ref Range Status   02/01/2024 33 (H) 22 - 29 mmol/L Final   07/15/2022 24 20 - 32  "mmol/L Final     Anion Gap   Date Value Ref Range Status   02/01/2024 8 7 - 15 mmol/L Final   07/15/2022 9 3 - 14 mmol/L Final   12/30/2017 8 3 - 14 mmol/L Final     Glucose   Date Value Ref Range Status   02/01/2024 131 (H) 70 - 99 mg/dL Final   07/15/2022 97 70 - 99 mg/dL Final   12/30/2017 145 (H) 70 - 99 mg/dL Final     GLUCOSE BY METER POCT   Date Value Ref Range Status   02/01/2024 121 (H) 70 - 99 mg/dL Final     Urea Nitrogen   Date Value Ref Range Status   02/01/2024 31.1 (H) 8.0 - 23.0 mg/dL Final   07/15/2022 13 7 - 30 mg/dL Final   12/31/2017 26 7 - 30 mg/dL Final     Creatinine   Date Value Ref Range Status   02/01/2024 1.38 (H) 0.67 - 1.17 mg/dL Final   12/31/2017 1.11 0.66 - 1.25 mg/dL Final     GFR Estimate   Date Value Ref Range Status   02/01/2024 49 (L) >60 mL/min/1.73m2 Final   12/31/2017 63 >60 mL/min/1.7m2 Final     Comment:     Non  GFR Calc     Calcium   Date Value Ref Range Status   02/01/2024 8.8 8.8 - 10.2 mg/dL Final   12/30/2017 7.9 (L) 8.5 - 10.1 mg/dL Final        UA: No results found for: \"UAMP\", \"UBARB\", \"BENZODIAZEUR\", \"UCANN\", \"UCOC\", \"OPIT\", \"UPCP\"           Please see A&P for additional details of medical decision making.  MANAGEMENT DISCUSSED with the following over the past 24 hours: pt   NOTE(S)/MEDICAL RECORDS REVIEWED over the past 24 hours: nursing and MD notes  Tests personally interpreted in the past 24 hours:  - Xray showing right picc  Tests REVIEWED in the past 24 hours:  - Crt  SUPPLEMENTAL HISTORY, in addition to the patient's history, over the past 24 hours obtained from:   - pt  Medical complexity over the past 24 hours:  -------------------------- MODERATE RISK FOR MORBIDITY --------------------------------------------------  - Prescription DRUG MANAGEMENT performed        Josi Palacios APRN, CNS-BC, CNP, ACHPN  Acute Care Pain Management Program   Hours of pain coverage Mon-Fri 8-1600, afterhours please call the house officer   [facility] " Tye (CHAD, Abhijit, SD, RH)   Page via [employer]- Click HERE to page Josi or Jordan text web console -Click for Jordan

## 2024-02-01 NOTE — PROGRESS NOTES
"[facility]    Medicine Progress Note - Hospitalist Service    Date of Admission:  1/22/2024    Assessment & Plan   Alvin Newton is a 89 year old male admitted on 1/22/2024. He has a history of pAF on Eliquis, bilateral hip replacements (33 years ago), HFpEF, T2DM, hypertension, severe aortic stenosis, possible amyloidosis (work-up in process), mild cognitive impairment and is admitted for left femur fracture. Unfortunately significant blood loss causing hypotension and transfer to ICU for pressors. Transferred back to medical unit on 1/27 for continued management. Ultimately now awaiting TCU as pain and anxiety has improved.      Delirium  Anxiety, improving  History of hospital-induced delirium per family. Known mild cognitive impairment at baseline. Having delirium post operatively with really any medications. Anxious and tearful. Limiting ability to work with PT. No agitation. Not requiring a 1:1.   - Buspar TID  - Scheduled Tylenol   - Cluster overnight cares as able to maximize nighttime sleep   - Continue to reorient patient to place/time     Left femur fracture s/p repair 1/24  Mechanical fall   Chronic anticoagulation (Eliquis)  Presented to ED 1/22 for a mechanical fall at home. Tried to reach for his cane for stability but put his weight on his \"grabber device\" instead and fell forward. Reports he did not hit his head or lose consciousness. CT head with no acute abnormality. XR pelvis/left hip revealed a left proximal femoral fracture. History of bilateral hip replacements about 33 years ago. On Eliquis for atrial fibrillation. Underwent repair on on 1/24. Complicated surgery in setting of bilateral hip replacements. Lost 3L of blood. Patient hgb now stabilized to 9s. Now dealing with significant post op pain and delirium complicating management.  - Orthopedic surgery consulted               - WB LLE with walker, no hip precautions              - PT/OT, TCU              - no hip " precautions              - 7 days of Keflex 500mg q6h while in house, (1/26-2/1)     -switch to [facility] for outpatient to complete 7 day course total if discharges              - maintain Prevena until follow-up              - follow-up with Dr. Ramos 2/7 for wound check  - Pain team consulted              - scheduled tylenol   - trial tramadol for pain              - diclofenac and lidocaine  - Daily BMP, CBC    Hypotension, resolved   Transferred to ICU on 1/24 after procedure after losing 3L of blood during surgery. Required pressor support until 1/27. Received pRBC transfusion on 1/26. Likely hemorrhagic shock vs vaso-plegia. Also with mildly low AM cortisol- likely component of adrenal insufficiency. Although hard to interpret when acutely sick. Discontinued steroids on 1/30.    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 Cardiac MRI in October concerning for infiltrative cardiomyopathy. Work-up for amyloidosis in process by cardiology and [facility]. Biopsies of bone marrow and fat pad negative for AL amyloid. Per chart review, plan at this time is to follow-up with cardiology regarding possibly myocardial biopsy and continued amyloid/infiltrative cardiomyopathy work-up.   - PTA metoprolol, Lipitor, torsemide   - Follow-up with cardiologist as scheduled      Normocytic anemia  Patient with hgb of 7-8s following procedure. Acute blood loss anemia. Stable around 9s.   - CBC in am     A-fib with RVR, improved  History of pAF on Eliquis. Most recent dose of Eliquis was 1/21 PM on admission. INR 1.69 on admission. Follows with [facility] cardiology. Went into RVR after procedure which has now resolved.  - continue Eliquis   - continue PTA metoprolol  - continue PTA amiodarone      DEJUAN on CKD 3a, resolved  Creatinine 1.34 on admission. Unclear baseline per chart review - Cr had been 1.0-1.2 summer 2023, but  "more recently 1.4-1.6 in cardiology clinic in December. Will continue to monitor. Cr bump to 1.74 morning of procedure. Improved with fluids.   - Daily BMP   - Avoid nephrotoxins     Type 2 diabetes mellitus   History of T2DM without long-term use of insulin. PTA medications: none. Most recent A1C 6.8% on admission.   - POC glucose checks QID   - Medium resistance SSI      Stress leukocytosis, resolved  WBC of 13 after procedure. Improving. No signs of infection at this time  - CBC in am     Depression/Anxiety  - PTA Celexa     GERD  - PTA Pepcid            Diet: Combination Diet Regular Diet; Very High Consistent Carb (90 g CHO per Meal) Diet  Discharge Instruction - Regular Diet Adult    DVT Prophylaxis: DOAC  Eastman Catheter: Not present  Lines: None       Cardiac Monitoring: None  Code Status: No CPR- Pre-arrest intubation OK      Clinically Significant Risk Factors        # Hypokalemia: Lowest K = 3.1 mmol/L in last 2 days, will replace as needed  # Hypernatremia: Highest Na = 147 mmol/L in last 2 days, will monitor as appropriate      # Hypoalbuminemia: Lowest albumin = 2.8 g/dL at 1/29/2024  5:05 AM, will monitor as appropriate     # Hypertension: Noted on problem list  # Chronic heart failure with preserved ejection fraction: heart failure noted on problem list and last echo with EF >50%      # DMII: A1C = 6.8 % (Ref range: <5.7 %) within past 6 months   # Obesity: Estimated body mass index is 31.79 kg/m  as calculated from the following:    Height as of this encounter: 1.803 m (5' 11\").    Weight as of this encounter: 103.4 kg (227 lb 15.3 oz).        # Financial/Environmental Concerns: none         Disposition Plan      Expected Discharge Date: 02/02/2024, 12:00 PM    Destination: nursing home  Discharge Comments: TCU when ready          The patient's care was discussed with the Attending Physician, Dr. Rosenstein .    Adriana Lombardo MD  Hospitalist Service  [facility]  Securely " message with Jordan (more info)  Text page via [employer] Paging/Directory   ______________________________________________________________________    Interval History   Patient alert and eating. Still confused about some things. Helped turned on TV.     Physical Exam   Vital Signs: Temp: 97.4  F (36.3  C) Temp src: Oral BP: 123/68 Pulse: 89   Resp: 18 SpO2: 97 % O2 Device: None (Room air)    Weight: 227 lbs 15.29 oz  GENERAL: Alert calm  RESP:  Lungs clear throughout. No wheeze or crackles.   CV: Heart RRR. Systolic murmur.   Abdomen: Soft, nontender, nondistended.  MSK: left hip clean and dry. Wound vac in place. No LE edema  SKIN: Visible skin clear. No significant rash, abnormal pigmentation or lesions.  NEURO: Cranial nerves grossly intact. Confused off and on       Data     I have personally reviewed the following data over the past 24 hrs:    8.8  \   9.9 (L)   / 347     147 (H) 106 31.1 (H) /  117 (H)   3.6 33 (H) 1.38 (H) \       Imaging results reviewed over the past 24 hrs:   No results found for this or any previous visit (from the past 24 hour(s)).

## 2024-02-01 NOTE — PLAN OF CARE
Problem: Adult Inpatient Plan of Care  Goal: Optimal Comfort and Wellbeing  Intervention: Provide Person-Centered Care  Recent Flowsheet Documentation  Taken 1/31/2024 1630 by Milla Dominguez RN  Trust Relationship/Rapport:   care explained   reassurance provided     Problem: Risk for Delirium  Goal: Improved Attention and Thought Clarity  Intervention: Maximize Cognitive Function  Recent Flowsheet Documentation  Taken 1/31/2024 1630 by Milla Dominguez RN  Sensory Stimulation Regulation:   care clustered   lighting decreased  Reorientation Measures: clock in view     Problem: Electrolyte Imbalance  Goal: Electrolyte Balance  Intervention: Monitor and Manage Electrolyte Imbalance  Recent Flowsheet Documentation  Taken 1/31/2024 1630 by Milla Dominguez RN  Fluid/Electrolyte Management: fluids provided   Goal Outcome Evaluation:       Complained of pain on his left hip at the begin of the evening shift, prn tramadol given as ordered with relief per patient. Voided about 320 ml, PVR less than 300. Patient was up in the chair at the begin of shift.  Needed Salome with 2 person transfer back to bed. Wound vac dressing reinforced, now functional, no drainage.

## 2024-02-01 NOTE — PLAN OF CARE
"  Problem: Adult Inpatient Plan of Care  Goal: Plan of Care Review  Description: The Plan of Care Review/Shift note should be completed every shift.  The Outcome Evaluation is a brief statement about your assessment that the patient is improving, declining, or no change.  This information will be displayed automatically on your shift  note.  Outcome: Progressing  Goal: Patient-Specific Goal (Individualized)  Description: You can add care plan individualizations to a care plan. Examples of Individualization might be:  \"Parent requests to be called daily at 9am for status\", \"I have a hard time hearing out of my right ear\", or \"Do not touch me to wake me up as it startles  me\".  Outcome: Progressing  Goal: Absence of Hospital-Acquired Illness or Injury  Outcome: Progressing  Intervention: Identify and Manage Fall Risk  Recent Flowsheet Documentation  Taken 2/1/2024 0300 by Jasmine Olivares RN  Safety Promotion/Fall Prevention:   activity supervised   room near nurse's station  Intervention: Prevent Skin Injury  Recent Flowsheet Documentation  Taken 2/1/2024 0300 by Jasmine Olivares RN  Body Position: position changed independently  Intervention: Prevent Infection  Recent Flowsheet Documentation  Taken 2/1/2024 0300 by Jasmine Olivares RN  Infection Prevention: hand hygiene promoted  Goal: Optimal Comfort and Wellbeing  Outcome: Progressing  Intervention: Provide Person-Centered Care  Recent Flowsheet Documentation  Taken 2/1/2024 0300 by Jasmine Olivares RN  Trust Relationship/Rapport:   care explained   reassurance provided  Goal: Readiness for Transition of Care  Outcome: Progressing     Problem: Risk for Delirium  Goal: Optimal Coping  Outcome: Progressing  Goal: Improved Behavioral Control  Outcome: Progressing  Intervention: Minimize Safety Risk  Recent Flowsheet Documentation  Taken 2/1/2024 0300 by Jasmine Olivares RN  Communication Enhancement Strategies: call light answered in person  Enhanced Safety Measures: room near " unit station  Trust Relationship/Rapport:   care explained   reassurance provided  Goal: Improved Attention and Thought Clarity  Outcome: Progressing  Intervention: Maximize Cognitive Function  Recent Flowsheet Documentation  Taken 2/1/2024 0300 by Jasmine Olivares RN  Sensory Stimulation Regulation:   care clustered   lighting decreased  Reorientation Measures: clock in view  Goal: Improved Sleep  Outcome: Progressing     Problem: Orthopaedic Fracture  Goal: Effective Tissue Perfusion  Outcome: Progressing  Goal: Optimal Pain Control and Function  Outcome: Progressing  Goal: Effective Oxygenation and Ventilation  Outcome: Progressing  Intervention: Promote Airway Secretion Clearance  Recent Flowsheet Documentation  Taken 2/1/2024 0300 by Jasmine Olivares RN  Activity Management: activity adjusted per tolerance  Intervention: Optimize Oxygenation and Ventilation  Recent Flowsheet Documentation  Taken 2/1/2024 0300 by Jasmine Olivares RN  Head of Bed (HOB) Positioning: HOB at 20-30 degrees     Problem: Electrolyte Imbalance  Goal: Electrolyte Balance  Outcome: Progressing     Problem: Oral Intake Inadequate  Goal: Improved Oral Intake  Outcome: Progressing   Goal Outcome Evaluation:  Pt has been sleeping well overnight. Incontinent X2 and pt was cleaned up and repositioned as needed. No complaints of pain noted.

## 2024-02-01 NOTE — PROGRESS NOTES
Care Management Follow Up    Length of Stay (days): 10    Expected Discharge Date: 02/02/2024     Concerns to be Addressed: discharge planning     Patient plan of care discussed at interdisciplinary rounds: Yes    Anticipated Discharge Disposition: Transitional Care     Anticipated Discharge Services: None  Anticipated Discharge DME: None    Patient/family educated on Medicare website which has current facility and service quality ratings: yes  Education Provided on the Discharge Plan: Yes  Patient/Family in Agreement with the Plan: yes    Referrals Placed by CM/SW:  [id number]  Private pay costs discussed: transportation costs    Additional Information:  8:46 AM  Pt's current plan is to discharge to [facility].    SW called and left a voicemail with JO[facility] Meri requesting a call back to confirm if she will have a bed available today. CM was informed yesterday that they might not have a bed open pending results of a patient's discharge appeal at their facility.     9:57 AM  ALIREZA called and left another voicemail with Meri to follow up on bed availability.     10:44 AM  SW spoke to Meri who reported she will not have a bed available today but can accept Pt around noon tomorrow. Care Team and Pt's daughter Fuad updated.    1:08 PM  [facility] wheelchair transport window for tomorrow scheduled between 1984-5817. ALIREZA called and left a voicemail with Meri informing her of the time.     2:51 PM  ALIREZA spoke to Pt's wife Rosie and second daughter in the hallway with updates and details for Pt's discharge plans tomorrow. Rosie asked CM to make sure Fuad is updated.     ALIREZA called and updated Fuad who is agreeable with Pt's plan for discharge tomorrow. Fuad requested to be updated in the morning once all discharge plans are confirmed.    CM Colleague assisted with completed the PAS. [id number].    CM to follow up with final coordination for discharge in the morning.      HEATH Jurado

## 2024-02-02 ENCOUNTER — DOCUMENTATION ONLY (OUTPATIENT)
Dept: GERIATRICS | Facility: CLINIC | Age: 89
End: 2024-02-02
Payer: COMMERCIAL

## 2024-02-02 VITALS
HEART RATE: 92 BPM | WEIGHT: 227.96 LBS | DIASTOLIC BLOOD PRESSURE: 65 MMHG | TEMPERATURE: 97.6 F | RESPIRATION RATE: 18 BRPM | OXYGEN SATURATION: 98 % | SYSTOLIC BLOOD PRESSURE: 151 MMHG | HEIGHT: 71 IN | BODY MASS INDEX: 31.91 KG/M2

## 2024-02-02 LAB
ANION GAP SERPL CALCULATED.3IONS-SCNC: 6 MMOL/L (ref 7–15)
BUN SERPL-MCNC: 30.7 MG/DL (ref 8–23)
CALCIUM SERPL-MCNC: 8.9 MG/DL (ref 8.8–10.2)
CHLORIDE SERPL-SCNC: 106 MMOL/L (ref 98–107)
CREAT SERPL-MCNC: 1.38 MG/DL (ref 0.67–1.17)
DEPRECATED HCO3 PLAS-SCNC: 31 MMOL/L (ref 22–29)
EGFRCR SERPLBLD CKD-EPI 2021: 49 ML/MIN/1.73M2
ERYTHROCYTE [DISTWIDTH] IN BLOOD BY AUTOMATED COUNT: 15.5 % (ref 10–15)
GLUCOSE BLDC GLUCOMTR-MCNC: 114 MG/DL (ref 70–99)
GLUCOSE BLDC GLUCOMTR-MCNC: 120 MG/DL (ref 70–99)
GLUCOSE BLDC GLUCOMTR-MCNC: 125 MG/DL (ref 70–99)
GLUCOSE SERPL-MCNC: 125 MG/DL (ref 70–99)
HCT VFR BLD AUTO: 29.6 % (ref 40–53)
HGB BLD-MCNC: 9.5 G/DL (ref 13.3–17.7)
MAGNESIUM SERPL-MCNC: 2.1 MG/DL (ref 1.7–2.3)
MCH RBC QN AUTO: 29.3 PG (ref 26.5–33)
MCHC RBC AUTO-ENTMCNC: 32.1 G/DL (ref 31.5–36.5)
MCV RBC AUTO: 91 FL (ref 78–100)
PHOSPHATE SERPL-MCNC: 3.4 MG/DL (ref 2.5–4.5)
PLATELET # BLD AUTO: 336 10E3/UL (ref 150–450)
POTASSIUM SERPL-SCNC: 3.6 MMOL/L (ref 3.4–5.3)
RBC # BLD AUTO: 3.24 10E6/UL (ref 4.4–5.9)
SODIUM SERPL-SCNC: 143 MMOL/L (ref 135–145)
WBC # BLD AUTO: 7.7 10E3/UL (ref 4–11)

## 2024-02-02 PROCEDURE — 85014 HEMATOCRIT: CPT

## 2024-02-02 PROCEDURE — 80048 BASIC METABOLIC PNL TOTAL CA: CPT

## 2024-02-02 PROCEDURE — 250N000013 HC RX MED GY IP 250 OP 250 PS 637: Performed by: STUDENT IN AN ORGANIZED HEALTH CARE EDUCATION/TRAINING PROGRAM

## 2024-02-02 PROCEDURE — 99238 HOSP IP/OBS DSCHRG MGMT 30/<: CPT | Mod: GC

## 2024-02-02 PROCEDURE — 97608 NEG PRS WND THER NDME>50SQCM: CPT

## 2024-02-02 PROCEDURE — 36415 COLL VENOUS BLD VENIPUNCTURE: CPT

## 2024-02-02 PROCEDURE — 99232 SBSQ HOSP IP/OBS MODERATE 35: CPT | Performed by: PAIN MEDICINE

## 2024-02-02 PROCEDURE — 250N000013 HC RX MED GY IP 250 OP 250 PS 637

## 2024-02-02 PROCEDURE — 84100 ASSAY OF PHOSPHORUS: CPT | Performed by: FAMILY MEDICINE

## 2024-02-02 PROCEDURE — 83735 ASSAY OF MAGNESIUM: CPT | Performed by: FAMILY MEDICINE

## 2024-02-02 PROCEDURE — 250N000013 HC RX MED GY IP 250 OP 250 PS 637: Performed by: FAMILY MEDICINE

## 2024-02-02 RX ORDER — FAMOTIDINE 20 MG/1
20 TABLET, FILM COATED ORAL DAILY
Start: 2024-02-02 | End: 2024-02-22

## 2024-02-02 RX ORDER — LIDOCAINE 50 MG/G
OINTMENT TOPICAL 3 TIMES DAILY
Qty: 100 G | Refills: 0 | Status: SHIPPED | OUTPATIENT
Start: 2024-02-02

## 2024-02-02 RX ORDER — BUSPIRONE HYDROCHLORIDE 5 MG/1
5 TABLET ORAL 3 TIMES DAILY
DISCHARGE
Start: 2024-02-02 | End: 2024-02-22

## 2024-02-02 RX ORDER — TRAMADOL HYDROCHLORIDE 50 MG/1
25 TABLET ORAL AT BEDTIME
Qty: 3 TABLET | Refills: 0 | Status: SHIPPED | OUTPATIENT
Start: 2024-02-02 | End: 2024-02-05

## 2024-02-02 RX ORDER — TRAMADOL HYDROCHLORIDE 50 MG/1
25 TABLET ORAL DAILY PRN
Qty: 4 TABLET | Refills: 0 | Status: SHIPPED | OUTPATIENT
Start: 2024-02-02 | End: 2024-02-05

## 2024-02-02 RX ADMIN — TORSEMIDE 20 MG: 20 TABLET ORAL at 09:47

## 2024-02-02 RX ADMIN — MIDODRINE HYDROCHLORIDE 5 MG: 5 TABLET ORAL at 12:42

## 2024-02-02 RX ADMIN — MIDODRINE HYDROCHLORIDE 5 MG: 5 TABLET ORAL at 09:48

## 2024-02-02 RX ADMIN — APIXABAN 5 MG: 5 TABLET, FILM COATED ORAL at 09:49

## 2024-02-02 RX ADMIN — POTASSIUM CHLORIDE 20 MEQ: 750 TABLET, EXTENDED RELEASE ORAL at 09:48

## 2024-02-02 RX ADMIN — SENNOSIDES AND DOCUSATE SODIUM 2 TABLET: 8.6; 5 TABLET ORAL at 09:48

## 2024-02-02 RX ADMIN — FAMOTIDINE 20 MG: 20 TABLET ORAL at 09:49

## 2024-02-02 RX ADMIN — Medication 250 MG: at 09:47

## 2024-02-02 RX ADMIN — AMIODARONE HYDROCHLORIDE 200 MG: 200 TABLET ORAL at 09:47

## 2024-02-02 RX ADMIN — METOPROLOL SUCCINATE 25 MG: 25 TABLET, EXTENDED RELEASE ORAL at 09:49

## 2024-02-02 RX ADMIN — Medication 50 MCG: at 09:48

## 2024-02-02 RX ADMIN — ACETAMINOPHEN 975 MG: 325 TABLET ORAL at 13:03

## 2024-02-02 RX ADMIN — ACETAMINOPHEN 975 MG: 325 TABLET ORAL at 09:48

## 2024-02-02 RX ADMIN — BUSPIRONE HYDROCHLORIDE 5 MG: 5 TABLET ORAL at 09:49

## 2024-02-02 RX ADMIN — POLYETHYLENE GLYCOL 3350 17 G: 17 POWDER, FOR SOLUTION ORAL at 09:47

## 2024-02-02 ASSESSMENT — ACTIVITIES OF DAILY LIVING (ADL)
ADLS_ACUITY_SCORE: 33

## 2024-02-02 NOTE — CONSULTS
[facility]  WOC Nurse Inpatient Assessment     Consulted for: Prevena management    Summary: 2/2 - woc team was rounding on 4th floor, noted an alarm on the prevena and the staff was struggling with the prevena pump. After an attempt to troubleshoot the ULTA pump indicated that the dressing was still not secure enough to move to the prevena plus pump. Nursing was advised to call the Ortho team as this dressing was placed in the OR. EMS was at bedside ready to take patient. WOC team was then called again to assist as the ortho team was unavailable. A new prevena plus customizable dressing was placed to help avoid a delay in discharge. ULTA pump indicated that there was a green check cyndi to switch to the prevena plus pump, no leak alarms were noted.     VALERIA Adams RN CWOCN  Pager no longer in use, please contact through [employer] group: [facility] [employer] [facility]      Patient History (according to provider note(s):      Assessment:    L hip incision with Prevena dressing in place - this is good for 2 weeks. Added new section of dressing as old suction head had been removed somehow.  Good suction attained at - 125 mmHg.  WOC will follow weekly in case needs should arise.    Treatment Plan:   Continue with Prevena @ - 125 mmHg   Prior to patient discharge, nursing to replace hospital pump with a home Prevena pump (order from stores - Supplies: Prevena Pump 14 Day PS# [id number] and switch hospital pump to this home pump).    Orders: Written    RECOMMEND PRIMARY TEAM ORDER: None, at this time  Education provided: plan of care  Discussed plan of care with: Patient, Family, and Nurse  WOC nurse follow-up plan: signing off  Notify WOC if wound(s) deteriorate.  Nursing to notify the Provider(s) and re-consult the WOC Nurse if new skin concern.    DATA:     Current support surface: Standard  Low air loss (EJ pump, Isolibrium, Pulsate, skin guard, etc)  Containment  of urine/stool: Incontinence Protocol  BMI: Body mass index is 31.79 kg/m .   Active diet order: Orders Placed This Encounter      Combination Diet Regular Diet; Very High Consistent Carb (90 g CHO per Meal) Diet      Discharge Instruction - Regular Diet Adult     Output: I/O last 3 completed shifts:  In: 220 [P.O.:220]  Out: 675 [Urine:675]     Labs:   Recent Labs   Lab 02/02/24  0517 01/30/24  1034 01/29/24  0505   ALBUMIN  --   --  2.8*   HGB 9.5*   < > 8.9*   WBC 7.7   < > 8.2    < > = values in this interval not displayed.     Pressure injury risk assessment:   Sensory Perception: 3-->slightly limited  Moisture: 4-->rarely moist  Activity: 2-->chairfast  Mobility: 2-->very limited  Nutrition: 3-->adequate  Friction and Shear: 2-->potential problem  Hunter Score: 16    MANISH Pagan RN CWOCN  Pager no longer is use, please contact through [facility] group: [facility] [email or web address] [facility]

## 2024-02-02 NOTE — PROGRESS NOTES
Patient was ready to discharge to TCU. This afternoon and then when wound vac was switched to the Prevena wound vac per orders, there was a leak notification on the pump.  WOC assisted with the malfunction, after Ortho stated was ok to change the dressing prior to discharge.  New dressing was placed and prevena wound vac was applied and functioning prior to wheelchair ride to TCU.  Sent prevena wound vac with patient as ordered.  Grisel Howell RN

## 2024-02-02 NOTE — PLAN OF CARE
Physical Therapy Discharge Summary    Reason for therapy discharge:    Discharged to transitional care facility.    Progress towards therapy goal(s). See goals on Care Plan in [facility] electronic health record for goal details.  Goals not met.  Barriers to achieving goals:   discharge from facility.    Therapy recommendation(s):    Continued therapy is recommended.  Rationale/Recommendations:  Continue PT at TCU as pt is progressing towards goals.

## 2024-02-02 NOTE — PLAN OF CARE
"  Problem: Adult Inpatient Plan of Care  Goal: Plan of Care Review  Description: The Plan of Care Review/Shift note should be completed every shift.  The Outcome Evaluation is a brief statement about your assessment that the patient is improving, declining, or no change.  This information will be displayed automatically on your shift  note.  Outcome: Progressing  Goal: Patient-Specific Goal (Individualized)  Description: You can add care plan individualizations to a care plan. Examples of Individualization might be:  \"Parent requests to be called daily at 9am for status\", \"I have a hard time hearing out of my right ear\", or \"Do not touch me to wake me up as it startles  me\".  Outcome: Progressing  Goal: Absence of Hospital-Acquired Illness or Injury  Outcome: Progressing  Intervention: Identify and Manage Fall Risk  Recent Flowsheet Documentation  Taken 2/2/2024 0237 by Jasmine Olivares RN  Safety Promotion/Fall Prevention:   activity supervised   room near nurse's station  Intervention: Prevent Skin Injury  Recent Flowsheet Documentation  Taken 2/2/2024 0237 by Jasmine Olivares RN  Body Position: position changed independently  Intervention: Prevent Infection  Recent Flowsheet Documentation  Taken 2/2/2024 0237 by Jasmine Olivares RN  Infection Prevention: hand hygiene promoted  Goal: Optimal Comfort and Wellbeing  Outcome: Progressing  Intervention: Provide Person-Centered Care  Recent Flowsheet Documentation  Taken 2/2/2024 0237 by Jasmine Olivares RN  Trust Relationship/Rapport:   care explained   reassurance provided  Goal: Readiness for Transition of Care  Outcome: Progressing     Problem: Risk for Delirium  Goal: Optimal Coping  Outcome: Progressing  Goal: Improved Behavioral Control  Outcome: Progressing  Intervention: Minimize Safety Risk  Recent Flowsheet Documentation  Taken 2/2/2024 0237 by Jasmine Olivares RN  Communication Enhancement Strategies: call light answered in person  Enhanced Safety Measures: room near " unit station  Trust Relationship/Rapport:   care explained   reassurance provided  Goal: Improved Attention and Thought Clarity  Outcome: Progressing  Intervention: Maximize Cognitive Function  Recent Flowsheet Documentation  Taken 2/2/2024 0237 by Jasmine Olivares, RN  Sensory Stimulation Regulation:   care clustered   lighting decreased  Reorientation Measures: clock in view  Goal: Improved Sleep  Outcome: Progressing     Problem: Orthopaedic Fracture  Goal: Effective Tissue Perfusion  Outcome: Progressing  Goal: Optimal Pain Control and Function  Outcome: Progressing  Goal: Effective Oxygenation and Ventilation  Outcome: Progressing  Intervention: Promote Airway Secretion Clearance  Recent Flowsheet Documentation  Taken 2/2/2024 0237 by Jasmine Olivares, RN  Activity Management: activity adjusted per tolerance  Intervention: Optimize Oxygenation and Ventilation  Recent Flowsheet Documentation  Taken 2/2/2024 0237 by Jasmine Olivares, RN  Head of Bed (HOB) Positioning: HOB at 20-30 degrees     Problem: Electrolyte Imbalance  Goal: Electrolyte Balance  Outcome: Progressing     Problem: Oral Intake Inadequate  Goal: Improved Oral Intake  Outcome: Progressing   Goal Outcome Evaluation:  Pt slept well overnight. Pt excited to be leaving today.

## 2024-02-02 NOTE — PLAN OF CARE
Occupational Therapy Discharge Summary    Reason for therapy discharge:    Discharged to transitional care facility.    Progress towards therapy goal(s). See goals on Care Plan in [facility] electronic health record for goal details.  Goals partially met.  Barriers to achieving goals:   discharge from facility.    Therapy recommendation(s):    Continued therapy is recommended.  Rationale/Recommendations:  Continued OT recommended for pt to continue to increase in activity tolerance and IND.    SAMIA Yang. 2/2/2024, 10:26 AM

## 2024-02-02 NOTE — DISCHARGE SUMMARY
"[facility]  Hospitalist Discharge Summary      Date of Admission:  1/22/2024  Date of Discharge:  2/2/2024  Discharging Provider: Adriana Lombardo MD/Dr. Garcia  Discharge Service: Hospitalist Service    Discharge Diagnoses   Principal Problem:    Hip fracture, left, closed, initial encounter (H) (1/22/2024)  Active Problems:    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) (12/6/2017)    Hypotension, unspecified hypotension type (1/25/2024)    Postoperative anemia due to acute blood loss (1/25/2024)  Delirium  Anxiety    Clinically Significant Risk Factors     # DMII: A1C = 6.8 % (Ref range: <5.7 %) within past 6 months  # Obesity: Estimated body mass index is 31.79 kg/m  as calculated from the following:    Height as of this encounter: 1.803 m (5' 11\").    Weight as of this encounter: 103.4 kg (227 lb 15.3 oz).       Follow-ups Needed After Discharge   Follow-up Appointments     Follow Up Care      Please follow-up with Dr. Ramos's team in 2 weeks at [facility].   Call our scheduling line at [phone] to make an appointment, if you do   not already have one scheduled.            Discharge Disposition   Discharged to rehabilitation facility  Condition at discharge: Stable    Hospital Course   Alvin Newton is a 89 year old male admitted on 1/22/2024. He has a history of pAF on Eliquis, bilateral hip replacements (33 years ago), HFpEF, T2DM, hypertension, severe aortic stenosis, possible amyloidosis (work-up in process), mild cognitive impairment and is admitted for left femur fracture.      Delirium  Anxiety, improving  History of hospital-induced delirium per family. Known mild cognitive impairment at baseline. Having delirium post operatively with really any medications. Anxious and tearful. Initially was limiting ability to work with PT. No agitation. Not requiring a 1:1. Eventually found a good balance with tramadol and Buspar to help with anxiety and pain while " "limiting delirium.     Left femur fracture s/p repair 1/24  Mechanical fall   Chronic anticoagulation (Eliquis)  Presented to ED 1/22 for a mechanical fall at home. Tried to reach for his cane for stability but put his weight on his \"grabber device\" instead and fell forward. Reports he did not hit his head or lose consciousness. CT head with no acute abnormality. XR pelvis/left hip revealed a left proximal femoral fracture. History of bilateral hip replacements about 33 years ago. On Eliquis for atrial fibrillation. Underwent repair on on 1/24. Complicated surgery in setting of bilateral hip replacements. Lost 3L of blood. Patient hgb now stabilized to 9s. Patient completed course of Keflex while inpatient. Difficult to control pain in setting of above delirium. Ultimately, regimen that worked balancing both concerns was nightly Tramadol and BID PRN tramadol. Per pain recommending nightly dose for 3 days and PRN dosing for 4 days. Ortho follow-up instructions provided. Continue Prevana until 2/7 when ortho follow-up scheduled.     Hypotension, resolved   Transferred to ICU on 1/24 after procedure after losing 3L of blood during surgery. Required pressor support until 1/27. Received pRBC transfusion on 1/26. Likely hemorrhagic shock vs vaso-plegia. Also with mildly low AM cortisol- likely component of adrenal insufficiency. Although hard to interpret when acutely sick.  Steroids were given for a couple days along with midodrine. BP improved on discharge. Discontinued steroids on 1/30.    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 Cardiac MRI in October concerning for infiltrative cardiomyopathy. Work-up for amyloidosis in process by cardiology and [facility]. Biopsies of bone marrow and fat pad negative for AL amyloid. Per chart review, plan at that time is to follow-up with cardiology regarding possibly " myocardial biopsy and continued amyloid/infiltrative cardiomyopathy work-up.      Normocytic anemia  Patient with hgb of 7-8s following procedure. Acute blood loss anemia. Stabilized around 9s after procedure.     A-fib with RVR, improved  History of pAF on Eliquis. Most recent dose of Eliquis was 1/21 PM on admission. INR 1.69 on admission. Follows with [facility] cardiology. Went into RVR after procedure which has now resolved. Continued PTA medications.    DEJUAN on CKD 3a, resolved  Creatinine 1.34 on admission. Unclear baseline per chart review - Cr had been 1.0-1.2 summer 2023, but more recently 1.4-1.6 in cardiology clinic in December. Will continue to monitor. Cr bump to 1.74 morning of procedure. Improved with fluids. Cr stable at baseline on discharge.     Type 2 diabetes mellitus   History of T2DM without long-term use of insulin. PTA medications: none. Most recent A1C 6.8% on admission. No insulin sent on discharge.      Stress leukocytosis, resolved  WBC of 13 after procedure. Resolved.           Consultations This Hospital Stay   PHYSICAL THERAPY ADULT IP CONSULT  OCCUPATIONAL THERAPY ADULT IP CONSULT  CARE MANAGEMENT / SOCIAL WORK IP CONSULT  ORTHOPEDIC SURGERY IP CONSULT  PHYSICAL THERAPY ADULT IP CONSULT  OCCUPATIONAL THERAPY ADULT IP CONSULT  VASCULAR ACCESS ADULT IP CONSULT  PAIN MANAGEMENT ADULT IP CONSULT  WOUND OSTOMY CONTINENCE NURSE  IP CONSULT  PHARMACY IP CONSULT  HOSPITALIST IP CONSULT    Code Status   No CPR- Pre-arrest intubation OK    Discussed with attending Dr. Jose Lombardo MD  [address] [address]  Phone: [phone]  Fax: [phone]  ______________________________________________________________________    Physical Exam   Vital Signs: Temp: 97.6  F (36.4  C) Temp src: Oral BP: (!) 151/65 Pulse: 92   Resp: 18 SpO2: 98 % O2 Device: None (Room air)    Weight: 227 lbs 15.29 oz  GENERAL: Alert calm  RESP:  Lungs  "clear throughout. No wheeze or crackles.   CV: Heart RRR. Systolic murmur.   Abdomen: Soft, nontender, nondistended.  MSK: left hip clean and dry. Wound vac in place. No LE edema  SKIN: Visible skin clear. No significant rash, abnormal pigmentation or lesions.  NEURO: Cranial nerves grossly intact. Confused off and on but pleasant.        Primary Care Physician   Steven Duane Semmler    Discharge Orders      Reason for your hospital stay    S/p hip repair     When to call - Contact Surgeon Team    You may experience symptoms that require follow-up before your scheduled appointment. Refer to the \"Stoplight Tool\" for instructions on when to contact your Surgeon Team if you are concerned about pain control, blood clots, constipation, or if you are unable to urinate.     When to call - Reach out to Urgent Care    If you are not able to reach your Surgeon Team and you need immediate care, go to the Orthopedic Walk-in Clinic or Urgent Care at your Surgeon's office.  Do NOT go to the Emergency Room unless you have shortness of breath, chest pain, or other signs of a medical emergency.     When to call - Reasons to Call 911    Call 911 immediately if you experience sudden-onset chest pain, arm weakness/numbness, slurred speech, or shortness of breath     Discharge Instruction - Breathing exercises    Perform breathing exercises using your Incentive Spirometer 10 times per hour while awake for 2 weeks.     Symptoms - Fever Management    A low grade fever can be expected after surgery.  Use acetaminophen (TYLENOL) as needed for fever management.  Contact your Surgeon Team if you have a fever greater than 101.5 F, chills, and/or night sweats.     Symptoms - Constipation management    Constipation (hard, dry bowel movements) is expected after surgery due to the combination of being less active, the anesthetic, and the opioid pain medication.  You can do the following to help reduce constipation:  ~  FLUIDS:  Drink clear liquids " (water or Gatorade), or juice (apple/prune).  ~  DIET:  Eat a fiber rich diet.    ~  ACTIVITY:  Get up and move around several times a day.  Increase your activity as you are able.  MEDICATIONS:  Reduce the risk of constipation by starting medications before you are constipated.  You can take Miralax   (1 packet as directed) and/or a stool softener (Senokot 1-2 tablets 1-2 times a day).  If you already have constipation and these medications are not working, you can get magnesium citrate and use as directed.  If you continue to have constipation you can try an over the counter suppository or enema.  Call your Surgeon Team if it has been greater than 3 days since your last bowel movement.     Symptoms - Reduced Urine Output    Changes in the amount of fluids you drank before and after surgery may result in problems urinating.  It is important to stay well-hydrated after surgery and drink plenty of water. If it has been greater than 8 hours since you have urinated despite drinking plenty of water, call your Surgeon Team.     Activity - Exercises to prevent blood clots    Unless otherwise directed by your Surgeon team, perform the following exercises at least three times per day for the first four weeks after surgery to prevent blood clots in your legs: 1) Point and flex your feet (Ankle Pumps), 2) Move your ankle around in big circles, 3) Wiggle your toes, 4) Walk, even for short distances, several times a day, will help decrease the risk of blood clots.     Comfort and Pain Management - Pain after Surgery    Pain after surgery is normal and expected.  You will have some amount of pain for several weeks after surgery.  Your pain will improve with time.  There are several things you can do to help reduce your pain including: rest, ice, elevation, and using pain medications as needed. Contact your Surgeon Team if you have pain that persists or worsens after surgery despite rest, ice, elevation, and taking your  "medication(s) as prescribed. Contact your Surgeon Team if you have new numbness, tingling, or weakness in your operative extremity.     Comfort and Pain Management - Swelling after Surgery    Swelling and/or bruising of the surgical extremity is common and may persist for several months after surgery. In addition to frequent icing and elevation, gentle compressive support with an ACE wrap or tubigrip may help with swelling. Apply compression regularly, removing at least twice daily to perform skin checks. Contact your Surgeon Team if your swelling increases and is NOT associated with an increase in your activity level, or if your swelling increases and is associated with redness and pain.     Comfort and Pain Management - LOWER Extremity Elevation    Swelling is expected for several months after surgery. This type of swelling is usually associated with gravity and activity, and can be improved with elevation.   The best way to do this is to get your \"toes above your nose\" by laying down and placing several pillows lengthwise under your calf and heel. When elevating your leg keep your knee completely straight. Perform this elevation as often as possible especially for the first two weeks after surgery.     Comfort and Pain Management - Cold therapy    Ice can be used to control swelling and discomfort after surgery. Place a thin towel over your operative site and apply the ice pack overtop. Leave ice pack in place for 20 minutes, then remove for 20 minutes. Repeat this 20 minutes on/20 minutes off routine as often as tolerated.     Medication Instructions - Acetaminophen (TYLENOL) Instructions    You were discharged with acetaminophen (TYLENOL) for pain management after surgery. Acetaminophen most effectively manages pain symptoms when it is taken on a schedule without missing doses (every four, six, or eight hours). Your Provider will prescribe a safe daily dose between 3000 - 4000 mg.  Do NOT exceed this daily " dose. Most patients use acetaminophen for pain control for the first four weeks after surgery.  You can wean from this medication as your pain decreases.     Medication Instructions - Opioid Instructions (1 - 2 tablets Q 4-6 hours, MAX 6 tablets)    You were discharged with an opioid medication (hydromorphone, oxycodone, hydrocodone, or tramadol). This medication should only be taken for breakthrough pain that is not controlled with acetaminophen (TYLENOL). If you rate your pain less than 3 you do not need this medication.  Pain rating 0-3:  You do not need this medication.  Pain rating 4-6:  Take 1 tablet every 4-6 hours as needed  Pain rating 7-10:  Take 2 tablets every 4-6 hours as needed.  Do not exceed 6 tablets per day     Medication Instructions - Opioids - Tapering Instructions    In the first three days following surgery, your symptoms may warrant use of the narcotic pain medication every four to six hours as prescribed. This is normal. As your pain symptoms improve, focus your efforts on decreasing (tapering) use of narcotic medications. The most successful tapering strategy is to first, decrease the number of tablets you take every 4-6 hours to the minimum prescribed. Then, increase the amount of time between doses.  For example:  First, taper to   or 1 tablet every 4-6 hours.  Then, taper to   or 1 tablet every 6-8 hours.  Then, taper to   or 1 tablet every 8-10 hours.  Then, taper to   or 1 tablet every 10-12 hours.  Then, taper to   or 1 tablet at bedtime.  The bedtime dose can help with comfort during sleep and is typically the last dose to be discontinued after surgery.     Follow Up Care    Please follow-up with Dr. Ramos's team in 2 weeks at [facility]. Call our scheduling line at [phone] to make an appointment, if you do not already have one scheduled.     Activity    Activity as tolerated     Return to Driving    Return to driving - Driving is NOT permitted until directed by your  provider. Under no circumstance are you permitted to drive while using narcotic pain medications.     Dressing / Wound Care - Wound    You have a wound vac in place.  This is to remain in place until follow up visit.  Leave to suction with prevena wound vac     Dressing / Wound care - Shower with wound/dressing covered    You must COVER your dressing or incision with saran wrap (or any other non-permeable covering) to allow the incision to remain dry while showering.  You may shower 2 days after surgery as long as the surgical wound stays dry. Continue to cover your dressing or incision for showering until your first office visit.  You are strictly prohibited from soaking or submerging the surgical wound underwater.     Dressing / Wound Care - NO Tub Bathing    Tub bathing, swimming, or any other activities that will cause your incision to be submerged in water should be avoided until allowed by your Surgeon.     Resume anticoagulation as prior to admission    Resume eliquis on post-op day 1     No precautions    No precautions directed by your Provider.  You may perform range of motion activities as tolerated.     Foot flat weight bearing    Foot flat weight bearing.     Crutches DME    DME Documentation: Describe the reason for need to support medical necessity: Impaired gait status post hip surgery. I, the undersigned, certify that the above prescribed supplies are medically necessary for this patient and is both reasonable and necessary in reference to accepted standards of medical practice in the treatment of this patient's condition and is not prescribed as a convenience.     [facility]    DME Documentation: Describe the reason for need to support medical necessity: Impaired gait status post hip surgery. I, the undersigned, certify that the above prescribed supplies are medically necessary for this patient and is both reasonable and necessary in reference to accepted standards of medical practice in the  treatment of this patient's condition and is not prescribed as a convenience.     [facility]    DME Documentation: Describe the reason for need to support medical necessity: Impaired gait status post hip surgery. I, the undersigned, certify that the above prescribed supplies are medically necessary for this patient and is both reasonable and necessary in reference to accepted standards of medical practice in the treatment of this patient's condition and is not prescribed as a convenience.     Discharge Instruction - Regular Diet Adult    Return to your pre-surgery diet unless instructed otherwise       Significant Results and Procedures   Most Recent 3 CBC's:  Recent Labs   Lab Test 02/02/24  0517 02/01/24  0558 01/31/24  0503   WBC 7.7 8.8 8.1   HGB 9.5* 9.9* 9.4*   MCV 91 92 91    347 302     Most Recent 3 BMP's:  Recent Labs   Lab Test 02/02/24  0804 02/02/24  0517 02/02/24  0217 02/01/24  0843 02/01/24  0558 01/31/24  1747 01/31/24  1232 01/31/24  0750 01/31/24  0503   NA  --  143  --   --  147*  --   --   --  146*   POTASSIUM  --  3.6  --   --  3.6  --  3.5  --  3.1*   CHLORIDE  --  106  --   --  106  --   --   --  107   CO2  --  31*  --   --  33*  --   --   --  32*   BUN  --  30.7*  --   --  31.1*  --   --   --  33.2*   CR  --  1.38*  --   --  1.38*  --   --   --  1.46*   ANIONGAP  --  6*  --   --  8  --   --   --  7   JERRY  --  8.9  --   --  8.8  --   --   --  8.6*   * 125* 125*   < > 131*   < >  --    < > 139*    < > = values in this interval not displayed.   ,   Results for orders placed or performed during the hospital encounter of 01/22/24   XR Pelvis and Hip Left 2 Views    Narrative    EXAM: XR PELVIS AND HIP LEFT 2 VIEWS  LOCATION: [facility]  DATE: 1/22/2024    INDICATION: Fall. Hip pain.  COMPARISON: None.      Impression    IMPRESSION: Mildly displaced oblique periprosthetic left proximal femoral fracture centered at the mid-distal femoral stem particularly  lateral and posterior. Bilateral total hip arthroplasties with asymmetric polyethylene liner wear superolateral, left   worse than right. No displaced pelvic fracture is identified. Degenerative change lower lumbar spine and both SI joints. No offset or widening at the symphysis pubis.    NOTE: ABNORMAL REPORT    THE DICTATION ABOVE DESCRIBES AN ABNORMALITY FOR WHICH FOLLOW-UP IS NEEDED.              Head CT w/o contrast    Narrative    EXAM: CT HEAD W/O CONTRAST  LOCATION: [facility]  DATE: 1/22/2024    INDICATION: Fall, on Eliquis, head injury.  COMPARISON: None.  TECHNIQUE: Routine CT Head without IV contrast. Multiplanar reformats. Dose reduction techniques were used.    FINDINGS:  No evidence of intracranial hemorrhage, mass, or hydrocephalus. Generalized volume loss with background of nonspecific white matter hypoattenuation presumably representing chronic small vessel ischemic change. No acute osseous abnormality. Severe   paranasal sinus mucosal thickening with secretions layering within the right sphenoid sinus.      Impression    IMPRESSION:  1.  No acute intracranial abnormality.   CT Cervical Spine w/o Contrast    Narrative    EXAM: CT CERVICAL SPINE W/O CONTRAST  LOCATION: [facility]  DATE: 1/22/2024    INDICATION: Fall, neck injury.  COMPARISON: None.  TECHNIQUE: Routine CT Cervical Spine without IV contrast. Multiplanar reformats. Dose reduction techniques were used.    FINDINGS:  Flowing marginal osteophytes with ankylosis from C2 through the visualized thoracic spine.    No evidence of acute fracture or posttraumatic subluxation. Mild spinal canal stenoses at multiple levels. Moderate to severe foraminal stenoses at multiple levels. Incidental partial nonunion of the posterior aspect of the posterior elements of C7.   Presumed atelectasis at the lung apices. Scattered vascular calcifications.      Impression    IMPRESSION:  1.  No fracture or  posttraumatic subluxation.  2.  Multilevel ankylosis.   CT Femur Thigh Left w/o Contrast    Narrative    EXAM: CT HIP LEFT W/O CONTRAST, CT FEMUR THIGH LEFT W/O CONTRAST  LOCATION: [facility]  DATE: 1/22/2024    INDICATION: periprosthetic left femur fracture. Left hip and thigh pain.  COMPARISON: 01/22/2024 radiographs.  TECHNIQUE: Noncontrast. Axial, sagittal and coronal thin-section reconstruction. Dose reduction techniques were used.     FINDINGS:     BONES:  -Left total hip arthroplasty with an acute mildly displaced periprosthetic fracture involving the femoral component in the inter and subtrochanteric regions. Slight anterolateral displacement of a moderate-sized cortical fracture fragment, as seen on   series 6, image 27. No evidence of additional periprosthetic fractures. There is some localized cystic change at the interface between the acetabular component and superolateral portion of the acetabulum. No pubic rami or sacral ala fractures. There is   ankylosis of both SI joints. Advanced multilevel degenerative disc disease changes in the lower lumbar spine.    SOFT TISSUES:  -Mild global atrophy of the left thigh musculature. No discrete fluid collection/hematoma.      Impression    IMPRESSION: Left total hip arthroplasty with an acute mildly displaced periprosthetic fracture involving the femoral component at the level of the inter and subtrochanteric regions.     CT Hip Left w/o Contrast    Narrative    EXAM: CT HIP LEFT W/O CONTRAST, CT FEMUR THIGH LEFT W/O CONTRAST  LOCATION: [facility]  DATE: 1/22/2024    INDICATION: periprosthetic left femur fracture. Left hip and thigh pain.  COMPARISON: 01/22/2024 radiographs.  TECHNIQUE: Noncontrast. Axial, sagittal and coronal thin-section reconstruction. Dose reduction techniques were used.     FINDINGS:     BONES:  -Left total hip arthroplasty with an acute mildly displaced periprosthetic fracture involving the  "femoral component in the inter and subtrochanteric regions. Slight anterolateral displacement of a moderate-sized cortical fracture fragment, as seen on   series 6, image 27. No evidence of additional periprosthetic fractures. There is some localized cystic change at the interface between the acetabular component and superolateral portion of the acetabulum. No pubic rami or sacral ala fractures. There is   ankylosis of both SI joints. Advanced multilevel degenerative disc disease changes in the lower lumbar spine.    SOFT TISSUES:  -Mild global atrophy of the left thigh musculature. No discrete fluid collection/hematoma.      Impression    IMPRESSION: Left total hip arthroplasty with an acute mildly displaced periprosthetic fracture involving the femoral component at the level of the inter and subtrochanteric regions.     XR Femur Left 2 Views    Narrative    EXAM: XR FEMUR LEFT 2 VIEWS  LOCATION: [facility]  DATE: 1/22/2024    INDICATION: Periprosthetic femur fx, hip pain.  COMPARISON: None.      Impression    IMPRESSION: Left total hip arthroplasty with an acute mildly displaced periprosthetic fracture involving the proximal femoral shaft extending to the intertrochanteric region. Small area of benign cortical thickening along the lateral margin of the mid   left femoral diaphysis.               XR Surgery GRICELDA  Fluoro G/T 5 Min    Narrative    This exam was marked as non-reportable because it will not be read by a   radiologist or a [facility] non-radiologist provider.         POC US Guidance Needle Placement    Narrative    Ultrasound was performed as guidance to an anesthesia procedure.  Click   \"PACS images\" hyperlink below to view any stored images.  For specific   procedure details, view procedure note authored by anesthesia.   XR Femur Port Left 2 Views    Narrative    EXAM: XR FEMUR PORT LEFT 2 VIEWS  LOCATION: [facility]  DATE: 1/24/2024    INDICATION: " Status post hip surgery.  COMPARISON: Radiographs from 01/22/2024.      Impression    IMPRESSION: Since the prior study, the femoral component has been replaced with a longstem femoral component and there are new cerclage wires around the proximal femur. These findings extend across the previously seen periprosthetic fracture, which is   now affixed into excellent position and alignment. There is no evidence of complication. Mild degenerative changes and small effusion in the knee incidentally noted.   XR Pelvis 1/2 Views    Narrative    EXAM: XR PELVIS 1/2 VIEWS  LOCATION: [facility]  DATE: 1/24/2024    INDICATION: Status post hip surgery.  COMPARISON: 01/22/2024 x-ray.      Impression    IMPRESSION: The femoral component of the left total hip arthroplasty has been replaced with a longstem component and there are new cerclage wires around the proximal femur, all extending across the previously seen periprosthetic fracture in the proximal   shaft of the femur. There is excellent position/alignment on this view. No complication evident. Please note that the distal end of the stem of the femoral component was not included in the field-of-view of this single image.   XR Chest PICC Placement 1 View    Narrative    XR CHEST PICC LINE PLACEMENT 1 VIEW  1/24/2024 10:05 PM CST      HISTORY: PICC placement.     COMPARISON: 07/23/2023.    FINDINGS: A right PICC is been placed and the tip is at the junction of SVC and right atrium. There is no pneumothorax. The heart size is normal. The lungs are clear. Degenerative disease in the spine and shoulders.      Impression    IMPRESSION: Right PICC to distal SVC.       Discharge Medications   Current Discharge Medication List        START taking these medications    Details   busPIRone (BUSPAR) 5 MG tablet Take 1 tablet (5 mg) by mouth 3 times daily    Associated Diagnoses: Anxiety      diclofenac (VOLTAREN) 1 % topical gel Apply 2 g topically 3 times daily  shoulders  Qty: 50 g, Refills: 0    Associated Diagnoses: Hip fracture, left, closed, initial encounter (H)      lidocaine (XYLOCAINE) 5 % external ointment Apply topically 3 times daily  Qty: 100 g, Refills: 0    Associated Diagnoses: Hip fracture, left, closed, initial encounter (H)      senna-docusate (SENOKOT-S/PERICOLACE) 8.6-50 MG tablet Take 2 tablets by mouth 2 times daily  Qty: 30 tablet, Refills: 0    Associated Diagnoses: Periprosthetic fracture of shaft of femur      !! traMADol (ULTRAM) 50 MG tablet Take 0.5 tablets (25 mg) by mouth at bedtime  Qty: 3 tablet, Refills: 0    Comments: At hs for 3 days then stop  Associated Diagnoses: Hip fracture, left, closed, initial encounter (H)      !! traMADol (ULTRAM) 50 MG tablet Take 0.5 tablets (25 mg) by mouth daily as needed for severe pain  Qty: 4 tablet, Refills: 0    Comments: Daily prn for 4 days then stop  Associated Diagnoses: Hip fracture, left, closed, initial encounter (H)       !! - Potential duplicate medications found. Please discuss with provider.        CONTINUE these medications which have CHANGED    Details   acetaminophen (TYLENOL) 325 MG tablet Take 3 tablets (975 mg) by mouth 3 times daily  Qty: 100 tablet, Refills: 0    Associated Diagnoses: Periprosthetic fracture of shaft of femur      famotidine (PEPCID) 20 MG tablet Take 1 tablet (20 mg) by mouth daily    Comments: Renal adjustment down to 20 mg daily  Associated Diagnoses: Esophageal dysmotility           CONTINUE these medications which have NOT CHANGED    Details   amiodarone (PACERONE) 200 MG tablet Take 1 tablet (200 mg) by mouth daily    Associated Diagnoses: Paroxysmal atrial fibrillation with RVR (H)      atorvastatin (LIPITOR) 20 MG tablet Take 20 mg by mouth At Bedtime      ELIQUIS ANTICOAGULANT 5 MG tablet Take 5 mg by mouth 2 times daily      Magnesium Oxide 250 MG TABS Take 250 mg by mouth daily      metoprolol succinate ER (TOPROL XL) 25 MG 24 hr tablet Take 25 mg by mouth  daily      potassium chloride ER (K-TAB/KLOR-CON) 10 MEQ CR tablet Take 20 mEq by mouth daily      torsemide (DEMADEX) 20 MG tablet Take 1 tablet (20 mg) by mouth daily           Allergies   No Known Allergies

## 2024-02-02 NOTE — PLAN OF CARE
Problem: Adult Inpatient Plan of Care  Goal: Absence of Hospital-Acquired Illness or Injury  Intervention: Identify and Manage Fall Risk  Recent Flowsheet Documentation  Taken 2/1/2024 1700 by Milla Dominguez RN  Safety Promotion/Fall Prevention:   activity supervised   room near nurse's station     Problem: Adult Inpatient Plan of Care  Goal: Absence of Hospital-Acquired Illness or Injury  Intervention: Prevent Skin Injury  Recent Flowsheet Documentation  Taken 2/1/2024 1700 by Milla Dominguez RN  Body Position:   right   turned     Problem: Adult Inpatient Plan of Care  Goal: Absence of Hospital-Acquired Illness or Injury  Intervention: Prevent Infection  Recent Flowsheet Documentation  Taken 2/1/2024 1700 by Milla Dominguez, RN  Infection Prevention:   rest/sleep promoted   hand hygiene promoted   Goal Outcome Evaluation:       Patient alert and oriented x 2. Denies any pain or discomfort. Voided x 2 this shift. Plan in place for discharge to TCU 02/02/24.

## 2024-02-02 NOTE — PROGRESS NOTES
Care Management Discharge Note    Discharge Date: 02/02/2024       Discharge Disposition: Transitional Care    Discharge Services: None    Discharge DME: None    Discharge Transportation: health plan transportation    Private pay costs discussed: transportation costs    Does the patient's insurance plan have a 3 day qualifying hospital stay waiver?  No    PAS Confirmation Code: [id number]  Patient/family educated on Medicare website which has current facility and service quality ratings: yes    Education Provided on the Discharge Plan: Yes  Persons Notified of Discharge Plans: MD, Rn, CM, patient, family  Patient/Family in Agreement with the Plan: yes    Handoff Referral Completed: Yes    Additional Information:  Patient discharging to [facility] ([facility]) via w/c transport. [facility] asked that CM reschedule ride, ride is now at 1:40-2:20.     CM updated [facility], wife Rosie, and dtr Fuad of ride change.     Jeimy Padilla, TETOW

## 2024-02-03 ENCOUNTER — TELEPHONE (OUTPATIENT)
Dept: GERIATRICS | Facility: CLINIC | Age: 89
End: 2024-02-03
Payer: COMMERCIAL

## 2024-02-03 NOTE — TELEPHONE ENCOUNTER
Alvin Newton is a 89 year old  (5/10/1934), Nurse called today to report: Patient in the TCU with hip surgery.  He was with a wound VAC that was to be removed and thrown away on 2/7/2024 however it accidentally pulled out today.  Orders given to update orthopedics and leave dressing intact        Electronically signed by:   Chelsea Joseph CNP

## 2024-02-04 ENCOUNTER — PATIENT OUTREACH (OUTPATIENT)
Dept: CARE COORDINATION | Facility: CLINIC | Age: 89
End: 2024-02-04
Payer: COMMERCIAL

## 2024-02-04 NOTE — PROGRESS NOTES
Connected Care Resource Center: Connected [facility]    Background: Transitional Care Management program identified per system criteria and reviewed by Connected Care Resource Center team for possible outreach.    Assessment: Upon chart review, CCRC Team member will not proceed with patient outreach related to this episode of Transitional Care Management program due to reason below:    Non-[facility] TCU: CCRC team member noted patient discharged to TCU/ARU/LTACH. Patient is not established with a [facility] currently supported by Primary Care-Care Coordination therefore handoff to Primary Care-Care Coordination is not appropriate at this time.    Plan: Transitional Care Management episode addressed appropriately per reason noted above.      Chelsea Meza MA  [facility] Care [facility], [facility]    *Connected Care Resource Team does NOT follow patient ongoing. Referrals are identified based on internal discharge reports and the outreach is to ensure patient has an understanding of their discharge instructions.

## 2024-02-05 ENCOUNTER — LAB REQUISITION (OUTPATIENT)
Dept: LAB | Facility: CLINIC | Age: 89
End: 2024-02-05
Payer: COMMERCIAL

## 2024-02-05 ENCOUNTER — TRANSITIONAL CARE UNIT VISIT (OUTPATIENT)
Dept: GERIATRICS | Facility: CLINIC | Age: 89
End: 2024-02-05
Payer: COMMERCIAL

## 2024-02-05 VITALS
SYSTOLIC BLOOD PRESSURE: 100 MMHG | HEART RATE: 92 BPM | BODY MASS INDEX: 33.17 KG/M2 | WEIGHT: 236.9 LBS | HEIGHT: 71 IN | OXYGEN SATURATION: 100 % | TEMPERATURE: 97.8 F | RESPIRATION RATE: 18 BRPM | DIASTOLIC BLOOD PRESSURE: 65 MMHG

## 2024-02-05 DIAGNOSIS — I77.810 ASCENDING AORTA DILATATION (H): ICD-10-CM

## 2024-02-05 DIAGNOSIS — N40.1 BENIGN PROSTATIC HYPERPLASIA WITH LOWER URINARY TRACT SYMPTOMS: ICD-10-CM

## 2024-02-05 DIAGNOSIS — E85.89 OTHER AMYLOIDOSIS (H): ICD-10-CM

## 2024-02-05 DIAGNOSIS — E44.0 MODERATE MALNUTRITION (H): ICD-10-CM

## 2024-02-05 DIAGNOSIS — S72.002D FRACTURE OF UNSPECIFIED PART OF NECK OF LEFT FEMUR, SUBSEQUENT ENCOUNTER FOR CLOSED FRACTURE WITH ROUTINE HEALING: ICD-10-CM

## 2024-02-05 DIAGNOSIS — Z47.89 ENCOUNTER FOR OTHER ORTHOPEDIC AFTERCARE: ICD-10-CM

## 2024-02-05 DIAGNOSIS — Z98.890 STATUS POST OPEN REDUCTION AND INTERNAL FIXATION (ORIF) OF FRACTURE: ICD-10-CM

## 2024-02-05 DIAGNOSIS — S72.002A HIP FRACTURE, LEFT, CLOSED, INITIAL ENCOUNTER (H): ICD-10-CM

## 2024-02-05 DIAGNOSIS — I95.9 HYPOTENSION, UNSPECIFIED: ICD-10-CM

## 2024-02-05 DIAGNOSIS — D68.9 MEDICATION INDUCED COAGULOPATHY (H): ICD-10-CM

## 2024-02-05 DIAGNOSIS — I50.32 CHRONIC DIASTOLIC (CONGESTIVE) HEART FAILURE (H): ICD-10-CM

## 2024-02-05 DIAGNOSIS — D62 ANEMIA DUE TO BLOOD LOSS, ACUTE: ICD-10-CM

## 2024-02-05 DIAGNOSIS — N18.31 STAGE 3A CHRONIC KIDNEY DISEASE (H): ICD-10-CM

## 2024-02-05 DIAGNOSIS — T50.905A MEDICATION INDUCED COAGULOPATHY (H): ICD-10-CM

## 2024-02-05 DIAGNOSIS — S72.22XD CLOSED DISPLACED SUBTROCHANTERIC FRACTURE OF LEFT FEMUR WITH ROUTINE HEALING, SUBSEQUENT ENCOUNTER: Primary | ICD-10-CM

## 2024-02-05 DIAGNOSIS — E11.42 TYPE 2 DIABETES MELLITUS WITH DIABETIC POLYNEUROPATHY, WITHOUT LONG-TERM CURRENT USE OF INSULIN (H): ICD-10-CM

## 2024-02-05 DIAGNOSIS — I48.0 PAROXYSMAL ATRIAL FIBRILLATION WITH RVR (H): ICD-10-CM

## 2024-02-05 DIAGNOSIS — Z87.81 STATUS POST OPEN REDUCTION AND INTERNAL FIXATION (ORIF) OF FRACTURE: ICD-10-CM

## 2024-02-05 DIAGNOSIS — E27.40 ADRENAL INSUFFICIENCY (H): ICD-10-CM

## 2024-02-05 PROCEDURE — 99305 1ST NF CARE MODERATE MDM 35: CPT | Performed by: NURSE PRACTITIONER

## 2024-02-05 RX ORDER — TRAMADOL HYDROCHLORIDE 50 MG/1
TABLET ORAL
Qty: 30 TABLET | Refills: 0 | Status: SHIPPED | OUTPATIENT
Start: 2024-02-05 | End: 2024-02-19

## 2024-02-05 NOTE — PROGRESS NOTES
[facility] GERIATRIC SERVICES    Code Status:  DNR   Visit Type:   Chief Complaint   Patient presents with    TCU Admission     [facility] 1/22/2024 - 2/2/2024     Facility:  [facility] () [[id number]]         HPI: Alvin Newton is a 89 year old male who I am seeing today for admit to the TCU.  Past medical history includes type 2 diabetes mellitus with diabetic polyneuropathy, proximal atrial fibs on Eliquis, bilateral hip replacement 33 years ago, HFpEF, type 2 diabetes mellitus, hypertension, severe aortic stenosis and mild cognitive impairment.  Patient admitted post fall with left femur fracture.  Patient had a mechanical fall in which she was trying to reach for his cane for stability but put his weight on his grabber device instead and fell forward.  He did not hit his head or lose consciousness.  Head CT was negative.  X-ray of the pelvis/left hip revealed a left proximal femoral fracture.  History of bilateral hip replacements about 33 years ago.  Patient underwent repair on 1/24/2024.  He had a complicated surgery in the setting of bilateral hip replacements.  He lost 3 L of blood.  He did require pressor support.  He was also transfused on 1/26.  Patient found to have a mildly low a.m. cortisol level most likely due to a component of adrenal insufficiency.  Steroids were done for couple days along with midodrine.  BP improved.  Steroids were discontinued on 1/30.  Pain controlled with tramadol and Tylenol.  Patient initially had a Prevena wound VAC however 1 day later during his TCU stay VAC was not working appropriately.  Ortho updated and this was discontinued and dry sterile dressing applied.  Patient also experienced postoperative delirium.  He is very anxious and tearful.  No agitation.  He did require one-on-one.  He was given BuSpar to help with anxiety.  Delirium improved.  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  Severe aortic  stenosis.  Cardiac MRI in October concerning for infiltrative cardiomyopathy.  Workup for amyloidosis in process by cardiology in [place] [facility].  Biopsies of the bone marrow and fat pad were negative for AL amyloid.  Plan at this time is to follow-up with cardiology regarding possible myocardial biopsy and continued amyloid/infiltrative cardiomyopathy workup.  History of proximal atrial fibs on Eliquis.  Patient did have episode of RVR after procedure which resolved.  Acute kidney injury on CKD stage III.  Creatinine 1.34 admission.  Creatinine did bump to 1.74 but improved with fluids.  Type 2 diabetes without long-term use of insulin.  Most recent hemoglobin A1c 6.8%.  Stress leukocytosis with a white blood cell count of 13 resolved.    Transitional Care Course: Today patient sitting up in bedside chair.  He has a friend visiting.  Patient alert.  He is answering questions appropriately.  Pleasant affect.  No further delirium or anxiety.  He continues with pain in his left hip.  He is receiving Tylenol and tramadol.  Some swelling noted.  Blood pressure appears satisfactory.  Patient denies any shortness of breath or chest pain.  He is eating well.  He is emptying his bladder and having regular bowel movements.      Assessment/Plan:     Closed displaced subtrochanteric fracture of left femur with routine healing, subsequent encounter  Status post open reduction and internal fixation (ORIF) of fracture  -History of bilateral hip replacement 33 years old.  -Dry sterile dressing to be changed daily.  -Weightbearing as tolerated.  -Continue tramadol and Tylenol for pain.    Anemia due to blood loss, acute  -EBL of 3 L.  -Patient underwent transfusion of 1/26/2024.  -Follow-up CBC.    Other amyloidosis (H)  -Workup for amyloidosis in process by cardiology in [place] [facility].  Biopsies of bone marrow and fat pad negative for AL amyloid.  -Follow-up with cardiology regarding possible myocardial biopsy and  continued amyloid/infiltrative cardiomyopathy workup.    Chronic diastolic (congestive) heart failure (H)  -Cardiac MRI in October concerning for infiltrative cardiomyopathy.  -Daily weights.  -Continue torsemide 20 mg daily.    Paroxysmal atrial fibrillation with RVR (H)  -Chronically on Eliquis.  -Rate controlled with metoprolol and amiodarone.    Type 2 diabetes mellitus with diabetic polyneuropathy, without long-term current use of insulin (H)  -Recent A1c 6.8%.  -Currently not on medication.    Stage 3a chronic kidney disease (H)  -Follow-up BMP.    Adrenal insufficiency (H24)  -Steroid dose during hospitalization.  Now off steroids.    Anxiety  Delirium  -Delirium resolved  -Continue BuSpar 5 mg 3 times daily.    -Okay for PT OT eval and treat.  -Follow-up CBC and BMP.    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/22/2020    Essential hypertension 04/04/2016    Mixed hyperlipidemia 01/02/2009    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) 12/06/2017    Carpal tunnel syndrome, bilateral 11/18/2021    Fall, initial encounter 06/06/2022    Closed fracture of first lumbar vertebra, unspecified fracture morphology, initial encounter (H) 06/06/2022    Fracture of L1 vertebra (H) 06/06/2022    Impaired fasting glucose 06/20/2022    Osteoarthritis of pelvis 06/20/2022    Other ill-defined and unknown causes of morbidity and mortality 01/01/1985    Primary localized osteoarthrosis of shoulder region 06/20/2022    Pure hypercholesterolemia 01/01/1999    Reason for consultation 06/20/2022    Right bundle branch block 06/20/2022    Cellulitis of right lower extremity 07/11/2023    Severe sepsis (H) 07/11/2023    Septic shock (H) 07/12/2023    Streptococcal bacteremia 07/13/2023    Thrombocytopenia (H24) 07/13/2023    Hyperbilirubinemia 07/13/2023    Hypophosphatemia 07/13/2023    Hypoalbuminemia  07/13/2023    Pyuria 07/13/2023    Paroxysmal atrial fibrillation with RVR (H) 07/13/2023    Hypomagnesemia 07/13/2023    Chronic pain of both shoulders 07/13/2023    Severe aortic stenosis 07/14/2023    Elevated brain natriuretic peptide (BNP) level 07/14/2023    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    Peripheral edema 07/14/2023    Positive urine culture 07/14/2023    Medication induced coagulopathy  (H24) 07/14/2023    Chronic diastolic (congestive) heart failure (H) 03/31/2023    Altered mental status, unspecified altered mental status type 07/23/2023    Clostridium difficile diarrhea 07/23/2023    History of Clostridium difficile infection July 2023 07/25/2023    Stage 3a chronic kidney disease (H) 07/25/2023    Esophageal dysmotility 07/27/2023    DEJUAN (acute kidney injury) (H24) 08/17/2023    Moderate malnutrition (H24) 08/17/2023    Schatzki's ring of distal esophagus-dilated 8/19/23 08/17/2023    Acute gout involving toe of left foot 08/17/2023    Unintentional weight loss 08/18/2023    Abnormal esophagram 08/18/2023    Hypokalemia 08/19/2023    Hyponatremia 08/19/2023    Open wound-coccyx/buttocks 08/23/2023    Enterocolitis due to Clostridium difficile, not specified as recurrent 07/27/2023    Other chronic pain 07/19/2023    Pain in left shoulder 07/19/2023    Pain in right shoulder 07/19/2023    Unspecified streptococcus as the cause of diseases classified elsewhere 07/19/2023    Esophageal dysphagia 08/11/2023    Hip fracture, left, closed, initial encounter (H) 01/22/2024    Acute kidney failure, unspecified (H24) 08/24/2023    Amyloidosis (H) 01/25/2024    Monoclonal gammopathy of unknown significance (MGUS) 01/25/2024    Hypotension, unspecified hypotension type 01/25/2024    Postoperative anemia due to acute blood loss 01/25/2024     Resolved Ambulatory Problems     Diagnosis Date Noted    Obesity, morbid, BMI 40.0-49.9 (H) 11/07/2017     Past Medical History:   Diagnosis Date    Colonic  "diverticulum     Hyperlipidemia     Hypertension     Obesity (BMI 30-39.9)     Osteoarthritis     Type 2 diabetes mellitus (H)      No Known Allergies    All Meds and Allergies reviewed in the record at the facility and is the most up-to-date.    Post Discharge Medication Reconciliation Status: discharge medications reconciled, continue medications without change  Current Outpatient Medications   Medication Sig    acetaminophen (TYLENOL) 325 MG tablet Take 3 tablets (975 mg) by mouth 3 times daily    amiodarone (PACERONE) 200 MG tablet Take 1 tablet (200 mg) by mouth daily    atorvastatin (LIPITOR) 20 MG tablet Take 20 mg by mouth At Bedtime    busPIRone (BUSPAR) 5 MG tablet Take 1 tablet (5 mg) by mouth 3 times daily    diclofenac (VOLTAREN) 1 % topical gel Apply 2 g topically 3 times daily shoulders    ELIQUIS ANTICOAGULANT 5 MG tablet Take 5 mg by mouth 2 times daily    famotidine (PEPCID) 20 MG tablet Take 1 tablet (20 mg) by mouth daily    lidocaine (XYLOCAINE) 5 % external ointment Apply topically 3 times daily    Magnesium Oxide 250 MG TABS Take 250 mg by mouth daily    metoprolol succinate ER (TOPROL XL) 25 MG 24 hr tablet Take 25 mg by mouth daily    potassium chloride ER (K-TAB/KLOR-CON) 10 MEQ CR tablet Take 20 mEq by mouth daily    senna-docusate (SENOKOT-S/PERICOLACE) 8.6-50 MG tablet Take 2 tablets by mouth 2 times daily    torsemide (DEMADEX) 20 MG tablet Take 1 tablet (20 mg) by mouth daily    traMADol (ULTRAM) 50 MG tablet Take 0.5 tablets (25 mg) by mouth at bedtime     No current facility-administered medications for this visit.       REVIEW OF SYSTEMS:   10 point review of systems reviewed and pertinent positives in the HPI.     PHYSICAL EXAMINATION:  Physical Exam     Vital signs: /65   Pulse 92   Temp 97.8  F (36.6  C)   Resp 18   Ht 1.803 m (5' 11\")   Wt 107.5 kg (236 lb 14.4 oz)   SpO2 100%   BMI 33.04 kg/m    General: Awake, Alert, oriented x3, sitting in bedside chair, follows " simple commands, conversant  HEENT:Pink conjunctiva, moist oral mucosa  NECK: Supple  CVS:  S1  S2, without murmur or gallop.   LUNG: Clear to auscultation, No wheezes, rales or rhonci.  BACK: No kyphosis of the thoracic spine  ABDOMEN: Soft, nontender to palpation, with positive bowel sounds  EXTREMITIES: Moves both upper and lower extremities with generalized weakness and some limitation to left lower extremity, 1+ pedal edema  SKIN: Incision to left hip covered with island dressing.  No strikethrough.  NEUROLOGIC: Intact, pulses palpable  PSYCHIATRIC: Mild cognitive impairment noted.  Pleasant affect.      Labs:  All labs reviewed in the nursing home record and Epic   @  Lab Results   Component Value Date    WBC 7.7 02/02/2024    WBC 9.0 12/30/2017     Lab Results   Component Value Date    RBC 3.24 02/02/2024    RBC 4.16 12/30/2017     Lab Results   Component Value Date    HGB 9.5 02/02/2024    HGB 12.6 12/30/2017     Lab Results   Component Value Date    HCT 29.6 02/02/2024    HCT 37.1 12/30/2017     Lab Results   Component Value Date    MCV 91 02/02/2024    MCV 89 12/30/2017     Lab Results   Component Value Date    MCH 29.3 02/02/2024    MCH 30.3 12/30/2017     Lab Results   Component Value Date    MCHC 32.1 02/02/2024    MCHC 34.0 12/30/2017     Lab Results   Component Value Date    RDW 15.5 02/02/2024    RDW 13.8 12/30/2017     Lab Results   Component Value Date     02/02/2024     12/30/2017        @Last Comprehensive Metabolic Panel:  Sodium   Date Value Ref Range Status   02/02/2024 143 135 - 145 mmol/L Final     Comment:     Reference intervals for this test were updated on 09/26/2023 to more accurately reflect our healthy population. There may be differences in the flagging of prior results with similar values performed with this method. Interpretation of those prior results can be made in the context of the updated reference intervals.    12/30/2017 138 133 - 144 mmol/L Final     Potassium    Date Value Ref Range Status   02/02/2024 3.6 3.4 - 5.3 mmol/L Final   07/15/2022 3.7 3.4 - 5.3 mmol/L Final   12/31/2017 3.5 3.4 - 5.3 mmol/L Final     Chloride   Date Value Ref Range Status   02/02/2024 106 98 - 107 mmol/L Final   07/15/2022 106 94 - 109 mmol/L Final   12/30/2017 104 94 - 109 mmol/L Final     Carbon Dioxide   Date Value Ref Range Status   12/30/2017 26 20 - 32 mmol/L Final     Carbon Dioxide (CO2)   Date Value Ref Range Status   02/02/2024 31 (H) 22 - 29 mmol/L Final   07/15/2022 24 20 - 32 mmol/L Final     Anion Gap   Date Value Ref Range Status   02/02/2024 6 (L) 7 - 15 mmol/L Final   07/15/2022 9 3 - 14 mmol/L Final   12/30/2017 8 3 - 14 mmol/L Final     Glucose   Date Value Ref Range Status   07/15/2022 97 70 - 99 mg/dL Final   12/30/2017 145 (H) 70 - 99 mg/dL Final     GLUCOSE BY METER POCT   Date Value Ref Range Status   02/02/2024 120 (H) 70 - 99 mg/dL Final     Urea Nitrogen   Date Value Ref Range Status   02/02/2024 30.7 (H) 8.0 - 23.0 mg/dL Final   07/15/2022 13 7 - 30 mg/dL Final   12/31/2017 26 7 - 30 mg/dL Final     Creatinine   Date Value Ref Range Status   02/02/2024 1.38 (H) 0.67 - 1.17 mg/dL Final   12/31/2017 1.11 0.66 - 1.25 mg/dL Final     GFR Estimate   Date Value Ref Range Status   02/02/2024 49 (L) >60 mL/min/1.73m2 Final   12/31/2017 63 >60 mL/min/1.7m2 Final     Comment:     Non  GFR Calc     Calcium   Date Value Ref Range Status   02/02/2024 8.9 8.8 - 10.2 mg/dL Final   12/30/2017 7.9 (L) 8.5 - 10.1 mg/dL Final     Greater than 35 minutes spent for this visit which included reviewing hospitalization records, labs, imaging, medications as well as face-to-face time spent with patient and collaborating with nursing staff.    This note has been dictated using voice recognition software. Any grammatical or context distortions are unintentional and inherent to the software    Electronically signed by: Grace Parry CNP

## 2024-02-05 NOTE — LETTER
2/5/2024        RE: Alvin Newton  [address] [facility] [id number]        [facility]    Code Status:  DNR   Visit Type:   Chief Complaint   Patient presents with     TCU Admission     [facility] 1/22/2024 - 2/2/2024     Facility:  [facility] ([facility]) [[id number]]         HPI: Alvin Newton is a 89 year old male who I am seeing today for admit to the TCU.  Past medical history includes type 2 diabetes mellitus with diabetic polyneuropathy, proximal atrial fibs on Eliquis, bilateral hip replacement 33 years ago, HFpEF, type 2 diabetes mellitus, hypertension, severe aortic stenosis and mild cognitive impairment.  Patient admitted post fall with left femur fracture.  Patient had a mechanical fall in which she was trying to reach for his cane for stability but put his weight on his grabber device instead and fell forward.  He did not hit his head or lose consciousness.  Head CT was negative.  X-ray of the pelvis/left hip revealed a left proximal femoral fracture.  History of bilateral hip replacements about 33 years ago.  Patient underwent repair on 1/24/2024.  He had a complicated surgery in the setting of bilateral hip replacements.  He lost 3 L of blood.  He did require pressor support.  He was also transfused on 1/26.  Patient found to have a mildly low a.m. cortisol level most likely due to a component of adrenal insufficiency.  Steroids were done for couple days along with midodrine.  BP improved.  Steroids were discontinued on 1/30.  Pain controlled with tramadol and Tylenol.  Patient initially had a Prevena wound VAC however 1 day later during his TCU stay VAC was not working appropriately.  Ortho updated and this was discontinued and dry sterile dressing applied.  Patient also experienced postoperative delirium.  He is very anxious and tearful.  No agitation.  He did require one-on-one.  He was given BuSpar to help with anxiety.  Delirium improved.  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  Severe aortic stenosis.  Cardiac MRI in October concerning for infiltrative cardiomyopathy.  Workup for amyloidosis in process by cardiology in [place] [facility].  Biopsies of the bone marrow and fat pad were negative for AL amyloid.  Plan at this time is to follow-up with cardiology regarding possible myocardial biopsy and continued amyloid/infiltrative cardiomyopathy workup.  History of proximal atrial fibs on Eliquis.  Patient did have episode of RVR after procedure which resolved.  Acute kidney injury on CKD stage III.  Creatinine 1.34 admission.  Creatinine did bump to 1.74 but improved with fluids.  Type 2 diabetes without long-term use of insulin.  Most recent hemoglobin A1c 6.8%.  Stress leukocytosis with a white blood cell count of 13 resolved.    Transitional Care Course: Today patient sitting up in bedside chair.  He has a friend visiting.  Patient alert.  He is answering questions appropriately.  Pleasant affect.  No further delirium or anxiety.  He continues with pain in his left hip.  He is receiving Tylenol and tramadol.  Some swelling noted.  Blood pressure appears satisfactory.  Patient denies any shortness of breath or chest pain.  He is eating well.  He is emptying his bladder and having regular bowel movements.      Assessment/Plan:     Closed displaced subtrochanteric fracture of left femur with routine healing, subsequent encounter  Status post open reduction and internal fixation (ORIF) of fracture  -History of bilateral hip replacement 33 years old.  -Dry sterile dressing to be changed daily.  -Weightbearing as tolerated.  -Continue tramadol and Tylenol for pain.    Anemia due to blood loss, acute  -EBL of 3 L.  -Patient underwent transfusion of 1/26/2024.  -Follow-up CBC.    Other amyloidosis (H)  -Workup for amyloidosis in process by cardiology in [place] [facility].  Biopsies of bone marrow and fat pad negative  for AL amyloid.  -Follow-up with cardiology regarding possible myocardial biopsy and continued amyloid/infiltrative cardiomyopathy workup.    Chronic diastolic (congestive) heart failure (H)  -Cardiac MRI in October concerning for infiltrative cardiomyopathy.  -Daily weights.  -Continue torsemide 20 mg daily.    Paroxysmal atrial fibrillation with RVR (H)  -Chronically on Eliquis.  -Rate controlled with metoprolol and amiodarone.    Type 2 diabetes mellitus with diabetic polyneuropathy, without long-term current use of insulin (H)  -Recent A1c 6.8%.  -Currently not on medication.    Stage 3a chronic kidney disease (H)  -Follow-up BMP.    Adrenal insufficiency (H24)  -Steroid dose during hospitalization.  Now off steroids.    Anxiety  Delirium  -Delirium resolved  -Continue BuSpar 5 mg 3 times daily.    -Okay for PT OT eval and treat.  -Follow-up CBC and BMP.    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     Essential hypertension 04/04/2016     Mixed hyperlipidemia 01/02/2009     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) 12/06/2017     Carpal tunnel syndrome, bilateral 11/18/2021     Fall, initial encounter 06/06/2022     Closed fracture of first lumbar vertebra, unspecified fracture morphology, initial encounter (H) 06/06/2022     Fracture of L1 vertebra (H) 06/06/2022     Impaired fasting glucose 06/20/2022     Osteoarthritis of pelvis 06/20/2022     Other ill-defined and unknown causes of morbidity and mortality 01/01/1985     Primary localized osteoarthrosis of shoulder region 06/20/2022     Pure hypercholesterolemia 01/01/1999     Reason for consultation 06/20/2022     Right bundle branch block 06/20/2022     Cellulitis of right lower extremity 07/11/2023     Severe sepsis (H) 07/11/2023     Septic shock (H) 07/12/2023     Streptococcal bacteremia 07/13/2023     Thrombocytopenia  (H24) 07/13/2023     Hyperbilirubinemia 07/13/2023     Hypophosphatemia 07/13/2023     Hypoalbuminemia 07/13/2023     Pyuria 07/13/2023     Paroxysmal atrial fibrillation with RVR (H) 07/13/2023     Hypomagnesemia 07/13/2023     Chronic pain of both shoulders 07/13/2023     Severe aortic stenosis 07/14/2023     Elevated brain natriuretic peptide (BNP) level 07/14/2023     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     Peripheral edema 07/14/2023     Positive urine culture 07/14/2023     Medication induced coagulopathy  (H24) 07/14/2023     Chronic diastolic (congestive) heart failure (H) 03/31/2023     Altered mental status, unspecified altered mental status type 07/23/2023     Clostridium difficile diarrhea 07/23/2023     History of Clostridium difficile infection July 2023 07/25/2023     Stage 3a chronic kidney disease (H) 07/25/2023     Esophageal dysmotility 07/27/2023     DEJUAN (acute kidney injury) (H24) 08/17/2023     Moderate malnutrition (H24) 08/17/2023     Schatzki's ring of distal esophagus-dilated 8/19/23 08/17/2023     Acute gout involving toe of left foot 08/17/2023     Unintentional weight loss 08/18/2023     Abnormal esophagram 08/18/2023     Hypokalemia 08/19/2023     Hyponatremia 08/19/2023     Open wound-coccyx/buttocks 08/23/2023     Enterocolitis due to Clostridium difficile, not specified as recurrent 07/27/2023     Other chronic pain 07/19/2023     Pain in left shoulder 07/19/2023     Pain in right shoulder 07/19/2023     Unspecified streptococcus as the cause of diseases classified elsewhere 07/19/2023     Esophageal dysphagia 08/11/2023     Hip fracture, left, closed, initial encounter (H) 01/22/2024     Acute kidney failure, unspecified (H24) 08/24/2023     Amyloidosis (H) 01/25/2024     Monoclonal gammopathy of unknown significance (MGUS) 01/25/2024     Hypotension, unspecified hypotension type 01/25/2024     Postoperative anemia due to acute blood loss 01/25/2024     Resolved Ambulatory  Problems     Diagnosis Date Noted     Obesity, morbid, BMI 40.0-49.9 (H) 11/07/2017     Past Medical History:   Diagnosis Date     Colonic diverticulum      Hyperlipidemia      Hypertension      Obesity (BMI 30-39.9)      Osteoarthritis      Type 2 diabetes mellitus (H)      No Known Allergies    All Meds and Allergies reviewed in the record at the facility and is the most up-to-date.    Post Discharge Medication Reconciliation Status: discharge medications reconciled, continue medications without change  Current Outpatient Medications   Medication Sig     acetaminophen (TYLENOL) 325 MG tablet Take 3 tablets (975 mg) by mouth 3 times daily     amiodarone (PACERONE) 200 MG tablet Take 1 tablet (200 mg) by mouth daily     atorvastatin (LIPITOR) 20 MG tablet Take 20 mg by mouth At Bedtime     busPIRone (BUSPAR) 5 MG tablet Take 1 tablet (5 mg) by mouth 3 times daily     diclofenac (VOLTAREN) 1 % topical gel Apply 2 g topically 3 times daily shoulders     ELIQUIS ANTICOAGULANT 5 MG tablet Take 5 mg by mouth 2 times daily     famotidine (PEPCID) 20 MG tablet Take 1 tablet (20 mg) by mouth daily     lidocaine (XYLOCAINE) 5 % external ointment Apply topically 3 times daily     Magnesium Oxide 250 MG TABS Take 250 mg by mouth daily     metoprolol succinate ER (TOPROL XL) 25 MG 24 hr tablet Take 25 mg by mouth daily     potassium chloride ER (K-TAB/KLOR-CON) 10 MEQ CR tablet Take 20 mEq by mouth daily     senna-docusate (SENOKOT-S/PERICOLACE) 8.6-50 MG tablet Take 2 tablets by mouth 2 times daily     torsemide (DEMADEX) 20 MG tablet Take 1 tablet (20 mg) by mouth daily     traMADol (ULTRAM) 50 MG tablet Take 0.5 tablets (25 mg) by mouth at bedtime     No current facility-administered medications for this visit.       REVIEW OF SYSTEMS:   10 point review of systems reviewed and pertinent positives in the HPI.     PHYSICAL EXAMINATION:  Physical Exam     Vital signs: /65   Pulse 92   Temp 97.8  F (36.6  C)   Resp 18   " Ht 1.803 m (5' 11\")   Wt 107.5 kg (236 lb 14.4 oz)   SpO2 100%   BMI 33.04 kg/m    General: Awake, Alert, oriented x3, sitting in bedside chair, follows simple commands, conversant  HEENT:Pink conjunctiva, moist oral mucosa  NECK: Supple  CVS:  S1  S2, without murmur or gallop.   LUNG: Clear to auscultation, No wheezes, rales or rhonci.  BACK: No kyphosis of the thoracic spine  ABDOMEN: Soft, nontender to palpation, with positive bowel sounds  EXTREMITIES: Moves both upper and lower extremities with generalized weakness and some limitation to left lower extremity, 1+ pedal edema  SKIN: Incision to left hip covered with island dressing.  No strikethrough.  NEUROLOGIC: Intact, pulses palpable  PSYCHIATRIC: Mild cognitive impairment noted.  Pleasant affect.      Labs:  All labs reviewed in the nursing home record and Epic   @  Lab Results   Component Value Date    WBC 7.7 02/02/2024    WBC 9.0 12/30/2017     Lab Results   Component Value Date    RBC 3.24 02/02/2024    RBC 4.16 12/30/2017     Lab Results   Component Value Date    HGB 9.5 02/02/2024    HGB 12.6 12/30/2017     Lab Results   Component Value Date    HCT 29.6 02/02/2024    HCT 37.1 12/30/2017     Lab Results   Component Value Date    MCV 91 02/02/2024    MCV 89 12/30/2017     Lab Results   Component Value Date    MCH 29.3 02/02/2024    MCH 30.3 12/30/2017     Lab Results   Component Value Date    MCHC 32.1 02/02/2024    MCHC 34.0 12/30/2017     Lab Results   Component Value Date    RDW 15.5 02/02/2024    RDW 13.8 12/30/2017     Lab Results   Component Value Date     02/02/2024     12/30/2017        @Last Comprehensive Metabolic Panel:  Sodium   Date Value Ref Range Status   02/02/2024 143 135 - 145 mmol/L Final     Comment:     Reference intervals for this test were updated on 09/26/2023 to more accurately reflect our healthy population. There may be differences in the flagging of prior results with similar values performed with this " method. Interpretation of those prior results can be made in the context of the updated reference intervals.    12/30/2017 138 133 - 144 mmol/L Final     Potassium   Date Value Ref Range Status   02/02/2024 3.6 3.4 - 5.3 mmol/L Final   07/15/2022 3.7 3.4 - 5.3 mmol/L Final   12/31/2017 3.5 3.4 - 5.3 mmol/L Final     Chloride   Date Value Ref Range Status   02/02/2024 106 98 - 107 mmol/L Final   07/15/2022 106 94 - 109 mmol/L Final   12/30/2017 104 94 - 109 mmol/L Final     Carbon Dioxide   Date Value Ref Range Status   12/30/2017 26 20 - 32 mmol/L Final     Carbon Dioxide (CO2)   Date Value Ref Range Status   02/02/2024 31 (H) 22 - 29 mmol/L Final   07/15/2022 24 20 - 32 mmol/L Final     Anion Gap   Date Value Ref Range Status   02/02/2024 6 (L) 7 - 15 mmol/L Final   07/15/2022 9 3 - 14 mmol/L Final   12/30/2017 8 3 - 14 mmol/L Final     Glucose   Date Value Ref Range Status   07/15/2022 97 70 - 99 mg/dL Final   12/30/2017 145 (H) 70 - 99 mg/dL Final     GLUCOSE BY METER POCT   Date Value Ref Range Status   02/02/2024 120 (H) 70 - 99 mg/dL Final     Urea Nitrogen   Date Value Ref Range Status   02/02/2024 30.7 (H) 8.0 - 23.0 mg/dL Final   07/15/2022 13 7 - 30 mg/dL Final   12/31/2017 26 7 - 30 mg/dL Final     Creatinine   Date Value Ref Range Status   02/02/2024 1.38 (H) 0.67 - 1.17 mg/dL Final   12/31/2017 1.11 0.66 - 1.25 mg/dL Final     GFR Estimate   Date Value Ref Range Status   02/02/2024 49 (L) >60 mL/min/1.73m2 Final   12/31/2017 63 >60 mL/min/1.7m2 Final     Comment:     Non  GFR Calc     Calcium   Date Value Ref Range Status   02/02/2024 8.9 8.8 - 10.2 mg/dL Final   12/30/2017 7.9 (L) 8.5 - 10.1 mg/dL Final     Greater than 35 minutes spent for this visit which included reviewing hospitalization records, labs, imaging, medications as well as face-to-face time spent with patient and collaborating with nursing staff.    This note has been dictated using voice recognition software. Any  grammatical or context distortions are unintentional and inherent to the software    Electronically signed by: Grace Parry CNP       Sincerely,        Grace Parry NP

## 2024-02-06 LAB
ANION GAP SERPL CALCULATED.3IONS-SCNC: 10 MMOL/L (ref 7–15)
BUN SERPL-MCNC: 23.6 MG/DL (ref 8–23)
CALCIUM SERPL-MCNC: 9 MG/DL (ref 8.8–10.2)
CHLORIDE SERPL-SCNC: 104 MMOL/L (ref 98–107)
CREAT SERPL-MCNC: 1.54 MG/DL (ref 0.67–1.17)
DEPRECATED HCO3 PLAS-SCNC: 27 MMOL/L (ref 22–29)
EGFRCR SERPLBLD CKD-EPI 2021: 43 ML/MIN/1.73M2
ERYTHROCYTE [DISTWIDTH] IN BLOOD BY AUTOMATED COUNT: 16.7 % (ref 10–15)
GLUCOSE SERPL-MCNC: 105 MG/DL (ref 70–99)
HCT VFR BLD AUTO: 30.2 % (ref 40–53)
HGB BLD-MCNC: 9.5 G/DL (ref 13.3–17.7)
MCH RBC QN AUTO: 29.6 PG (ref 26.5–33)
MCHC RBC AUTO-ENTMCNC: 31.5 G/DL (ref 31.5–36.5)
MCV RBC AUTO: 94 FL (ref 78–100)
PLATELET # BLD AUTO: 343 10E3/UL (ref 150–450)
POTASSIUM SERPL-SCNC: 3.7 MMOL/L (ref 3.4–5.3)
RBC # BLD AUTO: 3.21 10E6/UL (ref 4.4–5.9)
SODIUM SERPL-SCNC: 141 MMOL/L (ref 135–145)
WBC # BLD AUTO: 8 10E3/UL (ref 4–11)

## 2024-02-06 PROCEDURE — 36415 COLL VENOUS BLD VENIPUNCTURE: CPT | Mod: ORL | Performed by: NURSE PRACTITIONER

## 2024-02-06 PROCEDURE — P9604 ONE-WAY ALLOW PRORATED TRIP: HCPCS | Mod: ORL | Performed by: NURSE PRACTITIONER

## 2024-02-06 PROCEDURE — 85027 COMPLETE CBC AUTOMATED: CPT | Mod: ORL | Performed by: NURSE PRACTITIONER

## 2024-02-06 PROCEDURE — 80048 BASIC METABOLIC PNL TOTAL CA: CPT | Mod: ORL | Performed by: NURSE PRACTITIONER

## 2024-02-07 ENCOUNTER — LAB REQUISITION (OUTPATIENT)
Dept: LAB | Facility: CLINIC | Age: 89
End: 2024-02-07
Payer: COMMERCIAL

## 2024-02-07 ENCOUNTER — TRANSITIONAL CARE UNIT VISIT (OUTPATIENT)
Dept: GERIATRICS | Facility: CLINIC | Age: 89
End: 2024-02-07
Payer: COMMERCIAL

## 2024-02-07 VITALS
HEART RATE: 87 BPM | DIASTOLIC BLOOD PRESSURE: 65 MMHG | OXYGEN SATURATION: 98 % | RESPIRATION RATE: 18 BRPM | HEIGHT: 71 IN | SYSTOLIC BLOOD PRESSURE: 98 MMHG | BODY MASS INDEX: 30.74 KG/M2 | WEIGHT: 219.6 LBS | TEMPERATURE: 97.7 F

## 2024-02-07 DIAGNOSIS — E85.89 OTHER AMYLOIDOSIS (H): ICD-10-CM

## 2024-02-07 DIAGNOSIS — Z98.890 STATUS POST OPEN REDUCTION AND INTERNAL FIXATION (ORIF) OF FRACTURE: ICD-10-CM

## 2024-02-07 DIAGNOSIS — S72.002A HIP FRACTURE, LEFT, CLOSED, INITIAL ENCOUNTER (H): Primary | ICD-10-CM

## 2024-02-07 DIAGNOSIS — I50.32 CHRONIC DIASTOLIC (CONGESTIVE) HEART FAILURE (H): ICD-10-CM

## 2024-02-07 DIAGNOSIS — I48.0 PAROXYSMAL ATRIAL FIBRILLATION WITH RVR (H): ICD-10-CM

## 2024-02-07 DIAGNOSIS — I95.2 HYPOTENSION DUE TO DRUGS: ICD-10-CM

## 2024-02-07 DIAGNOSIS — E11.42 TYPE 2 DIABETES MELLITUS WITH DIABETIC POLYNEUROPATHY, WITHOUT LONG-TERM CURRENT USE OF INSULIN (H): ICD-10-CM

## 2024-02-07 DIAGNOSIS — S72.22XD CLOSED DISPLACED SUBTROCHANTERIC FRACTURE OF LEFT FEMUR WITH ROUTINE HEALING, SUBSEQUENT ENCOUNTER: ICD-10-CM

## 2024-02-07 DIAGNOSIS — Z87.81 STATUS POST OPEN REDUCTION AND INTERNAL FIXATION (ORIF) OF FRACTURE: ICD-10-CM

## 2024-02-07 DIAGNOSIS — D62 ANEMIA DUE TO BLOOD LOSS, ACUTE: ICD-10-CM

## 2024-02-07 PROCEDURE — 99309 SBSQ NF CARE MODERATE MDM 30: CPT | Performed by: NURSE PRACTITIONER

## 2024-02-07 NOTE — LETTER
2/7/2024        RE: Alvin Newton  [address] [facility] [id number]        [facility]    Code Status:  DNR   Visit Type:   Chief Complaint   Patient presents with     TCU FOLLOW UP     Facility:  [facility] ([facility]) [[id number]]         HPI: Alvin Newton is a 89 year old male who I am seeing today for follow up on the TCU.  Past medical history includes type 2 diabetes mellitus with diabetic polyneuropathy, proximal atrial fibs on Eliquis, bilateral hip replacement 33 years ago, HFpEF, type 2 diabetes mellitus, hypertension, severe aortic stenosis and mild cognitive impairment.  Patient admitted post fall with left femur fracture.  Patient had a mechanical fall in which she was trying to reach for his cane for stability but put his weight on his grabber device instead and fell forward.  He did not hit his head or lose consciousness.  Head CT was negative.  X-ray of the pelvis/left hip revealed a left proximal femoral fracture.  History of bilateral hip replacements about 33 years ago.  Patient underwent repair on 1/24/2024.  He had a complicated surgery in the setting of bilateral hip replacements.  He lost 3 L of blood.  He did require pressor support.  He was also transfused on 1/26.  Patient found to have a mildly low a.m. cortisol level most likely due to a component of adrenal insufficiency.  Steroids were done for couple days along with midodrine.  BP improved.  Steroids were discontinued on 1/30.  Pain controlled with tramadol and Tylenol.  Patient initially had a Prevena wound VAC however 1 day later during his TCU stay VAC was not working appropriately.  Ortho updated and this was discontinued and dry sterile dressing applied.  Patient also experienced postoperative delirium.  He is very anxious and tearful.  No agitation.  He did require one-on-one.  He was given BuSpar to help with anxiety.  Delirium improved.  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  Severe aortic stenosis.  Cardiac MRI in October concerning for infiltrative cardiomyopathy.  Workup for amyloidosis in process by cardiology in [place] [facility].  Biopsies of the bone marrow and fat pad were negative for AL amyloid.  Plan at this time is to follow-up with cardiology regarding possible myocardial biopsy and continued amyloid/infiltrative cardiomyopathy workup.  History of proximal atrial fibs on Eliquis.  Patient did have episode of RVR after procedure which resolved.  Acute kidney injury on CKD stage III.  Creatinine 1.34 admission.  Creatinine did bump to 1.74 but improved with fluids.  Type 2 diabetes without long-term use of insulin.  Most recent hemoglobin A1c 6.8%.  Stress leukocytosis with a white blood cell count of 13 resolved.    Transitional Care Course: Today patient sitting up in wheelchair. Nursing staff reporting pt having difficult time with therapy due to pain. He is currently getting Tramadol 1 time daily prn and scheduled at bedtime. PT reports stiffness with movement. Pt with underlying cognitive impairment and will not always ask for pain med. He is receiving tylenol TID. Swelling to LE noted. Today pt bp low 93/52. The nurse held his am metoprolol. He is also on torsemide for CHF. No SOB or CP.       Assessment/Plan:     Closed displaced subtrochanteric fracture of left femur with routine healing, subsequent encounter  Status post open reduction and internal fixation (ORIF) of fracture  -History of bilateral hip replacement 33 years old.  -Dry sterile dressing to be changed daily.  -Weightbearing as tolerated.  -Increase Tramadol to 25 mg TID.   -Continue tylenol 975 mg TID.     Anemia due to blood loss, acute  -EBL of 3 L.  -Patient underwent transfusion of 1/26/2024.  -Follow-up CBC with hgb 9.5.     Other amyloidosis (H)  Chronic diastolic (congestive) heart failure (H)  -Workup for amyloidosis in process by cardiology in  [place] [facility].  Biopsies of bone marrow and fat pad negative for AL amyloid.  -Follow-up with cardiology regarding possible myocardial biopsy and continued amyloid/infiltrative cardiomyopathy workup.  -Cardiac MRI in October concerning for infiltrative cardiomyopathy.  -Daily weights.  -Continue torsemide 20 mg daily. Monitor for dehydration.   -Follow up BMP in am.     Paroxysmal atrial fibrillation with RVR (H)  -Chronically on Eliquis.  -Rate controlled with metoprolol and amiodarone.    Hypotension  -pt treated with midodrine and steroids in hospital.   -Metoprolol held this am. Place parameters to hold for SBP <105.     Type 2 diabetes mellitus with diabetic polyneuropathy, without long-term current use of insulin (H)  -Recent A1c 6.8%.  -Currently not on medication.    Stage 3a chronic kidney disease (H)  -Follow-up BMP with Creatine slightly elevated at 1.54.   -Baseline 1.3-1.5.     Adrenal insufficiency (H24)  -Steroid dose during hospitalization.  Now off steroids.    Anxiety  Delirium  -Delirium resolved  -Continue BuSpar 5 mg 3 times daily.      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     Essential hypertension 04/04/2016     Mixed hyperlipidemia 01/02/2009     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) 12/06/2017     Carpal tunnel syndrome, bilateral 11/18/2021     Fall, initial encounter 06/06/2022     Closed fracture of first lumbar vertebra, unspecified fracture morphology, initial encounter (H) 06/06/2022     Fracture of L1 vertebra (H) 06/06/2022     Impaired fasting glucose 06/20/2022     Osteoarthritis of pelvis 06/20/2022     Other ill-defined and unknown causes of morbidity and mortality 01/01/1985     Primary localized osteoarthrosis of shoulder region 06/20/2022     Pure hypercholesterolemia 01/01/1999     Reason for consultation 06/20/2022     Right  bundle branch block 06/20/2022     Cellulitis of right lower extremity 07/11/2023     Severe sepsis (H) 07/11/2023     Septic shock (H) 07/12/2023     Streptococcal bacteremia 07/13/2023     Thrombocytopenia (H24) 07/13/2023     Hyperbilirubinemia 07/13/2023     Hypophosphatemia 07/13/2023     Hypoalbuminemia 07/13/2023     Pyuria 07/13/2023     Paroxysmal atrial fibrillation with RVR (H) 07/13/2023     Hypomagnesemia 07/13/2023     Chronic pain of both shoulders 07/13/2023     Severe aortic stenosis 07/14/2023     Elevated brain natriuretic peptide (BNP) level 07/14/2023     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     Peripheral edema 07/14/2023     Positive urine culture 07/14/2023     Medication induced coagulopathy  (H24) 07/14/2023     Chronic diastolic (congestive) heart failure (H) 03/31/2023     Altered mental status, unspecified altered mental status type 07/23/2023     Clostridium difficile diarrhea 07/23/2023     History of Clostridium difficile infection July 2023 07/25/2023     Stage 3a chronic kidney disease (H) 07/25/2023     Esophageal dysmotility 07/27/2023     DEJUAN (acute kidney injury) (H24) 08/17/2023     Moderate malnutrition (H24) 08/17/2023     Schatzki's ring of distal esophagus-dilated 8/19/23 08/17/2023     Acute gout involving toe of left foot 08/17/2023     Unintentional weight loss 08/18/2023     Abnormal esophagram 08/18/2023     Hypokalemia 08/19/2023     Hyponatremia 08/19/2023     Open wound-coccyx/buttocks 08/23/2023     Enterocolitis due to Clostridium difficile, not specified as recurrent 07/27/2023     Other chronic pain 07/19/2023     Pain in left shoulder 07/19/2023     Pain in right shoulder 07/19/2023     Unspecified streptococcus as the cause of diseases classified elsewhere 07/19/2023     Esophageal dysphagia 08/11/2023     Hip fracture, left, closed, initial encounter (H) 01/22/2024     Acute kidney failure, unspecified (H24) 08/24/2023     Amyloidosis (H) 01/25/2024      Monoclonal gammopathy of unknown significance (MGUS) 01/25/2024     Hypotension, unspecified hypotension type 01/25/2024     Postoperative anemia due to acute blood loss 01/25/2024     Resolved Ambulatory Problems     Diagnosis Date Noted     Obesity, morbid, BMI 40.0-49.9 (H) 11/07/2017     Past Medical History:   Diagnosis Date     Colonic diverticulum      Hyperlipidemia      Hypertension      Obesity (BMI 30-39.9)      Osteoarthritis      Type 2 diabetes mellitus (H)      No Known Allergies    All Meds and Allergies reviewed in the record at the facility and is the most up-to-date.    Current Outpatient Medications   Medication Sig     acetaminophen (TYLENOL) 325 MG tablet Take 3 tablets (975 mg) by mouth 3 times daily     amiodarone (PACERONE) 200 MG tablet Take 1 tablet (200 mg) by mouth daily     atorvastatin (LIPITOR) 20 MG tablet Take 20 mg by mouth At Bedtime     busPIRone (BUSPAR) 5 MG tablet Take 1 tablet (5 mg) by mouth 3 times daily     diclofenac (VOLTAREN) 1 % topical gel Apply 2 g topically 3 times daily shoulders     ELIQUIS ANTICOAGULANT 5 MG tablet Take 5 mg by mouth 2 times daily     famotidine (PEPCID) 20 MG tablet Take 1 tablet (20 mg) by mouth daily     lidocaine (XYLOCAINE) 5 % external ointment Apply topically 3 times daily     Magnesium Oxide 250 MG TABS Take 250 mg by mouth daily     metoprolol succinate ER (TOPROL XL) 25 MG 24 hr tablet Take 25 mg by mouth daily     potassium chloride ER (K-TAB/KLOR-CON) 10 MEQ CR tablet Take 20 mEq by mouth daily     senna-docusate (SENOKOT-S/PERICOLACE) 8.6-50 MG tablet Take 2 tablets by mouth 2 times daily     torsemide (DEMADEX) 20 MG tablet Take 1 tablet (20 mg) by mouth daily     traMADol (ULTRAM) 50 MG tablet Take 0.5 tablets (25 mg) by mouth at bedtime. May also take 0.5 tablets (25 mg) daily as needed for severe pain.     No current facility-administered medications for this visit.       REVIEW OF SYSTEMS:   10 point review of systems  "reviewed and pertinent positives in the HPI.     PHYSICAL EXAMINATION:  Physical Exam     Vital signs: BP 98/65   Pulse 87   Temp 97.7  F (36.5  C)   Resp 18   Ht 1.803 m (5' 11\")   Wt 99.6 kg (219 lb 9.6 oz)   SpO2 98%   BMI 30.63 kg/m    General: Awake, Alert, oriented x3, sitting in wheelchair, follows simple commands, conversant  HEENT:Pink conjunctiva, moist oral mucosa  NECK: Supple  CVS:  S1  S2, without murmur or gallop.   LUNG: Clear to auscultation, No wheezes, rales or rhonci.  BACK: No kyphosis of the thoracic spine  ABDOMEN: Soft, nontender to palpation, with positive bowel sounds  EXTREMITIES: Moves both upper and lower extremities with generalized weakness and some limitation to left lower extremity, 1+ pedal edema  SKIN: Incision to left hip covered with island dressing.  NEUROLOGIC: Intact, pulses palpable  PSYCHIATRIC: Mild cognitive impairment noted. .      Labs:  All labs reviewed in the nursing home record and Epic   @  Lab Results   Component Value Date    WBC 7.7 02/02/2024    WBC 9.0 12/30/2017     Lab Results   Component Value Date    RBC 3.24 02/02/2024    RBC 4.16 12/30/2017     Lab Results   Component Value Date    HGB 9.5 02/02/2024    HGB 12.6 12/30/2017     Lab Results   Component Value Date    HCT 29.6 02/02/2024    HCT 37.1 12/30/2017     Lab Results   Component Value Date    MCV 91 02/02/2024    MCV 89 12/30/2017     Lab Results   Component Value Date    MCH 29.3 02/02/2024    MCH 30.3 12/30/2017     Lab Results   Component Value Date    MCHC 32.1 02/02/2024    MCHC 34.0 12/30/2017     Lab Results   Component Value Date    RDW 15.5 02/02/2024    RDW 13.8 12/30/2017     Lab Results   Component Value Date     02/02/2024     12/30/2017        @Last Comprehensive Metabolic Panel:  Sodium   Date Value Ref Range Status   02/06/2024 141 135 - 145 mmol/L Final     Comment:     Reference intervals for this test were updated on 09/26/2023 to more accurately reflect our " healthy population. There may be differences in the flagging of prior results with similar values performed with this method. Interpretation of those prior results can be made in the context of the updated reference intervals.    12/30/2017 138 133 - 144 mmol/L Final     Potassium   Date Value Ref Range Status   02/06/2024 3.7 3.4 - 5.3 mmol/L Final   07/15/2022 3.7 3.4 - 5.3 mmol/L Final   12/31/2017 3.5 3.4 - 5.3 mmol/L Final     Chloride   Date Value Ref Range Status   02/06/2024 104 98 - 107 mmol/L Final   07/15/2022 106 94 - 109 mmol/L Final   12/30/2017 104 94 - 109 mmol/L Final     Carbon Dioxide   Date Value Ref Range Status   12/30/2017 26 20 - 32 mmol/L Final     Carbon Dioxide (CO2)   Date Value Ref Range Status   02/06/2024 27 22 - 29 mmol/L Final   07/15/2022 24 20 - 32 mmol/L Final     Anion Gap   Date Value Ref Range Status   02/06/2024 10 7 - 15 mmol/L Final   07/15/2022 9 3 - 14 mmol/L Final   12/30/2017 8 3 - 14 mmol/L Final     Glucose   Date Value Ref Range Status   02/06/2024 105 (H) 70 - 99 mg/dL Final   07/15/2022 97 70 - 99 mg/dL Final   12/30/2017 145 (H) 70 - 99 mg/dL Final     GLUCOSE BY METER POCT   Date Value Ref Range Status   02/02/2024 120 (H) 70 - 99 mg/dL Final     Urea Nitrogen   Date Value Ref Range Status   02/06/2024 23.6 (H) 8.0 - 23.0 mg/dL Final   07/15/2022 13 7 - 30 mg/dL Final   12/31/2017 26 7 - 30 mg/dL Final     Creatinine   Date Value Ref Range Status   02/06/2024 1.54 (H) 0.67 - 1.17 mg/dL Final   12/31/2017 1.11 0.66 - 1.25 mg/dL Final     GFR Estimate   Date Value Ref Range Status   02/06/2024 43 (L) >60 mL/min/1.73m2 Final   12/31/2017 63 >60 mL/min/1.7m2 Final     Comment:     Non  GFR Calc     Calcium   Date Value Ref Range Status   02/06/2024 9.0 8.8 - 10.2 mg/dL Final   12/30/2017 7.9 (L) 8.5 - 10.1 mg/dL Final       This note has been dictated using voice recognition software. Any grammatical or context distortions are unintentional and  inherent to the software    Electronically signed by: Grace Parry CNP       Sincerely,        Grace Parry NP

## 2024-02-08 ENCOUNTER — TRANSITIONAL CARE UNIT VISIT (OUTPATIENT)
Dept: GERIATRICS | Facility: CLINIC | Age: 89
End: 2024-02-08
Payer: COMMERCIAL

## 2024-02-08 VITALS
SYSTOLIC BLOOD PRESSURE: 93 MMHG | RESPIRATION RATE: 18 BRPM | OXYGEN SATURATION: 98 % | BODY MASS INDEX: 29.23 KG/M2 | DIASTOLIC BLOOD PRESSURE: 59 MMHG | HEART RATE: 87 BPM | TEMPERATURE: 98.1 F | HEIGHT: 72 IN | WEIGHT: 215.8 LBS

## 2024-02-08 DIAGNOSIS — Z87.81 STATUS POST OPEN REDUCTION AND INTERNAL FIXATION (ORIF) OF FRACTURE: ICD-10-CM

## 2024-02-08 DIAGNOSIS — I48.0 PAROXYSMAL ATRIAL FIBRILLATION WITH RVR (H): ICD-10-CM

## 2024-02-08 DIAGNOSIS — D62 ANEMIA DUE TO BLOOD LOSS, ACUTE: ICD-10-CM

## 2024-02-08 DIAGNOSIS — E11.42 TYPE 2 DIABETES MELLITUS WITH DIABETIC POLYNEUROPATHY, WITHOUT LONG-TERM CURRENT USE OF INSULIN (H): ICD-10-CM

## 2024-02-08 DIAGNOSIS — E85.89 OTHER AMYLOIDOSIS (H): ICD-10-CM

## 2024-02-08 DIAGNOSIS — I95.2 HYPOTENSION DUE TO DRUGS: ICD-10-CM

## 2024-02-08 DIAGNOSIS — S72.22XD CLOSED DISPLACED SUBTROCHANTERIC FRACTURE OF LEFT FEMUR WITH ROUTINE HEALING, SUBSEQUENT ENCOUNTER: Primary | ICD-10-CM

## 2024-02-08 DIAGNOSIS — I50.32 CHRONIC DIASTOLIC (CONGESTIVE) HEART FAILURE (H): ICD-10-CM

## 2024-02-08 DIAGNOSIS — Z98.890 STATUS POST OPEN REDUCTION AND INTERNAL FIXATION (ORIF) OF FRACTURE: ICD-10-CM

## 2024-02-08 LAB
ANION GAP SERPL CALCULATED.3IONS-SCNC: 9 MMOL/L (ref 7–15)
BUN SERPL-MCNC: 20.4 MG/DL (ref 8–23)
CALCIUM SERPL-MCNC: 8.9 MG/DL (ref 8.8–10.2)
CHLORIDE SERPL-SCNC: 104 MMOL/L (ref 98–107)
CREAT SERPL-MCNC: 1.39 MG/DL (ref 0.67–1.17)
DEPRECATED HCO3 PLAS-SCNC: 26 MMOL/L (ref 22–29)
EGFRCR SERPLBLD CKD-EPI 2021: 48 ML/MIN/1.73M2
GLUCOSE SERPL-MCNC: 105 MG/DL (ref 70–99)
POTASSIUM SERPL-SCNC: 4.1 MMOL/L (ref 3.4–5.3)
SODIUM SERPL-SCNC: 139 MMOL/L (ref 135–145)

## 2024-02-08 PROCEDURE — 36415 COLL VENOUS BLD VENIPUNCTURE: CPT | Performed by: NURSE PRACTITIONER

## 2024-02-08 PROCEDURE — 80048 BASIC METABOLIC PNL TOTAL CA: CPT | Performed by: NURSE PRACTITIONER

## 2024-02-08 PROCEDURE — P9604 ONE-WAY ALLOW PRORATED TRIP: HCPCS | Performed by: NURSE PRACTITIONER

## 2024-02-08 PROCEDURE — 99309 SBSQ NF CARE MODERATE MDM 30: CPT | Performed by: NURSE PRACTITIONER

## 2024-02-08 NOTE — PROGRESS NOTES
Addendum to Discharge Summary dated 2/2    # Acute metabolic encephalopathy  Patient with AMS/encephalopathy related to opioids and hospital delirium. Improving on discharge.    Adriana Lombardo MD

## 2024-02-08 NOTE — LETTER
2/8/2024        RE: Alvin Newton  [address] [facility] [id number]         HEALTH GERIATRIC SERVICES    Code Status:  DNR   Visit Type:   Chief Complaint   Patient presents with     <9>TCU Follow Up     Facility:  [facility] ([facility]) [[id number]]         HPI: Alvin Newton is a 89 year old male who I am seeing today for follow up on the TCU.  Past medical history includes type 2 diabetes mellitus with diabetic polyneuropathy, proximal atrial fibs on Eliquis, bilateral hip replacement 33 years ago, HFpEF, type 2 diabetes mellitus, hypertension, severe aortic stenosis and mild cognitive impairment.  Patient admitted post fall with left femur fracture.  Patient had a mechanical fall in which she was trying to reach for his cane for stability but put his weight on his grabber device instead and fell forward.  He did not hit his head or lose consciousness.  Head CT was negative.  X-ray of the pelvis/left hip revealed a left proximal femoral fracture.  History of bilateral hip replacements about 33 years ago.  Patient underwent repair on 1/24/2024.  He had a complicated surgery in the setting of bilateral hip replacements.  He lost 3 L of blood.  He did require pressor support.  He was also transfused on 1/26.  Patient found to have a mildly low a.m. cortisol level most likely due to a component of adrenal insufficiency.  Steroids were done for couple days along with midodrine.  BP improved.  Steroids were discontinued on 1/30.  Pain controlled with tramadol and Tylenol.  Patient initially had a Prevena wound VAC however 1 day later during his TCU stay VAC was not working appropriately.  Ortho updated and this was discontinued and dry sterile dressing applied.  Patient also experienced postoperative delirium.  He is very anxious and tearful.  No agitation.  He did require one-on-one.  He was given BuSpar to help with anxiety.  Delirium improved.  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  Severe aortic stenosis.  Cardiac MRI in October concerning for infiltrative cardiomyopathy.  Workup for amyloidosis in process by cardiology in [place] [facility].  Biopsies of the bone marrow and fat pad were negative for AL amyloid.  Plan at this time is to follow-up with cardiology regarding possible myocardial biopsy and continued amyloid/infiltrative cardiomyopathy workup.  History of proximal atrial fibs on Eliquis.  Patient did have episode of RVR after procedure which resolved.  Acute kidney injury on CKD stage III.  Creatinine 1.34 admission.  Creatinine did bump to 1.74 but improved with fluids.  Type 2 diabetes without long-term use of insulin.  Most recent hemoglobin A1c 6.8%.  Stress leukocytosis with a white blood cell count of 13 resolved.    Transitional Care Course: Today patient sitting up in wheelchair. Pt daughter present on exam. Femur fracture. Nursing staff report pain appears not well controlled. Pt was having difficulty in therapy due to pain and stiffness. Pt does not always ask for pain med. He is currently receiving tramadol 25 mg at HS. He also has a prn dose every day. The nurse did give this earlier today and pt had improvement. I did talk with his daughter about increasing to TID. He continues on tylenol TID.  Yesterday BP low with the 90s systolically and 50s diastolically.  His metoprolol was held.  Nursing staff report BP on the soft side again today in the 90s systolically.  Both his metoprolol and torsemide were held.  I did review this as well with his daughter who was present.  Patient down 5 pounds since admit.  Trace lower extremity edema.  Does have underlying CHF.  No shortness breath or chest pain.  BMP is pending.    Assessment/Plan:     Closed displaced subtrochanteric fracture of left femur with routine healing, subsequent encounter  Status post open reduction and internal fixation (ORIF) of fracture  -History of  bilateral hip replacement 33 years old.  -Dry sterile dressing to be changed daily.  -Weightbearing as tolerated.  -Tramadol to 25 mg TID.   -Continue tylenol 975 mg TID.     Anemia due to blood loss, acute  -EBL of 3 L.  -Patient underwent transfusion of 1/26/2024.  -Follow-up CBC with hgb 9.5.     Other amyloidosis (H)  Chronic diastolic (congestive) heart failure (H)  -Workup for amyloidosis in process by cardiology in [place] [facility].  Biopsies of bone marrow and fat pad negative for AL amyloid.  -Follow-up with cardiology regarding possible myocardial biopsy and continued amyloid/infiltrative cardiomyopathy workup.  -Cardiac MRI in October concerning for infiltrative cardiomyopathy.  -Daily weights.  -Continues on  torsemide 20 mg daily. Med was held today due to low bp.   -Follow up BMP pending.     Paroxysmal atrial fibrillation with RVR (H)  -Chronically on Eliquis.  -Rate controlled with metoprolol and amiodarone.    Hypotension  -pt treated with midodrine and steroids in hospital.   -Metoprolol and torsemide held this am. Place parameters to hold for SBP <105.     Type 2 diabetes mellitus with diabetic polyneuropathy, without long-term current use of insulin (H)  -Recent A1c 6.8%.  -Currently not on medication.    Stage 3a chronic kidney disease (H)  -Follow-up BMP with Creatine slightly elevated at 1.54.   -Baseline 1.3-1.5.   -BMP pending today.     Adrenal insufficiency (H24)  -Steroid dose during hospitalization.  Now off steroids.    Anxiety  Delirium  -Delirium resolved  -Continue BuSpar 5 mg 3 times daily.      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     Essential hypertension 04/04/2016     Mixed hyperlipidemia 01/02/2009     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) 12/06/2017     Carpal tunnel syndrome, bilateral 11/18/2021     Fall, initial encounter  06/06/2022     Closed fracture of first lumbar vertebra, unspecified fracture morphology, initial encounter (H) 06/06/2022     Fracture of L1 vertebra (H) 06/06/2022     Impaired fasting glucose 06/20/2022     Osteoarthritis of pelvis 06/20/2022     Other ill-defined and unknown causes of morbidity and mortality 01/01/1985     Primary localized osteoarthrosis of shoulder region 06/20/2022     Pure hypercholesterolemia 01/01/1999     Reason for consultation 06/20/2022     Right bundle branch block 06/20/2022     Cellulitis of right lower extremity 07/11/2023     Severe sepsis (H) 07/11/2023     Septic shock (H) 07/12/2023     Streptococcal bacteremia 07/13/2023     Thrombocytopenia (H24) 07/13/2023     Hyperbilirubinemia 07/13/2023     Hypophosphatemia 07/13/2023     Hypoalbuminemia 07/13/2023     Pyuria 07/13/2023     Paroxysmal atrial fibrillation with RVR (H) 07/13/2023     Hypomagnesemia 07/13/2023     Chronic pain of both shoulders 07/13/2023     Severe aortic stenosis 07/14/2023     Elevated brain natriuretic peptide (BNP) level 07/14/2023     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     Peripheral edema 07/14/2023     Positive urine culture 07/14/2023     Medication induced coagulopathy  (H24) 07/14/2023     Chronic diastolic (congestive) heart failure (H) 03/31/2023     Altered mental status, unspecified altered mental status type 07/23/2023     Clostridium difficile diarrhea 07/23/2023     History of Clostridium difficile infection July 2023 07/25/2023     Stage 3a chronic kidney disease (H) 07/25/2023     Esophageal dysmotility 07/27/2023     DEJUAN (acute kidney injury) (H24) 08/17/2023     Moderate malnutrition (H24) 08/17/2023     Schatzki's ring of distal esophagus-dilated 8/19/23 08/17/2023     Acute gout involving toe of left foot 08/17/2023     Unintentional weight loss 08/18/2023     Abnormal esophagram 08/18/2023     Hypokalemia 08/19/2023     Hyponatremia 08/19/2023     Open wound-coccyx/buttocks  08/23/2023     Enterocolitis due to Clostridium difficile, not specified as recurrent 07/27/2023     Other chronic pain 07/19/2023     Pain in left shoulder 07/19/2023     Pain in right shoulder 07/19/2023     Unspecified streptococcus as the cause of diseases classified elsewhere 07/19/2023     Esophageal dysphagia 08/11/2023     Hip fracture, left, closed, initial encounter (H) 01/22/2024     Acute kidney failure, unspecified (H24) 08/24/2023     Amyloidosis (H) 01/25/2024     Monoclonal gammopathy of unknown significance (MGUS) 01/25/2024     Hypotension, unspecified hypotension type 01/25/2024     Postoperative anemia due to acute blood loss 01/25/2024     Resolved Ambulatory Problems     Diagnosis Date Noted     Obesity, morbid, BMI 40.0-49.9 (H) 11/07/2017     Past Medical History:   Diagnosis Date     Colonic diverticulum      Hyperlipidemia      Hypertension      Obesity (BMI 30-39.9)      Osteoarthritis      Type 2 diabetes mellitus (H)      No Known Allergies    All Meds and Allergies reviewed in the record at the facility and is the most up-to-date.    Current Outpatient Medications   Medication Sig     acetaminophen (TYLENOL) 325 MG tablet Take 3 tablets (975 mg) by mouth 3 times daily     amiodarone (PACERONE) 200 MG tablet Take 1 tablet (200 mg) by mouth daily     atorvastatin (LIPITOR) 20 MG tablet Take 20 mg by mouth At Bedtime     busPIRone (BUSPAR) 5 MG tablet Take 1 tablet (5 mg) by mouth 3 times daily     diclofenac (VOLTAREN) 1 % topical gel Apply 2 g topically 3 times daily shoulders     ELIQUIS ANTICOAGULANT 5 MG tablet Take 5 mg by mouth 2 times daily     famotidine (PEPCID) 20 MG tablet Take 1 tablet (20 mg) by mouth daily     lidocaine (XYLOCAINE) 5 % external ointment Apply topically 3 times daily     Magnesium Oxide 250 MG TABS Take 250 mg by mouth daily     metoprolol succinate ER (TOPROL XL) 25 MG 24 hr tablet Take 25 mg by mouth daily Hold if SBP < 105.     potassium chloride ER  (K-TAB/KLOR-CON) 10 MEQ CR tablet Take 20 mEq by mouth daily     senna-docusate (SENOKOT-S/PERICOLACE) 8.6-50 MG tablet Take 2 tablets by mouth 2 times daily     torsemide (DEMADEX) 20 MG tablet Take 1 tablet (20 mg) by mouth daily     traMADol (ULTRAM) 50 MG tablet Take 0.5 tablets (25 mg) by mouth at bedtime. May also take 0.5 tablets (25 mg) daily as needed for severe pain. (Patient taking differently: Take 0.5 tablets (25 mg) by mouth TID)     No current facility-administered medications for this visit.       REVIEW OF SYSTEMS:   10 point review of systems reviewed and pertinent positives in the HPI.     PHYSICAL EXAMINATION:  Physical Exam     Vital signs: BP 93/59   Pulse 87   Temp 98.1  F (36.7  C)   Resp 18   Ht 1.829 m (6')   Wt 97.9 kg (215 lb 12.8 oz)   SpO2 98%   BMI 29.27 kg/m    General: Awake, Alert, oriented x3, sitting in wheelchair, follows simple commands, conversant  HEENT:Pink conjunctiva, moist oral mucosa  NECK: Supple  CVS:  S1  S2, without murmur or gallop.   LUNG: Clear to auscultation, No wheezes, rales or rhonci.  BACK: No kyphosis of the thoracic spine  ABDOMEN: Soft, nontender to palpation, with positive bowel sounds  EXTREMITIES: Moves both upper and lower extremities with generalized weakness and some limitation to left lower extremity, trace pedal edema on the operative side.   NEUROLOGIC: Intact, pulses palpable  PSYCHIATRIC: Mild cognitive impairment noted. .      Labs:  All labs reviewed in the nursing home record and [facility]   @  Lab Results   Component Value Date    WBC 7.7 02/02/2024    WBC 9.0 12/30/2017     Lab Results   Component Value Date    RBC 3.24 02/02/2024    RBC 4.16 12/30/2017     Lab Results   Component Value Date    HGB 9.5 02/02/2024    HGB 12.6 12/30/2017     Lab Results   Component Value Date    HCT 29.6 02/02/2024    HCT 37.1 12/30/2017     Lab Results   Component Value Date    MCV 91 02/02/2024    MCV 89 12/30/2017     Lab Results   Component Value Date     MCH 29.3 02/02/2024    MCH 30.3 12/30/2017     Lab Results   Component Value Date    MCHC 32.1 02/02/2024    MCHC 34.0 12/30/2017     Lab Results   Component Value Date    RDW 15.5 02/02/2024    RDW 13.8 12/30/2017     Lab Results   Component Value Date     02/02/2024     12/30/2017        @Last Comprehensive Metabolic Panel:  Sodium   Date Value Ref Range Status   02/08/2024 139 135 - 145 mmol/L Final     Comment:     Reference intervals for this test were updated on 09/26/2023 to more accurately reflect our healthy population. There may be differences in the flagging of prior results with similar values performed with this method. Interpretation of those prior results can be made in the context of the updated reference intervals.    12/30/2017 138 133 - 144 mmol/L Final     Potassium   Date Value Ref Range Status   02/08/2024 4.1 3.4 - 5.3 mmol/L Final   07/15/2022 3.7 3.4 - 5.3 mmol/L Final   12/31/2017 3.5 3.4 - 5.3 mmol/L Final     Chloride   Date Value Ref Range Status   02/08/2024 104 98 - 107 mmol/L Final   07/15/2022 106 94 - 109 mmol/L Final   12/30/2017 104 94 - 109 mmol/L Final     Carbon Dioxide   Date Value Ref Range Status   12/30/2017 26 20 - 32 mmol/L Final     Carbon Dioxide (CO2)   Date Value Ref Range Status   02/08/2024 26 22 - 29 mmol/L Final   07/15/2022 24 20 - 32 mmol/L Final     Anion Gap   Date Value Ref Range Status   02/08/2024 9 7 - 15 mmol/L Final   07/15/2022 9 3 - 14 mmol/L Final   12/30/2017 8 3 - 14 mmol/L Final     Glucose   Date Value Ref Range Status   02/08/2024 105 (H) 70 - 99 mg/dL Final   07/15/2022 97 70 - 99 mg/dL Final   12/30/2017 145 (H) 70 - 99 mg/dL Final     GLUCOSE BY METER POCT   Date Value Ref Range Status   02/02/2024 120 (H) 70 - 99 mg/dL Final     Urea Nitrogen   Date Value Ref Range Status   02/08/2024 20.4 8.0 - 23.0 mg/dL Final   07/15/2022 13 7 - 30 mg/dL Final   12/31/2017 26 7 - 30 mg/dL Final     Creatinine   Date Value Ref Range Status    02/08/2024 1.39 (H) 0.67 - 1.17 mg/dL Final   12/31/2017 1.11 0.66 - 1.25 mg/dL Final     GFR Estimate   Date Value Ref Range Status   02/08/2024 48 (L) >60 mL/min/1.73m2 Final   12/31/2017 63 >60 mL/min/1.7m2 Final     Comment:     Non  GFR Calc     Calcium   Date Value Ref Range Status   02/08/2024 8.9 8.8 - 10.2 mg/dL Final   12/30/2017 7.9 (L) 8.5 - 10.1 mg/dL Final       This note has been dictated using voice recognition software. Any grammatical or context distortions are unintentional and inherent to the software    Electronically signed by: Grace Parry CNP       Sincerely,        Grace Parry, NP

## 2024-02-08 NOTE — PROGRESS NOTES
[facility] GERIATRIC SERVICES    Code Status:  DNR   Visit Type:   Chief Complaint   Patient presents with    TCU FOLLOW UP     Facility:  [facility] ([facility]) [[id number]]         HPI: Alvin Newton is a 89 year old male who I am seeing today for follow up on the TCU.  Past medical history includes type 2 diabetes mellitus with diabetic polyneuropathy, proximal atrial fibs on Eliquis, bilateral hip replacement 33 years ago, HFpEF, type 2 diabetes mellitus, hypertension, severe aortic stenosis and mild cognitive impairment.  Patient admitted post fall with left femur fracture.  Patient had a mechanical fall in which she was trying to reach for his cane for stability but put his weight on his grabber device instead and fell forward.  He did not hit his head or lose consciousness.  Head CT was negative.  X-ray of the pelvis/left hip revealed a left proximal femoral fracture.  History of bilateral hip replacements about 33 years ago.  Patient underwent repair on 1/24/2024.  He had a complicated surgery in the setting of bilateral hip replacements.  He lost 3 L of blood.  He did require pressor support.  He was also transfused on 1/26.  Patient found to have a mildly low a.m. cortisol level most likely due to a component of adrenal insufficiency.  Steroids were done for couple days along with midodrine.  BP improved.  Steroids were discontinued on 1/30.  Pain controlled with tramadol and Tylenol.  Patient initially had a Prevena wound VAC however 1 day later during his TCU stay VAC was not working appropriately.  Ortho updated and this was discontinued and dry sterile dressing applied.  Patient also experienced postoperative delirium.  He is very anxious and tearful.  No agitation.  He did require one-on-one.  He was given BuSpar to help with anxiety.  Delirium improved.  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  Severe aortic stenosis.  Cardiac MRI in October  concerning for infiltrative cardiomyopathy.  Workup for amyloidosis in process by cardiology in [place] [facility].  Biopsies of the bone marrow and fat pad were negative for AL amyloid.  Plan at this time is to follow-up with cardiology regarding possible myocardial biopsy and continued amyloid/infiltrative cardiomyopathy workup.  History of proximal atrial fibs on Eliquis.  Patient did have episode of RVR after procedure which resolved.  Acute kidney injury on CKD stage III.  Creatinine 1.34 admission.  Creatinine did bump to 1.74 but improved with fluids.  Type 2 diabetes without long-term use of insulin.  Most recent hemoglobin A1c 6.8%.  Stress leukocytosis with a white blood cell count of 13 resolved.    Transitional Care Course: Today patient sitting up in wheelchair. Nursing staff reporting pt having difficult time with therapy due to pain. He is currently getting Tramadol 1 time daily prn and scheduled at bedtime. PT reports stiffness with movement. Pt with underlying cognitive impairment and will not always ask for pain med. He is receiving tylenol TID. Swelling to LE noted. Today pt bp low 93/52. The nurse held his am metoprolol. He is also on torsemide for CHF. No SOB or CP.       Assessment/Plan:     Closed displaced subtrochanteric fracture of left femur with routine healing, subsequent encounter  Status post open reduction and internal fixation (ORIF) of fracture  -History of bilateral hip replacement 33 years old.  -Dry sterile dressing to be changed daily.  -Weightbearing as tolerated.  -Increase Tramadol to 25 mg TID.   -Continue tylenol 975 mg TID.     Anemia due to blood loss, acute  -EBL of 3 L.  -Patient underwent transfusion of 1/26/2024.  -Follow-up CBC with hgb 9.5.     Other amyloidosis (H)  Chronic diastolic (congestive) heart failure (H)  -Workup for amyloidosis in process by cardiology in [place] [facility].  Biopsies of bone marrow and fat pad negative for AL amyloid.  -Follow-up with  cardiology regarding possible myocardial biopsy and continued amyloid/infiltrative cardiomyopathy workup.  -Cardiac MRI in October concerning for infiltrative cardiomyopathy.  -Daily weights.  -Continue torsemide 20 mg daily. Monitor for dehydration.   -Follow up BMP in am.     Paroxysmal atrial fibrillation with RVR (H)  -Chronically on Eliquis.  -Rate controlled with metoprolol and amiodarone.    Hypotension  -pt treated with midodrine and steroids in hospital.   -Metoprolol held this am. Place parameters to hold for SBP <105.     Type 2 diabetes mellitus with diabetic polyneuropathy, without long-term current use of insulin (H)  -Recent A1c 6.8%.  -Currently not on medication.    Stage 3a chronic kidney disease (H)  -Follow-up BMP with Creatine slightly elevated at 1.54.   -Baseline 1.3-1.5.     Adrenal insufficiency (H24)  -Steroid dose during hospitalization.  Now off steroids.    Anxiety  Delirium  -Delirium resolved  -Continue BuSpar 5 mg 3 times daily.      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/22/2020    Essential hypertension 04/04/2016    Mixed hyperlipidemia 01/02/2009    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) 12/06/2017    Carpal tunnel syndrome, bilateral 11/18/2021    Fall, initial encounter 06/06/2022    Closed fracture of first lumbar vertebra, unspecified fracture morphology, initial encounter (H) 06/06/2022    Fracture of L1 vertebra (H) 06/06/2022    Impaired fasting glucose 06/20/2022    Osteoarthritis of pelvis 06/20/2022    Other ill-defined and unknown causes of morbidity and mortality 01/01/1985    Primary localized osteoarthrosis of shoulder region 06/20/2022    Pure hypercholesterolemia 01/01/1999    Reason for consultation 06/20/2022    Right bundle branch block 06/20/2022    Cellulitis of right lower extremity 07/11/2023    Severe sepsis (H) 07/11/2023     Septic shock (H) 07/12/2023    Streptococcal bacteremia 07/13/2023    Thrombocytopenia (H24) 07/13/2023    Hyperbilirubinemia 07/13/2023    Hypophosphatemia 07/13/2023    Hypoalbuminemia 07/13/2023    Pyuria 07/13/2023    Paroxysmal atrial fibrillation with RVR (H) 07/13/2023    Hypomagnesemia 07/13/2023    Chronic pain of both shoulders 07/13/2023    Severe aortic stenosis 07/14/2023    Elevated brain natriuretic peptide (BNP) level 07/14/2023    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    Peripheral edema 07/14/2023    Positive urine culture 07/14/2023    Medication induced coagulopathy  (H24) 07/14/2023    Chronic diastolic (congestive) heart failure (H) 03/31/2023    Altered mental status, unspecified altered mental status type 07/23/2023    Clostridium difficile diarrhea 07/23/2023    History of Clostridium difficile infection July 2023 07/25/2023    Stage 3a chronic kidney disease (H) 07/25/2023    Esophageal dysmotility 07/27/2023    DEJUAN (acute kidney injury) (H24) 08/17/2023    Moderate malnutrition (H24) 08/17/2023    Schatzki's ring of distal esophagus-dilated 8/19/23 08/17/2023    Acute gout involving toe of left foot 08/17/2023    Unintentional weight loss 08/18/2023    Abnormal esophagram 08/18/2023    Hypokalemia 08/19/2023    Hyponatremia 08/19/2023    Open wound-coccyx/buttocks 08/23/2023    Enterocolitis due to Clostridium difficile, not specified as recurrent 07/27/2023    Other chronic pain 07/19/2023    Pain in left shoulder 07/19/2023    Pain in right shoulder 07/19/2023    Unspecified streptococcus as the cause of diseases classified elsewhere 07/19/2023    Esophageal dysphagia 08/11/2023    Hip fracture, left, closed, initial encounter (H) 01/22/2024    Acute kidney failure, unspecified (H24) 08/24/2023    Amyloidosis (H) 01/25/2024    Monoclonal gammopathy of unknown significance (MGUS) 01/25/2024    Hypotension, unspecified hypotension type 01/25/2024    Postoperative anemia due to acute  "blood loss 01/25/2024     Resolved Ambulatory Problems     Diagnosis Date Noted    Obesity, morbid, BMI 40.0-49.9 (H) 11/07/2017     Past Medical History:   Diagnosis Date    Colonic diverticulum     Hyperlipidemia     Hypertension     Obesity (BMI 30-39.9)     Osteoarthritis     Type 2 diabetes mellitus (H)      No Known Allergies    All Meds and Allergies reviewed in the record at the facility and is the most up-to-date.    Current Outpatient Medications   Medication Sig    acetaminophen (TYLENOL) 325 MG tablet Take 3 tablets (975 mg) by mouth 3 times daily    amiodarone (PACERONE) 200 MG tablet Take 1 tablet (200 mg) by mouth daily    atorvastatin (LIPITOR) 20 MG tablet Take 20 mg by mouth At Bedtime    busPIRone (BUSPAR) 5 MG tablet Take 1 tablet (5 mg) by mouth 3 times daily    diclofenac (VOLTAREN) 1 % topical gel Apply 2 g topically 3 times daily shoulders    ELIQUIS ANTICOAGULANT 5 MG tablet Take 5 mg by mouth 2 times daily    famotidine (PEPCID) 20 MG tablet Take 1 tablet (20 mg) by mouth daily    lidocaine (XYLOCAINE) 5 % external ointment Apply topically 3 times daily    Magnesium Oxide 250 MG TABS Take 250 mg by mouth daily    metoprolol succinate ER (TOPROL XL) 25 MG 24 hr tablet Take 25 mg by mouth daily    potassium chloride ER (K-TAB/KLOR-CON) 10 MEQ CR tablet Take 20 mEq by mouth daily    senna-docusate (SENOKOT-S/PERICOLACE) 8.6-50 MG tablet Take 2 tablets by mouth 2 times daily    torsemide (DEMADEX) 20 MG tablet Take 1 tablet (20 mg) by mouth daily    traMADol (ULTRAM) 50 MG tablet Take 0.5 tablets (25 mg) by mouth at bedtime. May also take 0.5 tablets (25 mg) daily as needed for severe pain.     No current facility-administered medications for this visit.       REVIEW OF SYSTEMS:   10 point review of systems reviewed and pertinent positives in the HPI.     PHYSICAL EXAMINATION:  Physical Exam     Vital signs: BP 98/65   Pulse 87   Temp 97.7  F (36.5  C)   Resp 18   Ht 1.803 m (5' 11\")   Wt " 99.6 kg (219 lb 9.6 oz)   SpO2 98%   BMI 30.63 kg/m    General: Awake, Alert, oriented x3, sitting in wheelchair, follows simple commands, conversant  HEENT:Pink conjunctiva, moist oral mucosa  NECK: Supple  CVS:  S1  S2, without murmur or gallop.   LUNG: Clear to auscultation, No wheezes, rales or rhonci.  BACK: No kyphosis of the thoracic spine  ABDOMEN: Soft, nontender to palpation, with positive bowel sounds  EXTREMITIES: Moves both upper and lower extremities with generalized weakness and some limitation to left lower extremity, 1+ pedal edema  SKIN: Incision to left hip covered with island dressing.  NEUROLOGIC: Intact, pulses palpable  PSYCHIATRIC: Mild cognitive impairment noted. .      Labs:  All labs reviewed in the nursing home record and [facility]   @  Lab Results   Component Value Date    WBC 7.7 02/02/2024    WBC 9.0 12/30/2017     Lab Results   Component Value Date    RBC 3.24 02/02/2024    RBC 4.16 12/30/2017     Lab Results   Component Value Date    HGB 9.5 02/02/2024    HGB 12.6 12/30/2017     Lab Results   Component Value Date    HCT 29.6 02/02/2024    HCT 37.1 12/30/2017     Lab Results   Component Value Date    MCV 91 02/02/2024    MCV 89 12/30/2017     Lab Results   Component Value Date    MCH 29.3 02/02/2024    MCH 30.3 12/30/2017     Lab Results   Component Value Date    MCHC 32.1 02/02/2024    MCHC 34.0 12/30/2017     Lab Results   Component Value Date    RDW 15.5 02/02/2024    RDW 13.8 12/30/2017     Lab Results   Component Value Date     02/02/2024     12/30/2017        @Last Comprehensive Metabolic Panel:  Sodium   Date Value Ref Range Status   02/06/2024 141 135 - 145 mmol/L Final     Comment:     Reference intervals for this test were updated on 09/26/2023 to more accurately reflect our healthy population. There may be differences in the flagging of prior results with similar values performed with this method. Interpretation of those prior results can be made in the context  of the updated reference intervals.    12/30/2017 138 133 - 144 mmol/L Final     Potassium   Date Value Ref Range Status   02/06/2024 3.7 3.4 - 5.3 mmol/L Final   07/15/2022 3.7 3.4 - 5.3 mmol/L Final   12/31/2017 3.5 3.4 - 5.3 mmol/L Final     Chloride   Date Value Ref Range Status   02/06/2024 104 98 - 107 mmol/L Final   07/15/2022 106 94 - 109 mmol/L Final   12/30/2017 104 94 - 109 mmol/L Final     Carbon Dioxide   Date Value Ref Range Status   12/30/2017 26 20 - 32 mmol/L Final     Carbon Dioxide (CO2)   Date Value Ref Range Status   02/06/2024 27 22 - 29 mmol/L Final   07/15/2022 24 20 - 32 mmol/L Final     Anion Gap   Date Value Ref Range Status   02/06/2024 10 7 - 15 mmol/L Final   07/15/2022 9 3 - 14 mmol/L Final   12/30/2017 8 3 - 14 mmol/L Final     Glucose   Date Value Ref Range Status   02/06/2024 105 (H) 70 - 99 mg/dL Final   07/15/2022 97 70 - 99 mg/dL Final   12/30/2017 145 (H) 70 - 99 mg/dL Final     GLUCOSE BY METER POCT   Date Value Ref Range Status   02/02/2024 120 (H) 70 - 99 mg/dL Final     Urea Nitrogen   Date Value Ref Range Status   02/06/2024 23.6 (H) 8.0 - 23.0 mg/dL Final   07/15/2022 13 7 - 30 mg/dL Final   12/31/2017 26 7 - 30 mg/dL Final     Creatinine   Date Value Ref Range Status   02/06/2024 1.54 (H) 0.67 - 1.17 mg/dL Final   12/31/2017 1.11 0.66 - 1.25 mg/dL Final     GFR Estimate   Date Value Ref Range Status   02/06/2024 43 (L) >60 mL/min/1.73m2 Final   12/31/2017 63 >60 mL/min/1.7m2 Final     Comment:     Non  GFR Calc     Calcium   Date Value Ref Range Status   02/06/2024 9.0 8.8 - 10.2 mg/dL Final   12/30/2017 7.9 (L) 8.5 - 10.1 mg/dL Final       This note has been dictated using voice recognition software. Any grammatical or context distortions are unintentional and inherent to the software    Electronically signed by: Grace Parry CNP

## 2024-02-09 ENCOUNTER — LAB REQUISITION (OUTPATIENT)
Dept: LAB | Facility: CLINIC | Age: 89
End: 2024-02-09
Payer: COMMERCIAL

## 2024-02-09 DIAGNOSIS — E86.0 DEHYDRATION: ICD-10-CM

## 2024-02-09 NOTE — PROGRESS NOTES
[facility] GERIATRIC SERVICES    Code Status:  DNR   Visit Type:   Chief Complaint   Patient presents with    <9>TCU Follow Up     Facility:  [facility] ([facility]) [[id number]]         HPI: Alvin Newton is a 89 year old male who I am seeing today for follow up on the TCU.  Past medical history includes type 2 diabetes mellitus with diabetic polyneuropathy, proximal atrial fibs on Eliquis, bilateral hip replacement 33 years ago, HFpEF, type 2 diabetes mellitus, hypertension, severe aortic stenosis and mild cognitive impairment.  Patient admitted post fall with left femur fracture.  Patient had a mechanical fall in which she was trying to reach for his cane for stability but put his weight on his grabber device instead and fell forward.  He did not hit his head or lose consciousness.  Head CT was negative.  X-ray of the pelvis/left hip revealed a left proximal femoral fracture.  History of bilateral hip replacements about 33 years ago.  Patient underwent repair on 1/24/2024.  He had a complicated surgery in the setting of bilateral hip replacements.  He lost 3 L of blood.  He did require pressor support.  He was also transfused on 1/26.  Patient found to have a mildly low a.m. cortisol level most likely due to a component of adrenal insufficiency.  Steroids were done for couple days along with midodrine.  BP improved.  Steroids were discontinued on 1/30.  Pain controlled with tramadol and Tylenol.  Patient initially had a Prevena wound VAC however 1 day later during his TCU stay VAC was not working appropriately.  Ortho updated and this was discontinued and dry sterile dressing applied.  Patient also experienced postoperative delirium.  He is very anxious and tearful.  No agitation.  He did require one-on-one.  He was given BuSpar to help with anxiety.  Delirium improved.  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  Severe aortic stenosis.  Cardiac MRI in  October concerning for infiltrative cardiomyopathy.  Workup for amyloidosis in process by cardiology in [place] [facility].  Biopsies of the bone marrow and fat pad were negative for AL amyloid.  Plan at this time is to follow-up with cardiology regarding possible myocardial biopsy and continued amyloid/infiltrative cardiomyopathy workup.  History of proximal atrial fibs on Eliquis.  Patient did have episode of RVR after procedure which resolved.  Acute kidney injury on CKD stage III.  Creatinine 1.34 admission.  Creatinine did bump to 1.74 but improved with fluids.  Type 2 diabetes without long-term use of insulin.  Most recent hemoglobin A1c 6.8%.  Stress leukocytosis with a white blood cell count of 13 resolved.    Transitional Care Course: Today patient sitting up in wheelchair. Pt daughter present on exam. Femur fracture. Nursing staff report pain appears not well controlled. Pt was having difficulty in therapy due to pain and stiffness. Pt does not always ask for pain med. He is currently receiving tramadol 25 mg at HS. He also has a prn dose every day. The nurse did give this earlier today and pt had improvement. I did talk with his daughter about increasing to TID. He continues on tylenol TID.  Yesterday BP low with the 90s systolically and 50s diastolically.  His metoprolol was held.  Nursing staff report BP on the soft side again today in the 90s systolically.  Both his metoprolol and torsemide were held.  I did review this as well with his daughter who was present.  Patient down 5 pounds since admit.  Trace lower extremity edema.  Does have underlying CHF.  No shortness breath or chest pain.  BMP is pending.    Assessment/Plan:     Closed displaced subtrochanteric fracture of left femur with routine healing, subsequent encounter  Status post open reduction and internal fixation (ORIF) of fracture  -History of bilateral hip replacement 33 years old.  -Dry sterile dressing to be changed  daily.  -Weightbearing as tolerated.  -Tramadol to 25 mg TID.   -Continue tylenol 975 mg TID.     Anemia due to blood loss, acute  -EBL of 3 L.  -Patient underwent transfusion of 1/26/2024.  -Follow-up CBC with hgb 9.5.     Other amyloidosis (H)  Chronic diastolic (congestive) heart failure (H)  -Workup for amyloidosis in process by cardiology in [place] [facility].  Biopsies of bone marrow and fat pad negative for AL amyloid.  -Follow-up with cardiology regarding possible myocardial biopsy and continued amyloid/infiltrative cardiomyopathy workup.  -Cardiac MRI in October concerning for infiltrative cardiomyopathy.  -Daily weights.  -Continues on  torsemide 20 mg daily. Med was held today due to low bp.   -Follow up BMP pending.     Paroxysmal atrial fibrillation with RVR (H)  -Chronically on Eliquis.  -Rate controlled with metoprolol and amiodarone.    Hypotension  -pt treated with midodrine and steroids in hospital.   -Metoprolol and torsemide held this am. Place parameters to hold for SBP <105.     Type 2 diabetes mellitus with diabetic polyneuropathy, without long-term current use of insulin (H)  -Recent A1c 6.8%.  -Currently not on medication.    Stage 3a chronic kidney disease (H)  -Follow-up BMP with Creatine slightly elevated at 1.54.   -Baseline 1.3-1.5.   -BMP pending today.     Adrenal insufficiency (H24)  -Steroid dose during hospitalization.  Now off steroids.    Anxiety  Delirium  -Delirium resolved  -Continue BuSpar 5 mg 3 times daily.      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/22/2020    Essential hypertension 04/04/2016    Mixed hyperlipidemia 01/02/2009    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) 12/06/2017    Carpal tunnel syndrome, bilateral 11/18/2021    Fall, initial encounter 06/06/2022    Closed fracture of first lumbar vertebra, unspecified fracture morphology,  initial encounter (H) 06/06/2022    Fracture of L1 vertebra (H) 06/06/2022    Impaired fasting glucose 06/20/2022    Osteoarthritis of pelvis 06/20/2022    Other ill-defined and unknown causes of morbidity and mortality 01/01/1985    Primary localized osteoarthrosis of shoulder region 06/20/2022    Pure hypercholesterolemia 01/01/1999    Reason for consultation 06/20/2022    Right bundle branch block 06/20/2022    Cellulitis of right lower extremity 07/11/2023    Severe sepsis (H) 07/11/2023    Septic shock (H) 07/12/2023    Streptococcal bacteremia 07/13/2023    Thrombocytopenia (H24) 07/13/2023    Hyperbilirubinemia 07/13/2023    Hypophosphatemia 07/13/2023    Hypoalbuminemia 07/13/2023    Pyuria 07/13/2023    Paroxysmal atrial fibrillation with RVR (H) 07/13/2023    Hypomagnesemia 07/13/2023    Chronic pain of both shoulders 07/13/2023    Severe aortic stenosis 07/14/2023    Elevated brain natriuretic peptide (BNP) level 07/14/2023    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    Peripheral edema 07/14/2023    Positive urine culture 07/14/2023    Medication induced coagulopathy  (H24) 07/14/2023    Chronic diastolic (congestive) heart failure (H) 03/31/2023    Altered mental status, unspecified altered mental status type 07/23/2023    Clostridium difficile diarrhea 07/23/2023    History of Clostridium difficile infection July 2023 07/25/2023    Stage 3a chronic kidney disease (H) 07/25/2023    Esophageal dysmotility 07/27/2023    DEJUAN (acute kidney injury) (H24) 08/17/2023    Moderate malnutrition (H24) 08/17/2023    Schatzki's ring of distal esophagus-dilated 8/19/23 08/17/2023    Acute gout involving toe of left foot 08/17/2023    Unintentional weight loss 08/18/2023    Abnormal esophagram 08/18/2023    Hypokalemia 08/19/2023    Hyponatremia 08/19/2023    Open wound-coccyx/buttocks 08/23/2023    Enterocolitis due to Clostridium difficile, not specified as recurrent 07/27/2023    Other chronic pain 07/19/2023     Pain in left shoulder 07/19/2023    Pain in right shoulder 07/19/2023    Unspecified streptococcus as the cause of diseases classified elsewhere 07/19/2023    Esophageal dysphagia 08/11/2023    Hip fracture, left, closed, initial encounter (H) 01/22/2024    Acute kidney failure, unspecified (H24) 08/24/2023    Amyloidosis (H) 01/25/2024    Monoclonal gammopathy of unknown significance (MGUS) 01/25/2024    Hypotension, unspecified hypotension type 01/25/2024    Postoperative anemia due to acute blood loss 01/25/2024     Resolved Ambulatory Problems     Diagnosis Date Noted    Obesity, morbid, BMI 40.0-49.9 (H) 11/07/2017     Past Medical History:   Diagnosis Date    Colonic diverticulum     Hyperlipidemia     Hypertension     Obesity (BMI 30-39.9)     Osteoarthritis     Type 2 diabetes mellitus (H)      No Known Allergies    All Meds and Allergies reviewed in the record at the facility and is the most up-to-date.    Current Outpatient Medications   Medication Sig    acetaminophen (TYLENOL) 325 MG tablet Take 3 tablets (975 mg) by mouth 3 times daily    amiodarone (PACERONE) 200 MG tablet Take 1 tablet (200 mg) by mouth daily    atorvastatin (LIPITOR) 20 MG tablet Take 20 mg by mouth At Bedtime    busPIRone (BUSPAR) 5 MG tablet Take 1 tablet (5 mg) by mouth 3 times daily    diclofenac (VOLTAREN) 1 % topical gel Apply 2 g topically 3 times daily shoulders    ELIQUIS ANTICOAGULANT 5 MG tablet Take 5 mg by mouth 2 times daily    famotidine (PEPCID) 20 MG tablet Take 1 tablet (20 mg) by mouth daily    lidocaine (XYLOCAINE) 5 % external ointment Apply topically 3 times daily    Magnesium Oxide 250 MG TABS Take 250 mg by mouth daily    metoprolol succinate ER (TOPROL XL) 25 MG 24 hr tablet Take 25 mg by mouth daily Hold if SBP < 105.    potassium chloride ER (K-TAB/KLOR-CON) 10 MEQ CR tablet Take 20 mEq by mouth daily    senna-docusate (SENOKOT-S/PERICOLACE) 8.6-50 MG tablet Take 2 tablets by mouth 2 times daily     torsemide (DEMADEX) 20 MG tablet Take 1 tablet (20 mg) by mouth daily    traMADol (ULTRAM) 50 MG tablet Take 0.5 tablets (25 mg) by mouth at bedtime. May also take 0.5 tablets (25 mg) daily as needed for severe pain. (Patient taking differently: Take 0.5 tablets (25 mg) by mouth TID)     No current facility-administered medications for this visit.       REVIEW OF SYSTEMS:   10 point review of systems reviewed and pertinent positives in the HPI.     PHYSICAL EXAMINATION:  Physical Exam     Vital signs: BP 93/59   Pulse 87   Temp 98.1  F (36.7  C)   Resp 18   Ht 1.829 m (6')   Wt 97.9 kg (215 lb 12.8 oz)   SpO2 98%   BMI 29.27 kg/m    General: Awake, Alert, oriented x3, sitting in wheelchair, follows simple commands, conversant  HEENT:Pink conjunctiva, moist oral mucosa  NECK: Supple  CVS:  S1  S2, without murmur or gallop.   LUNG: Clear to auscultation, No wheezes, rales or rhonci.  BACK: No kyphosis of the thoracic spine  ABDOMEN: Soft, nontender to palpation, with positive bowel sounds  EXTREMITIES: Moves both upper and lower extremities with generalized weakness and some limitation to left lower extremity, trace pedal edema on the operative side.   NEUROLOGIC: Intact, pulses palpable  PSYCHIATRIC: Mild cognitive impairment noted. .      Labs:  All labs reviewed in the nursing home record and Epic   @  Lab Results   Component Value Date    WBC 7.7 02/02/2024    WBC 9.0 12/30/2017     Lab Results   Component Value Date    RBC 3.24 02/02/2024    RBC 4.16 12/30/2017     Lab Results   Component Value Date    HGB 9.5 02/02/2024    HGB 12.6 12/30/2017     Lab Results   Component Value Date    HCT 29.6 02/02/2024    HCT 37.1 12/30/2017     Lab Results   Component Value Date    MCV 91 02/02/2024    MCV 89 12/30/2017     Lab Results   Component Value Date    MCH 29.3 02/02/2024    MCH 30.3 12/30/2017     Lab Results   Component Value Date    MCHC 32.1 02/02/2024    MCHC 34.0 12/30/2017     Lab Results   Component  Value Date    RDW 15.5 02/02/2024    RDW 13.8 12/30/2017     Lab Results   Component Value Date     02/02/2024     12/30/2017        @Last Comprehensive Metabolic Panel:  Sodium   Date Value Ref Range Status   02/08/2024 139 135 - 145 mmol/L Final     Comment:     Reference intervals for this test were updated on 09/26/2023 to more accurately reflect our healthy population. There may be differences in the flagging of prior results with similar values performed with this method. Interpretation of those prior results can be made in the context of the updated reference intervals.    12/30/2017 138 133 - 144 mmol/L Final     Potassium   Date Value Ref Range Status   02/08/2024 4.1 3.4 - 5.3 mmol/L Final   07/15/2022 3.7 3.4 - 5.3 mmol/L Final   12/31/2017 3.5 3.4 - 5.3 mmol/L Final     Chloride   Date Value Ref Range Status   02/08/2024 104 98 - 107 mmol/L Final   07/15/2022 106 94 - 109 mmol/L Final   12/30/2017 104 94 - 109 mmol/L Final     Carbon Dioxide   Date Value Ref Range Status   12/30/2017 26 20 - 32 mmol/L Final     Carbon Dioxide (CO2)   Date Value Ref Range Status   02/08/2024 26 22 - 29 mmol/L Final   07/15/2022 24 20 - 32 mmol/L Final     Anion Gap   Date Value Ref Range Status   02/08/2024 9 7 - 15 mmol/L Final   07/15/2022 9 3 - 14 mmol/L Final   12/30/2017 8 3 - 14 mmol/L Final     Glucose   Date Value Ref Range Status   02/08/2024 105 (H) 70 - 99 mg/dL Final   07/15/2022 97 70 - 99 mg/dL Final   12/30/2017 145 (H) 70 - 99 mg/dL Final     GLUCOSE BY METER POCT   Date Value Ref Range Status   02/02/2024 120 (H) 70 - 99 mg/dL Final     Urea Nitrogen   Date Value Ref Range Status   02/08/2024 20.4 8.0 - 23.0 mg/dL Final   07/15/2022 13 7 - 30 mg/dL Final   12/31/2017 26 7 - 30 mg/dL Final     Creatinine   Date Value Ref Range Status   02/08/2024 1.39 (H) 0.67 - 1.17 mg/dL Final   12/31/2017 1.11 0.66 - 1.25 mg/dL Final     GFR Estimate   Date Value Ref Range Status   02/08/2024 48 (L) >60  mL/min/1.73m2 Final   12/31/2017 63 >60 mL/min/1.7m2 Final     Comment:     Non  GFR Calc     Calcium   Date Value Ref Range Status   02/08/2024 8.9 8.8 - 10.2 mg/dL Final   12/30/2017 7.9 (L) 8.5 - 10.1 mg/dL Final       This note has been dictated using voice recognition software. Any grammatical or context distortions are unintentional and inherent to the software    Electronically signed by: Grace Parry, CNP

## 2024-02-12 ENCOUNTER — TRANSITIONAL CARE UNIT VISIT (OUTPATIENT)
Dept: GERIATRICS | Facility: CLINIC | Age: 89
End: 2024-02-12
Payer: COMMERCIAL

## 2024-02-12 VITALS
DIASTOLIC BLOOD PRESSURE: 67 MMHG | BODY MASS INDEX: 28.99 KG/M2 | RESPIRATION RATE: 18 BRPM | TEMPERATURE: 98.2 F | OXYGEN SATURATION: 99 % | WEIGHT: 214 LBS | SYSTOLIC BLOOD PRESSURE: 97 MMHG | HEART RATE: 96 BPM | HEIGHT: 72 IN

## 2024-02-12 DIAGNOSIS — E85.89 OTHER AMYLOIDOSIS (H): ICD-10-CM

## 2024-02-12 DIAGNOSIS — I95.2 HYPOTENSION DUE TO DRUGS: ICD-10-CM

## 2024-02-12 DIAGNOSIS — Z87.81 STATUS POST OPEN REDUCTION AND INTERNAL FIXATION (ORIF) OF FRACTURE: ICD-10-CM

## 2024-02-12 DIAGNOSIS — Z98.890 STATUS POST OPEN REDUCTION AND INTERNAL FIXATION (ORIF) OF FRACTURE: ICD-10-CM

## 2024-02-12 DIAGNOSIS — I50.32 CHRONIC DIASTOLIC (CONGESTIVE) HEART FAILURE (H): ICD-10-CM

## 2024-02-12 DIAGNOSIS — S72.22XD CLOSED DISPLACED SUBTROCHANTERIC FRACTURE OF LEFT FEMUR WITH ROUTINE HEALING, SUBSEQUENT ENCOUNTER: Primary | ICD-10-CM

## 2024-02-12 DIAGNOSIS — E11.42 TYPE 2 DIABETES MELLITUS WITH DIABETIC POLYNEUROPATHY, WITHOUT LONG-TERM CURRENT USE OF INSULIN (H): ICD-10-CM

## 2024-02-12 DIAGNOSIS — I48.0 PAROXYSMAL ATRIAL FIBRILLATION WITH RVR (H): ICD-10-CM

## 2024-02-12 DIAGNOSIS — D62 ANEMIA DUE TO BLOOD LOSS, ACUTE: ICD-10-CM

## 2024-02-12 LAB
ANION GAP SERPL CALCULATED.3IONS-SCNC: 11 MMOL/L (ref 7–15)
BUN SERPL-MCNC: 22.9 MG/DL (ref 8–23)
CALCIUM SERPL-MCNC: 8.7 MG/DL (ref 8.8–10.2)
CHLORIDE SERPL-SCNC: 102 MMOL/L (ref 98–107)
CREAT SERPL-MCNC: 1.67 MG/DL (ref 0.67–1.17)
DEPRECATED HCO3 PLAS-SCNC: 27 MMOL/L (ref 22–29)
EGFRCR SERPLBLD CKD-EPI 2021: 39 ML/MIN/1.73M2
GLUCOSE SERPL-MCNC: 101 MG/DL (ref 70–99)
POTASSIUM SERPL-SCNC: 3.4 MMOL/L (ref 3.4–5.3)
SODIUM SERPL-SCNC: 140 MMOL/L (ref 135–145)

## 2024-02-12 PROCEDURE — 36415 COLL VENOUS BLD VENIPUNCTURE: CPT | Mod: ORL | Performed by: NURSE PRACTITIONER

## 2024-02-12 PROCEDURE — 80048 BASIC METABOLIC PNL TOTAL CA: CPT | Mod: ORL | Performed by: NURSE PRACTITIONER

## 2024-02-12 PROCEDURE — P9604 ONE-WAY ALLOW PRORATED TRIP: HCPCS | Mod: ORL | Performed by: NURSE PRACTITIONER

## 2024-02-12 PROCEDURE — 99309 SBSQ NF CARE MODERATE MDM 30: CPT | Performed by: NURSE PRACTITIONER

## 2024-02-12 NOTE — LETTER
2/12/2024        RE: Alvin Newton  [address] [facility] [id number]        [facility]    Code Status:  DNR   Visit Type:   Chief Complaint   Patient presents with     TCU Follow Up     Facility:  [facility] ([facility]) [[id number]]         HPI: Alvin Newton is a 89 year old male who I am seeing today for follow up on the TCU.  Past medical history includes type 2 diabetes mellitus with diabetic polyneuropathy, proximal atrial fibs on Eliquis, bilateral hip replacement 33 years ago, HFpEF, type 2 diabetes mellitus, hypertension, severe aortic stenosis and mild cognitive impairment.  Patient admitted post fall with left femur fracture.  Patient had a mechanical fall in which she was trying to reach for his cane for stability but put his weight on his grabber device instead and fell forward.  He did not hit his head or lose consciousness.  Head CT was negative.  X-ray of the pelvis/left hip revealed a left proximal femoral fracture.  History of bilateral hip replacements about 33 years ago.  Patient underwent repair on 1/24/2024.  He had a complicated surgery in the setting of bilateral hip replacements.  He lost 3 L of blood.  He did require pressor support.  He was also transfused on 1/26.  Patient found to have a mildly low a.m. cortisol level most likely due to a component of adrenal insufficiency.  Steroids were done for couple days along with midodrine.  BP improved.  Steroids were discontinued on 1/30.  Pain controlled with tramadol and Tylenol.  Patient initially had a Prevena wound VAC however 1 day later during his TCU stay VAC was not working appropriately.  Ortho updated and this was discontinued and dry sterile dressing applied.  Patient also experienced postoperative delirium.  He is very anxious and tearful.  No agitation.  He did require one-on-one.  He was given BuSpar to help with anxiety.  Delirium improved.  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  Severe aortic stenosis.  Cardiac MRI in October concerning for infiltrative cardiomyopathy.  Workup for amyloidosis in process by cardiology in [place] [facility].  Biopsies of the bone marrow and fat pad were negative for AL amyloid.  Plan at this time is to follow-up with cardiology regarding possible myocardial biopsy and continued amyloid/infiltrative cardiomyopathy workup.  History of proximal atrial fibs on Eliquis.  Patient did have episode of RVR after procedure which resolved.  Acute kidney injury on CKD stage III.  Creatinine 1.34 admission.  Creatinine did bump to 1.74 but improved with fluids.  Type 2 diabetes without long-term use of insulin.  Most recent hemoglobin A1c 6.8%.  Stress leukocytosis with a white blood cell count of 13 resolved.    Transitional Care Course: Today patient lying in bed. Recent femur fracture. Nursing staff reporting pain much better controlled. He continues on tramadol TID. BP on the soft side. His metoprolol has been held 3 times in since 2/8. CHF. Weight down 5 lbs since admit. He continues on torsemide. He appears dry. Creatine now 1.67. He did report dizziness 2 days prior. Torsemide held 2 times last week. He denies any SOB or CP.     Assessment/Plan:     Closed displaced subtrochanteric fracture of left femur with routine healing, subsequent encounter  Status post open reduction and internal fixation (ORIF) of fracture  -History of bilateral hip replacement 33 years old.  -Dry sterile dressing to be changed daily.  -Weightbearing as tolerated.  -Tramadol to 25 mg TID.   -Continue tylenol 975 mg TID.     Anemia due to blood loss, acute  -EBL of 3 L.  -Patient underwent transfusion of 1/26/2024.  -Follow-up CBC with hgb 9.5.     Other amyloidosis (H)  Chronic diastolic (congestive) heart failure (H)  Hypotension   -Workup for amyloidosis in process by cardiology in [place] [facility].  Biopsies of bone marrow and fat pad  negative for AL amyloid.  -Follow-up with cardiology regarding possible myocardial biopsy and continued amyloid/infiltrative cardiomyopathy workup.  -Cardiac MRI in October concerning for infiltrative cardiomyopathy.  -Daily weights. Weight down 5 lbs since admit.   -Continues on  torsemide 20 mg daily.     Paroxysmal atrial fibrillation with RVR (H)  -Chronically on Eliquis.  -Rate controlled with metoprolol and amiodarone.    Hypotension  -pt treated with midodrine and steroids in hospital.   -Continue metoprolol with hold parameters for SBP <105.   -Hold torsemide and potassium X 3 days.   -Repeat BMP on Wednesday.     Type 2 diabetes mellitus with diabetic polyneuropathy, without long-term current use of insulin (H)  -Recent A1c 6.8%.  -Currently not on medication.    Stage 3a chronic kidney disease (H)  -Baseline 1.3-1.5.   -Pt appears dry. Creatine 1.67.       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     Essential hypertension 04/04/2016     Mixed hyperlipidemia 01/02/2009     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) 12/06/2017     Carpal tunnel syndrome, bilateral 11/18/2021     Fall, initial encounter 06/06/2022     Closed fracture of first lumbar vertebra, unspecified fracture morphology, initial encounter (H) 06/06/2022     Fracture of L1 vertebra (H) 06/06/2022     Impaired fasting glucose 06/20/2022     Osteoarthritis of pelvis 06/20/2022     Other ill-defined and unknown causes of morbidity and mortality 01/01/1985     Primary localized osteoarthrosis of shoulder region 06/20/2022     Pure hypercholesterolemia 01/01/1999     Reason for consultation 06/20/2022     Right bundle branch block 06/20/2022     Cellulitis of right lower extremity 07/11/2023     Severe sepsis (H) 07/11/2023     Septic shock (H) 07/12/2023     Streptococcal bacteremia 07/13/2023      Thrombocytopenia (H24) 07/13/2023     Hyperbilirubinemia 07/13/2023     Hypophosphatemia 07/13/2023     Hypoalbuminemia 07/13/2023     Pyuria 07/13/2023     Paroxysmal atrial fibrillation with RVR (H) 07/13/2023     Hypomagnesemia 07/13/2023     Chronic pain of both shoulders 07/13/2023     Severe aortic stenosis 07/14/2023     Elevated brain natriuretic peptide (BNP) level 07/14/2023     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     Peripheral edema 07/14/2023     Positive urine culture 07/14/2023     Medication induced coagulopathy  (H24) 07/14/2023     Chronic diastolic (congestive) heart failure (H) 03/31/2023     Altered mental status, unspecified altered mental status type 07/23/2023     Clostridium difficile diarrhea 07/23/2023     History of Clostridium difficile infection July 2023 07/25/2023     Stage 3a chronic kidney disease (H) 07/25/2023     Esophageal dysmotility 07/27/2023     DEJUAN (acute kidney injury) (H24) 08/17/2023     Moderate malnutrition (H24) 08/17/2023     Schatzki's ring of distal esophagus-dilated 8/19/23 08/17/2023     Acute gout involving toe of left foot 08/17/2023     Unintentional weight loss 08/18/2023     Abnormal esophagram 08/18/2023     Hypokalemia 08/19/2023     Hyponatremia 08/19/2023     Open wound-coccyx/buttocks 08/23/2023     Enterocolitis due to Clostridium difficile, not specified as recurrent 07/27/2023     Other chronic pain 07/19/2023     Pain in left shoulder 07/19/2023     Pain in right shoulder 07/19/2023     Unspecified streptococcus as the cause of diseases classified elsewhere 07/19/2023     Esophageal dysphagia 08/11/2023     Hip fracture, left, closed, initial encounter (H) 01/22/2024     Acute kidney failure, unspecified (H24) 08/24/2023     Amyloidosis (H) 01/25/2024     Monoclonal gammopathy of unknown significance (MGUS) 01/25/2024     Hypotension, unspecified hypotension type 01/25/2024     Postoperative anemia due to acute blood loss 01/25/2024      Resolved Ambulatory Problems     Diagnosis Date Noted     Obesity, morbid, BMI 40.0-49.9 (H) 11/07/2017     Past Medical History:   Diagnosis Date     Colonic diverticulum      Hyperlipidemia      Hypertension      Obesity (BMI 30-39.9)      Osteoarthritis      Type 2 diabetes mellitus (H)      No Known Allergies    All Meds and Allergies reviewed in the record at the facility and is the most up-to-date.    Current Outpatient Medications   Medication Sig     acetaminophen (TYLENOL) 325 MG tablet Take 3 tablets (975 mg) by mouth 3 times daily     amiodarone (PACERONE) 200 MG tablet Take 1 tablet (200 mg) by mouth daily     atorvastatin (LIPITOR) 20 MG tablet Take 20 mg by mouth At Bedtime     busPIRone (BUSPAR) 5 MG tablet Take 1 tablet (5 mg) by mouth 3 times daily     diclofenac (VOLTAREN) 1 % topical gel Apply 2 g topically 3 times daily shoulders     ELIQUIS ANTICOAGULANT 5 MG tablet Take 5 mg by mouth 2 times daily     famotidine (PEPCID) 20 MG tablet Take 1 tablet (20 mg) by mouth daily     lidocaine (XYLOCAINE) 5 % external ointment Apply topically 3 times daily     Magnesium Oxide 250 MG TABS Take 250 mg by mouth daily     metoprolol succinate ER (TOPROL XL) 25 MG 24 hr tablet Take 25 mg by mouth daily Hold if SBP < 105.     potassium chloride ER (K-TAB/KLOR-CON) 10 MEQ CR tablet Take 20 mEq by mouth daily     senna-docusate (SENOKOT-S/PERICOLACE) 8.6-50 MG tablet Take 2 tablets by mouth 2 times daily     torsemide (DEMADEX) 20 MG tablet Take 1 tablet (20 mg) by mouth daily     traMADol (ULTRAM) 50 MG tablet Take 0.5 tablets (25 mg) by mouth at bedtime. May also take 0.5 tablets (25 mg) daily as needed for severe pain. (Patient taking differently: Take 0.5 tablets (25 mg) by mouth TID)     No current facility-administered medications for this visit.       REVIEW OF SYSTEMS:   10 point review of systems reviewed and pertinent positives in the HPI.     PHYSICAL EXAMINATION:  Physical Exam     Vital signs:  BP 97/67   Pulse 96   Temp 98.2  F (36.8  C)   Resp 18   Ht 1.829 m (6')   Wt 97.1 kg (214 lb)   SpO2 99%   BMI 29.02 kg/m    General: Awake, Alert, oriented x3, sitting up in bed, follows simple commands, conversant  HEENT:Pink conjunctiva, moist oral mucosa  NECK: Supple  CVS:  S1  S2, without murmur or gallop.   LUNG: Clear to auscultation, No wheezes, rales or rhonci.  BACK: No kyphosis of the thoracic spine  ABDOMEN: Soft, nontender to palpation, with positive bowel sounds  EXTREMITIES: Moves both upper and lower extremities with generalized weakness and some limitation to left lower extremity, trace pedal edema on the operative side.   NEUROLOGIC: Intact, pulses palpable  PSYCHIATRIC: Mild cognitive impairment noted. .      Labs:  All labs reviewed in the nursing home record and Epic   @  Lab Results   Component Value Date    WBC 7.7 02/02/2024    WBC 9.0 12/30/2017     Lab Results   Component Value Date    RBC 3.24 02/02/2024    RBC 4.16 12/30/2017     Lab Results   Component Value Date    HGB 9.5 02/02/2024    HGB 12.6 12/30/2017     Lab Results   Component Value Date    HCT 29.6 02/02/2024    HCT 37.1 12/30/2017     Lab Results   Component Value Date    MCV 91 02/02/2024    MCV 89 12/30/2017     Lab Results   Component Value Date    MCH 29.3 02/02/2024    MCH 30.3 12/30/2017     Lab Results   Component Value Date    MCHC 32.1 02/02/2024    MCHC 34.0 12/30/2017     Lab Results   Component Value Date    RDW 15.5 02/02/2024    RDW 13.8 12/30/2017     Lab Results   Component Value Date     02/02/2024     12/30/2017        @Last Comprehensive Metabolic Panel:  Sodium   Date Value Ref Range Status   02/12/2024 140 135 - 145 mmol/L Final     Comment:     Reference intervals for this test were updated on 09/26/2023 to more accurately reflect our healthy population. There may be differences in the flagging of prior results with similar values performed with this method. Interpretation of those  prior results can be made in the context of the updated reference intervals.    12/30/2017 138 133 - 144 mmol/L Final     Potassium   Date Value Ref Range Status   02/12/2024 3.4 3.4 - 5.3 mmol/L Final   07/15/2022 3.7 3.4 - 5.3 mmol/L Final   12/31/2017 3.5 3.4 - 5.3 mmol/L Final     Chloride   Date Value Ref Range Status   02/12/2024 102 98 - 107 mmol/L Final   07/15/2022 106 94 - 109 mmol/L Final   12/30/2017 104 94 - 109 mmol/L Final     Carbon Dioxide   Date Value Ref Range Status   12/30/2017 26 20 - 32 mmol/L Final     Carbon Dioxide (CO2)   Date Value Ref Range Status   02/12/2024 27 22 - 29 mmol/L Final   07/15/2022 24 20 - 32 mmol/L Final     Anion Gap   Date Value Ref Range Status   02/12/2024 11 7 - 15 mmol/L Final   07/15/2022 9 3 - 14 mmol/L Final   12/30/2017 8 3 - 14 mmol/L Final     Glucose   Date Value Ref Range Status   02/12/2024 101 (H) 70 - 99 mg/dL Final   07/15/2022 97 70 - 99 mg/dL Final   12/30/2017 145 (H) 70 - 99 mg/dL Final     GLUCOSE BY METER POCT   Date Value Ref Range Status   02/02/2024 120 (H) 70 - 99 mg/dL Final     Urea Nitrogen   Date Value Ref Range Status   02/12/2024 22.9 8.0 - 23.0 mg/dL Final   07/15/2022 13 7 - 30 mg/dL Final   12/31/2017 26 7 - 30 mg/dL Final     Creatinine   Date Value Ref Range Status   02/12/2024 1.67 (H) 0.67 - 1.17 mg/dL Final   12/31/2017 1.11 0.66 - 1.25 mg/dL Final     GFR Estimate   Date Value Ref Range Status   02/12/2024 39 (L) >60 mL/min/1.73m2 Final   12/31/2017 63 >60 mL/min/1.7m2 Final     Comment:     Non  GFR Calc     Calcium   Date Value Ref Range Status   02/12/2024 8.7 (L) 8.8 - 10.2 mg/dL Final   12/30/2017 7.9 (L) 8.5 - 10.1 mg/dL Final       This note has been dictated using voice recognition software. Any grammatical or context distortions are unintentional and inherent to the software    Electronically signed by: Grace Parry CNP       Sincerely,        Grace Parry, NP

## 2024-02-12 NOTE — PROGRESS NOTES
[facility] GERIATRIC SERVICES    Code Status:  DNR   Visit Type:   Chief Complaint   Patient presents with    TCU Follow Up     Facility:  [facility] ([facility]) [[id number]]         HPI: Alvin Newton is a 89 year old male who I am seeing today for follow up on the TCU.  Past medical history includes type 2 diabetes mellitus with diabetic polyneuropathy, proximal atrial fibs on Eliquis, bilateral hip replacement 33 years ago, HFpEF, type 2 diabetes mellitus, hypertension, severe aortic stenosis and mild cognitive impairment.  Patient admitted post fall with left femur fracture.  Patient had a mechanical fall in which she was trying to reach for his cane for stability but put his weight on his grabber device instead and fell forward.  He did not hit his head or lose consciousness.  Head CT was negative.  X-ray of the pelvis/left hip revealed a left proximal femoral fracture.  History of bilateral hip replacements about 33 years ago.  Patient underwent repair on 1/24/2024.  He had a complicated surgery in the setting of bilateral hip replacements.  He lost 3 L of blood.  He did require pressor support.  He was also transfused on 1/26.  Patient found to have a mildly low a.m. cortisol level most likely due to a component of adrenal insufficiency.  Steroids were done for couple days along with midodrine.  BP improved.  Steroids were discontinued on 1/30.  Pain controlled with tramadol and Tylenol.  Patient initially had a Prevena wound VAC however 1 day later during his TCU stay VAC was not working appropriately.  Ortho updated and this was discontinued and dry sterile dressing applied.  Patient also experienced postoperative delirium.  He is very anxious and tearful.  No agitation.  He did require one-on-one.  He was given BuSpar to help with anxiety.  Delirium improved.  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  Severe aortic stenosis.  Cardiac MRI in October  concerning for infiltrative cardiomyopathy.  Workup for amyloidosis in process by cardiology in [place] [facility].  Biopsies of the bone marrow and fat pad were negative for AL amyloid.  Plan at this time is to follow-up with cardiology regarding possible myocardial biopsy and continued amyloid/infiltrative cardiomyopathy workup.  History of proximal atrial fibs on Eliquis.  Patient did have episode of RVR after procedure which resolved.  Acute kidney injury on CKD stage III.  Creatinine 1.34 admission.  Creatinine did bump to 1.74 but improved with fluids.  Type 2 diabetes without long-term use of insulin.  Most recent hemoglobin A1c 6.8%.  Stress leukocytosis with a white blood cell count of 13 resolved.    Transitional Care Course: Today patient lying in bed. Recent femur fracture. Nursing staff reporting pain much better controlled. He continues on tramadol TID. BP on the soft side. His metoprolol has been held 3 times in since 2/8. CHF. Weight down 5 lbs since admit. He continues on torsemide. He appears dry. Creatine now 1.67. He did report dizziness 2 days prior. Torsemide held 2 times last week. He denies any SOB or CP.     Assessment/Plan:     Closed displaced subtrochanteric fracture of left femur with routine healing, subsequent encounter  Status post open reduction and internal fixation (ORIF) of fracture  -History of bilateral hip replacement 33 years old.  -Dry sterile dressing to be changed daily.  -Weightbearing as tolerated.  -Tramadol to 25 mg TID.   -Continue tylenol 975 mg TID.     Anemia due to blood loss, acute  -EBL of 3 L.  -Patient underwent transfusion of 1/26/2024.  -Follow-up CBC with hgb 9.5.     Other amyloidosis (H)  Chronic diastolic (congestive) heart failure (H)  Hypotension   -Workup for amyloidosis in process by cardiology in [place] [facility].  Biopsies of bone marrow and fat pad negative for AL amyloid.  -Follow-up with cardiology regarding possible myocardial biopsy and  continued amyloid/infiltrative cardiomyopathy workup.  -Cardiac MRI in October concerning for infiltrative cardiomyopathy.  -Daily weights. Weight down 5 lbs since admit.   -Continues on  torsemide 20 mg daily.     Paroxysmal atrial fibrillation with RVR (H)  -Chronically on Eliquis.  -Rate controlled with metoprolol and amiodarone.    Hypotension  -pt treated with midodrine and steroids in hospital.   -Continue metoprolol with hold parameters for SBP <105.   -Hold torsemide and potassium X 3 days.   -Repeat BMP on Wednesday.     Type 2 diabetes mellitus with diabetic polyneuropathy, without long-term current use of insulin (H)  -Recent A1c 6.8%.  -Currently not on medication.    Stage 3a chronic kidney disease (H)  -Baseline 1.3-1.5.   -Pt appears dry. Creatine 1.67.       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/22/2020    Essential hypertension 04/04/2016    Mixed hyperlipidemia 01/02/2009    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) 12/06/2017    Carpal tunnel syndrome, bilateral 11/18/2021    Fall, initial encounter 06/06/2022    Closed fracture of first lumbar vertebra, unspecified fracture morphology, initial encounter (H) 06/06/2022    Fracture of L1 vertebra (H) 06/06/2022    Impaired fasting glucose 06/20/2022    Osteoarthritis of pelvis 06/20/2022    Other ill-defined and unknown causes of morbidity and mortality 01/01/1985    Primary localized osteoarthrosis of shoulder region 06/20/2022    Pure hypercholesterolemia 01/01/1999    Reason for consultation 06/20/2022    Right bundle branch block 06/20/2022    Cellulitis of right lower extremity 07/11/2023    Severe sepsis (H) 07/11/2023    Septic shock (H) 07/12/2023    Streptococcal bacteremia 07/13/2023    Thrombocytopenia (H24) 07/13/2023    Hyperbilirubinemia 07/13/2023    Hypophosphatemia 07/13/2023    Hypoalbuminemia  07/13/2023    Pyuria 07/13/2023    Paroxysmal atrial fibrillation with RVR (H) 07/13/2023    Hypomagnesemia 07/13/2023    Chronic pain of both shoulders 07/13/2023    Severe aortic stenosis 07/14/2023    Elevated brain natriuretic peptide (BNP) level 07/14/2023    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    Peripheral edema 07/14/2023    Positive urine culture 07/14/2023    Medication induced coagulopathy  (H24) 07/14/2023    Chronic diastolic (congestive) heart failure (H) 03/31/2023    Altered mental status, unspecified altered mental status type 07/23/2023    Clostridium difficile diarrhea 07/23/2023    History of Clostridium difficile infection July 2023 07/25/2023    Stage 3a chronic kidney disease (H) 07/25/2023    Esophageal dysmotility 07/27/2023    DEJUAN (acute kidney injury) (H24) 08/17/2023    Moderate malnutrition (H24) 08/17/2023    Schatzki's ring of distal esophagus-dilated 8/19/23 08/17/2023    Acute gout involving toe of left foot 08/17/2023    Unintentional weight loss 08/18/2023    Abnormal esophagram 08/18/2023    Hypokalemia 08/19/2023    Hyponatremia 08/19/2023    Open wound-coccyx/buttocks 08/23/2023    Enterocolitis due to Clostridium difficile, not specified as recurrent 07/27/2023    Other chronic pain 07/19/2023    Pain in left shoulder 07/19/2023    Pain in right shoulder 07/19/2023    Unspecified streptococcus as the cause of diseases classified elsewhere 07/19/2023    Esophageal dysphagia 08/11/2023    Hip fracture, left, closed, initial encounter (H) 01/22/2024    Acute kidney failure, unspecified (H24) 08/24/2023    Amyloidosis (H) 01/25/2024    Monoclonal gammopathy of unknown significance (MGUS) 01/25/2024    Hypotension, unspecified hypotension type 01/25/2024    Postoperative anemia due to acute blood loss 01/25/2024     Resolved Ambulatory Problems     Diagnosis Date Noted    Obesity, morbid, BMI 40.0-49.9 (H) 11/07/2017     Past Medical History:   Diagnosis Date    Colonic  diverticulum     Hyperlipidemia     Hypertension     Obesity (BMI 30-39.9)     Osteoarthritis     Type 2 diabetes mellitus (H)      No Known Allergies    All Meds and Allergies reviewed in the record at the facility and is the most up-to-date.    Current Outpatient Medications   Medication Sig    acetaminophen (TYLENOL) 325 MG tablet Take 3 tablets (975 mg) by mouth 3 times daily    amiodarone (PACERONE) 200 MG tablet Take 1 tablet (200 mg) by mouth daily    atorvastatin (LIPITOR) 20 MG tablet Take 20 mg by mouth At Bedtime    busPIRone (BUSPAR) 5 MG tablet Take 1 tablet (5 mg) by mouth 3 times daily    diclofenac (VOLTAREN) 1 % topical gel Apply 2 g topically 3 times daily shoulders    ELIQUIS ANTICOAGULANT 5 MG tablet Take 5 mg by mouth 2 times daily    famotidine (PEPCID) 20 MG tablet Take 1 tablet (20 mg) by mouth daily    lidocaine (XYLOCAINE) 5 % external ointment Apply topically 3 times daily    Magnesium Oxide 250 MG TABS Take 250 mg by mouth daily    metoprolol succinate ER (TOPROL XL) 25 MG 24 hr tablet Take 25 mg by mouth daily Hold if SBP < 105.    potassium chloride ER (K-TAB/KLOR-CON) 10 MEQ CR tablet Take 20 mEq by mouth daily    senna-docusate (SENOKOT-S/PERICOLACE) 8.6-50 MG tablet Take 2 tablets by mouth 2 times daily    torsemide (DEMADEX) 20 MG tablet Take 1 tablet (20 mg) by mouth daily    traMADol (ULTRAM) 50 MG tablet Take 0.5 tablets (25 mg) by mouth at bedtime. May also take 0.5 tablets (25 mg) daily as needed for severe pain. (Patient taking differently: Take 0.5 tablets (25 mg) by mouth TID)     No current facility-administered medications for this visit.       REVIEW OF SYSTEMS:   10 point review of systems reviewed and pertinent positives in the HPI.     PHYSICAL EXAMINATION:  Physical Exam     Vital signs: BP 97/67   Pulse 96   Temp 98.2  F (36.8  C)   Resp 18   Ht 1.829 m (6')   Wt 97.1 kg (214 lb)   SpO2 99%   BMI 29.02 kg/m    General: Awake, Alert, oriented x3, sitting up in  bed, follows simple commands, conversant  HEENT:Pink conjunctiva, moist oral mucosa  NECK: Supple  CVS:  S1  S2, without murmur or gallop.   LUNG: Clear to auscultation, No wheezes, rales or rhonci.  BACK: No kyphosis of the thoracic spine  ABDOMEN: Soft, nontender to palpation, with positive bowel sounds  EXTREMITIES: Moves both upper and lower extremities with generalized weakness and some limitation to left lower extremity, trace pedal edema on the operative side.   NEUROLOGIC: Intact, pulses palpable  PSYCHIATRIC: Mild cognitive impairment noted. .      Labs:  All labs reviewed in the nursing home record and Epic   @  Lab Results   Component Value Date    WBC 7.7 02/02/2024    WBC 9.0 12/30/2017     Lab Results   Component Value Date    RBC 3.24 02/02/2024    RBC 4.16 12/30/2017     Lab Results   Component Value Date    HGB 9.5 02/02/2024    HGB 12.6 12/30/2017     Lab Results   Component Value Date    HCT 29.6 02/02/2024    HCT 37.1 12/30/2017     Lab Results   Component Value Date    MCV 91 02/02/2024    MCV 89 12/30/2017     Lab Results   Component Value Date    MCH 29.3 02/02/2024    MCH 30.3 12/30/2017     Lab Results   Component Value Date    MCHC 32.1 02/02/2024    MCHC 34.0 12/30/2017     Lab Results   Component Value Date    RDW 15.5 02/02/2024    RDW 13.8 12/30/2017     Lab Results   Component Value Date     02/02/2024     12/30/2017        @Last Comprehensive Metabolic Panel:  Sodium   Date Value Ref Range Status   02/12/2024 140 135 - 145 mmol/L Final     Comment:     Reference intervals for this test were updated on 09/26/2023 to more accurately reflect our healthy population. There may be differences in the flagging of prior results with similar values performed with this method. Interpretation of those prior results can be made in the context of the updated reference intervals.    12/30/2017 138 133 - 144 mmol/L Final     Potassium   Date Value Ref Range Status   02/12/2024 3.4 3.4 -  5.3 mmol/L Final   07/15/2022 3.7 3.4 - 5.3 mmol/L Final   12/31/2017 3.5 3.4 - 5.3 mmol/L Final     Chloride   Date Value Ref Range Status   02/12/2024 102 98 - 107 mmol/L Final   07/15/2022 106 94 - 109 mmol/L Final   12/30/2017 104 94 - 109 mmol/L Final     Carbon Dioxide   Date Value Ref Range Status   12/30/2017 26 20 - 32 mmol/L Final     Carbon Dioxide (CO2)   Date Value Ref Range Status   02/12/2024 27 22 - 29 mmol/L Final   07/15/2022 24 20 - 32 mmol/L Final     Anion Gap   Date Value Ref Range Status   02/12/2024 11 7 - 15 mmol/L Final   07/15/2022 9 3 - 14 mmol/L Final   12/30/2017 8 3 - 14 mmol/L Final     Glucose   Date Value Ref Range Status   02/12/2024 101 (H) 70 - 99 mg/dL Final   07/15/2022 97 70 - 99 mg/dL Final   12/30/2017 145 (H) 70 - 99 mg/dL Final     GLUCOSE BY METER POCT   Date Value Ref Range Status   02/02/2024 120 (H) 70 - 99 mg/dL Final     Urea Nitrogen   Date Value Ref Range Status   02/12/2024 22.9 8.0 - 23.0 mg/dL Final   07/15/2022 13 7 - 30 mg/dL Final   12/31/2017 26 7 - 30 mg/dL Final     Creatinine   Date Value Ref Range Status   02/12/2024 1.67 (H) 0.67 - 1.17 mg/dL Final   12/31/2017 1.11 0.66 - 1.25 mg/dL Final     GFR Estimate   Date Value Ref Range Status   02/12/2024 39 (L) >60 mL/min/1.73m2 Final   12/31/2017 63 >60 mL/min/1.7m2 Final     Comment:     Non  GFR Calc     Calcium   Date Value Ref Range Status   02/12/2024 8.7 (L) 8.8 - 10.2 mg/dL Final   12/30/2017 7.9 (L) 8.5 - 10.1 mg/dL Final       This note has been dictated using voice recognition software. Any grammatical or context distortions are unintentional and inherent to the software    Electronically signed by: Grace Parry CNP

## 2024-02-13 ENCOUNTER — LAB REQUISITION (OUTPATIENT)
Dept: LAB | Facility: CLINIC | Age: 89
End: 2024-02-13
Payer: COMMERCIAL

## 2024-02-13 ENCOUNTER — TRANSITIONAL CARE UNIT VISIT (OUTPATIENT)
Dept: GERIATRICS | Facility: CLINIC | Age: 89
End: 2024-02-13
Payer: COMMERCIAL

## 2024-02-13 VITALS
BODY MASS INDEX: 29.09 KG/M2 | SYSTOLIC BLOOD PRESSURE: 82 MMHG | TEMPERATURE: 97.9 F | WEIGHT: 214.8 LBS | HEIGHT: 72 IN | DIASTOLIC BLOOD PRESSURE: 52 MMHG | OXYGEN SATURATION: 97 % | HEART RATE: 87 BPM | RESPIRATION RATE: 18 BRPM

## 2024-02-13 DIAGNOSIS — I95.9 HYPOTENSION, UNSPECIFIED: ICD-10-CM

## 2024-02-13 DIAGNOSIS — I48.0 PAROXYSMAL ATRIAL FIBRILLATION WITH RVR (H): ICD-10-CM

## 2024-02-13 DIAGNOSIS — Z98.890 STATUS POST OPEN REDUCTION AND INTERNAL FIXATION (ORIF) OF FRACTURE: ICD-10-CM

## 2024-02-13 DIAGNOSIS — E85.89 OTHER AMYLOIDOSIS (H): ICD-10-CM

## 2024-02-13 DIAGNOSIS — I95.2 HYPOTENSION DUE TO DRUGS: ICD-10-CM

## 2024-02-13 DIAGNOSIS — S72.22XD CLOSED DISPLACED SUBTROCHANTERIC FRACTURE OF LEFT FEMUR WITH ROUTINE HEALING, SUBSEQUENT ENCOUNTER: Primary | ICD-10-CM

## 2024-02-13 DIAGNOSIS — E11.42 TYPE 2 DIABETES MELLITUS WITH DIABETIC POLYNEUROPATHY, WITHOUT LONG-TERM CURRENT USE OF INSULIN (H): ICD-10-CM

## 2024-02-13 DIAGNOSIS — I50.32 CHRONIC DIASTOLIC (CONGESTIVE) HEART FAILURE (H): ICD-10-CM

## 2024-02-13 DIAGNOSIS — D62 ANEMIA DUE TO BLOOD LOSS, ACUTE: ICD-10-CM

## 2024-02-13 DIAGNOSIS — Z87.81 STATUS POST OPEN REDUCTION AND INTERNAL FIXATION (ORIF) OF FRACTURE: ICD-10-CM

## 2024-02-13 PROCEDURE — 99310 SBSQ NF CARE HIGH MDM 45: CPT | Performed by: NURSE PRACTITIONER

## 2024-02-13 NOTE — LETTER
2/13/2024        RE: Alvin Newton  [address] [facility] [id number]        [facility]    Code Status:  DNR   Visit Type:   Chief Complaint   Patient presents with     TCU Follow Up     Facility:  [facility] ([facility]) [[id number]]         HPI: Alvin Newton is a 89 year old male who I am seeing today for follow up on the TCU.  Past medical history includes type 2 diabetes mellitus with diabetic polyneuropathy, proximal atrial fibs on Eliquis, bilateral hip replacement 33 years ago, HFpEF, type 2 diabetes mellitus, hypertension, severe aortic stenosis and mild cognitive impairment.  Patient admitted post fall with left femur fracture.  Patient had a mechanical fall in which she was trying to reach for his cane for stability but put his weight on his grabber device instead and fell forward.  He did not hit his head or lose consciousness.  Head CT was negative.  X-ray of the pelvis/left hip revealed a left proximal femoral fracture.  History of bilateral hip replacements about 33 years ago.  Patient underwent repair on 1/24/2024.  He had a complicated surgery in the setting of bilateral hip replacements.  He lost 3 L of blood.  He did require pressor support.  He was also transfused on 1/26.  Patient found to have a mildly low a.m. cortisol level most likely due to a component of adrenal insufficiency.  Steroids were done for couple days along with midodrine.  BP improved.  Steroids were discontinued on 1/30.  Pain controlled with tramadol and Tylenol.  Patient initially had a Prevena wound VAC however 1 day later during his TCU stay VAC was not working appropriately.  Ortho updated and this was discontinued and dry sterile dressing applied.  Patient also experienced postoperative delirium.  He is very anxious and tearful.  No agitation.  He did require one-on-one.  He was given BuSpar to help with anxiety.  Delirium improved.  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  Severe aortic stenosis.  Cardiac MRI in October concerning for infiltrative cardiomyopathy.  Workup for amyloidosis in process by cardiology in [place] [facility].  Biopsies of the bone marrow and fat pad were negative for AL amyloid.  Plan at this time is to follow-up with cardiology regarding possible myocardial biopsy and continued amyloid/infiltrative cardiomyopathy workup.  History of proximal atrial fibs on Eliquis.  Patient did have episode of RVR after procedure which resolved.  Acute kidney injury on CKD stage III.  Creatinine 1.34 admission.  Creatinine did bump to 1.74 but improved with fluids.  Type 2 diabetes without long-term use of insulin.  Most recent hemoglobin A1c 6.8%.  Stress leukocytosis with a white blood cell count of 13 resolved.    Transitional Care Course: Today patient lying in bed. Recent femur fracture. Nurse manager and PT director reported conversation with pt daughter's in regards to pain management. Earlier today the nurse caring for the pt reported pt refusing pain med. Therapy reports when pain controlled pt able to participate in therapy and make progress. I did speak with pt daughter Fuad in regards to above. She would like therapy times to coordinated with pain meds. Pt appears to be tolerating pain med. He is awake and alert. He has had some ongoing hypotension. His torsemide is currently on hold. His metoprolol was held by nursing this morning. BP at rest in the 90s systolic. He did drop to 80s systolic in therapy. Fluids are being encouraged. Pt does have hx of CHF so will need close monitoring. No SOB or CP. Creatine up to 1.67. Pt daughter reporting pt often does not eat well in TCU. He has been in mx over the last 6 months. Nursing staff also reported stage II PU to buttock. Daughter reports he had this in the TCU prior to this.     Assessment/Plan:     Closed displaced subtrochanteric fracture of left femur with routine  healing, subsequent encounter  Status post open reduction and internal fixation (ORIF) of fracture  -History of bilateral hip replacement 33 years old.  -Dry sterile dressing to be changed daily.  -Weightbearing as tolerated.  -Tramadol to 25 mg TID.   -Continue tylenol 975 mg TID.     Hypotension   -pt treated with midodrine and steroids in hospital.   -Bp in the 90s systolic. Suspect poor oral intake.   -pt with dizziness.   -Continue to hold torsemide and potassium.   -Hold parameters on Metoprolol.   -monitor.   -Follow up BMP in am.   -Give additional 240cc with each med pass.     Anemia due to blood loss, acute  -EBL of 3 L.  -Patient underwent transfusion of 1/26/2024.  -Follow-up CBC with hgb 9.5.     Other amyloidosis (H)  Chronic diastolic (congestive) heart failure (H)  Hypotension   -Workup for amyloidosis in process by cardiology in [place] [facility].  Biopsies of bone marrow and fat pad negative for AL amyloid.  -Follow-up with cardiology regarding possible myocardial biopsy and continued amyloid/infiltrative cardiomyopathy workup.  -Cardiac MRI in October concerning for infiltrative cardiomyopathy.  -Daily weights. Weight down 5 lbs since admit.   -Continue to hold torsemide and potassium.   -monitor for fluid overload.     Paroxysmal atrial fibrillation with RVR (H)  -Chronically on Eliquis.  -Rate controlled with metoprolol and amiodarone.    Type 2 diabetes mellitus with diabetic polyneuropathy, without long-term current use of insulin (H)  -Recent A1c 6.8%.  -Currently not on medication.    Stage 3a chronic kidney disease (H)  -Baseline 1.3-1.5.   -Pt appears dry. Creatine 1.67.   -Follow up BMP in am.     Stage II Pressure Ulcer, buttock  -apply blayne and foam change every day.   -Ok for air mattress.         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     Essential hypertension  04/04/2016     Mixed hyperlipidemia 01/02/2009     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) 12/06/2017     Carpal tunnel syndrome, bilateral 11/18/2021     Fall, initial encounter 06/06/2022     Closed fracture of first lumbar vertebra, unspecified fracture morphology, initial encounter (H) 06/06/2022     Fracture of L1 vertebra (H) 06/06/2022     Impaired fasting glucose 06/20/2022     Osteoarthritis of pelvis 06/20/2022     Other ill-defined and unknown causes of morbidity and mortality 01/01/1985     Primary localized osteoarthrosis of shoulder region 06/20/2022     Pure hypercholesterolemia 01/01/1999     Reason for consultation 06/20/2022     Right bundle branch block 06/20/2022     Cellulitis of right lower extremity 07/11/2023     Severe sepsis (H) 07/11/2023     Septic shock (H) 07/12/2023     Streptococcal bacteremia 07/13/2023     Thrombocytopenia (H24) 07/13/2023     Hyperbilirubinemia 07/13/2023     Hypophosphatemia 07/13/2023     Hypoalbuminemia 07/13/2023     Pyuria 07/13/2023     Paroxysmal atrial fibrillation with RVR (H) 07/13/2023     Hypomagnesemia 07/13/2023     Chronic pain of both shoulders 07/13/2023     Severe aortic stenosis 07/14/2023     Elevated brain natriuretic peptide (BNP) level 07/14/2023     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     Peripheral edema 07/14/2023     Positive urine culture 07/14/2023     Medication induced coagulopathy  (H24) 07/14/2023     Chronic diastolic (congestive) heart failure (H) 03/31/2023     Altered mental status, unspecified altered mental status type 07/23/2023     Clostridium difficile diarrhea 07/23/2023     History of Clostridium difficile infection July 2023 07/25/2023     Stage 3a chronic kidney disease (H) 07/25/2023     Esophageal dysmotility 07/27/2023     DEJUAN (acute kidney injury) (H24) 08/17/2023     Moderate malnutrition (H24) 08/17/2023     Schatzki's ring of distal esophagus-dilated 8/19/23 08/17/2023     Acute gout  involving toe of left foot 08/17/2023     Unintentional weight loss 08/18/2023     Abnormal esophagram 08/18/2023     Hypokalemia 08/19/2023     Hyponatremia 08/19/2023     Open wound-coccyx/buttocks 08/23/2023     Enterocolitis due to Clostridium difficile, not specified as recurrent 07/27/2023     Other chronic pain 07/19/2023     Pain in left shoulder 07/19/2023     Pain in right shoulder 07/19/2023     Unspecified streptococcus as the cause of diseases classified elsewhere 07/19/2023     Esophageal dysphagia 08/11/2023     Hip fracture, left, closed, initial encounter (H) 01/22/2024     Acute kidney failure, unspecified (H24) 08/24/2023     Amyloidosis (H) 01/25/2024     Monoclonal gammopathy of unknown significance (MGUS) 01/25/2024     Hypotension, unspecified hypotension type 01/25/2024     Postoperative anemia due to acute blood loss 01/25/2024     Resolved Ambulatory Problems     Diagnosis Date Noted     Obesity, morbid, BMI 40.0-49.9 (H) 11/07/2017     Past Medical History:   Diagnosis Date     Colonic diverticulum      Hyperlipidemia      Hypertension      Obesity (BMI 30-39.9)      Osteoarthritis      Type 2 diabetes mellitus (H)      No Known Allergies    All Meds and Allergies reviewed in the record at the facility and is the most up-to-date.    Current Outpatient Medications   Medication Sig     acetaminophen (TYLENOL) 325 MG tablet Take 3 tablets (975 mg) by mouth 3 times daily     amiodarone (PACERONE) 200 MG tablet Take 1 tablet (200 mg) by mouth daily     atorvastatin (LIPITOR) 20 MG tablet Take 20 mg by mouth At Bedtime     busPIRone (BUSPAR) 5 MG tablet Take 1 tablet (5 mg) by mouth 3 times daily     diclofenac (VOLTAREN) 1 % topical gel Apply 2 g topically 3 times daily shoulders     ELIQUIS ANTICOAGULANT 5 MG tablet Take 5 mg by mouth 2 times daily     famotidine (PEPCID) 20 MG tablet Take 1 tablet (20 mg) by mouth daily     lidocaine (XYLOCAINE) 5 % external ointment Apply topically 3 times  daily     Magnesium Oxide 250 MG TABS Take 250 mg by mouth daily     metoprolol succinate ER (TOPROL XL) 25 MG 24 hr tablet Take 25 mg by mouth daily Hold if SBP < 105.     potassium chloride ER (K-TAB/KLOR-CON) 10 MEQ CR tablet Take 20 mEq by mouth daily     senna-docusate (SENOKOT-S/PERICOLACE) 8.6-50 MG tablet Take 2 tablets by mouth 2 times daily     torsemide (DEMADEX) 20 MG tablet Take 1 tablet (20 mg) by mouth daily     traMADol (ULTRAM) 50 MG tablet Take 0.5 tablets (25 mg) by mouth at bedtime. May also take 0.5 tablets (25 mg) daily as needed for severe pain. (Patient taking differently: Take 0.5 tablets (25 mg) by mouth TID)     No current facility-administered medications for this visit.       REVIEW OF SYSTEMS:   10 point review of systems reviewed and pertinent positives in the HPI.     PHYSICAL EXAMINATION:  Physical Exam     Vital signs: BP (!) 82/52   Pulse 87   Temp 97.9  F (36.6  C)   Resp 18   Ht 1.829 m (6')   Wt 97.4 kg (214 lb 12.8 oz)   SpO2 97%   BMI 29.13 kg/m    General: Awake, Alert, oriented x3, lying in bed, follows simple commands, conversant  HEENT:Pink conjunctiva, moist oral mucosa  NECK: Supple  CVS:  S1  S2, without murmur or gallop.   LUNG: Clear to auscultation, No wheezes, rales or rhonci.  BACK: No kyphosis of the thoracic spine  ABDOMEN: Soft, nontender to palpation, with positive bowel sounds  EXTREMITIES: Moves both upper and lower extremities with generalized weakness and some limitation to left lower extremity, trace pedal edema on the operative side.   NEUROLOGIC: Intact, pulses palpable  PSYCHIATRIC: Mild cognitive impairment noted. .      Labs:  All labs reviewed in the nursing home record and [facility]   @  Lab Results   Component Value Date    WBC 7.7 02/02/2024    WBC 9.0 12/30/2017     Lab Results   Component Value Date    RBC 3.24 02/02/2024    RBC 4.16 12/30/2017     Lab Results   Component Value Date    HGB 9.5 02/02/2024    HGB 12.6 12/30/2017     Lab Results    Component Value Date    HCT 29.6 02/02/2024    HCT 37.1 12/30/2017     Lab Results   Component Value Date    MCV 91 02/02/2024    MCV 89 12/30/2017     Lab Results   Component Value Date    MCH 29.3 02/02/2024    MCH 30.3 12/30/2017     Lab Results   Component Value Date    MCHC 32.1 02/02/2024    MCHC 34.0 12/30/2017     Lab Results   Component Value Date    RDW 15.5 02/02/2024    RDW 13.8 12/30/2017     Lab Results   Component Value Date     02/02/2024     12/30/2017        @Last Comprehensive Metabolic Panel:  Sodium   Date Value Ref Range Status   02/12/2024 140 135 - 145 mmol/L Final     Comment:     Reference intervals for this test were updated on 09/26/2023 to more accurately reflect our healthy population. There may be differences in the flagging of prior results with similar values performed with this method. Interpretation of those prior results can be made in the context of the updated reference intervals.    12/30/2017 138 133 - 144 mmol/L Final     Potassium   Date Value Ref Range Status   02/12/2024 3.4 3.4 - 5.3 mmol/L Final   07/15/2022 3.7 3.4 - 5.3 mmol/L Final   12/31/2017 3.5 3.4 - 5.3 mmol/L Final     Chloride   Date Value Ref Range Status   02/12/2024 102 98 - 107 mmol/L Final   07/15/2022 106 94 - 109 mmol/L Final   12/30/2017 104 94 - 109 mmol/L Final     Carbon Dioxide   Date Value Ref Range Status   12/30/2017 26 20 - 32 mmol/L Final     Carbon Dioxide (CO2)   Date Value Ref Range Status   02/12/2024 27 22 - 29 mmol/L Final   07/15/2022 24 20 - 32 mmol/L Final     Anion Gap   Date Value Ref Range Status   02/12/2024 11 7 - 15 mmol/L Final   07/15/2022 9 3 - 14 mmol/L Final   12/30/2017 8 3 - 14 mmol/L Final     Glucose   Date Value Ref Range Status   02/12/2024 101 (H) 70 - 99 mg/dL Final   07/15/2022 97 70 - 99 mg/dL Final   12/30/2017 145 (H) 70 - 99 mg/dL Final     GLUCOSE BY METER POCT   Date Value Ref Range Status   02/02/2024 120 (H) 70 - 99 mg/dL Final     Urea  Nitrogen   Date Value Ref Range Status   02/12/2024 22.9 8.0 - 23.0 mg/dL Final   07/15/2022 13 7 - 30 mg/dL Final   12/31/2017 26 7 - 30 mg/dL Final     Creatinine   Date Value Ref Range Status   02/12/2024 1.67 (H) 0.67 - 1.17 mg/dL Final   12/31/2017 1.11 0.66 - 1.25 mg/dL Final     GFR Estimate   Date Value Ref Range Status   02/12/2024 39 (L) >60 mL/min/1.73m2 Final   12/31/2017 63 >60 mL/min/1.7m2 Final     Comment:     Non  GFR Calc     Calcium   Date Value Ref Range Status   02/12/2024 8.7 (L) 8.8 - 10.2 mg/dL Final   12/30/2017 7.9 (L) 8.5 - 10.1 mg/dL Final       This note has been dictated using voice recognition software. Any grammatical or context distortions are unintentional and inherent to the software    Electronically signed by: Grace Parry, TASHI       Sincerely,        Grace Parry, NP

## 2024-02-14 ENCOUNTER — TRANSITIONAL CARE UNIT VISIT (OUTPATIENT)
Dept: GERIATRICS | Facility: CLINIC | Age: 89
End: 2024-02-14
Payer: COMMERCIAL

## 2024-02-14 VITALS
BODY MASS INDEX: 29.09 KG/M2 | TEMPERATURE: 97.5 F | RESPIRATION RATE: 17 BRPM | HEART RATE: 81 BPM | DIASTOLIC BLOOD PRESSURE: 75 MMHG | OXYGEN SATURATION: 98 % | SYSTOLIC BLOOD PRESSURE: 110 MMHG | WEIGHT: 214.8 LBS | HEIGHT: 72 IN

## 2024-02-14 DIAGNOSIS — I50.32 CHRONIC DIASTOLIC (CONGESTIVE) HEART FAILURE (H): ICD-10-CM

## 2024-02-14 DIAGNOSIS — I48.0 PAROXYSMAL ATRIAL FIBRILLATION WITH RVR (H): ICD-10-CM

## 2024-02-14 DIAGNOSIS — Z87.81 STATUS POST OPEN REDUCTION AND INTERNAL FIXATION (ORIF) OF FRACTURE: ICD-10-CM

## 2024-02-14 DIAGNOSIS — Z98.890 STATUS POST OPEN REDUCTION AND INTERNAL FIXATION (ORIF) OF FRACTURE: ICD-10-CM

## 2024-02-14 DIAGNOSIS — D62 ANEMIA DUE TO BLOOD LOSS, ACUTE: ICD-10-CM

## 2024-02-14 DIAGNOSIS — E11.42 TYPE 2 DIABETES MELLITUS WITH DIABETIC POLYNEUROPATHY, WITHOUT LONG-TERM CURRENT USE OF INSULIN (H): ICD-10-CM

## 2024-02-14 DIAGNOSIS — I95.2 HYPOTENSION DUE TO DRUGS: ICD-10-CM

## 2024-02-14 DIAGNOSIS — S72.22XD CLOSED DISPLACED SUBTROCHANTERIC FRACTURE OF LEFT FEMUR WITH ROUTINE HEALING, SUBSEQUENT ENCOUNTER: Primary | ICD-10-CM

## 2024-02-14 LAB
ANION GAP SERPL CALCULATED.3IONS-SCNC: 11 MMOL/L (ref 7–15)
BUN SERPL-MCNC: 25.7 MG/DL (ref 8–23)
CALCIUM SERPL-MCNC: 9.2 MG/DL (ref 8.8–10.2)
CHLORIDE SERPL-SCNC: 101 MMOL/L (ref 98–107)
CREAT SERPL-MCNC: 1.47 MG/DL (ref 0.67–1.17)
DEPRECATED HCO3 PLAS-SCNC: 26 MMOL/L (ref 22–29)
EGFRCR SERPLBLD CKD-EPI 2021: 45 ML/MIN/1.73M2
GLUCOSE SERPL-MCNC: 104 MG/DL (ref 70–99)
POTASSIUM SERPL-SCNC: 3.6 MMOL/L (ref 3.4–5.3)
SODIUM SERPL-SCNC: 138 MMOL/L (ref 135–145)

## 2024-02-14 PROCEDURE — 36415 COLL VENOUS BLD VENIPUNCTURE: CPT | Mod: ORL | Performed by: NURSE PRACTITIONER

## 2024-02-14 PROCEDURE — 80048 BASIC METABOLIC PNL TOTAL CA: CPT | Mod: ORL | Performed by: NURSE PRACTITIONER

## 2024-02-14 PROCEDURE — 99310 SBSQ NF CARE HIGH MDM 45: CPT | Performed by: NURSE PRACTITIONER

## 2024-02-14 PROCEDURE — P9604 ONE-WAY ALLOW PRORATED TRIP: HCPCS | Mod: ORL | Performed by: NURSE PRACTITIONER

## 2024-02-14 RX ORDER — MIDODRINE HYDROCHLORIDE 2.5 MG/1
2.5 TABLET ORAL DAILY
Status: ON HOLD | COMMUNITY
End: 2024-06-21

## 2024-02-14 NOTE — LETTER
2/14/2024        RE: Alvin Newton  [address] [facility] [id number]         [facility]    Code Status:  DNR   Visit Type:   Chief Complaint   Patient presents with     TCU Follow Up     Facility:  [facility] ([facility]) [[id number]]         HPI: Alvin Newton is a 89 year old male who I am seeing today for follow up on the TCU.  Past medical history includes type 2 diabetes mellitus with diabetic polyneuropathy, proximal atrial fibs on Eliquis, bilateral hip replacement 33 years ago, HFpEF, type 2 diabetes mellitus, hypertension, severe aortic stenosis and mild cognitive impairment.  Patient admitted post fall with left femur fracture.  Patient had a mechanical fall in which she was trying to reach for his cane for stability but put his weight on his grabber device instead and fell forward.  He did not hit his head or lose consciousness.  Head CT was negative.  X-ray of the pelvis/left hip revealed a left proximal femoral fracture.  History of bilateral hip replacements about 33 years ago.  Patient underwent repair on 1/24/2024.  He had a complicated surgery in the setting of bilateral hip replacements.  He lost 3 L of blood.  He did require pressor support.  He was also transfused on 1/26.  Patient found to have a mildly low a.m. cortisol level most likely due to a component of adrenal insufficiency.  Steroids were done for couple days along with midodrine.  BP improved.  Steroids were discontinued on 1/30.  Pain controlled with tramadol and Tylenol.  Patient initially had a Prevena wound VAC however 1 day later during his TCU stay VAC was not working appropriately.  Ortho updated and this was discontinued and dry sterile dressing applied.  Patient also experienced postoperative delirium.  He is very anxious and tearful.  No agitation.  He did require one-on-one.  He was given BuSpar to help with anxiety.  Delirium improved.  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  Severe aortic stenosis.  Cardiac MRI in October concerning for infiltrative cardiomyopathy.  Workup for amyloidosis in process by cardiology in [place] [facility].  Biopsies of the bone marrow and fat pad were negative for AL amyloid.  Plan at this time is to follow-up with cardiology regarding possible myocardial biopsy and continued amyloid/infiltrative cardiomyopathy workup.  History of proximal atrial fibs on Eliquis.  Patient did have episode of RVR after procedure which resolved.  Acute kidney injury on CKD stage III.  Creatinine 1.34 admission.  Creatinine did bump to 1.74 but improved with fluids.  Type 2 diabetes without long-term use of insulin.  Most recent hemoglobin A1c 6.8%.  Stress leukocytosis with a white blood cell count of 13 resolved.    Transitional Care Course: Today patient sitting up in bedside chair. His daughter (Fuad) is present on exam.  Recent femur fracture s/p ORIF. Pt has had ongoing issue with hypotension worsened with standing. Pt metoprolol and torsemide has been held X 3 days. Today I was phoned by nursing staff prior to my arrival that pt was in therapy and bp dropped to 90s SBP and 50s DBP. He became very SOB, and dizziness. He was placed in bedside chair with feet up. Fluids were given orally. On exam he reports continued dizziness at times. His daughter reports he had like episodes at his last TCU. His appetite varies. Hypovolemic with Creatine 1.67. He has no LE edema. He denies any SOB or CP at rest. Lungs are CTA. He has underlying CHF. He was given midodrine and steroids in hospital for Bp support. I did discuss with his daughter today. Pain appears to be better controlled with tramadol. Daughter is asking to decrease to BID. Questionable if dizziness is related.       Assessment/Plan:     Closed displaced subtrochanteric fracture of left femur with routine healing, subsequent encounter  Status post open reduction and internal  fixation (ORIF) of fracture  -History of bilateral hip replacement 33 years old.  -Dry sterile dressing to be changed daily.  -Weightbearing as tolerated.  -Decrease Tramadol to 25 mg BID.   -Continue tylenol 975 mg TID.     Hypotension   -pt treated with midodrine and steroids in hospital.   -Bp in the 90s systolic. Suspect poor oral intake. Hypovolemia.   -pt with dizziness.   -Continue to hold torsemide and potassium.   -Hold parameters on Metoprolol.   -Follow up BMP in am.   -Give additional 240cc with each med pass.   -Start midodrine 2.5 mg daily.   -TEDS for bp support.     Anemia due to blood loss, acute  -EBL of 3 L.  -Patient underwent transfusion of 1/26/2024.  -Follow-up CBC with hgb 9.5.     Other amyloidosis (H)  Chronic diastolic (congestive) heart failure (H)  Hypotension   -Workup for amyloidosis in process by cardiology in [place] [facility].  Biopsies of bone marrow and fat pad negative for AL amyloid.  -Follow-up with cardiology regarding possible myocardial biopsy and continued amyloid/infiltrative cardiomyopathy workup.  -Cardiac MRI in October concerning for infiltrative cardiomyopathy.  -Daily weights. Weight down 5 lbs since admit.   -Continue to hold torsemide and potassium.   -monitor for fluid overload.   -CXR 2 view today with no acute process.     Paroxysmal atrial fibrillation with RVR (H)  -Chronically on Eliquis.  -Rate controlled with metoprolol and amiodarone.    Type 2 diabetes mellitus with diabetic polyneuropathy, without long-term current use of insulin (H)  -Recent A1c 6.8%.  -Currently not on medication.    Stage 3a chronic kidney disease (H)  -Baseline 1.3-1.5.   -Pt appears dry. Creatine 1.67.   -Follow up BMP in am.     Stage II Pressure Ulcer, buttock  -apply blayne and foam change every day.   -Ok for air mattress.         Active Ambulatory Problems     Diagnosis Date Noted     NSTEMI (non-ST elevated myocardial infarction) (H) 12/27/2017     BPH with urinary obstruction  06/22/2020     Essential hypertension 04/04/2016     Mixed hyperlipidemia 01/02/2009     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) 12/06/2017     Carpal tunnel syndrome, bilateral 11/18/2021     Fall, initial encounter 06/06/2022     Closed fracture of first lumbar vertebra, unspecified fracture morphology, initial encounter (H) 06/06/2022     Fracture of L1 vertebra (H) 06/06/2022     Impaired fasting glucose 06/20/2022     Osteoarthritis of pelvis 06/20/2022     Other ill-defined and unknown causes of morbidity and mortality 01/01/1985     Primary localized osteoarthrosis of shoulder region 06/20/2022     Pure hypercholesterolemia 01/01/1999     Reason for consultation 06/20/2022     Right bundle branch block 06/20/2022     Cellulitis of right lower extremity 07/11/2023     Severe sepsis (H) 07/11/2023     Septic shock (H) 07/12/2023     Streptococcal bacteremia 07/13/2023     Thrombocytopenia (H24) 07/13/2023     Hyperbilirubinemia 07/13/2023     Hypophosphatemia 07/13/2023     Hypoalbuminemia 07/13/2023     Pyuria 07/13/2023     Paroxysmal atrial fibrillation with RVR (H) 07/13/2023     Hypomagnesemia 07/13/2023     Chronic pain of both shoulders 07/13/2023     Severe aortic stenosis 07/14/2023     Elevated brain natriuretic peptide (BNP) level 07/14/2023     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     Peripheral edema 07/14/2023     Positive urine culture 07/14/2023     Medication induced coagulopathy  (H24) 07/14/2023     Chronic diastolic (congestive) heart failure (H) 03/31/2023     Altered mental status, unspecified altered mental status type 07/23/2023     Clostridium difficile diarrhea 07/23/2023     History of Clostridium difficile infection July 2023 07/25/2023     Stage 3a chronic kidney disease (H) 07/25/2023     Esophageal dysmotility 07/27/2023     DEJUAN (acute kidney injury) (H24) 08/17/2023     Moderate malnutrition (H24) 08/17/2023     Schatzki's ring of distal esophagus-dilated  8/19/23 08/17/2023     Acute gout involving toe of left foot 08/17/2023     Unintentional weight loss 08/18/2023     Abnormal esophagram 08/18/2023     Hypokalemia 08/19/2023     Hyponatremia 08/19/2023     Open wound-coccyx/buttocks 08/23/2023     Enterocolitis due to Clostridium difficile, not specified as recurrent 07/27/2023     Other chronic pain 07/19/2023     Pain in left shoulder 07/19/2023     Pain in right shoulder 07/19/2023     Unspecified streptococcus as the cause of diseases classified elsewhere 07/19/2023     Esophageal dysphagia 08/11/2023     Hip fracture, left, closed, initial encounter (H) 01/22/2024     Acute kidney failure, unspecified (H24) 08/24/2023     Amyloidosis (H) 01/25/2024     Monoclonal gammopathy of unknown significance (MGUS) 01/25/2024     Hypotension, unspecified hypotension type 01/25/2024     Postoperative anemia due to acute blood loss 01/25/2024     Resolved Ambulatory Problems     Diagnosis Date Noted     Obesity, morbid, BMI 40.0-49.9 (H) 11/07/2017     Past Medical History:   Diagnosis Date     Colonic diverticulum      Hyperlipidemia      Hypertension      Obesity (BMI 30-39.9)      Osteoarthritis      Type 2 diabetes mellitus (H)      No Known Allergies    All Meds and Allergies reviewed in the record at the facility and is the most up-to-date.    Current Outpatient Medications   Medication Sig     acetaminophen (TYLENOL) 325 MG tablet Take 3 tablets (975 mg) by mouth 3 times daily     amiodarone (PACERONE) 200 MG tablet Take 1 tablet (200 mg) by mouth daily     atorvastatin (LIPITOR) 20 MG tablet Take 20 mg by mouth At Bedtime     busPIRone (BUSPAR) 5 MG tablet Take 1 tablet (5 mg) by mouth 3 times daily     diclofenac (VOLTAREN) 1 % topical gel Apply 2 g topically 3 times daily shoulders     ELIQUIS ANTICOAGULANT 5 MG tablet Take 5 mg by mouth 2 times daily     famotidine (PEPCID) 20 MG tablet Take 1 tablet (20 mg) by mouth daily     lidocaine (XYLOCAINE) 5 % external  ointment Apply topically 3 times daily     Magnesium Oxide 250 MG TABS Take 250 mg by mouth daily     metoprolol succinate ER (TOPROL XL) 25 MG 24 hr tablet Take 25 mg by mouth daily Hold if SBP < 105.     potassium chloride ER (K-TAB/KLOR-CON) 10 MEQ CR tablet Take 20 mEq by mouth daily     senna-docusate (SENOKOT-S/PERICOLACE) 8.6-50 MG tablet Take 2 tablets by mouth 2 times daily     torsemide (DEMADEX) 20 MG tablet Take 1 tablet (20 mg) by mouth daily     traMADol (ULTRAM) 50 MG tablet Take 0.5 tablets (25 mg) by mouth at bedtime. May also take 0.5 tablets (25 mg) daily as needed for severe pain. (Patient taking differently: Take 0.5 tablets (25 mg) by mouth TID)     No current facility-administered medications for this visit.       REVIEW OF SYSTEMS:   10 point review of systems reviewed and pertinent positives in the HPI.     PHYSICAL EXAMINATION:  Physical Exam     Vital signs: /75   Pulse 81   Temp 97.5  F (36.4  C)   Resp 17   Ht 1.829 m (6')   Wt 97.4 kg (214 lb 12.8 oz)   SpO2 98%   BMI 29.13 kg/m    General: Awake, Alert, oriented x3, lying in bed, follows simple commands, conversant  HEENT:Pink conjunctiva, moist oral mucosa  NECK: Supple  CVS:  S1  S2, without murmur or gallop.   LUNG: Clear to auscultation, No wheezes, rales or rhonci.  BACK: No kyphosis of the thoracic spine  ABDOMEN: Soft, nontender to palpation, with positive bowel sounds  EXTREMITIES: Moves both upper and lower extremities with generalized weakness and some limitation to left lower extremity, trace pedal edema on the operative side.   NEUROLOGIC: Intact, pulses palpable  PSYCHIATRIC: Mild cognitive impairment noted. .      Labs:  All labs reviewed in the nursing home record and [facility]   @  Lab Results   Component Value Date    WBC 7.7 02/02/2024    WBC 9.0 12/30/2017     Lab Results   Component Value Date    RBC 3.24 02/02/2024    RBC 4.16 12/30/2017     Lab Results   Component Value Date    HGB 9.5 02/02/2024    HGB  12.6 12/30/2017     Lab Results   Component Value Date    HCT 29.6 02/02/2024    HCT 37.1 12/30/2017     Lab Results   Component Value Date    MCV 91 02/02/2024    MCV 89 12/30/2017     Lab Results   Component Value Date    MCH 29.3 02/02/2024    MCH 30.3 12/30/2017     Lab Results   Component Value Date    MCHC 32.1 02/02/2024    MCHC 34.0 12/30/2017     Lab Results   Component Value Date    RDW 15.5 02/02/2024    RDW 13.8 12/30/2017     Lab Results   Component Value Date     02/02/2024     12/30/2017        @Last Comprehensive Metabolic Panel:  Sodium   Date Value Ref Range Status   02/14/2024 138 135 - 145 mmol/L Final     Comment:     Reference intervals for this test were updated on 09/26/2023 to more accurately reflect our healthy population. There may be differences in the flagging of prior results with similar values performed with this method. Interpretation of those prior results can be made in the context of the updated reference intervals.    12/30/2017 138 133 - 144 mmol/L Final     Potassium   Date Value Ref Range Status   02/14/2024 3.6 3.4 - 5.3 mmol/L Final   07/15/2022 3.7 3.4 - 5.3 mmol/L Final   12/31/2017 3.5 3.4 - 5.3 mmol/L Final     Chloride   Date Value Ref Range Status   02/14/2024 101 98 - 107 mmol/L Final   07/15/2022 106 94 - 109 mmol/L Final   12/30/2017 104 94 - 109 mmol/L Final     Carbon Dioxide   Date Value Ref Range Status   12/30/2017 26 20 - 32 mmol/L Final     Carbon Dioxide (CO2)   Date Value Ref Range Status   02/14/2024 26 22 - 29 mmol/L Final   07/15/2022 24 20 - 32 mmol/L Final     Anion Gap   Date Value Ref Range Status   02/14/2024 11 7 - 15 mmol/L Final   07/15/2022 9 3 - 14 mmol/L Final   12/30/2017 8 3 - 14 mmol/L Final     Glucose   Date Value Ref Range Status   02/14/2024 104 (H) 70 - 99 mg/dL Final   07/15/2022 97 70 - 99 mg/dL Final   12/30/2017 145 (H) 70 - 99 mg/dL Final     GLUCOSE BY METER POCT   Date Value Ref Range Status   02/02/2024 120 (H) 70  - 99 mg/dL Final     Urea Nitrogen   Date Value Ref Range Status   02/14/2024 25.7 (H) 8.0 - 23.0 mg/dL Final   07/15/2022 13 7 - 30 mg/dL Final   12/31/2017 26 7 - 30 mg/dL Final     Creatinine   Date Value Ref Range Status   02/14/2024 1.47 (H) 0.67 - 1.17 mg/dL Final   12/31/2017 1.11 0.66 - 1.25 mg/dL Final     GFR Estimate   Date Value Ref Range Status   02/14/2024 45 (L) >60 mL/min/1.73m2 Final   12/31/2017 63 >60 mL/min/1.7m2 Final     Comment:     Non  GFR Calc     Calcium   Date Value Ref Range Status   02/14/2024 9.2 8.8 - 10.2 mg/dL Final   12/30/2017 7.9 (L) 8.5 - 10.1 mg/dL Final       This note has been dictated using voice recognition software. Any grammatical or context distortions are unintentional and inherent to the software    Electronically signed by: Grace Parry, TASHI       Sincerely,        Grace Parry, NP

## 2024-02-14 NOTE — PROGRESS NOTES
[facility] GERIATRIC SERVICES    Code Status:  DNR   Visit Type:   Chief Complaint   Patient presents with    TCU Follow Up     Facility:  [facility] ([facility]) [[id number]]         HPI: Alvin Newton is a 89 year old male who I am seeing today for follow up on the TCU.  Past medical history includes type 2 diabetes mellitus with diabetic polyneuropathy, proximal atrial fibs on Eliquis, bilateral hip replacement 33 years ago, HFpEF, type 2 diabetes mellitus, hypertension, severe aortic stenosis and mild cognitive impairment.  Patient admitted post fall with left femur fracture.  Patient had a mechanical fall in which she was trying to reach for his cane for stability but put his weight on his grabber device instead and fell forward.  He did not hit his head or lose consciousness.  Head CT was negative.  X-ray of the pelvis/left hip revealed a left proximal femoral fracture.  History of bilateral hip replacements about 33 years ago.  Patient underwent repair on 1/24/2024.  He had a complicated surgery in the setting of bilateral hip replacements.  He lost 3 L of blood.  He did require pressor support.  He was also transfused on 1/26.  Patient found to have a mildly low a.m. cortisol level most likely due to a component of adrenal insufficiency.  Steroids were done for couple days along with midodrine.  BP improved.  Steroids were discontinued on 1/30.  Pain controlled with tramadol and Tylenol.  Patient initially had a Prevena wound VAC however 1 day later during his TCU stay VAC was not working appropriately.  Ortho updated and this was discontinued and dry sterile dressing applied.  Patient also experienced postoperative delirium.  He is very anxious and tearful.  No agitation.  He did require one-on-one.  He was given BuSpar to help with anxiety.  Delirium improved.  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  Severe aortic stenosis.  Cardiac MRI in October  concerning for infiltrative cardiomyopathy.  Workup for amyloidosis in process by cardiology in [place] [facility].  Biopsies of the bone marrow and fat pad were negative for AL amyloid.  Plan at this time is to follow-up with cardiology regarding possible myocardial biopsy and continued amyloid/infiltrative cardiomyopathy workup.  History of proximal atrial fibs on Eliquis.  Patient did have episode of RVR after procedure which resolved.  Acute kidney injury on CKD stage III.  Creatinine 1.34 admission.  Creatinine did bump to 1.74 but improved with fluids.  Type 2 diabetes without long-term use of insulin.  Most recent hemoglobin A1c 6.8%.  Stress leukocytosis with a white blood cell count of 13 resolved.    Transitional Care Course: Today patient lying in bed. Recent femur fracture. Nurse manager and PT director reported conversation with pt daughter's in regards to pain management. Earlier today the nurse caring for the pt reported pt refusing pain med. Therapy reports when pain controlled pt able to participate in therapy and make progress. I did speak with pt daughter Fuad in regards to above. She would like therapy times to coordinated with pain meds. Pt appears to be tolerating pain med. He is awake and alert. He has had some ongoing hypotension. His torsemide is currently on hold. His metoprolol was held by nursing this morning. BP at rest in the 90s systolic. He did drop to 80s systolic in therapy. Fluids are being encouraged. Pt does have hx of CHF so will need close monitoring. No SOB or CP. Creatine up to 1.67. Pt daughter reporting pt often does not eat well in TCU. He has been in mx over the last 6 months. Nursing staff also reported stage II PU to buttock. Daughter reports he had this in the TCU prior to this.     Assessment/Plan:     Closed displaced subtrochanteric fracture of left femur with routine healing, subsequent encounter  Status post open reduction and internal fixation (ORIF) of  fracture  -History of bilateral hip replacement 33 years old.  -Dry sterile dressing to be changed daily.  -Weightbearing as tolerated.  -Tramadol to 25 mg TID.   -Continue tylenol 975 mg TID.     Hypotension   -pt treated with midodrine and steroids in hospital.   -Bp in the 90s systolic. Suspect poor oral intake.   -pt with dizziness.   -Continue to hold torsemide and potassium.   -Hold parameters on Metoprolol.   -monitor.   -Follow up BMP in am.   -Give additional 240cc with each med pass.     Anemia due to blood loss, acute  -EBL of 3 L.  -Patient underwent transfusion of 1/26/2024.  -Follow-up CBC with hgb 9.5.     Other amyloidosis (H)  Chronic diastolic (congestive) heart failure (H)  Hypotension   -Workup for amyloidosis in process by cardiology in [place] [facility].  Biopsies of bone marrow and fat pad negative for AL amyloid.  -Follow-up with cardiology regarding possible myocardial biopsy and continued amyloid/infiltrative cardiomyopathy workup.  -Cardiac MRI in October concerning for infiltrative cardiomyopathy.  -Daily weights. Weight down 5 lbs since admit.   -Continue to hold torsemide and potassium.   -monitor for fluid overload.     Paroxysmal atrial fibrillation with RVR (H)  -Chronically on Eliquis.  -Rate controlled with metoprolol and amiodarone.    Type 2 diabetes mellitus with diabetic polyneuropathy, without long-term current use of insulin (H)  -Recent A1c 6.8%.  -Currently not on medication.    Stage 3a chronic kidney disease (H)  -Baseline 1.3-1.5.   -Pt appears dry. Creatine 1.67.   -Follow up BMP in am.     Stage II Pressure Ulcer, buttock  -apply blayne and foam change every day.   -Ok for air mattress.         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/22/2020    Essential hypertension 04/04/2016    Mixed hyperlipidemia 01/02/2009    Type 2 diabetes mellitus with diabetic  polyneuropathy (H) 12/06/2017    Carpal tunnel syndrome, bilateral 11/18/2021    Fall, initial encounter 06/06/2022    Closed fracture of first lumbar vertebra, unspecified fracture morphology, initial encounter (H) 06/06/2022    Fracture of L1 vertebra (H) 06/06/2022    Impaired fasting glucose 06/20/2022    Osteoarthritis of pelvis 06/20/2022    Other ill-defined and unknown causes of morbidity and mortality 01/01/1985    Primary localized osteoarthrosis of shoulder region 06/20/2022    Pure hypercholesterolemia 01/01/1999    Reason for consultation 06/20/2022    Right bundle branch block 06/20/2022    Cellulitis of right lower extremity 07/11/2023    Severe sepsis (H) 07/11/2023    Septic shock (H) 07/12/2023    Streptococcal bacteremia 07/13/2023    Thrombocytopenia (H24) 07/13/2023    Hyperbilirubinemia 07/13/2023    Hypophosphatemia 07/13/2023    Hypoalbuminemia 07/13/2023    Pyuria 07/13/2023    Paroxysmal atrial fibrillation with RVR (H) 07/13/2023    Hypomagnesemia 07/13/2023    Chronic pain of both shoulders 07/13/2023    Severe aortic stenosis 07/14/2023    Elevated brain natriuretic peptide (BNP) level 07/14/2023    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    Peripheral edema 07/14/2023    Positive urine culture 07/14/2023    Medication induced coagulopathy  (H24) 07/14/2023    Chronic diastolic (congestive) heart failure (H) 03/31/2023    Altered mental status, unspecified altered mental status type 07/23/2023    Clostridium difficile diarrhea 07/23/2023    History of Clostridium difficile infection July 2023 07/25/2023    Stage 3a chronic kidney disease (H) 07/25/2023    Esophageal dysmotility 07/27/2023    DEJUAN (acute kidney injury) (H24) 08/17/2023    Moderate malnutrition (H24) 08/17/2023    Schatzki's ring of distal esophagus-dilated 8/19/23 08/17/2023    Acute gout involving toe of left foot 08/17/2023    Unintentional weight loss 08/18/2023    Abnormal esophagram 08/18/2023    Hypokalemia  08/19/2023    Hyponatremia 08/19/2023    Open wound-coccyx/buttocks 08/23/2023    Enterocolitis due to Clostridium difficile, not specified as recurrent 07/27/2023    Other chronic pain 07/19/2023    Pain in left shoulder 07/19/2023    Pain in right shoulder 07/19/2023    Unspecified streptococcus as the cause of diseases classified elsewhere 07/19/2023    Esophageal dysphagia 08/11/2023    Hip fracture, left, closed, initial encounter (H) 01/22/2024    Acute kidney failure, unspecified (H24) 08/24/2023    Amyloidosis (H) 01/25/2024    Monoclonal gammopathy of unknown significance (MGUS) 01/25/2024    Hypotension, unspecified hypotension type 01/25/2024    Postoperative anemia due to acute blood loss 01/25/2024     Resolved Ambulatory Problems     Diagnosis Date Noted    Obesity, morbid, BMI 40.0-49.9 (H) 11/07/2017     Past Medical History:   Diagnosis Date    Colonic diverticulum     Hyperlipidemia     Hypertension     Obesity (BMI 30-39.9)     Osteoarthritis     Type 2 diabetes mellitus (H)      No Known Allergies    All Meds and Allergies reviewed in the record at the facility and is the most up-to-date.    Current Outpatient Medications   Medication Sig    acetaminophen (TYLENOL) 325 MG tablet Take 3 tablets (975 mg) by mouth 3 times daily    amiodarone (PACERONE) 200 MG tablet Take 1 tablet (200 mg) by mouth daily    atorvastatin (LIPITOR) 20 MG tablet Take 20 mg by mouth At Bedtime    busPIRone (BUSPAR) 5 MG tablet Take 1 tablet (5 mg) by mouth 3 times daily    diclofenac (VOLTAREN) 1 % topical gel Apply 2 g topically 3 times daily shoulders    ELIQUIS ANTICOAGULANT 5 MG tablet Take 5 mg by mouth 2 times daily    famotidine (PEPCID) 20 MG tablet Take 1 tablet (20 mg) by mouth daily    lidocaine (XYLOCAINE) 5 % external ointment Apply topically 3 times daily    Magnesium Oxide 250 MG TABS Take 250 mg by mouth daily    metoprolol succinate ER (TOPROL XL) 25 MG 24 hr tablet Take 25 mg by mouth daily Hold if SBP  < 105.    potassium chloride ER (K-TAB/KLOR-CON) 10 MEQ CR tablet Take 20 mEq by mouth daily    senna-docusate (SENOKOT-S/PERICOLACE) 8.6-50 MG tablet Take 2 tablets by mouth 2 times daily    torsemide (DEMADEX) 20 MG tablet Take 1 tablet (20 mg) by mouth daily    traMADol (ULTRAM) 50 MG tablet Take 0.5 tablets (25 mg) by mouth at bedtime. May also take 0.5 tablets (25 mg) daily as needed for severe pain. (Patient taking differently: Take 0.5 tablets (25 mg) by mouth TID)     No current facility-administered medications for this visit.       REVIEW OF SYSTEMS:   10 point review of systems reviewed and pertinent positives in the HPI.     PHYSICAL EXAMINATION:  Physical Exam     Vital signs: BP (!) 82/52   Pulse 87   Temp 97.9  F (36.6  C)   Resp 18   Ht 1.829 m (6')   Wt 97.4 kg (214 lb 12.8 oz)   SpO2 97%   BMI 29.13 kg/m    General: Awake, Alert, oriented x3, lying in bed, follows simple commands, conversant  HEENT:Pink conjunctiva, moist oral mucosa  NECK: Supple  CVS:  S1  S2, without murmur or gallop.   LUNG: Clear to auscultation, No wheezes, rales or rhonci.  BACK: No kyphosis of the thoracic spine  ABDOMEN: Soft, nontender to palpation, with positive bowel sounds  EXTREMITIES: Moves both upper and lower extremities with generalized weakness and some limitation to left lower extremity, trace pedal edema on the operative side.   NEUROLOGIC: Intact, pulses palpable  PSYCHIATRIC: Mild cognitive impairment noted. .      Labs:  All labs reviewed in the nursing home record and [facility]   @  Lab Results   Component Value Date    WBC 7.7 02/02/2024    WBC 9.0 12/30/2017     Lab Results   Component Value Date    RBC 3.24 02/02/2024    RBC 4.16 12/30/2017     Lab Results   Component Value Date    HGB 9.5 02/02/2024    HGB 12.6 12/30/2017     Lab Results   Component Value Date    HCT 29.6 02/02/2024    HCT 37.1 12/30/2017     Lab Results   Component Value Date    MCV 91 02/02/2024    MCV 89 12/30/2017     Lab Results    Component Value Date    MCH 29.3 02/02/2024    MCH 30.3 12/30/2017     Lab Results   Component Value Date    MCHC 32.1 02/02/2024    MCHC 34.0 12/30/2017     Lab Results   Component Value Date    RDW 15.5 02/02/2024    RDW 13.8 12/30/2017     Lab Results   Component Value Date     02/02/2024     12/30/2017        @Last Comprehensive Metabolic Panel:  Sodium   Date Value Ref Range Status   02/12/2024 140 135 - 145 mmol/L Final     Comment:     Reference intervals for this test were updated on 09/26/2023 to more accurately reflect our healthy population. There may be differences in the flagging of prior results with similar values performed with this method. Interpretation of those prior results can be made in the context of the updated reference intervals.    12/30/2017 138 133 - 144 mmol/L Final     Potassium   Date Value Ref Range Status   02/12/2024 3.4 3.4 - 5.3 mmol/L Final   07/15/2022 3.7 3.4 - 5.3 mmol/L Final   12/31/2017 3.5 3.4 - 5.3 mmol/L Final     Chloride   Date Value Ref Range Status   02/12/2024 102 98 - 107 mmol/L Final   07/15/2022 106 94 - 109 mmol/L Final   12/30/2017 104 94 - 109 mmol/L Final     Carbon Dioxide   Date Value Ref Range Status   12/30/2017 26 20 - 32 mmol/L Final     Carbon Dioxide (CO2)   Date Value Ref Range Status   02/12/2024 27 22 - 29 mmol/L Final   07/15/2022 24 20 - 32 mmol/L Final     Anion Gap   Date Value Ref Range Status   02/12/2024 11 7 - 15 mmol/L Final   07/15/2022 9 3 - 14 mmol/L Final   12/30/2017 8 3 - 14 mmol/L Final     Glucose   Date Value Ref Range Status   02/12/2024 101 (H) 70 - 99 mg/dL Final   07/15/2022 97 70 - 99 mg/dL Final   12/30/2017 145 (H) 70 - 99 mg/dL Final     GLUCOSE BY METER POCT   Date Value Ref Range Status   02/02/2024 120 (H) 70 - 99 mg/dL Final     Urea Nitrogen   Date Value Ref Range Status   02/12/2024 22.9 8.0 - 23.0 mg/dL Final   07/15/2022 13 7 - 30 mg/dL Final   12/31/2017 26 7 - 30 mg/dL Final     Creatinine   Date  Value Ref Range Status   02/12/2024 1.67 (H) 0.67 - 1.17 mg/dL Final   12/31/2017 1.11 0.66 - 1.25 mg/dL Final     GFR Estimate   Date Value Ref Range Status   02/12/2024 39 (L) >60 mL/min/1.73m2 Final   12/31/2017 63 >60 mL/min/1.7m2 Final     Comment:     Non  GFR Calc     Calcium   Date Value Ref Range Status   02/12/2024 8.7 (L) 8.8 - 10.2 mg/dL Final   12/30/2017 7.9 (L) 8.5 - 10.1 mg/dL Final       This note has been dictated using voice recognition software. Any grammatical or context distortions are unintentional and inherent to the software    Electronically signed by: Grace Parry CNP

## 2024-02-15 DIAGNOSIS — S72.002A HIP FRACTURE, LEFT, CLOSED, INITIAL ENCOUNTER (H): ICD-10-CM

## 2024-02-15 NOTE — PROGRESS NOTES
[facility] GERIATRIC SERVICES    Code Status:  DNR   Visit Type:   Chief Complaint   Patient presents with    TCU Follow Up     Facility:  [facility] ([facility]) [[id number]]         HPI: Alvin Newton is a 89 year old male who I am seeing today for follow up on the TCU.  Past medical history includes type 2 diabetes mellitus with diabetic polyneuropathy, proximal atrial fibs on Eliquis, bilateral hip replacement 33 years ago, HFpEF, type 2 diabetes mellitus, hypertension, severe aortic stenosis and mild cognitive impairment.  Patient admitted post fall with left femur fracture.  Patient had a mechanical fall in which she was trying to reach for his cane for stability but put his weight on his grabber device instead and fell forward.  He did not hit his head or lose consciousness.  Head CT was negative.  X-ray of the pelvis/left hip revealed a left proximal femoral fracture.  History of bilateral hip replacements about 33 years ago.  Patient underwent repair on 1/24/2024.  He had a complicated surgery in the setting of bilateral hip replacements.  He lost 3 L of blood.  He did require pressor support.  He was also transfused on 1/26.  Patient found to have a mildly low a.m. cortisol level most likely due to a component of adrenal insufficiency.  Steroids were done for couple days along with midodrine.  BP improved.  Steroids were discontinued on 1/30.  Pain controlled with tramadol and Tylenol.  Patient initially had a Prevena wound VAC however 1 day later during his TCU stay VAC was not working appropriately.  Ortho updated and this was discontinued and dry sterile dressing applied.  Patient also experienced postoperative delirium.  He is very anxious and tearful.  No agitation.  He did require one-on-one.  He was given BuSpar to help with anxiety.  Delirium improved.  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  Severe aortic stenosis.  Cardiac MRI in October  concerning for infiltrative cardiomyopathy.  Workup for amyloidosis in process by cardiology in [place] [facility].  Biopsies of the bone marrow and fat pad were negative for AL amyloid.  Plan at this time is to follow-up with cardiology regarding possible myocardial biopsy and continued amyloid/infiltrative cardiomyopathy workup.  History of proximal atrial fibs on Eliquis.  Patient did have episode of RVR after procedure which resolved.  Acute kidney injury on CKD stage III.  Creatinine 1.34 admission.  Creatinine did bump to 1.74 but improved with fluids.  Type 2 diabetes without long-term use of insulin.  Most recent hemoglobin A1c 6.8%.  Stress leukocytosis with a white blood cell count of 13 resolved.    Transitional Care Course: Today patient sitting up in bedside chair. His daughter (Fuad) is present on exam.  Recent femur fracture s/p ORIF. Pt has had ongoing issue with hypotension worsened with standing. Pt metoprolol and torsemide has been held X 3 days. Today I was phoned by nursing staff prior to my arrival that pt was in therapy and bp dropped to 90s SBP and 50s DBP. He became very SOB, and dizziness. He was placed in bedside chair with feet up. Fluids were given orally. On exam he reports continued dizziness at times. His daughter reports he had like episodes at his last TCU. His appetite varies. Hypovolemic with Creatine 1.67. He has no LE edema. He denies any SOB or CP at rest. Lungs are CTA. He has underlying CHF. He was given midodrine and steroids in hospital for Bp support. I did discuss with his daughter today. Pain appears to be better controlled with tramadol. Daughter is asking to decrease to BID. Questionable if dizziness is related.       Assessment/Plan:     Closed displaced subtrochanteric fracture of left femur with routine healing, subsequent encounter  Status post open reduction and internal fixation (ORIF) of fracture  -History of bilateral hip replacement 33 years old.  -Dry sterile  dressing to be changed daily.  -Weightbearing as tolerated.  -Decrease Tramadol to 25 mg BID.   -Continue tylenol 975 mg TID.     Hypotension   -pt treated with midodrine and steroids in hospital.   -Bp in the 90s systolic. Suspect poor oral intake. Hypovolemia.   -pt with dizziness.   -Continue to hold torsemide and potassium.   -Hold parameters on Metoprolol.   -Follow up BMP in am.   -Give additional 240cc with each med pass.   -Start midodrine 2.5 mg daily.   -TEDS for bp support.     Anemia due to blood loss, acute  -EBL of 3 L.  -Patient underwent transfusion of 1/26/2024.  -Follow-up CBC with hgb 9.5.     Other amyloidosis (H)  Chronic diastolic (congestive) heart failure (H)  Hypotension   -Workup for amyloidosis in process by cardiology in [place] [facility].  Biopsies of bone marrow and fat pad negative for AL amyloid.  -Follow-up with cardiology regarding possible myocardial biopsy and continued amyloid/infiltrative cardiomyopathy workup.  -Cardiac MRI in October concerning for infiltrative cardiomyopathy.  -Daily weights. Weight down 5 lbs since admit.   -Continue to hold torsemide and potassium.   -monitor for fluid overload.   -CXR 2 view today with no acute process.     Paroxysmal atrial fibrillation with RVR (H)  -Chronically on Eliquis.  -Rate controlled with metoprolol and amiodarone.    Type 2 diabetes mellitus with diabetic polyneuropathy, without long-term current use of insulin (H)  -Recent A1c 6.8%.  -Currently not on medication.    Stage 3a chronic kidney disease (H)  -Baseline 1.3-1.5.   -Pt appears dry. Creatine 1.67.   -Follow up BMP in am.     Stage II Pressure Ulcer, buttock  -apply blyane and foam change every day.   -Ok for air mattress.         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/22/2020    Essential hypertension 04/04/2016    Mixed hyperlipidemia 01/02/2009    Type 2  diabetes mellitus with diabetic polyneuropathy (H) 12/06/2017    Carpal tunnel syndrome, bilateral 11/18/2021    Fall, initial encounter 06/06/2022    Closed fracture of first lumbar vertebra, unspecified fracture morphology, initial encounter (H) 06/06/2022    Fracture of L1 vertebra (H) 06/06/2022    Impaired fasting glucose 06/20/2022    Osteoarthritis of pelvis 06/20/2022    Other ill-defined and unknown causes of morbidity and mortality 01/01/1985    Primary localized osteoarthrosis of shoulder region 06/20/2022    Pure hypercholesterolemia 01/01/1999    Reason for consultation 06/20/2022    Right bundle branch block 06/20/2022    Cellulitis of right lower extremity 07/11/2023    Severe sepsis (H) 07/11/2023    Septic shock (H) 07/12/2023    Streptococcal bacteremia 07/13/2023    Thrombocytopenia (H24) 07/13/2023    Hyperbilirubinemia 07/13/2023    Hypophosphatemia 07/13/2023    Hypoalbuminemia 07/13/2023    Pyuria 07/13/2023    Paroxysmal atrial fibrillation with RVR (H) 07/13/2023    Hypomagnesemia 07/13/2023    Chronic pain of both shoulders 07/13/2023    Severe aortic stenosis 07/14/2023    Elevated brain natriuretic peptide (BNP) level 07/14/2023    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    Peripheral edema 07/14/2023    Positive urine culture 07/14/2023    Medication induced coagulopathy  (H24) 07/14/2023    Chronic diastolic (congestive) heart failure (H) 03/31/2023    Altered mental status, unspecified altered mental status type 07/23/2023    Clostridium difficile diarrhea 07/23/2023    History of Clostridium difficile infection July 2023 07/25/2023    Stage 3a chronic kidney disease (H) 07/25/2023    Esophageal dysmotility 07/27/2023    DEJUAN (acute kidney injury) (H24) 08/17/2023    Moderate malnutrition (H24) 08/17/2023    Schatzki's ring of distal esophagus-dilated 8/19/23 08/17/2023    Acute gout involving toe of left foot 08/17/2023    Unintentional weight loss 08/18/2023    Abnormal esophagram  08/18/2023    Hypokalemia 08/19/2023    Hyponatremia 08/19/2023    Open wound-coccyx/buttocks 08/23/2023    Enterocolitis due to Clostridium difficile, not specified as recurrent 07/27/2023    Other chronic pain 07/19/2023    Pain in left shoulder 07/19/2023    Pain in right shoulder 07/19/2023    Unspecified streptococcus as the cause of diseases classified elsewhere 07/19/2023    Esophageal dysphagia 08/11/2023    Hip fracture, left, closed, initial encounter (H) 01/22/2024    Acute kidney failure, unspecified (H24) 08/24/2023    Amyloidosis (H) 01/25/2024    Monoclonal gammopathy of unknown significance (MGUS) 01/25/2024    Hypotension, unspecified hypotension type 01/25/2024    Postoperative anemia due to acute blood loss 01/25/2024     Resolved Ambulatory Problems     Diagnosis Date Noted    Obesity, morbid, BMI 40.0-49.9 (H) 11/07/2017     Past Medical History:   Diagnosis Date    Colonic diverticulum     Hyperlipidemia     Hypertension     Obesity (BMI 30-39.9)     Osteoarthritis     Type 2 diabetes mellitus (H)      No Known Allergies    All Meds and Allergies reviewed in the record at the facility and is the most up-to-date.    Current Outpatient Medications   Medication Sig    acetaminophen (TYLENOL) 325 MG tablet Take 3 tablets (975 mg) by mouth 3 times daily    amiodarone (PACERONE) 200 MG tablet Take 1 tablet (200 mg) by mouth daily    atorvastatin (LIPITOR) 20 MG tablet Take 20 mg by mouth At Bedtime    busPIRone (BUSPAR) 5 MG tablet Take 1 tablet (5 mg) by mouth 3 times daily    diclofenac (VOLTAREN) 1 % topical gel Apply 2 g topically 3 times daily shoulders    ELIQUIS ANTICOAGULANT 5 MG tablet Take 5 mg by mouth 2 times daily    famotidine (PEPCID) 20 MG tablet Take 1 tablet (20 mg) by mouth daily    lidocaine (XYLOCAINE) 5 % external ointment Apply topically 3 times daily    Magnesium Oxide 250 MG TABS Take 250 mg by mouth daily    metoprolol succinate ER (TOPROL XL) 25 MG 24 hr tablet Take 25 mg  by mouth daily Hold if SBP < 105.    potassium chloride ER (K-TAB/KLOR-CON) 10 MEQ CR tablet Take 20 mEq by mouth daily    senna-docusate (SENOKOT-S/PERICOLACE) 8.6-50 MG tablet Take 2 tablets by mouth 2 times daily    torsemide (DEMADEX) 20 MG tablet Take 1 tablet (20 mg) by mouth daily    traMADol (ULTRAM) 50 MG tablet Take 0.5 tablets (25 mg) by mouth at bedtime. May also take 0.5 tablets (25 mg) daily as needed for severe pain. (Patient taking differently: Take 0.5 tablets (25 mg) by mouth TID)     No current facility-administered medications for this visit.       REVIEW OF SYSTEMS:   10 point review of systems reviewed and pertinent positives in the HPI.     PHYSICAL EXAMINATION:  Physical Exam     Vital signs: /75   Pulse 81   Temp 97.5  F (36.4  C)   Resp 17   Ht 1.829 m (6')   Wt 97.4 kg (214 lb 12.8 oz)   SpO2 98%   BMI 29.13 kg/m    General: Awake, Alert, oriented x3, lying in bed, follows simple commands, conversant  HEENT:Pink conjunctiva, moist oral mucosa  NECK: Supple  CVS:  S1  S2, without murmur or gallop.   LUNG: Clear to auscultation, No wheezes, rales or rhonci.  BACK: No kyphosis of the thoracic spine  ABDOMEN: Soft, nontender to palpation, with positive bowel sounds  EXTREMITIES: Moves both upper and lower extremities with generalized weakness and some limitation to left lower extremity, trace pedal edema on the operative side.   NEUROLOGIC: Intact, pulses palpable  PSYCHIATRIC: Mild cognitive impairment noted. .      Labs:  All labs reviewed in the nursing home record and [facility]   @  Lab Results   Component Value Date    WBC 7.7 02/02/2024    WBC 9.0 12/30/2017     Lab Results   Component Value Date    RBC 3.24 02/02/2024    RBC 4.16 12/30/2017     Lab Results   Component Value Date    HGB 9.5 02/02/2024    HGB 12.6 12/30/2017     Lab Results   Component Value Date    HCT 29.6 02/02/2024    HCT 37.1 12/30/2017     Lab Results   Component Value Date    MCV 91 02/02/2024    MCV 89  12/30/2017     Lab Results   Component Value Date    MCH 29.3 02/02/2024    MCH 30.3 12/30/2017     Lab Results   Component Value Date    MCHC 32.1 02/02/2024    MCHC 34.0 12/30/2017     Lab Results   Component Value Date    RDW 15.5 02/02/2024    RDW 13.8 12/30/2017     Lab Results   Component Value Date     02/02/2024     12/30/2017        @Last Comprehensive Metabolic Panel:  Sodium   Date Value Ref Range Status   02/14/2024 138 135 - 145 mmol/L Final     Comment:     Reference intervals for this test were updated on 09/26/2023 to more accurately reflect our healthy population. There may be differences in the flagging of prior results with similar values performed with this method. Interpretation of those prior results can be made in the context of the updated reference intervals.    12/30/2017 138 133 - 144 mmol/L Final     Potassium   Date Value Ref Range Status   02/14/2024 3.6 3.4 - 5.3 mmol/L Final   07/15/2022 3.7 3.4 - 5.3 mmol/L Final   12/31/2017 3.5 3.4 - 5.3 mmol/L Final     Chloride   Date Value Ref Range Status   02/14/2024 101 98 - 107 mmol/L Final   07/15/2022 106 94 - 109 mmol/L Final   12/30/2017 104 94 - 109 mmol/L Final     Carbon Dioxide   Date Value Ref Range Status   12/30/2017 26 20 - 32 mmol/L Final     Carbon Dioxide (CO2)   Date Value Ref Range Status   02/14/2024 26 22 - 29 mmol/L Final   07/15/2022 24 20 - 32 mmol/L Final     Anion Gap   Date Value Ref Range Status   02/14/2024 11 7 - 15 mmol/L Final   07/15/2022 9 3 - 14 mmol/L Final   12/30/2017 8 3 - 14 mmol/L Final     Glucose   Date Value Ref Range Status   02/14/2024 104 (H) 70 - 99 mg/dL Final   07/15/2022 97 70 - 99 mg/dL Final   12/30/2017 145 (H) 70 - 99 mg/dL Final     GLUCOSE BY METER POCT   Date Value Ref Range Status   02/02/2024 120 (H) 70 - 99 mg/dL Final     Urea Nitrogen   Date Value Ref Range Status   02/14/2024 25.7 (H) 8.0 - 23.0 mg/dL Final   07/15/2022 13 7 - 30 mg/dL Final   12/31/2017 26 7 - 30  mg/dL Final     Creatinine   Date Value Ref Range Status   02/14/2024 1.47 (H) 0.67 - 1.17 mg/dL Final   12/31/2017 1.11 0.66 - 1.25 mg/dL Final     GFR Estimate   Date Value Ref Range Status   02/14/2024 45 (L) >60 mL/min/1.73m2 Final   12/31/2017 63 >60 mL/min/1.7m2 Final     Comment:     Non  GFR Calc     Calcium   Date Value Ref Range Status   02/14/2024 9.2 8.8 - 10.2 mg/dL Final   12/30/2017 7.9 (L) 8.5 - 10.1 mg/dL Final       This note has been dictated using voice recognition software. Any grammatical or context distortions are unintentional and inherent to the software    Electronically signed by: Grace Parry, CNP

## 2024-02-16 ENCOUNTER — LAB REQUISITION (OUTPATIENT)
Dept: LAB | Facility: CLINIC | Age: 89
End: 2024-02-16
Payer: COMMERCIAL

## 2024-02-16 DIAGNOSIS — I95.9 HYPOTENSION, UNSPECIFIED: ICD-10-CM

## 2024-02-19 LAB
ANION GAP SERPL CALCULATED.3IONS-SCNC: 12 MMOL/L (ref 7–15)
BUN SERPL-MCNC: 18.1 MG/DL (ref 8–23)
CALCIUM SERPL-MCNC: 9.2 MG/DL (ref 8.8–10.2)
CHLORIDE SERPL-SCNC: 100 MMOL/L (ref 98–107)
CREAT SERPL-MCNC: 1.56 MG/DL (ref 0.67–1.17)
DEPRECATED HCO3 PLAS-SCNC: 26 MMOL/L (ref 22–29)
EGFRCR SERPLBLD CKD-EPI 2021: 42 ML/MIN/1.73M2
ERYTHROCYTE [DISTWIDTH] IN BLOOD BY AUTOMATED COUNT: 16.7 % (ref 10–15)
GLUCOSE SERPL-MCNC: 111 MG/DL (ref 70–99)
HCT VFR BLD AUTO: 36.4 % (ref 40–53)
HGB BLD-MCNC: 11.5 G/DL (ref 13.3–17.7)
MCH RBC QN AUTO: 29.6 PG (ref 26.5–33)
MCHC RBC AUTO-ENTMCNC: 31.6 G/DL (ref 31.5–36.5)
MCV RBC AUTO: 94 FL (ref 78–100)
PLATELET # BLD AUTO: 223 10E3/UL (ref 150–450)
POTASSIUM SERPL-SCNC: 4 MMOL/L (ref 3.4–5.3)
RBC # BLD AUTO: 3.88 10E6/UL (ref 4.4–5.9)
SODIUM SERPL-SCNC: 138 MMOL/L (ref 135–145)
WBC # BLD AUTO: 6.3 10E3/UL (ref 4–11)

## 2024-02-19 PROCEDURE — P9604 ONE-WAY ALLOW PRORATED TRIP: HCPCS | Performed by: NURSE PRACTITIONER

## 2024-02-19 PROCEDURE — 85027 COMPLETE CBC AUTOMATED: CPT | Performed by: NURSE PRACTITIONER

## 2024-02-19 PROCEDURE — 36415 COLL VENOUS BLD VENIPUNCTURE: CPT | Performed by: NURSE PRACTITIONER

## 2024-02-19 PROCEDURE — 80048 BASIC METABOLIC PNL TOTAL CA: CPT | Performed by: NURSE PRACTITIONER

## 2024-02-19 RX ORDER — TRAMADOL HYDROCHLORIDE 50 MG/1
25 TABLET ORAL 2 TIMES DAILY
Qty: 30 TABLET | Refills: 0 | Status: SHIPPED | OUTPATIENT
Start: 2024-02-19 | End: 2024-02-22

## 2024-02-20 ENCOUNTER — LAB REQUISITION (OUTPATIENT)
Dept: LAB | Facility: CLINIC | Age: 89
End: 2024-02-20
Payer: COMMERCIAL

## 2024-02-20 ENCOUNTER — TRANSITIONAL CARE UNIT VISIT (OUTPATIENT)
Dept: GERIATRICS | Facility: CLINIC | Age: 89
End: 2024-02-20

## 2024-02-20 ENCOUNTER — TRANSITIONAL CARE UNIT VISIT (OUTPATIENT)
Dept: GERIATRICS | Facility: CLINIC | Age: 89
End: 2024-02-20
Payer: COMMERCIAL

## 2024-02-20 VITALS
SYSTOLIC BLOOD PRESSURE: 111 MMHG | OXYGEN SATURATION: 98 % | DIASTOLIC BLOOD PRESSURE: 66 MMHG | HEIGHT: 72 IN | WEIGHT: 206.6 LBS | HEART RATE: 96 BPM | TEMPERATURE: 97.6 F | BODY MASS INDEX: 27.98 KG/M2 | RESPIRATION RATE: 17 BRPM

## 2024-02-20 DIAGNOSIS — Z87.81 STATUS POST OPEN REDUCTION AND INTERNAL FIXATION (ORIF) OF FRACTURE: ICD-10-CM

## 2024-02-20 DIAGNOSIS — N39.0 URINARY TRACT INFECTION, SITE NOT SPECIFIED: ICD-10-CM

## 2024-02-20 DIAGNOSIS — F41.9 ANXIETY: ICD-10-CM

## 2024-02-20 DIAGNOSIS — K22.4 ESOPHAGEAL DYSMOTILITY: Primary | ICD-10-CM

## 2024-02-20 DIAGNOSIS — Z98.890 STATUS POST OPEN REDUCTION AND INTERNAL FIXATION (ORIF) OF FRACTURE: ICD-10-CM

## 2024-02-20 DIAGNOSIS — I95.9 HYPOTENSION, UNSPECIFIED HYPOTENSION TYPE: ICD-10-CM

## 2024-02-20 DIAGNOSIS — K59.03 DRUG-INDUCED CONSTIPATION: ICD-10-CM

## 2024-02-20 DIAGNOSIS — S72.22XD CLOSED DISPLACED SUBTROCHANTERIC FRACTURE OF LEFT FEMUR WITH ROUTINE HEALING, SUBSEQUENT ENCOUNTER: ICD-10-CM

## 2024-02-20 DIAGNOSIS — Z47.89 ORTHOPEDIC AFTERCARE: ICD-10-CM

## 2024-02-20 DIAGNOSIS — R11.0 NAUSEA: ICD-10-CM

## 2024-02-20 DIAGNOSIS — E83.42 HYPOMAGNESEMIA: ICD-10-CM

## 2024-02-20 DIAGNOSIS — Z96.649 PERIPROSTHETIC FRACTURE OF SHAFT OF FEMUR: ICD-10-CM

## 2024-02-20 DIAGNOSIS — I48.20 CHRONIC ATRIAL FIBRILLATION (H): ICD-10-CM

## 2024-02-20 DIAGNOSIS — M97.8XXA PERIPROSTHETIC FRACTURE OF SHAFT OF FEMUR: ICD-10-CM

## 2024-02-20 DIAGNOSIS — I48.0 PAROXYSMAL ATRIAL FIBRILLATION WITH RVR (H): ICD-10-CM

## 2024-02-20 LAB
ALBUMIN UR-MCNC: NEGATIVE MG/DL
APPEARANCE UR: ABNORMAL
BACTERIA #/AREA URNS HPF: ABNORMAL /HPF
BILIRUB UR QL STRIP: NEGATIVE
COLOR UR AUTO: YELLOW
GLUCOSE UR STRIP-MCNC: NEGATIVE MG/DL
HGB UR QL STRIP: ABNORMAL
HYALINE CASTS: 21 /LPF
KETONES UR STRIP-MCNC: NEGATIVE MG/DL
LEUKOCYTE ESTERASE UR QL STRIP: ABNORMAL
MUCOUS THREADS #/AREA URNS LPF: PRESENT /LPF
NITRATE UR QL: POSITIVE
PH UR STRIP: 5 [PH] (ref 5–7)
RBC URINE: 3 /HPF
SP GR UR STRIP: 1.01 (ref 1–1.03)
SQUAMOUS EPITHELIAL: 1 /HPF
UROBILINOGEN UR STRIP-MCNC: NORMAL MG/DL
WBC CLUMPS #/AREA URNS HPF: PRESENT /HPF
WBC URINE: 51 /HPF

## 2024-02-20 PROCEDURE — 99310 SBSQ NF CARE HIGH MDM 45: CPT | Performed by: NURSE PRACTITIONER

## 2024-02-20 PROCEDURE — 81001 URINALYSIS AUTO W/SCOPE: CPT | Mod: ORL

## 2024-02-20 PROCEDURE — 87086 URINE CULTURE/COLONY COUNT: CPT | Mod: ORL

## 2024-02-20 PROCEDURE — 99207 PR NO BILLABLE SERVICE THIS VISIT: CPT | Performed by: NURSE PRACTITIONER

## 2024-02-20 NOTE — LETTER
2/20/2024        RE: Alvin Newton  [address] [facility] [address]        [facility] GERIATRICS  ACUTE/EPISODIC VISIT    [facility] Medical Record Number:  [id number]  Place of Service where encounter took place:  [facility] ([facility]) [[id number]]    Chief Complaint   Patient presents with     RECHECK       HPI:    Alvin Newton is a 89 year old  (5/10/1934), who is being seen today for an episodic care visit.  HPI information obtained from: facility chart records, facility staff, patient report, and [facility] chart review.    Today's concern is:    Diagnoses         Codes Comments    Esophageal dysmotility    -  Primary K22.4     Hypotension, unspecified hypotension type     I95.9     Periprosthetic fracture of shaft of femur     M97.8XXA, Z96.649     Orthopedic aftercare     Z47.89     Drug-induced constipation     K59.03     Anxiety     F41.9     Paroxysmal atrial fibrillation with RVR (H)     I48.0     Nausea     R11.0           Came to see Alvin today as his son and daughter were present.  Alvin is here in TCU after having his prosthetic hip fracture revised and ORIF preformed on 1/24/24.  His hospital stay was complicated with hypotension and delirium.  A balance with Buspar and Tramadol was found to help.  Alvin is very sensitive to medication and medication adjustments continued after admission here to the TCU.    Over the last few days have been speaking with nursing about his complaints, and concerns from family.    With a list of his current medication in hand, his son had a list written down in his phone and able to compare what Alvin took at home and what is new now.  Most notable was the fact that Alvin was struggling with belching and his home famotidine dose was not what he was on here.  Alvin has been struggling with nausea/vomiting but admits now that he finally had a large BM yesterday, he may feel better since he thought it had been 6 days since having a BM.       Talked about his pain and what pain medication he was on.  He requested to have adjustments as he feels his pain is minimal.  Compromised on orders for the Tylenol and Tramadol.    Explained to Alvin and his family that he is on amiodarone for A. Fib and question if that is causing him the nausea and vomiting.  Had already written orders to further check labs of his liver, thyroid.  Including a Mg level since he is on a replacement here but was not on Mg at home.    Explained as well about having a UA/UC done as maybe an infection causing him the nausea/vomiting.    Family appreciative of the time and work being put in to help Alvin.      ALLERGIES:  No Known Allergies     MEDICATIONS:  Post Discharge Medication Reconciliation Status:  Medications reconciled today.   Current Outpatient Medications   Medication Sig Dispense Refill     acetaminophen (TYLENOL) 325 MG tablet 975mg by mouth every AM and 975mg twice a day as needed       busPIRone (BUSPAR) 5 MG tablet Take 1 tablet (5 mg) by mouth 2 times daily       famotidine (PEPCID) 20 MG tablet Take 2 tablets (40 mg) by mouth 2 times daily       traMADol (ULTRAM) 50 MG tablet Take 0.5 tablets (25 mg) by mouth 2 times daily as needed for severe pain       amiodarone (PACERONE) 200 MG tablet Take 1 tablet (200 mg) by mouth daily       atorvastatin (LIPITOR) 20 MG tablet Take 20 mg by mouth At Bedtime       diclofenac (VOLTAREN) 1 % topical gel Apply 2 g topically 3 times daily shoulders 50 g 0     ELIQUIS ANTICOAGULANT 5 MG tablet Take 5 mg by mouth 2 times daily       lidocaine (XYLOCAINE) 5 % external ointment Apply topically 3 times daily 100 g 0     Magnesium Oxide 250 MG TABS Take 250 mg by mouth daily       metoprolol succinate ER (TOPROL XL) 25 MG 24 hr tablet Take 12.5 mg by mouth daily Hold if SBP < 105.       midodrine (PROAMATINE) 2.5 MG tablet Take 2.5 mg by mouth daily       potassium chloride ER (K-TAB/KLOR-CON) 10 MEQ CR tablet Take 20 mEq by mouth  "daily Currently on hold       senna-docusate (SENOKOT-S/PERICOLACE) 8.6-50 MG tablet Take 2 tablets by mouth 2 times daily 30 tablet 0     torsemide (DEMADEX) 20 MG tablet Take 20 mg by mouth daily Currently on hold       Medications reviewed:  Medications reconciled to facility chart and changes were made to reflect current medications as identified as above med list. Below are the changes that were made:   Medications stopped since last EPIC medication reconciliation:   Medications Discontinued During This Encounter   Medication Reason     acetaminophen (TYLENOL) 325 MG tablet      busPIRone (BUSPAR) 5 MG tablet      famotidine (PEPCID) 20 MG tablet      traMADol (ULTRAM) 50 MG tablet        Medications started since last [facility] medication reconciliation:  Orders Placed This Encounter   Medications     acetaminophen (TYLENOL) 325 MG tablet     Simg by mouth every AM and 975mg twice a day as needed     busPIRone (BUSPAR) 5 MG tablet     Sig: Take 1 tablet (5 mg) by mouth 2 times daily     famotidine (PEPCID) 20 MG tablet     Sig: Take 2 tablets (40 mg) by mouth 2 times daily     traMADol (ULTRAM) 50 MG tablet     Sig: Take 0.5 tablets (25 mg) by mouth 2 times daily as needed for severe pain       REVIEW OF SYSTEMS:  4 point ROS neg other than the symptoms noted above in the HPI.        PHYSICAL EXAM:  /66   Pulse 96   Temp (!) 96.7  F (35.9  C)   Resp 17   Wt 93.4 kg (206 lb)   BMI 27.94 kg/m    Physical Exam      ASSESSMENT / PLAN:  (K22.4) Esophageal dysmotility  (primary encounter diagnosis)  Comment: Explained to Alvin and his children that currently Alvin is only on 20 mg of Pepcid daily where he was on 40 mg twice a day at home.  Watching him \"belch\" at this point makes this nurse practitioner think that he needs to be back on the dose he was at home.  Family requested if he could get 40 mg starting this evening already.  Increase famotidine to 40 mg twice a day.  Please give 40 mg starting this " evening per family request  Plan: famotidine (PEPCID) 20 MG tablet    (I95.9) Hypotension, unspecified hypotension type  Comment: Alvin was restarted on midodrine 2.5 mg here and his blood pressures have ranged from 90s-120s/60s-70s over the last few days.  Will keep him on the midodrine at this time and vitals per facility protocol    (M97.8XXA,  Z96.649) Periprosthetic fracture of shaft of femur  (Z47.89) Orthopedic aftercare  Comment: Between Alvin, his children, and this nurse practitioner, decision made to at least keep 1 dose of Tylenol scheduled and then the other 2 doses can be as needed.  He did not feel he needed the tramadol to be scheduled and so we will change that to as needed but then he can have it if he rates his pain from 6-10.  Change Tylenol orders to be: Tylenol 975 mg by mouth every morning and twice a day as needed  Change tramadol 50 mg give 25 mg twice a day as needed for pain rated 6-10  Plan: acetaminophen (TYLENOL) 325 MG tablet, traMADol        (ULTRAM) 50 MG tablet    (K59.03) Drug-induced constipation  Comment: Kari Alvarado receives Senna-S 2 tablets twice a day.  Will keep him on this dose and continue to monitor.      (F41.9) Anxiety  Comment: Will remain on BuSpar 5 mg twice a day and continue to monitor his anxiety.  Plan: busPIRone (BUSPAR) 5 MG tablet    (I48.0) Paroxysmal atrial fibrillation with RVR (H)  (R11.0) Nausea  Comment: Prior to meeting with the family and Alvin had already written orders for a UA UC, LFTs, TSH and free T4 as well as a EKG for medication monitoring and his atrial fibrillation.  He is on amiodarone 200 mg daily and Eliquis 5 mg twice a day.  Family understands why these labs are being done    Orders:   UA/UC - nausea, urgency  LFTs, TSH and Free T4 for medication monitoring and A fib  EKG due to A fib, medication monitoring  Discontinue Tramadol orders  Start Tramadol 25mg po BID as needed for pain rated 6-10  Discontinue tylenol orders  Start tylenol 975mg  po every morning and twice a day prn for left hip pain  Increase famotidine to 40mg po BID for esophageal motility.    Please start with a 40mg dose of famotidine tonight per request of family  Mg level for hypomagnesia    Electronically signed by  CHUCK Bowles CNP             Sincerely,        CHUCK Bowles CNP

## 2024-02-20 NOTE — PROGRESS NOTES
[facility] GERIATRICS  ACUTE/EPISODIC VISIT    [facility] Medical Record Number:  [id number]  Place of Service where encounter took place:  [facility] ([facility]) [[id number]]    Chief Complaint   Patient presents with    RECHECK       HPI:    Alvin Newton is a 89 year old  (5/10/1934), who is being seen today for an episodic care visit.  HPI information obtained from: facility chart records, facility staff, patient report, and family/first contact son and one daughter report.    Today's concern is:    Diagnoses         Codes Comments    Esophageal dysmotility    -  Primary K22.4     Nausea     R11.0     Chronic atrial fibrillation (H)     I48.20     Closed displaced subtrochanteric fracture of left femur with routine healing, subsequent encounter     S72.22XD     Status post open reduction and internal fixation (ORIF) of fracture     Z98.890, Z87.81           Came to see Alvin today as family present.  Did see the nurse review medications with the son earlier. Alvin is sitting up in the wheelchair looking somewhat uncomfortable.  Breathing is heavy, but not rapid.  Mouth open.      Prior to coming to see him, the nurse manager and this NP were reviewing his history.  Kept coming back to his medications for A fib - trying to think of more things to test him for as he is struggling to get better.  Decided to do labs and a EKG for the amiodarone use.  Informed the family of these decisions and where the thinking is coming from.  Other family members express their concerns more than the the two siblings did today- from this past weekend.  The kids seemed content on things that are being tried for Alvin.    Reviewed medication lists and made a discovery of doses he took at home compared to what he is taking here.  Answered any questions for family.    ALLERGIES:  No Known Allergies     MEDICATIONS:  Post Discharge Medication Reconciliation Status: medications reviewed and reconciled.     Current  Outpatient Medications   Medication Sig Dispense Refill    acetaminophen (TYLENOL) 325 MG tablet Take 3 tablets (975 mg) by mouth 3 times daily 100 tablet 0    amiodarone (PACERONE) 200 MG tablet Take 1 tablet (200 mg) by mouth daily      atorvastatin (LIPITOR) 20 MG tablet Take 20 mg by mouth At Bedtime      busPIRone (BUSPAR) 5 MG tablet Take 1 tablet (5 mg) by mouth 3 times daily (Patient taking differently: Take 5 mg by mouth 2 times daily)      diclofenac (VOLTAREN) 1 % topical gel Apply 2 g topically 3 times daily shoulders 50 g 0    ELIQUIS ANTICOAGULANT 5 MG tablet Take 5 mg by mouth 2 times daily      famotidine (PEPCID) 20 MG tablet Take 2 tablet (40 mg) by mouth daily      lidocaine (XYLOCAINE) 5 % external ointment Apply topically 3 times daily 100 g 0    Magnesium Oxide 250 MG TABS Take 250 mg by mouth daily      metoprolol succinate ER (TOPROL XL) 25 MG 24 hr tablet Take 12.5 mg by mouth daily Hold if SBP < 105.      midodrine (PROAMATINE) 2.5 MG tablet Take 2.5 mg by mouth daily      potassium chloride ER (K-TAB/KLOR-CON) 10 MEQ CR tablet Take 20 mEq by mouth daily Currently on hold      senna-docusate (SENOKOT-S/PERICOLACE) 8.6-50 MG tablet Take 2 tablets by mouth 2 times daily 30 tablet 0    torsemide (DEMADEX) 20 MG tablet Take 20 mg by mouth daily Currently on hold      traMADol (ULTRAM) 50 MG tablet Take 0.5 tablets (25 mg) by mouth 2 times daily PRN for pain 6-10 30 tablet 0         REVIEW OF SYSTEMS:  4 point ROS neg other than the symptoms noted above in the HPI.  Stomach is bothering him with mouth open.  .      PHYSICAL EXAM:  /66   Pulse 96   Temp 97.6  F (36.4  C)   Resp 17   Ht 1.829 m (6')   Wt 93.7 kg (206 lb 9.6 oz)   SpO2 98%   BMI 28.02 kg/m    Tall gentleman sitting up in his wheelchair.  Glasses on and making eye contact.  Clearly does not look well but not enough to state he should go to hospital.  Mouth open as breathing heavy at times.  Not particularly gulping air  but belching?  Abdomen is not distended.  Soft to touch.    Heart rate/rhythm regular/irregular and strong.  No edema in lower legs  Lungs are clear of adventitious sounds.    Skin is pink/pale, dry and warm.    A1 with transfers.  Working with therapies.    Component      Latest Ref Rng 2/19/2024  5:11 AM   Sodium      135 - 145 mmol/L 138    Potassium      3.4 - 5.3 mmol/L 4.0    Chloride      98 - 107 mmol/L 100    Carbon Dioxide (CO2)      22 - 29 mmol/L 26    Anion Gap      7 - 15 mmol/L 12    Urea Nitrogen      8.0 - 23.0 mg/dL 18.1    Creatinine      0.67 - 1.17 mg/dL 1.56 (H)    GFR Estimate      >60 mL/min/1.73m2 42 (L)    Calcium      8.8 - 10.2 mg/dL 9.2    Glucose      70 - 99 mg/dL 111 (H)    WBC      4.0 - 11.0 10e3/uL 6.3    RBC Count      4.40 - 5.90 10e6/uL 3.88 (L)    Hemoglobin      13.3 - 17.7 g/dL 11.5 (L)    Hematocrit      40.0 - 53.0 % 36.4 (L)    MCV      78 - 100 fL 94    MCH      26.5 - 33.0 pg 29.6    MCHC      31.5 - 36.5 g/dL 31.6    RDW      10.0 - 15.0 % 16.7 (H)    Platelet Count      150 - 450 10e3/uL 223         ASSESSMENT / PLAN:  (K22.4) Esophageal dysmotility  (primary encounter diagnosis)  (R11.0) Nausea  Comment: noted that Alvin took 40mg twice a day of Pepcid at home and only 20mg daily here.  When looking at him, could see where his dysmotility and nausea gives him issues.   UA/UC for nausea  Increase Pepcid to 40mg po BID with starting a 40mg dose tonight per family request.    (I48.20) Chronic atrial fibrillation (H)  Comment: will order Mg, LFT, TSH and Free T4 for monitoring how the A fib can effect other functions of the body.  Will also have a EKG done to see for abnormalities beyond A Fib    (S72.22XD) Closed displaced subtrochanteric fracture of left femur with routine healing, subsequent encounter  (Z98.890,  Z87.81) Status post open reduction and internal fixation (ORIF) of fracture  Comment: Alvin stated he does not have pain.  Negotiated with him and family about  changing his pain regimen which would allow him to take less medications.  He was open to keep a morning dose of Tylenol for stiffness and prior to therapies.   Change Tylenol order to be 975mg po every AM and the BID prn for left hip repair.  Change Tramadol to 25mg po BID prn for pain rated 6-10       Orders:   Change Pepcid to 40mg po BID with starting 40mg tonight - GERD  Discontinue tylenol orders.  Start Tylenol 975mg every AM and BID prn for s/p left hip ORIF  Discontinue tramadol orders  Tramadol 50mg - give 25mg BID PRN for left hip ORIF for pain rate 6-10  Please draw Mg, LFT, TSH, free T4 for Atrial fib  EKG for atrial fib monitoring.  UA/UC for nausea and urgency    Electronically signed by  CHUCK Bowles CNP

## 2024-02-20 NOTE — LETTER
2/20/2024        RE: Alvin Newton  [address] [address] [facility] [address]        [facility] GERIATRICS  ACUTE/EPISODIC VISIT    [facility] Medical Record Number:  [id number]  Place of Service where encounter took place:  [facility] ([facility]) [[id number]]    Chief Complaint   Patient presents with     RECHECK       HPI:    Alvin Newton is a 89 year old  (5/10/1934), who is being seen today for an episodic care visit.  HPI information obtained from: facility chart records, facility staff, patient report, and family/first contact son and one daughter report.    Today's concern is:    Diagnoses         Codes Comments    Esophageal dysmotility    -  Primary K22.4     Nausea     R11.0     Chronic atrial fibrillation (H)     I48.20     Closed displaced subtrochanteric fracture of left femur with routine healing, subsequent encounter     S72.22XD     Status post open reduction and internal fixation (ORIF) of fracture     Z98.890, Z87.81           Came to see Alvin today as family present.  Did see the nurse review medications with the son earlier. Alvin is sitting up in the wheelchair looking somewhat uncomfortable.  Breathing is heavy, but not rapid.  Mouth open.      Prior to coming to see him, the nurse manager and this NP were reviewing his history.  Kept coming back to his medications for A fib - trying to think of more things to test him for as he is struggling to get better.  Decided to do labs and a EKG for the amiodarone use.  Informed the family of these decisions and where the thinking is coming from.  Other family members express their concerns more than the the two siblings did today- from this past weekend.  The kids seemed content on things that are being tried for Alvin.    Reviewed medication lists and made a discovery of doses he took at home compared to what he is taking here.  Answered any questions for family.    ALLERGIES:  No Known Allergies     MEDICATIONS:  Post  Discharge Medication Reconciliation Status: medications reviewed and reconciled.     Current Outpatient Medications   Medication Sig Dispense Refill     acetaminophen (TYLENOL) 325 MG tablet Take 3 tablets (975 mg) by mouth 3 times daily 100 tablet 0     amiodarone (PACERONE) 200 MG tablet Take 1 tablet (200 mg) by mouth daily       atorvastatin (LIPITOR) 20 MG tablet Take 20 mg by mouth At Bedtime       busPIRone (BUSPAR) 5 MG tablet Take 1 tablet (5 mg) by mouth 3 times daily (Patient taking differently: Take 5 mg by mouth 2 times daily)       diclofenac (VOLTAREN) 1 % topical gel Apply 2 g topically 3 times daily shoulders 50 g 0     ELIQUIS ANTICOAGULANT 5 MG tablet Take 5 mg by mouth 2 times daily       famotidine (PEPCID) 20 MG tablet Take 2 tablet (40 mg) by mouth daily       lidocaine (XYLOCAINE) 5 % external ointment Apply topically 3 times daily 100 g 0     Magnesium Oxide 250 MG TABS Take 250 mg by mouth daily       metoprolol succinate ER (TOPROL XL) 25 MG 24 hr tablet Take 12.5 mg by mouth daily Hold if SBP < 105.       midodrine (PROAMATINE) 2.5 MG tablet Take 2.5 mg by mouth daily       potassium chloride ER (K-TAB/KLOR-CON) 10 MEQ CR tablet Take 20 mEq by mouth daily Currently on hold       senna-docusate (SENOKOT-S/PERICOLACE) 8.6-50 MG tablet Take 2 tablets by mouth 2 times daily 30 tablet 0     torsemide (DEMADEX) 20 MG tablet Take 20 mg by mouth daily Currently on hold       traMADol (ULTRAM) 50 MG tablet Take 0.5 tablets (25 mg) by mouth 2 times daily PRN for pain 6-10 30 tablet 0         REVIEW OF SYSTEMS:  4 point ROS neg other than the symptoms noted above in the HPI.  Stomach is bothering him with mouth open.  .      PHYSICAL EXAM:  /66   Pulse 96   Temp 97.6  F (36.4  C)   Resp 17   Ht 1.829 m (6')   Wt 93.7 kg (206 lb 9.6 oz)   SpO2 98%   BMI 28.02 kg/m    Tall gentleman sitting up in his wheelchair.  Glasses on and making eye contact.  Clearly does not look well but not  enough to state he should go to hospital.  Mouth open as breathing heavy at times.  Not particularly gulping air but belching?  Abdomen is not distended.  Soft to touch.    Heart rate/rhythm regular/irregular and strong.  No edema in lower legs  Lungs are clear of adventitious sounds.    Skin is pink/pale, dry and warm.    A1 with transfers.  Working with therapies.    Component      Latest Ref Rng 2/19/2024  5:11 AM   Sodium      135 - 145 mmol/L 138    Potassium      3.4 - 5.3 mmol/L 4.0    Chloride      98 - 107 mmol/L 100    Carbon Dioxide (CO2)      22 - 29 mmol/L 26    Anion Gap      7 - 15 mmol/L 12    Urea Nitrogen      8.0 - 23.0 mg/dL 18.1    Creatinine      0.67 - 1.17 mg/dL 1.56 (H)    GFR Estimate      >60 mL/min/1.73m2 42 (L)    Calcium      8.8 - 10.2 mg/dL 9.2    Glucose      70 - 99 mg/dL 111 (H)    WBC      4.0 - 11.0 10e3/uL 6.3    RBC Count      4.40 - 5.90 10e6/uL 3.88 (L)    Hemoglobin      13.3 - 17.7 g/dL 11.5 (L)    Hematocrit      40.0 - 53.0 % 36.4 (L)    MCV      78 - 100 fL 94    MCH      26.5 - 33.0 pg 29.6    MCHC      31.5 - 36.5 g/dL 31.6    RDW      10.0 - 15.0 % 16.7 (H)    Platelet Count      150 - 450 10e3/uL 223         ASSESSMENT / PLAN:  (K22.4) Esophageal dysmotility  (primary encounter diagnosis)  (R11.0) Nausea  Comment: noted that Alvin took 40mg twice a day of Pepcid at home and only 20mg daily here.  When looking at him, could see where his dysmotility and nausea gives him issues.   UA/UC for nausea  Increase Pepcid to 40mg po BID with starting a 40mg dose tonight per family request.    (I48.20) Chronic atrial fibrillation (H)  Comment: will order Mg, LFT, TSH and Free T4 for monitoring how the A fib can effect other functions of the body.  Will also have a EKG done to see for abnormalities beyond A Fib    (S72.22XD) Closed displaced subtrochanteric fracture of left femur with routine healing, subsequent encounter  (Z98.890,  Z87.81) Status post open reduction and  internal fixation (ORIF) of fracture  Comment: Alvin stated he does not have pain.  Negotiated with him and family about changing his pain regimen which would allow him to take less medications.  He was open to keep a morning dose of Tylenol for stiffness and prior to therapies.   Change Tylenol order to be 975mg po every AM and the BID prn for left hip repair.  Change Tramadol to 25mg po BID prn for pain rated 6-10       Orders:   Change Pepcid to 40mg po BID with starting 40mg tonight - GERD  Discontinue tylenol orders.  Start Tylenol 975mg every AM and BID prn for s/p left hip ORIF  Discontinue tramadol orders  Tramadol 50mg - give 25mg BID PRN for left hip ORIF for pain rate 6-10  Please draw Mg, LFT, TSH, free T4 for Atrial fib  EKG for atrial fib monitoring.  UA/UC for nausea and urgency    Electronically signed by  CHUCK Bowles CNP            Sincerely,        CHUCK Bowles CNP

## 2024-02-20 NOTE — LETTER
2/20/2024        RE: Alvin Newton  [address] [address] [facility] [address]        No notes on file      Sincerely,        CHUCK Bowles CNP

## 2024-02-21 LAB
ALBUMIN SERPL BCG-MCNC: 3.4 G/DL (ref 3.5–5.2)
ALP SERPL-CCNC: 179 U/L (ref 40–150)
ALT SERPL W P-5'-P-CCNC: 8 U/L (ref 0–70)
AST SERPL W P-5'-P-CCNC: 27 U/L (ref 0–45)
BILIRUB DIRECT SERPL-MCNC: 0.39 MG/DL (ref 0–0.3)
BILIRUB SERPL-MCNC: 1.6 MG/DL
MAGNESIUM SERPL-MCNC: 1.9 MG/DL (ref 1.7–2.3)
MAGNESIUM SERPL-MCNC: 1.9 MG/DL (ref 1.7–2.3)
PROT SERPL-MCNC: 6.9 G/DL (ref 6.4–8.3)
T4 FREE SERPL-MCNC: 2.18 NG/DL (ref 0.9–1.7)
TSH SERPL DL<=0.005 MIU/L-ACNC: 0.81 UIU/ML (ref 0.3–4.2)

## 2024-02-21 PROCEDURE — 84439 ASSAY OF FREE THYROXINE: CPT | Mod: ORL | Performed by: NURSE PRACTITIONER

## 2024-02-21 PROCEDURE — 36415 COLL VENOUS BLD VENIPUNCTURE: CPT | Mod: ORL | Performed by: NURSE PRACTITIONER

## 2024-02-21 PROCEDURE — P9604 ONE-WAY ALLOW PRORATED TRIP: HCPCS | Mod: ORL | Performed by: NURSE PRACTITIONER

## 2024-02-21 PROCEDURE — 83735 ASSAY OF MAGNESIUM: CPT | Mod: ORL | Performed by: NURSE PRACTITIONER

## 2024-02-21 PROCEDURE — 80076 HEPATIC FUNCTION PANEL: CPT | Mod: ORL | Performed by: NURSE PRACTITIONER

## 2024-02-21 PROCEDURE — 84443 ASSAY THYROID STIM HORMONE: CPT | Mod: ORL | Performed by: NURSE PRACTITIONER

## 2024-02-22 ENCOUNTER — TRANSITIONAL CARE UNIT VISIT (OUTPATIENT)
Dept: GERIATRICS | Facility: CLINIC | Age: 89
End: 2024-02-22
Payer: COMMERCIAL

## 2024-02-22 VITALS
HEIGHT: 72 IN | TEMPERATURE: 98.3 F | RESPIRATION RATE: 14 BRPM | BODY MASS INDEX: 28.25 KG/M2 | HEART RATE: 89 BPM | OXYGEN SATURATION: 99 % | SYSTOLIC BLOOD PRESSURE: 116 MMHG | DIASTOLIC BLOOD PRESSURE: 75 MMHG | WEIGHT: 208.6 LBS

## 2024-02-22 VITALS
TEMPERATURE: 96.7 F | HEART RATE: 96 BPM | DIASTOLIC BLOOD PRESSURE: 66 MMHG | SYSTOLIC BLOOD PRESSURE: 111 MMHG | WEIGHT: 206 LBS | RESPIRATION RATE: 17 BRPM | BODY MASS INDEX: 27.94 KG/M2

## 2024-02-22 DIAGNOSIS — M97.8XXA PERIPROSTHETIC FRACTURE OF SHAFT OF FEMUR: ICD-10-CM

## 2024-02-22 DIAGNOSIS — I48.0 PAROXYSMAL ATRIAL FIBRILLATION WITH RVR (H): ICD-10-CM

## 2024-02-22 DIAGNOSIS — I50.32 CHRONIC DIASTOLIC CONGESTIVE HEART FAILURE (H): ICD-10-CM

## 2024-02-22 DIAGNOSIS — I95.9 HYPOTENSION, UNSPECIFIED HYPOTENSION TYPE: Primary | ICD-10-CM

## 2024-02-22 DIAGNOSIS — Z96.649 PERIPROSTHETIC FRACTURE OF SHAFT OF FEMUR: ICD-10-CM

## 2024-02-22 DIAGNOSIS — E11.42 TYPE 2 DIABETES MELLITUS WITH DIABETIC POLYNEUROPATHY, WITHOUT LONG-TERM CURRENT USE OF INSULIN (H): ICD-10-CM

## 2024-02-22 LAB — BACTERIA UR CULT: NORMAL

## 2024-02-22 PROCEDURE — 99309 SBSQ NF CARE MODERATE MDM 30: CPT | Performed by: NURSE PRACTITIONER

## 2024-02-22 RX ORDER — TRAMADOL HYDROCHLORIDE 50 MG/1
25 TABLET ORAL 2 TIMES DAILY PRN
Start: 2024-02-20 | End: 2024-03-28

## 2024-02-22 RX ORDER — FAMOTIDINE 20 MG/1
40 TABLET, FILM COATED ORAL 2 TIMES DAILY
Start: 2024-02-20 | End: 2024-06-19

## 2024-02-22 RX ORDER — BUSPIRONE HYDROCHLORIDE 5 MG/1
5 TABLET ORAL 2 TIMES DAILY
Start: 2024-02-20 | End: 2024-06-19

## 2024-02-22 RX ORDER — ACETAMINOPHEN 325 MG/1
TABLET ORAL
Start: 2024-02-20

## 2024-02-22 NOTE — LETTER
2/22/2024        RE: Alvin Newton  [address] [facility] [id number]        [facility]    Code Status:  DNR   Visit Type:   Chief Complaint   Patient presents with     TCU Follow Up     Facility:  [facility] ([facility]) [[id number]]         HPI: Alvin Newton is a 89 year old male who I am seeing today for follow up on the TCU.  Past medical history includes type 2 diabetes mellitus with diabetic polyneuropathy, proximal atrial fibs on Eliquis, bilateral hip replacement 33 years ago, HFpEF, type 2 diabetes mellitus, hypertension, severe aortic stenosis and mild cognitive impairment.  Patient admitted post fall with left femur fracture.  Patient had a mechanical fall in which she was trying to reach for his cane for stability but put his weight on his grabber device instead and fell forward.  He did not hit his head or lose consciousness.  Head CT was negative.  X-ray of the pelvis/left hip revealed a left proximal femoral fracture.  History of bilateral hip replacements about 33 years ago.  Patient underwent repair on 1/24/2024.  He had a complicated surgery in the setting of bilateral hip replacements.  He lost 3 L of blood.  He did require pressor support.  He was also transfused on 1/26.  Patient found to have a mildly low a.m. cortisol level most likely due to a component of adrenal insufficiency.  Steroids were done for couple days along with midodrine.  BP improved.  Steroids were discontinued on 1/30.  Pain controlled with tramadol and Tylenol.  Patient initially had a Prevena wound VAC however 1 day later during his TCU stay VAC was not working appropriately.  Ortho updated and this was discontinued and dry sterile dressing applied.  Patient also experienced postoperative delirium.  He is very anxious and tearful.  No agitation.  He did require one-on-one.  He was given BuSpar to help with anxiety.  Delirium improved.  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  Severe aortic stenosis.  Cardiac MRI in October concerning for infiltrative cardiomyopathy.  Workup for amyloidosis in process by cardiology in [place] [facility].  Biopsies of the bone marrow and fat pad were negative for AL amyloid.  Plan at this time is to follow-up with cardiology regarding possible myocardial biopsy and continued amyloid/infiltrative cardiomyopathy workup.  History of proximal atrial fibs on Eliquis.  Patient did have episode of RVR after procedure which resolved.  Acute kidney injury on CKD stage III.  Creatinine 1.34 admission.  Creatinine did bump to 1.74 but improved with fluids.  Type 2 diabetes without long-term use of insulin.  Most recent hemoglobin A1c 6.8%.  Stress leukocytosis with a white blood cell count of 13 resolved.    Transitional Care Course: Today patient lying in bed. Pt with recent UTI. He continues on Macrobid. He reports he is \"feeling much better.\" Recent low bp. He was started on midodrine. Bp better today. Abdominal binder and TEDs ordered. His not wearing either today. He is asymptomatic. Torsemide on hold. Follow up CXR without fiud overload. Pain in his hip controlled with tylenol. Tramadol changed to prn.     Assessment/Plan:     Closed displaced subtrochanteric fracture of left femur with routine healing, subsequent encounter  Status post open reduction and internal fixation (ORIF) of fracture  -History of bilateral hip replacement 33 years old.  -Dry sterile dressing to be changed daily.  -Weightbearing as tolerated.  -Tramadol changed to BID prn.   -Continue tylenol 975 mg TID.     Hypotension   -pt treated with midodrine and steroids in hospital.   -Lg blood loss from surgery. Hgb now 9.5.   -Bp in the 90s systolic. Suspect poor oral intake. Hypovolemia.   -pt with dizziness.   -torsemide on hold.   -Metoprolol decreased to 12.5 mg every day with hold parameters.   -Follow up BMP on Monday.   -Give additional 240cc " with each med pass. Avoid aggressive fluid resuscitation with AVS.   -Continue midodrine 2.5 mg daily.   -TEDS for bp support.     Anemia due to blood loss, acute  -EBL of 3 L.  -Patient underwent transfusion of 1/26/2024.  -Follow-up CBC with hgb 9.5.   -Continue to monitor.     Other amyloidosis (H)  Chronic diastolic (congestive) heart failure (H)  -Workup for amyloidosis in process by cardiology in [place] [facility].  Biopsies of bone marrow and fat pad negative for AL amyloid.  -Follow-up with cardiology regarding possible myocardial biopsy and continued amyloid/infiltrative cardiomyopathy workup.  -Cardiac MRI in October concerning for infiltrative cardiomyopathy.  -Daily weights. Weight down 5 lbs since admit.   -Continue to hold torsemide and potassium.   -monitor for fluid overload.   -CXR 2 view today with no acute process.       Paroxysmal atrial fibrillation with RVR (H)  -Chronically on Eliquis.  -Rate controlled with metoprolol and amiodarone.    Type 2 diabetes mellitus with diabetic polyneuropathy, without long-term current use of insulin (H)  -Recent A1c 6.8%.  -Currently not on medication.    Stage 3a chronic kidney disease (H)  -Baseline 1.3-1.5.   -Pt appears dry. Creatine 1.67.   -follow up BMP with creatine of 1.47.   -monitor.     Stage II Pressure Ulcer, buttock  -apply blayne and foam change every day.   -Ok for air mattress.         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     Essential hypertension 04/04/2016     Mixed hyperlipidemia 01/02/2009     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) 12/06/2017     Carpal tunnel syndrome, bilateral 11/18/2021     Fall, initial encounter 06/06/2022     Closed fracture of first lumbar vertebra, unspecified fracture morphology, initial encounter (H) 06/06/2022     Fracture of L1 vertebra (H) 06/06/2022     Impaired  fasting glucose 06/20/2022     Osteoarthritis of pelvis 06/20/2022     Other ill-defined and unknown causes of morbidity and mortality 01/01/1985     Primary localized osteoarthrosis of shoulder region 06/20/2022     Pure hypercholesterolemia 01/01/1999     Reason for consultation 06/20/2022     Right bundle branch block 06/20/2022     Cellulitis of right lower extremity 07/11/2023     Severe sepsis (H) 07/11/2023     Septic shock (H) 07/12/2023     Streptococcal bacteremia 07/13/2023     Thrombocytopenia (H24) 07/13/2023     Hyperbilirubinemia 07/13/2023     Hypophosphatemia 07/13/2023     Hypoalbuminemia 07/13/2023     Pyuria 07/13/2023     Paroxysmal atrial fibrillation with RVR (H) 07/13/2023     Hypomagnesemia 07/13/2023     Chronic pain of both shoulders 07/13/2023     Severe aortic stenosis 07/14/2023     Elevated brain natriuretic peptide (BNP) level 07/14/2023     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     Peripheral edema 07/14/2023     Positive urine culture 07/14/2023     Medication induced coagulopathy  (H24) 07/14/2023     Chronic diastolic (congestive) heart failure (H) 03/31/2023     Altered mental status, unspecified altered mental status type 07/23/2023     Clostridium difficile diarrhea 07/23/2023     History of Clostridium difficile infection July 2023 07/25/2023     Stage 3a chronic kidney disease (H) 07/25/2023     Esophageal dysmotility 07/27/2023     DEJUAN (acute kidney injury) (H24) 08/17/2023     Moderate malnutrition (H24) 08/17/2023     Schatzki's ring of distal esophagus-dilated 8/19/23 08/17/2023     Acute gout involving toe of left foot 08/17/2023     Unintentional weight loss 08/18/2023     Abnormal esophagram 08/18/2023     Hypokalemia 08/19/2023     Hyponatremia 08/19/2023     Open wound-coccyx/buttocks 08/23/2023     Enterocolitis due to Clostridium difficile, not specified as recurrent 07/27/2023     Other chronic pain 07/19/2023     Pain in left shoulder 07/19/2023     Pain in  right shoulder 07/19/2023     Unspecified streptococcus as the cause of diseases classified elsewhere 07/19/2023     Esophageal dysphagia 08/11/2023     Hip fracture, left, closed, initial encounter (H) 01/22/2024     Acute kidney failure, unspecified (H24) 08/24/2023     Amyloidosis (H) 01/25/2024     Monoclonal gammopathy of unknown significance (MGUS) 01/25/2024     Hypotension, unspecified hypotension type 01/25/2024     Postoperative anemia due to acute blood loss 01/25/2024     Resolved Ambulatory Problems     Diagnosis Date Noted     Obesity, morbid, BMI 40.0-49.9 (H) 11/07/2017     Past Medical History:   Diagnosis Date     Colonic diverticulum      Hyperlipidemia      Hypertension      Obesity (BMI 30-39.9)      Osteoarthritis      Type 2 diabetes mellitus (H)      No Known Allergies    All Meds and Allergies reviewed in the record at the facility and is the most up-to-date.    Current Outpatient Medications   Medication Sig     acetaminophen (TYLENOL) 325 MG tablet 975mg by mouth every AM and 975mg twice a day as needed     amiodarone (PACERONE) 200 MG tablet Take 1 tablet (200 mg) by mouth daily     atorvastatin (LIPITOR) 20 MG tablet Take 20 mg by mouth At Bedtime     busPIRone (BUSPAR) 5 MG tablet Take 1 tablet (5 mg) by mouth 2 times daily     diclofenac (VOLTAREN) 1 % topical gel Apply 2 g topically 3 times daily shoulders     ELIQUIS ANTICOAGULANT 5 MG tablet Take 5 mg by mouth 2 times daily     famotidine (PEPCID) 20 MG tablet Take 2 tablets (40 mg) by mouth 2 times daily     lidocaine (XYLOCAINE) 5 % external ointment Apply topically 3 times daily     Magnesium Oxide 250 MG TABS Take 250 mg by mouth daily     metoprolol succinate ER (TOPROL XL) 25 MG 24 hr tablet Take 12.5 mg by mouth daily Hold if SBP < 105.     midodrine (PROAMATINE) 2.5 MG tablet Take 2.5 mg by mouth daily     ondansetron (ZOFRAN) 4 MG tablet Take 1 tablet (4 mg) by mouth every 8 hours as needed for nausea     potassium chloride  ER (K-TAB/KLOR-CON) 10 MEQ CR tablet Take 20 mEq by mouth daily Currently on hold     senna-docusate (SENOKOT-S/PERICOLACE) 8.6-50 MG tablet Take 2 tablets by mouth 2 times daily     torsemide (DEMADEX) 20 MG tablet Take 20 mg by mouth daily Currently on hold     traMADol (ULTRAM) 50 MG tablet Take 0.5 tablets (25 mg) by mouth 2 times daily as needed for severe pain     No current facility-administered medications for this visit.       REVIEW OF SYSTEMS:   10 point review of systems reviewed and pertinent positives in the HPI.     PHYSICAL EXAMINATION:  Physical Exam     Vital signs: /75   Pulse 89   Temp 98.3  F (36.8  C)   Resp 14   Ht 1.829 m (6')   Wt 94.6 kg (208 lb 9.6 oz)   SpO2 99%   BMI 28.29 kg/m    General: Awake, Alert, oriented x3, lying in bed, follows simple commands, conversant  HEENT:Pink conjunctiva, moist oral mucosa  NECK: Supple  CVS:  S1  S2, without murmur or gallop.   LUNG: Clear to auscultation, No wheezes, rales or rhonci.  BACK: No kyphosis of the thoracic spine  ABDOMEN: Soft, nontender to palpation, with positive bowel sounds  EXTREMITIES: Moves both upper and lower extremities with generalized weakness and some limitation to left lower extremity, trace pedal edema on the operative side.   NEUROLOGIC: Intact, pulses palpable  PSYCHIATRIC: Mild cognitive impairment noted.       Labs:  All labs reviewed in the nursing home record and Epic   @  Lab Results   Component Value Date    WBC 7.7 02/02/2024    WBC 9.0 12/30/2017     Lab Results   Component Value Date    RBC 3.24 02/02/2024    RBC 4.16 12/30/2017     Lab Results   Component Value Date    HGB 9.5 02/02/2024    HGB 12.6 12/30/2017     Lab Results   Component Value Date    HCT 29.6 02/02/2024    HCT 37.1 12/30/2017     Lab Results   Component Value Date    MCV 91 02/02/2024    MCV 89 12/30/2017     Lab Results   Component Value Date    MCH 29.3 02/02/2024    MCH 30.3 12/30/2017     Lab Results   Component Value Date     MCHC 32.1 02/02/2024    MCHC 34.0 12/30/2017     Lab Results   Component Value Date    RDW 15.5 02/02/2024    RDW 13.8 12/30/2017     Lab Results   Component Value Date     02/02/2024     12/30/2017        @Last Comprehensive Metabolic Panel:  Sodium   Date Value Ref Range Status   02/19/2024 138 135 - 145 mmol/L Final     Comment:     Reference intervals for this test were updated on 09/26/2023 to more accurately reflect our healthy population. There may be differences in the flagging of prior results with similar values performed with this method. Interpretation of those prior results can be made in the context of the updated reference intervals.    12/30/2017 138 133 - 144 mmol/L Final     Potassium   Date Value Ref Range Status   02/19/2024 4.0 3.4 - 5.3 mmol/L Final   07/15/2022 3.7 3.4 - 5.3 mmol/L Final   12/31/2017 3.5 3.4 - 5.3 mmol/L Final     Chloride   Date Value Ref Range Status   02/19/2024 100 98 - 107 mmol/L Final   07/15/2022 106 94 - 109 mmol/L Final   12/30/2017 104 94 - 109 mmol/L Final     Carbon Dioxide   Date Value Ref Range Status   12/30/2017 26 20 - 32 mmol/L Final     Carbon Dioxide (CO2)   Date Value Ref Range Status   02/19/2024 26 22 - 29 mmol/L Final   07/15/2022 24 20 - 32 mmol/L Final     Anion Gap   Date Value Ref Range Status   02/19/2024 12 7 - 15 mmol/L Final   07/15/2022 9 3 - 14 mmol/L Final   12/30/2017 8 3 - 14 mmol/L Final     Glucose   Date Value Ref Range Status   02/19/2024 111 (H) 70 - 99 mg/dL Final   07/15/2022 97 70 - 99 mg/dL Final   12/30/2017 145 (H) 70 - 99 mg/dL Final     GLUCOSE BY METER POCT   Date Value Ref Range Status   02/02/2024 120 (H) 70 - 99 mg/dL Final     Urea Nitrogen   Date Value Ref Range Status   02/19/2024 18.1 8.0 - 23.0 mg/dL Final   07/15/2022 13 7 - 30 mg/dL Final   12/31/2017 26 7 - 30 mg/dL Final     Creatinine   Date Value Ref Range Status   02/19/2024 1.56 (H) 0.67 - 1.17 mg/dL Final   12/31/2017 1.11 0.66 - 1.25 mg/dL Final      GFR Estimate   Date Value Ref Range Status   02/19/2024 42 (L) >60 mL/min/1.73m2 Final   12/31/2017 63 >60 mL/min/1.7m2 Final     Comment:     Non  GFR Calc     Calcium   Date Value Ref Range Status   02/19/2024 9.2 8.8 - 10.2 mg/dL Final   12/30/2017 7.9 (L) 8.5 - 10.1 mg/dL Final       This note has been dictated using voice recognition software. Any grammatical or context distortions are unintentional and inherent to the software    Electronically signed by: Grace Parry, TASHI       Sincerely,        Grace Parry, NP

## 2024-02-22 NOTE — PROGRESS NOTES
[facility] GERIATRICS  ACUTE/EPISODIC VISIT    [facility] Medical Record Number:  [id number]  Place of Service where encounter took place:  [facility] ([facility]) [[id number]]    Chief Complaint   Patient presents with    RECHECK       HPI:    Alvin Newton is a 89 year old  (5/10/1934), who is being seen today for an episodic care visit.  HPI information obtained from: facility chart records, facility staff, patient report, and [facility] chart review.    Today's concern is:    Diagnoses         Codes Comments    Esophageal dysmotility    -  Primary K22.4     Hypotension, unspecified hypotension type     I95.9     Periprosthetic fracture of shaft of femur     M97.8XXA, Z96.649     Orthopedic aftercare     Z47.89     Drug-induced constipation     K59.03     Anxiety     F41.9     Paroxysmal atrial fibrillation with RVR (H)     I48.0     Nausea     R11.0           Came to see Alvin today as his son and daughter were present.  Alvin is here in TCU after having his prosthetic hip fracture revised and ORIF preformed on 1/24/24.  His hospital stay was complicated with hypotension and delirium.  A balance with Buspar and Tramadol was found to help.  Alvin is very sensitive to medication and medication adjustments continued after admission here to the TCU.    Over the last few days have been speaking with nursing about his complaints, and concerns from family.    With a list of his current medication in hand, his son had a list written down in his phone and able to compare what Alvin took at home and what is new now.  Most notable was the fact that Alvin was struggling with belching and his home famotidine dose was not what he was on here.  Alvin has been struggling with nausea/vomiting but admits now that he finally had a large BM yesterday, he may feel better since he thought it had been 6 days since having a BM.      Talked about his pain and what pain medication he was on.  He requested to have adjustments  as he feels his pain is minimal.  Compromised on orders for the Tylenol and Tramadol.    Explained to Alvin and his family that he is on amiodarone for A. Fib and question if that is causing him the nausea and vomiting.  Had already written orders to further check labs of his liver, thyroid.  Including a Mg level since he is on a replacement here but was not on Mg at home.    Explained as well about having a UA/UC done as maybe an infection causing him the nausea/vomiting.    Family appreciative of the time and work being put in to help Alvin.      ALLERGIES:  No Known Allergies     MEDICATIONS:  Post Discharge Medication Reconciliation Status:  Medications reconciled today.   Current Outpatient Medications   Medication Sig Dispense Refill    acetaminophen (TYLENOL) 325 MG tablet 975mg by mouth every AM and 975mg twice a day as needed      busPIRone (BUSPAR) 5 MG tablet Take 1 tablet (5 mg) by mouth 2 times daily      famotidine (PEPCID) 20 MG tablet Take 2 tablets (40 mg) by mouth 2 times daily      traMADol (ULTRAM) 50 MG tablet Take 0.5 tablets (25 mg) by mouth 2 times daily as needed for severe pain      amiodarone (PACERONE) 200 MG tablet Take 1 tablet (200 mg) by mouth daily      atorvastatin (LIPITOR) 20 MG tablet Take 20 mg by mouth At Bedtime      diclofenac (VOLTAREN) 1 % topical gel Apply 2 g topically 3 times daily shoulders 50 g 0    ELIQUIS ANTICOAGULANT 5 MG tablet Take 5 mg by mouth 2 times daily      lidocaine (XYLOCAINE) 5 % external ointment Apply topically 3 times daily 100 g 0    Magnesium Oxide 250 MG TABS Take 250 mg by mouth daily      metoprolol succinate ER (TOPROL XL) 25 MG 24 hr tablet Take 12.5 mg by mouth daily Hold if SBP < 105.      midodrine (PROAMATINE) 2.5 MG tablet Take 2.5 mg by mouth daily      potassium chloride ER (K-TAB/KLOR-CON) 10 MEQ CR tablet Take 20 mEq by mouth daily Currently on hold      senna-docusate (SENOKOT-S/PERICOLACE) 8.6-50 MG tablet Take 2 tablets by mouth 2  "times daily 30 tablet 0    torsemide (DEMADEX) 20 MG tablet Take 20 mg by mouth daily Currently on hold       Medications reviewed:  Medications reconciled to facility chart and changes were made to reflect current medications as identified as above med list. Below are the changes that were made:   Medications stopped since last EPIC medication reconciliation:   Medications Discontinued During This Encounter   Medication Reason    acetaminophen (TYLENOL) 325 MG tablet     busPIRone (BUSPAR) 5 MG tablet     famotidine (PEPCID) 20 MG tablet     traMADol (ULTRAM) 50 MG tablet        Medications started since last [facility] medication reconciliation:  Orders Placed This Encounter   Medications    acetaminophen (TYLENOL) 325 MG tablet     Simg by mouth every AM and 975mg twice a day as needed    busPIRone (BUSPAR) 5 MG tablet     Sig: Take 1 tablet (5 mg) by mouth 2 times daily    famotidine (PEPCID) 20 MG tablet     Sig: Take 2 tablets (40 mg) by mouth 2 times daily    traMADol (ULTRAM) 50 MG tablet     Sig: Take 0.5 tablets (25 mg) by mouth 2 times daily as needed for severe pain       REVIEW OF SYSTEMS:  4 point ROS neg other than the symptoms noted above in the HPI.        PHYSICAL EXAM:  /66   Pulse 96   Temp (!) 96.7  F (35.9  C)   Resp 17   Wt 93.4 kg (206 lb)   BMI 27.94 kg/m    Physical Exam      ASSESSMENT / PLAN:  (K22.4) Esophageal dysmotility  (primary encounter diagnosis)  Comment: Explained to Alvin and his children that currently Alvin is only on 20 mg of Pepcid daily where he was on 40 mg twice a day at home.  Watching him \"belch\" at this point makes this nurse practitioner think that he needs to be back on the dose he was at home.  Family requested if he could get 40 mg starting this evening already.  Increase famotidine to 40 mg twice a day.  Please give 40 mg starting this evening per family request  Plan: famotidine (PEPCID) 20 MG tablet    (I95.9) Hypotension, unspecified hypotension " type  Comment: Alvin was restarted on midodrine 2.5 mg here and his blood pressures have ranged from 90s-120s/60s-70s over the last few days.  Will keep him on the midodrine at this time and vitals per facility protocol    (M97.8XXA,  Z96.649) Periprosthetic fracture of shaft of femur  (Z47.89) Orthopedic aftercare  Comment: Between Alvin, his children, and this nurse practitioner, decision made to at least keep 1 dose of Tylenol scheduled and then the other 2 doses can be as needed.  He did not feel he needed the tramadol to be scheduled and so we will change that to as needed but then he can have it if he rates his pain from 6-10.  Change Tylenol orders to be: Tylenol 975 mg by mouth every morning and twice a day as needed  Change tramadol 50 mg give 25 mg twice a day as needed for pain rated 6-10  Plan: acetaminophen (TYLENOL) 325 MG tablet, traMADol        (ULTRAM) 50 MG tablet    (K59.03) Drug-induced constipation  Comment: Kari Alvarado receives Senna-S 2 tablets twice a day.  Will keep him on this dose and continue to monitor.      (F41.9) Anxiety  Comment: Will remain on BuSpar 5 mg twice a day and continue to monitor his anxiety.  Plan: busPIRone (BUSPAR) 5 MG tablet    (I48.0) Paroxysmal atrial fibrillation with RVR (H)  (R11.0) Nausea  Comment: Prior to meeting with the family and Alvin had already written orders for a UA UC, LFTs, TSH and free T4 as well as a EKG for medication monitoring and his atrial fibrillation.  He is on amiodarone 200 mg daily and Eliquis 5 mg twice a day.  Family understands why these labs are being done    Orders:   UA/UC - nausea, urgency  LFTs, TSH and Free T4 for medication monitoring and A fib  EKG due to A fib, medication monitoring  Discontinue Tramadol orders  Start Tramadol 25mg po BID as needed for pain rated 6-10  Discontinue tylenol orders  Start tylenol 975mg po every morning and twice a day prn for left hip pain  Increase famotidine to 40mg po BID for esophageal motility.     Please start with a 40mg dose of famotidine tonight per request of family  Mg level for hypomagnesia    Electronically signed by  CHUCK Bowles CNP

## 2024-02-25 ENCOUNTER — TELEPHONE (OUTPATIENT)
Dept: GERIATRICS | Facility: CLINIC | Age: 89
End: 2024-02-25
Payer: COMMERCIAL

## 2024-02-25 ENCOUNTER — LAB REQUISITION (OUTPATIENT)
Dept: LAB | Facility: CLINIC | Age: 89
End: 2024-02-25
Payer: COMMERCIAL

## 2024-02-25 DIAGNOSIS — K59.09 OTHER CONSTIPATION: ICD-10-CM

## 2024-02-25 RX ORDER — ONDANSETRON 4 MG/1
4 TABLET, FILM COATED ORAL EVERY 8 HOURS PRN
COMMUNITY
Start: 2024-02-25 | End: 2024-06-19

## 2024-02-25 NOTE — TELEPHONE ENCOUNTER
[facility] Geriatrics   2024     Name: Alvin Newton   : 5/10/1934     Background:  Blood pressure 99/72. Can't push fluids or eat due to nausea for the past week. Had Tums and Maalox without relief. More weak. Last labs 24. History of heart failure. Last bowel movement 24. Taking Senna-S 2 tabs BID    Orders:  BMP, CBC 24  Ondansetron 4 mg PO q8h PRN Dx: Nausea  Bisacodyl 10 mg IL x 1    Electronically signed by CHUCK Jack CNP

## 2024-02-26 LAB
ANION GAP SERPL CALCULATED.3IONS-SCNC: 11 MMOL/L (ref 7–15)
BUN SERPL-MCNC: 19.9 MG/DL (ref 8–23)
CALCIUM SERPL-MCNC: 9.5 MG/DL (ref 8.8–10.2)
CHLORIDE SERPL-SCNC: 101 MMOL/L (ref 98–107)
CREAT SERPL-MCNC: 1.64 MG/DL (ref 0.67–1.17)
DEPRECATED HCO3 PLAS-SCNC: 29 MMOL/L (ref 22–29)
EGFRCR SERPLBLD CKD-EPI 2021: 40 ML/MIN/1.73M2
ERYTHROCYTE [DISTWIDTH] IN BLOOD BY AUTOMATED COUNT: 16.4 % (ref 10–15)
GLUCOSE SERPL-MCNC: 104 MG/DL (ref 70–99)
HCT VFR BLD AUTO: 38.3 % (ref 40–53)
HGB BLD-MCNC: 12.3 G/DL (ref 13.3–17.7)
MCH RBC QN AUTO: 31.1 PG (ref 26.5–33)
MCHC RBC AUTO-ENTMCNC: 32.1 G/DL (ref 31.5–36.5)
MCV RBC AUTO: 97 FL (ref 78–100)
PLATELET # BLD AUTO: 285 10E3/UL (ref 150–450)
POTASSIUM SERPL-SCNC: 3.8 MMOL/L (ref 3.4–5.3)
RBC # BLD AUTO: 3.95 10E6/UL (ref 4.4–5.9)
SODIUM SERPL-SCNC: 141 MMOL/L (ref 135–145)
WBC # BLD AUTO: 6.3 10E3/UL (ref 4–11)

## 2024-02-26 PROCEDURE — 80048 BASIC METABOLIC PNL TOTAL CA: CPT | Mod: ORL | Performed by: FAMILY MEDICINE

## 2024-02-26 PROCEDURE — P9604 ONE-WAY ALLOW PRORATED TRIP: HCPCS | Mod: ORL | Performed by: FAMILY MEDICINE

## 2024-02-26 PROCEDURE — 85027 COMPLETE CBC AUTOMATED: CPT | Mod: ORL | Performed by: FAMILY MEDICINE

## 2024-02-26 PROCEDURE — 36415 COLL VENOUS BLD VENIPUNCTURE: CPT | Mod: ORL | Performed by: FAMILY MEDICINE

## 2024-02-26 NOTE — PROGRESS NOTES
[facility]    Code Status:  DNR   Visit Type:   Chief Complaint   Patient presents with    TCU Follow Up     Facility:  [facility] ([facility]) [[id number]]         HPI: Alvin Newton is a 89 year old male who I am seeing today for follow up on the TCU.  Past medical history includes type 2 diabetes mellitus with diabetic polyneuropathy, proximal atrial fibs on Eliquis, bilateral hip replacement 33 years ago, HFpEF, type 2 diabetes mellitus, hypertension, severe aortic stenosis and mild cognitive impairment.  Patient admitted post fall with left femur fracture.  Patient had a mechanical fall in which she was trying to reach for his cane for stability but put his weight on his grabber device instead and fell forward.  He did not hit his head or lose consciousness.  Head CT was negative.  X-ray of the pelvis/left hip revealed a left proximal femoral fracture.  History of bilateral hip replacements about 33 years ago.  Patient underwent repair on 1/24/2024.  He had a complicated surgery in the setting of bilateral hip replacements.  He lost 3 L of blood.  He did require pressor support.  He was also transfused on 1/26.  Patient found to have a mildly low a.m. cortisol level most likely due to a component of adrenal insufficiency.  Steroids were done for couple days along with midodrine.  BP improved.  Steroids were discontinued on 1/30.  Pain controlled with tramadol and Tylenol.  Patient initially had a Prevena wound VAC however 1 day later during his TCU stay VAC was not working appropriately.  Ortho updated and this was discontinued and dry sterile dressing applied.  Patient also experienced postoperative delirium.  He is very anxious and tearful.  No agitation.  He did require one-on-one.  He was given BuSpar to help with anxiety.  Delirium improved.  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  Severe aortic stenosis.  Cardiac MRI in October  "concerning for infiltrative cardiomyopathy.  Workup for amyloidosis in process by cardiology in [place] [facility].  Biopsies of the bone marrow and fat pad were negative for AL amyloid.  Plan at this time is to follow-up with cardiology regarding possible myocardial biopsy and continued amyloid/infiltrative cardiomyopathy workup.  History of proximal atrial fibs on Eliquis.  Patient did have episode of RVR after procedure which resolved.  Acute kidney injury on CKD stage III.  Creatinine 1.34 admission.  Creatinine did bump to 1.74 but improved with fluids.  Type 2 diabetes without long-term use of insulin.  Most recent hemoglobin A1c 6.8%.  Stress leukocytosis with a white blood cell count of 13 resolved.    Transitional Care Course: Today patient lying in bed. Pt with recent UTI. He continues on Macrobid. He reports he is \"feeling much better.\" Recent low bp. He was started on midodrine. Bp better today. Abdominal binder and TEDs ordered. His not wearing either today. He is asymptomatic. Torsemide on hold. Follow up CXR without fiud overload. Pain in his hip controlled with tylenol. Tramadol changed to prn.     Assessment/Plan:     Closed displaced subtrochanteric fracture of left femur with routine healing, subsequent encounter  Status post open reduction and internal fixation (ORIF) of fracture  -History of bilateral hip replacement 33 years old.  -Dry sterile dressing to be changed daily.  -Weightbearing as tolerated.  -Tramadol changed to BID prn.   -Continue tylenol 975 mg TID.     Hypotension   -pt treated with midodrine and steroids in hospital.   -Lg blood loss from surgery. Hgb now 9.5.   -Bp in the 90s systolic. Suspect poor oral intake. Hypovolemia.   -pt with dizziness.   -torsemide on hold.   -Metoprolol decreased to 12.5 mg every day with hold parameters.   -Follow up BMP on Monday.   -Give additional 240cc with each med pass. Avoid aggressive fluid resuscitation with AVS.   -Continue midodrine 2.5 " mg daily.   -TEDS for bp support.     Anemia due to blood loss, acute  -EBL of 3 L.  -Patient underwent transfusion of 1/26/2024.  -Follow-up CBC with hgb 9.5.   -Continue to monitor.     Other amyloidosis (H)  Chronic diastolic (congestive) heart failure (H)  -Workup for amyloidosis in process by cardiology in [place] [facility].  Biopsies of bone marrow and fat pad negative for AL amyloid.  -Follow-up with cardiology regarding possible myocardial biopsy and continued amyloid/infiltrative cardiomyopathy workup.  -Cardiac MRI in October concerning for infiltrative cardiomyopathy.  -Daily weights. Weight down 5 lbs since admit.   -Continue to hold torsemide and potassium.   -monitor for fluid overload.   -CXR 2 view today with no acute process.       Paroxysmal atrial fibrillation with RVR (H)  -Chronically on Eliquis.  -Rate controlled with metoprolol and amiodarone.    Type 2 diabetes mellitus with diabetic polyneuropathy, without long-term current use of insulin (H)  -Recent A1c 6.8%.  -Currently not on medication.    Stage 3a chronic kidney disease (H)  -Baseline 1.3-1.5.   -Pt appears dry. Creatine 1.67.   -follow up BMP with creatine of 1.47.   -monitor.     Stage II Pressure Ulcer, buttock  -apply blayne and foam change every day.   -Ok for air mattress.         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/22/2020    Essential hypertension 04/04/2016    Mixed hyperlipidemia 01/02/2009    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) 12/06/2017    Carpal tunnel syndrome, bilateral 11/18/2021    Fall, initial encounter 06/06/2022    Closed fracture of first lumbar vertebra, unspecified fracture morphology, initial encounter (H) 06/06/2022    Fracture of L1 vertebra (H) 06/06/2022    Impaired fasting glucose 06/20/2022    Osteoarthritis of pelvis 06/20/2022    Other ill-defined and unknown causes  of morbidity and mortality 01/01/1985    Primary localized osteoarthrosis of shoulder region 06/20/2022    Pure hypercholesterolemia 01/01/1999    Reason for consultation 06/20/2022    Right bundle branch block 06/20/2022    Cellulitis of right lower extremity 07/11/2023    Severe sepsis (H) 07/11/2023    Septic shock (H) 07/12/2023    Streptococcal bacteremia 07/13/2023    Thrombocytopenia (H24) 07/13/2023    Hyperbilirubinemia 07/13/2023    Hypophosphatemia 07/13/2023    Hypoalbuminemia 07/13/2023    Pyuria 07/13/2023    Paroxysmal atrial fibrillation with RVR (H) 07/13/2023    Hypomagnesemia 07/13/2023    Chronic pain of both shoulders 07/13/2023    Severe aortic stenosis 07/14/2023    Elevated brain natriuretic peptide (BNP) level 07/14/2023    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    Peripheral edema 07/14/2023    Positive urine culture 07/14/2023    Medication induced coagulopathy  (H24) 07/14/2023    Chronic diastolic (congestive) heart failure (H) 03/31/2023    Altered mental status, unspecified altered mental status type 07/23/2023    Clostridium difficile diarrhea 07/23/2023    History of Clostridium difficile infection July 2023 07/25/2023    Stage 3a chronic kidney disease (H) 07/25/2023    Esophageal dysmotility 07/27/2023    DEJUAN (acute kidney injury) (H24) 08/17/2023    Moderate malnutrition (H24) 08/17/2023    Schatzki's ring of distal esophagus-dilated 8/19/23 08/17/2023    Acute gout involving toe of left foot 08/17/2023    Unintentional weight loss 08/18/2023    Abnormal esophagram 08/18/2023    Hypokalemia 08/19/2023    Hyponatremia 08/19/2023    Open wound-coccyx/buttocks 08/23/2023    Enterocolitis due to Clostridium difficile, not specified as recurrent 07/27/2023    Other chronic pain 07/19/2023    Pain in left shoulder 07/19/2023    Pain in right shoulder 07/19/2023    Unspecified streptococcus as the cause of diseases classified elsewhere 07/19/2023    Esophageal dysphagia 08/11/2023     Hip fracture, left, closed, initial encounter (H) 01/22/2024    Acute kidney failure, unspecified (H24) 08/24/2023    Amyloidosis (H) 01/25/2024    Monoclonal gammopathy of unknown significance (MGUS) 01/25/2024    Hypotension, unspecified hypotension type 01/25/2024    Postoperative anemia due to acute blood loss 01/25/2024     Resolved Ambulatory Problems     Diagnosis Date Noted    Obesity, morbid, BMI 40.0-49.9 (H) 11/07/2017     Past Medical History:   Diagnosis Date    Colonic diverticulum     Hyperlipidemia     Hypertension     Obesity (BMI 30-39.9)     Osteoarthritis     Type 2 diabetes mellitus (H)      No Known Allergies    All Meds and Allergies reviewed in the record at the facility and is the most up-to-date.    Current Outpatient Medications   Medication Sig    acetaminophen (TYLENOL) 325 MG tablet 975mg by mouth every AM and 975mg twice a day as needed    amiodarone (PACERONE) 200 MG tablet Take 1 tablet (200 mg) by mouth daily    atorvastatin (LIPITOR) 20 MG tablet Take 20 mg by mouth At Bedtime    busPIRone (BUSPAR) 5 MG tablet Take 1 tablet (5 mg) by mouth 2 times daily    diclofenac (VOLTAREN) 1 % topical gel Apply 2 g topically 3 times daily shoulders    ELIQUIS ANTICOAGULANT 5 MG tablet Take 5 mg by mouth 2 times daily    famotidine (PEPCID) 20 MG tablet Take 2 tablets (40 mg) by mouth 2 times daily    lidocaine (XYLOCAINE) 5 % external ointment Apply topically 3 times daily    Magnesium Oxide 250 MG TABS Take 250 mg by mouth daily    metoprolol succinate ER (TOPROL XL) 25 MG 24 hr tablet Take 12.5 mg by mouth daily Hold if SBP < 105.    midodrine (PROAMATINE) 2.5 MG tablet Take 2.5 mg by mouth daily    ondansetron (ZOFRAN) 4 MG tablet Take 1 tablet (4 mg) by mouth every 8 hours as needed for nausea    potassium chloride ER (K-TAB/KLOR-CON) 10 MEQ CR tablet Take 20 mEq by mouth daily Currently on hold    senna-docusate (SENOKOT-S/PERICOLACE) 8.6-50 MG tablet Take 2 tablets by mouth 2 times  daily    torsemide (DEMADEX) 20 MG tablet Take 20 mg by mouth daily Currently on hold    traMADol (ULTRAM) 50 MG tablet Take 0.5 tablets (25 mg) by mouth 2 times daily as needed for severe pain     No current facility-administered medications for this visit.       REVIEW OF SYSTEMS:   10 point review of systems reviewed and pertinent positives in the HPI.     PHYSICAL EXAMINATION:  Physical Exam     Vital signs: /75   Pulse 89   Temp 98.3  F (36.8  C)   Resp 14   Ht 1.829 m (6')   Wt 94.6 kg (208 lb 9.6 oz)   SpO2 99%   BMI 28.29 kg/m    General: Awake, Alert, oriented x3, lying in bed, follows simple commands, conversant  HEENT:Pink conjunctiva, moist oral mucosa  NECK: Supple  CVS:  S1  S2, without murmur or gallop.   LUNG: Clear to auscultation, No wheezes, rales or rhonci.  BACK: No kyphosis of the thoracic spine  ABDOMEN: Soft, nontender to palpation, with positive bowel sounds  EXTREMITIES: Moves both upper and lower extremities with generalized weakness and some limitation to left lower extremity, trace pedal edema on the operative side.   NEUROLOGIC: Intact, pulses palpable  PSYCHIATRIC: Mild cognitive impairment noted.       Labs:  All labs reviewed in the nursing home record and Epic   @  Lab Results   Component Value Date    WBC 7.7 02/02/2024    WBC 9.0 12/30/2017     Lab Results   Component Value Date    RBC 3.24 02/02/2024    RBC 4.16 12/30/2017     Lab Results   Component Value Date    HGB 9.5 02/02/2024    HGB 12.6 12/30/2017     Lab Results   Component Value Date    HCT 29.6 02/02/2024    HCT 37.1 12/30/2017     Lab Results   Component Value Date    MCV 91 02/02/2024    MCV 89 12/30/2017     Lab Results   Component Value Date    MCH 29.3 02/02/2024    MCH 30.3 12/30/2017     Lab Results   Component Value Date    MCHC 32.1 02/02/2024    MCHC 34.0 12/30/2017     Lab Results   Component Value Date    RDW 15.5 02/02/2024    RDW 13.8 12/30/2017     Lab Results   Component Value Date    PLT  336 02/02/2024     12/30/2017        @Last Comprehensive Metabolic Panel:  Sodium   Date Value Ref Range Status   02/19/2024 138 135 - 145 mmol/L Final     Comment:     Reference intervals for this test were updated on 09/26/2023 to more accurately reflect our healthy population. There may be differences in the flagging of prior results with similar values performed with this method. Interpretation of those prior results can be made in the context of the updated reference intervals.    12/30/2017 138 133 - 144 mmol/L Final     Potassium   Date Value Ref Range Status   02/19/2024 4.0 3.4 - 5.3 mmol/L Final   07/15/2022 3.7 3.4 - 5.3 mmol/L Final   12/31/2017 3.5 3.4 - 5.3 mmol/L Final     Chloride   Date Value Ref Range Status   02/19/2024 100 98 - 107 mmol/L Final   07/15/2022 106 94 - 109 mmol/L Final   12/30/2017 104 94 - 109 mmol/L Final     Carbon Dioxide   Date Value Ref Range Status   12/30/2017 26 20 - 32 mmol/L Final     Carbon Dioxide (CO2)   Date Value Ref Range Status   02/19/2024 26 22 - 29 mmol/L Final   07/15/2022 24 20 - 32 mmol/L Final     Anion Gap   Date Value Ref Range Status   02/19/2024 12 7 - 15 mmol/L Final   07/15/2022 9 3 - 14 mmol/L Final   12/30/2017 8 3 - 14 mmol/L Final     Glucose   Date Value Ref Range Status   02/19/2024 111 (H) 70 - 99 mg/dL Final   07/15/2022 97 70 - 99 mg/dL Final   12/30/2017 145 (H) 70 - 99 mg/dL Final     GLUCOSE BY METER POCT   Date Value Ref Range Status   02/02/2024 120 (H) 70 - 99 mg/dL Final     Urea Nitrogen   Date Value Ref Range Status   02/19/2024 18.1 8.0 - 23.0 mg/dL Final   07/15/2022 13 7 - 30 mg/dL Final   12/31/2017 26 7 - 30 mg/dL Final     Creatinine   Date Value Ref Range Status   02/19/2024 1.56 (H) 0.67 - 1.17 mg/dL Final   12/31/2017 1.11 0.66 - 1.25 mg/dL Final     GFR Estimate   Date Value Ref Range Status   02/19/2024 42 (L) >60 mL/min/1.73m2 Final   12/31/2017 63 >60 mL/min/1.7m2 Final     Comment:     Non  GFR  Calc     Calcium   Date Value Ref Range Status   02/19/2024 9.2 8.8 - 10.2 mg/dL Final   12/30/2017 7.9 (L) 8.5 - 10.1 mg/dL Final       This note has been dictated using voice recognition software. Any grammatical or context distortions are unintentional and inherent to the software    Electronically signed by: Grace Parry, CNP

## 2024-02-27 ENCOUNTER — LAB REQUISITION (OUTPATIENT)
Dept: LAB | Facility: CLINIC | Age: 89
End: 2024-02-27
Payer: COMMERCIAL

## 2024-02-27 ENCOUNTER — TRANSITIONAL CARE UNIT VISIT (OUTPATIENT)
Dept: GERIATRICS | Facility: CLINIC | Age: 89
End: 2024-02-27
Payer: COMMERCIAL

## 2024-02-27 VITALS
TEMPERATURE: 97.9 F | SYSTOLIC BLOOD PRESSURE: 109 MMHG | WEIGHT: 198.8 LBS | HEART RATE: 86 BPM | RESPIRATION RATE: 18 BRPM | OXYGEN SATURATION: 98 % | DIASTOLIC BLOOD PRESSURE: 66 MMHG | BODY MASS INDEX: 26.93 KG/M2 | HEIGHT: 72 IN

## 2024-02-27 DIAGNOSIS — E11.42 TYPE 2 DIABETES MELLITUS WITH DIABETIC POLYNEUROPATHY, WITHOUT LONG-TERM CURRENT USE OF INSULIN (H): ICD-10-CM

## 2024-02-27 DIAGNOSIS — I48.0 PAROXYSMAL ATRIAL FIBRILLATION WITH RVR (H): ICD-10-CM

## 2024-02-27 DIAGNOSIS — I50.32 CHRONIC DIASTOLIC CONGESTIVE HEART FAILURE (H): ICD-10-CM

## 2024-02-27 DIAGNOSIS — M97.8XXA PERIPROSTHETIC FRACTURE OF SHAFT OF FEMUR: ICD-10-CM

## 2024-02-27 DIAGNOSIS — E86.0 DEHYDRATION: ICD-10-CM

## 2024-02-27 DIAGNOSIS — Z96.649 PERIPROSTHETIC FRACTURE OF SHAFT OF FEMUR: ICD-10-CM

## 2024-02-27 DIAGNOSIS — E86.0 DEHYDRATION: Primary | ICD-10-CM

## 2024-02-27 DIAGNOSIS — K22.4 ESOPHAGEAL DYSMOTILITY: ICD-10-CM

## 2024-02-27 DIAGNOSIS — I95.9 HYPOTENSION, UNSPECIFIED HYPOTENSION TYPE: ICD-10-CM

## 2024-02-27 DIAGNOSIS — K59.01 SLOW TRANSIT CONSTIPATION: ICD-10-CM

## 2024-02-27 PROCEDURE — 99310 SBSQ NF CARE HIGH MDM 45: CPT | Performed by: NURSE PRACTITIONER

## 2024-02-27 NOTE — LETTER
2/27/2024        RE: Alvin Newton  [address] [facility] [id number]        [facility]    Code Status:  DNR   Visit Type:   Chief Complaint   Patient presents with     TCU Follow Up     Facility:  [facility] () [[id number]]         HPI: Alvin Newton is a 89 year old male who I am seeing today for follow up on the TCU.  Past medical history includes type 2 diabetes mellitus with diabetic polyneuropathy, proximal atrial fibs on Eliquis, bilateral hip replacement 33 years ago, HFpEF, type 2 diabetes mellitus, hypertension, severe aortic stenosis and mild cognitive impairment.  Patient admitted post fall with left femur fracture.  Patient had a mechanical fall in which she was trying to reach for his cane for stability but put his weight on his grabber device instead and fell forward.  He did not hit his head or lose consciousness.  Head CT was negative.  X-ray of the pelvis/left hip revealed a left proximal femoral fracture.  History of bilateral hip replacements about 33 years ago.  Patient underwent repair on 1/24/2024.  He had a complicated surgery in the setting of bilateral hip replacements.  He lost 3 L of blood.  He did require pressor support.  He was also transfused on 1/26.  Patient found to have a mildly low a.m. cortisol level most likely due to a component of adrenal insufficiency.  Steroids were done for couple days along with midodrine.  BP improved.  Steroids were discontinued on 1/30.  Pain controlled with tramadol and Tylenol.  Patient initially had a Prevena wound VAC however 1 day later during his TCU stay VAC was not working appropriately.  Ortho updated and this was discontinued and dry sterile dressing applied.  Patient also experienced postoperative delirium.  He is very anxious and tearful.  No agitation.  He did require one-on-one.  He was given BuSpar to help with anxiety.  Delirium improved.  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  Severe aortic stenosis.  Cardiac MRI in October concerning for infiltrative cardiomyopathy.  Workup for amyloidosis in process by cardiology in [place] [facility].  Biopsies of the bone marrow and fat pad were negative for AL amyloid.  Plan at this time is to follow-up with cardiology regarding possible myocardial biopsy and continued amyloid/infiltrative cardiomyopathy workup.  History of proximal atrial fibs on Eliquis.  Patient did have episode of RVR after procedure which resolved.  Acute kidney injury on CKD stage III.  Creatinine 1.34 admission.  Creatinine did bump to 1.74 but improved with fluids.  Type 2 diabetes without long-term use of insulin.  Most recent hemoglobin A1c 6.8%.  Stress leukocytosis with a white blood cell count of 13 resolved.    Transitional Care Course: Today patient sitting up in wheelchair. Pt with continued hypotension and poor oral intake. Creatine returned at 1.64. No further nausea. Pt metoprolol has been reduced. His torsemide has hold parameters- which has been held. Today pt reports \"he gets full fast and is only drinking small sips of water.\" He is reporting gas today. He was given tums prior to my visit. His last BM was 2 days prior. Bp continues on the soft side. He continues with dizziness with standing due to orthostasis. He was started on midodrine for bp support. He also has TEDS and abdominal binder ordered. Recent CXR without fiud overload. Pain in his hip controlled with tylenol.     I did talk to his daughter Fuad today. She reports pt had a tearful evening yesterday and reported being very tired. However today he told he it was better. She reports that every time he goes to TCU he gets this way. We talked about his hx of depression with anxiety. He continues on Buspar. He has been on citalopram in the past. Plan of care reviewed.     Assessment/Plan:     DEJUAN/Dehydration   Stage 3a chronic kidney disease (H)  -Baseline " 1.3-1.5.   -Creatine now 1.64.   -poor oral intake.   -Hold torsemide X 2 days.   -Give 1 liter of normal saline   -Follow up BMP in am.     Closed displaced subtrochanteric fracture of left femur with routine healing, subsequent encounter  Status post open reduction and internal fixation (ORIF) of fracture  -History of bilateral hip replacement 33 years old.  -Dry sterile dressing to be changed daily.  -Weightbearing as tolerated.  -Tramadol changed to BID prn.   -Continue tylenol 975 mg TID.     Hypotension   -pt treated with midodrine and steroids in hospital.   -Lg blood loss from surgery. Hgb now 9.5.   -Suspect poor oral intake. Hypovolemia.   -bp improving but on soft side.   -pt with dizziness with change in position.   -torsemide on hold.   -Metoprolol decreased to 12.5 mg every day with hold parameters.   -Give additional 240cc with each med pass.   -Continue midodrine 2.5 mg daily.   -TEDS for bp support.     Anemia due to blood loss, acute  -EBL of 3 L.  -Patient underwent transfusion of 1/26/2024.  -Follow-up CBC with hgb 9.5.   -Continue to monitor.     Other amyloidosis (H)  Chronic diastolic (congestive) heart failure (H)  -Workup for amyloidosis in process by cardiology in [place] [facility].  Biopsies of bone marrow and fat pad negative for AL amyloid.  -Follow-up with cardiology regarding possible myocardial biopsy and continued amyloid/infiltrative cardiomyopathy workup.  -Cardiac MRI in October concerning for infiltrative cardiomyopathy.  -Daily weights. Weight down 5 lbs since admit.   -Continue to hold torsemide and potassium.   -CXR 2 view today with no acute process.     Paroxysmal atrial fibrillation with RVR (H)  -Chronically on Eliquis.  -Rate controlled with metoprolol and amiodarone.    Type 2 diabetes mellitus with diabetic polyneuropathy, without long-term current use of insulin (H)  -Recent A1c 6.8%.  -Currently not on medication.      Active Ambulatory Problems     Diagnosis Date  Noted     NSTEMI (non-ST elevated myocardial infarction) (H) 12/27/2017     BPH with urinary obstruction 06/22/2020     Essential hypertension 04/04/2016     Mixed hyperlipidemia 01/02/2009     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) 12/06/2017     Carpal tunnel syndrome, bilateral 11/18/2021     Fall, initial encounter 06/06/2022     Closed fracture of first lumbar vertebra, unspecified fracture morphology, initial encounter (H) 06/06/2022     Fracture of L1 vertebra (H) 06/06/2022     Impaired fasting glucose 06/20/2022     Osteoarthritis of pelvis 06/20/2022     Other ill-defined and unknown causes of morbidity and mortality 01/01/1985     Primary localized osteoarthrosis of shoulder region 06/20/2022     Pure hypercholesterolemia 01/01/1999     Reason for consultation 06/20/2022     Right bundle branch block 06/20/2022     Cellulitis of right lower extremity 07/11/2023     Severe sepsis (H) 07/11/2023     Septic shock (H) 07/12/2023     Streptococcal bacteremia 07/13/2023     Thrombocytopenia (H24) 07/13/2023     Hyperbilirubinemia 07/13/2023     Hypophosphatemia 07/13/2023     Hypoalbuminemia 07/13/2023     Pyuria 07/13/2023     Paroxysmal atrial fibrillation with RVR (H) 07/13/2023     Hypomagnesemia 07/13/2023     Chronic pain of both shoulders 07/13/2023     Severe aortic stenosis 07/14/2023     Elevated brain natriuretic peptide (BNP) level 07/14/2023     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     Peripheral edema 07/14/2023     Positive urine culture 07/14/2023     Medication induced coagulopathy  (H24) 07/14/2023     Chronic diastolic (congestive) heart failure (H) 03/31/2023     Altered mental status, unspecified altered mental status type 07/23/2023     Clostridium difficile diarrhea 07/23/2023     History of Clostridium difficile infection July 2023 07/25/2023     Stage 3a chronic kidney disease (H) 07/25/2023     Esophageal dysmotility 07/27/2023     DEJUAN (acute kidney injury)  (H24) 08/17/2023     Moderate malnutrition (H24) 08/17/2023     Schatzki's ring of distal esophagus-dilated 8/19/23 08/17/2023     Acute gout involving toe of left foot 08/17/2023     Unintentional weight loss 08/18/2023     Abnormal esophagram 08/18/2023     Hypokalemia 08/19/2023     Hyponatremia 08/19/2023     Open wound-coccyx/buttocks 08/23/2023     Enterocolitis due to Clostridium difficile, not specified as recurrent 07/27/2023     Other chronic pain 07/19/2023     Pain in left shoulder 07/19/2023     Pain in right shoulder 07/19/2023     Unspecified streptococcus as the cause of diseases classified elsewhere 07/19/2023     Esophageal dysphagia 08/11/2023     Hip fracture, left, closed, initial encounter (H) 01/22/2024     Acute kidney failure, unspecified (H24) 08/24/2023     Amyloidosis (H) 01/25/2024     Monoclonal gammopathy of unknown significance (MGUS) 01/25/2024     Hypotension, unspecified hypotension type 01/25/2024     Postoperative anemia due to acute blood loss 01/25/2024     Resolved Ambulatory Problems     Diagnosis Date Noted     Obesity, morbid, BMI 40.0-49.9 (H) 11/07/2017     Past Medical History:   Diagnosis Date     Colonic diverticulum      Hyperlipidemia      Hypertension      Obesity (BMI 30-39.9)      Osteoarthritis      Type 2 diabetes mellitus (H)      No Known Allergies    All Meds and Allergies reviewed in the record at the facility and is the most up-to-date.    Current Outpatient Medications   Medication Sig     acetaminophen (TYLENOL) 325 MG tablet 975mg by mouth every AM and 975mg twice a day as needed     amiodarone (PACERONE) 200 MG tablet Take 1 tablet (200 mg) by mouth daily     atorvastatin (LIPITOR) 20 MG tablet Take 20 mg by mouth At Bedtime     busPIRone (BUSPAR) 5 MG tablet Take 1 tablet (5 mg) by mouth 2 times daily     diclofenac (VOLTAREN) 1 % topical gel Apply 2 g topically 3 times daily shoulders     ELIQUIS ANTICOAGULANT 5 MG tablet Take 5 mg by mouth 2 times  daily     famotidine (PEPCID) 20 MG tablet Take 2 tablets (40 mg) by mouth 2 times daily     lidocaine (XYLOCAINE) 5 % external ointment Apply topically 3 times daily     Magnesium Oxide 250 MG TABS Take 250 mg by mouth daily     metoprolol succinate ER (TOPROL XL) 25 MG 24 hr tablet Take 12.5 mg by mouth daily Hold if SBP < 105.     midodrine (PROAMATINE) 2.5 MG tablet Take 2.5 mg by mouth daily     ondansetron (ZOFRAN) 4 MG tablet Take 1 tablet (4 mg) by mouth every 8 hours as needed for nausea     potassium chloride ER (K-TAB/KLOR-CON) 10 MEQ CR tablet Take 20 mEq by mouth daily Currently on hold     senna-docusate (SENOKOT-S/PERICOLACE) 8.6-50 MG tablet Take 2 tablets by mouth 2 times daily     torsemide (DEMADEX) 20 MG tablet Take 20 mg by mouth daily Currently on hold     traMADol (ULTRAM) 50 MG tablet Take 0.5 tablets (25 mg) by mouth 2 times daily as needed for severe pain     No current facility-administered medications for this visit.       REVIEW OF SYSTEMS:   10 point review of systems reviewed and pertinent positives in the HPI.     PHYSICAL EXAMINATION:  Physical Exam     Vital signs: /66   Pulse 86   Temp 97.9  F (36.6  C)   Resp 18   Ht 1.829 m (6')   Wt 90.2 kg (198 lb 12.8 oz)   SpO2 98%   BMI 26.96 kg/m    General: Awake, Alert, oriented x3, sitting up in wheelchair, conversant  HEENT:Pink conjunctiva, dry oral mucosa  NECK: Supple  CVS:  S1  S2, without murmur or gallop.   LUNG: Clear to auscultation, No wheezes, rales or rhonci.  BACK: No kyphosis of the thoracic spine  ABDOMEN: Soft, nontender to palpation, with positive bowel sounds  EXTREMITIES: Moves both upper and lower extremities with generalized weakness and some limitation to left lower extremity, no pedal edema.   NEUROLOGIC: Intact, pulses palpable  PSYCHIATRIC: Mild cognitive impairment noted.       Labs:  All labs reviewed in the nursing home record and [facility]   @  Lab Results   Component Value Date    WBC 7.7 02/02/2024     WBC 9.0 12/30/2017     Lab Results   Component Value Date    RBC 3.24 02/02/2024    RBC 4.16 12/30/2017     Lab Results   Component Value Date    HGB 9.5 02/02/2024    HGB 12.6 12/30/2017     Lab Results   Component Value Date    HCT 29.6 02/02/2024    HCT 37.1 12/30/2017     Lab Results   Component Value Date    MCV 91 02/02/2024    MCV 89 12/30/2017     Lab Results   Component Value Date    MCH 29.3 02/02/2024    MCH 30.3 12/30/2017     Lab Results   Component Value Date    MCHC 32.1 02/02/2024    MCHC 34.0 12/30/2017     Lab Results   Component Value Date    RDW 15.5 02/02/2024    RDW 13.8 12/30/2017     Lab Results   Component Value Date     02/02/2024     12/30/2017        @Last Comprehensive Metabolic Panel:  Sodium   Date Value Ref Range Status   02/26/2024 141 135 - 145 mmol/L Final     Comment:     Reference intervals for this test were updated on 09/26/2023 to more accurately reflect our healthy population. There may be differences in the flagging of prior results with similar values performed with this method. Interpretation of those prior results can be made in the context of the updated reference intervals.    12/30/2017 138 133 - 144 mmol/L Final     Potassium   Date Value Ref Range Status   02/26/2024 3.8 3.4 - 5.3 mmol/L Final   07/15/2022 3.7 3.4 - 5.3 mmol/L Final   12/31/2017 3.5 3.4 - 5.3 mmol/L Final     Chloride   Date Value Ref Range Status   02/26/2024 101 98 - 107 mmol/L Final   07/15/2022 106 94 - 109 mmol/L Final   12/30/2017 104 94 - 109 mmol/L Final     Carbon Dioxide   Date Value Ref Range Status   12/30/2017 26 20 - 32 mmol/L Final     Carbon Dioxide (CO2)   Date Value Ref Range Status   02/26/2024 29 22 - 29 mmol/L Final   07/15/2022 24 20 - 32 mmol/L Final     Anion Gap   Date Value Ref Range Status   02/26/2024 11 7 - 15 mmol/L Final   07/15/2022 9 3 - 14 mmol/L Final   12/30/2017 8 3 - 14 mmol/L Final     Glucose   Date Value Ref Range Status   02/26/2024 104 (H)  70 - 99 mg/dL Final   07/15/2022 97 70 - 99 mg/dL Final   12/30/2017 145 (H) 70 - 99 mg/dL Final     GLUCOSE BY METER POCT   Date Value Ref Range Status   02/02/2024 120 (H) 70 - 99 mg/dL Final     Urea Nitrogen   Date Value Ref Range Status   02/26/2024 19.9 8.0 - 23.0 mg/dL Final   07/15/2022 13 7 - 30 mg/dL Final   12/31/2017 26 7 - 30 mg/dL Final     Creatinine   Date Value Ref Range Status   02/26/2024 1.64 (H) 0.67 - 1.17 mg/dL Final   12/31/2017 1.11 0.66 - 1.25 mg/dL Final     GFR Estimate   Date Value Ref Range Status   02/26/2024 40 (L) >60 mL/min/1.73m2 Final   12/31/2017 63 >60 mL/min/1.7m2 Final     Comment:     Non  GFR Calc     Calcium   Date Value Ref Range Status   02/26/2024 9.5 8.8 - 10.2 mg/dL Final   12/30/2017 7.9 (L) 8.5 - 10.1 mg/dL Final     >35 minutes spent for this visit which included reviewing labs, meds as well as face to face with pt and talking with his daughter and collaborating with nursing staff.     This note has been dictated using voice recognition software. Any grammatical or context distortions are unintentional and inherent to the software    Electronically signed by: Grace Parry CNP       Sincerely,        Grace Parry NP

## 2024-02-28 ENCOUNTER — LAB REQUISITION (OUTPATIENT)
Dept: LAB | Facility: CLINIC | Age: 89
End: 2024-02-28
Payer: COMMERCIAL

## 2024-02-28 ENCOUNTER — TRANSITIONAL CARE UNIT VISIT (OUTPATIENT)
Dept: GERIATRICS | Facility: CLINIC | Age: 89
End: 2024-02-28
Payer: COMMERCIAL

## 2024-02-28 VITALS
RESPIRATION RATE: 15 BRPM | BODY MASS INDEX: 26.93 KG/M2 | WEIGHT: 198.8 LBS | TEMPERATURE: 97.9 F | HEIGHT: 72 IN | OXYGEN SATURATION: 99 % | HEART RATE: 86 BPM | DIASTOLIC BLOOD PRESSURE: 68 MMHG | SYSTOLIC BLOOD PRESSURE: 122 MMHG

## 2024-02-28 DIAGNOSIS — E11.42 TYPE 2 DIABETES MELLITUS WITH DIABETIC POLYNEUROPATHY, WITHOUT LONG-TERM CURRENT USE OF INSULIN (H): ICD-10-CM

## 2024-02-28 DIAGNOSIS — K59.01 SLOW TRANSIT CONSTIPATION: ICD-10-CM

## 2024-02-28 DIAGNOSIS — E86.0 DEHYDRATION: ICD-10-CM

## 2024-02-28 DIAGNOSIS — I95.9 HYPOTENSION, UNSPECIFIED HYPOTENSION TYPE: ICD-10-CM

## 2024-02-28 DIAGNOSIS — E86.0 DEHYDRATION: Primary | ICD-10-CM

## 2024-02-28 DIAGNOSIS — I48.0 PAROXYSMAL ATRIAL FIBRILLATION WITH RVR (H): ICD-10-CM

## 2024-02-28 DIAGNOSIS — M97.8XXA PERIPROSTHETIC FRACTURE OF SHAFT OF FEMUR: ICD-10-CM

## 2024-02-28 DIAGNOSIS — I50.32 CHRONIC DIASTOLIC CONGESTIVE HEART FAILURE (H): ICD-10-CM

## 2024-02-28 DIAGNOSIS — Z96.649 PERIPROSTHETIC FRACTURE OF SHAFT OF FEMUR: ICD-10-CM

## 2024-02-28 LAB
ANION GAP SERPL CALCULATED.3IONS-SCNC: 11 MMOL/L (ref 7–15)
BUN SERPL-MCNC: 21.1 MG/DL (ref 8–23)
CALCIUM SERPL-MCNC: 9.3 MG/DL (ref 8.8–10.2)
CHLORIDE SERPL-SCNC: 100 MMOL/L (ref 98–107)
CREAT SERPL-MCNC: 1.68 MG/DL (ref 0.67–1.17)
DEPRECATED HCO3 PLAS-SCNC: 28 MMOL/L (ref 22–29)
EGFRCR SERPLBLD CKD-EPI 2021: 39 ML/MIN/1.73M2
ERYTHROCYTE [DISTWIDTH] IN BLOOD BY AUTOMATED COUNT: 16.3 % (ref 10–15)
GLUCOSE SERPL-MCNC: 95 MG/DL (ref 70–99)
HCT VFR BLD AUTO: 35.3 % (ref 40–53)
HGB BLD-MCNC: 11.7 G/DL (ref 13.3–17.7)
MCH RBC QN AUTO: 31.6 PG (ref 26.5–33)
MCHC RBC AUTO-ENTMCNC: 33.1 G/DL (ref 31.5–36.5)
MCV RBC AUTO: 95 FL (ref 78–100)
PLATELET # BLD AUTO: 225 10E3/UL (ref 150–450)
POTASSIUM SERPL-SCNC: 3.8 MMOL/L (ref 3.4–5.3)
RBC # BLD AUTO: 3.7 10E6/UL (ref 4.4–5.9)
SODIUM SERPL-SCNC: 139 MMOL/L (ref 135–145)
WBC # BLD AUTO: 6.5 10E3/UL (ref 4–11)

## 2024-02-28 PROCEDURE — 99309 SBSQ NF CARE MODERATE MDM 30: CPT | Performed by: NURSE PRACTITIONER

## 2024-02-28 PROCEDURE — P9604 ONE-WAY ALLOW PRORATED TRIP: HCPCS | Mod: ORL | Performed by: NURSE PRACTITIONER

## 2024-02-28 PROCEDURE — 80048 BASIC METABOLIC PNL TOTAL CA: CPT | Mod: ORL | Performed by: NURSE PRACTITIONER

## 2024-02-28 PROCEDURE — 36415 COLL VENOUS BLD VENIPUNCTURE: CPT | Mod: ORL | Performed by: NURSE PRACTITIONER

## 2024-02-28 PROCEDURE — 85027 COMPLETE CBC AUTOMATED: CPT | Mod: ORL | Performed by: NURSE PRACTITIONER

## 2024-02-28 NOTE — PROGRESS NOTES
[facility] GERIATRIC SERVICES    Code Status:  DNR   Visit Type:   Chief Complaint   Patient presents with    TCU Follow Up     Facility:  [facility] ([facility]) [[id number]]         HPI: Alvin Newton is a 89 year old male who I am seeing today for follow up on the TCU.  Past medical history includes type 2 diabetes mellitus with diabetic polyneuropathy, proximal atrial fibs on Eliquis, bilateral hip replacement 33 years ago, HFpEF, type 2 diabetes mellitus, hypertension, severe aortic stenosis and mild cognitive impairment.  Patient admitted post fall with left femur fracture.  Patient had a mechanical fall in which she was trying to reach for his cane for stability but put his weight on his grabber device instead and fell forward.  He did not hit his head or lose consciousness.  Head CT was negative.  X-ray of the pelvis/left hip revealed a left proximal femoral fracture.  History of bilateral hip replacements about 33 years ago.  Patient underwent repair on 1/24/2024.  He had a complicated surgery in the setting of bilateral hip replacements.  He lost 3 L of blood.  He did require pressor support.  He was also transfused on 1/26.  Patient found to have a mildly low a.m. cortisol level most likely due to a component of adrenal insufficiency.  Steroids were done for couple days along with midodrine.  BP improved.  Steroids were discontinued on 1/30.  Pain controlled with tramadol and Tylenol.  Patient initially had a Prevena wound VAC however 1 day later during his TCU stay VAC was not working appropriately.  Ortho updated and this was discontinued and dry sterile dressing applied.  Patient also experienced postoperative delirium.  He is very anxious and tearful.  No agitation.  He did require one-on-one.  He was given BuSpar to help with anxiety.  Delirium improved.  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  Severe aortic stenosis.  Cardiac MRI in October  "concerning for infiltrative cardiomyopathy.  Workup for amyloidosis in process by cardiology in [place] [facility].  Biopsies of the bone marrow and fat pad were negative for AL amyloid.  Plan at this time is to follow-up with cardiology regarding possible myocardial biopsy and continued amyloid/infiltrative cardiomyopathy workup.  History of proximal atrial fibs on Eliquis.  Patient did have episode of RVR after procedure which resolved.  Acute kidney injury on CKD stage III.  Creatinine 1.34 admission.  Creatinine did bump to 1.74 but improved with fluids.  Type 2 diabetes without long-term use of insulin.  Most recent hemoglobin A1c 6.8%.  Stress leukocytosis with a white blood cell count of 13 resolved.    Transitional Care Course: Today patient sitting up in wheelchair. Pt with continued hypotension and poor oral intake. Creatine returned at 1.64. No further nausea. Pt metoprolol has been reduced. His torsemide has hold parameters- which has been held. Today pt reports \"he gets full fast and is only drinking small sips of water.\" He is reporting gas today. He was given tums prior to my visit. His last BM was 2 days prior. Bp continues on the soft side. He continues with dizziness with standing due to orthostasis. He was started on midodrine for bp support. He also has TEDS and abdominal binder ordered. Recent CXR without fiud overload. Pain in his hip controlled with tylenol.     I did talk to his daughter Fuad today. She reports pt had a tearful evening yesterday and reported being very tired. However today he told he it was better. She reports that every time he goes to TCU he gets this way. We talked about his hx of depression with anxiety. He continues on Buspar. He has been on citalopram in the past. Plan of care reviewed.     Assessment/Plan:     DEJUAN/Dehydration   Stage 3a chronic kidney disease (H)  -Baseline 1.3-1.5.   -Creatine now 1.64.   -poor oral intake.   -Hold torsemide X 2 days.   -Give 1 liter " of normal saline   -Follow up BMP in am.     Closed displaced subtrochanteric fracture of left femur with routine healing, subsequent encounter  Status post open reduction and internal fixation (ORIF) of fracture  -History of bilateral hip replacement 33 years old.  -Dry sterile dressing to be changed daily.  -Weightbearing as tolerated.  -Tramadol changed to BID prn.   -Continue tylenol 975 mg TID.     Hypotension   -pt treated with midodrine and steroids in hospital.   -Lg blood loss from surgery. Hgb now 9.5.   -Suspect poor oral intake. Hypovolemia.   -bp improving but on soft side.   -pt with dizziness with change in position.   -torsemide on hold.   -Metoprolol decreased to 12.5 mg every day with hold parameters.   -Give additional 240cc with each med pass.   -Continue midodrine 2.5 mg daily.   -TEDS for bp support.     Anemia due to blood loss, acute  -EBL of 3 L.  -Patient underwent transfusion of 1/26/2024.  -Follow-up CBC with hgb 9.5.   -Continue to monitor.     Other amyloidosis (H)  Chronic diastolic (congestive) heart failure (H)  -Workup for amyloidosis in process by cardiology in [place] [facility].  Biopsies of bone marrow and fat pad negative for AL amyloid.  -Follow-up with cardiology regarding possible myocardial biopsy and continued amyloid/infiltrative cardiomyopathy workup.  -Cardiac MRI in October concerning for infiltrative cardiomyopathy.  -Daily weights. Weight down 5 lbs since admit.   -Continue to hold torsemide and potassium.   -CXR 2 view today with no acute process.     Paroxysmal atrial fibrillation with RVR (H)  -Chronically on Eliquis.  -Rate controlled with metoprolol and amiodarone.    Type 2 diabetes mellitus with diabetic polyneuropathy, without long-term current use of insulin (H)  -Recent A1c 6.8%.  -Currently not on medication.      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/22/2020    Essential hypertension 04/04/2016    Mixed hyperlipidemia 01/02/2009    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) 12/06/2017    Carpal tunnel syndrome, bilateral 11/18/2021    Fall, initial encounter 06/06/2022    Closed fracture of first lumbar vertebra, unspecified fracture morphology, initial encounter (H) 06/06/2022    Fracture of L1 vertebra (H) 06/06/2022    Impaired fasting glucose 06/20/2022    Osteoarthritis of pelvis 06/20/2022    Other ill-defined and unknown causes of morbidity and mortality 01/01/1985    Primary localized osteoarthrosis of shoulder region 06/20/2022    Pure hypercholesterolemia 01/01/1999    Reason for consultation 06/20/2022    Right bundle branch block 06/20/2022    Cellulitis of right lower extremity 07/11/2023    Severe sepsis (H) 07/11/2023    Septic shock (H) 07/12/2023    Streptococcal bacteremia 07/13/2023    Thrombocytopenia (H24) 07/13/2023    Hyperbilirubinemia 07/13/2023    Hypophosphatemia 07/13/2023    Hypoalbuminemia 07/13/2023    Pyuria 07/13/2023    Paroxysmal atrial fibrillation with RVR (H) 07/13/2023    Hypomagnesemia 07/13/2023    Chronic pain of both shoulders 07/13/2023    Severe aortic stenosis 07/14/2023    Elevated brain natriuretic peptide (BNP) level 07/14/2023    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    Peripheral edema 07/14/2023    Positive urine culture 07/14/2023    Medication induced coagulopathy  (H24) 07/14/2023    Chronic diastolic (congestive) heart failure (H) 03/31/2023    Altered mental status, unspecified altered mental status type 07/23/2023    Clostridium difficile diarrhea 07/23/2023    History of Clostridium difficile infection July 2023 07/25/2023    Stage 3a chronic kidney disease (H) 07/25/2023    Esophageal dysmotility 07/27/2023    DEJUAN (acute kidney injury) (H24) 08/17/2023    Moderate malnutrition (H24) 08/17/2023    Schatzki's ring of distal esophagus-dilated 8/19/23 08/17/2023    Acute  gout involving toe of left foot 08/17/2023    Unintentional weight loss 08/18/2023    Abnormal esophagram 08/18/2023    Hypokalemia 08/19/2023    Hyponatremia 08/19/2023    Open wound-coccyx/buttocks 08/23/2023    Enterocolitis due to Clostridium difficile, not specified as recurrent 07/27/2023    Other chronic pain 07/19/2023    Pain in left shoulder 07/19/2023    Pain in right shoulder 07/19/2023    Unspecified streptococcus as the cause of diseases classified elsewhere 07/19/2023    Esophageal dysphagia 08/11/2023    Hip fracture, left, closed, initial encounter (H) 01/22/2024    Acute kidney failure, unspecified (H24) 08/24/2023    Amyloidosis (H) 01/25/2024    Monoclonal gammopathy of unknown significance (MGUS) 01/25/2024    Hypotension, unspecified hypotension type 01/25/2024    Postoperative anemia due to acute blood loss 01/25/2024     Resolved Ambulatory Problems     Diagnosis Date Noted    Obesity, morbid, BMI 40.0-49.9 (H) 11/07/2017     Past Medical History:   Diagnosis Date    Colonic diverticulum     Hyperlipidemia     Hypertension     Obesity (BMI 30-39.9)     Osteoarthritis     Type 2 diabetes mellitus (H)      No Known Allergies    All Meds and Allergies reviewed in the record at the facility and is the most up-to-date.    Current Outpatient Medications   Medication Sig    acetaminophen (TYLENOL) 325 MG tablet 975mg by mouth every AM and 975mg twice a day as needed    amiodarone (PACERONE) 200 MG tablet Take 1 tablet (200 mg) by mouth daily    atorvastatin (LIPITOR) 20 MG tablet Take 20 mg by mouth At Bedtime    busPIRone (BUSPAR) 5 MG tablet Take 1 tablet (5 mg) by mouth 2 times daily    diclofenac (VOLTAREN) 1 % topical gel Apply 2 g topically 3 times daily shoulders    ELIQUIS ANTICOAGULANT 5 MG tablet Take 5 mg by mouth 2 times daily    famotidine (PEPCID) 20 MG tablet Take 2 tablets (40 mg) by mouth 2 times daily    lidocaine (XYLOCAINE) 5 % external ointment Apply topically 3 times daily     Magnesium Oxide 250 MG TABS Take 250 mg by mouth daily    metoprolol succinate ER (TOPROL XL) 25 MG 24 hr tablet Take 12.5 mg by mouth daily Hold if SBP < 105.    midodrine (PROAMATINE) 2.5 MG tablet Take 2.5 mg by mouth daily    ondansetron (ZOFRAN) 4 MG tablet Take 1 tablet (4 mg) by mouth every 8 hours as needed for nausea    potassium chloride ER (K-TAB/KLOR-CON) 10 MEQ CR tablet Take 20 mEq by mouth daily Currently on hold    senna-docusate (SENOKOT-S/PERICOLACE) 8.6-50 MG tablet Take 2 tablets by mouth 2 times daily    torsemide (DEMADEX) 20 MG tablet Take 20 mg by mouth daily Currently on hold    traMADol (ULTRAM) 50 MG tablet Take 0.5 tablets (25 mg) by mouth 2 times daily as needed for severe pain     No current facility-administered medications for this visit.       REVIEW OF SYSTEMS:   10 point review of systems reviewed and pertinent positives in the HPI.     PHYSICAL EXAMINATION:  Physical Exam     Vital signs: /66   Pulse 86   Temp 97.9  F (36.6  C)   Resp 18   Ht 1.829 m (6')   Wt 90.2 kg (198 lb 12.8 oz)   SpO2 98%   BMI 26.96 kg/m    General: Awake, Alert, oriented x3, sitting up in wheelchair, conversant  HEENT:Pink conjunctiva, dry oral mucosa  NECK: Supple  CVS:  S1  S2, without murmur or gallop.   LUNG: Clear to auscultation, No wheezes, rales or rhonci.  BACK: No kyphosis of the thoracic spine  ABDOMEN: Soft, nontender to palpation, with positive bowel sounds  EXTREMITIES: Moves both upper and lower extremities with generalized weakness and some limitation to left lower extremity, no pedal edema.   NEUROLOGIC: Intact, pulses palpable  PSYCHIATRIC: Mild cognitive impairment noted.       Labs:  All labs reviewed in the nursing home record and [facility]   @  Lab Results   Component Value Date    WBC 7.7 02/02/2024    WBC 9.0 12/30/2017     Lab Results   Component Value Date    RBC 3.24 02/02/2024    RBC 4.16 12/30/2017     Lab Results   Component Value Date    HGB 9.5 02/02/2024    HGB  12.6 12/30/2017     Lab Results   Component Value Date    HCT 29.6 02/02/2024    HCT 37.1 12/30/2017     Lab Results   Component Value Date    MCV 91 02/02/2024    MCV 89 12/30/2017     Lab Results   Component Value Date    MCH 29.3 02/02/2024    MCH 30.3 12/30/2017     Lab Results   Component Value Date    MCHC 32.1 02/02/2024    MCHC 34.0 12/30/2017     Lab Results   Component Value Date    RDW 15.5 02/02/2024    RDW 13.8 12/30/2017     Lab Results   Component Value Date     02/02/2024     12/30/2017        @Last Comprehensive Metabolic Panel:  Sodium   Date Value Ref Range Status   02/26/2024 141 135 - 145 mmol/L Final     Comment:     Reference intervals for this test were updated on 09/26/2023 to more accurately reflect our healthy population. There may be differences in the flagging of prior results with similar values performed with this method. Interpretation of those prior results can be made in the context of the updated reference intervals.    12/30/2017 138 133 - 144 mmol/L Final     Potassium   Date Value Ref Range Status   02/26/2024 3.8 3.4 - 5.3 mmol/L Final   07/15/2022 3.7 3.4 - 5.3 mmol/L Final   12/31/2017 3.5 3.4 - 5.3 mmol/L Final     Chloride   Date Value Ref Range Status   02/26/2024 101 98 - 107 mmol/L Final   07/15/2022 106 94 - 109 mmol/L Final   12/30/2017 104 94 - 109 mmol/L Final     Carbon Dioxide   Date Value Ref Range Status   12/30/2017 26 20 - 32 mmol/L Final     Carbon Dioxide (CO2)   Date Value Ref Range Status   02/26/2024 29 22 - 29 mmol/L Final   07/15/2022 24 20 - 32 mmol/L Final     Anion Gap   Date Value Ref Range Status   02/26/2024 11 7 - 15 mmol/L Final   07/15/2022 9 3 - 14 mmol/L Final   12/30/2017 8 3 - 14 mmol/L Final     Glucose   Date Value Ref Range Status   02/26/2024 104 (H) 70 - 99 mg/dL Final   07/15/2022 97 70 - 99 mg/dL Final   12/30/2017 145 (H) 70 - 99 mg/dL Final     GLUCOSE BY METER POCT   Date Value Ref Range Status   02/02/2024 120 (H) 70  - 99 mg/dL Final     Urea Nitrogen   Date Value Ref Range Status   02/26/2024 19.9 8.0 - 23.0 mg/dL Final   07/15/2022 13 7 - 30 mg/dL Final   12/31/2017 26 7 - 30 mg/dL Final     Creatinine   Date Value Ref Range Status   02/26/2024 1.64 (H) 0.67 - 1.17 mg/dL Final   12/31/2017 1.11 0.66 - 1.25 mg/dL Final     GFR Estimate   Date Value Ref Range Status   02/26/2024 40 (L) >60 mL/min/1.73m2 Final   12/31/2017 63 >60 mL/min/1.7m2 Final     Comment:     Non  GFR Calc     Calcium   Date Value Ref Range Status   02/26/2024 9.5 8.8 - 10.2 mg/dL Final   12/30/2017 7.9 (L) 8.5 - 10.1 mg/dL Final     >35 minutes spent for this visit which included reviewing labs, meds as well as face to face with pt and talking with his daughter and collaborating with nursing staff.     This note has been dictated using voice recognition software. Any grammatical or context distortions are unintentional and inherent to the software    Electronically signed by: Grace Parry, CNP

## 2024-02-28 NOTE — LETTER
2/28/2024        RE: Alvin Newton  [address] [facility] [id number]        [facility]    Code Status:  DNR   Visit Type:   Chief Complaint   Patient presents with     Tcu Follow Up     Facility:  [facility] ([facility]) [[id number]]         HPI: Alvin Newton is a 89 year old male who I am seeing today for follow up on the TCU.  Past medical history includes type 2 diabetes mellitus with diabetic polyneuropathy, proximal atrial fibs on Eliquis, bilateral hip replacement 33 years ago, HFpEF, type 2 diabetes mellitus, hypertension, severe aortic stenosis and mild cognitive impairment.  Patient admitted post fall with left femur fracture.  Patient had a mechanical fall in which she was trying to reach for his cane for stability but put his weight on his grabber device instead and fell forward.  He did not hit his head or lose consciousness.  Head CT was negative.  X-ray of the pelvis/left hip revealed a left proximal femoral fracture.  History of bilateral hip replacements about 33 years ago.  Patient underwent repair on 1/24/2024.  He had a complicated surgery in the setting of bilateral hip replacements.  He lost 3 L of blood.  He did require pressor support.  He was also transfused on 1/26.  Patient found to have a mildly low a.m. cortisol level most likely due to a component of adrenal insufficiency.  Steroids were done for couple days along with midodrine.  BP improved.  Steroids were discontinued on 1/30.  Pain controlled with tramadol and Tylenol.  Patient initially had a Prevena wound VAC however 1 day later during his TCU stay VAC was not working appropriately.  Ortho updated and this was discontinued and dry sterile dressing applied.  Patient also experienced postoperative delirium.  He is very anxious and tearful.  No agitation.  He did require one-on-one.  He was given BuSpar to help with anxiety.  Delirium improved.  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  Severe aortic stenosis.  Cardiac MRI in October concerning for infiltrative cardiomyopathy.  Workup for amyloidosis in process by cardiology in [place] [facility].  Biopsies of the bone marrow and fat pad were negative for AL amyloid.  Plan at this time is to follow-up with cardiology regarding possible myocardial biopsy and continued amyloid/infiltrative cardiomyopathy workup.  History of proximal atrial fibs on Eliquis.  Patient did have episode of RVR after procedure which resolved.  Acute kidney injury on CKD stage III.  Creatinine 1.34 admission.  Creatinine did bump to 1.74 but improved with fluids.  Type 2 diabetes without long-term use of insulin.  Most recent hemoglobin A1c 6.8%.  Stress leukocytosis with a white blood cell count of 13 resolved.    Transitional Care Course: Today patient sitting up in wheelchair. Relative visiting. Pt with continued hypotension/DEJUAN  and poor oral intake. Creatine yesterday 1.64. No further nausea. Pt given 1 liter of NS. Today creatine 1.68. Torsemide on hold X 3 days. He reports today \"this is the best I have felt in weeks. 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 Per hospital records avoid aggressive fluid resuscitation. He denies any dizziness today. His metoprolol was decreased. He continues on midodrine for bp support. He also has TEDS. His stool softeners were increased and he reports he had a bowel movement this morning. He stills reports \"feeling gassy.\"     Assessment/Plan:     DEJUAN/Dehydration   Stage 3a chronic kidney disease (H)  -Baseline 1.3-1.5.   -1 liter of fluids given.   -Creatine now 1.64-1.68.   -poor oral intake.   -Continue to hold torsemide and potassium.   -Follow up BMP in am.   -per hospital avoid aggressive fluid resuscitation due to heart failure.     Closed displaced " subtrochanteric fracture of left femur with routine healing, subsequent encounter  Status post open reduction and internal fixation (ORIF) of fracture  -History of bilateral hip replacement 33 years old.  -Dry sterile dressing to be changed daily.  -Weightbearing as tolerated.  -Tramadol changed to BID prn.   -Continue tylenol 975 mg TID.     Hypotension   -pt treated with midodrine and steroids in hospital.   -Lg blood loss from surgery. Hgb now 9.5.   -Suspect poor oral intake. Hypovolemia.   -bp improving but on soft side.   -pt with dizziness with change in position.   -torsemide on hold.   -Metoprolol decreased to 12.5 mg every day with hold parameters.   -Give additional 240cc with each med pass.   -Continue midodrine 2.5 mg daily.   -TEDS for bp support.     Anemia due to blood loss, acute  -EBL of 3 L.  -Patient underwent transfusion of 1/26/2024.  -Follow-up CBC with hgb 9.5.   -Continue to monitor.     Other amyloidosis (H)  Chronic diastolic (congestive) heart failure (H)  -Workup for amyloidosis in process by cardiology in [place] [facility].  Biopsies of bone marrow and fat pad negative for AL amyloid.  -Follow-up with cardiology regarding possible myocardial biopsy and continued amyloid/infiltrative cardiomyopathy workup.  -Cardiac MRI in October concerning for infiltrative cardiomyopathy.  -Daily weights. Weight down 5 lbs since admit.   -Continue to hold torsemide and potassium.   -CXR 2 view today with no acute process.     Paroxysmal atrial fibrillation with RVR (H)  -Chronically on Eliquis.  -Rate controlled with metoprolol and amiodarone.    Type 2 diabetes mellitus with diabetic polyneuropathy, without long-term current use of insulin (H)  -Recent A1c 6.8%.  -Currently not on medication.      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     Essential hypertension 04/04/2016      Mixed hyperlipidemia 01/02/2009     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) 12/06/2017     Carpal tunnel syndrome, bilateral 11/18/2021     Fall, initial encounter 06/06/2022     Closed fracture of first lumbar vertebra, unspecified fracture morphology, initial encounter (H) 06/06/2022     Fracture of L1 vertebra (H) 06/06/2022     Impaired fasting glucose 06/20/2022     Osteoarthritis of pelvis 06/20/2022     Other ill-defined and unknown causes of morbidity and mortality 01/01/1985     Primary localized osteoarthrosis of shoulder region 06/20/2022     Pure hypercholesterolemia 01/01/1999     Reason for consultation 06/20/2022     Right bundle branch block 06/20/2022     Cellulitis of right lower extremity 07/11/2023     Severe sepsis (H) 07/11/2023     Septic shock (H) 07/12/2023     Streptococcal bacteremia 07/13/2023     Thrombocytopenia (H24) 07/13/2023     Hyperbilirubinemia 07/13/2023     Hypophosphatemia 07/13/2023     Hypoalbuminemia 07/13/2023     Pyuria 07/13/2023     Paroxysmal atrial fibrillation with RVR (H) 07/13/2023     Hypomagnesemia 07/13/2023     Chronic pain of both shoulders 07/13/2023     Severe aortic stenosis 07/14/2023     Elevated brain natriuretic peptide (BNP) level 07/14/2023     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     Peripheral edema 07/14/2023     Positive urine culture 07/14/2023     Medication induced coagulopathy  (H24) 07/14/2023     Chronic diastolic (congestive) heart failure (H) 03/31/2023     Altered mental status, unspecified altered mental status type 07/23/2023     Clostridium difficile diarrhea 07/23/2023     History of Clostridium difficile infection July 2023 07/25/2023     Stage 3a chronic kidney disease (H) 07/25/2023     Esophageal dysmotility 07/27/2023     DEJUAN (acute kidney injury) (H24) 08/17/2023     Moderate malnutrition (H24) 08/17/2023     Schatzki's ring of distal esophagus-dilated 8/19/23 08/17/2023     Acute gout involving toe of  left foot 08/17/2023     Unintentional weight loss 08/18/2023     Abnormal esophagram 08/18/2023     Hypokalemia 08/19/2023     Hyponatremia 08/19/2023     Open wound-coccyx/buttocks 08/23/2023     Enterocolitis due to Clostridium difficile, not specified as recurrent 07/27/2023     Other chronic pain 07/19/2023     Pain in left shoulder 07/19/2023     Pain in right shoulder 07/19/2023     Unspecified streptococcus as the cause of diseases classified elsewhere 07/19/2023     Esophageal dysphagia 08/11/2023     Hip fracture, left, closed, initial encounter (H) 01/22/2024     Acute kidney failure, unspecified (H24) 08/24/2023     Amyloidosis (H) 01/25/2024     Monoclonal gammopathy of unknown significance (MGUS) 01/25/2024     Hypotension, unspecified hypotension type 01/25/2024     Postoperative anemia due to acute blood loss 01/25/2024     Resolved Ambulatory Problems     Diagnosis Date Noted     Obesity, morbid, BMI 40.0-49.9 (H) 11/07/2017     Past Medical History:   Diagnosis Date     Colonic diverticulum      Hyperlipidemia      Hypertension      Obesity (BMI 30-39.9)      Osteoarthritis      Type 2 diabetes mellitus (H)      No Known Allergies    All Meds and Allergies reviewed in the record at the facility and is the most up-to-date.    Current Outpatient Medications   Medication Sig     acetaminophen (TYLENOL) 325 MG tablet 975mg by mouth every AM and 975mg twice a day as needed     amiodarone (PACERONE) 200 MG tablet Take 1 tablet (200 mg) by mouth daily     atorvastatin (LIPITOR) 20 MG tablet Take 20 mg by mouth At Bedtime     busPIRone (BUSPAR) 5 MG tablet Take 1 tablet (5 mg) by mouth 2 times daily     diclofenac (VOLTAREN) 1 % topical gel Apply 2 g topically 3 times daily shoulders     ELIQUIS ANTICOAGULANT 5 MG tablet Take 5 mg by mouth 2 times daily     famotidine (PEPCID) 20 MG tablet Take 2 tablets (40 mg) by mouth 2 times daily     lidocaine (XYLOCAINE) 5 % external ointment Apply topically 3 times  daily     Magnesium Oxide 250 MG TABS Take 250 mg by mouth daily     metoprolol succinate ER (TOPROL XL) 25 MG 24 hr tablet Take 12.5 mg by mouth daily Hold if SBP < 105.     midodrine (PROAMATINE) 2.5 MG tablet Take 2.5 mg by mouth daily     ondansetron (ZOFRAN) 4 MG tablet Take 1 tablet (4 mg) by mouth every 8 hours as needed for nausea     senna-docusate (SENOKOT-S/PERICOLACE) 8.6-50 MG tablet Take 2 tablets by mouth 2 times daily     traMADol (ULTRAM) 50 MG tablet Take 0.5 tablets (25 mg) by mouth 2 times daily as needed for severe pain     No current facility-administered medications for this visit.       REVIEW OF SYSTEMS:   10 point review of systems reviewed and pertinent positives in the HPI.     PHYSICAL EXAMINATION:  Physical Exam     Vital signs: /68   Pulse 86   Temp 97.9  F (36.6  C)   Resp 15   Ht 1.829 m (6')   Wt 90.2 kg (198 lb 12.8 oz)   SpO2 99%   BMI 26.96 kg/m    General: Awake, Alert, oriented x3, sitting up in wheelchair, conversant  HEENT:Pink conjunctiva, moist oral mucosa  NECK: Supple  CVS:  S1  S2, without murmur or gallop.   LUNG: Clear to auscultation, No wheezes, rales or rhonci.  BACK: No kyphosis of the thoracic spine  ABDOMEN: Soft, nontender to palpation, with positive bowel sounds  EXTREMITIES: Moves both upper and lower extremities with generalized weakness and some limitation to left lower extremity, no pedal edema.   NEUROLOGIC: Intact, pulses palpable  PSYCHIATRIC: Mild cognitive impairment noted. Very happy on exam.       Labs:  All labs reviewed in the nursing home record and [employer]   @  Lab Results   Component Value Date    WBC 7.7 02/02/2024    WBC 9.0 12/30/2017     Lab Results   Component Value Date    RBC 3.24 02/02/2024    RBC 4.16 12/30/2017     Lab Results   Component Value Date    HGB 9.5 02/02/2024    HGB 12.6 12/30/2017     Lab Results   Component Value Date    HCT 29.6 02/02/2024    HCT 37.1 12/30/2017     Lab Results   Component Value Date    MCV 91  02/02/2024    MCV 89 12/30/2017     Lab Results   Component Value Date    MCH 29.3 02/02/2024    MCH 30.3 12/30/2017     Lab Results   Component Value Date    MCHC 32.1 02/02/2024    MCHC 34.0 12/30/2017     Lab Results   Component Value Date    RDW 15.5 02/02/2024    RDW 13.8 12/30/2017     Lab Results   Component Value Date     02/02/2024     12/30/2017        @Last Comprehensive Metabolic Panel:  Sodium   Date Value Ref Range Status   02/28/2024 139 135 - 145 mmol/L Final     Comment:     Reference intervals for this test were updated on 09/26/2023 to more accurately reflect our healthy population. There may be differences in the flagging of prior results with similar values performed with this method. Interpretation of those prior results can be made in the context of the updated reference intervals.    12/30/2017 138 133 - 144 mmol/L Final     Potassium   Date Value Ref Range Status   02/28/2024 3.8 3.4 - 5.3 mmol/L Final   07/15/2022 3.7 3.4 - 5.3 mmol/L Final   12/31/2017 3.5 3.4 - 5.3 mmol/L Final     Chloride   Date Value Ref Range Status   02/28/2024 100 98 - 107 mmol/L Final   07/15/2022 106 94 - 109 mmol/L Final   12/30/2017 104 94 - 109 mmol/L Final     Carbon Dioxide   Date Value Ref Range Status   12/30/2017 26 20 - 32 mmol/L Final     Carbon Dioxide (CO2)   Date Value Ref Range Status   02/28/2024 28 22 - 29 mmol/L Final   07/15/2022 24 20 - 32 mmol/L Final     Anion Gap   Date Value Ref Range Status   02/28/2024 11 7 - 15 mmol/L Final   07/15/2022 9 3 - 14 mmol/L Final   12/30/2017 8 3 - 14 mmol/L Final     Glucose   Date Value Ref Range Status   02/28/2024 95 70 - 99 mg/dL Final   07/15/2022 97 70 - 99 mg/dL Final   12/30/2017 145 (H) 70 - 99 mg/dL Final     GLUCOSE BY METER POCT   Date Value Ref Range Status   02/02/2024 120 (H) 70 - 99 mg/dL Final     Urea Nitrogen   Date Value Ref Range Status   02/28/2024 21.1 8.0 - 23.0 mg/dL Final   07/15/2022 13 7 - 30 mg/dL Final   12/31/2017  26 7 - 30 mg/dL Final     Creatinine   Date Value Ref Range Status   02/28/2024 1.68 (H) 0.67 - 1.17 mg/dL Final   12/31/2017 1.11 0.66 - 1.25 mg/dL Final     GFR Estimate   Date Value Ref Range Status   02/28/2024 39 (L) >60 mL/min/1.73m2 Final   12/31/2017 63 >60 mL/min/1.7m2 Final     Comment:     Non  GFR Calc     Calcium   Date Value Ref Range Status   02/28/2024 9.3 8.8 - 10.2 mg/dL Final   12/30/2017 7.9 (L) 8.5 - 10.1 mg/dL Final       This note has been dictated using voice recognition software. Any grammatical or context distortions are unintentional and inherent to the software    Electronically signed by: Grace Parry CNP       Sincerely,        Grace Parry, NP

## 2024-02-29 ENCOUNTER — TRANSITIONAL CARE UNIT VISIT (OUTPATIENT)
Dept: GERIATRICS | Facility: CLINIC | Age: 89
End: 2024-02-29
Payer: COMMERCIAL

## 2024-02-29 VITALS
DIASTOLIC BLOOD PRESSURE: 84 MMHG | SYSTOLIC BLOOD PRESSURE: 138 MMHG | OXYGEN SATURATION: 100 % | WEIGHT: 205.8 LBS | HEART RATE: 86 BPM | RESPIRATION RATE: 16 BRPM | TEMPERATURE: 98 F | BODY MASS INDEX: 27.87 KG/M2 | HEIGHT: 72 IN

## 2024-02-29 DIAGNOSIS — E86.0 DEHYDRATION: Primary | ICD-10-CM

## 2024-02-29 DIAGNOSIS — I48.0 PAROXYSMAL ATRIAL FIBRILLATION WITH RVR (H): ICD-10-CM

## 2024-02-29 DIAGNOSIS — K59.01 SLOW TRANSIT CONSTIPATION: ICD-10-CM

## 2024-02-29 DIAGNOSIS — K22.4 ESOPHAGEAL DYSMOTILITY: ICD-10-CM

## 2024-02-29 DIAGNOSIS — M97.8XXA PERIPROSTHETIC FRACTURE OF SHAFT OF FEMUR: ICD-10-CM

## 2024-02-29 DIAGNOSIS — E11.42 TYPE 2 DIABETES MELLITUS WITH DIABETIC POLYNEUROPATHY, WITHOUT LONG-TERM CURRENT USE OF INSULIN (H): ICD-10-CM

## 2024-02-29 DIAGNOSIS — Z96.649 PERIPROSTHETIC FRACTURE OF SHAFT OF FEMUR: ICD-10-CM

## 2024-02-29 DIAGNOSIS — I95.9 HYPOTENSION, UNSPECIFIED HYPOTENSION TYPE: ICD-10-CM

## 2024-02-29 DIAGNOSIS — I50.32 CHRONIC DIASTOLIC CONGESTIVE HEART FAILURE (H): ICD-10-CM

## 2024-02-29 LAB
ANION GAP SERPL CALCULATED.3IONS-SCNC: 12 MMOL/L (ref 7–15)
BUN SERPL-MCNC: 20 MG/DL (ref 8–23)
CALCIUM SERPL-MCNC: 9 MG/DL (ref 8.8–10.2)
CHLORIDE SERPL-SCNC: 98 MMOL/L (ref 98–107)
CREAT SERPL-MCNC: 1.53 MG/DL (ref 0.67–1.17)
DEPRECATED HCO3 PLAS-SCNC: 27 MMOL/L (ref 22–29)
EGFRCR SERPLBLD CKD-EPI 2021: 43 ML/MIN/1.73M2
GLUCOSE SERPL-MCNC: 89 MG/DL (ref 70–99)
POTASSIUM SERPL-SCNC: 3.7 MMOL/L (ref 3.4–5.3)
SODIUM SERPL-SCNC: 137 MMOL/L (ref 135–145)

## 2024-02-29 PROCEDURE — 99309 SBSQ NF CARE MODERATE MDM 30: CPT | Performed by: NURSE PRACTITIONER

## 2024-02-29 PROCEDURE — P9604 ONE-WAY ALLOW PRORATED TRIP: HCPCS | Mod: ORL | Performed by: FAMILY MEDICINE

## 2024-02-29 PROCEDURE — 36415 COLL VENOUS BLD VENIPUNCTURE: CPT | Mod: ORL | Performed by: FAMILY MEDICINE

## 2024-02-29 PROCEDURE — 80048 BASIC METABOLIC PNL TOTAL CA: CPT | Mod: ORL | Performed by: FAMILY MEDICINE

## 2024-02-29 NOTE — PROGRESS NOTES
[facility]    Code Status:  DNR   Visit Type:   Chief Complaint   Patient presents with    Tcu Follow Up     Facility:  [facility] ([facility]) [[id number]]         HPI: Alvin Newton is a 89 year old male who I am seeing today for follow up on the TCU.  Past medical history includes type 2 diabetes mellitus with diabetic polyneuropathy, proximal atrial fibs on Eliquis, bilateral hip replacement 33 years ago, HFpEF, type 2 diabetes mellitus, hypertension, severe aortic stenosis and mild cognitive impairment.  Patient admitted post fall with left femur fracture.  Patient had a mechanical fall in which she was trying to reach for his cane for stability but put his weight on his grabber device instead and fell forward.  He did not hit his head or lose consciousness.  Head CT was negative.  X-ray of the pelvis/left hip revealed a left proximal femoral fracture.  History of bilateral hip replacements about 33 years ago.  Patient underwent repair on 1/24/2024.  He had a complicated surgery in the setting of bilateral hip replacements.  He lost 3 L of blood.  He did require pressor support.  He was also transfused on 1/26.  Patient found to have a mildly low a.m. cortisol level most likely due to a component of adrenal insufficiency.  Steroids were done for couple days along with midodrine.  BP improved.  Steroids were discontinued on 1/30.  Pain controlled with tramadol and Tylenol.  Patient initially had a Prevena wound VAC however 1 day later during his TCU stay VAC was not working appropriately.  Ortho updated and this was discontinued and dry sterile dressing applied.  Patient also experienced postoperative delirium.  He is very anxious and tearful.  No agitation.  He did require one-on-one.  He was given BuSpar to help with anxiety.  Delirium improved.  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  Severe aortic stenosis.  Cardiac MRI in October  "concerning for infiltrative cardiomyopathy.  Workup for amyloidosis in process by cardiology in [place] [facility].  Biopsies of the bone marrow and fat pad were negative for AL amyloid.  Plan at this time is to follow-up with cardiology regarding possible myocardial biopsy and continued amyloid/infiltrative cardiomyopathy workup.  History of proximal atrial fibs on Eliquis.  Patient did have episode of RVR after procedure which resolved.  Acute kidney injury on CKD stage III.  Creatinine 1.34 admission.  Creatinine did bump to 1.74 but improved with fluids.  Type 2 diabetes without long-term use of insulin.  Most recent hemoglobin A1c 6.8%.  Stress leukocytosis with a white blood cell count of 13 resolved.    Transitional Care Course: Today patient sitting up in wheelchair. Relative visiting. Pt with continued hypotension/DEJUAN  and poor oral intake. Creatine yesterday 1.64. No further nausea. Pt given 1 liter of NS. Today creatine 1.68. Torsemide on hold X 3 days. He reports today \"this is the best I have felt in weeks. 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 Per hospital records avoid aggressive fluid resuscitation. He denies any dizziness today. His metoprolol was decreased. He continues on midodrine for bp support. He also has TEDS. His stool softeners were increased and he reports he had a bowel movement this morning. He stills reports \"feeling gassy.\"     Assessment/Plan:     DEJUAN/Dehydration   Stage 3a chronic kidney disease (H)  -Baseline 1.3-1.5.   -1 liter of fluids given.   -Creatine now 1.64-1.68.   -poor oral intake.   -Continue to hold torsemide and potassium.   -Follow up BMP in am.   -per hospital avoid aggressive fluid resuscitation due to heart failure.     Closed displaced subtrochanteric fracture of left femur with routine healing, subsequent encounter  Status post open " reduction and internal fixation (ORIF) of fracture  -History of bilateral hip replacement 33 years old.  -Dry sterile dressing to be changed daily.  -Weightbearing as tolerated.  -Tramadol changed to BID prn.   -Continue tylenol 975 mg TID.     Hypotension   -pt treated with midodrine and steroids in hospital.   -Lg blood loss from surgery. Hgb now 9.5.   -Suspect poor oral intake. Hypovolemia.   -bp improving but on soft side.   -pt with dizziness with change in position.   -torsemide on hold.   -Metoprolol decreased to 12.5 mg every day with hold parameters.   -Give additional 240cc with each med pass.   -Continue midodrine 2.5 mg daily.   -TEDS for bp support.     Anemia due to blood loss, acute  -EBL of 3 L.  -Patient underwent transfusion of 1/26/2024.  -Follow-up CBC with hgb 9.5.   -Continue to monitor.     Other amyloidosis (H)  Chronic diastolic (congestive) heart failure (H)  -Workup for amyloidosis in process by cardiology in [place] [facility].  Biopsies of bone marrow and fat pad negative for AL amyloid.  -Follow-up with cardiology regarding possible myocardial biopsy and continued amyloid/infiltrative cardiomyopathy workup.  -Cardiac MRI in October concerning for infiltrative cardiomyopathy.  -Daily weights. Weight down 5 lbs since admit.   -Continue to hold torsemide and potassium.   -CXR 2 view today with no acute process.     Paroxysmal atrial fibrillation with RVR (H)  -Chronically on Eliquis.  -Rate controlled with metoprolol and amiodarone.    Type 2 diabetes mellitus with diabetic polyneuropathy, without long-term current use of insulin (H)  -Recent A1c 6.8%.  -Currently not on medication.      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/22/2020    Essential hypertension 04/04/2016    Mixed hyperlipidemia 01/02/2009    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) 12/06/2017     Carpal tunnel syndrome, bilateral 11/18/2021    Fall, initial encounter 06/06/2022    Closed fracture of first lumbar vertebra, unspecified fracture morphology, initial encounter (H) 06/06/2022    Fracture of L1 vertebra (H) 06/06/2022    Impaired fasting glucose 06/20/2022    Osteoarthritis of pelvis 06/20/2022    Other ill-defined and unknown causes of morbidity and mortality 01/01/1985    Primary localized osteoarthrosis of shoulder region 06/20/2022    Pure hypercholesterolemia 01/01/1999    Reason for consultation 06/20/2022    Right bundle branch block 06/20/2022    Cellulitis of right lower extremity 07/11/2023    Severe sepsis (H) 07/11/2023    Septic shock (H) 07/12/2023    Streptococcal bacteremia 07/13/2023    Thrombocytopenia (H24) 07/13/2023    Hyperbilirubinemia 07/13/2023    Hypophosphatemia 07/13/2023    Hypoalbuminemia 07/13/2023    Pyuria 07/13/2023    Paroxysmal atrial fibrillation with RVR (H) 07/13/2023    Hypomagnesemia 07/13/2023    Chronic pain of both shoulders 07/13/2023    Severe aortic stenosis 07/14/2023    Elevated brain natriuretic peptide (BNP) level 07/14/2023    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    Peripheral edema 07/14/2023    Positive urine culture 07/14/2023    Medication induced coagulopathy  (H24) 07/14/2023    Chronic diastolic (congestive) heart failure (H) 03/31/2023    Altered mental status, unspecified altered mental status type 07/23/2023    Clostridium difficile diarrhea 07/23/2023    History of Clostridium difficile infection July 2023 07/25/2023    Stage 3a chronic kidney disease (H) 07/25/2023    Esophageal dysmotility 07/27/2023    DEJUAN (acute kidney injury) (H24) 08/17/2023    Moderate malnutrition (H24) 08/17/2023    Schatzki's ring of distal esophagus-dilated 8/19/23 08/17/2023    Acute gout involving toe of left foot 08/17/2023    Unintentional weight loss 08/18/2023    Abnormal esophagram 08/18/2023    Hypokalemia 08/19/2023    Hyponatremia 08/19/2023     Open wound-coccyx/buttocks 08/23/2023    Enterocolitis due to Clostridium difficile, not specified as recurrent 07/27/2023    Other chronic pain 07/19/2023    Pain in left shoulder 07/19/2023    Pain in right shoulder 07/19/2023    Unspecified streptococcus as the cause of diseases classified elsewhere 07/19/2023    Esophageal dysphagia 08/11/2023    Hip fracture, left, closed, initial encounter (H) 01/22/2024    Acute kidney failure, unspecified (H24) 08/24/2023    Amyloidosis (H) 01/25/2024    Monoclonal gammopathy of unknown significance (MGUS) 01/25/2024    Hypotension, unspecified hypotension type 01/25/2024    Postoperative anemia due to acute blood loss 01/25/2024     Resolved Ambulatory Problems     Diagnosis Date Noted    Obesity, morbid, BMI 40.0-49.9 (H) 11/07/2017     Past Medical History:   Diagnosis Date    Colonic diverticulum     Hyperlipidemia     Hypertension     Obesity (BMI 30-39.9)     Osteoarthritis     Type 2 diabetes mellitus (H)      No Known Allergies    All Meds and Allergies reviewed in the record at the facility and is the most up-to-date.    Current Outpatient Medications   Medication Sig    acetaminophen (TYLENOL) 325 MG tablet 975mg by mouth every AM and 975mg twice a day as needed    amiodarone (PACERONE) 200 MG tablet Take 1 tablet (200 mg) by mouth daily    atorvastatin (LIPITOR) 20 MG tablet Take 20 mg by mouth At Bedtime    busPIRone (BUSPAR) 5 MG tablet Take 1 tablet (5 mg) by mouth 2 times daily    diclofenac (VOLTAREN) 1 % topical gel Apply 2 g topically 3 times daily shoulders    ELIQUIS ANTICOAGULANT 5 MG tablet Take 5 mg by mouth 2 times daily    famotidine (PEPCID) 20 MG tablet Take 2 tablets (40 mg) by mouth 2 times daily    lidocaine (XYLOCAINE) 5 % external ointment Apply topically 3 times daily    Magnesium Oxide 250 MG TABS Take 250 mg by mouth daily    metoprolol succinate ER (TOPROL XL) 25 MG 24 hr tablet Take 12.5 mg by mouth daily Hold if SBP < 105.     midodrine (PROAMATINE) 2.5 MG tablet Take 2.5 mg by mouth daily    ondansetron (ZOFRAN) 4 MG tablet Take 1 tablet (4 mg) by mouth every 8 hours as needed for nausea    senna-docusate (SENOKOT-S/PERICOLACE) 8.6-50 MG tablet Take 2 tablets by mouth 2 times daily    traMADol (ULTRAM) 50 MG tablet Take 0.5 tablets (25 mg) by mouth 2 times daily as needed for severe pain     No current facility-administered medications for this visit.       REVIEW OF SYSTEMS:   10 point review of systems reviewed and pertinent positives in the HPI.     PHYSICAL EXAMINATION:  Physical Exam     Vital signs: /68   Pulse 86   Temp 97.9  F (36.6  C)   Resp 15   Ht 1.829 m (6')   Wt 90.2 kg (198 lb 12.8 oz)   SpO2 99%   BMI 26.96 kg/m    General: Awake, Alert, oriented x3, sitting up in wheelchair, conversant  HEENT:Pink conjunctiva, moist oral mucosa  NECK: Supple  CVS:  S1  S2, without murmur or gallop.   LUNG: Clear to auscultation, No wheezes, rales or rhonci.  BACK: No kyphosis of the thoracic spine  ABDOMEN: Soft, nontender to palpation, with positive bowel sounds  EXTREMITIES: Moves both upper and lower extremities with generalized weakness and some limitation to left lower extremity, no pedal edema.   NEUROLOGIC: Intact, pulses palpable  PSYCHIATRIC: Mild cognitive impairment noted. Very happy on exam.       Labs:  All labs reviewed in the nursing home record and [facility]   @  Lab Results   Component Value Date    WBC 7.7 02/02/2024    WBC 9.0 12/30/2017     Lab Results   Component Value Date    RBC 3.24 02/02/2024    RBC 4.16 12/30/2017     Lab Results   Component Value Date    HGB 9.5 02/02/2024    HGB 12.6 12/30/2017     Lab Results   Component Value Date    HCT 29.6 02/02/2024    HCT 37.1 12/30/2017     Lab Results   Component Value Date    MCV 91 02/02/2024    MCV 89 12/30/2017     Lab Results   Component Value Date    MCH 29.3 02/02/2024    MCH 30.3 12/30/2017     Lab Results   Component Value Date    MCHC 32.1  02/02/2024    MCHC 34.0 12/30/2017     Lab Results   Component Value Date    RDW 15.5 02/02/2024    RDW 13.8 12/30/2017     Lab Results   Component Value Date     02/02/2024     12/30/2017        @Last Comprehensive Metabolic Panel:  Sodium   Date Value Ref Range Status   02/28/2024 139 135 - 145 mmol/L Final     Comment:     Reference intervals for this test were updated on 09/26/2023 to more accurately reflect our healthy population. There may be differences in the flagging of prior results with similar values performed with this method. Interpretation of those prior results can be made in the context of the updated reference intervals.    12/30/2017 138 133 - 144 mmol/L Final     Potassium   Date Value Ref Range Status   02/28/2024 3.8 3.4 - 5.3 mmol/L Final   07/15/2022 3.7 3.4 - 5.3 mmol/L Final   12/31/2017 3.5 3.4 - 5.3 mmol/L Final     Chloride   Date Value Ref Range Status   02/28/2024 100 98 - 107 mmol/L Final   07/15/2022 106 94 - 109 mmol/L Final   12/30/2017 104 94 - 109 mmol/L Final     Carbon Dioxide   Date Value Ref Range Status   12/30/2017 26 20 - 32 mmol/L Final     Carbon Dioxide (CO2)   Date Value Ref Range Status   02/28/2024 28 22 - 29 mmol/L Final   07/15/2022 24 20 - 32 mmol/L Final     Anion Gap   Date Value Ref Range Status   02/28/2024 11 7 - 15 mmol/L Final   07/15/2022 9 3 - 14 mmol/L Final   12/30/2017 8 3 - 14 mmol/L Final     Glucose   Date Value Ref Range Status   02/28/2024 95 70 - 99 mg/dL Final   07/15/2022 97 70 - 99 mg/dL Final   12/30/2017 145 (H) 70 - 99 mg/dL Final     GLUCOSE BY METER POCT   Date Value Ref Range Status   02/02/2024 120 (H) 70 - 99 mg/dL Final     Urea Nitrogen   Date Value Ref Range Status   02/28/2024 21.1 8.0 - 23.0 mg/dL Final   07/15/2022 13 7 - 30 mg/dL Final   12/31/2017 26 7 - 30 mg/dL Final     Creatinine   Date Value Ref Range Status   02/28/2024 1.68 (H) 0.67 - 1.17 mg/dL Final   12/31/2017 1.11 0.66 - 1.25 mg/dL Final     GFR  Estimate   Date Value Ref Range Status   02/28/2024 39 (L) >60 mL/min/1.73m2 Final   12/31/2017 63 >60 mL/min/1.7m2 Final     Comment:     Non  GFR Calc     Calcium   Date Value Ref Range Status   02/28/2024 9.3 8.8 - 10.2 mg/dL Final   12/30/2017 7.9 (L) 8.5 - 10.1 mg/dL Final       This note has been dictated using voice recognition software. Any grammatical or context distortions are unintentional and inherent to the software    Electronically signed by: Grace Parry, CNP

## 2024-02-29 NOTE — LETTER
2/29/2024        RE: Alvin Newton  [address] [facility] [id number]        [facility]    Code Status:  DNR   Visit Type:   Chief Complaint   Patient presents with     TCU Follow Up     Facility:  [facility] ([facility]) [[id number]]         HPI: Alvin Newton is a 89 year old male who I am seeing today for follow up on the TCU.  Past medical history includes type 2 diabetes mellitus with diabetic polyneuropathy, proximal atrial fibs on Eliquis, bilateral hip replacement 33 years ago, HFpEF, type 2 diabetes mellitus, hypertension, severe aortic stenosis and mild cognitive impairment.  Patient admitted post fall with left femur fracture.  Patient had a mechanical fall in which she was trying to reach for his cane for stability but put his weight on his grabber device instead and fell forward.  He did not hit his head or lose consciousness.  Head CT was negative.  X-ray of the pelvis/left hip revealed a left proximal femoral fracture.  History of bilateral hip replacements about 33 years ago.  Patient underwent repair on 1/24/2024.  He had a complicated surgery in the setting of bilateral hip replacements.  He lost 3 L of blood.  He did require pressor support.  He was also transfused on 1/26.  Patient found to have a mildly low a.m. cortisol level most likely due to a component of adrenal insufficiency.  Steroids were done for couple days along with midodrine.  BP improved.  Steroids were discontinued on 1/30.  Pain controlled with tramadol and Tylenol.  Patient initially had a Prevena wound VAC however 1 day later during his TCU stay VAC was not working appropriately.  Ortho updated and this was discontinued and dry sterile dressing applied.  Patient also experienced postoperative delirium.  He is very anxious and tearful.  No agitation.  He did require one-on-one.  He was given BuSpar to help with anxiety.  Delirium improved.  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  Severe aortic stenosis.  Cardiac MRI in October concerning for infiltrative cardiomyopathy.  Workup for amyloidosis in process by cardiology in [place] [facility].  Biopsies of the bone marrow and fat pad were negative for AL amyloid.  Plan at this time is to follow-up with cardiology regarding possible myocardial biopsy and continued amyloid/infiltrative cardiomyopathy workup.  History of proximal atrial fibs on Eliquis.  Patient did have episode of RVR after procedure which resolved.  Acute kidney injury on CKD stage III.  Creatinine 1.34 admission.  Creatinine did bump to 1.74 but improved with fluids.  Type 2 diabetes without long-term use of insulin.  Most recent hemoglobin A1c 6.8%.  Stress leukocytosis with a white blood cell count of 13 resolved.    Transitional Care Course: Today patient sitting up in wheelchair. Care conference held earlier today with pt and family.  Pt with continued hypotension/DEJUAN  and poor oral intake. Creatine yesterday 1.64. He received 1 liter of IV fluids. He is more alert. He denies any nausea today. He reports \"feeling full and gassy.\" Recent increase in bowel meds. Last BM 4 days prior. He has positive bowel sounds. He was also given additional Miralax today. 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 Torsemide on hold. He has had ~ 15 lb weight loss. Per hospital records avoid aggressive fluid resuscitation. He denies any dizziness today. His metoprolol was decreased. He continues on midodrine for bp support. He also has TEDS.     Assessment/Plan:     DEJUAN/Dehydration   Stage 3a chronic kidney disease (H)  -Baseline 1.3-1.5.   -1 liter of fluids given.   -Creatine 1.64-1.68. Today 1.53.   -poor oral intake.   -Continue to hold torsemide and potassium.   -Follow up BMP on Monday.   -per hospital avoid aggressive fluid resuscitation due to heart failure.     Closed displaced subtrochanteric " fracture of left femur with routine healing, subsequent encounter  Status post open reduction and internal fixation (ORIF) of fracture  -History of bilateral hip replacement 33 years old.  -Dry sterile dressing to be changed daily.  -Weightbearing as tolerated.  -Tramadol changed to BID prn.   -Continue tylenol 975 mg TID.     Hypotension   -pt treated with midodrine and steroids in hospital.   -Lg blood loss from surgery. Hgb now 9.5.   -Suspect poor oral intake. Hypovolemia.   -bp improving but on soft side.   -torsemide on hold.   -Metoprolol decreased to 12.5 mg every day with hold parameters.   -Give additional 240cc with each med pass.   -Continue midodrine 2.5 mg daily.   -TEDS for bp support.     Anemia due to blood loss, acute  -EBL of 3 L.  -Patient underwent transfusion of 1/26/2024.  -Follow-up CBC with hgb 9.5.   -Continue to monitor.     Other amyloidosis (H)  Chronic diastolic (congestive) heart failure (H)  -Workup for amyloidosis in process by cardiology in [place] [facility].  Biopsies of bone marrow and fat pad negative for AL amyloid.  -Follow-up with cardiology regarding possible myocardial biopsy and continued amyloid/infiltrative cardiomyopathy workup.  -Cardiac MRI in October concerning for infiltrative cardiomyopathy.  -Daily weights. Weight down `15 lbs since admit.   -Continue to hold torsemide and potassium.   -CXR 2 view today with no acute process.   -Follow up with cardiology 3/4/24.     Paroxysmal atrial fibrillation with RVR (H)  -Chronically on Eliquis.  -Rate controlled with metoprolol and amiodarone.    Type 2 diabetes mellitus with diabetic polyneuropathy, without long-term current use of insulin (H)  -Recent A1c 6.8%.  -Currently not on medication.    Constipation  GI dysmotility  -Continue Senna S 2 tabs BID.   -Miralax 17 gm BID.   -Simethicone 80 mg QID.     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/22/2020     Essential hypertension 04/04/2016     Mixed hyperlipidemia 01/02/2009     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) 12/06/2017     Carpal tunnel syndrome, bilateral 11/18/2021     Fall, initial encounter 06/06/2022     Closed fracture of first lumbar vertebra, unspecified fracture morphology, initial encounter (H) 06/06/2022     Fracture of L1 vertebra (H) 06/06/2022     Impaired fasting glucose 06/20/2022     Osteoarthritis of pelvis 06/20/2022     Other ill-defined and unknown causes of morbidity and mortality 01/01/1985     Primary localized osteoarthrosis of shoulder region 06/20/2022     Pure hypercholesterolemia 01/01/1999     Reason for consultation 06/20/2022     Right bundle branch block 06/20/2022     Cellulitis of right lower extremity 07/11/2023     Severe sepsis (H) 07/11/2023     Septic shock (H) 07/12/2023     Streptococcal bacteremia 07/13/2023     Thrombocytopenia (H24) 07/13/2023     Hyperbilirubinemia 07/13/2023     Hypophosphatemia 07/13/2023     Hypoalbuminemia 07/13/2023     Pyuria 07/13/2023     Paroxysmal atrial fibrillation with RVR (H) 07/13/2023     Hypomagnesemia 07/13/2023     Chronic pain of both shoulders 07/13/2023     Severe aortic stenosis 07/14/2023     Elevated brain natriuretic peptide (BNP) level 07/14/2023     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     Peripheral edema 07/14/2023     Positive urine culture 07/14/2023     Medication induced coagulopathy  (H24) 07/14/2023     Chronic diastolic (congestive) heart failure (H) 03/31/2023     Altered mental status, unspecified altered mental status type 07/23/2023     Clostridium difficile diarrhea 07/23/2023     History of Clostridium difficile infection July 2023 07/25/2023     Stage 3a chronic kidney disease (H) 07/25/2023     Esophageal dysmotility 07/27/2023     DEJUAN (acute kidney injury) (H24) 08/17/2023     Moderate malnutrition (H24) 08/17/2023     Cong  ring of distal esophagus-dilated 8/19/23 08/17/2023     Acute gout involving toe of left foot 08/17/2023     Unintentional weight loss 08/18/2023     Abnormal esophagram 08/18/2023     Hypokalemia 08/19/2023     Hyponatremia 08/19/2023     Open wound-coccyx/buttocks 08/23/2023     Enterocolitis due to Clostridium difficile, not specified as recurrent 07/27/2023     Other chronic pain 07/19/2023     Pain in left shoulder 07/19/2023     Pain in right shoulder 07/19/2023     Unspecified streptococcus as the cause of diseases classified elsewhere 07/19/2023     Esophageal dysphagia 08/11/2023     Hip fracture, left, closed, initial encounter (H) 01/22/2024     Acute kidney failure, unspecified (H24) 08/24/2023     Amyloidosis (H) 01/25/2024     Monoclonal gammopathy of unknown significance (MGUS) 01/25/2024     Hypotension, unspecified hypotension type 01/25/2024     Postoperative anemia due to acute blood loss 01/25/2024     Resolved Ambulatory Problems     Diagnosis Date Noted     Obesity, morbid, BMI 40.0-49.9 (H) 11/07/2017     Past Medical History:   Diagnosis Date     Colonic diverticulum      Hyperlipidemia      Hypertension      Obesity (BMI 30-39.9)      Osteoarthritis      Type 2 diabetes mellitus (H)      No Known Allergies    All Meds and Allergies reviewed in the record at the facility and is the most up-to-date.    Current Outpatient Medications   Medication Sig     acetaminophen (TYLENOL) 325 MG tablet 975mg by mouth every AM and 975mg twice a day as needed     amiodarone (PACERONE) 200 MG tablet Take 1 tablet (200 mg) by mouth daily     atorvastatin (LIPITOR) 20 MG tablet Take 20 mg by mouth At Bedtime     busPIRone (BUSPAR) 5 MG tablet Take 1 tablet (5 mg) by mouth 2 times daily     diclofenac (VOLTAREN) 1 % topical gel Apply 2 g topically 3 times daily shoulders     ELIQUIS ANTICOAGULANT 5 MG tablet Take 5 mg by mouth 2 times daily     famotidine (PEPCID) 20 MG tablet Take 2 tablets (40 mg) by mouth 2  times daily     lidocaine (XYLOCAINE) 5 % external ointment Apply topically 3 times daily     Magnesium Oxide 250 MG TABS Take 250 mg by mouth daily     metoprolol succinate ER (TOPROL XL) 25 MG 24 hr tablet Take 12.5 mg by mouth daily Hold if SBP < 105.     midodrine (PROAMATINE) 2.5 MG tablet Take 2.5 mg by mouth daily     ondansetron (ZOFRAN) 4 MG tablet Take 1 tablet (4 mg) by mouth every 8 hours as needed for nausea     senna-docusate (SENOKOT-S/PERICOLACE) 8.6-50 MG tablet Take 2 tablets by mouth 2 times daily     traMADol (ULTRAM) 50 MG tablet Take 0.5 tablets (25 mg) by mouth 2 times daily as needed for severe pain     No current facility-administered medications for this visit.       REVIEW OF SYSTEMS:   10 point review of systems reviewed and pertinent positives in the HPI.     PHYSICAL EXAMINATION:  Physical Exam     Vital signs: /84   Pulse 86   Temp 98  F (36.7  C)   Resp 16   Ht 1.829 m (6')   Wt 93.4 kg (205 lb 12.8 oz)   SpO2 100%   BMI 27.91 kg/m    General: Awake, Alert, oriented x3, sitting up in wheelchair, conversant  HEENT:Pink conjunctiva, moist oral mucosa  NECK: Supple  CVS:  S1  S2, without murmur or gallop.   LUNG: Clear to auscultation, No wheezes, rales or rhonci.  BACK: No kyphosis of the thoracic spine  ABDOMEN: Soft, nontender to palpation, with positive bowel sounds  EXTREMITIES: Moves both upper and lower extremities with generalized weakness and some limitation to left lower extremity, no pedal edema.   NEUROLOGIC: Intact, pulses palpable  PSYCHIATRIC: Mild cognitive impairment noted. Pleasant on exam.       Labs:  All labs reviewed in the nursing home record and [facility]   @  Lab Results   Component Value Date    WBC 7.7 02/02/2024    WBC 9.0 12/30/2017     Lab Results   Component Value Date    RBC 3.24 02/02/2024    RBC 4.16 12/30/2017     Lab Results   Component Value Date    HGB 9.5 02/02/2024    HGB 12.6 12/30/2017     Lab Results   Component Value Date    HCT 29.6  02/02/2024    HCT 37.1 12/30/2017     Lab Results   Component Value Date    MCV 91 02/02/2024    MCV 89 12/30/2017     Lab Results   Component Value Date    MCH 29.3 02/02/2024    MCH 30.3 12/30/2017     Lab Results   Component Value Date    MCHC 32.1 02/02/2024    MCHC 34.0 12/30/2017     Lab Results   Component Value Date    RDW 15.5 02/02/2024    RDW 13.8 12/30/2017     Lab Results   Component Value Date     02/02/2024     12/30/2017        @Last Comprehensive Metabolic Panel:  Sodium   Date Value Ref Range Status   02/29/2024 137 135 - 145 mmol/L Final     Comment:     Reference intervals for this test were updated on 09/26/2023 to more accurately reflect our healthy population. There may be differences in the flagging of prior results with similar values performed with this method. Interpretation of those prior results can be made in the context of the updated reference intervals.    12/30/2017 138 133 - 144 mmol/L Final     Potassium   Date Value Ref Range Status   02/29/2024 3.7 3.4 - 5.3 mmol/L Final   07/15/2022 3.7 3.4 - 5.3 mmol/L Final   12/31/2017 3.5 3.4 - 5.3 mmol/L Final     Chloride   Date Value Ref Range Status   02/29/2024 98 98 - 107 mmol/L Final   07/15/2022 106 94 - 109 mmol/L Final   12/30/2017 104 94 - 109 mmol/L Final     Carbon Dioxide   Date Value Ref Range Status   12/30/2017 26 20 - 32 mmol/L Final     Carbon Dioxide (CO2)   Date Value Ref Range Status   02/29/2024 27 22 - 29 mmol/L Final   07/15/2022 24 20 - 32 mmol/L Final     Anion Gap   Date Value Ref Range Status   02/29/2024 12 7 - 15 mmol/L Final   07/15/2022 9 3 - 14 mmol/L Final   12/30/2017 8 3 - 14 mmol/L Final     Glucose   Date Value Ref Range Status   02/29/2024 89 70 - 99 mg/dL Final   07/15/2022 97 70 - 99 mg/dL Final   12/30/2017 145 (H) 70 - 99 mg/dL Final     GLUCOSE BY METER POCT   Date Value Ref Range Status   02/02/2024 120 (H) 70 - 99 mg/dL Final     Urea Nitrogen   Date Value Ref Range Status    02/29/2024 20.0 8.0 - 23.0 mg/dL Final   07/15/2022 13 7 - 30 mg/dL Final   12/31/2017 26 7 - 30 mg/dL Final     Creatinine   Date Value Ref Range Status   02/29/2024 1.53 (H) 0.67 - 1.17 mg/dL Final   12/31/2017 1.11 0.66 - 1.25 mg/dL Final     GFR Estimate   Date Value Ref Range Status   02/29/2024 43 (L) >60 mL/min/1.73m2 Final   12/31/2017 63 >60 mL/min/1.7m2 Final     Comment:     Non  GFR Calc     Calcium   Date Value Ref Range Status   02/29/2024 9.0 8.8 - 10.2 mg/dL Final   12/30/2017 7.9 (L) 8.5 - 10.1 mg/dL Final       This note has been dictated using voice recognition software. Any grammatical or context distortions are unintentional and inherent to the software    Electronically signed by: Grace Parry CNP       Sincerely,        Grace Parry, NP

## 2024-03-01 NOTE — PROGRESS NOTES
[facility] GERIATRIC SERVICES    Code Status:  DNR   Visit Type:   Chief Complaint   Patient presents with    TCU Follow Up     Facility:  [facility] ([facility]) [[id number]]         HPI: Alvin Newton is a 89 year old male who I am seeing today for follow up on the TCU.  Past medical history includes type 2 diabetes mellitus with diabetic polyneuropathy, proximal atrial fibs on Eliquis, bilateral hip replacement 33 years ago, HFpEF, type 2 diabetes mellitus, hypertension, severe aortic stenosis and mild cognitive impairment.  Patient admitted post fall with left femur fracture.  Patient had a mechanical fall in which she was trying to reach for his cane for stability but put his weight on his grabber device instead and fell forward.  He did not hit his head or lose consciousness.  Head CT was negative.  X-ray of the pelvis/left hip revealed a left proximal femoral fracture.  History of bilateral hip replacements about 33 years ago.  Patient underwent repair on 1/24/2024.  He had a complicated surgery in the setting of bilateral hip replacements.  He lost 3 L of blood.  He did require pressor support.  He was also transfused on 1/26.  Patient found to have a mildly low a.m. cortisol level most likely due to a component of adrenal insufficiency.  Steroids were done for couple days along with midodrine.  BP improved.  Steroids were discontinued on 1/30.  Pain controlled with tramadol and Tylenol.  Patient initially had a Prevena wound VAC however 1 day later during his TCU stay VAC was not working appropriately.  Ortho updated and this was discontinued and dry sterile dressing applied.  Patient also experienced postoperative delirium.  He is very anxious and tearful.  No agitation.  He did require one-on-one.  He was given BuSpar to help with anxiety.  Delirium improved.  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  Severe aortic stenosis.  Cardiac MRI in October  "concerning for infiltrative cardiomyopathy.  Workup for amyloidosis in process by cardiology in [place] [facility].  Biopsies of the bone marrow and fat pad were negative for AL amyloid.  Plan at this time is to follow-up with cardiology regarding possible myocardial biopsy and continued amyloid/infiltrative cardiomyopathy workup.  History of proximal atrial fibs on Eliquis.  Patient did have episode of RVR after procedure which resolved.  Acute kidney injury on CKD stage III.  Creatinine 1.34 admission.  Creatinine did bump to 1.74 but improved with fluids.  Type 2 diabetes without long-term use of insulin.  Most recent hemoglobin A1c 6.8%.  Stress leukocytosis with a white blood cell count of 13 resolved.    Transitional Care Course: Today patient sitting up in wheelchair. Care conference held earlier today with pt and family.  Pt with continued hypotension/DEJUAN  and poor oral intake. Creatine yesterday 1.64. He received 1 liter of IV fluids. He is more alert. He denies any nausea today. He reports \"feeling full and gassy.\" Recent increase in bowel meds. Last BM 4 days prior. He has positive bowel sounds. He was also given additional Miralax today. 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 Torsemide on hold. He has had ~ 15 lb weight loss. Per hospital records avoid aggressive fluid resuscitation. He denies any dizziness today. His metoprolol was decreased. He continues on midodrine for bp support. He also has TEDS.     Assessment/Plan:     DEJUAN/Dehydration   Stage 3a chronic kidney disease (H)  -Baseline 1.3-1.5.   -1 liter of fluids given.   -Creatine 1.64-1.68. Today 1.53.   -poor oral intake.   -Continue to hold torsemide and potassium.   -Follow up BMP on Monday.   -per hospital avoid aggressive fluid resuscitation due to heart failure.     Closed displaced subtrochanteric fracture of left femur with routine healing, subsequent encounter  Status post open reduction and " internal fixation (ORIF) of fracture  -History of bilateral hip replacement 33 years old.  -Dry sterile dressing to be changed daily.  -Weightbearing as tolerated.  -Tramadol changed to BID prn.   -Continue tylenol 975 mg TID.     Hypotension   -pt treated with midodrine and steroids in hospital.   -Lg blood loss from surgery. Hgb now 9.5.   -Suspect poor oral intake. Hypovolemia.   -bp improving but on soft side.   -torsemide on hold.   -Metoprolol decreased to 12.5 mg every day with hold parameters.   -Give additional 240cc with each med pass.   -Continue midodrine 2.5 mg daily.   -TEDS for bp support.     Anemia due to blood loss, acute  -EBL of 3 L.  -Patient underwent transfusion of 1/26/2024.  -Follow-up CBC with hgb 9.5.   -Continue to monitor.     Other amyloidosis (H)  Chronic diastolic (congestive) heart failure (H)  -Workup for amyloidosis in process by cardiology in [place] [facility].  Biopsies of bone marrow and fat pad negative for AL amyloid.  -Follow-up with cardiology regarding possible myocardial biopsy and continued amyloid/infiltrative cardiomyopathy workup.  -Cardiac MRI in October concerning for infiltrative cardiomyopathy.  -Daily weights. Weight down `15 lbs since admit.   -Continue to hold torsemide and potassium.   -CXR 2 view today with no acute process.   -Follow up with cardiology 3/4/24.     Paroxysmal atrial fibrillation with RVR (H)  -Chronically on Eliquis.  -Rate controlled with metoprolol and amiodarone.    Type 2 diabetes mellitus with diabetic polyneuropathy, without long-term current use of insulin (H)  -Recent A1c 6.8%.  -Currently not on medication.    Constipation  GI dysmotility  -Continue Senna S 2 tabs BID.   -Miralax 17 gm BID.   -Simethicone 80 mg QID.     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/22/2020    Essential hypertension 04/04/2016    Mixed  hyperlipidemia 01/02/2009    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) 12/06/2017    Carpal tunnel syndrome, bilateral 11/18/2021    Fall, initial encounter 06/06/2022    Closed fracture of first lumbar vertebra, unspecified fracture morphology, initial encounter (H) 06/06/2022    Fracture of L1 vertebra (H) 06/06/2022    Impaired fasting glucose 06/20/2022    Osteoarthritis of pelvis 06/20/2022    Other ill-defined and unknown causes of morbidity and mortality 01/01/1985    Primary localized osteoarthrosis of shoulder region 06/20/2022    Pure hypercholesterolemia 01/01/1999    Reason for consultation 06/20/2022    Right bundle branch block 06/20/2022    Cellulitis of right lower extremity 07/11/2023    Severe sepsis (H) 07/11/2023    Septic shock (H) 07/12/2023    Streptococcal bacteremia 07/13/2023    Thrombocytopenia (H24) 07/13/2023    Hyperbilirubinemia 07/13/2023    Hypophosphatemia 07/13/2023    Hypoalbuminemia 07/13/2023    Pyuria 07/13/2023    Paroxysmal atrial fibrillation with RVR (H) 07/13/2023    Hypomagnesemia 07/13/2023    Chronic pain of both shoulders 07/13/2023    Severe aortic stenosis 07/14/2023    Elevated brain natriuretic peptide (BNP) level 07/14/2023    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    Peripheral edema 07/14/2023    Positive urine culture 07/14/2023    Medication induced coagulopathy  (H24) 07/14/2023    Chronic diastolic (congestive) heart failure (H) 03/31/2023    Altered mental status, unspecified altered mental status type 07/23/2023    Clostridium difficile diarrhea 07/23/2023    History of Clostridium difficile infection July 2023 07/25/2023    Stage 3a chronic kidney disease (H) 07/25/2023    Esophageal dysmotility 07/27/2023    DEJUAN (acute kidney injury) (H24) 08/17/2023    Moderate malnutrition (H24) 08/17/2023    Schatzki's ring of distal esophagus-dilated 8/19/23 08/17/2023    Acute gout involving toe of left foot 08/17/2023    Unintentional weight  loss 08/18/2023    Abnormal esophagram 08/18/2023    Hypokalemia 08/19/2023    Hyponatremia 08/19/2023    Open wound-coccyx/buttocks 08/23/2023    Enterocolitis due to Clostridium difficile, not specified as recurrent 07/27/2023    Other chronic pain 07/19/2023    Pain in left shoulder 07/19/2023    Pain in right shoulder 07/19/2023    Unspecified streptococcus as the cause of diseases classified elsewhere 07/19/2023    Esophageal dysphagia 08/11/2023    Hip fracture, left, closed, initial encounter (H) 01/22/2024    Acute kidney failure, unspecified (H24) 08/24/2023    Amyloidosis (H) 01/25/2024    Monoclonal gammopathy of unknown significance (MGUS) 01/25/2024    Hypotension, unspecified hypotension type 01/25/2024    Postoperative anemia due to acute blood loss 01/25/2024     Resolved Ambulatory Problems     Diagnosis Date Noted    Obesity, morbid, BMI 40.0-49.9 (H) 11/07/2017     Past Medical History:   Diagnosis Date    Colonic diverticulum     Hyperlipidemia     Hypertension     Obesity (BMI 30-39.9)     Osteoarthritis     Type 2 diabetes mellitus (H)      No Known Allergies    All Meds and Allergies reviewed in the record at the facility and is the most up-to-date.    Current Outpatient Medications   Medication Sig    acetaminophen (TYLENOL) 325 MG tablet 975mg by mouth every AM and 975mg twice a day as needed    amiodarone (PACERONE) 200 MG tablet Take 1 tablet (200 mg) by mouth daily    atorvastatin (LIPITOR) 20 MG tablet Take 20 mg by mouth At Bedtime    busPIRone (BUSPAR) 5 MG tablet Take 1 tablet (5 mg) by mouth 2 times daily    diclofenac (VOLTAREN) 1 % topical gel Apply 2 g topically 3 times daily shoulders    ELIQUIS ANTICOAGULANT 5 MG tablet Take 5 mg by mouth 2 times daily    famotidine (PEPCID) 20 MG tablet Take 2 tablets (40 mg) by mouth 2 times daily    lidocaine (XYLOCAINE) 5 % external ointment Apply topically 3 times daily    Magnesium Oxide 250 MG TABS Take 250 mg by mouth daily    metoprolol  succinate ER (TOPROL XL) 25 MG 24 hr tablet Take 12.5 mg by mouth daily Hold if SBP < 105.    midodrine (PROAMATINE) 2.5 MG tablet Take 2.5 mg by mouth daily    ondansetron (ZOFRAN) 4 MG tablet Take 1 tablet (4 mg) by mouth every 8 hours as needed for nausea    senna-docusate (SENOKOT-S/PERICOLACE) 8.6-50 MG tablet Take 2 tablets by mouth 2 times daily    traMADol (ULTRAM) 50 MG tablet Take 0.5 tablets (25 mg) by mouth 2 times daily as needed for severe pain     No current facility-administered medications for this visit.       REVIEW OF SYSTEMS:   10 point review of systems reviewed and pertinent positives in the HPI.     PHYSICAL EXAMINATION:  Physical Exam     Vital signs: /84   Pulse 86   Temp 98  F (36.7  C)   Resp 16   Ht 1.829 m (6')   Wt 93.4 kg (205 lb 12.8 oz)   SpO2 100%   BMI 27.91 kg/m    General: Awake, Alert, oriented x3, sitting up in wheelchair, conversant  HEENT:Pink conjunctiva, moist oral mucosa  NECK: Supple  CVS:  S1  S2, without murmur or gallop.   LUNG: Clear to auscultation, No wheezes, rales or rhonci.  BACK: No kyphosis of the thoracic spine  ABDOMEN: Soft, nontender to palpation, with positive bowel sounds  EXTREMITIES: Moves both upper and lower extremities with generalized weakness and some limitation to left lower extremity, no pedal edema.   NEUROLOGIC: Intact, pulses palpable  PSYCHIATRIC: Mild cognitive impairment noted. Pleasant on exam.       Labs:  All labs reviewed in the nursing home record and [facility]   @  Lab Results   Component Value Date    WBC 7.7 02/02/2024    WBC 9.0 12/30/2017     Lab Results   Component Value Date    RBC 3.24 02/02/2024    RBC 4.16 12/30/2017     Lab Results   Component Value Date    HGB 9.5 02/02/2024    HGB 12.6 12/30/2017     Lab Results   Component Value Date    HCT 29.6 02/02/2024    HCT 37.1 12/30/2017     Lab Results   Component Value Date    MCV 91 02/02/2024    MCV 89 12/30/2017     Lab Results   Component Value Date    MCH 29.3  02/02/2024    MCH 30.3 12/30/2017     Lab Results   Component Value Date    MCHC 32.1 02/02/2024    MCHC 34.0 12/30/2017     Lab Results   Component Value Date    RDW 15.5 02/02/2024    RDW 13.8 12/30/2017     Lab Results   Component Value Date     02/02/2024     12/30/2017        @Last Comprehensive Metabolic Panel:  Sodium   Date Value Ref Range Status   02/29/2024 137 135 - 145 mmol/L Final     Comment:     Reference intervals for this test were updated on 09/26/2023 to more accurately reflect our healthy population. There may be differences in the flagging of prior results with similar values performed with this method. Interpretation of those prior results can be made in the context of the updated reference intervals.    12/30/2017 138 133 - 144 mmol/L Final     Potassium   Date Value Ref Range Status   02/29/2024 3.7 3.4 - 5.3 mmol/L Final   07/15/2022 3.7 3.4 - 5.3 mmol/L Final   12/31/2017 3.5 3.4 - 5.3 mmol/L Final     Chloride   Date Value Ref Range Status   02/29/2024 98 98 - 107 mmol/L Final   07/15/2022 106 94 - 109 mmol/L Final   12/30/2017 104 94 - 109 mmol/L Final     Carbon Dioxide   Date Value Ref Range Status   12/30/2017 26 20 - 32 mmol/L Final     Carbon Dioxide (CO2)   Date Value Ref Range Status   02/29/2024 27 22 - 29 mmol/L Final   07/15/2022 24 20 - 32 mmol/L Final     Anion Gap   Date Value Ref Range Status   02/29/2024 12 7 - 15 mmol/L Final   07/15/2022 9 3 - 14 mmol/L Final   12/30/2017 8 3 - 14 mmol/L Final     Glucose   Date Value Ref Range Status   02/29/2024 89 70 - 99 mg/dL Final   07/15/2022 97 70 - 99 mg/dL Final   12/30/2017 145 (H) 70 - 99 mg/dL Final     GLUCOSE BY METER POCT   Date Value Ref Range Status   02/02/2024 120 (H) 70 - 99 mg/dL Final     Urea Nitrogen   Date Value Ref Range Status   02/29/2024 20.0 8.0 - 23.0 mg/dL Final   07/15/2022 13 7 - 30 mg/dL Final   12/31/2017 26 7 - 30 mg/dL Final     Creatinine   Date Value Ref Range Status   02/29/2024 1.53  (H) 0.67 - 1.17 mg/dL Final   12/31/2017 1.11 0.66 - 1.25 mg/dL Final     GFR Estimate   Date Value Ref Range Status   02/29/2024 43 (L) >60 mL/min/1.73m2 Final   12/31/2017 63 >60 mL/min/1.7m2 Final     Comment:     Non  GFR Calc     Calcium   Date Value Ref Range Status   02/29/2024 9.0 8.8 - 10.2 mg/dL Final   12/30/2017 7.9 (L) 8.5 - 10.1 mg/dL Final       This note has been dictated using voice recognition software. Any grammatical or context distortions are unintentional and inherent to the software    Electronically signed by: Grace Parry CNP

## 2024-03-02 ENCOUNTER — TELEPHONE (OUTPATIENT)
Dept: GERIATRICS | Facility: CLINIC | Age: 89
End: 2024-03-02
Payer: COMMERCIAL

## 2024-03-02 NOTE — TELEPHONE ENCOUNTER
Alvin Newton is a 89 year old  (5/10/1934), Nurse called today to report: Nurse requesting an abdominal x-ray.  Patient last documented bowel movement was February 25, 2024.  Per nurse he has received suppositories multiple bowel medications with no results.  Also per nursing report they have had lots of agency nursing so they are unaware if he has had a bowel movement since then.  However the patient does feel uncomfortable abdomen is soft he does have active bowel sounds however they are hypoactive.  Orders given to get the x-ray and nurse will call back with findings       Electronically signed by:   Chelsea Joseph, CNP

## 2024-03-05 ENCOUNTER — LAB REQUISITION (OUTPATIENT)
Dept: LAB | Facility: CLINIC | Age: 89
End: 2024-03-05
Payer: COMMERCIAL

## 2024-03-05 DIAGNOSIS — D62 ACUTE POSTHEMORRHAGIC ANEMIA: ICD-10-CM

## 2024-03-06 LAB
ERYTHROCYTE [DISTWIDTH] IN BLOOD BY AUTOMATED COUNT: 16.3 % (ref 10–15)
HCT VFR BLD AUTO: 35.1 % (ref 40–53)
HGB BLD-MCNC: 11.3 G/DL (ref 13.3–17.7)
MCH RBC QN AUTO: 30.4 PG (ref 26.5–33)
MCHC RBC AUTO-ENTMCNC: 32.2 G/DL (ref 31.5–36.5)
MCV RBC AUTO: 94 FL (ref 78–100)
PLATELET # BLD AUTO: 193 10E3/UL (ref 150–450)
RBC # BLD AUTO: 3.72 10E6/UL (ref 4.4–5.9)
WBC # BLD AUTO: 5.3 10E3/UL (ref 4–11)

## 2024-03-06 PROCEDURE — 80048 BASIC METABOLIC PNL TOTAL CA: CPT | Mod: ORL | Performed by: NURSE PRACTITIONER

## 2024-03-06 PROCEDURE — P9604 ONE-WAY ALLOW PRORATED TRIP: HCPCS | Mod: ORL | Performed by: NURSE PRACTITIONER

## 2024-03-06 PROCEDURE — 36415 COLL VENOUS BLD VENIPUNCTURE: CPT | Mod: ORL | Performed by: NURSE PRACTITIONER

## 2024-03-06 PROCEDURE — 85027 COMPLETE CBC AUTOMATED: CPT | Mod: ORL | Performed by: NURSE PRACTITIONER

## 2024-03-07 LAB
ANION GAP SERPL CALCULATED.3IONS-SCNC: 9 MMOL/L (ref 7–15)
BUN SERPL-MCNC: 15.3 MG/DL (ref 8–23)
CALCIUM SERPL-MCNC: 8.9 MG/DL (ref 8.8–10.2)
CHLORIDE SERPL-SCNC: 103 MMOL/L (ref 98–107)
CREAT SERPL-MCNC: 1.21 MG/DL (ref 0.67–1.17)
DEPRECATED HCO3 PLAS-SCNC: 26 MMOL/L (ref 22–29)
EGFRCR SERPLBLD CKD-EPI 2021: 57 ML/MIN/1.73M2
GLUCOSE SERPL-MCNC: 81 MG/DL (ref 70–99)
POTASSIUM SERPL-SCNC: 3.8 MMOL/L (ref 3.4–5.3)
SODIUM SERPL-SCNC: 138 MMOL/L (ref 135–145)

## 2024-03-13 ENCOUNTER — TRANSITIONAL CARE UNIT VISIT (OUTPATIENT)
Dept: GERIATRICS | Facility: CLINIC | Age: 89
End: 2024-03-13
Payer: COMMERCIAL

## 2024-03-13 VITALS
TEMPERATURE: 97.8 F | RESPIRATION RATE: 20 BRPM | SYSTOLIC BLOOD PRESSURE: 122 MMHG | WEIGHT: 213 LBS | HEIGHT: 72 IN | BODY MASS INDEX: 28.85 KG/M2 | DIASTOLIC BLOOD PRESSURE: 59 MMHG | HEART RATE: 91 BPM | OXYGEN SATURATION: 99 %

## 2024-03-13 DIAGNOSIS — K22.4 ESOPHAGEAL DYSMOTILITY: ICD-10-CM

## 2024-03-13 DIAGNOSIS — I50.32 CHRONIC DIASTOLIC CONGESTIVE HEART FAILURE (H): ICD-10-CM

## 2024-03-13 DIAGNOSIS — I48.0 PAROXYSMAL ATRIAL FIBRILLATION WITH RVR (H): ICD-10-CM

## 2024-03-13 DIAGNOSIS — S72.002A HIP FRACTURE, LEFT, CLOSED, INITIAL ENCOUNTER (H): ICD-10-CM

## 2024-03-13 DIAGNOSIS — K59.01 SLOW TRANSIT CONSTIPATION: ICD-10-CM

## 2024-03-13 DIAGNOSIS — M25.552 HIP PAIN, LEFT: Primary | ICD-10-CM

## 2024-03-13 DIAGNOSIS — Z87.81 S/P ORIF (OPEN REDUCTION INTERNAL FIXATION) FRACTURE: ICD-10-CM

## 2024-03-13 DIAGNOSIS — E11.42 TYPE 2 DIABETES MELLITUS WITH DIABETIC POLYNEUROPATHY, WITHOUT LONG-TERM CURRENT USE OF INSULIN (H): ICD-10-CM

## 2024-03-13 DIAGNOSIS — Z98.890 S/P ORIF (OPEN REDUCTION INTERNAL FIXATION) FRACTURE: ICD-10-CM

## 2024-03-13 PROCEDURE — 99310 SBSQ NF CARE HIGH MDM 45: CPT | Performed by: NURSE PRACTITIONER

## 2024-03-13 NOTE — LETTER
3/13/2024        RE: Alvin Newton  [address] [facility] ([facility] [id number]        [facility]    Code Status:  DNR   Visit Type:   Chief Complaint   Patient presents with     TCU Follow Up     Facility:  [facility] ([facility]) [[id number]]         HPI: Alvin Newton is a 89 year old male who I am seeing today for follow up on the TCU.  Past medical history includes type 2 diabetes mellitus with diabetic polyneuropathy, proximal atrial fibs on Eliquis, bilateral hip replacement 33 years ago, HFpEF, type 2 diabetes mellitus, hypertension, severe aortic stenosis and mild cognitive impairment.  Patient admitted post fall with left femur fracture.  Patient had a mechanical fall in which she was trying to reach for his cane for stability but put his weight on his grabber device instead and fell forward.  He did not hit his head or lose consciousness.  Head CT was negative.  X-ray of the pelvis/left hip revealed a left proximal femoral fracture.  History of bilateral hip replacements about 33 years ago.  Patient underwent repair on 1/24/2024.  He had a complicated surgery in the setting of bilateral hip replacements.  He lost 3 L of blood.  He did require pressor support.  He was also transfused on 1/26.  Patient found to have a mildly low a.m. cortisol level most likely due to a component of adrenal insufficiency.  Steroids were done for couple days along with midodrine.  BP improved.  Steroids were discontinued on 1/30.  Pain controlled with tramadol and Tylenol.  Patient initially had a Prevena wound VAC however 1 day later during his TCU stay VAC was not working appropriately.  Ortho updated and this was discontinued and dry sterile dressing applied.  Patient also experienced postoperative delirium.  He is very anxious and tearful.  No agitation.  He did require one-on-one.  He was given BuSpar to help with anxiety.  Delirium improved.  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  Severe aortic stenosis.  Cardiac MRI in October concerning for infiltrative cardiomyopathy.  Workup for amyloidosis in process by cardiology in [place] [facility].  Biopsies of the bone marrow and fat pad were negative for AL amyloid.  Plan at this time is to follow-up with cardiology regarding possible myocardial biopsy and continued amyloid/infiltrative cardiomyopathy workup.  History of proximal atrial fibs on Eliquis.  Patient did have episode of RVR after procedure which resolved.  Acute kidney injury on CKD stage III.  Creatinine 1.34 admission.  Creatinine did bump to 1.74 but improved with fluids.  Type 2 diabetes without long-term use of insulin.  Most recent hemoglobin A1c 6.8%.  Stress leukocytosis with a white blood cell count of 13 resolved.    Transitional Care Course: Today patient sitting up in wheelchair. On initial exam his wife was present and on follow up exam his daughter was present. Pt with recent left hip fracture. He was starting to ambulate and as of 2 days he complains of increased pain in his left hip and therapy reports difficulty bearing weight. He has edema 2+ to LLE. Venous doppler obtained today and was negative on exam. He tolerates ROM with no facial grimacing today. He is taking tylenol for pain. No evidence of infection. Last week pt with dehydration. He was given fluids. Laboratory improved. Pt and his wife feel his appetite has improved. He has hx of Gi dysmotility. His bowels are moving better and he is passing gas more freely. He denies any SOB or CP. Today he daughter explained pt has been approved for trial medication for his cardiac amyloidosis. He has received a wayne to pay for the medication.       Assessment/Plan:     Closed displaced subtrochanteric fracture of left femur with routine healing, subsequent encounter  Status post open reduction and internal fixation (ORIF) of fracture  Left leg pain  -History of bilateral hip  replacement 33 years old.  -Weightbearing as tolerated.  -Venous doppler obtained today and was negative.   -Follow up left hip xray 2 view.   -Tramadol changed to BID prn.   -Continue tylenol 975 mg TID.     DEJUAN/Dehydration   Stage 3a chronic kidney disease (H)  -Baseline 1.3-1.5.   -1 liter of fluids given.   -Creatine 1.64-1.68. Now 1.21   -poor oral intake.   -per hospital avoid aggressive fluid resuscitation due to heart failure.       Hypotension   -pt treated with midodrine and steroids in hospital.   -Lg blood loss from surgery. Hgb now 9.5.   -Suspect poor oral intake. Hypovolemia.   -bp improving but on soft side.   -torsemide on hold.   -Metoprolol decreased to 12.5 mg every day with hold parameters.   -Give additional 240cc with each med pass.   -Continue midodrine 2.5 mg daily.   -TEDS for bp support.   -BP improved.     Anemia due to blood loss, acute  -EBL of 3 L.  -Patient underwent transfusion of 1/26/2024.  -Follow-up CBC with hgb 9.5.   -Continue to monitor.     Other amyloidosis (H)  Chronic diastolic (congestive) heart failure (H)  -Workup for amyloidosis in process by cardiology in [place] [facility].  Biopsies of bone marrow and fat pad negative for AL amyloid.  -Follow-up with cardiology regarding possible myocardial biopsy and continued amyloid/infiltrative cardiomyopathy workup.  -Cardiac MRI in October concerning for infiltrative cardiomyopathy.  -Daily weights. Weight down `15 lbs since admit.   -Continue to hold torsemide and potassium.   -CXR 2 view today with no acute process.   -Follow up with cardiology 3/4/24. Pt has been approved for trial drug treatment. He has received a wayne for paying for drug.     Paroxysmal atrial fibrillation with RVR (H)  -Chronically on Eliquis.  -Rate controlled with metoprolol and amiodarone.    Constipation  GI dysmotility  -Continue Senna S 2 tabs BID.   -Miralax 17 gm BID.   -Simethicone 80 mg QID.     Active Ambulatory Problems     Diagnosis Date  Noted     NSTEMI (non-ST elevated myocardial infarction) (H) 12/27/2017     BPH with urinary obstruction 06/22/2020     Essential hypertension 04/04/2016     Mixed hyperlipidemia 01/02/2009     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) 12/06/2017     Carpal tunnel syndrome, bilateral 11/18/2021     Fall, initial encounter 06/06/2022     Closed fracture of first lumbar vertebra, unspecified fracture morphology, initial encounter (H) 06/06/2022     Fracture of L1 vertebra (H) 06/06/2022     Impaired fasting glucose 06/20/2022     Osteoarthritis of pelvis 06/20/2022     Other ill-defined and unknown causes of morbidity and mortality 01/01/1985     Primary localized osteoarthrosis of shoulder region 06/20/2022     Pure hypercholesterolemia 01/01/1999     Reason for consultation 06/20/2022     Right bundle branch block 06/20/2022     Cellulitis of right lower extremity 07/11/2023     Severe sepsis (H) 07/11/2023     Septic shock (H) 07/12/2023     Streptococcal bacteremia 07/13/2023     Thrombocytopenia (H24) 07/13/2023     Hyperbilirubinemia 07/13/2023     Hypophosphatemia 07/13/2023     Hypoalbuminemia 07/13/2023     Pyuria 07/13/2023     Paroxysmal atrial fibrillation with RVR (H) 07/13/2023     Hypomagnesemia 07/13/2023     Chronic pain of both shoulders 07/13/2023     Severe aortic stenosis 07/14/2023     Elevated brain natriuretic peptide (BNP) level 07/14/2023     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     Peripheral edema 07/14/2023     Positive urine culture 07/14/2023     Medication induced coagulopathy  (H24) 07/14/2023     Chronic diastolic (congestive) heart failure (H) 03/31/2023     Altered mental status, unspecified altered mental status type 07/23/2023     Clostridium difficile diarrhea 07/23/2023     History of Clostridium difficile infection July 2023 07/25/2023     Stage 3a chronic kidney disease (H) 07/25/2023     Esophageal dysmotility 07/27/2023     DEJUAN (acute kidney injury)  (H24) 08/17/2023     Moderate malnutrition (H24) 08/17/2023     Schatzki's ring of distal esophagus-dilated 8/19/23 08/17/2023     Acute gout involving toe of left foot 08/17/2023     Unintentional weight loss 08/18/2023     Abnormal esophagram 08/18/2023     Hypokalemia 08/19/2023     Hyponatremia 08/19/2023     Open wound-coccyx/buttocks 08/23/2023     Enterocolitis due to Clostridium difficile, not specified as recurrent 07/27/2023     Other chronic pain 07/19/2023     Pain in left shoulder 07/19/2023     Pain in right shoulder 07/19/2023     Unspecified streptococcus as the cause of diseases classified elsewhere 07/19/2023     Esophageal dysphagia 08/11/2023     Hip fracture, left, closed, initial encounter (H) 01/22/2024     Acute kidney failure, unspecified (H24) 08/24/2023     Amyloidosis (H) 01/25/2024     Monoclonal gammopathy of unknown significance (MGUS) 01/25/2024     Hypotension, unspecified hypotension type 01/25/2024     Postoperative anemia due to acute blood loss 01/25/2024     Resolved Ambulatory Problems     Diagnosis Date Noted     Obesity, morbid, BMI 40.0-49.9 (H) 11/07/2017     Past Medical History:   Diagnosis Date     Colonic diverticulum      Hyperlipidemia      Hypertension      Obesity (BMI 30-39.9)      Osteoarthritis      Type 2 diabetes mellitus (H)      No Known Allergies    All Meds and Allergies reviewed in the record at the facility and is the most up-to-date.    Current Outpatient Medications   Medication Sig     acetaminophen (TYLENOL) 325 MG tablet 975mg by mouth every AM and 975mg twice a day as needed     amiodarone (PACERONE) 200 MG tablet Take 1 tablet (200 mg) by mouth daily     atorvastatin (LIPITOR) 20 MG tablet Take 20 mg by mouth At Bedtime     busPIRone (BUSPAR) 5 MG tablet Take 1 tablet (5 mg) by mouth 2 times daily     diclofenac (VOLTAREN) 1 % topical gel Apply 2 g topically 3 times daily shoulders     ELIQUIS ANTICOAGULANT 5 MG tablet Take 5 mg by mouth 2 times  daily     famotidine (PEPCID) 20 MG tablet Take 2 tablets (40 mg) by mouth 2 times daily     lidocaine (XYLOCAINE) 5 % external ointment Apply topically 3 times daily     Magnesium Oxide 250 MG TABS Take 250 mg by mouth daily     metoprolol succinate ER (TOPROL XL) 25 MG 24 hr tablet Take 12.5 mg by mouth daily Hold if SBP < 105.     midodrine (PROAMATINE) 2.5 MG tablet Take 2.5 mg by mouth daily     ondansetron (ZOFRAN) 4 MG tablet Take 1 tablet (4 mg) by mouth every 8 hours as needed for nausea     senna-docusate (SENOKOT-S/PERICOLACE) 8.6-50 MG tablet Take 2 tablets by mouth 2 times daily     traMADol (ULTRAM) 50 MG tablet Take 0.5 tablets (25 mg) by mouth 2 times daily as needed for severe pain     No current facility-administered medications for this visit.       REVIEW OF SYSTEMS:   10 point review of systems reviewed and pertinent positives in the HPI.     PHYSICAL EXAMINATION:  Physical Exam     Vital signs: /59   Pulse 91   Temp 97.8  F (36.6  C)   Resp 20   Ht 1.829 m (6')   Wt 96.6 kg (213 lb)   SpO2 99%   BMI 28.89 kg/m    General: Awake, Alert, oriented x3, sitting up in wheelchair, conversant  HEENT:Pink conjunctiva, moist oral mucosa  NECK: Supple  CVS:  S1  S2, without murmur or gallop.   LUNG: Clear to auscultation, No wheezes, rales or rhonci.  BACK: No kyphosis of the thoracic spine  ABDOMEN: Soft, nontender to palpation, with positive bowel sounds  EXTREMITIES: Moves both upper and lower extremities with generalized weakness and some limitation to left lower extremity, 2+ pedal edema. Tolerates ROM, plantar and dorsi flexion.   NEUROLOGIC: Intact, pulses palpable  PSYCHIATRIC: Mild cognitive impairment noted. Pleasant on exam.       Labs:  All labs reviewed in the nursing home record and Epic   @  Lab Results   Component Value Date    WBC 7.7 02/02/2024    WBC 9.0 12/30/2017     Lab Results   Component Value Date    RBC 3.24 02/02/2024    RBC 4.16 12/30/2017     Lab Results    Component Value Date    HGB 9.5 02/02/2024    HGB 12.6 12/30/2017     Lab Results   Component Value Date    HCT 29.6 02/02/2024    HCT 37.1 12/30/2017     Lab Results   Component Value Date    MCV 91 02/02/2024    MCV 89 12/30/2017     Lab Results   Component Value Date    MCH 29.3 02/02/2024    MCH 30.3 12/30/2017     Lab Results   Component Value Date    MCHC 32.1 02/02/2024    MCHC 34.0 12/30/2017     Lab Results   Component Value Date    RDW 15.5 02/02/2024    RDW 13.8 12/30/2017     Lab Results   Component Value Date     02/02/2024     12/30/2017        @Last Comprehensive Metabolic Panel:  Sodium   Date Value Ref Range Status   03/06/2024 138 135 - 145 mmol/L Final     Comment:     Reference intervals for this test were updated on 09/26/2023 to more accurately reflect our healthy population. There may be differences in the flagging of prior results with similar values performed with this method. Interpretation of those prior results can be made in the context of the updated reference intervals.    12/30/2017 138 133 - 144 mmol/L Final     Potassium   Date Value Ref Range Status   03/06/2024 3.8 3.4 - 5.3 mmol/L Final   07/15/2022 3.7 3.4 - 5.3 mmol/L Final   12/31/2017 3.5 3.4 - 5.3 mmol/L Final     Chloride   Date Value Ref Range Status   03/06/2024 103 98 - 107 mmol/L Final   07/15/2022 106 94 - 109 mmol/L Final   12/30/2017 104 94 - 109 mmol/L Final     Carbon Dioxide   Date Value Ref Range Status   12/30/2017 26 20 - 32 mmol/L Final     Carbon Dioxide (CO2)   Date Value Ref Range Status   03/06/2024 26 22 - 29 mmol/L Final   07/15/2022 24 20 - 32 mmol/L Final     Anion Gap   Date Value Ref Range Status   03/06/2024 9 7 - 15 mmol/L Final   07/15/2022 9 3 - 14 mmol/L Final   12/30/2017 8 3 - 14 mmol/L Final     Glucose   Date Value Ref Range Status   03/06/2024 81 70 - 99 mg/dL Final   07/15/2022 97 70 - 99 mg/dL Final   12/30/2017 145 (H) 70 - 99 mg/dL Final     GLUCOSE BY METER POCT   Date  Value Ref Range Status   02/02/2024 120 (H) 70 - 99 mg/dL Final     Urea Nitrogen   Date Value Ref Range Status   03/06/2024 15.3 8.0 - 23.0 mg/dL Final   07/15/2022 13 7 - 30 mg/dL Final   12/31/2017 26 7 - 30 mg/dL Final     Creatinine   Date Value Ref Range Status   03/06/2024 1.21 (H) 0.67 - 1.17 mg/dL Final   12/31/2017 1.11 0.66 - 1.25 mg/dL Final     GFR Estimate   Date Value Ref Range Status   03/06/2024 57 (L) >60 mL/min/1.73m2 Final   12/31/2017 63 >60 mL/min/1.7m2 Final     Comment:     Non  GFR Calc     Calcium   Date Value Ref Range Status   03/06/2024 8.9 8.8 - 10.2 mg/dL Final   12/30/2017 7.9 (L) 8.5 - 10.1 mg/dL Final     > 35 minutes spent for this visit which included face to face time with pt and both wife and daughter reviewing test results and plan of care.     This note has been dictated using voice recognition software. Any grammatical or context distortions are unintentional and inherent to the software    Electronically signed by: Grace Parry CNP       Sincerely,        Grace Parry NP

## 2024-03-14 ENCOUNTER — LAB REQUISITION (OUTPATIENT)
Dept: LAB | Facility: CLINIC | Age: 89
End: 2024-03-14
Payer: COMMERCIAL

## 2024-03-14 DIAGNOSIS — I95.9 HYPOTENSION, UNSPECIFIED: ICD-10-CM

## 2024-03-14 DIAGNOSIS — R39.15 URGENCY OF URINATION: ICD-10-CM

## 2024-03-14 DIAGNOSIS — R06.02 SHORTNESS OF BREATH: ICD-10-CM

## 2024-03-14 DIAGNOSIS — N39.0 URINARY TRACT INFECTION, SITE NOT SPECIFIED: ICD-10-CM

## 2024-03-14 DIAGNOSIS — E83.42 HYPOMAGNESEMIA: ICD-10-CM

## 2024-03-14 DIAGNOSIS — Z47.89 ENCOUNTER FOR OTHER ORTHOPEDIC AFTERCARE: ICD-10-CM

## 2024-03-14 NOTE — PROGRESS NOTES
[facility] GERIATRIC SERVICES    Code Status:  DNR   Visit Type:   Chief Complaint   Patient presents with    TCU Follow Up     Facility:  [facility] ([facility]) [[id number]]         HPI: Alvin Newton is a 89 year old male who I am seeing today for follow up on the TCU.  Past medical history includes type 2 diabetes mellitus with diabetic polyneuropathy, proximal atrial fibs on Eliquis, bilateral hip replacement 33 years ago, HFpEF, type 2 diabetes mellitus, hypertension, severe aortic stenosis and mild cognitive impairment.  Patient admitted post fall with left femur fracture.  Patient had a mechanical fall in which she was trying to reach for his cane for stability but put his weight on his grabber device instead and fell forward.  He did not hit his head or lose consciousness.  Head CT was negative.  X-ray of the pelvis/left hip revealed a left proximal femoral fracture.  History of bilateral hip replacements about 33 years ago.  Patient underwent repair on 1/24/2024.  He had a complicated surgery in the setting of bilateral hip replacements.  He lost 3 L of blood.  He did require pressor support.  He was also transfused on 1/26.  Patient found to have a mildly low a.m. cortisol level most likely due to a component of adrenal insufficiency.  Steroids were done for couple days along with midodrine.  BP improved.  Steroids were discontinued on 1/30.  Pain controlled with tramadol and Tylenol.  Patient initially had a Prevena wound VAC however 1 day later during his TCU stay VAC was not working appropriately.  Ortho updated and this was discontinued and dry sterile dressing applied.  Patient also experienced postoperative delirium.  He is very anxious and tearful.  No agitation.  He did require one-on-one.  He was given BuSpar to help with anxiety.  Delirium improved.  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  Severe aortic stenosis.  Cardiac MRI in October  concerning for infiltrative cardiomyopathy.  Workup for amyloidosis in process by cardiology in [place] [facility].  Biopsies of the bone marrow and fat pad were negative for AL amyloid.  Plan at this time is to follow-up with cardiology regarding possible myocardial biopsy and continued amyloid/infiltrative cardiomyopathy workup.  History of proximal atrial fibs on Eliquis.  Patient did have episode of RVR after procedure which resolved.  Acute kidney injury on CKD stage III.  Creatinine 1.34 admission.  Creatinine did bump to 1.74 but improved with fluids.  Type 2 diabetes without long-term use of insulin.  Most recent hemoglobin A1c 6.8%.  Stress leukocytosis with a white blood cell count of 13 resolved.    Transitional Care Course: Today patient sitting up in wheelchair. On initial exam his wife was present and on follow up exam his daughter was present. Pt with recent left hip fracture. He was starting to ambulate and as of 2 days he complains of increased pain in his left hip and therapy reports difficulty bearing weight. He has edema 2+ to LLE. Venous doppler obtained today and was negative on exam. He tolerates ROM with no facial grimacing today. He is taking tylenol for pain. No evidence of infection. Last week pt with dehydration. He was given fluids. Laboratory improved. Pt and his wife feel his appetite has improved. He has hx of Gi dysmotility. His bowels are moving better and he is passing gas more freely. He denies any SOB or CP. Today he daughter explained pt has been approved for trial medication for his cardiac amyloidosis. He has received a wayne to pay for the medication.       Assessment/Plan:     Closed displaced subtrochanteric fracture of left femur with routine healing, subsequent encounter  Status post open reduction and internal fixation (ORIF) of fracture  Left leg pain  -History of bilateral hip replacement 33 years old.  -Weightbearing as tolerated.  -Venous doppler obtained today and  was negative.   -Follow up left hip xray 2 view.   -Tramadol changed to BID prn.   -Continue tylenol 975 mg TID.     DEJUAN/Dehydration   Stage 3a chronic kidney disease (H)  -Baseline 1.3-1.5.   -1 liter of fluids given.   -Creatine 1.64-1.68. Now 1.21   -poor oral intake.   -per hospital avoid aggressive fluid resuscitation due to heart failure.       Hypotension   -pt treated with midodrine and steroids in hospital.   -Lg blood loss from surgery. Hgb now 9.5.   -Suspect poor oral intake. Hypovolemia.   -bp improving but on soft side.   -torsemide on hold.   -Metoprolol decreased to 12.5 mg every day with hold parameters.   -Give additional 240cc with each med pass.   -Continue midodrine 2.5 mg daily.   -TEDS for bp support.   -BP improved.     Anemia due to blood loss, acute  -EBL of 3 L.  -Patient underwent transfusion of 1/26/2024.  -Follow-up CBC with hgb 9.5.   -Continue to monitor.     Other amyloidosis (H)  Chronic diastolic (congestive) heart failure (H)  -Workup for amyloidosis in process by cardiology in [place] [facility].  Biopsies of bone marrow and fat pad negative for AL amyloid.  -Follow-up with cardiology regarding possible myocardial biopsy and continued amyloid/infiltrative cardiomyopathy workup.  -Cardiac MRI in October concerning for infiltrative cardiomyopathy.  -Daily weights. Weight down `15 lbs since admit.   -Continue to hold torsemide and potassium.   -CXR 2 view today with no acute process.   -Follow up with cardiology 3/4/24. Pt has been approved for trial drug treatment. He has received a wayne for paying for drug.     Paroxysmal atrial fibrillation with RVR (H)  -Chronically on Eliquis.  -Rate controlled with metoprolol and amiodarone.    Constipation  GI dysmotility  -Continue Senna S 2 tabs BID.   -Miralax 17 gm BID.   -Simethicone 80 mg QID.     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/22/2020    Essential hypertension 04/04/2016    Mixed hyperlipidemia 01/02/2009    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) 12/06/2017    Carpal tunnel syndrome, bilateral 11/18/2021    Fall, initial encounter 06/06/2022    Closed fracture of first lumbar vertebra, unspecified fracture morphology, initial encounter (H) 06/06/2022    Fracture of L1 vertebra (H) 06/06/2022    Impaired fasting glucose 06/20/2022    Osteoarthritis of pelvis 06/20/2022    Other ill-defined and unknown causes of morbidity and mortality 01/01/1985    Primary localized osteoarthrosis of shoulder region 06/20/2022    Pure hypercholesterolemia 01/01/1999    Reason for consultation 06/20/2022    Right bundle branch block 06/20/2022    Cellulitis of right lower extremity 07/11/2023    Severe sepsis (H) 07/11/2023    Septic shock (H) 07/12/2023    Streptococcal bacteremia 07/13/2023    Thrombocytopenia (H24) 07/13/2023    Hyperbilirubinemia 07/13/2023    Hypophosphatemia 07/13/2023    Hypoalbuminemia 07/13/2023    Pyuria 07/13/2023    Paroxysmal atrial fibrillation with RVR (H) 07/13/2023    Hypomagnesemia 07/13/2023    Chronic pain of both shoulders 07/13/2023    Severe aortic stenosis 07/14/2023    Elevated brain natriuretic peptide (BNP) level 07/14/2023    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    Peripheral edema 07/14/2023    Positive urine culture 07/14/2023    Medication induced coagulopathy  (H24) 07/14/2023    Chronic diastolic (congestive) heart failure (H) 03/31/2023    Altered mental status, unspecified altered mental status type 07/23/2023    Clostridium difficile diarrhea 07/23/2023    History of Clostridium difficile infection July 2023 07/25/2023    Stage 3a chronic kidney disease (H) 07/25/2023    Esophageal dysmotility 07/27/2023    DEJUAN (acute kidney injury) (H24) 08/17/2023    Moderate malnutrition (H24) 08/17/2023    Schatzki's ring of distal esophagus-dilated 8/19/23 08/17/2023    Acute  gout involving toe of left foot 08/17/2023    Unintentional weight loss 08/18/2023    Abnormal esophagram 08/18/2023    Hypokalemia 08/19/2023    Hyponatremia 08/19/2023    Open wound-coccyx/buttocks 08/23/2023    Enterocolitis due to Clostridium difficile, not specified as recurrent 07/27/2023    Other chronic pain 07/19/2023    Pain in left shoulder 07/19/2023    Pain in right shoulder 07/19/2023    Unspecified streptococcus as the cause of diseases classified elsewhere 07/19/2023    Esophageal dysphagia 08/11/2023    Hip fracture, left, closed, initial encounter (H) 01/22/2024    Acute kidney failure, unspecified (H24) 08/24/2023    Amyloidosis (H) 01/25/2024    Monoclonal gammopathy of unknown significance (MGUS) 01/25/2024    Hypotension, unspecified hypotension type 01/25/2024    Postoperative anemia due to acute blood loss 01/25/2024     Resolved Ambulatory Problems     Diagnosis Date Noted    Obesity, morbid, BMI 40.0-49.9 (H) 11/07/2017     Past Medical History:   Diagnosis Date    Colonic diverticulum     Hyperlipidemia     Hypertension     Obesity (BMI 30-39.9)     Osteoarthritis     Type 2 diabetes mellitus (H)      No Known Allergies    All Meds and Allergies reviewed in the record at the facility and is the most up-to-date.    Current Outpatient Medications   Medication Sig    acetaminophen (TYLENOL) 325 MG tablet 975mg by mouth every AM and 975mg twice a day as needed    amiodarone (PACERONE) 200 MG tablet Take 1 tablet (200 mg) by mouth daily    atorvastatin (LIPITOR) 20 MG tablet Take 20 mg by mouth At Bedtime    busPIRone (BUSPAR) 5 MG tablet Take 1 tablet (5 mg) by mouth 2 times daily    diclofenac (VOLTAREN) 1 % topical gel Apply 2 g topically 3 times daily shoulders    ELIQUIS ANTICOAGULANT 5 MG tablet Take 5 mg by mouth 2 times daily    famotidine (PEPCID) 20 MG tablet Take 2 tablets (40 mg) by mouth 2 times daily    lidocaine (XYLOCAINE) 5 % external ointment Apply topically 3 times daily     Magnesium Oxide 250 MG TABS Take 250 mg by mouth daily    metoprolol succinate ER (TOPROL XL) 25 MG 24 hr tablet Take 12.5 mg by mouth daily Hold if SBP < 105.    midodrine (PROAMATINE) 2.5 MG tablet Take 2.5 mg by mouth daily    ondansetron (ZOFRAN) 4 MG tablet Take 1 tablet (4 mg) by mouth every 8 hours as needed for nausea    senna-docusate (SENOKOT-S/PERICOLACE) 8.6-50 MG tablet Take 2 tablets by mouth 2 times daily    traMADol (ULTRAM) 50 MG tablet Take 0.5 tablets (25 mg) by mouth 2 times daily as needed for severe pain     No current facility-administered medications for this visit.       REVIEW OF SYSTEMS:   10 point review of systems reviewed and pertinent positives in the HPI.     PHYSICAL EXAMINATION:  Physical Exam     Vital signs: /59   Pulse 91   Temp 97.8  F (36.6  C)   Resp 20   Ht 1.829 m (6')   Wt 96.6 kg (213 lb)   SpO2 99%   BMI 28.89 kg/m    General: Awake, Alert, oriented x3, sitting up in wheelchair, conversant  HEENT:Pink conjunctiva, moist oral mucosa  NECK: Supple  CVS:  S1  S2, without murmur or gallop.   LUNG: Clear to auscultation, No wheezes, rales or rhonci.  BACK: No kyphosis of the thoracic spine  ABDOMEN: Soft, nontender to palpation, with positive bowel sounds  EXTREMITIES: Moves both upper and lower extremities with generalized weakness and some limitation to left lower extremity, 2+ pedal edema. Tolerates ROM, plantar and dorsi flexion.   NEUROLOGIC: Intact, pulses palpable  PSYCHIATRIC: Mild cognitive impairment noted. Pleasant on exam.       Labs:  All labs reviewed in the nursing home record and [facility]   @  Lab Results   Component Value Date    WBC 7.7 02/02/2024    WBC 9.0 12/30/2017     Lab Results   Component Value Date    RBC 3.24 02/02/2024    RBC 4.16 12/30/2017     Lab Results   Component Value Date    HGB 9.5 02/02/2024    HGB 12.6 12/30/2017     Lab Results   Component Value Date    HCT 29.6 02/02/2024    HCT 37.1 12/30/2017     Lab Results   Component  Value Date    MCV 91 02/02/2024    MCV 89 12/30/2017     Lab Results   Component Value Date    MCH 29.3 02/02/2024    MCH 30.3 12/30/2017     Lab Results   Component Value Date    MCHC 32.1 02/02/2024    MCHC 34.0 12/30/2017     Lab Results   Component Value Date    RDW 15.5 02/02/2024    RDW 13.8 12/30/2017     Lab Results   Component Value Date     02/02/2024     12/30/2017        @Last Comprehensive Metabolic Panel:  Sodium   Date Value Ref Range Status   03/06/2024 138 135 - 145 mmol/L Final     Comment:     Reference intervals for this test were updated on 09/26/2023 to more accurately reflect our healthy population. There may be differences in the flagging of prior results with similar values performed with this method. Interpretation of those prior results can be made in the context of the updated reference intervals.    12/30/2017 138 133 - 144 mmol/L Final     Potassium   Date Value Ref Range Status   03/06/2024 3.8 3.4 - 5.3 mmol/L Final   07/15/2022 3.7 3.4 - 5.3 mmol/L Final   12/31/2017 3.5 3.4 - 5.3 mmol/L Final     Chloride   Date Value Ref Range Status   03/06/2024 103 98 - 107 mmol/L Final   07/15/2022 106 94 - 109 mmol/L Final   12/30/2017 104 94 - 109 mmol/L Final     Carbon Dioxide   Date Value Ref Range Status   12/30/2017 26 20 - 32 mmol/L Final     Carbon Dioxide (CO2)   Date Value Ref Range Status   03/06/2024 26 22 - 29 mmol/L Final   07/15/2022 24 20 - 32 mmol/L Final     Anion Gap   Date Value Ref Range Status   03/06/2024 9 7 - 15 mmol/L Final   07/15/2022 9 3 - 14 mmol/L Final   12/30/2017 8 3 - 14 mmol/L Final     Glucose   Date Value Ref Range Status   03/06/2024 81 70 - 99 mg/dL Final   07/15/2022 97 70 - 99 mg/dL Final   12/30/2017 145 (H) 70 - 99 mg/dL Final     GLUCOSE BY METER POCT   Date Value Ref Range Status   02/02/2024 120 (H) 70 - 99 mg/dL Final     Urea Nitrogen   Date Value Ref Range Status   03/06/2024 15.3 8.0 - 23.0 mg/dL Final   07/15/2022 13 7 - 30 mg/dL  Final   12/31/2017 26 7 - 30 mg/dL Final     Creatinine   Date Value Ref Range Status   03/06/2024 1.21 (H) 0.67 - 1.17 mg/dL Final   12/31/2017 1.11 0.66 - 1.25 mg/dL Final     GFR Estimate   Date Value Ref Range Status   03/06/2024 57 (L) >60 mL/min/1.73m2 Final   12/31/2017 63 >60 mL/min/1.7m2 Final     Comment:     Non  GFR Calc     Calcium   Date Value Ref Range Status   03/06/2024 8.9 8.8 - 10.2 mg/dL Final   12/30/2017 7.9 (L) 8.5 - 10.1 mg/dL Final     > 35 minutes spent for this visit which included face to face time with pt and both wife and daughter reviewing test results and plan of care.     This note has been dictated using voice recognition software. Any grammatical or context distortions are unintentional and inherent to the software    Electronically signed by: Grace Parry, CNP

## 2024-03-15 ENCOUNTER — TRANSITIONAL CARE UNIT VISIT (OUTPATIENT)
Dept: GERIATRICS | Facility: CLINIC | Age: 89
End: 2024-03-15
Payer: COMMERCIAL

## 2024-03-15 VITALS
TEMPERATURE: 98.6 F | RESPIRATION RATE: 16 BRPM | BODY MASS INDEX: 29.02 KG/M2 | DIASTOLIC BLOOD PRESSURE: 66 MMHG | WEIGHT: 214 LBS | SYSTOLIC BLOOD PRESSURE: 106 MMHG | OXYGEN SATURATION: 98 % | HEART RATE: 84 BPM

## 2024-03-15 DIAGNOSIS — I50.32 CHRONIC DIASTOLIC (CONGESTIVE) HEART FAILURE (H): Primary | ICD-10-CM

## 2024-03-15 DIAGNOSIS — R21 RASH: ICD-10-CM

## 2024-03-15 DIAGNOSIS — R60.0 PERIPHERAL EDEMA: ICD-10-CM

## 2024-03-15 DIAGNOSIS — S72.22XD CLOSED DISPLACED SUBTROCHANTERIC FRACTURE OF LEFT FEMUR WITH ROUTINE HEALING: ICD-10-CM

## 2024-03-15 LAB
ERYTHROCYTE [DISTWIDTH] IN BLOOD BY AUTOMATED COUNT: 15.8 % (ref 10–15)
HCT VFR BLD AUTO: 33.9 % (ref 40–53)
HGB BLD-MCNC: 11.1 G/DL (ref 13.3–17.7)
MCH RBC QN AUTO: 31.3 PG (ref 26.5–33)
MCHC RBC AUTO-ENTMCNC: 32.7 G/DL (ref 31.5–36.5)
MCV RBC AUTO: 96 FL (ref 78–100)
PLATELET # BLD AUTO: 169 10E3/UL (ref 150–450)
RBC # BLD AUTO: 3.55 10E6/UL (ref 4.4–5.9)
WBC # BLD AUTO: 6.2 10E3/UL (ref 4–11)

## 2024-03-15 PROCEDURE — 99309 SBSQ NF CARE MODERATE MDM 30: CPT | Performed by: NURSE PRACTITIONER

## 2024-03-15 PROCEDURE — 80048 BASIC METABOLIC PNL TOTAL CA: CPT | Mod: ORL | Performed by: NURSE PRACTITIONER

## 2024-03-15 PROCEDURE — P9604 ONE-WAY ALLOW PRORATED TRIP: HCPCS | Mod: ORL | Performed by: NURSE PRACTITIONER

## 2024-03-15 PROCEDURE — 83880 ASSAY OF NATRIURETIC PEPTIDE: CPT | Mod: ORL | Performed by: NURSE PRACTITIONER

## 2024-03-15 PROCEDURE — 36415 COLL VENOUS BLD VENIPUNCTURE: CPT | Mod: ORL | Performed by: NURSE PRACTITIONER

## 2024-03-15 PROCEDURE — 85027 COMPLETE CBC AUTOMATED: CPT | Mod: ORL | Performed by: NURSE PRACTITIONER

## 2024-03-15 NOTE — LETTER
3/15/2024        RE: Alvin Newton  [address] [address] [id number]        [facility] GERIATRICS  ACUTE/EPISODIC VISIT    [facility] Medical Record Number:  [id number]  Place of Service where encounter took place:  [facility] ([facility]) [[id number]]    Chief Complaint   Patient presents with     RECHECK       HPI:    Alvin Newton is a 89 year old  (5/10/1934), who is being seen today for an episodic care visit.  HPI information obtained from: facility chart records, facility staff, patient report and [facility] chart review.    Today's concern is:    Diagnoses         Codes Comments    Chronic diastolic (congestive) heart failure (H)    -  Primary I50.32     Peripheral edema     R60.9     Closed displaced subtrochanteric fracture of left femur with routine healing     S72.22XD     Rash     R21           Visited with patient in room. Today he was sitting up in his w/c watching TV. Patient was very pleasant and able to hold a conversation.     He talked about his weight fluctuation and states tiffany he has gained 11 pounds since he got admitted to the facility. He sates that he does not eat that much but drinks a lot of fluids especially water.   He denies pain with voids but endorses some hesitancy. Patient reports 2 stools a day which is his baseline. He denies loose stools or diarrhea.     He continues to report pain to shoulder but sates that the Voltaren gel cream has been very helpful with relieving the pain.     He reports feeling very winded with activities in the past. He currently denies SOB and reports improvements with activities during PT.     Patient went on to talk about his family and was able to call each family member by name and mentioned what they were currently doing and living without difficulty. Patient reports that they had found an assisted living in [place] which was a town where he lived and raised his children. He expresses how fond he is of the  [facility] and pleased that he gets to spend his older years in that town with his wife.    .       ALLERGIES:  No Known Allergies     MEDICATIONS:  Post Discharge Medication Reconciliation Status: medication reconcilation previously completed during another office visit.  Medications reviewed.     Current Outpatient Medications   Medication Sig Dispense Refill     acetaminophen (TYLENOL) 325 MG tablet 975mg by mouth every AM and 975mg twice a day as needed       amiodarone (PACERONE) 200 MG tablet Take 1 tablet (200 mg) by mouth daily       atorvastatin (LIPITOR) 20 MG tablet Take 20 mg by mouth At Bedtime       busPIRone (BUSPAR) 5 MG tablet Take 1 tablet (5 mg) by mouth 2 times daily       diclofenac (VOLTAREN) 1 % topical gel Apply 2 g topically 3 times daily shoulders 50 g 0     ELIQUIS ANTICOAGULANT 5 MG tablet Take 5 mg by mouth 2 times daily       famotidine (PEPCID) 20 MG tablet Take 2 tablets (40 mg) by mouth 2 times daily       lidocaine (XYLOCAINE) 5 % external ointment Apply topically 3 times daily 100 g 0     Magnesium Oxide 250 MG TABS Take 250 mg by mouth daily       metoprolol succinate ER (TOPROL XL) 25 MG 24 hr tablet Take 12.5 mg by mouth daily Hold if SBP < 105.       midodrine (PROAMATINE) 2.5 MG tablet Take 2.5 mg by mouth daily       ondansetron (ZOFRAN) 4 MG tablet Take 1 tablet (4 mg) by mouth every 8 hours as needed for nausea       senna-docusate (SENOKOT-S/PERICOLACE) 8.6-50 MG tablet Take 2 tablets by mouth 2 times daily 30 tablet 0     traMADol (ULTRAM) 50 MG tablet Take 0.5 tablets (25 mg) by mouth 2 times daily as needed for severe pain         REVIEW OF SYSTEMS:  4 point ROS neg other than the symptoms noted above in the HPI.      PHYSICAL EXAM:  /66   Pulse 84   Temp 98.6  F (37  C)   Resp 16   Wt 97.1 kg (214 lb)   SpO2 98%   BMI 29.02 kg/m    Physical Exam  Cardiovascular:      Rate and Rhythm: Normal rate and regular rhythm.      Pulses: Normal pulses.      Heart sounds:  Normal heart sounds.   Pulmonary:      Effort: Pulmonary effort is normal.      Breath sounds: Normal breath sounds.   Abdominal:      General: Bowel sounds are normal.   Genitourinary:     Comments: Pt. Reports hesitancy with urination.   Musculoskeletal:         General: Swelling present.      Right lower leg: Edema present.      Left lower leg: Edema present.      Comments: + 2 edema to BLE.    Skin:     General: Skin is warm and dry.   Neurological:      Mental Status: He is alert. Mental status is at baseline.   Psychiatric:         Mood and Affect: Mood normal.         Behavior: Behavior normal.         Findings from abdominal x-ray on 03/02/24 found normal gas pattern, no obstruction, no calcification, no free air, no mass, no inflammation, normal bones Bilateral hip replacement was also seen on x-ray film.                 ASSESSMENT / PLAN:  (I50.32) Chronic diastolic (congestive) heart failure (H)  (primary encounter diagnosis)  (R60.9) Peripheral edema  Comment: Continue daily weight monitoring.  Admission weight was 236.9lbs  Current weight 216.6lbs on 3/15/24  Weight on 3/14/24 was 214 lbs with a BMI of 29.02.    Weight 3/13/24 was 213 lbs  Weight 3/12/24 was 210.6 lbs  Weight 3/11/24 was 209.4 lbs  Weight on 3/09/24 was 206.2 lbs    Weight today (3/15/24) down 20 lbs from admission weight Weight up 10.4 lbs from 6 days ago.   He denies pain to BLE. No redness noted.  Cain Socks in place.  Encouraged patient to elevate BLE when sitting and when laying down in bed.   Patient on DVT prophylaxis.     BP variable and running between 81 - 122 systolic and 58 - 68 diaastolic.   Per facility records patient has a fluid intake of about 500 - 700 ml a day.     PLAN:  will start Alvin on Torsemide 10mg po daily.  He was on 20mg before and B/P's dropped.  With his weight increasing daily, more edema and becoming winded, will try a small dose of the diuretic.  Will follow with a BMP next week.      (S72.22XD) Closed  displaced subtrochanteric fracture of left femur with routine healing  Comment: Continue tylenol, tramadol and Voltaren gel for pain.   Work with therapies on mobility.    (R21) Rash  Comment: Red rash to groin area. Patient states that he gets this rash intermittently.  Hydrocortisone ordered at this visit.       Orders:  Hydrocortisone cream 1% to redness around groin.   Torsemide 10mg po daily for CHF/lower extremity edema.  BMP on 3/20/24 for CHF      Electronically signed by  Baljinder Milian NP Student      I was present with the medical student/advanced practice registered nurse, Baljinder Milian NP student, who participated in the service and in the documentation of this note and/or observation along side of this provider. I have verified the history and personally performed the physical exam and medical decision making. I agree with the assessment and plan of care as documented in the note.     CHUCK Bowles CNP      Sincerely,        CHUCK Bowles CNP

## 2024-03-15 NOTE — PROGRESS NOTES
[facility] GERIATRICS  ACUTE/EPISODIC VISIT    [facility] Medical Record Number:  [id number]  Place of Service where encounter took place:  [facility] ([facility]) [[id number]]    Chief Complaint   Patient presents with    RECHECK       HPI:    Alvin Newton is a 89 year old  (5/10/1934), who is being seen today for an episodic care visit.  HPI information obtained from: facility chart records, facility staff, patient report and [facility] chart review.    Today's concern is:    Diagnoses         Codes Comments    Chronic diastolic (congestive) heart failure (H)    -  Primary I50.32     Peripheral edema     R60.9     Closed displaced subtrochanteric fracture of left femur with routine healing     S72.22XD     Rash     R21           Visited with patient in room. Today he was sitting up in his w/c watching TV. Patient was very pleasant and able to hold a conversation.     He talked about his weight fluctuation and states tiffany he has gained 11 pounds since he got admitted to the facility. He sates that he does not eat that much but drinks a lot of fluids especially water.   He denies pain with voids but endorses some hesitancy. Patient reports 2 stools a day which is his baseline. He denies loose stools or diarrhea.     He continues to report pain to shoulder but sates that the Voltaren gel cream has been very helpful with relieving the pain.     He reports feeling very winded with activities in the past. He currently denies SOB and reports improvements with activities during PT.     Patient went on to talk about his family and was able to call each family member by name and mentioned what they were currently doing and living without difficulty. Patient reports that they had found an assisted living in [place] which was a town where he lived and raised his children. He expresses how fond he is of the town and pleased that he gets to spend his older years in that town with his wife.    .        ALLERGIES:  No Known Allergies     MEDICATIONS:  Post Discharge Medication Reconciliation Status: medication reconcilation previously completed during another office visit.  Medications reviewed.     Current Outpatient Medications   Medication Sig Dispense Refill    acetaminophen (TYLENOL) 325 MG tablet 975mg by mouth every AM and 975mg twice a day as needed      amiodarone (PACERONE) 200 MG tablet Take 1 tablet (200 mg) by mouth daily      atorvastatin (LIPITOR) 20 MG tablet Take 20 mg by mouth At Bedtime      busPIRone (BUSPAR) 5 MG tablet Take 1 tablet (5 mg) by mouth 2 times daily      diclofenac (VOLTAREN) 1 % topical gel Apply 2 g topically 3 times daily shoulders 50 g 0    ELIQUIS ANTICOAGULANT 5 MG tablet Take 5 mg by mouth 2 times daily      famotidine (PEPCID) 20 MG tablet Take 2 tablets (40 mg) by mouth 2 times daily      lidocaine (XYLOCAINE) 5 % external ointment Apply topically 3 times daily 100 g 0    Magnesium Oxide 250 MG TABS Take 250 mg by mouth daily      metoprolol succinate ER (TOPROL XL) 25 MG 24 hr tablet Take 12.5 mg by mouth daily Hold if SBP < 105.      midodrine (PROAMATINE) 2.5 MG tablet Take 2.5 mg by mouth daily      ondansetron (ZOFRAN) 4 MG tablet Take 1 tablet (4 mg) by mouth every 8 hours as needed for nausea      senna-docusate (SENOKOT-S/PERICOLACE) 8.6-50 MG tablet Take 2 tablets by mouth 2 times daily 30 tablet 0    traMADol (ULTRAM) 50 MG tablet Take 0.5 tablets (25 mg) by mouth 2 times daily as needed for severe pain         REVIEW OF SYSTEMS:  4 point ROS neg other than the symptoms noted above in the HPI.      PHYSICAL EXAM:  /66   Pulse 84   Temp 98.6  F (37  C)   Resp 16   Wt 97.1 kg (214 lb)   SpO2 98%   BMI 29.02 kg/m    Physical Exam  Cardiovascular:      Rate and Rhythm: Normal rate and regular rhythm.      Pulses: Normal pulses.      Heart sounds: Normal heart sounds.   Pulmonary:      Effort: Pulmonary effort is normal.      Breath sounds: Normal  breath sounds.   Abdominal:      General: Bowel sounds are normal.   Genitourinary:     Comments: Pt. Reports hesitancy with urination.   Musculoskeletal:         General: Swelling present.      Right lower leg: Edema present.      Left lower leg: Edema present.      Comments: + 2 edema to BLE.    Skin:     General: Skin is warm and dry.   Neurological:      Mental Status: He is alert. Mental status is at baseline.   Psychiatric:         Mood and Affect: Mood normal.         Behavior: Behavior normal.         Findings from abdominal x-ray on 03/02/24 found normal gas pattern, no obstruction, no calcification, no free air, no mass, no inflammation, normal bones Bilateral hip replacement was also seen on x-ray film.                 ASSESSMENT / PLAN:  (I50.32) Chronic diastolic (congestive) heart failure (H)  (primary encounter diagnosis)  (R60.9) Peripheral edema  Comment: Continue daily weight monitoring.  Admission weight was 236.9lbs  Current weight 216.6lbs on 3/15/24  Weight on 3/14/24 was 214 lbs with a BMI of 29.02.    Weight 3/13/24 was 213 lbs  Weight 3/12/24 was 210.6 lbs  Weight 3/11/24 was 209.4 lbs  Weight on 3/09/24 was 206.2 lbs    Weight today (3/15/24) down 20 lbs from admission weight Weight up 10.4 lbs from 6 days ago.   He denies pain to BLE. No redness noted.  Cain Socks in place.  Encouraged patient to elevate BLE when sitting and when laying down in bed.   Patient on DVT prophylaxis.     BP variable and running between 81 - 122 systolic and 58 - 68 diaastolic.   Per facility records patient has a fluid intake of about 500 - 700 ml a day.     PLAN:  will start Alvin on Torsemide 10mg po daily.  He was on 20mg before and B/P's dropped.  With his weight increasing daily, more edema and becoming winded, will try a small dose of the diuretic.  Will follow with a BMP next week.      (S72.22XD) Closed displaced subtrochanteric fracture of left femur with routine healing  Comment: Continue tylenol,  tramadol and Voltaren gel for pain.   Work with therapies on mobility.    (R21) Rash  Comment: Red rash to groin area. Patient states that he gets this rash intermittently.  Hydrocortisone ordered at this visit.       Orders:  Hydrocortisone cream 1% to redness around groin.   Torsemide 10mg po daily for CHF/lower extremity edema.  BMP on 3/20/24 for CHF      Electronically signed by  Baljinder Milian NP Student      I was present with the medical student/advanced practice registered nurse, Baljinder Milian NP student, who participated in the service and in the documentation of this note and/or observation along side of this provider. I have verified the history and personally performed the physical exam and medical decision making. I agree with the assessment and plan of care as documented in the note.     CHUCK Bowles CNP

## 2024-03-16 LAB
ANION GAP SERPL CALCULATED.3IONS-SCNC: 8 MMOL/L (ref 7–15)
BUN SERPL-MCNC: 14.8 MG/DL (ref 8–23)
CALCIUM SERPL-MCNC: 8.8 MG/DL (ref 8.8–10.2)
CHLORIDE SERPL-SCNC: 108 MMOL/L (ref 98–107)
CREAT SERPL-MCNC: 1.05 MG/DL (ref 0.67–1.17)
DEPRECATED HCO3 PLAS-SCNC: 23 MMOL/L (ref 22–29)
EGFRCR SERPLBLD CKD-EPI 2021: 68 ML/MIN/1.73M2
GLUCOSE SERPL-MCNC: 85 MG/DL (ref 70–99)
NT-PROBNP SERPL-MCNC: 2218 PG/ML (ref 0–1800)
POTASSIUM SERPL-SCNC: 4.4 MMOL/L (ref 3.4–5.3)
SODIUM SERPL-SCNC: 139 MMOL/L (ref 135–145)

## 2024-03-18 ENCOUNTER — TRANSITIONAL CARE UNIT VISIT (OUTPATIENT)
Dept: GERIATRICS | Facility: CLINIC | Age: 89
End: 2024-03-18
Payer: COMMERCIAL

## 2024-03-18 VITALS
TEMPERATURE: 97.8 F | BODY MASS INDEX: 29.12 KG/M2 | HEART RATE: 92 BPM | WEIGHT: 215 LBS | HEIGHT: 72 IN | RESPIRATION RATE: 20 BRPM | OXYGEN SATURATION: 98 % | SYSTOLIC BLOOD PRESSURE: 107 MMHG | DIASTOLIC BLOOD PRESSURE: 62 MMHG

## 2024-03-18 DIAGNOSIS — Z98.890 S/P ORIF (OPEN REDUCTION INTERNAL FIXATION) FRACTURE: ICD-10-CM

## 2024-03-18 DIAGNOSIS — Z87.81 S/P ORIF (OPEN REDUCTION INTERNAL FIXATION) FRACTURE: ICD-10-CM

## 2024-03-18 DIAGNOSIS — S72.22XD CLOSED DISPLACED SUBTROCHANTERIC FRACTURE OF LEFT FEMUR WITH ROUTINE HEALING: ICD-10-CM

## 2024-03-18 DIAGNOSIS — K59.01 SLOW TRANSIT CONSTIPATION: ICD-10-CM

## 2024-03-18 DIAGNOSIS — I48.0 PAROXYSMAL ATRIAL FIBRILLATION WITH RVR (H): ICD-10-CM

## 2024-03-18 DIAGNOSIS — I50.32 CHRONIC DIASTOLIC (CONGESTIVE) HEART FAILURE (H): Primary | ICD-10-CM

## 2024-03-18 PROCEDURE — 99309 SBSQ NF CARE MODERATE MDM 30: CPT | Performed by: NURSE PRACTITIONER

## 2024-03-18 NOTE — LETTER
3/18/2024        RE: Alvin Newton  [address] [facility] [id number]        [facility]    Code Status:  DNR   Visit Type:   Chief Complaint   Patient presents with     TCU Follow Up     Facility:  [facility] ([facility]) [[id number]]         HPI: Alvin Newton is a 89 year old male who I am seeing today for follow up on the TCU.  Past medical history includes type 2 diabetes mellitus with diabetic polyneuropathy, proximal atrial fibs on Eliquis, bilateral hip replacement 33 years ago, HFpEF, type 2 diabetes mellitus, hypertension, severe aortic stenosis and mild cognitive impairment.  Patient admitted post fall with left femur fracture.  Patient had a mechanical fall in which she was trying to reach for his cane for stability but put his weight on his grabber device instead and fell forward.  He did not hit his head or lose consciousness.  Head CT was negative.  X-ray of the pelvis/left hip revealed a left proximal femoral fracture.  History of bilateral hip replacements about 33 years ago.  Patient underwent repair on 1/24/2024.  He had a complicated surgery in the setting of bilateral hip replacements.  He lost 3 L of blood.  He did require pressor support.  He was also transfused on 1/26.  Patient found to have a mildly low a.m. cortisol level most likely due to a component of adrenal insufficiency.  Steroids were done for couple days along with midodrine.  BP improved.  Steroids were discontinued on 1/30.  Pain controlled with tramadol and Tylenol.  Patient initially had a Prevena wound VAC however 1 day later during his TCU stay VAC was not working appropriately.  Ortho updated and this was discontinued and dry sterile dressing applied.  Patient also experienced postoperative delirium.  He is very anxious and tearful.  No agitation.  He did require one-on-one.  He was given BuSpar to help with anxiety.  Delirium improved.  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  Severe aortic stenosis.  Cardiac MRI in October concerning for infiltrative cardiomyopathy.  Workup for amyloidosis in process by cardiology in [place] [facility].  Biopsies of the bone marrow and fat pad were negative for AL amyloid.  Plan at this time is to follow-up with cardiology regarding possible myocardial biopsy and continued amyloid/infiltrative cardiomyopathy workup.  History of proximal atrial fibs on Eliquis.  Patient did have episode of RVR after procedure which resolved.  Acute kidney injury on CKD stage III.  Creatinine 1.34 admission.  Creatinine did bump to 1.74 but improved with fluids.  Type 2 diabetes without long-term use of insulin.  Most recent hemoglobin A1c 6.8%.  Stress leukocytosis with a white blood cell count of 13 resolved.    Transitional Care Course: Today patient sitting up in wheelchair. His wife and daughter are present on exam. Recent left hip fx. Pt denies any pain in his hip. Cardiac Amylosis CHF. He has been approved for a trial drug. Daughter plans to bring it in when received. Pt initially had low bp with orthostasis and poor oral intake. His torsemide was discontinued. Recent increased LE and SOB. His torsemide has been resumed. Today he denies any SOB. He continues with 1+ LE edema. He is wearing tubi . He reported nausea and vomiting yesterday. No emesis today. No nausea on exam. He continues on scheduled zofran. He has been refusing his Senna S. He reports no BM X 2 days. He continues on Simethicone. Appetite is improving. Today he went to look at an apartment in [facility].     Assessment/Plan:     Closed displaced subtrochanteric fracture of left femur with routine healing, subsequent encounter  Status post open reduction and internal fixation (ORIF) of fracture  Left leg pain  -History of bilateral hip replacement 33 years old.  -Weightbearing as tolerated.  -Venous doppler obtained today and was negative.   -Follow up left  hip xray 2 view with proper placement. No acute process.   -Tramadol  BID prn.   -Continue tylenol 975 mg TID.     Other amyloidosis (H)  Chronic diastolic (congestive) heart failure (H)  -Workup for amyloidosis in process by cardiology in [place] [facility].  Biopsies of bone marrow and fat pad negative for AL amyloid.  -Follow-up with cardiology regarding possible myocardial biopsy and continued amyloid/infiltrative cardiomyopathy workup.  -Cardiac MRI in October concerning for infiltrative cardiomyopathy.  -Daily weights. Weight down `15 lbs since admit.   -Resume torsemide 10 mg daily.   -Follow up with cardiology 3/4/24. Pt has been approved for trial drug treatment. He has received a wayne for paying for drug. Family to bring in when received however is N&V persist will hold off starting.     Stage 3a chronic kidney disease (H)  -Creatine 1.08.   -per hospital avoid aggressive fluid resuscitation due to heart failure.   -Follow up BMP.     Hypotension   -Improved.   -pt treated with midodrine and steroids in hospital.   -Lg blood loss from surgery. Hgb now 9.5.   -Metoprolol decreased to 12.5 mg every day with hold parameters.   -Give additional 240cc with each med pass.   -Continue midodrine 2.5 mg daily.   -TEDS for bp support.     Anemia due to blood loss, acute  -EBL of 3 L.  -Patient underwent transfusion of 1/26/2024.  -Follow-up CBC with hgb 9.5.   -Continue to monitor.     Paroxysmal atrial fibrillation with RVR (H)  -Chronically on Eliquis.  -Rate controlled with metoprolol and amiodarone.    Constipation  GI dysmotility  -Decrease Senna S 1 tabs QD.   -Miralax 17 gm prn.   -Simethicone 80 mg QID.     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     Essential hypertension 04/04/2016     Mixed hyperlipidemia 01/02/2009     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)  12/06/2017     Carpal tunnel syndrome, bilateral 11/18/2021     Fall, initial encounter 06/06/2022     Closed fracture of first lumbar vertebra, unspecified fracture morphology, initial encounter (H) 06/06/2022     Fracture of L1 vertebra (H) 06/06/2022     Impaired fasting glucose 06/20/2022     Osteoarthritis of pelvis 06/20/2022     Other ill-defined and unknown causes of morbidity and mortality 01/01/1985     Primary localized osteoarthrosis of shoulder region 06/20/2022     Pure hypercholesterolemia 01/01/1999     Reason for consultation 06/20/2022     Right bundle branch block 06/20/2022     Cellulitis of right lower extremity 07/11/2023     Severe sepsis (H) 07/11/2023     Septic shock (H) 07/12/2023     Streptococcal bacteremia 07/13/2023     Thrombocytopenia (H24) 07/13/2023     Hyperbilirubinemia 07/13/2023     Hypophosphatemia 07/13/2023     Hypoalbuminemia 07/13/2023     Pyuria 07/13/2023     Paroxysmal atrial fibrillation with RVR (H) 07/13/2023     Hypomagnesemia 07/13/2023     Chronic pain of both shoulders 07/13/2023     Severe aortic stenosis 07/14/2023     Elevated brain natriuretic peptide (BNP) level 07/14/2023     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     Peripheral edema 07/14/2023     Positive urine culture 07/14/2023     Medication induced coagulopathy  (H24) 07/14/2023     Chronic diastolic (congestive) heart failure (H) 03/31/2023     Altered mental status, unspecified altered mental status type 07/23/2023     Clostridium difficile diarrhea 07/23/2023     History of Clostridium difficile infection July 2023 07/25/2023     Stage 3a chronic kidney disease (H) 07/25/2023     Esophageal dysmotility 07/27/2023     DEJUAN (acute kidney injury) (H24) 08/17/2023     Moderate malnutrition (H24) 08/17/2023     Schatzki's ring of distal esophagus-dilated 8/19/23 08/17/2023     Acute gout involving toe of left foot 08/17/2023     Unintentional weight loss 08/18/2023     Abnormal esophagram  08/18/2023     Hypokalemia 08/19/2023     Hyponatremia 08/19/2023     Open wound-coccyx/buttocks 08/23/2023     Enterocolitis due to Clostridium difficile, not specified as recurrent 07/27/2023     Other chronic pain 07/19/2023     Pain in left shoulder 07/19/2023     Pain in right shoulder 07/19/2023     Unspecified streptococcus as the cause of diseases classified elsewhere 07/19/2023     Esophageal dysphagia 08/11/2023     Hip fracture, left, closed, initial encounter (H) 01/22/2024     Acute kidney failure, unspecified (H24) 08/24/2023     Amyloidosis (H) 01/25/2024     Monoclonal gammopathy of unknown significance (MGUS) 01/25/2024     Hypotension, unspecified hypotension type 01/25/2024     Postoperative anemia due to acute blood loss 01/25/2024     Resolved Ambulatory Problems     Diagnosis Date Noted     Obesity, morbid, BMI 40.0-49.9 (H) 11/07/2017     Past Medical History:   Diagnosis Date     Colonic diverticulum      Hyperlipidemia      Hypertension      Obesity (BMI 30-39.9)      Osteoarthritis      Type 2 diabetes mellitus (H)      No Known Allergies    All Meds and Allergies reviewed in the record at the facility and is the most up-to-date.    Current Outpatient Medications   Medication Sig     acetaminophen (TYLENOL) 325 MG tablet 975mg by mouth every AM and 975mg twice a day as needed     amiodarone (PACERONE) 200 MG tablet Take 1 tablet (200 mg) by mouth daily     atorvastatin (LIPITOR) 20 MG tablet Take 20 mg by mouth At Bedtime     busPIRone (BUSPAR) 5 MG tablet Take 1 tablet (5 mg) by mouth 2 times daily     diclofenac (VOLTAREN) 1 % topical gel Apply 2 g topically 3 times daily shoulders     ELIQUIS ANTICOAGULANT 5 MG tablet Take 5 mg by mouth 2 times daily     famotidine (PEPCID) 20 MG tablet Take 2 tablets (40 mg) by mouth 2 times daily     lidocaine (XYLOCAINE) 5 % external ointment Apply topically 3 times daily     Magnesium Oxide 250 MG TABS Take 250 mg by mouth daily     metoprolol  succinate ER (TOPROL XL) 25 MG 24 hr tablet Take 12.5 mg by mouth daily Hold if SBP < 105.     midodrine (PROAMATINE) 2.5 MG tablet Take 2.5 mg by mouth daily     ondansetron (ZOFRAN) 4 MG tablet Take 1 tablet (4 mg) by mouth every 8 hours as needed for nausea     senna-docusate (SENOKOT-S/PERICOLACE) 8.6-50 MG tablet Take 2 tablets by mouth 2 times daily (Patient taking differently: Take 1 tablet by mouth daily)     traMADol (ULTRAM) 50 MG tablet Take 0.5 tablets (25 mg) by mouth 2 times daily as needed for severe pain     No current facility-administered medications for this visit.       REVIEW OF SYSTEMS:   10 point review of systems reviewed and pertinent positives in the HPI.     PHYSICAL EXAMINATION:  Physical Exam     Vital signs: /62   Pulse 92   Temp 97.8  F (36.6  C)   Resp 20   Ht 1.829 m (6')   Wt 97.5 kg (215 lb)   SpO2 98%   BMI 29.16 kg/m    General: Awake, Alert, oriented x3, sitting up in wheelchair, conversant  HEENT:Pink conjunctiva, moist oral mucosa  NECK: Supple  CVS:  S1  S2, without murmur or gallop.   LUNG: Clear to auscultation, No wheezes, rales or rhonci.  BACK: No kyphosis of the thoracic spine  ABDOMEN: Soft, nontender to palpation, with positive bowel sounds  EXTREMITIES: Moves both upper and lower extremities with generalized weakness and some limitation to left lower extremity, 1+ pedal edema. Tolerates ROM, plantar and dorsi flexion.   NEUROLOGIC: Intact, pulses palpable  PSYCHIATRIC: Mild cognitive impairment noted. Pleasant on exam.       Labs:  All labs reviewed in the nursing home record and [facility]   @  Lab Results   Component Value Date    WBC 7.7 02/02/2024    WBC 9.0 12/30/2017     Lab Results   Component Value Date    RBC 3.24 02/02/2024    RBC 4.16 12/30/2017     Lab Results   Component Value Date    HGB 9.5 02/02/2024    HGB 12.6 12/30/2017     Lab Results   Component Value Date    HCT 29.6 02/02/2024    HCT 37.1 12/30/2017     Lab Results   Component Value Date     MCV 91 02/02/2024    MCV 89 12/30/2017     Lab Results   Component Value Date    MCH 29.3 02/02/2024    MCH 30.3 12/30/2017     Lab Results   Component Value Date    MCHC 32.1 02/02/2024    MCHC 34.0 12/30/2017     Lab Results   Component Value Date    RDW 15.5 02/02/2024    RDW 13.8 12/30/2017     Lab Results   Component Value Date     02/02/2024     12/30/2017        @Last Comprehensive Metabolic Panel:  Sodium   Date Value Ref Range Status   03/15/2024 139 135 - 145 mmol/L Final     Comment:     Reference intervals for this test were updated on 09/26/2023 to more accurately reflect our healthy population. There may be differences in the flagging of prior results with similar values performed with this method. Interpretation of those prior results can be made in the context of the updated reference intervals.    12/30/2017 138 133 - 144 mmol/L Final     Potassium   Date Value Ref Range Status   03/15/2024 4.4 3.4 - 5.3 mmol/L Final   07/15/2022 3.7 3.4 - 5.3 mmol/L Final   12/31/2017 3.5 3.4 - 5.3 mmol/L Final     Chloride   Date Value Ref Range Status   03/15/2024 108 (H) 98 - 107 mmol/L Final   07/15/2022 106 94 - 109 mmol/L Final   12/30/2017 104 94 - 109 mmol/L Final     Carbon Dioxide   Date Value Ref Range Status   12/30/2017 26 20 - 32 mmol/L Final     Carbon Dioxide (CO2)   Date Value Ref Range Status   03/15/2024 23 22 - 29 mmol/L Final   07/15/2022 24 20 - 32 mmol/L Final     Anion Gap   Date Value Ref Range Status   03/15/2024 8 7 - 15 mmol/L Final   07/15/2022 9 3 - 14 mmol/L Final   12/30/2017 8 3 - 14 mmol/L Final     Glucose   Date Value Ref Range Status   03/15/2024 85 70 - 99 mg/dL Final   07/15/2022 97 70 - 99 mg/dL Final   12/30/2017 145 (H) 70 - 99 mg/dL Final     GLUCOSE BY METER POCT   Date Value Ref Range Status   02/02/2024 120 (H) 70 - 99 mg/dL Final     Urea Nitrogen   Date Value Ref Range Status   03/15/2024 14.8 8.0 - 23.0 mg/dL Final   07/15/2022 13 7 - 30 mg/dL Final    12/31/2017 26 7 - 30 mg/dL Final     Creatinine   Date Value Ref Range Status   03/15/2024 1.05 0.67 - 1.17 mg/dL Final   12/31/2017 1.11 0.66 - 1.25 mg/dL Final     GFR Estimate   Date Value Ref Range Status   03/15/2024 68 >60 mL/min/1.73m2 Final   12/31/2017 63 >60 mL/min/1.7m2 Final     Comment:     Non  GFR Calc     Calcium   Date Value Ref Range Status   03/15/2024 8.8 8.8 - 10.2 mg/dL Final   12/30/2017 7.9 (L) 8.5 - 10.1 mg/dL Final       This note has been dictated using voice recognition software. Any grammatical or context distortions are unintentional and inherent to the software    Electronically signed by: Grace Parry CNP       Sincerely,        Grace Parry, NP

## 2024-03-19 ENCOUNTER — LAB REQUISITION (OUTPATIENT)
Dept: LAB | Facility: CLINIC | Age: 89
End: 2024-03-19
Payer: COMMERCIAL

## 2024-03-19 DIAGNOSIS — I50.32 CHRONIC DIASTOLIC (CONGESTIVE) HEART FAILURE (H): ICD-10-CM

## 2024-03-19 NOTE — PROGRESS NOTES
[facility] GERIATRIC SERVICES    Code Status:  DNR   Visit Type:   Chief Complaint   Patient presents with    TCU Follow Up     Facility:  [facility] ([facility]) [[id number]]         HPI: Alvin Newton is a 89 year old male who I am seeing today for follow up on the TCU.  Past medical history includes type 2 diabetes mellitus with diabetic polyneuropathy, proximal atrial fibs on Eliquis, bilateral hip replacement 33 years ago, HFpEF, type 2 diabetes mellitus, hypertension, severe aortic stenosis and mild cognitive impairment.  Patient admitted post fall with left femur fracture.  Patient had a mechanical fall in which she was trying to reach for his cane for stability but put his weight on his grabber device instead and fell forward.  He did not hit his head or lose consciousness.  Head CT was negative.  X-ray of the pelvis/left hip revealed a left proximal femoral fracture.  History of bilateral hip replacements about 33 years ago.  Patient underwent repair on 1/24/2024.  He had a complicated surgery in the setting of bilateral hip replacements.  He lost 3 L of blood.  He did require pressor support.  He was also transfused on 1/26.  Patient found to have a mildly low a.m. cortisol level most likely due to a component of adrenal insufficiency.  Steroids were done for couple days along with midodrine.  BP improved.  Steroids were discontinued on 1/30.  Pain controlled with tramadol and Tylenol.  Patient initially had a Prevena wound VAC however 1 day later during his TCU stay VAC was not working appropriately.  Ortho updated and this was discontinued and dry sterile dressing applied.  Patient also experienced postoperative delirium.  He is very anxious and tearful.  No agitation.  He did require one-on-one.  He was given BuSpar to help with anxiety.  Delirium improved.  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  Severe aortic stenosis.  Cardiac MRI in October  concerning for infiltrative cardiomyopathy.  Workup for amyloidosis in process by cardiology in [place] [facility].  Biopsies of the bone marrow and fat pad were negative for AL amyloid.  Plan at this time is to follow-up with cardiology regarding possible myocardial biopsy and continued amyloid/infiltrative cardiomyopathy workup.  History of proximal atrial fibs on Eliquis.  Patient did have episode of RVR after procedure which resolved.  Acute kidney injury on CKD stage III.  Creatinine 1.34 admission.  Creatinine did bump to 1.74 but improved with fluids.  Type 2 diabetes without long-term use of insulin.  Most recent hemoglobin A1c 6.8%.  Stress leukocytosis with a white blood cell count of 13 resolved.    Transitional Care Course: Today patient sitting up in wheelchair. His wife and daughter are present on exam. Recent left hip fx. Pt denies any pain in his hip. Cardiac Amylosis CHF. He has been approved for a trial drug. Daughter plans to bring it in when received. Pt initially had low bp with orthostasis and poor oral intake. His torsemide was discontinued. Recent increased LE and SOB. His torsemide has been resumed. Today he denies any SOB. He continues with 1+ LE edema. He is wearing tubi . He reported nausea and vomiting yesterday. No emesis today. No nausea on exam. He continues on scheduled zofran. He has been refusing his Senna S. He reports no BM X 2 days. He continues on Simethicone. Appetite is improving. Today he went to look at an apartment in [facility].     Assessment/Plan:     Closed displaced subtrochanteric fracture of left femur with routine healing, subsequent encounter  Status post open reduction and internal fixation (ORIF) of fracture  Left leg pain  -History of bilateral hip replacement 33 years old.  -Weightbearing as tolerated.  -Venous doppler obtained today and was negative.   -Follow up left hip xray 2 view with proper placement. No acute process.   -Tramadol  BID prn.   -Continue  tylenol 975 mg TID.     Other amyloidosis (H)  Chronic diastolic (congestive) heart failure (H)  -Workup for amyloidosis in process by cardiology in [place] [facility].  Biopsies of bone marrow and fat pad negative for AL amyloid.  -Follow-up with cardiology regarding possible myocardial biopsy and continued amyloid/infiltrative cardiomyopathy workup.  -Cardiac MRI in October concerning for infiltrative cardiomyopathy.  -Daily weights. Weight down `15 lbs since admit.   -Resume torsemide 10 mg daily.   -Follow up with cardiology 3/4/24. Pt has been approved for trial drug treatment. He has received a wayne for paying for drug. Family to bring in when received however is N&V persist will hold off starting.     Stage 3a chronic kidney disease (H)  -Creatine 1.08.   -per hospital avoid aggressive fluid resuscitation due to heart failure.   -Follow up BMP.     Hypotension   -Improved.   -pt treated with midodrine and steroids in hospital.   -Lg blood loss from surgery. Hgb now 9.5.   -Metoprolol decreased to 12.5 mg every day with hold parameters.   -Give additional 240cc with each med pass.   -Continue midodrine 2.5 mg daily.   -TEDS for bp support.     Anemia due to blood loss, acute  -EBL of 3 L.  -Patient underwent transfusion of 1/26/2024.  -Follow-up CBC with hgb 9.5.   -Continue to monitor.     Paroxysmal atrial fibrillation with RVR (H)  -Chronically on Eliquis.  -Rate controlled with metoprolol and amiodarone.    Constipation  GI dysmotility  -Decrease Senna S 1 tabs QD.   -Miralax 17 gm prn.   -Simethicone 80 mg QID.     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/22/2020    Essential hypertension 04/04/2016    Mixed hyperlipidemia 01/02/2009    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) 12/06/2017    Carpal tunnel syndrome, bilateral 11/18/2021    Fall, initial encounter 06/06/2022     Closed fracture of first lumbar vertebra, unspecified fracture morphology, initial encounter (H) 06/06/2022    Fracture of L1 vertebra (H) 06/06/2022    Impaired fasting glucose 06/20/2022    Osteoarthritis of pelvis 06/20/2022    Other ill-defined and unknown causes of morbidity and mortality 01/01/1985    Primary localized osteoarthrosis of shoulder region 06/20/2022    Pure hypercholesterolemia 01/01/1999    Reason for consultation 06/20/2022    Right bundle branch block 06/20/2022    Cellulitis of right lower extremity 07/11/2023    Severe sepsis (H) 07/11/2023    Septic shock (H) 07/12/2023    Streptococcal bacteremia 07/13/2023    Thrombocytopenia (H24) 07/13/2023    Hyperbilirubinemia 07/13/2023    Hypophosphatemia 07/13/2023    Hypoalbuminemia 07/13/2023    Pyuria 07/13/2023    Paroxysmal atrial fibrillation with RVR (H) 07/13/2023    Hypomagnesemia 07/13/2023    Chronic pain of both shoulders 07/13/2023    Severe aortic stenosis 07/14/2023    Elevated brain natriuretic peptide (BNP) level 07/14/2023    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    Peripheral edema 07/14/2023    Positive urine culture 07/14/2023    Medication induced coagulopathy  (H24) 07/14/2023    Chronic diastolic (congestive) heart failure (H) 03/31/2023    Altered mental status, unspecified altered mental status type 07/23/2023    Clostridium difficile diarrhea 07/23/2023    History of Clostridium difficile infection July 2023 07/25/2023    Stage 3a chronic kidney disease (H) 07/25/2023    Esophageal dysmotility 07/27/2023    DEJUAN (acute kidney injury) (H24) 08/17/2023    Moderate malnutrition (H24) 08/17/2023    Schatzki's ring of distal esophagus-dilated 8/19/23 08/17/2023    Acute gout involving toe of left foot 08/17/2023    Unintentional weight loss 08/18/2023    Abnormal esophagram 08/18/2023    Hypokalemia 08/19/2023    Hyponatremia 08/19/2023    Open wound-coccyx/buttocks 08/23/2023    Enterocolitis due to Clostridium  difficile, not specified as recurrent 07/27/2023    Other chronic pain 07/19/2023    Pain in left shoulder 07/19/2023    Pain in right shoulder 07/19/2023    Unspecified streptococcus as the cause of diseases classified elsewhere 07/19/2023    Esophageal dysphagia 08/11/2023    Hip fracture, left, closed, initial encounter (H) 01/22/2024    Acute kidney failure, unspecified (H24) 08/24/2023    Amyloidosis (H) 01/25/2024    Monoclonal gammopathy of unknown significance (MGUS) 01/25/2024    Hypotension, unspecified hypotension type 01/25/2024    Postoperative anemia due to acute blood loss 01/25/2024     Resolved Ambulatory Problems     Diagnosis Date Noted    Obesity, morbid, BMI 40.0-49.9 (H) 11/07/2017     Past Medical History:   Diagnosis Date    Colonic diverticulum     Hyperlipidemia     Hypertension     Obesity (BMI 30-39.9)     Osteoarthritis     Type 2 diabetes mellitus (H)      No Known Allergies    All Meds and Allergies reviewed in the record at the facility and is the most up-to-date.    Current Outpatient Medications   Medication Sig    acetaminophen (TYLENOL) 325 MG tablet 975mg by mouth every AM and 975mg twice a day as needed    amiodarone (PACERONE) 200 MG tablet Take 1 tablet (200 mg) by mouth daily    atorvastatin (LIPITOR) 20 MG tablet Take 20 mg by mouth At Bedtime    busPIRone (BUSPAR) 5 MG tablet Take 1 tablet (5 mg) by mouth 2 times daily    diclofenac (VOLTAREN) 1 % topical gel Apply 2 g topically 3 times daily shoulders    ELIQUIS ANTICOAGULANT 5 MG tablet Take 5 mg by mouth 2 times daily    famotidine (PEPCID) 20 MG tablet Take 2 tablets (40 mg) by mouth 2 times daily    lidocaine (XYLOCAINE) 5 % external ointment Apply topically 3 times daily    Magnesium Oxide 250 MG TABS Take 250 mg by mouth daily    metoprolol succinate ER (TOPROL XL) 25 MG 24 hr tablet Take 12.5 mg by mouth daily Hold if SBP < 105.    midodrine (PROAMATINE) 2.5 MG tablet Take 2.5 mg by mouth daily    ondansetron  (ZOFRAN) 4 MG tablet Take 1 tablet (4 mg) by mouth every 8 hours as needed for nausea    senna-docusate (SENOKOT-S/PERICOLACE) 8.6-50 MG tablet Take 2 tablets by mouth 2 times daily (Patient taking differently: Take 1 tablet by mouth daily)    traMADol (ULTRAM) 50 MG tablet Take 0.5 tablets (25 mg) by mouth 2 times daily as needed for severe pain     No current facility-administered medications for this visit.       REVIEW OF SYSTEMS:   10 point review of systems reviewed and pertinent positives in the HPI.     PHYSICAL EXAMINATION:  Physical Exam     Vital signs: /62   Pulse 92   Temp 97.8  F (36.6  C)   Resp 20   Ht 1.829 m (6')   Wt 97.5 kg (215 lb)   SpO2 98%   BMI 29.16 kg/m    General: Awake, Alert, oriented x3, sitting up in wheelchair, conversant  HEENT:Pink conjunctiva, moist oral mucosa  NECK: Supple  CVS:  S1  S2, without murmur or gallop.   LUNG: Clear to auscultation, No wheezes, rales or rhonci.  BACK: No kyphosis of the thoracic spine  ABDOMEN: Soft, nontender to palpation, with positive bowel sounds  EXTREMITIES: Moves both upper and lower extremities with generalized weakness and some limitation to left lower extremity, 1+ pedal edema. Tolerates ROM, plantar and dorsi flexion.   NEUROLOGIC: Intact, pulses palpable  PSYCHIATRIC: Mild cognitive impairment noted. Pleasant on exam.       Labs:  All labs reviewed in the nursing home record and Epic   @  Lab Results   Component Value Date    WBC 7.7 02/02/2024    WBC 9.0 12/30/2017     Lab Results   Component Value Date    RBC 3.24 02/02/2024    RBC 4.16 12/30/2017     Lab Results   Component Value Date    HGB 9.5 02/02/2024    HGB 12.6 12/30/2017     Lab Results   Component Value Date    HCT 29.6 02/02/2024    HCT 37.1 12/30/2017     Lab Results   Component Value Date    MCV 91 02/02/2024    MCV 89 12/30/2017     Lab Results   Component Value Date    MCH 29.3 02/02/2024    MCH 30.3 12/30/2017     Lab Results   [facility] Date    [facility]    [facility] 34.0 12/30/2017     Lab Results   Component Value Date    RDW 15.5 02/02/2024    RDW 13.8 12/30/2017     Lab Results   Component Value Date     02/02/2024     12/30/2017        @Last Comprehensive Metabolic Panel:  Sodium   Date Value Ref Range Status   03/15/2024 139 135 - 145 mmol/L Final     Comment:     Reference intervals for this test were updated on 09/26/2023 to more accurately reflect our healthy population. There may be differences in the flagging of prior results with similar values performed with this method. Interpretation of those prior results can be made in the context of the updated reference intervals.    12/30/2017 138 133 - 144 mmol/L Final     Potassium   Date Value Ref Range Status   03/15/2024 4.4 3.4 - 5.3 mmol/L Final   07/15/2022 3.7 3.4 - 5.3 mmol/L Final   12/31/2017 3.5 3.4 - 5.3 mmol/L Final     Chloride   Date Value Ref Range Status   03/15/2024 108 (H) 98 - 107 mmol/L Final   07/15/2022 106 94 - 109 mmol/L Final   12/30/2017 104 94 - 109 mmol/L Final     Carbon Dioxide   Date Value Ref Range Status   12/30/2017 26 20 - 32 mmol/L Final     Carbon Dioxide (CO2)   Date Value Ref Range Status   03/15/2024 23 22 - 29 mmol/L Final   07/15/2022 24 20 - 32 mmol/L Final     Anion Gap   Date Value Ref Range Status   03/15/2024 8 7 - 15 mmol/L Final   07/15/2022 9 3 - 14 mmol/L Final   12/30/2017 8 3 - 14 mmol/L Final     Glucose   Date Value Ref Range Status   03/15/2024 85 70 - 99 mg/dL Final   07/15/2022 97 70 - 99 mg/dL Final   12/30/2017 145 (H) 70 - 99 mg/dL Final     GLUCOSE BY METER POCT   Date Value Ref Range Status   02/02/2024 120 (H) 70 - 99 mg/dL Final     Urea Nitrogen   Date Value Ref Range Status   03/15/2024 14.8 8.0 - 23.0 mg/dL Final   07/15/2022 13 7 - 30 mg/dL Final   12/31/2017 26 7 - 30 mg/dL Final     Creatinine   Date Value Ref Range Status   03/15/2024 1.05 0.67 - 1.17 mg/dL Final   12/31/2017 1.11 0.66 - 1.25 mg/dL Final     GFR  Estimate   Date Value Ref Range Status   03/15/2024 68 >60 mL/min/1.73m2 Final   12/31/2017 63 >60 mL/min/1.7m2 Final     Comment:     Non  GFR Calc     Calcium   Date Value Ref Range Status   03/15/2024 8.8 8.8 - 10.2 mg/dL Final   12/30/2017 7.9 (L) 8.5 - 10.1 mg/dL Final       This note has been dictated using voice recognition software. Any grammatical or context distortions are unintentional and inherent to the software    Electronically signed by: Grace Parry, CNP

## 2024-03-20 ENCOUNTER — LAB REQUISITION (OUTPATIENT)
Dept: LAB | Facility: CLINIC | Age: 89
End: 2024-03-20
Payer: COMMERCIAL

## 2024-03-20 DIAGNOSIS — I50.32 CHRONIC DIASTOLIC (CONGESTIVE) HEART FAILURE (H): ICD-10-CM

## 2024-03-20 LAB
ANION GAP SERPL CALCULATED.3IONS-SCNC: 10 MMOL/L (ref 7–15)
BUN SERPL-MCNC: 17.5 MG/DL (ref 8–23)
CALCIUM SERPL-MCNC: 9 MG/DL (ref 8.8–10.2)
CHLORIDE SERPL-SCNC: 106 MMOL/L (ref 98–107)
CREAT SERPL-MCNC: 1.32 MG/DL (ref 0.67–1.17)
DEPRECATED HCO3 PLAS-SCNC: 27 MMOL/L (ref 22–29)
EGFRCR SERPLBLD CKD-EPI 2021: 52 ML/MIN/1.73M2
GLUCOSE SERPL-MCNC: 90 MG/DL (ref 70–99)
POTASSIUM SERPL-SCNC: 3.9 MMOL/L (ref 3.4–5.3)
SODIUM SERPL-SCNC: 143 MMOL/L (ref 135–145)

## 2024-03-20 PROCEDURE — P9604 ONE-WAY ALLOW PRORATED TRIP: HCPCS | Mod: ORL | Performed by: NURSE PRACTITIONER

## 2024-03-20 PROCEDURE — 80048 BASIC METABOLIC PNL TOTAL CA: CPT | Mod: ORL | Performed by: NURSE PRACTITIONER

## 2024-03-20 PROCEDURE — 36415 COLL VENOUS BLD VENIPUNCTURE: CPT | Mod: ORL | Performed by: NURSE PRACTITIONER

## 2024-03-21 ENCOUNTER — TRANSITIONAL CARE UNIT VISIT (OUTPATIENT)
Dept: GERIATRICS | Facility: CLINIC | Age: 89
End: 2024-03-21
Payer: COMMERCIAL

## 2024-03-21 VITALS
HEART RATE: 95 BPM | WEIGHT: 213.4 LBS | DIASTOLIC BLOOD PRESSURE: 73 MMHG | OXYGEN SATURATION: 100 % | TEMPERATURE: 97.8 F | HEIGHT: 72 IN | BODY MASS INDEX: 28.91 KG/M2 | SYSTOLIC BLOOD PRESSURE: 112 MMHG | RESPIRATION RATE: 18 BRPM

## 2024-03-21 DIAGNOSIS — K59.01 SLOW TRANSIT CONSTIPATION: ICD-10-CM

## 2024-03-21 DIAGNOSIS — S72.22XD CLOSED DISPLACED SUBTROCHANTERIC FRACTURE OF LEFT FEMUR WITH ROUTINE HEALING: Primary | ICD-10-CM

## 2024-03-21 DIAGNOSIS — I48.0 PAROXYSMAL ATRIAL FIBRILLATION WITH RVR (H): ICD-10-CM

## 2024-03-21 DIAGNOSIS — Z98.890 S/P ORIF (OPEN REDUCTION INTERNAL FIXATION) FRACTURE: ICD-10-CM

## 2024-03-21 DIAGNOSIS — Z87.81 S/P ORIF (OPEN REDUCTION INTERNAL FIXATION) FRACTURE: ICD-10-CM

## 2024-03-21 DIAGNOSIS — I50.32 CHRONIC DIASTOLIC (CONGESTIVE) HEART FAILURE (H): ICD-10-CM

## 2024-03-21 LAB
ANION GAP SERPL CALCULATED.3IONS-SCNC: 11 MMOL/L (ref 7–15)
BUN SERPL-MCNC: 17.6 MG/DL (ref 8–23)
CALCIUM SERPL-MCNC: 9 MG/DL (ref 8.8–10.2)
CHLORIDE SERPL-SCNC: 104 MMOL/L (ref 98–107)
CREAT SERPL-MCNC: 1.25 MG/DL (ref 0.67–1.17)
DEPRECATED HCO3 PLAS-SCNC: 27 MMOL/L (ref 22–29)
EGFRCR SERPLBLD CKD-EPI 2021: 55 ML/MIN/1.73M2
GLUCOSE SERPL-MCNC: 82 MG/DL (ref 70–99)
POTASSIUM SERPL-SCNC: 4.2 MMOL/L (ref 3.4–5.3)
SODIUM SERPL-SCNC: 142 MMOL/L (ref 135–145)

## 2024-03-21 PROCEDURE — 80048 BASIC METABOLIC PNL TOTAL CA: CPT | Mod: ORL | Performed by: NURSE PRACTITIONER

## 2024-03-21 PROCEDURE — 36415 COLL VENOUS BLD VENIPUNCTURE: CPT | Mod: ORL | Performed by: NURSE PRACTITIONER

## 2024-03-21 PROCEDURE — 99309 SBSQ NF CARE MODERATE MDM 30: CPT | Performed by: NURSE PRACTITIONER

## 2024-03-21 PROCEDURE — P9604 ONE-WAY ALLOW PRORATED TRIP: HCPCS | Mod: ORL | Performed by: NURSE PRACTITIONER

## 2024-03-21 RX ORDER — TORSEMIDE 10 MG/1
10 TABLET ORAL DAILY
COMMUNITY
End: 2024-06-19

## 2024-03-21 RX ORDER — POLYETHYLENE GLYCOL 3350 17 G/17G
1 POWDER, FOR SOLUTION ORAL EVERY OTHER DAY
COMMUNITY
End: 2024-06-19

## 2024-03-21 NOTE — LETTER
3/21/2024        RE: Alvin Newton  [address] [facility] [id number]        [facility]    Code Status:  DNR   Visit Type:   Chief Complaint   Patient presents with     TCU Follow Up     Facility:  [facility] ([facility]) [[id number]]         HPI: Alvin Newton is a 89 year old male who I am seeing today for follow up on the TCU.  Past medical history includes type 2 diabetes mellitus with diabetic polyneuropathy, proximal atrial fibs on Eliquis, bilateral hip replacement 33 years ago, HFpEF, type 2 diabetes mellitus, hypertension, severe aortic stenosis and mild cognitive impairment.  Patient admitted post fall with left femur fracture.  Patient had a mechanical fall in which she was trying to reach for his cane for stability but put his weight on his grabber device instead and fell forward.  He did not hit his head or lose consciousness.  Head CT was negative.  X-ray of the pelvis/left hip revealed a left proximal femoral fracture.  History of bilateral hip replacements about 33 years ago.  Patient underwent repair on 1/24/2024.  He had a complicated surgery in the setting of bilateral hip replacements.  He lost 3 L of blood.  He did require pressor support.  He was also transfused on 1/26.  Patient found to have a mildly low a.m. cortisol level most likely due to a component of adrenal insufficiency.  Steroids were done for couple days along with midodrine.  BP improved.  Steroids were discontinued on 1/30.  Pain controlled with tramadol and Tylenol.  Patient initially had a Prevena wound VAC however 1 day later during his TCU stay VAC was not working appropriately.  Ortho updated and this was discontinued and dry sterile dressing applied.  Patient also experienced postoperative delirium.  He is very anxious and tearful.  No agitation.  He did require one-on-one.  He was given BuSpar to help with anxiety.  Delirium improved.  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  Severe aortic stenosis.  Cardiac MRI in October concerning for infiltrative cardiomyopathy.  Workup for amyloidosis in process by cardiology in [place] onc[facility].  Biopsies of the bone marrow and fat pad were negative for AL amyloid.  Plan at this time is to follow-up with cardiology regarding possible myocardial biopsy and continued amyloid/infiltrative cardiomyopathy workup.  History of proximal atrial fibs on Eliquis.  Patient did have episode of RVR after procedure which resolved.  Acute kidney injury on CKD stage III.  Creatinine 1.34 admission.  Creatinine did bump to 1.74 but improved with fluids.  Type 2 diabetes without long-term use of insulin.  Most recent hemoglobin A1c 6.8%.  Stress leukocytosis with a white blood cell count of 13 resolved.    Transitional Care Course: Today patient sitting up in wheelchair. Recent left hip fx. Pt denies any pain in his hip. He is moving better. Cardiac Amylosis CHF. He has been approved for a trial drug Vyndamax. He was started on this 2 days prior. Pt initially had low bp with orthostasis and poor oral intake. BP improved. CHF with recent increased LE edema and SOB. Torsemide 10 mg resumed. Edema now 1+. No SOB or CP. Today c/o constipation. 2 days prior refusing bowel meds. Senna at that time reduced to once daily. He continues on Simethicone. Appetite is improving.    Assessment/Plan:     Closed displaced subtrochanteric fracture of left femur with routine healing, subsequent encounter  Status post open reduction and internal fixation (ORIF) of fracture  Left leg pain  -History of bilateral hip replacement 33 years old.  -Weightbearing as tolerated.  -Venous doppler obtained today and was negative.   -Follow up left hip xray 2 view with proper placement. No acute process.   -Tramadol  BID prn.   -Continue tylenol 975 mg TID.     Other amyloidosis (H)  Chronic diastolic (congestive) heart failure (H)  -Workup for  amyloidosis in process by cardiology in [place] oncology.  Biopsies of bone marrow and fat pad negative for AL amyloid.  -Follow-up with cardiology regarding possible myocardial biopsy and continued amyloid/infiltrative cardiomyopathy workup.  -Cardiac MRI in October concerning for infiltrative cardiomyopathy.  -Daily weights. Weight down `15 lbs since admit.   -Resume torsemide 10 mg daily.   -Follow up with cardiology 3/4/24. Pt has been approved for trial drug treatment. Vynamax. He has received a wayne for paying for drug. Med started 2 days prior.      Stage 3a chronic kidney disease (H)  -Creatine 1.08.   -per hospital avoid aggressive fluid resuscitation due to heart failure.     Hypotension   -Improved.   -pt treated with midodrine and steroids in hospital.   -Lg blood loss from surgery. Hgb now 9.5.   -Metoprolol decreased to 12.5 mg every day with hold parameters.   -Give additional 240cc with each med pass.   -Continue midodrine 2.5 mg daily.   -TEDS for bp support.     Anemia due to blood loss, acute  -EBL of 3 L.  -Patient underwent transfusion of 1/26/2024.  -Follow-up CBC with hgb 9.5.   -Continue to monitor.     Paroxysmal atrial fibrillation with RVR (H)  -Chronically on Eliquis.  -Rate controlled with metoprolol and amiodarone.    Constipation  GI dysmotility  -Senna S 1 tabs QD.   -Miralax 17 gm every other day.   -Simethicone 80 mg QID.     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     Essential hypertension 04/04/2016     Mixed hyperlipidemia 01/02/2009     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) 12/06/2017     Carpal tunnel syndrome, bilateral 11/18/2021     Fall, initial encounter 06/06/2022     Closed fracture of first lumbar vertebra, unspecified fracture morphology, initial encounter (H) 06/06/2022     Fracture of L1 vertebra (H) 06/06/2022     Impaired  fasting glucose 06/20/2022     Osteoarthritis of pelvis 06/20/2022     Other ill-defined and unknown causes of morbidity and mortality 01/01/1985     Primary localized osteoarthrosis of shoulder region 06/20/2022     Pure hypercholesterolemia 01/01/1999     Reason for consultation 06/20/2022     Right bundle branch block 06/20/2022     Cellulitis of right lower extremity 07/11/2023     Severe sepsis (H) 07/11/2023     Septic shock (H) 07/12/2023     Streptococcal bacteremia 07/13/2023     Thrombocytopenia (H24) 07/13/2023     Hyperbilirubinemia 07/13/2023     Hypophosphatemia 07/13/2023     Hypoalbuminemia 07/13/2023     Pyuria 07/13/2023     Paroxysmal atrial fibrillation with RVR (H) 07/13/2023     Hypomagnesemia 07/13/2023     Chronic pain of both shoulders 07/13/2023     Severe aortic stenosis 07/14/2023     Elevated brain natriuretic peptide (BNP) level 07/14/2023     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     Peripheral edema 07/14/2023     Positive urine culture 07/14/2023     Medication induced coagulopathy  (H24) 07/14/2023     Chronic diastolic (congestive) heart failure (H) 03/31/2023     Altered mental status, unspecified altered mental status type 07/23/2023     Clostridium difficile diarrhea 07/23/2023     History of Clostridium difficile infection July 2023 07/25/2023     Stage 3a chronic kidney disease (H) 07/25/2023     Esophageal dysmotility 07/27/2023     DEJUAN (acute kidney injury) (H24) 08/17/2023     Moderate malnutrition (H24) 08/17/2023     Schatzki's ring of distal esophagus-dilated 8/19/23 08/17/2023     Acute gout involving toe of left foot 08/17/2023     Unintentional weight loss 08/18/2023     Abnormal esophagram 08/18/2023     Hypokalemia 08/19/2023     Hyponatremia 08/19/2023     Open wound-coccyx/buttocks 08/23/2023     Enterocolitis due to Clostridium difficile, not specified as recurrent 07/27/2023     Other chronic pain 07/19/2023     Pain in left shoulder 07/19/2023     Pain in  right shoulder 07/19/2023     Unspecified streptococcus as the cause of diseases classified elsewhere 07/19/2023     Esophageal dysphagia 08/11/2023     Hip fracture, left, closed, initial encounter (H) 01/22/2024     Acute kidney failure, unspecified (H24) 08/24/2023     Amyloidosis (H) 01/25/2024     Monoclonal gammopathy of unknown significance (MGUS) 01/25/2024     Hypotension, unspecified hypotension type 01/25/2024     Postoperative anemia due to acute blood loss 01/25/2024     Resolved Ambulatory Problems     Diagnosis Date Noted     Obesity, morbid, BMI 40.0-49.9 (H) 11/07/2017     Past Medical History:   Diagnosis Date     Colonic diverticulum      Hyperlipidemia      Hypertension      Obesity (BMI 30-39.9)      Osteoarthritis      Type 2 diabetes mellitus (H)      No Known Allergies    All Meds and Allergies reviewed in the record at the facility and is the most up-to-date.    Current Outpatient Medications   Medication Sig     polyethylene glycol (MIRALAX) 17 g packet Take 1 packet by mouth every other day     torsemide (DEMADEX) 10 MG tablet Take 10 mg by mouth daily     acetaminophen (TYLENOL) 325 MG tablet 975mg by mouth every AM and 975mg twice a day as needed     amiodarone (PACERONE) 200 MG tablet Take 1 tablet (200 mg) by mouth daily     atorvastatin (LIPITOR) 20 MG tablet Take 20 mg by mouth At Bedtime     busPIRone (BUSPAR) 5 MG tablet Take 1 tablet (5 mg) by mouth 2 times daily     diclofenac (VOLTAREN) 1 % topical gel Apply 2 g topically 3 times daily shoulders     ELIQUIS ANTICOAGULANT 5 MG tablet Take 5 mg by mouth 2 times daily     famotidine (PEPCID) 20 MG tablet Take 2 tablets (40 mg) by mouth 2 times daily     lidocaine (XYLOCAINE) 5 % external ointment Apply topically 3 times daily     Magnesium Oxide 250 MG TABS Take 250 mg by mouth daily     metoprolol succinate ER (TOPROL XL) 25 MG 24 hr tablet Take 12.5 mg by mouth daily Hold if SBP < 105.     midodrine (PROAMATINE) 2.5 MG tablet  Take 2.5 mg by mouth daily     ondansetron (ZOFRAN) 4 MG tablet Take 1 tablet (4 mg) by mouth every 8 hours as needed for nausea     senna-docusate (SENOKOT-S/PERICOLACE) 8.6-50 MG tablet Take 2 tablets by mouth 2 times daily (Patient taking differently: Take 1 tablet by mouth daily)     traMADol (ULTRAM) 50 MG tablet Take 0.5 tablets (25 mg) by mouth 2 times daily as needed for severe pain     No current facility-administered medications for this visit.       REVIEW OF SYSTEMS:   10 point review of systems reviewed and pertinent positives in the HPI.     PHYSICAL EXAMINATION:  Physical Exam     Vital signs: /73   Pulse 95   Temp 97.8  F (36.6  C)   Resp 18   Ht 1.829 m (6')   Wt 96.8 kg (213 lb 6.4 oz)   SpO2 100%   BMI 28.94 kg/m    General: Awake, Alert, oriented x3, sitting up in wheelchair, conversant  HEENT:Pink conjunctiva, moist oral mucosa  NECK: Supple  CVS:  S1  S2, without murmur or gallop.   LUNG: Clear to auscultation, No wheezes, rales or rhonci.  BACK: No kyphosis of the thoracic spine  ABDOMEN: Soft, nontender to palpation, with positive bowel sounds  EXTREMITIES: Moves both upper and lower extremities with generalized weakness and some limitation to left lower extremity, 1+ pedal edema.   NEUROLOGIC: Intact, pulses palpable  PSYCHIATRIC: Mild cognitive impairment noted. Pleasant on exam.       Labs:  All labs reviewed in the nursing home record and [facility]   @  Lab Results   Component Value Date    WBC 7.7 02/02/2024    WBC 9.0 12/30/2017     Lab Results   Component Value Date    RBC 3.24 02/02/2024    RBC 4.16 12/30/2017     Lab Results   Component Value Date    HGB 9.5 02/02/2024    HGB 12.6 12/30/2017     Lab Results   Component Value Date    HCT 29.6 02/02/2024    HCT 37.1 12/30/2017     Lab Results   Component Value Date    MCV 91 02/02/2024    MCV 89 12/30/2017     Lab Results   Component Value Date    MCH 29.3 02/02/2024    MCH 30.3 12/30/2017     Lab Results   Component Value Date     MCHC 32.1 02/02/2024    MCHC 34.0 12/30/2017     Lab Results   Component Value Date    RDW 15.5 02/02/2024    RDW 13.8 12/30/2017     Lab Results   Component Value Date     02/02/2024     12/30/2017        @Last Comprehensive Metabolic Panel:  Sodium   Date Value Ref Range Status   03/21/2024 142 135 - 145 mmol/L Final     Comment:     Reference intervals for this test were updated on 09/26/2023 to more accurately reflect our healthy population. There may be differences in the flagging of prior results with similar values performed with this method. Interpretation of those prior results can be made in the context of the updated reference intervals.    12/30/2017 138 133 - 144 mmol/L Final     Potassium   Date Value Ref Range Status   03/21/2024 4.2 3.4 - 5.3 mmol/L Final   07/15/2022 3.7 3.4 - 5.3 mmol/L Final   12/31/2017 3.5 3.4 - 5.3 mmol/L Final     Chloride   Date Value Ref Range Status   03/21/2024 104 98 - 107 mmol/L Final   07/15/2022 106 94 - 109 mmol/L Final   12/30/2017 104 94 - 109 mmol/L Final     Carbon Dioxide   Date Value Ref Range Status   12/30/2017 26 20 - 32 mmol/L Final     Carbon Dioxide (CO2)   Date Value Ref Range Status   03/21/2024 27 22 - 29 mmol/L Final   07/15/2022 24 20 - 32 mmol/L Final     Anion Gap   Date Value Ref Range Status   03/21/2024 11 7 - 15 mmol/L Final   07/15/2022 9 3 - 14 mmol/L Final   12/30/2017 8 3 - 14 mmol/L Final     Glucose   Date Value Ref Range Status   03/21/2024 82 70 - 99 mg/dL Final   07/15/2022 97 70 - 99 mg/dL Final   12/30/2017 145 (H) 70 - 99 mg/dL Final     GLUCOSE BY METER POCT   Date Value Ref Range Status   02/02/2024 120 (H) 70 - 99 mg/dL Final     Urea Nitrogen   Date Value Ref Range Status   03/21/2024 17.6 8.0 - 23.0 mg/dL Final   07/15/2022 13 7 - 30 mg/dL Final   12/31/2017 26 7 - 30 mg/dL Final     Creatinine   Date Value Ref Range Status   03/21/2024 1.25 (H) 0.67 - 1.17 mg/dL Final   12/31/2017 1.11 0.66 - 1.25 mg/dL Final      GFR Estimate   Date Value Ref Range Status   03/21/2024 55 (L) >60 mL/min/1.73m2 Final   12/31/2017 63 >60 mL/min/1.7m2 Final     Comment:     Non  GFR Calc     Calcium   Date Value Ref Range Status   03/21/2024 9.0 8.8 - 10.2 mg/dL Final   12/30/2017 7.9 (L) 8.5 - 10.1 mg/dL Final       This note has been dictated using voice recognition software. Any grammatical or context distortions are unintentional and inherent to the software    Electronically signed by: Grace Parry, TASHI       Sincerely,        Grace Parry, NP

## 2024-03-22 ENCOUNTER — LAB REQUISITION (OUTPATIENT)
Dept: LAB | Facility: CLINIC | Age: 89
End: 2024-03-22
Payer: COMMERCIAL

## 2024-03-22 DIAGNOSIS — I50.32 CHRONIC DIASTOLIC (CONGESTIVE) HEART FAILURE (H): ICD-10-CM

## 2024-03-22 NOTE — PROGRESS NOTES
[facility] GERIATRIC SERVICES    Code Status:  DNR   Visit Type:   Chief Complaint   Patient presents with    TCU Follow Up     Facility:  [facility] ([facility]) [[id number]]         HPI: Alvin Newton is a 89 year old male who I am seeing today for follow up on the TCU.  Past medical history includes type 2 diabetes mellitus with diabetic polyneuropathy, proximal atrial fibs on Eliquis, bilateral hip replacement 33 years ago, HFpEF, type 2 diabetes mellitus, hypertension, severe aortic stenosis and mild cognitive impairment.  Patient admitted post fall with left femur fracture.  Patient had a mechanical fall in which she was trying to reach for his cane for stability but put his weight on his grabber device instead and fell forward.  He did not hit his head or lose consciousness.  Head CT was negative.  X-ray of the pelvis/left hip revealed a left proximal femoral fracture.  History of bilateral hip replacements about 33 years ago.  Patient underwent repair on 1/24/2024.  He had a complicated surgery in the setting of bilateral hip replacements.  He lost 3 L of blood.  He did require pressor support.  He was also transfused on 1/26.  Patient found to have a mildly low a.m. cortisol level most likely due to a component of adrenal insufficiency.  Steroids were done for couple days along with midodrine.  BP improved.  Steroids were discontinued on 1/30.  Pain controlled with tramadol and Tylenol.  Patient initially had a Prevena wound VAC however 1 day later during his TCU stay VAC was not working appropriately.  Ortho updated and this was discontinued and dry sterile dressing applied.  Patient also experienced postoperative delirium.  He is very anxious and tearful.  No agitation.  He did require one-on-one.  He was given BuSpar to help with anxiety.  Delirium improved.  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  Severe aortic stenosis.  Cardiac MRI in October  concerning for infiltrative cardiomyopathy.  Workup for amyloidosis in process by cardiology in [place] [facility].  Biopsies of the bone marrow and fat pad were negative for AL amyloid.  Plan at this time is to follow-up with cardiology regarding possible myocardial biopsy and continued amyloid/infiltrative cardiomyopathy workup.  History of proximal atrial fibs on Eliquis.  Patient did have episode of RVR after procedure which resolved.  Acute kidney injury on CKD stage III.  Creatinine 1.34 admission.  Creatinine did bump to 1.74 but improved with fluids.  Type 2 diabetes without long-term use of insulin.  Most recent hemoglobin A1c 6.8%.  Stress leukocytosis with a white blood cell count of 13 resolved.    Transitional Care Course: Today patient sitting up in wheelchair. Recent left hip fx. Pt denies any pain in his hip. He is moving better. Cardiac Amylosis CHF. He has been approved for a trial drug Vyndamax. He was started on this 2 days prior. Pt initially had low bp with orthostasis and poor oral intake. BP improved. CHF with recent increased LE edema and SOB. Torsemide 10 mg resumed. Edema now 1+. No SOB or CP. Today c/o constipation. 2 days prior refusing bowel meds. Senna at that time reduced to once daily. He continues on Simethicone. Appetite is improving.    Assessment/Plan:     Closed displaced subtrochanteric fracture of left femur with routine healing, subsequent encounter  Status post open reduction and internal fixation (ORIF) of fracture  Left leg pain  -History of bilateral hip replacement 33 years old.  -Weightbearing as tolerated.  -Venous doppler obtained today and was negative.   -Follow up left hip xray 2 view with proper placement. No acute process.   -Tramadol  BID prn.   -Continue tylenol 975 mg TID.     Other amyloidosis (H)  Chronic diastolic (congestive) heart failure (H)  -Workup for amyloidosis in process by cardiology in [place] [facility].  Biopsies of bone marrow and fat pad  negative for AL amyloid.  -Follow-up with cardiology regarding possible myocardial biopsy and continued amyloid/infiltrative cardiomyopathy workup.  -Cardiac MRI in October concerning for infiltrative cardiomyopathy.  -Daily weights. Weight down `15 lbs since admit.   -Resume torsemide 10 mg daily.   -Follow up with cardiology 3/4/24. Pt has been approved for trial drug treatment. Vynamax. He has received a wayne for paying for drug. Med started 2 days prior.      Stage 3a chronic kidney disease (H)  -Creatine 1.08.   -per hospital avoid aggressive fluid resuscitation due to heart failure.     Hypotension   -Improved.   -pt treated with midodrine and steroids in hospital.   -Lg blood loss from surgery. Hgb now 9.5.   -Metoprolol decreased to 12.5 mg every day with hold parameters.   -Give additional 240cc with each med pass.   -Continue midodrine 2.5 mg daily.   -TEDS for bp support.     Anemia due to blood loss, acute  -EBL of 3 L.  -Patient underwent transfusion of 1/26/2024.  -Follow-up CBC with hgb 9.5.   -Continue to monitor.     Paroxysmal atrial fibrillation with RVR (H)  -Chronically on Eliquis.  -Rate controlled with metoprolol and amiodarone.    Constipation  GI dysmotility  -Senna S 1 tabs QD.   -Miralax 17 gm every other day.   -Simethicone 80 mg QID.     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/22/2020    Essential hypertension 04/04/2016    Mixed hyperlipidemia 01/02/2009    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) 12/06/2017    Carpal tunnel syndrome, bilateral 11/18/2021    Fall, initial encounter 06/06/2022    Closed fracture of first lumbar vertebra, unspecified fracture morphology, initial encounter (H) 06/06/2022    Fracture of L1 vertebra (H) 06/06/2022    Impaired fasting glucose 06/20/2022    Osteoarthritis of pelvis 06/20/2022    Other ill-defined and unknown causes  of morbidity and mortality 01/01/1985    Primary localized osteoarthrosis of shoulder region 06/20/2022    Pure hypercholesterolemia 01/01/1999    Reason for consultation 06/20/2022    Right bundle branch block 06/20/2022    Cellulitis of right lower extremity 07/11/2023    Severe sepsis (H) 07/11/2023    Septic shock (H) 07/12/2023    Streptococcal bacteremia 07/13/2023    Thrombocytopenia (H24) 07/13/2023    Hyperbilirubinemia 07/13/2023    Hypophosphatemia 07/13/2023    Hypoalbuminemia 07/13/2023    Pyuria 07/13/2023    Paroxysmal atrial fibrillation with RVR (H) 07/13/2023    Hypomagnesemia 07/13/2023    Chronic pain of both shoulders 07/13/2023    Severe aortic stenosis 07/14/2023    Elevated brain natriuretic peptide (BNP) level 07/14/2023    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    Peripheral edema 07/14/2023    Positive urine culture 07/14/2023    Medication induced coagulopathy  (H24) 07/14/2023    Chronic diastolic (congestive) heart failure (H) 03/31/2023    Altered mental status, unspecified altered mental status type 07/23/2023    Clostridium difficile diarrhea 07/23/2023    History of Clostridium difficile infection July 2023 07/25/2023    Stage 3a chronic kidney disease (H) 07/25/2023    Esophageal dysmotility 07/27/2023    DEJUAN (acute kidney injury) (H24) 08/17/2023    Moderate malnutrition (H24) 08/17/2023    Schatzki's ring of distal esophagus-dilated 8/19/23 08/17/2023    Acute gout involving toe of left foot 08/17/2023    Unintentional weight loss 08/18/2023    Abnormal esophagram 08/18/2023    Hypokalemia 08/19/2023    Hyponatremia 08/19/2023    Open wound-coccyx/buttocks 08/23/2023    Enterocolitis due to Clostridium difficile, not specified as recurrent 07/27/2023    Other chronic pain 07/19/2023    Pain in left shoulder 07/19/2023    Pain in right shoulder 07/19/2023    Unspecified streptococcus as the cause of diseases classified elsewhere 07/19/2023    Esophageal dysphagia 08/11/2023     Hip fracture, left, closed, initial encounter (H) 01/22/2024    Acute kidney failure, unspecified (H24) 08/24/2023    Amyloidosis (H) 01/25/2024    Monoclonal gammopathy of unknown significance (MGUS) 01/25/2024    Hypotension, unspecified hypotension type 01/25/2024    Postoperative anemia due to acute blood loss 01/25/2024     Resolved Ambulatory Problems     Diagnosis Date Noted    Obesity, morbid, BMI 40.0-49.9 (H) 11/07/2017     Past Medical History:   Diagnosis Date    Colonic diverticulum     Hyperlipidemia     Hypertension     Obesity (BMI 30-39.9)     Osteoarthritis     Type 2 diabetes mellitus (H)      No Known Allergies    All Meds and Allergies reviewed in the record at the facility and is the most up-to-date.    Current Outpatient Medications   Medication Sig    polyethylene glycol (MIRALAX) 17 g packet Take 1 packet by mouth every other day    torsemide (DEMADEX) 10 MG tablet Take 10 mg by mouth daily    acetaminophen (TYLENOL) 325 MG tablet 975mg by mouth every AM and 975mg twice a day as needed    amiodarone (PACERONE) 200 MG tablet Take 1 tablet (200 mg) by mouth daily    atorvastatin (LIPITOR) 20 MG tablet Take 20 mg by mouth At Bedtime    busPIRone (BUSPAR) 5 MG tablet Take 1 tablet (5 mg) by mouth 2 times daily    diclofenac (VOLTAREN) 1 % topical gel Apply 2 g topically 3 times daily shoulders    ELIQUIS ANTICOAGULANT 5 MG tablet Take 5 mg by mouth 2 times daily    famotidine (PEPCID) 20 MG tablet Take 2 tablets (40 mg) by mouth 2 times daily    lidocaine (XYLOCAINE) 5 % external ointment Apply topically 3 times daily    Magnesium Oxide 250 MG TABS Take 250 mg by mouth daily    metoprolol succinate ER (TOPROL XL) 25 MG 24 hr tablet Take 12.5 mg by mouth daily Hold if SBP < 105.    midodrine (PROAMATINE) 2.5 MG tablet Take 2.5 mg by mouth daily    ondansetron (ZOFRAN) 4 MG tablet Take 1 tablet (4 mg) by mouth every 8 hours as needed for nausea    senna-docusate (SENOKOT-S/PERICOLACE) 8.6-50 MG  tablet Take 2 tablets by mouth 2 times daily (Patient taking differently: Take 1 tablet by mouth daily)    traMADol (ULTRAM) 50 MG tablet Take 0.5 tablets (25 mg) by mouth 2 times daily as needed for severe pain     No current facility-administered medications for this visit.       REVIEW OF SYSTEMS:   10 point review of systems reviewed and pertinent positives in the HPI.     PHYSICAL EXAMINATION:  Physical Exam     Vital signs: /73   Pulse 95   Temp 97.8  F (36.6  C)   Resp 18   Ht 1.829 m (6')   Wt 96.8 kg (213 lb 6.4 oz)   SpO2 100%   BMI 28.94 kg/m    General: Awake, Alert, oriented x3, sitting up in wheelchair, conversant  HEENT:Pink conjunctiva, moist oral mucosa  NECK: Supple  CVS:  S1  S2, without murmur or gallop.   LUNG: Clear to auscultation, No wheezes, rales or rhonci.  BACK: No kyphosis of the thoracic spine  ABDOMEN: Soft, nontender to palpation, with positive bowel sounds  EXTREMITIES: Moves both upper and lower extremities with generalized weakness and some limitation to left lower extremity, 1+ pedal edema.   NEUROLOGIC: Intact, pulses palpable  PSYCHIATRIC: Mild cognitive impairment noted. Pleasant on exam.       Labs:  All labs reviewed in the nursing home record and Epic   @  Lab Results   Component Value Date    WBC 7.7 02/02/2024    WBC 9.0 12/30/2017     Lab Results   Component Value Date    RBC 3.24 02/02/2024    RBC 4.16 12/30/2017     Lab Results   Component Value Date    HGB 9.5 02/02/2024    HGB 12.6 12/30/2017     Lab Results   Component Value Date    HCT 29.6 02/02/2024    HCT 37.1 12/30/2017     Lab Results   Component Value Date    MCV 91 02/02/2024    MCV 89 12/30/2017     Lab Results   Component Value Date    MCH 29.3 02/02/2024    MCH 30.3 12/30/2017     Lab Results   Component Value Date    MCHC 32.1 02/02/2024    MCHC 34.0 12/30/2017     Lab Results   Component Value Date    RDW 15.5 02/02/2024    RDW 13.8 12/30/2017     Lab Results   Component Value Date    PLT  336 02/02/2024     12/30/2017        @Last Comprehensive Metabolic Panel:  Sodium   Date Value Ref Range Status   03/21/2024 142 135 - 145 mmol/L Final     Comment:     Reference intervals for this test were updated on 09/26/2023 to more accurately reflect our healthy population. There may be differences in the flagging of prior results with similar values performed with this method. Interpretation of those prior results can be made in the context of the updated reference intervals.    12/30/2017 138 133 - 144 mmol/L Final     Potassium   Date Value Ref Range Status   03/21/2024 4.2 3.4 - 5.3 mmol/L Final   07/15/2022 3.7 3.4 - 5.3 mmol/L Final   12/31/2017 3.5 3.4 - 5.3 mmol/L Final     Chloride   Date Value Ref Range Status   03/21/2024 104 98 - 107 mmol/L Final   07/15/2022 106 94 - 109 mmol/L Final   12/30/2017 104 94 - 109 mmol/L Final     Carbon Dioxide   Date Value Ref Range Status   12/30/2017 26 20 - 32 mmol/L Final     Carbon Dioxide (CO2)   Date Value Ref Range Status   03/21/2024 27 22 - 29 mmol/L Final   07/15/2022 24 20 - 32 mmol/L Final     Anion Gap   Date Value Ref Range Status   03/21/2024 11 7 - 15 mmol/L Final   07/15/2022 9 3 - 14 mmol/L Final   12/30/2017 8 3 - 14 mmol/L Final     Glucose   Date Value Ref Range Status   03/21/2024 82 70 - 99 mg/dL Final   07/15/2022 97 70 - 99 mg/dL Final   12/30/2017 145 (H) 70 - 99 mg/dL Final     GLUCOSE BY METER POCT   Date Value Ref Range Status   02/02/2024 120 (H) 70 - 99 mg/dL Final     Urea Nitrogen   Date Value Ref Range Status   03/21/2024 17.6 8.0 - 23.0 mg/dL Final   07/15/2022 13 7 - 30 mg/dL Final   12/31/2017 26 7 - 30 mg/dL Final     Creatinine   Date Value Ref Range Status   03/21/2024 1.25 (H) 0.67 - 1.17 mg/dL Final   12/31/2017 1.11 0.66 - 1.25 mg/dL Final     GFR Estimate   Date Value Ref Range Status   03/21/2024 55 (L) >60 mL/min/1.73m2 Final   12/31/2017 63 >60 mL/min/1.7m2 Final     Comment:     Non  GFR Calc      Calcium   Date Value Ref Range Status   03/21/2024 9.0 8.8 - 10.2 mg/dL Final   12/30/2017 7.9 (L) 8.5 - 10.1 mg/dL Final       This note has been dictated using voice recognition software. Any grammatical or context distortions are unintentional and inherent to the software    Electronically signed by: Grace Parry, CNP

## 2024-03-25 LAB
ANION GAP SERPL CALCULATED.3IONS-SCNC: 12 MMOL/L (ref 7–15)
BUN SERPL-MCNC: 18 MG/DL (ref 8–23)
CALCIUM SERPL-MCNC: 9.4 MG/DL (ref 8.8–10.2)
CHLORIDE SERPL-SCNC: 101 MMOL/L (ref 98–107)
CREAT SERPL-MCNC: 1.36 MG/DL (ref 0.67–1.17)
DEPRECATED HCO3 PLAS-SCNC: 25 MMOL/L (ref 22–29)
EGFRCR SERPLBLD CKD-EPI 2021: 50 ML/MIN/1.73M2
GLUCOSE SERPL-MCNC: 114 MG/DL (ref 70–99)
POTASSIUM SERPL-SCNC: 4.1 MMOL/L (ref 3.4–5.3)
SODIUM SERPL-SCNC: 138 MMOL/L (ref 135–145)

## 2024-03-25 PROCEDURE — 80048 BASIC METABOLIC PNL TOTAL CA: CPT | Mod: ORL | Performed by: NURSE PRACTITIONER

## 2024-03-25 PROCEDURE — 36415 COLL VENOUS BLD VENIPUNCTURE: CPT | Mod: ORL | Performed by: NURSE PRACTITIONER

## 2024-03-25 PROCEDURE — P9604 ONE-WAY ALLOW PRORATED TRIP: HCPCS | Mod: ORL | Performed by: NURSE PRACTITIONER

## 2024-03-26 ENCOUNTER — TRANSITIONAL CARE UNIT VISIT (OUTPATIENT)
Dept: GERIATRICS | Facility: CLINIC | Age: 89
End: 2024-03-26
Payer: COMMERCIAL

## 2024-03-26 VITALS
SYSTOLIC BLOOD PRESSURE: 105 MMHG | WEIGHT: 213.2 LBS | HEIGHT: 72 IN | RESPIRATION RATE: 22 BRPM | OXYGEN SATURATION: 97 % | DIASTOLIC BLOOD PRESSURE: 58 MMHG | BODY MASS INDEX: 28.88 KG/M2 | HEART RATE: 90 BPM | TEMPERATURE: 98.2 F

## 2024-03-26 DIAGNOSIS — I50.32 CHRONIC DIASTOLIC (CONGESTIVE) HEART FAILURE (H): ICD-10-CM

## 2024-03-26 DIAGNOSIS — I48.0 PAROXYSMAL ATRIAL FIBRILLATION WITH RVR (H): ICD-10-CM

## 2024-03-26 DIAGNOSIS — S72.22XD CLOSED DISPLACED SUBTROCHANTERIC FRACTURE OF LEFT FEMUR WITH ROUTINE HEALING: Primary | ICD-10-CM

## 2024-03-26 DIAGNOSIS — R60.0 PERIPHERAL EDEMA: ICD-10-CM

## 2024-03-26 DIAGNOSIS — Z98.890 S/P ORIF (OPEN REDUCTION INTERNAL FIXATION) FRACTURE: ICD-10-CM

## 2024-03-26 DIAGNOSIS — Z87.81 S/P ORIF (OPEN REDUCTION INTERNAL FIXATION) FRACTURE: ICD-10-CM

## 2024-03-26 DIAGNOSIS — K59.01 SLOW TRANSIT CONSTIPATION: ICD-10-CM

## 2024-03-26 PROCEDURE — 99309 SBSQ NF CARE MODERATE MDM 30: CPT | Performed by: NURSE PRACTITIONER

## 2024-03-26 NOTE — LETTER
3/26/2024        RE: Alvin Newton  [address] [facility] [id number]        [facility]    Code Status:  DNR   Visit Type:   Chief Complaint   Patient presents with     TCU Follow Up     Facility:  [facility] ([facility]) [[id number]]         HPI: Alvin Newton is a 89 year old male who I am seeing today for follow up on the TCU.  Past medical history includes type 2 diabetes mellitus with diabetic polyneuropathy, proximal atrial fibs on Eliquis, bilateral hip replacement 33 years ago, HFpEF, type 2 diabetes mellitus, hypertension, severe aortic stenosis and mild cognitive impairment.  Patient admitted post fall with left femur fracture.  Patient had a mechanical fall in which she was trying to reach for his cane for stability but put his weight on his grabber device instead and fell forward.  He did not hit his head or lose consciousness.  Head CT was negative.  X-ray of the pelvis/left hip revealed a left proximal femoral fracture.  History of bilateral hip replacements about 33 years ago.  Patient underwent repair on 1/24/2024.  He had a complicated surgery in the setting of bilateral hip replacements.  He lost 3 L of blood.  He did require pressor support.  He was also transfused on 1/26.  Patient found to have a mildly low a.m. cortisol level most likely due to a component of adrenal insufficiency.  Steroids were done for couple days along with midodrine.  BP improved.  Steroids were discontinued on 1/30.  Pain controlled with tramadol and Tylenol.  Patient initially had a Prevena wound VAC however 1 day later during his TCU stay VAC was not working appropriately.  Ortho updated and this was discontinued and dry sterile dressing applied.  Patient also experienced postoperative delirium.  He is very anxious and tearful.  No agitation.  He did require one-on-one.  He was given BuSpar to help with anxiety.  Delirium improved.  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  Severe aortic stenosis.  Cardiac MRI in October concerning for infiltrative cardiomyopathy.  Workup for amyloidosis in process by cardiology in [place] [facility].  Biopsies of the bone marrow and fat pad were negative for AL amyloid.  Plan at this time is to follow-up with cardiology regarding possible myocardial biopsy and continued amyloid/infiltrative cardiomyopathy workup.  History of proximal atrial fibs on Eliquis.  Patient did have episode of RVR after procedure which resolved.  Acute kidney injury on CKD stage III.  Creatinine 1.34 admission.  Creatinine did bump to 1.74 but improved with fluids.  Type 2 diabetes without long-term use of insulin.  Most recent hemoglobin A1c 6.8%.  Stress leukocytosis with a white blood cell count of 13 resolved.    Transitional Care Course: Today patient sitting up in wheelchair. Underlying CHF. Pt with recent increased SOB and LE edema. Torsemide resumed. He had some ongoing issues with ortho stasis upon admit. This has improved with use of midodrine. Left hip fx. Pt denies any pain in his hip. He is moving better. Cardiac Amylosis CHF. He has been approved for a trial drug Vyndamax. He was started on this med and appears to be tolerating well.  Edema now 1+. Pt denies any SOB or CP. He is having regular bowel movements now.     Assessment/Plan:       Other amyloidosis (H)  Chronic diastolic (congestive) heart failure (H)  -Workup for amyloidosis in process by cardiology in [place] [facility].  Biopsies of bone marrow and fat pad negative for AL amyloid.  -Follow up with cardiology 3/4/24. Pt has been approved for trial drug treatment. Vynamax. He has received a wayne for paying for drug. Pt continues on med and tolerating well.   -Cardiac MRI in October concerning for infiltrative cardiomyopathy.  -Daily weights. Weight down 15 lbs since admit.   -torsemide 10 mg daily.     Closed displaced subtrochanteric fracture of left  femur with routine healing, subsequent encounter  Status post open reduction and internal fixation (ORIF) of fracture  Left leg pain  -History of bilateral hip replacement 33 years old.  -Weightbearing as tolerated.  -Venous doppler obtained today and was negative.   -Follow up left hip xray 2 view with proper placement. No acute process.   -Tramadol  BID prn.   -Continue tylenol 975 mg TID.     Stage 3a chronic kidney disease (H)  -Creatine 1.08.   -per hospital avoid aggressive fluid resuscitation due to heart failure.     Hypotension   -Improved.   -pt treated with midodrine and steroids in hospital.   -Lg blood loss from surgery. Hgb now 9.5.   -Metoprolol decreased to 12.5 mg every day with hold parameters.   -Give additional 240cc with each med pass.   -Continue midodrine 2.5 mg daily.   -TEDS for bp support.     Anemia due to blood loss, acute  -EBL of 3 L.  -Patient underwent transfusion of 1/26/2024.  -Follow-up CBC with hgb 9.5.   -Continue to monitor.     Paroxysmal atrial fibrillation with RVR (H)  -Chronically on Eliquis.  -Rate controlled with metoprolol and amiodarone.    Constipation  GI dysmotility  -Senna S 1 tabs QD.   -Miralax 17 gm every other day.   -Simethicone 80 mg QID.     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     Essential hypertension 04/04/2016     Mixed hyperlipidemia 01/02/2009     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) 12/06/2017     Carpal tunnel syndrome, bilateral 11/18/2021     Fall, initial encounter 06/06/2022     Closed fracture of first lumbar vertebra, unspecified fracture morphology, initial encounter (H) 06/06/2022     Fracture of L1 vertebra (H) 06/06/2022     Impaired fasting glucose 06/20/2022     Osteoarthritis of pelvis 06/20/2022     Other ill-defined and unknown causes of morbidity and mortality 01/01/1985     Primary  localized osteoarthrosis of shoulder region 06/20/2022     Pure hypercholesterolemia 01/01/1999     Reason for consultation 06/20/2022     Right bundle branch block 06/20/2022     Cellulitis of right lower extremity 07/11/2023     Severe sepsis (H) 07/11/2023     Septic shock (H) 07/12/2023     Streptococcal bacteremia 07/13/2023     Thrombocytopenia (H24) 07/13/2023     Hyperbilirubinemia 07/13/2023     Hypophosphatemia 07/13/2023     Hypoalbuminemia 07/13/2023     Pyuria 07/13/2023     Paroxysmal atrial fibrillation with RVR (H) 07/13/2023     Hypomagnesemia 07/13/2023     Chronic pain of both shoulders 07/13/2023     Severe aortic stenosis 07/14/2023     Elevated brain natriuretic peptide (BNP) level 07/14/2023     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     Peripheral edema 07/14/2023     Positive urine culture 07/14/2023     Medication induced coagulopathy  (H24) 07/14/2023     Chronic diastolic (congestive) heart failure (H) 03/31/2023     Altered mental status, unspecified altered mental status type 07/23/2023     Clostridium difficile diarrhea 07/23/2023     History of Clostridium difficile infection July 2023 07/25/2023     Stage 3a chronic kidney disease (H) 07/25/2023     Esophageal dysmotility 07/27/2023     DEJUAN (acute kidney injury) (H24) 08/17/2023     Moderate malnutrition (H24) 08/17/2023     Schatzki's ring of distal esophagus-dilated 8/19/23 08/17/2023     Acute gout involving toe of left foot 08/17/2023     Unintentional weight loss 08/18/2023     Abnormal esophagram 08/18/2023     Hypokalemia 08/19/2023     Hyponatremia 08/19/2023     Open wound-coccyx/buttocks 08/23/2023     Enterocolitis due to Clostridium difficile, not specified as recurrent 07/27/2023     Other chronic pain 07/19/2023     Pain in left shoulder 07/19/2023     Pain in right shoulder 07/19/2023     Unspecified streptococcus as the cause of diseases classified elsewhere 07/19/2023     Esophageal dysphagia 08/11/2023     Hip  fracture, left, closed, initial encounter (H) 01/22/2024     Acute kidney failure, unspecified (H24) 08/24/2023     Amyloidosis (H) 01/25/2024     Monoclonal gammopathy of unknown significance (MGUS) 01/25/2024     Hypotension, unspecified hypotension type 01/25/2024     Postoperative anemia due to acute blood loss 01/25/2024     Resolved Ambulatory Problems     Diagnosis Date Noted     Obesity, morbid, BMI 40.0-49.9 (H) 11/07/2017     Past Medical History:   Diagnosis Date     Colonic diverticulum      Hyperlipidemia      Hypertension      Obesity (BMI 30-39.9)      Osteoarthritis      Type 2 diabetes mellitus (H)      No Known Allergies    All Meds and Allergies reviewed in the record at the facility and is the most up-to-date.    Current Outpatient Medications   Medication Sig     acetaminophen (TYLENOL) 325 MG tablet 975mg by mouth every AM and 975mg twice a day as needed     amiodarone (PACERONE) 200 MG tablet Take 1 tablet (200 mg) by mouth daily     atorvastatin (LIPITOR) 20 MG tablet Take 20 mg by mouth At Bedtime     busPIRone (BUSPAR) 5 MG tablet Take 1 tablet (5 mg) by mouth 2 times daily     diclofenac (VOLTAREN) 1 % topical gel Apply 2 g topically 3 times daily shoulders     ELIQUIS ANTICOAGULANT 5 MG tablet Take 5 mg by mouth 2 times daily     famotidine (PEPCID) 20 MG tablet Take 2 tablets (40 mg) by mouth 2 times daily     lidocaine (XYLOCAINE) 5 % external ointment Apply topically 3 times daily     Magnesium Oxide 250 MG TABS Take 250 mg by mouth daily     metoprolol succinate ER (TOPROL XL) 25 MG 24 hr tablet Take 12.5 mg by mouth daily Hold if SBP < 105.     midodrine (PROAMATINE) 2.5 MG tablet Take 2.5 mg by mouth daily     ondansetron (ZOFRAN) 4 MG tablet Take 1 tablet (4 mg) by mouth every 8 hours as needed for nausea     polyethylene glycol (MIRALAX) 17 g packet Take 1 packet by mouth every other day     senna-docusate (SENOKOT-S/PERICOLACE) 8.6-50 MG tablet Take 2 tablets by mouth 2 times  daily (Patient taking differently: Take 1 tablet by mouth daily)     tafamidis (VYNDAMAX) 61 MG CAPS capsule Take 61 mg by mouth daily     torsemide (DEMADEX) 10 MG tablet Take 10 mg by mouth daily     traMADol (ULTRAM) 50 MG tablet Take 0.5 tablets (25 mg) by mouth 2 times daily as needed for severe pain     No current facility-administered medications for this visit.       REVIEW OF SYSTEMS:   10 point review of systems reviewed and pertinent positives in the HPI.     PHYSICAL EXAMINATION:  Physical Exam     Vital signs: /58   Pulse 90   Temp 98.2  F (36.8  C)   Resp 22   Ht 1.829 m (6')   Wt 96.7 kg (213 lb 3.2 oz)   SpO2 97%   BMI 28.92 kg/m    General: Awake, Alert, oriented x3, sitting up in wheelchair, conversant  HEENT:Pink conjunctiva, moist oral mucosa  NECK: Supple  CVS:  S1  S2, without murmur or gallop.   LUNG: Clear to auscultation, No wheezes, rales or rhonci.  BACK: No kyphosis of the thoracic spine  ABDOMEN: Soft, nontender to palpation, with positive bowel sounds  EXTREMITIES: Moves both upper and lower extremities.  1+ pedal edema.   NEUROLOGIC: Intact, pulses palpable  PSYCHIATRIC: Mild cognitive impairment noted. Pleasant on exam.       Labs:  All labs reviewed in the nursing home record and Epic   @  Lab Results   Component Value Date    WBC 7.7 02/02/2024    WBC 9.0 12/30/2017     Lab Results   Component Value Date    RBC 3.24 02/02/2024    RBC 4.16 12/30/2017     Lab Results   Component Value Date    HGB 9.5 02/02/2024    HGB 12.6 12/30/2017     Lab Results   Component Value Date    HCT 29.6 02/02/2024    HCT 37.1 12/30/2017     Lab Results   Component Value Date    MCV 91 02/02/2024    MCV 89 12/30/2017     Lab Results   Component Value Date    MCH 29.3 02/02/2024    MCH 30.3 12/30/2017     Lab Results   Component Value Date    MCHC 32.1 02/02/2024    MCHC 34.0 12/30/2017     Lab Results   Component Value Date    RDW 15.5 02/02/2024    RDW 13.8 12/30/2017     Lab Results    Component Value Date     02/02/2024     12/30/2017        @Last Comprehensive Metabolic Panel:  Sodium   Date Value Ref Range Status   03/25/2024 138 135 - 145 mmol/L Final     Comment:     Reference intervals for this test were updated on 09/26/2023 to more accurately reflect our healthy population. There may be differences in the flagging of prior results with similar values performed with this method. Interpretation of those prior results can be made in the context of the updated reference intervals.    12/30/2017 138 133 - 144 mmol/L Final     Potassium   Date Value Ref Range Status   03/25/2024 4.1 3.4 - 5.3 mmol/L Final   07/15/2022 3.7 3.4 - 5.3 mmol/L Final   12/31/2017 3.5 3.4 - 5.3 mmol/L Final     Chloride   Date Value Ref Range Status   03/25/2024 101 98 - 107 mmol/L Final   07/15/2022 106 94 - 109 mmol/L Final   12/30/2017 104 94 - 109 mmol/L Final     Carbon Dioxide   Date Value Ref Range Status   12/30/2017 26 20 - 32 mmol/L Final     Carbon Dioxide (CO2)   Date Value Ref Range Status   03/25/2024 25 22 - 29 mmol/L Final   07/15/2022 24 20 - 32 mmol/L Final     Anion Gap   Date Value Ref Range Status   03/25/2024 12 7 - 15 mmol/L Final   07/15/2022 9 3 - 14 mmol/L Final   12/30/2017 8 3 - 14 mmol/L Final     Glucose   Date Value Ref Range Status   03/25/2024 114 (H) 70 - 99 mg/dL Final   07/15/2022 97 70 - 99 mg/dL Final   12/30/2017 145 (H) 70 - 99 mg/dL Final     GLUCOSE BY METER POCT   Date Value Ref Range Status   02/02/2024 120 (H) 70 - 99 mg/dL Final     Urea Nitrogen   Date Value Ref Range Status   03/25/2024 18.0 8.0 - 23.0 mg/dL Final   07/15/2022 13 7 - 30 mg/dL Final   12/31/2017 26 7 - 30 mg/dL Final     Creatinine   Date Value Ref Range Status   03/25/2024 1.36 (H) 0.67 - 1.17 mg/dL Final   12/31/2017 1.11 0.66 - 1.25 mg/dL Final     GFR Estimate   Date Value Ref Range Status   03/25/2024 50 (L) >60 mL/min/1.73m2 Final   12/31/2017 63 >60 mL/min/1.7m2 Final     Comment:      Non  GFR Calc     Calcium   Date Value Ref Range Status   03/25/2024 9.4 8.8 - 10.2 mg/dL Final   12/30/2017 7.9 (L) 8.5 - 10.1 mg/dL Final       This note has been dictated using voice recognition software. Any grammatical or context distortions are unintentional and inherent to the software    Electronically signed by: Grace Parry CNP       Sincerely,        Grace Parry, NP

## 2024-03-27 NOTE — PROGRESS NOTES
[facility] GERIATRIC SERVICES    Code Status:  DNR   Visit Type:   Chief Complaint   Patient presents with    TCU Follow Up     Facility:  [facility] ([facility]) [[id number]]         HPI: Alvin Newton is a 89 year old male who I am seeing today for follow up on the TCU.  Past medical history includes type 2 diabetes mellitus with diabetic polyneuropathy, proximal atrial fibs on Eliquis, bilateral hip replacement 33 years ago, HFpEF, type 2 diabetes mellitus, hypertension, severe aortic stenosis and mild cognitive impairment.  Patient admitted post fall with left femur fracture.  Patient had a mechanical fall in which she was trying to reach for his cane for stability but put his weight on his grabber device instead and fell forward.  He did not hit his head or lose consciousness.  Head CT was negative.  X-ray of the pelvis/left hip revealed a left proximal femoral fracture.  History of bilateral hip replacements about 33 years ago.  Patient underwent repair on 1/24/2024.  He had a complicated surgery in the setting of bilateral hip replacements.  He lost 3 L of blood.  He did require pressor support.  He was also transfused on 1/26.  Patient found to have a mildly low a.m. cortisol level most likely due to a component of adrenal insufficiency.  Steroids were done for couple days along with midodrine.  BP improved.  Steroids were discontinued on 1/30.  Pain controlled with tramadol and Tylenol.  Patient initially had a Prevena wound VAC however 1 day later during his TCU stay VAC was not working appropriately.  Ortho updated and this was discontinued and dry sterile dressing applied.  Patient also experienced postoperative delirium.  He is very anxious and tearful.  No agitation.  He did require one-on-one.  He was given BuSpar to help with anxiety.  Delirium improved.  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  Severe aortic stenosis.  Cardiac MRI in October  concerning for infiltrative cardiomyopathy.  Workup for amyloidosis in process by cardiology in [place] [facility].  Biopsies of the bone marrow and fat pad were negative for AL amyloid.  Plan at this time is to follow-up with cardiology regarding possible myocardial biopsy and continued amyloid/infiltrative cardiomyopathy workup.  History of proximal atrial fibs on Eliquis.  Patient did have episode of RVR after procedure which resolved.  Acute kidney injury on CKD stage III.  Creatinine 1.34 admission.  Creatinine did bump to 1.74 but improved with fluids.  Type 2 diabetes without long-term use of insulin.  Most recent hemoglobin A1c 6.8%.  Stress leukocytosis with a white blood cell count of 13 resolved.    Transitional Care Course: Today patient sitting up in wheelchair. Underlying CHF. Pt with recent increased SOB and LE edema. Torsemide resumed. He had some ongoing issues with ortho stasis upon admit. This has improved with use of midodrine. Left hip fx. Pt denies any pain in his hip. He is moving better. Cardiac Amylosis CHF. He has been approved for a trial drug Vyndamax. He was started on this med and appears to be tolerating well.  Edema now 1+. Pt denies any SOB or CP. He is having regular bowel movements now.     Assessment/Plan:       Other amyloidosis (H)  Chronic diastolic (congestive) heart failure (H)  -Workup for amyloidosis in process by cardiology in [place] [facility].  Biopsies of bone marrow and fat pad negative for AL amyloid.  -Follow up with cardiology 3/4/24. Pt has been approved for trial drug treatment. Vynamax. He has received a wayne for paying for drug. Pt continues on med and tolerating well.   -Cardiac MRI in October concerning for infiltrative cardiomyopathy.  -Daily weights. Weight down 15 lbs since admit.   -torsemide 10 mg daily.     Closed displaced subtrochanteric fracture of left femur with routine healing, subsequent encounter  Status post open reduction and internal  fixation (ORIF) of fracture  Left leg pain  -History of bilateral hip replacement 33 years old.  -Weightbearing as tolerated.  -Venous doppler obtained today and was negative.   -Follow up left hip xray 2 view with proper placement. No acute process.   -Tramadol  BID prn.   -Continue tylenol 975 mg TID.     Stage 3a chronic kidney disease (H)  -Creatine 1.08.   -per hospital avoid aggressive fluid resuscitation due to heart failure.     Hypotension   -Improved.   -pt treated with midodrine and steroids in hospital.   -Lg blood loss from surgery. Hgb now 9.5.   -Metoprolol decreased to 12.5 mg every day with hold parameters.   -Give additional 240cc with each med pass.   -Continue midodrine 2.5 mg daily.   -TEDS for bp support.     Anemia due to blood loss, acute  -EBL of 3 L.  -Patient underwent transfusion of 1/26/2024.  -Follow-up CBC with hgb 9.5.   -Continue to monitor.     Paroxysmal atrial fibrillation with RVR (H)  -Chronically on Eliquis.  -Rate controlled with metoprolol and amiodarone.    Constipation  GI dysmotility  -Senna S 1 tabs QD.   -Miralax 17 gm every other day.   -Simethicone 80 mg QID.     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/22/2020    Essential hypertension 04/04/2016    Mixed hyperlipidemia 01/02/2009    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) 12/06/2017    Carpal tunnel syndrome, bilateral 11/18/2021    Fall, initial encounter 06/06/2022    Closed fracture of first lumbar vertebra, unspecified fracture morphology, initial encounter (H) 06/06/2022    Fracture of L1 vertebra (H) 06/06/2022    Impaired fasting glucose 06/20/2022    Osteoarthritis of pelvis 06/20/2022    Other ill-defined and unknown causes of morbidity and mortality 01/01/1985    Primary localized osteoarthrosis of shoulder region 06/20/2022    Pure hypercholesterolemia 01/01/1999    Reason for  consultation 06/20/2022    Right bundle branch block 06/20/2022    Cellulitis of right lower extremity 07/11/2023    Severe sepsis (H) 07/11/2023    Septic shock (H) 07/12/2023    Streptococcal bacteremia 07/13/2023    Thrombocytopenia (H24) 07/13/2023    Hyperbilirubinemia 07/13/2023    Hypophosphatemia 07/13/2023    Hypoalbuminemia 07/13/2023    Pyuria 07/13/2023    Paroxysmal atrial fibrillation with RVR (H) 07/13/2023    Hypomagnesemia 07/13/2023    Chronic pain of both shoulders 07/13/2023    Severe aortic stenosis 07/14/2023    Elevated brain natriuretic peptide (BNP) level 07/14/2023    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    Peripheral edema 07/14/2023    Positive urine culture 07/14/2023    Medication induced coagulopathy  (H24) 07/14/2023    Chronic diastolic (congestive) heart failure (H) 03/31/2023    Altered mental status, unspecified altered mental status type 07/23/2023    Clostridium difficile diarrhea 07/23/2023    History of Clostridium difficile infection July 2023 07/25/2023    Stage 3a chronic kidney disease (H) 07/25/2023    Esophageal dysmotility 07/27/2023    DEJUAN (acute kidney injury) (H24) 08/17/2023    Moderate malnutrition (H24) 08/17/2023    Schatzki's ring of distal esophagus-dilated 8/19/23 08/17/2023    Acute gout involving toe of left foot 08/17/2023    Unintentional weight loss 08/18/2023    Abnormal esophagram 08/18/2023    Hypokalemia 08/19/2023    Hyponatremia 08/19/2023    Open wound-coccyx/buttocks 08/23/2023    Enterocolitis due to Clostridium difficile, not specified as recurrent 07/27/2023    Other chronic pain 07/19/2023    Pain in left shoulder 07/19/2023    Pain in right shoulder 07/19/2023    Unspecified streptococcus as the cause of diseases classified elsewhere 07/19/2023    Esophageal dysphagia 08/11/2023    Hip fracture, left, closed, initial encounter (H) 01/22/2024    Acute kidney failure, unspecified (H24) 08/24/2023    Amyloidosis (H) 01/25/2024     Monoclonal gammopathy of unknown significance (MGUS) 01/25/2024    Hypotension, unspecified hypotension type 01/25/2024    Postoperative anemia due to acute blood loss 01/25/2024     Resolved Ambulatory Problems     Diagnosis Date Noted    Obesity, morbid, BMI 40.0-49.9 (H) 11/07/2017     Past Medical History:   Diagnosis Date    Colonic diverticulum     Hyperlipidemia     Hypertension     Obesity (BMI 30-39.9)     Osteoarthritis     Type 2 diabetes mellitus (H)      No Known Allergies    All Meds and Allergies reviewed in the record at the facility and is the most up-to-date.    Current Outpatient Medications   Medication Sig    acetaminophen (TYLENOL) 325 MG tablet 975mg by mouth every AM and 975mg twice a day as needed    amiodarone (PACERONE) 200 MG tablet Take 1 tablet (200 mg) by mouth daily    atorvastatin (LIPITOR) 20 MG tablet Take 20 mg by mouth At Bedtime    busPIRone (BUSPAR) 5 MG tablet Take 1 tablet (5 mg) by mouth 2 times daily    diclofenac (VOLTAREN) 1 % topical gel Apply 2 g topically 3 times daily shoulders    ELIQUIS ANTICOAGULANT 5 MG tablet Take 5 mg by mouth 2 times daily    famotidine (PEPCID) 20 MG tablet Take 2 tablets (40 mg) by mouth 2 times daily    lidocaine (XYLOCAINE) 5 % external ointment Apply topically 3 times daily    Magnesium Oxide 250 MG TABS Take 250 mg by mouth daily    metoprolol succinate ER (TOPROL XL) 25 MG 24 hr tablet Take 12.5 mg by mouth daily Hold if SBP < 105.    midodrine (PROAMATINE) 2.5 MG tablet Take 2.5 mg by mouth daily    ondansetron (ZOFRAN) 4 MG tablet Take 1 tablet (4 mg) by mouth every 8 hours as needed for nausea    polyethylene glycol (MIRALAX) 17 g packet Take 1 packet by mouth every other day    senna-docusate (SENOKOT-S/PERICOLACE) 8.6-50 MG tablet Take 2 tablets by mouth 2 times daily (Patient taking differently: Take 1 tablet by mouth daily)    tafamidis (VYNDAMAX) 61 MG CAPS capsule Take 61 mg by mouth daily    torsemide (DEMADEX) 10 MG tablet  Take 10 mg by mouth daily    traMADol (ULTRAM) 50 MG tablet Take 0.5 tablets (25 mg) by mouth 2 times daily as needed for severe pain     No current facility-administered medications for this visit.       REVIEW OF SYSTEMS:   10 point review of systems reviewed and pertinent positives in the HPI.     PHYSICAL EXAMINATION:  Physical Exam     Vital signs: /58   Pulse 90   Temp 98.2  F (36.8  C)   Resp 22   Ht 1.829 m (6')   Wt 96.7 kg (213 lb 3.2 oz)   SpO2 97%   BMI 28.92 kg/m    General: Awake, Alert, oriented x3, sitting up in wheelchair, conversant  HEENT:Pink conjunctiva, moist oral mucosa  NECK: Supple  CVS:  S1  S2, without murmur or gallop.   LUNG: Clear to auscultation, No wheezes, rales or rhonci.  BACK: No kyphosis of the thoracic spine  ABDOMEN: Soft, nontender to palpation, with positive bowel sounds  EXTREMITIES: Moves both upper and lower extremities.  1+ pedal edema.   NEUROLOGIC: Intact, pulses palpable  PSYCHIATRIC: Mild cognitive impairment noted. Pleasant on exam.       Labs:  All labs reviewed in the nursing home record and Epic   @  Lab Results   Component Value Date    WBC 7.7 02/02/2024    WBC 9.0 12/30/2017     Lab Results   Component Value Date    RBC 3.24 02/02/2024    RBC 4.16 12/30/2017     Lab Results   Component Value Date    HGB 9.5 02/02/2024    HGB 12.6 12/30/2017     Lab Results   Component Value Date    HCT 29.6 02/02/2024    HCT 37.1 12/30/2017     Lab Results   Component Value Date    MCV 91 02/02/2024    MCV 89 12/30/2017     Lab Results   Component Value Date    MCH 29.3 02/02/2024    MCH 30.3 12/30/2017     Lab Results   Component Value Date    MCHC 32.1 02/02/2024    MCHC 34.0 12/30/2017     Lab Results   Component Value Date    RDW 15.5 02/02/2024    RDW 13.8 12/30/2017     Lab Results   Component Value Date     02/02/2024     12/30/2017        @Last Comprehensive Metabolic Panel:  Sodium   Date Value Ref Range Status   03/25/2024 138 135 - 145  mmol/L Final     Comment:     Reference intervals for this test were updated on 09/26/2023 to more accurately reflect our healthy population. There may be differences in the flagging of prior results with similar values performed with this method. Interpretation of those prior results can be made in the context of the updated reference intervals.    12/30/2017 138 133 - 144 mmol/L Final     Potassium   Date Value Ref Range Status   03/25/2024 4.1 3.4 - 5.3 mmol/L Final   07/15/2022 3.7 3.4 - 5.3 mmol/L Final   12/31/2017 3.5 3.4 - 5.3 mmol/L Final     Chloride   Date Value Ref Range Status   03/25/2024 101 98 - 107 mmol/L Final   07/15/2022 106 94 - 109 mmol/L Final   12/30/2017 104 94 - 109 mmol/L Final     Carbon Dioxide   Date Value Ref Range Status   12/30/2017 26 20 - 32 mmol/L Final     Carbon Dioxide (CO2)   Date Value Ref Range Status   03/25/2024 25 22 - 29 mmol/L Final   07/15/2022 24 20 - 32 mmol/L Final     Anion Gap   Date Value Ref Range Status   03/25/2024 12 7 - 15 mmol/L Final   07/15/2022 9 3 - 14 mmol/L Final   12/30/2017 8 3 - 14 mmol/L Final     Glucose   Date Value Ref Range Status   03/25/2024 114 (H) 70 - 99 mg/dL Final   07/15/2022 97 70 - 99 mg/dL Final   12/30/2017 145 (H) 70 - 99 mg/dL Final     GLUCOSE BY METER POCT   Date Value Ref Range Status   02/02/2024 120 (H) 70 - 99 mg/dL Final     Urea Nitrogen   Date Value Ref Range Status   03/25/2024 18.0 8.0 - 23.0 mg/dL Final   07/15/2022 13 7 - 30 mg/dL Final   12/31/2017 26 7 - 30 mg/dL Final     Creatinine   Date Value Ref Range Status   03/25/2024 1.36 (H) 0.67 - 1.17 mg/dL Final   12/31/2017 1.11 0.66 - 1.25 mg/dL Final     GFR Estimate   Date Value Ref Range Status   03/25/2024 50 (L) >60 mL/min/1.73m2 Final   12/31/2017 63 >60 mL/min/1.7m2 Final     Comment:     Non  GFR Calc     Calcium   Date Value Ref Range Status   03/25/2024 9.4 8.8 - 10.2 mg/dL Final   12/30/2017 7.9 (L) 8.5 - 10.1 mg/dL Final       This note  has been dictated using voice recognition software. Any grammatical or context distortions are unintentional and inherent to the software    Electronically signed by: Grace Parry CNP

## 2024-03-28 ENCOUNTER — DISCHARGE SUMMARY NURSING HOME (OUTPATIENT)
Dept: GERIATRICS | Facility: CLINIC | Age: 89
End: 2024-03-28
Payer: COMMERCIAL

## 2024-03-28 VITALS
HEART RATE: 87 BPM | SYSTOLIC BLOOD PRESSURE: 144 MMHG | DIASTOLIC BLOOD PRESSURE: 83 MMHG | OXYGEN SATURATION: 96 % | RESPIRATION RATE: 15 BRPM | HEIGHT: 72 IN | WEIGHT: 213.2 LBS | TEMPERATURE: 98.2 F | BODY MASS INDEX: 28.88 KG/M2

## 2024-03-28 DIAGNOSIS — R60.0 PERIPHERAL EDEMA: ICD-10-CM

## 2024-03-28 DIAGNOSIS — E85.4 DILATED CARDIOMYOPATHY SECONDARY TO AMYLOIDOSIS (H): ICD-10-CM

## 2024-03-28 DIAGNOSIS — I48.0 PAROXYSMAL ATRIAL FIBRILLATION WITH RVR (H): ICD-10-CM

## 2024-03-28 DIAGNOSIS — S72.22XD CLOSED DISPLACED SUBTROCHANTERIC FRACTURE OF LEFT FEMUR WITH ROUTINE HEALING: Primary | ICD-10-CM

## 2024-03-28 DIAGNOSIS — Z87.81 S/P ORIF (OPEN REDUCTION INTERNAL FIXATION) FRACTURE: ICD-10-CM

## 2024-03-28 DIAGNOSIS — E11.42 TYPE 2 DIABETES MELLITUS WITH DIABETIC POLYNEUROPATHY, WITHOUT LONG-TERM CURRENT USE OF INSULIN (H): ICD-10-CM

## 2024-03-28 DIAGNOSIS — K59.01 SLOW TRANSIT CONSTIPATION: ICD-10-CM

## 2024-03-28 DIAGNOSIS — Z98.890 S/P ORIF (OPEN REDUCTION INTERNAL FIXATION) FRACTURE: ICD-10-CM

## 2024-03-28 DIAGNOSIS — I50.32 CHRONIC DIASTOLIC (CONGESTIVE) HEART FAILURE (H): ICD-10-CM

## 2024-03-28 DIAGNOSIS — I43 DILATED CARDIOMYOPATHY SECONDARY TO AMYLOIDOSIS (H): ICD-10-CM

## 2024-03-28 PROCEDURE — 99316 NF DSCHRG MGMT 30 MIN+: CPT | Performed by: NURSE PRACTITIONER

## 2024-03-28 NOTE — LETTER
3/28/2024        RE: Alvin Newton  [address] [facility] [id number]         HEALTH GERIATRIC SERVICES    Code Status:  DNR   Visit Type:   Chief Complaint   Patient presents with     TCU Discharge     Facility:  [facility] ([facility]) [[id number]]         HPI: Alvin Newton is a 89 year old male who I am seeing today for discharge from the TCU.  Past medical history includes type 2 diabetes mellitus with diabetic polyneuropathy, proximal atrial fibs on Eliquis, bilateral hip replacement 33 years ago, HFpEF, type 2 diabetes mellitus, hypertension, severe aortic stenosis and mild cognitive impairment.  Patient admitted post fall with left femur fracture.  Patient had a mechanical fall in which she was trying to reach for his cane for stability but put his weight on his grabber device instead and fell forward.  He did not hit his head or lose consciousness.  Head CT was negative.  X-ray of the pelvis/left hip revealed a left proximal femoral fracture.  History of bilateral hip replacements about 33 years ago.  Patient underwent repair on 1/24/2024.  He had a complicated surgery in the setting of bilateral hip replacements.  He lost 3 L of blood.  He did require pressor support.  He was also transfused on 1/26.  Patient found to have a mildly low a.m. cortisol level most likely due to a component of adrenal insufficiency.  Steroids were done for couple days along with midodrine.  BP improved.  Steroids were discontinued on 1/30.  Pain controlled with tramadol and Tylenol.  Patient initially had a Prevena wound VAC however 1 day later during his TCU stay VAC was not working appropriately.  Ortho updated and this was discontinued and dry sterile dressing applied.  Patient also experienced postoperative delirium.  He is very anxious and tearful.  No agitation.  He did require one-on-one.  He was given BuSpar to help with anxiety.  Delirium improved.  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  Severe aortic stenosis.  Cardiac MRI in October concerning for infiltrative cardiomyopathy.  Workup for amyloidosis in process by cardiology in [place] [facility].  Biopsies of the bone marrow and fat pad were negative for AL amyloid.  Plan at this time is to follow-up with cardiology regarding possible myocardial biopsy and continued amyloid/infiltrative cardiomyopathy workup.  History of proximal atrial fibs on Eliquis.  Patient did have episode of RVR after procedure which resolved.  Acute kidney injury on CKD stage III.  Creatinine 1.34 admission.  Creatinine did bump to 1.74 but improved with fluids.  Type 2 diabetes without long-term use of insulin.  Most recent hemoglobin A1c 6.8%.  Stress leukocytosis with a white blood cell count of 13 resolved.    Transitional Care Course: Today patient sitting up in wheelchair. His 2 daughters and wife are present on exam. Pt with recent hip fx, s/p ORIF. He denies any pain in his hip. Underlying CHF with suspension for amyloidosis. He continues on trial drug Vyndamax. Initially on admit to TCU pt had hypotension due to poor oral intake and meds. Pt with severe orthostatic hypotension. This has stabilized with use of ARTUR hose and low dose midodrine. His metoprolol has been added back and low dose torsemide. If bp remains stable could consider stopping midodrine out pt.  Pt denies any SOB or CP. He is having regular bowel movements now.     Assessment/Plan:       Other amyloidosis (H)  Chronic diastolic (congestive) heart failure (H)  -Workup for amyloidosis in process by cardiology in [place] [facility].  Biopsies of bone marrow and fat pad negative for AL amyloid.  -Follow up with cardiology 3/4/24. Pt has been approved for trial drug treatment. Vynamax. He has received a wayne for paying for drug. Pt continues on med and tolerating well.   -Cardiac MRI in October concerning for infiltrative cardiomyopathy.  -Daily  weights. Weight down 10 lbs since admit.   -torsemide 10 mg daily.   -follow up with cardiology out pt.     Closed displaced subtrochanteric fracture of left femur with routine healing, subsequent encounter  Status post open reduction and internal fixation (ORIF) of fracture  Left leg pain  -History of bilateral hip replacement 33 years old.  -Weightbearing as tolerated.  -Venous doppler obtained today and was negative.   -Follow up left hip xray 2 view with proper placement. No acute process.   -Continue tylenol 975 mg TID.     Stage 3a chronic kidney disease (H)  -Creatine 1.08.   -per hospital avoid aggressive fluid resuscitation due to heart failure.     Hypotension   -Improved.   -pt treated with midodrine and steroids in hospital.   -Lg blood loss from surgery. Hgb now 9.5.   -Metoprolol decreased to 12.5 mg every day with hold parameters.   -Give additional 240cc with each med pass.   -Continue midodrine 2.5 mg daily. (If bp remains stable consider discontinuing out pt).   -TEDS for bp support.   -I have asked family to check bp and record for follow up.     Anemia due to blood loss, acute  -EBL of 3 L.  -Patient underwent transfusion of 1/26/2024.  -Follow-up CBC with hgb 9.5.     Paroxysmal atrial fibrillation with RVR (H)  -Chronically on Eliquis.  -Rate controlled with metoprolol and amiodarone.    Constipation  GI dysmotility  -Senna S 1 tabs QD.   -Miralax 17 gm every other day.   -Simethicone 80 mg QID.     -ok to discharge home with current meds and treatments.   -Home PT, OT, HHA and RN for medication management.   -Follow up with PCP in 1-2 weeks.     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     Essential hypertension 04/04/2016     Mixed hyperlipidemia 01/02/2009     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) 12/06/2017     Carpal tunnel syndrome, bilateral  11/18/2021     Fall, initial encounter 06/06/2022     Closed fracture of first lumbar vertebra, unspecified fracture morphology, initial encounter (H) 06/06/2022     Fracture of L1 vertebra (H) 06/06/2022     Impaired fasting glucose 06/20/2022     Osteoarthritis of pelvis 06/20/2022     Other ill-defined and unknown causes of morbidity and mortality 01/01/1985     Primary localized osteoarthrosis of shoulder region 06/20/2022     Pure hypercholesterolemia 01/01/1999     Reason for consultation 06/20/2022     Right bundle branch block 06/20/2022     Cellulitis of right lower extremity 07/11/2023     Severe sepsis (H) 07/11/2023     Septic shock (H) 07/12/2023     Streptococcal bacteremia 07/13/2023     Thrombocytopenia (H24) 07/13/2023     Hyperbilirubinemia 07/13/2023     Hypophosphatemia 07/13/2023     Hypoalbuminemia 07/13/2023     Pyuria 07/13/2023     Paroxysmal atrial fibrillation with RVR (H) 07/13/2023     Hypomagnesemia 07/13/2023     Chronic pain of both shoulders 07/13/2023     Severe aortic stenosis 07/14/2023     Elevated brain natriuretic peptide (BNP) level 07/14/2023     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     Peripheral edema 07/14/2023     Positive urine culture 07/14/2023     Medication induced coagulopathy  (H24) 07/14/2023     Chronic diastolic (congestive) heart failure (H) 03/31/2023     Altered mental status, unspecified altered mental status type 07/23/2023     Clostridium difficile diarrhea 07/23/2023     History of Clostridium difficile infection July 2023 07/25/2023     Stage 3a chronic kidney disease (H) 07/25/2023     Esophageal dysmotility 07/27/2023     DEJUAN (acute kidney injury) (H24) 08/17/2023     Moderate malnutrition (H24) 08/17/2023     Schatzki's ring of distal esophagus-dilated 8/19/23 08/17/2023     Acute gout involving toe of left foot 08/17/2023     Unintentional weight loss 08/18/2023     Abnormal esophagram 08/18/2023     Hypokalemia 08/19/2023     Hyponatremia  08/19/2023     Open wound-coccyx/buttocks 08/23/2023     Enterocolitis due to Clostridium difficile, not specified as recurrent 07/27/2023     Other chronic pain 07/19/2023     Pain in left shoulder 07/19/2023     Pain in right shoulder 07/19/2023     Unspecified streptococcus as the cause of diseases classified elsewhere 07/19/2023     Esophageal dysphagia 08/11/2023     Hip fracture, left, closed, initial encounter (H) 01/22/2024     Acute kidney failure, unspecified (H24) 08/24/2023     Amyloidosis (H) 01/25/2024     Monoclonal gammopathy of unknown significance (MGUS) 01/25/2024     Hypotension, unspecified hypotension type 01/25/2024     Postoperative anemia due to acute blood loss 01/25/2024     Resolved Ambulatory Problems     Diagnosis Date Noted     Obesity, morbid, BMI 40.0-49.9 (H) 11/07/2017     Past Medical History:   Diagnosis Date     Colonic diverticulum      Hyperlipidemia      Hypertension      Obesity (BMI 30-39.9)      Osteoarthritis      Type 2 diabetes mellitus (H)      No Known Allergies    All Meds and Allergies reviewed in the record at the facility and is the most up-to-date.    Current Outpatient Medications   Medication Sig     acetaminophen (TYLENOL) 325 MG tablet 975mg by mouth every AM and 975mg twice a day as needed     amiodarone (PACERONE) 200 MG tablet Take 1 tablet (200 mg) by mouth daily     atorvastatin (LIPITOR) 20 MG tablet Take 20 mg by mouth At Bedtime     busPIRone (BUSPAR) 5 MG tablet Take 1 tablet (5 mg) by mouth 2 times daily     diclofenac (VOLTAREN) 1 % topical gel Apply 2 g topically 3 times daily shoulders     ELIQUIS ANTICOAGULANT 5 MG tablet Take 5 mg by mouth 2 times daily     famotidine (PEPCID) 20 MG tablet Take 2 tablets (40 mg) by mouth 2 times daily     lidocaine (XYLOCAINE) 5 % external ointment Apply topically 3 times daily     Magnesium Oxide 250 MG TABS Take 250 mg by mouth daily     metoprolol succinate ER (TOPROL XL) 25 MG 24 hr tablet Take 12.5 mg by  mouth daily Hold if SBP < 105.     midodrine (PROAMATINE) 2.5 MG tablet Take 2.5 mg by mouth daily     ondansetron (ZOFRAN) 4 MG tablet Take 1 tablet (4 mg) by mouth every 8 hours as needed for nausea     polyethylene glycol (MIRALAX) 17 g packet Take 1 packet by mouth every other day     senna-docusate (SENOKOT-S/PERICOLACE) 8.6-50 MG tablet Take 2 tablets by mouth 2 times daily (Patient taking differently: Take 1 tablet by mouth daily)     tafamidis (VYNDAMAX) 61 MG CAPS capsule Take 61 mg by mouth daily     torsemide (DEMADEX) 10 MG tablet Take 10 mg by mouth daily     No current facility-administered medications for this visit.       REVIEW OF SYSTEMS:   10 point review of systems reviewed and pertinent positives in the HPI.     PHYSICAL EXAMINATION:  Physical Exam     Vital signs: BP (!) 144/83   Pulse 87   Temp 98.2  F (36.8  C)   Resp 15   Ht 1.829 m (6')   Wt 96.7 kg (213 lb 3.2 oz)   SpO2 96%   BMI 28.92 kg/m    General: Awake, Alert, oriented x3, sitting up in wheelchair, conversant  HEENT:Pink conjunctiva, moist oral mucosa  NECK: Supple  CVS:  S1  S2, without murmur or gallop.   LUNG: Clear to auscultation, No wheezes, rales or rhonci.  BACK: No kyphosis of the thoracic spine  ABDOMEN: Soft, nontender to palpation, with positive bowel sounds  EXTREMITIES: Moves both upper and lower extremities.  1+ pedal edema. ARTUR hose on.   NEUROLOGIC: Intact, pulses palpable  PSYCHIATRIC: Mild cognitive impairment noted. Pleasant on exam.       Labs:  All labs reviewed in the nursing home record and [facility]   @  Lab Results   Component Value Date    WBC 7.7 02/02/2024    WBC 9.0 12/30/2017     Lab Results   Component Value Date    RBC 3.24 02/02/2024    RBC 4.16 12/30/2017     Lab Results   Component Value Date    HGB 9.5 02/02/2024    HGB 12.6 12/30/2017     Lab Results   Component Value Date    HCT 29.6 02/02/2024    HCT 37.1 12/30/2017     Lab Results   Component Value Date    MCV 91 02/02/2024    MCV 89  12/30/2017     Lab Results   Component Value Date    MCH 29.3 02/02/2024    MCH 30.3 12/30/2017     Lab Results   Component Value Date    MCHC 32.1 02/02/2024    MCHC 34.0 12/30/2017     Lab Results   Component Value Date    RDW 15.5 02/02/2024    RDW 13.8 12/30/2017     Lab Results   Component Value Date     02/02/2024     12/30/2017        @Last Comprehensive Metabolic Panel:  Sodium   Date Value Ref Range Status   03/25/2024 138 135 - 145 mmol/L Final     Comment:     Reference intervals for this test were updated on 09/26/2023 to more accurately reflect our healthy population. There may be differences in the flagging of prior results with similar values performed with this method. Interpretation of those prior results can be made in the context of the updated reference intervals.    12/30/2017 138 133 - 144 mmol/L Final     Potassium   Date Value Ref Range Status   03/25/2024 4.1 3.4 - 5.3 mmol/L Final   07/15/2022 3.7 3.4 - 5.3 mmol/L Final   12/31/2017 3.5 3.4 - 5.3 mmol/L Final     Chloride   Date Value Ref Range Status   03/25/2024 101 98 - 107 mmol/L Final   07/15/2022 106 94 - 109 mmol/L Final   12/30/2017 104 94 - 109 mmol/L Final     Carbon Dioxide   Date Value Ref Range Status   12/30/2017 26 20 - 32 mmol/L Final     Carbon Dioxide (CO2)   Date Value Ref Range Status   03/25/2024 25 22 - 29 mmol/L Final   07/15/2022 24 20 - 32 mmol/L Final     Anion Gap   Date Value Ref Range Status   03/25/2024 12 7 - 15 mmol/L Final   07/15/2022 9 3 - 14 mmol/L Final   12/30/2017 8 3 - 14 mmol/L Final     Glucose   Date Value Ref Range Status   03/25/2024 114 (H) 70 - 99 mg/dL Final   07/15/2022 97 70 - 99 mg/dL Final   12/30/2017 145 (H) 70 - 99 mg/dL Final     GLUCOSE BY METER POCT   Date Value Ref Range Status   02/02/2024 120 (H) 70 - 99 mg/dL Final     Urea Nitrogen   Date Value Ref Range Status   03/25/2024 18.0 8.0 - 23.0 mg/dL Final   07/15/2022 13 7 - 30 mg/dL Final   12/31/2017 26 7 - 30 mg/dL  Final     Creatinine   Date Value Ref Range Status   03/25/2024 1.36 (H) 0.67 - 1.17 mg/dL Final   12/31/2017 1.11 0.66 - 1.25 mg/dL Final     GFR Estimate   Date Value Ref Range Status   03/25/2024 50 (L) >60 mL/min/1.73m2 Final   12/31/2017 63 >60 mL/min/1.7m2 Final     Comment:     Non  GFR Calc     Calcium   Date Value Ref Range Status   03/25/2024 9.4 8.8 - 10.2 mg/dL Final   12/30/2017 7.9 (L) 8.5 - 10.1 mg/dL Final   DISCHARGE PLAN/FACE TO FACE:  I certify that this patient is under my care and that I, or a nurse practitioner or physician's assistant working with me, had a face-to-face encounter that meets the physician face-to-face encounter requirements with this patient.       I certify that, based on my findings, the following services are medically necessary home health services.    My clinical findings support the need for the above skilled services.    This patient is homebound because: recent hospitalization with hip fx, s/p ORIF.     The patient is, or has been, under my care and I have initiated the establishment of the plan of care. This patient will be followed by a physician who will periodically review the plan of care.    > 35 minutes spent with pt and family discussing discharge meds, follow ups, home care services ,equipment and monitoring of bp and TEDs.     This note has been dictated using voice recognition software. Any grammatical or context distortions are unintentional and inherent to the software    Electronically signed by: Grace Parry CNP       Sincerely,        Grace Parry NP

## 2024-03-29 NOTE — PROGRESS NOTES
[facility] GERIATRIC SERVICES    Code Status:  DNR   Visit Type:   Chief Complaint   Patient presents with    TCU Discharge     Facility:  [facility] ([facility]) [[id number]]         HPI: Alvin Newton is a 89 year old male who I am seeing today for discharge from the TCU.  Past medical history includes type 2 diabetes mellitus with diabetic polyneuropathy, proximal atrial fibs on Eliquis, bilateral hip replacement 33 years ago, HFpEF, type 2 diabetes mellitus, hypertension, severe aortic stenosis and mild cognitive impairment.  Patient admitted post fall with left femur fracture.  Patient had a mechanical fall in which she was trying to reach for his cane for stability but put his weight on his grabber device instead and fell forward.  He did not hit his head or lose consciousness.  Head CT was negative.  X-ray of the pelvis/left hip revealed a left proximal femoral fracture.  History of bilateral hip replacements about 33 years ago.  Patient underwent repair on 1/24/2024.  He had a complicated surgery in the setting of bilateral hip replacements.  He lost 3 L of blood.  He did require pressor support.  He was also transfused on 1/26.  Patient found to have a mildly low a.m. cortisol level most likely due to a component of adrenal insufficiency.  Steroids were done for couple days along with midodrine.  BP improved.  Steroids were discontinued on 1/30.  Pain controlled with tramadol and Tylenol.  Patient initially had a Prevena wound VAC however 1 day later during his TCU stay VAC was not working appropriately.  Ortho updated and this was discontinued and dry sterile dressing applied.  Patient also experienced postoperative delirium.  He is very anxious and tearful.  No agitation.  He did require one-on-one.  He was given BuSpar to help with anxiety.  Delirium improved.  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  Severe aortic stenosis.  Cardiac MRI in October  concerning for infiltrative cardiomyopathy.  Workup for amyloidosis in process by cardiology in [place] [facility].  Biopsies of the bone marrow and fat pad were negative for AL amyloid.  Plan at this time is to follow-up with cardiology regarding possible myocardial biopsy and continued amyloid/infiltrative cardiomyopathy workup.  History of proximal atrial fibs on Eliquis.  Patient did have episode of RVR after procedure which resolved.  Acute kidney injury on CKD stage III.  Creatinine 1.34 admission.  Creatinine did bump to 1.74 but improved with fluids.  Type 2 diabetes without long-term use of insulin.  Most recent hemoglobin A1c 6.8%.  Stress leukocytosis with a white blood cell count of 13 resolved.    Transitional Care Course: Today patient sitting up in wheelchair. His 2 daughters and wife are present on exam. Pt with recent hip fx, s/p ORIF. He denies any pain in his hip. Underlying CHF with suspension for amyloidosis. He continues on trial drug Vyndamax. Initially on admit to TCU pt had hypotension due to poor oral intake and meds. Pt with severe orthostatic hypotension. This has stabilized with use of ARTUR hose and low dose midodrine. His metoprolol has been added back and low dose torsemide. If bp remains stable could consider stopping midodrine out pt.  Pt denies any SOB or CP. He is having regular bowel movements now.     Assessment/Plan:       Other amyloidosis (H)  Chronic diastolic (congestive) heart failure (H)  -Workup for amyloidosis in process by cardiology in [place] [facility].  Biopsies of bone marrow and fat pad negative for AL amyloid.  -Follow up with cardiology 3/4/24. Pt has been approved for trial drug treatment. Vynamax. He has received a wayne for paying for drug. Pt continues on med and tolerating well.   -Cardiac MRI in October concerning for infiltrative cardiomyopathy.  -Daily weights. Weight down 10 lbs since admit.   -torsemide 10 mg daily.   -follow up with cardiology out pt.      Closed displaced subtrochanteric fracture of left femur with routine healing, subsequent encounter  Status post open reduction and internal fixation (ORIF) of fracture  Left leg pain  -History of bilateral hip replacement 33 years old.  -Weightbearing as tolerated.  -Venous doppler obtained today and was negative.   -Follow up left hip xray 2 view with proper placement. No acute process.   -Continue tylenol 975 mg TID.     Stage 3a chronic kidney disease (H)  -Creatine 1.08.   -per hospital avoid aggressive fluid resuscitation due to heart failure.     Hypotension   -Improved.   -pt treated with midodrine and steroids in hospital.   -Lg blood loss from surgery. Hgb now 9.5.   -Metoprolol decreased to 12.5 mg every day with hold parameters.   -Give additional 240cc with each med pass.   -Continue midodrine 2.5 mg daily. (If bp remains stable consider discontinuing out pt).   -TEDS for bp support.   -I have asked family to check bp and record for follow up.     Anemia due to blood loss, acute  -EBL of 3 L.  -Patient underwent transfusion of 1/26/2024.  -Follow-up CBC with hgb 9.5.     Paroxysmal atrial fibrillation with RVR (H)  -Chronically on Eliquis.  -Rate controlled with metoprolol and amiodarone.    Constipation  GI dysmotility  -Senna S 1 tabs QD.   -Miralax 17 gm every other day.   -Simethicone 80 mg QID.     -ok to discharge home with current meds and treatments.   -Home PT, OT, HHA and RN for medication management.   -Follow up with PCP in 1-2 weeks.     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/22/2020    Essential hypertension 04/04/2016    Mixed hyperlipidemia 01/02/2009    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) 12/06/2017    Carpal tunnel syndrome, bilateral 11/18/2021    Fall, initial encounter 06/06/2022    Closed fracture of first lumbar vertebra, unspecified fracture  morphology, initial encounter (H) 06/06/2022    Fracture of L1 vertebra (H) 06/06/2022    Impaired fasting glucose 06/20/2022    Osteoarthritis of pelvis 06/20/2022    Other ill-defined and unknown causes of morbidity and mortality 01/01/1985    Primary localized osteoarthrosis of shoulder region 06/20/2022    Pure hypercholesterolemia 01/01/1999    Reason for consultation 06/20/2022    Right bundle branch block 06/20/2022    Cellulitis of right lower extremity 07/11/2023    Severe sepsis (H) 07/11/2023    Septic shock (H) 07/12/2023    Streptococcal bacteremia 07/13/2023    Thrombocytopenia (H24) 07/13/2023    Hyperbilirubinemia 07/13/2023    Hypophosphatemia 07/13/2023    Hypoalbuminemia 07/13/2023    Pyuria 07/13/2023    Paroxysmal atrial fibrillation with RVR (H) 07/13/2023    Hypomagnesemia 07/13/2023    Chronic pain of both shoulders 07/13/2023    Severe aortic stenosis 07/14/2023    Elevated brain natriuretic peptide (BNP) level 07/14/2023    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    Peripheral edema 07/14/2023    Positive urine culture 07/14/2023    Medication induced coagulopathy  (H24) 07/14/2023    Chronic diastolic (congestive) heart failure (H) 03/31/2023    Altered mental status, unspecified altered mental status type 07/23/2023    Clostridium difficile diarrhea 07/23/2023    History of Clostridium difficile infection July 2023 07/25/2023    Stage 3a chronic kidney disease (H) 07/25/2023    Esophageal dysmotility 07/27/2023    DEJUAN (acute kidney injury) (H24) 08/17/2023    Moderate malnutrition (H24) 08/17/2023    Schatzki's ring of distal esophagus-dilated 8/19/23 08/17/2023    Acute gout involving toe of left foot 08/17/2023    Unintentional weight loss 08/18/2023    Abnormal esophagram 08/18/2023    Hypokalemia 08/19/2023    Hyponatremia 08/19/2023    Open wound-coccyx/buttocks 08/23/2023    Enterocolitis due to Clostridium difficile, not specified as recurrent 07/27/2023    Other chronic pain  07/19/2023    Pain in left shoulder 07/19/2023    Pain in right shoulder 07/19/2023    Unspecified streptococcus as the cause of diseases classified elsewhere 07/19/2023    Esophageal dysphagia 08/11/2023    Hip fracture, left, closed, initial encounter (H) 01/22/2024    Acute kidney failure, unspecified (H24) 08/24/2023    Amyloidosis (H) 01/25/2024    Monoclonal gammopathy of unknown significance (MGUS) 01/25/2024    Hypotension, unspecified hypotension type 01/25/2024    Postoperative anemia due to acute blood loss 01/25/2024     Resolved Ambulatory Problems     Diagnosis Date Noted    Obesity, morbid, BMI 40.0-49.9 (H) 11/07/2017     Past Medical History:   Diagnosis Date    Colonic diverticulum     Hyperlipidemia     Hypertension     Obesity (BMI 30-39.9)     Osteoarthritis     Type 2 diabetes mellitus (H)      No Known Allergies    All Meds and Allergies reviewed in the record at the facility and is the most up-to-date.    Current Outpatient Medications   Medication Sig    acetaminophen (TYLENOL) 325 MG tablet 975mg by mouth every AM and 975mg twice a day as needed    amiodarone (PACERONE) 200 MG tablet Take 1 tablet (200 mg) by mouth daily    atorvastatin (LIPITOR) 20 MG tablet Take 20 mg by mouth At Bedtime    busPIRone (BUSPAR) 5 MG tablet Take 1 tablet (5 mg) by mouth 2 times daily    diclofenac (VOLTAREN) 1 % topical gel Apply 2 g topically 3 times daily shoulders    ELIQUIS ANTICOAGULANT 5 MG tablet Take 5 mg by mouth 2 times daily    famotidine (PEPCID) 20 MG tablet Take 2 tablets (40 mg) by mouth 2 times daily    lidocaine (XYLOCAINE) 5 % external ointment Apply topically 3 times daily    Magnesium Oxide 250 MG TABS Take 250 mg by mouth daily    metoprolol succinate ER (TOPROL XL) 25 MG 24 hr tablet Take 12.5 mg by mouth daily Hold if SBP < 105.    midodrine (PROAMATINE) 2.5 MG tablet Take 2.5 mg by mouth daily    ondansetron (ZOFRAN) 4 MG tablet Take 1 tablet (4 mg) by mouth every 8 hours as needed  for nausea    polyethylene glycol (MIRALAX) 17 g packet Take 1 packet by mouth every other day    senna-docusate (SENOKOT-S/PERICOLACE) 8.6-50 MG tablet Take 2 tablets by mouth 2 times daily (Patient taking differently: Take 1 tablet by mouth daily)    tafamidis (VYNDAMAX) 61 MG CAPS capsule Take 61 mg by mouth daily    torsemide (DEMADEX) 10 MG tablet Take 10 mg by mouth daily     No current facility-administered medications for this visit.       REVIEW OF SYSTEMS:   10 point review of systems reviewed and pertinent positives in the HPI.     PHYSICAL EXAMINATION:  Physical Exam     Vital signs: BP (!) 144/83   Pulse 87   Temp 98.2  F (36.8  C)   Resp 15   Ht 1.829 m (6')   Wt 96.7 kg (213 lb 3.2 oz)   SpO2 96%   BMI 28.92 kg/m    General: Awake, Alert, oriented x3, sitting up in wheelchair, conversant  HEENT:Pink conjunctiva, moist oral mucosa  NECK: Supple  CVS:  S1  S2, without murmur or gallop.   LUNG: Clear to auscultation, No wheezes, rales or rhonci.  BACK: No kyphosis of the thoracic spine  ABDOMEN: Soft, nontender to palpation, with positive bowel sounds  EXTREMITIES: Moves both upper and lower extremities.  1+ pedal edema. ARTUR hose on.   NEUROLOGIC: Intact, pulses palpable  PSYCHIATRIC: Mild cognitive impairment noted. Pleasant on exam.       Labs:  All labs reviewed in the nursing home record and Epic   @  Lab Results   Component Value Date    WBC 7.7 02/02/2024    WBC 9.0 12/30/2017     Lab Results   Component Value Date    RBC 3.24 02/02/2024    RBC 4.16 12/30/2017     Lab Results   Component Value Date    HGB 9.5 02/02/2024    HGB 12.6 12/30/2017     Lab Results   Component Value Date    HCT 29.6 02/02/2024    HCT 37.1 12/30/2017     Lab Results   Component Value Date    MCV 91 02/02/2024    MCV 89 12/30/2017     Lab Results   Component Value Date    MCH 29.3 02/02/2024    MCH 30.3 12/30/2017     Lab Results   Component Value Date    MCHC 32.1 02/02/2024    MCHC 34.0 12/30/2017     Lab Results    Component Value Date    RDW 15.5 02/02/2024    RDW 13.8 12/30/2017     Lab Results   Component Value Date     02/02/2024     12/30/2017        @Last Comprehensive Metabolic Panel:  Sodium   Date Value Ref Range Status   03/25/2024 138 135 - 145 mmol/L Final     Comment:     Reference intervals for this test were updated on 09/26/2023 to more accurately reflect our healthy population. There may be differences in the flagging of prior results with similar values performed with this method. Interpretation of those prior results can be made in the context of the updated reference intervals.    12/30/2017 138 133 - 144 mmol/L Final     Potassium   Date Value Ref Range Status   03/25/2024 4.1 3.4 - 5.3 mmol/L Final   07/15/2022 3.7 3.4 - 5.3 mmol/L Final   12/31/2017 3.5 3.4 - 5.3 mmol/L Final     Chloride   Date Value Ref Range Status   03/25/2024 101 98 - 107 mmol/L Final   07/15/2022 106 94 - 109 mmol/L Final   12/30/2017 104 94 - 109 mmol/L Final     Carbon Dioxide   Date Value Ref Range Status   12/30/2017 26 20 - 32 mmol/L Final     Carbon Dioxide (CO2)   Date Value Ref Range Status   03/25/2024 25 22 - 29 mmol/L Final   07/15/2022 24 20 - 32 mmol/L Final     Anion Gap   Date Value Ref Range Status   03/25/2024 12 7 - 15 mmol/L Final   07/15/2022 9 3 - 14 mmol/L Final   12/30/2017 8 3 - 14 mmol/L Final     Glucose   Date Value Ref Range Status   03/25/2024 114 (H) 70 - 99 mg/dL Final   07/15/2022 97 70 - 99 mg/dL Final   12/30/2017 145 (H) 70 - 99 mg/dL Final     GLUCOSE BY METER POCT   Date Value Ref Range Status   02/02/2024 120 (H) 70 - 99 mg/dL Final     Urea Nitrogen   Date Value Ref Range Status   03/25/2024 18.0 8.0 - 23.0 mg/dL Final   07/15/2022 13 7 - 30 mg/dL Final   12/31/2017 26 7 - 30 mg/dL Final     Creatinine   Date Value Ref Range Status   03/25/2024 1.36 (H) 0.67 - 1.17 mg/dL Final   12/31/2017 1.11 0.66 - 1.25 mg/dL Final     GFR Estimate   Date Value Ref Range Status   03/25/2024  50 (L) >60 mL/min/1.73m2 Final   12/31/2017 63 >60 mL/min/1.7m2 Final     Comment:     Non  GFR Calc     Calcium   Date Value Ref Range Status   03/25/2024 9.4 8.8 - 10.2 mg/dL Final   12/30/2017 7.9 (L) 8.5 - 10.1 mg/dL Final   DISCHARGE PLAN/FACE TO FACE:  I certify that this patient is under my care and that I, or a nurse practitioner or physician's assistant working with me, had a face-to-face encounter that meets the physician face-to-face encounter requirements with this patient.       I certify that, based on my findings, the following services are medically necessary home health services.    My clinical findings support the need for the above skilled services.    This patient is homebound because: recent hospitalization with hip fx, s/p ORIF.     The patient is, or has been, under my care and I have initiated the establishment of the plan of care. This patient will be followed by a physician who will periodically review the plan of care.    > 35 minutes spent with pt and family discussing discharge meds, follow ups, home care services ,equipment and monitoring of bp and TEDs.     This note has been dictated using voice recognition software. Any grammatical or context distortions are unintentional and inherent to the software    Electronically signed by: Grace Parry CNP

## 2024-05-07 RX ORDER — MIDODRINE HYDROCHLORIDE 2.5 MG/1
2.5 TABLET ORAL DAILY
Qty: 30 TABLET | Refills: 0 | OUTPATIENT
Start: 2024-05-07

## 2024-06-16 ENCOUNTER — HEALTH MAINTENANCE LETTER (OUTPATIENT)
Age: 89
End: 2024-06-16

## 2024-06-19 ENCOUNTER — HOSPITAL ENCOUNTER (OUTPATIENT)
Facility: CLINIC | Age: 89
Setting detail: OBSERVATION
Discharge: HOME | End: 2024-06-21
Attending: FAMILY MEDICINE | Admitting: STUDENT IN AN ORGANIZED HEALTH CARE EDUCATION/TRAINING PROGRAM
Payer: COMMERCIAL

## 2024-06-19 DIAGNOSIS — I21.4 NSTEMI (NON-ST ELEVATED MYOCARDIAL INFARCTION) (H): ICD-10-CM

## 2024-06-19 DIAGNOSIS — R00.1 SINUS BRADYCARDIA: ICD-10-CM

## 2024-06-19 DIAGNOSIS — E85.89 OTHER AMYLOIDOSIS (H): ICD-10-CM

## 2024-06-19 DIAGNOSIS — I35.0 SEVERE AORTIC STENOSIS: ICD-10-CM

## 2024-06-19 DIAGNOSIS — L03.115 CELLULITIS OF RIGHT LOWER EXTREMITY: Primary | ICD-10-CM

## 2024-06-19 PROBLEM — I45.10 RIGHT BUNDLE BRANCH BLOCK: Status: ACTIVE | Noted: 2022-06-20

## 2024-06-19 PROBLEM — I50.32 CHRONIC DIASTOLIC (CONGESTIVE) HEART FAILURE (H): Status: ACTIVE | Noted: 2023-03-31

## 2024-06-19 PROBLEM — N40.1 BPH WITH URINARY OBSTRUCTION: Status: ACTIVE | Noted: 2020-06-22

## 2024-06-19 PROBLEM — K22.2 SCHATZKI'S RING OF DISTAL ESOPHAGUS: Status: ACTIVE | Noted: 2023-08-17

## 2024-06-19 PROBLEM — R13.19 ESOPHAGEAL DYSPHAGIA: Status: ACTIVE | Noted: 2023-08-11

## 2024-06-19 PROBLEM — N18.31 STAGE 3A CHRONIC KIDNEY DISEASE (H): Status: ACTIVE | Noted: 2023-07-25

## 2024-06-19 PROBLEM — N13.8 BPH WITH URINARY OBSTRUCTION: Status: ACTIVE | Noted: 2020-06-22

## 2024-06-19 PROBLEM — I48.0 PAROXYSMAL ATRIAL FIBRILLATION WITH RVR (H): Status: ACTIVE | Noted: 2023-07-13

## 2024-06-19 LAB
ALBUMIN SERPL BCG-MCNC: 3.5 G/DL (ref 3.5–5.2)
ALP SERPL-CCNC: 236 U/L (ref 40–150)
ALT SERPL W P-5'-P-CCNC: 23 U/L (ref 0–70)
ANION GAP SERPL CALCULATED.3IONS-SCNC: 10 MMOL/L (ref 7–15)
AST SERPL W P-5'-P-CCNC: 28 U/L (ref 0–45)
BASOPHILS # BLD AUTO: 0.1 10E3/UL (ref 0–0.2)
BASOPHILS NFR BLD AUTO: 1 %
BILIRUB SERPL-MCNC: 1 MG/DL
BUN SERPL-MCNC: 25.7 MG/DL (ref 8–23)
CALCIUM SERPL-MCNC: 9.5 MG/DL (ref 8.2–9.6)
CHLORIDE SERPL-SCNC: 106 MMOL/L (ref 98–107)
CREAT SERPL-MCNC: 1.4 MG/DL (ref 0.67–1.17)
DEPRECATED HCO3 PLAS-SCNC: 24 MMOL/L (ref 22–29)
EGFRCR SERPLBLD CKD-EPI 2021: 48 ML/MIN/1.73M2
EOSINOPHIL # BLD AUTO: 0.2 10E3/UL (ref 0–0.7)
EOSINOPHIL NFR BLD AUTO: 4 %
ERYTHROCYTE [DISTWIDTH] IN BLOOD BY AUTOMATED COUNT: 15.5 % (ref 10–15)
GLUCOSE BLDC GLUCOMTR-MCNC: 90 MG/DL (ref 70–99)
GLUCOSE SERPL-MCNC: 111 MG/DL (ref 70–99)
HCT VFR BLD AUTO: 39 % (ref 40–53)
HGB BLD-MCNC: 12.2 G/DL (ref 13.3–17.7)
HOLD SPECIMEN: NORMAL
HOLD SPECIMEN: NORMAL
IMM GRANULOCYTES # BLD: 0 10E3/UL
IMM GRANULOCYTES NFR BLD: 0 %
LYMPHOCYTES # BLD AUTO: 2.2 10E3/UL (ref 0.8–5.3)
LYMPHOCYTES NFR BLD AUTO: 43 %
MCH RBC QN AUTO: 28.8 PG (ref 26.5–33)
MCHC RBC AUTO-ENTMCNC: 31.3 G/DL (ref 31.5–36.5)
MCV RBC AUTO: 92 FL (ref 78–100)
MONOCYTES # BLD AUTO: 0.8 10E3/UL (ref 0–1.3)
MONOCYTES NFR BLD AUTO: 16 %
NEUTROPHILS # BLD AUTO: 1.9 10E3/UL (ref 1.6–8.3)
NEUTROPHILS NFR BLD AUTO: 36 %
NRBC # BLD AUTO: 0 10E3/UL
NRBC BLD AUTO-RTO: 0 /100
PLATELET # BLD AUTO: 235 10E3/UL (ref 150–450)
POTASSIUM SERPL-SCNC: 4.6 MMOL/L (ref 3.4–5.3)
PROT SERPL-MCNC: 7.6 G/DL (ref 6.4–8.3)
RBC # BLD AUTO: 4.23 10E6/UL (ref 4.4–5.9)
SODIUM SERPL-SCNC: 140 MMOL/L (ref 135–145)
TROPONIN T SERPL HS-MCNC: 46 NG/L
TROPONIN T SERPL HS-MCNC: 50 NG/L
TSH SERPL DL<=0.005 MIU/L-ACNC: 2.25 UIU/ML (ref 0.3–4.2)
WBC # BLD AUTO: 5.1 10E3/UL (ref 4–11)

## 2024-06-19 PROCEDURE — 258N000003 HC RX IP 258 OP 636: Mod: JZ | Performed by: FAMILY MEDICINE

## 2024-06-19 PROCEDURE — 82962 GLUCOSE BLOOD TEST: CPT

## 2024-06-19 PROCEDURE — 99285 EMERGENCY DEPT VISIT HI MDM: CPT | Mod: 25 | Performed by: FAMILY MEDICINE

## 2024-06-19 PROCEDURE — 99222 1ST HOSP IP/OBS MODERATE 55: CPT

## 2024-06-19 PROCEDURE — 80053 COMPREHEN METABOLIC PANEL: CPT | Performed by: FAMILY MEDICINE

## 2024-06-19 PROCEDURE — 84484 ASSAY OF TROPONIN QUANT: CPT | Performed by: FAMILY MEDICINE

## 2024-06-19 PROCEDURE — 85025 COMPLETE CBC W/AUTO DIFF WBC: CPT | Performed by: FAMILY MEDICINE

## 2024-06-19 PROCEDURE — 93010 ELECTROCARDIOGRAM REPORT: CPT | Performed by: FAMILY MEDICINE

## 2024-06-19 PROCEDURE — 96360 HYDRATION IV INFUSION INIT: CPT

## 2024-06-19 PROCEDURE — G0378 HOSPITAL OBSERVATION PER HR: HCPCS

## 2024-06-19 PROCEDURE — 36415 COLL VENOUS BLD VENIPUNCTURE: CPT | Performed by: FAMILY MEDICINE

## 2024-06-19 PROCEDURE — 96361 HYDRATE IV INFUSION ADD-ON: CPT

## 2024-06-19 PROCEDURE — 99285 EMERGENCY DEPT VISIT HI MDM: CPT | Mod: 25

## 2024-06-19 PROCEDURE — 84443 ASSAY THYROID STIM HORMONE: CPT | Performed by: EMERGENCY MEDICINE

## 2024-06-19 RX ORDER — ATORVASTATIN CALCIUM 20 MG/1
20 TABLET, FILM COATED ORAL AT BEDTIME
Status: DISCONTINUED | OUTPATIENT
Start: 2024-06-19 | End: 2024-06-21 | Stop reason: HOSPADM

## 2024-06-19 RX ORDER — MIDODRINE HYDROCHLORIDE 2.5 MG/1
2.5 TABLET ORAL DAILY
Status: DISCONTINUED | OUTPATIENT
Start: 2024-06-20 | End: 2024-06-20

## 2024-06-19 RX ORDER — ACETAMINOPHEN 325 MG/1
650 TABLET ORAL EVERY 4 HOURS PRN
Status: DISCONTINUED | OUTPATIENT
Start: 2024-06-19 | End: 2024-06-21 | Stop reason: HOSPADM

## 2024-06-19 RX ORDER — AMIODARONE HYDROCHLORIDE 200 MG/1
200 TABLET ORAL DAILY
Status: DISCONTINUED | OUTPATIENT
Start: 2024-06-20 | End: 2024-06-21 | Stop reason: HOSPADM

## 2024-06-19 RX ORDER — ONDANSETRON 2 MG/ML
4 INJECTION INTRAMUSCULAR; INTRAVENOUS EVERY 6 HOURS PRN
Status: DISCONTINUED | OUTPATIENT
Start: 2024-06-19 | End: 2024-06-21 | Stop reason: HOSPADM

## 2024-06-19 RX ORDER — TORSEMIDE 5 MG/1
10 TABLET ORAL DAILY
Status: DISCONTINUED | OUTPATIENT
Start: 2024-06-20 | End: 2024-06-21 | Stop reason: HOSPADM

## 2024-06-19 RX ORDER — ONDANSETRON 4 MG/1
4 TABLET, ORALLY DISINTEGRATING ORAL EVERY 6 HOURS PRN
Status: DISCONTINUED | OUTPATIENT
Start: 2024-06-19 | End: 2024-06-21 | Stop reason: HOSPADM

## 2024-06-19 RX ORDER — CEPHALEXIN 500 MG/1
500 CAPSULE ORAL EVERY 6 HOURS SCHEDULED
Status: DISCONTINUED | OUTPATIENT
Start: 2024-06-19 | End: 2024-06-21 | Stop reason: HOSPADM

## 2024-06-19 RX ORDER — AMOXICILLIN 250 MG
2 CAPSULE ORAL 2 TIMES DAILY PRN
Status: DISCONTINUED | OUTPATIENT
Start: 2024-06-19 | End: 2024-06-21 | Stop reason: HOSPADM

## 2024-06-19 RX ORDER — AMOXICILLIN 250 MG
1 CAPSULE ORAL 2 TIMES DAILY PRN
Status: DISCONTINUED | OUTPATIENT
Start: 2024-06-19 | End: 2024-06-21 | Stop reason: HOSPADM

## 2024-06-19 RX ORDER — TORSEMIDE 20 MG/1
10 TABLET ORAL DAILY
COMMUNITY
Start: 2024-06-10

## 2024-06-19 RX ADMIN — SODIUM CHLORIDE 500 ML: 9 INJECTION, SOLUTION INTRAVENOUS at 17:44

## 2024-06-19 ASSESSMENT — ACTIVITIES OF DAILY LIVING (ADL)
ADLS_ACUITY_SCORE: 39
ADLS_ACUITY_SCORE: 37
ADLS_ACUITY_SCORE: 39
ADLS_ACUITY_SCORE: 37
ADLS_ACUITY_SCORE: 39
ADLS_ACUITY_SCORE: 39

## 2024-06-19 ASSESSMENT — COLUMBIA-SUICIDE SEVERITY RATING SCALE - C-SSRS
1. IN THE PAST MONTH, HAVE YOU WISHED YOU WERE DEAD OR WISHED YOU COULD GO TO SLEEP AND NOT WAKE UP?: NO
2. HAVE YOU ACTUALLY HAD ANY THOUGHTS OF KILLING YOURSELF IN THE PAST MONTH?: NO
6. HAVE YOU EVER DONE ANYTHING, STARTED TO DO ANYTHING, OR PREPARED TO DO ANYTHING TO END YOUR LIFE?: NO

## 2024-06-19 NOTE — ED PROVIDER NOTES
History     Chief Complaint   Patient presents with    Bradycardia     Sent by home health nurse for HR at 38 to 40. Patient denies any chest pain or dizziness. Has a new person who set up his med so home health was questioning if his meds were mixed up.      KAREN Newton is a 89 year old male, past medical history is significant for amyloidosis, MGUS, hypokalemia, hyponatremia, malnutrition, Schatzki's ring of distal esophagus, gout, esophageal dysphagia, esophageal dysmotility, C. difficile colitis, stage III renal disease, paroxysmal atrial fibrillation, hypomagnesemia, chronic pain of both shoulders, type 2 diabetes, hypertension, mixed hyperlipidemia, pure hypercholesterolemia, presents to the emergency department from nursing home for concerns of low heart rate in the 38-40 range.  History is obtained from the patient and his wife who states that they have a new person preparing meds for the home health nurse and apparently the home health nurse had concerned that he received full dose of Toprol-XL 25 mg rather than the half tablet.  They do not know.  It is unclear whether he received all of his other medications appropriately or not either including amiodarone.  The patient himself has no new symptoms.  He denies any recent or current chest pain shortness of breath lightheadedness nausea or vomiting.  He and his significant other established that he had considerable problems with gait instability, walks with a cane, recent orthopedic injury left lower extremity in January of this year.      Allergies:  No Known Allergies    Problem List:    Patient Active Problem List    Diagnosis Date Noted    Sinus bradycardia 06/19/2024     Priority: Medium    Amyloidosis (H) 01/25/2024     Priority: Medium    Monoclonal gammopathy of unknown significance (MGUS) 01/25/2024     Priority: Medium    Hypotension, unspecified hypotension type 01/25/2024     Priority: Medium    Postoperative anemia due to acute blood  loss 01/25/2024     Priority: Medium    Hip fracture, left, closed, initial encounter (H) 01/22/2024     Priority: Medium    Acute kidney failure, unspecified (H24) 08/24/2023     Priority: Medium    Open wound-coccyx/buttocks 08/23/2023     Priority: Medium    Hypokalemia 08/19/2023     Priority: Medium    Hyponatremia 08/19/2023     Priority: Medium    Unintentional weight loss 08/18/2023     Priority: Medium    Abnormal esophagram 08/18/2023     Priority: Medium    DEJUAN (acute kidney injury) (H24) 08/17/2023     Priority: Medium    Moderate malnutrition (H24) 08/17/2023     Priority: Medium    Schatzki's ring of distal esophagus-dilated 8/19/23 08/17/2023     Priority: Medium    Acute gout involving toe of left foot 08/17/2023     Priority: Medium    Esophageal dysphagia 08/11/2023     Priority: Medium    Esophageal dysmotility 07/27/2023     Priority: Medium    Enterocolitis due to Clostridium difficile, not specified as recurrent 07/27/2023     Priority: Medium    History of Clostridium difficile infection July 2023 07/25/2023     Priority: Medium    Stage 3a chronic kidney disease (H) 07/25/2023     Priority: Medium    Altered mental status, unspecified altered mental status type 07/23/2023     Priority: Medium    Clostridium difficile diarrhea 07/23/2023     Priority: Medium    Other chronic pain 07/19/2023     Priority: Medium    Pain in left shoulder 07/19/2023     Priority: Medium    Pain in right shoulder 07/19/2023     Priority: Medium    Unspecified streptococcus as the cause of diseases classified elsewhere 07/19/2023     Priority: Medium    Severe aortic stenosis 07/14/2023     Priority: Medium    Elevated brain natriuretic peptide (BNP) level 07/14/2023     Priority: Medium    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     Priority: Medium    Peripheral edema 07/14/2023     Priority: Medium    Positive urine culture 07/14/2023     Priority: Medium    Medication induced coagulopathy  (H24) 07/14/2023      Priority: Medium    Streptococcal bacteremia 07/13/2023     Priority: Medium    Thrombocytopenia (H24) 07/13/2023     Priority: Medium    Hyperbilirubinemia 07/13/2023     Priority: Medium    Hypophosphatemia 07/13/2023     Priority: Medium    Hypoalbuminemia 07/13/2023     Priority: Medium    Pyuria 07/13/2023     Priority: Medium    Paroxysmal atrial fibrillation with RVR (H) 07/13/2023     Priority: Medium    Hypomagnesemia 07/13/2023     Priority: Medium    Chronic pain of both shoulders 07/13/2023     Priority: Medium    Septic shock (H) 07/12/2023     Priority: Medium    Cellulitis of right lower extremity 07/11/2023     Priority: Medium    Severe sepsis (H) 07/11/2023     Priority: Medium    Chronic diastolic (congestive) heart failure (H) 03/31/2023     Priority: Medium    Impaired fasting glucose 06/20/2022     Priority: Medium    Osteoarthritis of pelvis 06/20/2022     Priority: Medium     Dec 16, 2003 Entered By: JOHN GROVE Comment: 1988  S-P HIP REPLACEMENT      Primary localized osteoarthrosis of shoulder region 06/20/2022     Priority: Medium    Reason for consultation 06/20/2022     Priority: Medium    Right bundle branch block 06/20/2022     Priority: Medium    Fall, initial encounter 06/06/2022     Priority: Medium    Closed fracture of first lumbar vertebra, unspecified fracture morphology, initial encounter (H) 06/06/2022     Priority: Medium    Fracture of L1 vertebra (H) 06/06/2022     Priority: Medium    Carpal tunnel syndrome, bilateral 11/18/2021     Priority: Medium    BPH with urinary obstruction 06/22/2020     Priority: Medium    NSTEMI (non-ST elevated myocardial infarction) (H) 12/27/2017     Priority: Medium    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) 12/06/2017     Priority: Medium    Essential hypertension 04/04/2016     Priority: Medium    Pure hypercholesterolemia 01/01/1999     Priority: Medium    Other ill-defined and unknown causes of morbidity and  mortality 01/01/1985     Priority: Medium     Dec 16, 2003 Entered By: JOHN GROVE Comment: S-P TURP          Past Medical History:    Past Medical History:   Diagnosis Date    Colonic diverticulum     Hyperlipidemia     Hypertension     Mixed hyperlipidemia 01/02/2009    Obesity (BMI 30-39.9)     Osteoarthritis     Type 2 diabetes mellitus (H)        Past Surgical History:    Past Surgical History:   Procedure Laterality Date    ARTHROPLASTY HIP Left 1/24/2024    Procedure: Revision total hip arthroplasty, both components;  Surgeon: Aurelio Ramos MD;  Location: [facility] OR    ARTHROPLASTY HIP BILATERAL  1987    ESOPHAGOSCOPY, GASTROSCOPY, DUODENOSCOPY (EGD), COMBINED N/A 8/19/2023    Procedure: ESOPHAGOGASTRODUODENOSCOPY, WITH BIOPSY;  Surgeon: Antolin Cyr DO;  Location: [facility]    ESOPHAGOSCOPY, GASTROSCOPY, DUODENOSCOPY (EGD), DILATATION, COMBINED N/A 8/19/2023    Procedure: Esophagoscopy, gastroscopy, duodenoscopy, dilatation, combined;  Surgeon: Antolin Cyr DO;  Location: [facility]    HC ESOPHAGOSCOPY, DIAGNOSTIC  2003    LAPAROSCOPIC CHOLECYSTECTOMY N/A 12/28/2017    Procedure: LAPAROSCOPIC CHOLECYSTECTOMY;  LAPAROSCOPIC CHOLECYSTECTOMY;  Surgeon: Heber Gonzalez MD;  Location:  OR    OPEN REDUCTION INTERNAL FIXATION HIP Left 1/24/2024    Procedure: Open reduction internal fixation left periprosthetic femur fracture;  Surgeon: Aurelio Ramos MD;  Location: [facility] OR    TRANSRECTAL ULTRASONIC, TRANSURETHRAL RESECTION (TUR) OF PROSTATE CYST  1990       Family History:    No family history on file.    Social History:  Marital Status:   [2]  Social History     Tobacco Use    Smoking status: Never    Smokeless tobacco: Never   Substance Use Topics    Alcohol use: Yes     Comment: 0-1 beer daily    Drug use: No        Medications:    acetaminophen (TYLENOL) 325 MG tablet  amiodarone (PACERONE) 200 MG tablet  atorvastatin (LIPITOR) 20 MG tablet  ELIQUIS ANTICOAGULANT  "5 MG tablet  lidocaine (XYLOCAINE) 5 % external ointment  Magnesium Oxide 250 MG TABS  metoprolol succinate ER (TOPROL XL) 25 MG 24 hr tablet  midodrine (PROAMATINE) 2.5 MG tablet  tafamidis (VYNDAMAX) 61 MG CAPS capsule  torsemide (DEMADEX) 20 MG tablet          Review of Systems   All other systems reviewed and are negative.      Physical Exam   BP: (!) 155/79  Pulse: (!) 42  Temp: 97.9  F (36.6  C)  Resp: 16  Height: 180.3 cm (5' 11\")  Weight: 100.7 kg (222 lb)  SpO2: 99 %      Physical Exam  Vitals and nursing note reviewed.   Constitutional:       General: He is not in acute distress.     Appearance: Normal appearance. He is not ill-appearing.      Comments: Alert, no acute distress, appears stated age.   HENT:      Head: Normocephalic and atraumatic.      Right Ear: Tympanic membrane, ear canal and external ear normal.      Left Ear: Tympanic membrane, ear canal and external ear normal.      Nose: Nose normal.      Mouth/Throat:      Mouth: Mucous membranes are dry.      Pharynx: Oropharynx is clear.   Eyes:      Extraocular Movements: Extraocular movements intact.      Conjunctiva/sclera: Conjunctivae normal.      Pupils: Pupils are equal, round, and reactive to light.   Cardiovascular:      Rate and Rhythm: Regular rhythm. Bradycardia present.      Pulses: Normal pulses.      Heart sounds: Normal heart sounds.   Pulmonary:      Effort: Pulmonary effort is normal.      Breath sounds: Normal breath sounds.   Abdominal:      General: Bowel sounds are normal.      Palpations: Abdomen is soft.   Musculoskeletal:         General: Normal range of motion.      Cervical back: Normal range of motion and neck supple.   Skin:     General: Skin is warm and dry.      Capillary Refill: Capillary refill takes less than 2 seconds.   Neurological:      General: No focal deficit present.      Mental Status: He is alert and oriented to person, place, and time.   Psychiatric:         Mood and Affect: Mood normal.         " Behavior: Behavior normal.         ED Course        Procedures              EKG Interpretation:      Interpreted by Aurelio Jiménez MD  Time reviewed: Time obtained 453 time interpreted 552 42 bpm sinus bradycardia first-degree AV block FL point 296, pre-existing right bundle branch block.  Comparison cardiogram dated 8/16/2023 shows rate controlled atrial fibrillation with right bundle branch block.      Critical Care time:  none               Results for orders placed or performed during the hospital encounter of 06/19/24 (from the past 24 hour(s))   [place] Draw    Narrative    The following orders were created for panel order [place] Draw.  Procedure                               Abnormality         Status                     ---------                               -----------         ------                     Extra Blue Top Tube[[id number]]                              Final result               Extra Red Top Tube[[id number]]                               Final result                 Please view results for these tests on the individual orders.   CBC with platelets, differential    Narrative    The following orders were created for panel order CBC with platelets, differential.  Procedure                               Abnormality         Status                     ---------                               -----------         ------                     CBC with platelets and d...[[id number]]  Abnormal            Final result                 Please view results for these tests on the individual orders.   Comprehensive metabolic panel   Result Value Ref Range    Sodium 140 135 - 145 mmol/L    Potassium 4.6 3.4 - 5.3 mmol/L    Carbon Dioxide (CO2) 24 22 - 29 mmol/L    Anion Gap 10 7 - 15 mmol/L    Urea Nitrogen 25.7 (H) 8.0 - 23.0 mg/dL    Creatinine 1.40 (H) 0.67 - 1.17 mg/dL    GFR Estimate 48 (L) >60 mL/min/1.73m2    Calcium 9.5 8.2 - 9.6 mg/dL    Chloride 106 98 - 107 mmol/L    Glucose 111 (H) 70 - 99 mg/dL     Alkaline Phosphatase 236 (H) 40 - 150 U/L    AST 28 0 - 45 U/L    ALT 23 0 - 70 U/L    Protein Total 7.6 6.4 - 8.3 g/dL    Albumin 3.5 3.5 - 5.2 g/dL    Bilirubin Total 1.0 <=1.2 mg/dL   Troponin T, High Sensitivity   Result Value Ref Range    Troponin T, High Sensitivity 50 (H) <=22 ng/L   Extra Blue Top Tube   Result Value Ref Range    Hold Specimen JIC    Extra Red Top Tube   Result Value Ref Range    Hold Specimen JIC    CBC with platelets and differential   Result Value Ref Range    WBC Count 5.1 4.0 - 11.0 10e3/uL    RBC Count 4.23 (L) 4.40 - 5.90 10e6/uL    Hemoglobin 12.2 (L) 13.3 - 17.7 g/dL    Hematocrit 39.0 (L) 40.0 - 53.0 %    MCV 92 78 - 100 fL    MCH 28.8 26.5 - 33.0 pg    MCHC 31.3 (L) 31.5 - 36.5 g/dL    RDW 15.5 (H) 10.0 - 15.0 %    Platelet Count 235 150 - 450 10e3/uL    % Neutrophils 36 %    % Lymphocytes 43 %    % Monocytes 16 %    % Eosinophils 4 %    % Basophils 1 %    % Immature Granulocytes 0 %    NRBCs per 100 WBC 0 <1 /100    Absolute Neutrophils 1.9 1.6 - 8.3 10e3/uL    Absolute Lymphocytes 2.2 0.8 - 5.3 10e3/uL    Absolute Monocytes 0.8 0.0 - 1.3 10e3/uL    Absolute Eosinophils 0.2 0.0 - 0.7 10e3/uL    Absolute Basophils 0.1 0.0 - 0.2 10e3/uL    Absolute Immature Granulocytes 0.0 <=0.4 10e3/uL    Absolute NRBCs 0.0 10e3/uL   TSH with free T4 reflex   Result Value Ref Range    TSH 2.25 0.30 - 4.20 uIU/mL   Troponin T, High Sensitivity   Result Value Ref Range    Troponin T, High Sensitivity 46 (H) <=22 ng/L     9:56 PM  Troponin rise is flat and comparable to previous presentations upon more distant EHR review.  No ischemic features to presentation.  Patient is asymptomatic.  Given his advanced age, comorbidities, proclivity to falling I think watching this gentleman with his bradycardia overnight would be very reasonable and safe for him.  If it resolves then clearly it was the result of inadvertent double dosing of his beta-blocker.  If it does not he will need further evaluation.  I  discussed him with hospitalist on-call Tan Rodrigues who agreed to accept him to observation status..  Transition orders placed.    Medications   sodium chloride 0.9% BOLUS 500 mL (0 mLs Intravenous Stopped 6/19/24 1946)       Assessments & Plan (with Medical Decision Making)   Assessment and plan with medical decision making at the time stamp above.    Disclaimer: This note consists of symbols derived from keyboarding, dictation and/or voice recognition software. As a result, there may be errors in the script that have gone undetected. Please consider this when interpreting information found in this chart.      I have reviewed the nursing notes.    I have reviewed the findings, diagnosis, plan and need for follow up with the patient.               New Prescriptions    No medications on file       Final diagnoses:   Sinus bradycardia - Possible inadvertent beta-blocker double dose.       6/19/2024   [facility] EMERGENCY DEPT       Aurelio Jiménez MD  06/19/24 4856

## 2024-06-19 NOTE — ED TRIAGE NOTES
Sent by home health nurse for HR at 38 to 40. Patient denies any chest pain or dizziness. Has a new person who set up his med so home health was questioning if his meds were mixed up.      Triage Assessment (Adult)       Row Name 06/19/24 4536          Triage Assessment    Airway WDL WDL        Respiratory WDL    Respiratory WDL WDL        Skin Circulation/Temperature WDL    Skin Circulation/Temperature WDL WDL        Cardiac WDL    Cardiac WDL X;rhythm     Pulse Rate & Regularity bradycardic

## 2024-06-20 LAB
ALBUMIN SERPL BCG-MCNC: 3.3 G/DL (ref 3.5–5.2)
ALP SERPL-CCNC: 219 U/L (ref 40–150)
ALT SERPL W P-5'-P-CCNC: 19 U/L (ref 0–70)
ANION GAP SERPL CALCULATED.3IONS-SCNC: 10 MMOL/L (ref 7–15)
AST SERPL W P-5'-P-CCNC: 24 U/L (ref 0–45)
BILIRUB SERPL-MCNC: 1.2 MG/DL
BUN SERPL-MCNC: 20.6 MG/DL (ref 8–23)
CALCIUM SERPL-MCNC: 9.2 MG/DL (ref 8.2–9.6)
CHLORIDE SERPL-SCNC: 107 MMOL/L (ref 98–107)
CREAT SERPL-MCNC: 1.23 MG/DL (ref 0.67–1.17)
DEPRECATED HCO3 PLAS-SCNC: 22 MMOL/L (ref 22–29)
EGFRCR SERPLBLD CKD-EPI 2021: 56 ML/MIN/1.73M2
ERYTHROCYTE [DISTWIDTH] IN BLOOD BY AUTOMATED COUNT: 15.4 % (ref 10–15)
GLUCOSE SERPL-MCNC: 102 MG/DL (ref 70–99)
HCT VFR BLD AUTO: 38.3 % (ref 40–53)
HGB BLD-MCNC: 11.8 G/DL (ref 13.3–17.7)
MCH RBC QN AUTO: 28 PG (ref 26.5–33)
MCHC RBC AUTO-ENTMCNC: 30.8 G/DL (ref 31.5–36.5)
MCV RBC AUTO: 91 FL (ref 78–100)
NT-PROBNP SERPL-MCNC: 1984 PG/ML (ref 0–1800)
PLATELET # BLD AUTO: 223 10E3/UL (ref 150–450)
POTASSIUM SERPL-SCNC: 4.1 MMOL/L (ref 3.4–5.3)
PROT SERPL-MCNC: 6.8 G/DL (ref 6.4–8.3)
RBC # BLD AUTO: 4.22 10E6/UL (ref 4.4–5.9)
SODIUM SERPL-SCNC: 139 MMOL/L (ref 135–145)
TROPONIN T SERPL HS-MCNC: 49 NG/L
WBC # BLD AUTO: 4.8 10E3/UL (ref 4–11)

## 2024-06-20 PROCEDURE — 99232 SBSQ HOSP IP/OBS MODERATE 35: CPT | Performed by: STUDENT IN AN ORGANIZED HEALTH CARE EDUCATION/TRAINING PROGRAM

## 2024-06-20 PROCEDURE — 250N000013 HC RX MED GY IP 250 OP 250 PS 637

## 2024-06-20 PROCEDURE — 93005 ELECTROCARDIOGRAM TRACING: CPT

## 2024-06-20 PROCEDURE — 80053 COMPREHEN METABOLIC PANEL: CPT

## 2024-06-20 PROCEDURE — 84484 ASSAY OF TROPONIN QUANT: CPT | Performed by: STUDENT IN AN ORGANIZED HEALTH CARE EDUCATION/TRAINING PROGRAM

## 2024-06-20 PROCEDURE — G0378 HOSPITAL OBSERVATION PER HR: HCPCS

## 2024-06-20 PROCEDURE — 36415 COLL VENOUS BLD VENIPUNCTURE: CPT

## 2024-06-20 PROCEDURE — 83880 ASSAY OF NATRIURETIC PEPTIDE: CPT | Performed by: STUDENT IN AN ORGANIZED HEALTH CARE EDUCATION/TRAINING PROGRAM

## 2024-06-20 PROCEDURE — 85041 AUTOMATED RBC COUNT: CPT

## 2024-06-20 RX ADMIN — MIDODRINE HYDROCHLORIDE 2.5 MG: 2.5 TABLET ORAL at 09:33

## 2024-06-20 RX ADMIN — APIXABAN 5 MG: 5 TABLET, FILM COATED ORAL at 09:33

## 2024-06-20 RX ADMIN — APIXABAN 5 MG: 5 TABLET, FILM COATED ORAL at 00:11

## 2024-06-20 RX ADMIN — AMIODARONE HYDROCHLORIDE 200 MG: 200 TABLET ORAL at 09:33

## 2024-06-20 RX ADMIN — APIXABAN 5 MG: 5 TABLET, FILM COATED ORAL at 19:47

## 2024-06-20 RX ADMIN — CEPHALEXIN 500 MG: 500 CAPSULE ORAL at 19:02

## 2024-06-20 RX ADMIN — ATORVASTATIN CALCIUM 20 MG: 20 TABLET, FILM COATED ORAL at 00:11

## 2024-06-20 RX ADMIN — TORSEMIDE 10 MG: 5 TABLET ORAL at 09:47

## 2024-06-20 RX ADMIN — CEPHALEXIN 500 MG: 500 CAPSULE ORAL at 06:02

## 2024-06-20 RX ADMIN — CEPHALEXIN 500 MG: 500 CAPSULE ORAL at 13:13

## 2024-06-20 RX ADMIN — ATORVASTATIN CALCIUM 20 MG: 20 TABLET, FILM COATED ORAL at 22:51

## 2024-06-20 RX ADMIN — CEPHALEXIN 500 MG: 500 CAPSULE ORAL at 00:11

## 2024-06-20 ASSESSMENT — ACTIVITIES OF DAILY LIVING (ADL)
ADLS_ACUITY_SCORE: 37
ADLS_ACUITY_SCORE: 36
ADLS_ACUITY_SCORE: 37
ADLS_ACUITY_SCORE: 36
ADLS_ACUITY_SCORE: 36
ADLS_ACUITY_SCORE: 37
ADLS_ACUITY_SCORE: 36
ADLS_ACUITY_SCORE: 37
ADLS_ACUITY_SCORE: 36
ADLS_ACUITY_SCORE: 37
ADLS_ACUITY_SCORE: 36

## 2024-06-20 NOTE — PROGRESS NOTES
Skin affirmation note    Admitting nurse completed full skin assessment, Hunter score and Hunter interventions. This writer agrees with the initial skin assessment findings.  Yun Shen RN on 6/20/2024 at 3:24 AM

## 2024-06-20 NOTE — ED NOTES
"[facility]   Admission Handoff    The patient is Alvin Newton, 89 year old who arrived in the ED by WHEELCHAIR from emergency room with a complaint of Bradycardia (Sent by home health nurse for HR at 38 to 40. Patient denies any chest pain or dizziness. Has a new person who set up his med so home health was questioning if his meds were mixed up. )  . The patient's current symptoms are new and during this time the symptoms have remained the same. In the ED, patient was diagnosed with   Final diagnoses:   Sinus bradycardia - Possible inadvertent beta-blocker double dose.         Needed?: No    Allergies:  No Known Allergies    Past Medical Hx:   Past Medical History:   Diagnosis Date    Colonic diverticulum     Hyperlipidemia     Hypertension     Mixed hyperlipidemia 01/02/2009    Obesity (BMI 30-39.9)     Osteoarthritis     Type 2 diabetes mellitus (H)        Initial vitals were: BP: (!) 155/79  Pulse: (!) 42  Temp: 97.9  F (36.6  C)  Resp: 16  Height: 180.3 cm (5' 11\")  Weight: 100.7 kg (222 lb)  SpO2: 99 %   Recent vital Signs: BP (!) 164/81   Pulse (!) 43   Temp 97.9  F (36.6  C) (Tympanic)   Resp 14   Ht 1.803 m (5' 11\")   Wt 100.7 kg (222 lb)   SpO2 96%   BMI 30.96 kg/m      Elimination Status: Continent: Yes     Activity Level: 2 assist    Fall Status: Reason for falls risk:  Mobility and Reason for falls risk: Cognition  bed/chair alarm on, nonskid shoes/slippers when out of bed, arm band in place, assistive device/personal items within reach, activity supervised, and room door open    Baseline Mental status: mild dementia  Current Mental Status changes: at basesline    Infection present or suspected this encounter: no  Sepsis suspected: No    Isolation type: NA    Bariatric equipment needed?: No    In the ED these meds were given:   Medications   sodium chloride 0.9% BOLUS 500 mL (0 mLs Intravenous Stopped 6/19/24 1946)       Drips running?  No    Home pump  " No    Current LDAs: Peripheral IV: Site left AC; Gauge 18  normal saline     Results:   Labs/Imaging  Ordered and Resulted from Time of ED Arrival Up to the Time of Departure from the ED  Results for orders placed or performed during the hospital encounter of 06/19/24 (from the past 24 hour(s))   [place] Draw    Narrative    The following orders were created for panel order [place] Draw.  Procedure                               Abnormality         Status                     ---------                               -----------         ------                     Extra Blue Top Tube[[id number]]                              Final result               Extra Red Top Tube[[id number]]                               Final result                 Please view results for these tests on the individual orders.   CBC with platelets, differential    Narrative    The following orders were created for panel order CBC with platelets, differential.  Procedure                               Abnormality         Status                     ---------                               -----------         ------                     CBC with platelets and d...[[id number]]  Abnormal            Final result                 Please view results for these tests on the individual orders.   Comprehensive metabolic panel   Result Value Ref Range    Sodium 140 135 - 145 mmol/L    Potassium 4.6 3.4 - 5.3 mmol/L    Carbon Dioxide (CO2) 24 22 - 29 mmol/L    Anion Gap 10 7 - 15 mmol/L    Urea Nitrogen 25.7 (H) 8.0 - 23.0 mg/dL    Creatinine 1.40 (H) 0.67 - 1.17 mg/dL    GFR Estimate 48 (L) >60 mL/min/1.73m2    Calcium 9.5 8.2 - 9.6 mg/dL    Chloride 106 98 - 107 mmol/L    Glucose 111 (H) 70 - 99 mg/dL    Alkaline Phosphatase 236 (H) 40 - 150 U/L    AST 28 0 - 45 U/L    ALT 23 0 - 70 U/L    Protein Total 7.6 6.4 - 8.3 g/dL    Albumin 3.5 3.5 - 5.2 g/dL    Bilirubin Total 1.0 <=1.2 mg/dL   Troponin T, High Sensitivity   Result Value Ref Range    Troponin T, High  Sensitivity 50 (H) <=22 ng/L   Extra Blue Top Tube   Result Value Ref Range    Hold Specimen JIC    Extra Red Top Tube   Result Value Ref Range    Hold Specimen JIC    CBC with platelets and differential   Result Value Ref Range    WBC Count 5.1 4.0 - 11.0 10e3/uL    RBC Count 4.23 (L) 4.40 - 5.90 10e6/uL    Hemoglobin 12.2 (L) 13.3 - 17.7 g/dL    Hematocrit 39.0 (L) 40.0 - 53.0 %    MCV 92 78 - 100 fL    MCH 28.8 26.5 - 33.0 pg    MCHC 31.3 (L) 31.5 - 36.5 g/dL    RDW 15.5 (H) 10.0 - 15.0 %    Platelet Count 235 150 - 450 10e3/uL    % Neutrophils 36 %    % Lymphocytes 43 %    % Monocytes 16 %    % Eosinophils 4 %    % Basophils 1 %    % Immature Granulocytes 0 %    NRBCs per 100 WBC 0 <1 /100    Absolute Neutrophils 1.9 1.6 - 8.3 10e3/uL    Absolute Lymphocytes 2.2 0.8 - 5.3 10e3/uL    Absolute Monocytes 0.8 0.0 - 1.3 10e3/uL    Absolute Eosinophils 0.2 0.0 - 0.7 10e3/uL    Absolute Basophils 0.1 0.0 - 0.2 10e3/uL    Absolute Immature Granulocytes 0.0 <=0.4 10e3/uL    Absolute NRBCs 0.0 10e3/uL   TSH with free T4 reflex   Result Value Ref Range    TSH 2.25 0.30 - 4.20 uIU/mL   Troponin T, High Sensitivity   Result Value Ref Range    Troponin T, High Sensitivity 46 (H) <=22 ng/L       For the majority of the shift this patient's behavior was Green     Cardiac Rhythm: Bradycardia  Pt needs tele? Yes  Skin/wound Issues: None    Code Status: DNR    Pain control: good    Nausea control: good    Abnormal labs/tests/findings requiring intervention: Bradycardia, slightly elevated troponin    Patient tested for COVID 19 prior to admission: NO     OBS brochure/video discussed/provided to patient/family: Yes     Family present during ED course? Yes     Family Comments/Social Situation comments: Lives at home with wife    Tasks needing completion: None    Khadijah Luis RN

## 2024-06-20 NOTE — PROGRESS NOTES
[facility]    Medicine Progress Note - Hospitalist Service    Date of Admission:  6/19/2024    Assessment & Plan   Alvin Newton is a 89 year old male with past medical hx of paroxysmal Afib, severe aortic stenosis, amyloidosis, MGUS, T2DM, CKD, HTN, BPH, Schatzkis ring who presents on 6/19/2024 with home nurse reporting a HR of 38-40.    # Sinus bradycardia, asymptomatic    Presents because home RN noted a HR of 38-40 (baseline ~60s). Pt has new person preparing his medications, and he accidentally received either 50mg or 75mg (unknown) of Metoprolol XL instead of his usual 25. HR remained 40-50 in ER. Pt is asymptomatic, but given his high fall risk and gait instability it was felt unsafe to return home. HR will likely improve after excess Metoprolol washes out of his system. With him remaining bradycardic extended period of observation to allow further time to metabolize. If he does not improve will need to consider other etiologies possible including his amyloidosis   - Continued telemetry   - Holding PTA Metoprolol 12.5 daily    # Paroxysmal atrial fibrillation with RVR (H)  # Right bundle branch block  EKG without afib.  - Continue PTA Amiodarone 200, Apixaban 5 BID  - Hold PTA Metoprolol 12.5 as above    # Severe aortic stenosis  # Chronic diastolic (congestive) heart failure (H)  # Essential hypertension  Followed by cardiology, last visit 3/4/24. Echo 7/2023 with moderate concentric LVH, EF 50-55%, severe aortic stenosis, mildly dilated RV. Doesn't appear to be in acute CHF exacerbation on admission.  - Continued PTA Torsemide 10    # Hx unspecified hypotension   Had been on midodrine after a surgery 1/2023 for suspected adrenal insufficiency. It appears midodrine 2.5 mg once daily was started 2/2024 for unspecified hypotension. With him being hypertensive here will stop.  - Stopped PTA midodrine     # RLE cellulitis, acute, improving   Has small area of cellulitis on right shin.  Warm, weeping, erythematous, clear demarcations. No leukocytosis or fever. States it's been present ~1m and it intermittently blisters. Has a hx of cellulitis in both legs.   - Continued PO Keflex QID    # Hx type 2 diabetes mellitus with diabetic polyneuropathy (H)  A1c 5.8% on 5/23/24. Good control. Not on meds PTA. Withholding sliding scale insulin and frequent monitoring.    # Pure hypercholesterolemia  - Continued PTA atorvastatin 20    Stage 3a chronic kidney disease (H)  Cr 1.4 on admission (baseline 1.2-1.5). Improving.  - BMP in am    # Schatzki's ring of distal esophagus-dilated 8/19/23  # Esophageal dysphagia  Noted in chart review. Denies recent aspiration.    # Amyloidosis (H)  Possibly contributing to bradycardia.   - Continue PTA Tafamidis 61    # Monoclonal gammopathy of unknown significance (MGUS)  SPEP 6/13/24 stable.    # Elevated alk phos, improving   236 on admit. Other LFTs WNL.     # Elevated troponin  50 ?  46. Chronically elevated. No chest pain or other symptoms. EKG without ischemia. Low concern for ACS.     # Anemia, normocytic, chronic  12.2 on admit (baseline 11-12). Stable.   - CBC in am       Observation Goals: -diagnostic tests and consults completed and resulted, -vital signs normal or at patient baseline, -returns to baseline functional status, Nurse to notify provider when observation goals have been met and patient is ready for discharge.  Diet: Combination Diet Moderate Consistent Carb (60 g CHO per Meal) Diet; 2 gm NA Diet    DVT Prophylaxis: DOAC  Eastman Catheter: Not present  Lines: None     Cardiac Monitoring: ACTIVE order. Indication: Bradycardias (48 hours)  Code Status: No CPR- Do NOT Intubate      Clinically Significant Risk Factors Present on Admission              # Hypoalbuminemia: Lowest albumin = 3.3 g/dL at 6/20/2024  6:17 AM, will monitor as appropriate  # Drug Induced Coagulation Defect: home medication list includes an anticoagulant medication    # Hypertension:  "Noted on problem list  # Chronic heart failure with preserved ejection fraction: heart failure noted on problem list and last echo with EF >50%           # DMII: A1C = N/A within past 6 months    # Obesity: Estimated body mass index is 31.58 kg/m  as calculated from the following:    Height as of this encounter: 1.803 m (5' 11\").    Weight as of this encounter: 102.7 kg (226 lb 6.6 oz).       # Financial/Environmental Concerns: none         Disposition Plan     Medically Ready for Discharge: Anticipated Today     Cristofer Carson MD  Hospitalist Service  [facility]  Securely message with [employer] (more info)  Text page via [facility] Paging/Directory   ______________________________________________________________________    Interval History   Admitted yesterday. No acute evens since admission. States that he feels well this morning and denies having ant palpations, chest pain, chest pressure, SOB, fevers, or chills.     Physical Exam   Vital Signs: Temp: 97.5  F (36.4  C) Temp src: Oral BP: (!) 157/63 Pulse: (!) 48   Resp: 16 SpO2: 97 % O2 Device: None (Room air)    Weight: 226 lbs 6.6 oz    General Appearance: Awake and alert, sitting up in bed, in no acute distress   Respiratory:  Breathing easily on room air   Cardiovascular: Regular rhythm and bradycardic   GI: Abdomen soft, non-tender, and non-distended   Skin: No rashes or lesions   Other: Appropriate mood and affect, no focal deficits appreciated      Medical Decision Making       45 MINUTES SPENT BY ME on the date of service doing chart review, history, exam, documentation & further activities per the note.      Data     I have personally reviewed the following data over the past 24 hrs:    4.8  \   11.8 (L)   / 223     139 107 20.6 /  102 (H)   4.1 22 1.23 (H) \     ALT: 19 AST: 24 AP: 219 (H) TBILI: 1.2   ALB: 3.3 (L) TOT PROTEIN: 6.8 LIPASE: N/A     Trop: 49 (H) BNP: 1,984 (H)       Imaging results reviewed over the past 24 hrs: "   No results found for this or any previous visit (from the past 24 hour(s)).

## 2024-06-20 NOTE — H&P
[facility]    History and Physical  Hospital Medicine       Date of Admission:  6/19/2024  Date of Service: 6/19/2024     Assessment & Plan   Alvin Newton is a 89 year old male with past medical hx of paroxysmal Afib, severe aortic stenosis, amyloidosis, MGUS, T2DM, CKD, HTN, BPH, Schatzkis ring who presents on 6/19/2024 with home nurse reporting a HR of 38-40.    Sinus bradycardia, asymptomatic    Presents because home RN noted a HR of 38-40 (baseline ~60s). Pt has new person preparing his medications, and he accidentally received either 50mg or 75mg (unknown) of Metoprolol XL instead of his usual 25. HR remained 40-50 in ER. Pt is asymptomatic, but given his high fall risk and gait instability it was felt unsafe to return home. HR will likely improve after excess Metoprolol washes out of his system.     - Tele  - Hold PTA Metoprolol 12.5 daily    Paroxysmal atrial fibrillation with RVR (H)  Right bundle branch block  EKG without afib.  - Continue PTA Amiodarone 200, Apixaban 5 BID  - Hold PTA Metoprolol 12.5 as above    Severe aortic stenosis  Chronic diastolic (congestive) heart failure (H)  Essential hypertension  Followed by cardiology, last visit 3/4/24. Echo 7/2023 with moderate concentric LVH, EF 50-55%, severe aortic stenosis, mildly dilated RV. Doesn't appear to be in acute CHF exacerbation on admission.  - Continue PTA Torsemide 10, Midodrine 2.5    RLE cellulitis, acute  Has small area of cellulitis on right shin. Warm, weeping, erythematous, clear demarcations. No leukocytosis or fever. States it's been present ~1m and it intermittently blisters.  Has a hx of cellulitis in both legs.   - PO Keflex QID    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)  A1c 5.8% on 5/23/24. Good control. Not on meds PTA. Withholding sliding scale insulin and frequent monitoring.    Pure hypercholesterolemia  - Continue PTA Atorvastatin 20    Stage 3a chronic kidney disease (H)  Cr  "1.4 on admission (baseline 1.2-1.5).  - CMP in am    Schatzki's ring of distal esophagus-dilated 8/19/23  Esophageal dysphagia  Noted in chart review. Denies recent aspiration.    Amyloidosis (H)  - Continue PTA Tafamidis 61    Monoclonal gammopathy of unknown significance (MGUS)  SPEP 6/13/24 stable.    Elevated alk phos  236 on admit. Other LFTs WNL.   - CMP in am    Elevated troponin  50 ?  46. Chronically elevated. No chest pain. EKG without ischemia.    Anemia, normocytic, chronic  12.2 on admit (baseline 11-12).  - CBC in am    Clinically Significant Risk Factors Present on Admission               # Drug Induced Coagulation Defect: home medication list includes an anticoagulant medication    # Hypertension: Noted on problem list  # Chronic heart failure with preserved ejection fraction: heart failure noted on problem list and last echo with EF >50%           # DMII: A1C = N/A within past 6 months    # Obesity: Estimated body mass index is 31.64 kg/m  as calculated from the following:    Height as of this encounter: 1.803 m (5' 11\").    Weight as of this encounter: 102.9 kg (226 lb 13.7 oz).         # Financial/Environmental Concerns:            Diet: Combination Diet Moderate Consistent Carb (60 g CHO per Meal) Diet; 2 gm NA Diet  DVT Prophylaxis: DOAC  Eastman Catheter: Not present  Code Status: No CPR- Do NOT Intubate  Lines: peripheral IV    Disposition Plan      Expected Discharge Date: 06/20/2024             Entered: Tan Bose PA-C 06/20/2024, 12:02 AM     Status: Patient is appropriate for obs admission to ensure Metoprolol washes out of his system and HR returns to normal.   Tan Bose PA-C        The patient's care was discussed with the Attending Physician, Dr. Kennedy .    Primary Care Physician   Semmler, Steven Duane [phone]    History is obtained from the patient and review of old records via the EMR.    History of Present Illness   Alvin Newton is a 89 year old male with past " medical hx of paroxysmal Afib, severe aortic stenosis, amyloidosis, MGUS, T2DM, CKD, HTN, BPH, Schatzkis ring who presents on 6/19/2024 with home nurse reporting a HR of 38-40.    Patient presents from home with his home nurse reporting a blood pressure of 38-40.  Heart rate has remained in the 40s on admission.  He reports no symptoms.  Denies shortness of breath, cough, lightheadedness, dizziness, falls, chest pain.  He has a new person managing his medications and he thinks that he got 1.5 metoprolol XL instead of his usual 0.5.  He has a history of frequent falls and gait instability, so best to manage inpatient.    Review of Systems   The 5 point Review of Systems is negative other than noted in the HPI or here.     Past Medical History    Past Medical History:   Diagnosis Date    Colonic diverticulum     Hyperlipidemia     Hypertension     Mixed hyperlipidemia 01/02/2009    Obesity (BMI 30-39.9)     Osteoarthritis     Type 2 diabetes mellitus (H)      Patient Active Problem List    Diagnosis Date Noted    Sinus bradycardia 06/19/2024     Priority: Medium    Amyloidosis (H) 01/25/2024     Priority: Medium    Monoclonal gammopathy of unknown significance (MGUS) 01/25/2024     Priority: Medium    Hypotension, unspecified hypotension type 01/25/2024     Priority: Medium    Postoperative anemia due to acute blood loss 01/25/2024     Priority: Medium    Hip fracture, left, closed, initial encounter (H) 01/22/2024     Priority: Medium    Acute kidney failure, unspecified (H24) 08/24/2023     Priority: Medium    Open wound-coccyx/buttocks 08/23/2023     Priority: Medium    Hypokalemia 08/19/2023     Priority: Medium    Hyponatremia 08/19/2023     Priority: Medium    Unintentional weight loss 08/18/2023     Priority: Medium    Abnormal esophagram 08/18/2023     Priority: Medium    DEJUAN (acute kidney injury) (H24) 08/17/2023     Priority: Medium    Moderate malnutrition (H24) 08/17/2023     Priority: Medium    Schatzki's  ring of distal esophagus-dilated 8/19/23 08/17/2023     Priority: Medium    Acute gout involving toe of left foot 08/17/2023     Priority: Medium    Esophageal dysphagia 08/11/2023     Priority: Medium    Esophageal dysmotility 07/27/2023     Priority: Medium    Enterocolitis due to Clostridium difficile, not specified as recurrent 07/27/2023     Priority: Medium    History of Clostridium difficile infection July 2023 07/25/2023     Priority: Medium    Stage 3a chronic kidney disease (H) 07/25/2023     Priority: Medium    Altered mental status, unspecified altered mental status type 07/23/2023     Priority: Medium    Clostridium difficile diarrhea 07/23/2023     Priority: Medium    Other chronic pain 07/19/2023     Priority: Medium    Pain in left shoulder 07/19/2023     Priority: Medium    Pain in right shoulder 07/19/2023     Priority: Medium    Unspecified streptococcus as the cause of diseases classified elsewhere 07/19/2023     Priority: Medium    Severe aortic stenosis 07/14/2023     Priority: Medium    Elevated brain natriuretic peptide (BNP) level 07/14/2023     Priority: Medium    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     Priority: Medium    Peripheral edema 07/14/2023     Priority: Medium    Positive urine culture 07/14/2023     Priority: Medium    Medication induced coagulopathy  (H24) 07/14/2023     Priority: Medium    Streptococcal bacteremia 07/13/2023     Priority: Medium    Thrombocytopenia (H24) 07/13/2023     Priority: Medium    Hyperbilirubinemia 07/13/2023     Priority: Medium    Hypophosphatemia 07/13/2023     Priority: Medium    Hypoalbuminemia 07/13/2023     Priority: Medium    Pyuria 07/13/2023     Priority: Medium    Paroxysmal atrial fibrillation with RVR (H) 07/13/2023     Priority: Medium    Hypomagnesemia 07/13/2023     Priority: Medium    Chronic pain of both shoulders 07/13/2023     Priority: Medium    Septic shock (H) 07/12/2023     Priority: Medium    Cellulitis of right lower  extremity 07/11/2023     Priority: Medium    Severe sepsis (H) 07/11/2023     Priority: Medium    Chronic diastolic (congestive) heart failure (H) 03/31/2023     Priority: Medium    Impaired fasting glucose 06/20/2022     Priority: Medium    Osteoarthritis of pelvis 06/20/2022     Priority: Medium     Dec 16, 2003 Entered By: JOHN GROVE Comment: 1988  S-P HIP REPLACEMENT      Primary localized osteoarthrosis of shoulder region 06/20/2022     Priority: Medium    Reason for consultation 06/20/2022     Priority: Medium    Right bundle branch block 06/20/2022     Priority: Medium    Fall, initial encounter 06/06/2022     Priority: Medium    Closed fracture of first lumbar vertebra, unspecified fracture morphology, initial encounter (H) 06/06/2022     Priority: Medium    Fracture of L1 vertebra (H) 06/06/2022     Priority: Medium    Carpal tunnel syndrome, bilateral 11/18/2021     Priority: Medium    BPH with urinary obstruction 06/22/2020     Priority: Medium    NSTEMI (non-ST elevated myocardial infarction) (H) 12/27/2017     Priority: Medium    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) 12/06/2017     Priority: Medium    Essential hypertension 04/04/2016     Priority: Medium    Pure hypercholesterolemia 01/01/1999     Priority: Medium    Other ill-defined and unknown causes of morbidity and mortality 01/01/1985     Priority: Medium     Dec 16, 2003 Entered By: JOHN GROVE Comment: S-P TURP          Past Surgical History   Past Surgical History:   Procedure Laterality Date    ARTHROPLASTY HIP Left 1/24/2024    Procedure: Revision total hip arthroplasty, both components;  Surgeon: Aurelio Ramos MD;  [facility]    ARTHROPLASTY HIP BILATERAL  1987    ESOPHAGOSCOPY, GASTROSCOPY, DUODENOSCOPY (EGD), COMBINED N/A 8/19/2023    Procedure: ESOPHAGOGASTRODUODENOSCOPY, WITH BIOPSY;  Surgeon: Antolin Cyr DO;  Location:  GI    ESOPHAGOSCOPY, GASTROSCOPY, DUODENOSCOPY (EGD),  DILATATION, COMBINED N/A 2023    Procedure: Esophagoscopy, gastroscopy, duodenoscopy, dilatation, combined;  Surgeon: Antolin Cyr DO;  Location:  GI     ESOPHAGOSCOPY, DIAGNOSTIC      LAPAROSCOPIC CHOLECYSTECTOMY N/A 2017    Procedure: LAPAROSCOPIC CHOLECYSTECTOMY;  LAPAROSCOPIC CHOLECYSTECTOMY;  Surgeon: Heber Gonzalez MD;  Location:  OR    OPEN REDUCTION INTERNAL FIXATION HIP Left 2024    Procedure: Open reduction internal fixation left periprosthetic femur fracture;  Surgeon: Aurelio Ramos MD;  Location: [facility] OR    TRANSRECTAL ULTRASONIC, TRANSURETHRAL RESECTION (TUR) OF PROSTATE CYST          Prior to Admission Medications   Prior to Admission Medications   Prescriptions Last Dose Informant Patient Reported? Taking?   ELIQUIS ANTICOAGULANT 5 MG tablet 2024 at am Spouse/Significant Other, Self Yes Yes   Sig: Take 5 mg by mouth 2 times daily   Magnesium Oxide 250 MG TABS 2024 at am Self, Spouse/Significant Other Yes Yes   Sig: Take 250 mg by mouth daily   acetaminophen (TYLENOL) 325 MG tablet 2024 at prn Self, Spouse/Significant Other No Yes   Simg by mouth every AM and 975mg twice a day as needed   Patient taking differently: Take 650-975 mg by mouth 2 times daily as needed   amiodarone (PACERONE) 200 MG tablet Unknown Spouse/Significant Other, Self No Yes   Sig: Take 1 tablet (200 mg) by mouth daily   atorvastatin (LIPITOR) 20 MG tablet 2024 at pm Spouse/Significant Other, Self Yes Yes   Sig: Take 20 mg by mouth At Bedtime   lidocaine (XYLOCAINE) 5 % external ointment Past Week Self, Spouse/Significant Other No Yes   Sig: Apply topically 3 times daily   metoprolol succinate ER (TOPROL XL) 25 MG 24 hr tablet 2024 at am Self, Spouse/Significant Other Yes Yes   Sig: Take 12.5 mg by mouth daily Hold if SBP < 105.   midodrine (PROAMATINE) 2.5 MG tablet Unknown Self, Spouse/Significant Other Yes Yes   Sig: Take 2.5 mg by mouth  daily   tafamidis (VYNDAMAX) 61 MG CAPS capsule 6/19/2024 at am Self, Spouse/Significant Other Yes Yes   Sig: Take 61 mg by mouth daily   torsemide (DEMADEX) 20 MG tablet 6/19/2024 at am Self, Spouse/Significant Other Yes Yes   Sig: Take 10 mg by mouth daily      Facility-Administered Medications: None     Allergies   No Known Allergies    Family History    No family history on file.  Social History   Social History     Socioeconomic History    Marital status:      Spouse name: Not on file    Number of children: Not on file    Years of education: Not on file    Highest education level: Not on file   Occupational History    Not on file   Tobacco Use    Smoking status: Never    Smokeless tobacco: Never   Substance and Sexual Activity    Alcohol use: Yes     Comment: 0-1 beer daily    Drug use: No    Sexual activity: Not on file   Other Topics Concern    Not on file   Social History Narrative    Not on file     Social Determinants of Health     Financial Resource Strain: Medium Risk (8/26/2022)    [facility], [facility]    Financial Resource Strain     Difficulty of Paying Living Expenses: 1     Difficulty of Paying Living Expenses: 2   Food Insecurity: No Food Insecurity (8/26/2022)    Received from [facility] [employer] [facility], [facility]    Food Insecurity     Worried About Running Out of Food in the Last Year: 1   Transportation Needs: No Transportation Needs (8/26/2022)    Received from [facility] [employer] [facility], [facility]    Transportation Needs     Lack of Transportation (Medical): 1   Physical Activity: Not on file   Stress: Not on file   Social Connections: Unknown (8/28/2023)    Received from [facility] [employer] [facility], [facility]    Social Connections     Frequency of  "Communication with Friends and Family: Not on file   Interpersonal Safety: Not on file   Housing Stability: Low Risk  (8/26/2022)    [facility], [employer] [facility]    Housing Stability     Unable to Pay for Housing in the Last Year: 1     Physical Exam   BP (!) 142/53 (BP Location: Right arm)   Pulse 52   Temp 97.8  F (36.6  C) (Oral)   Resp 15   Ht 1.803 m (5' 11\")   Wt 102.9 kg (226 lb 13.7 oz)   SpO2 96%   BMI 31.64 kg/m       Weight: 226 lbs 13.65 oz Body mass index is 31.64 kg/m .     Constitutional: Alert, oriented, cooperative, in no acute distress, appears nontoxic.  HENT: Oropharynx is clear. No evidence of cranial trauma. Normocephalic. Eyes anicteric.   Cardiovascular: Regular rate and rhythm, normal S1 and S2, and no murmur noted. 1+ BLE edema  Respiratory: Clear to auscultation bilaterally with no adventitious breath sounds.   GI: Soft, non-tender, normal bowel sounds, no hepatomegaly, no masses.  Musculoskeletal: Normal muscle bulk and tone. FROM in all extremities   Skin: Area of erythema on RLE, roughly 10cm. Warm, mildly tender.  Neurologic: Cranial nerves 2-12 are grossly intact.        Data   Data reviewed today:   Recent Labs   Lab 06/19/24  2252 06/19/24  1650   WBC  --  5.1   HGB  --  12.2*   MCV  --  92   PLT  --  235   NA  --  140   POTASSIUM  --  4.6   CHLORIDE  --  106   CO2  --  24   BUN  --  25.7*   CR  --  1.40*   ANIONGAP  --  10   JERRY  --  9.5   GLC 90 111*   ALBUMIN  --  3.5   PROTTOTAL  --  7.6   BILITOTAL  --  1.0   ALKPHOS  --  236*   ALT  --  23   AST  --  28       No results found for this or any previous visit (from the past 24 hour(s)).    I personally reviewed the EKG tracing showing sinus tacho    "

## 2024-06-20 NOTE — PROGRESS NOTES
[facility]  Patient is currently open to home care services with [facility]. The patient has  RN PT OT   services.  [facility]  and team have been notified of patient admission.  NO Need to send a  new  referral thru the HUB.  [facility] liaison will continue to follow patient during stay.  If appropriate provide orders to resume home care at time of discharge.

## 2024-06-20 NOTE — PLAN OF CARE
"6/20/24  [id number]  Problem: Adult Inpatient Plan of Care  Goal: Plan of Care Review  Outcome: Progressing  Goal: Patient-Specific Goal (Individualized)  Outcome: Progressing  Goal: Absence of Hospital-Acquired Illness or Injury  Outcome: Progressing  Intervention: Identify and Manage Fall Risk  Recent Flowsheet Documentation  Taken 6/20/2024 1558 by Aggie Puga, RN  Safety Promotion/Fall Prevention: activity supervised    Intervention: Prevent Skin Injury  Recent Flowsheet Documentation  Taken 6/20/2024 1558 by Aggie Puga, RN  Body Position: position changed independently    Goal: Optimal Comfort and Wellbeing  Outcome: Progressing  Goal: Readiness for Transition of Care  Outcome: Progressing   Goal Outcome Evaluation:  BP (!) 157/63 (BP Location: Right arm)   Pulse (!) 48   Temp 97.5  F (36.4  C) (Oral)   Resp 16   Ht 1.803 m (5' 11\")   Wt 102.7 kg (226 lb 6.6 oz)   SpO2 97%   BMI 31.58 kg/m           PT remains asymptomatic with HR maintaining 43-48 throughout the day. Pt up in chair, Pleasant, denied chest pain. EKG completed this afternoon. Remains on Tele.                "

## 2024-06-20 NOTE — MEDICATION SCRIBE - ADMISSION MEDICATION HISTORY
Medication Scribe Admission Medication History    Admission medication history is complete. The information provided in this note is only as accurate as the sources available at the time of the update.    Information Source(s): Patient via in-person    Pertinent Information: Spoke to patient and spouse as well as Nancy with [facility]. [facility]unable to find contact info). When home health nurse showed up this morning, patient's heart rate was unusually low which prompted her to check his medications. She found his Metoprolol to not be cut in half (typical dose is 12.5 mg tablet every day) and made the assumption that he took 25 mg instead. Patient is not a reliable historian and was not able to provide details on all his medications as far as what he took today. PTA med list is current however    Changes made to PTA medication list:  Added: None  Deleted: None  Changed: None    Allergies reviewed with patient and updates made in EHR: yes    Medication History Completed By: Aggie Sosa 6/19/2024 7:54 PM    PTA Med List   Medication Sig Note Last Dose    acetaminophen (TYLENOL) 325 MG tablet 975mg by mouth every AM and 975mg twice a day as needed (Patient taking differently: Take 650-975 mg by mouth 2 times daily as needed)  6/19/2024 at prn    amiodarone (PACERONE) 200 MG tablet Take 1 tablet (200 mg) by mouth daily  Unknown    atorvastatin (LIPITOR) 20 MG tablet Take 20 mg by mouth At Bedtime  6/18/2024 at pm    ELIQUIS ANTICOAGULANT 5 MG tablet Take 5 mg by mouth 2 times daily  6/19/2024 at am    lidocaine (XYLOCAINE) 5 % external ointment Apply topically 3 times daily  Past Week    Magnesium Oxide 250 MG TABS Take 250 mg by mouth daily  6/19/2024 at am    metoprolol succinate ER (TOPROL XL) 25 MG 24 hr tablet Take 12.5 mg by mouth daily Hold if SBP < 105. 6/19/2024: Home Health nurse believes patient took full tablet this morning 6/19/2024 at am    midodrine  (PROAMATINE) 2.5 MG tablet Take 2.5 mg by mouth daily  Unknown    tafamidis (VYNDAMAX) 61 MG CAPS capsule Take 61 mg by mouth daily  6/19/2024 at am    torsemide (DEMADEX) 20 MG tablet Take 10 mg by mouth daily  6/19/2024 at am

## 2024-06-20 NOTE — CONSULTS
Care Management Initial Consult    General Information  Assessment completed with: Children, Spouse or significant other,    Type of CM/SW Visit: Initial Assessment    Primary Care Provider verified and updated as needed: Yes   Readmission within the last 30 days: no previous admission in last 30 days      Reason for Consult: discharge planning  Advance Care Planning: Advance Care Planning Reviewed: no concerns identified        Communication Assessment  Patient's communication style: spoken language (English or Bilingual)    Hearing Difficulty or Deaf: yes   Wear Glasses or Blind: yes    Cognitive  Cognitive/Neuro/Behavioral: WDL                      Living Environment:   People in home: spouse     Current living Arrangements: house      Able to return to prior arrangements: yes    Family/Social Support:  Care provided by: self, spouse/significant other, child(katarzyna)  Provides care for: no one  Marital Status:   Wife, Children          Description of Support System: Supportive, Involved    Support Assessment: Adequate family and caregiver support, Adequate social supports    Current Resources:   Patient receiving home care services: Yes  Skilled Home Care Services: Skilled Nursing, Physical Therapy, Occupational Therapy  Community Resources: Home Care  Equipment currently used at home: cane, quad, grab bar, tub/shower  Supplies currently used at home: Hearing Aid Batteries    Employment/Financial:  Employment Status: retired        Financial Concerns: none      Does the patient's insurance plan have a 3 day qualifying hospital stay waiver?  No    Lifestyle & Psychosocial Needs:  Social Determinants of Health     Food Insecurity: No Food Insecurity (8/26/2022)    [facility]    Food Insecurity     Worried About Running Out of Food in the Last Year: 1   Depression: Not at risk (5/23/2024)    Received from [facility] [employer] [facility]    PHQ-2     PHQ-2 TOTAL SCORE: 1   Housing Stability: Low Risk  (8/26/2022)    Received from [facility] [employer] [facility], [facility]    Housing Stability     Unable to Pay for Housing in the Last Year: 1   Tobacco Use: Low Risk  (6/13/2024)    Received from [facility]    Patient History     Smoking Tobacco Use: Never     Smokeless Tobacco Use: Never     Passive Exposure: Not on file   Financial Resource Strain: Medium Risk (8/26/2022)    Received from [facility] [facility], [facility]    Financial Resource Strain     Difficulty of Paying Living Expenses: 1     Difficulty of Paying Living Expenses: 2   Alcohol Use: Not on file   Transportation Needs: No Transportation Needs (8/26/2022)    Received from [facility] [employer] [facility], [facility]    Transportation Needs     Lack of Transportation (Medical): 1   Physical Activity: Not on file   Interpersonal Safety: Not on file   Stress: Not on file   Social Connections: Unknown (8/28/2023)    Received from [facility] [employer] [facility], [facility]    Social Connections     Frequency of Communication with Friends and Family: Not on file   Health Literacy: Not on file     Functional Status:  Prior to admission patient needed assistance:   Dependent ADLs:: Independent  Dependent IADLs:: Shopping, Cleaning, Cooking, Laundry, Medication Management, Transportation, Money Management     Mental Health Status:  Mental Health Status: No Current Concerns       Chemical Dependency Status:  Chemical Dependency Status: No Current Concerns           Values/Beliefs:  Spiritual, Cultural Beliefs, Mandaen Practices, Values that affect care: no             Additional Information:    CM placed self referral due to  elevated TRUDI score.     Met with patient, spouse, and daughter at bedside and introduced self and role. Patient lives with his spouse in a one-level townhouse.  Patient has 3 children that are supportive. Wife assists with ADLs and IADLs.     [facility] is a  [facility] also [facility] with [facility] (Phone: [phone]) for RN/PT/OT.    PLAN: Home with resumption of HC- RN/PT/OT      GIANLUCA ARCE RN

## 2024-06-20 NOTE — PLAN OF CARE
[place] [facility] [place]    Patient admitted to room 2301 at approximately 2225 via bed from emergency room. Patient was accompanied by transport tech.     Verbal SBAR report received from Khadijah GORE prior to patient arrival.     Patient ambulated to bed with stand-by assist. Patient alert and oriented X 3. The patient is not having any pain.  . Admission vital signs: Blood pressure (!) 142/53, pulse 52, temperature 97.8  F (36.6  C), temperature source Oral, resp. rate 15, height 1.803 m (5' 11\"), weight 102.9 kg (226 lb 13.7 oz), SpO2 96%. Patient was oriented to plan of care, call light, bed controls, tv, telephone, bathroom, and visiting hours.     Risk Assessment    The following safety risks were identified during admission: fall. Yellow risk band applied: YES.     Skin Initial Assessment    This writer admitted this patient and completed a full skin assessment and Hunter score in the Adult PCS flowsheet.   Photo documentation of skin problem and/or wound competed via [employer] application (located under Media):  N/A    Appropriate interventions initiated as needed.     Secondary skin check completed by April RN.         Education    Patient has a Eckert to Observation order: Yes  Observation education completed and documented: Yes      Jacquelyn Carson RN             "

## 2024-06-21 VITALS
HEART RATE: 54 BPM | TEMPERATURE: 97.6 F | WEIGHT: 226.41 LBS | RESPIRATION RATE: 18 BRPM | SYSTOLIC BLOOD PRESSURE: 154 MMHG | HEIGHT: 71 IN | BODY MASS INDEX: 31.7 KG/M2 | DIASTOLIC BLOOD PRESSURE: 60 MMHG | OXYGEN SATURATION: 98 %

## 2024-06-21 LAB
ANION GAP SERPL CALCULATED.3IONS-SCNC: 13 MMOL/L (ref 7–15)
BUN SERPL-MCNC: 21.9 MG/DL (ref 8–23)
CALCIUM SERPL-MCNC: 9.1 MG/DL (ref 8.2–9.6)
CHLORIDE SERPL-SCNC: 107 MMOL/L (ref 98–107)
CREAT SERPL-MCNC: 1.31 MG/DL (ref 0.67–1.17)
DEPRECATED HCO3 PLAS-SCNC: 20 MMOL/L (ref 22–29)
EGFRCR SERPLBLD CKD-EPI 2021: 52 ML/MIN/1.73M2
ERYTHROCYTE [DISTWIDTH] IN BLOOD BY AUTOMATED COUNT: 15.1 % (ref 10–15)
GLUCOSE SERPL-MCNC: 103 MG/DL (ref 70–99)
HCT VFR BLD AUTO: 38 % (ref 40–53)
HGB BLD-MCNC: 12.2 G/DL (ref 13.3–17.7)
MCH RBC QN AUTO: 28.6 PG (ref 26.5–33)
MCHC RBC AUTO-ENTMCNC: 32.1 G/DL (ref 31.5–36.5)
MCV RBC AUTO: 89 FL (ref 78–100)
PLATELET # BLD AUTO: 230 10E3/UL (ref 150–450)
POTASSIUM SERPL-SCNC: 4.2 MMOL/L (ref 3.4–5.3)
RBC # BLD AUTO: 4.27 10E6/UL (ref 4.4–5.9)
SODIUM SERPL-SCNC: 140 MMOL/L (ref 135–145)
WBC # BLD AUTO: 5.5 10E3/UL (ref 4–11)

## 2024-06-21 PROCEDURE — 250N000013 HC RX MED GY IP 250 OP 250 PS 637

## 2024-06-21 PROCEDURE — G0378 HOSPITAL OBSERVATION PER HR: HCPCS

## 2024-06-21 PROCEDURE — 85027 COMPLETE CBC AUTOMATED: CPT | Performed by: STUDENT IN AN ORGANIZED HEALTH CARE EDUCATION/TRAINING PROGRAM

## 2024-06-21 PROCEDURE — 80048 BASIC METABOLIC PNL TOTAL CA: CPT | Performed by: STUDENT IN AN ORGANIZED HEALTH CARE EDUCATION/TRAINING PROGRAM

## 2024-06-21 PROCEDURE — 99239 HOSP IP/OBS DSCHRG MGMT >30: CPT | Performed by: STUDENT IN AN ORGANIZED HEALTH CARE EDUCATION/TRAINING PROGRAM

## 2024-06-21 PROCEDURE — 36415 COLL VENOUS BLD VENIPUNCTURE: CPT | Performed by: STUDENT IN AN ORGANIZED HEALTH CARE EDUCATION/TRAINING PROGRAM

## 2024-06-21 RX ORDER — CEPHALEXIN 500 MG/1
500 CAPSULE ORAL 4 TIMES DAILY
Qty: 12 CAPSULE | Refills: 0 | Status: SHIPPED | OUTPATIENT
Start: 2024-06-21 | End: 2024-06-24

## 2024-06-21 RX ADMIN — CEPHALEXIN 500 MG: 500 CAPSULE ORAL at 00:24

## 2024-06-21 RX ADMIN — ACETAMINOPHEN 650 MG: 325 TABLET, FILM COATED ORAL at 08:33

## 2024-06-21 RX ADMIN — CEPHALEXIN 500 MG: 500 CAPSULE ORAL at 11:54

## 2024-06-21 RX ADMIN — TORSEMIDE 10 MG: 5 TABLET ORAL at 08:34

## 2024-06-21 RX ADMIN — CEPHALEXIN 500 MG: 500 CAPSULE ORAL at 05:35

## 2024-06-21 RX ADMIN — AMIODARONE HYDROCHLORIDE 200 MG: 200 TABLET ORAL at 08:34

## 2024-06-21 RX ADMIN — APIXABAN 5 MG: 5 TABLET, FILM COATED ORAL at 08:33

## 2024-06-21 ASSESSMENT — ACTIVITIES OF DAILY LIVING (ADL)
ADLS_ACUITY_SCORE: 36

## 2024-06-21 NOTE — PROGRESS NOTES
Care Management Discharge Note    Discharge Date: 06/21/2024     Discharge Disposition: Home Care    Discharge Services: None    Discharge DME: None    Discharge Transportation: family or friend will provide    Private pay costs discussed: Not applicable    Does the patient's insurance plan have a 3 day qualifying hospital stay waiver?  No    Patient/family educated on Medicare website which has current facility and service quality ratings: no    Education Provided on the Discharge Plan: Yes  Persons Notified of Discharge Plans: patient, spouse, daughter  Patient/Family in Agreement with the Plan: yes    Handoff Referral Completed: Yes    Additional Information:    Per IDT rounds, MD team states that patient is medically stable for discharge today.     Patient will discharge home with resumption of [facility] (Phone: [phone]) PT/OT/RN.    GIANLUCA ARCE RN

## 2024-06-21 NOTE — PLAN OF CARE
"  Problem: Adult Inpatient Plan of Care  Goal: Plan of Care Review  Description: The Plan of Care Review/Shift note should be completed every shift.  The Outcome Evaluation is a brief statement about your assessment that the patient is improving, declining, or no change.  This information will be displayed automatically on your shift  note.  Outcome: Progressing  Goal: Patient-Specific Goal (Individualized)  Description: You can add care plan individualizations to a care plan. Examples of Individualization might be:  \"Parent requests to be called daily at 9am for status\", \"I have a hard time hearing out of my right ear\", or \"Do not touch me to wake me up as it startles  me\".  Outcome: Progressing  Goal: Absence of Hospital-Acquired Illness or Injury  Outcome: Progressing  Intervention: Identify and Manage Fall Risk  Recent Flowsheet Documentation  Taken 6/21/2024 0830 by Vida Cross RN  Safety Promotion/Fall Prevention:   activity supervised   assistive device/personal items within reach   clutter free environment maintained   increased rounding and observation   nonskid shoes/slippers when out of bed   room door open   room near nurse's station  Intervention: Prevent Skin Injury  Recent Flowsheet Documentation  Taken 6/21/2024 0830 by Vida Cross RN  Body Position:   position changed independently   weight shifting  Intervention: Prevent and Manage VTE (Venous Thromboembolism) Risk  Recent Flowsheet Documentation  Taken 6/21/2024 0830 by Vida Cross RN  VTE Prevention/Management: SCDs (sequential compression devices) off  Goal: Optimal Comfort and Wellbeing  Outcome: Progressing  Intervention: Monitor Pain and Promote Comfort  Recent Flowsheet Documentation  Taken 6/21/2024 0830 by Vida Cross RN  Pain Management Interventions: medication (see MAR)  Goal: Readiness for Transition of Care  Outcome: Progressing   Goal Outcome Evaluation:       Patient c/o headache " Tylenol given, effective. Patient ambulated to bathroom. A/O, bradycardic.

## 2024-06-21 NOTE — DISCHARGE SUMMARY
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    Weight as of this encounter: 102.7 kg (226 lb 6.6 oz).       Follow-ups Needed After Discharge   Follow-up Appointments     Follow-up and recommended labs and tests       Follow up with primary care provider, Steven Duane Semmler, within 7 days   to evaluate medication change and for hospital follow- up.      Plan to schedule follow up with your cardiologist.          Unresulted Labs Ordered in the Past 30 Days of this Admission       No orders found from 5/20/2024 to 6/20/2024.        These results will be followed up by Cristofer Carson MD.     Discharge Disposition   Discharged to home  Condition at discharge: Stable    Hospital Course   Alvin Newton is a 89 year old male with past medical hx of paroxysmal Afib, severe aortic stenosis, amyloidosis, MGUS, T2DM, CKD, HTN, BPH, Schatzkis ring who " presents on 6/19/2024 with home nurse reporting a HR of 38-40.Gradually improving and suspected to be secondary to being inadvertently given double/triple doses of metoprolol for several days.     # Sinus bradycardia, asymptomatic, improving   Presents because home RN noted a HR of 38-40 (baseline ~60s). Pt has new person preparing his medications, and he accidentally received either 50mg or 75mg (unknown) of Metoprolol XL instead of his usual 25. HR remained 40-50 in ER. Pt is asymptomatic, but given his high fall risk and gait instability it was felt unsafe to return home. HR will likely improve after excess Metoprolol washes out of his system. With him remaining bradycardic extended period of observation to allow further time to metabolize. Still bradycardic but Hr steadily improving. Gradually improving and suspected to be secondary to being inadvertently given double/triple doses of metoprolol for several days. Give he is asymptomatic will discharge and plan for him to follow up outpatient. If he does not continue to improve would need to consider other etiologies possible including his amyloidosis   - Discontinued PTA  Metoprolol  - Referral placed for cardiology follow up     # Paroxysmal atrial fibrillation with RVR (H)  # Right bundle branch block  EKG without afib.  - Continue PTA Amiodarone 200, Apixaban 5 BID  - Discontinued PTA Metoprolol 12.5 as above    # Severe aortic stenosis  # Chronic diastolic (congestive) heart failure (H)  # Essential hypertension  Followed by cardiology, last visit 3/4/24. Echo 7/2023 with moderate concentric LVH, EF 50-55%, severe aortic stenosis, mildly dilated RV. Doesn't appear to be in acute CHF exacerbation on admission.  - Continued PTA Torsemide 10    # Hx unspecified hypotension   Had been on midodrine after a surgery 1/2023 for suspected adrenal insufficiency. It appears midodrine 2.5 mg once daily was started 2/2024 for unspecified hypotension. With him being  hypertensive here will stop.  - Discontinued PTA midodrine     # RLE cellulitis, acute, improving   Has small area of cellulitis on right shin. Warm, weeping, erythematous, clear demarcations. No leukocytosis or fever. States it's been present ~1m and it intermittently blisters. Has a hx of cellulitis in both legs.   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 Good control. Not on meds PTA. Withholding sliding scale insulin and frequent monitoring.    # Pure hypercholesterolemia  - Continued PTA atorvastatin 20    Stage 3a chronic kidney disease (H)  Cr 1.4 on admission (baseline 1.2-1.5). Improving.  - BMP in am    # Schatzki's ring of distal esophagus-dilated 8/19/23  # Esophageal dysphagia  Noted in chart review. Denies recent aspiration.    # Amyloidosis (H)  Possibly contributing to bradycardia.   - Continue PTA Tafamidis 61    # Monoclonal gammopathy of unknown significance (MGUS)  SPEP 6/13/24 stable.    # Elevated alk phos, improving   236 on admit. Other LFTs WNL.     # Elevated troponin  50 ?  46. Chronically elevated. No chest pain or other symptoms. EKG without ischemia. Low concern for ACS.     # Anemia, normocytic, chronic  12.2 on admit (baseline 11-12). Stable.   - CBC in am    Consultations This Hospital Stay   CARE MANAGEMENT / SOCIAL WORK IP CONSULT    Code Status   No CPR- Do NOT Intubate    Time Spent on this Encounter   I, Cristofer Carson MD, personally saw the patient today and spent greater than 30 minutes discharging this patient.       Cristofer Carson MD  [facility] MEDICAL SURGICAL  [address] [facility] [facility] [address]  Phone: [phone]  Fax: [phone]  ______________________________________________________________________    Physical Exam   Vital Signs: Temp: 97.9  F (36.6  C) Temp src: Oral BP: 130/44 Pulse: 52   Resp: 18 SpO2: 95 % O2 Device: None (Room air)    Weight: 226  lbs 6.6 oz  General Appearance:  Awake and alert, sitting up in bed, in no acute distress   Respiratory:  Breathing easily on room air   Cardiovascular: Regular rhythm and bradycardic   GI: Abdomen soft, non-tender, and non-distended   Skin: No rashes or lesions   Other:  Appropriate mood and affect, no focal deficits appreciated        Primary Care Physician   Steven Duane Semmler    Discharge Orders      Adult Cardiology al [facility] Referral      Home Care Referral      Reason for your hospital stay    You were hospitalized with a slow heart rate that improved during your admission.     Follow-up and recommended labs and tests     Follow up with primary care provider, Steven Duane Semmler, within 7 days to evaluate medication change and for hospital follow- up.      Plan to schedule follow up with your cardiologist.     Activity    Your activity upon discharge: activity as tolerated     Diet    Follow this diet upon discharge: Regular adult diet       Significant Results and Procedures   Results for orders placed or performed during the hospital encounter of 01/22/24   XR Pelvis and Hip Left 2 Views    Narrative    EXAM: XR PELVIS AND HIP LEFT 2 VIEWS  LOCATION: [facility]  DATE: 1/22/2024    INDICATION: Fall. Hip pain.  COMPARISON: None.      Impression    IMPRESSION: Mildly displaced oblique periprosthetic left proximal femoral fracture centered at the mid-distal femoral stem particularly lateral and posterior. Bilateral total hip arthroplasties with asymmetric polyethylene liner wear superolateral, left   worse than right. No displaced pelvic fracture is identified. Degenerative change lower lumbar spine and both SI joints. No offset or widening at the symphysis pubis.    NOTE: ABNORMAL REPORT    THE DICTATION ABOVE DESCRIBES AN ABNORMALITY FOR WHICH FOLLOW-UP IS NEEDED.              Head CT w/o contrast    Narrative    EXAM: CT HEAD W/O CONTRAST  LOCATION: [facility]  DATE: 1/22/2024    INDICATION: Fall, on Eliquis, head injury.  COMPARISON: None.  TECHNIQUE: Routine CT Head without IV contrast. Multiplanar reformats. Dose reduction techniques were used.    FINDINGS:  No evidence of intracranial hemorrhage, mass, or hydrocephalus. Generalized volume loss with background of nonspecific white matter hypoattenuation presumably representing chronic small vessel ischemic change. No acute osseous abnormality. Severe   paranasal sinus mucosal thickening with secretions layering within the right sphenoid sinus.      Impression    IMPRESSION:  1.  No acute intracranial abnormality.   CT Cervical Spine w/o Contrast    Narrative    EXAM: CT CERVICAL SPINE W/O CONTRAST  LOCATION: [facility]  DATE: 1/22/2024    INDICATION: Fall, neck injury.  COMPARISON: None.  TECHNIQUE: Routine CT Cervical Spine without IV contrast. Multiplanar reformats. Dose reduction techniques were used.    FINDINGS:  Flowing marginal osteophytes with ankylosis from C2 through the visualized thoracic spine.    No evidence of acute fracture or posttraumatic subluxation. Mild spinal canal stenoses at multiple levels. Moderate to severe foraminal stenoses at multiple levels. Incidental partial nonunion of the posterior aspect of the posterior elements of C7.   Presumed atelectasis at the lung apices. Scattered vascular calcifications.      Impression    IMPRESSION:  1.  No fracture or posttraumatic subluxation.  2.  Multilevel ankylosis.   CT Femur Thigh Left w/o Contrast    Narrative    EXAM: CT HIP LEFT W/O CONTRAST, CT FEMUR THIGH LEFT W/O CONTRAST  LOCATION: [facility]  DATE: 1/22/2024    INDICATION: periprosthetic left femur fracture. Left hip and thigh pain.  COMPARISON: 01/22/2024 radiographs.  TECHNIQUE: Noncontrast. Axial, sagittal and coronal thin-section reconstruction. Dose reduction techniques were used.     FINDINGS:     BONES:  -Left total hip  arthroplasty with an acute mildly displaced periprosthetic fracture involving the femoral component in the inter and subtrochanteric regions. Slight anterolateral displacement of a moderate-sized cortical fracture fragment, as seen on   series 6, image 27. No evidence of additional periprosthetic fractures. There is some localized cystic change at the interface between the acetabular component and superolateral portion of the acetabulum. No pubic rami or sacral ala fractures. There is   ankylosis of both SI joints. Advanced multilevel degenerative disc disease changes in the lower lumbar spine.    SOFT TISSUES:  -Mild global atrophy of the left thigh musculature. No discrete fluid collection/hematoma.      Impression    IMPRESSION: Left total hip arthroplasty with an acute mildly displaced periprosthetic fracture involving the femoral component at the level of the inter and subtrochanteric regions.     CT Hip Left w/o Contrast    Narrative    EXAM: CT HIP LEFT W/O CONTRAST, CT FEMUR THIGH LEFT W/O CONTRAST  LOCATION: [facility]  DATE: 1/22/2024    INDICATION: periprosthetic left femur fracture. Left hip and thigh pain.  COMPARISON: 01/22/2024 radiographs.  TECHNIQUE: Noncontrast. Axial, sagittal and coronal thin-section reconstruction. Dose reduction techniques were used.     FINDINGS:     BONES:  -Left total hip arthroplasty with an acute mildly displaced periprosthetic fracture involving the femoral component in the inter and subtrochanteric regions. Slight anterolateral displacement of a moderate-sized cortical fracture fragment, as seen on   series 6, image 27. No evidence of additional periprosthetic fractures. There is some localized cystic change at the interface between the acetabular component and superolateral portion of the acetabulum. No pubic rami or sacral ala fractures. There is   ankylosis of both SI joints. Advanced multilevel degenerative disc disease changes in the lower  "lumbar spine.    SOFT TISSUES:  -Mild global atrophy of the left thigh musculature. No discrete fluid collection/hematoma.      Impression    IMPRESSION: Left total hip arthroplasty with an acute mildly displaced periprosthetic fracture involving the femoral component at the level of the inter and subtrochanteric regions.     XR Femur Left 2 Views    Narrative    EXAM: XR FEMUR LEFT 2 VIEWS  LOCATION: [facility]  DATE: 1/22/2024    INDICATION: Periprosthetic femur fx, hip pain.  COMPARISON: None.      Impression    IMPRESSION: Left total hip arthroplasty with an acute mildly displaced periprosthetic fracture involving the proximal femoral shaft extending to the intertrochanteric region. Small area of benign cortical thickening along the lateral margin of the mid   left femoral diaphysis.               XR Surgery GRICELDA  Fluoro G/T 5 Min    Narrative    This exam was marked as non-reportable because it will not be read by a   radiologist or a [place] non-radiologist provider.         POC US Guidance Needle Placement    Narrative    Ultrasound was performed as guidance to an anesthesia procedure.  Click   \"PACS images\" hyperlink below to view any stored images.  For specific   procedure details, view procedure note authored by anesthesia.   XR Femur Port Left 2 Views    Narrative    EXAM: XR FEMUR PORT LEFT 2 VIEWS  LOCATION: [facility]  DATE: 1/24/2024    INDICATION: Status post hip surgery.  COMPARISON: Radiographs from 01/22/2024.      Impression    IMPRESSION: Since the prior study, the femoral component has been replaced with a longstem femoral component and there are new cerclage wires around the proximal femur. These findings extend across the previously seen periprosthetic fracture, which is   now affixed into excellent position and alignment. There is no evidence of complication. Mild degenerative changes and small effusion in the knee incidentally noted.   XR " Pelvis 1/2 Views    Narrative    EXAM: XR PELVIS 1/2 VIEWS  LOCATION: [facility]  DATE: 1/24/2024    INDICATION: Status post hip surgery.  COMPARISON: 01/22/2024 x-ray.      Impression    IMPRESSION: The femoral component of the left total hip arthroplasty has been replaced with a longstem component and there are new cerclage wires around the proximal femur, all extending across the previously seen periprosthetic fracture in the proximal   shaft of the femur. There is excellent position/alignment on this view. No complication evident. Please note that the distal end of the stem of the femoral component was not included in the field-of-view of this single image.   XR Chest PICC Placement 1 View    Narrative    XR CHEST PICC LINE PLACEMENT 1 VIEW  1/24/2024 10:05 PM CST      HISTORY: PICC placement.     COMPARISON: 07/23/2023.    FINDINGS: A right PICC is been placed and the tip is at the junction of SVC and right atrium. There is no pneumothorax. The heart size is normal. The lungs are clear. Degenerative disease in the spine and shoulders.      Impression    IMPRESSION: Right PICC to distal SVC.       Discharge Medications   Current Discharge Medication List        START taking these medications    Details   cephALEXin (KEFLEX) 500 MG capsule Take 1 capsule (500 mg) by mouth 4 times daily for 3 days  Qty: 12 capsule, Refills: 0    Associated Diagnoses: Cellulitis of right lower extremity           CONTINUE these medications which have NOT CHANGED    Details   acetaminophen (TYLENOL) 325 MG tablet 975mg by mouth every AM and 975mg twice a day as needed    Associated Diagnoses: Periprosthetic fracture of shaft of femur      amiodarone (PACERONE) 200 MG tablet Take 1 tablet (200 mg) by mouth daily    Associated Diagnoses: Paroxysmal atrial fibrillation with RVR (H)      atorvastatin (LIPITOR) 20 MG tablet Take 20 mg by mouth At Bedtime      ELIQUIS ANTICOAGULANT 5 MG tablet Take 5 mg by mouth 2  times daily      lidocaine (XYLOCAINE) 5 % external ointment Apply topically 3 times daily  Qty: 100 g, Refills: 0    Associated Diagnoses: Hip fracture, left, closed, initial encounter (H)      Magnesium Oxide 250 MG TABS Take 250 mg by mouth daily      tafamidis (VYNDAMAX) 61 MG CAPS capsule Take 61 mg by mouth daily      torsemide (DEMADEX) 20 MG tablet Take 10 mg by mouth daily           STOP taking these medications       metoprolol succinate ER (TOPROL XL) 25 MG 24 hr tablet Comments:   Reason for Stopping:         midodrine (PROAMATINE) 2.5 MG tablet Comments:   Reason for Stopping:             Allergies   No Known Allergies

## 2024-06-21 NOTE — PLAN OF CARE
Problem: Adult Inpatient Plan of Care  Goal: Optimal Comfort and Wellbeing  Outcome: Progressing  Intervention: Monitor Pain and Promote Comfort  Flowsheets (Taken 6/21/2024 3685)  Pain Management Interventions:   medication (see MAR)   rest       Goal Outcome Evaluation: Pt alert and oriented. Pt denies pain and able to ambulate into bathroom Ax1 with walker. Pt ready to go home.

## 2024-06-21 NOTE — PLAN OF CARE
[place] [facility] DISCHARGE NOTE    Patient discharged to home at 1:40 PM via wheel chair. Accompanied by spouse and daughter and staff. Discharge instructions reviewed with patient, spouse, and daughter, opportunity offered to ask questions. Prescriptions sent to patients preferred pharmacy. All belongings sent with patient.    Vida Cross, RNGoal Outcome Evaluation:

## 2024-06-24 ENCOUNTER — PATIENT OUTREACH (OUTPATIENT)
Dept: CARE COORDINATION | Facility: CLINIC | Age: 89
End: 2024-06-24
Payer: COMMERCIAL

## 2024-06-24 NOTE — PROGRESS NOTES
[facility]: [facility]    Background: Transitional Care Management program identified per system criteria and reviewed by [facility] [place] team for possible outreach.    Assessment: Upon chart review, [facility] Team member will not proceed with patient outreach related to this episode of Transitional Care Management program due to reason below:    RN spoke to wife Rosie. Home care RN just showed up at the house so patient is unavailable to speak on the phone. Patient also has appointment with PCP tomorrow. Due to patient having appointment tomorrow and RN is currently with patient no further outreaches will be made.  24/7 nurse line information provided to wife (consent on file).    Plan: Transitional Care Management episode addressed appropriately per reason noted above.      Shaye Sams RN  [facility]    *Connected Care Resource Team does NOT follow patient ongoing. Referrals are identified based on internal discharge reports and the outreach is to ensure patient has an understanding of their discharge instructions.

## 2024-11-03 ENCOUNTER — HEALTH MAINTENANCE LETTER (OUTPATIENT)
Age: 89
End: 2024-11-03

## 2024-12-29 ENCOUNTER — HEALTH MAINTENANCE LETTER (OUTPATIENT)
Age: 89
End: 2024-12-29

## 2025-01-03 ENCOUNTER — HOSPITAL ENCOUNTER (EMERGENCY)
Facility: CLINIC | Age: OVER 89
Discharge: HOME | End: 2025-01-03
Attending: EMERGENCY MEDICINE | Admitting: EMERGENCY MEDICINE
Payer: COMMERCIAL

## 2025-01-03 VITALS
SYSTOLIC BLOOD PRESSURE: 142 MMHG | DIASTOLIC BLOOD PRESSURE: 62 MMHG | HEART RATE: 61 BPM | RESPIRATION RATE: 12 BRPM | TEMPERATURE: 98 F | OXYGEN SATURATION: 100 %

## 2025-01-03 DIAGNOSIS — H81.10 BENIGN PAROXYSMAL POSITIONAL VERTIGO, UNSPECIFIED LATERALITY: ICD-10-CM

## 2025-01-03 DIAGNOSIS — R42 EPISODE OF DIZZINESS: ICD-10-CM

## 2025-01-03 LAB
ALBUMIN SERPL BCG-MCNC: 3.9 G/DL (ref 3.5–5.2)
ALP SERPL-CCNC: 164 U/L (ref 40–150)
ALT SERPL W P-5'-P-CCNC: 20 U/L (ref 0–70)
ANION GAP SERPL CALCULATED.3IONS-SCNC: 10 MMOL/L (ref 7–15)
AST SERPL W P-5'-P-CCNC: 26 U/L (ref 0–45)
BILIRUB SERPL-MCNC: 0.7 MG/DL
BUN SERPL-MCNC: 29.8 MG/DL (ref 8–23)
CALCIUM SERPL-MCNC: 9.8 MG/DL (ref 8.8–10.4)
CHLORIDE SERPL-SCNC: 106 MMOL/L (ref 98–107)
CREAT SERPL-MCNC: 1.27 MG/DL (ref 0.67–1.17)
EGFRCR SERPLBLD CKD-EPI 2021: 54 ML/MIN/1.73M2
ERYTHROCYTE [DISTWIDTH] IN BLOOD BY AUTOMATED COUNT: 13.2 % (ref 10–15)
GLUCOSE SERPL-MCNC: 107 MG/DL (ref 70–99)
HCO3 SERPL-SCNC: 27 MMOL/L (ref 22–29)
HCT VFR BLD AUTO: 41 % (ref 40–53)
HGB BLD-MCNC: 13.6 G/DL (ref 13.3–17.7)
HOLD SPECIMEN: NORMAL
MCH RBC QN AUTO: 30.8 PG (ref 26.5–33)
MCHC RBC AUTO-ENTMCNC: 33.2 G/DL (ref 31.5–36.5)
MCV RBC AUTO: 93 FL (ref 78–100)
PLATELET # BLD AUTO: 143 10E3/UL (ref 150–450)
POTASSIUM SERPL-SCNC: 4.6 MMOL/L (ref 3.4–5.3)
PROT SERPL-MCNC: 7.4 G/DL (ref 6.4–8.3)
RBC # BLD AUTO: 4.41 10E6/UL (ref 4.4–5.9)
SODIUM SERPL-SCNC: 143 MMOL/L (ref 135–145)
WBC # BLD AUTO: 6.9 10E3/UL (ref 4–11)

## 2025-01-03 PROCEDURE — 85048 AUTOMATED LEUKOCYTE COUNT: CPT | Performed by: FAMILY MEDICINE

## 2025-01-03 PROCEDURE — 99283 EMERGENCY DEPT VISIT LOW MDM: CPT | Performed by: EMERGENCY MEDICINE

## 2025-01-03 PROCEDURE — 36415 COLL VENOUS BLD VENIPUNCTURE: CPT | Performed by: FAMILY MEDICINE

## 2025-01-03 PROCEDURE — 99283 EMERGENCY DEPT VISIT LOW MDM: CPT

## 2025-01-03 PROCEDURE — 80053 COMPREHEN METABOLIC PANEL: CPT | Performed by: FAMILY MEDICINE

## 2025-01-03 PROCEDURE — 85014 HEMATOCRIT: CPT | Performed by: FAMILY MEDICINE

## 2025-01-03 ASSESSMENT — COLUMBIA-SUICIDE SEVERITY RATING SCALE - C-SSRS
1. IN THE PAST MONTH, HAVE YOU WISHED YOU WERE DEAD OR WISHED YOU COULD GO TO SLEEP AND NOT WAKE UP?: NO
6. HAVE YOU EVER DONE ANYTHING, STARTED TO DO ANYTHING, OR PREPARED TO DO ANYTHING TO END YOUR LIFE?: NO
2. HAVE YOU ACTUALLY HAD ANY THOUGHTS OF KILLING YOURSELF IN THE PAST MONTH?: NO

## 2025-01-03 ASSESSMENT — ACTIVITIES OF DAILY LIVING (ADL)
ADLS_ACUITY_SCORE: 57
ADLS_ACUITY_SCORE: 57

## 2025-01-03 NOTE — ED PROVIDER NOTES
History     Chief Complaint   Patient presents with    Dizziness     HPI  Alvin Newton is a 89 year old male with a history of CAD, atrial fibrillation status post pacemaker placement and on amiodarone who presents for evaluation of episodic dizziness.  The patient reports that over the past 2 or 3 weeks he has been having episodes where he feels dizzy like things are spinning around him.  They will often times last for less than a minute and frequently was just for several seconds at a time before they resolve spontaneously.  They could happen with activity or at rest.  Most commonly they were happening in the afternoon but he does not seem to know of any specific timeframe for these episodes. He has trouble focusing during these episodes but does not think he has had any double vision.  No chest pain with it.  No difficulty speaking typically with the episodes.  He has not fallen however he has noted that his wife had to help him into a chair once or twice.  They have happened several times per week, maybe once or twice has had more than 1 episode in the day.  Today's episode seem to be the worst and lasted the longest, he estimates for about a minute or so, he reports numbness throughout his whole body, and was not localized to 1 side or the other.  He was with his physical therapist when this episode happened today.  The patient reports that he feels well now and denies any complaints.  No dizziness currently.  No headache, illness, nausea, vomiting, chest pain, difficulty breathing.    I spoke on the phone with the physical therapist who was evaluating and working with the patient during the episode she discussed the patient's episode and ongoing management.  She reports the patient was answering questions well and then suddenly started having difficulty answering questions. He was not slurring his speech and could get the words out but seemed to have difficulty concentrating on what was being said. She  noticed nystagmus on exam at rest that seemed to be lateral.  The whole episode lasted for less than 2 minutes.    Allergies:  No Known Allergies    Problem List:    Patient Active Problem List    Diagnosis Date Noted    Sinus bradycardia 06/19/2024     Priority: Medium    Amyloidosis (H) 01/25/2024     Priority: Medium    Monoclonal gammopathy of unknown significance (MGUS) 01/25/2024     Priority: Medium    Hypotension, unspecified hypotension type 01/25/2024     Priority: Medium    Postoperative anemia due to acute blood loss 01/25/2024     Priority: Medium    Hip fracture, left, closed, initial encounter (H) 01/22/2024     Priority: Medium    Acute kidney failure, unspecified (H) 08/24/2023     Priority: Medium    Open wound-coccyx/buttocks 08/23/2023     Priority: Medium    Hypokalemia 08/19/2023     Priority: Medium    Hyponatremia 08/19/2023     Priority: Medium    Unintentional weight loss 08/18/2023     Priority: Medium    Abnormal esophagram 08/18/2023     Priority: Medium    DEJUAN (acute kidney injury) (H) 08/17/2023     Priority: Medium    Moderate malnutrition (H) 08/17/2023     Priority: Medium    Schatzki's ring of distal esophagus-dilated 8/19/23 08/17/2023     Priority: Medium    Acute gout involving toe of left foot 08/17/2023     Priority: Medium    Esophageal dysphagia 08/11/2023     Priority: Medium    Esophageal dysmotility 07/27/2023     Priority: Medium    Enterocolitis due to Clostridium difficile, not specified as recurrent 07/27/2023     Priority: Medium    History of Clostridium difficile infection July 2023 07/25/2023     Priority: Medium    Stage 3a chronic kidney disease (H) 07/25/2023     Priority: Medium    Altered mental status, unspecified altered mental status type 07/23/2023     Priority: Medium    Clostridium difficile diarrhea 07/23/2023     Priority: Medium    Other chronic pain 07/19/2023     Priority: Medium    Pain in left shoulder 07/19/2023     Priority: Medium    Pain in  right shoulder 07/19/2023     Priority: Medium    Unspecified streptococcus as the cause of diseases classified elsewhere 07/19/2023     Priority: Medium    Severe aortic stenosis 07/14/2023     Priority: Medium    Elevated brain natriuretic peptide (BNP) level 07/14/2023     Priority: Medium    Ascending aorta dilatation (H) 07/14/2023     Priority: Medium    Peripheral edema 07/14/2023     Priority: Medium    Positive urine culture 07/14/2023     Priority: Medium    Medication induced coagulopathy (H) 07/14/2023     Priority: Medium    Streptococcal bacteremia 07/13/2023     Priority: Medium    Thrombocytopenia (H) 07/13/2023     Priority: Medium    Hyperbilirubinemia 07/13/2023     Priority: Medium    Hypophosphatemia 07/13/2023     Priority: Medium    Hypoalbuminemia 07/13/2023     Priority: Medium    Pyuria 07/13/2023     Priority: Medium    Paroxysmal atrial fibrillation with RVR (H) 07/13/2023     Priority: Medium    Hypomagnesemia 07/13/2023     Priority: Medium    Chronic pain of both shoulders 07/13/2023     Priority: Medium    Septic shock (H) 07/12/2023     Priority: Medium    Cellulitis of right lower extremity 07/11/2023     Priority: Medium    Severe sepsis (H) 07/11/2023     Priority: Medium    Chronic diastolic (congestive) heart failure (H) 03/31/2023     Priority: Medium    Impaired fasting glucose 06/20/2022     Priority: Medium    Osteoarthritis of pelvis 06/20/2022     Priority: Medium     Dec 16, 2003 Entered By: JOHN GROVE Comment: 1988  S-P HIP REPLACEMENT      Primary localized osteoarthrosis of shoulder region 06/20/2022     Priority: Medium    Reason for consultation 06/20/2022     Priority: Medium    Right bundle branch block 06/20/2022     Priority: Medium    Fall, initial encounter 06/06/2022     Priority: Medium    Closed fracture of first lumbar vertebra, unspecified fracture morphology, initial encounter (H) 06/06/2022     Priority: Medium    Fracture of L1 vertebra (H) 06/06/2022      Priority: Medium    Carpal tunnel syndrome, bilateral 11/18/2021     Priority: Medium    BPH with urinary obstruction 06/22/2020     Priority: Medium    NSTEMI (non-ST elevated myocardial infarction) (H) 12/27/2017     Priority: Medium    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) 12/06/2017     Priority: Medium    Essential hypertension 04/04/2016     Priority: Medium    Pure hypercholesterolemia 01/01/1999     Priority: Medium    Other ill-defined and unknown causes of morbidity and mortality 01/01/1985     Priority: Medium     Dec 16, 2003 Entered By: JOHN GROVE Comment: S-P KATERINE          Past Medical History:    Past Medical History:   Diagnosis Date    Colonic diverticulum     Hyperlipidemia     Hypertension     Mixed hyperlipidemia 01/02/2009    Obesity (BMI 30-39.9)     Osteoarthritis     Type 2 diabetes mellitus (H)        Past Surgical History:    Past Surgical History:   Procedure Laterality Date    ARTHROPLASTY HIP Left 1/24/2024    Procedure: Revision total hip arthroplasty, both components;  Surgeon: Aurelio Ramos MD;  Location: [facility] OR    ARTHROPLASTY HIP BILATERAL  1987    ESOPHAGOSCOPY, GASTROSCOPY, DUODENOSCOPY (EGD), COMBINED N/A 8/19/2023    Procedure: ESOPHAGOGASTRODUODENOSCOPY, WITH BIOPSY;  Surgeon: Antolin Cyr DO;  Location:  GI    ESOPHAGOSCOPY, GASTROSCOPY, DUODENOSCOPY (EGD), DILATATION, COMBINED N/A 8/19/2023    Procedure: Esophagoscopy, gastroscopy, duodenoscopy, dilatation, combined;  Surgeon: Antolin Cyr DO;  Location:  GI     ESOPHAGOSCOPY, DIAGNOSTIC  2003    LAPAROSCOPIC CHOLECYSTECTOMY N/A 12/28/2017    Procedure: LAPAROSCOPIC CHOLECYSTECTOMY;  LAPAROSCOPIC CHOLECYSTECTOMY;  Surgeon: Heber Gonzalez MD;  Location:  OR    OPEN REDUCTION INTERNAL FIXATION HIP Left 1/24/2024    Procedure: Open reduction internal fixation left periprosthetic femur fracture;  Surgeon: Aurelio Ramos MD;  Location: [facility] OR     TRANSRECTAL ULTRASONIC, TRANSURETHRAL RESECTION (TUR) OF PROSTATE CYST  1990       Family History:    No family history on file.    Social History:  Marital Status:   [2]  Social History     Tobacco Use    Smoking status: Never    Smokeless tobacco: Never   Substance Use Topics    Alcohol use: Yes     Comment: 0-1 beer daily    Drug use: No        Medications:    acetaminophen (TYLENOL) 325 MG tablet  amiodarone (PACERONE) 200 MG tablet  atorvastatin (LIPITOR) 20 MG tablet  ELIQUIS ANTICOAGULANT 5 MG tablet  lidocaine (XYLOCAINE) 5 % external ointment  Magnesium Oxide 250 MG TABS  tafamidis (VYNDAMAX) 61 MG CAPS capsule  torsemide (DEMADEX) 20 MG tablet          Review of Systems    Physical Exam   BP: 136/79  Pulse: 83  Temp: 98  F (36.7  C)  Resp: 18  SpO2: 98 %      Physical Exam  Constitutional:       General: He is not in acute distress.     Appearance: He is well-developed.   HENT:      Head: Normocephalic and atraumatic.   Eyes:      Extraocular Movements: Extraocular movements intact.      Right eye: No nystagmus.      Left eye: No nystagmus.   Cardiovascular:      Rate and Rhythm: Normal rate.   Pulmonary:      Effort: No respiratory distress.      Breath sounds: No stridor.   Skin:     General: Skin is warm and dry.   Neurological:      Mental Status: He is alert.      Cranial Nerves: No facial asymmetry.      Motor: No abnormal muscle tone or pronator drift.      Coordination: Finger-Nose-Finger Test normal.      Gait: Gait (with cane) normal.         ED Course        Procedures              Critical Care time:  none             Results for orders placed or performed during the hospital encounter of 01/03/25 (from the past 24 hours)   CBC with platelets   Result Value Ref Range    WBC Count 6.9 4.0 - 11.0 10e3/uL    RBC Count 4.41 4.40 - 5.90 10e6/uL    Hemoglobin 13.6 13.3 - 17.7 g/dL    Hematocrit 41.0 40.0 - 53.0 %    MCV 93 78 - 100 fL    MCH 30.8 26.5 - 33.0 pg    MCHC 33.2 31.5 - 36.5 g/dL     RDW 13.2 10.0 - 15.0 %    Platelet Count 143 (L) 150 - 450 10e3/uL   Comprehensive metabolic panel   Result Value Ref Range    Sodium 143 135 - 145 mmol/L    Potassium 4.6 3.4 - 5.3 mmol/L    Carbon Dioxide (CO2) 27 22 - 29 mmol/L    Anion Gap 10 7 - 15 mmol/L    Urea Nitrogen 29.8 (H) 8.0 - 23.0 mg/dL    Creatinine 1.27 (H) 0.67 - 1.17 mg/dL    GFR Estimate 54 (L) >60 mL/min/1.73m2    Calcium 9.8 8.8 - 10.4 mg/dL    Chloride 106 98 - 107 mmol/L    Glucose 107 (H) 70 - 99 mg/dL    Alkaline Phosphatase 164 (H) 40 - 150 U/L    AST 26 0 - 45 U/L    ALT 20 0 - 70 U/L    Protein Total 7.4 6.4 - 8.3 g/dL    Albumin 3.9 3.5 - 5.2 g/dL    Bilirubin Total 0.7 <=1.2 mg/dL   [place] [facility]    Narrative    The following orders were created for panel order [place] [facility].  Procedure                               Abnormality         Status                     ---------                               -----------         ------                     Extra Blue Top Tube[[id number]]                              Final result                 Please view results for these tests on the individual orders.   Extra Blue Top Tube   Result Value Ref Range    Hold Specimen [facility]        Medications - No data to display    Assessments & Plan (with Medical Decision Making)   89-year-old male with a history of coronary disease, atrial fibrillation status post pacemaker placement and on amiodarone who presents for episodes of dizziness.  He has a normal neurologic examination now, no nystagmus on exam, he can ambulate without difficulty.  No current symptoms.  The patient's [employer] pacemaker is interrogated and per my review of the results of this the patient had no episodes of ventricular tachycardia, atrial tachycardia, or atrial fibrillation requiring termination from the pacer.  No pacer terminated episodes.  He is atrial paced 92.9% of the time and ventricularly paced 99.7% of the time.  Electrolytes are within normal limits.  Hemoglobin is at his  baseline.  The patient is ambulating here with that his baseline using his cane.  He feels comfortable going home and I believe this is reasonable.  There is no sign of stroke on exam.  I believe this is related to an issue with imaging ear, possibly BPPV given the very short episodes.  I will refer him to physical therapy and they are told to return if he has sustained episodes of dizziness, worsening symptoms, or other concerns.  Otherwise follow-up in clinic.  The patient and his wife are in agreement with this plan.    I have reviewed the nursing notes.    I have reviewed the findings, diagnosis, plan and need for follow up with the patient.           Medical Decision Making  The patient's presentation was of moderate complexity (an undiagnosed new problem with uncertain prognosis).    The patient's evaluation involved:  an assessment requiring an independent historian (see separate area of note for details)  ordering and/or review of 3+ test(s) in this encounter (see separate area of note for details)  discussion of management or test interpretation with another health professional (see separate area of note for details)    The patient's management necessitated only low risk treatment.        Discharge Medication List as of 1/3/2025  2:00 PM          Final diagnoses:   Episode of dizziness   Benign paroxysmal positional vertigo, unspecified laterality       1/3/2025   [facility] EMERGENCY DEPT       Gomez Falcon MD  01/03/25 [id number]

## 2025-01-03 NOTE — DISCHARGE INSTRUCTIONS
I am not certain what is causing your symptoms but so far the workup is reassuring.  It does not appear to be a stroke that is causing the dizziness.  I believe it is likely an issue within your inner ear called BPPV.  I want you to follow-up with physical therapy to help with exercises regarding the symptoms.  If you have worsening of the symptoms or sustained episodes return to the emergency department for a recheck.

## 2025-01-03 NOTE — ED TRIAGE NOTES
Patient comes in from PT for dizzy spell that lasted 10 minutes and since resolved. Pt on eliquis. Negative BE FAST.

## 2025-03-01 ENCOUNTER — HEALTH MAINTENANCE LETTER (OUTPATIENT)
Age: OVER 89
End: 2025-03-01

## 2025-06-21 ENCOUNTER — HEALTH MAINTENANCE LETTER (OUTPATIENT)
Age: OVER 89
End: 2025-06-21

## 2025-08-03 ENCOUNTER — HOSPITAL ENCOUNTER (INPATIENT)
Facility: CLINIC | Age: OVER 89
LOS: 2 days | Discharge: HOME | End: 2025-08-05
Attending: EMERGENCY MEDICINE | Admitting: STUDENT IN AN ORGANIZED HEALTH CARE EDUCATION/TRAINING PROGRAM
Payer: COMMERCIAL

## 2025-08-03 ENCOUNTER — APPOINTMENT (OUTPATIENT)
Dept: GENERAL RADIOLOGY | Facility: CLINIC | Age: OVER 89
End: 2025-08-03
Attending: EMERGENCY MEDICINE
Payer: COMMERCIAL

## 2025-08-03 ENCOUNTER — APPOINTMENT (OUTPATIENT)
Dept: ULTRASOUND IMAGING | Facility: CLINIC | Age: OVER 89
End: 2025-08-03
Attending: EMERGENCY MEDICINE
Payer: COMMERCIAL

## 2025-08-03 DIAGNOSIS — E85.82 WILD-TYPE TRANSTHYRETIN-RELATED (ATTR) AMYLOIDOSIS (H): ICD-10-CM

## 2025-08-03 DIAGNOSIS — L03.116 CELLULITIS OF LEFT LOWER EXTREMITY: Primary | ICD-10-CM

## 2025-08-03 PROBLEM — L03.90 CELLULITIS: Status: ACTIVE | Noted: 2025-08-03

## 2025-08-03 LAB
ALBUMIN SERPL BCG-MCNC: 3.8 G/DL (ref 3.5–5.2)
ALBUMIN UR-MCNC: NEGATIVE MG/DL
ALP SERPL-CCNC: 145 U/L (ref 40–150)
ALT SERPL W P-5'-P-CCNC: 23 U/L (ref 0–70)
ANION GAP SERPL CALCULATED.3IONS-SCNC: 16 MMOL/L (ref 7–15)
APPEARANCE UR: CLEAR
AST SERPL W P-5'-P-CCNC: 28 U/L (ref 0–45)
ATRIAL RATE - MUSE: 76 BPM
BASOPHILS # BLD AUTO: 0 10E3/UL (ref 0–0.2)
BASOPHILS NFR BLD AUTO: 0 %
BILIRUB SERPL-MCNC: 1.6 MG/DL
BILIRUB UR QL STRIP: NEGATIVE
BUN SERPL-MCNC: 26.4 MG/DL (ref 8–23)
CALCIUM SERPL-MCNC: 9.5 MG/DL (ref 8.8–10.4)
CHLORIDE SERPL-SCNC: 104 MMOL/L (ref 98–107)
COLOR UR AUTO: YELLOW
CREAT SERPL-MCNC: 1.45 MG/DL (ref 0.67–1.17)
DIASTOLIC BLOOD PRESSURE - MUSE: NORMAL MMHG
EGFRCR SERPLBLD CKD-EPI 2021: 45 ML/MIN/1.73M2
EOSINOPHIL # BLD AUTO: 0 10E3/UL (ref 0–0.7)
EOSINOPHIL NFR BLD AUTO: 0 %
ERYTHROCYTE [DISTWIDTH] IN BLOOD BY AUTOMATED COUNT: 13.2 % (ref 10–15)
GLUCOSE BLDC GLUCOMTR-MCNC: 119 MG/DL (ref 70–99)
GLUCOSE SERPL-MCNC: 124 MG/DL (ref 70–99)
GLUCOSE UR STRIP-MCNC: NEGATIVE MG/DL
HCO3 SERPL-SCNC: 21 MMOL/L (ref 22–29)
HCT VFR BLD AUTO: 41.6 % (ref 40–53)
HGB BLD-MCNC: 14.1 G/DL (ref 13.3–17.7)
HGB UR QL STRIP: ABNORMAL
IMM GRANULOCYTES # BLD: 0.1 10E3/UL
IMM GRANULOCYTES NFR BLD: 1 %
INTERPRETATION ECG - MUSE: NORMAL
KETONES UR STRIP-MCNC: NEGATIVE MG/DL
LACTATE SERPL-SCNC: 1.8 MMOL/L (ref 0.7–2)
LACTATE SERPL-SCNC: 2.7 MMOL/L (ref 0.7–2)
LEUKOCYTE ESTERASE UR QL STRIP: NEGATIVE
LYMPHOCYTES # BLD AUTO: 0.7 10E3/UL (ref 0.8–5.3)
LYMPHOCYTES NFR BLD AUTO: 6 %
MCH RBC QN AUTO: 30.9 PG (ref 26.5–33)
MCHC RBC AUTO-ENTMCNC: 33.9 G/DL (ref 31.5–36.5)
MCV RBC AUTO: 91 FL (ref 78–100)
MONOCYTES # BLD AUTO: 0.9 10E3/UL (ref 0–1.3)
MONOCYTES NFR BLD AUTO: 8 %
MUCOUS THREADS #/AREA URNS LPF: PRESENT /LPF
NEUTROPHILS # BLD AUTO: 9.5 10E3/UL (ref 1.6–8.3)
NEUTROPHILS NFR BLD AUTO: 85 %
NITRATE UR QL: NEGATIVE
NRBC # BLD AUTO: 0 10E3/UL
NRBC BLD AUTO-RTO: 0 /100
NT-PROBNP SERPL-MCNC: 1815 PG/ML (ref 0–852)
P AXIS - MUSE: 61 DEGREES
PH UR STRIP: 7.5 [PH] (ref 5–7)
PLATELET # BLD AUTO: 119 10E3/UL (ref 150–450)
POTASSIUM SERPL-SCNC: 4.2 MMOL/L (ref 3.4–5.3)
PR INTERVAL - MUSE: 200 MS
PROCALCITONIN SERPL IA-MCNC: 4.34 NG/ML
PROT SERPL-MCNC: 7.2 G/DL (ref 6.4–8.3)
QRS DURATION - MUSE: 136 MS
QT - MUSE: 430 MS
QTC - MUSE: 483 MS
R AXIS - MUSE: 13 DEGREES
RBC # BLD AUTO: 4.57 10E6/UL (ref 4.4–5.9)
RBC URINE: 8 /HPF
SODIUM SERPL-SCNC: 141 MMOL/L (ref 135–145)
SP GR UR STRIP: 1.03 (ref 1–1.03)
SQUAMOUS EPITHELIAL: <1 /HPF
SYSTOLIC BLOOD PRESSURE - MUSE: NORMAL MMHG
T AXIS - MUSE: 48 DEGREES
TROPONIN T SERPL HS-MCNC: 39 NG/L
TROPONIN T SERPL HS-MCNC: 42 NG/L
UROBILINOGEN UR STRIP-MCNC: NORMAL MG/DL
VENTRICULAR RATE- MUSE: 76 BPM
WBC # BLD AUTO: 11.1 10E3/UL (ref 4–11)
WBC URINE: 1 /HPF

## 2025-08-03 PROCEDURE — 93005 ELECTROCARDIOGRAM TRACING: CPT

## 2025-08-03 PROCEDURE — 120N000001 HC R&B MED SURG/OB

## 2025-08-03 PROCEDURE — 96365 THER/PROPH/DIAG IV INF INIT: CPT

## 2025-08-03 PROCEDURE — 85025 COMPLETE CBC W/AUTO DIFF WBC: CPT | Performed by: EMERGENCY MEDICINE

## 2025-08-03 PROCEDURE — 81001 URINALYSIS AUTO W/SCOPE: CPT | Performed by: EMERGENCY MEDICINE

## 2025-08-03 PROCEDURE — 87154 CUL TYP ID BLD PTHGN 6+ TRGT: CPT | Performed by: EMERGENCY MEDICINE

## 2025-08-03 PROCEDURE — 83880 ASSAY OF NATRIURETIC PEPTIDE: CPT | Performed by: EMERGENCY MEDICINE

## 2025-08-03 PROCEDURE — 71046 X-RAY EXAM CHEST 2 VIEWS: CPT

## 2025-08-03 PROCEDURE — 250N000011 HC RX IP 250 OP 636

## 2025-08-03 PROCEDURE — 99285 EMERGENCY DEPT VISIT HI MDM: CPT | Performed by: EMERGENCY MEDICINE

## 2025-08-03 PROCEDURE — G0378 HOSPITAL OBSERVATION PER HR: HCPCS

## 2025-08-03 PROCEDURE — 93971 EXTREMITY STUDY: CPT | Mod: LT

## 2025-08-03 PROCEDURE — 84145 PROCALCITONIN (PCT): CPT | Performed by: EMERGENCY MEDICINE

## 2025-08-03 PROCEDURE — 84484 ASSAY OF TROPONIN QUANT: CPT | Performed by: EMERGENCY MEDICINE

## 2025-08-03 PROCEDURE — 82962 GLUCOSE BLOOD TEST: CPT

## 2025-08-03 PROCEDURE — 250N000013 HC RX MED GY IP 250 OP 250 PS 637

## 2025-08-03 PROCEDURE — 36415 COLL VENOUS BLD VENIPUNCTURE: CPT | Performed by: EMERGENCY MEDICINE

## 2025-08-03 PROCEDURE — 87181 SC STD AGAR DILUTION PER AGT: CPT | Performed by: EMERGENCY MEDICINE

## 2025-08-03 PROCEDURE — 250N000011 HC RX IP 250 OP 636: Performed by: EMERGENCY MEDICINE

## 2025-08-03 PROCEDURE — 96367 TX/PROPH/DG ADDL SEQ IV INF: CPT

## 2025-08-03 PROCEDURE — 99285 EMERGENCY DEPT VISIT HI MDM: CPT | Mod: 25 | Performed by: EMERGENCY MEDICINE

## 2025-08-03 PROCEDURE — 93010 ELECTROCARDIOGRAM REPORT: CPT | Performed by: EMERGENCY MEDICINE

## 2025-08-03 PROCEDURE — 99223 1ST HOSP IP/OBS HIGH 75: CPT

## 2025-08-03 PROCEDURE — 83605 ASSAY OF LACTIC ACID: CPT | Performed by: EMERGENCY MEDICINE

## 2025-08-03 PROCEDURE — 82310 ASSAY OF CALCIUM: CPT | Performed by: EMERGENCY MEDICINE

## 2025-08-03 PROCEDURE — 258N000003 HC RX IP 258 OP 636: Performed by: EMERGENCY MEDICINE

## 2025-08-03 RX ORDER — TRIAMCINOLONE ACETONIDE 1 MG/G
CREAM TOPICAL 3 TIMES DAILY PRN
COMMUNITY
Start: 2025-05-06

## 2025-08-03 RX ORDER — PROCHLORPERAZINE MALEATE 5 MG/1
5 TABLET ORAL EVERY 6 HOURS PRN
Status: DISCONTINUED | OUTPATIENT
Start: 2025-08-03 | End: 2025-08-05 | Stop reason: HOSPADM

## 2025-08-03 RX ORDER — LIDOCAINE 40 MG/G
CREAM TOPICAL
Status: DISCONTINUED | OUTPATIENT
Start: 2025-08-03 | End: 2025-08-05 | Stop reason: HOSPADM

## 2025-08-03 RX ORDER — CEFAZOLIN SODIUM 2 G/50ML
2 SOLUTION INTRAVENOUS ONCE
Status: COMPLETED | OUTPATIENT
Start: 2025-08-03 | End: 2025-08-03

## 2025-08-03 RX ORDER — ONDANSETRON 2 MG/ML
4 INJECTION INTRAMUSCULAR; INTRAVENOUS EVERY 6 HOURS PRN
Status: DISCONTINUED | OUTPATIENT
Start: 2025-08-03 | End: 2025-08-05 | Stop reason: HOSPADM

## 2025-08-03 RX ORDER — BISACODYL 10 MG
10 SUPPOSITORY, RECTAL RECTAL DAILY PRN
Status: DISCONTINUED | OUTPATIENT
Start: 2025-08-03 | End: 2025-08-05 | Stop reason: HOSPADM

## 2025-08-03 RX ORDER — CALCIUM CARBONATE 500 MG/1
1000 TABLET, CHEWABLE ORAL 4 TIMES DAILY PRN
Status: DISCONTINUED | OUTPATIENT
Start: 2025-08-03 | End: 2025-08-03

## 2025-08-03 RX ORDER — ACETAMINOPHEN 325 MG/1
650 TABLET ORAL 2 TIMES DAILY PRN
COMMUNITY

## 2025-08-03 RX ORDER — POLYETHYLENE GLYCOL 3350 17 G/17G
17 POWDER, FOR SOLUTION ORAL 2 TIMES DAILY PRN
Status: DISCONTINUED | OUTPATIENT
Start: 2025-08-03 | End: 2025-08-05 | Stop reason: HOSPADM

## 2025-08-03 RX ORDER — TORSEMIDE 10 MG/1
1 TABLET ORAL DAILY
COMMUNITY
Start: 2025-06-13

## 2025-08-03 RX ORDER — AMIODARONE HYDROCHLORIDE 100 MG/1
100 TABLET ORAL DAILY
Status: DISCONTINUED | OUTPATIENT
Start: 2025-08-04 | End: 2025-08-05 | Stop reason: HOSPADM

## 2025-08-03 RX ORDER — AMIODARONE HYDROCHLORIDE 100 MG/1
100 TABLET ORAL DAILY
COMMUNITY
Start: 2025-07-28

## 2025-08-03 RX ORDER — NICOTINE POLACRILEX 4 MG
15-30 LOZENGE BUCCAL
Status: DISCONTINUED | OUTPATIENT
Start: 2025-08-03 | End: 2025-08-05 | Stop reason: HOSPADM

## 2025-08-03 RX ORDER — CEFAZOLIN SODIUM 1 G/50ML
2000 SOLUTION INTRAVENOUS ONCE
Status: COMPLETED | OUTPATIENT
Start: 2025-08-03 | End: 2025-08-03

## 2025-08-03 RX ORDER — VANCOMYCIN HYDROCHLORIDE 1 G/200ML
1000 INJECTION, SOLUTION INTRAVENOUS EVERY 24 HOURS
Status: DISCONTINUED | OUTPATIENT
Start: 2025-08-04 | End: 2025-08-03

## 2025-08-03 RX ORDER — AMOXICILLIN 250 MG
2 CAPSULE ORAL 2 TIMES DAILY PRN
Status: DISCONTINUED | OUTPATIENT
Start: 2025-08-03 | End: 2025-08-05 | Stop reason: HOSPADM

## 2025-08-03 RX ORDER — CEFAZOLIN SODIUM 2 G/50ML
2 SOLUTION INTRAVENOUS EVERY 8 HOURS
Status: DISCONTINUED | OUTPATIENT
Start: 2025-08-04 | End: 2025-08-04

## 2025-08-03 RX ORDER — TORSEMIDE 5 MG/1
10 TABLET ORAL DAILY
Status: DISCONTINUED | OUTPATIENT
Start: 2025-08-04 | End: 2025-08-05 | Stop reason: HOSPADM

## 2025-08-03 RX ORDER — DEXTROSE MONOHYDRATE 25 G/50ML
25-50 INJECTION, SOLUTION INTRAVENOUS
Status: DISCONTINUED | OUTPATIENT
Start: 2025-08-03 | End: 2025-08-05 | Stop reason: HOSPADM

## 2025-08-03 RX ORDER — ACETAMINOPHEN 325 MG/1
650 TABLET ORAL EVERY 4 HOURS PRN
Status: DISCONTINUED | OUTPATIENT
Start: 2025-08-03 | End: 2025-08-05 | Stop reason: HOSPADM

## 2025-08-03 RX ORDER — ATORVASTATIN CALCIUM 20 MG/1
20 TABLET, FILM COATED ORAL AT BEDTIME
Status: DISCONTINUED | OUTPATIENT
Start: 2025-08-03 | End: 2025-08-05 | Stop reason: HOSPADM

## 2025-08-03 RX ORDER — ONDANSETRON 4 MG/1
4 TABLET, ORALLY DISINTEGRATING ORAL EVERY 6 HOURS PRN
Status: DISCONTINUED | OUTPATIENT
Start: 2025-08-03 | End: 2025-08-05 | Stop reason: HOSPADM

## 2025-08-03 RX ORDER — CALCIUM CARBONATE 500 MG/1
1000 TABLET, CHEWABLE ORAL 4 TIMES DAILY PRN
Status: DISCONTINUED | OUTPATIENT
Start: 2025-08-03 | End: 2025-08-05 | Stop reason: HOSPADM

## 2025-08-03 RX ORDER — AMOXICILLIN 250 MG
1 CAPSULE ORAL 2 TIMES DAILY PRN
Status: DISCONTINUED | OUTPATIENT
Start: 2025-08-03 | End: 2025-08-05 | Stop reason: HOSPADM

## 2025-08-03 RX ADMIN — CEFAZOLIN SODIUM 2 G: 2 SOLUTION INTRAVENOUS at 23:54

## 2025-08-03 RX ADMIN — ATORVASTATIN CALCIUM 20 MG: 20 TABLET, FILM COATED ORAL at 21:15

## 2025-08-03 RX ADMIN — SODIUM CHLORIDE 1000 ML: 0.9 INJECTION, SOLUTION INTRAVENOUS at 16:25

## 2025-08-03 RX ADMIN — CEFAZOLIN SODIUM 2 G: 2 SOLUTION INTRAVENOUS at 16:48

## 2025-08-03 RX ADMIN — SODIUM CHLORIDE 2000 MG: 0.9 INJECTION, SOLUTION INTRAVENOUS at 17:20

## 2025-08-03 RX ADMIN — ACETAMINOPHEN 650 MG: 325 TABLET ORAL at 21:15

## 2025-08-03 RX ADMIN — APIXABAN 5 MG: 5 TABLET, FILM COATED ORAL at 21:54

## 2025-08-03 ASSESSMENT — ACTIVITIES OF DAILY LIVING (ADL)
ADLS_ACUITY_SCORE: 57
ADLS_ACUITY_SCORE: 54
ADLS_ACUITY_SCORE: 57

## 2025-08-03 ASSESSMENT — COLUMBIA-SUICIDE SEVERITY RATING SCALE - C-SSRS
6. HAVE YOU EVER DONE ANYTHING, STARTED TO DO ANYTHING, OR PREPARED TO DO ANYTHING TO END YOUR LIFE?: NO
2. HAVE YOU ACTUALLY HAD ANY THOUGHTS OF KILLING YOURSELF IN THE PAST MONTH?: NO
1. IN THE PAST MONTH, HAVE YOU WISHED YOU WERE DEAD OR WISHED YOU COULD GO TO SLEEP AND NOT WAKE UP?: NO

## 2025-08-04 LAB
ACB COMPLEX DNA BLD POS QL NAA+NON-PROBE: NOT DETECTED
ALBUMIN SERPL BCG-MCNC: 3.2 G/DL (ref 3.5–5.2)
ALP SERPL-CCNC: 109 U/L (ref 40–150)
ALT SERPL W P-5'-P-CCNC: 15 U/L (ref 0–70)
ANION GAP SERPL CALCULATED.3IONS-SCNC: 17 MMOL/L (ref 7–15)
AST SERPL W P-5'-P-CCNC: 27 U/L (ref 0–45)
B FRAGILIS DNA BLD POS QL NAA+NON-PROBE: NOT DETECTED
BASOPHILS # BLD AUTO: 0 10E3/UL (ref 0–0.2)
BASOPHILS NFR BLD AUTO: 0 %
BILIRUB SERPL-MCNC: 1.3 MG/DL
BUN SERPL-MCNC: 25.6 MG/DL (ref 8–23)
C ALBICANS DNA BLD POS QL NAA+NON-PROBE: NOT DETECTED
C AURIS DNA BLD POS QL NAA+NON-PROBE: NOT DETECTED
C GATTII+NEOFOR DNA BLD POS QL NAA+N-PRB: NOT DETECTED
C GLABRATA DNA BLD POS QL NAA+NON-PROBE: NOT DETECTED
C KRUSEI DNA BLD POS QL NAA+NON-PROBE: NOT DETECTED
C PARAP DNA BLD POS QL NAA+NON-PROBE: NOT DETECTED
C TROPICLS DNA BLD POS QL NAA+NON-PROBE: NOT DETECTED
CALCIUM SERPL-MCNC: 8.6 MG/DL (ref 8.8–10.4)
CHLORIDE SERPL-SCNC: 105 MMOL/L (ref 98–107)
CREAT SERPL-MCNC: 1.3 MG/DL (ref 0.67–1.17)
E CLOAC COMP DNA BLD POS NAA+NON-PROBE: NOT DETECTED
E COLI DNA BLD POS QL NAA+NON-PROBE: NOT DETECTED
E FAECALIS DNA BLD POS QL NAA+NON-PROBE: NOT DETECTED
E FAECIUM DNA BLD POS QL NAA+NON-PROBE: NOT DETECTED
EGFRCR SERPLBLD CKD-EPI 2021: 52 ML/MIN/1.73M2
ENTEROBACTERALES DNA BLD POS NAA+N-PRB: NOT DETECTED
EOSINOPHIL # BLD AUTO: 0 10E3/UL (ref 0–0.7)
EOSINOPHIL NFR BLD AUTO: 0 %
ERYTHROCYTE [DISTWIDTH] IN BLOOD BY AUTOMATED COUNT: 13.5 % (ref 10–15)
GLUCOSE BLDC GLUCOMTR-MCNC: 123 MG/DL (ref 70–99)
GLUCOSE BLDC GLUCOMTR-MCNC: 124 MG/DL (ref 70–99)
GLUCOSE BLDC GLUCOMTR-MCNC: 124 MG/DL (ref 70–99)
GLUCOSE BLDC GLUCOMTR-MCNC: 131 MG/DL (ref 70–99)
GLUCOSE SERPL-MCNC: 113 MG/DL (ref 70–99)
GP B STREP DNA BLD POS QL NAA+NON-PROBE: NOT DETECTED
HAEM INFLU DNA BLD POS QL NAA+NON-PROBE: NOT DETECTED
HCO3 SERPL-SCNC: 16 MMOL/L (ref 22–29)
HCT VFR BLD AUTO: 37.6 % (ref 40–53)
HGB BLD-MCNC: 12.8 G/DL (ref 13.3–17.7)
IMM GRANULOCYTES # BLD: 0.1 10E3/UL
IMM GRANULOCYTES NFR BLD: 1 %
K OXYTOCA DNA BLD POS QL NAA+NON-PROBE: NOT DETECTED
KLEBSIELLA SP DNA BLD POS QL NAA+NON-PRB: NOT DETECTED
KLEBSIELLA SP DNA BLD POS QL NAA+NON-PRB: NOT DETECTED
L MONOCYTOG DNA BLD POS QL NAA+NON-PROBE: NOT DETECTED
LYMPHOCYTES # BLD AUTO: 1 10E3/UL (ref 0.8–5.3)
LYMPHOCYTES NFR BLD AUTO: 8 %
MCH RBC QN AUTO: 30.7 PG (ref 26.5–33)
MCHC RBC AUTO-ENTMCNC: 34 G/DL (ref 31.5–36.5)
MCV RBC AUTO: 90 FL (ref 78–100)
MONOCYTES # BLD AUTO: 0.6 10E3/UL (ref 0–1.3)
MONOCYTES NFR BLD AUTO: 5 %
N MEN DNA BLD POS QL NAA+NON-PROBE: NOT DETECTED
NEUTROPHILS # BLD AUTO: 10 10E3/UL (ref 1.6–8.3)
NEUTROPHILS NFR BLD AUTO: 85 %
NRBC # BLD AUTO: 0 10E3/UL
NRBC BLD AUTO-RTO: 0 /100
P AERUGINOSA DNA BLD POS NAA+NON-PROBE: NOT DETECTED
PLATELET # BLD AUTO: 112 10E3/UL (ref 150–450)
POTASSIUM SERPL-SCNC: 4 MMOL/L (ref 3.4–5.3)
PROT SERPL-MCNC: 6.1 G/DL (ref 6.4–8.3)
PROTEUS SP DNA BLD POS QL NAA+NON-PROBE: NOT DETECTED
RBC # BLD AUTO: 4.17 10E6/UL (ref 4.4–5.9)
S AUREUS DNA BLD POS QL NAA+NON-PROBE: NOT DETECTED
S AUREUS+CONS DNA BLD POS NAA+NON-PROBE: NOT DETECTED
S EPIDERMIDIS DNA BLD POS QL NAA+NON-PRB: NOT DETECTED
S LUGDUNENSIS DNA BLD POS QL NAA+NON-PRB: NOT DETECTED
S MALTOPHILIA DNA BLD POS QL NAA+NON-PRB: NOT DETECTED
S MARCESCENS DNA BLD POS NAA+NON-PROBE: NOT DETECTED
S PNEUM DNA BLD POS QL NAA+NON-PROBE: NOT DETECTED
S PYO DNA BLD POS QL NAA+NON-PROBE: NOT DETECTED
SALMONELLA DNA BLD POS QL NAA+NON-PROBE: NOT DETECTED
SODIUM SERPL-SCNC: 138 MMOL/L (ref 135–145)
STREPTOCOCCUS DNA BLD POS NAA+NON-PROBE: DETECTED
WBC # BLD AUTO: 11.8 10E3/UL (ref 4–11)

## 2025-08-04 PROCEDURE — 120N000001 HC R&B MED SURG/OB

## 2025-08-04 PROCEDURE — 99232 SBSQ HOSP IP/OBS MODERATE 35: CPT | Performed by: STUDENT IN AN ORGANIZED HEALTH CARE EDUCATION/TRAINING PROGRAM

## 2025-08-04 PROCEDURE — 82962 GLUCOSE BLOOD TEST: CPT

## 2025-08-04 PROCEDURE — 250N000013 HC RX MED GY IP 250 OP 250 PS 637: Performed by: STUDENT IN AN ORGANIZED HEALTH CARE EDUCATION/TRAINING PROGRAM

## 2025-08-04 PROCEDURE — 250N000011 HC RX IP 250 OP 636: Performed by: STUDENT IN AN ORGANIZED HEALTH CARE EDUCATION/TRAINING PROGRAM

## 2025-08-04 PROCEDURE — 36415 COLL VENOUS BLD VENIPUNCTURE: CPT

## 2025-08-04 PROCEDURE — 85025 COMPLETE CBC W/AUTO DIFF WBC: CPT

## 2025-08-04 PROCEDURE — 84155 ASSAY OF PROTEIN SERUM: CPT

## 2025-08-04 PROCEDURE — 250N000013 HC RX MED GY IP 250 OP 250 PS 637

## 2025-08-04 RX ORDER — CEFTRIAXONE 2 G/1
2 INJECTION, POWDER, FOR SOLUTION INTRAMUSCULAR; INTRAVENOUS EVERY 24 HOURS
Status: DISCONTINUED | OUTPATIENT
Start: 2025-08-04 | End: 2025-08-05

## 2025-08-04 RX ORDER — VANCOMYCIN HYDROCHLORIDE 1 G/200ML
1000 INJECTION, SOLUTION INTRAVENOUS EVERY 24 HOURS
Status: DISCONTINUED | OUTPATIENT
Start: 2025-08-04 | End: 2025-08-04

## 2025-08-04 RX ORDER — TORSEMIDE 5 MG/1
10 TABLET ORAL ONCE
Status: COMPLETED | OUTPATIENT
Start: 2025-08-04 | End: 2025-08-04

## 2025-08-04 RX ADMIN — APIXABAN 5 MG: 5 TABLET, FILM COATED ORAL at 19:37

## 2025-08-04 RX ADMIN — ACETAMINOPHEN 650 MG: 325 TABLET ORAL at 13:30

## 2025-08-04 RX ADMIN — VANCOMYCIN HYDROCHLORIDE 1000 MG: 1 INJECTION, SOLUTION INTRAVENOUS at 07:03

## 2025-08-04 RX ADMIN — TORSEMIDE 10 MG: 5 TABLET ORAL at 09:14

## 2025-08-04 RX ADMIN — ATORVASTATIN CALCIUM 20 MG: 20 TABLET, FILM COATED ORAL at 21:41

## 2025-08-04 RX ADMIN — ACETAMINOPHEN 650 MG: 325 TABLET ORAL at 04:52

## 2025-08-04 RX ADMIN — TORSEMIDE 10 MG: 5 TABLET ORAL at 14:42

## 2025-08-04 RX ADMIN — AMIODARONE HYDROCHLORIDE 100 MG: 100 TABLET ORAL at 09:14

## 2025-08-04 RX ADMIN — CEFTRIAXONE 2 G: 2 INJECTION, POWDER, FOR SOLUTION INTRAMUSCULAR; INTRAVENOUS at 09:14

## 2025-08-04 RX ADMIN — APIXABAN 5 MG: 5 TABLET, FILM COATED ORAL at 09:14

## 2025-08-04 RX ADMIN — Medication 200 MG: at 09:14

## 2025-08-04 ASSESSMENT — ACTIVITIES OF DAILY LIVING (ADL)
ADLS_ACUITY_SCORE: 54
DRESSING/BATHING_DIFFICULTY: NO
ADLS_ACUITY_SCORE: 43
ADLS_ACUITY_SCORE: 54
ADLS_ACUITY_SCORE: 43
ADLS_ACUITY_SCORE: 55
ADLS_ACUITY_SCORE: 43
ADLS_ACUITY_SCORE: 54
ADLS_ACUITY_SCORE: 55
ADLS_ACUITY_SCORE: 41
WALKING_OR_CLIMBING_STAIRS: AMBULATION DIFFICULTY, REQUIRES EQUIPMENT
TOILETING_ISSUES: NO
CONCENTRATING,_REMEMBERING_OR_MAKING_DECISIONS_DIFFICULTY: NO
ADLS_ACUITY_SCORE: 41
ADLS_ACUITY_SCORE: 54
ADLS_ACUITY_SCORE: 41
ADLS_ACUITY_SCORE: 54
DOING_ERRANDS_INDEPENDENTLY_DIFFICULTY: NO
ADLS_ACUITY_SCORE: 41
ADLS_ACUITY_SCORE: 54
ADLS_ACUITY_SCORE: 43
DIFFICULTY_EATING/SWALLOWING: NO
WALKING_OR_CLIMBING_STAIRS_DIFFICULTY: YES
ADLS_ACUITY_SCORE: 55
ADLS_ACUITY_SCORE: 54
ADLS_ACUITY_SCORE: 55
ADLS_ACUITY_SCORE: 54
ADLS_ACUITY_SCORE: 41
EQUIPMENT_CURRENTLY_USED_AT_HOME: WALKER, ROLLING;CANE, QUAD
ADLS_ACUITY_SCORE: 55
ADLS_ACUITY_SCORE: 55

## 2025-08-05 VITALS
DIASTOLIC BLOOD PRESSURE: 64 MMHG | HEIGHT: 71 IN | TEMPERATURE: 99.4 F | RESPIRATION RATE: 18 BRPM | WEIGHT: 226 LBS | BODY MASS INDEX: 31.64 KG/M2 | SYSTOLIC BLOOD PRESSURE: 118 MMHG | OXYGEN SATURATION: 96 % | HEART RATE: 64 BPM

## 2025-08-05 LAB
ALBUMIN SERPL BCG-MCNC: 3.2 G/DL (ref 3.5–5.2)
ANION GAP SERPL CALCULATED.3IONS-SCNC: 12 MMOL/L (ref 7–15)
BASOPHILS # BLD AUTO: 0.1 10E3/UL (ref 0–0.2)
BASOPHILS NFR BLD AUTO: 0 %
BUN SERPL-MCNC: 30.9 MG/DL (ref 8–23)
CALCIUM SERPL-MCNC: 8.5 MG/DL (ref 8.8–10.4)
CHLORIDE SERPL-SCNC: 103 MMOL/L (ref 98–107)
CREAT SERPL-MCNC: 1.45 MG/DL (ref 0.67–1.17)
EGFRCR SERPLBLD CKD-EPI 2021: 45 ML/MIN/1.73M2
EOSINOPHIL # BLD AUTO: 0.1 10E3/UL (ref 0–0.7)
EOSINOPHIL NFR BLD AUTO: 1 %
ERYTHROCYTE [DISTWIDTH] IN BLOOD BY AUTOMATED COUNT: 13.6 % (ref 10–15)
GLUCOSE BLDC GLUCOMTR-MCNC: 106 MG/DL (ref 70–99)
GLUCOSE BLDC GLUCOMTR-MCNC: 116 MG/DL (ref 70–99)
GLUCOSE SERPL-MCNC: 115 MG/DL (ref 70–99)
HCO3 SERPL-SCNC: 21 MMOL/L (ref 22–29)
HCT VFR BLD AUTO: 37.9 % (ref 40–53)
HGB BLD-MCNC: 12.9 G/DL (ref 13.3–17.7)
IMM GRANULOCYTES # BLD: 0.1 10E3/UL
IMM GRANULOCYTES NFR BLD: 1 %
LYMPHOCYTES # BLD AUTO: 2.5 10E3/UL (ref 0.8–5.3)
LYMPHOCYTES NFR BLD AUTO: 20 %
MCH RBC QN AUTO: 30.9 PG (ref 26.5–33)
MCHC RBC AUTO-ENTMCNC: 34 G/DL (ref 31.5–36.5)
MCV RBC AUTO: 91 FL (ref 78–100)
MONOCYTES # BLD AUTO: 0.8 10E3/UL (ref 0–1.3)
MONOCYTES NFR BLD AUTO: 7 %
NEUTROPHILS # BLD AUTO: 8.9 10E3/UL (ref 1.6–8.3)
NEUTROPHILS NFR BLD AUTO: 72 %
NRBC # BLD AUTO: 0 10E3/UL
NRBC BLD AUTO-RTO: 0 /100
PHOSPHATE SERPL-MCNC: 1.9 MG/DL (ref 2.5–4.5)
PLATELET # BLD AUTO: 100 10E3/UL (ref 150–450)
POTASSIUM SERPL-SCNC: 3.5 MMOL/L (ref 3.4–5.3)
RBC # BLD AUTO: 4.17 10E6/UL (ref 4.4–5.9)
SODIUM SERPL-SCNC: 136 MMOL/L (ref 135–145)
WBC # BLD AUTO: 12.5 10E3/UL (ref 4–11)

## 2025-08-05 PROCEDURE — 85004 AUTOMATED DIFF WBC COUNT: CPT | Performed by: STUDENT IN AN ORGANIZED HEALTH CARE EDUCATION/TRAINING PROGRAM

## 2025-08-05 PROCEDURE — 99239 HOSP IP/OBS DSCHRG MGMT >30: CPT | Performed by: STUDENT IN AN ORGANIZED HEALTH CARE EDUCATION/TRAINING PROGRAM

## 2025-08-05 PROCEDURE — 36415 COLL VENOUS BLD VENIPUNCTURE: CPT | Performed by: STUDENT IN AN ORGANIZED HEALTH CARE EDUCATION/TRAINING PROGRAM

## 2025-08-05 PROCEDURE — 82310 ASSAY OF CALCIUM: CPT | Performed by: STUDENT IN AN ORGANIZED HEALTH CARE EDUCATION/TRAINING PROGRAM

## 2025-08-05 PROCEDURE — 250N000013 HC RX MED GY IP 250 OP 250 PS 637: Performed by: STUDENT IN AN ORGANIZED HEALTH CARE EDUCATION/TRAINING PROGRAM

## 2025-08-05 PROCEDURE — 250N000011 HC RX IP 250 OP 636: Performed by: STUDENT IN AN ORGANIZED HEALTH CARE EDUCATION/TRAINING PROGRAM

## 2025-08-05 PROCEDURE — 250N000013 HC RX MED GY IP 250 OP 250 PS 637

## 2025-08-05 RX ORDER — AMOXICILLIN 500 MG/1
500 CAPSULE ORAL 2 TIMES DAILY
Qty: 9 CAPSULE | Refills: 0 | Status: SHIPPED | OUTPATIENT
Start: 2025-08-05 | End: 2025-08-10

## 2025-08-05 RX ORDER — POTASSIUM CHLORIDE 1500 MG/1
40 TABLET, EXTENDED RELEASE ORAL ONCE
Status: COMPLETED | OUTPATIENT
Start: 2025-08-05 | End: 2025-08-05

## 2025-08-05 RX ORDER — DOXYCYCLINE HYCLATE 100 MG
100 TABLET ORAL 2 TIMES DAILY
Qty: 9 TABLET | Refills: 0 | Status: SHIPPED | OUTPATIENT
Start: 2025-08-05 | End: 2025-08-10

## 2025-08-05 RX ADMIN — AMIODARONE HYDROCHLORIDE 100 MG: 100 TABLET ORAL at 08:30

## 2025-08-05 RX ADMIN — POTASSIUM CHLORIDE EXTENDED-RELEASE 40 MEQ: 1500 TABLET ORAL at 09:07

## 2025-08-05 RX ADMIN — Medication 200 MG: at 08:30

## 2025-08-05 RX ADMIN — APIXABAN 5 MG: 5 TABLET, FILM COATED ORAL at 08:26

## 2025-08-05 RX ADMIN — CEFTRIAXONE 2 G: 2 INJECTION, POWDER, FOR SOLUTION INTRAMUSCULAR; INTRAVENOUS at 08:24

## 2025-08-05 RX ADMIN — ACETAMINOPHEN 650 MG: 325 TABLET ORAL at 08:26

## 2025-08-05 RX ADMIN — TORSEMIDE 10 MG: 5 TABLET ORAL at 08:29

## 2025-08-05 ASSESSMENT — ACTIVITIES OF DAILY LIVING (ADL)
ADLS_ACUITY_SCORE: 46
ADLS_ACUITY_SCORE: 45
ADLS_ACUITY_SCORE: 46
ADLS_ACUITY_SCORE: 43

## 2025-08-06 LAB
BACTERIA SPEC CULT: ABNORMAL
BACTERIA SPEC CULT: ABNORMAL

## 2025-08-07 LAB — BACTERIA SPEC CULT: NORMAL

## 2025-08-08 LAB — BACTERIA SPEC CULT: NO GROWTH

## (undated) DEVICE — SUTURE VICRYL+ 1 27IN CT-1 UND VCP261H

## (undated) DEVICE — DRAPE C-ARM 60X42" 1013

## (undated) DEVICE — DRAPE C-ARMOR 5 SIDED 5523

## (undated) DEVICE — GLOVE BIOGEL PI INDICATOR 8.0 LF 41680

## (undated) DEVICE — SOL NACL 0.9% INJ 1000ML BAG 2B1324X

## (undated) DEVICE — A3 SUPPLIES- SEE NURSING INFO PAGE

## (undated) DEVICE — DRAPE IOBAN INCISE 23X17" 6650EZ

## (undated) DEVICE — KIT DRSG PREVENA PLUS NEG PRESSURE W/CANISTER PRE4001US

## (undated) DEVICE — GOWN IMPERVIOUS SPECIALTY XLG/XLONG 32474

## (undated) DEVICE — CATH TRAY FOLEY SURESTEP 16FR DRAIN BAG STATOCK A899916

## (undated) DEVICE — DECANTER VIAL 2006S

## (undated) DEVICE — DRSG GAUZE 4X4" 3033

## (undated) DEVICE — HOLDER LIMB VELCRO OR 0814-1533

## (undated) DEVICE — SUCTION MANIFOLD NEPTUNE 2 SYS 4 PORT 0702-020-000

## (undated) DEVICE — ESU GROUND PAD ADULT REM W/15' CORD E7507DB

## (undated) DEVICE — SUTURE VICRYL+ 2 27IN CP UNDYED VCP195H

## (undated) DEVICE — ESU ELEC BLADE 6" COATED E1450-6

## (undated) DEVICE — ENDO CANNULA 05MM VERSAONE UNIVERSAL UNVCA5STF

## (undated) DEVICE — ESU CORD MONOPOLAR 10'  E0510

## (undated) DEVICE — TUBING SUCTION 12"X1/4" N612

## (undated) DEVICE — POSITIONER ABDUCTION PILLOW FOAM MED FP-ABDUCTM

## (undated) DEVICE — GLOVE BIOGEL PI ULTRATOUCH G SZ 7.5 42175

## (undated) DEVICE — SUTURE VICRYL+ 0 27IN CT-1 UND VCP260H

## (undated) DEVICE — ENDO POUCH REIACATCH 2.44" 10MM CATCH10

## (undated) DEVICE — SUCTION SLEEVE NEPTUNE 2 165MM 0703-005-165

## (undated) DEVICE — DRSG ABD TNDRSRB WET PRUF 8IN X 10IN STRL  9194A

## (undated) DEVICE — ESU GROUND PAD UNIVERSAL W/O CORD

## (undated) DEVICE — SUTURE VICRYL+ 2-0 27IN CT-1 UND VCP259H

## (undated) DEVICE — SPONGE LAP 18X18" X8435

## (undated) DEVICE — LINEN TOWEL PACK X5 5464

## (undated) DEVICE — GOWN IMPERVIOUS BREATHABLE SMART XLG 89045

## (undated) DEVICE — DRSG DRAIN 4X4" 7086

## (undated) DEVICE — CLIP APPLIER ENDO 05MM MED/LG 176630

## (undated) DEVICE — ENDO TROCAR OPTICAL 11MM VERSAPORT PLUS W/FIX CAN ONB11STF

## (undated) DEVICE — SUCTION IRR SYSTEM W/O TIP INTERPULSE HANDPIECE 0210-100-000

## (undated) DEVICE — BONE CEMENT MIXEVAC III HI VAC KIT  0206-015-000

## (undated) DEVICE — CLEANSER JET LAVAGE IRRISEPT 0.05% CHG IRRISEPT45USA

## (undated) DEVICE — TOOL DISSECT MIDAS MR8 14CM MTL CUTTR 3MM MR8-14MC30

## (undated) DEVICE — ESU BIPOLAR SEALER AQUAMANTYS 6MM 23-112-1

## (undated) DEVICE — SOLUTION IRRIG 2B7127 .9NS 3000ML BAG

## (undated) DEVICE — SOL NACL 0.9% IRRIG 1000ML BOTTLE 2F7124

## (undated) DEVICE — BONE CLEANING TIP INTERPULSE  0210-010-000

## (undated) DEVICE — DRAPE IOBAN INCISE 36X23" 6651EZ

## (undated) DEVICE — SOL WATER IRRIG 1000ML BOTTLE 2F7114

## (undated) DEVICE — NDL INSUFFLATION 120MM VERRES 172015

## (undated) DEVICE — SUCTION IRR STRYKERFLOW II W/TIP 250-070-520

## (undated) DEVICE — PACK LAP CHOLE SLC15LCFSD

## (undated) DEVICE — MAT FLOOR SURGICAL 40X38 0702140238

## (undated) DEVICE — SU STRATAFIX PDS PLUS 1 CT-1 18" SXPP1A404

## (undated) DEVICE — SUCTION CANISTER MEDIVAC LINER 3000ML W/LID 65651-530

## (undated) DEVICE — LUBRICATING JELLY 4.25OZ

## (undated) DEVICE — GLOVE BIOGEL INDICATOR 7.5 LF 41675

## (undated) DEVICE — PREP CHLORAPREP 26ML TINTED HI-LITE ORANGE 930815

## (undated) DEVICE — KIT ENDO TURNOVER/PROCEDURE CARRY-ON 101822

## (undated) DEVICE — SU VICRYL 4-0 PS-2 18" UND J496H

## (undated) DEVICE — PREP CHLORAPREP 26ML TINTED ORANGE  260815

## (undated) DEVICE — SOL ISOPROPYL RUBBING ALCOHOL USP 70% 4OZ HDX-20 I0020

## (undated) DEVICE — GLOVE PROTEXIS W/NEU-THERA 8.0  2D73TE80

## (undated) DEVICE — SUTURE MONOCRYL 3-0 18 PS2 UND MCP497G

## (undated) DEVICE — SU ETHIBOND 5 V-37 4X30" MB66G

## (undated) DEVICE — ENDO TROCAR OPTICAL 05MM VERSAPORT PLUS W/FIX CAN ONB5STF

## (undated) DEVICE — GLOVE BIOGEL PI ULTRATOUCH G SZ 8.0 42180

## (undated) DEVICE — CUSTOM PACK TOTAL HIP SOP5BTHHEA

## (undated) DEVICE — PUMP UNIT NEGATIVE PRESSURE PREVENA PLUS PRE4010.S

## (undated) DEVICE — SU ETHILON 2-0 FS 18" 664H

## (undated) DEVICE — PAD HIP POSITIONING MCGUIRE 707-CPM

## (undated) DEVICE — ESU HOLDER LAP INST DISP PURPLE LONG 330MM H-PRO-330

## (undated) DEVICE — BLADE SAW SAGITTAL STRK WIDE 25.4X85X1.2MM 2108-151-000

## (undated) DEVICE — DRAPE STERI U 1015

## (undated) RX ORDER — PROPOFOL 10 MG/ML
INJECTION, EMULSION INTRAVENOUS
Status: DISPENSED
Start: 2024-01-24

## (undated) RX ORDER — HYDROMORPHONE HYDROCHLORIDE 1 MG/ML
INJECTION, SOLUTION INTRAMUSCULAR; INTRAVENOUS; SUBCUTANEOUS
Status: DISPENSED
Start: 2017-12-28

## (undated) RX ORDER — GLYCOPYRROLATE 0.2 MG/ML
INJECTION, SOLUTION INTRAMUSCULAR; INTRAVENOUS
Status: DISPENSED
Start: 2017-12-28

## (undated) RX ORDER — PHENYLEPHRINE HYDROCHLORIDE 10 MG/ML
INJECTION INTRAVENOUS
Status: DISPENSED
Start: 2024-01-24

## (undated) RX ORDER — PROPOFOL 10 MG/ML
INJECTION, EMULSION INTRAVENOUS
Status: DISPENSED
Start: 2017-12-28

## (undated) RX ORDER — REGADENOSON 0.08 MG/ML
INJECTION, SOLUTION INTRAVENOUS
Status: DISPENSED
Start: 2017-12-27

## (undated) RX ORDER — GLYCOPYRROLATE 0.2 MG/ML
INJECTION, SOLUTION INTRAMUSCULAR; INTRAVENOUS
Status: DISPENSED
Start: 2024-01-24

## (undated) RX ORDER — CEFAZOLIN SODIUM 1 G/3ML
INJECTION, POWDER, FOR SOLUTION INTRAMUSCULAR; INTRAVENOUS
Status: DISPENSED
Start: 2024-01-24

## (undated) RX ORDER — BUPIVACAINE HYDROCHLORIDE AND EPINEPHRINE 5; 5 MG/ML; UG/ML
INJECTION, SOLUTION EPIDURAL; INTRACAUDAL; PERINEURAL
Status: DISPENSED
Start: 2017-12-28

## (undated) RX ORDER — PROPOFOL 10 MG/ML
INJECTION, EMULSION INTRAVENOUS
Status: DISPENSED
Start: 2023-08-19

## (undated) RX ORDER — VASOPRESSIN 20 U/ML
INJECTION PARENTERAL
Status: DISPENSED
Start: 2024-01-24

## (undated) RX ORDER — FENTANYL CITRATE 50 UG/ML
INJECTION, SOLUTION INTRAMUSCULAR; INTRAVENOUS
Status: DISPENSED
Start: 2024-01-24

## (undated) RX ORDER — DEXAMETHASONE SODIUM PHOSPHATE 10 MG/ML
INJECTION, SOLUTION INTRAMUSCULAR; INTRAVENOUS
Status: DISPENSED
Start: 2024-01-24

## (undated) RX ORDER — LIDOCAINE HYDROCHLORIDE 10 MG/ML
INJECTION, SOLUTION EPIDURAL; INFILTRATION; INTRACAUDAL; PERINEURAL
Status: DISPENSED
Start: 2024-01-24

## (undated) RX ORDER — FENTANYL CITRATE 50 UG/ML
INJECTION, SOLUTION INTRAMUSCULAR; INTRAVENOUS
Status: DISPENSED
Start: 2017-12-28

## (undated) RX ORDER — LIDOCAINE HYDROCHLORIDE 10 MG/ML
INJECTION, SOLUTION EPIDURAL; INFILTRATION; INTRACAUDAL; PERINEURAL
Status: DISPENSED
Start: 2023-08-19

## (undated) RX ORDER — ONDANSETRON 2 MG/ML
INJECTION INTRAMUSCULAR; INTRAVENOUS
Status: DISPENSED
Start: 2017-12-28

## (undated) RX ORDER — LIDOCAINE HYDROCHLORIDE 20 MG/ML
INJECTION, SOLUTION EPIDURAL; INFILTRATION; INTRACAUDAL; PERINEURAL
Status: DISPENSED
Start: 2017-12-28

## (undated) RX ORDER — ONDANSETRON 2 MG/ML
INJECTION INTRAMUSCULAR; INTRAVENOUS
Status: DISPENSED
Start: 2024-01-24

## (undated) RX ORDER — VANCOMYCIN HYDROCHLORIDE 1 G/20ML
INJECTION, POWDER, LYOPHILIZED, FOR SOLUTION INTRAVENOUS
Status: DISPENSED
Start: 2024-01-24

## (undated) RX ORDER — DEXAMETHASONE SODIUM PHOSPHATE 4 MG/ML
INJECTION, SOLUTION INTRA-ARTICULAR; INTRALESIONAL; INTRAMUSCULAR; INTRAVENOUS; SOFT TISSUE
Status: DISPENSED
Start: 2017-12-28

## (undated) RX ORDER — ETOMIDATE 2 MG/ML
INJECTION INTRAVENOUS
Status: DISPENSED
Start: 2024-01-24